# Patient Record
Sex: MALE | Race: WHITE | Employment: OTHER | URBAN - METROPOLITAN AREA
[De-identification: names, ages, dates, MRNs, and addresses within clinical notes are randomized per-mention and may not be internally consistent; named-entity substitution may affect disease eponyms.]

---

## 2017-05-11 ENCOUNTER — HOSPITAL ENCOUNTER (OUTPATIENT)
Dept: ULTRASOUND IMAGING | Age: 82
Discharge: HOME | End: 2017-05-11
Attending: INTERNAL MEDICINE | Admitting: INTERNAL MEDICINE

## 2017-05-11 DIAGNOSIS — N28.9 UNSPECIFIED DISORDER OF KIDNEY AND URETER: ICD-10-CM

## 2018-01-30 ENCOUNTER — HOSPITAL ENCOUNTER (OUTPATIENT)
Dept: GENERAL RADIOLOGY | Age: 83
Discharge: HOME | End: 2018-01-30
Attending: PHYSICIAN ASSISTANT | Admitting: PHYSICIAN ASSISTANT

## 2018-01-30 DIAGNOSIS — R06.09 DOE (DYSPNEA ON EXERTION): ICD-10-CM

## 2018-01-30 DIAGNOSIS — R07.9 CHEST PAIN, UNSPECIFIED TYPE: ICD-10-CM

## 2018-01-30 DIAGNOSIS — R53.1 WEAKNESS: ICD-10-CM

## 2018-01-30 DIAGNOSIS — R00.0 TACHYCARDIA: ICD-10-CM

## 2018-01-30 LAB
ALBUMIN SERPL-MCNC: 3.8 GM/DL (ref 3.4–5)
ALP BLD-CCNC: 111 IU/L (ref 40–129)
ALT SERPL-CCNC: 10 U/L (ref 10–40)
ANION GAP SERPL CALCULATED.3IONS-SCNC: 12 MMOL/L (ref 4–16)
AST SERPL-CCNC: 15 IU/L (ref 15–37)
BASOPHILS ABSOLUTE: 0 K/CU MM
BASOPHILS RELATIVE PERCENT: 0.7 % (ref 0–1)
BILIRUB SERPL-MCNC: 0.2 MG/DL (ref 0–1)
BUN BLDV-MCNC: 25 MG/DL (ref 6–23)
CALCIUM SERPL-MCNC: 9 MG/DL (ref 8.3–10.6)
CHLORIDE BLD-SCNC: 102 MMOL/L (ref 99–110)
CO2: 26 MMOL/L (ref 21–32)
CREAT SERPL-MCNC: 1.5 MG/DL (ref 0.9–1.3)
DIFFERENTIAL TYPE: ABNORMAL
EKG ATRIAL RATE: 87 BPM
EKG DIAGNOSIS: NORMAL
EKG P AXIS: 52 DEGREES
EKG P-R INTERVAL: 200 MS
EKG Q-T INTERVAL: 348 MS
EKG QRS DURATION: 78 MS
EKG QTC CALCULATION (BAZETT): 418 MS
EKG R AXIS: 45 DEGREES
EKG T AXIS: 34 DEGREES
EKG VENTRICULAR RATE: 87 BPM
EOSINOPHILS ABSOLUTE: 0.2 K/CU MM
EOSINOPHILS RELATIVE PERCENT: 3.1 % (ref 0–3)
FERRITIN: 10 NG/ML (ref 30–400)
GFR AFRICAN AMERICAN: 54 ML/MIN/1.73M2
GFR NON-AFRICAN AMERICAN: 44 ML/MIN/1.73M2
GLUCOSE BLD-MCNC: 114 MG/DL (ref 70–99)
HCT VFR BLD CALC: 26.5 % (ref 42–52)
HEMOGLOBIN: 7.7 GM/DL (ref 13.5–18)
IMMATURE NEUTROPHIL %: 0.5 % (ref 0–0.43)
IRON: 24 UG/DL (ref 59–158)
LYMPHOCYTES ABSOLUTE: 0.6 K/CU MM
LYMPHOCYTES RELATIVE PERCENT: 9.9 % (ref 24–44)
MCH RBC QN AUTO: 24.5 PG (ref 27–31)
MCHC RBC AUTO-ENTMCNC: 29.1 % (ref 32–36)
MCV RBC AUTO: 84.4 FL (ref 78–100)
MONOCYTES ABSOLUTE: 0.7 K/CU MM
MONOCYTES RELATIVE PERCENT: 12.4 % (ref 0–4)
PCT TRANSFERRIN: 8 % (ref 10–44)
PDW BLD-RTO: 15.8 % (ref 11.7–14.9)
PLATELET # BLD: 281 K/CU MM (ref 140–440)
PMV BLD AUTO: 10 FL (ref 7.5–11.1)
POTASSIUM SERPL-SCNC: 4.1 MMOL/L (ref 3.5–5.1)
PRO-BNP: 220.9 PG/ML
RBC # BLD: 3.14 M/CU MM (ref 4.6–6.2)
SEGMENTED NEUTROPHILS ABSOLUTE COUNT: 4.1 K/CU MM
SEGMENTED NEUTROPHILS RELATIVE PERCENT: 73.4 % (ref 36–66)
SODIUM BLD-SCNC: 140 MMOL/L (ref 135–145)
TOTAL IMMATURE NEUTOROPHIL: 0.03 K/CU MM
TOTAL IRON BINDING CAPACITY: 297 UG/DL (ref 250–450)
TOTAL PROTEIN: 6.2 GM/DL (ref 6.4–8.2)
TRANSFERRIN: 259.9 MG/DL (ref 200–360)
TSH HIGH SENSITIVITY: 3.65 UIU/ML (ref 0.27–4.2)
UNSATURATED IRON BINDING CAPACITY: 273 UG/DL (ref 110–370)
WBC # BLD: 5.6 K/CU MM (ref 4–10.5)

## 2018-02-01 ENCOUNTER — HOSPITAL ENCOUNTER (OUTPATIENT)
Dept: INFUSION THERAPY | Age: 83
Discharge: HOME | End: 2018-02-01
Attending: FAMILY MEDICINE | Admitting: FAMILY MEDICINE

## 2018-02-01 VITALS
SYSTOLIC BLOOD PRESSURE: 130 MMHG | OXYGEN SATURATION: 98 % | DIASTOLIC BLOOD PRESSURE: 72 MMHG | RESPIRATION RATE: 18 BRPM | HEART RATE: 76 BPM | TEMPERATURE: 97.9 F

## 2018-02-01 RX ORDER — ACETAMINOPHEN 325 MG/1
650 TABLET ORAL SEE ADMIN INSTRUCTIONS
Status: COMPLETED | OUTPATIENT
Start: 2018-02-01 | End: 2018-02-01

## 2018-02-01 RX ORDER — SODIUM CHLORIDE 9 MG/ML
INJECTION, SOLUTION INTRAVENOUS
Status: COMPLETED
Start: 2018-02-01 | End: 2018-02-01

## 2018-02-01 RX ORDER — 0.9 % SODIUM CHLORIDE 0.9 %
250 INTRAVENOUS SOLUTION INTRAVENOUS ONCE
Status: COMPLETED | OUTPATIENT
Start: 2018-02-01 | End: 2018-02-01

## 2018-02-01 RX ORDER — SODIUM CHLORIDE 0.9 % (FLUSH) 0.9 %
10 SYRINGE (ML) INJECTION PRN
Status: DISCONTINUED | OUTPATIENT
Start: 2018-02-01 | End: 2018-02-02 | Stop reason: HOSPADM

## 2018-02-01 RX ADMIN — Medication 10 ML: at 08:25

## 2018-02-01 RX ADMIN — ACETAMINOPHEN 650 MG: 325 TABLET ORAL at 09:12

## 2018-02-01 RX ADMIN — SODIUM CHLORIDE 500 ML: 9 INJECTION, SOLUTION INTRAVENOUS at 09:13

## 2018-02-01 RX ADMIN — Medication 500 ML: at 09:13

## 2018-02-01 NOTE — PLAN OF CARE
Pt taken to room 4 . Pt oriented to room, call light, bed/chair controls, TV, pt voiced understanding. Plan of care explained to pt, pt voiced understanding.

## 2018-04-10 ENCOUNTER — HOSPITAL ENCOUNTER (OUTPATIENT)
Dept: GENERAL RADIOLOGY | Age: 83
Discharge: HOME | End: 2018-04-10
Attending: SPECIALIST | Admitting: SPECIALIST

## 2018-04-10 DIAGNOSIS — K92.2 GASTROINTESTINAL HEMORRHAGE: ICD-10-CM

## 2018-04-10 DIAGNOSIS — D64.9 ANEMIA, UNSPECIFIED TYPE: ICD-10-CM

## 2018-04-10 DIAGNOSIS — K92.2 ACUTE GASTROINTESTINAL HEMORRHAGE: ICD-10-CM

## 2018-04-10 DIAGNOSIS — D64.9 ANEMIA: ICD-10-CM

## 2018-11-19 LAB
CHOLESTEROL, TOTAL: 157 MG/DL
CHOLESTEROL/HDL RATIO: NORMAL
HDLC SERPL-MCNC: 41 MG/DL (ref 35–70)
LDL CHOLESTEROL CALCULATED: 75 MG/DL (ref 0–160)
TRIGL SERPL-MCNC: 207 MG/DL
VLDLC SERPL CALC-MCNC: 41 MG/DL

## 2019-10-12 DIAGNOSIS — N40.1 BPH WITH URINARY OBSTRUCTION: ICD-10-CM

## 2019-10-12 DIAGNOSIS — N13.8 BPH WITH URINARY OBSTRUCTION: ICD-10-CM

## 2019-10-12 DIAGNOSIS — F41.9 ANXIETY: ICD-10-CM

## 2019-10-12 RX ORDER — FERROUS SULFATE 325(65) MG
325 TABLET ORAL 2 TIMES DAILY WITH MEALS
COMMUNITY
End: 2019-11-06

## 2019-10-12 RX ORDER — QUETIAPINE FUMARATE 100 MG/1
50 TABLET, FILM COATED ORAL EVERY EVENING
COMMUNITY
End: 2021-05-24

## 2019-10-12 RX ORDER — MINOCYCLINE HYDROCHLORIDE 100 MG/1
100 CAPSULE ORAL 2 TIMES DAILY
COMMUNITY
End: 2019-11-06

## 2019-10-12 RX ORDER — QUETIAPINE FUMARATE 50 MG/1
50 TABLET, FILM COATED ORAL 2 TIMES DAILY
COMMUNITY
End: 2021-10-20 | Stop reason: ALTCHOICE

## 2019-10-12 RX ORDER — TEMAZEPAM 7.5 MG/1
7.5 CAPSULE ORAL NIGHTLY PRN
COMMUNITY

## 2019-11-06 ENCOUNTER — OFFICE VISIT (OUTPATIENT)
Dept: FAMILY MEDICINE CLINIC | Age: 84
End: 2019-11-06
Payer: MEDICARE

## 2019-11-06 VITALS
OXYGEN SATURATION: 99 % | DIASTOLIC BLOOD PRESSURE: 68 MMHG | WEIGHT: 185.2 LBS | BODY MASS INDEX: 27.43 KG/M2 | HEART RATE: 98 BPM | HEIGHT: 69 IN | SYSTOLIC BLOOD PRESSURE: 122 MMHG

## 2019-11-06 DIAGNOSIS — Z91.81 AT HIGH RISK FOR FALLS: Primary | ICD-10-CM

## 2019-11-06 DIAGNOSIS — G62.9 NEUROPATHY: ICD-10-CM

## 2019-11-06 LAB
A/G RATIO: 1.7 (ref 1.1–2.2)
ALBUMIN SERPL-MCNC: 3.7 G/DL (ref 3.4–5)
ALP BLD-CCNC: 89 U/L (ref 40–129)
ALT SERPL-CCNC: 6 U/L (ref 10–40)
ANION GAP SERPL CALCULATED.3IONS-SCNC: 11 MMOL/L (ref 3–16)
AST SERPL-CCNC: 14 U/L (ref 15–37)
BASOPHILS ABSOLUTE: 0 K/UL (ref 0–0.2)
BASOPHILS RELATIVE PERCENT: 1.3 %
BILIRUB SERPL-MCNC: <0.2 MG/DL (ref 0–1)
BUN BLDV-MCNC: 25 MG/DL (ref 7–20)
CALCIUM SERPL-MCNC: 8.9 MG/DL (ref 8.3–10.6)
CHLORIDE BLD-SCNC: 104 MMOL/L (ref 99–110)
CO2: 24 MMOL/L (ref 21–32)
CREAT SERPL-MCNC: 1.6 MG/DL (ref 0.8–1.3)
EOSINOPHILS ABSOLUTE: 0.2 K/UL (ref 0–0.6)
EOSINOPHILS RELATIVE PERCENT: 5.1 %
FOLATE: 6.45 NG/ML (ref 4.78–24.2)
GFR AFRICAN AMERICAN: 49
GFR NON-AFRICAN AMERICAN: 41
GLOBULIN: 2.2 G/DL
GLUCOSE BLD-MCNC: 118 MG/DL (ref 70–99)
HCT VFR BLD CALC: 27 % (ref 40.5–52.5)
HEMOGLOBIN: 8.7 G/DL (ref 13.5–17.5)
LYMPHOCYTES ABSOLUTE: 0.6 K/UL (ref 1–5.1)
LYMPHOCYTES RELATIVE PERCENT: 17 %
MCH RBC QN AUTO: 26 PG (ref 26–34)
MCHC RBC AUTO-ENTMCNC: 32.2 G/DL (ref 31–36)
MCV RBC AUTO: 80.7 FL (ref 80–100)
MONOCYTES ABSOLUTE: 0.3 K/UL (ref 0–1.3)
MONOCYTES RELATIVE PERCENT: 10.4 %
NEUTROPHILS ABSOLUTE: 2.2 K/UL (ref 1.7–7.7)
NEUTROPHILS RELATIVE PERCENT: 66.2 %
PDW BLD-RTO: 19.3 % (ref 12.4–15.4)
PLATELET # BLD: 203 K/UL (ref 135–450)
PMV BLD AUTO: 8.4 FL (ref 5–10.5)
POTASSIUM SERPL-SCNC: 4.1 MMOL/L (ref 3.5–5.1)
RBC # BLD: 3.34 M/UL (ref 4.2–5.9)
SODIUM BLD-SCNC: 139 MMOL/L (ref 136–145)
TOTAL PROTEIN: 5.9 G/DL (ref 6.4–8.2)
VITAMIN B-12: 390 PG/ML (ref 211–911)
WBC # BLD: 3.4 K/UL (ref 4–11)

## 2019-11-06 PROCEDURE — 1123F ACP DISCUSS/DSCN MKR DOCD: CPT | Performed by: FAMILY MEDICINE

## 2019-11-06 PROCEDURE — 4040F PNEUMOC VAC/ADMIN/RCVD: CPT | Performed by: FAMILY MEDICINE

## 2019-11-06 PROCEDURE — 1036F TOBACCO NON-USER: CPT | Performed by: FAMILY MEDICINE

## 2019-11-06 PROCEDURE — G8427 DOCREV CUR MEDS BY ELIG CLIN: HCPCS | Performed by: FAMILY MEDICINE

## 2019-11-06 PROCEDURE — G8484 FLU IMMUNIZE NO ADMIN: HCPCS | Performed by: FAMILY MEDICINE

## 2019-11-06 PROCEDURE — G8417 CALC BMI ABV UP PARAM F/U: HCPCS | Performed by: FAMILY MEDICINE

## 2019-11-06 PROCEDURE — 99214 OFFICE O/P EST MOD 30 MIN: CPT | Performed by: FAMILY MEDICINE

## 2019-11-06 ASSESSMENT — ENCOUNTER SYMPTOMS
VOMITING: 0
ABDOMINAL PAIN: 0
NAUSEA: 0
COUGH: 0
SHORTNESS OF BREATH: 0
CONSTIPATION: 1
DIARRHEA: 0

## 2019-11-06 ASSESSMENT — PATIENT HEALTH QUESTIONNAIRE - PHQ9
1. LITTLE INTEREST OR PLEASURE IN DOING THINGS: 0
2. FEELING DOWN, DEPRESSED OR HOPELESS: 0
SUM OF ALL RESPONSES TO PHQ9 QUESTIONS 1 & 2: 0
SUM OF ALL RESPONSES TO PHQ QUESTIONS 1-9: 0
SUM OF ALL RESPONSES TO PHQ QUESTIONS 1-9: 0

## 2019-11-07 DIAGNOSIS — D64.9 ANEMIA, UNSPECIFIED TYPE: Primary | ICD-10-CM

## 2019-11-08 ENCOUNTER — TELEPHONE (OUTPATIENT)
Dept: FAMILY MEDICINE CLINIC | Age: 84
End: 2019-11-08

## 2019-11-08 DIAGNOSIS — D64.9 ANEMIA, UNSPECIFIED TYPE: ICD-10-CM

## 2019-11-08 LAB — TOTAL IRON BINDING CAPACITY: 318 UG/DL (ref 260–445)

## 2019-12-02 ENCOUNTER — OFFICE VISIT (OUTPATIENT)
Dept: FAMILY MEDICINE CLINIC | Age: 84
End: 2019-12-02
Payer: MEDICARE

## 2019-12-02 VITALS
BODY MASS INDEX: 24.35 KG/M2 | HEART RATE: 89 BPM | WEIGHT: 179.8 LBS | DIASTOLIC BLOOD PRESSURE: 60 MMHG | HEIGHT: 72 IN | OXYGEN SATURATION: 100 % | SYSTOLIC BLOOD PRESSURE: 115 MMHG

## 2019-12-02 DIAGNOSIS — G57.93 NEUROPATHY OF BOTH FEET: Primary | ICD-10-CM

## 2019-12-02 DIAGNOSIS — I83.93 VARICOSE VEINS OF BOTH LOWER EXTREMITIES, UNSPECIFIED WHETHER COMPLICATED: ICD-10-CM

## 2019-12-02 DIAGNOSIS — G57.93 NEUROPATHY INVOLVING BOTH LOWER EXTREMITIES: ICD-10-CM

## 2019-12-02 DIAGNOSIS — R20.9 COLD EXTREMITIES: ICD-10-CM

## 2019-12-02 PROCEDURE — 1036F TOBACCO NON-USER: CPT | Performed by: PHYSICIAN ASSISTANT

## 2019-12-02 PROCEDURE — G8484 FLU IMMUNIZE NO ADMIN: HCPCS | Performed by: PHYSICIAN ASSISTANT

## 2019-12-02 PROCEDURE — G8427 DOCREV CUR MEDS BY ELIG CLIN: HCPCS | Performed by: PHYSICIAN ASSISTANT

## 2019-12-02 PROCEDURE — 4040F PNEUMOC VAC/ADMIN/RCVD: CPT | Performed by: PHYSICIAN ASSISTANT

## 2019-12-02 PROCEDURE — G8420 CALC BMI NORM PARAMETERS: HCPCS | Performed by: PHYSICIAN ASSISTANT

## 2019-12-02 PROCEDURE — 99214 OFFICE O/P EST MOD 30 MIN: CPT | Performed by: PHYSICIAN ASSISTANT

## 2019-12-02 PROCEDURE — 1123F ACP DISCUSS/DSCN MKR DOCD: CPT | Performed by: PHYSICIAN ASSISTANT

## 2020-01-08 ENCOUNTER — OFFICE VISIT (OUTPATIENT)
Dept: FAMILY MEDICINE CLINIC | Age: 85
End: 2020-01-08
Payer: MEDICARE

## 2020-01-08 VITALS
OXYGEN SATURATION: 100 % | HEART RATE: 77 BPM | SYSTOLIC BLOOD PRESSURE: 118 MMHG | WEIGHT: 177.8 LBS | BODY MASS INDEX: 24.08 KG/M2 | HEIGHT: 72 IN | DIASTOLIC BLOOD PRESSURE: 70 MMHG

## 2020-01-08 DIAGNOSIS — Z13.1 SCREENING FOR DIABETES MELLITUS: ICD-10-CM

## 2020-01-08 DIAGNOSIS — N18.30 CKD (CHRONIC KIDNEY DISEASE) STAGE 3, GFR 30-59 ML/MIN (HCC): ICD-10-CM

## 2020-01-08 DIAGNOSIS — D64.9 ANEMIA, UNSPECIFIED TYPE: ICD-10-CM

## 2020-01-08 DIAGNOSIS — R73.9 HYPERGLYCEMIA: ICD-10-CM

## 2020-01-08 DIAGNOSIS — G57.93 NEUROPATHY INVOLVING BOTH LOWER EXTREMITIES: ICD-10-CM

## 2020-01-08 LAB
A/G RATIO: 1.2 (ref 1.1–2.2)
ALBUMIN SERPL-MCNC: 3.6 G/DL (ref 3.4–5)
ALP BLD-CCNC: 87 U/L (ref 40–129)
ALT SERPL-CCNC: 8 U/L (ref 10–40)
ANION GAP SERPL CALCULATED.3IONS-SCNC: 14 MMOL/L (ref 3–16)
AST SERPL-CCNC: 16 U/L (ref 15–37)
BASOPHILS ABSOLUTE: 0.1 K/UL (ref 0–0.2)
BASOPHILS RELATIVE PERCENT: 2 %
BILIRUB SERPL-MCNC: <0.2 MG/DL (ref 0–1)
BUN BLDV-MCNC: 25 MG/DL (ref 7–20)
CALCIUM SERPL-MCNC: 9.4 MG/DL (ref 8.3–10.6)
CHLORIDE BLD-SCNC: 100 MMOL/L (ref 99–110)
CO2: 24 MMOL/L (ref 21–32)
CREAT SERPL-MCNC: 1.7 MG/DL (ref 0.8–1.3)
EOSINOPHILS ABSOLUTE: 0.2 K/UL (ref 0–0.6)
EOSINOPHILS RELATIVE PERCENT: 6.3 %
FERRITIN: 16.8 NG/ML (ref 30–400)
GFR AFRICAN AMERICAN: 46
GFR NON-AFRICAN AMERICAN: 38
GLOBULIN: 2.9 G/DL
GLUCOSE BLD-MCNC: 93 MG/DL (ref 70–99)
HCT VFR BLD CALC: 28 % (ref 40.5–52.5)
HEMOGLOBIN: 9.2 G/DL (ref 13.5–17.5)
LYMPHOCYTES ABSOLUTE: 0.7 K/UL (ref 1–5.1)
LYMPHOCYTES RELATIVE PERCENT: 19.7 %
MCH RBC QN AUTO: 26.6 PG (ref 26–34)
MCHC RBC AUTO-ENTMCNC: 32.9 G/DL (ref 31–36)
MCV RBC AUTO: 80.9 FL (ref 80–100)
MONOCYTES ABSOLUTE: 0.4 K/UL (ref 0–1.3)
MONOCYTES RELATIVE PERCENT: 10.7 %
NEUTROPHILS ABSOLUTE: 2.1 K/UL (ref 1.7–7.7)
NEUTROPHILS RELATIVE PERCENT: 61.3 %
PDW BLD-RTO: 18.7 % (ref 12.4–15.4)
PLATELET # BLD: 193 K/UL (ref 135–450)
PMV BLD AUTO: 8.4 FL (ref 5–10.5)
POTASSIUM SERPL-SCNC: 4.4 MMOL/L (ref 3.5–5.1)
RBC # BLD: 3.46 M/UL (ref 4.2–5.9)
SODIUM BLD-SCNC: 138 MMOL/L (ref 136–145)
TOTAL PROTEIN: 6.5 G/DL (ref 6.4–8.2)
WBC # BLD: 3.5 K/UL (ref 4–11)

## 2020-01-08 PROCEDURE — 99213 OFFICE O/P EST LOW 20 MIN: CPT | Performed by: PHYSICIAN ASSISTANT

## 2020-01-08 PROCEDURE — G8484 FLU IMMUNIZE NO ADMIN: HCPCS | Performed by: PHYSICIAN ASSISTANT

## 2020-01-08 PROCEDURE — 4040F PNEUMOC VAC/ADMIN/RCVD: CPT | Performed by: PHYSICIAN ASSISTANT

## 2020-01-08 PROCEDURE — G8420 CALC BMI NORM PARAMETERS: HCPCS | Performed by: PHYSICIAN ASSISTANT

## 2020-01-08 PROCEDURE — G8427 DOCREV CUR MEDS BY ELIG CLIN: HCPCS | Performed by: PHYSICIAN ASSISTANT

## 2020-01-08 PROCEDURE — 1036F TOBACCO NON-USER: CPT | Performed by: PHYSICIAN ASSISTANT

## 2020-01-08 PROCEDURE — 1123F ACP DISCUSS/DSCN MKR DOCD: CPT | Performed by: PHYSICIAN ASSISTANT

## 2020-01-08 RX ORDER — GABAPENTIN 100 MG/1
100 CAPSULE ORAL 2 TIMES DAILY
Qty: 60 CAPSULE | Refills: 2 | Status: SHIPPED | OUTPATIENT
Start: 2020-01-08 | End: 2020-11-30

## 2020-01-08 NOTE — PROGRESS NOTES
1/8/2020    Renetta Calvert    Chief Complaint   Patient presents with    Other     f/u for going to a vein specialist for his legs being numb. Pt states that at the vein spec they told him it was not his veins causing it . HPI  History was obtained from patient. Ramirez Price is a 80 y.o. male who presents today for follow-up. Patient has been having ongoing numbness and tingling in bilateral legs and feet for 1-2 years. Coldness at night, wakes him up. Patient has been worked up for this in the past, dating back at least to 2018. Patient has both chronic kidney disease as well as anemia. B12 and Folate most recently was normal.  He was also sent to Dr. Rena Tate for evaluation. There was concern for  varicosities and chronic venous insufficiency causing the complaints, however, ultrasound did not reveal this. Patient did not want an EMG last visit, but is agreeable today. He denies any other pain. REVIEW OF SYMPTOMS    Constitutional:  Denies fever, chills, weight loss or weakness  Cardiovascular:  Denies chest pain, palpitations or swelling  Respiratory:  Denies cough or shortness of breath  Neurologic: Admits numbness, tingling, coldness to bilateral lower extremities.       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    Rt Tricep, Lt Superior Forearm       FAMILY HISTORY  Family History   Problem Relation Age of Onset    Depression Mother     COPD Father     Diabetes Brother     Diabetes Maternal Grandmother        SOCIAL HISTORY  Social History     Socioeconomic History    Marital status:      Spouse name: None    Number of children: None    Years of education: None    Highest education level: None   Occupational History    None   Social Needs    Financial resource strain: None    Food insecurity:     Worry: None     Inability: None    Transportation needs:     Medical: None     Non-medical: None   Tobacco Use    Smoking status: Former Smoker     Packs/day: 1.00     Years: 5.00     Pack years: 5.00     Types: Cigarettes     Start date: 1950     Last attempt to quit: 1955     Years since quittin.2    Smokeless tobacco: Never Used   Substance and Sexual Activity    Alcohol use: Not Currently    Drug use: Not Currently    Sexual activity: None   Lifestyle    Physical activity:     Days per week: None     Minutes per session: None    Stress: None   Relationships    Social connections:     Talks on phone: None     Gets together: None     Attends Holiness service: None     Active member of club or organization: None     Attends meetings of clubs or organizations: None     Relationship status: None    Intimate partner violence:     Fear of current or ex partner: None     Emotionally abused: None     Physically abused: None     Forced sexual activity: None   Other Topics Concern    None   Social History Narrative    None        SURGICAL HISTORY  Past Surgical History:   Procedure Laterality Date    CATARACT REMOVAL      HERNIA REPAIR      NECK SURGERY         CURRENT MEDICATIONS  Current Outpatient Medications   Medication Sig Dispense Refill    gabapentin (NEURONTIN) 100 MG capsule Take 1 capsule by mouth 2 times daily for 30 days. Intended supply: 90 days 60 capsule 2    ferrous sulfate 325 (65 Fe) MG tablet Take 325 mg by mouth daily (with breakfast)      finasteride (PROSCAR) 5 MG tablet TAKE 1 TABLET BY MOUTH EVERY DAY  11    temazepam (RESTORIL) 7.5 MG capsule Take 7.5 mg by mouth nightly as needed for Sleep.       QUEtiapine (SEROQUEL) 100 MG tablet Take 100 mg by mouth every evening      QUEtiapine (SEROQUEL) 50 MG tablet Take 50 mg by mouth daily      sertraline (ZOLOFT) 100 MG tablet Take 100 mg by mouth daily      tamsulosin (FLOMAX) 0.4 MG capsule Take 1 capsule by mouth daily. (Patient taking differently: Take 0.8 mg by mouth daily 01/11/17 Pt to take 2- .04 mg capsules at bedtime) 30 capsule 12     No current facility-administered medications for this visit. ALLERGIES  No Known Allergies    PHYSICAL EXAM    /70   Pulse 77   Ht 6' (1.829 m)   Wt 177 lb 12.8 oz (80.6 kg)   SpO2 100%   BMI 24.11 kg/m²     Constitutional:  Well developed, well nourished  Lymphatic:  No lymphadenopathy noted  Cardiovascular:  Normal heart rate, normal rhythm, no murmurs, gallops or rubs  Thorax & Lungs:  Normal breath sounds, no respiratory distress, no wheezing  Skin:  Warm, dry, no erythema, no rash  Back:  No tenderness, full lumbar ROM  Extremities/Neuro:  No edema, no tenderness, no cyanosis, no clubbing. Sensation intact. Equal strength in bilateral lower extremities. Normal range of motion at the ankles. Distal pulses intact. Varicosities noted. Psychiatric:  Affect normal, mood normal    ASSESSMENT & PLAN    Roc Gonzalez was seen today for other. Diagnoses and all orders for this visit:    Anemia, unspecified type  -     CBC Auto Differential; Future  Last CBC was 11/6/2019 and patient had a hemoglobin of 8.7. Patient cannot tolerate iron supplements. He does try to get iron from diet. We will recheck CBC today. CKD (chronic kidney disease) stage 3, GFR 30-59 ml/min ([facility])  -     Comprehensive Metabolic Panel; Future  Last GFR was 11/6/2019 and was 41. We will recheck CMP today. Neuropathy involving both lower extremities  -     Hemoglobin A1C; Future  -     gabapentin (NEURONTIN) 100 MG capsule; Take 1 capsule by mouth 2 times daily for 30 days. Intended supply: 90 days  -     EMG; Future  Patient has been worked up for this in the past dating back at least to November 2018.   Normal B12 and folate

## 2020-01-09 LAB
ESTIMATED AVERAGE GLUCOSE: 114 MG/DL
HBA1C MFR BLD: 5.6 %

## 2020-01-10 ENCOUNTER — TELEPHONE (OUTPATIENT)
Dept: FAMILY MEDICINE CLINIC | Age: 85
End: 2020-01-10

## 2020-01-10 NOTE — TELEPHONE ENCOUNTER
Spoke with Bettie at 236pm regard. Message below per Dalton MARTINEZ patients wife voiced understanding. Electronically signed by Luigi Mccullough LPN on 7/70/6691 at 2:53 PM    ----- Message from Nida Gale sent at 1/10/2020  2:35 PM EST -----  His ferritin was low, which goes with the other labs she had and had ordered showing his low hemoglobin but I know that is improving. Continue to eat iron rich foods as Micha Flores directed.     Thanks, Marga

## 2020-02-28 ENCOUNTER — HOSPITAL ENCOUNTER (OUTPATIENT)
Dept: NEUROLOGY | Age: 85
Discharge: HOME | End: 2020-02-28
Payer: MEDICARE

## 2020-02-28 PROCEDURE — 95861 NEEDLE EMG 2 EXTREMITIES: CPT

## 2020-05-05 ENCOUNTER — OFFICE VISIT (OUTPATIENT)
Dept: FAMILY MEDICINE CLINIC | Age: 85
End: 2020-05-05
Payer: MEDICARE

## 2020-05-05 VITALS
OXYGEN SATURATION: 100 % | HEART RATE: 93 BPM | TEMPERATURE: 97.6 F | WEIGHT: 169.3 LBS | DIASTOLIC BLOOD PRESSURE: 70 MMHG | HEIGHT: 72 IN | SYSTOLIC BLOOD PRESSURE: 118 MMHG | BODY MASS INDEX: 22.93 KG/M2

## 2020-05-05 PROCEDURE — 99213 OFFICE O/P EST LOW 20 MIN: CPT | Performed by: PHYSICIAN ASSISTANT

## 2020-05-05 PROCEDURE — 1123F ACP DISCUSS/DSCN MKR DOCD: CPT | Performed by: PHYSICIAN ASSISTANT

## 2020-05-05 PROCEDURE — G8420 CALC BMI NORM PARAMETERS: HCPCS | Performed by: PHYSICIAN ASSISTANT

## 2020-05-05 PROCEDURE — G8427 DOCREV CUR MEDS BY ELIG CLIN: HCPCS | Performed by: PHYSICIAN ASSISTANT

## 2020-05-05 PROCEDURE — 4040F PNEUMOC VAC/ADMIN/RCVD: CPT | Performed by: PHYSICIAN ASSISTANT

## 2020-05-05 PROCEDURE — 1036F TOBACCO NON-USER: CPT | Performed by: PHYSICIAN ASSISTANT

## 2020-05-05 ASSESSMENT — ENCOUNTER SYMPTOMS
DIARRHEA: 0
SORE THROAT: 0
CONSTIPATION: 0
ABDOMINAL PAIN: 0
COUGH: 0
BLOOD IN STOOL: 0
EYE PAIN: 0
RHINORRHEA: 1
VOMITING: 0
NAUSEA: 0
SHORTNESS OF BREATH: 0

## 2020-05-05 NOTE — PROGRESS NOTES
5/5/2020    Antoni Kelsey    Chief Complaint   Patient presents with   [facility] Other     pt states that he fell last night and hit his arm on the dresser and has a pretty big cut on his forearm . Pt wants to know if he needs stitches for the one open wound        HPI  History obtained from the patient. Dulce Wang is a 80 y.o. male who presents today for right arm laceration. The patient states that he fell against his dresser yesterday morning about 11:00am. He wants to know if he needs stiches or not. \"It didn't seem to bleed that much. \" Denies pain in hand or wrist. No pain anywhere else. No bruising or swelling. He did not hit his head. He states that he has numbness in his feet, which causes him to trip sometimes. He states that he's fallen quite a bit because of this. Denies history of diabetes. REVIEW OF SYMPTOMS  Review of Systems   Constitutional: Negative for chills and fever. HENT: Positive for rhinorrhea. Negative for ear pain and sore throat. Eyes: Negative for pain and visual disturbance. Respiratory: Negative for cough and shortness of breath. Cardiovascular: Negative for chest pain and palpitations. Gastrointestinal: Negative for abdominal pain, blood in stool, constipation, diarrhea, nausea and vomiting. Genitourinary: Negative for dysuria, frequency and urgency. Skin: Positive for wound. Negative for rash. Allergic/Immunologic: Positive for environmental allergies. Neurological: Negative for dizziness, syncope and light-headedness. Psychiatric/Behavioral: Negative for suicidal ideas. The patient is not nervous/anxious.         PAST MEDICAL HISTORY  Past Medical History:   Diagnosis Date    Anemia     Anxiety     BPH     BPH with urinary obstruction     Depression     GERD (gastroesophageal reflux disease)     Hemorrhoids     Hepatitis     Hernia of unspecified site of abdominal cavity without mention of obstruction or gangrene     Hyperlipidemia     Insomnia     SCC the elbow. Neurological:      Mental Status: He is alert and oriented to person, place, and time. Comments: Cranial nerves II-XII grossly intact   Psychiatric:         Mood and Affect: Mood normal.         Behavior: Behavior normal.                 ASSESSMENT & PLAN  1. Laceration of skin of right forearm, initial encounter  Patient states that the injury occurred at 11:00am yesterday, so the 18 hour window for closure by primary intension has passed. I rinsed the laceration with 10 cc sterile saline, applied antibiotic ointment and dressed the wound with gauze. I instructed the patient to continue applying antibiotic ointment and change the dressings twice daily or if they become soiled. He will return in a week for recheck, to ensure it's healing well and is not infected. Seek immediate medical attention if he develops fever or chills or if the surrounding area becomes edematous and warm to the touch. Return in about 1 week (around 5/12/2020).             Electronically signed by Joseluis Cevallos PA-C on 5/5/2020

## 2020-05-12 ENCOUNTER — OFFICE VISIT (OUTPATIENT)
Dept: FAMILY MEDICINE CLINIC | Age: 85
End: 2020-05-12
Payer: MEDICARE

## 2020-05-12 VITALS
TEMPERATURE: 97.6 F | HEIGHT: 72 IN | SYSTOLIC BLOOD PRESSURE: 100 MMHG | WEIGHT: 174.7 LBS | DIASTOLIC BLOOD PRESSURE: 60 MMHG | OXYGEN SATURATION: 97 % | HEART RATE: 89 BPM | BODY MASS INDEX: 23.66 KG/M2

## 2020-05-12 PROCEDURE — 1036F TOBACCO NON-USER: CPT | Performed by: PHYSICIAN ASSISTANT

## 2020-05-12 PROCEDURE — G8427 DOCREV CUR MEDS BY ELIG CLIN: HCPCS | Performed by: PHYSICIAN ASSISTANT

## 2020-05-12 PROCEDURE — G8420 CALC BMI NORM PARAMETERS: HCPCS | Performed by: PHYSICIAN ASSISTANT

## 2020-05-12 PROCEDURE — 4040F PNEUMOC VAC/ADMIN/RCVD: CPT | Performed by: PHYSICIAN ASSISTANT

## 2020-05-12 PROCEDURE — 99212 OFFICE O/P EST SF 10 MIN: CPT | Performed by: PHYSICIAN ASSISTANT

## 2020-05-12 PROCEDURE — 1123F ACP DISCUSS/DSCN MKR DOCD: CPT | Performed by: PHYSICIAN ASSISTANT

## 2020-05-12 RX ORDER — SULFAMETHOXAZOLE AND TRIMETHOPRIM 800; 160 MG/1; MG/1
1 TABLET ORAL 2 TIMES DAILY
Qty: 20 TABLET | Refills: 0 | Status: SHIPPED | OUTPATIENT
Start: 2020-05-12 | End: 2020-05-22

## 2020-05-12 ASSESSMENT — ENCOUNTER SYMPTOMS
RHINORRHEA: 1
ABDOMINAL PAIN: 0
EYE PAIN: 0
COUGH: 0
SHORTNESS OF BREATH: 0
NAUSEA: 0
DIARRHEA: 0
VOMITING: 0
CONSTIPATION: 0
SORE THROAT: 0

## 2020-05-12 NOTE — PROGRESS NOTES
Spouse name: None    Number of children: None    Years of education: None    Highest education level: None   Occupational History    None   Social Needs    Financial resource strain: None    Food insecurity     Worry: None     Inability: None    Transportation needs     Medical: None     Non-medical: None   Tobacco Use    Smoking status: Former Smoker     Packs/day: 1.00     Years: 5.00     Pack years: 5.00     Types: Cigarettes     Start date: 1950     Last attempt to quit: 1955     Years since quittin.5    Smokeless tobacco: Never Used   Substance and Sexual Activity    Alcohol use: Not Currently    Drug use: Not Currently    Sexual activity: None   Lifestyle    Physical activity     Days per week: None     Minutes per session: None    Stress: None   Relationships    Social connections     Talks on phone: None     Gets together: None     Attends Orthodoxy service: None     Active member of club or organization: None     Attends meetings of clubs or organizations: None     Relationship status: None    Intimate partner violence     Fear of current or ex partner: None     Emotionally abused: None     Physically abused: None     Forced sexual activity: None   Other Topics Concern    None   Social History Narrative    None        SURGICAL HISTORY  Past Surgical History:   Procedure Laterality Date    CATARACT REMOVAL      HERNIA REPAIR      NECK SURGERY         CURRENT MEDICATIONS  Current Outpatient Medications   Medication Sig Dispense Refill    sulfamethoxazole-trimethoprim (BACTRIM DS;SEPTRA DS) 800-160 MG per tablet Take 1 tablet by mouth 2 times daily for 10 days 20 tablet 0    gabapentin (NEURONTIN) 100 MG capsule Take 1 capsule by mouth 2 times daily for 30 days.  Intended supply: 90 days 60 capsule 2    ferrous sulfate 325 (65 Fe) MG tablet Take 325 mg by mouth daily (with breakfast)      finasteride (PROSCAR) 5 MG tablet TAKE 1 TABLET BY MOUTH EVERY DAY  11  temazepam (RESTORIL) 7.5 MG capsule Take 7.5 mg by mouth nightly as needed for Sleep.  QUEtiapine (SEROQUEL) 100 MG tablet Take 100 mg by mouth every evening      QUEtiapine (SEROQUEL) 50 MG tablet Take 50 mg by mouth daily      sertraline (ZOLOFT) 100 MG tablet Take 100 mg by mouth daily      tamsulosin (FLOMAX) 0.4 MG capsule Take 1 capsule by mouth daily. (Patient taking differently: Take 0.8 mg by mouth daily 01/11/17 Pt to take 2- .04 mg capsules at bedtime) 30 capsule 12     No current facility-administered medications for this visit. ALLERGIES  No Known Allergies    RECENT LABS    Lab Results   Component Value Date    LABA1C 5.6 01/08/2020     Lab Results   Component Value Date    .0 01/08/2020       Lab Results   Component Value Date    CHOL 157 11/19/2018    CHOL 204 (H) 09/21/2012     Lab Results   Component Value Date    LDLCALC 75 11/19/2018    LDLCALC 135 (H) 09/21/2012       Lab Results   Component Value Date    WBC 3.5 (L) 01/08/2020    HGB 9.2 (L) 01/08/2020    HCT 28.0 (L) 01/08/2020    MCV 80.9 01/08/2020     01/08/2020       PHYSICAL EXAM  /60 (Site: Left Upper Arm, Position: Sitting, Cuff Size: Medium Adult)   Pulse 89   Temp 97.6 °F (36.4 °C)   Ht 6' (1.829 m)   Wt 174 lb 11.2 oz (79.2 kg)   SpO2 97%   BMI 23.69 kg/m²     Physical Exam  Constitutional:       Appearance: Normal appearance. HENT:      Head: Normocephalic and atraumatic. Eyes:      Comments: EOM grossly intact. Cardiovascular:      Rate and Rhythm: Normal rate and regular rhythm. Heart sounds: No murmur. No friction rub. No gallop. Comments: No carotid bruits. Pulmonary:      Effort: Pulmonary effort is normal.      Breath sounds: Normal breath sounds. Abdominal:      General: Bowel sounds are normal.      Palpations: Abdomen is soft. Skin:     General: Skin is warm and dry.       Comments: Avulsion laceration of right forearm, just distal to the elbow, healing but

## 2020-11-30 ENCOUNTER — VIRTUAL VISIT (OUTPATIENT)
Dept: FAMILY MEDICINE CLINIC | Age: 85
End: 2020-11-30
Payer: MEDICARE

## 2020-11-30 PROCEDURE — 99213 OFFICE O/P EST LOW 20 MIN: CPT | Performed by: FAMILY MEDICINE

## 2020-11-30 NOTE — PROGRESS NOTES
Pierre Calhoun is a 80 y.o. male evaluated via telephone on 11/30/2020. Consent:  He and/or health care decision maker is aware that that he may receive a bill for this telephone service, depending on his insurance coverage, and has provided verbal consent to proceed: Yes      Documentation:  I communicated with the patient and/or health care decision maker about falls, dermatology rash and squamous cell carcinoma, bilateral ankle swelling, peripheral neuropathy and iron deficiency anemia. Details of this discussion including any medical advice provided:     See falls discussion above    Is a notes that the patient has significant peripheral neuropathy for years. That is causing him not to walk nearly as much. He is spending a lot of times in a chair. Son is willing to do lab work to check on reversible causes of neuropathy. We discussed patient's iron deficiency anemia neither son or I are very familiar. I reassured son that his latest count although low of just above 9 was the best in the last 3 years with only checking about once a year. Patient's son denies patient having gastric surgeries therefore most likely a chronic intermittent GI blood loss for years. Patient declined denies seeing blood in his stool. Patient and son were concerned with ankle swelling. Patient has listed a history of varicose veins. Currently is tachycardic although patient does not feel he has infection despite his temperature of 99 4. I suggested that patient use support socks and do a lab evaluation for this in the discussions above. Patient to come in to do a TSH, CMP, CBC, B12, ferritin and iron. Patient follow-up in 4 months or earlier as needed. I affirm this is a Patient Initiated Episode with a Patient who has not had a related appointment within my department in the past 7 days or scheduled within the next 24 hours.     Patient identification was verified at the start of the visit: Yes    Total Time: minutes: 11-20 minutes    Note: not billable if this call serves to triage the patient into an appointment for the relevant concern      Amparo Lips

## 2020-11-30 NOTE — PROGRESS NOTES
On the basis of positive falls risk screening, assessment and plan is as follows: patient declines any further evaluation/treatment for increased falls risk. I offered physical therapy whether in-house or in their office. Son was the intermediary and will discuss with father who is hard of hearing. They are not interested at this moment but will call later if they wish for it.

## 2020-12-23 DIAGNOSIS — G62.9 NEUROPATHY: ICD-10-CM

## 2020-12-23 DIAGNOSIS — D50.0 IRON DEFICIENCY ANEMIA DUE TO CHRONIC BLOOD LOSS: ICD-10-CM

## 2020-12-23 DIAGNOSIS — E78.00 PURE HYPERCHOLESTEROLEMIA: ICD-10-CM

## 2020-12-23 LAB
A/G RATIO: 1.3 (ref 1.1–2.2)
ALBUMIN SERPL-MCNC: 3.2 G/DL (ref 3.4–5)
ALP BLD-CCNC: 103 U/L (ref 40–129)
ALT SERPL-CCNC: 13 U/L (ref 10–40)
ANION GAP SERPL CALCULATED.3IONS-SCNC: 12 MMOL/L (ref 3–16)
AST SERPL-CCNC: 18 U/L (ref 15–37)
BASOPHILS ABSOLUTE: 0 K/UL (ref 0–0.2)
BASOPHILS RELATIVE PERCENT: 0.5 %
BILIRUB SERPL-MCNC: 0.4 MG/DL (ref 0–1)
BUN BLDV-MCNC: 26 MG/DL (ref 7–20)
CALCIUM SERPL-MCNC: 9.3 MG/DL (ref 8.3–10.6)
CHLORIDE BLD-SCNC: 100 MMOL/L (ref 99–110)
CO2: 27 MMOL/L (ref 21–32)
CREAT SERPL-MCNC: 1.6 MG/DL (ref 0.8–1.3)
EOSINOPHILS ABSOLUTE: 0.4 K/UL (ref 0–0.6)
EOSINOPHILS RELATIVE PERCENT: 4.2 %
FERRITIN: 39.2 NG/ML (ref 30–400)
GFR AFRICAN AMERICAN: 49
GFR NON-AFRICAN AMERICAN: 41
GLOBULIN: 2.5 G/DL
GLUCOSE BLD-MCNC: 158 MG/DL (ref 70–99)
HCT VFR BLD CALC: 33.7 % (ref 40.5–52.5)
HEMOGLOBIN: 10.8 G/DL (ref 13.5–17.5)
IRON SATURATION: 17 % (ref 20–50)
IRON: 36 UG/DL (ref 59–158)
LYMPHOCYTES ABSOLUTE: 0.9 K/UL (ref 1–5.1)
LYMPHOCYTES RELATIVE PERCENT: 10.5 %
MCH RBC QN AUTO: 30.3 PG (ref 26–34)
MCHC RBC AUTO-ENTMCNC: 32 G/DL (ref 31–36)
MCV RBC AUTO: 94.7 FL (ref 80–100)
MONOCYTES ABSOLUTE: 0.7 K/UL (ref 0–1.3)
MONOCYTES RELATIVE PERCENT: 8.6 %
NEUTROPHILS ABSOLUTE: 6.6 K/UL (ref 1.7–7.7)
NEUTROPHILS RELATIVE PERCENT: 76.2 %
PDW BLD-RTO: 15.6 % (ref 12.4–15.4)
PLATELET # BLD: 272 K/UL (ref 135–450)
PMV BLD AUTO: 8 FL (ref 5–10.5)
POTASSIUM SERPL-SCNC: 3.4 MMOL/L (ref 3.5–5.1)
RBC # BLD: 3.55 M/UL (ref 4.2–5.9)
SODIUM BLD-SCNC: 139 MMOL/L (ref 136–145)
TOTAL IRON BINDING CAPACITY: 218 UG/DL (ref 260–445)
TOTAL PROTEIN: 5.7 G/DL (ref 6.4–8.2)
TSH SERPL DL<=0.05 MIU/L-ACNC: 6.02 UIU/ML (ref 0.27–4.2)
VITAMIN B-12: 622 PG/ML (ref 211–911)
WBC # BLD: 8.6 K/UL (ref 4–11)

## 2020-12-28 ENCOUNTER — TELEPHONE (OUTPATIENT)
Dept: FAMILY MEDICINE CLINIC | Age: 85
End: 2020-12-28

## 2020-12-28 NOTE — TELEPHONE ENCOUNTER
Calling to see if [facility], his father, needs an appointment. He did get blood work done last week. Please call him back at [phone] to let him know if appointment is needed.

## 2020-12-28 NOTE — TELEPHONE ENCOUNTER
SPOKE WITH SON INFORMED DUE 3/2021. Scheduled appt 3/31/21 9:30am    Should pt have bw done before appt?

## 2020-12-30 PROBLEM — E03.9 ACQUIRED HYPOTHYROIDISM: Status: ACTIVE | Noted: 2020-12-30

## 2020-12-31 RX ORDER — FERROUS SULFATE 325(65) MG
325 TABLET ORAL
Qty: 90 TABLET | Refills: 1 | Status: SHIPPED | OUTPATIENT
Start: 2020-12-31 | End: 2022-04-05

## 2020-12-31 RX ORDER — LEVOTHYROXINE SODIUM 0.05 MG/1
50 TABLET ORAL DAILY
Qty: 90 TABLET | Refills: 1 | Status: SHIPPED | OUTPATIENT
Start: 2020-12-31 | End: 2021-01-22 | Stop reason: DRUGHIGH

## 2020-12-31 RX ORDER — DOCUSATE SODIUM 100 MG/1
100 CAPSULE, LIQUID FILLED ORAL DAILY
Qty: 90 CAPSULE | Refills: 1 | Status: ON HOLD | OUTPATIENT
Start: 2020-12-31 | End: 2021-04-09

## 2020-12-31 NOTE — TELEPHONE ENCOUNTER
Spoke with pt's son Darrin Tay per dr Yolanda Parker note.  Son   agreed & voiced understanding  Requested Prescriptions     Signed Prescriptions Disp Refills    ferrous sulfate (IRON 325) 325 (65 Fe) MG tablet 90 tablet 1     Sig: Take 1 tablet by mouth daily (with breakfast)     Authorizing Provider: Jordyn Wheeler     Ordering User: KISHA Nolen    docusate sodium (COLACE) 100 MG capsule 90 capsule 1     Sig: Take 1 capsule by mouth daily With iron pill     Authorizing Provider: Jordyn Danielle User: Dustin Álvarez levothyroxine (SYNTHROID) 50 MCG tablet 90 tablet 1     Sig: Take 1 tablet by mouth daily     Authorizing Provider: Jordyn Wheeler     Ordering User: Malik Jay

## 2020-12-31 NOTE — TELEPHONE ENCOUNTER
Alisa please call Mr. [place] son back. Thanks for bringing your dad in to do his labs. There are 3 things of note. His iron and thyroid and potassium are all low. He should do better by improving these. Please start FeSO4 325 mg 1 daily #90 refill 1. Colace 100 mg 1 with each iron #90 refill 1 and levothyroxine 50 mcg 1 daily #90 refill 1. Please try to have your dad eat more bananas, oranges and berries as that will raise his potassium. Please consider bringing him in a week before his next appointment and we can recheck all these labs to show you the improvement. (These labs are nonfasting).

## 2021-01-21 ENCOUNTER — OFFICE VISIT (OUTPATIENT)
Dept: FAMILY MEDICINE CLINIC | Age: 86
End: 2021-01-21
Payer: MEDICARE

## 2021-01-21 ENCOUNTER — TELEPHONE (OUTPATIENT)
Dept: FAMILY MEDICINE CLINIC | Age: 86
End: 2021-01-21

## 2021-01-21 VITALS
HEIGHT: 72 IN | DIASTOLIC BLOOD PRESSURE: 72 MMHG | BODY MASS INDEX: 23.69 KG/M2 | HEART RATE: 96 BPM | TEMPERATURE: 97.2 F | SYSTOLIC BLOOD PRESSURE: 110 MMHG

## 2021-01-21 DIAGNOSIS — R60.0 BILATERAL LEG EDEMA: Primary | ICD-10-CM

## 2021-01-21 DIAGNOSIS — R60.0 BILATERAL LEG EDEMA: ICD-10-CM

## 2021-01-21 LAB
A/G RATIO: 1.4 (ref 1.1–2.2)
ALBUMIN SERPL-MCNC: 2.9 G/DL (ref 3.4–5)
ALP BLD-CCNC: 115 U/L (ref 40–129)
ALT SERPL-CCNC: 14 U/L (ref 10–40)
ANION GAP SERPL CALCULATED.3IONS-SCNC: 10 MMOL/L (ref 3–16)
AST SERPL-CCNC: 18 U/L (ref 15–37)
BASOPHILS ABSOLUTE: 0.1 K/UL (ref 0–0.2)
BASOPHILS RELATIVE PERCENT: 1.4 %
BILIRUB SERPL-MCNC: <0.2 MG/DL (ref 0–1)
BUN BLDV-MCNC: 28 MG/DL (ref 7–20)
CALCIUM SERPL-MCNC: 8.7 MG/DL (ref 8.3–10.6)
CHLORIDE BLD-SCNC: 101 MMOL/L (ref 99–110)
CO2: 26 MMOL/L (ref 21–32)
CREAT SERPL-MCNC: 1.8 MG/DL (ref 0.8–1.3)
EOSINOPHILS ABSOLUTE: 0.1 K/UL (ref 0–0.6)
EOSINOPHILS RELATIVE PERCENT: 3.3 %
GFR AFRICAN AMERICAN: 43
GFR NON-AFRICAN AMERICAN: 36
GLOBULIN: 2.1 G/DL
GLUCOSE BLD-MCNC: 118 MG/DL (ref 70–99)
HCT VFR BLD CALC: 30.8 % (ref 40.5–52.5)
HEMOGLOBIN: 10.1 G/DL (ref 13.5–17.5)
LYMPHOCYTES ABSOLUTE: 0.7 K/UL (ref 1–5.1)
LYMPHOCYTES RELATIVE PERCENT: 15.9 %
MCH RBC QN AUTO: 30.4 PG (ref 26–34)
MCHC RBC AUTO-ENTMCNC: 32.9 G/DL (ref 31–36)
MCV RBC AUTO: 92.3 FL (ref 80–100)
MONOCYTES ABSOLUTE: 0.4 K/UL (ref 0–1.3)
MONOCYTES RELATIVE PERCENT: 9.2 %
NEUTROPHILS ABSOLUTE: 3.2 K/UL (ref 1.7–7.7)
NEUTROPHILS RELATIVE PERCENT: 70.2 %
PDW BLD-RTO: 16.2 % (ref 12.4–15.4)
PLATELET # BLD: 176 K/UL (ref 135–450)
PMV BLD AUTO: 8.2 FL (ref 5–10.5)
POTASSIUM SERPL-SCNC: 3.7 MMOL/L (ref 3.5–5.1)
RBC # BLD: 3.34 M/UL (ref 4.2–5.9)
SODIUM BLD-SCNC: 137 MMOL/L (ref 136–145)
T4 FREE: 0.8 NG/DL (ref 0.9–1.8)
TOTAL PROTEIN: 5 G/DL (ref 6.4–8.2)
TSH SERPL DL<=0.05 MIU/L-ACNC: 3.52 UIU/ML (ref 0.27–4.2)
WBC # BLD: 4.6 K/UL (ref 4–11)

## 2021-01-21 PROCEDURE — 1036F TOBACCO NON-USER: CPT | Performed by: PHYSICIAN ASSISTANT

## 2021-01-21 PROCEDURE — G8427 DOCREV CUR MEDS BY ELIG CLIN: HCPCS | Performed by: PHYSICIAN ASSISTANT

## 2021-01-21 PROCEDURE — 1123F ACP DISCUSS/DSCN MKR DOCD: CPT | Performed by: PHYSICIAN ASSISTANT

## 2021-01-21 PROCEDURE — G8420 CALC BMI NORM PARAMETERS: HCPCS | Performed by: PHYSICIAN ASSISTANT

## 2021-01-21 PROCEDURE — G8484 FLU IMMUNIZE NO ADMIN: HCPCS | Performed by: PHYSICIAN ASSISTANT

## 2021-01-21 PROCEDURE — 99214 OFFICE O/P EST MOD 30 MIN: CPT | Performed by: PHYSICIAN ASSISTANT

## 2021-01-21 PROCEDURE — 4040F PNEUMOC VAC/ADMIN/RCVD: CPT | Performed by: PHYSICIAN ASSISTANT

## 2021-01-21 RX ORDER — FUROSEMIDE 20 MG/1
20 TABLET ORAL DAILY
Qty: 60 TABLET | Refills: 3 | Status: SHIPPED | OUTPATIENT
Start: 2021-01-21 | End: 2021-04-28 | Stop reason: SDUPTHER

## 2021-01-21 NOTE — PROGRESS NOTES
1/21/2021    Jenny Liriano    Chief Complaint   Patient presents with    Swelling     bilateral ankles, fluctuates daily, ongoing problems       HPI  History obtained from the patient and his son. Aura Bejarano is a 80 y.o. male who presents today for bilateral LE swelling. This has been a chronic issue for the patient, and he wears compression stockings daily and elevates his feet frequently. The patient states that despite these measures, over the last few days, the swelling to his lower legs has significantly increased over the past few days. He does admit shortness of breath on exertion, though this could be due to deconditioning. Denies chest pain, SOB at rest, or dyspnea. REVIEW OF SYMPTOMS  Review of Systems   Constitutional: Negative for chills and fever. Respiratory: Negative for cough and shortness of breath. Gastrointestinal: Negative for diarrhea and vomiting.      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    Rt Tricep, Lt Superior Forearm       FAMILY HISTORY  Family History   Problem Relation Age of Onset    Depression Mother     COPD Father     Diabetes Brother     Diabetes Maternal Grandmother        SOCIAL HISTORY  Social History     Socioeconomic History    Marital status:      Spouse name: None    Number of children: None    Years of education: None    Highest education level: None   Occupational History    None   Social Needs    Financial resource strain: None    Food insecurity     Worry: None     Inability: None    Transportation needs     Medical: None     Non-medical: None   Tobacco Use    Smoking status: Former Smoker Packs/day: 1.00     Years: 5.00     Pack years: 5.00     Types: Cigarettes     Start date: 1950     Quit date: 1955     Years since quittin.2    Smokeless tobacco: Never Used   Substance and Sexual Activity    Alcohol use: Not Currently    Drug use: Not Currently    Sexual activity: None   Lifestyle    Physical activity     Days per week: None     Minutes per session: None    Stress: None   Relationships    Social connections     Talks on phone: None     Gets together: None     Attends Roman Catholic service: None     Active member of club or organization: None     Attends meetings of clubs or organizations: None     Relationship status: None    Intimate partner violence     Fear of current or ex partner: None     Emotionally abused: None     Physically abused: None     Forced sexual activity: None   Other Topics Concern    None   Social History Narrative    None        SURGICAL HISTORY  Past Surgical History:   Procedure Laterality Date    CATARACT REMOVAL      HERNIA REPAIR      NECK SURGERY         CURRENT MEDICATIONS  Current Outpatient Medications   Medication Sig Dispense Refill    furosemide (LASIX) 20 MG tablet Take 1 tablet by mouth daily 60 tablet 3    sertraline (ZOLOFT) 50 MG tablet Take 50 mg by mouth daily      docusate sodium (COLACE) 100 MG capsule Take 1 capsule by mouth daily With iron pill 90 capsule 1    levothyroxine (SYNTHROID) 50 MCG tablet Take 1 tablet by mouth daily 90 tablet 1    temazepam (RESTORIL) 7.5 MG capsule Take 7.5 mg by mouth nightly as needed for Sleep.  QUEtiapine (SEROQUEL) 100 MG tablet Take 100 mg by mouth every evening      QUEtiapine (SEROQUEL) 50 MG tablet Take 50 mg by mouth 3 times daily       tamsulosin (FLOMAX) 0.4 MG capsule Take 1 capsule by mouth daily.  (Patient taking differently: Take 0.8 mg by mouth daily 17 Pt to take 2- .04 mg capsules at bedtime) 30 capsule 12    ferrous sulfate (IRON 325) 325 (65 Fe) MG tablet Take 1 tablet by mouth daily (with breakfast) (Patient not taking: Reported on 1/21/2021) 90 tablet 1     No current facility-administered medications for this visit. ALLERGIES  Allergies   Allergen Reactions    Minocycline Other (See Comments)       RECENT LABS    Lab Results   Component Value Date    LABA1C 5.6 01/08/2020     Lab Results   Component Value Date    .0 01/08/2020       Lab Results   Component Value Date    CHOL 157 11/19/2018    CHOL 204 (H) 09/21/2012     Lab Results   Component Value Date    LDLCALC 75 11/19/2018    LDLCALC 135 (H) 09/21/2012       Lab Results   Component Value Date    WBC 8.6 12/23/2020    HGB 10.8 (L) 12/23/2020    HCT 33.7 (L) 12/23/2020    MCV 94.7 12/23/2020     12/23/2020       PHYSICAL EXAM  /72   Pulse 96   Temp 97.2 °F (36.2 °C)   Ht 6' (1.829 m)   BMI 23.69 kg/m²     Physical Exam  Constitutional:       Appearance: Normal appearance. HENT:      Head: Normocephalic and atraumatic. Eyes:      Comments: EOM grossly intact. Cardiovascular:      Rate and Rhythm: Normal rate and regular rhythm. Heart sounds: No murmur. No friction rub. No gallop. Pulmonary:      Effort: Pulmonary effort is normal.      Breath sounds: Normal breath sounds. No wheezing, rhonchi or rales. Musculoskeletal:      Comments: 1+ pitting edema bilaterally. Skin:     General: Skin is warm and dry. Neurological:      Mental Status: He is alert and oriented to person, place, and time. Comments: Cranial nerves II-XII grossly intact   Psychiatric:         Mood and Affect: Mood normal.         Behavior: Behavior normal.         ASSESSMENT & PLAN  1. Bilateral leg edema  Prescribed Lasix 20 mg and instructed the patient to increase his dose to 40 mg once daily if swelling has not improved in 2-3 days. Take this in the morning to avoid nocturia. Will check labs and UA and advise based on findings. Follow up in 2 weeks.    - T4, Free; Future  - TSH without Reflex; Future  - Comprehensive Metabolic Panel; Future  - CBC Auto Differential; Future  - URINALYSIS WITH MICROSCOPIC  - furosemide (LASIX) 20 MG tablet; Take 1 tablet by mouth daily  Dispense: 60 tablet; Refill: 3          Return in about 2 weeks (around 2/4/2021).             Electronically signed by Liliane Flower PA-C on 1/21/2021

## 2021-01-22 DIAGNOSIS — R79.89 ELEVATED SERUM CREATININE: ICD-10-CM

## 2021-01-22 DIAGNOSIS — E03.9 ACQUIRED HYPOTHYROIDISM: Primary | ICD-10-CM

## 2021-01-22 LAB
BILIRUBIN URINE: NEGATIVE
BLOOD, URINE: ABNORMAL
CLARITY: ABNORMAL
COLOR: YELLOW
EPITHELIAL CELLS, UA: 4 /HPF (ref 0–5)
GLUCOSE URINE: NEGATIVE MG/DL
HYALINE CASTS: 2 /LPF (ref 0–8)
KETONES, URINE: NEGATIVE MG/DL
LEUKOCYTE ESTERASE, URINE: ABNORMAL
MICROSCOPIC EXAMINATION: YES
NITRITE, URINE: NEGATIVE
PH UA: 6 (ref 5–8)
PROTEIN UA: ABNORMAL MG/DL
RBC UA: ABNORMAL /HPF (ref 0–4)
SPECIFIC GRAVITY UA: 1.02 (ref 1–1.03)
URINE TYPE: ABNORMAL
UROBILINOGEN, URINE: 0.2 E.U./DL
WBC UA: 19 /HPF (ref 0–5)

## 2021-01-22 RX ORDER — LEVOTHYROXINE SODIUM 0.05 MG/1
75 TABLET ORAL DAILY
Qty: 90 TABLET | Refills: 1 | Status: SHIPPED
Start: 2021-01-22 | End: 2021-04-28 | Stop reason: SDUPTHER

## 2021-01-25 DIAGNOSIS — E88.09 SERUM ALBUMIN DECREASED: ICD-10-CM

## 2021-01-25 DIAGNOSIS — R80.9 PROTEINURIA, UNSPECIFIED TYPE: Primary | ICD-10-CM

## 2021-01-25 NOTE — RESULT ENCOUNTER NOTE
Please call the patient and let him know that we just received the results of his urinalysis. It did show some protein loss in his urine. I would like to quantify this with a 24 hour urine collection to see exactly how much protein he is losing in his urine. I placed this order and he can stop by the lab anytime to  the urine collection kit. It did show some signs of a UTI, as well. We only treat this if he is symptomatic though. Has he been experiencing any burning with urination, increased frequency or urgency of urination or lower abdominal discomfort? (If so, I will send in an antibiotic).      Thanks,  Gianni

## 2021-02-04 ENCOUNTER — VIRTUAL VISIT (OUTPATIENT)
Dept: FAMILY MEDICINE CLINIC | Age: 86
End: 2021-02-04
Payer: MEDICARE

## 2021-02-04 ENCOUNTER — TELEPHONE (OUTPATIENT)
Dept: FAMILY MEDICINE CLINIC | Age: 86
End: 2021-02-04

## 2021-02-04 VITALS — HEIGHT: 72 IN | WEIGHT: 174 LBS | BODY MASS INDEX: 23.57 KG/M2

## 2021-02-04 DIAGNOSIS — Z00.00 ROUTINE GENERAL MEDICAL EXAMINATION AT A HEALTH CARE FACILITY: Primary | ICD-10-CM

## 2021-02-04 PROCEDURE — 1123F ACP DISCUSS/DSCN MKR DOCD: CPT | Performed by: FAMILY MEDICINE

## 2021-02-04 PROCEDURE — 4040F PNEUMOC VAC/ADMIN/RCVD: CPT | Performed by: FAMILY MEDICINE

## 2021-02-04 PROCEDURE — G0438 PPPS, INITIAL VISIT: HCPCS | Performed by: FAMILY MEDICINE

## 2021-02-04 ASSESSMENT — LIFESTYLE VARIABLES: HOW OFTEN DO YOU HAVE A DRINK CONTAINING ALCOHOL: 0

## 2021-02-04 ASSESSMENT — PATIENT HEALTH QUESTIONNAIRE - PHQ9
SUM OF ALL RESPONSES TO PHQ QUESTIONS 1-9: 0
SUM OF ALL RESPONSES TO PHQ QUESTIONS 1-9: 0
2. FEELING DOWN, DEPRESSED OR HOPELESS: 0
1. LITTLE INTEREST OR PLEASURE IN DOING THINGS: 0
SUM OF ALL RESPONSES TO PHQ9 QUESTIONS 1 & 2: 0

## 2021-02-04 NOTE — TELEPHONE ENCOUNTER
Called pt's son , Ace Valle, pt missed appt today. Ace Valle states pt has seen Dr. Laina Mcguire, was given 2 rounds of ABX , last urine screen was negative. Pt stated no UTI sx at this time.   appt rescheduled 2-5-21 with larissa martinez, telephone appt

## 2021-02-04 NOTE — PATIENT INSTRUCTIONS
Personalized Preventive Plan for Apple Wheatley - 2/4/2021  Medicare offers a range of preventive health benefits. Some of the tests and screenings are paid in full while other may be subject to a deductible, co-insurance, and/or copay. Some of these benefits include a comprehensive review of your medical history including lifestyle, illnesses that may run in your family, and various assessments and screenings as appropriate. After reviewing your medical record and screening and assessments performed today your provider may have ordered immunizations, labs, imaging, and/or referrals for you. A list of these orders (if applicable) as well as your Preventive Care list are included within your After Visit Summary for your review. Other Preventive Recommendations:    · A preventive eye exam performed by an eye specialist is recommended every 1-2 years to screen for glaucoma; cataracts, macular degeneration, and other eye disorders. · A preventive dental visit is recommended every 6 months. · Try to get at least 150 minutes of exercise per week or 10,000 steps per day on a pedometer . · Order or download the FREE \"Exercise & Physical Activity: Your Everyday Guide\" from [employer]. Call 2-469.788.1502 or search [employer] online. · You need 4988-7220 mg of calcium and 4535-0616 IU of vitamin D per day. It is possible to meet your calcium requirement with diet alone, but a vitamin D supplement is usually necessary to meet this goal.  · When exposed to the sun, use a sunscreen that protects against both UVA and UVB radiation with an SPF of 30 or greater. Reapply every 2 to 3 hours or after sweating, drying off with a towel, or swimming. · Always wear a seat belt when traveling in a car. Always wear a helmet when riding a bicycle or motorcycle.

## 2021-02-04 NOTE — PROGRESS NOTES
Medicare Annual Wellness Visit  Name: Mj Evangelista Date: 2021   MRN: [id number] Sex: Male   Age: 80 y.o. Ethnicity: Non-/Non    : 10/18/1930 Race: Robyn Luevano is here for No chief complaint on file. Screenings for behavioral, psychosocial and functional/safety risks, and cognitive dysfunction are all negative except as indicated below. These results, as well as other patient data from the [address] form, are documented in Flowsheets linked to this Encounter. Allergies   Allergen Reactions    Minocycline Other (See Comments)       Prior to Visit Medications    Medication Sig Taking? Authorizing Provider   levothyroxine (SYNTHROID) 50 MCG tablet Take 1.5 tablets by mouth daily Yes Jenae Stewart PA-C   furosemide (LASIX) 20 MG tablet Take 1 tablet by mouth daily Yes Jenae Stewart PA-C   sertraline (ZOLOFT) 50 MG tablet Take 50 mg by mouth daily Yes Historical Provider, MD   docusate sodium (COLACE) 100 MG capsule Take 1 capsule by mouth daily With iron pill Yes Hardy Hawthorne MD   temazepam (RESTORIL) 7.5 MG capsule Take 7.5 mg by mouth nightly as needed for Sleep. Yes Historical Provider, MD   QUEtiapine (SEROQUEL) 100 MG tablet Take 100 mg by mouth every evening Yes Historical Provider, MD   QUEtiapine (SEROQUEL) 50 MG tablet Take 50 mg by mouth 3 times daily  Yes Historical Provider, MD   tamsulosin (FLOMAX) 0.4 MG capsule Take 1 capsule by mouth daily.   Patient taking differently: Take 0.8 mg by mouth daily 17 Pt to take 2- .04 mg capsules at bedtime Yes López Ashton MD   ferrous sulfate (IRON 325) 325 (65 Fe) MG tablet Take 1 tablet by mouth daily (with breakfast)  Patient not taking: Reported on 2021  Hardy Hawthorne MD       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 Empaneled Provider    Wt Readings from Last 3 Encounters:   05/12/20 174 lb 11.2 oz (79.2 kg)   05/05/20 169 lb 4.8 oz (76.8 kg)   01/08/20 177 lb 12.8 oz (80.6 kg)     There were no vitals filed for this visit. There is no height or weight on file to calculate BMI. Based upon direct observation of the patient, evaluation of cognition reveals recent and remote memory intact. Patient's complete Health Risk Assessment and screening values have been reviewed and are found in Flowsheets. The following problems were reviewed today and where indicated follow up appointments were made and/or referrals ordered. Positive Risk Factor Screenings with Interventions:     Fall Risk:  2 or more falls in past year?: (!) yes  Fall with injury in past year?: no  Fall Risk Interventions:    · Home safety tips provided verbally due to being virtual visit. Cognitive:   Words recalled: 0 Words Recalled  Total Score Interpretation: Positive Mini-Cog  Did the patient refuse to take the cognition test?: No  Cognitive Impairment Interventions:  · Patient declines any further evaluation/treatment for cognitive impairment       General Health and ACP:  General  In general, how would you say your health is?: Good  In the past 7 days, have you experienced any of the following? New or Increased Pain, New or Increased Fatigue, Loneliness, Social Isolation, Stress or Anger?: None of These  Do you get the social and emotional support that you need?: Yes  Do you have a Living Will?: Yes  Advance Directives     Power of Shannan Elmore Will ACP-Advance Directive ACP-Power of     Not on File Not on File Not on File Not on File      General Health Risk Interventions:  · No Living Will: ACP documents already completed- patient asked to provide copy to the office    Health Habits/Nutrition:  Health Habits/Nutrition  Do you exercise for at least 20 minutes 2-3 times per week?: (!) No  Have you lost any weight without trying in the past 3 months?: No  Do you eat only one meal per day?: No  Have you seen the dentist within the past year?: (!) No     Health Habits/Nutrition Interventions:  · Inadequate physical activity:  patient is not ready to increase his/her physical activity level at this time  · Dental exam overdue:  patient encouraged to make appointment with his/her dentist    Hearing/Vision:  No exam data present  Hearing/Vision  Do you or your family notice any trouble with your hearing that hasn't been managed with hearing aids?: No  Do you have difficulty driving, watching TV, or doing any of your daily activities because of your eyesight?: No  Have you had an eye exam within the past year?: (!) No  Hearing/Vision Interventions:  · Vision concerns:  patient encouraged to make appointment with his/her eye specialist    Safety:  Safety  Do you have working smoke detectors?: Yes  Have all throw rugs been removed or fastened?: Yes  Do you have non-slip mats or surfaces in all bathtubs/showers?: Yes  Do all of your stairways have a railing or banister?: Yes  Are your doorways, halls and stairs free of clutter?: Yes  Do you always fasten your seatbelt when you are in a car?: (!) No  Safety Interventions:  · Home safety tips provided verbally due to being virtual visit. ADL:  ADLs  In the past 7 days, did you need help from others to perform any of the following everyday activities? Eating, dressing, grooming, bathing, toileting, or walking/balance?: None  In the past 7 days, did you need help from others to take care of any of the following? Laundry, housekeeping, banking/finances, shopping, telephone use, food preparation, transportation, or taking medications?: (!) Housekeeping, Laundry  ADL Interventions:  · Patient declines any further evaluation/treatment for this issue. Patient states that his son helps with these things. Personalized Preventive Plan   Current Health Maintenance Status  Immunization History   Administered Date(s) Administered    COVID-19, Moderna, 100mcg/0.5ml 01/29/2021    Influenza Virus Vaccine 09/21/2012, 09/05/2019    Influenza, High Dose (Fluzone 65 yrs and older) 09/05/2019        Health Maintenance   Topic Date Due    DTaP/Tdap/Td vaccine (1 - Tdap) [birth year]/1949    Shingles Vaccine (1 of 2) 10/18/1980    Pneumococcal 65+ years Vaccine (1 of 1 - PPSV23) 10/18/1995    PSA counseling  09/21/2013    Annual Wellness Visit (AWV)  11/06/2019    Flu vaccine (1) 09/01/2020    COVID-19 Vaccine (2 of 2 - Moderna series) 02/26/2021    TSH testing  01/21/2022    Potassium monitoring  01/21/2022    Creatinine monitoring  01/21/2022    Hepatitis A vaccine  Aged Out    Hepatitis B vaccine  Aged Out    Hib vaccine  Aged Out    Meningococcal (ACWY) vaccine  Aged Out     Recommendations for LOSC Management Due: see orders and patient instructions/AVS.    Unable to obtain three vital signs due to patient not having equipment to take temperature or Bp. Recommended screening schedule for the next 5-10 years is provided to the patient in written form: see Patient Instructions/AVS.    Jason Alston LPN, 6/8/5603, performed the documented evaluation under the direct supervision of the attending physician.     Talia Curz is a 80 y.o. male being evaluated by a Virtual Visit (audio visit) encounter to address concerns as mentioned above. A caregiver was present when appropriate. Due to this being a TeleHealth encounter (During QNBMY-06 public health emergency), evaluation of the following organ systems was limited: Vitals/Constitutional/EENT/Resp/CV/GI//MS/Neuro/Skin/Heme-Lymph-Imm. Pursuant to the emergency declaration under the [address] [place] [address] and the [employer] Act, this Virtual Visit was conducted with patient's (and/or legal guardian's) consent, to reduce the patient's risk of exposure to COVID-19 and provide necessary medical care. The patient (and/or legal guardian) has also been advised to contact this office for worsening conditions or problems, and seek emergency medical treatment and/or call 911 if deemed necessary. Patient identification was verified at the start of the visit: Yes    Total time spent for this encounter: Not billed by time    Services were provided through audio synchronous discussion virtually to substitute for in-person clinic visit. Patient and provider were located at their individual homes. --Lidia Ma LPN on 9/0/0773 at 9:79 PM    An electronic signature was used to authenticate this note. This encounter was performed under myNess MDs, direct supervision, 2/4/2021.

## 2021-02-05 ENCOUNTER — VIRTUAL VISIT (OUTPATIENT)
Dept: FAMILY MEDICINE CLINIC | Age: 86
End: 2021-02-05
Payer: MEDICARE

## 2021-02-05 DIAGNOSIS — R60.0 BILATERAL LEG EDEMA: Primary | ICD-10-CM

## 2021-02-05 PROCEDURE — G8427 DOCREV CUR MEDS BY ELIG CLIN: HCPCS | Performed by: PHYSICIAN ASSISTANT

## 2021-02-05 PROCEDURE — G8420 CALC BMI NORM PARAMETERS: HCPCS | Performed by: PHYSICIAN ASSISTANT

## 2021-02-05 PROCEDURE — G8484 FLU IMMUNIZE NO ADMIN: HCPCS | Performed by: PHYSICIAN ASSISTANT

## 2021-02-05 PROCEDURE — 4040F PNEUMOC VAC/ADMIN/RCVD: CPT | Performed by: PHYSICIAN ASSISTANT

## 2021-02-05 PROCEDURE — 99212 OFFICE O/P EST SF 10 MIN: CPT | Performed by: PHYSICIAN ASSISTANT

## 2021-02-05 PROCEDURE — 1123F ACP DISCUSS/DSCN MKR DOCD: CPT | Performed by: PHYSICIAN ASSISTANT

## 2021-02-05 PROCEDURE — 1036F TOBACCO NON-USER: CPT | Performed by: PHYSICIAN ASSISTANT

## 2021-02-05 NOTE — PROGRESS NOTES
Albert Harrell is a 80 y.o. male evaluated via telephone on 2/5/2021. Consent:  He and/or health care decision maker is aware that that he may receive a bill for this telephone service, depending on his insurance coverage, and has provided verbal consent to proceed: Yes      Documentation:  I communicated with the patient and/or health care decision maker about LE edema. Details of this discussion including any medical advice provided:     History was given by the patient's son, Alek Neumann. The patient has seen some improvement with his leg swelling. He tried taking Lasix 20 mg but did not like that he had to get up to urinate more frequently with this, so he's only taken it once or twice. He tried taking a half table today. He doesn't drink a lot of fluids during the day but does suck on ice cubes throughout the day. Denies SOB. Pt's son reports that they've been seeing Dr. Melany Duarte for UTI and the patient has been treated with two courses of antibiotics, the last of which was Cipro. He was told by Dr. Melany Duarte that the infection is gone now. The patient's albumin was noted to be low on his recent blood work, and he showed evidence of protein in his urine, so I had ordered a 24 hour urine microalbumin but the patient has not completed this yet. Instructed him to come into the lab to obtain the container and instructions at his earliest convenience. He just started the increased dose of Levothyroxine and has been doing well on this. He has an appointment with Dr. Mariah Pope in 2 months and will recheck thyroid levels and BMP at that time. I affirm this is a Patient Initiated Episode with a Patient who has not had a related appointment within my department in the past 7 days or scheduled within the next 24 hours.     Patient identification was verified at the start of the visit: Yes    Total Time: minutes: 5-10 minutes    Note: not billable if this call serves to triage the patient into an appointment for the relevant concern      [facility]

## 2021-03-02 ENCOUNTER — APPOINTMENT (OUTPATIENT)
Dept: GENERAL RADIOLOGY | Age: 86
DRG: 326 | End: 2021-03-02
Payer: MEDICARE

## 2021-03-02 ENCOUNTER — HOSPITAL ENCOUNTER (INPATIENT)
Age: 86
LOS: 10 days | Discharge: NURSING, REHAB OR LONG-TERM CARE | DRG: 326 | End: 2021-03-12
Attending: EMERGENCY MEDICINE | Admitting: INTERNAL MEDICINE
Payer: MEDICARE

## 2021-03-02 ENCOUNTER — APPOINTMENT (OUTPATIENT)
Dept: CT IMAGING | Age: 86
DRG: 326 | End: 2021-03-02
Payer: MEDICARE

## 2021-03-02 DIAGNOSIS — R07.9 CHEST PAIN, UNSPECIFIED TYPE: ICD-10-CM

## 2021-03-02 DIAGNOSIS — K44.9 HIATAL HERNIA: ICD-10-CM

## 2021-03-02 DIAGNOSIS — R93.3 ABNORMAL COMPUTED TOMOGRAPHY OF STOMACH: Primary | ICD-10-CM

## 2021-03-02 DIAGNOSIS — K92.0 HEMATEMESIS WITH NAUSEA: ICD-10-CM

## 2021-03-02 DIAGNOSIS — D64.9 ANEMIA, UNSPECIFIED TYPE: ICD-10-CM

## 2021-03-02 PROBLEM — K92.2 UGIB (UPPER GASTROINTESTINAL BLEED): Status: ACTIVE | Noted: 2021-03-02

## 2021-03-02 LAB
ALBUMIN SERPL-MCNC: 3.2 GM/DL (ref 3.4–5)
ALP BLD-CCNC: 93 IU/L (ref 40–129)
ALT SERPL-CCNC: 12 U/L (ref 10–40)
ANION GAP SERPL CALCULATED.3IONS-SCNC: 9 MMOL/L (ref 4–16)
AST SERPL-CCNC: 20 IU/L (ref 15–37)
BASOPHILS ABSOLUTE: 0.1 K/CU MM
BASOPHILS RELATIVE PERCENT: 0.9 % (ref 0–1)
BILIRUB SERPL-MCNC: 0.2 MG/DL (ref 0–1)
BUN BLDV-MCNC: 18 MG/DL (ref 6–23)
CALCIUM SERPL-MCNC: 9 MG/DL (ref 8.3–10.6)
CHLORIDE BLD-SCNC: 98 MMOL/L (ref 99–110)
CO2: 30 MMOL/L (ref 21–32)
CREAT SERPL-MCNC: 1.7 MG/DL (ref 0.9–1.3)
DIFFERENTIAL TYPE: ABNORMAL
EOSINOPHILS ABSOLUTE: 0.3 K/CU MM
EOSINOPHILS RELATIVE PERCENT: 3.8 % (ref 0–3)
GFR AFRICAN AMERICAN: 46 ML/MIN/1.73M2
GFR NON-AFRICAN AMERICAN: 38 ML/MIN/1.73M2
GLUCOSE BLD-MCNC: 170 MG/DL (ref 70–99)
HCT VFR BLD CALC: 29.5 % (ref 42–52)
HEMOGLOBIN: 9.3 GM/DL (ref 13.5–18)
IMMATURE NEUTROPHIL %: 0.4 % (ref 0–0.43)
LIPASE: 37 IU/L (ref 13–60)
LYMPHOCYTES ABSOLUTE: 1.4 K/CU MM
LYMPHOCYTES RELATIVE PERCENT: 19.7 % (ref 24–44)
MAGNESIUM: 1.9 MG/DL (ref 1.8–2.4)
MCH RBC QN AUTO: 29.2 PG (ref 27–31)
MCHC RBC AUTO-ENTMCNC: 31.5 % (ref 32–36)
MCV RBC AUTO: 92.8 FL (ref 78–100)
MONOCYTES ABSOLUTE: 0.8 K/CU MM
MONOCYTES RELATIVE PERCENT: 11.2 % (ref 0–4)
NUCLEATED RBC %: 0 %
PDW BLD-RTO: 14.6 % (ref 11.7–14.9)
PLATELET # BLD: 239 K/CU MM (ref 140–440)
PMV BLD AUTO: 9.9 FL (ref 7.5–11.1)
POTASSIUM SERPL-SCNC: 3.4 MMOL/L (ref 3.5–5.1)
PRO-BNP: 248.4 PG/ML
RBC # BLD: 3.18 M/CU MM (ref 4.6–6.2)
SARS-COV-2, NAAT: NOT DETECTED
SEGMENTED NEUTROPHILS ABSOLUTE COUNT: 4.4 K/CU MM
SEGMENTED NEUTROPHILS RELATIVE PERCENT: 64 % (ref 36–66)
SODIUM BLD-SCNC: 137 MMOL/L (ref 135–145)
SOURCE: NORMAL
TOTAL IMMATURE NEUTOROPHIL: 0.03 K/CU MM
TOTAL NUCLEATED RBC: 0 K/CU MM
TOTAL PROTEIN: 6 GM/DL (ref 6.4–8.2)
TROPONIN T: <0.01 NG/ML
TSH HIGH SENSITIVITY: 1.48 UIU/ML (ref 0.27–4.2)
WBC # BLD: 6.9 K/CU MM (ref 4–10.5)

## 2021-03-02 PROCEDURE — 85025 COMPLETE CBC W/AUTO DIFF WBC: CPT

## 2021-03-02 PROCEDURE — 2000000000 HC ICU R&B

## 2021-03-02 PROCEDURE — 86900 BLOOD TYPING SEROLOGIC ABO: CPT

## 2021-03-02 PROCEDURE — 87635 SARS-COV-2 COVID-19 AMP PRB: CPT

## 2021-03-02 PROCEDURE — C9113 INJ PANTOPRAZOLE SODIUM, VIA: HCPCS | Performed by: PHYSICIAN ASSISTANT

## 2021-03-02 PROCEDURE — 86922 COMPATIBILITY TEST ANTIGLOB: CPT

## 2021-03-02 PROCEDURE — P9016 RBC LEUKOCYTES REDUCED: HCPCS

## 2021-03-02 PROCEDURE — 84484 ASSAY OF TROPONIN QUANT: CPT

## 2021-03-02 PROCEDURE — 83880 ASSAY OF NATRIURETIC PEPTIDE: CPT

## 2021-03-02 PROCEDURE — 2580000003 HC RX 258: Performed by: PHYSICIAN ASSISTANT

## 2021-03-02 PROCEDURE — 93005 ELECTROCARDIOGRAM TRACING: CPT | Performed by: PHYSICIAN ASSISTANT

## 2021-03-02 PROCEDURE — 71045 X-RAY EXAM CHEST 1 VIEW: CPT

## 2021-03-02 PROCEDURE — 96375 TX/PRO/DX INJ NEW DRUG ADDON: CPT

## 2021-03-02 PROCEDURE — 6360000004 HC RX CONTRAST MEDICATION: Performed by: PHYSICIAN ASSISTANT

## 2021-03-02 PROCEDURE — 74018 RADEX ABDOMEN 1 VIEW: CPT

## 2021-03-02 PROCEDURE — 84443 ASSAY THYROID STIM HORMONE: CPT

## 2021-03-02 PROCEDURE — 6360000002 HC RX W HCPCS: Performed by: PHYSICIAN ASSISTANT

## 2021-03-02 PROCEDURE — 96376 TX/PRO/DX INJ SAME DRUG ADON: CPT

## 2021-03-02 PROCEDURE — 83690 ASSAY OF LIPASE: CPT

## 2021-03-02 PROCEDURE — 80053 COMPREHEN METABOLIC PANEL: CPT

## 2021-03-02 PROCEDURE — 83735 ASSAY OF MAGNESIUM: CPT

## 2021-03-02 PROCEDURE — 83605 ASSAY OF LACTIC ACID: CPT

## 2021-03-02 PROCEDURE — 99285 EMERGENCY DEPT VISIT HI MDM: CPT

## 2021-03-02 PROCEDURE — 96374 THER/PROPH/DIAG INJ IV PUSH: CPT

## 2021-03-02 PROCEDURE — 86901 BLOOD TYPING SEROLOGIC RH(D): CPT

## 2021-03-02 PROCEDURE — 74174 CTA ABD&PLVS W/CONTRAST: CPT

## 2021-03-02 PROCEDURE — 86850 RBC ANTIBODY SCREEN: CPT

## 2021-03-02 RX ORDER — ONDANSETRON 2 MG/ML
4 INJECTION INTRAMUSCULAR; INTRAVENOUS ONCE
Status: COMPLETED | OUTPATIENT
Start: 2021-03-02 | End: 2021-03-02

## 2021-03-02 RX ORDER — SODIUM CHLORIDE 0.9 % (FLUSH) 0.9 %
10 SYRINGE (ML) INJECTION 2 TIMES DAILY
Status: DISCONTINUED | OUTPATIENT
Start: 2021-03-02 | End: 2021-03-03

## 2021-03-02 RX ORDER — FENTANYL CITRATE 50 UG/ML
25 INJECTION, SOLUTION INTRAMUSCULAR; INTRAVENOUS ONCE
Status: DISCONTINUED | OUTPATIENT
Start: 2021-03-02 | End: 2021-03-04

## 2021-03-02 RX ORDER — PANTOPRAZOLE SODIUM 40 MG/10ML
40 INJECTION, POWDER, LYOPHILIZED, FOR SOLUTION INTRAVENOUS ONCE
Status: COMPLETED | OUTPATIENT
Start: 2021-03-02 | End: 2021-03-02

## 2021-03-02 RX ORDER — 0.9 % SODIUM CHLORIDE 0.9 %
500 INTRAVENOUS SOLUTION INTRAVENOUS ONCE
Status: COMPLETED | OUTPATIENT
Start: 2021-03-02 | End: 2021-03-02

## 2021-03-02 RX ORDER — 0.9 % SODIUM CHLORIDE 0.9 %
500 INTRAVENOUS SOLUTION INTRAVENOUS ONCE
Status: COMPLETED | OUTPATIENT
Start: 2021-03-02 | End: 2021-03-03

## 2021-03-02 RX ORDER — SODIUM CHLORIDE 9 MG/ML
INJECTION, SOLUTION INTRAVENOUS PRN
Status: DISCONTINUED | OUTPATIENT
Start: 2021-03-02 | End: 2021-03-04

## 2021-03-02 RX ADMIN — IOPAMIDOL 75 ML: 755 INJECTION, SOLUTION INTRAVENOUS at 19:46

## 2021-03-02 RX ADMIN — ONDANSETRON 4 MG: 2 INJECTION INTRAMUSCULAR; INTRAVENOUS at 20:47

## 2021-03-02 RX ADMIN — SODIUM CHLORIDE 500 ML: 9 INJECTION, SOLUTION INTRAVENOUS at 23:00

## 2021-03-02 RX ADMIN — PANTOPRAZOLE SODIUM 40 MG: 40 INJECTION, POWDER, FOR SOLUTION INTRAVENOUS at 19:02

## 2021-03-02 RX ADMIN — SODIUM CHLORIDE 500 ML: 9 INJECTION, SOLUTION INTRAVENOUS at 19:02

## 2021-03-02 RX ADMIN — ONDANSETRON 4 MG: 2 INJECTION INTRAMUSCULAR; INTRAVENOUS at 19:02

## 2021-03-02 RX ADMIN — PANTOPRAZOLE SODIUM 40 MG: 40 INJECTION, POWDER, FOR SOLUTION INTRAVENOUS at 23:02

## 2021-03-02 ASSESSMENT — PAIN DESCRIPTION - PAIN TYPE: TYPE: ACUTE PAIN

## 2021-03-02 NOTE — ED PROVIDER NOTES
I independently examined and evaluated Milly Cannon. In brief their history revealed patient is here with chest pain and abdominal pain as well as vomiting blood. Patient was in baseline state of health until this evening when the above started. Patient reports to me \"thought I was having a heart attack\". Patient reports chest pain is left-sided rating into left shoulder. Some associated nausea and vomiting. Patient reports this evening he vomited again some brownish emesis that appeared bloody. Additionally, patient reports that this past weekend he had an episode of vomiting of some blood but felt better afterwards and did not seek evaluation. This is the first time this ever happened. EMS was called tonight and patient did receive full dose aspirin prior to arrival.    Their focused exam revealed the patient is afebrile, tachycardic but slightly hypertensive and otherwise with appropriate oxygen saturation on room air. The patient appears age appropriate, appears well-hydrated, well-nourished. Patient is resting comfortably in bed. Mucous membranes are moist. Speech is clear. Breathing is unlabored. Speaking clearly in full sentences. Patient does have mild epigastric tenderness to palpation. Skin is dry. Mental status is normal. The patient moves all extremities and is without facial droop.     Results for orders placed or performed during the hospital encounter of 03/02/21   COVID-19, Rapid    Specimen: Nasopharyngeal   Result Value Ref Range    Source THROAT     SARS-CoV-2, NAAT NOT DETECTED    CBC Auto Differential   Result Value Ref Range    WBC 6.9 4.0 - 10.5 K/CU MM    RBC 3.18 (L) 4.6 - 6.2 M/CU MM    Hemoglobin 9.3 (L) 13.5 - 18.0 GM/DL    Hematocrit 29.5 (L) 42 - 52 %    MCV 92.8 78 - 100 FL    MCH 29.2 27 - 31 PG    MCHC 31.5 (L) 32.0 - 36.0 %    RDW 14.6 11.7 - 14.9 %    Platelets 838 310 - 198 K/CU MM    MPV 9.9 7.5 - 11.1 FL    Differential Type AUTOMATED DIFFERENTIAL     Segs Relative 64.0 36 - 66 %    Lymphocytes % 19.7 (L) 24 - 44 %    Monocytes % 11.2 (H) 0 - 4 %    Eosinophils % 3.8 (H) 0 - 3 %    Basophils % 0.9 0 - 1 %    Segs Absolute 4.4 K/CU MM    Lymphocytes Absolute 1.4 K/CU MM    Monocytes Absolute 0.8 K/CU MM    Eosinophils Absolute 0.3 K/CU MM    Basophils Absolute 0.1 K/CU MM    Nucleated RBC % 0.0 %    Total Nucleated RBC 0.0 K/CU MM    Total Immature Neutrophil 0.03 K/CU MM    Immature Neutrophil % 0.4 0 - 0.43 %   Comprehensive Metabolic Panel w/ Reflex to MG   Result Value Ref Range    Sodium 137 135 - 145 MMOL/L    Potassium 3.4 (L) 3.5 - 5.1 MMOL/L    Chloride 98 (L) 99 - 110 mMol/L    CO2 30 21 - 32 MMOL/L    BUN 18 6 - 23 MG/DL    CREATININE 1.7 (H) 0.9 - 1.3 MG/DL    Glucose 170 (H) 70 - 99 MG/DL    Calcium 9.0 8.3 - 10.6 MG/DL    Albumin 3.2 (L) 3.4 - 5.0 GM/DL    Total Protein 6.0 (L) 6.4 - 8.2 GM/DL    Total Bilirubin 0.2 0.0 - 1.0 MG/DL    ALT 12 10 - 40 U/L    AST 20 15 - 37 IU/L    Alkaline Phosphatase 93 40 - 129 IU/L    GFR Non- 38 (L) >60 mL/min/1.73m2    GFR  46 (L) >60 mL/min/1.73m2    Anion Gap 9 4 - 16   Lipase   Result Value Ref Range    Lipase 37 13 - 60 IU/L   Troponin   Result Value Ref Range    Troponin T <0.010 <0.01 NG/ML   Magnesium   Result Value Ref Range    Magnesium 1.9 1.8 - 2.4 mg/dl   TYPE AND SCREEN   Result Value Ref Range    ABO/Rh O POSITIVE     Antibody Screen NEGATIVE     Unit Number [id number]     Component LEUKO-POOR RED CELLS     Unit Divison 00     Status ALLOCATED     Transfusion Status OK TO TRANSFUSE     Crossmatch Result COMPATIBLE     Unit Number [id number]     Component LEUKO-POOR RED CELLS     Unit Divison 00     Status ALLOCATED     Transfusion Status OK TO TRANSFUSE     Crossmatch Result COMPATIBLE          12 lead EKG per my interpretation:  Sinus Tachycardia at 117  Axis is   Normal  QTc is  within an acceptable range  There is no specific T wave changes appreciated.   There is no specific ST wave changes appreciated. No STEMI    Prior EKG to compare with was available and no clinically significant change over morphology when compared to prior. ED course: Pt presents as above. Emergent conditions considered. Presentation prompted initial labs, EKG and imaging. Rapid Covid test is negative. CBC does demonstrate acute on chronic anemia with a hemoglobin of 9.3. CMP does demonstrate mild hyperglycemia without acidosis. Lipase is suggestive of a pancreatitis. Troponin negative. Magnesium within normal limits. Type and screen is sent and 2 units of packed red blood cells are placed on hold. Patient is initially mildly tachycardic and is receiving some IV fluids for this. CT imaging of patient's abdomen/pelvis is obtained per GI bleed protocol in does demonstrate possible gastric entrapment. Patient is discussed with his general surgeon, Dr. Alycia Moss, by physicians assistant and he recommends involving GI and starting patient on some Protonix which is done. We will keep n.p.o. at midnight for any possible planned procedures. Patient is discussed with hospitalist team patient is admitted to medicine. While still in the emergency department patient underwent orthostatic vital signs per hospitalist and he was acutely positive. Patient's blood pressure dropped to the 26K systolic and his heart rate jumped to the 150s. Patient also became very nauseous and vomited on the floor. Emesis was brownish and appeared slightly bloody. Given patient's orthostatic positivity and possible continued GI bleed I will give a unit of packed red blood cells. Patient will remain resting in bed until admission. Questions sought and answered with the patient. They voice understanding and agree with plan.     CRITICAL CARE NOTE:  There was a high probability of clinically significant life-threatening deterioration of the patient's condition requiring my urgent intervention due to GI bleed and tachycardia. IV fluid resuscitation, packed red blood cell transfusion was performed to address this. Total critical care time is AT LEAST 15 minutes (of my personal time in addition to PA time). This includes vital sign monitoring, pulse oximetry monitoring, telemetry monitoring, clinical response to the IV medications, reviewing the nursing notes, consultation time, dictation/documentation time, and interpretation of the lab work. This time excludes time spent performing procedures and separately billable procedures and family discussion time. All diagnostic, treatment, and disposition decisions were made by myself in conjunction with the Advanced Practice Provider. For all further details of the patient's emergency department visit, please see the Advanced Practice Provider's documentation.        Dev Paz MD  03/02/21 8836

## 2021-03-02 NOTE — ED PROVIDER NOTES
EKG is interpreted me. EKG shows sinus rhythm 170 bpm, axis appears to be nondeviated, normal questing elevations or depressions, T waves are unremarkable, IA interval 224, QRS duration of 90, QTc of 4-46. Final impression, sinus tachycardia, prolonged IA. Overall nonspecific EKG.     Jeni Real  3/2/2021  6:23 PM        Jeni Real MD  03/02/21 0271

## 2021-03-02 NOTE — ED NOTES
Bed: ED-14  Expected date:   Expected time:   Means of arrival:   Comments:  Moving sanchez bed 4 to this room     Mi Burns RN  03/02/21 0508

## 2021-03-03 LAB
HCT VFR BLD CALC: 34.9 % (ref 42–52)
HEMOGLOBIN: 10.7 GM/DL (ref 13.5–18)
IRON: 27 UG/DL (ref 59–158)
LACTATE: 3.3 MMOL/L (ref 0.4–2)
LV EF: 50 %
LVEF MODALITY: NORMAL
PCT TRANSFERRIN: 10 % (ref 10–44)
PRO-BNP: 868.2 PG/ML
SARS-COV-2, NAAT: NOT DETECTED
SOURCE: NORMAL
TOTAL IRON BINDING CAPACITY: 283 UG/DL (ref 250–450)
TROPONIN T: <0.01 NG/ML
TROPONIN T: <0.01 NG/ML
UNSATURATED IRON BINDING CAPACITY: 256 UG/DL (ref 110–370)

## 2021-03-03 PROCEDURE — 2580000003 HC RX 258: Performed by: INTERNAL MEDICINE

## 2021-03-03 PROCEDURE — 2000000000 HC ICU R&B

## 2021-03-03 PROCEDURE — 6360000002 HC RX W HCPCS: Performed by: INTERNAL MEDICINE

## 2021-03-03 PROCEDURE — 85025 COMPLETE CBC W/AUTO DIFF WBC: CPT

## 2021-03-03 PROCEDURE — 83880 ASSAY OF NATRIURETIC PEPTIDE: CPT

## 2021-03-03 PROCEDURE — 84484 ASSAY OF TROPONIN QUANT: CPT

## 2021-03-03 PROCEDURE — C9113 INJ PANTOPRAZOLE SODIUM, VIA: HCPCS | Performed by: INTERNAL MEDICINE

## 2021-03-03 PROCEDURE — 2500000003 HC RX 250 WO HCPCS: Performed by: INTERNAL MEDICINE

## 2021-03-03 PROCEDURE — 87635 SARS-COV-2 COVID-19 AMP PRB: CPT

## 2021-03-03 PROCEDURE — 83550 IRON BINDING TEST: CPT

## 2021-03-03 PROCEDURE — P9016 RBC LEUKOCYTES REDUCED: HCPCS

## 2021-03-03 PROCEDURE — 83540 ASSAY OF IRON: CPT

## 2021-03-03 PROCEDURE — 36430 TRANSFUSION BLD/BLD COMPNT: CPT

## 2021-03-03 PROCEDURE — 85014 HEMATOCRIT: CPT

## 2021-03-03 PROCEDURE — 93010 ELECTROCARDIOGRAM REPORT: CPT | Performed by: INTERNAL MEDICINE

## 2021-03-03 PROCEDURE — 2580000003 HC RX 258: Performed by: SURGERY

## 2021-03-03 PROCEDURE — 93306 TTE W/DOPPLER COMPLETE: CPT

## 2021-03-03 PROCEDURE — 85018 HEMOGLOBIN: CPT

## 2021-03-03 RX ORDER — LEVOTHYROXINE SODIUM 0.05 MG/1
50 TABLET ORAL DAILY
Status: DISCONTINUED | OUTPATIENT
Start: 2021-03-03 | End: 2021-03-04

## 2021-03-03 RX ORDER — SODIUM CHLORIDE 0.9 % (FLUSH) 0.9 %
10 SYRINGE (ML) INJECTION PRN
Status: DISCONTINUED | OUTPATIENT
Start: 2021-03-03 | End: 2021-03-12 | Stop reason: HOSPADM

## 2021-03-03 RX ORDER — ONDANSETRON 2 MG/ML
4 INJECTION INTRAMUSCULAR; INTRAVENOUS EVERY 6 HOURS PRN
Status: DISCONTINUED | OUTPATIENT
Start: 2021-03-03 | End: 2021-03-12 | Stop reason: HOSPADM

## 2021-03-03 RX ORDER — PROMETHAZINE HYDROCHLORIDE 25 MG/1
12.5 TABLET ORAL EVERY 6 HOURS PRN
Status: DISCONTINUED | OUTPATIENT
Start: 2021-03-03 | End: 2021-03-03

## 2021-03-03 RX ORDER — TAMSULOSIN HYDROCHLORIDE 0.4 MG/1
0.8 CAPSULE ORAL DAILY
Status: DISCONTINUED | OUTPATIENT
Start: 2021-03-03 | End: 2021-03-04

## 2021-03-03 RX ORDER — ACETAMINOPHEN 325 MG/1
650 TABLET ORAL EVERY 6 HOURS PRN
Status: DISCONTINUED | OUTPATIENT
Start: 2021-03-03 | End: 2021-03-12 | Stop reason: HOSPADM

## 2021-03-03 RX ORDER — PANTOPRAZOLE SODIUM 40 MG/10ML
40 INJECTION, POWDER, LYOPHILIZED, FOR SOLUTION INTRAVENOUS EVERY 12 HOURS
Status: DISCONTINUED | OUTPATIENT
Start: 2021-03-03 | End: 2021-03-12 | Stop reason: HOSPADM

## 2021-03-03 RX ORDER — SODIUM CHLORIDE 9 MG/ML
10 INJECTION INTRAVENOUS EVERY 12 HOURS
Status: DISCONTINUED | OUTPATIENT
Start: 2021-03-03 | End: 2021-03-12 | Stop reason: HOSPADM

## 2021-03-03 RX ORDER — DIPHENHYDRAMINE HYDROCHLORIDE 50 MG/ML
25 INJECTION INTRAMUSCULAR; INTRAVENOUS ONCE
Status: COMPLETED | OUTPATIENT
Start: 2021-03-03 | End: 2021-03-04

## 2021-03-03 RX ORDER — SODIUM CHLORIDE, SODIUM LACTATE, POTASSIUM CHLORIDE, CALCIUM CHLORIDE 600; 310; 30; 20 MG/100ML; MG/100ML; MG/100ML; MG/100ML
INJECTION, SOLUTION INTRAVENOUS CONTINUOUS
Status: DISCONTINUED | OUTPATIENT
Start: 2021-03-03 | End: 2021-03-04

## 2021-03-03 RX ORDER — ACETAMINOPHEN 650 MG/1
650 SUPPOSITORY RECTAL EVERY 6 HOURS PRN
Status: DISCONTINUED | OUTPATIENT
Start: 2021-03-03 | End: 2021-03-12 | Stop reason: HOSPADM

## 2021-03-03 RX ORDER — FUROSEMIDE 20 MG/1
20 TABLET ORAL DAILY
Status: DISCONTINUED | OUTPATIENT
Start: 2021-03-03 | End: 2021-03-10

## 2021-03-03 RX ORDER — SODIUM CHLORIDE 0.9 % (FLUSH) 0.9 %
10 SYRINGE (ML) INJECTION EVERY 12 HOURS SCHEDULED
Status: DISCONTINUED | OUTPATIENT
Start: 2021-03-03 | End: 2021-03-12 | Stop reason: HOSPADM

## 2021-03-03 RX ADMIN — SODIUM CHLORIDE, POTASSIUM CHLORIDE, SODIUM LACTATE AND CALCIUM CHLORIDE: 600; 310; 30; 20 INJECTION, SOLUTION INTRAVENOUS at 20:05

## 2021-03-03 RX ADMIN — PANTOPRAZOLE SODIUM 40 MG: 40 INJECTION, POWDER, FOR SOLUTION INTRAVENOUS at 05:07

## 2021-03-03 RX ADMIN — METOPROLOL TARTRATE 5 MG: 5 INJECTION INTRAVENOUS at 17:00

## 2021-03-03 RX ADMIN — SODIUM CHLORIDE, PRESERVATIVE FREE 10 ML: 5 INJECTION INTRAVENOUS at 20:31

## 2021-03-03 RX ADMIN — METOPROLOL TARTRATE 5 MG: 5 INJECTION INTRAVENOUS at 11:29

## 2021-03-03 RX ADMIN — SODIUM CHLORIDE, PRESERVATIVE FREE 10 ML: 5 INJECTION INTRAVENOUS at 16:56

## 2021-03-03 RX ADMIN — PANTOPRAZOLE SODIUM 40 MG: 40 INJECTION, POWDER, FOR SOLUTION INTRAVENOUS at 16:55

## 2021-03-03 RX ADMIN — SODIUM CHLORIDE, POTASSIUM CHLORIDE, SODIUM LACTATE AND CALCIUM CHLORIDE: 600; 310; 30; 20 INJECTION, SOLUTION INTRAVENOUS at 08:47

## 2021-03-03 NOTE — CONSULTS
Soft and nontender. Bowel sounds are present. No  hepatosplenomegaly or guarding appreciated. EXTREMITIES:  No cyanosis or clubbing noted. NEUROLOGIC:  Cranial nerves II through XII are grossly intact. LABORATORY DATA:  His potassium is 3.4, creatinine is 1.7. This is  baseline. Mag is 1.9. Lactic acid 3.3. Troponins are negative. LFTs  are normal.  TSH is normal.  Hemoglobin 10.7 and platelet count is  normal.  The patient had a CT of the abdomen done with contrast, very  large hiatal hernia, again portion of the gastric fundus and gastric  body have migrated inferior to the diaphragm. There is a dilatation of  the _____ stomach and concern for entrapment. EKG shows sinus  tachycardia present. An echo shows LV function appears preserved. IMPRESSION AND PLAN:  This is a 80-year-old male patient who appears  younger than his age. Came with abdominal pain and chest pain present. He has a large hiatal hernia noted with possible obstruction present. The plan is for surgery on the patient. At this time from cardiac  stand, I think he appears stable. It appears that he is moderate risk  for having cardiovascular complication, but is stable to proceed. I  will follow the patient along with you. I will place him on beta  blockers IV if needed.         Ranjit Pierre MD    D: 03/03/2021 9:13:15       T: 03/03/2021 11:13:17     [id number]  Job#: [id number]     Doc#: [id number]    CC:

## 2021-03-03 NOTE — PLAN OF CARE
Problem: Infection:  Goal: Will remain free from infection  Description: Will remain free from infection  Outcome: Ongoing     Problem: Safety:  Goal: Free from accidental physical injury  Description: Free from accidental physical injury  Outcome: Ongoing  Goal: Free from intentional harm  Description: Free from intentional harm  Outcome: Ongoing     Problem: Daily Care:  Goal: Daily care needs are met  Description: Daily care needs are met  Outcome: Ongoing     Problem: Pain:  Goal: Patient's pain/discomfort is manageable  Description: Patient's pain/discomfort is manageable  Outcome: Ongoing     Problem: Skin Integrity:  Goal: Skin integrity will stabilize  Description: Skin integrity will stabilize  Outcome: Ongoing     Problem: Discharge Planning:  Goal: Patients continuum of care needs are met  Description: Patients continuum of care needs are met  Outcome: Ongoing     Problem: Fluid Volume - Imbalance:  Goal: Will show no signs and symptoms of excessive bleeding  Description: Will show no signs and symptoms of excessive bleeding  Outcome: Ongoing  Goal: Absence of imbalanced fluid volume signs and symptoms  Description: Absence of imbalanced fluid volume signs and symptoms  Outcome: Ongoing     Problem: Nausea/Vomiting:  Goal: Absence of nausea/vomiting  Description: Absence of nausea/vomiting  Outcome: Ongoing  Goal: Able to drink  Description: Able to drink  Outcome: Ongoing  Goal: Able to eat  Description: Able to eat  Outcome: Ongoing  Goal: Ability to achieve adequate nutritional intake will improve  Description: Ability to achieve adequate nutritional intake will improve  Outcome: Ongoing

## 2021-03-03 NOTE — ACP (ADVANCE CARE PLANNING)
Advance Care Planning     Advance Care Planning Clinical Specialist  Conversation Note      Date of ACP Conversation: 3/2/2021    [facility] Conducted with: Patient with Decision Making Capacity    ACP Clinical Specialist: Bronwyn Macias Decision Maker:  Primary Decision Maker: Nikia Sun @ [phone]    Current Designated Healthcare Decision Maker:  Nikia Sun @ [phone]    Click here to complete Healthcare Decision Makers including section of the Healthcare Decision Maker Relationship (ie \"Primary\")    Care Preferences    Hospitalization: \"If your health worsens and it becomes clear that your chance of recovery is unlikely, what would your preference be regarding hospitalization? \"    Choice:  [x] The patient wants hospitalization. [] The patient prefers comfort-focused treatment without hospitalization. Ventilation: \"If you were in your present state of health and suddenly became very ill and were unable to breathe on your own, what would your preference be about the use of a ventilator (breathing machine) if it were available to you? \"      If the patient would desire the use of ventilator (breathing machine), answer \"yes\". If not, \"no\": yes    \"If your health worsens and it becomes clear that your chance of recovery is unlikely, what would your preference be about the use of a ventilator (breathing machine) if it were available to you? \"     Would the patient desire the use of ventilator (breathing machine)?: No      Resuscitation  \"CPR works best to restart the heart when there is a sudden event, like a heart attack, in someone who is otherwise healthy. Unfortunately, CPR does not typically restart the heart for people who have serious health conditions or who are very sick. \"    \"In the event your heart stopped as a result of an underlying serious health condition, would you want attempts to be made to restart your heart (answer \"yes\" for attempt to resuscitate) or would you prefer a natural death (answer \"no\" for do not attempt to resuscitate)? \" yes       [x] Yes   [] No   Educated Patient / Jana Graff regarding differences between Advance Directives and portable DNR orders. Length of ACP Conversation in minutes:  12    Conversation Outcomes:  [x] ACP discussion completed  [] Existing advance directive reviewed with patient; no changes to patient's previously recorded wishes  [] New Advance Directive completed  [] Portable Do Not Rescitate prepared for Provider review and signature  [] POLST/POST/MOLST/MOST prepared for Provider review and signature      Follow-up plan:    [] Schedule follow-up conversation to continue planning  [] Referred individual to Provider for additional questions/concerns   [] Advised patient/agent/surrogate to review completed ACP document and update if needed with changes in condition, patient preferences or care setting    [x] This note routed to one or more involved healthcare providers              Patient reports that he does have a completed Medical Power of  but it is at home.  Patient reports that he will have either his wife or son drop it off to the emergency room Case Management from 11 AM to 11 PM.

## 2021-03-03 NOTE — PROGRESS NOTES
Hospitalist Progress Note      Name:  Benton Shook /Age/Sex: 10/18/1930  (80 y.o. male)   MRN & CSN:  [id number] & [id number] Admission Date/Time: 3/2/2021  6:10 PM   Location:  -A PCP: lEa lBunt MD         Hospital Day: 2    Assessment and Plan:   Benton Shook is a 80 y.o.  male  who presents with chest pain, associated nausea and bloody emesis. Upper GI bleed   Acute on chronic blood loss anemia, hemodynamically stable  Large hiatal hernia, symptomatic  Status post PRBC 1 unit, transfused in the ED  Serial H&H, transfuse for Hb less than 7  IV pantoprazole  CT abdomen with large hiatal hernia, with concerns for entrapment  General surgery on board, plans for surgery tomorrow  N.p.o., NG tube in situ  IV fluids, IV antiemetics, analgesics  Iron studies with borderline ESTUARDO  Cardiology consult was placed for cardiac clearance, cleared with moderate risk for adverse cardiovascular events  Prophylactic anticoagulation on hold due to surgery    Chest pain, atypical, likely due to above findings  Serial troponin negative  Echo pending  Cardiology on board            CKD 3, seems at baseline  Avoid nephrotoxins, monitor  Lasix on hold, resume as needed    Chronic medical condition  Neuropathic, continue gabapentin  BPH on Flomax  Hypothyroidism, continue home meds  Anxiety disorder, continue home medications    CODE STATUS, limited no intubation, no CPR, resuscitative meds are allowed      Diet Diet NPO Effective Now Exceptions are: Ice Chips   DVT Prophylaxis [] Lovenox, []  Heparin, [] SCDs, []No VTE prophylaxis, patient ambulating   GI Prophylaxis [] PPI, [] H2 Blocker, [] No GI prophylaxis, patient is receiving diet/Tube Feeds   Code Status Limited   Disposition Patient requires continued admission due to    MDM [] Low, [] Moderate,[]  High  Patient's risk as above due to      History of Present Illness:     Pt S&E.   Patient comfortable, has NG tube in situ, denies chest pain or abdominal pain at this time, feels slightly nauseated    10-14 point ROS reviewed negative, unless as noted above    Objective: Intake/Output Summary (Last 24 hours) at 3/3/2021 1305  Last data filed at 3/3/2021 0507  Gross per 24 hour   Intake 664.08 ml   Output 100 ml   Net 564.08 ml      Vitals:   Vitals:    03/03/21 0602   BP: (!) 149/84   Pulse: 105   Resp: 12   Temp:    SpO2: 95%     Physical Exam:    GEN Awake male, sitting upright in bed in no apparent distress. Appears given age. EYES Pupils are equally round. No scleral erythema, discharge, or conjunctivitis. HENT Mucous membranes are moist.  NG tube in situ  NECK No apparent thyromegaly or masses. RESP Clear to auscultation, no wheezes, rales or rhonchi. Symmetric chest movement while on room air. CARDIO/VASC S1/S2 auscultated. Regular rate without appreciable murmurs, rubs, or gallops. Peripheral pulses equal bilaterally and palpable. No peripheral edema. GI Abdomen is soft without significant tenderness, masses, or guarding. Bowel sounds are normoactive. Rectal exam deferred.  Laureano catheter is not present. HEME/LYMPH No petechiae or ecchymoses. MSK No gross joint deformities. Spontaneous movement of all extremities  SKIN Normal coloration, warm, dry. NEURO Cranial nerves appear grossly intact, normal speech, no lateralizing weakness. PSYCH Awake, alert, oriented x 4. Affect appropriate.     Medications:   Medications:    [Held by provider] levothyroxine  50 mcg Oral Daily    [Held by provider] furosemide  20 mg Oral Daily    tamsulosin  0.8 mg Oral Daily    sodium chloride flush  10 mL Intravenous 2 times per day    pantoprazole  40 mg Intravenous Q12H    And    sodium chloride (PF)  10 mL Intravenous Q12H    metoprolol (LOPRESSOR) ivpb  5 mg Intravenous TID    fentanNYL  25 mcg Intravenous Once      Infusions:    lactated ringers 85 mL/hr at 03/03/21 0847    sodium chloride Stopped (03/03/21 0403)     PRN Meds:   Hilda Garcia sodium chloride flush, 10 mL, PRN      ondansetron, 4 mg, Q6H PRN      acetaminophen, 650 mg, Q6H PRN    Or      acetaminophen, 650 mg, Q6H PRN      sodium chloride, , PRN        Data    Recent Labs     03/02/21 1843 03/03/21  0435   WBC 6.9  --    HGB 9.3* 10.7*   HCT 29.5* 34.9*     --       Recent Labs     03/02/21 1843      K 3.4*   CL 98*   CO2 30   BUN 18   CREATININE 1.7*     Recent Labs     03/02/21 1843   AST 20   ALT 12   BILITOT 0.2   ALKPHOS 93     No results for input(s): INR in the last 72 hours. No results for input(s): CKTOTAL, CKMB, CKMBINDEX, TROPONINI in the last 72 hours.         Electronically signed by Kimberly Garcia MD on 3/3/2021 at 1:05 PM

## 2021-03-03 NOTE — ED NOTES
While attempting orthostatic vital signs on pt with this nurse and Clearance Jesús JIMENEZ pt did not tolerating standing. Pt vomiting large amount of dark brown emesis. Pt immediately put back in bed. hospitalist notified.       Jens Moritz, RN  03/02/21 7085

## 2021-03-03 NOTE — CONSULTS
[facility]  Consultation Note    Trupti Painting M.D.      Reason for Consult: Large hiatal hernia with partial obstruction      Patient's Name/Date of Birth: Denise Mondragon / 10/18/1930 (89 y.o.)    Date: March 3, 2021     HPI:  55-year-old male presented to the emergency room yesterday with a 2-day history of intermittent postprandial vomiting and upper abdominal pain which was worsening. The patient came to the emergency room where he was evaluated and found to have a large hiatal hernia with partial gastric obstruction. The patient was also found to have a anemia with reported hematemesis. He had no hemodynamic instability or active bleeding. The patient did receive blood transfusion nasogastric decompression and IV fluids and is much better today. I was consulted to see the patient regarding his symptomatic hiatal hernia. The patient claims that he has known he has had a hiatal hernia for many years. For over 6 months he has had difficulty eating with intermittent episodes of epigastric and chest discomfort which was most severe on this admission.     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    Rt Tricep, Lt Superior Forearm       Past Surgical History:   Procedure Laterality Date    CATARACT REMOVAL      HERNIA REPAIR      NECK SURGERY         Allergies   Allergen Reactions    Minocycline Other (See Comments)       Family History   Problem Relation Age of Onset    Depression Mother     COPD Father     Diabetes Brother     Diabetes Maternal Grandmother        Social History     Socioeconomic History    Marital status:      Spouse name: Not on file    Number of children: Not on file    Years of education: Not on file    Highest education level: Not on file   Occupational History    Not on file   Social Needs    Financial resource strain: Not on file    Food insecurity     Worry: Not on file     Inability: Not on file    Transportation needs     Medical: Not on file     Non-medical: Not on file   Tobacco Use    Smoking status: Former Smoker     Packs/day: 1.00     Years: 5.00     Pack years: 5.00     Types: Cigarettes     Start date: 1950     Quit date: 1955     Years since quittin.3    Smokeless tobacco: Never Used   Substance and Sexual Activity    Alcohol use: Not Currently    Drug use: Not Currently    Sexual activity: Not on file   Lifestyle    Physical activity     Days per week: Not on file     Minutes per session: Not on file    Stress: Not on file   Relationships    Social connections     Talks on phone: Not on file     Gets together: Not on file     Attends Jainism service: Not on file     Active member of club or organization: Not on file     Attends meetings of clubs or organizations: Not on file     Relationship status: Not on file    Intimate partner violence     Fear of current or ex partner: Not on file     Emotionally abused: Not on file     Physically abused: Not on file     Forced sexual activity: Not on file   Other Topics Concern    Not on file   Social History Narrative    Not on file       ROS: Non-contributory    Physical Exam:  Vitals:    21 0602   BP: (!) 149/84   Pulse: 105   Resp: 12   Temp:    SpO2: 95%   Awake and alert in no distress with a nasogastric tube in place. Brownish drainage noted from the nasogastric tube. Chest: Breath sounds were clear and equal with no rales, wheezes, or rhonchi. Respiratory effort was normal with no retractions or use of accessory muscles. Cardiovascular: Heart sounds were normal with a regular rate and rhythm.   There were no murmurs or gallops. Abdomen: Bowel sounds were normal.  The abdomen was soft and non distended. There was no tenderness, guarding, rebound, or rigidity. There was no masses, hepatosplenomegaly, or hernias. Lower extremities show chronic mild edema of the feet and ankles. .      Labs    CBC:   Lab Results   Component Value Date    WBC 6.9 03/02/2021    RBC 3.18 03/02/2021    HGB 10.7 03/03/2021    HCT 34.9 03/03/2021    MCV 92.8 03/02/2021    MCH 29.2 03/02/2021    MCHC 31.5 03/02/2021    RDW 14.6 03/02/2021     03/02/2021    MPV 9.9 03/02/2021         Assessment/Plan:  25-year-old male with type III hiatal hernia with partial torsion /obstruction  I have spent time with the patient and also called the patient's son and discussed with him the cause of his symptom and the need for surgical repair. The patient does not have any history of other significant medical problems such as severe heart disease but an echo and preoperative cardiac evaluation will be obtained. Due to the patient's age he is at increased risk for postoperative complications such as pulmonary complications and prolonged recovery. I will attempt to repair the hiatal hernia laparoscopically but I did explain to the patient and the patient's family that the need for open repair is quite possible. Possible complications such as bleeding, postop pneumonia, respiratory difficulties and need for ventilator support were also discussed. I will plan on surgery tomorrow. I will also discuss his case with gastroenterology. I do not believe preoperative EGD is mandatory.     Discussed risks of Covid with patient regarding in hospital stay and surgery  Rapid test ordered      Laine Bosworth, MD   3/3/2021 at 7:36 AM

## 2021-03-03 NOTE — H&P
HISTORY AND PHYSICAL  (Hospitalist, Internal Medicine)  IDENTIFYING INFORMATION   PATIENT:  Rigo Gregg  MRN:  [id number]  ADMIT DATE: 3/2/2021  TIME OF EVALUATION: 3/2/2021 10:05 PM    CHIEF COMPLAINT     Throwing up blood  HISTORY OF PRESENT ILLNESS   Rigo Gregg is a 80 y.o. male admitted for hematemesis x2. Patient states that he was doing a blood on Sunday. And again on Tuesday. Patient is alert oriented x4. States that it was brown in color. Otherwise denies any associated complaints. Patient denies any dizziness or lightheadedness, denies any palpitation. Patient denies any chest pain during my interview, however mention chest pain to the EMS and was given aspirin. Patient denies any associated shortness of breath, myalgias, cough or diarrhea. Patient denies any blood per rectum. He does have epigastric pain can complaints dull in nature, also described as mild.     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    Rt Tricep, Lt Superior Forearm     Past Surgical History:   Procedure Laterality Date    CATARACT REMOVAL      HERNIA REPAIR      NECK SURGERY       Family History   Problem Relation Age of Onset    Depression Mother     COPD Father     Diabetes Brother     Diabetes Maternal Grandmother      Family Hx of HTN  Family Hx as reviewed above, otherwise non-contributory  Social History     Socioeconomic History    Marital status:      Spouse name: None    Number of children: None    Years of education: None    Highest education level: None   Occupational History    None   Social Needs    Financial resource strain: None    Food insecurity     Worry: None     Inability: None    Transportation needs     Medical: None     Non-medical: None   Tobacco Use    Smoking status: Former Smoker     Packs/day: 1.00     Years: 5.00     Pack years: 5.00     Types: Cigarettes     Start date: 1950     Quit date: 1955     Years since quittin.3    Smokeless tobacco: Never Used   Substance and Sexual Activity    Alcohol use: Not Currently    Drug use: Not Currently    Sexual activity: None   Lifestyle    Physical activity     Days per week: None     Minutes per session: None    Stress: None   Relationships    Social connections     Talks on phone: None     Gets together: None     Attends Latter day service: None     Active member of club or organization: None     Attends meetings of clubs or organizations: None     Relationship status: None    Intimate partner violence     Fear of current or ex partner: None     Emotionally abused: None     Physically abused: None     Forced sexual activity: None   Other Topics Concern    None   Social History Narrative    None       MEDICATIONS   Medications Prior to Admission  Not in a hospital admission.     Current Medications  Current Facility-Administered Medications   Medication Dose Route Frequency Provider Last Rate Last Admin    0.9 % sodium chloride infusion   Intravenous PRN Hilbert Dakins, PA-C        fentaNYL (SUBLIMAZE) injection 25 mcg  25 mcg Intravenous Once Hilbert Dakins, PA-C        sodium chloride flush 0.9 % injection 10 mL  10 mL Intravenous BID Hilbert Dakins, PA-C        0.9 % sodium chloride bolus  500 mL Intravenous Once Hilbert Dakins, PA-C        pantoprazole (PROTONIX) injection 40 mg  40 mg Intravenous Once Hilbert Dakins, PA-C         Current Outpatient Medications   Medication Sig Dispense Refill    levothyroxine (SYNTHROID) 50 MCG tablet Take 1.5 tablets by mouth daily 90 tablet 1    furosemide (LASIX) 20 MG tablet Take 1 tablet by mouth daily (Patient taking differently: Take 20 mg by mouth daily Son reports pt takes every other day) 60 tablet 3    sertraline (ZOLOFT) 50 MG tablet Take 50 mg by mouth daily      ferrous sulfate (IRON 325) 325 (65 Fe) MG tablet Take 1 tablet by mouth daily (with breakfast) (Patient not taking: Reported on 1/21/2021) 90 tablet 1    docusate sodium (COLACE) 100 MG capsule Take 1 capsule by mouth daily With iron pill 90 capsule 1    temazepam (RESTORIL) 7.5 MG capsule Take 7.5 mg by mouth nightly as needed for Sleep.  QUEtiapine (SEROQUEL) 100 MG tablet Take 100 mg by mouth every evening      QUEtiapine (SEROQUEL) 50 MG tablet Take 50 mg by mouth 3 times daily       tamsulosin (FLOMAX) 0.4 MG capsule Take 1 capsule by mouth daily. (Patient taking differently: Take 0.8 mg by mouth daily 01/11/17 Pt to take 2- .04 mg capsules at bedtime) 30 capsule 12         Allergies  Allergies   Allergen Reactions    Minocycline Other (See Comments)       REVIEW OF SYSTEMS   Within above limitations. 14 point review of systems reviewed. Pertinent positive or negative as per HPI or otherwise negative per 14 point systems review. PHYSICAL EXAM     Wt Readings from Last 3 Encounters:   03/02/21 174 lb (78.9 kg)   02/04/21 174 lb (78.9 kg)   05/12/20 174 lb 11.2 oz (79.2 kg)       Blood pressure (!) 149/91, pulse 114, temperature 98.5 °F (36.9 °C), temperature source Oral, resp. rate 12, height 6' (1.829 m), weight 174 lb (78.9 kg), SpO2 93 %. General - AAO x 3  Psych - Appropriate affect/speech. No agitation  Eyes - Eye lids intact. No scleral icterus  ENT - Lips wnl. External ear clear/dry/intact. No thyromegaly on inspection  Neuro - No gross peripheral or central neuro deficits on inspection  Heart - Sinus. RRR. S1 and S2 present. No added HS/murmurs appreciated. No elevated JVD appreciated  Lung - Adequate air entry b/l, No crackles/wheezes appreciated  GI - Soft.  No guarding/rigidity. No hepatosplenomegaly/ascites. BS+; mild abdominal tenderness at ventral hernia site.  - No CVA/suprapubic tenderness or palpable bladder distension  Skin - Intact. No rash/petechiae/ecchymosis. Warm extremities  MSK - Joints with normal ROM. No joint swellings    Lines/Drains/Airways/Wounds:  [unfilled]    LABS AND IMAGING   CBC  [unfilled]    Last 3 Hemoglobin  Lab Results   Component Value Date    HGB 9.3 03/02/2021    HGB 10.1 01/21/2021    HGB 10.8 12/23/2020     Last 3 WBC/ANC  Lab Results   Component Value Date    WBC 6.9 03/02/2021    WBC 4.6 01/21/2021    WBC 8.6 12/23/2020     No components found for: GRNLOCTYABS  Last 3 Platelets  No results found for: PLATELET  Chemistry  [unfilled]  [unfilled]  No results found for: LDH  Coagulation Studies  No results found for: PTT, INR  Liver Function Studies  Lab Results   Component Value Date    ALT 12 03/02/2021    AST 20 03/02/2021    ALKPHOS 93 03/02/2021       Recent Imaging        Relevant labs and imaging reviewed    ASSESSMENT AND PLAN   Albert Harrell is a 80 y.o. male p/w    Hematememesis, Anemia, unspecified type,   - started Sunday, again started at 4 PM today  - orthostatic VS  - NG tube requested by surgeon  - GI consult  - 2 IV access  - Protonix  - serial CBC    Chest pain, consider ACS, however less likely   Admit to Med/Surg    Telemetry monitoring    Serial troponin level and repeat EKG in AM   Cardiology consult.     Antiplatelet- given by EMS, will defer to cardiology for further tx given, possible hematemesis/Statin  Consider stress test, NPO    Symmetric LE edema, r/o CHF  - ECHO  - added-pro BNP  - hold lasix    CKD (chronic kidney disease) stage 3, GFR 30-59 ml/min ([facility])  -   stable    Neuropathy involving both lower extremities  -     Gabapentin    Hyperglycemia, Type 2 diabetes-?   - random blood glc high  Hypoglycemic protocol  F/u A1c    BPH  - flowmax    Hypothyroidism  - TSH    Anxiety  - no active SI  - c/w home medications    Case d/w ED provider    DVT ppx: SCD  Code status: DNR-CCA, limited, states if his heart were to stop he does not want chest compression or to be intubated. \"I'm 80years old, just let me go if my heart stops. \" Pt is AxO4, has good insight into his own condition, does not recall all his medications need, to be reconciled in the AM, however states he does not take any blood thinners    [facility], Internal Medicine  3/2/2021 at 10:05 PM

## 2021-03-03 NOTE — ED NOTES
12 Fr Ng tube placed. Measured at 54. Dark brown liquid noted in tube.  Xray called     Isatu Menendez RN  03/02/21 4130

## 2021-03-03 NOTE — ED NOTES
Report received from [facility], care assumed at this time.  Pt to Fidelia5 Jamar Elmore, RN  03/02/21 6300

## 2021-03-03 NOTE — CONSULTS
Discussed with DR. Sunitha Mike  Will dictate full note  Need abdominal surgery  Echo pending this am  Thanks     Dictated -[id number]  Moderate risk for CV events but stable from cardiac stand  Echo EF 50% range  RI noted  Would follow

## 2021-03-03 NOTE — ED PROVIDER NOTES
EMERGENCY DEPARTMENT ENCOUNTER        PCP: Brunilda Deshpande MD    [facility]    Chief Complaint   Patient presents with    Chest Pain     onset 1600 today, radiates to L arm. 324 mg asa given by EMS. and states vomiting with blood on Sunday       Of note, this patient was also evaluated by the attending physician, Dr. Isaiah Lamar. HPI      Kevin Jones is a 80 y.o. male former smoker with PMH of hepatitis, HLD, anemia, hemorrhoids, GERD, abdominal hernia who presents via EMS with chest pain and bloody emesis. Patient reports \"I thought I was having a heart attack. \"  Onset of chest pain was 4 PM this evening. Duration has been constant since onset. Aggravating factor is exertion. Alleviating factors are denied. Patient was treated with 324 mg aspirin by EMS prior to arrival without improvement in symptoms. Patient reports chest pain radiates to his left arm. Characteristic of chest pain is described as pressure throughout the anterior chest worse in the left side of the chest.  Severity of pain is 8 out of 10. Activity at onset was resting not do anything stressful or strenuous. Patient reports that shortly after chest pressure began he then became nauseated and had several episodes of brown/bloody appearing emesis. He also reports that 2 days ago he also had brown and bloody appearing emesis and nausea but no chest discomfort at that time. Patient also reports associated bilateral lower extremity edema that is chronic but slightly worse than usual the last several days. Patient denies alcohol use, palpitations, diaphoresis, cough, shortness of breath, melena, hematochezia, abdominal pain, diarrhea, constipation. REVIEW OF SYSTEMS    Constitutional:  Denies fever, chills. HENT:  Denies sore throat or ear pain   Cardiovascular:  +Chest Pain; Denies palpitations,  Denies syncope, no extremity edema.   Respiratory:  Denies cough, shortness of breath, or hemoptysis    GI: See HPI  :  Denies any urinary symptoms  Musculoskeletal:  + Bilateral lower extremity edema;  Denies back pain  Skin:  Denies rash  Neurologic:  Denies lightheadedness, dizziness, headache, focal weakness or sensory changes   Endocrine:  Denies polyuria or polydypsia   Lymphatic:  Denies swollen glands     All other review of systems are negative  See HPI and nursing notes for additional information         PAST MEDICAL & SURGICAL HISTORY    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    Rt Tricep, Lt Superior Forearm     Past Surgical History:   Procedure Laterality Date    CATARACT REMOVAL      HERNIA REPAIR      NECK SURGERY         CURRENT MEDICATIONS        ALLERGIES    Allergies   Allergen Reactions    Minocycline Other (See Comments)       SOCIAL & FAMILY HISTORY    Social History     Socioeconomic History    Marital status:      Spouse name: None    Number of children: None    Years of education: None    Highest education level: None   Occupational History    None   Social Needs    Financial resource strain: None    Food insecurity     Worry: None     Inability: None    Transportation needs     Medical: None     Non-medical: None   Tobacco Use    Smoking status: Former Smoker     Packs/day: 1.00     Years: 5.00     Pack years: 5.00     Types: Cigarettes     Start date: 1950     Quit date: 1955     Years since quittin.3    Smokeless tobacco: Never Used   Substance and Sexual Activity    Alcohol use: Not Currently    Drug use: Not Currently    Sexual activity: None   Lifestyle    Physical activity     Days per week: None     Minutes per session: None    Stress: None Relationships    Social connections     Talks on phone: None     Gets together: None     Attends Yarsanism service: None     Active member of club or organization: None     Attends meetings of clubs or organizations: None     Relationship status: None    Intimate partner violence     Fear of current or ex partner: None     Emotionally abused: None     Physically abused: None     Forced sexual activity: None   Other Topics Concern    None   Social History Narrative    None     Family History   Problem Relation Age of Onset    Depression Mother     COPD Father     Diabetes Brother     Diabetes Maternal Grandmother        PHYSICAL EXAM    VITAL SIGNS: /78   Pulse 141   Temp 99 °F (37.2 °C) (Oral)   Resp 20   Ht 6' (1.829 m)   Wt 190 lb 7.6 oz (86.4 kg)   SpO2 90%   BMI 25.83 kg/m²    Constitutional:  Well developed, well nourished, no acute distress. Patient actively vomiting brown/blood-tinged emesis during examination. HENT:  Atraumatic, moist mucus membranes. No blood or clear fluid in the posterior oropharynx. Nares without blood or clear fluid bilaterally. Neck/Lymphatics: supple, no JVD, no swollen nodes  Respiratory:  Lungs Clear, no retractions, no wheezing, rhonchi, rales  Cardiovascular:  Rate tachycardic, regular Rhythm,  no murmurs/rubs/gallops. No carotid bruits or murmurs heard in carotids. Vascular: Radial pulses and DP pulses 2+ equal bilaterally  GI:  Soft, normal bowel sounds, no distention. No guarding or rigidity. There is mild diffuse abdominal tenderness to palpation in all quadrants. No rebound tenderness. No increased tenderness to palpation over McBurney's point. Negative Jose sign. Negative Rovsing sign. Musculoskeletal: There is symmetric 1+ edema of the bilateral lower extremities. No deformities. No thigh/calf tenderness to palpation bilaterally. Compartments are soft in bilateral lower extremities.   Integument:  Skin warm and dry, no petechiae Neurologic:  Alert & oriented, normal speech  Psych: Pleasant affect, no hallucinations        LABS:  Results for orders placed or performed during the hospital encounter of 03/02/21   COVID-19, Rapid    Specimen: Nasopharyngeal   Result Value Ref Range    Source THROAT     SARS-CoV-2, NAAT NOT DETECTED    CBC Auto Differential   Result Value Ref Range    WBC 6.9 4.0 - 10.5 K/CU MM    RBC 3.18 (L) 4.6 - 6.2 M/CU MM    Hemoglobin 9.3 (L) 13.5 - 18.0 GM/DL    Hematocrit 29.5 (L) 42 - 52 %    MCV 92.8 78 - 100 FL    MCH 29.2 27 - 31 PG    MCHC 31.5 (L) 32.0 - 36.0 %    RDW 14.6 11.7 - 14.9 %    Platelets 278 055 - 207 K/CU MM    MPV 9.9 7.5 - 11.1 FL    Differential Type AUTOMATED DIFFERENTIAL     Segs Relative 64.0 36 - 66 %    Lymphocytes % 19.7 (L) 24 - 44 %    Monocytes % 11.2 (H) 0 - 4 %    Eosinophils % 3.8 (H) 0 - 3 %    Basophils % 0.9 0 - 1 %    Segs Absolute 4.4 K/CU MM    Lymphocytes Absolute 1.4 K/CU MM    Monocytes Absolute 0.8 K/CU MM    Eosinophils Absolute 0.3 K/CU MM    Basophils Absolute 0.1 K/CU MM    Nucleated RBC % 0.0 %    Total Nucleated RBC 0.0 K/CU MM    Total Immature Neutrophil 0.03 K/CU MM    Immature Neutrophil % 0.4 0 - 0.43 %   Comprehensive Metabolic Panel w/ Reflex to MG   Result Value Ref Range    Sodium 137 135 - 145 MMOL/L    Potassium 3.4 (L) 3.5 - 5.1 MMOL/L    Chloride 98 (L) 99 - 110 mMol/L    CO2 30 21 - 32 MMOL/L    BUN 18 6 - 23 MG/DL    CREATININE 1.7 (H) 0.9 - 1.3 MG/DL    Glucose 170 (H) 70 - 99 MG/DL    Calcium 9.0 8.3 - 10.6 MG/DL    Albumin 3.2 (L) 3.4 - 5.0 GM/DL    Total Protein 6.0 (L) 6.4 - 8.2 GM/DL    Total Bilirubin 0.2 0.0 - 1.0 MG/DL    ALT 12 10 - 40 U/L    AST 20 15 - 37 IU/L    Alkaline Phosphatase 93 40 - 129 IU/L    GFR Non- 38 (L) >60 mL/min/1.73m2    GFR  46 (L) >60 mL/min/1.73m2    Anion Gap 9 4 - 16   Lipase   Result Value Ref Range    Lipase 37 13 - 60 IU/L   Troponin   Result Value Ref Range    Troponin T <0.010 <0.01 course. Patient care and presentation staffed with and seen in conjunction with supervising physician, Dr. Elissa Colón. Patient presents as above. Patient placed on telemetry monitoring as well as continuous pulse oximetry monitoring. While in the ED today, labs, imaging, and EKG were obtained. For EKG interpretation- please see ED physician's note. Labs reveal worsening chronic anemia from 10.1 on 1/21 to 9.3 today as well as stable chronic kidney disease with creatinine of 1.7 today which was 1.8 on 1/21. Troponin is negative. Potassium very mildly decreased at 3.4. Type and screen ordered and 2 units of packed red blood cells placed on hold for patient should he decompensate as he has a history of anemia. Lipase normal.  Abdominal exam without peritoneal signs and although patient denies abdominal pain during HPI he does report mild diffuse abdominal tenderness of palpation of all quadrants. Chest xray obtained today and reveals increased size of large hiatal hernia but otherwise unchanged chest x-ray. Pulses are equal in all extremities, no ripping or tearing pain, no widened mediastinum-low clinical suspicion for AAA/thoracic dissection. Patient treated with 500 cc fluid bolus in the ED as he was tachycardic upon arrival with bloody emesis. He was also treated with IV Zofran and IV Protonix with concern for GI bleed. Patient's pain treated with IV fentanyl with improvement. CTA abdomen pelvis obtained today to evaluate for active GI bleed. CTA reveals a very large hiatal hernia and part of the gastric fundus and gastric body have migrated inferior to the diaphragm with dilation of the segment of the stomach concerning for entrapment. Otherwise no acute abnormality identified. No focal extravasation of contrast into the bowel is visualized per radiologist report.   Given findings concerning for entrapment of the gastric body and fundus I did consult with patient's general surgeon, Dr. Klaus Mack, and we discussed the patient's case. He would like GI to get on board and recommends consultation. He would like COVID-19 test to be obtained, patient to be n.p.o. with ice chips, lactic acid to be obtained, and NG tube placed for decompression of the stomach and patient admitted to hospitalist service. NG tube order placed and Covid test ordered and is negative. Lactic acid ordered. I did consult with on-call GI physician, Dr. Palak Goins, and we discussed the patient's case. He would like patient be treated with another 40 mg of IV Protonix for total of 80 mg and then to have 40 mg of Protonix every 12 hours. He agrees with placing NG tube. He would like patient admitted to hospitalist. I consulted with hospitalist and we discussed the patient's case. Hospitalist, Dr. Danette Novak, agrees to admit the patient at this time for further evaluation and care and would like orthostatic vital signs to be obtained. Orthostatic vital signs ordered. When patient was stood up during orthostatic vital sign testing his blood pressure did significantly drop to the 80s and his heart rate increased significantly to the 150s. He was acutely positive. He did have a large episode of brown/slightly bloody emesis with standing as well. Given concern for GI bleed patient was transfused 1 unit packed red blood cells and was ordered to remain resting in bed until admission. Hospitalist was updated. Post NG tube x-ray read that it should be advanced 5 cm. Nursing staff did not realize that it required adjustment prior to patient being taken upstairs for admission. Hospitalist, Dr. Danette Novak, updated on need for adjustment of NG tube. All pertinent Lab data and radiographic results reviewed with patient at bedside. The patient and/or the family were informed of the results of any tests/labs/imaging, the treatment plan, and time was allotted to answer questions.   Diagnosis, disposition, and treatment plan reviewed in detail with patient who

## 2021-03-03 NOTE — CARE COORDINATION
CM into see pt to initiate a safe discharge plan. Cm  introduced self and explained role of CM. Pt is kind, alert and oriented. Pt lives with his spouse. Pt home is a one level with basement. Pt has  three children and one lives in [place]. Two dtrs live out of state. Son is described as being very supportive. Pt DME includes a walker, cane and shower seat. Pt informed CM that he sometimes drives but  pts son helps with groceries/deliveries. Wife is currently receiving homecare and pt is agreement to have home care for himself. CM provided card and encouraged to call for any needs or concern. CM is available if any needs arise. CM called pts son and spoke with him regarding discharge planning. They would like [facility] to follow. CM called [facility] and spoke with [employer]. CM provided referral and faxed information.   Upon discharge pt will be going home with [facility]  Please call family  Please fax H/P, D/S.  AVS, order, and face sheet to [phone]  Please call [phone]  and inform of discharge

## 2021-03-03 NOTE — FLOWSHEET NOTE
Pt arrived in ICU to room 2111, transferred to ICU bed and hooked up to monitor. One unit of PRBCs currently infusing. VSS with ST noted on monitor. Oral temp 101.1, /95, RR 15, SPO2 92% on room air. Denies chills or Shortness of breath. 2 RN skin assessment complete per this RN and primary RN Noelle Lindquist noted to have excoriation to bilateral groin, with some scattered bruising and bottom pink and blanchable. NGT hooked up to LIS , 50cm at left nare, with brown output. Abdomen soft to touch, active bs x 4 quad. Updated on POC, voices understanding. Oriented to room and call light.

## 2021-03-03 NOTE — ED NOTES
Pt had episode of emesis dark brown in color. Pt cleaned and linens changed.       Kelley Tijerina RN  03/02/21 2017

## 2021-03-04 ENCOUNTER — APPOINTMENT (OUTPATIENT)
Dept: GENERAL RADIOLOGY | Age: 86
DRG: 326 | End: 2021-03-04
Payer: MEDICARE

## 2021-03-04 ENCOUNTER — ANESTHESIA EVENT (OUTPATIENT)
Dept: OPERATING ROOM | Age: 86
DRG: 326 | End: 2021-03-04
Payer: MEDICARE

## 2021-03-04 ENCOUNTER — ANESTHESIA (OUTPATIENT)
Dept: OPERATING ROOM | Age: 86
DRG: 326 | End: 2021-03-04
Payer: MEDICARE

## 2021-03-04 VITALS
SYSTOLIC BLOOD PRESSURE: 134 MMHG | TEMPERATURE: 96.4 F | DIASTOLIC BLOOD PRESSURE: 90 MMHG | RESPIRATION RATE: 29 BRPM | OXYGEN SATURATION: 100 %

## 2021-03-04 LAB
ANION GAP SERPL CALCULATED.3IONS-SCNC: 8 MMOL/L (ref 4–16)
BASOPHILS ABSOLUTE: 0.1 K/CU MM
BASOPHILS RELATIVE PERCENT: 0.6 % (ref 0–1)
BUN BLDV-MCNC: 21 MG/DL (ref 6–23)
CALCIUM SERPL-MCNC: 8.3 MG/DL (ref 8.3–10.6)
CHLORIDE BLD-SCNC: 104 MMOL/L (ref 99–110)
CO2: 25 MMOL/L (ref 21–32)
CREAT SERPL-MCNC: 1.7 MG/DL (ref 0.9–1.3)
DIFFERENTIAL TYPE: ABNORMAL
EOSINOPHILS ABSOLUTE: 0.1 K/CU MM
EOSINOPHILS RELATIVE PERCENT: 0.8 % (ref 0–3)
GFR AFRICAN AMERICAN: 46 ML/MIN/1.73M2
GFR NON-AFRICAN AMERICAN: 38 ML/MIN/1.73M2
GLUCOSE BLD-MCNC: 85 MG/DL (ref 70–99)
HCT VFR BLD CALC: 30.3 % (ref 42–52)
HEMOGLOBIN: 9.5 GM/DL (ref 13.5–18)
IMMATURE NEUTROPHIL %: 0.3 % (ref 0–0.43)
LYMPHOCYTES ABSOLUTE: 0.6 K/CU MM
LYMPHOCYTES RELATIVE PERCENT: 6.7 % (ref 24–44)
MCH RBC QN AUTO: 29.2 PG (ref 27–31)
MCHC RBC AUTO-ENTMCNC: 31.4 % (ref 32–36)
MCV RBC AUTO: 93.2 FL (ref 78–100)
MONOCYTES ABSOLUTE: 0.7 K/CU MM
MONOCYTES RELATIVE PERCENT: 8 % (ref 0–4)
NUCLEATED RBC %: 0 %
PDW BLD-RTO: 14.6 % (ref 11.7–14.9)
PLATELET # BLD: 212 K/CU MM (ref 140–440)
PMV BLD AUTO: 10 FL (ref 7.5–11.1)
POTASSIUM SERPL-SCNC: 4.1 MMOL/L (ref 3.5–5.1)
RBC # BLD: 3.25 M/CU MM (ref 4.6–6.2)
SEGMENTED NEUTROPHILS ABSOLUTE COUNT: 7.5 K/CU MM
SEGMENTED NEUTROPHILS RELATIVE PERCENT: 83.6 % (ref 36–66)
SODIUM BLD-SCNC: 137 MMOL/L (ref 135–145)
TOTAL IMMATURE NEUTOROPHIL: 0.03 K/CU MM
TOTAL NUCLEATED RBC: 0 K/CU MM
WBC # BLD: 9 K/CU MM (ref 4–10.5)

## 2021-03-04 PROCEDURE — 0DV44ZZ RESTRICTION OF ESOPHAGOGASTRIC JUNCTION, PERCUTANEOUS ENDOSCOPIC APPROACH: ICD-10-PCS | Performed by: SURGERY

## 2021-03-04 PROCEDURE — 2580000003 HC RX 258: Performed by: SURGERY

## 2021-03-04 PROCEDURE — 2700000000 HC OXYGEN THERAPY PER DAY

## 2021-03-04 PROCEDURE — 6360000002 HC RX W HCPCS: Performed by: INTERNAL MEDICINE

## 2021-03-04 PROCEDURE — 3700000000 HC ANESTHESIA ATTENDED CARE: Performed by: SURGERY

## 2021-03-04 PROCEDURE — 0BQT4ZZ REPAIR DIAPHRAGM, PERCUTANEOUS ENDOSCOPIC APPROACH: ICD-10-PCS | Performed by: SURGERY

## 2021-03-04 PROCEDURE — 6360000002 HC RX W HCPCS: Performed by: NURSE ANESTHETIST, CERTIFIED REGISTERED

## 2021-03-04 PROCEDURE — 6360000002 HC RX W HCPCS: Performed by: SURGERY

## 2021-03-04 PROCEDURE — 3600000014 HC SURGERY LEVEL 4 ADDTL 15MIN: Performed by: SURGERY

## 2021-03-04 PROCEDURE — 94761 N-INVAS EAR/PLS OXIMETRY MLT: CPT

## 2021-03-04 PROCEDURE — 6360000002 HC RX W HCPCS: Performed by: ANESTHESIOLOGY

## 2021-03-04 PROCEDURE — 2000000000 HC ICU R&B

## 2021-03-04 PROCEDURE — 80048 BASIC METABOLIC PNL TOTAL CA: CPT

## 2021-03-04 PROCEDURE — C9113 INJ PANTOPRAZOLE SODIUM, VIA: HCPCS | Performed by: SURGERY

## 2021-03-04 PROCEDURE — 85025 COMPLETE CBC W/AUTO DIFF WBC: CPT

## 2021-03-04 PROCEDURE — 2500000003 HC RX 250 WO HCPCS: Performed by: SURGERY

## 2021-03-04 PROCEDURE — 2500000003 HC RX 250 WO HCPCS: Performed by: INTERNAL MEDICINE

## 2021-03-04 PROCEDURE — 2500000003 HC RX 250 WO HCPCS: Performed by: NURSE ANESTHETIST, CERTIFIED REGISTERED

## 2021-03-04 PROCEDURE — C9113 INJ PANTOPRAZOLE SODIUM, VIA: HCPCS | Performed by: INTERNAL MEDICINE

## 2021-03-04 PROCEDURE — 6360000002 HC RX W HCPCS: Performed by: PHYSICIAN ASSISTANT

## 2021-03-04 PROCEDURE — 2720000010 HC SURG SUPPLY STERILE: Performed by: SURGERY

## 2021-03-04 PROCEDURE — 2580000003 HC RX 258: Performed by: NURSE ANESTHETIST, CERTIFIED REGISTERED

## 2021-03-04 PROCEDURE — 3700000001 HC ADD 15 MINUTES (ANESTHESIA): Performed by: SURGERY

## 2021-03-04 PROCEDURE — 71045 X-RAY EXAM CHEST 1 VIEW: CPT

## 2021-03-04 PROCEDURE — 2709999900 HC NON-CHARGEABLE SUPPLY: Performed by: SURGERY

## 2021-03-04 PROCEDURE — 2580000003 HC RX 258: Performed by: INTERNAL MEDICINE

## 2021-03-04 PROCEDURE — 3600000004 HC SURGERY LEVEL 4 BASE: Performed by: SURGERY

## 2021-03-04 RX ORDER — PROPOFOL 10 MG/ML
INJECTION, EMULSION INTRAVENOUS PRN
Status: DISCONTINUED | OUTPATIENT
Start: 2021-03-04 | End: 2021-03-04 | Stop reason: SDUPTHER

## 2021-03-04 RX ORDER — BUPIVACAINE HYDROCHLORIDE 5 MG/ML
INJECTION, SOLUTION EPIDURAL; INTRACAUDAL
Status: COMPLETED | OUTPATIENT
Start: 2021-03-04 | End: 2021-03-04

## 2021-03-04 RX ORDER — ROCURONIUM BROMIDE 10 MG/ML
INJECTION, SOLUTION INTRAVENOUS PRN
Status: DISCONTINUED | OUTPATIENT
Start: 2021-03-04 | End: 2021-03-04 | Stop reason: SDUPTHER

## 2021-03-04 RX ORDER — MEPERIDINE HYDROCHLORIDE 25 MG/ML
12.5 INJECTION INTRAMUSCULAR; INTRAVENOUS; SUBCUTANEOUS EVERY 5 MIN PRN
Status: DISCONTINUED | OUTPATIENT
Start: 2021-03-04 | End: 2021-03-04

## 2021-03-04 RX ORDER — HYDROMORPHONE HCL 110MG/55ML
0.5 PATIENT CONTROLLED ANALGESIA SYRINGE INTRAVENOUS EVERY 5 MIN PRN
Status: DISCONTINUED | OUTPATIENT
Start: 2021-03-04 | End: 2021-03-04

## 2021-03-04 RX ORDER — CEFAZOLIN SODIUM 2 G/50ML
SOLUTION INTRAVENOUS PRN
Status: DISCONTINUED | OUTPATIENT
Start: 2021-03-04 | End: 2021-03-04 | Stop reason: SDUPTHER

## 2021-03-04 RX ORDER — MORPHINE SULFATE 4 MG/ML
4 INJECTION, SOLUTION INTRAMUSCULAR; INTRAVENOUS
Status: DISCONTINUED | OUTPATIENT
Start: 2021-03-04 | End: 2021-03-04

## 2021-03-04 RX ORDER — PROMETHAZINE HYDROCHLORIDE 25 MG/ML
6.25 INJECTION, SOLUTION INTRAMUSCULAR; INTRAVENOUS
Status: DISCONTINUED | OUTPATIENT
Start: 2021-03-04 | End: 2021-03-04

## 2021-03-04 RX ORDER — HYDRALAZINE HYDROCHLORIDE 20 MG/ML
5 INJECTION INTRAMUSCULAR; INTRAVENOUS EVERY 10 MIN PRN
Status: DISCONTINUED | OUTPATIENT
Start: 2021-03-04 | End: 2021-03-04

## 2021-03-04 RX ORDER — SODIUM CHLORIDE, SODIUM LACTATE, POTASSIUM CHLORIDE, CALCIUM CHLORIDE 600; 310; 30; 20 MG/100ML; MG/100ML; MG/100ML; MG/100ML
INJECTION, SOLUTION INTRAVENOUS CONTINUOUS PRN
Status: DISCONTINUED | OUTPATIENT
Start: 2021-03-04 | End: 2021-03-04 | Stop reason: SDUPTHER

## 2021-03-04 RX ORDER — HYDROCODONE BITARTRATE AND ACETAMINOPHEN 5; 325 MG/1; MG/1
1 TABLET ORAL PRN
Status: ACTIVE | OUTPATIENT
Start: 2021-03-04 | End: 2021-03-04

## 2021-03-04 RX ORDER — LABETALOL HYDROCHLORIDE 5 MG/ML
5 INJECTION, SOLUTION INTRAVENOUS EVERY 10 MIN PRN
Status: DISCONTINUED | OUTPATIENT
Start: 2021-03-04 | End: 2021-03-04

## 2021-03-04 RX ORDER — SODIUM CHLORIDE, SODIUM LACTATE, POTASSIUM CHLORIDE, CALCIUM CHLORIDE 600; 310; 30; 20 MG/100ML; MG/100ML; MG/100ML; MG/100ML
INJECTION, SOLUTION INTRAVENOUS CONTINUOUS
Status: DISCONTINUED | OUTPATIENT
Start: 2021-03-04 | End: 2021-03-09

## 2021-03-04 RX ORDER — FENTANYL CITRATE 50 UG/ML
INJECTION, SOLUTION INTRAMUSCULAR; INTRAVENOUS PRN
Status: DISCONTINUED | OUTPATIENT
Start: 2021-03-04 | End: 2021-03-04 | Stop reason: SDUPTHER

## 2021-03-04 RX ORDER — FENTANYL CITRATE 50 UG/ML
50 INJECTION, SOLUTION INTRAMUSCULAR; INTRAVENOUS EVERY 5 MIN PRN
Status: DISCONTINUED | OUTPATIENT
Start: 2021-03-04 | End: 2021-03-04

## 2021-03-04 RX ORDER — HYDROCODONE BITARTRATE AND ACETAMINOPHEN 5; 325 MG/1; MG/1
2 TABLET ORAL PRN
Status: ACTIVE | OUTPATIENT
Start: 2021-03-04 | End: 2021-03-04

## 2021-03-04 RX ORDER — LIDOCAINE HYDROCHLORIDE 20 MG/ML
INJECTION, SOLUTION INTRAVENOUS PRN
Status: DISCONTINUED | OUTPATIENT
Start: 2021-03-04 | End: 2021-03-04 | Stop reason: SDUPTHER

## 2021-03-04 RX ORDER — DIPHENHYDRAMINE HYDROCHLORIDE 50 MG/ML
12.5 INJECTION INTRAMUSCULAR; INTRAVENOUS
Status: DISCONTINUED | OUTPATIENT
Start: 2021-03-04 | End: 2021-03-04

## 2021-03-04 RX ORDER — MORPHINE SULFATE 4 MG/ML
4 INJECTION, SOLUTION INTRAMUSCULAR; INTRAVENOUS
Status: DISCONTINUED | OUTPATIENT
Start: 2021-03-04 | End: 2021-03-12 | Stop reason: HOSPADM

## 2021-03-04 RX ORDER — ONDANSETRON 2 MG/ML
INJECTION INTRAMUSCULAR; INTRAVENOUS PRN
Status: DISCONTINUED | OUTPATIENT
Start: 2021-03-04 | End: 2021-03-04 | Stop reason: SDUPTHER

## 2021-03-04 RX ADMIN — DIPHENHYDRAMINE HYDROCHLORIDE 25 MG: 50 INJECTION INTRAMUSCULAR; INTRAVENOUS at 01:08

## 2021-03-04 RX ADMIN — FENTANYL CITRATE 50 MCG: 50 INJECTION INTRAMUSCULAR; INTRAVENOUS at 11:06

## 2021-03-04 RX ADMIN — MORPHINE SULFATE 4 MG: 4 INJECTION, SOLUTION INTRAMUSCULAR; INTRAVENOUS at 21:21

## 2021-03-04 RX ADMIN — ROCURONIUM BROMIDE 10 MG: 10 INJECTION INTRAVENOUS at 09:27

## 2021-03-04 RX ADMIN — SODIUM CHLORIDE, POTASSIUM CHLORIDE, SODIUM LACTATE AND CALCIUM CHLORIDE: 600; 310; 30; 20 INJECTION, SOLUTION INTRAVENOUS at 20:57

## 2021-03-04 RX ADMIN — ONDANSETRON 4 MG: 2 INJECTION INTRAMUSCULAR; INTRAVENOUS at 11:01

## 2021-03-04 RX ADMIN — FENTANYL CITRATE 100 MCG: 50 INJECTION INTRAMUSCULAR; INTRAVENOUS at 08:07

## 2021-03-04 RX ADMIN — PANTOPRAZOLE SODIUM 40 MG: 40 INJECTION, POWDER, FOR SOLUTION INTRAVENOUS at 05:54

## 2021-03-04 RX ADMIN — PHENYLEPHRINE HYDROCHLORIDE 200 MCG: 10 INJECTION INTRAVENOUS at 09:43

## 2021-03-04 RX ADMIN — SUGAMMADEX 200 MG: 100 INJECTION, SOLUTION INTRAVENOUS at 11:01

## 2021-03-04 RX ADMIN — MORPHINE SULFATE 4 MG: 4 INJECTION, SOLUTION INTRAMUSCULAR; INTRAVENOUS at 11:56

## 2021-03-04 RX ADMIN — SODIUM CHLORIDE, PRESERVATIVE FREE 10 ML: 5 INJECTION INTRAVENOUS at 21:24

## 2021-03-04 RX ADMIN — FENTANYL CITRATE 50 MCG: 50 INJECTION, SOLUTION INTRAMUSCULAR; INTRAVENOUS at 12:30

## 2021-03-04 RX ADMIN — CEFAZOLIN SODIUM 2 G: 2 SOLUTION INTRAVENOUS at 07:45

## 2021-03-04 RX ADMIN — FENTANYL CITRATE 50 MCG: 50 INJECTION, SOLUTION INTRAMUSCULAR; INTRAVENOUS at 12:24

## 2021-03-04 RX ADMIN — ONDANSETRON 4 MG: 2 INJECTION INTRAMUSCULAR; INTRAVENOUS at 21:24

## 2021-03-04 RX ADMIN — PHENYLEPHRINE HYDROCHLORIDE 200 MCG: 10 INJECTION INTRAVENOUS at 10:52

## 2021-03-04 RX ADMIN — SODIUM CHLORIDE, PRESERVATIVE FREE 10 ML: 5 INJECTION INTRAVENOUS at 21:22

## 2021-03-04 RX ADMIN — METOPROLOL TARTRATE 5 MG: 5 INJECTION INTRAVENOUS at 17:00

## 2021-03-04 RX ADMIN — ROCURONIUM BROMIDE 80 MG: 10 INJECTION INTRAVENOUS at 07:41

## 2021-03-04 RX ADMIN — ROCURONIUM BROMIDE 10 MG: 10 INJECTION INTRAVENOUS at 09:35

## 2021-03-04 RX ADMIN — PROPOFOL 100 MG: 10 INJECTION, EMULSION INTRAVENOUS at 07:41

## 2021-03-04 RX ADMIN — MORPHINE SULFATE 4 MG: 4 INJECTION, SOLUTION INTRAMUSCULAR; INTRAVENOUS at 14:05

## 2021-03-04 RX ADMIN — FENTANYL CITRATE 25 MCG: 50 INJECTION INTRAMUSCULAR; INTRAVENOUS at 11:10

## 2021-03-04 RX ADMIN — SODIUM CHLORIDE, PRESERVATIVE FREE 10 ML: 5 INJECTION INTRAVENOUS at 17:07

## 2021-03-04 RX ADMIN — PHENYLEPHRINE HYDROCHLORIDE 100 MCG: 10 INJECTION INTRAVENOUS at 08:44

## 2021-03-04 RX ADMIN — FENTANYL CITRATE 25 MCG: 50 INJECTION INTRAMUSCULAR; INTRAVENOUS at 11:15

## 2021-03-04 RX ADMIN — SODIUM CHLORIDE, PRESERVATIVE FREE 10 ML: 5 INJECTION INTRAVENOUS at 20:57

## 2021-03-04 RX ADMIN — SODIUM CHLORIDE, POTASSIUM CHLORIDE, SODIUM LACTATE AND CALCIUM CHLORIDE: 600; 310; 30; 20 INJECTION, SOLUTION INTRAVENOUS at 07:36

## 2021-03-04 RX ADMIN — MORPHINE SULFATE 4 MG: 4 INJECTION, SOLUTION INTRAMUSCULAR; INTRAVENOUS at 19:22

## 2021-03-04 RX ADMIN — METOPROLOL TARTRATE 5 MG: 5 INJECTION INTRAVENOUS at 01:08

## 2021-03-04 RX ADMIN — SODIUM CHLORIDE, POTASSIUM CHLORIDE, SODIUM LACTATE AND CALCIUM CHLORIDE 125 ML/HR: 600; 310; 30; 20 INJECTION, SOLUTION INTRAVENOUS at 13:36

## 2021-03-04 RX ADMIN — LIDOCAINE HYDROCHLORIDE 100 MG: 20 INJECTION, SOLUTION INTRAVENOUS at 07:41

## 2021-03-04 RX ADMIN — ROCURONIUM BROMIDE 10 MG: 10 INJECTION INTRAVENOUS at 10:40

## 2021-03-04 RX ADMIN — PANTOPRAZOLE SODIUM 40 MG: 40 INJECTION, POWDER, FOR SOLUTION INTRAVENOUS at 17:05

## 2021-03-04 RX ADMIN — PHENYLEPHRINE HYDROCHLORIDE 200 MCG: 10 INJECTION INTRAVENOUS at 10:28

## 2021-03-04 ASSESSMENT — PULMONARY FUNCTION TESTS
PIF_VALUE: 33
PIF_VALUE: 27
PIF_VALUE: 11
PIF_VALUE: 31
PIF_VALUE: 30
PIF_VALUE: 24
PIF_VALUE: 29
PIF_VALUE: 1
PIF_VALUE: 17
PIF_VALUE: 31
PIF_VALUE: 18
PIF_VALUE: 25
PIF_VALUE: 17
PIF_VALUE: 32
PIF_VALUE: 26
PIF_VALUE: 27
PIF_VALUE: 27
PIF_VALUE: 30
PIF_VALUE: 27
PIF_VALUE: 17
PIF_VALUE: 7
PIF_VALUE: 30
PIF_VALUE: 1
PIF_VALUE: 17
PIF_VALUE: 26
PIF_VALUE: 30
PIF_VALUE: 18
PIF_VALUE: 29
PIF_VALUE: 29
PIF_VALUE: 28
PIF_VALUE: 32
PIF_VALUE: 29
PIF_VALUE: 31
PIF_VALUE: 31
PIF_VALUE: 32
PIF_VALUE: 5
PIF_VALUE: 28
PIF_VALUE: 27
PIF_VALUE: 26
PIF_VALUE: 26
PIF_VALUE: 22
PIF_VALUE: 33
PIF_VALUE: 30
PIF_VALUE: 17
PIF_VALUE: 1
PIF_VALUE: 1
PIF_VALUE: 29
PIF_VALUE: 25
PIF_VALUE: 30
PIF_VALUE: 7
PIF_VALUE: 29
PIF_VALUE: 31
PIF_VALUE: 28
PIF_VALUE: 30
PIF_VALUE: 31
PIF_VALUE: 18
PIF_VALUE: 26
PIF_VALUE: 29
PIF_VALUE: 29
PIF_VALUE: 28
PIF_VALUE: 30
PIF_VALUE: 31
PIF_VALUE: 30
PIF_VALUE: 33
PIF_VALUE: 30
PIF_VALUE: 32
PIF_VALUE: 33
PIF_VALUE: 29
PIF_VALUE: 32
PIF_VALUE: 31
PIF_VALUE: 30
PIF_VALUE: 31
PIF_VALUE: 18
PIF_VALUE: 28
PIF_VALUE: 33
PIF_VALUE: 31
PIF_VALUE: 31
PIF_VALUE: 27
PIF_VALUE: 29
PIF_VALUE: 30
PIF_VALUE: 28
PIF_VALUE: 30
PIF_VALUE: 33
PIF_VALUE: 30
PIF_VALUE: 28
PIF_VALUE: 26
PIF_VALUE: 31
PIF_VALUE: 18
PIF_VALUE: 17
PIF_VALUE: 17
PIF_VALUE: 32
PIF_VALUE: 18
PIF_VALUE: 28
PIF_VALUE: 31
PIF_VALUE: 29
PIF_VALUE: 25
PIF_VALUE: 33
PIF_VALUE: 21
PIF_VALUE: 32
PIF_VALUE: 31
PIF_VALUE: 30
PIF_VALUE: 29
PIF_VALUE: 32
PIF_VALUE: 5
PIF_VALUE: 30
PIF_VALUE: 29
PIF_VALUE: 18
PIF_VALUE: 31
PIF_VALUE: 30
PIF_VALUE: 25
PIF_VALUE: 29
PIF_VALUE: 27
PIF_VALUE: 26
PIF_VALUE: 27

## 2021-03-04 ASSESSMENT — PAIN DESCRIPTION - FREQUENCY
FREQUENCY: CONTINUOUS
FREQUENCY: CONTINUOUS
FREQUENCY: INTERMITTENT
FREQUENCY: INTERMITTENT

## 2021-03-04 ASSESSMENT — PAIN SCALES - GENERAL
PAINLEVEL_OUTOF10: 4
PAINLEVEL_OUTOF10: 7
PAINLEVEL_OUTOF10: 8
PAINLEVEL_OUTOF10: 9
PAINLEVEL_OUTOF10: 8
PAINLEVEL_OUTOF10: 0
PAINLEVEL_OUTOF10: 8

## 2021-03-04 ASSESSMENT — PAIN DESCRIPTION - ORIENTATION
ORIENTATION: MID;UPPER
ORIENTATION: LEFT
ORIENTATION: LEFT

## 2021-03-04 ASSESSMENT — PAIN - FUNCTIONAL ASSESSMENT
PAIN_FUNCTIONAL_ASSESSMENT: PREVENTS OR INTERFERES SOME ACTIVE ACTIVITIES AND ADLS
PAIN_FUNCTIONAL_ASSESSMENT: PREVENTS OR INTERFERES SOME ACTIVE ACTIVITIES AND ADLS

## 2021-03-04 ASSESSMENT — PAIN DESCRIPTION - PROGRESSION
CLINICAL_PROGRESSION: GRADUALLY IMPROVING

## 2021-03-04 ASSESSMENT — PAIN DESCRIPTION - PAIN TYPE
TYPE: SURGICAL PAIN

## 2021-03-04 ASSESSMENT — PAIN DESCRIPTION - DESCRIPTORS
DESCRIPTORS: ACHING
DESCRIPTORS: CONSTANT;DISCOMFORT

## 2021-03-04 ASSESSMENT — PAIN DESCRIPTION - LOCATION
LOCATION: ABDOMEN
LOCATION: ABDOMEN
LOCATION: ABDOMEN;CHEST

## 2021-03-04 ASSESSMENT — PAIN DESCRIPTION - ONSET: ONSET: ON-GOING

## 2021-03-04 NOTE — FLOWSHEET NOTE
Went to patient room to check on alarm at 0330,  he had pulled both IV's out, his NG tube out, and removed all of his cardiac leads. He said he thought he was tied up in them, and trying to get free. I reassured him he was ok, and reoriented him to where he is and why he is here. I restarted #20 IV in his left FA, and reattached leads. But he did not want NG put back in. The only thing out of NG was the ice chips he had taken, no sign of bleeding. Patient has eyes closed and is resting at present time. Spoke with patients son, Serafin Cox, he will be here around 5:15-5:30 to see his father prior to surgery. Updated on patients condition.

## 2021-03-04 NOTE — BRIEF OP NOTE
Brief Postoperative Note      Patient: Kevin Jones  YOB: 1930  MRN: [id number]    Date of Procedure: 3/4/2021    Pre-Op Diagnosis: HIATAL HERNIA    Post-Op Diagnosis: Same       Procedure(s):  LAPAROSCOPIC LARGE HERNIA HIATAL REPAIR WITH GASTRIC OBSTRUCTION POSS OPEN    Surgeon(s):  Charlene Parra MD    Assistant:  * No surgical staff found *    Anesthesia: General    Estimated Blood Loss (mL): less than 50     Complications: None    Specimens:   * No specimens in log *    Implants:  * No implants in log *      Drains:   Urethral Catheter Straight-tip 16 fr (Active)   Catheter Indications Acute urinary retention/obstruction; Need for fluid management in critically ill patients in a critical care setting not able to be managed by other means such as BSC with hat, bedpan, urinal, condom catheter, or short term intermittent urethral catherization 03/04/21 0500   Securement Device Date Changed 03/03/21 03/04/21 0500   Site Assessment No urethral drainage 03/04/21 0500   Urine Color Pink 03/04/21 0500   Urine Appearance Hazy 03/04/21 0500   Urine Odor Malodorous 03/04/21 0500   Output (mL) 250 mL 03/04/21 0555       [REMOVED] NG/OG/NJ/NE Tube Nasogastric 16 fr Left nostril (Removed)   Surrounding Skin Dry; Intact 03/04/21 0100   Securement device Yes 03/04/21 0100   Status Suction-low intermittent 03/04/21 0100   Placement Verified by External Catheter Length;by Gastric Contents 03/04/21 0100   NG/OG/NJ/NE External Measurement (cm) 55 cm 03/04/21 0100   Drainage Appearance Brown 03/04/21 0100   Free Water Flush (mL) 20 mL 03/03/21 0507   Output (mL) 50 ml 03/03/21 0507       Findings: same    Electronically signed by Charlene Parra MD on 3/4/2021 at 12:00 PM

## 2021-03-04 NOTE — CARE COORDINATION
CM reviewed notes OR today for laparoscopic large hernia repair. CM will continue to follow for any changes in discharge plan.  [address]

## 2021-03-04 NOTE — PLAN OF CARE
Problem: Infection:  Goal: Will remain free from infection  Description: Will remain free from infection  Outcome: Ongoing     Problem: Safety:  Goal: Free from accidental physical injury  Description: Free from accidental physical injury  Outcome: Ongoing  Goal: Free from intentional harm  Description: Free from intentional harm  Outcome: Ongoing     Problem: Daily Care:  Goal: Daily care needs are met  Description: Daily care needs are met  Outcome: Ongoing     Problem: Pain:  Goal: Patient's pain/discomfort is manageable  Description: Patient's pain/discomfort is manageable  Outcome: Ongoing     Problem: Skin Integrity:  Goal: Skin integrity will stabilize  Description: Skin integrity will stabilize  Outcome: Ongoing     Problem: Discharge Planning:  Goal: Patients continuum of care needs are met  Description: Patients continuum of care needs are met  Outcome: Ongoing     Problem: Fluid Volume - Imbalance:  Goal: Will show no signs and symptoms of excessive bleeding  Description: Will show no signs and symptoms of excessive bleeding  Outcome: Ongoing  Goal: Absence of imbalanced fluid volume signs and symptoms  Description: Absence of imbalanced fluid volume signs and symptoms  Outcome: Ongoing     Problem: Nausea/Vomiting:  Goal: Absence of nausea/vomiting  Description: Absence of nausea/vomiting  Outcome: Ongoing  Goal: Able to drink  Description: Able to drink  Outcome: Ongoing  Goal: Able to eat  Description: Able to eat  Outcome: Ongoing  Goal: Ability to achieve adequate nutritional intake will improve  Description: Ability to achieve adequate nutritional intake will improve  Outcome: Ongoing     Problem: Falls - Risk of:  Goal: Will remain free from falls  Description: Will remain free from falls  Outcome: Ongoing  Goal: Absence of physical injury  Description: Absence of physical injury  Outcome: Ongoing

## 2021-03-04 NOTE — ANESTHESIA PRE PROCEDURE
Department of Anesthesiology  Preprocedure Note       Name:  Julian Jewish   Age:  80 y.o.  :  10/18/1930                                          MRN:  [id number]         Date:  3/4/2021      Surgeon: Silvina Zavalar):  Werner Ying MD    Procedure: Procedure(s):  LAPAROSCOPIC LARGE HERNIA HIATAL REPAIR WITH GASTRIC OBSTRUCTION POSS OPEN    Medications prior to admission:   Prior to Admission medications    Medication Sig Start Date End Date Taking? Authorizing Provider   levothyroxine (SYNTHROID) 50 MCG tablet Take 1.5 tablets by mouth daily 21   Perfecto De Guzman PA-C   furosemide (LASIX) 20 MG tablet Take 1 tablet by mouth daily  Patient taking differently: Take 20 mg by mouth daily Son reports pt takes every other day 21   Perfecto De Guzman PA-C   sertraline (ZOLOFT) 50 MG tablet Take 50 mg by mouth daily    Historical Provider, MD   ferrous sulfate (IRON 325) 325 (65 Fe) MG tablet Take 1 tablet by mouth daily (with breakfast)  Patient not taking: Reported on 2021 12/31/20 3/31/21  Tirso Martin MD   docusate sodium (COLACE) 100 MG capsule Take 1 capsule by mouth daily With iron pill 12/31/20 3/31/21  Tirso Martin MD   temazepam (RESTORIL) 7.5 MG capsule Take 7.5 mg by mouth nightly as needed for Sleep. Historical Provider, MD   QUEtiapine (SEROQUEL) 100 MG tablet Take 100 mg by mouth every evening    Historical Provider, MD   QUEtiapine (SEROQUEL) 50 MG tablet Take 50 mg by mouth 3 times daily     Historical Provider, MD   tamsulosin (FLOMAX) 0.4 MG capsule Take 1 capsule by mouth daily.   Patient taking differently: Take 0.8 mg by mouth daily 17 Pt to take 2- .04 mg capsules at bedtime 10/12/12   Raven Eden MD       Current medications:    Current Facility-Administered Medications   Medication Dose Route Frequency Provider Last Rate Last Admin    levothyroxine (SYNTHROID) tablet 50 mcg  50 mcg Oral Daily Marquis Raúl MD        [Held by provider] furosemide (LASIX) tablet 20 mg  20 mg Oral Daily Kirill Bhargavi Sanderson MD        tamsulosin (FLOMAX) capsule 0.8 mg  0.8 mg Oral Daily Kirill Bhargavi Sanderson MD        sodium chloride flush 0.9 % injection 10 mL  10 mL Intravenous 2 times per day Rafiq Johnson MD   10 mL at 03/03/21 2031    sodium chloride flush 0.9 % injection 10 mL  10 mL Intravenous PRN Kirill Bhargavi Sanderson MD        ondansetron (ZOFRAN) injection 4 mg  4 mg Intravenous Q6H PRN Kirill Bhargavi Sanderson MD        acetaminophen (TYLENOL) tablet 650 mg  650 mg Oral Q6H PRN Kirill Bhargavi Sanderson MD        Or    acetaminophen (TYLENOL) suppository 650 mg  650 mg Rectal Q6H PRN Kirill Bhargavi Sanderson MD        pantoprazole (PROTONIX) injection 40 mg  40 mg Intravenous Q12H Kirill Pavel LOWE MD   40 mg at 03/04/21 0554    And    sodium chloride (PF) 0.9 % injection 10 mL  10 mL Intravenous Q12H Kirill Bhargavi Sanderson MD   10 mL at 03/03/21 1656    lactated ringers infusion   Intravenous Continuous Charmayne Cornish, MD 85 mL/hr at 03/03/21 2005 New Bag at 03/03/21 2005    metoprolol (LOPRESSOR) 5 mg in sodium chloride 0.9 % 50 mL IVPB  5 mg Intravenous Q8H Paulette Pink MD   Stopped at 03/04/21 0138    0.9 % sodium chloride infusion   Intravenous PRN Jose Guadalupe Orr PA-C   Stopped at 03/03/21 0403    fentaNYL (SUBLIMAZE) injection 25 mcg  25 mcg Intravenous Once Jose Guadalupe Orr PA-C           Allergies:     Allergies   Allergen Reactions    Minocycline Other (See Comments)       Problem List:    Patient Active Problem List   Diagnosis Code    Hyperlipidemia E78.5    Depression F32.9    Insomnia G47.00    BPH (benign prostatic hyperplasia) N40.0    Dysuria R30.0    Vitamin D deficiency E55.9    Seborrheic keratosis, inflamed L82.0    Actinic keratosis L57.0    Seborrheic keratosis L82.1    SCC (squamous cell carcinoma) C44.92    Varicose 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 I83.813    Anxiety F41.9    BPH with urinary obstruction N40.1, N13.8    Acquired hypothyroidism E03.9  UGIB (upper gastrointestinal bleed) K92.2       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    Rt Tricep, Lt Superior Forearm       Past Surgical History:        Procedure Laterality Date    CATARACT REMOVAL      HERNIA REPAIR      NECK SURGERY         Social History:    Social History     Tobacco Use    Smoking status: Former Smoker     Packs/day: 1.00     Years: 5.00     Pack years: 5.00     Types: Cigarettes     Start date: 1950     Quit date: 1955     Years since quittin.3    Smokeless tobacco: Never Used   Substance Use Topics    Alcohol use: Not Currently                                Counseling given: Not Answered      Vital Signs (Current):   Vitals:    21 0200 21 0300 21 0400 21 0500   BP: (!) 143/87 (!) 93/49 (!) 140/73 (!) 151/82   Pulse: 75 89 77 89   Resp: 14 22 14 (!) 34   Temp:       TempSrc:       SpO2: 92%  93% 93%   Weight:       Height:                                                  BP Readings from Last 3 Encounters:   21 (!) 151/82   21 110/72   20 100/60       NPO Status:                                                                                 BMI:   Wt Readings from Last 3 Encounters:   21 190 lb 7.6 oz (86.4 kg)   21 174 lb (78.9 kg)   20 174 lb 11.2 oz (79.2 kg)     Body mass index is 25.83 kg/m².     CBC:   Lab Results   Component Value Date    WBC 9.0 2021    RBC 3.25 2021    HGB 9.5 2021    HCT 30.3 2021    MCV 93.2 2021    RDW 14.6 2021     2021       CMP:   Lab Results   Component Value Date     2021    K 4.1 03/04/2021     03/04/2021    CO2 25 03/04/2021    BUN 21 03/04/2021    CREATININE 1.7 03/04/2021    GFRAA 46 03/04/2021    AGRATIO 1.4 01/21/2021    LABGLOM 38 03/04/2021    GLUCOSE 85 03/04/2021    PROT 6.0 03/02/2021    CALCIUM 8.3 03/04/2021    BILITOT 0.2 03/02/2021    ALKPHOS 93 03/02/2021    AST 20 03/02/2021    ALT 12 03/02/2021       POC Tests: No results for input(s): POCGLU, POCNA, POCK, POCCL, POCBUN, POCHEMO, POCHCT in the last 72 hours. Coags: No results found for: PROTIME, INR, APTT    HCG (If Applicable): No results found for: PREGTESTUR, PREGSERUM, HCG, HCGQUANT     ABGs: No results found for: PHART, PO2ART, VWU3ZYK, GSZ3KTH, BEART, R1MMTMKS     Type & Screen (If Applicable):  No results found for: LABABO, LABRH    Drug/Infectious Status (If Applicable):  No results found for: HIV, HEPCAB    COVID-19 Screening (If Applicable):   Lab Results   Component Value Date    COVID19 NOT DETECTED 03/03/2021         Anesthesia Evaluation  Patient summary reviewed and Nursing notes reviewed no history of anesthetic complications:   Airway: Mallampati: II        Dental:          Pulmonary:                             ROS comment: Former smoker   Cardiovascular:  Exercise tolerance: poor (<4 METS),   (+) hyperlipidemia         Beta Blocker:  Not on Beta Blocker         Neuro/Psych:   (+) depression/anxiety             GI/Hepatic/Renal:   (+) hiatal hernia, GERD:,           Endo/Other:    (+) hypothyroidism, blood dyscrasia: anemia:., malignancy/cancer (skin). Abdominal:           Vascular: negative vascular ROS. Anesthesia Plan      general     ASA 2       Induction: intravenous. MIPS: Postoperative opioids intended and Prophylactic antiemetics administered. Anesthetic plan and risks discussed with patient. Plan discussed with CRNA.                   Ailyn Miranda, DO   3/4/2021

## 2021-03-04 NOTE — ANESTHESIA POSTPROCEDURE EVALUATION
Department of Anesthesiology  Postprocedure Note    Patient: Rosette Thomas  MRN: [id number]  YOB: 1930  Date of evaluation: 3/4/2021  Time:  1:57 PM     Procedure Summary     Date: 03/04/21 Room / Location: [address] / [facility]    Anesthesia Start: 4478 Anesthesia Stop: 1119    Procedure: [address] [place] WITH GASTRIC OBSTRUCTION POSS OPEN (N/A Abdomen) Diagnosis: (HIATAL HERNIA)    Surgeons: Ailin Chirinos MD Responsible Provider: Pa Flynn DO    Anesthesia Type: general ASA Status: 2          Anesthesia Type: general    Jaison Phase I: Jaison Score: 8    Jaison Phase II: Jaison Score: 8    Last vitals: Reviewed and per EMR flowsheets.        Anesthesia Post Evaluation    Patient location during evaluation: ICU  Patient participation: complete - patient participated  Level of consciousness: awake and confused  Airway patency: patent  Nausea & Vomiting: no vomiting and no nausea  Cardiovascular status: blood pressure returned to baseline and hemodynamically stable  Respiratory status: acceptable, face mask, nonlabored ventilation and spontaneous ventilation  Hydration status: stable

## 2021-03-04 NOTE — PROGRESS NOTES
Hospitalist Progress Note      Name:  Nasima Cutler /Age/Sex: 10/18/1930  (80 y.o. male)   MRN & CSN:  [id number] & [id number] Admission Date/Time: 3/2/2021  6:10 PM   Location:  -A PCP: Jenise Reid MD         Hospital Day: 3    Assessment and Plan:   Nasima Cutler is a 80 y.o.  male  who presents with chest pain, associated nausea and bloody emesis. Upper GI bleed   Acute on chronic blood loss anemia, hemodynamically stable  Large hiatal hernia, symptomatic  Status post PRBC 1 unit, transfused in the ED  Serial H&H, transfuse for Hb less than 7  IV pantoprazole  CT abdomen with large hiatal hernia, with concerns for entrapment  General surgery on board, surgery today  N.p.o., NG tube in situ  IV fluids, IV antiemetics, analgesics  Iron studies with borderline ESTUARDO  Cardiology consult was placed for cardiac clearance, cleared with moderate risk for adverse cardiovascular events      Chest pain, atypical, likely due to above findings  Serial troponin negative  Echo pending  Cardiology on board            CKD 3, seems at baseline  Avoid nephrotoxins, monitor  Lasix on hold, resume as needed    Chronic medical condition  Neuropathic, continue gabapentin  BPH on Flomax  Hypothyroidism, continue home meds  Anxiety disorder, continue home medications    CODE STATUS, limited no intubation, no CPR, resuscitative meds are allowed      Diet Diet NPO Effective Now Exceptions are: Ice Chips   DVT Prophylaxis [] Lovenox, []  Heparin, [] SCDs, []No VTE prophylaxis, patient ambulating   GI Prophylaxis [] PPI, [] H2 Blocker, [] No GI prophylaxis, patient is receiving diet/Tube Feeds   Code Status Limited   Disposition Patient requires continued admission due to    MDM [] Low, [] Moderate,[]  High  Patient's risk as above due to      History of Present Illness:     Pt S&E.   Seen post surgery, drowsy but arousable , patient comfortable, has NG tube in situ, denies chest pain or abdominal pain at this time    10-14 point ROS reviewed negative, unless as noted above    Objective: Intake/Output Summary (Last 24 hours) at 3/4/2021 1145  Last data filed at 3/4/2021 1103  Gross per 24 hour   Intake 2250.62 ml   Output 3250 ml   Net -999.38 ml      Vitals:   Vitals:    03/04/21 0500   BP: (!) 151/82   Pulse: 89   Resp: (!) 34   Temp:    SpO2: 93%     Physical Exam:    GEN Awake male, sitting upright in bed in no apparent distress. Appears given age. EYES Pupils are equally round. No scleral erythema, discharge, or conjunctivitis. HENT Mucous membranes are moist.  NG tube in situ  NECK No apparent thyromegaly or masses. RESP Clear to auscultation, no wheezes, rales or rhonchi. Symmetric chest movement while on room air. CARDIO/VASC S1/S2 auscultated. Regular rate without appreciable murmurs, rubs, or gallops. Peripheral pulses equal bilaterally and palpable. No peripheral edema. GI Abdomen is soft without significant tenderness, masses, or guarding. Bowel sounds are normoactive. Rectal exam deferred.  Laureano catheter is not present. HEME/LYMPH No petechiae or ecchymoses. MSK No gross joint deformities. Spontaneous movement of all extremities  SKIN Normal coloration, warm, dry. NEURO Cranial nerves appear grossly intact, normal speech, no lateralizing weakness. PSYCH Awake, alert, oriented x 4. Affect appropriate.     Medications:   Medications:    levothyroxine  50 mcg Oral Daily    [Held by provider] furosemide  20 mg Oral Daily    tamsulosin  0.8 mg Oral Daily    sodium chloride flush  10 mL Intravenous 2 times per day    pantoprazole  40 mg Intravenous Q12H    And    sodium chloride (PF)  10 mL Intravenous Q12H    metoprolol (LOPRESSOR) ivpb  5 mg Intravenous Q8H    fentanNYL  25 mcg Intravenous Once      Infusions:    lactated ringers      sodium chloride Stopped (03/03/21 0403)     PRN Meds:     morphine, 4 mg, Q3H PRN      sodium chloride flush, 10 mL, PRN      ondansetron, 4 mg, Q6H PRN      acetaminophen, 650 mg, Q6H PRN    Or      acetaminophen, 650 mg, Q6H PRN      sodium chloride, , PRN        Data    Recent Labs     03/02/21  1843 03/03/21  0435 03/04/21  0540   WBC 6.9  --  9.0   HGB 9.3* 10.7* 9.5*   HCT 29.5* 34.9* 30.3*     --  212      Recent Labs     03/02/21  1843 03/04/21  0540    137   K 3.4* 4.1   CL 98* 104   CO2 30 25   BUN 18 21   CREATININE 1.7* 1.7*     Recent Labs     03/02/21  1843   AST 20   ALT 12   BILITOT 0.2   ALKPHOS 93     No results for input(s): INR in the last 72 hours. No results for input(s): CKTOTAL, CKMB, CKMBINDEX, TROPONINI in the last 72 hours.         Electronically signed by Andrew Peterson MD on 3/4/2021 at 11:45 AM

## 2021-03-05 LAB
ANION GAP SERPL CALCULATED.3IONS-SCNC: 10 MMOL/L (ref 4–16)
BACTERIA: NEGATIVE /HPF
BASOPHILS ABSOLUTE: 0 K/CU MM
BASOPHILS RELATIVE PERCENT: 0.3 % (ref 0–1)
BILIRUBIN URINE: NEGATIVE MG/DL
BLOOD, URINE: ABNORMAL
BUN BLDV-MCNC: 22 MG/DL (ref 6–23)
CALCIUM SERPL-MCNC: 8.2 MG/DL (ref 8.3–10.6)
CHLORIDE BLD-SCNC: 104 MMOL/L (ref 99–110)
CLARITY: ABNORMAL
CO2: 27 MMOL/L (ref 21–32)
COLOR: ABNORMAL
CREAT SERPL-MCNC: 1.5 MG/DL (ref 0.9–1.3)
DIFFERENTIAL TYPE: ABNORMAL
EOSINOPHILS ABSOLUTE: 0 K/CU MM
EOSINOPHILS RELATIVE PERCENT: 0.1 % (ref 0–3)
GFR AFRICAN AMERICAN: 53 ML/MIN/1.73M2
GFR NON-AFRICAN AMERICAN: 44 ML/MIN/1.73M2
GLUCOSE BLD-MCNC: 99 MG/DL (ref 70–99)
GLUCOSE, URINE: NEGATIVE MG/DL
HCT VFR BLD CALC: 30.8 % (ref 42–52)
HEMOGLOBIN: 9.8 GM/DL (ref 13.5–18)
IMMATURE NEUTROPHIL %: 0.6 % (ref 0–0.43)
KETONES, URINE: ABNORMAL MG/DL
LEUKOCYTE ESTERASE, URINE: ABNORMAL
LYMPHOCYTES ABSOLUTE: 0.6 K/CU MM
LYMPHOCYTES RELATIVE PERCENT: 6.6 % (ref 24–44)
MCH RBC QN AUTO: 29.6 PG (ref 27–31)
MCHC RBC AUTO-ENTMCNC: 31.8 % (ref 32–36)
MCV RBC AUTO: 93.1 FL (ref 78–100)
MONOCYTES ABSOLUTE: 1.1 K/CU MM
MONOCYTES RELATIVE PERCENT: 11.9 % (ref 0–4)
MUCUS: ABNORMAL HPF
NITRITE URINE, QUANTITATIVE: NEGATIVE
NUCLEATED RBC %: 0 %
PDW BLD-RTO: 14.6 % (ref 11.7–14.9)
PH, URINE: 6 (ref 5–8)
PLATELET # BLD: 222 K/CU MM (ref 140–440)
PMV BLD AUTO: 9.3 FL (ref 7.5–11.1)
POTASSIUM SERPL-SCNC: 4.1 MMOL/L (ref 3.5–5.1)
PRO-BNP: 1965 PG/ML
PROTEIN UA: 100 MG/DL
RBC # BLD: 3.31 M/CU MM (ref 4.6–6.2)
RBC URINE: 1179 /HPF (ref 0–3)
SEGMENTED NEUTROPHILS ABSOLUTE COUNT: 7.6 K/CU MM
SEGMENTED NEUTROPHILS RELATIVE PERCENT: 80.5 % (ref 36–66)
SODIUM BLD-SCNC: 141 MMOL/L (ref 135–145)
SPECIFIC GRAVITY UA: 1.01 (ref 1–1.03)
TOTAL IMMATURE NEUTOROPHIL: 0.06 K/CU MM
TOTAL NUCLEATED RBC: 0 K/CU MM
TRICHOMONAS: ABNORMAL /HPF
UROBILINOGEN, URINE: NEGATIVE MG/DL (ref 0.2–1)
WBC # BLD: 9.4 K/CU MM (ref 4–10.5)
WBC UA: 65 /HPF (ref 0–2)

## 2021-03-05 PROCEDURE — C9113 INJ PANTOPRAZOLE SODIUM, VIA: HCPCS | Performed by: SURGERY

## 2021-03-05 PROCEDURE — 2000000000 HC ICU R&B

## 2021-03-05 PROCEDURE — 6360000002 HC RX W HCPCS: Performed by: SURGERY

## 2021-03-05 PROCEDURE — 6360000002 HC RX W HCPCS: Performed by: INTERNAL MEDICINE

## 2021-03-05 PROCEDURE — 81001 URINALYSIS AUTO W/SCOPE: CPT

## 2021-03-05 PROCEDURE — 2580000003 HC RX 258: Performed by: SURGERY

## 2021-03-05 PROCEDURE — 2500000003 HC RX 250 WO HCPCS: Performed by: SURGERY

## 2021-03-05 PROCEDURE — 36415 COLL VENOUS BLD VENIPUNCTURE: CPT

## 2021-03-05 PROCEDURE — 80048 BASIC METABOLIC PNL TOTAL CA: CPT

## 2021-03-05 PROCEDURE — 87086 URINE CULTURE/COLONY COUNT: CPT

## 2021-03-05 PROCEDURE — 83880 ASSAY OF NATRIURETIC PEPTIDE: CPT

## 2021-03-05 PROCEDURE — 94761 N-INVAS EAR/PLS OXIMETRY MLT: CPT

## 2021-03-05 PROCEDURE — 2700000000 HC OXYGEN THERAPY PER DAY

## 2021-03-05 PROCEDURE — 85025 COMPLETE CBC W/AUTO DIFF WBC: CPT

## 2021-03-05 RX ORDER — HALOPERIDOL 5 MG/ML
5 INJECTION INTRAMUSCULAR ONCE
Status: COMPLETED | OUTPATIENT
Start: 2021-03-05 | End: 2021-03-05

## 2021-03-05 RX ORDER — HALOPERIDOL 5 MG/ML
5 INJECTION INTRAMUSCULAR EVERY 6 HOURS PRN
Status: DISCONTINUED | OUTPATIENT
Start: 2021-03-05 | End: 2021-03-12 | Stop reason: HOSPADM

## 2021-03-05 RX ORDER — FUROSEMIDE 10 MG/ML
40 INJECTION INTRAMUSCULAR; INTRAVENOUS ONCE
Status: COMPLETED | OUTPATIENT
Start: 2021-03-05 | End: 2021-03-05

## 2021-03-05 RX ADMIN — HALOPERIDOL LACTATE 5 MG: 5 INJECTION, SOLUTION INTRAMUSCULAR at 12:55

## 2021-03-05 RX ADMIN — METOPROLOL TARTRATE 5 MG: 5 INJECTION INTRAVENOUS at 09:02

## 2021-03-05 RX ADMIN — SODIUM CHLORIDE, PRESERVATIVE FREE 10 ML: 5 INJECTION INTRAVENOUS at 05:22

## 2021-03-05 RX ADMIN — ENOXAPARIN SODIUM 30 MG: 30 INJECTION SUBCUTANEOUS at 09:02

## 2021-03-05 RX ADMIN — SODIUM CHLORIDE, POTASSIUM CHLORIDE, SODIUM LACTATE AND CALCIUM CHLORIDE: 600; 310; 30; 20 INJECTION, SOLUTION INTRAVENOUS at 14:27

## 2021-03-05 RX ADMIN — MORPHINE SULFATE 4 MG: 4 INJECTION, SOLUTION INTRAMUSCULAR; INTRAVENOUS at 05:22

## 2021-03-05 RX ADMIN — FUROSEMIDE 40 MG: 10 INJECTION, SOLUTION INTRAMUSCULAR; INTRAVENOUS at 12:55

## 2021-03-05 RX ADMIN — SODIUM CHLORIDE, PRESERVATIVE FREE 10 ML: 5 INJECTION INTRAVENOUS at 06:03

## 2021-03-05 RX ADMIN — SODIUM CHLORIDE, PRESERVATIVE FREE 10 ML: 5 INJECTION INTRAVENOUS at 08:58

## 2021-03-05 RX ADMIN — MORPHINE SULFATE 4 MG: 4 INJECTION, SOLUTION INTRAMUSCULAR; INTRAVENOUS at 00:30

## 2021-03-05 RX ADMIN — ONDANSETRON 4 MG: 2 INJECTION INTRAMUSCULAR; INTRAVENOUS at 03:20

## 2021-03-05 RX ADMIN — SODIUM CHLORIDE, PRESERVATIVE FREE 10 ML: 5 INJECTION INTRAVENOUS at 00:30

## 2021-03-05 RX ADMIN — PANTOPRAZOLE SODIUM 40 MG: 40 INJECTION, POWDER, FOR SOLUTION INTRAVENOUS at 17:11

## 2021-03-05 RX ADMIN — MORPHINE SULFATE 4 MG: 4 INJECTION, SOLUTION INTRAMUSCULAR; INTRAVENOUS at 03:20

## 2021-03-05 RX ADMIN — SODIUM CHLORIDE, PRESERVATIVE FREE 10 ML: 5 INJECTION INTRAVENOUS at 21:52

## 2021-03-05 RX ADMIN — PANTOPRAZOLE SODIUM 40 MG: 40 INJECTION, POWDER, FOR SOLUTION INTRAVENOUS at 06:03

## 2021-03-05 RX ADMIN — METOPROLOL TARTRATE 5 MG: 5 INJECTION INTRAVENOUS at 00:31

## 2021-03-05 RX ADMIN — METOPROLOL TARTRATE 5 MG: 5 INJECTION INTRAVENOUS at 16:46

## 2021-03-05 RX ADMIN — SODIUM CHLORIDE, POTASSIUM CHLORIDE, SODIUM LACTATE AND CALCIUM CHLORIDE: 600; 310; 30; 20 INJECTION, SOLUTION INTRAVENOUS at 04:39

## 2021-03-05 ASSESSMENT — PAIN DESCRIPTION - DESCRIPTORS
DESCRIPTORS: SORE
DESCRIPTORS: SORE
DESCRIPTORS: ACHING
DESCRIPTORS: ACHING

## 2021-03-05 ASSESSMENT — PAIN SCALES - GENERAL
PAINLEVEL_OUTOF10: 0
PAINLEVEL_OUTOF10: 8
PAINLEVEL_OUTOF10: 4
PAINLEVEL_OUTOF10: 0
PAINLEVEL_OUTOF10: 8
PAINLEVEL_OUTOF10: 0

## 2021-03-05 ASSESSMENT — PAIN DESCRIPTION - LOCATION
LOCATION: ABDOMEN

## 2021-03-05 ASSESSMENT — PAIN DESCRIPTION - ORIENTATION
ORIENTATION: MID;UPPER

## 2021-03-05 ASSESSMENT — PAIN DESCRIPTION - PAIN TYPE: TYPE: SURGICAL PAIN

## 2021-03-05 ASSESSMENT — PAIN DESCRIPTION - FREQUENCY
FREQUENCY: INTERMITTENT
FREQUENCY: INTERMITTENT

## 2021-03-05 NOTE — OP NOTE
pull on the stomach to reduce the majority of the stomach  back in the peritoneal cavity. Edema was noted within the hernia sac  and the stomach was viable with no signs of ischemia. I divided the  hepatogastric omentum with a bipolar LigaSure device exposing a portion  of the right guerita of the diaphragm. I then elevated the stomach and  exposed the greater curvature of the stomach near the mid and upper  fundus and using bipolar LigaSure device, I divided the short gastric  vessels and entered the lesser sac. In so doing, I was able to continue  to reduce the stomach from the diaphragmatic defect and I divided up the  greater curvature of the stomach up to the level of the left guerita of the  diaphragm. At this point, I incised the thickened peritoneal sac of the  hernia sac and developed a plane between the left guerita of the diaphragm  and the peritoneum and the mediastinal structures. By extending this  incision and dividing the peritoneum superiorly and towards the right  side, I was able to grasp the peritoneum and the hernia sac and fully  reduce the hernia sac from the mediastinum. This sac was excised and  placed in a retrieval bag and removed through the larger port site. Once I accomplished this, I continued to do dissection and was able to  clearly define the left guerita of the diaphragm, the right guerita of the  diaphragm and the GE junction. The posterior vagus nerve was also  identified. Bipolar LigaSure device was also used to divide some  attachments posteriorly which allowed me to clearly develop the  retroesophageal space and pass a Penrose drain around the GE junction  for traction. The patient had a D-shaped diaphragmatic defect with  healthy thickened crura of the diaphragm which allowed primary closure  without prosthetic material.  Using an extracorporeal knot tying  technique and 2-0 Ethibond sutures, I reapproximated the crura of the  diaphragm.   These were all placed individually, tied

## 2021-03-05 NOTE — PROGRESS NOTES
I seen an order to let the physician know if bp is >160/90. Current bp 162/95  Map 115. I sent Dr. Janice Alvarez a message to let him know.  Patient is currently sleeping

## 2021-03-05 NOTE — PROGRESS NOTES
Cardiology Progress Note       Danette Eddy is a 80 y.o. male   10/18/1930     SUBJECTIVE:   Patient seen and examined appears lethargic rhythm is sinus urine output is low  OBJECTIVE:    Review of Systems:  General appearance: alert, appears stated age and cooperative  Skin: Skin color, texture, normal. No rashes or lesions  HEENT: No nose bleed, headache, vision problems  CV: C/O chest pain, tightness, pressure,   Respiratory: C/o no SOB, ORTIZ, Orthopnea, PND  GI: No abdominal pain, black stool, bloating  Limbs: No c/o edema, pain, swelling, intermittent claudication, joint pains  Neuro: No dizziness, lightheadedness, syncope, gait problems, memory problems  Psych: grossly normal. No SI/depression. Vitals:   Blood pressure 122/70, pulse 86, temperature 97.9 °F (36.6 °C), temperature source Oral, resp. rate 10, height 6' (1.829 m), weight 197 lb 12 oz (89.7 kg), SpO2 98 %.     HEENT: AT, NC, PERRLA  Neck: No JVD  Heart: S1 S2 audible, no murmur   Lungs: CTA   Abdomen: Nontender   Limbs: No edema   CNS: no focal deficit      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    Rt Tricep, Lt Superior Forearm        Patient Active Problem List   Diagnosis    Hyperlipidemia    Depression    Insomnia    BPH (benign prostatic hyperplasia)    Dysuria    Vitamin D deficiency    Seborrheic keratosis, inflamed    Actinic keratosis    Seborrheic keratosis    SCC (squamous cell carcinoma)    Varicose 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    Anxiety    BPH with urinary obstruction    Acquired hypothyroidism    UGIB (upper gastrointestinal bleed)        Allergies   Allergen Reactions    11.9 03/05/2021    BASOPCT 0.3 03/05/2021    MONOSABS 1.1 03/05/2021    LYMPHSABS 0.6 03/05/2021    EOSABS 0.0 03/05/2021    BASOSABS 0.0 03/05/2021    DIFFTYPE AUTOMATED DIFFERENTIAL 03/05/2021     CMP:    Lab Results   Component Value Date     03/05/2021    K 4.1 03/05/2021     03/05/2021    CO2 27 03/05/2021    BUN 22 03/05/2021    CREATININE 1.5 03/05/2021    GFRAA 53 03/05/2021    AGRATIO 1.4 01/21/2021    LABGLOM 44 03/05/2021    GLUCOSE 99 03/05/2021    PROT 6.0 03/02/2021    LABALBU 3.2 03/02/2021    CALCIUM 8.2 03/05/2021    BILITOT 0.2 03/02/2021    ALKPHOS 93 03/02/2021    AST 20 03/02/2021    ALT 12 03/02/2021     Hepatic Function Panel:    Lab Results   Component Value Date    ALKPHOS 93 03/02/2021    ALT 12 03/02/2021    AST 20 03/02/2021    PROT 6.0 03/02/2021    BILITOT 0.2 03/02/2021    LABALBU 3.2 03/02/2021     Magnesium:    Lab Results   Component Value Date    MG 1.9 03/02/2021     PT/INR:  No results found for: PROTIME, INR  Last 3 Troponin:  No results found for: TROPONINI  U/A:    Lab Results   Component Value Date    COLORU YELLOW 01/22/2021    PHUR 6.0 01/22/2021    WBCUA 19 01/22/2021    RBCUA  01/22/2021    RBCUA 3 01/11/2017    MUCUS RARE 01/11/2017    BACTERIA RARE 01/11/2017    CLARITYU CLOUDY 01/22/2021    SPECGRAV 1.019 01/22/2021    LEUKOCYTESUR MODERATE 01/22/2021    UROBILINOGEN 0.2 01/22/2021    BILIRUBINUR Negative 01/22/2021    BLOODU MODERATE 01/22/2021    GLUCOSEU Negative 01/22/2021     ABG:  No results found for: PHART, LFP2VXP, PO2ART, QHF2KZW, BEART, THGBART, QQV7BVS, Z6RTTHZO  FLP:    Lab Results   Component Value Date    TRIG 207 11/19/2018    HDL 41 11/19/2018    LDLCALC 75 11/19/2018    LABVLDL 18 09/21/2012     TSH:    Lab Results   Component Value Date    TSH 3.52 01/21/2021      DATA:   ECG: Sinus Rhythm       ASSESSMENT:   1 status post large hiatal hernia repair will relieve obstruction no cardiac complication patient has normal LV function  2 upper GI bleed with blood loss anemia  3 atypical chest pain which resolved  4 CKD stage III  Continue IV hydration  PLAN   Stable from cardiology standpoint  Bnp

## 2021-03-05 NOTE — PLAN OF CARE
Problem: Infection:  Goal: Will remain free from infection  Description: Will remain free from infection  3/4/2021 2032 by Aurora Hallman RN  Outcome: Ongoing  3/4/2021 1814 by Dennis Degroot RN  Outcome: Ongoing     Problem: Safety:  Goal: Free from accidental physical injury  Description: Free from accidental physical injury  3/4/2021 2032 by Aurora Hallman RN  Outcome: Ongoing  3/4/2021 1814 by Dennis Degroot RN  Outcome: Ongoing  Goal: Free from intentional harm  Description: Free from intentional harm  3/4/2021 2032 by Aurora Hallman RN  Outcome: Ongoing  3/4/2021 1814 by Dennis Degroot RN  Outcome: Ongoing     Problem: Daily Care:  Goal: Daily care needs are met  Description: Daily care needs are met  3/4/2021 2032 by Aurora Hallman RN  Outcome: Ongoing  3/4/2021 1814 by Dennis Degroot RN  Outcome: Ongoing     Problem: Pain:  Goal: Patient's pain/discomfort is manageable  Description: Patient's pain/discomfort is manageable  3/4/2021 2032 by Aurora Hallman RN  Outcome: Ongoing  3/4/2021 1814 by Dennis Degroot RN  Outcome: Ongoing     Problem: Skin Integrity:  Goal: Skin integrity will stabilize  Description: Skin integrity will stabilize  3/4/2021 2032 by Aurora Hallman RN  Outcome: Ongoing  3/4/2021 1814 by Dennis Degroot RN  Outcome: Ongoing     Problem: Discharge Planning:  Goal: Patients continuum of care needs are met  Description: Patients continuum of care needs are met  3/4/2021 2032 by Aurora Hallman RN  Outcome: Ongoing  3/4/2021 1814 by Dennis Degroot RN  Outcome: Ongoing     Problem: Fluid Volume - Imbalance:  Goal: Will show no signs and symptoms of excessive bleeding  Description: Will show no signs and symptoms of excessive bleeding  3/4/2021 2032 by Aurora Hallman RN  Outcome: Ongoing  3/4/2021 1814 by Dennis Degroot RN  Outcome: Ongoing  Goal: Absence of imbalanced fluid volume signs and symptoms  Description: Absence of imbalanced fluid volume signs and symptoms  3/4/2021 2032 by Aurora Hallman RN  Outcome: Ongoing  3/4/2021 1814 by Philippe Cao RN  Outcome: Ongoing     Problem: Nausea/Vomiting:  Goal: Absence of nausea/vomiting  Description: Absence of nausea/vomiting  3/4/2021 2032 by Elizabeth Jessica RN  Outcome: Ongoing  3/4/2021 1814 by Philippe Cao RN  Outcome: Ongoing  Goal: Able to drink  Description: Able to drink  3/4/2021 2032 by Elizabeth Jessica RN  Outcome: Ongoing  3/4/2021 1814 by Philippe Cao RN  Outcome: Ongoing  Goal: Able to eat  Description: Able to eat  3/4/2021 2032 by Elizabeth Jessica RN  Outcome: Ongoing  3/4/2021 1814 by Philippe Cao RN  Outcome: Ongoing  Goal: Ability to achieve adequate nutritional intake will improve  Description: Ability to achieve adequate nutritional intake will improve  3/4/2021 2032 by Elizabeth Jessica RN  Outcome: Ongoing  3/4/2021 1814 by Philippe Cao RN  Outcome: Ongoing     Problem: Falls - Risk of:  Goal: Will remain free from falls  Description: Will remain free from falls  3/4/2021 2032 by Elizabeth Jessica RN  Outcome: Ongoing  3/4/2021 1814 by Philippe Cao RN  Outcome: Ongoing  Goal: Absence of physical injury  Description: Absence of physical injury  3/4/2021 2032 by Elizabeth Jessica RN  Outcome: Ongoing  3/4/2021 1814 by Philippe Cao RN  Outcome: Ongoing     Problem: Skin Integrity:  Goal: Will show no infection signs and symptoms  Description: Will show no infection signs and symptoms  3/4/2021 2032 by Elizabeth Jessica RN  Outcome: Ongoing  3/4/2021 1814 by Philippe Cao RN  Outcome: Ongoing  Goal: Absence of new skin breakdown  Description: Absence of new skin breakdown  3/4/2021 2032 by Elizabeth Jessica RN  Outcome: Ongoing  3/4/2021 1814 by Philippe Cao RN  Outcome: Ongoing     Problem: Discharge Planning:  Goal: Discharged to appropriate level of care  Description: Discharged to appropriate level of care  Outcome: Ongoing

## 2021-03-05 NOTE — PROGRESS NOTES
Hospitalist Progress Note      Name:  Apple Wheatley /Age/Sex: 10/18/1930  (80 y.o. male)   MRN & CSN:  [id number] & [id number] Admission Date/Time: 3/2/2021  6:10 PM   Location:  -A PCP: Connie Ramos MD         Hospital Day: 4    Assessment and Plan:   Apple Wheatley is a 80 y.o.  male  who presents with chest pain, associated nausea and bloody emesis.     Upper GI bleed   Acute on chronic blood loss anemia, hemodynamically stable  Large hiatal hernia, symptomatic s/p Laparoscopic repair of incarcerated hernia with partial fundoplication   Status post PRBC 1 unit, transfused in the ED  Serial H&H, transfuse for Hb less than 7  IV pantoprazole  CT abdomen with large hiatal hernia, with concerns for entrapment  General surgery on board s/p surgery  N.p.o., NG tube in situ, management per surgery  IV antiemetics, analgesics  Iron studies with borderline ESTUARDO  Cardiology consult was placed for cardiac clearance, cleared with moderate risk for adverse cardiovascular events    Chest pain, atypical, likely due to above findings  Serial troponin negative  Echo EF50%  Cardiology on board            CKD 3, seems at baseline  Avoid nephrotoxins, monitor  Lasix resumed    Hematuria, likely traumatic  Urology to evaluate    Chronic medical condition  Neuropathy, continue gabapentin  BPH on Flomax  Hypothyroidism, continue home meds  Anxiety disorder, continue home medications    CODE STATUS, limited no intubation, no CPR, resuscitative meds are allowed    Discussed with Joce Vera doc updated on care and course  Diet Diet NPO Effective Now Exceptions are: Ice Chips   DVT Prophylaxis [] Lovenox, []  Heparin, [] SCDs, []No VTE prophylaxis, patient ambulating   GI Prophylaxis [] PPI, [] H2 Blocker, [] No GI prophylaxis, patient is receiving diet/Tube Feeds   Code Status Limited   Disposition Patient requires continued admission due to    MDM [] Low, [] Moderate,[]  High  Patient's risk as above due to History of Present Illness:     Pt S&E. Patient seems anxious, otherwise comfortable, denies any pain, has nausea, no vomiting    10-14 point ROS reviewed negative, unless as noted above    Objective: Intake/Output Summary (Last 24 hours) at 3/5/2021 0925  Last data filed at 3/5/2021 0800  Gross per 24 hour   Intake 2595.5 ml   Output 2159 ml   Net 436.5 ml      Vitals:   Vitals:    03/05/21 0809   BP: 122/70   Pulse: 86   Resp: 10   Temp: 97.9 °F (36.6 °C)   SpO2: 98%     Physical Exam:    GEN Awake male, lying , does not seem in  distress. Appears given age. EYES Pupils are equally round. No scleral erythema, discharge, or conjunctivitis. HENT Mucous membranes are moist.  NG tube in situ  NECK No apparent thyromegaly or masses. RESP Diminished BS bilaterally, no wheezes, rales or rhonchi. Symmetric chest movement while on room air. CARDIO/VASC S1/S2 auscultated. Regular rate without appreciable murmurs, rubs, or gallops. Peripheral pulses equal bilaterally and palpable. No peripheral edema. GI Abdomen is soft without significant tenderness, masses, or guarding. Bowel sounds are reduced . Clean surgical dressings, mild crepitus in left lower quadrant area. Rectal exam deferred.  Laureano catheter is  Present, draining dark urine  HEME/LYMPH No petechiae or ecchymoses. MSK No gross joint deformities. Spontaneous movement of all extremities. Bilateral lower extremity oedema, +2  SKIN Normal coloration, warm, dry. NEURO Cranial nerves appear grossly intact, normal speech, no lateralizing weakness. PSYCH Awake, alert, oriented x 4. Affect appropriate.     Medications:   Medications:    enoxaparin  30 mg Subcutaneous Daily    [Held by provider] furosemide  20 mg Oral Daily    sodium chloride flush  10 mL Intravenous 2 times per day    pantoprazole  40 mg Intravenous Q12H    And    sodium chloride (PF)  10 mL Intravenous Q12H    metoprolol (LOPRESSOR) ivpb  5 mg Intravenous Q8H Infusions:    lactated ringers 125 mL/hr at 03/05/21 0439     PRN Meds:     morphine, 4 mg, Q2H PRN      sodium chloride flush, 10 mL, PRN      ondansetron, 4 mg, Q6H PRN      acetaminophen, 650 mg, Q6H PRN    Or      acetaminophen, 650 mg, Q6H PRN        Data    Recent Labs     03/02/21  1843 03/03/21  0435 03/04/21  0540 03/05/21  0500   WBC 6.9  --  9.0 9.4   HGB 9.3* 10.7* 9.5* 9.8*   HCT 29.5* 34.9* 30.3* 30.8*     --  212 222      Recent Labs     03/02/21  1843 03/04/21  0540 03/05/21  0500    137 141   K 3.4* 4.1 4.1   CL 98* 104 104   CO2 30 25 27   BUN 18 21 22   CREATININE 1.7* 1.7* 1.5*     Recent Labs     03/02/21  1843   AST 20   ALT 12   BILITOT 0.2   ALKPHOS 93     No results for input(s): INR in the last 72 hours. No results for input(s): CKTOTAL, CKMB, CKMBINDEX, TROPONINI in the last 72 hours.         Electronically signed by Tara Guardado MD on 3/5/2021 at 9:25 AM

## 2021-03-05 NOTE — PROGRESS NOTES
This RN spoke to Dr. Simon Koehler, covering for Dr. Kayleigh Escobar, regarding pt urine output. No Lasix ordered for now, BNP ordered.

## 2021-03-05 NOTE — CONSULTS
[facility]   Consult Note  [facility] 1 2 3 4 5    Date: 3/5/2021   Patient: Kevin Jones   : 10/18/1930   DOA: 3/2/2021   MRN: [id number]   ROOM#: -[id number]     Reason for Consult:  Epi Rater Hematuria   Requesting Physician:  Dr. Shamika Fang  Collaborating Urologist on Call at time of admission: Dr. Ruben Welsh:  Chest Pain     History Obtained From:  electronic medical record, reason patient could not give history:  altered mental status    HISTORY OF PRESENT ILLNESS:                The patient is a 80 y.o. male with significant past medical history of BPH, Hepatitis and CKD who presented to [facility] ED on 3/2/21 with chest pain, associated nausea and bloody emesis. Patient admitted for acute on chronic blood loss anemia, Upper GI bleed and found to have a large hiatal hernia with partial gastric obstruction and underwent Laparoscopic repair of incarcerated hernia with partial fundoplication  by General Surgery, Dr. Omid Jc. Patient has had an indwelling borden catheter since surgery and was pink tinged with a foul urine odor upon insertion by report. Today, patient became combative this afternoon and is now in soft wrist restraints.      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    Rt Tricep, Lt Superior Forearm     Past Surgical History:        Procedure Laterality Date    CATARACT REMOVAL      HERNIA REPAIR      HIATAL HERNIA REPAIR N/A 3/4/2021    LAPAROSCOPIC LARGE HERNIA HIATAL REPAIR WITH GASTRIC OBSTRUCTION POSS OPEN performed by Charlene Parra MD at [address] [place]       Current Medications:   Current Facility-Administered Medications: lactated ringers infusion, , Intravenous, Continuous  morphine sulfate (PF) injection 4 mg, 4 mg, Intravenous, Q2H PRN  enoxaparin (LOVENOX) injection 30 mg, 30 mg, Subcutaneous, Daily  [Held by provider] furosemide (LASIX) tablet 20 mg, 20 mg, Oral, Daily  sodium chloride flush 0.9 % injection 10 mL, 10 mL, Intravenous, 2 times per day  sodium chloride flush 0.9 % injection 10 mL, 10 mL, Intravenous, PRN  [DISCONTINUED] promethazine (PHENERGAN) tablet 12.5 mg, 12.5 mg, Oral, Q6H PRN **OR** ondansetron (ZOFRAN) injection 4 mg, 4 mg, Intravenous, Q6H PRN  acetaminophen (TYLENOL) tablet 650 mg, 650 mg, Oral, Q6H PRN **OR** acetaminophen (TYLENOL) suppository 650 mg, 650 mg, Rectal, Q6H PRN  pantoprazole (PROTONIX) injection 40 mg, 40 mg, Intravenous, Q12H **AND** sodium chloride (PF) 0.9 % injection 10 mL, 10 mL, Intravenous, Q12H  metoprolol (LOPRESSOR) 5 mg in sodium chloride 0.9 % 50 mL IVPB, 5 mg, Intravenous, Q8H    Allergies:  Minocycline    Social History:   TOBACCO:   reports that he quit smoking about 65 years ago. His smoking use included cigarettes. He started smoking about 70 years ago. He has a 5.00 pack-year smoking history. He has never used smokeless tobacco.  ETOH:   reports previous alcohol use. DRUGS:   reports previous drug use. Family History:       Problem Relation Age of Onset    Depression Mother     COPD Father     Diabetes Brother     Diabetes Maternal Grandmother        REVIEW OF SYSTEMS:     Review of systems not obtained due to patient factors - mental status    PHYSICAL EXAM:      VITALS:  BP (!) 141/76   Pulse 99   Temp 97.7 °F (36.5 °C) (Oral)   Resp 16   Ht 6' (1.829 m)   Wt 197 lb 12 oz (89.7 kg)   SpO2 99%   BMI 26.82 kg/m²      TEMPERATURE:  Current - Temp: 97.7 °F (36.5 °C);  Max - Temp  Av.2 °F (36.8 °C)  Min: 97.7 °F (36.5 °C)  Max: 98.8 °F (37.1 °C)  24HR BLOOD PRESSURE RANGE:  Systolic (52SIH), GPN:394 , Min:122 , Max:168 ; Diastolic (64GPB), WOL:08, Min:70, Max:98      General appearance: alert, appears stated age, cooperative and no distress  Head: Normocephalic, without obvious abnormality, atraumatic  Abdomen: soft, non-tender, non-distended  Male genitalia: borden catheter in place, urine khurram colored    DATA:    WBC:    Lab Results   Component Value Date    WBC 9.4 03/05/2021     Hemoglobin/Hematocrit:    Lab Results   Component Value Date    HGB 9.8 03/05/2021    HCT 30.8 03/05/2021     BMP:    Lab Results   Component Value Date     03/05/2021    K 4.1 03/05/2021     03/05/2021    CO2 27 03/05/2021    BUN 22 03/05/2021    LABALBU 3.2 03/02/2021    CREATININE 1.5 03/05/2021    CALCIUM 8.2 03/05/2021    GFRAA 53 03/05/2021    LABGLOM 44 03/05/2021     Urine Culture: pending     Imaging:  Echocardiogram Complete 2d With Doppler With Color    Result Date: 3/3/2021  Transthoracic Echocardiography Report (TTE)  Demographics   Patient Name       Nelli Cross       Date of Study       03/03/2021   Date of Birth      10/18/1930        Gender              Male   Age                80 year(s)        Race                   Patient Number     [id number]        Room Number         2111   Visit Number       [id number]   Corporate ID       [id number]   Accession Number   [id number]        [address] [place] [address]   Ordering Physician Adam Hendrickson MD Interpreting        Emma Bello MD                                       Physician  Procedure Type of Study   TTE procedure:ECHOCARDIOGRAM COMPLETE 2D W DOPPLER W COLOR. Procedure Date Date: 03/03/2021 Start: 08:52 AM Study Location: Portable Technical Quality: Fair visualization Indications:Congestive heart failure.  Patient Status: Routine Height: 72 inches Weight: 190 pounds BSA: 2.08 m2 BMI: 25.77 kg/m2 HR: 96 bpm BP: 149/84 mmHg  Conclusions   Summary  Left ventricular systolic function is low normal.  Ejection fraction is visually estimated at 50%. Sclerotic, but non-stenotic aortic valve. Trace aortic regurgitation is noted. No evidence of any pericardial effusion. Signature   ------------------------------------------------------------------  Electronically signed by Reagan Steven MD (Interpreting  physician) on 03/03/2021 at 11:18 AM  ------------------------------------------------------------------   Findings   Left Ventricle  Left ventricular systolic function is low normal.  Ejection fraction is visually estimated at 50%. Left Atrium  Essentially normal left atrium. Right Atrium  Essentially normal right atrium. Right Ventricle  Essentially normal right ventricle. Aortic Valve  Sclerotic, but non-stenotic aortic valve. Trace aortic regurgitation is noted. Mitral Valve  Trace mitral regurgitation is present. Tricuspid Valve  Mild tricuspid regurgitation is present. Pulmonic Valve  The pulmonic valve was not well visualized. Pericardial Effusion  No evidence of any pericardial effusion.   M-Mode/2D Measurements & Calculations   LV Diastolic Dimension:  LV Systolic Dimension:  LA Dimension: 3.5 cmAO Root  4.93 cm                  3.56 cm                 Dimension: 4 cmLA Area:  LV FS:27.8 %             LV Volume Diastolic: 72 60.2 cm2  LV PW Diastolic: 9.06 cm ml  LV PW Systolic: 1.4 cm   LV Volume Systolic: 40  Septum Diastolic: 9.26   ml  cm                       LV EDV/LV EDV Index: 72 RV Diastolic Dimension:  Septum Systolic: 3.04 cm GL/95 B4HU ESV/LV ESV   2.91 cm  CO: 7.97 l/min           Index: 40 ml/19 m2  CI: 3.83 l/m*m2          EF Calculated (A4C):    LA/Aorta: 0.88                           44.4 %                  Ascending Aorta: 3.7 cm  LV Area Diastolic: 88.5  EF Calculated (2D):     LA volume/Index: 49 ml  cm2                      53.5 %                  /44A3  LV Area Systolic: 18 cm2                           LV Length: 8.17 cm                            LVOT: 2.7 cm  Doppler Measurements & Calculations    AV Peak Velocity: 127 cm/s    LVOT Peak Velocity: 82.7 cm/s   AV Peak Gradient: 6.45 mmHg   LVOT Mean Velocity: 59.8 cm/s   AV Mean Velocity: 93.1 cm/s   LVOT Peak Gradient: 3 mmHgLVOT Mean Gradient:   AV Mean Gradient: 4 mmHg      2 mmHg   AV VTI: 21.5 cm               Estimated RVSP: 36 mmHg   AV Area (Continuity):3.86 cm2 Estimated RAP:3 mmHg    LVOT VTI: 14.5 cm                                 TR Velocity:286 cm/s   Estimated PASP: 35.72 mmHg    TR Gradient:32.72 mmHg      Xr Chest Portable    Result Date: 3/4/2021  EXAMINATION: ONE XRAY VIEW OF THE CHEST 3/4/2021 12:32 pm COMPARISON: March 2, 2021 HISTORY: ORDERING SYSTEM PROVIDED HISTORY: post operative hiatal hernia repair TECHNOLOGIST PROVIDED HISTORY: Reason for exam:->post operative hiatal hernia repair Reason for Exam: post operative hiatal hernia repair FINDINGS: Nasogastric tube with its distal tip coursing below the diaphragm. Stable cardiomediastinal silhouette. Bibasilar atelectasis or infiltrate is noted. No definite pleural effusion or pneumothorax. The osseous structures are stable. Bibasilar atelectasis or infiltrate. Xr Chest Portable    Result Date: 3/2/2021  EXAMINATION: ONE XRAY VIEW OF THE CHEST 3/2/2021 6:43 pm COMPARISON: 01/30/2018 HISTORY: ORDERING SYSTEM PROVIDED HISTORY: Chest pain TECHNOLOGIST PROVIDED HISTORY: Reason for exam:->Chest pain Reason for Exam: Chest pain Acuity: Acute Type of Exam: Initial Additional signs and symptoms: na Relevant Medical/Surgical History: na FINDINGS: The heart size is stable. Large hiatal hernia again noted. Probable subsegmental atelectasis left lung base. Linear opacity right lung base unchanged. No pneumothorax. Increased size of a large hiatal hernia.   Otherwise, unchanged chest     Cta Abdomen Pelvis W Contrast    Result Date: 3/2/2021  EXAMINATION: CTA OF THE ABDOMEN AND PELVIS WITH CONTRAST 3/2/2021 4:41 pm TECHNIQUE: CTA of the abdomen and pelvis was performed with the administration of intravenous contrast. Multiplanar reformatted images are provided for review. MIP images are provided for review. Dose modulation, iterative reconstruction, and/or weight based adjustment of the mA/kV was utilized to reduce the radiation dose to as low as reasonably achievable. COMPARISON: Upper GI series, 04/10/2018 HISTORY: ORDERING SYSTEM PROVIDED HISTORY: GI bleed protocol TECHNOLOGIST PROVIDED HISTORY: Reason for exam:->GI bleed protocol Decision Support Exception->Emergency Medical Condition (MA) Reason for Exam: GI bleed protocol Acuity: Acute Type of Exam: Initial FINDINGS: Lower Chest: There is an extremely large hiatal hernia, with organo-axial volvulus. The gastric fundus and a portion the body is now found inferior to the diaphragm on the left (previously those were superior), and now there is dilation of that portion of the stomach, which is concerning for entrapment. Note that the entirety of the hiatal hernia is not included. Organs: The liver enhances normally. Gallbladder is unremarkable. Normal arterial phase imaging of the spleen. Benign nodule the left adrenal gland measures 2.2 cm and is unchanged since 2009. Pancreas is unremarkable. GI/Bowel: Evaluation for GI bleeding is limited, as only an arterial phase was obtained (normally noncontrast and delayed images are also obtained for the purposes of localization of GI bleeding). That said, no suspicious extravasation of contrast is identified within the large bowel. There is mild diverticulosis of the large bowel. The appendix is normal. Again, there is the large hiatal hernia, with concern for entrapment of the gastric fundus and a portion of the body. The duodenal sweep and the remainder of the small bowel are unremarkable. 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 Urinary bladder appears thickened, which is likely secondary to outlet obstruction.   There is moderate to marked prostatic hypertrophy. No free pelvic fluid. No pelvic sidewall lymphadenopathy. There is evidence of previous bilateral inguinal hernia repair. Peritoneum/Retroperitoneum: Abdominal aorta normal in caliber. No retroperitoneal lymphadenopathy is identified. No iliac chain or inguinal lymphadenopathy. 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 Additional pagetoid change is seen in the T11 vertebral body. No acute or suspicious bony abnormalities are detected. Very large hiatal hernia. Again, a portion of the gastric fundus and gastric body have migrated inferior to the diaphragm, and there is dilation of that segment of the stomach. The findings are concerning for entrapment. Otherwise, no acute abnormality identified. Focal extravasation contrast into the bowel is not visualized, but again the evaluation is limited by a monophasic study. Moderate to marked prostatic hypertrophy. Urinary bladder mural thickening with urinary bladder diverticula. The thickening is likely secondary to bladder outlet obstruction. Xr Abdomen For Ng/og/ne Tube Placement    Result Date: 3/3/2021  EXAMINATION: ONE SUPINE XRAY VIEW(S) OF THE ABDOMEN 3/2/2021 11:58 pm COMPARISON: None. HISTORY: ORDERING SYSTEM PROVIDED HISTORY: Confirmation of course of NG/OG/NE tube and location of tip of tube TECHNOLOGIST PROVIDED HISTORY: Reason for exam:->Confirmation of course of NG/OG/NE tube and location of tip of tube Portable? ->Yes Reason for Exam: Confirmation of course of NG/OG/NE tube and location of tip of tube Acuity: Acute Type of Exam: Initial FINDINGS: The tip of the nasogastric tube is in the stomach. The side hole/port is near the expected location of the GE junction. There is marked elevation of the left hemidiaphragm. The nasogastric tube should be advanced 5 cm.      Assessment & Plan:      Albert Harrell is a 80y.o. year old male admitted 3/2/2021 for Upper GI Bleed. 1) Gross Hematuria: Resolving. Likely secondary to borden catheter trauma   Urine now khurram colored in urinary drainage tubing and pink tinged in drainage bag   CT a/p 3/2/21 with multiple bladder diverticulum, thickened urinary bladder wall likely secondary to CANSECO with a large prostate   Recommend outpatient Cystoscopy for further evaluation       Urine culture 3/5/21 pending    Continue borden catheter for now, may remove prior to discharge   2) BPH: h/o 90g prostate gland. Chronically on Flomax 0.4mg and Proscar 5mg, okay to resume these    Patient follows with Dr. Magda Roldan, h/o incomplete bladder emptying, patient has declined surgical intervention in the past due to his age   Continue medical management   Follow-up with Dr. Magda Roldan in 2 weeks     Will Continue to Follow    Patient seen and examined, chart reviewed.      Electronically signed by Leticia Maguire PA-C on 3/5/2021 at 5:16 PM

## 2021-03-05 NOTE — PLAN OF CARE
eat  Description: Able to eat  Outcome: Ongoing  Goal: Ability to achieve adequate nutritional intake will improve  Description: Ability to achieve adequate nutritional intake will improve  Outcome: Ongoing     Problem: Falls - Risk of:  Goal: Will remain free from falls  Description: Will remain free from falls  Outcome: Ongoing  Goal: Absence of physical injury  Description: Absence of physical injury  Outcome: Ongoing     Problem: Skin Integrity:  Goal: Will show no infection signs and symptoms  Description: Will show no infection signs and symptoms  Outcome: Ongoing  Goal: Absence of new skin breakdown  Description: Absence of new skin breakdown  Outcome: Ongoing     Problem: Non-Violent Restraints  Goal: Removal from restraints as soon as assessed to be safe  Outcome: Ongoing  Goal: No harm/injury to patient while restraints in use  Outcome: Ongoing  Goal: Patient's dignity will be maintained  Outcome: Ongoing     Problem: Bleeding:  Goal: Will show no signs and symptoms of excessive bleeding  Description: Will show no signs and symptoms of excessive bleeding  Outcome: Ongoing

## 2021-03-05 NOTE — PROGRESS NOTES
Teresa Trejo BSN, RN, EMT-P, clinical  for [facility] SURGICAL [facility] RN students [phone] this date.

## 2021-03-05 NOTE — CONSULTS
Per the physical rehabilitation triage process, the PT referral will be held at this time. Await improved patient condition relative to behavior including resolution of newly placed restraints. Await subsequent participation with nursing activity out of bed. Will periodically review chart for change.   Adrienne Michael PT,   3/5/2021, 12:39 PM

## 2021-03-05 NOTE — PROGRESS NOTES
Rosette Thomas Mr. Sherman has had a LOC change. He's become combative, yelling, and repetitively calling staff fakers. When he was asked if he was ready to get up and start moving for the day. He allowed staff to complete most of his bed bath before this precipitous change. Mr. Britany Yin had to be restrained with two soft restraints when he was swinging at staff. We sent a urine culture and urinalysis per Dr. Moira Hernandez. Mr. Britany Yin has not allowed us to take his temperature, however HR: 103, RR 22, unsure if PRN pain medication is advised at this time due to apparent delirium.       Dr. Nicolette Silva notified

## 2021-03-06 LAB
ABO/RH: NORMAL
ANION GAP SERPL CALCULATED.3IONS-SCNC: 12 MMOL/L (ref 4–16)
ANTIBODY SCREEN: NEGATIVE
BASOPHILS ABSOLUTE: 0 K/CU MM
BASOPHILS RELATIVE PERCENT: 0.2 % (ref 0–1)
BUN BLDV-MCNC: 22 MG/DL (ref 6–23)
CALCIUM SERPL-MCNC: 8.2 MG/DL (ref 8.3–10.6)
CHLORIDE BLD-SCNC: 100 MMOL/L (ref 99–110)
CO2: 27 MMOL/L (ref 21–32)
COMPONENT: NORMAL
COMPONENT: NORMAL
CREAT SERPL-MCNC: 1.6 MG/DL (ref 0.9–1.3)
CROSSMATCH RESULT: NORMAL
CROSSMATCH RESULT: NORMAL
CULTURE: NORMAL
DIFFERENTIAL TYPE: ABNORMAL
EOSINOPHILS ABSOLUTE: 0 K/CU MM
EOSINOPHILS RELATIVE PERCENT: 0.1 % (ref 0–3)
GFR AFRICAN AMERICAN: 49 ML/MIN/1.73M2
GFR NON-AFRICAN AMERICAN: 41 ML/MIN/1.73M2
GLUCOSE BLD-MCNC: 82 MG/DL (ref 70–99)
HCT VFR BLD CALC: 29.8 % (ref 42–52)
HEMOGLOBIN: 9.1 GM/DL (ref 13.5–18)
IMMATURE NEUTROPHIL %: 0.9 % (ref 0–0.43)
LYMPHOCYTES ABSOLUTE: 0.5 K/CU MM
LYMPHOCYTES RELATIVE PERCENT: 6.1 % (ref 24–44)
Lab: NORMAL
MCH RBC QN AUTO: 28.4 PG (ref 27–31)
MCHC RBC AUTO-ENTMCNC: 30.5 % (ref 32–36)
MCV RBC AUTO: 93.1 FL (ref 78–100)
MONOCYTES ABSOLUTE: 0.9 K/CU MM
MONOCYTES RELATIVE PERCENT: 10 % (ref 0–4)
NUCLEATED RBC %: 0 %
PDW BLD-RTO: 14.5 % (ref 11.7–14.9)
PLATELET # BLD: 217 K/CU MM (ref 140–440)
PMV BLD AUTO: 9.3 FL (ref 7.5–11.1)
POTASSIUM SERPL-SCNC: 3.8 MMOL/L (ref 3.5–5.1)
RBC # BLD: 3.2 M/CU MM (ref 4.6–6.2)
SEGMENTED NEUTROPHILS ABSOLUTE COUNT: 7 K/CU MM
SEGMENTED NEUTROPHILS RELATIVE PERCENT: 82.7 % (ref 36–66)
SODIUM BLD-SCNC: 139 MMOL/L (ref 135–145)
SPECIMEN: NORMAL
STATUS: NORMAL
STATUS: NORMAL
TOTAL IMMATURE NEUTOROPHIL: 0.08 K/CU MM
TOTAL NUCLEATED RBC: 0 K/CU MM
TRANSFUSION STATUS: NORMAL
TRANSFUSION STATUS: NORMAL
UNIT DIVISION: 0
UNIT DIVISION: 0
UNIT NUMBER: NORMAL
UNIT NUMBER: NORMAL
WBC # BLD: 8.5 K/CU MM (ref 4–10.5)

## 2021-03-06 PROCEDURE — 2580000003 HC RX 258: Performed by: SURGERY

## 2021-03-06 PROCEDURE — 94150 VITAL CAPACITY TEST: CPT

## 2021-03-06 PROCEDURE — 6360000002 HC RX W HCPCS: Performed by: SURGERY

## 2021-03-06 PROCEDURE — 2700000000 HC OXYGEN THERAPY PER DAY

## 2021-03-06 PROCEDURE — 94664 DEMO&/EVAL PT USE INHALER: CPT

## 2021-03-06 PROCEDURE — 2000000000 HC ICU R&B

## 2021-03-06 PROCEDURE — 36415 COLL VENOUS BLD VENIPUNCTURE: CPT

## 2021-03-06 PROCEDURE — 80048 BASIC METABOLIC PNL TOTAL CA: CPT

## 2021-03-06 PROCEDURE — 2500000003 HC RX 250 WO HCPCS: Performed by: SURGERY

## 2021-03-06 PROCEDURE — 85025 COMPLETE CBC W/AUTO DIFF WBC: CPT

## 2021-03-06 PROCEDURE — C9113 INJ PANTOPRAZOLE SODIUM, VIA: HCPCS | Performed by: SURGERY

## 2021-03-06 PROCEDURE — 6370000000 HC RX 637 (ALT 250 FOR IP): Performed by: PHYSICIAN ASSISTANT

## 2021-03-06 PROCEDURE — 94761 N-INVAS EAR/PLS OXIMETRY MLT: CPT

## 2021-03-06 RX ORDER — LANOLIN ALCOHOL/MO/W.PET/CERES
9 CREAM (GRAM) TOPICAL NIGHTLY PRN
Status: DISCONTINUED | OUTPATIENT
Start: 2021-03-06 | End: 2021-03-12 | Stop reason: HOSPADM

## 2021-03-06 RX ADMIN — ENOXAPARIN SODIUM 30 MG: 30 INJECTION SUBCUTANEOUS at 08:05

## 2021-03-06 RX ADMIN — SODIUM CHLORIDE, POTASSIUM CHLORIDE, SODIUM LACTATE AND CALCIUM CHLORIDE 50 ML: 600; 310; 30; 20 INJECTION, SOLUTION INTRAVENOUS at 10:54

## 2021-03-06 RX ADMIN — SODIUM CHLORIDE, PRESERVATIVE FREE 10 ML: 5 INJECTION INTRAVENOUS at 08:48

## 2021-03-06 RX ADMIN — METOPROLOL TARTRATE 5 MG: 5 INJECTION INTRAVENOUS at 00:27

## 2021-03-06 RX ADMIN — METOPROLOL TARTRATE 5 MG: 5 INJECTION INTRAVENOUS at 17:30

## 2021-03-06 RX ADMIN — METOPROLOL TARTRATE 5 MG: 5 INJECTION INTRAVENOUS at 08:05

## 2021-03-06 RX ADMIN — PANTOPRAZOLE SODIUM 40 MG: 40 INJECTION, POWDER, FOR SOLUTION INTRAVENOUS at 18:02

## 2021-03-06 RX ADMIN — SODIUM CHLORIDE, PRESERVATIVE FREE 10 ML: 5 INJECTION INTRAVENOUS at 21:06

## 2021-03-06 RX ADMIN — SODIUM CHLORIDE, PRESERVATIVE FREE 10 ML: 5 INJECTION INTRAVENOUS at 18:02

## 2021-03-06 RX ADMIN — PANTOPRAZOLE SODIUM 40 MG: 40 INJECTION, POWDER, FOR SOLUTION INTRAVENOUS at 06:32

## 2021-03-06 RX ADMIN — Medication 9 MG: at 00:56

## 2021-03-06 ASSESSMENT — PAIN SCALES - GENERAL
PAINLEVEL_OUTOF10: 0

## 2021-03-06 NOTE — PROGRESS NOTES
[facility]  [facility]   Progress Note  [facility] 0 1 2      Date: 3/6/2021   Patient: Sina Duane   : 10/18/1930   DOA: 3/2/2021   MRN: [id number]   ROOM#: /-A     Admit Date: 3/2/2021     Collaborating Urologist on Call at time of admission: Dr. Love Samano    CC: Chest Pain   Reason for Consult:  Gross Hematuria     Subjective:     Pain: mild, no nausea and no vomiting,   Bowel Movement/Flatus:   Yes  Voiding: indwelling borden catheter, urine blood tinged in drainage bag, khurram colored in tubing     \"I feel terrible\"    Objective:    Vitals:    BP (!) 142/75   Pulse 90   Temp 98 °F (36.7 °C) (Oral)   Resp 14   Ht 6' (1.829 m)   Wt 197 lb 12 oz (89.7 kg)   SpO2 100%   BMI 26.82 kg/m²    Temp  Av.8 °F (36.6 °C)  Min: 97.7 °F (36.5 °C)  Max: 98 °F (36.7 °C)       Intake/Output Summary (Last 24 hours) at 3/6/2021 0754  Last data filed at 3/6/2021 0950  Gross per 24 hour   Intake 1050 ml   Output 4665 ml   Net -3615 ml       Physical Exam:   General appearance: alert, appears stated age, cooperative, no distress and mildly obese  Head: Normocephalic, without obvious abnormality, atraumatic  Abdomen: soft, non-tender, non-distended  Male genitalia: borden catheter in place, urine blood tinged in drainage bag, khurram colored in tubing    Labs:   WBC:    Lab Results   Component Value Date    WBC 8.5 2021      Hemoglobin/Hematocrit:    Lab Results   Component Value Date    HGB 9.1 2021    HCT 29.8 2021      BMP:   Lab Results   Component Value Date     2021    K 3.8 2021     2021    CO2 27 2021    BUN 22 2021    LABALBU 3.2 2021    CREATININE 1.6 2021    CALCIUM 8.2 2021    GFRAA 49 2021    LABGLOM 41 2021      Urine Culture: 3/5/21  Final Report No growth at 18 to 36 hours    Imaging:   Echocardiogram Complete 2d With Doppler With Color    Result Date: 3/3/2021  Transthoracic Echocardiography present. Pulmonic Valve  The pulmonic valve was not well visualized. Pericardial Effusion  No evidence of any pericardial effusion.   M-Mode/2D Measurements & Calculations   LV Diastolic Dimension:  LV Systolic Dimension:  LA Dimension: 3.5 cmAO Root  4.93 cm                  3.56 cm                 Dimension: 4 cmLA Area:  LV FS:27.8 %             LV Volume Diastolic: 72 46.6 cm2  LV PW Diastolic: 5.78 cm ml  LV PW Systolic: 1.4 cm   LV Volume Systolic: 40  Septum Diastolic: 5.18   ml  cm                       LV EDV/LV EDV Index: 72 RV Diastolic Dimension:  Septum Systolic: 6.90 cm QX/71 W9QU ESV/LV ESV   2.91 cm  CO: 7.97 l/min           Index: 40 ml/19 m2  CI: 3.83 l/m*m2          EF Calculated (A4C):    LA/Aorta: 0.88                           44.4 %                  Ascending Aorta: 3.7 cm  LV Area Diastolic: 27.6  EF Calculated (2D):     LA volume/Index: 49 ml  cm2                      53.5 %                  /15Y3  LV Area Systolic: 18 cm2                           LV Length: 8.17 cm                            LVOT: 2.7 cm  Doppler Measurements & Calculations    AV Peak Velocity: 127 cm/s    LVOT Peak Velocity: 82.7 cm/s   AV Peak Gradient: 6.45 mmHg   LVOT Mean Velocity: 59.8 cm/s   AV Mean Velocity: 93.1 cm/s   LVOT Peak Gradient: 3 mmHgLVOT Mean Gradient:   AV Mean Gradient: 4 mmHg      2 mmHg   AV VTI: 21.5 cm               Estimated RVSP: 36 mmHg   AV Area (Continuity):3.86 cm2 Estimated RAP:3 mmHg    LVOT VTI: 14.5 cm                                 TR Velocity:286 cm/s   Estimated PASP: 35.72 mmHg    TR Gradient:32.72 mmHg      Xr Chest Portable    Result Date: 3/4/2021  EXAMINATION: ONE XRAY VIEW OF THE CHEST 3/4/2021 12:32 pm COMPARISON: March 2, 2021 HISTORY: ORDERING SYSTEM PROVIDED HISTORY: post operative hiatal hernia repair TECHNOLOGIST PROVIDED HISTORY: Reason for exam:->post operative hiatal hernia repair Reason for Exam: post operative hiatal hernia repair FINDINGS: Nasogastric tube with its distal tip coursing below the diaphragm. Stable cardiomediastinal silhouette. Bibasilar atelectasis or infiltrate is noted. No definite pleural effusion or pneumothorax. The osseous structures are stable. Bibasilar atelectasis or infiltrate. Xr Chest Portable    Result Date: 3/2/2021  EXAMINATION: ONE XRAY VIEW OF THE CHEST 3/2/2021 6:43 pm COMPARISON: 01/30/2018 HISTORY: ORDERING SYSTEM PROVIDED HISTORY: Chest pain TECHNOLOGIST PROVIDED HISTORY: Reason for exam:->Chest pain Reason for Exam: Chest pain Acuity: Acute Type of Exam: Initial Additional signs and symptoms: na Relevant Medical/Surgical History: na FINDINGS: The heart size is stable. Large hiatal hernia again noted. Probable subsegmental atelectasis left lung base. Linear opacity right lung base unchanged. No pneumothorax. Increased size of a large hiatal hernia. Otherwise, unchanged chest     Cta Abdomen Pelvis W Contrast    Result Date: 3/2/2021  EXAMINATION: CTA OF THE ABDOMEN AND PELVIS WITH CONTRAST 3/2/2021 4:41 pm TECHNIQUE: CTA of the abdomen and pelvis was performed with the administration of intravenous contrast. Multiplanar reformatted images are provided for review. MIP images are provided for review. Dose modulation, iterative reconstruction, and/or weight based adjustment of the mA/kV was utilized to reduce the radiation dose to as low as reasonably achievable. COMPARISON: Upper GI series, 04/10/2018 HISTORY: ORDERING SYSTEM PROVIDED HISTORY: GI bleed protocol TECHNOLOGIST PROVIDED HISTORY: Reason for exam:->GI bleed protocol Decision Support Exception->Emergency Medical Condition (MA) Reason for Exam: GI bleed protocol Acuity: Acute Type of Exam: Initial FINDINGS: Lower Chest: There is an extremely large hiatal hernia, with organo-axial volvulus.   The gastric fundus and a portion the body is now found inferior to the diaphragm on the left (previously those were superior), and now there is dilation of that portion of the stomach, which is concerning for entrapment. Note that the entirety of the hiatal hernia is not included. Organs: The liver enhances normally. Gallbladder is unremarkable. Normal arterial phase imaging of the spleen. Benign nodule the left adrenal gland measures 2.2 cm and is unchanged since 2009. Pancreas is unremarkable. GI/Bowel: Evaluation for GI bleeding is limited, as only an arterial phase was obtained (normally noncontrast and delayed images are also obtained for the purposes of localization of GI bleeding). That said, no suspicious extravasation of contrast is identified within the large bowel. There is mild diverticulosis of the large bowel. The appendix is normal. Again, there is the large hiatal hernia, with concern for entrapment of the gastric fundus and a portion of the body. The duodenal sweep and the remainder of the small bowel are unremarkable. 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 Urinary bladder appears thickened, which is likely secondary to outlet obstruction. There is moderate to marked prostatic hypertrophy. No free pelvic fluid. No pelvic sidewall lymphadenopathy. There is evidence of previous bilateral inguinal hernia repair. Peritoneum/Retroperitoneum: Abdominal aorta normal in caliber. No retroperitoneal lymphadenopathy is identified. No iliac chain or inguinal lymphadenopathy. 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 Additional pagetoid change is seen in the T11 vertebral body. No acute or suspicious bony abnormalities are detected. Very large hiatal hernia. Again, a portion of the gastric fundus and gastric body have migrated inferior to the diaphragm, and there is dilation of that segment of the stomach. The findings are concerning for entrapment. Otherwise, no acute abnormality identified.   Focal extravasation contrast into the bowel is not visualized, but again the evaluation is limited by a monophasic study. Moderate to marked prostatic hypertrophy. Urinary bladder mural thickening with urinary bladder diverticula. The thickening is likely secondary to bladder outlet obstruction. Xr Abdomen For Ng/og/ne Tube Placement    Result Date: 3/3/2021  EXAMINATION: ONE SUPINE XRAY VIEW(S) OF THE ABDOMEN 3/2/2021 11:58 pm COMPARISON: None. HISTORY: ORDERING SYSTEM PROVIDED HISTORY: Confirmation of course of NG/OG/NE tube and location of tip of tube TECHNOLOGIST PROVIDED HISTORY: Reason for exam:->Confirmation of course of NG/OG/NE tube and location of tip of tube Portable? ->Yes Reason for Exam: Confirmation of course of NG/OG/NE tube and location of tip of tube Acuity: Acute Type of Exam: Initial FINDINGS: The tip of the nasogastric tube is in the stomach. The side hole/port is near the expected location of the GE junction. There is marked elevation of the left hemidiaphragm. The nasogastric tube should be advanced 5 cm. Assessment & Plan:      Tiago King is a 80y.o. year old male admitted 3/2/2021 for Upper GI Bleed.    1) Gross Hematuria: Resolving. Likely secondary to borden catheter trauma              Urine now khurram colored in urinary drainage tubing and blood tinged in drainage bag              CT a/p 3/2/21 with multiple bladder diverticulum, thickened urinary bladder wall likely secondary to CANSECO with a large prostate              Recommend outpatient Cystoscopy for further evaluation                  Urine culture 3/5/21 Negative              Continue borden catheter for now, may remove prior to discharge   2) BPH: h/o 90g prostate gland.  Chronically on Flomax 0.4mg and Proscar 5mg, okay to resume these               Patient follows with Dr. Carlos Phillip, h/o incomplete bladder emptying, patient has declined surgical intervention in the past due to his age              Continue medical management              Follow-up with Dr. Carlos Phillip in 2 weeks     Patient seen and examined, chart reviewed.      Electronically signed by Priscilla Falcon PA-C on 3/6/2021 at 7:54 AM

## 2021-03-06 NOTE — PROGRESS NOTES
Hospitalist Progress Note      Name:  Benton Shook /Age/Sex: 10/18/1930  (80 y.o. male)   MRN & CSN:  [id number] & [id number] Admission Date/Time: 3/2/2021  6:10 PM   Location:  -A PCP: Ela Blunt MD         Hospital Day: 5    Assessment and Plan:   Benton Shook is a 80 y.o.  male  who presents with chest pain, associated nausea and bloody emesis.     Upper GI bleed   Acute on chronic blood loss anemia, hemodynamically stable  Large hiatal hernia, symptomatic s/p Laparoscopic repair of incarcerated hernia with partial fundoplication   Status post PRBC 1 unit, transfused in the ED  Serial H&H, transfuse for Hb less than 7  IV pantoprazole  CT abdomen with large hiatal hernia, with concerns for entrapment  General surgery on board s/p surgery  NG tube now out, advance diet as tolerated, started on full liquid today by surgery  IV antiemetics, analgesics  Iron studies with borderline ESTUARDO  Cardiology consult was placed for cardiac clearance, cleared with moderate risk for adverse cardiovascular events    Chest pain, atypical, likely due to above findings  Serial troponin negative  Echo EF50%  Cardiology on board            Acute metabolic encephalopathy, delirium, postop  CKD 3, seems at baseline  Avoid nephrotoxins, monitor  Lasix resumed    Hematuria, likely traumatic  Urine cultures negative  Urology, [facility] in, plans for cystoscopy as outpatient    Chronic medical condition  Neuropathy, continue gabapentin  BPH on Flomax  Hypothyroidism, continue home meds  Anxiety disorder, continue home medications    CODE STATUS, limited no intubation, no CPR, resuscitative meds are allowed  PT OT to evaluate  Diet DIET FULL LIQUID;   DVT Prophylaxis [] Lovenox, []  Heparin, [] SCDs, []No VTE prophylaxis, patient ambulating   GI Prophylaxis [] PPI, [] H2 Blocker, [] No GI prophylaxis, patient is receiving diet/Tube Feeds   Code Status Limited   Disposition Patient requires continued admission metoprolol (LOPRESSOR) ivpb  5 mg Intravenous Q8H      Infusions:    lactated ringers 50 mL (03/06/21 1054)     PRN Meds: melatonin, 9 mg, Nightly PRN  haloperidol lactate, 5 mg, Q6H PRN  morphine, 4 mg, Q2H PRN  sodium chloride flush, 10 mL, PRN  ondansetron, 4 mg, Q6H PRN  acetaminophen, 650 mg, Q6H PRN    Or  acetaminophen, 650 mg, Q6H PRN        Data    Recent Labs     03/04/21  0540 03/05/21  0500 03/06/21  0048   WBC 9.0 9.4 8.5   HGB 9.5* 9.8* 9.1*   HCT 30.3* 30.8* 29.8*    222 217      Recent Labs     03/04/21  0540 03/05/21  0500 03/06/21  0048    141 139   K 4.1 4.1 3.8    104 100   CO2 25 27 27   BUN 21 22 22   CREATININE 1.7* 1.5* 1.6*     No results for input(s): AST, ALT, ALB, BILIDIR, BILITOT, ALKPHOS in the last 72 hours. No results for input(s): INR in the last 72 hours. No results for input(s): CKTOTAL, CKMB, CKMBINDEX, TROPONINI in the last 72 hours.         Electronically signed by Scar Hoff MD on 3/6/2021 at 1:46 PM

## 2021-03-06 NOTE — PROGRESS NOTES
Urologist saw Dr. Henri Garner. Urinalysis completed. Urine still remains cherry red. Urine output > 40 ml/hr. Pt's family updated on status. Pt had very large BM that was loose and watery. Pt ate well with no difficulty.

## 2021-03-06 NOTE — PLAN OF CARE
Problem: Infection:  Goal: Will remain free from infection  Description: Will remain free from infection  3/5/2021 2051 by Flaquito Evans RN  Outcome: Ongoing  3/5/2021 1553 by Pankaj Fletcher RN  Outcome: Ongoing     Problem: Safety:  Goal: Free from accidental physical injury  Description: Free from accidental physical injury  3/5/2021 2051 by Flaquito Evans RN  Outcome: Ongoing  3/5/2021 1553 by Pankaj Fletcher RN  Outcome: Ongoing  Goal: Free from intentional harm  Description: Free from intentional harm  3/5/2021 2051 by Flaquito Evans RN  Outcome: Ongoing  3/5/2021 1553 by Pankaj Fletcher RN  Outcome: Ongoing     Problem: Daily Care:  Goal: Daily care needs are met  Description: Daily care needs are met  3/5/2021 2051 by Flaquito Evans RN  Outcome: Ongoing  3/5/2021 1553 by Pankaj Fletcher RN  Outcome: Ongoing     Problem: Pain:  Goal: Patient's pain/discomfort is manageable  Description: Patient's pain/discomfort is manageable  3/5/2021 2051 by Flaquito Evans RN  Outcome: Ongoing  3/5/2021 1553 by Pankaj Fletcher RN  Outcome: Ongoing     Problem: Skin Integrity:  Goal: Skin integrity will stabilize  Description: Skin integrity will stabilize  3/5/2021 2051 by Flaquito Evans RN  Outcome: Ongoing  3/5/2021 1553 by Pankaj Fletcher RN  Outcome: Ongoing     Problem: Discharge Planning:  Goal: Patients continuum of care needs are met  Description: Patients continuum of care needs are met  3/5/2021 2051 by Flaquito Evans RN  Outcome: Ongoing  3/5/2021 1553 by Pankaj Fletcher RN  Outcome: Ongoing     Problem: Fluid Volume - Imbalance:  Goal: Will show no signs and symptoms of excessive bleeding  Description: Will show no signs and symptoms of excessive bleeding  3/5/2021 2051 by Flaquito Evans RN  Outcome: Ongoing  3/5/2021 1553 by Pankaj Fletcher RN  Outcome: Ongoing  Goal: Absence of imbalanced fluid volume signs and symptoms  Description: Absence of imbalanced fluid volume signs and symptoms  3/5/2021 2051 by Madelyn Marin RN  Outcome: Ongoing  3/5/2021 1553 by Salazar Valdes RN  Outcome: Ongoing     Problem: Nausea/Vomiting:  Goal: Absence of nausea/vomiting  Description: Absence of nausea/vomiting  3/5/2021 2051 by Madelyn Marin RN  Outcome: Ongoing  3/5/2021 1553 by Salazar aVldes RN  Outcome: Ongoing  Goal: Able to drink  Description: Able to drink  3/5/2021 2051 by Madelyn Marin RN  Outcome: Ongoing  3/5/2021 1553 by Salazar Valdes RN  Outcome: Ongoing  Goal: Able to eat  Description: Able to eat  3/5/2021 2051 by Madelyn Marin RN  Outcome: Ongoing  3/5/2021 1553 by Salazar Valdes RN  Outcome: Ongoing  Goal: Ability to achieve adequate nutritional intake will improve  Description: Ability to achieve adequate nutritional intake will improve  3/5/2021 2051 by Madelyn Marin RN  Outcome: Ongoing  3/5/2021 1553 by Salazar Valdes RN  Outcome: Ongoing     Problem: Falls - Risk of:  Goal: Will remain free from falls  Description: Will remain free from falls  3/5/2021 2051 by Madelyn Marin RN  Outcome: Ongoing  3/5/2021 1553 by Salazar Valdes RN  Outcome: Ongoing  Goal: Absence of physical injury  Description: Absence of physical injury  3/5/2021 2051 by Madelyn Marin RN  Outcome: Ongoing  3/5/2021 1553 by Salazar Valdes RN  Outcome: Ongoing     Problem: Skin Integrity:  Goal: Will show no infection signs and symptoms  Description: Will show no infection signs and symptoms  3/5/2021 2051 by Madelyn Marin RN  Outcome: Ongoing  3/5/2021 1553 by Salazar Valdes RN  Outcome: Ongoing  Goal: Absence of new skin breakdown  Description: Absence of new skin breakdown  3/5/2021 2051 by Madelyn Marin RN  Outcome: Ongoing  3/5/2021 1553 by Salazar Valdes RN  Outcome: Ongoing     Problem: Non-Violent Restraints  Goal: Removal from restraints as soon as assessed to be safe  3/5/2021 2051 by Madelyn Marin RN  Outcome: Ongoing  3/5/2021 1553 by Salazar Valdes RN  Outcome: Ongoing  Goal: No harm/injury to patient while restraints in use  3/5/2021 2051 by Judson Kitchen RN  Outcome: Ongoing  3/5/2021 1553 by Ethan Branett RN  Outcome: Ongoing  Goal: Patient's dignity will be maintained  3/5/2021 2051 by Judson Kitchen RN  Outcome: Ongoing  3/5/2021 1553 by Ethan Barnett RN  Outcome: Ongoing     Problem: Discharge Planning:  Goal: Discharged to appropriate level of care  Description: Discharged to appropriate level of care  3/5/2021 2051 by Judson Kitchen RN  Outcome: Ongoing  3/5/2021 1553 by Ethan Barnett RN  Outcome: Ongoing     Problem: Bleeding:  Goal: Will show no signs and symptoms of excessive bleeding  Description: Will show no signs and symptoms of excessive bleeding  3/5/2021 2051 by Judson Kitchen RN  Outcome: Ongoing  3/5/2021 1553 by Ethan Barnett RN  Outcome: Ongoing

## 2021-03-06 NOTE — CONSULTS
PT update:  Reviewed chart, understand patient is improved. Continue hold this date, await tolerance for and participation with nursing mobility/ambulation.   Cliff Reina [id number]  10:27 AM 3/6/2021

## 2021-03-06 NOTE — PROGRESS NOTES
Cardiology Progress Note       Radha Zabala is a 80 y.o. male   10/18/1930     SUBJECTIVE:   Patient seen and examined appears lethargic rhythm is sinus urine output is low  3/6  Patient seen and examined more awake today rhythm is sinus, urology evaluation noted for gross hematuria  OBJECTIVE:    Review of Systems:  General appearance: alert, appears stated age and cooperative  Skin: Skin color, texture, normal. No rashes or lesions  HEENT: No nose bleed, headache, vision problems  CV: C/O chest pain, tightness, pressure,   Respiratory: C/o no SOB, ORTIZ, Orthopnea, PND  GI: No abdominal pain, black stool, bloating  Limbs: No c/o edema, pain, swelling, intermittent claudication, joint pains  Neuro: No dizziness, lightheadedness, syncope, gait problems, memory problems  Psych: grossly normal. No SI/depression. Vitals:   Blood pressure 138/72, pulse 89, temperature 98.2 °F (36.8 °C), temperature source Oral, resp. rate 14, height 6' (1.829 m), weight 197 lb 12 oz (89.7 kg), SpO2 100 %.     HEENT: AT, NC, PERRLA  Neck: No JVD  Heart: S1 S2 audible, no murmur   Lungs: CTA   Abdomen: Nontender   Limbs: No edema   CNS: no focal deficit      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    Rt Tricep, Lt Superior Forearm        Patient Active Problem List   Diagnosis    Hyperlipidemia    Depression    Insomnia    BPH (benign prostatic hyperplasia)    Dysuria    Vitamin D deficiency    Seborrheic keratosis, inflamed    Actinic keratosis    Seborrheic keratosis    SCC (squamous cell carcinoma)    Varicose 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    Anxiety    BPH with urinary obstruction    Acquired hypothyroidism    UGIB (upper gastrointestinal bleed)        Allergies   Allergen Reactions    Minocycline Other (See Comments)        Current Inpatient Medications:    Current Facility-Administered Medications   Medication Dose Route Frequency Provider Last Rate Last Admin    melatonin tablet 9 mg  9 mg Oral Nightly PRN Jesse Swanson PA-C   9 mg at 03/06/21 0056    haloperidol lactate (HALDOL) injection 5 mg  5 mg Intramuscular Q6H PRN Scooby Valdes MD        lactated ringers infusion   Intravenous Continuous Britany Pires MD 50 mL/hr at 03/05/21 1544 Rate Change at 03/05/21 1544    morphine sulfate (PF) injection 4 mg  4 mg Intravenous Q2H PRN Britany Pires MD   4 mg at 03/05/21 0522    enoxaparin (LOVENOX) injection 30 mg  30 mg Subcutaneous Daily Britany Pires MD   30 mg at 03/06/21 0805    [Held by provider] furosemide (LASIX) tablet 20 mg  20 mg Oral Daily Britany Pires MD        sodium chloride flush 0.9 % injection 10 mL  10 mL Intravenous 2 times per day Britany Pires MD   10 mL at 03/05/21 2152    sodium chloride flush 0.9 % injection 10 mL  10 mL Intravenous PRN Britany Pires MD   10 mL at 03/05/21 0603    ondansetron (ZOFRAN) injection 4 mg  4 mg Intravenous Q6H PRN Britany Pires MD   4 mg at 03/05/21 0320    acetaminophen (TYLENOL) tablet 650 mg  650 mg Oral Q6H PRN Britany Pires MD        Or    acetaminophen (TYLENOL) suppository 650 mg  650 mg Rectal Q6H PRN Britany Pires MD        pantoprazole (PROTONIX) injection 40 mg  40 mg Intravenous Q12H Britany Pires MD   40 mg at 03/06/21 5360    And    sodium chloride (PF) 0.9 % injection 10 mL  10 mL Intravenous Q12H Britany Pires MD   10 mL at 03/05/21 0603    metoprolol (LOPRESSOR) 5 mg in sodium chloride 0.9 % 50 mL IVPB  5 mg Intravenous Andrew Mcghee  mL/hr at 03/06/21 0805 5 mg at 03/06/21 0805           Labs:  CBC with Differential:    Lab Results   Component Value Date    WBC 8.5 03/06/2021    RBC 3.20 03/06/2021    HGB 9.1 03/06/2021    HCT 29.8 03/06/2021     03/06/2021    MCV 93.1 03/06/2021    MCH 28.4 03/06/2021    MCHC 30.5 03/06/2021    RDW 14.5 03/06/2021    SEGSPCT 82.7 03/06/2021    LYMPHOPCT 6.1 03/06/2021    MONOPCT 10.0 03/06/2021    BASOPCT 0.2 03/06/2021    MONOSABS 0.9 03/06/2021    LYMPHSABS 0.5 03/06/2021    EOSABS 0.0 03/06/2021    BASOSABS 0.0 03/06/2021    DIFFTYPE AUTOMATED DIFFERENTIAL 03/06/2021     CMP:    Lab Results   Component Value Date     03/06/2021    K 3.8 03/06/2021     03/06/2021    CO2 27 03/06/2021    BUN 22 03/06/2021    CREATININE 1.6 03/06/2021    GFRAA 49 03/06/2021    AGRATIO 1.4 01/21/2021    LABGLOM 41 03/06/2021    GLUCOSE 82 03/06/2021    PROT 6.0 03/02/2021    LABALBU 3.2 03/02/2021    CALCIUM 8.2 03/06/2021    BILITOT 0.2 03/02/2021    ALKPHOS 93 03/02/2021    AST 20 03/02/2021    ALT 12 03/02/2021     Hepatic Function Panel:    Lab Results   Component Value Date    ALKPHOS 93 03/02/2021    ALT 12 03/02/2021    AST 20 03/02/2021    PROT 6.0 03/02/2021    BILITOT 0.2 03/02/2021    LABALBU 3.2 03/02/2021     Magnesium:    Lab Results   Component Value Date    MG 1.9 03/02/2021     PT/INR:  No results found for: PROTIME, INR  Last 3 Troponin:  No results found for: TROPONINI  U/A:    Lab Results   Component Value Date    COLORU RED 03/05/2021    PHUR 6.0 01/22/2021    WBCUA 65 03/05/2021    RBCUA 1,179 03/05/2021    MUCUS OCCASIONAL 03/05/2021    TRICHOMONAS NONE SEEN 03/05/2021    BACTERIA NEGATIVE 03/05/2021    CLARITYU HAZY 03/05/2021    SPECGRAV 1.010 03/05/2021    LEUKOCYTESUR SMALL 03/05/2021    UROBILINOGEN NEGATIVE 03/05/2021    BILIRUBINUR NEGATIVE 03/05/2021    BLOODU LARGE 03/05/2021    GLUCOSEU Negative 01/22/2021     ABG:  No results found for: PHART, RVL3MZS, PO2ART, HFQ5TVF, BEART, THGBART, EKO5CBT, M3BGVOPT  FLP:    Lab Results   Component Value Date    TRIG 207 11/19/2018    HDL 41 11/19/2018    LDLCALC 75 11/19/2018    LABVLDL 18 09/21/2012     TSH:    Lab Results   Component Value Date    TSH 3.52 01/21/2021      DATA:   ECG: Sinus Rhythm       ASSESSMENT:   1 status post large hiatal hernia repair will relieve obstruction no cardiac complication patient has normal LV function, no cardiac complication    2 upper GI bleed with blood loss anemia    3 atypical chest pain which resolved    4 CKD stage III  Continue IV hydration    PLAN   Stable from cardiology standpoint

## 2021-03-06 NOTE — PROGRESS NOTES
Progress Note    Subjective:      Milly Cagey  Patient is much less confused today. He is hungry. 4 L urinary output following Lasix but he still has intermittent hematuria  Objective:     /72   Pulse 89   Temp 98.2 °F (36.8 °C) (Oral)   Resp 14   Ht 6' (1.829 m)   Wt 197 lb 12 oz (89.7 kg)   SpO2 100%   BMI 26.82 kg/m²     In: 1110 [P.O.:120;  I.V.:890]  Out: 4150 [Urine:4150]         General: Awake and alert and appropriate mildly confused  Abdomen: Soft flat nontender  Lungs: Clear  Other: na    Labs:   CBC:   Lab Results   Component Value Date    WBC 8.5 03/06/2021    RBC 3.20 03/06/2021    HGB 9.1 03/06/2021    HCT 29.8 03/06/2021    MCV 93.1 03/06/2021    MCH 28.4 03/06/2021    MCHC 30.5 03/06/2021    RDW 14.5 03/06/2021     03/06/2021    MPV 9.3 03/06/2021        Assessment:     Status post repair of incarcerated hiatal hernia  Hematuria  Postop confusion  Mild anemia     Plan:   Full liquid diet  Urology to evaluate and manage Laureano catheter and hematuria  Physical therapy and ambulation  May transfer out of the ICU tomorrow if necessary

## 2021-03-07 LAB
ANION GAP SERPL CALCULATED.3IONS-SCNC: 9 MMOL/L (ref 4–16)
BASOPHILS ABSOLUTE: 0 K/CU MM
BASOPHILS RELATIVE PERCENT: 0.4 % (ref 0–1)
BUN BLDV-MCNC: 23 MG/DL (ref 6–23)
CALCIUM SERPL-MCNC: 8.6 MG/DL (ref 8.3–10.6)
CHLORIDE BLD-SCNC: 103 MMOL/L (ref 99–110)
CO2: 28 MMOL/L (ref 21–32)
CREAT SERPL-MCNC: 1.4 MG/DL (ref 0.9–1.3)
DIFFERENTIAL TYPE: ABNORMAL
EOSINOPHILS ABSOLUTE: 0.2 K/CU MM
EOSINOPHILS RELATIVE PERCENT: 2.2 % (ref 0–3)
GFR AFRICAN AMERICAN: 58 ML/MIN/1.73M2
GFR NON-AFRICAN AMERICAN: 48 ML/MIN/1.73M2
GLUCOSE BLD-MCNC: 86 MG/DL (ref 70–99)
HCT VFR BLD CALC: 29.1 % (ref 42–52)
HEMOGLOBIN: 8.8 GM/DL (ref 13.5–18)
IMMATURE NEUTROPHIL %: 1.2 % (ref 0–0.43)
LYMPHOCYTES ABSOLUTE: 0.6 K/CU MM
LYMPHOCYTES RELATIVE PERCENT: 8.4 % (ref 24–44)
MCH RBC QN AUTO: 28.5 PG (ref 27–31)
MCHC RBC AUTO-ENTMCNC: 30.2 % (ref 32–36)
MCV RBC AUTO: 94.2 FL (ref 78–100)
MONOCYTES ABSOLUTE: 0.7 K/CU MM
MONOCYTES RELATIVE PERCENT: 9.7 % (ref 0–4)
NUCLEATED RBC %: 0 %
PDW BLD-RTO: 14.2 % (ref 11.7–14.9)
PLATELET # BLD: 244 K/CU MM (ref 140–440)
PMV BLD AUTO: 9.9 FL (ref 7.5–11.1)
POTASSIUM SERPL-SCNC: 3.6 MMOL/L (ref 3.5–5.1)
RBC # BLD: 3.09 M/CU MM (ref 4.6–6.2)
SEGMENTED NEUTROPHILS ABSOLUTE COUNT: 5.9 K/CU MM
SEGMENTED NEUTROPHILS RELATIVE PERCENT: 78.1 % (ref 36–66)
SODIUM BLD-SCNC: 140 MMOL/L (ref 135–145)
TOTAL IMMATURE NEUTOROPHIL: 0.09 K/CU MM
TOTAL NUCLEATED RBC: 0 K/CU MM
WBC # BLD: 7.6 K/CU MM (ref 4–10.5)

## 2021-03-07 PROCEDURE — 1200000000 HC SEMI PRIVATE

## 2021-03-07 PROCEDURE — 97162 PT EVAL MOD COMPLEX 30 MIN: CPT

## 2021-03-07 PROCEDURE — 6370000000 HC RX 637 (ALT 250 FOR IP): Performed by: INTERNAL MEDICINE

## 2021-03-07 PROCEDURE — 80048 BASIC METABOLIC PNL TOTAL CA: CPT

## 2021-03-07 PROCEDURE — 2580000003 HC RX 258: Performed by: SURGERY

## 2021-03-07 PROCEDURE — 97535 SELF CARE MNGMENT TRAINING: CPT

## 2021-03-07 PROCEDURE — 85025 COMPLETE CBC W/AUTO DIFF WBC: CPT

## 2021-03-07 PROCEDURE — 97530 THERAPEUTIC ACTIVITIES: CPT

## 2021-03-07 PROCEDURE — 2500000003 HC RX 250 WO HCPCS: Performed by: SURGERY

## 2021-03-07 PROCEDURE — C9113 INJ PANTOPRAZOLE SODIUM, VIA: HCPCS | Performed by: SURGERY

## 2021-03-07 PROCEDURE — 6360000002 HC RX W HCPCS: Performed by: SURGERY

## 2021-03-07 PROCEDURE — 97116 GAIT TRAINING THERAPY: CPT

## 2021-03-07 PROCEDURE — 97112 NEUROMUSCULAR REEDUCATION: CPT

## 2021-03-07 PROCEDURE — 97166 OT EVAL MOD COMPLEX 45 MIN: CPT

## 2021-03-07 RX ORDER — QUETIAPINE FUMARATE 100 MG/1
100 TABLET, FILM COATED ORAL EVERY EVENING
Status: DISCONTINUED | OUTPATIENT
Start: 2021-03-07 | End: 2021-03-12 | Stop reason: HOSPADM

## 2021-03-07 RX ORDER — LORAZEPAM 0.5 MG/1
0.5 TABLET ORAL NIGHTLY PRN
Status: DISCONTINUED | OUTPATIENT
Start: 2021-03-07 | End: 2021-03-12 | Stop reason: HOSPADM

## 2021-03-07 RX ORDER — AMLODIPINE BESYLATE 5 MG/1
5 TABLET ORAL DAILY
Status: DISCONTINUED | OUTPATIENT
Start: 2021-03-07 | End: 2021-03-08

## 2021-03-07 RX ORDER — QUETIAPINE FUMARATE 25 MG/1
50 TABLET, FILM COATED ORAL 3 TIMES DAILY
Status: DISCONTINUED | OUTPATIENT
Start: 2021-03-07 | End: 2021-03-12 | Stop reason: HOSPADM

## 2021-03-07 RX ADMIN — PANTOPRAZOLE SODIUM 40 MG: 40 INJECTION, POWDER, FOR SOLUTION INTRAVENOUS at 04:33

## 2021-03-07 RX ADMIN — QUETIAPINE FUMARATE 50 MG: 25 TABLET ORAL at 17:00

## 2021-03-07 RX ADMIN — SODIUM CHLORIDE, PRESERVATIVE FREE 10 ML: 5 INJECTION INTRAVENOUS at 08:22

## 2021-03-07 RX ADMIN — SODIUM CHLORIDE, PRESERVATIVE FREE 10 ML: 5 INJECTION INTRAVENOUS at 21:15

## 2021-03-07 RX ADMIN — METOPROLOL TARTRATE 5 MG: 5 INJECTION INTRAVENOUS at 08:21

## 2021-03-07 RX ADMIN — ENOXAPARIN SODIUM 30 MG: 30 INJECTION SUBCUTANEOUS at 08:21

## 2021-03-07 RX ADMIN — SERTRALINE HYDROCHLORIDE 50 MG: 50 TABLET ORAL at 12:11

## 2021-03-07 RX ADMIN — QUETIAPINE FUMARATE 50 MG: 25 TABLET ORAL at 12:16

## 2021-03-07 RX ADMIN — PANTOPRAZOLE SODIUM 40 MG: 40 INJECTION, POWDER, FOR SOLUTION INTRAVENOUS at 21:18

## 2021-03-07 RX ADMIN — METOPROLOL TARTRATE 5 MG: 5 INJECTION INTRAVENOUS at 01:31

## 2021-03-07 RX ADMIN — QUETIAPINE FUMARATE 100 MG: 100 TABLET ORAL at 21:13

## 2021-03-07 RX ADMIN — AMLODIPINE BESYLATE 5 MG: 5 TABLET ORAL at 21:13

## 2021-03-07 RX ADMIN — SODIUM CHLORIDE, PRESERVATIVE FREE 10 ML: 5 INJECTION INTRAVENOUS at 04:33

## 2021-03-07 RX ADMIN — METOPROLOL TARTRATE 25 MG: 25 TABLET, FILM COATED ORAL at 21:13

## 2021-03-07 ASSESSMENT — PAIN SCALES - GENERAL: PAINLEVEL_OUTOF10: 0

## 2021-03-07 NOTE — PROGRESS NOTES
lower extremities with pain    Anxiety    BPH with urinary obstruction    Acquired hypothyroidism    UGIB (upper gastrointestinal bleed)        Allergies   Allergen Reactions    Minocycline Other (See Comments)        Current Inpatient Medications:    Current Facility-Administered Medications   Medication Dose Route Frequency Provider Last Rate Last Admin    melatonin tablet 9 mg  9 mg Oral Nightly PRN Dedrick Devine PA-C   9 mg at 03/06/21 0056    haloperidol lactate (HALDOL) injection 5 mg  5 mg Intramuscular Q6H PRN Kishor Shafer MD        lactated ringers infusion   Intravenous Continuous Charlene Parra MD 50 mL/hr at 03/06/21 1054 50 mL at 03/06/21 1054    morphine sulfate (PF) injection 4 mg  4 mg Intravenous Q2H PRN Charlene Parra MD   4 mg at 03/05/21 0522    enoxaparin (LOVENOX) injection 30 mg  30 mg Subcutaneous Daily Charlene Parra MD   30 mg at 03/07/21 8134    [Held by provider] furosemide (LASIX) tablet 20 mg  20 mg Oral Daily Charlene Parra MD        sodium chloride flush 0.9 % injection 10 mL  10 mL Intravenous 2 times per day Charlene Parra MD   10 mL at 03/07/21 4432    sodium chloride flush 0.9 % injection 10 mL  10 mL Intravenous PRN Charlene Parra MD   10 mL at 03/05/21 0603    ondansetron (ZOFRAN) injection 4 mg  4 mg Intravenous Q6H PRN Charlene Parra MD   4 mg at 03/05/21 0320    acetaminophen (TYLENOL) tablet 650 mg  650 mg Oral Q6H PRN Charlene Parra MD        Or    acetaminophen (TYLENOL) suppository 650 mg  650 mg Rectal Q6H PRN Charlene Parra MD        pantoprazole (PROTONIX) injection 40 mg  40 mg Intravenous Q12H Charlene Parra MD   40 mg at 03/07/21 0433    And    sodium chloride (PF) 0.9 % injection 10 mL  10 mL Intravenous Q12H Charlene Parra MD   10 mL at 03/07/21 0433    metoprolol (LOPRESSOR) 5 mg in sodium chloride 0.9 % 50 mL IVPB  5 mg Intravenous Mannie Galicia MD   Stopped at 03/07/21 0056 Labs:  CBC with Differential:    Lab Results   Component Value Date    WBC 7.6 03/07/2021    RBC 3.09 03/07/2021    HGB 8.8 03/07/2021    HCT 29.1 03/07/2021     03/07/2021    MCV 94.2 03/07/2021    MCH 28.5 03/07/2021    MCHC 30.2 03/07/2021    RDW 14.2 03/07/2021    SEGSPCT 78.1 03/07/2021    LYMPHOPCT 8.4 03/07/2021    MONOPCT 9.7 03/07/2021    BASOPCT 0.4 03/07/2021    MONOSABS 0.7 03/07/2021    LYMPHSABS 0.6 03/07/2021    EOSABS 0.2 03/07/2021    BASOSABS 0.0 03/07/2021    DIFFTYPE AUTOMATED DIFFERENTIAL 03/07/2021     CMP:    Lab Results   Component Value Date     03/07/2021    K 3.6 03/07/2021     03/07/2021    CO2 28 03/07/2021    BUN 23 03/07/2021    CREATININE 1.4 03/07/2021    GFRAA 58 03/07/2021    AGRATIO 1.4 01/21/2021    LABGLOM 48 03/07/2021    GLUCOSE 86 03/07/2021    PROT 6.0 03/02/2021    LABALBU 3.2 03/02/2021    CALCIUM 8.6 03/07/2021    BILITOT 0.2 03/02/2021    ALKPHOS 93 03/02/2021    AST 20 03/02/2021    ALT 12 03/02/2021     Hepatic Function Panel:    Lab Results   Component Value Date    ALKPHOS 93 03/02/2021    ALT 12 03/02/2021    AST 20 03/02/2021    PROT 6.0 03/02/2021    BILITOT 0.2 03/02/2021    LABALBU 3.2 03/02/2021     Magnesium:    Lab Results   Component Value Date    MG 1.9 03/02/2021     PT/INR:  No results found for: PROTIME, INR  Last 3 Troponin:  No results found for: TROPONINI  U/A:    Lab Results   Component Value Date    COLORU RED 03/05/2021    PHUR 6.0 01/22/2021    WBCUA 65 03/05/2021    RBCUA 1,179 03/05/2021    MUCUS OCCASIONAL 03/05/2021    TRICHOMONAS NONE SEEN 03/05/2021    BACTERIA NEGATIVE 03/05/2021    CLARITYU HAZY 03/05/2021    SPECGRAV 1.010 03/05/2021    LEUKOCYTESUR SMALL 03/05/2021    UROBILINOGEN NEGATIVE 03/05/2021    BILIRUBINUR NEGATIVE 03/05/2021    BLOODU LARGE 03/05/2021    GLUCOSEU Negative 01/22/2021     ABG:  No results found for: PHART, MZJ3JJW, PO2ART, TOB4UUJ, BEART, THGBART, IGM7HWQ, D8NIJHBU  FLP:    Lab Results Component Value Date    TRIG 207 11/19/2018    HDL 41 11/19/2018    LDLCALC 75 11/19/2018    LABVLDL 18 09/21/2012     TSH:    Lab Results   Component Value Date    TSH 3.52 01/21/2021      DATA:   ECG: Sinus Rhythm       ASSESSMENT:   1 status post large hiatal hernia repair will relieve obstruction no cardiac complication patient has normal LV function, no cardiac complication remain clinically stable    2 upper GI bleed with blood loss anemia  Hemoglobin is stable    3 atypical chest pain which resolved  Remains chest pain-free    4 CKD stage III  Continue IV hydration    PLAN   Stable from cardiology standpoint

## 2021-03-07 NOTE — PLAN OF CARE
show no infection signs and symptoms  Outcome: Ongoing  Goal: Absence of new skin breakdown  Description: Absence of new skin breakdown  Outcome: Ongoing     Problem: Discharge Planning:  Goal: Discharged to appropriate level of care  Description: Discharged to appropriate level of care  Outcome: Ongoing     Problem: Bleeding:  Goal: Will show no signs and symptoms of excessive bleeding  Description: Will show no signs and symptoms of excessive bleeding  Outcome: Ongoing

## 2021-03-07 NOTE — PROGRESS NOTES
Gross hematuria returned this afternoon, cherry red in color. Nursing manually irrigated with return of a few dark colored clots, urine still bloody per nursing report. Orders given to manually irrigate every 4-6 hours and PRN clots. Patient made NPO after midnight in case he needs Cystoscopy/clot evacuation. Will re-evaluate in AM assuming well overnight.

## 2021-03-07 NOTE — CONSULTS
[facility] PHYSICAL THERAPY EVALUATION  Raphael [place], 10/18/1930, 2111/2111-A, 3/7/2021    History  [place]:  The primary encounter diagnosis was Abnormal computed tomography of stomach. Diagnoses of Chest pain, unspecified type, Hematemesis with nausea, Anemia, unspecified type, and Hiatal hernia were also pertinent to this visit. Patient  has a past medical history of Anemia, Anxiety, BPH, BPH with urinary obstruction, Depression, GERD (gastroesophageal reflux disease), Hemorrhoids, Hepatitis, Hernia of unspecified site of abdominal cavity without mention of obstruction or gangrene, Hyperlipidemia, Insomnia, and SCC (squamous cell carcinoma). Patient  has a past surgical history that includes Neck surgery; Cataract removal; hernia repair; and hiatal hernia repair (N/A, 3/4/2021). Subjective:  Patient states:  Alireza Sell you guys for helping me. \"    Pain:  Denies pain.     Communication with other providers:  Handoff to RN, OT  Restrictions: general precautions, fall risk    Home Setup/Prior level of function  Social/Functional History  Lives With: Spouse  Type of Home: House  Home Layout: One level, Laundry in basement  Home Access: Level entry  Bathroom Shower/Tub: Walk-in shower, Shower chair with back  [employer]: Handicap height  Bathroom Equipment: Grab bars in shower, Grab bars around toilet  Bathroom Accessibility: Leon Merrill: Rolling walker, Cane, Wheelchair-manual(Pt reports at baseline he maintaly utilizes RW during ambulation.)  Receives Help From: Family(Pt reports his wife has been having back trouble recently, however they have friends and family who can assist as needed.)  ADL Assistance: Needs assistance(Pt reports his wife assists with bathing PRN, and he is able to complete dressing and toileting IND.)  Homemaking Assistance: Independent  Homemaking Responsibilities: No(Spouse completes tasks of laundry, cooking, and cleaning.)  Ambulation Assistance: Independent  Transfer Assistance: Independent  Active : Yes  Mode of Transportation: Car(Pt reports they order groceries and drive to the store to pick them up.)  Occupation: Retired  Type of occupation: Factory    Examination of body systems (includes body structures/functions, activity/participation limitations):  · Observation:  Pt supine in bed upon arrival and agreeable to therapy  · Vision:  Wears glasses   · Hearing:  [place], bilateral hearing aides  · Cardiopulmonary:  3L O2  · Cognition: impaired, pt slightly confused this session, see OT/SLP note for further evaluation. Musculoskeletal  · ROM R/L:  WFL. · Strength R/L:  4/5, moderate impairment in function and endurance. · Neuro:  Denies N/t      Mobility:  · Rolling L/R:  supervision  · Supine to sit:  Supervision but significantly increased time and effort to complete   · Transfers: Pt completed STS from bed CGA, to/from commode and chair min A  · Sitting balance:  good. · Standing balance:  Fair, pt stood at sink ~5 minutes CGA with unilateral UE support and forward flexed posture, able to correct with cues but unable to maintain. · Gait: Pt ambulated 10' +10' with RW with decreased mark, significantly forward flexed posture, and decreased bilateral step length and height. Cues provided for walker management and pathway negotiation throughout. No LOB, SOB, or dizziness throughout bout. [facility] 6 Clicks Inpatient Mobility:  AM-PAC Inpatient Mobility Raw Score : 16    Safety: patient left in chair with alarm on, call light within reach, RN notified, gait belt used. Assessment:  Pt is a 80 y.o. male admitted to the hospital for an upper gastrointestinal bleed. Pt is typically independent with all ambulation and transfers with a RW. Pt currently requires supervision for bed mobility, min A for transfers, and CGA to ambulate 10' with RW.  Pt is presenting with decreased endurance, impaired strength, impaired transfer status, impaired bed mobility, and impaired gait. Pt would benefit from continued acute care PT as well as ARU placement after discharge to continue to address impairments. Complexity: moderate    Prognosis: Good, no significant barriers to participation at this time.      Plan Times per week: 3+/week     Equipment: TBD at next level of care    Goals:  Short term goals  Time Frame for Short term goals: 1 week  Short term goal 1: Pt to complete all bed mobility mod I  Short term goal 2: Pt to complete all STS transfers to/from bed, commode, and chair with supervision  Short term goal 3: Pt to ambulate 48' with LRAD and supervision       Treatment plan:  Bed mobility, transfers, balance, gait, TA, TX    Recommendations for NURSING mobility: amb with RW and gait belt    Time:    Time in: 0918  Time out: 1006  Timed treatment minutes: 38  Total time: 48    Electronically signed by:    Deepali Liriano PT  3/7/2021, 12:57 PM

## 2021-03-07 NOTE — PROGRESS NOTES
due to    MDM [] Low, [] Moderate,[]  High  Patient's risk as above due to      History of Present Illness:     Pt S&E. No acute events overnight, looks more alert today, doing better, had large bowel movement overnight, no complaints today  10-14 point ROS reviewed negative, unless as noted above    Objective: Intake/Output Summary (Last 24 hours) at 3/7/2021 1253  Last data filed at 3/7/2021 1100  Gross per 24 hour   Intake 2124.47 ml   Output 2035 ml   Net 89.47 ml      Vitals:   Vitals:    03/07/21 1220   BP: (!) 141/73   Pulse:    Resp:    Temp:    SpO2:      Physical Exam:    GEN Awake male, lying , does not seem in  distress. Appears given age. EYES Pupils are equally round. No scleral erythema, discharge, or conjunctivitis. HENT Mucous membranes are moist.  NG tube in situ  NECK No apparent thyromegaly or masses. RESP Diminished BS bilaterally, no wheezes, rales or rhonchi. Symmetric chest movement while on room air. CARDIO/VASC S1/S2 auscultated. Regular rate without appreciable murmurs, rubs, or gallops. Peripheral pulses equal bilaterally and palpable. No peripheral edema. GI Abdomen is soft without significant tenderness, masses, or guarding. Bowel sounds are reduced . Clean surgical dressings, mild crepitus in left lower quadrant area. Rectal exam deferred.  Laureano catheter is  Present, draining dark urine  HEME/LYMPH No petechiae or ecchymoses. MSK No gross joint deformities. Spontaneous movement of all extremities. Bilateral lower extremity oedema, +2  SKIN Normal coloration, warm, dry. NEURO Cranial nerves appear grossly intact, normal speech, no lateralizing weakness. PSYCH Awake, alert, oriented x 4. Affect appropriate.     Medications:   Medications:    QUEtiapine  100 mg Oral QPM    QUEtiapine  50 mg Oral TID    sertraline  50 mg Oral Daily    enoxaparin  30 mg Subcutaneous Daily    [Held by provider] furosemide  20 mg Oral Daily    sodium chloride flush  10 mL Intravenous 2 times per day    pantoprazole  40 mg Intravenous Q12H    And    sodium chloride (PF)  10 mL Intravenous Q12H    metoprolol (LOPRESSOR) ivpb  5 mg Intravenous Q8H      Infusions:    lactated ringers 50 mL (03/06/21 1054)     PRN Meds: LORazepam, 0.5 mg, Nightly PRN  melatonin, 9 mg, Nightly PRN  haloperidol lactate, 5 mg, Q6H PRN  morphine, 4 mg, Q2H PRN  sodium chloride flush, 10 mL, PRN  ondansetron, 4 mg, Q6H PRN  acetaminophen, 650 mg, Q6H PRN    Or  acetaminophen, 650 mg, Q6H PRN        Data    Recent Labs     03/05/21  0500 03/06/21  0048 03/07/21  0440   WBC 9.4 8.5 7.6   HGB 9.8* 9.1* 8.8*   HCT 30.8* 29.8* 29.1*    217 244      Recent Labs     03/05/21  0500 03/06/21  0048 03/07/21  0440    139 140   K 4.1 3.8 3.6    100 103   CO2 27 27 28   BUN 22 22 23   CREATININE 1.5* 1.6* 1.4*     No results for input(s): AST, ALT, ALB, BILIDIR, BILITOT, ALKPHOS in the last 72 hours. No results for input(s): INR in the last 72 hours. No results for input(s): CKTOTAL, CKMB, CKMBINDEX, TROPONINI in the last 72 hours.         Electronically signed by Tylor Delgado MD on 3/7/2021 at 12:53 PM

## 2021-03-07 NOTE — CONSULTS
[facility] THERAPY EVALUATION    Milly Cannon, 10/18/1930, 2111/2111-A, 3/7/2021    Discharge Recommendation: Inpatient Rehabilitation      History:  [place]:  The primary encounter diagnosis was Abnormal computed tomography of stomach. Diagnoses of Chest pain, unspecified type, Hematemesis with nausea, Anemia, unspecified type, and Hiatal hernia were also pertinent to this visit. Subjective:  Patient states: Agreeable to therapy. Pain: Pt denied pain this date  Communication with other providers: PT, RN  Restrictions: General Precautions, Fall Risk    Home Setup/Prior level of function:  Social/Functional History  Lives With: Spouse  Type of Home: House  Home Layout: One level, Laundry in basement  Home Access: Level entry  Bathroom Shower/Tub: Walk-in shower, Shower chair with back  [employer]: Handicap height  Bathroom Equipment: Grab bars in shower, Grab bars around toilet  Bathroom Accessibility: [facility]: Rolling walker, Cane, Wheelchair-manual(Pt reports at baseline he maintaly utilizes RW during ambulation.)  Receives Help From: Family(Pt reports his wife has been having back trouble recently, however they have friends and family who can assist as needed.)  ADL Assistance: Needs assistance(Pt reports his wife assists with bathing PRN, and he is able to complete dressing and toileting IND.)  Homemaking Assistance: Independent  Homemaking Responsibilities: No(Spouse completes tasks of laundry, cooking, and cleaning.)  Ambulation Assistance: Independent  Transfer Assistance: Independent  Active : Yes  Mode of Transportation: Car(Pt reports they order groceries and drive to the store to pick them up.)  Occupation: Retired  Type of occupation: Factory     **Recommend follow-up d/t intermittent episodes of confusion throughout session. **    Examination:  · Observation: Supine in bed upon arrival  · Vision: [facility]  · Hearing: [facility]  · Vitals: Stable vitals throughout session    Body Systems and functions:  · ROM: WFL   · Strength: WFL  · Sensation: WFL  · Tone: Normal  · Coordination: WFL  · Perception: WNL    Activities of Daily Living (ADLs):  · Feeding: Yonis  setup  · Grooming: CGA pt stood at sink and performed oral hygiene with CGA for balancing in standing and slightly forward flexed posture. Increased time and VC throughout for sequencing. Pt shaved face with electric razor while on the commode, assist to ensure task was performed thoroughly, setup, and increased time). · UB bathing: Casa in seated with assist to ensure task is performed thoroughly, increased time, setup, and VC throughout. Pt reports at baseline his wife assists with bathing. · LB bathing: ModA in seated with assist for distal reaching, setup, increased time, VC). · UB dressing: SBA in seated, setup, increased time, VC.  · LB dressing: Casa in seated pt attempted to jyoti sock, required increased time and was unable to complete task without assist). · Toileting: ModA (pt completed toileting this date and required assist for toilet transfers and brunilda care in standing). Cognitive and Psychosocial Functioning:  · Overall cognitive status: Pt oriented to the date (March) disoriented to year (2013), oriented to person and place. Pt demo intermittent episodes of confusion throughout session. · Affect: Normal     Balance:   · Sitting: SBA (pt sat EOB demo good dynamic sitting balance). · Standing: CGA (pt stood with RW. Demo fair+ dynamic standing balance and slightly forward flexed posture). Functional Mobility:   · Bed Mobility: SBA (pt performed supine to seated bed mobility with HOB slightly elevated, VC for sequencing throughout, increased time). · Transfers:   · CGA  (pt performed STS from EOB with CGA. VC throughout for sequencing and increased time). · Casa (stand to sit to toilet and chair with VC for sequencing and assist for controlled descend).   · Casa (STS from toilet with VC for sequencing throughout). · Ambulation: CGA (pt ambulated approx 15ft with RW. Demo slow pace throughout and 0 LOB, slightly forward flexed posture). AM-PAC 6 click short form for inpatient daily activity:   How much help from another person does the patient currently need. .. Unable  Dep A Lot  Max A A Lot   Mod A A Little  Min A A Little   CGA  SBA None   Mod I  Indep  Sup   1. Putting on and taking off regular lower body clothing? [] 1    [] 2   [] 2   [x] 3   [] 3   [] 4      2. Bathing (including washing, rinsing, drying)? [] 1   [] 2   [x] 2 [] 3 [] 3 [] 4   3. Toileting, which includes using toilet, bedpan, or urinal? [] 1    [] 2   [x] 2   [] 3   [] 3   [] 4     4. Putting on and taking off regular upper body clothing? [] 1   [] 2   [] 2   [] 3   [x] 3    [] 4      5. Taking care of personal grooming such as brushing teeth? [] 1   [] 2    [] 2 [] 3    [x] 3   [] 4      6. Eating meals? [] 1   [] 2   [] 2   [] 3   [] 3   [x] 4      Raw Score:  17     [24=0% impaired(CH), 23=1-19%(CI), 20-22=20-39%(CJ), 15-19=40-59%(CK), 10-14=60-79%(CL), 7-9=80-99%(CM), 6=100%(CN)]    Treatment:  Therapeutic Activity Training:   Therapeutic activity training was instructed today. Cues were given for safety, sequence, UE/LE placement, awareness, and balance. Activities performed today included bed mobility training, sup-sit, sit-stand, ambulation. Self Care Training:   Cues were given for safety, sequence, UE/LE placement, visual cues, and balance. Activities performed today included dressing, toileting, and grooming.     Safety Measures: Gait belt used, Left in chair, Alarm in place    Assessment:  Assessment  Performance deficits / Impairments: Decreased functional mobility , Decreased balance, Decreased high-level IADLs, Decreased ADL status, Decreased endurance, Decreased strength, Decreased posture  Prognosis: Good  Decision Making: Medium Complexity  REQUIRES OT FOLLOW UP: Yes  Discharge Recommendations: Kira Mendoza    Pt is a 80year old M admitted for UGIB. Pt reports that prior to admission he was IND in ADLs, not responsible for IADLs, and Yonis for functional mobility. This date, pt demo decreased strength, activity tolerance, balance, and endurance impacting ADL status. Pt is currently functioning below baseline and would benefit from skilled OT services at [facility]. Plan:  Plan  Times per week: 2+  Times per day: Daily      Goals:  1. Pt will complete all aspects of bed mobility for EOB/OOB ADLs SUP. 2. Pt will complete UB/LB bathing with CGA and AE as needed. 3. Pt will complete all aspects of LB dressing with CGA and AE as needed. 4. Pt will complete all functional transfers to and from bed, chair, toilet, shower chair with SBA and AD. 5. Pt will ambulate HH distance to bathroom for toileting with SBA and AD. 6. Pt will complete all aspects of toileting task with Casa. 7. Pt will complete oral hygiene/grooming routine in standing at sink with SBA demo good dynamic standing balance for approx 8 minutes. 8. Pt will complete ther ex/ther act with focus on UB strengthening. Time:   Time in: 918  Time out: 1006  Timed treatment minutes: 38  Total time: 48      Electronically signed by:       GONZÁLEZ Aguillon/L, [place], [id number]

## 2021-03-07 NOTE — PROGRESS NOTES
Former[facility]  [facility], [address]   Progress Note  [facility] 0 1 2      Date: 3/7/2021   Patient: Milly Cannon   : 10/18/1930   DOA: 3/2/2021   MRN: [id number]   ROOM#: [facility]/-A     Admit Date: 3/2/2021     Collaborating Urologist on Call at time of admission: Dr. Isis Patterson    CC: Chest Pain   Reason for Consult:  Gross Hematuria     Subjective:     Pain: mild, no nausea and no vomiting,   Bowel Movement/Flatus:   Yes  Voiding: indwelling borden catheter, urine khurram colored     Patient resting comfortably in bed, denies any pain    Objective:    Vitals:    BP (!) 171/82   Pulse 79   Temp 97.8 °F (36.6 °C) (Oral)   Resp 11   Ht 6' (1.829 m)   Wt 197 lb 12 oz (89.7 kg)   SpO2 100%   BMI 26.82 kg/m²    Temp  Av.8 °F (36.6 °C)  Min: 97.1 °F (36.2 °C)  Max: 98.3 °F (36.8 °C)       Intake/Output Summary (Last 24 hours) at 3/7/2021 0754  Last data filed at 3/7/2021 0746  Gross per 24 hour   Intake 2484.47 ml   Output 2560 ml   Net -75.53 ml       Physical Exam:   General appearance: alert, appears stated age, cooperative, no distress and mildly obese  Head: Normocephalic, without obvious abnormality, atraumatic  Abdomen: soft, non-tender, non-distended  Male genitalia: borden catheter in place, urine khurram colored    Labs:   WBC:    Lab Results   Component Value Date    WBC 7.6 2021      Hemoglobin/Hematocrit:    Lab Results   Component Value Date    HGB 8.8 2021    HCT 29.1 2021      BMP:   Lab Results   Component Value Date     2021    K 3.6 2021     2021    CO2 28 2021    BUN 23 2021    LABALBU 3.2 2021    CREATININE 1.4 2021    CALCIUM 8.6 2021    GFRAA 58 2021    LABGLOM 48 2021      Urine Culture: 3/5/21  Final Report No growth at 18 to 36 hours    Imaging:   Echocardiogram Complete 2d With Doppler With Color    Result Date: 3/3/2021  Transthoracic Echocardiography Report (TTE)  Demographics   Patient Name       Cesia Anderson       Date of Study       03/03/2021   Date of Birth      10/18/1930        Gender              Male   Age                80 year(s)        Race                   Patient Number     [id number]        Room Number         2111   Visit Number       [id number]   Corporate ID       [id number]   Accession Number   [id number]        [address] [place] [address], [address] [place] [address]   Ordering Physician Aime Edwards MD Interpreting        Rosana Serrano MD                                       Physician  Procedure Type of Study   TTE procedure:ECHOCARDIOGRAM COMPLETE 2D W DOPPLER W COLOR. Procedure Date Date: 03/03/2021 Start: 08:52 AM Study Location: Portable Technical Quality: Fair visualization Indications:Congestive heart failure. Patient Status: Routine Height: 72 inches Weight: 190 pounds BSA: 2.08 m2 BMI: 25.77 kg/m2 HR: 96 bpm BP: 149/84 mmHg  Conclusions   Summary  Left ventricular systolic function is low normal.  Ejection fraction is visually estimated at 50%. Sclerotic, but non-stenotic aortic valve. Trace aortic regurgitation is noted. No evidence of any pericardial effusion. Signature   ------------------------------------------------------------------  Electronically signed by Rosana Serrano MD (Interpreting  physician) on 03/03/2021 at 11:18 AM  ------------------------------------------------------------------   Findings   Left Ventricle  Left ventricular systolic function is low normal.  Ejection fraction is visually estimated at 50%. Left Atrium  Essentially normal left atrium. Right Atrium  Essentially normal right atrium. Right Ventricle  Essentially normal right ventricle. Aortic Valve  Sclerotic, but non-stenotic aortic valve. Trace aortic regurgitation is noted. Mitral Valve  Trace mitral regurgitation is present. Tricuspid Valve  Mild tricuspid regurgitation is present.    Pulmonic Valve  The pulmonic valve was not well visualized. Pericardial Effusion  No evidence of any pericardial effusion.   M-Mode/2D Measurements & Calculations   LV Diastolic Dimension:  LV Systolic Dimension:  LA Dimension: 3.5 cmAO Root  4.93 cm                  3.56 cm                 Dimension: 4 cmLA Area:  LV FS:27.8 %             LV Volume Diastolic: 72 19.9 cm2  LV PW Diastolic: 3.05 cm ml  LV PW Systolic: 1.4 cm   LV Volume Systolic: 40  Septum Diastolic: 2.52   ml  cm                       LV EDV/LV EDV Index: 72 RV Diastolic Dimension:  Septum Systolic: 3.77 cm ES/17 J5US ESV/LV ESV   2.91 cm  CO: 7.97 l/min           Index: 40 ml/19 m2  CI: 3.83 l/m*m2          EF Calculated (A4C):    LA/Aorta: 0.88                           44.4 %                  Ascending Aorta: 3.7 cm  LV Area Diastolic: 84.1  EF Calculated (2D):     LA volume/Index: 49 ml  cm2                      53.5 %                  /73V5  LV Area Systolic: 18 cm2                           LV Length: 8.17 cm                            LVOT: 2.7 cm  Doppler Measurements & Calculations    AV Peak Velocity: 127 cm/s    LVOT Peak Velocity: 82.7 cm/s   AV Peak Gradient: 6.45 mmHg   LVOT Mean Velocity: 59.8 cm/s   AV Mean Velocity: 93.1 cm/s   LVOT Peak Gradient: 3 mmHgLVOT Mean Gradient:   AV Mean Gradient: 4 mmHg      2 mmHg   AV VTI: 21.5 cm               Estimated RVSP: 36 mmHg   AV Area (Continuity):3.86 cm2 Estimated RAP:3 mmHg    LVOT VTI: 14.5 cm                                 TR Velocity:286 cm/s   Estimated PASP: 35.72 mmHg    TR Gradient:32.72 mmHg      Xr Chest Portable    Result Date: 3/4/2021  EXAMINATION: ONE XRAY VIEW OF THE CHEST 3/4/2021 12:32 pm COMPARISON: March 2, 2021 HISTORY: ORDERING SYSTEM PROVIDED HISTORY: post operative hiatal hernia repair TECHNOLOGIST PROVIDED HISTORY: Reason for exam:->post operative hiatal hernia repair Reason for Exam: post operative hiatal hernia repair FINDINGS: Nasogastric tube with its distal tip coursing below the marked prostatic hypertrophy. Urinary bladder mural thickening with urinary bladder diverticula. The thickening is likely secondary to bladder outlet obstruction. Xr Abdomen For Ng/og/ne Tube Placement    Result Date: 3/3/2021  EXAMINATION: ONE SUPINE XRAY VIEW(S) OF THE ABDOMEN 3/2/2021 11:58 pm COMPARISON: None. HISTORY: ORDERING SYSTEM PROVIDED HISTORY: Confirmation of course of NG/OG/NE tube and location of tip of tube TECHNOLOGIST PROVIDED HISTORY: Reason for exam:->Confirmation of course of NG/OG/NE tube and location of tip of tube Portable? ->Yes Reason for Exam: Confirmation of course of NG/OG/NE tube and location of tip of tube Acuity: Acute Type of Exam: Initial FINDINGS: The tip of the nasogastric tube is in the stomach. The side hole/port is near the expected location of the GE junction. There is marked elevation of the left hemidiaphragm. The nasogastric tube should be advanced 5 cm. Assessment & Plan:      Phil Weller is a 80y.o. year old male admitted 3/2/2021 for Upper GI Bleed.    1) Gross Hematuria: Resolved. Likely secondary to borden catheter trauma              Urine now khurram colored               CT a/p 3/2/21 with multiple bladder diverticulum, thickened urinary bladder wall likely secondary to CANSECO with a large prostate              Recommend outpatient Cystoscopy for further evaluation                  Urine culture 3/5/21 Negative              Recommend voiding trial once out of ICU. 2) BPH: h/o 90g prostate gland.  Chronically on Flomax 0.4mg and Proscar 5mg, okay to resume these               Patient follows with Dr. Ama Mccormack, h/o incomplete bladder emptying, patient has declined surgical intervention in the past due to his age              Continue medical management              Follow-up with Dr. Ama Mccormack in 2 weeks     Patient overall stable from a  standpoint, d/c borden prior to discharge  Patient to follow-up with Dr. Ama Mccormack in 2 weeks, will sign off, please

## 2021-03-08 LAB
ANION GAP SERPL CALCULATED.3IONS-SCNC: 10 MMOL/L (ref 4–16)
BASOPHILS ABSOLUTE: 0.1 K/CU MM
BASOPHILS RELATIVE PERCENT: 0.8 % (ref 0–1)
BUN BLDV-MCNC: 19 MG/DL (ref 6–23)
CALCIUM SERPL-MCNC: 8 MG/DL (ref 8.3–10.6)
CHLORIDE BLD-SCNC: 105 MMOL/L (ref 99–110)
CO2: 26 MMOL/L (ref 21–32)
CREAT SERPL-MCNC: 1.3 MG/DL (ref 0.9–1.3)
DIFFERENTIAL TYPE: ABNORMAL
EOSINOPHILS ABSOLUTE: 0.4 K/CU MM
EOSINOPHILS RELATIVE PERCENT: 5.9 % (ref 0–3)
GFR AFRICAN AMERICAN: >60 ML/MIN/1.73M2
GFR NON-AFRICAN AMERICAN: 52 ML/MIN/1.73M2
GLUCOSE BLD-MCNC: 91 MG/DL (ref 70–99)
HCT VFR BLD CALC: 25.5 % (ref 42–52)
HEMOGLOBIN: 7.8 GM/DL (ref 13.5–18)
IMMATURE NEUTROPHIL %: 1.2 % (ref 0–0.43)
LYMPHOCYTES ABSOLUTE: 0.7 K/CU MM
LYMPHOCYTES RELATIVE PERCENT: 11.4 % (ref 24–44)
MAGNESIUM: 1.7 MG/DL (ref 1.8–2.4)
MCH RBC QN AUTO: 28.4 PG (ref 27–31)
MCHC RBC AUTO-ENTMCNC: 30.6 % (ref 32–36)
MCV RBC AUTO: 92.7 FL (ref 78–100)
MONOCYTES ABSOLUTE: 0.7 K/CU MM
MONOCYTES RELATIVE PERCENT: 12.3 % (ref 0–4)
NUCLEATED RBC %: 0 %
PDW BLD-RTO: 14.3 % (ref 11.7–14.9)
PLATELET # BLD: 212 K/CU MM (ref 140–440)
PMV BLD AUTO: 10.1 FL (ref 7.5–11.1)
POTASSIUM SERPL-SCNC: 3.3 MMOL/L (ref 3.5–5.1)
RBC # BLD: 2.75 M/CU MM (ref 4.6–6.2)
SEGMENTED NEUTROPHILS ABSOLUTE COUNT: 4.1 K/CU MM
SEGMENTED NEUTROPHILS RELATIVE PERCENT: 68.4 % (ref 36–66)
SODIUM BLD-SCNC: 141 MMOL/L (ref 135–145)
TOTAL IMMATURE NEUTOROPHIL: 0.07 K/CU MM
TOTAL NUCLEATED RBC: 0 K/CU MM
WBC # BLD: 6 K/CU MM (ref 4–10.5)

## 2021-03-08 PROCEDURE — 6360000002 HC RX W HCPCS: Performed by: INTERNAL MEDICINE

## 2021-03-08 PROCEDURE — 80048 BASIC METABOLIC PNL TOTAL CA: CPT

## 2021-03-08 PROCEDURE — 2580000003 HC RX 258: Performed by: INTERNAL MEDICINE

## 2021-03-08 PROCEDURE — 6370000000 HC RX 637 (ALT 250 FOR IP): Performed by: SURGERY

## 2021-03-08 PROCEDURE — 94761 N-INVAS EAR/PLS OXIMETRY MLT: CPT

## 2021-03-08 PROCEDURE — 6360000002 HC RX W HCPCS: Performed by: SURGERY

## 2021-03-08 PROCEDURE — C9113 INJ PANTOPRAZOLE SODIUM, VIA: HCPCS | Performed by: SURGERY

## 2021-03-08 PROCEDURE — 2580000003 HC RX 258: Performed by: SURGERY

## 2021-03-08 PROCEDURE — 36415 COLL VENOUS BLD VENIPUNCTURE: CPT

## 2021-03-08 PROCEDURE — 6370000000 HC RX 637 (ALT 250 FOR IP): Performed by: PHYSICIAN ASSISTANT

## 2021-03-08 PROCEDURE — 97535 SELF CARE MNGMENT TRAINING: CPT

## 2021-03-08 PROCEDURE — 1200000000 HC SEMI PRIVATE

## 2021-03-08 PROCEDURE — 97112 NEUROMUSCULAR REEDUCATION: CPT

## 2021-03-08 PROCEDURE — 97116 GAIT TRAINING THERAPY: CPT

## 2021-03-08 PROCEDURE — 97530 THERAPEUTIC ACTIVITIES: CPT

## 2021-03-08 PROCEDURE — 85025 COMPLETE CBC W/AUTO DIFF WBC: CPT

## 2021-03-08 PROCEDURE — 83735 ASSAY OF MAGNESIUM: CPT

## 2021-03-08 PROCEDURE — 6370000000 HC RX 637 (ALT 250 FOR IP): Performed by: INTERNAL MEDICINE

## 2021-03-08 RX ORDER — FINASTERIDE 5 MG/1
5 TABLET, FILM COATED ORAL DAILY
Status: DISCONTINUED | OUTPATIENT
Start: 2021-03-08 | End: 2021-03-12 | Stop reason: HOSPADM

## 2021-03-08 RX ORDER — AMLODIPINE BESYLATE 5 MG/1
5 TABLET ORAL 2 TIMES DAILY
Status: DISCONTINUED | OUTPATIENT
Start: 2021-03-08 | End: 2021-03-09

## 2021-03-08 RX ORDER — TAMSULOSIN HYDROCHLORIDE 0.4 MG/1
0.4 CAPSULE ORAL DAILY
Status: DISCONTINUED | OUTPATIENT
Start: 2021-03-08 | End: 2021-03-12 | Stop reason: HOSPADM

## 2021-03-08 RX ORDER — MAGNESIUM SULFATE IN WATER 40 MG/ML
2000 INJECTION, SOLUTION INTRAVENOUS ONCE
Status: COMPLETED | OUTPATIENT
Start: 2021-03-08 | End: 2021-03-08

## 2021-03-08 RX ADMIN — PANTOPRAZOLE SODIUM 40 MG: 40 INJECTION, POWDER, FOR SOLUTION INTRAVENOUS at 17:35

## 2021-03-08 RX ADMIN — FINASTERIDE 5 MG: 5 TABLET, FILM COATED ORAL at 11:36

## 2021-03-08 RX ADMIN — AMLODIPINE BESYLATE 5 MG: 5 TABLET ORAL at 11:36

## 2021-03-08 RX ADMIN — SODIUM CHLORIDE, PRESERVATIVE FREE 10 ML: 5 INJECTION INTRAVENOUS at 21:38

## 2021-03-08 RX ADMIN — SERTRALINE HYDROCHLORIDE 50 MG: 50 TABLET ORAL at 11:36

## 2021-03-08 RX ADMIN — METOPROLOL TARTRATE 25 MG: 25 TABLET, FILM COATED ORAL at 11:36

## 2021-03-08 RX ADMIN — QUETIAPINE FUMARATE 50 MG: 25 TABLET ORAL at 11:36

## 2021-03-08 RX ADMIN — AMLODIPINE BESYLATE 5 MG: 5 TABLET ORAL at 21:36

## 2021-03-08 RX ADMIN — QUETIAPINE FUMARATE 100 MG: 100 TABLET ORAL at 21:36

## 2021-03-08 RX ADMIN — PANTOPRAZOLE SODIUM 40 MG: 40 INJECTION, POWDER, FOR SOLUTION INTRAVENOUS at 06:35

## 2021-03-08 RX ADMIN — ENOXAPARIN SODIUM 30 MG: 30 INJECTION SUBCUTANEOUS at 11:36

## 2021-03-08 RX ADMIN — METOPROLOL TARTRATE 25 MG: 25 TABLET, FILM COATED ORAL at 21:36

## 2021-03-08 RX ADMIN — QUETIAPINE FUMARATE 50 MG: 25 TABLET ORAL at 17:35

## 2021-03-08 RX ADMIN — IRON SUCROSE 300 MG: 20 INJECTION, SOLUTION INTRAVENOUS at 13:15

## 2021-03-08 RX ADMIN — SODIUM CHLORIDE, POTASSIUM CHLORIDE, SODIUM LACTATE AND CALCIUM CHLORIDE: 600; 310; 30; 20 INJECTION, SOLUTION INTRAVENOUS at 02:45

## 2021-03-08 RX ADMIN — MAGNESIUM SULFATE IN WATER 2000 MG: 40 INJECTION, SOLUTION INTRAVENOUS at 15:37

## 2021-03-08 RX ADMIN — TAMSULOSIN HYDROCHLORIDE 0.4 MG: 0.4 CAPSULE ORAL at 11:36

## 2021-03-08 RX ADMIN — SODIUM CHLORIDE, PRESERVATIVE FREE 10 ML: 5 INJECTION INTRAVENOUS at 06:36

## 2021-03-08 NOTE — PROGRESS NOTES
Hospitalist Progress Note      Name:  Sherwin Linares /Age/Sex: 10/18/1930  (80 y.o. male)   MRN & CSN:  [id number] & [id number] Admission Date/Time: 3/2/2021  6:10 PM   Location:  9266/8910-W PCP: Iman Vogt MD         Hospital Day: 7    Assessment and Plan:   Sherwin Linares is a 80 y.o.  male  who presents with chest pain, associated nausea and bloody emesis.     Upper GI bleed   Acute on chronic blood loss anemia, hemodynamically stable  Large hiatal hernia, symptomatic s/p Laparoscopic repair of incarcerated hernia with partial fundoplication   Status post PRBC 1 unit, transfused in the ED  Serial H&H, transfuse for Hb less than 7  IV pantoprazole  CT abdomen with large hiatal hernia, with concerns for entrapment  General surgery on board s/p surgery  NG tube now out, advance diet as tolerated  IV antiemetics, analgesics  Iron studies with borderline ESTUARDO, iron infusion  Cardiology consult was placed for cardiac clearance, cleared with moderate risk for adverse cardiovascular events    Chest pain, atypical, likely due to above findings  Serial troponin negative  Echo EF50%  Cardiology on board            Acute metabolic encephalopathy, delirium, postop  CKD 3, seems at baseline  Avoid nephrotoxins, monitor  Lasix resumed    Hematuria, likely traumatic  Urine cultures negative  Urology, Laureano , removed today, plans for cystoscopy as outpatient  Continue Flomax, Proscar    Hypertension  Cardiology adjusting medications    Chronic medical condition  Neuropathy, continue gabapentin  BPH on Flomax  Hypothyroidism, continue home meds  Anxiety disorder, continue home medications    CODE STATUS, limited no intubation, no CPR, resuscitative meds are allowed  PT OT to evaluate  Diet DIET DENTAL SOFT;   DVT Prophylaxis [] Lovenox, []  Heparin, [] SCDs, []No VTE prophylaxis, patient ambulating   GI Prophylaxis [] PPI, [] H2 Blocker, [] No GI prophylaxis, patient is receiving diet/Tube Feeds   Code Status Limited   Disposition Patient requires continued admission due to    MDM [] Low, [] Moderate,[]  High  Patient's risk as above due to      History of Present Illness:     Pt S&E. Patient feels much better today, slept well, no abdominal pain, has not moved his bowels  10-14 point ROS reviewed negative, unless as noted above    Objective: Intake/Output Summary (Last 24 hours) at 3/8/2021 1343  Last data filed at 3/8/2021 0636  Gross per 24 hour   Intake 1388.33 ml   Output 475 ml   Net 913.33 ml     Vitals:   Vitals:    03/08/21 1132   BP: (!) 157/76   Pulse: 74   Resp: 20   Temp: 98.6 °F (37 °C)   SpO2: 95%     Physical Exam:    GEN Awake male, lying , does not seem in  distress. Appears given age. EYES Pupils are equally round. No scleral erythema, discharge, or conjunctivitis. HENT Mucous membranes are moist.    NECK No apparent thyromegaly or masses. RESP Diminished BS bilaterally, no wheezes, rales or rhonchi. Symmetric chest movement while on room air. CARDIO/VASC S1/S2 auscultated. Regular rate without appreciable murmurs, rubs, or gallops. Peripheral pulses equal bilaterally and palpable. No peripheral edema. GI Abdomen is soft without significant tenderness, masses, or guarding. Bowel sounds are reduced . Clean surgical dressings Rectal exam deferred.  Laureano catheter is  Not Present  HEME/LYMPH No petechiae or ecchymoses. MSK No gross joint deformities. Spontaneous movement of all extremities. Bilateral lower extremity oedema,+1  SKIN Normal coloration, warm, dry. NEURO Cranial nerves appear grossly intact, normal speech, no lateralizing weakness. PSYCH Awake, alert, oriented x 4. Affect appropriate.     Medications:   Medications:    amLODIPine  5 mg Oral BID    iron sucrose  300 mg Intravenous Once    tamsulosin  0.4 mg Oral Daily    finasteride  5 mg Oral Daily    QUEtiapine  100 mg Oral QPM    QUEtiapine  50 mg Oral TID    sertraline  50 mg Oral Daily    metoprolol tartrate  25 mg Oral BID    enoxaparin  30 mg Subcutaneous Daily    [Held by provider] furosemide  20 mg Oral Daily    sodium chloride flush  10 mL Intravenous 2 times per day    pantoprazole  40 mg Intravenous Q12H    And    sodium chloride (PF)  10 mL Intravenous Q12H      Infusions:    lactated ringers 30 mL/hr at 03/08/21 1137     PRN Meds: LORazepam, 0.5 mg, Nightly PRN  melatonin, 9 mg, Nightly PRN  haloperidol lactate, 5 mg, Q6H PRN  morphine, 4 mg, Q2H PRN  sodium chloride flush, 10 mL, PRN  ondansetron, 4 mg, Q6H PRN  acetaminophen, 650 mg, Q6H PRN    Or  acetaminophen, 650 mg, Q6H PRN        Data    Recent Labs     03/06/21  0048 03/07/21  0440 03/08/21  0620   WBC 8.5 7.6 6.0   HGB 9.1* 8.8* 7.8*   HCT 29.8* 29.1* 25.5*    244 212      Recent Labs     03/06/21  0048 03/07/21  0440 03/08/21  0620    140 141   K 3.8 3.6 3.3*    103 105   CO2 27 28 26   BUN 22 23 19   CREATININE 1.6* 1.4* 1.3     No results for input(s): AST, ALT, ALB, BILIDIR, BILITOT, ALKPHOS in the last 72 hours. No results for input(s): INR in the last 72 hours. No results for input(s): CKTOTAL, CKMB, CKMBINDEX, TROPONINI in the last 72 hours.         Electronically signed by Scooby Valdes MD on 3/8/2021 at 1:43 PM

## 2021-03-08 NOTE — PROGRESS NOTES
Daily Progress Note    Awake alert has some confusion   Heart rate is stable, sinus rate is stable  Anemia and GI bleed -may need transfusion -watch for now   HTN stable   S/p abdominal surgery for hernia   Supportive care and conservative treatment  Normal EF  On echo      Pt. Awake, doing ok  HR stable, NSR, BP elevated  No CP, Sob per pt. S/p Large hiatal hernia repair    Repair done 3/4/21    UGI bleed after- s/p transfusion    Per surgery    Chest pain    Atypical    Trop neg. Echo done and EF 50%    Hematuria    Gross hematuria noted-urology following    Hgb dropping again-7.8 today    Possible cystoscopy today per urology note    BP elevated-adjust meds today  Will cont. To follow    Echo-3/3/21   Summary   Left ventricular systolic function is low normal.   Ejection fraction is visually estimated at 50%. Sclerotic, but non-stenotic aortic valve. Trace aortic regurgitation is noted. No evidence of any pericardial effusion. PAST MEDICAL HISTORY:  History of anxiety, anemia, BPH, GERD, hepatitis,  hemorrhoids, hyperlipidemia, squamous cell cancer present.     PAST SURGICAL HISTORY:  Carpal tunnel surgery, hernia repair, and neck  surgery.     SOCIAL HISTORY:  He does not smoke, does not drink.     ALLERGIES:  MINOCYCLINE.     MEDICATIONS AT HOME:  He is on Synthroid, Lasix, Zoloft, iron, Colace,  Restoril, Seroquel, and Flomax.   Objective:   BP (!) 158/78   Pulse 82   Temp 97 °F (36.1 °C) (Oral)   Resp 18   Ht 6' (1.829 m)   Wt 185 lb (83.9 kg)   SpO2 96%   BMI 25.09 kg/m²       Intake/Output Summary (Last 24 hours) at 3/8/2021 0755  Last data filed at 3/8/2021 0636  Gross per 24 hour   Intake 1388.33 ml   Output 750 ml   Net 638.33 ml       Medications:   Scheduled Meds:   QUEtiapine  100 mg Oral QPM    QUEtiapine  50 mg Oral TID    sertraline  50 mg Oral Daily    metoprolol tartrate  25 mg Oral BID    amLODIPine  5 mg Oral Daily    enoxaparin  30 mg Subcutaneous Daily    [Held by provider] furosemide  20 mg Oral Daily    sodium chloride flush  10 mL Intravenous 2 times per day    pantoprazole  40 mg Intravenous Q12H    And    sodium chloride (PF)  10 mL Intravenous Q12H      Infusions:   lactated ringers 50 mL/hr at 03/08/21 0636      PRN Meds:  LORazepam, melatonin, haloperidol lactate, morphine, sodium chloride flush, [DISCONTINUED] promethazine **OR** ondansetron, acetaminophen **OR** acetaminophen       Physical Exam:  Vitals:    03/08/21 0504   BP: (!) 158/78   Pulse: 82   Resp: 18   Temp: 97 °F (36.1 °C)   SpO2: 96%        General: AAO, NAD  Chest: Nontender  Cardiac: First and Second Heart Sounds are Normal, No Murmurs or Gallops noted  Lungs:Clear to auscultation and percussion. Abdomen: Soft, NT, ND, +BS  Extremities: No clubbing, no edema  Vascular:  Equal 2+ peripheral pulses. Lab Data:  CBC:   Recent Labs     03/06/21  0048 03/07/21  0440 03/08/21  0620   WBC 8.5 7.6 6.0   HGB 9.1* 8.8* 7.8*   HCT 29.8* 29.1* 25.5*   MCV 93.1 94.2 92.7    244 212     BMP:   Recent Labs     03/06/21  0048 03/07/21  0440    140   K 3.8 3.6    103   CO2 27 28   BUN 22 23   CREATININE 1.6* 1.4*     LIVER PROFILE: No results for input(s): AST, ALT, LIPASE, BILIDIR, BILITOT, ALKPHOS in the last 72 hours. Invalid input(s): AMYLASE,  ALB  PT/INR: No results for input(s): PROTIME, INR in the last 72 hours. APTT: No results for input(s): APTT in the last 72 hours. BNP:  No results for input(s): BNP in the last 72 hours.       Assessment:  Patient Active Problem List    Diagnosis Date Noted    Varicose 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 08/15/2015     Priority: Medium     Class: Chronic    UGIB (upper gastrointestinal bleed) 03/02/2021    Acquired hypothyroidism 12/30/2020    Anxiety     BPH with urinary obstruction     Seborrheic keratosis, inflamed 03/10/2014    Actinic keratosis 03/10/2014    Seborrheic keratosis 03/10/2014    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    Hyperlipidemia 09/21/2012    Depression 09/21/2012    Insomnia 09/21/2012    BPH (benign prostatic hyperplasia) 09/21/2012    Dysuria 09/21/2012    Vitamin D deficiency 09/21/2012       Electronically signed by Roderick Owen PA-C on 3/8/2021 at 7:55 AM  Electronically signed by Kylah Sharma MD on 3/8/21 at 9:33 AM EST

## 2021-03-08 NOTE — PLAN OF CARE
Shift     Problem: Skin Integrity:  Goal: Will show no infection signs and symptoms  Description: Will show no infection signs and symptoms  Outcome: Met This Shift  Goal: Absence of new skin breakdown  Description: Absence of new skin breakdown  Outcome: Met This Shift     Problem: Discharge Planning:  Goal: Discharged to appropriate level of care  Description: Discharged to appropriate level of care  Outcome: Met This Shift     Problem: Bleeding:  Goal: Will show no signs and symptoms of excessive bleeding  Description: Will show no signs and symptoms of excessive bleeding  Outcome: Met This Shift

## 2021-03-08 NOTE — PROGRESS NOTES
1005  Time out:  1033  Timed treatment minutes:  28  Total treatment time:  28    Electronically signed by: Domingo Valdivia,   3/8/2021, 10:35 AM    Previously filed values:    Goals:  1. Pt will complete all aspects of bed mobility for EOB/OOB ADLs SUP. 2. Pt will complete UB/LB bathing with CGA and AE as needed. 3. Pt will complete all aspects of LB dressing with CGA and AE as needed. 4. Pt will complete all functional transfers to and from bed, chair, toilet, shower chair with SBA and AD. 5. Pt will ambulate HH distance to bathroom for toileting with SBA and AD. 6. Pt will complete all aspects of toileting task with Casa. 7. Pt will complete oral hygiene/grooming routine in standing at sink with SBA demo good dynamic standing balance for approx 8 minutes. 8. Pt will complete ther ex/ther act with focus on UB strengthening.

## 2021-03-08 NOTE — PROGRESS NOTES
Physical Therapy Treatment Note  Name: Natasha Sheikh MRN: [id number] :   10/18/1930   Date:  3/8/2021   Admission Date: 3/2/2021 Room:  [address] [place] [address]     Restrictions/Precautions:        general precautions, fall risk    Communication with other providers:  RN OT    Subjective:  Patient states:  \"I can walk\"  Pain:   Location, Type, Intensity (0/10 to 10/10): Denies pain    Objective:    Observation:  Pt supine in bed upon entry and agreeable to therapy. Treatment, including education/measures:    Bed mobility: Pt completed supine to sitting EOB with supervision and increased time and effort to complete    Transfers: Pt completed STS transfer from bed and to chair min A with cues for sequencing    Balance: Pt sat EOB ~8 minutes while reaching in and out of base of support with supervision and no LOB    Gait: Pt ambulated 100' min A with RW with decreased mark, forward flexed posture, narrowed PEDRO, maintained walker too far forward, and decreased bilateral step length and height. Cues provided for pathway negotiation and VC and tactile cues provided for walker management. Assessment / Impression:    Pt up in chair at end of session with needs in reach and alarm on .     Patient's tolerance of treatment:  well   Adverse Reaction: n/a  Significant change in status and impact:  n/a  Barriers to improvement:  Decreased endurance, impaired balance    Plan for Next Session:    Continue to address transfer training and ambulation in future sessions    Time in:  1005  Time out:  1033  Timed treatment minutes:  28  Total treatment time:  28    Previously filed items:  Social/Functional History  Lives With: Spouse  Type of Home: House  Home Layout: One level, Laundry in basement  Home Access: Level entry  Bathroom Shower/Tub: Walk-in shower, Shower chair with back  Bathroom Toilet: Handicap height  Bathroom Equipment: Grab bars in shower, Grab bars around toilet  Bathroom Accessibility: Accessible  Home Equipment: Rolling walker, Cane, Wheelchair-manual(Pt reports at baseline he maintaly utilizes RW during ambulation.)  Receives Help From: Family(Pt reports his wife has been having back trouble recently, however they have friends and family who can assist as needed.)  ADL Assistance: Needs assistance(Pt reports his wife assists with bathing PRN, and he is able to complete dressing and toileting IND.)  Homemaking Assistance: Independent  Homemaking Responsibilities: No(Spouse completes tasks of laundry, cooking, and cleaning.)  Ambulation Assistance: Independent  Transfer Assistance: Independent  Active : Yes  Mode of Transportation: Car(Pt reports they order groceries and drive to the store to pick them up.)  Occupation: Retired  Type of occupation: Factory  Short term goals  Time Frame for [employer] term goals: 1 week  Short term goal 1: Pt to complete all bed mobility mod I  Short term goal 2: Pt to complete all STS transfers to/from bed, commode, and chair with supervision  Short term goal 3: Pt to ambulate 48' with LRAD and supervision       Electronically signed by:    Sari Koehler PT  3/8/2021, 1:17 PM

## 2021-03-08 NOTE — CARE COORDINATION
Chart reviewed. CM met with patient at bedside and he confirms his plan is home with [facility] at d/c. He states his son will pick him up when he is medically cleared to go home. He states he does not think he will have any needs.

## 2021-03-08 NOTE — PROGRESS NOTES
Patient sitting up in chair  Continues to improve daily  Tolerating liquid diet and was advanced to a soft diet  Confusion has resolved and he is more oriented and wants to go home soon  If the patient is tolerating a soft diet I see no reason why the patient cannot be discharged home with appropriate care at home. The patient's son is at home with his wife and I have consulted case management to assist in discharge planning.   Patient will see me back in the office in 1 week after discharge tomorrow if all is arranged

## 2021-03-08 NOTE — PROGRESS NOTES
[facility] [address]   Progress Note  [facility] 0 1 2      Date: 3/8/2021   Patient: Lilia Landa   : 10/18/1930   DOA: 3/2/2021   MRN: [id number]   ROOM#: [id number]     Admit Date: 3/2/2021     Collaborating Urologist on Call at time of admission: Dr. Marga Hoyt  CC: Chest Pain   Reason for Consult:  Gross Hematuria     Subjective:     Pain: None, no nausea and no vomiting,   Bowel Movement/Flatus:   Yes  Voiding: Indwelling borden catheter, urine clear yellow    Patient resting comfortably in bed, denies any pain. Borden catheter removed this am. Paraphimosis noted and reduced at bedside. Objective:    Vitals:    BP (!) 158/78   Pulse 82   Temp 97 °F (36.1 °C) (Oral)   Resp 18   Ht 6' (1.829 m)   Wt 185 lb (83.9 kg)   SpO2 96%   BMI 25.09 kg/m²    Temp  Av.3 °F (36.8 °C)  Min: 97 °F (36.1 °C)  Max: 99.2 °F (37.3 °C)       Intake/Output Summary (Last 24 hours) at 3/8/2021 0804  Last data filed at 3/8/2021 0636  Gross per 24 hour   Intake 1388.33 ml   Output 750 ml   Net 638.33 ml       Physical Exam:   General appearance: alert, appears stated age, cooperative, no distress and mildly obese  Head: Normocephalic, without obvious abnormality, atraumatic  Abdomen: soft, non-tender, non-distended  Male genitalia: Uncircumcised; paraphimos noted and reduced at bedside.  Borden catheter in place, urine clear yellow    Labs:   WBC:    Lab Results   Component Value Date    WBC 6.0 2021      Hemoglobin/Hematocrit:    Lab Results   Component Value Date    HGB 7.8 2021    HCT 25.5 2021      BMP:   Lab Results   Component Value Date     2021    K 3.3 2021     2021    CO2 26 2021    BUN 19 2021    LABALBU 3.2 2021    CREATININE 1.3 2021    CALCIUM 8.0 2021    GFRAA >60 2021    LABGLOM 52 2021     Urine Culture: 3/5/21  Final Report No growth at 18 to 36 hours    Imaging:   Echocardiogram Complete 2d With Doppler With Color    Result Date: 3/3/2021  Transthoracic Echocardiography Report (TTE)  Demographics   Patient Name       Hitesh Johansen       Date of Study       03/03/2021   Date of Birth      10/18/1930        Gender              Male   Age                80 year(s)        Race                   Patient Number     [id number]        Room Number         2111   Visit Number       [id number]   Corporate ID       [id number]   Accession Number   [id number]        [address] [place] [address], [address] [place] [address]   Ordering Physician Alejandro Hunt MD Interpreting        Alisson Singer MD                                       Physician  Procedure Type of Study   TTE procedure:ECHOCARDIOGRAM COMPLETE 2D W DOPPLER W COLOR. Procedure Date Date: 03/03/2021 Start: 08:52 AM Study Location: Portable Technical Quality: Fair visualization Indications:Congestive heart failure. Patient Status: Routine Height: 72 inches Weight: 190 pounds BSA: 2.08 m2 BMI: 25.77 kg/m2 HR: 96 bpm BP: 149/84 mmHg  Conclusions   Summary  Left ventricular systolic function is low normal.  Ejection fraction is visually estimated at 50%. Sclerotic, but non-stenotic aortic valve. Trace aortic regurgitation is noted. No evidence of any pericardial effusion. Signature   ------------------------------------------------------------------  Electronically signed by Alisson Singer MD (Interpreting  physician) on 03/03/2021 at 11:18 AM  ------------------------------------------------------------------   Findings   Left Ventricle  Left ventricular systolic function is low normal.  Ejection fraction is visually estimated at 50%. Left Atrium  Essentially normal left atrium. Right Atrium  Essentially normal right atrium. Right Ventricle  Essentially normal right ventricle. Aortic Valve  Sclerotic, but non-stenotic aortic valve. Trace aortic regurgitation is noted.    Mitral Valve  Trace mitral regurgitation is present. Tricuspid Valve  Mild tricuspid regurgitation is present. Pulmonic Valve  The pulmonic valve was not well visualized. Pericardial Effusion  No evidence of any pericardial effusion.   M-Mode/2D Measurements & Calculations   LV Diastolic Dimension:  LV Systolic Dimension:  LA Dimension: 3.5 cmAO Root  4.93 cm                  3.56 cm                 Dimension: 4 cmLA Area:  LV FS:27.8 %             LV Volume Diastolic: 72 17.0 cm2  LV PW Diastolic: 1.45 cm ml  LV PW Systolic: 1.4 cm   LV Volume Systolic: 40  Septum Diastolic: 5.74   ml  cm                       LV EDV/LV EDV Index: 72 RV Diastolic Dimension:  Septum Systolic: 3.67 cm WM/76 N5RF ESV/LV ESV   2.91 cm  CO: 7.97 l/min           Index: 40 ml/19 m2  CI: 3.83 l/m*m2          EF Calculated (A4C):    LA/Aorta: 0.88                           44.4 %                  Ascending Aorta: 3.7 cm  LV Area Diastolic: 96.9  EF Calculated (2D):     LA volume/Index: 49 ml  cm2                      53.5 %                  /77I9  LV Area Systolic: 18 cm2                           LV Length: 8.17 cm                            LVOT: 2.7 cm  Doppler Measurements & Calculations    AV Peak Velocity: 127 cm/s    LVOT Peak Velocity: 82.7 cm/s   AV Peak Gradient: 6.45 mmHg   LVOT Mean Velocity: 59.8 cm/s   AV Mean Velocity: 93.1 cm/s   LVOT Peak Gradient: 3 mmHgLVOT Mean Gradient:   AV Mean Gradient: 4 mmHg      2 mmHg   AV VTI: 21.5 cm               Estimated RVSP: 36 mmHg   AV Area (Continuity):3.86 cm2 Estimated RAP:3 mmHg    LVOT VTI: 14.5 cm                                 TR Velocity:286 cm/s   Estimated PASP: 35.72 mmHg    TR Gradient:32.72 mmHg      Xr Chest Portable    Result Date: 3/4/2021  EXAMINATION: ONE XRAY VIEW OF THE CHEST 3/4/2021 12:32 pm COMPARISON: March 2, 2021 HISTORY: ORDERING SYSTEM PROVIDED HISTORY: post operative hiatal hernia repair TECHNOLOGIST PROVIDED HISTORY: Reason for exam:->post operative hiatal hernia repair Reason for Exam: post operative hiatal hernia repair FINDINGS: Nasogastric tube with its distal tip coursing below the diaphragm. Stable cardiomediastinal silhouette. Bibasilar atelectasis or infiltrate is noted. No definite pleural effusion or pneumothorax. The osseous structures are stable. Bibasilar atelectasis or infiltrate. Xr Chest Portable    Result Date: 3/2/2021  EXAMINATION: ONE XRAY VIEW OF THE CHEST 3/2/2021 6:43 pm COMPARISON: 01/30/2018 HISTORY: ORDERING SYSTEM PROVIDED HISTORY: Chest pain TECHNOLOGIST PROVIDED HISTORY: Reason for exam:->Chest pain Reason for Exam: Chest pain Acuity: Acute Type of Exam: Initial Additional signs and symptoms: na Relevant Medical/Surgical History: na FINDINGS: The heart size is stable. Large hiatal hernia again noted. Probable subsegmental atelectasis left lung base. Linear opacity right lung base unchanged. No pneumothorax. Increased size of a large hiatal hernia. Otherwise, unchanged chest     Cta Abdomen Pelvis W Contrast    Result Date: 3/2/2021  EXAMINATION: CTA OF THE ABDOMEN AND PELVIS WITH CONTRAST 3/2/2021 4:41 pm TECHNIQUE: CTA of the abdomen and pelvis was performed with the administration of intravenous contrast. Multiplanar reformatted images are provided for review. MIP images are provided for review. Dose modulation, iterative reconstruction, and/or weight based adjustment of the mA/kV was utilized to reduce the radiation dose to as low as reasonably achievable. COMPARISON: Upper GI series, 04/10/2018 HISTORY: ORDERING SYSTEM PROVIDED HISTORY: GI bleed protocol TECHNOLOGIST PROVIDED HISTORY: Reason for exam:->GI bleed protocol Decision Support Exception->Emergency Medical Condition ([place]) Reason for Exam: GI bleed protocol Acuity: Acute Type of Exam: Initial FINDINGS: Lower Chest: There is an extremely large hiatal hernia, with organo-axial volvulus.   The gastric fundus and a portion the body is now found inferior to the diaphragm on the left (previously those were superior), and now there is dilation of that portion of the stomach, which is concerning for entrapment. Note that the entirety of the hiatal hernia is not included. Organs: The liver enhances normally. Gallbladder is unremarkable. Normal arterial phase imaging of the spleen. Benign nodule the left adrenal gland measures 2.2 cm and is unchanged since 2009. Pancreas is unremarkable. GI/Bowel: Evaluation for GI bleeding is limited, as only an arterial phase was obtained (normally noncontrast and delayed images are also obtained for the purposes of localization of GI bleeding). That said, no suspicious extravasation of contrast is identified within the large bowel. There is mild diverticulosis of the large bowel. The appendix is normal. Again, there is the large hiatal hernia, with concern for entrapment of the gastric fundus and a portion of the body. The duodenal sweep and the remainder of the small bowel are unremarkable. 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 Urinary bladder appears thickened, which is likely secondary to outlet obstruction. There is moderate to marked prostatic hypertrophy. No free pelvic fluid. No pelvic sidewall lymphadenopathy. There is evidence of previous bilateral inguinal hernia repair. Peritoneum/Retroperitoneum: Abdominal aorta normal in caliber. No retroperitoneal lymphadenopathy is identified. No iliac chain or inguinal lymphadenopathy. 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 Additional pagetoid change is seen in the T11 vertebral body. No acute or suspicious bony abnormalities are detected. Very large hiatal hernia. Again, a portion of the gastric fundus and gastric body have migrated inferior to the diaphragm, and there is dilation of that segment of the stomach. The findings are concerning for entrapment. Otherwise, no acute abnormality identified.   Focal extravasation contrast into the bowel is not visualized, but again the evaluation is limited by a monophasic study. Moderate to marked prostatic hypertrophy. Urinary bladder mural thickening with urinary bladder diverticula. The thickening is likely secondary to bladder outlet obstruction. Xr Abdomen For Ng/og/ne Tube Placement    Result Date: 3/3/2021  EXAMINATION: ONE SUPINE XRAY VIEW(S) OF THE ABDOMEN 3/2/2021 11:58 pm COMPARISON: None. HISTORY: ORDERING SYSTEM PROVIDED HISTORY: Confirmation of course of NG/OG/NE tube and location of tip of tube TECHNOLOGIST PROVIDED HISTORY: Reason for exam:->Confirmation of course of NG/OG/NE tube and location of tip of tube Portable? ->Yes Reason for Exam: Confirmation of course of NG/OG/NE tube and location of tip of tube Acuity: Acute Type of Exam: Initial FINDINGS: The tip of the nasogastric tube is in the stomach. The side hole/port is near the expected location of the GE junction. There is marked elevation of the left hemidiaphragm. The nasogastric tube should be advanced 5 cm. Assessment & Plan:      Phil Weller is a 80y.o. year old male admitted 3/2/2021 for Upper GI Bleed.    1) Gross Hematuria: Likely secondary to borden catheter trauma              Hgb 7.8              CT a/p 3/2/21 with multiple bladder diverticulum, thickened urinary bladder wall likely secondary to CANSECO with a large prostate              Urine culture 3/5/21 Negative   Recommend outpatient diagnostic cystoscopy in ~2 weeks for further evaluation of hematuria         2) BPH: h/o 90g prostate gland. Chronically on Flomax 0.4mg and Proscar 5mg, okay to resume these               Patient follows with Dr. Ama Mccormack, h/o incomplete bladder emptying, patient has declined surgical intervention in the past due to his age. Continue medical management   Borden catheter removed this am; recommend nursing staff perform PVR bladder scan in ~4hrs or after first void and notify us if >250cc. Follow-up with Dr. Raz Banks in 2 weeks     Will continue to follow    Patient seen and examined, chart reviewed.      Electronically signed by Romi Roldan PA-C on 3/8/2021 at 8:04 AM

## 2021-03-09 LAB
ANION GAP SERPL CALCULATED.3IONS-SCNC: 7 MMOL/L (ref 4–16)
ANISOCYTOSIS: ABNORMAL
BANDED NEUTROPHILS ABSOLUTE COUNT: 0.26 K/CU MM
BANDED NEUTROPHILS RELATIVE PERCENT: 4 % (ref 5–11)
BUN BLDV-MCNC: 18 MG/DL (ref 6–23)
CALCIUM SERPL-MCNC: 8 MG/DL (ref 8.3–10.6)
CHLORIDE BLD-SCNC: 102 MMOL/L (ref 99–110)
CO2: 27 MMOL/L (ref 21–32)
CREAT SERPL-MCNC: 1.2 MG/DL (ref 0.9–1.3)
DIFFERENTIAL TYPE: ABNORMAL
EOSINOPHILS ABSOLUTE: 0.2 K/CU MM
EOSINOPHILS RELATIVE PERCENT: 3 % (ref 0–3)
GFR AFRICAN AMERICAN: >60 ML/MIN/1.73M2
GFR NON-AFRICAN AMERICAN: 57 ML/MIN/1.73M2
GLUCOSE BLD-MCNC: 104 MG/DL (ref 70–99)
HCT VFR BLD CALC: 26.4 % (ref 42–52)
HEMOGLOBIN: 8.4 GM/DL (ref 13.5–18)
LYMPHOCYTES ABSOLUTE: 0.7 K/CU MM
LYMPHOCYTES RELATIVE PERCENT: 11 % (ref 24–44)
MAGNESIUM: 2 MG/DL (ref 1.8–2.4)
MCH RBC QN AUTO: 28.9 PG (ref 27–31)
MCHC RBC AUTO-ENTMCNC: 31.8 % (ref 32–36)
MCV RBC AUTO: 90.7 FL (ref 78–100)
METAMYELOCYTES ABSOLUTE COUNT: 0.06 K/CU MM
METAMYELOCYTES PERCENT: 1 %
MONOCYTES ABSOLUTE: 0.2 K/CU MM
MONOCYTES RELATIVE PERCENT: 3 % (ref 0–4)
NUCLEATED RED BLOOD CELLS: 1
OVALOCYTES: ABNORMAL
PDW BLD-RTO: 14.3 % (ref 11.7–14.9)
PLATELET # BLD: 225 K/CU MM (ref 140–440)
PMV BLD AUTO: 9.5 FL (ref 7.5–11.1)
POLYCHROMASIA: ABNORMAL
POTASSIUM SERPL-SCNC: 3.2 MMOL/L (ref 3.5–5.1)
RBC # BLD: 2.91 M/CU MM (ref 4.6–6.2)
SEGMENTED NEUTROPHILS ABSOLUTE COUNT: 5 K/CU MM
SEGMENTED NEUTROPHILS RELATIVE PERCENT: 78 % (ref 36–66)
SODIUM BLD-SCNC: 136 MMOL/L (ref 135–145)
WBC # BLD: 6.4 K/CU MM (ref 4–10.5)
WBC # BLD: ABNORMAL 10*3/UL

## 2021-03-09 PROCEDURE — C9113 INJ PANTOPRAZOLE SODIUM, VIA: HCPCS | Performed by: SURGERY

## 2021-03-09 PROCEDURE — 51701 INSERT BLADDER CATHETER: CPT

## 2021-03-09 PROCEDURE — 6370000000 HC RX 637 (ALT 250 FOR IP): Performed by: INTERNAL MEDICINE

## 2021-03-09 PROCEDURE — 6370000000 HC RX 637 (ALT 250 FOR IP): Performed by: HOSPITALIST

## 2021-03-09 PROCEDURE — 80048 BASIC METABOLIC PNL TOTAL CA: CPT

## 2021-03-09 PROCEDURE — 2580000003 HC RX 258: Performed by: SURGERY

## 2021-03-09 PROCEDURE — 94761 N-INVAS EAR/PLS OXIMETRY MLT: CPT

## 2021-03-09 PROCEDURE — 51798 US URINE CAPACITY MEASURE: CPT

## 2021-03-09 PROCEDURE — 6370000000 HC RX 637 (ALT 250 FOR IP): Performed by: SURGERY

## 2021-03-09 PROCEDURE — 6360000002 HC RX W HCPCS: Performed by: SURGERY

## 2021-03-09 PROCEDURE — 6370000000 HC RX 637 (ALT 250 FOR IP): Performed by: PHYSICIAN ASSISTANT

## 2021-03-09 PROCEDURE — 36415 COLL VENOUS BLD VENIPUNCTURE: CPT

## 2021-03-09 PROCEDURE — 83735 ASSAY OF MAGNESIUM: CPT

## 2021-03-09 PROCEDURE — 85007 BL SMEAR W/DIFF WBC COUNT: CPT

## 2021-03-09 PROCEDURE — 1200000000 HC SEMI PRIVATE

## 2021-03-09 PROCEDURE — 85027 COMPLETE CBC AUTOMATED: CPT

## 2021-03-09 RX ORDER — METOPROLOL SUCCINATE 25 MG/1
25 TABLET, EXTENDED RELEASE ORAL EVERY EVENING
Status: DISCONTINUED | OUTPATIENT
Start: 2021-03-09 | End: 2021-03-12 | Stop reason: HOSPADM

## 2021-03-09 RX ORDER — MAGNESIUM OXIDE 400 MG/1
200 TABLET ORAL DAILY
Status: DISCONTINUED | OUTPATIENT
Start: 2021-03-10 | End: 2021-03-12 | Stop reason: HOSPADM

## 2021-03-09 RX ORDER — AMLODIPINE BESYLATE 5 MG/1
5 TABLET ORAL EVERY MORNING
Status: DISCONTINUED | OUTPATIENT
Start: 2021-03-10 | End: 2021-03-12 | Stop reason: HOSPADM

## 2021-03-09 RX ORDER — POTASSIUM CHLORIDE 20 MEQ/1
20 TABLET, EXTENDED RELEASE ORAL 2 TIMES DAILY WITH MEALS
Status: DISCONTINUED | OUTPATIENT
Start: 2021-03-09 | End: 2021-03-12 | Stop reason: HOSPADM

## 2021-03-09 RX ORDER — MAGNESIUM OXIDE 400 MG/1
400 TABLET ORAL 2 TIMES DAILY
Status: DISCONTINUED | OUTPATIENT
Start: 2021-03-09 | End: 2021-03-09

## 2021-03-09 RX ORDER — POTASSIUM CHLORIDE 20 MEQ/1
40 TABLET, EXTENDED RELEASE ORAL ONCE
Status: COMPLETED | OUTPATIENT
Start: 2021-03-09 | End: 2021-03-09

## 2021-03-09 RX ADMIN — SODIUM CHLORIDE, POTASSIUM CHLORIDE, SODIUM LACTATE AND CALCIUM CHLORIDE: 600; 310; 30; 20 INJECTION, SOLUTION INTRAVENOUS at 09:59

## 2021-03-09 RX ADMIN — SODIUM CHLORIDE, PRESERVATIVE FREE 10 ML: 5 INJECTION INTRAVENOUS at 20:21

## 2021-03-09 RX ADMIN — PANTOPRAZOLE SODIUM 40 MG: 40 INJECTION, POWDER, FOR SOLUTION INTRAVENOUS at 06:45

## 2021-03-09 RX ADMIN — AMLODIPINE BESYLATE 5 MG: 5 TABLET ORAL at 10:00

## 2021-03-09 RX ADMIN — POTASSIUM CHLORIDE 40 MEQ: 1500 TABLET, EXTENDED RELEASE ORAL at 12:29

## 2021-03-09 RX ADMIN — QUETIAPINE FUMARATE 50 MG: 25 TABLET ORAL at 12:29

## 2021-03-09 RX ADMIN — POTASSIUM CHLORIDE 20 MEQ: 20 TABLET, EXTENDED RELEASE ORAL at 18:00

## 2021-03-09 RX ADMIN — TAMSULOSIN HYDROCHLORIDE 0.4 MG: 0.4 CAPSULE ORAL at 10:00

## 2021-03-09 RX ADMIN — SODIUM CHLORIDE, PRESERVATIVE FREE 10 ML: 5 INJECTION INTRAVENOUS at 10:00

## 2021-03-09 RX ADMIN — QUETIAPINE FUMARATE 50 MG: 25 TABLET ORAL at 10:00

## 2021-03-09 RX ADMIN — QUETIAPINE FUMARATE 50 MG: 25 TABLET ORAL at 18:00

## 2021-03-09 RX ADMIN — METOPROLOL TARTRATE 25 MG: 25 TABLET, FILM COATED ORAL at 10:00

## 2021-03-09 RX ADMIN — PANTOPRAZOLE SODIUM 40 MG: 40 INJECTION, POWDER, FOR SOLUTION INTRAVENOUS at 18:01

## 2021-03-09 RX ADMIN — FINASTERIDE 5 MG: 5 TABLET, FILM COATED ORAL at 10:00

## 2021-03-09 RX ADMIN — SODIUM CHLORIDE, PRESERVATIVE FREE 10 ML: 5 INJECTION INTRAVENOUS at 18:01

## 2021-03-09 RX ADMIN — SODIUM CHLORIDE, PRESERVATIVE FREE 10 ML: 5 INJECTION INTRAVENOUS at 06:45

## 2021-03-09 RX ADMIN — METOPROLOL SUCCINATE 25 MG: 25 TABLET, EXTENDED RELEASE ORAL at 18:00

## 2021-03-09 RX ADMIN — MAGNESIUM OXIDE 400 MG (241.3 MG MAGNESIUM) TABLET 400 MG: TABLET at 10:00

## 2021-03-09 RX ADMIN — ENOXAPARIN SODIUM 30 MG: 30 INJECTION SUBCUTANEOUS at 10:00

## 2021-03-09 RX ADMIN — SERTRALINE HYDROCHLORIDE 50 MG: 50 TABLET ORAL at 10:00

## 2021-03-09 RX ADMIN — FUROSEMIDE 20 MG: 20 TABLET ORAL at 10:00

## 2021-03-09 RX ADMIN — QUETIAPINE FUMARATE 100 MG: 100 TABLET ORAL at 20:20

## 2021-03-09 ASSESSMENT — PAIN DESCRIPTION - PAIN TYPE: TYPE: ACUTE PAIN

## 2021-03-09 ASSESSMENT — PAIN DESCRIPTION - DESCRIPTORS: DESCRIPTORS: DISCOMFORT

## 2021-03-09 ASSESSMENT — PAIN DESCRIPTION - PROGRESSION: CLINICAL_PROGRESSION: GRADUALLY IMPROVING

## 2021-03-09 ASSESSMENT — PAIN DESCRIPTION - FREQUENCY: FREQUENCY: INTERMITTENT

## 2021-03-09 ASSESSMENT — PAIN DESCRIPTION - ORIENTATION: ORIENTATION: MID;LOWER

## 2021-03-09 ASSESSMENT — PAIN SCALES - GENERAL: PAINLEVEL_OUTOF10: 2

## 2021-03-09 NOTE — PLAN OF CARE
Problem: Infection:  Goal: Will remain free from infection  Description: Will remain free from infection  3/8/2021 1947 by Christina Salinas RN  Outcome: Ongoing     Problem: Safety:  Goal: Free from accidental physical injury  Description: Free from accidental physical injury  3/8/2021 1947 by Christina Salinas RN  Outcome: Ongoing  Goal: Free from intentional harm  Description: Free from intentional harm  3/8/2021 1947 by Christina Salinas RN  Outcome: Ongoing     Problem: Daily Care:  Goal: Daily care needs are met  Description: Daily care needs are met  3/8/2021 1947 by Christina Salinas RN  Outcome: Ongoing     Problem: Pain:  Goal: Patient's pain/discomfort is manageable  Description: Patient's pain/discomfort is manageable  3/8/2021 1947 by Christina Salinas RN  Outcome: Ongoing     Problem: Skin Integrity:  Goal: Skin integrity will stabilize  Description: Skin integrity will stabilize  3/8/2021 1947 by Christina Salinas RN  Outcome: Ongoing     Problem: Discharge Planning:  Goal: Patients continuum of care needs are met  Description: Patients continuum of care needs are met  3/8/2021 1947 by Christina Salinas RN  Outcome: Ongoing     Problem: Fluid Volume - Imbalance:  Goal: Will show no signs and symptoms of excessive bleeding  Description: Will show no signs and symptoms of excessive bleeding  3/8/2021 1947 by Christina Salinas RN  Outcome: Ongoing  Goal: Absence of imbalanced fluid volume signs and symptoms  Description: Absence of imbalanced fluid volume signs and symptoms  3/8/2021 1947 by Christina Salinas RN  Outcome: Ongoing     Problem: Nausea/Vomiting:  Goal: Absence of nausea/vomiting  Description: Absence of nausea/vomiting  3/8/2021 1947 by Christina Salinas RN  Outcome: Ongoing  Goal: Able to drink  Description: Able to drink  3/8/2021 1947 by Christina Salinas RN  Outcome: Ongoing  Goal: Able to eat  Description: Able to eat  3/8/2021 1947 by Christina Salinas RN  Outcome: Ongoing  Goal: Ability to achieve adequate nutritional intake will improve  Description: Ability to achieve adequate nutritional intake will improve  3/8/2021 1947 by Gerhardt Salts, RN  Outcome: Ongoing     Problem: Falls - Risk of:  Goal: Will remain free from falls  Description: Will remain free from falls  3/8/2021 1947 by Gerhardt Salts, RN  Outcome: Ongoing  Goal: Absence of physical injury  Description: Absence of physical injury  3/8/2021 1947 by Gerhardt Salts, RN  Outcome: Ongoing     Problem: Skin Integrity:  Goal: Will show no infection signs and symptoms  Description: Will show no infection signs and symptoms  3/8/2021 1947 by Gerhardt Salts, RN  Outcome: Ongoing  Goal: Absence of new skin breakdown  Description: Absence of new skin breakdown  3/8/2021 1947 by Gerhardt Salts, RN  Outcome: Ongoing     Problem: Discharge Planning:  Goal: Discharged to appropriate level of care  Description: Discharged to appropriate level of care  3/8/2021 1947 by Gerhardt Salts, RN  Outcome: Ongoing     Problem: Bleeding:  Goal: Will show no signs and symptoms of excessive bleeding  Description: Will show no signs and symptoms of excessive bleeding  3/8/2021 1947 by Gerhardt Salts, RN  Outcome: Ongoing

## 2021-03-09 NOTE — PLAN OF CARE
Problem: Infection:  Goal: Will remain free from infection  Description: Will remain free from infection  Outcome: Ongoing     Problem: Safety:  Goal: Free from accidental physical injury  Description: Free from accidental physical injury  Outcome: Ongoing  Goal: Free from intentional harm  Description: Free from intentional harm  Outcome: Ongoing     Problem: Daily Care:  Goal: Daily care needs are met  Description: Daily care needs are met  Outcome: Ongoing     Problem: Pain:  Goal: Patient's pain/discomfort is manageable  Description: Patient's pain/discomfort is manageable  Outcome: Ongoing     Problem: Skin Integrity:  Goal: Skin integrity will stabilize  Description: Skin integrity will stabilize  Outcome: Ongoing     Problem: Discharge Planning:  Goal: Patients continuum of care needs are met  Description: Patients continuum of care needs are met  Outcome: Ongoing     Problem: Fluid Volume - Imbalance:  Goal: Will show no signs and symptoms of excessive bleeding  Description: Will show no signs and symptoms of excessive bleeding  Outcome: Ongoing  Goal: Absence of imbalanced fluid volume signs and symptoms  Description: Absence of imbalanced fluid volume signs and symptoms  Outcome: Ongoing     Problem: Nausea/Vomiting:  Goal: Absence of nausea/vomiting  Description: Absence of nausea/vomiting  Outcome: Ongoing  Goal: Able to drink  Description: Able to drink  Outcome: Ongoing  Goal: Able to eat  Description: Able to eat  Outcome: Ongoing  Goal: Ability to achieve adequate nutritional intake will improve  Description: Ability to achieve adequate nutritional intake will improve  Outcome: Ongoing     Problem: Falls - Risk of:  Goal: Will remain free from falls  Description: Will remain free from falls  Outcome: Ongoing  Goal: Absence of physical injury  Description: Absence of physical injury  Outcome: Ongoing     Problem: Skin Integrity:  Goal: Will show no infection signs and symptoms  Description: Will show no infection signs and symptoms  Outcome: Ongoing  Goal: Absence of new skin breakdown  Description: Absence of new skin breakdown  Outcome: Ongoing     Problem: Discharge Planning:  Goal: Discharged to appropriate level of care  Description: Discharged to appropriate level of care  Outcome: Ongoing     Problem: Bleeding:  Goal: Will show no signs and symptoms of excessive bleeding  Description: Will show no signs and symptoms of excessive bleeding  Outcome: Ongoing

## 2021-03-09 NOTE — PROGRESS NOTES
[facility]   Progress Note  [facility] 0 1 2      Date: 3/9/2021   Patient: Amber Hodges   : 10/18/1930   DOA: 3/2/2021   MRN: [id number]   ROOM#: [id number]     Admit Date: 3/2/2021     Collaborating Urologist on Call at time of admission: Dr. Twan Henning  CC: Chest Pain   Reason for Consult:  Gross Hematuria     Subjective:     Pain: None, no nausea and no vomiting,   Bowel Movement/Flatus:   Yes  Voiding: None    Patient resting comfortably in bedside recliner, states he feels OK today. Borden catheter removed 3/8/21, pt unable to void since borden removal. He was straight cathed this am with 575cc urine return. PVR's in our office typically between 300-500cc. Objective:    Vitals:    BP (!) 142/64   Pulse 79   Temp 97.5 °F (36.4 °C) (Oral)   Resp 18   Ht 6' (1.829 m)   Wt 185 lb (83.9 kg)   SpO2 94%   BMI 25.09 kg/m²    Temp  Av.7 °F (36.5 °C)  Min: 97.2 °F (36.2 °C)  Max: 98.3 °F (36.8 °C)       Intake/Output Summary (Last 24 hours) at 3/9/2021 1150  Last data filed at 3/9/2021 6896  Gross per 24 hour   Intake 1462.67 ml   Output 575 ml   Net 887.67 ml       Physical Exam:   General appearance: alert, appears stated age, cooperative, no distress and mildly obese  Head: Normocephalic, without obvious abnormality, atraumatic  Abdomen: soft, non-tender, non-distended  Male genitalia: Uncircumcised; paraphimos noted and reduced at bedside.  Borden catheter in place, urine clear yellow    Labs:   WBC:    Lab Results   Component Value Date    WBC 6.4 2021      Hemoglobin/Hematocrit:    Lab Results   Component Value Date    HGB 8.4 2021    HCT 26.4 2021      BMP:   Lab Results   Component Value Date     2021    K 3.2 2021     2021    CO2 27 2021    BUN 18 2021    LABALBU 3.2 2021    CREATININE 1.2 2021    CALCIUM 8.0 2021    GFRAA >60 2021    LABGLOM 57 2021     Urine Culture: 3/5/21 Final Report No growth at 18 to 36 hours    Imaging:   Echocardiogram Complete 2d With Doppler With Color    Result Date: 3/3/2021  Transthoracic Echocardiography Report (TTE)  Demographics   Patient Name       Benito Lyons       Date of Study       03/03/2021   Date of Birth      10/18/1930        Gender              Male   Age                80 year(s)        Race                   Patient Number     [id number]        Room Number         2111   Visit Number       [id number]   Corporate ID       [id number]   Accession Number   [id number]        [address] [place] [address]   Ordering Physician Karly Sims MD Interpreting        Kamila Powell MD                                       Physician  Procedure Type of Study   TTE procedure:ECHOCARDIOGRAM COMPLETE 2D W DOPPLER W COLOR. Procedure Date Date: 03/03/2021 Start: 08:52 AM Study Location: Portable Technical Quality: Fair visualization Indications:Congestive heart failure. Patient Status: Routine Height: 72 inches Weight: 190 pounds BSA: 2.08 m2 BMI: 25.77 kg/m2 HR: 96 bpm BP: 149/84 mmHg  Conclusions   Summary  Left ventricular systolic function is low normal.  Ejection fraction is visually estimated at 50%. Sclerotic, but non-stenotic aortic valve. Trace aortic regurgitation is noted. No evidence of any pericardial effusion. Signature   ------------------------------------------------------------------  Electronically signed by Kamila Powell MD (Interpreting  physician) on 03/03/2021 at 11:18 AM  ------------------------------------------------------------------   Findings   Left Ventricle  Left ventricular systolic function is low normal.  Ejection fraction is visually estimated at 50%. Left Atrium  Essentially normal left atrium. Right Atrium  Essentially normal right atrium. Right Ventricle  Essentially normal right ventricle.    Aortic Valve  Sclerotic, but non-stenotic aortic valve. Trace aortic regurgitation is noted. Mitral Valve  Trace mitral regurgitation is present. Tricuspid Valve  Mild tricuspid regurgitation is present. Pulmonic Valve  The pulmonic valve was not well visualized. Pericardial Effusion  No evidence of any pericardial effusion.   M-Mode/2D Measurements & Calculations   LV Diastolic Dimension:  LV Systolic Dimension:  LA Dimension: 3.5 cmAO Root  4.93 cm                  3.56 cm                 Dimension: 4 cmLA Area:  LV FS:27.8 %             LV Volume Diastolic: 72 13.1 cm2  LV PW Diastolic: 1.41 cm ml  LV PW Systolic: 1.4 cm   LV Volume Systolic: 40  Septum Diastolic: 2.35   ml  cm                       LV EDV/LV EDV Index: 72 RV Diastolic Dimension:  Septum Systolic: 7.68 cm CW/23 W3EB ESV/LV ESV   2.91 cm  CO: 7.97 l/min           Index: 40 ml/19 m2  CI: 3.83 l/m*m2          EF Calculated (A4C):    LA/Aorta: 0.88                           44.4 %                  Ascending Aorta: 3.7 cm  LV Area Diastolic: 42.1  EF Calculated (2D):     LA volume/Index: 49 ml  cm2                      53.5 %                  /63A3  LV Area Systolic: 18 cm2                           LV Length: 8.17 cm                            LVOT: 2.7 cm  Doppler Measurements & Calculations    AV Peak Velocity: 127 cm/s    LVOT Peak Velocity: 82.7 cm/s   AV Peak Gradient: 6.45 mmHg   LVOT Mean Velocity: 59.8 cm/s   AV Mean Velocity: 93.1 cm/s   LVOT Peak Gradient: 3 mmHgLVOT Mean Gradient:   AV Mean Gradient: 4 mmHg      2 mmHg   AV VTI: 21.5 cm               Estimated RVSP: 36 mmHg   AV Area (Continuity):3.86 cm2 Estimated RAP:3 mmHg    LVOT VTI: 14.5 cm                                 TR Velocity:286 cm/s   Estimated PASP: 35.72 mmHg    TR Gradient:32.72 mmHg      Xr Chest Portable    Result Date: 3/4/2021  EXAMINATION: ONE XRAY VIEW OF THE CHEST 3/4/2021 12:32 pm COMPARISON: March 2, 2021 HISTORY: ORDERING SYSTEM PROVIDED HISTORY: post operative hiatal hernia repair TECHNOLOGIST PROVIDED HISTORY: Reason for exam:->post operative hiatal hernia repair Reason for Exam: post operative hiatal hernia repair FINDINGS: Nasogastric tube with its distal tip coursing below the diaphragm. Stable cardiomediastinal silhouette. Bibasilar atelectasis or infiltrate is noted. No definite pleural effusion or pneumothorax. The osseous structures are stable. Bibasilar atelectasis or infiltrate. Xr Chest Portable    Result Date: 3/2/2021  EXAMINATION: ONE XRAY VIEW OF THE CHEST 3/2/2021 6:43 pm COMPARISON: 01/30/2018 HISTORY: ORDERING SYSTEM PROVIDED HISTORY: Chest pain TECHNOLOGIST PROVIDED HISTORY: Reason for exam:->Chest pain Reason for Exam: Chest pain Acuity: Acute Type of Exam: Initial Additional signs and symptoms: na Relevant Medical/Surgical History: na FINDINGS: The heart size is stable. Large hiatal hernia again noted. Probable subsegmental atelectasis left lung base. Linear opacity right lung base unchanged. No pneumothorax. Increased size of a large hiatal hernia. Otherwise, unchanged chest     Cta Abdomen Pelvis W Contrast    Result Date: 3/2/2021  EXAMINATION: CTA OF THE ABDOMEN AND PELVIS WITH CONTRAST 3/2/2021 4:41 pm TECHNIQUE: CTA of the abdomen and pelvis was performed with the administration of intravenous contrast. Multiplanar reformatted images are provided for review. MIP images are provided for review. Dose modulation, iterative reconstruction, and/or weight based adjustment of the mA/kV was utilized to reduce the radiation dose to as low as reasonably achievable. COMPARISON: Upper GI series, 04/10/2018 HISTORY: ORDERING SYSTEM PROVIDED HISTORY: GI bleed protocol TECHNOLOGIST PROVIDED HISTORY: Reason for exam:->GI bleed protocol Decision Support Exception->Emergency Medical Condition (MA) Reason for Exam: GI bleed protocol Acuity: Acute Type of Exam: Initial FINDINGS: Lower Chest: There is an extremely large hiatal hernia, with organo-axial volvulus.   The gastric fundus and a portion the body is now found inferior to the diaphragm on the left (previously those were superior), and now there is dilation of that portion of the stomach, which is concerning for entrapment. Note that the entirety of the hiatal hernia is not included. Organs: The liver enhances normally. Gallbladder is unremarkable. Normal arterial phase imaging of the spleen. Benign nodule the left adrenal gland measures 2.2 cm and is unchanged since 2009. Pancreas is unremarkable. GI/Bowel: Evaluation for GI bleeding is limited, as only an arterial phase was obtained (normally noncontrast and delayed images are also obtained for the purposes of localization of GI bleeding). That said, no suspicious extravasation of contrast is identified within the large bowel. There is mild diverticulosis of the large bowel. The appendix is normal. Again, there is the large hiatal hernia, with concern for entrapment of the gastric fundus and a portion of the body. The duodenal sweep and the remainder of the small bowel are unremarkable. 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 Urinary bladder appears thickened, which is likely secondary to outlet obstruction. There is moderate to marked prostatic hypertrophy. No free pelvic fluid. No pelvic sidewall lymphadenopathy. There is evidence of previous bilateral inguinal hernia repair. Peritoneum/Retroperitoneum: Abdominal aorta normal in caliber. No retroperitoneal lymphadenopathy is identified. No iliac chain or inguinal lymphadenopathy. 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 Additional pagetoid change is seen in the T11 vertebral body. No acute or suspicious bony abnormalities are detected. Very large hiatal hernia. Again, a portion of the gastric fundus and gastric body have migrated inferior to the diaphragm, and there is dilation of that segment of the stomach.   The findings are concerning for entrapment. Otherwise, no acute abnormality identified. Focal extravasation contrast into the bowel is not visualized, but again the evaluation is limited by a monophasic study. Moderate to marked prostatic hypertrophy. Urinary bladder mural thickening with urinary bladder diverticula. The thickening is likely secondary to bladder outlet obstruction. Xr Abdomen For Ng/og/ne Tube Placement    Result Date: 3/3/2021  EXAMINATION: ONE SUPINE XRAY VIEW(S) OF THE ABDOMEN 3/2/2021 11:58 pm COMPARISON: None. HISTORY: ORDERING SYSTEM PROVIDED HISTORY: Confirmation of course of NG/OG/NE tube and location of tip of tube TECHNOLOGIST PROVIDED HISTORY: Reason for exam:->Confirmation of course of NG/OG/NE tube and location of tip of tube Portable? ->Yes Reason for Exam: Confirmation of course of NG/OG/NE tube and location of tip of tube Acuity: Acute Type of Exam: Initial FINDINGS: The tip of the nasogastric tube is in the stomach. The side hole/port is near the expected location of the GE junction. There is marked elevation of the left hemidiaphragm. The nasogastric tube should be advanced 5 cm. Assessment & Plan:      Rosette Thomas is a 80y.o. year old male admitted 3/2/2021 for Upper GI Bleed.    1) Gross Hematuria: Likely secondary to borden catheter trauma              Hgb 8.4, stable              CT a/p 3/2/21 with multiple bladder diverticulum, thickened urinary bladder wall likely secondary to CANSECO with a large prostate              Urine culture 3/5/21 Negative   Recommend outpatient diagnostic cystoscopy in ~2 weeks for further evaluation of hematuria         2) BPH: h/o 90g prostate gland. Chronically on Flomax 0.4mg and Proscar 5mg, okay to resume these               Patient follows with Dr. Deepti Carvajal, h/o incomplete bladder emptying, patient has declined surgical intervention in the past due to his age.  Continue medical management   Borden catheter removed 3/8/21, pt straight cathed this am with 575cc urine return   Recommend nursing staff perform PVR bladder scan q4hrs and straight cath if >300cc. Follow-up with Dr. Migdalia Rebollar in 2 weeks for diagnostic cystoscopy. Will continue to follow. Anticipate discharge home tomorrow. Patient seen and examined, chart reviewed.      Electronically signed by Lizeth Mueller PA-C on 3/9/2021 at 11:50 AM

## 2021-03-09 NOTE — PROGRESS NOTES
Daily Progress Note    Remain in sinus rate is stable  Heart rate and BP stable  S/p abdominal surgery   Conservative treatment from cardiac stand  Keep on current cardiac meds    Pt. Awake, alert, doing ok  HR stable, NSR, BP stable  No CP, Sob per pt.     S/p Large hiatal hernia repair    Repair done 3/4/21    UGI bleed after- s/p transfusion    Per surgery     Chest pain    Atypical    Trop neg. Echo done and EF 50%     Hematuria    Gross hematuria noted-urology following    Hgb dropping again-7.8 last check- awaiting labs today    Possible cystoscopy today per urology note     BP elevated-adjusted meds yesterday-monitor BP today  Stable from cardiac standpoint  Ok to D/C when ok with primary team     Echo-3/3/21   Summary   Left ventricular systolic function is low normal.   Ejection fraction is visually estimated at 50%.   Sclerotic, but non-stenotic aortic valve.   Trace aortic regurgitation is noted.   No evidence of any pericardial effusion.     PAST MEDICAL HISTORY:  History of anxiety, anemia, BPH, GERD, hepatitis,  hemorrhoids, hyperlipidemia, squamous cell cancer present.     PAST SURGICAL HISTORY:  Carpal tunnel surgery, hernia repair, and neck  surgery.     SOCIAL HISTORY:  He does not smoke, does not drink.     ALLERGIES:  MINOCYCLINE.     MEDICATIONS AT HOME:  He is on Synthroid, Lasix, Zoloft, iron, Colace,  Restoril, Seroquel, and Flomax.       Objective:   BP (!) 154/77   Pulse 77   Temp 97.2 °F (36.2 °C) (Oral)   Resp 18   Ht 6' (1.829 m)   Wt 185 lb (83.9 kg)   SpO2 95%   BMI 25.09 kg/m²       Intake/Output Summary (Last 24 hours) at 3/9/2021 0759  Last data filed at 3/9/2021 0645  Gross per 24 hour   Intake 1134.83 ml   Output 575 ml   Net 559.83 ml       Medications:   Scheduled Meds:   amLODIPine  5 mg Oral BID    tamsulosin  0.4 mg Oral Daily    finasteride  5 mg Oral Daily    QUEtiapine  100 mg Oral QPM    QUEtiapine  50 mg Oral TID    sertraline  50 mg Oral Daily    03/10/2014    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    Hyperlipidemia 09/21/2012    Depression 09/21/2012    Insomnia 09/21/2012    BPH (benign prostatic hyperplasia) 09/21/2012    Dysuria 09/21/2012    Vitamin D deficiency 09/21/2012       Electronically signed by Hellen Nguyễn PA-C on 3/9/2021 at 7:59 AM   Electronically signed by Kenny Hsieh MD on 3/9/21 at 12:57 PM EST

## 2021-03-09 NOTE — PROGRESS NOTES
Comprehensive Nutrition Assessment    Type and Reason for Visit:  Initial, RD Nutrition Re-Screen/LOS    Nutrition Recommendations/Plan:   Modified diet to Low Fiber Diet   Encourage adequate protein at meals     Nutrition Assessment:  Pt with upper gi bleed and POD#5 lap hernia hiatal repair with gastric obstruction. Pt tolerated Dental Soft diet at visit, spoke with pt and pt's son. Pt has difficulty hearing, but able to answer diet/wt hx. Pt has good appetite and good po intake prior and during admission. Denied any N/V/pain. Pt's son stated pt's UBW ~175 but has noticed pt has gained. Encourage adequate protein for wound healing. Anticipated to D/C per urology noted. Pt at low nutrition risk. Estimated Daily Nutrient Needs:  Energy (kcal):  5847-1672 (1.25-1.4); Weight Used for Energy Requirements:  Current     Protein (g):   (1.2-1.5 g/kg); Weight Used for Protein Requirements:  Ideal        Fluid (ml/day):  9242-5281; Method Used for Fluid Requirements:  1 ml/kcal      Nutrition Related Findings:  +1 pitting RUE, LUE      Wounds:  Surgical Incision       Current Nutrition Therapies:    DIET LOW FIBER; Anthropometric Measures:  · Height: 6' (182.9 cm)  · Current Body Weight: 184 lb 15.5 oz (83.9 kg)   · Admission Body Weight: 173 lb 15.1 oz (78.9 kg)    · Usual Body Weight: 175 lb (79.4 kg)     · Ideal Body Weight: 178 lbs; % Ideal Body Weight 103.9 %   · BMI: 25.1  · BMI Categories: Overweight (BMI 25.0-29. 9)       Nutrition Diagnosis:   · Increased nutrient needs related to (Wound healing) as evidenced by wounds    Nutrition Interventions:   Food and/or Nutrient Delivery:  Modify Current Diet  Nutrition Education/Counseling:  No recommendation at this time   Coordination of Nutrition Care:  Continue to monitor while inpatient, Coordination of Community Care    Goals:  Pt will tolerate new diet order in the next 24 hours       Nutrition Monitoring and Evaluation:   Behavioral-Environmental Outcomes:  None Identified   Food/Nutrient Intake Outcomes:  Diet Advancement/Tolerance, Food and Nutrient Intake  Physical Signs/Symptoms Outcomes:  Biochemical Data, Weight, Skin, Nutrition Focused Physical Findings, Fluid Status or Edema     Discharge Planning:     Too soon to determine     Electronically signed by Michelle Lama RD, LD on 3/9/21 at 1:10 PM EST    Contact: [phone]

## 2021-03-09 NOTE — PROGRESS NOTES
Patient over 8hrs with No void post borden removal.  Bladder Scan completed and showed >450cc. Patient denies bladder discomfort or need to void. Will contact Urology Service to inform. Spoke w/ MICHELLE Bashir and orders given to straight cath Patient x1. And complete PVR if/when Patient voids. Patient straight cath'd for 575cc of Annetta, Red Urine.

## 2021-03-09 NOTE — PLAN OF CARE
Problem: Infection:  Goal: Will remain free from infection  Description: Will remain free from infection  Outcome: Ongoing     Problem: Safety:  Goal: Free from accidental physical injury  Description: Free from accidental physical injury  Outcome: Ongoing  Goal: Free from intentional harm  Description: Free from intentional harm  Outcome: Ongoing     Problem: Daily Care:  Goal: Daily care needs are met  Description: Daily care needs are met  Outcome: Ongoing     Problem: Pain:  Goal: Patient's pain/discomfort is manageable  Description: Patient's pain/discomfort is manageable  Outcome: Ongoing  Goal: Pain level will decrease  Description: Pain level will decrease  Outcome: Ongoing  Goal: Control of acute pain  Description: Control of acute pain  Outcome: Ongoing  Goal: Control of chronic pain  Description: Control of chronic pain  Outcome: Ongoing     Problem: Skin Integrity:  Goal: Skin integrity will stabilize  Description: Skin integrity will stabilize  Outcome: Ongoing     Problem: Discharge Planning:  Goal: Patients continuum of care needs are met  Description: Patients continuum of care needs are met  Outcome: Ongoing     Problem: Fluid Volume - Imbalance:  Goal: Will show no signs and symptoms of excessive bleeding  Description: Will show no signs and symptoms of excessive bleeding  Outcome: Ongoing  Goal: Absence of imbalanced fluid volume signs and symptoms  Description: Absence of imbalanced fluid volume signs and symptoms  Outcome: Ongoing     Problem: Nausea/Vomiting:  Goal: Absence of nausea/vomiting  Description: Absence of nausea/vomiting  Outcome: Ongoing  Goal: Able to drink  Description: Able to drink  Outcome: Ongoing  Goal: Able to eat  Description: Able to eat  Outcome: Ongoing  Goal: Ability to achieve adequate nutritional intake will improve  Description: Ability to achieve adequate nutritional intake will improve  Outcome: Ongoing     Problem: Falls - Risk of:  Goal: Will remain free from falls  Description: Will remain free from falls  Outcome: Ongoing  Goal: Absence of physical injury  Description: Absence of physical injury  Outcome: Ongoing     Problem: Skin Integrity:  Goal: Will show no infection signs and symptoms  Description: Will show no infection signs and symptoms  Outcome: Ongoing  Goal: Absence of new skin breakdown  Description: Absence of new skin breakdown  Outcome: Ongoing     Problem: Discharge Planning:  Goal: Discharged to appropriate level of care  Description: Discharged to appropriate level of care  Outcome: Ongoing     Problem: Bleeding:  Goal: Will show no signs and symptoms of excessive bleeding  Description: Will show no signs and symptoms of excessive bleeding  Outcome: Ongoing     Problem: Urinary Retention:  Goal: Urinary elimination within specified parameters  Description: Urinary elimination within specified parameters  Outcome: Ongoing  Goal: Able to perform urinary catheter care  Description: Able to perform urinary catheter care  Outcome: Ongoing  Goal: Able to perform urinary self-catheterization  Description: Able to perform urinary self-catheterization  Outcome: Ongoing  Goal: Ability to reestablish a normal urinary elimination pattern will improve - after catheter removal  Description: Ability to reestablish a normal urinary elimination pattern will improve - after catheter removal  Outcome: Ongoing  Goal: Ability to recognize the need to void and respond appropriately will improve  Description: Ability to recognize the need to void and respond appropriately will improve  Outcome: Ongoing  Goal: Absence of postvoid residual urine  Description: Absence of postvoid residual urine  Outcome: Ongoing

## 2021-03-09 NOTE — PROGRESS NOTES
Progress Note    Subjective:      Usama Baltazar  Postop day #5 laparoscopic repair of incarcerated type III hiatal hernia  Patient tolerating a diet  Has low potassium of 3.2  Reportedly needed straight catheterization last night urology to evaluate  Patient wanting to go home  Spoke to the patient's son who will be in today but also mention that the patient's wife unknowingly to the wife was admitted to the hospital last night. She is undergoing an upper endoscopy for possible GI bleed  Objective:     BP (!) 142/64   Pulse 79   Temp 97.5 °F (36.4 °C) (Oral)   Resp 18   Ht 6' (1.829 m)   Wt 185 lb (83.9 kg)   SpO2 94%   BMI 25.09 kg/m²     In: 1462.7 [P.O.:540; I.V.:922.7]  Out: 575 [Urine:575]         General: Up in a chair awake alert and appropriate  Abdomen: Abdomen soft incisions healing well  Lungs: Clear  Other: N/A    Labs:   BMP:    Lab Results   Component Value Date     03/09/2021    K 3.2 03/09/2021     03/09/2021    CO2 27 03/09/2021    BUN 18 03/09/2021    LABALBU 3.2 03/02/2021    CREATININE 1.2 03/09/2021    CALCIUM 8.0 03/09/2021    GFRAA >60 03/09/2021    LABGLOM 57 03/09/2021    GLUCOSE 104 03/09/2021        Assessment:     Doing well     Plan:   Urology to evaluate need for Laureano catheter.   From surgical standpoint patient ready for discharge  Home health care requested  Discharge instructions given verbally to the patient's son and to the patient  We will follow up with me next week

## 2021-03-09 NOTE — PROGRESS NOTES
last 72 hours. APTT:No results for input(s): APTT in the last 72 hours. LIVER PROFILE:No results for input(s): AST, ALT, BILIDIR, BILITOT, ALKPHOS in the last 72 hours. Imaging Last 24 Hours:  No results found. Assessment//Plan           Hospital Problems           Last Modified POA    UGIB (upper gastrointestinal bleed) 3/2/2021 Yes        Assessment & Plan  Upper GI bled with acute on chronic anemia  -Large hiatal hernia s/p lap repair of incarcerated hernia with partial fundoplication 3/4  -S/p PRBC and iron sucrose  -IV PPI  -advance diet  Chest pain  -atypical  -EF 50%  Acute met encephalopathy  -post op delerium  CKD 3  -stable  Hematuria traumatic and urinary retention  -urine cx neg  -Borden removed  -flomax, proscar  -cystocopy outpt  HTN  -BB  Hypokalemia  -replace  Hypothyroid  DVt prophylaxis  -lovenox      Overall stable and plan discharge for tomorrow and  as still retaining urine. If has PVR > 300cc then straight can and may have to go home with borden. Discussed with urology.        Electronically signed by Orville Cockayne, MD on 3/9/21 at 8:59 AM EST

## 2021-03-10 LAB
ANION GAP SERPL CALCULATED.3IONS-SCNC: 10 MMOL/L (ref 4–16)
ANISOCYTOSIS: ABNORMAL
BANDED NEUTROPHILS ABSOLUTE COUNT: 0.18 K/CU MM
BANDED NEUTROPHILS RELATIVE PERCENT: 3 % (ref 5–11)
BUN BLDV-MCNC: 15 MG/DL (ref 6–23)
CALCIUM SERPL-MCNC: 8 MG/DL (ref 8.3–10.6)
CHLORIDE BLD-SCNC: 103 MMOL/L (ref 99–110)
CO2: 25 MMOL/L (ref 21–32)
CREAT SERPL-MCNC: 1.3 MG/DL (ref 0.9–1.3)
DIFFERENTIAL TYPE: ABNORMAL
GFR AFRICAN AMERICAN: >60 ML/MIN/1.73M2
GFR NON-AFRICAN AMERICAN: 52 ML/MIN/1.73M2
GLUCOSE BLD-MCNC: 104 MG/DL (ref 70–99)
HCT VFR BLD CALC: 26.6 % (ref 42–52)
HEMOGLOBIN: 8.4 GM/DL (ref 13.5–18)
LYMPHOCYTES ABSOLUTE: 0.7 K/CU MM
LYMPHOCYTES RELATIVE PERCENT: 11 % (ref 24–44)
MAGNESIUM: 1.9 MG/DL (ref 1.8–2.4)
MCH RBC QN AUTO: 28.9 PG (ref 27–31)
MCHC RBC AUTO-ENTMCNC: 31.6 % (ref 32–36)
MCV RBC AUTO: 91.4 FL (ref 78–100)
METAMYELOCYTES ABSOLUTE COUNT: 0.12 K/CU MM
METAMYELOCYTES PERCENT: 2 %
MONOCYTES ABSOLUTE: 0.8 K/CU MM
MONOCYTES RELATIVE PERCENT: 13 % (ref 0–4)
PDW BLD-RTO: 14.1 % (ref 11.7–14.9)
PLATELET # BLD: 251 K/CU MM (ref 140–440)
PMV BLD AUTO: 9.9 FL (ref 7.5–11.1)
POTASSIUM SERPL-SCNC: 3.5 MMOL/L (ref 3.5–5.1)
RBC # BLD: 2.91 M/CU MM (ref 4.6–6.2)
RBC # BLD: ABNORMAL 10*6/UL
SEGMENTED NEUTROPHILS ABSOLUTE COUNT: 4.2 K/CU MM
SEGMENTED NEUTROPHILS RELATIVE PERCENT: 71 % (ref 36–66)
SODIUM BLD-SCNC: 138 MMOL/L (ref 135–145)
WBC # BLD: 6 K/CU MM (ref 4–10.5)

## 2021-03-10 PROCEDURE — 51701 INSERT BLADDER CATHETER: CPT

## 2021-03-10 PROCEDURE — 6370000000 HC RX 637 (ALT 250 FOR IP): Performed by: INTERNAL MEDICINE

## 2021-03-10 PROCEDURE — 94761 N-INVAS EAR/PLS OXIMETRY MLT: CPT

## 2021-03-10 PROCEDURE — 85007 BL SMEAR W/DIFF WBC COUNT: CPT

## 2021-03-10 PROCEDURE — 2580000003 HC RX 258: Performed by: SURGERY

## 2021-03-10 PROCEDURE — 6360000002 HC RX W HCPCS: Performed by: SURGERY

## 2021-03-10 PROCEDURE — 51798 US URINE CAPACITY MEASURE: CPT

## 2021-03-10 PROCEDURE — 36415 COLL VENOUS BLD VENIPUNCTURE: CPT

## 2021-03-10 PROCEDURE — 94150 VITAL CAPACITY TEST: CPT

## 2021-03-10 PROCEDURE — 83735 ASSAY OF MAGNESIUM: CPT

## 2021-03-10 PROCEDURE — 85027 COMPLETE CBC AUTOMATED: CPT

## 2021-03-10 PROCEDURE — 80048 BASIC METABOLIC PNL TOTAL CA: CPT

## 2021-03-10 PROCEDURE — 1200000000 HC SEMI PRIVATE

## 2021-03-10 PROCEDURE — 6370000000 HC RX 637 (ALT 250 FOR IP): Performed by: HOSPITALIST

## 2021-03-10 PROCEDURE — 6370000000 HC RX 637 (ALT 250 FOR IP): Performed by: SURGERY

## 2021-03-10 PROCEDURE — C9113 INJ PANTOPRAZOLE SODIUM, VIA: HCPCS | Performed by: SURGERY

## 2021-03-10 RX ORDER — POTASSIUM CHLORIDE 20 MEQ/1
20 TABLET, EXTENDED RELEASE ORAL DAILY
Qty: 30 TABLET | Refills: 0 | Status: SHIPPED | OUTPATIENT
Start: 2021-03-10 | End: 2021-04-28 | Stop reason: SDUPTHER

## 2021-03-10 RX ORDER — FINASTERIDE 5 MG/1
5 TABLET, FILM COATED ORAL DAILY
Qty: 30 TABLET | Refills: 0 | Status: SHIPPED | OUTPATIENT
Start: 2021-03-11 | End: 2021-04-28 | Stop reason: SDUPTHER

## 2021-03-10 RX ORDER — PANTOPRAZOLE SODIUM 20 MG/1
20 TABLET, DELAYED RELEASE ORAL DAILY
Qty: 30 TABLET | Refills: 0 | Status: SHIPPED | OUTPATIENT
Start: 2021-03-10 | End: 2021-04-28 | Stop reason: SDUPTHER

## 2021-03-10 RX ORDER — AMLODIPINE BESYLATE 5 MG/1
5 TABLET ORAL EVERY MORNING
Qty: 30 TABLET | Refills: 0 | Status: SHIPPED | OUTPATIENT
Start: 2021-03-11 | End: 2021-04-28 | Stop reason: SDUPTHER

## 2021-03-10 RX ORDER — METOPROLOL SUCCINATE 25 MG/1
25 TABLET, EXTENDED RELEASE ORAL EVERY EVENING
Qty: 30 TABLET | Refills: 0 | Status: SHIPPED | OUTPATIENT
Start: 2021-03-10 | End: 2021-04-28 | Stop reason: SDUPTHER

## 2021-03-10 RX ADMIN — QUETIAPINE FUMARATE 100 MG: 100 TABLET ORAL at 20:34

## 2021-03-10 RX ADMIN — QUETIAPINE FUMARATE 50 MG: 25 TABLET ORAL at 09:22

## 2021-03-10 RX ADMIN — METOPROLOL SUCCINATE 25 MG: 25 TABLET, EXTENDED RELEASE ORAL at 18:05

## 2021-03-10 RX ADMIN — SODIUM CHLORIDE, PRESERVATIVE FREE 10 ML: 5 INJECTION INTRAVENOUS at 20:34

## 2021-03-10 RX ADMIN — ENOXAPARIN SODIUM 30 MG: 30 INJECTION SUBCUTANEOUS at 09:19

## 2021-03-10 RX ADMIN — SODIUM CHLORIDE, PRESERVATIVE FREE 10 ML: 5 INJECTION INTRAVENOUS at 18:06

## 2021-03-10 RX ADMIN — POTASSIUM CHLORIDE 20 MEQ: 20 TABLET, EXTENDED RELEASE ORAL at 09:22

## 2021-03-10 RX ADMIN — FUROSEMIDE 20 MG: 20 TABLET ORAL at 09:19

## 2021-03-10 RX ADMIN — TAMSULOSIN HYDROCHLORIDE 0.4 MG: 0.4 CAPSULE ORAL at 09:18

## 2021-03-10 RX ADMIN — MAGNESIUM OXIDE 200 MG: 400 TABLET ORAL at 09:19

## 2021-03-10 RX ADMIN — POTASSIUM CHLORIDE 20 MEQ: 20 TABLET, EXTENDED RELEASE ORAL at 18:08

## 2021-03-10 RX ADMIN — SERTRALINE HYDROCHLORIDE 50 MG: 50 TABLET ORAL at 09:18

## 2021-03-10 RX ADMIN — AMLODIPINE BESYLATE 5 MG: 5 TABLET ORAL at 09:19

## 2021-03-10 RX ADMIN — PANTOPRAZOLE SODIUM 40 MG: 40 INJECTION, POWDER, FOR SOLUTION INTRAVENOUS at 06:30

## 2021-03-10 RX ADMIN — FINASTERIDE 5 MG: 5 TABLET, FILM COATED ORAL at 09:19

## 2021-03-10 RX ADMIN — PANTOPRAZOLE SODIUM 40 MG: 40 INJECTION, POWDER, FOR SOLUTION INTRAVENOUS at 18:05

## 2021-03-10 RX ADMIN — SODIUM CHLORIDE, PRESERVATIVE FREE 10 ML: 5 INJECTION INTRAVENOUS at 06:30

## 2021-03-10 RX ADMIN — SODIUM CHLORIDE, PRESERVATIVE FREE 10 ML: 5 INJECTION INTRAVENOUS at 09:19

## 2021-03-10 RX ADMIN — QUETIAPINE FUMARATE 50 MG: 25 TABLET ORAL at 18:05

## 2021-03-10 ASSESSMENT — PAIN SCALES - GENERAL
PAINLEVEL_OUTOF10: 0
PAINLEVEL_OUTOF10: 0

## 2021-03-10 NOTE — PLAN OF CARE
Problem: Infection:  Goal: Will remain free from infection  Description: Will remain free from infection  3/10/2021 0222 by Ifrah Kang RN  Outcome: Met This Shift  3/10/2021 0221 by Ifrah Kang RN  Outcome: Met This Shift  3/9/2021 1605 by Edward Quick RN  Outcome: Ongoing     Problem: Safety:  Goal: Free from accidental physical injury  Description: Free from accidental physical injury  3/10/2021 0222 by Ifrah Kang RN  Outcome: Met This Shift  3/10/2021 0221 by Ifrah Kang RN  Outcome: Met This Shift  3/9/2021 1605 by Edward Quick RN  Outcome: Ongoing  Goal: Free from intentional harm  Description: Free from intentional harm  3/10/2021 0222 by Ifrah Kang RN  Outcome: Met This Shift  3/10/2021 0221 by Ifrah Kang RN  Outcome: Met This Shift  3/9/2021 1605 by Edward Quick RN  Outcome: Ongoing     Problem: Daily Care:  Goal: Daily care needs are met  Description: Daily care needs are met  3/10/2021 0222 by Ifrah Kang RN  Outcome: Met This Shift  3/10/2021 0221 by Ifrah Kang RN  Outcome: Met This Shift  3/9/2021 1605 by Edward Quick RN  Outcome: Ongoing     Problem: Pain:  Goal: Patient's pain/discomfort is manageable  Description: Patient's pain/discomfort is manageable  3/10/2021 0222 by Ifrah Kang RN  Outcome: Met This Shift  3/10/2021 0221 by Ifrha Kang RN  Outcome: Met This Shift  3/9/2021 1605 by Edward Quick RN  Outcome: Ongoing  Goal: Pain level will decrease  Description: Pain level will decrease  3/10/2021 0222 by Ifrah Kang RN  Outcome: Met This Shift  3/10/2021 0221 by Ifrah Kang RN  Outcome: Met This Shift  3/9/2021 1605 by Edward Quick RN  Outcome: Ongoing  Goal: Control of acute pain  Description: Control of acute pain  3/10/2021 0222 by Ifrah Kang RN  Outcome: Met This Shift  3/10/2021 0221 by Ifrah Kang RN  Outcome: Met This Shift  3/9/2021 1605 by Edward Quick RN  Outcome: Ongoing  Goal: Control of chronic pain  Description: Control of chronic pain  3/10/2021 0222 by Dariusz Manley RN  Outcome: Met This Shift  3/10/2021 0221 by Dariusz Manley RN  Outcome: Met This Shift  3/9/2021 1605 by Aundrea Curran RN  Outcome: Ongoing     Problem: Skin Integrity:  Goal: Skin integrity will stabilize  Description: Skin integrity will stabilize  3/10/2021 0222 by Dariusz Manley RN  Outcome: Met This Shift  3/10/2021 0221 by Dariusz Manley RN  Outcome: Met This Shift  3/9/2021 1605 by Aundrea Curran RN  Outcome: Ongoing     Problem: Discharge Planning:  Goal: Patients continuum of care needs are met  Description: Patients continuum of care needs are met  3/10/2021 0222 by Dariusz Manley RN  Outcome: Met This Shift  3/10/2021 0221 by Dariusz Manley RN  Outcome: Met This Shift  3/9/2021 1605 by Aundrea Curran RN  Outcome: Ongoing     Problem: Fluid Volume - Imbalance:  Goal: Will show no signs and symptoms of excessive bleeding  Description: Will show no signs and symptoms of excessive bleeding  3/10/2021 0222 by Dariusz Manley RN  Outcome: Met This Shift  3/10/2021 0221 by Dariusz Manley RN  Outcome: Met This Shift  3/9/2021 1605 by Aundrea Curran RN  Outcome: Ongoing  Goal: Absence of imbalanced fluid volume signs and symptoms  Description: Absence of imbalanced fluid volume signs and symptoms  3/10/2021 0222 by Dariusz Manley RN  Outcome: Met This Shift  3/10/2021 0221 by Dariusz Manley RN  Outcome: Met This Shift  3/9/2021 1605 by Aundrea Curran RN  Outcome: Ongoing     Problem: Nausea/Vomiting:  Goal: Absence of nausea/vomiting  Description: Absence of nausea/vomiting  3/10/2021 0222 by Dariusz Manley RN  Outcome: Met This Shift  3/10/2021 0221 by Dariusz Manley RN  Outcome: Met This Shift  3/9/2021 1605 by Aundrea Curran RN  Outcome: Ongoing  Goal: Able to drink  Description: Able to drink  3/10/2021 0222 by Dariusz Manley RN  Outcome: Met This Shift  3/10/2021 0221 by Dariusz Manley RN  Outcome: Met This Shift  3/9/2021 1605 by Adan Shift  3/9/2021 1605 by Pb Cueto RN  Outcome: Ongoing     Problem: Bleeding:  Goal: Will show no signs and symptoms of excessive bleeding  Description: Will show no signs and symptoms of excessive bleeding  3/10/2021 0222 by Guillermina Dior RN  Outcome: Met This Shift  3/10/2021 0221 by Guillermina Dior RN  Outcome: Met This Shift  3/9/2021 1605 by Pb Cueto RN  Outcome: Ongoing     Problem: Urinary Retention:  Goal: Ability to recognize the need to void and respond appropriately will improve  Description: Ability to recognize the need to void and respond appropriately will improve  3/10/2021 0222 by Guillermina Dior RN  Outcome: Met This Shift  3/10/2021 0221 by Guillermina Dior RN  Outcome: Met This Shift  3/9/2021 1605 by Pb Cueto RN  Outcome: Ongoing     Problem: Urinary Retention:  Goal: Urinary elimination within specified parameters  Description: Urinary elimination within specified parameters  3/10/2021 0222 by Guillermina Dior RN  Outcome: Ongoing  3/10/2021 0221 by Guillermina Dior RN  Outcome: Ongoing  3/9/2021 1605 by Pb Cueto RN  Outcome: Ongoing  Goal: Ability to reestablish a normal urinary elimination pattern will improve - after catheter removal  Description: Ability to reestablish a normal urinary elimination pattern will improve - after catheter removal  3/10/2021 0222 by Guillermina Dior RN  Outcome: Ongoing  3/10/2021 0221 by Guillermina Dior RN  Outcome: Not Met This Shift  3/9/2021 1605 by Pb Cueto RN  Outcome: Ongoing  Goal: Absence of postvoid residual urine  Description: Absence of postvoid residual urine  3/10/2021 0222 by Guillermina Dior RN  Outcome: Ongoing  Note: Q4h bladder scan ordered  3/10/2021 0221 by Guillermina Dior RN  Outcome: Ongoing  3/9/2021 1605 by Pb Cueto RN  Outcome: Ongoing     Problem: Urinary Retention:  Goal: Able to perform urinary self-catheterization  Description: Able to perform urinary self-catheterization  3/10/2021 0222 by Guillermina Dior RN  Outcome: Not Met This Shift  3/10/2021 0221 by vAa Romo RN  Outcome: Not Met This Shift  3/9/2021 1605 by Evelio Guzman RN  Outcome: Ongoing     Problem: Urinary Retention:  Goal: Able to perform urinary catheter care  Description: Able to perform urinary catheter care  3/10/2021 0222 by Ava Romo RN  Outcome: Completed  Note: Patient w/o borden cathether  3/10/2021 0221 by Ava Romo RN  Outcome: Met This Shift  3/9/2021 1605 by Evelio Guzman RN  Outcome: Ongoing

## 2021-03-10 NOTE — PROGRESS NOTES
Upon completing Bladder scan @ 0630, Noted az small amount of blood In Patients brief. No clots noted  Spoke w/ Read MICHELLE Charles to inform.   No new orders placed @ this time

## 2021-03-10 NOTE — DISCHARGE INSTR - COC
Continuity of Care Form    Patient Name: Talia Cruz   :  10/18/1930  MRN:  [id number]    Admit date:  3/2/2021  Discharge date:  3/12/2021    Code Status Order: Limited   Advance Directives:   Advance Care Flowsheet Documentation       Date/Time Healthcare Directive Type of Healthcare Directive Copy in [address] Agent's Name Healthcare Agent's Phone Number    21 0236  Yes, patient has an advance directive for healthcare treatment  Health care treatment directive  Yes, copy in chart  Spouse  [place]  [phone]            Admitting Physician:  Dionisio Landeros MD  PCP: Milan Angel MD    Discharging Nurse: Andi Salas RN  [address] Unit/Room#: 5420/3809-R  Discharging Unit Phone Number: [phone]    Emergency Contact:   Extended Emergency Contact Information  Primary Emergency Contact: Bettie Sherman  Address: [address] [place] [address] Phone: [phone]  Work Phone: [phone]  Mobile Phone: [phone]  Relation: Spouse  Secondary Emergency Contact: Zhou Gutierrez  [place] Phone: [phone]  Work Phone: [phone]  Mobile Phone: [phone]  Relation: Child    Past Surgical History:  Past Surgical History:   Procedure Laterality Date    CATARACT REMOVAL      HERNIA REPAIR      HIATAL HERNIA REPAIR N/A 3/4/2021    LAPAROSCOPIC LARGE HERNIA HIATAL REPAIR WITH GASTRIC OBSTRUCTION POSS OPEN performed by Cris Bello MD at [address] [place] [address]         Immunization History:   Immunization History   Administered Date(s) Administered    COVID-19, Moderna, PF, 100mcg/0.5mL 2021    Influenza Virus Vaccine 2012, 2019    Influenza, High Dose (Fluzone 72 yrs and older) 2019       Active Problems:  Patient Active Problem List   Diagnosis Code    Hyperlipidemia E78.5    Depression F32.9    Insomnia G47.00    BPH (benign prostatic hyperplasia) N40.0    Dysuria R30.0  Vitamin D deficiency E55.9    Seborrheic keratosis, inflamed L82.0    Actinic keratosis L57.0    Seborrheic keratosis L82.1    SCC (squamous cell carcinoma) C44.92    Varicose 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 I83.813    Anxiety F41.9    BPH with urinary obstruction N40.1, N13.8    Acquired hypothyroidism E03.9    UGIB (upper gastrointestinal bleed) K92.2       Isolation/Infection:   Isolation            No Isolation          Patient Infection Status       Infection Onset Added Last Indicated Last Indicated By Review Planned Expiration Resolved Resolved By    None active    Resolved    COVID-19 Rule Out 03/03/21 03/03/21 03/03/21 COVID-19, Rapid (Ordered)   03/03/21 Rule-Out Test Resulted            Nurse Assessment:  Last Vital Signs: /68   Pulse 76   Temp 97.6 °F (36.4 °C) (Oral)   Resp 17   Ht 6' (1.829 m)   Wt 185 lb (83.9 kg)   SpO2 94%   BMI 25.09 kg/m²     Last documented pain score (0-10 scale): Pain Level: 0  Last Weight:   Wt Readings from Last 1 Encounters:   03/07/21 185 lb (83.9 kg)     Mental Status:  alert    IV Access:  - None    Nursing Mobility/ADLs:  Walking   Assisted  Transfer  Assisted  Bathing  Assisted  Dressing  Assisted  Toileting  Assisted  Feeding  Independent  Med Admin  Assisted  Med Delivery   whole    Wound Care Documentation and Therapy:        Elimination:  Continence:   · Bowel: Yes  · Bladder: Yes  Urinary Catheter: None   Colostomy/Ileostomy/Ileal Conduit: No       Date of Last BM: 3/12/2021      Intake/Output Summary (Last 24 hours) at 3/10/2021 1431  Last data filed at 3/10/2021 0918  Gross per 24 hour   Intake 140 ml   Output 925 ml   Net -785 ml     I/O last 3 completed shifts: In: 532.1 [P.O.:340; I.V.:192.1]  Out: 1492 [Urine:1492]    Safety Concerns:      At Risk for Falls    Impairments/Disabilities:      None        Patient's personal belongings (please select all that are sent with patient):  None    RN SIGNATURE:  Electronically signed by Junior Navarro RN on 3/12/21 at 3:59 PM EST    CASE MANAGEMENT/SOCIAL WORK SECTION    Inpatient Status Date: ***    Readmission Risk Assessment Score:  Readmission Risk              Risk of Unplanned Readmission:        20           Discharging to Facility/ Agency   · Name:   · Address:  · Phone:  · Fax:    Dialysis Facility (if applicable)   · Name:  · Address:  · Dialysis Schedule:  · Phone:  · Fax:    / signature: {Esignature:[id number]}    PHYSICIAN SECTION  Nutrition Therapy:  Current Nutrition Therapy:   - Oral Diet:  General    Routes of Feeding: Oral  Liquids: No Restrictions  Daily Fluid Restriction: no  Last Modified Barium Swallow with Video (Video Swallowing Test): not done    Treatments at the Time of Hospital Discharge:   Respiratory Treatments: none  Oxygen Therapy:  is not on home oxygen therapy. Ventilator:    - No ventilator support    Rehab Therapies: Physical Therapy and Occupational Therapy  Weight Bearing Status/Restrictions: No weight bearing restirctions  Prognosis: Fair    Condition at Discharge: Stable    Rehab Potential (if transferring to Rehab): Good    Recommended Labs or Other Treatments After Discharge: CBC and CMP in one week. Follow up with urology in a week    Physician Certification: I certify the above information and transfer of Raphael Zee  is necessary for the continuing treatment of the diagnosis listed and that he requires Dioni Benjamin for less 30 days.      Update Admission H&P: No change in H&P    PHYSICIAN SIGNATURE:  Electronically signed by Claude Blonder, MD on 3/12/21 at 3:49 PM EST

## 2021-03-10 NOTE — CARE COORDINATION
Pt on discharge to return home c son (wife remains in [facility] as a patient). Notified Stefani of pt's discharge and faxed Awa Zee order/ AVS to [facility] [phone]. Upon discharge pt and son feel pt not ready to return home and would benefit from SNF plcmt and pt now agreeable. Met c pt and son at bedside and they would like [facility] as pt's spouse has been there previously. Referral sent to [facility], pt will need updated PT/OT for SNF precert, placed whiteboard note.

## 2021-03-10 NOTE — PROGRESS NOTES
Daily Progress Note    Doing ok having issues with  related  unable to void straight cath and bleeding   Stable from cardiac stand  Keep on BP meds  S/p abdominal surgery for hernia      Pt. Awake, alert, doing ok  HR stable, NSR, BP stable  No CP, Sob per pt.     S/p Large hiatal hernia repair    Repair done 3/4/21    UGI bleed after- s/p transfusion    Per surgery     Chest pain    Atypical    Trop neg.    Echo done and EF 50%     Hematuria    Gross hematuria noted-urology following    Hgb stable    Stable now-urology to follow OP     BP stable  Stable from cardiac standpoint  Ok to D/C when ok with primary team     Echo-3/3/21   Summary   Left ventricular systolic function is low normal.   Ejection fraction is visually estimated at 50%.   Sclerotic, but non-stenotic aortic valve.   Trace aortic regurgitation is noted.   No evidence of any pericardial effusion.     PAST MEDICAL HISTORY:  History of anxiety, anemia, BPH, GERD, hepatitis,  hemorrhoids, hyperlipidemia, squamous cell cancer present.     PAST SURGICAL HISTORY:  Carpal tunnel surgery, hernia repair, and neck  surgery.     SOCIAL HISTORY:  He does not smoke, does not drink.     ALLERGIES:  MINOCYCLINE.     MEDICATIONS AT HOME:  He is on Synthroid, Lasix, Zoloft, iron, Colace,  Restoril, Seroquel, and Flomax.          Objective:   BP (!) 152/70   Pulse 78   Temp 96.4 °F (35.8 °C) (Oral)   Resp 18   Ht 6' (1.829 m)   Wt 185 lb (83.9 kg)   SpO2 95%   BMI 25.09 kg/m²       Intake/Output Summary (Last 24 hours) at 3/10/2021 0808  Last data filed at 3/10/2021 0630  Gross per 24 hour   Intake 532.13 ml   Output 1492 ml   Net -959.87 ml       Medications:   Scheduled Meds:   magnesium oxide  200 mg Oral Daily    potassium chloride  20 mEq Oral BID WC    amLODIPine  5 mg Oral QAM    metoprolol succinate  25 mg Oral QPM    tamsulosin  0.4 mg Oral Daily    finasteride  5 mg Oral Daily    QUEtiapine  100 mg Oral QPM    QUEtiapine  50 mg Oral TID  sertraline  50 mg Oral Daily    enoxaparin  30 mg Subcutaneous Daily    furosemide  20 mg Oral Daily    sodium chloride flush  10 mL Intravenous 2 times per day    pantoprazole  40 mg Intravenous Q12H    And    sodium chloride (PF)  10 mL Intravenous Q12H      Infusions:     PRN Meds:  LORazepam, melatonin, haloperidol lactate, morphine, sodium chloride flush, [DISCONTINUED] promethazine **OR** ondansetron, acetaminophen **OR** acetaminophen       Physical Exam:  Vitals:    03/10/21 0311   BP: (!) 152/70   Pulse: 78   Resp: 18   Temp: 96.4 °F (35.8 °C)   SpO2: 95%        General: AAO, NAD  Chest: Nontender  Cardiac: First and Second Heart Sounds are Normal, No Murmurs or Gallops noted  Lungs:Clear to auscultation and percussion. Abdomen: Soft, NT, ND, +BS  Extremities: No clubbing, no edema  Vascular:  Equal 2+ peripheral pulses. Lab Data:  CBC:   Recent Labs     03/08/21  0620 03/09/21  0800 03/10/21  0646   WBC 6.0 6.4 6.0   HGB 7.8* 8.4* 8.4*   HCT 25.5* 26.4* 26.6*   MCV 92.7 90.7 91.4    225 251     BMP:   Recent Labs     03/08/21  0620 03/09/21  0800 03/10/21  0646    136 138   K 3.3* 3.2* 3.5    102 103   CO2 26 27 25   BUN 19 18 15   CREATININE 1.3 1.2 1.3     LIVER PROFILE: No results for input(s): AST, ALT, LIPASE, BILIDIR, BILITOT, ALKPHOS in the last 72 hours. Invalid input(s): AMYLASE,  ALB  PT/INR: No results for input(s): PROTIME, INR in the last 72 hours. APTT: No results for input(s): APTT in the last 72 hours. BNP:  No results for input(s): BNP in the last 72 hours.       Assessment:  Patient Active Problem List    Diagnosis Date Noted    Varicose 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 08/15/2015     Priority: Medium     Class: Chronic    UGIB (upper gastrointestinal bleed) 03/02/2021    Acquired hypothyroidism 12/30/2020    Anxiety     BPH with urinary obstruction     Seborrheic keratosis, inflamed 03/10/2014    Actinic keratosis 03/10/2014    Seborrheic keratosis 03/10/2014    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    Hyperlipidemia 09/21/2012    Depression 09/21/2012    Insomnia 09/21/2012    BPH (benign prostatic hyperplasia) 09/21/2012    Dysuria 09/21/2012    Vitamin D deficiency 09/21/2012       Electronically signed by Ladon Kussmaul, PA-C on 3/10/2021 at 8:08 AM   Electronically signed by Aury Hunter MD on 3/10/21 at 10:16 AM EST

## 2021-03-10 NOTE — PROGRESS NOTES
[facility] [address]   Progress Note  [facility] 0 1 2      Date: 3/10/2021   Patient: Sherwin Linares   : 10/18/1930   DOA: 3/2/2021   MRN: [id number]   ROOM#: [id number]     Admit Date: 3/2/2021  Collaborating Urologist on Call at time of admission: Dr. Yaneli Pruitt  CC: Chest Pain  Reason for Consult:  Gross Hematuria     Subjective:     Pain: None, no nausea and no vomiting  Bowel Movement/Flatus: Yes  Voiding: None    Patient resting comfortably in bedside recliner, states he feels OK today and is ready to go home. Laureano catheter removed 3/8/21, pt only able to dribble small amounts. Straight cathed overnight with 750cc bloody urine returns with clots noted. PVR 365cc this am, pt comfortable. PVR's in our office typically between 300-500cc. Objective:    Vitals:    BP (!) 152/70   Pulse 78   Temp 96.4 °F (35.8 °C) (Oral)   Resp 18   Ht 6' (1.829 m)   Wt 185 lb (83.9 kg)   SpO2 95%   BMI 25.09 kg/m²    Temp  Av.3 °F (36.3 °C)  Min: 96.4 °F (35.8 °C)  Max: 98.5 °F (36.9 °C)       Intake/Output Summary (Last 24 hours) at 3/10/2021 0912  Last data filed at 3/10/2021 0630  Gross per 24 hour   Intake 292.13 ml   Output 1403 ml   Net -1110.87 ml       Physical Exam:   General appearance: alert, appears stated age, cooperative, no distress and mildly obese  Head: Normocephalic, without obvious abnormality, atraumatic  Abdomen: soft, non-tender, non-distended  Male genitalia: Uncircumcised; paraphimos noted and reduced at bedside.  Laureano catheter in place, urine clear yellow    Labs:   WBC:    Lab Results   Component Value Date    WBC 6.0 03/10/2021      Hemoglobin/Hematocrit:    Lab Results   Component Value Date    HGB 8.4 03/10/2021    HCT 26.6 03/10/2021      BMP:   Lab Results   Component Value Date     03/10/2021    K 3.5 03/10/2021     03/10/2021    CO2 25 03/10/2021    BUN 15 03/10/2021    LABALBU 3.2 2021    CREATININE 1.3 03/10/2021    CALCIUM 8.0 03/10/2021 GFRAA >60 03/10/2021    LABGLOM 52 03/10/2021     Urine Culture: 3/5/21  Final Report No growth at 18 to 36 hours    Imaging:   Echocardiogram Complete 2d With Doppler With Color    Result Date: 3/3/2021  Transthoracic Echocardiography Report (TTE)  Demographics   Patient Name       Danna Jeffries       Date of Study       03/03/2021   Date of Birth      10/18/1930        Gender              Male   Age                80 year(s)        Race                   Patient Number     [id number]        Room Number         2111   Visit Number       [id number]   Corporate ID       [id number]   Accession Number   [id number]        Simran Nichols, [facility]   Ordering Physician Flash Gamboa MD Interpreting        Andrew Haines MD                                       Physician  Procedure Type of Study   TTE procedure:ECHOCARDIOGRAM COMPLETE 2D W DOPPLER W COLOR. Procedure Date Date: 03/03/2021 Start: 08:52 AM Study Location: Portable Technical Quality: Fair visualization Indications:Congestive heart failure. Patient Status: Routine Height: 72 inches Weight: 190 pounds BSA: 2.08 m2 BMI: 25.77 kg/m2 HR: 96 bpm BP: 149/84 mmHg  Conclusions   Summary  Left ventricular systolic function is low normal.  Ejection fraction is visually estimated at 50%. Sclerotic, but non-stenotic aortic valve. Trace aortic regurgitation is noted. No evidence of any pericardial effusion. Signature   ------------------------------------------------------------------  Electronically signed by Andrew Haines MD (Interpreting  physician) on 03/03/2021 at 11:18 AM  ------------------------------------------------------------------   Findings   Left Ventricle  Left ventricular systolic function is low normal.  Ejection fraction is visually estimated at 50%. Left Atrium  Essentially normal left atrium. Right Atrium  Essentially normal right atrium.    Right Ventricle Essentially normal right ventricle. Aortic Valve  Sclerotic, but non-stenotic aortic valve. Trace aortic regurgitation is noted. Mitral Valve  Trace mitral regurgitation is present. Tricuspid Valve  Mild tricuspid regurgitation is present. Pulmonic Valve  The pulmonic valve was not well visualized. Pericardial Effusion  No evidence of any pericardial effusion.   M-Mode/2D Measurements & Calculations   LV Diastolic Dimension:  LV Systolic Dimension:  LA Dimension: 3.5 cmAO Root  4.93 cm                  3.56 cm                 Dimension: 4 cmLA Area:  LV FS:27.8 %             LV Volume Diastolic: 72 91.7 cm2  LV PW Diastolic: 3.00 cm ml  LV PW Systolic: 1.4 cm   LV Volume Systolic: 40  Septum Diastolic: 4.77   ml  cm                       LV EDV/LV EDV Index: 72 RV Diastolic Dimension:  Septum Systolic: 2.47 cm YD/59 J8QO ESV/LV ESV   2.91 cm  CO: 7.97 l/min           Index: 40 ml/19 m2  CI: 3.83 l/m*m2          EF Calculated (A4C):    LA/Aorta: 0.88                           44.4 %                  Ascending Aorta: 3.7 cm  LV Area Diastolic: 56.8  EF Calculated (2D):     LA volume/Index: 49 ml  cm2                      53.5 %                  /43G1  LV Area Systolic: 18 cm2                           LV Length: 8.17 cm                            LVOT: 2.7 cm  Doppler Measurements & Calculations    AV Peak Velocity: 127 cm/s    LVOT Peak Velocity: 82.7 cm/s   AV Peak Gradient: 6.45 mmHg   LVOT Mean Velocity: 59.8 cm/s   AV Mean Velocity: 93.1 cm/s   LVOT Peak Gradient: 3 mmHgLVOT Mean Gradient:   AV Mean Gradient: 4 mmHg      2 mmHg   AV VTI: 21.5 cm               Estimated RVSP: 36 mmHg   AV Area (Continuity):3.86 cm2 Estimated RAP:3 mmHg    LVOT VTI: 14.5 cm                                 TR Velocity:286 cm/s   Estimated PASP: 35.72 mmHg    TR Gradient:32.72 mmHg      Xr Chest Portable    Result Date: 3/4/2021  EXAMINATION: ONE XRAY VIEW OF THE CHEST 3/4/2021 12:32 pm COMPARISON: March 2, 2021 HISTORY: is an extremely large hiatal hernia, with organo-axial volvulus. The gastric fundus and a portion the body is now found inferior to the diaphragm on the left (previously those were superior), and now there is dilation of that portion of the stomach, which is concerning for entrapment. Note that the entirety of the hiatal hernia is not included. Organs: The liver enhances normally. Gallbladder is unremarkable. Normal arterial phase imaging of the spleen. Benign nodule the left adrenal gland measures 2.2 cm and is unchanged since 2009. Pancreas is unremarkable. GI/Bowel: Evaluation for GI bleeding is limited, as only an arterial phase was obtained (normally noncontrast and delayed images are also obtained for the purposes of localization of GI bleeding). That said, no suspicious extravasation of contrast is identified within the large bowel. There is mild diverticulosis of the large bowel. The appendix is normal. Again, there is the large hiatal hernia, with concern for entrapment of the gastric fundus and a portion of the body. The duodenal sweep and the remainder of the small bowel are unremarkable. 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 Urinary bladder appears thickened, which is likely secondary to outlet obstruction. There is moderate to marked prostatic hypertrophy. No free pelvic fluid. No pelvic sidewall lymphadenopathy. There is evidence of previous bilateral inguinal hernia repair. Peritoneum/Retroperitoneum: Abdominal aorta normal in caliber. No retroperitoneal lymphadenopathy is identified. No iliac chain or inguinal lymphadenopathy. 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 Additional pagetoid change is seen in the T11 vertebral body. No acute or suspicious bony abnormalities are detected. Very large hiatal hernia.   Again, a portion of the gastric fundus and gastric body have migrated inferior to the diaphragm, and there is dilation of that segment of the stomach. The findings are concerning for entrapment. Otherwise, no acute abnormality identified. Focal extravasation contrast into the bowel is not visualized, but again the evaluation is limited by a monophasic study. Moderate to marked prostatic hypertrophy. Urinary bladder mural thickening with urinary bladder diverticula. The thickening is likely secondary to bladder outlet obstruction. Xr Abdomen For Ng/og/ne Tube Placement    Result Date: 3/3/2021  EXAMINATION: ONE SUPINE XRAY VIEW(S) OF THE ABDOMEN 3/2/2021 11:58 pm COMPARISON: None. HISTORY: ORDERING SYSTEM PROVIDED HISTORY: Confirmation of course of NG/OG/NE tube and location of tip of tube TECHNOLOGIST PROVIDED HISTORY: Reason for exam:->Confirmation of course of NG/OG/NE tube and location of tip of tube Portable? ->Yes Reason for Exam: Confirmation of course of NG/OG/NE tube and location of tip of tube Acuity: Acute Type of Exam: Initial FINDINGS: The tip of the nasogastric tube is in the stomach. The side hole/port is near the expected location of the GE junction. There is marked elevation of the left hemidiaphragm. The nasogastric tube should be advanced 5 cm. Assessment & Plan:      Sina Duane is a 80y.o. year old male admitted 3/2/2021 for Upper GI Bleed.    1) Gross Hematuria: Likely secondary to borden catheter trauma              Hgb 8.4, stable              CT a/p 3/2/21 with multiple bladder diverticulum, thickened urinary bladder wall likely secondary to CANSECO with a large prostate              Urine culture 3/5/21 Negative   Recommend outpatient diagnostic cystoscopy in the near future with Dr. Andrae Saenz for further evaluation of hematuria. 2) BPH: h/o 90g prostate gland.  Chronically on Flomax 0.4mg and Proscar 5mg, okay to resume these               Patient follows with Dr. Andrae Saenz, h/o incomplete bladder emptying, patient has declined surgical intervention in the past due to his age. Continue medical management   Borden catheter removed 3/8/21, pt straight cathed this am with 750cc urine return   Nursing staff to perform PVR bladder scan q4hrs and straight cath if >300cc. Recommend straight cathing pt prior to discharge. Do not recommend sending pt home with indwelling borden catheter d/t confusion and risk of borden trauma. Please provide pt with straight catheters prior to discharge. [facility] to straight cath pt at home as needed for urinary retention. Hopeful that pt will be able to urinate once he is more mobile at home and further out from surgery. Encourage adequate hydration. Pt stable from a  standpoint. Will sign off, please call with any questions. Pt to follow up in our office on Friday, 3/12/21 for reevaluation. Discussed plan with son Sridhar Topete ([phone]) and he is in agreement with the plan. Patient seen and examined, chart reviewed.      Electronically signed by Kamron Delgado PA-C on 3/10/2021 at 9:12 AM

## 2021-03-10 NOTE — DISCHARGE INSTR - DIET

## 2021-03-10 NOTE — PROGRESS NOTES
Progress Note  Date:3/10/2021       [facility]/[id number]-U  Patient Name:Nino Sherman     Date of Birth:     Age:89 y.o. Wants to go home  Subjective    Subjective:  Symptoms:  Stable. Diet:  Adequate intake. Pain:  He reports no pain. Review of Systems  Objective         Vitals Last 24 Hours:  TEMPERATURE:  Temp  Av.3 °F (36.3 °C)  Min: 96.4 °F (35.8 °C)  Max: 98.5 °F (36.9 °C)  RESPIRATIONS RANGE: Resp  Av  Min: 18  Max: 18  PULSE OXIMETRY RANGE: SpO2  Av %  Min: 94 %  Max: 96 %  PULSE RANGE: Pulse  Av  Min: 76  Max: 83  BLOOD PRESSURE RANGE: Systolic (66ONF), SPL:601 , Min:134 , RZP:640   ; Diastolic (93GDE), VSL:58, Min:64, Max:81    I/O (24Hr): Intake/Output Summary (Last 24 hours) at 3/10/2021 0814  Last data filed at 3/10/2021 0630  Gross per 24 hour   Intake 532.13 ml   Output 1492 ml   Net -959.87 ml     Objective:  General Appearance:  Comfortable. Vital signs: (most recent): Blood pressure (!) 152/70, pulse 78, temperature 96.4 °F (35.8 °C), temperature source Oral, resp. rate 18, height 6' (1.829 m), weight 185 lb (83.9 kg), SpO2 95 %. Vital signs are normal.    HEENT: Normal HEENT exam.    Lungs:  Normal effort. There are decreased breath sounds. Heart: Normal rate. Abdomen: Abdomen is soft. Bowel sounds are normal.     Extremities: Decreased range of motion. Neurological: Patient is alert. Pupils:  Pupils are equal, round, and reactive to light. Skin:  Warm.       Labs/Imaging/Diagnostics    Labs:  CBC:  Recent Labs     21  0620 21  0800 03/10/21  0646   WBC 6.0 6.4 6.0   RBC 2.75* 2.91* 2.91*   HGB 7.8* 8.4* 8.4*   HCT 25.5* 26.4* 26.6*   MCV 92.7 90.7 91.4   RDW 14.3 14.3 14.1    225 251     CHEMISTRIES:  Recent Labs     21  0620 21  0800 03/10/21  0646    136 138   K 3.3* 3.2* 3.5    102 103   CO2 26 27 25   BUN 19 18 15   CREATININE 1.3 1.2 1.3   GLUCOSE 91 104* 104*   MG 1.7* 2.0  -- PT/INR:No results for input(s): PROTIME, INR in the last 72 hours. APTT:No results for input(s): APTT in the last 72 hours. LIVER PROFILE:No results for input(s): AST, ALT, BILIDIR, BILITOT, ALKPHOS in the last 72 hours. Imaging Last 24 Hours:  No results found. Assessment//Plan           Hospital Problems           Last Modified POA    UGIB (upper gastrointestinal bleed) 3/2/2021 Yes        Assessment & Plan  Upper GI bled with acute on chronic anemia  -Large hiatal hernia s/p lap repair of incarcerated hernia with partial fundoplication 3/4  -S/p PRBC and iron sucrose  -IV PPI  -advance diet  Chest pain  -atypical  -EF 50%  Acute met encephalopathy  -post op delerium  CKD 3  -stable  Hematuria traumatic and urinary retention  -urine cx neg  -Borden removed  -flomax, proscar  -cystocopy outpt  HTN  -BB  Hypokalemia  -replace  Hypothyroid  DVt prophylaxis  -lovenox      OK to discharge as per urology and agree with plan. Hold sending with borden cathter as at risk for confusion/trauma and will need [facility] to monitor urine output and if retaining then straight cath prn.  Follow up with urology 3/12/21     Electronically signed by Jennifer Owen MD on 3/9/21 at 8:59 AM EST

## 2021-03-11 PROCEDURE — 51798 US URINE CAPACITY MEASURE: CPT

## 2021-03-11 PROCEDURE — 97110 THERAPEUTIC EXERCISES: CPT

## 2021-03-11 PROCEDURE — 6370000000 HC RX 637 (ALT 250 FOR IP): Performed by: SURGERY

## 2021-03-11 PROCEDURE — 6360000002 HC RX W HCPCS: Performed by: SURGERY

## 2021-03-11 PROCEDURE — 97116 GAIT TRAINING THERAPY: CPT

## 2021-03-11 PROCEDURE — 6370000000 HC RX 637 (ALT 250 FOR IP): Performed by: INTERNAL MEDICINE

## 2021-03-11 PROCEDURE — 94761 N-INVAS EAR/PLS OXIMETRY MLT: CPT

## 2021-03-11 PROCEDURE — C9113 INJ PANTOPRAZOLE SODIUM, VIA: HCPCS | Performed by: SURGERY

## 2021-03-11 PROCEDURE — 51701 INSERT BLADDER CATHETER: CPT

## 2021-03-11 PROCEDURE — 2580000003 HC RX 258: Performed by: SURGERY

## 2021-03-11 PROCEDURE — 97530 THERAPEUTIC ACTIVITIES: CPT

## 2021-03-11 PROCEDURE — 94150 VITAL CAPACITY TEST: CPT

## 2021-03-11 PROCEDURE — 1200000000 HC SEMI PRIVATE

## 2021-03-11 PROCEDURE — 6370000000 HC RX 637 (ALT 250 FOR IP): Performed by: HOSPITALIST

## 2021-03-11 RX ADMIN — MAGNESIUM OXIDE 200 MG: 400 TABLET ORAL at 09:51

## 2021-03-11 RX ADMIN — ENOXAPARIN SODIUM 30 MG: 30 INJECTION SUBCUTANEOUS at 09:51

## 2021-03-11 RX ADMIN — POTASSIUM CHLORIDE 20 MEQ: 20 TABLET, EXTENDED RELEASE ORAL at 17:53

## 2021-03-11 RX ADMIN — QUETIAPINE FUMARATE 50 MG: 25 TABLET ORAL at 12:05

## 2021-03-11 RX ADMIN — QUETIAPINE FUMARATE 100 MG: 100 TABLET ORAL at 21:06

## 2021-03-11 RX ADMIN — PANTOPRAZOLE SODIUM 40 MG: 40 INJECTION, POWDER, FOR SOLUTION INTRAVENOUS at 17:52

## 2021-03-11 RX ADMIN — TAMSULOSIN HYDROCHLORIDE 0.4 MG: 0.4 CAPSULE ORAL at 09:51

## 2021-03-11 RX ADMIN — MORPHINE SULFATE 4 MG: 4 INJECTION, SOLUTION INTRAMUSCULAR; INTRAVENOUS at 02:59

## 2021-03-11 RX ADMIN — AMLODIPINE BESYLATE 5 MG: 5 TABLET ORAL at 09:51

## 2021-03-11 RX ADMIN — SODIUM CHLORIDE, PRESERVATIVE FREE 10 ML: 5 INJECTION INTRAVENOUS at 21:06

## 2021-03-11 RX ADMIN — POTASSIUM CHLORIDE 20 MEQ: 20 TABLET, EXTENDED RELEASE ORAL at 09:51

## 2021-03-11 RX ADMIN — SODIUM CHLORIDE, PRESERVATIVE FREE 10 ML: 5 INJECTION INTRAVENOUS at 17:53

## 2021-03-11 RX ADMIN — METOPROLOL SUCCINATE 25 MG: 25 TABLET, EXTENDED RELEASE ORAL at 17:53

## 2021-03-11 RX ADMIN — PANTOPRAZOLE SODIUM 40 MG: 40 INJECTION, POWDER, FOR SOLUTION INTRAVENOUS at 05:58

## 2021-03-11 RX ADMIN — FINASTERIDE 5 MG: 5 TABLET, FILM COATED ORAL at 09:52

## 2021-03-11 RX ADMIN — QUETIAPINE FUMARATE 50 MG: 25 TABLET ORAL at 17:53

## 2021-03-11 RX ADMIN — SERTRALINE HYDROCHLORIDE 50 MG: 50 TABLET ORAL at 09:52

## 2021-03-11 RX ADMIN — SODIUM CHLORIDE, PRESERVATIVE FREE 10 ML: 5 INJECTION INTRAVENOUS at 09:52

## 2021-03-11 RX ADMIN — SODIUM CHLORIDE, PRESERVATIVE FREE 10 ML: 5 INJECTION INTRAVENOUS at 05:58

## 2021-03-11 RX ADMIN — QUETIAPINE FUMARATE 50 MG: 25 TABLET ORAL at 09:52

## 2021-03-11 ASSESSMENT — PAIN SCALES - GENERAL
PAINLEVEL_OUTOF10: 0
PAINLEVEL_OUTOF10: 0

## 2021-03-11 NOTE — PROGRESS NOTES
Daily Progress Note     patient is awake alert  Remain in sinus  Has issues with hematuria   May need a borden to help heal--urology on case   Stable from cardiac stand  Had abdominal surgery for hernia  Keep on current cardiac meds    Echo-3/3/21   Summary   Left ventricular systolic function is low normal.   Ejection fraction is visually estimated at 50%.   Sclerotic, but non-stenotic aortic valve.   Trace aortic regurgitation is noted.   No evidence of any pericardial effusion.     PAST MEDICAL HISTORY:  History of anxiety, anemia, BPH, GERD, hepatitis,  hemorrhoids, hyperlipidemia, squamous cell cancer present.     PAST SURGICAL HISTORY:  Carpal tunnel surgery, hernia repair, and neck  surgery.     SOCIAL HISTORY:  He does not smoke, does not drink.       Objective:   /67   Pulse 81   Temp 97.7 °F (36.5 °C) (Oral)   Resp 16   Ht 6' (1.829 m)   Wt 185 lb (83.9 kg)   SpO2 94%   BMI 25.09 kg/m²       Intake/Output Summary (Last 24 hours) at 3/11/2021 1121  Last data filed at 3/11/2021 1006  Gross per 24 hour   Intake 140 ml   Output 1700 ml   Net -1560 ml       Medications:   Scheduled Meds:   magnesium oxide  200 mg Oral Daily    potassium chloride  20 mEq Oral BID WC    amLODIPine  5 mg Oral QAM    metoprolol succinate  25 mg Oral QPM    tamsulosin  0.4 mg Oral Daily    finasteride  5 mg Oral Daily    QUEtiapine  100 mg Oral QPM    QUEtiapine  50 mg Oral TID    sertraline  50 mg Oral Daily    enoxaparin  30 mg Subcutaneous Daily    sodium chloride flush  10 mL Intravenous 2 times per day    pantoprazole  40 mg Intravenous Q12H    And    sodium chloride (PF)  10 mL Intravenous Q12H      Infusions:     PRN Meds:  LORazepam, melatonin, haloperidol lactate, morphine, sodium chloride flush, [DISCONTINUED] promethazine **OR** ondansetron, acetaminophen **OR** acetaminophen       Physical Exam:  Vitals:    03/11/21 0948   BP:    Pulse:    Resp: 16   Temp:    SpO2: 94%        General: awake alert   Chest: Nontender  Cardiac: sinus   Lungs:Clear to auscultation and percussion. Abdomen: Soft, NT, ND, +BS  Extremities: no edema   Vascular:  Equal 2+ peripheral pulses. Lab Data:  CBC:   Recent Labs     03/09/21  0800 03/10/21  0646   WBC 6.4 6.0   HGB 8.4* 8.4*   HCT 26.4* 26.6*   MCV 90.7 91.4    251     BMP:   Recent Labs     03/09/21  0800 03/10/21  0646    138   K 3.2* 3.5    103   CO2 27 25   BUN 18 15   CREATININE 1.2 1.3     LIVER PROFILE: No results for input(s): AST, ALT, LIPASE, BILIDIR, BILITOT, ALKPHOS in the last 72 hours. Invalid input(s): AMYLASE,  ALB  PT/INR: No results for input(s): PROTIME, INR in the last 72 hours. APTT: No results for input(s): APTT in the last 72 hours. BNP:  No results for input(s): BNP in the last 72 hours.       Assessment:  Patient Active Problem List    Diagnosis Date Noted    Varicose 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 08/15/2015     Priority: Medium     Class: Chronic    UGIB (upper gastrointestinal bleed) 03/02/2021    Acquired hypothyroidism 12/30/2020    Anxiety     BPH with urinary obstruction     Seborrheic keratosis, inflamed 03/10/2014    Actinic keratosis 03/10/2014    Seborrheic keratosis 03/10/2014    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    Hyperlipidemia 09/21/2012    Depression 09/21/2012    Insomnia 09/21/2012    BPH (benign prostatic hyperplasia) 09/21/2012    Dysuria 09/21/2012    Vitamin D deficiency 09/21/2012       Jessica Weber MD 3/11/2021 11:21 AM

## 2021-03-11 NOTE — CARE COORDINATION
TC to [facility], spoke with Darlene Beckman, she has received referral and still reviewing, will let me know once decision made. Will need updated PT/OT for SNF precert, whiteboard placed yesterday, pt ready for discharge once approved. Notified Fidelina with [facility] that PT/OT is available.

## 2021-03-11 NOTE — PROGRESS NOTES
Physical Therapy    Physical Therapy Treatment Note  Name: Danette Eddy MRN: [id number] :   10/18/1930   Date:  3/11/2021   Admission Date: 3/2/2021 Room:  [address] [place] [address]   Restrictions/Precautions:        fall risk; general precautions    Subjective:  Patient states:  \"I can walk in the hallway\"  Pain:   Location, Type, Intensity (0/10 to 10/10):  0/10; denies    Objective:    Observation:  Pt standing with OT upon entry    Treatment, including education/measures:  Pt agreeable to participating in therapy at this time. Therapeutic Activity Training:   Therapeutic activity training was instructed today. Cues were given for safety, sequence, UE/LE placement, awareness, and balance. Activities performed today included bed mobility training, sup-sit, sit-stand. Pt completed sit to stand from chair with modAx1 with verbal cues to push through chair and avoid pulling on walker. Pt completed stand to sit onto chair with minAx1 with verbal cues to feel chair against back of legs, reach back, and sit slowly; poor eccentric control and decreased safety awareness. Pt completed sit to stand from chair with minAx1 with verbal cues to push through chair and avoid pulling on walker. Pt completed stand to sit onto chair with minAx1 with verbal cues to feel chair against back of legs, reach back, and sit slowly; poor eccentric control and decreased safety awareness. Gait Training:  Cues were given for safety, sequence, device management, balance, posture, awareness, path. Pt ambulated 10 feet + 10 feet + 25 feet with minAx1 with a front wheeled walker with a decreased gait speed, a decreased step length bilaterally, a shuffling gait, a forward trunk lean, and an unsteady gait. Pt provided with verbal and tactile cues for BLE placement, walker placement, and sequence throughout ambulation. Pt provided with verbal and tactile cues to maintain upright posture in order to avoid COM shifting outside of PEDRO.   Pt Assistance: Independent  Transfer Assistance: Independent  Active : Yes  Mode of Transportation: Car(Pt reports they order groceries and drive to the store to pick them up.)  Occupation: Retired  Type of occupation: Factory  Short term goals  Time Frame for [employer] term goals: 1 week  Short term goal 1: Pt to complete all bed mobility mod I  Short term goal 2: Pt to complete all STS transfers to/from bed, commode, and chair with supervision  Short term goal 3: Pt to ambulate 48' with LRAD and supervision       Electronically signed by:      Ishaan Francisco PT, DPT  License #: [id number]

## 2021-03-11 NOTE — PROGRESS NOTES
Occupational Therapy  . Occupational Therapy Treatment Note  Name: Lisha Parker MRN: [id number] :   10/18/1930   Date:  3/11/2021   Admission Date: 3/2/2021 [facility]  [id number]   Restrictions/Precautions:    General precautions; Fall risk    Communication with other providers:  Per chart review and Nurse Alisa Miller, patient is appropriate for therapeutic intervention. Nurse notified / aware of pt's bloody urine in urinal, reports pt has straight cath. PT Moreno Ledezma. Subjective:  Patient states: \"I can hardly hear you, my hearing aid batteries are dead! \" With written communication, pt agreeable to Tx session. Pain:   Location, Type, Intensity (0/10 to 10/10):  0/10, denies pain    Objective:    Observation:  Pt received seated in bedside chair. Objective Measures:  N/A    Treatment, including education:  Therapeutic Activity Training:   Therapeutic activity training was instructed today. Cues were given for safety, sequence, UE/LE placement, awareness, and balance. Activities performed today included transfer training for sit-stand, SPT and functional mobility. Mod A c RW for sit to stand from chair, required tactile cues for safe body positioning   Min A c RW for stand to sits c verbal / tactile cues for hand placement / safe body positioning, exhibits poor eccentric control. Functional Mobility: Min A c RW inside room to sink and in hallway ~35 ft c chair follow. Pt required rest break between mobility in the room and in the hallway to manage activity tolerance. Standing balance / tolerance: Mod A x1 to correct one LOB during ADL task at sink, otherwise Min A c RW, tolerated two stands, ~5 minutes each      Therapeutic Exercise:  Cues were given for technique, safety, recruitment, and rationale. Cues were verbal and/or tactile.   Pt Sup seated + visual cues for technique to perform BUE AROM exercises to include: shoulder flex/extension, internal/external rotation, chest presses, bicep curls, rowing, and supination/pronation x15 reps each c two rest breaks to manage activity tolerance. All therapeutic intervention performed c emphasis on BUE exercises, dynamic balance / standing tolerance to inc strength, endurance and act tolerance for inc Indep c ADL tasks, func transfers / mobility. Safety  Patient safely in bedside chair + alarm at end of session, with call light/phone in reach, and nursing aware. Gait belt was used for func transfers / mobility. Assessment / Impression:        Patient's tolerance of treatment:  Well   Adverse Reaction: None  Significant change in status and impact:  None  Barriers to improvement:  Balance / strength / endurance    Plan for Next Session:    Continue per OT POC c emphasis on dynamic balance / standing tolerance during ADL tasks. Time in:  1108  Time out:  1137  Timed treatment minutes:  29  Total treatment time:  29    Electronically signed by:    GISELE Arias  3/11/2021, 11:03 AM    Previously filed values:       Goals:  1. Pt will complete all aspects of bed mobility for EOB/OOB ADLs SUP. 2. Pt will complete UB/LB bathing with CGA and AE as needed. 3. Pt will complete all aspects of LB dressing with CGA and AE as needed. 4. Pt will complete all functional transfers to and from bed, chair, toilet, shower chair with SBA and AD. 5. Pt will ambulate HH distance to bathroom for toileting with SBA and AD. 6. Pt will complete all aspects of toileting task with Casa. 7. Pt will complete oral hygiene/grooming routine in standing at sink with SBA demo good dynamic standing balance for approx 8 minutes. 8. Pt will complete ther ex/ther act with focus on UB strengthening.

## 2021-03-11 NOTE — PROGRESS NOTES
Progress Note  Date:3/11/2021       Room:[address] [place] [address]  Patient Name:Nino Sherman     Date of Birth:     Age:89 y.o. No discomfort urinating and no blood  Subjective    Subjective:  Symptoms:  Stable. Diet:  Adequate intake. Pain:  He reports no pain. Review of Systems  Objective         Vitals Last 24 Hours:  TEMPERATURE:  Temp  Av.6 °F (36.4 °C)  Min: 97.3 °F (36.3 °C)  Max: 98.1 °F (36.7 °C)  RESPIRATIONS RANGE: Resp  Av.8  Min: 16  Max: 18  PULSE OXIMETRY RANGE: SpO2  Av.3 %  Min: 94 %  Max: 95 %  PULSE RANGE: Pulse  Av.3  Min: 76  Max: 89  BLOOD PRESSURE RANGE: Systolic (55NSC), SWT:528 , Min:127 , GSJ:784   ; Diastolic (79LXN), WVD:53, Min:65, Max:76    I/O (24Hr): Intake/Output Summary (Last 24 hours) at 3/11/2021 0809  Last data filed at 3/11/2021 0429  Gross per 24 hour   Intake 30 ml   Output 1275 ml   Net -1245 ml     Objective:  General Appearance:  Comfortable. Vital signs: (most recent): Blood pressure 134/65, pulse 83, temperature 98.1 °F (36.7 °C), temperature source Oral, resp. rate 18, height 6' (1.829 m), weight 185 lb (83.9 kg), SpO2 94 %. Vital signs are normal.    HEENT: Normal HEENT exam.    Lungs:  Normal effort. There are decreased breath sounds. Heart: Normal rate. Abdomen: Abdomen is soft. Bowel sounds are normal.     Extremities: Decreased range of motion. Neurological: Patient is alert. Pupils:  Pupils are equal, round, and reactive to light. Skin:  Warm. Labs/Imaging/Diagnostics    Labs:  CBC:  Recent Labs     21  0800 03/10/21  0646   WBC 6.4 6.0   RBC 2.91* 2.91*   HGB 8.4* 8.4*   HCT 26.4* 26.6*   MCV 90.7 91.4   RDW 14.3 14.1    251     CHEMISTRIES:  Recent Labs     21  0800 03/10/21  0646    138   K 3.2* 3.5    103   CO2 27 25   BUN 18 15   CREATININE 1.2 1.3   GLUCOSE 104* 104*   MG 2.0 1.9     PT/INR:No results for input(s): PROTIME, INR in the last 72 hours.   APTT:No results for input(s): APTT in the last 72 hours. LIVER PROFILE:No results for input(s): AST, ALT, BILIDIR, BILITOT, ALKPHOS in the last 72 hours. Imaging Last 24 Hours:  No results found.   Assessment//Plan           Hospital Problems           Last Modified POA    UGIB (upper gastrointestinal bleed) 3/2/2021 Yes        Assessment & Plan  Upper GI bled with acute on chronic anemia  -Large hiatal hernia s/p lap repair of incarcerated hernia with partial fundoplication 3/4  -S/p PRBC and iron sucrose  -IV PPI  -advance diet  Chest pain  -atypical  -EF 50%  Acute met encephalopathy  -post op delerium  CKD 3  -stable  Hematuria traumatic and urinary retention  -urine cx neg  -Laureano removed  -flomax, proscar  -cystocopy outpt  HTN  -BB  Hypokalemia  -replace  Hypothyroid  DVt prophylaxis  -lovenox      SNF   Electronically signed by Corinne America, MD on 3/9/21 at 8:59 AM EST

## 2021-03-12 VITALS
DIASTOLIC BLOOD PRESSURE: 58 MMHG | OXYGEN SATURATION: 96 % | BODY MASS INDEX: 25.06 KG/M2 | HEIGHT: 72 IN | HEART RATE: 89 BPM | SYSTOLIC BLOOD PRESSURE: 117 MMHG | RESPIRATION RATE: 16 BRPM | WEIGHT: 185 LBS | TEMPERATURE: 98 F

## 2021-03-12 PROCEDURE — 51798 US URINE CAPACITY MEASURE: CPT

## 2021-03-12 PROCEDURE — 97530 THERAPEUTIC ACTIVITIES: CPT

## 2021-03-12 PROCEDURE — 6370000000 HC RX 637 (ALT 250 FOR IP): Performed by: HOSPITALIST

## 2021-03-12 PROCEDURE — 6370000000 HC RX 637 (ALT 250 FOR IP): Performed by: INTERNAL MEDICINE

## 2021-03-12 PROCEDURE — 97116 GAIT TRAINING THERAPY: CPT

## 2021-03-12 PROCEDURE — 6370000000 HC RX 637 (ALT 250 FOR IP): Performed by: SURGERY

## 2021-03-12 PROCEDURE — 94761 N-INVAS EAR/PLS OXIMETRY MLT: CPT

## 2021-03-12 PROCEDURE — 6360000002 HC RX W HCPCS: Performed by: SURGERY

## 2021-03-12 PROCEDURE — 2580000003 HC RX 258: Performed by: SURGERY

## 2021-03-12 PROCEDURE — 51701 INSERT BLADDER CATHETER: CPT

## 2021-03-12 PROCEDURE — C9113 INJ PANTOPRAZOLE SODIUM, VIA: HCPCS | Performed by: SURGERY

## 2021-03-12 RX ORDER — POLYETHYLENE GLYCOL 3350 17 G/17G
17 POWDER, FOR SOLUTION ORAL DAILY
Qty: 30 EACH | Refills: 0
Start: 2021-03-13 | End: 2021-04-12

## 2021-03-12 RX ORDER — SENNA AND DOCUSATE SODIUM 50; 8.6 MG/1; MG/1
2 TABLET, FILM COATED ORAL DAILY PRN
Status: DISCONTINUED | OUTPATIENT
Start: 2021-03-12 | End: 2021-03-12 | Stop reason: HOSPADM

## 2021-03-12 RX ORDER — POLYETHYLENE GLYCOL 3350 17 G/17G
17 POWDER, FOR SOLUTION ORAL DAILY
Status: DISCONTINUED | OUTPATIENT
Start: 2021-03-12 | End: 2021-03-12 | Stop reason: HOSPADM

## 2021-03-12 RX ADMIN — AMLODIPINE BESYLATE 5 MG: 5 TABLET ORAL at 09:33

## 2021-03-12 RX ADMIN — POTASSIUM CHLORIDE 20 MEQ: 20 TABLET, EXTENDED RELEASE ORAL at 09:35

## 2021-03-12 RX ADMIN — SODIUM CHLORIDE, PRESERVATIVE FREE 10 ML: 5 INJECTION INTRAVENOUS at 09:36

## 2021-03-12 RX ADMIN — ACETAMINOPHEN 650 MG: 325 TABLET ORAL at 15:19

## 2021-03-12 RX ADMIN — SODIUM CHLORIDE, PRESERVATIVE FREE 10 ML: 5 INJECTION INTRAVENOUS at 05:47

## 2021-03-12 RX ADMIN — POTASSIUM CHLORIDE 20 MEQ: 20 TABLET, EXTENDED RELEASE ORAL at 15:19

## 2021-03-12 RX ADMIN — PANTOPRAZOLE SODIUM 40 MG: 40 INJECTION, POWDER, FOR SOLUTION INTRAVENOUS at 05:47

## 2021-03-12 RX ADMIN — POLYETHYLENE GLYCOL (3350) 17 G: 17 POWDER, FOR SOLUTION ORAL at 15:23

## 2021-03-12 RX ADMIN — MAGNESIUM OXIDE 200 MG: 400 TABLET ORAL at 09:33

## 2021-03-12 RX ADMIN — FINASTERIDE 5 MG: 5 TABLET, FILM COATED ORAL at 09:33

## 2021-03-12 RX ADMIN — ENOXAPARIN SODIUM 30 MG: 30 INJECTION SUBCUTANEOUS at 09:33

## 2021-03-12 RX ADMIN — SERTRALINE HYDROCHLORIDE 50 MG: 50 TABLET ORAL at 09:33

## 2021-03-12 RX ADMIN — QUETIAPINE FUMARATE 50 MG: 25 TABLET ORAL at 09:33

## 2021-03-12 RX ADMIN — QUETIAPINE FUMARATE 50 MG: 25 TABLET ORAL at 12:36

## 2021-03-12 RX ADMIN — TAMSULOSIN HYDROCHLORIDE 0.4 MG: 0.4 CAPSULE ORAL at 09:33

## 2021-03-12 ASSESSMENT — PAIN DESCRIPTION - LOCATION: LOCATION: HEAD

## 2021-03-12 ASSESSMENT — PAIN SCALES - GENERAL: PAINLEVEL_OUTOF10: 0

## 2021-03-12 NOTE — DISCHARGE SUMMARY
Physician Discharge Summary     Patient ID:  Brian Yeung  [id number]  76 y.o.  10/18/1930    Admit date: 3/2/2021    Discharge date and time: No discharge date for patient encounter. Admitting Physician: Codie Silva MD     Discharge Physician: Rik Hi    Admission Diagnoses: Hiatal hernia [K44.9]  Abnormal computed tomography of stomach [R93.3]  UGIB (upper gastrointestinal bleed) [K92.2]  Hematemesis with nausea [K92.0]  Anemia, unspecified type [D64.9]  Chest pain, unspecified type [R07.9]    Discharge Diagnoses: Upper GI bleed with acute on chronic anemia                                           Chest apin                                           Acute metabolic encephaloapty                                          CKD3                                           Hypokalemia                                           Hypothyroid                                           Urinary retention and hematuria 2/2 traumatic borden                                           HTN                                              Admission Condition: fair    Discharged Condition: good    Indication for Admission: GI bleed    Hospital Course:   80 y.o. male admitted for hematemesis x2. Patient states that he was doing a blood on Sunday. And again on Tuesday. Patient is alert oriented x4. States that it was brown in color. Otherwise denies any associated complaints. Patient denies any dizziness or lightheadedness, denies any palpitation. Patient denies any chest pain during my interview, however mention chest pain to the EMS and was given aspirin. Patient denies any associated shortness of breath, myalgias, cough or diarrhea. Patient denies any blood per rectum. He does have epigastric pain can complaints dull in nature, also described as mild.     He was admitted for upper GI bleed requiring PRBC and worsening acute on chronic anemia.  Surgery consulted and imaging showed incarcerated hernia and underwent ex lap repair of incarcerated hernia with partial fundoplication. His Hb remained stable. He did have atypical chest pain and most likely due to above but cardiology consulted and med management recommended. Echo with EF 50%. His BP meds were adjusted as noted in med rec. He did have post op delerium which resolved. Urology consulted as he had a borden in place and had hematuria and consistent with trauma. Taking out borden he did have urinary retention and continued on flomax and proscar and this improved but still need to monitor how much he urinates as at risk for recurrent urinary retention and has follow up with urology and may need to straight cath prn. Not a good candidate for longstanding borden catheter if needed in future. Will need cystoscopy outpt. Place on potassium supplements for hypokalemia but BMP needs to be checked at [facility] with underlying CKD . He was discharged to SNF. Consults: cardiology and urology        Outstanding Order Results     No orders found from 2/1/2021 to 3/3/2021. Discharge Exam:  HEENT: Normal HEENT exam.    Lungs:  Normal effort. There are decreased breath sounds. Heart: Normal rate. Abdomen: Abdomen is soft. Bowel sounds are normal.     Extremities: Decreased range of motion. Neurological: Patient is alert. Pupils:  Pupils are equal, round, and reactive to light. Skin:  Warm.       Disposition: [facility]    Patient Instructions:      Medication List      START taking these medications    amLODIPine 5 MG tablet  Commonly known as: NORVASC  Take 1 tablet by mouth every morning     finasteride 5 MG tablet  Commonly known as: PROSCAR  Take 1 tablet by mouth daily     metoprolol succinate 25 MG extended release tablet  Commonly known as: TOPROL XL  Take 1 tablet by mouth every evening     pantoprazole 20 MG tablet  Commonly known as: Protonix  Take 1 tablet by mouth daily     polyethylene glycol 17 g packet  Commonly known as: GLYCOLAX  Take 17 g by mouth daily  Start

## 2021-03-12 NOTE — CARE COORDINATION
Spoke with Preston Jackman at [facility], precert remains pending and she will update once received. TC to pt's son Maryam Rodas to update. Received call from Preston Jackman with [facility], pt is approved to admit. Set up transport with [facility] [phone], packet prepared.  Electronic PAS completed

## 2021-03-12 NOTE — PLAN OF CARE
Problem: Infection:  Goal: Will remain free from infection  Description: Will remain free from infection  3/12/2021 1315 by Janessa Parson RN  Outcome: Ongoing  3/12/2021 0232 by Dick Landaverde RN  Outcome: Ongoing     Problem: Safety:  Goal: Free from accidental physical injury  Description: Free from accidental physical injury  3/12/2021 1315 by Janessa Parson RN  Outcome: Ongoing  3/12/2021 0232 by Dick Landaverde RN  Outcome: Ongoing  Goal: Free from intentional harm  Description: Free from intentional harm  3/12/2021 1315 by Janessa Parson RN  Outcome: Ongoing  3/12/2021 0232 by Dick Landaverde RN  Outcome: Ongoing     Problem: Daily Care:  Goal: Daily care needs are met  Description: Daily care needs are met  3/12/2021 1315 by Janessa Parson RN  Outcome: Ongoing  3/12/2021 0232 by Dick Landaverde RN  Outcome: Ongoing     Problem: Pain:  Goal: Patient's pain/discomfort is manageable  Description: Patient's pain/discomfort is manageable  3/12/2021 1315 by Janessa Parson RN  Outcome: Ongoing  3/12/2021 0232 by Dick Landaverde RN  Outcome: Ongoing  Goal: Pain level will decrease  Description: Pain level will decrease  3/12/2021 1315 by Janessa Parson RN  Outcome: Ongoing  3/12/2021 0232 by Dick Landaverde RN  Outcome: Ongoing  Goal: Control of acute pain  Description: Control of acute pain  3/12/2021 1315 by Janessa Parson RN  Outcome: Ongoing  3/12/2021 0232 by Dick Landaverde RN  Outcome: Ongoing  Goal: Control of chronic pain  Description: Control of chronic pain  3/12/2021 1315 by Janessa Parson RN  Outcome: Ongoing  3/12/2021 0232 by Dick Landaverde RN  Outcome: Ongoing     Problem: Skin Integrity:  Goal: Skin integrity will stabilize  Description: Skin integrity will stabilize  3/12/2021 1315 by Janessa Parson RN  Outcome: Ongoing  3/12/2021 0232 by Dick Landaverde RN  Outcome: Ongoing     Problem: Discharge Planning:  Goal: Patients continuum of care needs are met  Description: Patients continuum of care needs are met  3/12/2021 1315 by Nikhil Schmitt RN  Outcome: Ongoing  3/12/2021 0232 by Luiza Mendoza RN  Outcome: Ongoing     Problem: Fluid Volume - Imbalance:  Goal: Will show no signs and symptoms of excessive bleeding  Description: Will show no signs and symptoms of excessive bleeding  3/12/2021 1315 by Nikhil Schmitt RN  Outcome: Ongoing  3/12/2021 0232 by Luiza Mendoza RN  Outcome: Ongoing  Goal: Absence of imbalanced fluid volume signs and symptoms  Description: Absence of imbalanced fluid volume signs and symptoms  3/12/2021 1315 by Nikhil Schmitt RN  Outcome: Ongoing  3/12/2021 0232 by Luiza Mendoza RN  Outcome: Ongoing     Problem: Nausea/Vomiting:  Goal: Absence of nausea/vomiting  Description: Absence of nausea/vomiting  3/12/2021 1315 by Nikhil Schmitt RN  Outcome: Ongoing  3/12/2021 0232 by Luiza Mendoza RN  Outcome: Ongoing  Goal: Able to drink  Description: Able to drink  3/12/2021 1315 by Nikhil Schmitt RN  Outcome: Ongoing  3/12/2021 0232 by Luiza Mendoza RN  Outcome: Ongoing  Goal: Able to eat  Description: Able to eat  3/12/2021 1315 by Nikhil Schmitt RN  Outcome: Ongoing  3/12/2021 0232 by Luiza Mendoza RN  Outcome: Ongoing  Goal: Ability to achieve adequate nutritional intake will improve  Description: Ability to achieve adequate nutritional intake will improve  3/12/2021 1315 by Nikhil Schmitt RN  Outcome: Ongoing  3/12/2021 0232 by Luiza Mendoza RN  Outcome: Ongoing     Problem: Falls - Risk of:  Goal: Will remain free from falls  Description: Will remain free from falls  3/12/2021 1315 by Nikhil Schmitt RN  Outcome: Ongoing  3/12/2021 0232 by Luiza Mendoza RN  Outcome: Ongoing  Goal: Absence of physical injury  Description: Absence of physical injury  3/12/2021 1315 by Nikhil Schmitt RN  Outcome: Ongoing  3/12/2021 0232 by Luiza Mendoza RN  Outcome: Ongoing     Problem: Skin Integrity:  Goal: Will show no infection signs and symptoms  Description: Will show no infection signs and symptoms  3/12/2021 1315 by Aakash Barrios RN  Outcome: Ongoing  3/12/2021 0232 by Susannah Beltre RN  Outcome: Ongoing  Goal: Absence of new skin breakdown  Description: Absence of new skin breakdown  3/12/2021 1315 by Aakash Barrios RN  Outcome: Ongoing  3/12/2021 0232 by Susannah Beltre RN  Outcome: Ongoing     Problem: Discharge Planning:  Goal: Discharged to appropriate level of care  Description: Discharged to appropriate level of care  3/12/2021 1315 by Aakash Barrios RN  Outcome: Ongoing  3/12/2021 0232 by Susannah Beltre RN  Outcome: Ongoing

## 2021-03-12 NOTE — PROGRESS NOTES
Daily Progress Note    Awake alert feeling better  Sitting in chair, eating better  Hematuria improved  Heart rate and BP stable  S/p abdominal surgery  HTN keep on meds  Stable from cardiac stand  Hopefully d/c soon      Pt. Awake, alert, doing ok  HR stable, NSR, BP stable  No CP, Sob per pt.     S/p Large hiatal hernia repair    Repair done 3/4/21    UGI bleed after- s/p transfusion    Per surgery     Chest pain    Atypical    Trop neg.    Echo done and EF 50%     Hematuria    Gross hematuria noted-urology was following    Hgb stable    Stable now    Continues with urinary retention-per note does not want borden but wants SNF to straight cath as needed until OP f/u     BP stable  Stable from cardiac standpoint  Ok to D/C when ok with primary team-awaiting placement     Echo-3/3/21   Summary   Left ventricular systolic function is low normal.   Ejection fraction is visually estimated at 50%.   Sclerotic, but non-stenotic aortic valve.   Trace aortic regurgitation is noted.   No evidence of any pericardial effusion.     PAST MEDICAL HISTORY:  History of anxiety, anemia, BPH, GERD, hepatitis,  hemorrhoids, hyperlipidemia, squamous cell cancer present.     PAST SURGICAL HISTORY:  Carpal tunnel surgery, hernia repair, and neck  surgery.     SOCIAL HISTORY:  He does not smoke, does not drink.     ALLERGIES:  MINOCYCLINE.     MEDICATIONS AT HOME:  He is on Synthroid, Lasix, Zoloft, iron, Colace,  Restoril, Seroquel, and Flomax.       Objective:   /67   Pulse 78   Temp 97.9 °F (36.6 °C) (Oral)   Resp 16   Ht 6' (1.829 m)   Wt 185 lb (83.9 kg)   SpO2 95%   BMI 25.09 kg/m²       Intake/Output Summary (Last 24 hours) at 3/12/2021 1000  Last data filed at 3/12/2021 0547  Gross per 24 hour   Intake 350 ml   Output 960 ml   Net -610 ml       Medications:   Scheduled Meds:   magnesium oxide  200 mg Oral Daily    potassium chloride  20 mEq Oral BID WC    amLODIPine  5 mg Oral QAM    metoprolol succinate  25 mg carcinoma) 03/10/2014    Hyperlipidemia 09/21/2012    Depression 09/21/2012    Insomnia 09/21/2012    BPH (benign prostatic hyperplasia) 09/21/2012    Dysuria 09/21/2012    Vitamin D deficiency 09/21/2012       Electronically signed by Kaya Tay PA-C on 3/12/2021 at 10:00 AM

## 2021-03-12 NOTE — PROGRESS NOTES
BILITOT, ALKPHOS in the last 72 hours. Imaging Last 24 Hours:  No results found.   Assessment//Plan           Hospital Problems           Last Modified POA    UGIB (upper gastrointestinal bleed) 3/2/2021 Yes        Assessment & Plan  Upper GI bled with acute on chronic anemia  -Large hiatal hernia s/p lap repair of incarcerated hernia with partial fundoplication 3/4  -S/p PRBC and iron sucrose  -IV PPI  -advance diet  Chest pain  -atypical  -EF 50%  Acute met encephalopathy  -post op delerium  CKD 3  -stable  Hematuria traumatic and urinary retention  -urine cx neg  -Laureano removed  -flomax, proscar  -cystocopy outpt  HTN  -BB  Hypokalemia  -replace  Hypothyroid  DVt prophylaxis  -lovenox      SNF  And discharge once precert is done  Electronically signed by Ramos Enciso MD on 3/9/21 at 8:59 AM EST

## 2021-03-12 NOTE — PROGRESS NOTES
Physical Therapy    Physical Therapy Treatment Note  Name: Sina Duane MRN: [id number] :   10/18/1930   Date:  3/12/2021   Admission Date: 3/2/2021 Room:  [address] [place] [address]-     Restrictions/Precautions:        fall risk; general precautions    Communication with other providers:  RN approves tx session. Subjective:  Patient states:  Agreeable to tx session; \"Lets do it\"    Pain:   Location, Type, Intensity (0/10 to 10/10): \"Currently have a headache but the nurse just gave me something for it. \"    Objective:    Observation:  Pt in bed upon arrival. Pt son present for tx session. Treatment, including education/measures:  Transfers  Supine to sit :SBA  Sit to supine :CGA  Scooting :CGA  Rolling :CGA; VC's for hand placement   Sit to stand :min/CGA  Stand to sit :CGA    Gait:  Pt amb with RW for 100 ft with CG/min assist. Pt amb with very decreased mark and narrow PEDRO. Pt appears slightly unsteady when turning but does not experience any true LOB's. Pt needed VC's for pathway, safety    Safety  Patient left safely in the bed, with call light/phone in reach with alarm applied. Gait belt and mask were used for transfers and gait.     Assessment / Impression:       Patient's tolerance of treatment:  good   Adverse Reaction: none  Significant change in status and impact:  none  Barriers to improvement:  Endurance     Plan for Next Session:    Will cont to work towards pt's goals per his tolerance    Time in:  1525  Time out:  1550  Timed treatment minutes:  25  Total treatment time:  25    Previously filed items:  Social/Functional History  Lives With: Spouse  Type of Home: House  Home Layout: One level, Laundry in basement  Home Access: Level entry  Bathroom Shower/Tub: Walk-in shower, Shower chair with back  [employer]: Handicap height  Bathroom Equipment: Grab bars in shower, Grab bars around toilet  Bathroom Accessibility: Accessible  Home Equipment: Rolling walker, [place], Wheelchair-manual(Pt reports at baseline he maintaly utilizes RW during ambulation.)  Receives Help From: Family(Pt reports his wife has been having back trouble recently, however they have friends and family who can assist as needed.)  ADL Assistance: Needs assistance(Pt reports his wife assists with bathing PRN, and he is able to complete dressing and toileting IND.)  Homemaking Assistance: Independent  Homemaking Responsibilities: No(Spouse completes tasks of laundry, cooking, and cleaning.)  Ambulation Assistance: Independent  Transfer Assistance: Independent  Active : Yes  Mode of Transportation: Car(Pt reports they order groceries and drive to the store to pick them up.)  Occupation: Retired  Type of occupation: Factory  Short term goals  Time Frame for Valeri Row term goals: 1 week  Short term goal 1: Pt to complete all bed mobility mod I  Short term goal 2: Pt to complete all STS transfers to/from bed, commode, and chair with supervision  Short term goal 3: Pt to ambulate 48' with LRAD and supervision       Electronically signed by:    Nori Barrientos PTA  3/12/2021, 3:50 PM

## 2021-03-12 NOTE — PLAN OF CARE
falls  Description: Will remain free from falls  Outcome: Ongoing  Goal: Absence of physical injury  Description: Absence of physical injury  Outcome: Ongoing     Problem: Skin Integrity:  Goal: Will show no infection signs and symptoms  Description: Will show no infection signs and symptoms  Outcome: Ongoing  Goal: Absence of new skin breakdown  Description: Absence of new skin breakdown  Outcome: Ongoing     Problem: Discharge Planning:  Goal: Discharged to appropriate level of care  Description: Discharged to appropriate level of care  Outcome: Ongoing     Problem: Bleeding:  Goal: Will show no signs and symptoms of excessive bleeding  Description: Will show no signs and symptoms of excessive bleeding  Outcome: Ongoing     Problem: Urinary Retention:  Goal: Urinary elimination within specified parameters  Description: Urinary elimination within specified parameters  Outcome: Ongoing  Goal: Able to perform urinary self-catheterization  Description: Able to perform urinary self-catheterization  Outcome: Ongoing  Goal: Ability to reestablish a normal urinary elimination pattern will improve - after catheter removal  Description: Ability to reestablish a normal urinary elimination pattern will improve - after catheter removal  Outcome: Ongoing  Goal: Ability to recognize the need to void and respond appropriately will improve  Description: Ability to recognize the need to void and respond appropriately will improve  Outcome: Ongoing  Goal: Absence of postvoid residual urine  Description: Absence of postvoid residual urine  Outcome: Ongoing

## 2021-03-15 LAB
EKG ATRIAL RATE: 117 BPM
EKG DIAGNOSIS: NORMAL
EKG P AXIS: 5 DEGREES
EKG P-R INTERVAL: 224 MS
EKG Q-T INTERVAL: 320 MS
EKG QRS DURATION: 90 MS
EKG QTC CALCULATION (BAZETT): 446 MS
EKG R AXIS: -6 DEGREES
EKG T AXIS: 32 DEGREES
EKG VENTRICULAR RATE: 117 BPM

## 2021-03-22 PROBLEM — R31.9 HEMATURIA: Status: ACTIVE | Noted: 2021-03-22

## 2021-03-23 ENCOUNTER — HOSPITAL ENCOUNTER (OUTPATIENT)
Dept: INFUSION THERAPY | Age: 86
Setting detail: INFUSION SERIES
Discharge: HOME | DRG: 666 | End: 2021-03-23
Payer: MEDICARE

## 2021-03-23 VITALS
DIASTOLIC BLOOD PRESSURE: 56 MMHG | RESPIRATION RATE: 16 BRPM | TEMPERATURE: 97.1 F | HEART RATE: 72 BPM | OXYGEN SATURATION: 100 % | SYSTOLIC BLOOD PRESSURE: 117 MMHG

## 2021-03-23 DIAGNOSIS — R31.9 HEMATURIA, UNSPECIFIED TYPE: Primary | ICD-10-CM

## 2021-03-23 PROCEDURE — 86850 RBC ANTIBODY SCREEN: CPT

## 2021-03-23 PROCEDURE — 99211 OFF/OP EST MAY X REQ PHY/QHP: CPT

## 2021-03-23 PROCEDURE — P9016 RBC LEUKOCYTES REDUCED: HCPCS

## 2021-03-23 PROCEDURE — 86900 BLOOD TYPING SEROLOGIC ABO: CPT

## 2021-03-23 PROCEDURE — 2580000003 HC RX 258: Performed by: PHYSICIAN ASSISTANT

## 2021-03-23 PROCEDURE — 86901 BLOOD TYPING SEROLOGIC RH(D): CPT

## 2021-03-23 PROCEDURE — 86922 COMPATIBILITY TEST ANTIGLOB: CPT

## 2021-03-23 PROCEDURE — 36430 TRANSFUSION BLD/BLD COMPNT: CPT

## 2021-03-23 RX ORDER — SODIUM CHLORIDE 0.9 % (FLUSH) 0.9 %
20 SYRINGE (ML) INJECTION PRN
Status: DISCONTINUED | OUTPATIENT
Start: 2021-03-23 | End: 2021-03-24 | Stop reason: HOSPADM

## 2021-03-23 RX ORDER — 0.9 % SODIUM CHLORIDE 0.9 %
250 INTRAVENOUS SOLUTION INTRAVENOUS ONCE
Status: CANCELLED | OUTPATIENT
Start: 2021-03-23 | End: 2021-03-23

## 2021-03-23 RX ORDER — ACETAMINOPHEN 325 MG/1
650 TABLET ORAL EVERY 6 HOURS PRN
COMMUNITY
End: 2022-03-10

## 2021-03-23 RX ORDER — 0.9 % SODIUM CHLORIDE 0.9 %
250 INTRAVENOUS SOLUTION INTRAVENOUS ONCE
Status: COMPLETED | OUTPATIENT
Start: 2021-03-23 | End: 2021-03-23

## 2021-03-23 RX ORDER — SODIUM CHLORIDE 0.9 % (FLUSH) 0.9 %
20 SYRINGE (ML) INJECTION PRN
Status: CANCELLED | OUTPATIENT
Start: 2021-03-23

## 2021-03-23 RX ORDER — ATROPA BELLADONNA AND OPIUM 16.2; 3 MG/1; MG/1
30 SUPPOSITORY RECTAL EVERY 8 HOURS PRN
COMMUNITY
End: 2021-04-28

## 2021-03-23 RX ORDER — CIPROFLOXACIN 250 MG/1
250 TABLET, FILM COATED ORAL 2 TIMES DAILY
Status: ON HOLD | COMMUNITY
End: 2021-04-09 | Stop reason: HOSPADM

## 2021-03-23 RX ADMIN — SODIUM CHLORIDE, PRESERVATIVE FREE 20 ML: 5 INJECTION INTRAVENOUS at 08:30

## 2021-03-23 RX ADMIN — SODIUM CHLORIDE 250 ML: 9 INJECTION, SOLUTION INTRAVENOUS at 09:30

## 2021-03-23 NOTE — DISCHARGE SUMMARY
Tolerated Transfusion well. Reviewed discharge instructions, understanding verbalized. Copies of AVS given to take home. Patient discharged  with  to [facility] [place] [facility].     Orders Placed This Encounter   Medications    0.9 % sodium chloride infusion 250 mL    sodium chloride flush 0.9 % injection 20 mL

## 2021-03-23 NOTE — PLAN OF CARE
To unit room 2 for Blood Transfusion. Orientated to unit. Procedure and plan of care explained. Questions answered. Understanding verbalized.

## 2021-03-25 ENCOUNTER — HOSPITAL ENCOUNTER (OUTPATIENT)
Dept: INFUSION THERAPY | Age: 86
Setting detail: INFUSION SERIES
Discharge: HOME | DRG: 666 | End: 2021-03-25
Payer: MEDICARE

## 2021-03-25 VITALS
RESPIRATION RATE: 16 BRPM | HEART RATE: 70 BPM | SYSTOLIC BLOOD PRESSURE: 119 MMHG | DIASTOLIC BLOOD PRESSURE: 56 MMHG | OXYGEN SATURATION: 98 % | TEMPERATURE: 97.3 F

## 2021-03-25 PROCEDURE — P9016 RBC LEUKOCYTES REDUCED: HCPCS

## 2021-03-25 PROCEDURE — 2580000003 HC RX 258: Performed by: EMERGENCY MEDICINE

## 2021-03-25 PROCEDURE — 36430 TRANSFUSION BLD/BLD COMPNT: CPT

## 2021-03-25 PROCEDURE — 99211 OFF/OP EST MAY X REQ PHY/QHP: CPT

## 2021-03-25 RX ORDER — SODIUM CHLORIDE 9 MG/ML
INJECTION, SOLUTION INTRAVENOUS PRN
Status: COMPLETED | OUTPATIENT
Start: 2021-03-25 | End: 2021-03-25

## 2021-03-25 RX ADMIN — SODIUM CHLORIDE: 9 INJECTION, SOLUTION INTRAVENOUS at 09:19

## 2021-03-25 NOTE — PROGRESS NOTES
Tolerated transfusion well. Reviewed discharge instruction, voiced understanding. Copies of AVS given. Pt discharged [facility]. Pt to exit via wheelchair.     Orders Placed This Encounter   Medications    0.9 % sodium chloride infusion

## 2021-03-26 ENCOUNTER — HOSPITAL ENCOUNTER (INPATIENT)
Age: 86
LOS: 14 days | Discharge: NURSING, REHAB OR LONG-TERM CARE | DRG: 666 | End: 2021-04-09
Attending: EMERGENCY MEDICINE
Payer: MEDICARE

## 2021-03-26 DIAGNOSIS — R31.9 HEMATURIA, UNSPECIFIED TYPE: Primary | ICD-10-CM

## 2021-03-26 DIAGNOSIS — D64.9 ANEMIA, UNSPECIFIED TYPE: ICD-10-CM

## 2021-03-26 DIAGNOSIS — T83.9XXA PROBLEM WITH FOLEY CATHETER, INITIAL ENCOUNTER (HCC): ICD-10-CM

## 2021-03-26 LAB
ABO/RH: NORMAL
ABO/RH: NORMAL
ALBUMIN SERPL-MCNC: 2.4 GM/DL (ref 3.4–5)
ALP BLD-CCNC: 86 IU/L (ref 40–129)
ALT SERPL-CCNC: 9 U/L (ref 10–40)
ANION GAP SERPL CALCULATED.3IONS-SCNC: 8 MMOL/L (ref 4–16)
ANTIBODY SCREEN: NEGATIVE
ANTIBODY SCREEN: NEGATIVE
APTT: 37.8 SECONDS (ref 25.1–37.1)
AST SERPL-CCNC: 15 IU/L (ref 15–37)
BACTERIA: NEGATIVE /HPF
BASOPHILS ABSOLUTE: 0 K/CU MM
BASOPHILS RELATIVE PERCENT: 0.3 % (ref 0–1)
BILIRUB SERPL-MCNC: 0.2 MG/DL (ref 0–1)
BILIRUBIN URINE: NEGATIVE MG/DL
BLOOD, URINE: ABNORMAL
BUN BLDV-MCNC: 20 MG/DL (ref 6–23)
CALCIUM SERPL-MCNC: 8 MG/DL (ref 8.3–10.6)
CHLORIDE BLD-SCNC: 104 MMOL/L (ref 99–110)
CLARITY: ABNORMAL
CO2: 24 MMOL/L (ref 21–32)
COLOR: ABNORMAL
COMPONENT: NORMAL
COMPONENT: NORMAL
CREAT SERPL-MCNC: 1.7 MG/DL (ref 0.9–1.3)
CROSSMATCH RESULT: NORMAL
CROSSMATCH RESULT: NORMAL
DIFFERENTIAL TYPE: ABNORMAL
EOSINOPHILS ABSOLUTE: 0.2 K/CU MM
EOSINOPHILS RELATIVE PERCENT: 1.8 % (ref 0–3)
GFR AFRICAN AMERICAN: 46 ML/MIN/1.73M2
GFR NON-AFRICAN AMERICAN: 38 ML/MIN/1.73M2
GLUCOSE BLD-MCNC: 129 MG/DL (ref 70–99)
GLUCOSE, URINE: NEGATIVE MG/DL
HCT VFR BLD CALC: 25.8 % (ref 42–52)
HEMOGLOBIN: 7.9 GM/DL (ref 13.5–18)
IMMATURE NEUTROPHIL %: 1.8 % (ref 0–0.43)
INR BLD: 1.16 INDEX
KETONES, URINE: NEGATIVE MG/DL
LEUKOCYTE ESTERASE, URINE: ABNORMAL
LYMPHOCYTES ABSOLUTE: 0.8 K/CU MM
LYMPHOCYTES RELATIVE PERCENT: 8.1 % (ref 24–44)
MCH RBC QN AUTO: 28.3 PG (ref 27–31)
MCHC RBC AUTO-ENTMCNC: 30.6 % (ref 32–36)
MCV RBC AUTO: 92.5 FL (ref 78–100)
MONOCYTES ABSOLUTE: 0.8 K/CU MM
MONOCYTES RELATIVE PERCENT: 8.7 % (ref 0–4)
NITRITE URINE, QUANTITATIVE: NEGATIVE
NUCLEATED RBC %: 0 %
PDW BLD-RTO: 15.8 % (ref 11.7–14.9)
PH, URINE: 6 (ref 5–8)
PLATELET # BLD: 305 K/CU MM (ref 140–440)
PMV BLD AUTO: 8.8 FL (ref 7.5–11.1)
POTASSIUM SERPL-SCNC: 4.4 MMOL/L (ref 3.5–5.1)
PROTEIN UA: 100 MG/DL
PROTHROMBIN TIME: 14.1 SECONDS (ref 11.7–14.5)
RBC # BLD: 2.79 M/CU MM (ref 4.6–6.2)
RBC URINE: 3179 /HPF (ref 0–3)
SARS-COV-2, NAAT: NOT DETECTED
SEGMENTED NEUTROPHILS ABSOLUTE COUNT: 7.7 K/CU MM
SEGMENTED NEUTROPHILS RELATIVE PERCENT: 79.3 % (ref 36–66)
SODIUM BLD-SCNC: 136 MMOL/L (ref 135–145)
SOURCE: NORMAL
SPECIFIC GRAVITY UA: 1.01 (ref 1–1.03)
STATUS: NORMAL
STATUS: NORMAL
TOTAL IMMATURE NEUTOROPHIL: 0.17 K/CU MM
TOTAL NUCLEATED RBC: 0 K/CU MM
TOTAL PROTEIN: 5.5 GM/DL (ref 6.4–8.2)
TRANSFUSION STATUS: NORMAL
TRANSFUSION STATUS: NORMAL
TRICHOMONAS: ABNORMAL /HPF
UNIT DIVISION: 0
UNIT DIVISION: 0
UNIT NUMBER: NORMAL
UNIT NUMBER: NORMAL
UROBILINOGEN, URINE: NEGATIVE MG/DL (ref 0.2–1)
WBC # BLD: 9.7 K/CU MM (ref 4–10.5)
WBC UA: 561 /HPF (ref 0–2)

## 2021-03-26 PROCEDURE — 85025 COMPLETE CBC W/AUTO DIFF WBC: CPT

## 2021-03-26 PROCEDURE — 99284 EMERGENCY DEPT VISIT MOD MDM: CPT

## 2021-03-26 PROCEDURE — 94761 N-INVAS EAR/PLS OXIMETRY MLT: CPT

## 2021-03-26 PROCEDURE — 85730 THROMBOPLASTIN TIME PARTIAL: CPT

## 2021-03-26 PROCEDURE — 81001 URINALYSIS AUTO W/SCOPE: CPT

## 2021-03-26 PROCEDURE — 87635 SARS-COV-2 COVID-19 AMP PRB: CPT

## 2021-03-26 PROCEDURE — 80053 COMPREHEN METABOLIC PANEL: CPT

## 2021-03-26 PROCEDURE — 87086 URINE CULTURE/COLONY COUNT: CPT

## 2021-03-26 PROCEDURE — 6370000000 HC RX 637 (ALT 250 FOR IP): Performed by: INTERNAL MEDICINE

## 2021-03-26 PROCEDURE — 1200000000 HC SEMI PRIVATE

## 2021-03-26 PROCEDURE — 86901 BLOOD TYPING SEROLOGIC RH(D): CPT

## 2021-03-26 PROCEDURE — 86900 BLOOD TYPING SEROLOGIC ABO: CPT

## 2021-03-26 PROCEDURE — 2580000003 HC RX 258: Performed by: INTERNAL MEDICINE

## 2021-03-26 PROCEDURE — 36415 COLL VENOUS BLD VENIPUNCTURE: CPT

## 2021-03-26 PROCEDURE — 85610 PROTHROMBIN TIME: CPT

## 2021-03-26 PROCEDURE — 6370000000 HC RX 637 (ALT 250 FOR IP): Performed by: NURSE PRACTITIONER

## 2021-03-26 PROCEDURE — 51702 INSERT TEMP BLADDER CATH: CPT

## 2021-03-26 PROCEDURE — 86850 RBC ANTIBODY SCREEN: CPT

## 2021-03-26 RX ORDER — METOPROLOL SUCCINATE 25 MG/1
25 TABLET, EXTENDED RELEASE ORAL EVERY EVENING
Status: DISCONTINUED | OUTPATIENT
Start: 2021-03-26 | End: 2021-04-09 | Stop reason: HOSPADM

## 2021-03-26 RX ORDER — LEVOTHYROXINE SODIUM 0.07 MG/1
75 TABLET ORAL DAILY
Status: DISCONTINUED | OUTPATIENT
Start: 2021-03-26 | End: 2021-04-09 | Stop reason: HOSPADM

## 2021-03-26 RX ORDER — PROMETHAZINE HYDROCHLORIDE 25 MG/1
12.5 TABLET ORAL EVERY 6 HOURS PRN
Status: DISCONTINUED | OUTPATIENT
Start: 2021-03-26 | End: 2021-04-09 | Stop reason: HOSPADM

## 2021-03-26 RX ORDER — QUETIAPINE FUMARATE 100 MG/1
100 TABLET, FILM COATED ORAL EVERY EVENING
Status: DISCONTINUED | OUTPATIENT
Start: 2021-03-26 | End: 2021-03-26

## 2021-03-26 RX ORDER — SODIUM CHLORIDE 9 MG/ML
25 INJECTION, SOLUTION INTRAVENOUS PRN
Status: DISCONTINUED | OUTPATIENT
Start: 2021-03-26 | End: 2021-04-09 | Stop reason: HOSPADM

## 2021-03-26 RX ORDER — ACETAMINOPHEN 650 MG/1
650 SUPPOSITORY RECTAL EVERY 6 HOURS PRN
Status: DISCONTINUED | OUTPATIENT
Start: 2021-03-26 | End: 2021-04-09 | Stop reason: HOSPADM

## 2021-03-26 RX ORDER — DOCUSATE SODIUM 100 MG/1
100 CAPSULE, LIQUID FILLED ORAL DAILY
Status: DISCONTINUED | OUTPATIENT
Start: 2021-03-26 | End: 2021-04-09 | Stop reason: HOSPADM

## 2021-03-26 RX ORDER — ACETAMINOPHEN 325 MG/1
650 TABLET ORAL EVERY 6 HOURS PRN
Status: DISCONTINUED | OUTPATIENT
Start: 2021-03-26 | End: 2021-04-09 | Stop reason: HOSPADM

## 2021-03-26 RX ORDER — FINASTERIDE 5 MG/1
5 TABLET, FILM COATED ORAL DAILY
Status: DISCONTINUED | OUTPATIENT
Start: 2021-03-26 | End: 2021-04-09 | Stop reason: HOSPADM

## 2021-03-26 RX ORDER — SODIUM CHLORIDE 0.9 % (FLUSH) 0.9 %
10 SYRINGE (ML) INJECTION PRN
Status: DISCONTINUED | OUTPATIENT
Start: 2021-03-26 | End: 2021-04-09 | Stop reason: HOSPADM

## 2021-03-26 RX ORDER — POTASSIUM CHLORIDE 20 MEQ/1
40 TABLET, EXTENDED RELEASE ORAL PRN
Status: DISCONTINUED | OUTPATIENT
Start: 2021-03-26 | End: 2021-04-09 | Stop reason: HOSPADM

## 2021-03-26 RX ORDER — AMLODIPINE BESYLATE 5 MG/1
5 TABLET ORAL EVERY MORNING
Status: DISCONTINUED | OUTPATIENT
Start: 2021-03-27 | End: 2021-04-09 | Stop reason: HOSPADM

## 2021-03-26 RX ORDER — SODIUM CHLORIDE 9 MG/ML
INJECTION, SOLUTION INTRAVENOUS CONTINUOUS
Status: ACTIVE | OUTPATIENT
Start: 2021-03-26 | End: 2021-03-27

## 2021-03-26 RX ORDER — POTASSIUM CHLORIDE 20 MEQ/1
20 TABLET, EXTENDED RELEASE ORAL DAILY
Status: DISCONTINUED | OUTPATIENT
Start: 2021-03-26 | End: 2021-04-09 | Stop reason: HOSPADM

## 2021-03-26 RX ORDER — PANTOPRAZOLE SODIUM 20 MG/1
20 TABLET, DELAYED RELEASE ORAL DAILY
Status: DISCONTINUED | OUTPATIENT
Start: 2021-03-26 | End: 2021-04-09 | Stop reason: HOSPADM

## 2021-03-26 RX ORDER — FERROUS SULFATE 325(65) MG
325 TABLET ORAL
Status: DISCONTINUED | OUTPATIENT
Start: 2021-03-27 | End: 2021-04-09 | Stop reason: HOSPADM

## 2021-03-26 RX ORDER — LORAZEPAM 0.5 MG/1
0.5 TABLET ORAL NIGHTLY PRN
Status: DISCONTINUED | OUTPATIENT
Start: 2021-03-26 | End: 2021-04-09 | Stop reason: HOSPADM

## 2021-03-26 RX ORDER — TAMSULOSIN HYDROCHLORIDE 0.4 MG/1
0.8 CAPSULE ORAL DAILY
Status: DISCONTINUED | OUTPATIENT
Start: 2021-03-26 | End: 2021-04-09 | Stop reason: HOSPADM

## 2021-03-26 RX ORDER — FUROSEMIDE 20 MG/1
20 TABLET ORAL DAILY
Status: DISCONTINUED | OUTPATIENT
Start: 2021-03-26 | End: 2021-04-09 | Stop reason: HOSPADM

## 2021-03-26 RX ORDER — QUETIAPINE FUMARATE 100 MG/1
100 TABLET, FILM COATED ORAL NIGHTLY PRN
Status: DISCONTINUED | OUTPATIENT
Start: 2021-03-26 | End: 2021-04-09 | Stop reason: HOSPADM

## 2021-03-26 RX ORDER — FAMOTIDINE 20 MG/1
20 TABLET, FILM COATED ORAL 2 TIMES DAILY
Status: DISCONTINUED | OUTPATIENT
Start: 2021-03-26 | End: 2021-04-09 | Stop reason: HOSPADM

## 2021-03-26 RX ORDER — POTASSIUM CHLORIDE 7.45 MG/ML
10 INJECTION INTRAVENOUS PRN
Status: DISCONTINUED | OUTPATIENT
Start: 2021-03-26 | End: 2021-04-09 | Stop reason: HOSPADM

## 2021-03-26 RX ORDER — SODIUM CHLORIDE 0.9 % (FLUSH) 0.9 %
10 SYRINGE (ML) INJECTION EVERY 12 HOURS SCHEDULED
Status: DISCONTINUED | OUTPATIENT
Start: 2021-03-26 | End: 2021-04-09 | Stop reason: HOSPADM

## 2021-03-26 RX ORDER — POLYETHYLENE GLYCOL 3350 17 G/17G
17 POWDER, FOR SOLUTION ORAL DAILY PRN
Status: DISCONTINUED | OUTPATIENT
Start: 2021-03-26 | End: 2021-04-09 | Stop reason: HOSPADM

## 2021-03-26 RX ORDER — QUETIAPINE FUMARATE 25 MG/1
50 TABLET, FILM COATED ORAL 3 TIMES DAILY
Status: DISCONTINUED | OUTPATIENT
Start: 2021-03-26 | End: 2021-04-09 | Stop reason: HOSPADM

## 2021-03-26 RX ORDER — ONDANSETRON 2 MG/ML
4 INJECTION INTRAMUSCULAR; INTRAVENOUS EVERY 6 HOURS PRN
Status: DISCONTINUED | OUTPATIENT
Start: 2021-03-26 | End: 2021-04-09 | Stop reason: HOSPADM

## 2021-03-26 RX ADMIN — QUETIAPINE FUMARATE 100 MG: 100 TABLET ORAL at 21:09

## 2021-03-26 RX ADMIN — METOPROLOL SUCCINATE 25 MG: 25 TABLET, EXTENDED RELEASE ORAL at 21:09

## 2021-03-26 RX ADMIN — LEVOTHYROXINE SODIUM 75 MCG: 0.07 TABLET ORAL at 21:09

## 2021-03-26 RX ADMIN — TAMSULOSIN HYDROCHLORIDE 0.8 MG: 0.4 CAPSULE ORAL at 21:12

## 2021-03-26 RX ADMIN — DOCUSATE SODIUM 100 MG: 100 CAPSULE, LIQUID FILLED ORAL at 21:09

## 2021-03-26 RX ADMIN — FUROSEMIDE 20 MG: 20 TABLET ORAL at 21:09

## 2021-03-26 RX ADMIN — SERTRALINE HYDROCHLORIDE 50 MG: 50 TABLET ORAL at 21:09

## 2021-03-26 RX ADMIN — PANTOPRAZOLE SODIUM 20 MG: 20 TABLET, DELAYED RELEASE ORAL at 21:09

## 2021-03-26 RX ADMIN — POTASSIUM CHLORIDE 20 MEQ: 1500 TABLET, EXTENDED RELEASE ORAL at 21:09

## 2021-03-26 RX ADMIN — FINASTERIDE 5 MG: 5 TABLET, FILM COATED ORAL at 21:09

## 2021-03-26 RX ADMIN — SODIUM CHLORIDE: 9 INJECTION, SOLUTION INTRAVENOUS at 20:26

## 2021-03-26 RX ADMIN — FAMOTIDINE 20 MG: 20 TABLET ORAL at 21:09

## 2021-03-26 ASSESSMENT — ENCOUNTER SYMPTOMS
ALLERGIC/IMMUNOLOGIC NEGATIVE: 1
RESPIRATORY NEGATIVE: 1
ABDOMINAL PAIN: 1
EYES NEGATIVE: 1

## 2021-03-26 ASSESSMENT — PAIN SCALES - GENERAL: PAINLEVEL_OUTOF10: 0

## 2021-03-26 NOTE — H&P
[facility]    History and Physical      Name:  Leandra Goel /Age/Sex: 10/18/1930  (80 y.o. male)   MRN & CSN:  [id number] & [id number] Admission Date/Time: 3/26/2021  3:51 PM   Location:  [facility] PCP: Karlee Espinoza MD       Hospital Day: 1    Assessment and Plan:   Leandra Goel is a 80 y.o.  male with past medical history of hypertension, BPH, hypothyroidism, anemia of chronic disease who presents with gross hematuria    Gross Hematuria  Laureano catheter dysfunction likely due to blood clotts     Laureano to be exchanged in ED, started on CBI  IV Rocephin  Continue Flomax, Proscar  Urology consulted -possible cystoscopy in a.m., will keep n.p.o. after midnight     Anemia of chronic disease   Due to chronic GI bleed and hematuria    Recent admission for GI bleed and EGD  Hb 7.9, monitor hemoglobin and transfuse for Hb less than 7  Continue with ferrous sulfate    Hypertension -continue with Toprol, Norvasc, Lasix     Chronic medical condition : Resume home medications when clinically appropriate    Peripheral Neuropathy   BPH on Flomax  Hypothyroidism   Anxiety disorder/depression    Diet No diet orders on file   DVT Prophylaxis [] Lovenox, []  Heparin, [] SCDs, [] No VTE prophylaxis, patient ambulating   GI Prophylaxis [] PPI, [] H2 Blocker, [] No GI prophylaxis, patient is receiving diet/Tube Feeds   Code Status Prior   Disposition Patient requires continued admission due to gross hematuria   MDM [] Low, [x] Moderate,[]  High  Patient's risk as above due to      History of Present Illness:     Chief Complaint: Hematuria, dysfunction of Laureano cath    Leandra Goel is a 80 y.o.  male with past medical history of hypertension, BPH, hypothyroidism, anemia of chronic disease who presents with gross hematuria. Patient states that he noticed intermittent blood clots passing through the urethral catheter which concerned him and was brought to the emergency room today.   He was not passing much of urine through the Laureano. Denies any fever or chills. He has no nausea or vomiting. There was no reported fever. He was transfused 2 units of blood at the transfusion center yesterday. Denies any shortness of breath or chest pain. No reported dizziness. Ten point ROS reviewed negative, unless as noted above    Objective: Intake/Output Summary (Last 24 hours) at 3/26/2021 1647  Last data filed at 3/26/2021 1624  Gross per 24 hour   Intake --   Output 500 ml   Net -500 ml      Vitals:   Vitals:    03/26/21 1553   BP: 120/64   Pulse: 84   Resp: 17   Temp: 98.1 °F (36.7 °C)   SpO2: 97%     Physical Exam:    GEN Awake male, resting in bed in no apparent distress. Appears given age. EYES Pupils are equally round. No scleral erythema, discharge, or conjunctivitis. HENT Mucous membranes are moist.   NECK No apparent thyromegaly or masses. RESP Clear to auscultation, no wheezes, rales or rhonchi. Symmetric chest movement while on room air. CARDIO/VASC S1/S2 auscultated. Regular rate without appreciable murmurs, rubs, or gallops. Peripheral pulses equal bilaterally and palpable. No peripheral edema. GI Abdomen is soft without significant tenderness, masses, or guarding. Bowel sounds are normoactive. Rectal exam deferred.  Laureano catheter is present. With irrigation  HEME/LYMPH No petechiae or ecchymoses. MSK No gross joint deformities. Spontaneous movement of all extremities  SKIN Normal coloration, warm, dry. NEURO Cranial nerves appear grossly intact, normal speech, no lateralizing weakness. PSYCH Awake, alert, oriented x 4. Affect appropriate.     Past Medical History:      Past Medical History:   Diagnosis Date    Anemia     Anxiety     BPH     BPH with urinary obstruction     Depression     GERD (gastroesophageal reflux disease)     Hemorrhoids     Hepatitis     Hernia of unspecified site of abdominal cavity without mention of obstruction or gangrene     Hyperlipidemia     Insomnia      Not on file       Medications:   Medications:    Infusions:   PRN Meds:   Current home medications   Prior to Admission medications    Medication Sig Start Date End Date Taking? Authorizing Provider   acetaminophen (TYLENOL) 325 MG tablet Take 650 mg by mouth every 6 hours as needed for Pain    Historical Provider, MD   opium-belladonna (B&O SUPPRETTES) 16.2-30 MG suppository Place 30 mg rectally every 8 hours as needed for Pain. Historical Provider, MD   ciprofloxacin (CIPRO) 250 MG tablet Take 250 mg by mouth 2 times daily    Historical Provider, MD   polyethylene glycol (GLYCOLAX) 17 g packet Take 17 g by mouth daily 3/13/21 4/12/21  Kwesi Brennan MD   metoprolol succinate (TOPROL XL) 25 MG extended release tablet Take 1 tablet by mouth every evening 3/10/21   Kwesi Brennan, MD   amLODIPine (NORVASC) 5 MG tablet Take 1 tablet by mouth every morning 3/11/21   Kwesi Brnenan MD   finasteride (PROSCAR) 5 MG tablet Take 1 tablet by mouth daily 3/11/21   Kwesi Brennan MD   pantoprazole (PROTONIX) 20 MG tablet Take 1 tablet by mouth daily 3/10/21   Kwesi Brennan MD   potassium chloride (KLOR-CON M) 20 MEQ extended release tablet Take 1 tablet by mouth daily 3/10/21   Kwesi Brennan MD   levothyroxine (SYNTHROID) 50 MCG tablet Take 1.5 tablets by mouth daily 1/22/21   Esther Moy PA-C   furosemide (LASIX) 20 MG tablet Take 1 tablet by mouth daily  Patient taking differently: Take 20 mg by mouth daily Son reports pt takes every other day 1/21/21   Esther Moy PA-C   sertraline (ZOLOFT) 50 MG tablet Take 50 mg by mouth daily    Historical Provider, MD   ferrous sulfate (IRON 325) 325 (65 Fe) MG tablet Take 1 tablet by mouth daily (with breakfast) 12/31/20 3/31/21  Lalo Morales MD   docusate sodium (COLACE) 100 MG capsule Take 1 capsule by mouth daily With iron pill 12/31/20 3/31/21  Lalo Morales MD   temazepam (RESTORIL) 7.5 MG capsule Take 7.5 mg by mouth nightly as needed for Sleep. Historical Provider, MD   QUEtiapine (SEROQUEL) 100 MG tablet Take 100 mg by mouth every evening    Historical Provider, MD   QUEtiapine (SEROQUEL) 50 MG tablet Take 50 mg by mouth 3 times daily     Historical Provider, MD   tamsulosin (FLOMAX) 0.4 MG capsule Take 1 capsule by mouth daily.   Patient taking differently: Take 0.8 mg by mouth daily 01/11/17 Pt to take 2- .04 mg capsules at bedtime 10/12/12   William Guevara MD       PHYSICIAN CERTIFICATION    I certify that Jessica Escobar is expected to be hospitalized for greater than 2 midnights based on the above assessment and plan:     Current diagnosis and plan of management discussed with the patient at the time of admission in lay language     Code status was discussed with patient  - Full code     Pain Assessment -no reported pain at this time    Electronically signed by Ventura Shi MD on 3/26/2021 at 4:47 PM

## 2021-03-26 NOTE — ED TRIAGE NOTES
Pt has been throwing blood clots through hius urethral castheter over past couple of days. Pt denies any pain or discomforts at this time.

## 2021-03-26 NOTE — ED NOTES
Full Report given to [facility]. No further questions at this time.      Maritza Whittaker RN  03/26/21 1939

## 2021-03-26 NOTE — ED PROVIDER NOTES
[facility]      TRIAGE CHIEF COMPLAINT:   Other (Blood clots in urethral catheter)      [place]:  Ayanna Pfeiffer is a [address] y.o. male that presents with complaint of Laureano catheter problem, hematuria, anemia. Patient has a history of this has a catheter and he states because he has prostate issues. He does see urology. Patient is very hard of hearing but denies other complaints. He was sent in today by another hospital, physician who called me before arrival she had talked to patient's urologist patient to be admitted here and to go to surgery with urology for ablation, intervention for continued hematuria anemia requiring blood transfusions despite Laureano catheter. No other questions or concerns patient otherwise stable. REVIEW OF SYSTEMS:  At least 10 systems reviewed and otherwise acutely negative except as in the [address] Review of Systems   Constitutional: Negative. HENT: Negative. Eyes: Negative. Respiratory: Negative. Cardiovascular: Negative. Gastrointestinal: Positive for abdominal pain. Endocrine: Negative. Genitourinary: Positive for difficulty urinating and hematuria. Musculoskeletal: Negative. Skin: Negative. Allergic/Immunologic: Negative. Neurological: Negative. Hematological: Negative. Psychiatric/Behavioral: Negative. All other systems reviewed and are negative.       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  ciprofloxacin (CIPRO) 250 MG tablet Take 250 mg by mouth 2 times daily      polyethylene glycol (GLYCOLAX) 17 g packet Take 17 g by mouth daily 30 each 0    metoprolol succinate (TOPROL XL) 25 MG extended release tablet Take 1 tablet by mouth every evening 30 tablet 0    amLODIPine (NORVASC) 5 MG tablet Take 1 tablet by mouth every morning 30 tablet 0    finasteride (PROSCAR) 5 MG tablet Take 1 tablet by mouth daily 30 tablet 0    pantoprazole (PROTONIX) 20 MG tablet Take 1 tablet by mouth daily 30 tablet 0    potassium chloride (KLOR-CON M) 20 MEQ extended release tablet Take 1 tablet by mouth daily 30 tablet 0    levothyroxine (SYNTHROID) 50 MCG tablet Take 1.5 tablets by mouth daily 90 tablet 1    furosemide (LASIX) 20 MG tablet Take 1 tablet by mouth daily (Patient taking differently: Take 20 mg by mouth daily Son reports pt takes every other day) 60 tablet 3    sertraline (ZOLOFT) 50 MG tablet Take 50 mg by mouth daily      ferrous sulfate (IRON 325) 325 (65 Fe) MG tablet Take 1 tablet by mouth daily (with breakfast) 90 tablet 1    docusate sodium (COLACE) 100 MG capsule Take 1 capsule by mouth daily With iron pill 90 capsule 1    temazepam (RESTORIL) 7.5 MG capsule Take 7.5 mg by mouth nightly as needed for Sleep.  QUEtiapine (SEROQUEL) 100 MG tablet Take 100 mg by mouth every evening      QUEtiapine (SEROQUEL) 50 MG tablet Take 50 mg by mouth 3 times daily       tamsulosin (FLOMAX) 0.4 MG capsule Take 1 capsule by mouth daily. (Patient taking differently: Take 0.8 mg by mouth daily 01/11/17 Pt to take 2- .04 mg capsules at bedtime) 30 capsule 12      Allergies   Allergen Reactions    Minocycline Other (See Comments)     No current facility-administered medications for this encounter.       Current Outpatient BP       Pulse       Resp       Temp       Temp src       SpO2       Weight       Height       Head Circumference       Peak Flow       Pain Score       Pain Loc       Pain Edu? Excl. in [address]? PHYSICAL EXAM:  Physical Exam  Vitals signs and nursing note reviewed. Constitutional:       General: He is not in acute distress. Appearance: Normal appearance. He is well-developed and well-groomed. He is not ill-appearing, toxic-appearing or diaphoretic. HENT:      Head: Normocephalic and atraumatic. Right Ear: External ear normal.      Left Ear: External ear normal.      Nose: No congestion or rhinorrhea. Mouth/Throat:      Pharynx: No oropharyngeal exudate or posterior oropharyngeal erythema. Eyes:      General: No scleral icterus. Right eye: No discharge. Left eye: No discharge. Extraocular Movements: Extraocular movements intact. Conjunctiva/sclera: Conjunctivae normal.      Pupils: Pupils are equal, round, and reactive to light. Neck:      Musculoskeletal: Full passive range of motion without pain and normal range of motion. Normal range of motion. No edema, erythema, neck rigidity, crepitus, injury, pain with movement or torticollis. Vascular: No JVD. Trachea: Phonation normal.   Cardiovascular:      Rate and Rhythm: Normal rate and regular rhythm. Pulses: Normal pulses. Heart sounds: Normal heart sounds. No murmur. No friction rub. No gallop. Pulmonary:      Effort: Pulmonary effort is normal. No respiratory distress. Breath sounds: Normal breath sounds. No stridor. No wheezing, rhonchi or rales. Abdominal:      General: Bowel sounds are normal. There is no distension. There are no signs of injury. Palpations: Abdomen is soft. There is no mass or pulsatile mass. Tenderness: There is abdominal tenderness. There is no guarding or rebound. Negative signs include Jose's sign, Rovsing's sign and McBurney's sign. Hernia: No hernia is present. Genitourinary:     Penis: Normal and circumcised. No phimosis, paraphimosis, hypospadias, erythema, discharge, swelling or lesions. Comments: Laureano catheter in place, nothing coming out  Musculoskeletal: Normal range of motion. General: Swelling present. No tenderness, deformity or signs of injury. Right lower leg: Edema present. Left lower leg: Edema present. Skin:     General: Skin is warm. Coloration: Skin is pale. Skin is not jaundiced. Findings: No bruising, erythema, lesion or rash. Neurological:      General: No focal deficit present. Mental Status: He is alert and oriented to person, place, and time. GCS: GCS eye subscore is 4. GCS verbal subscore is 5. GCS motor subscore is 6. Cranial Nerves: Cranial nerves are intact. No cranial nerve deficit, dysarthria or facial asymmetry. Sensory: Sensation is intact. No sensory deficit. Motor: Motor function is intact. No weakness, tremor, atrophy, abnormal muscle tone or seizure activity. Coordination: Coordination is intact. Coordination normal.   Psychiatric:         Mood and Affect: Mood normal.         Behavior: Behavior is cooperative. Thought Content:  Thought content normal.         Judgment: Judgment normal.           I have reviewed andinterpreted all of the currently available lab results from this visit (if applicable):    Results for orders placed or performed during the hospital encounter of 03/26/21   COVID-19, Rapid    Specimen: Nasopharyngeal   Result Value Ref Range    Source THROAT     SARS-CoV-2, NAAT NOT DETECTED    CBC Auto Differential   Result Value Ref Range    WBC 9.7 4.0 - 10.5 K/CU MM    RBC 2.79 (L) 4.6 - 6.2 M/CU MM    Hemoglobin 7.9 (L) 13.5 - 18.0 GM/DL    Hematocrit 25.8 (L) 42 - 52 %    MCV 92.5 78 - 100 FL    MCH 28.3 27 - 31 PG    MCHC 30.6 (L) 32.0 - 36.0 %    RDW 15.8 (H) 11.7 - 14.9 %    Platelets 033 118 - 835 K/CU MM    MPV 8.8 7.5 - 11.1 FL    Differential Type AUTOMATED DIFFERENTIAL     Segs Relative 79.3 (H) 36 - 66 %    Lymphocytes % 8.1 (L) 24 - 44 %    Monocytes % 8.7 (H) 0 - 4 %    Eosinophils % 1.8 0 - 3 %    Basophils % 0.3 0 - 1 %    Segs Absolute 7.7 K/CU MM    Lymphocytes Absolute 0.8 K/CU MM    Monocytes Absolute 0.8 K/CU MM    Eosinophils Absolute 0.2 K/CU MM    Basophils Absolute 0.0 K/CU MM    Nucleated RBC % 0.0 %    Total Nucleated RBC 0.0 K/CU MM    Total Immature Neutrophil 0.17 K/CU MM    Immature Neutrophil % 1.8 (H) 0 - 0.43 %   CMP   Result Value Ref Range    Sodium 136 135 - 145 MMOL/L    Potassium 4.4 3.5 - 5.1 MMOL/L    Chloride 104 99 - 110 mMol/L    CO2 24 21 - 32 MMOL/L    BUN 20 6 - 23 MG/DL    CREATININE 1.7 (H) 0.9 - 1.3 MG/DL    Glucose 129 (H) 70 - 99 MG/DL    Calcium 8.0 (L) 8.3 - 10.6 MG/DL    Albumin 2.4 (L) 3.4 - 5.0 GM/DL    Total Protein 5.5 (L) 6.4 - 8.2 GM/DL    Total Bilirubin 0.2 0.0 - 1.0 MG/DL    ALT 9 (L) 10 - 40 U/L    AST 15 15 - 37 IU/L    Alkaline Phosphatase 86 40 - 129 IU/L    GFR Non- 38 (L) >60 mL/min/1.73m2    GFR  46 (L) >60 mL/min/1.73m2    Anion Gap 8 4 - 16   Protime/INR & PTT   Result Value Ref Range    Protime 14.1 11.7 - 14.5 SECONDS    INR 1.16 INDEX    aPTT 37.8 (H) 25.1 - 37.1 SECONDS   Urinalysis   Result Value Ref Range    Color, UA RED (A) YELLOW    Clarity, UA SLIGHTLY CLOUDY (A) CLEAR    Glucose, Urine NEGATIVE NEGATIVE MG/DL    Bilirubin Urine NEGATIVE NEGATIVE MG/DL    Ketones, Urine NEGATIVE NEGATIVE MG/DL    Specific Gravity, UA 1.008 1.001 - 1.035    Blood, Urine LARGE (A) NEGATIVE    pH, Urine 6.0 5.0 - 8.0    Protein,  (A) NEGATIVE MG/DL    Urobilinogen, Urine NEGATIVE 0.2 - 1.0 MG/DL    Nitrite Urine, Quantitative NEGATIVE NEGATIVE    Leukocyte Esterase, Urine TRACE (A) NEGATIVE    RBC, UA 3,179 (H) 0 - 3 /HPF    WBC,  (H) 0 - 2 /HPF    Bacteria, UA NEGATIVE NEGATIVE /HPF    Trichomonas, UA NONE SEEN NONE SEEN right atrium. Right Ventricle  Essentially normal right ventricle. Aortic Valve  Sclerotic, but non-stenotic aortic valve. Trace aortic regurgitation is noted. Mitral Valve  Trace mitral regurgitation is present. Tricuspid Valve  Mild tricuspid regurgitation is present. Pulmonic Valve  The pulmonic valve was not well visualized. Pericardial Effusion  No evidence of any pericardial effusion.   M-Mode/2D Measurements & Calculations   LV Diastolic Dimension:  LV Systolic Dimension:  LA Dimension: 3.5 cmAO Root  4.93 cm                  3.56 cm                 Dimension: 4 cmLA Area:  LV FS:27.8 %             LV Volume Diastolic: 72 92.3 cm2  LV PW Diastolic: 9.05 cm ml  LV PW Systolic: 1.4 cm   LV Volume Systolic: 40  Septum Diastolic: 6.44   ml  cm                       LV EDV/LV EDV Index: 72 RV Diastolic Dimension:  Septum Systolic: 7.56 cm OI/18 Z8ND ESV/LV ESV   2.91 cm  CO: 7.97 l/min           Index: 40 ml/19 m2  CI: 3.83 l/m*m2          EF Calculated (A4C):    LA/Aorta: 0.88                           44.4 %                  Ascending Aorta: 3.7 cm  LV Area Diastolic: 74.9  EF Calculated (2D):     LA volume/Index: 49 ml  cm2                      53.5 %                  /57O4  LV Area Systolic: 18 cm2                           LV Length: 8.17 cm                            LVOT: 2.7 cm  Doppler Measurements & Calculations    AV Peak Velocity: 127 cm/s    LVOT Peak Velocity: 82.7 cm/s   AV Peak Gradient: 6.45 mmHg   LVOT Mean Velocity: 59.8 cm/s   AV Mean Velocity: 93.1 cm/s   LVOT Peak Gradient: 3 mmHgLVOT Mean Gradient:   AV Mean Gradient: 4 mmHg      2 mmHg   AV VTI: 21.5 cm               Estimated RVSP: 36 mmHg   AV Area (Continuity):3.86 cm2 Estimated RAP:3 mmHg    LVOT VTI: 14.5 cm                                 TR Velocity:286 cm/s   Estimated PASP: 35.72 mmHg    TR Gradient:32.72 mmHg      Xr Chest Portable    Result Date: 3/4/2021  EXAMINATION: ONE XRAY VIEW OF THE CHEST 3/4/2021 12:32 pm Initial FINDINGS: Lower Chest: There is an extremely large hiatal hernia, with organo-axial volvulus. The gastric fundus and a portion the body is now found inferior to the diaphragm on the left (previously those were superior), and now there is dilation of that portion of the stomach, which is concerning for entrapment. Note that the entirety of the hiatal hernia is not included. Organs: The liver enhances normally. Gallbladder is unremarkable. Normal arterial phase imaging of the spleen. Benign nodule the left adrenal gland measures 2.2 cm and is unchanged since 2009. Pancreas is unremarkable. GI/Bowel: Evaluation for GI bleeding is limited, as only an arterial phase was obtained (normally noncontrast and delayed images are also obtained for the purposes of localization of GI bleeding). That said, no suspicious extravasation of contrast is identified within the large bowel. There is mild diverticulosis of the large bowel. The appendix is normal. Again, there is the large hiatal hernia, with concern for entrapment of the gastric fundus and a portion of the body. The duodenal sweep and the remainder of the small bowel are unremarkable. 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 Urinary bladder appears thickened, which is likely secondary to outlet obstruction. There is moderate to marked prostatic hypertrophy. No free pelvic fluid. No pelvic sidewall lymphadenopathy. There is evidence of previous bilateral inguinal hernia repair. Peritoneum/Retroperitoneum: Abdominal aorta normal in caliber. No retroperitoneal lymphadenopathy is identified. No iliac chain or inguinal lymphadenopathy. 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 Additional pagetoid change is seen in the T11 vertebral body. No acute or suspicious bony abnormalities are detected. Very large hiatal hernia.   Again, a portion of the gastric fundus and gastric body have migrated inferior to the diaphragm, and there is dilation of that segment of the stomach. The findings are concerning for entrapment. Otherwise, no acute abnormality identified. Focal extravasation contrast into the bowel is not visualized, but again the evaluation is limited by a monophasic study. Moderate to marked prostatic hypertrophy. Urinary bladder mural thickening with urinary bladder diverticula. The thickening is likely secondary to bladder outlet obstruction. Xr Abdomen For Ng/og/ne Tube Placement    Result Date: 3/3/2021  EXAMINATION: ONE SUPINE XRAY VIEW(S) OF THE ABDOMEN 3/2/2021 11:58 pm COMPARISON: None. HISTORY: ORDERING SYSTEM PROVIDED HISTORY: Confirmation of course of NG/OG/NE tube and location of tip of tube TECHNOLOGIST PROVIDED HISTORY: Reason for exam:->Confirmation of course of NG/OG/NE tube and location of tip of tube Portable? ->Yes Reason for Exam: Confirmation of course of NG/OG/NE tube and location of tip of tube Acuity: Acute Type of Exam: Initial FINDINGS: The tip of the nasogastric tube is in the stomach. The side hole/port is near the expected location of the GE junction. There is marked elevation of the left hemidiaphragm. The nasogastric tube should be advanced 5 cm. EKG (if obtained): (All EKG's are interpreted by myself in the absence of a cardiologist)    MDM:    Patient here with Laureano catheter problem, hematuria. Again patient sent in by another physician who talk to me before patient's arrival patient has a history of prostate issues apparently has a Laureano catheter and is having difficulty draining having hematuria and anemia from this requiring multiple blood transfusions. Other physician did talk to patient's urologist today before patient's arrival patient is to be admitted and go to the OR with urology for ablation versus intervention to help stop hematuria, anemia requiring blood transfusions.   Patient is very hard of hearing he is ANO x3 but having no other complaints other than some lower abdominal pain and difficulty urinating hematuria we will check basic lab work we will try to irrigate Laureano, change catheter, Laureano will give him pain nausea medicine as needed vital signs are stable. I did order a rapid Covid test for operating room reasons, did talk to hospital medicine patient be admitted for observation and urology evaluation. I did talk to urology agree with plan admit to medicine and will need to go to the OR probably tomorrow otherwise stable admitted. Pending labs. Laureano catheter was exchanged by nursing staff. Now irrigating. Does have hematuria we will culture urine Covid test is negative hemoglobin is 7.9 stable admitted. I did talk to hospital medicine. CLINICAL IMPRESSION:  Final diagnoses:   Hematuria, unspecified type   Problem with Laureano catheter, initial encounter ([facility] [address]   Anemia, unspecified type       (Please note that portions of this note may have been completed with a voice recognition program. Efforts were made to edit the dictations but occasionally words aremis-transcribed.)    DISPOSITION REFERRAL (if applicable):  No follow-up provider specified.     DISPOSITION MEDICATIONS (if applicable):  New Prescriptions    No medications on file          Windy Bell, [facility],   03/26/21 7327

## 2021-03-27 LAB
ANION GAP SERPL CALCULATED.3IONS-SCNC: 7 MMOL/L (ref 4–16)
BASOPHILS ABSOLUTE: 0.1 K/CU MM
BASOPHILS RELATIVE PERCENT: 0.9 % (ref 0–1)
BUN BLDV-MCNC: 18 MG/DL (ref 6–23)
CALCIUM SERPL-MCNC: 7.8 MG/DL (ref 8.3–10.6)
CHLORIDE BLD-SCNC: 104 MMOL/L (ref 99–110)
CO2: 24 MMOL/L (ref 21–32)
CREAT SERPL-MCNC: 1.6 MG/DL (ref 0.9–1.3)
DIFFERENTIAL TYPE: ABNORMAL
EOSINOPHILS ABSOLUTE: 0.3 K/CU MM
EOSINOPHILS RELATIVE PERCENT: 4.9 % (ref 0–3)
GFR AFRICAN AMERICAN: 49 ML/MIN/1.73M2
GFR NON-AFRICAN AMERICAN: 41 ML/MIN/1.73M2
GLUCOSE BLD-MCNC: 111 MG/DL (ref 70–99)
HCT VFR BLD CALC: 23.4 % (ref 42–52)
HEMOGLOBIN: 7 GM/DL (ref 13.5–18)
IMMATURE NEUTROPHIL %: 1.2 % (ref 0–0.43)
LYMPHOCYTES ABSOLUTE: 0.6 K/CU MM
LYMPHOCYTES RELATIVE PERCENT: 9 % (ref 24–44)
MCH RBC QN AUTO: 28.5 PG (ref 27–31)
MCHC RBC AUTO-ENTMCNC: 29.9 % (ref 32–36)
MCV RBC AUTO: 95.1 FL (ref 78–100)
MONOCYTES ABSOLUTE: 0.7 K/CU MM
MONOCYTES RELATIVE PERCENT: 9.9 % (ref 0–4)
NUCLEATED RBC %: 0 %
PDW BLD-RTO: 15.9 % (ref 11.7–14.9)
PLATELET # BLD: 253 K/CU MM (ref 140–440)
PMV BLD AUTO: 8.9 FL (ref 7.5–11.1)
POTASSIUM SERPL-SCNC: 4 MMOL/L (ref 3.5–5.1)
RBC # BLD: 2.46 M/CU MM (ref 4.6–6.2)
SEGMENTED NEUTROPHILS ABSOLUTE COUNT: 4.9 K/CU MM
SEGMENTED NEUTROPHILS RELATIVE PERCENT: 74.1 % (ref 36–66)
SODIUM BLD-SCNC: 135 MMOL/L (ref 135–145)
TOTAL IMMATURE NEUTOROPHIL: 0.08 K/CU MM
TOTAL NUCLEATED RBC: 0 K/CU MM
WBC # BLD: 6.6 K/CU MM (ref 4–10.5)

## 2021-03-27 PROCEDURE — 1200000000 HC SEMI PRIVATE

## 2021-03-27 PROCEDURE — 94761 N-INVAS EAR/PLS OXIMETRY MLT: CPT

## 2021-03-27 PROCEDURE — 86900 BLOOD TYPING SEROLOGIC ABO: CPT

## 2021-03-27 PROCEDURE — P9016 RBC LEUKOCYTES REDUCED: HCPCS

## 2021-03-27 PROCEDURE — 80048 BASIC METABOLIC PNL TOTAL CA: CPT

## 2021-03-27 PROCEDURE — 86901 BLOOD TYPING SEROLOGIC RH(D): CPT

## 2021-03-27 PROCEDURE — 2580000003 HC RX 258: Performed by: INTERNAL MEDICINE

## 2021-03-27 PROCEDURE — 36415 COLL VENOUS BLD VENIPUNCTURE: CPT

## 2021-03-27 PROCEDURE — 85025 COMPLETE CBC W/AUTO DIFF WBC: CPT

## 2021-03-27 PROCEDURE — 97116 GAIT TRAINING THERAPY: CPT

## 2021-03-27 PROCEDURE — 97530 THERAPEUTIC ACTIVITIES: CPT

## 2021-03-27 PROCEDURE — 86922 COMPATIBILITY TEST ANTIGLOB: CPT

## 2021-03-27 PROCEDURE — 6370000000 HC RX 637 (ALT 250 FOR IP): Performed by: INTERNAL MEDICINE

## 2021-03-27 PROCEDURE — 97163 PT EVAL HIGH COMPLEX 45 MIN: CPT

## 2021-03-27 PROCEDURE — 6360000002 HC RX W HCPCS: Performed by: INTERNAL MEDICINE

## 2021-03-27 PROCEDURE — 86850 RBC ANTIBODY SCREEN: CPT

## 2021-03-27 RX ORDER — FUROSEMIDE 10 MG/ML
20 INJECTION INTRAMUSCULAR; INTRAVENOUS ONCE
Status: COMPLETED | OUTPATIENT
Start: 2021-03-27 | End: 2021-03-27

## 2021-03-27 RX ORDER — CALCIUM CARBONATE 200(500)MG
500 TABLET,CHEWABLE ORAL EVERY 6 HOURS PRN
Status: DISCONTINUED | OUTPATIENT
Start: 2021-03-27 | End: 2021-04-09 | Stop reason: HOSPADM

## 2021-03-27 RX ORDER — SODIUM CHLORIDE 9 MG/ML
INJECTION, SOLUTION INTRAVENOUS PRN
Status: DISCONTINUED | OUTPATIENT
Start: 2021-03-27 | End: 2021-04-01

## 2021-03-27 RX ADMIN — TAMSULOSIN HYDROCHLORIDE 0.8 MG: 0.4 CAPSULE ORAL at 11:11

## 2021-03-27 RX ADMIN — FAMOTIDINE 20 MG: 20 TABLET ORAL at 20:41

## 2021-03-27 RX ADMIN — FUROSEMIDE 20 MG: 20 TABLET ORAL at 13:25

## 2021-03-27 RX ADMIN — POTASSIUM CHLORIDE 20 MEQ: 1500 TABLET, EXTENDED RELEASE ORAL at 11:05

## 2021-03-27 RX ADMIN — SERTRALINE HYDROCHLORIDE 50 MG: 50 TABLET ORAL at 11:11

## 2021-03-27 RX ADMIN — FINASTERIDE 5 MG: 5 TABLET, FILM COATED ORAL at 13:26

## 2021-03-27 RX ADMIN — FERROUS SULFATE TAB 325 MG (65 MG ELEMENTAL FE) 325 MG: 325 (65 FE) TAB at 10:59

## 2021-03-27 RX ADMIN — QUETIAPINE FUMARATE 50 MG: 25 TABLET ORAL at 20:40

## 2021-03-27 RX ADMIN — QUETIAPINE FUMARATE 50 MG: 25 TABLET ORAL at 15:59

## 2021-03-27 RX ADMIN — PANTOPRAZOLE SODIUM 20 MG: 20 TABLET, DELAYED RELEASE ORAL at 11:08

## 2021-03-27 RX ADMIN — DOCUSATE SODIUM 100 MG: 100 CAPSULE, LIQUID FILLED ORAL at 11:04

## 2021-03-27 RX ADMIN — FUROSEMIDE 20 MG: 10 INJECTION, SOLUTION INTRAVENOUS at 16:01

## 2021-03-27 RX ADMIN — LEVOTHYROXINE SODIUM 75 MCG: 0.07 TABLET ORAL at 11:08

## 2021-03-27 RX ADMIN — QUETIAPINE FUMARATE 50 MG: 25 TABLET ORAL at 11:12

## 2021-03-27 RX ADMIN — METOPROLOL SUCCINATE 25 MG: 25 TABLET, EXTENDED RELEASE ORAL at 17:39

## 2021-03-27 RX ADMIN — AMLODIPINE BESYLATE 5 MG: 5 TABLET ORAL at 11:03

## 2021-03-27 RX ADMIN — SODIUM CHLORIDE, PRESERVATIVE FREE 10 ML: 5 INJECTION INTRAVENOUS at 20:41

## 2021-03-27 RX ADMIN — SODIUM CHLORIDE 25 ML: 9 INJECTION, SOLUTION INTRAVENOUS at 11:32

## 2021-03-27 RX ADMIN — FAMOTIDINE 20 MG: 20 TABLET ORAL at 11:04

## 2021-03-27 RX ADMIN — SODIUM CHLORIDE: 9 INJECTION, SOLUTION INTRAVENOUS at 09:06

## 2021-03-27 NOTE — PROGRESS NOTES
Physical Therapy    Facility/Department: [facility] 1N  Initial Assessment    NAME: Meliza Patton  : 10/18/1930  MRN: [id number]    Date of Service: 3/27/2021    Discharge Recommendations:  Subacute/Skilled Nursing Facility        Assessment   Assessment: Pt is a 80 y.o. male with medical history, surgical history, co-morbidities, and personal factors including Anemia, Anxiety, BPH, BPH with urinary obstruction, Depression, GERD (gastroesophageal reflux disease), Hemorrhoids, Hepatitis, Hernia of unspecified site of abdominal cavity without mention of obstruction or gangrene, Hyperlipidemia, Insomnia, SCC (squamous cell carcinoma), Neck surgery; Cataract removal; hernia repair; and hiatal hernia repair (N/A, 3/4/2021) with admission for Hematuria, Problem with Laureano catheter, and Anemia. Prior to admission, pt was modified independent with functional mobility and ADLs. Examination of body systems reveals decreased strength, decreased balance, decreased aerobic capacity, and decreased independence with functional mobility.          Prognosis: Good  Decision Making: High Complexity  Clinical Presentation: unpredictable characteristics  PT Education: Goals;PT Role;General Safety;Plan of Care;Gait Training;Functional Mobility Training;Equipment;Transfer Training;Orientation  REQUIRES PT FOLLOW UP: Yes  Activity Tolerance  Activity Tolerance: Patient Tolerated treatment well         Restrictions  Restrictions/Precautions  Restrictions/Precautions: General Precautions, Fall Risk  Vision/Hearing  Vision: Within Functional Limits  Hearing: Exceptions to [facility]  Hearing Exceptions: Hard of hearing/hearing concerns     Subjective  General  Chart Reviewed: Yes  Patient assessed for rehabilitation services?: Yes  Family / Caregiver Present: No  Follows Commands: Within Functional Limits  Pain Screening  Patient Currently in Pain: Denies  Pain Assessment  Pain Assessment: 0-10  Pain Level: 0  Vital Signs  Patient Currently in Pain: Denies       Orientation  Orientation  Overall Orientation Status: Impaired  Orientation Level: Disoriented to time;Oriented to person  Social/Functional History  Social/Functional History  Lives With: Spouse  Type of Home: House  Home Layout: One level  Home Access: Level entry  Bathroom Shower/Tub: Walk-in shower  Bathroom Toilet: Handicap height  Bathroom Equipment: Grab bars in shower, Shower chair  Home Equipment: Rolling walker, Cane  ADL Assistance: Independent  Homemaking Assistance: Needs assistance  Ambulation Assistance: Independent  Transfer Assistance: Independent  Active : Yes  Cognition   Cognition  Overall Cognitive Status: Exceptions  Arousal/Alertness: Appropriate responses to stimuli  Following Commands:  Follows all commands without difficulty  Attention Span: Appears intact  Safety Judgement: Decreased awareness of need for safety;Decreased awareness of need for assistance  Problem Solving: Decreased awareness of errors  Insights: Decreased awareness of deficits  Initiation: Requires cues for some  Sequencing: Requires cues for some    Objective             Strength RLE  Comment: knee extension: at least 3+/5 observed functionally  Strength LLE  Comment: knee extension: at least 3+/5 observed functionally     Sensation  Overall Sensation Status: WNL  Bed mobility  Supine to Sit: Minimal assistance(with verbal and tactile cues for BUE and BLE placement throughout transfer; wtih HOB elevated; with increased time for task completion)  Transfers  Sit to Stand: Minimal Assistance(with verbal cues to push through bed and avoid pulling on walker)  Stand to sit: Minimal Assistance(with verbal cues to feel chair against back of legs, reach back, and sit slowly)  Ambulation  Ambulation?: Yes  Ambulation 1  Surface: level tile  Device: Rolling Walker  Assistance: Minimal assistance  Quality of Gait: decreased gait speed, decreased step length bilaterally, unsteady gait  Distance: 5 feet  Comments: with verbal and tactile cues for BLE placement, walker placement, and sequence throughout ambulation; with verbal and tactile cues to maintain upright posture in order to avoid COM shifting outside of PEDRO     Balance  Posture: Fair(forward head, rounded shoulders)  Sitting - Static: Good  Sitting - Dynamic: Good  Standing - Static: Poor;+  Standing - Dynamic: Poor;+        Gait Training:  Cues were given for safety, sequence, device management, balance, posture, awareness, path. Therapeutic Activity Training:   Therapeutic activity training was instructed today. Cues were given for safety, sequence, UE/LE placement, awareness, and balance. Activities performed today included bed mobility training, sup-sit, sit-stand. Increased time required for all task completion. Plan   Plan  Times per week: 3+  Current Treatment Recommendations: Strengthening, ROM, Balance Training, Functional Mobility Training, Transfer Training, ADL/Self-care Training, Gait Training, Patient/Caregiver Education & Training, IADL Training, Stair training, Equipment Evaluation, Education, & procurement, Neuromuscular Re-education, Pain Management, Home Exercise Program, Positioning, Endurance Training, Safety Education & Training, Cognitive/Perceptual Training  Safety Devices  Type of devices: All fall risk precautions in place, Left in chair, Call light within reach, Chair alarm in place, Nurse notified, Gait belt, Patient at risk for falls      AM-PAC Score  AM-PAC Inpatient Mobility Raw Score : 16 (03/27/21 1503)  AM-PAC Inpatient T-Scale Score : 40.78 (03/27/21 1503)  Mobility Inpatient CMS 0-100% Score: 54.16 (03/27/21 1503)  Mobility Inpatient CMS G-Code Modifier : CK (03/27/21 1503)          Goals  Long term goals  Time Frame for Long term goals :  In one week:  Long term goal 1: Pt will complete all bed mobility with SBAx1  Long term goal 2: Pt will complete sit <> stand transfers with SBAx1  Long term goal 3: Pt will ambulate 75 feet with SBAx1 with LRAD  Long term goal 4: Pt will independently complete 3 sets of 10 reps of BLE AROM exercises in available and allowed ROM       Time In: 1122  Time Out: 1216  Total Treatment Time: 54  Timed Code Treatment Minutes: 300 [facility] Chema Martínez PT, DPT  License #: [id number]

## 2021-03-27 NOTE — PLAN OF CARE
Problem: Falls - Risk of:  Goal: Will remain free from falls  Description: Will remain free from falls  Outcome: Ongoing  Goal: Absence of physical injury  Description: Absence of physical injury  Outcome: Ongoing     Problem: Safety:  Goal: Free from accidental physical injury  Description: Free from accidental physical injury  Outcome: Ongoing  Goal: Free from intentional harm  Description: Free from intentional harm  Outcome: Ongoing     Problem: Daily Care:  Goal: Daily care needs are met  Description: Daily care needs are met  Outcome: Ongoing     Problem: Discharge Planning:  Goal: Patients continuum of care needs are met  Description: Patients continuum of care needs are met  Outcome: Ongoing

## 2021-03-27 NOTE — CONSULTS
[facility] [address]   Consult Note  [facility] 1 2 3 4 5    Date: 3/27/2021   Patient: Aldair Schroeder   : 10/18/1930   DOA: 3/26/2021   MRN: [id number]   ROOM#: [id number]     Reason for Consult:  Hematuria  Requesting Physician:  Dr. Luther Arnett  Collaborating Urologist on Call at time of admission: Dr. Medeiros Post: Blood clots in catheter    History Obtained From:  patient, electronic medical record    HISTORY OF PRESENT ILLNESS:                The patient is a 80 y.o. male with significant past medical history of BPH, Hepatitis and CKD who presented with blood clots in his catheter and persistent hematuria x2 weeks. A 28fr 3-way catheter was placed in the ED and pt started on CBI, urine cherry red with multiple clots returned with manual irrigated. Pt with anemia requring several PRBC transfusions in the passed week. Pt is currently comfortable. Urine cherry red on slow flow CBI. I manually irrigated catheter with 2 large clots returned. Urine clear on high flow CBI. Will continue to monitor conservatively. Pt may require cystoscopy/bladder fulguration during this admission. Discussed plan with son Triston Swanson ([phone]). ED Provider's HPI 3/26/21: Aldair Schroeder is a 80 y.o. male that presents with complaint of Laureano catheter problem, hematuria, anemia. Patient has a history of this has a catheter and he states because he has prostate issues. He does see urology. Patient is very hard of hearing but denies other complaints. He was sent in today by another hospital, physician who called me before arrival she had talked to patient's urologist patient to be admitted here and to go to surgery with urology for ablation, intervention for continued hematuria anemia requiring blood transfusions despite Laureano catheter. No other questions or concerns patient otherwise stable.     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    Rt Tricep, Lt Superior Forearm     Past Surgical History:        Procedure Laterality Date    CATARACT REMOVAL      HERNIA REPAIR      HIATAL HERNIA REPAIR N/A 3/4/2021    LAPAROSCOPIC LARGE HERNIA HIATAL REPAIR WITH GASTRIC OBSTRUCTION POSS OPEN performed by Abrahan Daly MD at [address] [facility]       Current Medications:   Current Facility-Administered Medications: 0.9 % sodium chloride infusion, , Intravenous, PRN  furosemide (LASIX) injection 20 mg, 20 mg, Intravenous, Once  amLODIPine (NORVASC) tablet 5 mg, 5 mg, Oral, QAM  docusate sodium (COLACE) capsule 100 mg, 100 mg, Oral, Daily  ferrous sulfate (IRON 325) tablet 325 mg, 325 mg, Oral, Daily with breakfast  finasteride (PROSCAR) tablet 5 mg, 5 mg, Oral, Daily  furosemide (LASIX) tablet 20 mg, 20 mg, Oral, Daily  levothyroxine (SYNTHROID) tablet 75 mcg, 75 mcg, Oral, Daily  metoprolol succinate (TOPROL XL) extended release tablet 25 mg, 25 mg, Oral, QPM  pantoprazole (PROTONIX) tablet 20 mg, 20 mg, Oral, Daily  potassium chloride (KLOR-CON M) extended release tablet 20 mEq, 20 mEq, Oral, Daily  QUEtiapine (SEROQUEL) tablet 50 mg, 50 mg, Oral, TID  tamsulosin (FLOMAX) capsule 0.8 mg, 0.8 mg, Oral, Daily  LORazepam (ATIVAN) tablet 0.5 mg, 0.5 mg, Oral, Nightly PRN  sertraline (ZOLOFT) tablet 50 mg, 50 mg, Oral, Daily  sodium chloride flush 0.9 % injection 10 mL, 10 mL, Intravenous, 2 times per day  sodium chloride flush 0.9 % injection 10 mL, 10 mL, Intravenous, PRN  0.9 % sodium chloride infusion, 25 mL, Intravenous, PRN  enoxaparin (LOVENOX) injection 40 mg, 40 mg, Subcutaneous, Daily  promethazine (PHENERGAN) tablet 12.5 mg, 12.5 mg, Oral, Q6H PRN **OR** ondansetron (ZOFRAN) injection 4 mg, 4 mg, Intravenous, Q6H PRN  polyethylene glycol (GLYCOLAX) packet 17 g, 17 g, Oral, Daily PRN  famotidine (PEPCID) tablet 20 mg, 20 mg, Oral, BID  acetaminophen (TYLENOL) tablet 650 mg, 650 mg, Oral, Q6H PRN **OR** acetaminophen (TYLENOL) suppository 650 mg, 650 mg, Rectal, Q6H PRN  potassium chloride (KLOR-CON M) extended release tablet 40 mEq, 40 mEq, Oral, PRN **OR** potassium bicarb-citric acid (EFFER-K) effervescent tablet 40 mEq, 40 mEq, Oral, PRN **OR** potassium chloride 10 mEq/100 mL IVPB (Peripheral Line), 10 mEq, Intravenous, PRN  0.9 % sodium chloride infusion, , Intravenous, Continuous  QUEtiapine (SEROQUEL) tablet 100 mg, 100 mg, Oral, Nightly PRN    Allergies:  Minocycline    Social History:   TOBACCO:   reports that he quit smoking about 65 years ago. His smoking use included cigarettes. He started smoking about 70 years ago. He has a 5.00 pack-year smoking history. He has never used smokeless tobacco.  ETOH:   reports previous alcohol use. DRUGS:   reports previous drug use. Family History:       Problem Relation Age of Onset    Depression Mother     COPD Father     Diabetes Brother     Diabetes Maternal Grandmother        REVIEW OF SYSTEMS:     CONSTITUTIONAL:  negative  RESPIRATORY:  negative  CARDIOVASCULAR:  negative  GASTROINTESTINAL:  negative  GENITOURINARY:  positive for hesitancy, decreased stream and hematuria    PHYSICAL EXAM:      VITALS:  BP (!) 125/59   Pulse 89   Temp 98.6 °F (37 °C) (Oral)   Resp 16   Ht 6' (1.829 m)   Wt 178 lb (80.7 kg)   SpO2 97%   BMI 24.14 kg/m²      TEMPERATURE:  Current - Temp: 98.6 °F (37 °C);  Max - Temp  Av.4 °F (36.9 °C)  Min: 98.1 °F (36.7 °C)  Max: 98.6 °F (37 °C)  24HR BLOOD PRESSURE RANGE:  Systolic (48YUO), OXX:829 , Min:120 , RUT:677   ; Diastolic (78TDN), FGU:33, Min:59, Max:68      General appearance: alert, appears stated age, cooperative and no distress  Head: Normocephalic, without obvious ventricular systolic function is low normal.  Ejection fraction is visually estimated at 50%. Sclerotic, but non-stenotic aortic valve. Trace aortic regurgitation is noted. No evidence of any pericardial effusion. Signature   ------------------------------------------------------------------  Electronically signed by Elvis Patrick MD (Interpreting  physician) on 03/03/2021 at 11:18 AM  ------------------------------------------------------------------   Findings   Left Ventricle  Left ventricular systolic function is low normal.  Ejection fraction is visually estimated at 50%. Left Atrium  Essentially normal left atrium. Right Atrium  Essentially normal right atrium. Right Ventricle  Essentially normal right ventricle. Aortic Valve  Sclerotic, but non-stenotic aortic valve. Trace aortic regurgitation is noted. Mitral Valve  Trace mitral regurgitation is present. Tricuspid Valve  Mild tricuspid regurgitation is present. Pulmonic Valve  The pulmonic valve was not well visualized. Pericardial Effusion  No evidence of any pericardial effusion.   M-Mode/2D Measurements & Calculations   LV Diastolic Dimension:  LV Systolic Dimension:  LA Dimension: 3.5 cmAO Root  4.93 cm                  3.56 cm                 Dimension: 4 cmLA Area:  LV FS:27.8 %             LV Volume Diastolic: 72 22.6 cm2  LV PW Diastolic: 1.38 cm ml  LV PW Systolic: 1.4 cm   LV Volume Systolic: 40  Septum Diastolic: 8.55   ml  cm                       LV EDV/LV EDV Index: 72 RV Diastolic Dimension:  Septum Systolic: 5.65 cm PP/42 H3KO ESV/LV ESV   2.91 cm  CO: 7.97 l/min           Index: 40 ml/19 m2  CI: 3.83 l/m*m2          EF Calculated (A4C):    LA/Aorta: 0.88                           44.4 %                  Ascending Aorta: 3.7 cm  LV Area Diastolic: 12.1  EF Calculated (2D):     LA volume/Index: 49 ml  cm2                      53.5 %                  /87X2  LV Area Systolic: 18 cm2                           LV Length: 8.17 cm                            LVOT: 2.7 cm  Doppler Measurements & Calculations    AV Peak Velocity: 127 cm/s    LVOT Peak Velocity: 82.7 cm/s   AV Peak Gradient: 6.45 mmHg   LVOT Mean Velocity: 59.8 cm/s   AV Mean Velocity: 93.1 cm/s   LVOT Peak Gradient: 3 mmHgLVOT Mean Gradient:   AV Mean Gradient: 4 mmHg      2 mmHg   AV VTI: 21.5 cm               Estimated RVSP: 36 mmHg   AV Area (Continuity):3.86 cm2 Estimated RAP:3 mmHg    LVOT VTI: 14.5 cm                                 TR Velocity:286 cm/s   Estimated PASP: 35.72 mmHg    TR Gradient:32.72 mmHg      Xr Chest Portable    Result Date: 3/4/2021  EXAMINATION: ONE XRAY VIEW OF THE CHEST 3/4/2021 12:32 pm COMPARISON: March 2, 2021 HISTORY: ORDERING SYSTEM PROVIDED HISTORY: post operative hiatal hernia repair TECHNOLOGIST PROVIDED HISTORY: Reason for exam:->post operative hiatal hernia repair Reason for Exam: post operative hiatal hernia repair FINDINGS: Nasogastric tube with its distal tip coursing below the diaphragm. Stable cardiomediastinal silhouette. Bibasilar atelectasis or infiltrate is noted. No definite pleural effusion or pneumothorax. The osseous structures are stable. Bibasilar atelectasis or infiltrate. Xr Chest Portable    Result Date: 3/2/2021  EXAMINATION: ONE XRAY VIEW OF THE CHEST 3/2/2021 6:43 pm COMPARISON: 01/30/2018 HISTORY: ORDERING SYSTEM PROVIDED HISTORY: Chest pain TECHNOLOGIST PROVIDED HISTORY: Reason for exam:->Chest pain Reason for Exam: Chest pain Acuity: Acute Type of Exam: Initial Additional signs and symptoms: na Relevant Medical/Surgical History: na FINDINGS: The heart size is stable. Large hiatal hernia again noted. Probable subsegmental atelectasis left lung base. Linear opacity right lung base unchanged. No pneumothorax. Increased size of a large hiatal hernia.   Otherwise, unchanged chest     Cta Abdomen Pelvis W Contrast    Result Date: 3/2/2021  EXAMINATION: CTA OF THE ABDOMEN AND PELVIS WITH CONTRAST outlet obstruction. There is moderate to marked prostatic hypertrophy. No free pelvic fluid. No pelvic sidewall lymphadenopathy. There is evidence of previous bilateral inguinal hernia repair. Peritoneum/Retroperitoneum: Abdominal aorta normal in caliber. No retroperitoneal lymphadenopathy is identified. No iliac chain or inguinal lymphadenopathy. 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 Additional pagetoid change is seen in the T11 vertebral body. No acute or suspicious bony abnormalities are detected. Very large hiatal hernia. Again, a portion of the gastric fundus and gastric body have migrated inferior to the diaphragm, and there is dilation of that segment of the stomach. The findings are concerning for entrapment. Otherwise, no acute abnormality identified. Focal extravasation contrast into the bowel is not visualized, but again the evaluation is limited by a monophasic study. Moderate to marked prostatic hypertrophy. Urinary bladder mural thickening with urinary bladder diverticula. The thickening is likely secondary to bladder outlet obstruction. Xr Abdomen For Ng/og/ne Tube Placement    Result Date: 3/3/2021  EXAMINATION: ONE SUPINE XRAY VIEW(S) OF THE ABDOMEN 3/2/2021 11:58 pm COMPARISON: None. HISTORY: ORDERING SYSTEM PROVIDED HISTORY: Confirmation of course of NG/OG/NE tube and location of tip of tube TECHNOLOGIST PROVIDED HISTORY: Reason for exam:->Confirmation of course of NG/OG/NE tube and location of tip of tube Portable? ->Yes Reason for Exam: Confirmation of course of NG/OG/NE tube and location of tip of tube Acuity: Acute Type of Exam: Initial FINDINGS: The tip of the nasogastric tube is in the stomach. The side hole/port is near the expected location of the GE junction. There is marked elevation of the left hemidiaphragm. The nasogastric tube should be advanced 5 cm.        Assessment & Plan:      Vijay Spatz is a 80

## 2021-03-27 NOTE — PROGRESS NOTES
ml      Vitals:   Vitals:    03/27/21 0509   BP: (!) 125/59   Pulse: 89   Resp: 16   Temp: 98.6 °F (37 °C)   SpO2: 97%     Physical Exam:    GEN Awake male, resting in bed in no apparent distress. Appears given age. HENT Mucous membranes are moist.   RESP Clear to auscultation, no wheezes, rales or rhonchi. CARDIO/VASC -S1/S2 auscultated. Regular rate without appreciable murmurs, rubs, or gallops. Peripheral pulses equal bilaterally and palpable. No peripheral edema. GI Abdomen is soft without significant tenderness, masses, or guarding. Bowel sounds are normoactive. Rectal exam deferred.  Laureano catheter is present.   Annetta-colored urine in the bag    Medications:   Medications:    furosemide  20 mg Intravenous Once    amLODIPine  5 mg Oral QAM    docusate sodium  100 mg Oral Daily    ferrous sulfate  325 mg Oral Daily with breakfast    finasteride  5 mg Oral Daily    furosemide  20 mg Oral Daily    levothyroxine  75 mcg Oral Daily    metoprolol succinate  25 mg Oral QPM    pantoprazole  20 mg Oral Daily    potassium chloride  20 mEq Oral Daily    QUEtiapine  50 mg Oral TID    tamsulosin  0.8 mg Oral Daily    sertraline  50 mg Oral Daily    sodium chloride flush  10 mL Intravenous 2 times per day    enoxaparin  40 mg Subcutaneous Daily    famotidine  20 mg Oral BID      Infusions:    sodium chloride      sodium chloride      sodium chloride 75 mL/hr at 03/26/21 2026     PRN Meds: sodium chloride, , PRN  LORazepam, 0.5 mg, Nightly PRN  sodium chloride flush, 10 mL, PRN  sodium chloride, 25 mL, PRN  promethazine, 12.5 mg, Q6H PRN    Or  ondansetron, 4 mg, Q6H PRN  polyethylene glycol, 17 g, Daily PRN  acetaminophen, 650 mg, Q6H PRN    Or  acetaminophen, 650 mg, Q6H PRN  potassium chloride, 40 mEq, PRN    Or  potassium alternative oral replacement, 40 mEq, PRN    Or  potassium chloride, 10 mEq, PRN  QUEtiapine, 100 mg, Nightly PRN          Electronically signed by Allison Rdz MD on

## 2021-03-28 ENCOUNTER — APPOINTMENT (OUTPATIENT)
Dept: ULTRASOUND IMAGING | Age: 86
DRG: 666 | End: 2021-03-28
Payer: MEDICARE

## 2021-03-28 LAB
ABO/RH: NORMAL
ANION GAP SERPL CALCULATED.3IONS-SCNC: 9 MMOL/L (ref 4–16)
ANTIBODY SCREEN: NEGATIVE
BASOPHILS ABSOLUTE: 0 K/CU MM
BASOPHILS RELATIVE PERCENT: 0.6 % (ref 0–1)
BUN BLDV-MCNC: 19 MG/DL (ref 6–23)
CALCIUM SERPL-MCNC: 8.3 MG/DL (ref 8.3–10.6)
CHLORIDE BLD-SCNC: 103 MMOL/L (ref 99–110)
CO2: 26 MMOL/L (ref 21–32)
COMPONENT: NORMAL
CREAT SERPL-MCNC: 1.7 MG/DL (ref 0.9–1.3)
CROSSMATCH RESULT: NORMAL
CULTURE: NORMAL
DIFFERENTIAL TYPE: ABNORMAL
EOSINOPHILS ABSOLUTE: 0.3 K/CU MM
EOSINOPHILS RELATIVE PERCENT: 3.5 % (ref 0–3)
GFR AFRICAN AMERICAN: 46 ML/MIN/1.73M2
GFR NON-AFRICAN AMERICAN: 38 ML/MIN/1.73M2
GLUCOSE BLD-MCNC: 112 MG/DL (ref 70–99)
HCT VFR BLD CALC: 25.6 % (ref 42–52)
HEMOGLOBIN: 8 GM/DL (ref 13.5–18)
IMMATURE NEUTROPHIL %: 1 % (ref 0–0.43)
LYMPHOCYTES ABSOLUTE: 0.6 K/CU MM
LYMPHOCYTES RELATIVE PERCENT: 8.5 % (ref 24–44)
Lab: NORMAL
MCH RBC QN AUTO: 28.7 PG (ref 27–31)
MCHC RBC AUTO-ENTMCNC: 31.3 % (ref 32–36)
MCV RBC AUTO: 91.8 FL (ref 78–100)
MONOCYTES ABSOLUTE: 0.6 K/CU MM
MONOCYTES RELATIVE PERCENT: 7.8 % (ref 0–4)
NUCLEATED RBC %: 0 %
PDW BLD-RTO: 15.3 % (ref 11.7–14.9)
PLATELET # BLD: 271 K/CU MM (ref 140–440)
PMV BLD AUTO: 9 FL (ref 7.5–11.1)
POTASSIUM SERPL-SCNC: 4.3 MMOL/L (ref 3.5–5.1)
RBC # BLD: 2.79 M/CU MM (ref 4.6–6.2)
SEGMENTED NEUTROPHILS ABSOLUTE COUNT: 5.7 K/CU MM
SEGMENTED NEUTROPHILS RELATIVE PERCENT: 78.6 % (ref 36–66)
SODIUM BLD-SCNC: 138 MMOL/L (ref 135–145)
SPECIMEN: NORMAL
STATUS: NORMAL
TOTAL IMMATURE NEUTOROPHIL: 0.07 K/CU MM
TOTAL NUCLEATED RBC: 0 K/CU MM
TRANSFUSION STATUS: NORMAL
UNIT DIVISION: 0
UNIT NUMBER: NORMAL
WBC # BLD: 7.2 K/CU MM (ref 4–10.5)

## 2021-03-28 PROCEDURE — 76770 US EXAM ABDO BACK WALL COMP: CPT

## 2021-03-28 PROCEDURE — 80048 BASIC METABOLIC PNL TOTAL CA: CPT

## 2021-03-28 PROCEDURE — 1200000000 HC SEMI PRIVATE

## 2021-03-28 PROCEDURE — 36415 COLL VENOUS BLD VENIPUNCTURE: CPT

## 2021-03-28 PROCEDURE — 6370000000 HC RX 637 (ALT 250 FOR IP): Performed by: INTERNAL MEDICINE

## 2021-03-28 PROCEDURE — 94761 N-INVAS EAR/PLS OXIMETRY MLT: CPT

## 2021-03-28 PROCEDURE — 97535 SELF CARE MNGMENT TRAINING: CPT

## 2021-03-28 PROCEDURE — 2580000003 HC RX 258: Performed by: INTERNAL MEDICINE

## 2021-03-28 PROCEDURE — 85025 COMPLETE CBC W/AUTO DIFF WBC: CPT

## 2021-03-28 PROCEDURE — 97166 OT EVAL MOD COMPLEX 45 MIN: CPT

## 2021-03-28 RX ADMIN — SODIUM CHLORIDE, PRESERVATIVE FREE 10 ML: 5 INJECTION INTRAVENOUS at 20:54

## 2021-03-28 RX ADMIN — TAMSULOSIN HYDROCHLORIDE 0.8 MG: 0.4 CAPSULE ORAL at 12:09

## 2021-03-28 RX ADMIN — FINASTERIDE 5 MG: 5 TABLET, FILM COATED ORAL at 12:08

## 2021-03-28 RX ADMIN — QUETIAPINE FUMARATE 50 MG: 25 TABLET ORAL at 20:54

## 2021-03-28 RX ADMIN — FAMOTIDINE 20 MG: 20 TABLET ORAL at 12:07

## 2021-03-28 RX ADMIN — FAMOTIDINE 20 MG: 20 TABLET ORAL at 20:53

## 2021-03-28 RX ADMIN — QUETIAPINE FUMARATE 50 MG: 25 TABLET ORAL at 16:09

## 2021-03-28 RX ADMIN — FERROUS SULFATE TAB 325 MG (65 MG ELEMENTAL FE) 325 MG: 325 (65 FE) TAB at 12:07

## 2021-03-28 RX ADMIN — PANTOPRAZOLE SODIUM 20 MG: 20 TABLET, DELAYED RELEASE ORAL at 12:15

## 2021-03-28 RX ADMIN — AMLODIPINE BESYLATE 5 MG: 5 TABLET ORAL at 12:06

## 2021-03-28 RX ADMIN — POTASSIUM CHLORIDE 20 MEQ: 1500 TABLET, EXTENDED RELEASE ORAL at 12:08

## 2021-03-28 RX ADMIN — SODIUM CHLORIDE, PRESERVATIVE FREE 10 ML: 5 INJECTION INTRAVENOUS at 12:16

## 2021-03-28 RX ADMIN — SERTRALINE HYDROCHLORIDE 50 MG: 50 TABLET ORAL at 12:09

## 2021-03-28 RX ADMIN — LEVOTHYROXINE SODIUM 75 MCG: 0.07 TABLET ORAL at 12:09

## 2021-03-28 RX ADMIN — DOCUSATE SODIUM 100 MG: 100 CAPSULE, LIQUID FILLED ORAL at 12:06

## 2021-03-28 RX ADMIN — FUROSEMIDE 20 MG: 20 TABLET ORAL at 12:08

## 2021-03-28 RX ADMIN — METOPROLOL SUCCINATE 25 MG: 25 TABLET, EXTENDED RELEASE ORAL at 18:22

## 2021-03-28 RX ADMIN — QUETIAPINE FUMARATE 50 MG: 25 TABLET ORAL at 12:15

## 2021-03-28 ASSESSMENT — PAIN SCALES - GENERAL: PAINLEVEL_OUTOF10: 0

## 2021-03-28 NOTE — PROGRESS NOTES
PROTIME 14.1 03/26/2021    INR 1.16 03/26/2021      PTT:    Lab Results   Component Value Date    APTT 37.8 03/26/2021     Urine Culture: No growth at 18 to 36 hours     Imaging:  Echocardiogram Complete 2d With Doppler With Color    Result Date: 3/3/2021  Transthoracic Echocardiography Report (TTE)  Demographics   Patient Name       Gurpreet Kirk       Date of Study       03/03/2021   Date of Birth      10/18/1930        Gender              Male   Age                80 year(s)        Race                   Patient Number     [id number]        Room Number         2111   Visit Number       [id number]   Corporate ID       [id number]   Accession Number   [id number]        [address] [facility], [facility]   Ordering Physician Risa Whiting MD Interpreting        Miroslava Feliz MD                                       Physician  Procedure Type of Study   TTE procedure:ECHOCARDIOGRAM COMPLETE 2D W DOPPLER W COLOR. Procedure Date Date: 03/03/2021 Start: 08:52 AM Study Location: Portable Technical Quality: Fair visualization Indications:Congestive heart failure. Patient Status: Routine Height: 72 inches Weight: 190 pounds BSA: 2.08 m2 BMI: 25.77 kg/m2 HR: 96 bpm BP: 149/84 mmHg  Conclusions   Summary  Left ventricular systolic function is low normal.  Ejection fraction is visually estimated at 50%. Sclerotic, but non-stenotic aortic valve. Trace aortic regurgitation is noted. No evidence of any pericardial effusion. Signature   ------------------------------------------------------------------  Electronically signed by Miroslava Feliz MD (Interpreting  physician) on 03/03/2021 at 11:18 AM  ------------------------------------------------------------------   Findings   Left Ventricle  Left ventricular systolic function is low normal.  Ejection fraction is visually estimated at 50%. Left Atrium  Essentially normal left atrium.    Right Atrium 3/4/2021 12:32 pm COMPARISON: March 2, 2021 HISTORY: ORDERING SYSTEM PROVIDED HISTORY: post operative hiatal hernia repair TECHNOLOGIST PROVIDED HISTORY: Reason for exam:->post operative hiatal hernia repair Reason for Exam: post operative hiatal hernia repair FINDINGS: Nasogastric tube with its distal tip coursing below the diaphragm. Stable cardiomediastinal silhouette. Bibasilar atelectasis or infiltrate is noted. No definite pleural effusion or pneumothorax. The osseous structures are stable. Bibasilar atelectasis or infiltrate. Xr Chest Portable    Result Date: 3/2/2021  EXAMINATION: ONE XRAY VIEW OF THE CHEST 3/2/2021 6:43 pm COMPARISON: 01/30/2018 HISTORY: ORDERING SYSTEM PROVIDED HISTORY: Chest pain TECHNOLOGIST PROVIDED HISTORY: Reason for exam:->Chest pain Reason for Exam: Chest pain Acuity: Acute Type of Exam: Initial Additional signs and symptoms: na Relevant Medical/Surgical History: na FINDINGS: The heart size is stable. Large hiatal hernia again noted. Probable subsegmental atelectasis left lung base. Linear opacity right lung base unchanged. No pneumothorax. Increased size of a large hiatal hernia. Otherwise, unchanged chest     Cta Abdomen Pelvis W Contrast    Result Date: 3/2/2021  EXAMINATION: CTA OF THE ABDOMEN AND PELVIS WITH CONTRAST 3/2/2021 4:41 pm TECHNIQUE: CTA of the abdomen and pelvis was performed with the administration of intravenous contrast. Multiplanar reformatted images are provided for review. MIP images are provided for review. Dose modulation, iterative reconstruction, and/or weight based adjustment of the mA/kV was utilized to reduce the radiation dose to as low as reasonably achievable.  COMPARISON: Upper GI series, 04/10/2018 HISTORY: ORDERING SYSTEM PROVIDED HISTORY: GI bleed protocol TECHNOLOGIST PROVIDED HISTORY: Reason for exam:->GI bleed protocol Decision Support Exception->Emergency Medical Condition (MA) Reason for Exam: GI bleed protocol Acuity: Acute Type of Exam: Initial FINDINGS: Lower Chest: There is an extremely large hiatal hernia, with organo-axial volvulus. The gastric fundus and a portion the body is now found inferior to the diaphragm on the left (previously those were superior), and now there is dilation of that portion of the stomach, which is concerning for entrapment. Note that the entirety of the hiatal hernia is not included. Organs: The liver enhances normally. Gallbladder is unremarkable. Normal arterial phase imaging of the spleen. Benign nodule the left adrenal gland measures 2.2 cm and is unchanged since 2009. Pancreas is unremarkable. GI/Bowel: Evaluation for GI bleeding is limited, as only an arterial phase was obtained (normally noncontrast and delayed images are also obtained for the purposes of localization of GI bleeding). That said, no suspicious extravasation of contrast is identified within the large bowel. There is mild diverticulosis of the large bowel. The appendix is normal. Again, there is the large hiatal hernia, with concern for entrapment of the gastric fundus and a portion of the body. The duodenal sweep and the remainder of the small bowel are unremarkable. 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 Urinary bladder appears thickened, which is likely secondary to outlet obstruction. There is moderate to marked prostatic hypertrophy. No free pelvic fluid. No pelvic sidewall lymphadenopathy. There is evidence of previous bilateral inguinal hernia repair. Peritoneum/Retroperitoneum: Abdominal aorta normal in caliber. No retroperitoneal lymphadenopathy is identified. No iliac chain or inguinal lymphadenopathy. 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 Additional pagetoid change is seen in the T11 vertebral body. No acute or suspicious bony abnormalities are detected. Very large hiatal hernia.   Again, a portion of the gastric fundus and gastric body have migrated inferior to the diaphragm, and there is dilation of that segment of the stomach. The findings are concerning for entrapment. Otherwise, no acute abnormality identified. Focal extravasation contrast into the bowel is not visualized, but again the evaluation is limited by a monophasic study. Moderate to marked prostatic hypertrophy. Urinary bladder mural thickening with urinary bladder diverticula. The thickening is likely secondary to bladder outlet obstruction. Xr Abdomen For Ng/og/ne Tube Placement    Result Date: 3/3/2021  EXAMINATION: ONE SUPINE XRAY VIEW(S) OF THE ABDOMEN 3/2/2021 11:58 pm COMPARISON: None. HISTORY: ORDERING SYSTEM PROVIDED HISTORY: Confirmation of course of NG/OG/NE tube and location of tip of tube TECHNOLOGIST PROVIDED HISTORY: Reason for exam:->Confirmation of course of NG/OG/NE tube and location of tip of tube Portable? ->Yes Reason for Exam: Confirmation of course of NG/OG/NE tube and location of tip of tube Acuity: Acute Type of Exam: Initial FINDINGS: The tip of the nasogastric tube is in the stomach. The side hole/port is near the expected location of the GE junction. There is marked elevation of the left hemidiaphragm. The nasogastric tube should be advanced 5 cm. Assessment & Plan:      Dale Spatz is a 80y.o. year old male admitted 3/26/2021 for gross hematuria.    1) Gross Hematuria: Likely secondary to borden catheter trauma              28fr 3-way catheter in place, urine clear on high flow CBI              Hgb 7.0, recommend PRBC transfusion if <7.0              CT a/p 3/2/21 with multiple bladder diverticulum, thickened urinary bladder wall likely secondary to CANSECO with a large prostate              Urine culture negative    Hgb 8.0              Nursing staff to manually irrigate bladder prn clots or catheter not draining properly.               Continue CBI; pt may require cystoscopy/bladder fulguration/clot evac during this admission if persistent hematuria. NPO after midnight. 2) BPH: h/o 90g prostate gland. Chronically on Flomax 0.4mg and Proscar 5mg, okay to resume these               Patient follows with Dr. David Baxter, h/o incomplete bladder emptying, patient has declined surgical intervention in the past due to his age. Continue medical management              Continue catheter for now, keep off traction     Will continue to follow    Patient seen and examined, chart reviewed.      Electronically signed by Zechariah Schulz PA-C on 3/28/2021 at 10:26 AM

## 2021-03-28 NOTE — PLAN OF CARE
Called Alex Davey, will come and do portable as soon as I am caught up from ED STATS, spoke to Gumaro 1737 hrs

## 2021-03-28 NOTE — PROGRESS NOTES
[address]    Hospitalist Progress Note      Name:  Felecia Christian /Age/Sex: 10/18/1930  (80 y.o. male)   MRN & CSN:  [id number] & [id number] Admission Date/Time: 3/26/2021  3:51 PM   Location:  [address] [place] [address] PCP: Brian Alvarez MD         Hospital Day: 3    Assessment and Plan:   Felecia Christian is a 80 y.o.  male  with past medical history of hypertension, BPH, hypothyroidism, anemia of chronic disease who presents with gross hematuria     Gross Hematuria  Laureano catheter dysfunction likely due to blood clotts      Continue with CBI per Urology   IV Rocephin  Continue Flomax, Proscar  Urology following       Acute on chronic anemia    Due to chronic GI bleed and hematuria     Recent admission for GI bleed and EGD  Hb 7.9, dropped to 7.0 - transfuse with a unit of pRBC (continued bleed)  Monitor hemoglobin and transfuse for Hb less than 7  Continue with ferrous sulfate     Hypertension -continue with Toprol, Norvasc, Lasix     Chronic medical condition : Resume home medications when clinically appropriate     Peripheral Neuropathy   BPH on Flomax  Hypothyroidism   Anxiety disorder/depression    Diet DIET GENERAL;   DVT Prophylaxis [] Lovenox, []  Heparin, [] SCDs, []No VTE prophylaxis, patient ambulating   GI Prophylaxis [] PPI, [] H2 Blocker, [] No GI prophylaxis, patient is receiving diet/Tube Feeds   Code Status Full Code   Disposition Patient requires continued admission due to hematuria   MDM [] Low, [x] Moderate,[]  High     History of Present Illness: Subjective     Patient Seen & Examined at the bedside       Patient is resting in bed with no distress while on room air - hard of hearing - but communicates appropriately   Asking questions as to when \"we\" will do the procedures - no fever       Ten point ROS reviewed negative, unless as noted above    Objective:        Intake/Output Summary (Last 24 hours) at 3/28/2021 1028  Last data filed at 3/28/2021 0818  Gross per 24 hour   Intake 4658.33 ml   Output 02792 ml Net -51750.67 ml      Vitals:   Vitals:    03/28/21 0845   BP: 133/64   Pulse: 77   Resp: 20   Temp: 98.4 °F (36.9 °C)   SpO2: 96%     Physical Exam:    GEN Awake male, resting in bed in no apparent distress. Appears given age. HENT Mucous membranes are moist.   RESP Clear to auscultation, no wheezes, rales or rhonchi. CARDIO/VASC -S1/S2 auscultated. Regular rate without appreciable murmurs, rubs, or gallops. Peripheral pulses equal bilaterally and palpable. No peripheral edema. GI Abdomen is soft without significant tenderness, masses, or guarding. Bowel sounds are normoactive. Rectal exam deferred.  Laureano catheter is present.   Annetta-colored urine in the bag    Medications:   Medications:    amLODIPine  5 mg Oral QAM    docusate sodium  100 mg Oral Daily    ferrous sulfate  325 mg Oral Daily with breakfast    finasteride  5 mg Oral Daily    furosemide  20 mg Oral Daily    levothyroxine  75 mcg Oral Daily    metoprolol succinate  25 mg Oral QPM    pantoprazole  20 mg Oral Daily    potassium chloride  20 mEq Oral Daily    QUEtiapine  50 mg Oral TID    tamsulosin  0.8 mg Oral Daily    sertraline  50 mg Oral Daily    sodium chloride flush  10 mL Intravenous 2 times per day    enoxaparin  40 mg Subcutaneous Daily    famotidine  20 mg Oral BID      Infusions:    sodium chloride      sodium chloride 25 mL (03/27/21 1132)     PRN Meds: sodium chloride, , PRN  calcium carbonate, 500 mg, Q6H PRN  LORazepam, 0.5 mg, Nightly PRN  sodium chloride flush, 10 mL, PRN  sodium chloride, 25 mL, PRN  promethazine, 12.5 mg, Q6H PRN    Or  ondansetron, 4 mg, Q6H PRN  polyethylene glycol, 17 g, Daily PRN  acetaminophen, 650 mg, Q6H PRN    Or  acetaminophen, 650 mg, Q6H PRN  potassium chloride, 40 mEq, PRN    Or  potassium alternative oral replacement, 40 mEq, PRN    Or  potassium chloride, 10 mEq, PRN  QUEtiapine, 100 mg, Nightly PRN          Electronically signed by Christian Payan MD on 3/28/2021 at 10:28 AM

## 2021-03-28 NOTE — CONSULTS
floor, blood and urine saturating bed/floor- RN notified and arrived to reconnect, Urologist notified and arrived to assess, this OT assisted pt in LB bathing/dressing and transfer to chair for linen change  · Vision: [facility]  · Hearing: Extremely [place]  · Vitals: Stable vitals throughout session    Body Systems and functions:  · ROM: WFL   · Strength: WFL   · Sensation: WFL  · Tone: Normal  · Coordination: WFL  · Perception: WNL    Activities of Daily Living (ADLs):  · Feeding: ind  · Grooming: [place] (pt able to demo grooming in seated with no difficulties noted)  · UB bathing: SUP (recommend pt complete in seated d/t dec standing balance)  · LB bathing: min A (completed LB bathing this date d/t pt soiled on arrival, assist with thoroughness and balance in standing)  · UB dressing: setup (pt donned gown in seated with assist for line management only)  · LB dressing: min A (pt with difficulty safely releasing UE from FWW in standing to complete task, able to jyoti B socks in seated with no assist required)  · Toileting: min A (assist with balance in standing and to ensure thoroughness in task)    Cognitive and Psychosocial Functioning:  · Overall cognitive status: Pt is A&O x person, place, and month not year (2002).  Intermittently confused throughout session- catheter on floor upon arrival pt states \"I don't know\" when asked, pt frequently pulls on catheter line t/o session, reports \"the  is outside\" and similar statements t/o session requiring cues for reorientation, unable to report PLOF states \"I can't remember\" re: assist required at [address] [place] [address]  · Affect: Normal     Balance:   · Sitting: at EOB with SUP, slight lateral lean with cues to correct  · Standing: static standing at [address] with SBA, dynamic standing with CGA    Functional Mobility:  · Bed Mobility: supine to sit with HOB flat and use of R bed rail for assist  · Transfers: STS from EOB to FWW with CGA for safety and cues for positioning and sequencing · Ambulation: from EOB to armchair with FWW, min A for walker management and no LOB noted      AM-PAC 6 click short form for inpatient daily activity:   How much help from another person does the patient currently need. .. Unable  Dep A Lot  Max A A Lot   Mod A A Little  Min A A Little   CGA  SBA None   Mod I  Indep  Sup   1. Putting on and taking off regular lower body clothing? [] 1    [] 2   [] 2   [x] 3   [] 3   [] 4      2. Bathing (including washing, rinsing, drying)? [] 1   [] 2   [] 2 [x] 3 [] 3 [] 4   3. Toileting, which includes using toilet, bedpan, or urinal? [] 1    [] 2   [] 2   [x] 3   [] 3   [] 4     4. Putting on and taking off regular upper body clothing? [] 1   [] 2   [] 2   [] 3   [] 3    [x] 4      5. Taking care of personal grooming such as brushing teeth? [] 1   [] 2    [] 2 [] 3    [] 3   [x] 4      6. Eating meals? [] 1   [] 2   [] 2   [] 3   [] 3   [x] 4      Raw Score:  21     [24=0% impaired(CH), 23=1-19%(CI), 20-22=20-39%(CJ), 15-19=40-59%(CK), 10-14=60-79%(CL), 7-9=80-99%(CM), 6=100%(CN)]     Treatment:  Self Care Training:   Cues were given for safety, sequence, UE/LE placement, visual cues, and balance. Activities performed today included dressing, toileting, grooming, and functional transfer training as noted above. Safety Measures: Gait belt used, Left in seated in chair with all needs met, RN aware of status, Alarm in place    Assessment:  Pt is a 80 y.o. male who  has a past medical history of Anemia, Anxiety, BPH, BPH with urinary obstruction, Depression, GERD (gastroesophageal reflux disease), Hemorrhoids, Hepatitis, Hernia of unspecified site of abdominal cavity without mention of obstruction or gangrene, Hyperlipidemia, Insomnia, and SCC (squamous cell carcinoma). Presented to [facility] ED 3/26 with c/o borden catheter problem, hematuria, and anemia. Low Hg on arrival, receiving PRBC prn, ultrasound pending.  Urology planning for potential cystoscopy/bladder fulguration

## 2021-03-28 NOTE — PROGRESS NOTES
Persistent gross hematuria with indwelling catheter  Enlarged prostate with median lobe    One unit Prbc yesterday 7 to 8  Urine is light pink on slow/moderate CBI on my exam  Prior irrigation with small clot    By report he is pulling on catheter components  He is [place]      Continue borden and CBI  Monitor for patient pulling    Af,vss  [place]  Soft, nd/nt      Difficult situation with patient pulling on catheter, urinary retention requiring CIC vs indwelling catheter with possible dementia component vs [place]      Continue Borden and CBI  Obtain renal/bladder ultrasound to evaluate for clots in bladder  Possible cystoscopy, fulguration of bleeding, clot evac if does not improve in near future  Urology team to discuss with son as well concerning TURP (concerns for failure and patient pulling on catheter) vs trial of SP tube (concerns for him pulling on that tube as well) for long term bladder management. Wound need cardiac clearance for TURP procedure. Urology team will continue to follow.

## 2021-03-29 ENCOUNTER — ANESTHESIA EVENT (OUTPATIENT)
Dept: OPERATING ROOM | Age: 86
DRG: 666 | End: 2021-03-29
Payer: MEDICARE

## 2021-03-29 ENCOUNTER — ANESTHESIA (OUTPATIENT)
Dept: OPERATING ROOM | Age: 86
DRG: 666 | End: 2021-03-29
Payer: MEDICARE

## 2021-03-29 VITALS
SYSTOLIC BLOOD PRESSURE: 137 MMHG | DIASTOLIC BLOOD PRESSURE: 85 MMHG | OXYGEN SATURATION: 100 % | RESPIRATION RATE: 15 BRPM

## 2021-03-29 LAB
HCT VFR BLD CALC: 25 % (ref 42–52)
HEMOGLOBIN: 7.8 GM/DL (ref 13.5–18)
MCH RBC QN AUTO: 28.7 PG (ref 27–31)
MCHC RBC AUTO-ENTMCNC: 31.2 % (ref 32–36)
MCV RBC AUTO: 91.9 FL (ref 78–100)
PDW BLD-RTO: 15.4 % (ref 11.7–14.9)
PLATELET # BLD: 239 K/CU MM (ref 140–440)
PMV BLD AUTO: 8.6 FL (ref 7.5–11.1)
RBC # BLD: 2.72 M/CU MM (ref 4.6–6.2)
SARS-COV-2, NAAT: NOT DETECTED
WBC # BLD: 7.4 K/CU MM (ref 4–10.5)

## 2021-03-29 PROCEDURE — 6360000002 HC RX W HCPCS: Performed by: NURSE ANESTHETIST, CERTIFIED REGISTERED

## 2021-03-29 PROCEDURE — 94761 N-INVAS EAR/PLS OXIMETRY MLT: CPT

## 2021-03-29 PROCEDURE — 3600000012 HC SURGERY LEVEL 2 ADDTL 15MIN: Performed by: SPECIALIST

## 2021-03-29 PROCEDURE — 2580000003 HC RX 258: Performed by: SPECIALIST

## 2021-03-29 PROCEDURE — 2580000003 HC RX 258: Performed by: ANESTHESIOLOGY

## 2021-03-29 PROCEDURE — 0T9B30Z DRAINAGE OF BLADDER WITH DRAINAGE DEVICE, PERCUTANEOUS APPROACH: ICD-10-PCS | Performed by: SPECIALIST

## 2021-03-29 PROCEDURE — 87635 SARS-COV-2 COVID-19 AMP PRB: CPT

## 2021-03-29 PROCEDURE — 85027 COMPLETE CBC AUTOMATED: CPT

## 2021-03-29 PROCEDURE — 0T5B8ZZ DESTRUCTION OF BLADDER, VIA NATURAL OR ARTIFICIAL OPENING ENDOSCOPIC: ICD-10-PCS | Performed by: SPECIALIST

## 2021-03-29 PROCEDURE — 7100000001 HC PACU RECOVERY - ADDTL 15 MIN: Performed by: SPECIALIST

## 2021-03-29 PROCEDURE — 7100000000 HC PACU RECOVERY - FIRST 15 MIN: Performed by: SPECIALIST

## 2021-03-29 PROCEDURE — 6360000002 HC RX W HCPCS: Performed by: NURSE PRACTITIONER

## 2021-03-29 PROCEDURE — 3700000001 HC ADD 15 MINUTES (ANESTHESIA): Performed by: SPECIALIST

## 2021-03-29 PROCEDURE — 1200000000 HC SEMI PRIVATE

## 2021-03-29 PROCEDURE — 3700000000 HC ANESTHESIA ATTENDED CARE: Performed by: SPECIALIST

## 2021-03-29 PROCEDURE — 2709999900 HC NON-CHARGEABLE SUPPLY: Performed by: SPECIALIST

## 2021-03-29 PROCEDURE — C1729 CATH, DRAINAGE: HCPCS | Performed by: SPECIALIST

## 2021-03-29 PROCEDURE — 36415 COLL VENOUS BLD VENIPUNCTURE: CPT

## 2021-03-29 PROCEDURE — 6360000002 HC RX W HCPCS: Performed by: SPECIALIST

## 2021-03-29 PROCEDURE — 6370000000 HC RX 637 (ALT 250 FOR IP): Performed by: SPECIALIST

## 2021-03-29 PROCEDURE — 0TCB8ZZ EXTIRPATION OF MATTER FROM BLADDER, VIA NATURAL OR ARTIFICIAL OPENING ENDOSCOPIC: ICD-10-PCS | Performed by: SPECIALIST

## 2021-03-29 PROCEDURE — 3600000002 HC SURGERY LEVEL 2 BASE: Performed by: SPECIALIST

## 2021-03-29 PROCEDURE — 2580000003 HC RX 258

## 2021-03-29 PROCEDURE — 2500000003 HC RX 250 WO HCPCS: Performed by: NURSE ANESTHETIST, CERTIFIED REGISTERED

## 2021-03-29 RX ORDER — DIPHENHYDRAMINE HYDROCHLORIDE 50 MG/ML
12.5 INJECTION INTRAMUSCULAR; INTRAVENOUS
Status: DISCONTINUED | OUTPATIENT
Start: 2021-03-29 | End: 2021-03-29 | Stop reason: HOSPADM

## 2021-03-29 RX ORDER — PROMETHAZINE HYDROCHLORIDE 25 MG/ML
6.25 INJECTION, SOLUTION INTRAMUSCULAR; INTRAVENOUS
Status: DISCONTINUED | OUTPATIENT
Start: 2021-03-29 | End: 2021-03-29 | Stop reason: HOSPADM

## 2021-03-29 RX ORDER — SODIUM CHLORIDE, SODIUM LACTATE, POTASSIUM CHLORIDE, CALCIUM CHLORIDE 600; 310; 30; 20 MG/100ML; MG/100ML; MG/100ML; MG/100ML
INJECTION, SOLUTION INTRAVENOUS CONTINUOUS
Status: DISCONTINUED | OUTPATIENT
Start: 2021-03-29 | End: 2021-03-29

## 2021-03-29 RX ORDER — MEPERIDINE HYDROCHLORIDE 25 MG/ML
12.5 INJECTION INTRAMUSCULAR; INTRAVENOUS; SUBCUTANEOUS EVERY 5 MIN PRN
Status: DISCONTINUED | OUTPATIENT
Start: 2021-03-29 | End: 2021-03-29 | Stop reason: HOSPADM

## 2021-03-29 RX ORDER — DEXAMETHASONE SODIUM PHOSPHATE 4 MG/ML
INJECTION, SOLUTION INTRA-ARTICULAR; INTRALESIONAL; INTRAMUSCULAR; INTRAVENOUS; SOFT TISSUE PRN
Status: DISCONTINUED | OUTPATIENT
Start: 2021-03-29 | End: 2021-03-29 | Stop reason: SDUPTHER

## 2021-03-29 RX ORDER — HYDROCODONE BITARTRATE AND ACETAMINOPHEN 5; 325 MG/1; MG/1
2 TABLET ORAL EVERY 6 HOURS PRN
Status: DISCONTINUED | OUTPATIENT
Start: 2021-03-29 | End: 2021-03-29 | Stop reason: HOSPADM

## 2021-03-29 RX ORDER — SODIUM CHLORIDE 9 MG/ML
INJECTION, SOLUTION INTRAVENOUS CONTINUOUS
Status: DISCONTINUED | OUTPATIENT
Start: 2021-03-29 | End: 2021-04-01

## 2021-03-29 RX ORDER — FENTANYL CITRATE 50 UG/ML
50 INJECTION, SOLUTION INTRAMUSCULAR; INTRAVENOUS EVERY 5 MIN PRN
Status: DISCONTINUED | OUTPATIENT
Start: 2021-03-29 | End: 2021-03-29 | Stop reason: HOSPADM

## 2021-03-29 RX ORDER — PHENYLEPHRINE HCL IN 0.9% NACL 1 MG/10 ML
SYRINGE (ML) INTRAVENOUS PRN
Status: DISCONTINUED | OUTPATIENT
Start: 2021-03-29 | End: 2021-03-29 | Stop reason: SDUPTHER

## 2021-03-29 RX ORDER — ZIPRASIDONE MESYLATE 20 MG/ML
20 INJECTION, POWDER, LYOPHILIZED, FOR SOLUTION INTRAMUSCULAR ONCE
Status: COMPLETED | OUTPATIENT
Start: 2021-03-29 | End: 2021-03-29

## 2021-03-29 RX ORDER — LIDOCAINE HYDROCHLORIDE 20 MG/ML
INJECTION, SOLUTION INTRAVENOUS PRN
Status: DISCONTINUED | OUTPATIENT
Start: 2021-03-29 | End: 2021-03-29 | Stop reason: SDUPTHER

## 2021-03-29 RX ORDER — 0.9 % SODIUM CHLORIDE 0.9 %
500 INTRAVENOUS SOLUTION INTRAVENOUS
Status: DISCONTINUED | OUTPATIENT
Start: 2021-03-29 | End: 2021-03-29 | Stop reason: HOSPADM

## 2021-03-29 RX ORDER — ESMOLOL HYDROCHLORIDE 10 MG/ML
INJECTION INTRAVENOUS PRN
Status: DISCONTINUED | OUTPATIENT
Start: 2021-03-29 | End: 2021-03-29 | Stop reason: SDUPTHER

## 2021-03-29 RX ORDER — HYDROMORPHONE HCL 110MG/55ML
0.5 PATIENT CONTROLLED ANALGESIA SYRINGE INTRAVENOUS EVERY 5 MIN PRN
Status: DISCONTINUED | OUTPATIENT
Start: 2021-03-29 | End: 2021-03-29 | Stop reason: HOSPADM

## 2021-03-29 RX ORDER — FENTANYL CITRATE 50 UG/ML
INJECTION, SOLUTION INTRAMUSCULAR; INTRAVENOUS PRN
Status: DISCONTINUED | OUTPATIENT
Start: 2021-03-29 | End: 2021-03-29 | Stop reason: SDUPTHER

## 2021-03-29 RX ORDER — ONDANSETRON 2 MG/ML
4 INJECTION INTRAMUSCULAR; INTRAVENOUS
Status: DISCONTINUED | OUTPATIENT
Start: 2021-03-29 | End: 2021-03-29 | Stop reason: HOSPADM

## 2021-03-29 RX ORDER — HYDROCODONE BITARTRATE AND ACETAMINOPHEN 5; 325 MG/1; MG/1
1 TABLET ORAL PRN
Status: DISCONTINUED | OUTPATIENT
Start: 2021-03-29 | End: 2021-03-29 | Stop reason: HOSPADM

## 2021-03-29 RX ORDER — HYDRALAZINE HYDROCHLORIDE 20 MG/ML
5 INJECTION INTRAMUSCULAR; INTRAVENOUS EVERY 10 MIN PRN
Status: DISCONTINUED | OUTPATIENT
Start: 2021-03-29 | End: 2021-03-29 | Stop reason: HOSPADM

## 2021-03-29 RX ORDER — ONDANSETRON 2 MG/ML
INJECTION INTRAMUSCULAR; INTRAVENOUS PRN
Status: DISCONTINUED | OUTPATIENT
Start: 2021-03-29 | End: 2021-03-29 | Stop reason: SDUPTHER

## 2021-03-29 RX ORDER — PROPOFOL 10 MG/ML
INJECTION, EMULSION INTRAVENOUS PRN
Status: DISCONTINUED | OUTPATIENT
Start: 2021-03-29 | End: 2021-03-29 | Stop reason: SDUPTHER

## 2021-03-29 RX ORDER — LABETALOL HYDROCHLORIDE 5 MG/ML
5 INJECTION, SOLUTION INTRAVENOUS EVERY 10 MIN PRN
Status: DISCONTINUED | OUTPATIENT
Start: 2021-03-29 | End: 2021-03-29 | Stop reason: HOSPADM

## 2021-03-29 RX ADMIN — WATER: 1 INJECTION INTRAMUSCULAR; INTRAVENOUS; SUBCUTANEOUS at 20:30

## 2021-03-29 RX ADMIN — SODIUM CHLORIDE: 9 INJECTION, SOLUTION INTRAVENOUS at 15:45

## 2021-03-29 RX ADMIN — FAMOTIDINE 20 MG: 20 TABLET ORAL at 19:58

## 2021-03-29 RX ADMIN — Medication 100 MCG: at 17:26

## 2021-03-29 RX ADMIN — DEXAMETHASONE SODIUM PHOSPHATE 4 MG: 4 INJECTION, SOLUTION INTRAMUSCULAR; INTRAVENOUS at 17:21

## 2021-03-29 RX ADMIN — ZIPRASIDONE MESYLATE 20 MG: 20 INJECTION, POWDER, LYOPHILIZED, FOR SOLUTION INTRAMUSCULAR at 20:24

## 2021-03-29 RX ADMIN — LIDOCAINE HYDROCHLORIDE 75 MG: 20 INJECTION, SOLUTION INTRAVENOUS at 17:14

## 2021-03-29 RX ADMIN — PROPOFOL 75 MG: 10 INJECTION, EMULSION INTRAVENOUS at 17:14

## 2021-03-29 RX ADMIN — FENTANYL CITRATE 100 MCG: 50 INJECTION, SOLUTION INTRAMUSCULAR; INTRAVENOUS at 17:13

## 2021-03-29 RX ADMIN — QUETIAPINE FUMARATE 50 MG: 25 TABLET ORAL at 19:58

## 2021-03-29 RX ADMIN — Medication 100 MCG: at 17:21

## 2021-03-29 RX ADMIN — ESMOLOL HYDROCHLORIDE 20 MG: 10 INJECTION, SOLUTION INTRAVENOUS at 17:14

## 2021-03-29 RX ADMIN — ONDANSETRON 4 MG: 2 INJECTION INTRAMUSCULAR; INTRAVENOUS at 17:38

## 2021-03-29 RX ADMIN — SODIUM CHLORIDE, PRESERVATIVE FREE 10 ML: 5 INJECTION INTRAVENOUS at 19:59

## 2021-03-29 RX ADMIN — SODIUM CHLORIDE: 9 INJECTION, SOLUTION INTRAVENOUS at 17:10

## 2021-03-29 RX ADMIN — CEFAZOLIN SODIUM 2 G: 10 INJECTION, POWDER, FOR SOLUTION INTRAVENOUS at 17:20

## 2021-03-29 ASSESSMENT — PULMONARY FUNCTION TESTS
PIF_VALUE: 5
PIF_VALUE: 5
PIF_VALUE: 1
PIF_VALUE: 5
PIF_VALUE: 4
PIF_VALUE: 3
PIF_VALUE: 4
PIF_VALUE: 2
PIF_VALUE: 5
PIF_VALUE: 5
PIF_VALUE: 0
PIF_VALUE: 4
PIF_VALUE: 4
PIF_VALUE: 2
PIF_VALUE: 2
PIF_VALUE: 4
PIF_VALUE: 5
PIF_VALUE: 4
PIF_VALUE: 2
PIF_VALUE: 2
PIF_VALUE: 4
PIF_VALUE: 5
PIF_VALUE: 6
PIF_VALUE: 1

## 2021-03-29 ASSESSMENT — PAIN SCALES - GENERAL
PAINLEVEL_OUTOF10: 0
PAINLEVEL_OUTOF10: 0

## 2021-03-29 NOTE — PROGRESS NOTES
1806 Pt arrived to PACU from OR. Monitors applied and alarms on. Report from [facility]. 1830 Pt turned side to side in bed. 1832 Pt supra-pubic cath flushed per Dr. Efren Fuentes at bedside in PACU.   2354 Report called to floor and given to [facility]. 1850 Transporter to PACU to transfer pt to room.

## 2021-03-29 NOTE — PROGRESS NOTES
Formerly [facility] [address], [address]   Progress Note  [facility] 0 1 2      Date: 3/29/2021   Patient: Willie Roldan   : 10/18/1930   DOA: 3/26/2021   MRN: [id number]   ROOM#: 1111/1111-A     Admit Date: 3/26/2021     Collaborating Urologist on Call at time of admission: Dr. Kelsi Maravilla  CC: Blood clots in catheter  Reason for Consult:  Hematuria    Subjective:     Pain: none, no nausea and no vomiting,   Bowel Movement/Flatus:   Yes  Voidinfr 3-way catheter in place, urine cherry red on low flow CBI    Pt resting comfortably in bed, son at bedside. Discussed plan for cystoscopy, clot evacuation/bladder fulguration and possible transurethral resection of prostate VS suprapubic catheter placement this afternoon and possible risks/complications. Pt and son verbalize understanding and are agreeable to proceed. Objective:    Vitals:    /60   Pulse 74   Temp 98.2 °F (36.8 °C) (Oral)   Resp 16   Ht 6' (1.829 m)   Wt 170 lb (77.1 kg)   SpO2 99%   BMI 23.06 kg/m²    Temp  Av.5 °F (36.9 °C)  Min: 98.2 °F (36.8 °C)  Max: 98.7 °F (37.1 °C)       Intake/Output Summary (Last 24 hours) at 3/29/2021 8904  Last data filed at 3/29/2021 3954  Gross per 24 hour   Intake 640 ml   Output 57351 ml   Net -48195 ml       Physical Exam:   General appearance: alert, appears stated age, cooperative and no distress  Head: Normocephalic, without obvious abnormality, atraumatic  Back: No CVA tenderness  Abdomen: Soft, non-tender, non-distended   : Uncircumcised phallus with 28fr 3-way catheter in place, urine cherry red on low flow CBI.     Labs:   WBC:    Lab Results   Component Value Date    WBC 7.2 2021      Hemoglobin/Hematocrit:    Lab Results   Component Value Date    HGB 8.0 2021    HCT 25.6 2021      BMP:   Lab Results   Component Value Date     2021    K 4.3 2021     2021    CO2 26 2021    BUN 19 2021    LABALBU 2.4 2021    CREATININE 1.7 03/28/2021    CALCIUM 8.3 03/28/2021    GFRAA 46 03/28/2021    LABGLOM 38 03/28/2021      PT/INR:    Lab Results   Component Value Date    PROTIME 14.1 03/26/2021    INR 1.16 03/26/2021      PTT:    Lab Results   Component Value Date    APTT 37.8 03/26/2021     Urine Culture: No growth at 18 to 36 hours    Imaging:  Echocardiogram Complete 2d With Doppler With Color    Result Date: 3/3/2021  Transthoracic Echocardiography Report (TTE)  Demographics   Patient Name       Louis Cao       Date of Study       03/03/2021   Date of Birth      10/18/1930        Gender              Male   Age                80 year(s)        Race                   Patient Number     [id number]        Room Number         2111   Visit Number       [id number]   Corporate ID       [id number]   Accession Number   [id number]        [address] [place] [address], [address] [place] [address]   Ordering Physician Bright Brown MD Interpreting        Michelle Davenport MD                                       Physician  Procedure Type of Study   TTE procedure:ECHOCARDIOGRAM COMPLETE 2D W DOPPLER W COLOR. Procedure Date Date: 03/03/2021 Start: 08:52 AM Study Location: Portable Technical Quality: Fair visualization Indications:Congestive heart failure. Patient Status: Routine Height: 72 inches Weight: 190 pounds BSA: 2.08 m2 BMI: 25.77 kg/m2 HR: 96 bpm BP: 149/84 mmHg  Conclusions   Summary  Left ventricular systolic function is low normal.  Ejection fraction is visually estimated at 50%. Sclerotic, but non-stenotic aortic valve. Trace aortic regurgitation is noted. No evidence of any pericardial effusion.    Signature   ------------------------------------------------------------------  Electronically signed by Michelle Davenport MD (Interpreting  physician) on 03/03/2021 at 11:18 AM  ------------------------------------------------------------------   Findings   Left Ventricle  Left ventricular systolic function is low normal.  Ejection fraction is visually estimated at 50%. Left Atrium  Essentially normal left atrium. Right Atrium  Essentially normal right atrium. Right Ventricle  Essentially normal right ventricle. Aortic Valve  Sclerotic, but non-stenotic aortic valve. Trace aortic regurgitation is noted. Mitral Valve  Trace mitral regurgitation is present. Tricuspid Valve  Mild tricuspid regurgitation is present. Pulmonic Valve  The pulmonic valve was not well visualized. Pericardial Effusion  No evidence of any pericardial effusion.   M-Mode/2D Measurements & Calculations   LV Diastolic Dimension:  LV Systolic Dimension:  LA Dimension: 3.5 cmAO Root  4.93 cm                  3.56 cm                 Dimension: 4 cmLA Area:  LV FS:27.8 %             LV Volume Diastolic: 72 99.0 cm2  LV PW Diastolic: 0.05 cm ml  LV PW Systolic: 1.4 cm   LV Volume Systolic: 40  Septum Diastolic: 9.12   ml  cm                       LV EDV/LV EDV Index: 72 RV Diastolic Dimension:  Septum Systolic: 5.01 cm WN/87 Z1PE ESV/LV ESV   2.91 cm  CO: 7.97 l/min           Index: 40 ml/19 m2  CI: 3.83 l/m*m2          EF Calculated (A4C):    LA/Aorta: 0.88                           44.4 %                  Ascending Aorta: 3.7 cm  LV Area Diastolic: 27.6  EF Calculated (2D):     LA volume/Index: 49 ml  cm2                      53.5 %                  /34C0  LV Area Systolic: 18 cm2                           LV Length: 8.17 cm                            LVOT: 2.7 cm  Doppler Measurements & Calculations    AV Peak Velocity: 127 cm/s    LVOT Peak Velocity: 82.7 cm/s   AV Peak Gradient: 6.45 mmHg   LVOT Mean Velocity: 59.8 cm/s   AV Mean Velocity: 93.1 cm/s   LVOT Peak Gradient: 3 mmHgLVOT Mean Gradient:   AV Mean Gradient: 4 mmHg      2 mmHg   AV VTI: 21.5 cm               Estimated RVSP: 36 mmHg   AV Area (Continuity):3.86 cm2 Estimated RAP:3 mmHg    LVOT VTI: 14.5 cm                                 TR Velocity:286 cm/s   Estimated PASP: 35.72 mmHg    TR Gradient:32.72 mmHg      Xr Chest Portable    Result Date: 3/4/2021  EXAMINATION: ONE XRAY VIEW OF THE CHEST 3/4/2021 12:32 pm COMPARISON: March 2, 2021 HISTORY: ORDERING SYSTEM PROVIDED HISTORY: post operative hiatal hernia repair TECHNOLOGIST PROVIDED HISTORY: Reason for exam:->post operative hiatal hernia repair Reason for Exam: post operative hiatal hernia repair FINDINGS: Nasogastric tube with its distal tip coursing below the diaphragm. Stable cardiomediastinal silhouette. Bibasilar atelectasis or infiltrate is noted. No definite pleural effusion or pneumothorax. The osseous structures are stable. Bibasilar atelectasis or infiltrate. Xr Chest Portable    Result Date: 3/2/2021  EXAMINATION: ONE XRAY VIEW OF THE CHEST 3/2/2021 6:43 pm COMPARISON: 01/30/2018 HISTORY: ORDERING SYSTEM PROVIDED HISTORY: Chest pain TECHNOLOGIST PROVIDED HISTORY: Reason for exam:->Chest pain Reason for Exam: Chest pain Acuity: Acute Type of Exam: Initial Additional signs and symptoms: na Relevant Medical/Surgical History: na FINDINGS: The heart size is stable. Large hiatal hernia again noted. Probable subsegmental atelectasis left lung base. Linear opacity right lung base unchanged. No pneumothorax. Increased size of a large hiatal hernia. Otherwise, unchanged chest     Cta Abdomen Pelvis W Contrast    Result Date: 3/2/2021  EXAMINATION: CTA OF THE ABDOMEN AND PELVIS WITH CONTRAST 3/2/2021 4:41 pm TECHNIQUE: CTA of the abdomen and pelvis was performed with the administration of intravenous contrast. Multiplanar reformatted images are provided for review. MIP images are provided for review. Dose modulation, iterative reconstruction, and/or weight based adjustment of the mA/kV was utilized to reduce the radiation dose to as low as reasonably achievable.  COMPARISON: Upper GI series, 04/10/2018 HISTORY: ORDERING SYSTEM PROVIDED HISTORY: GI bleed protocol TECHNOLOGIST PROVIDED HISTORY: Reason for acute or suspicious bony abnormalities are detected. Very large hiatal hernia. Again, a portion of the gastric fundus and gastric body have migrated inferior to the diaphragm, and there is dilation of that segment of the stomach. The findings are concerning for entrapment. Otherwise, no acute abnormality identified. Focal extravasation contrast into the bowel is not visualized, but again the evaluation is limited by a monophasic study. Moderate to marked prostatic hypertrophy. Urinary bladder mural thickening with urinary bladder diverticula. The thickening is likely secondary to bladder outlet obstruction. Xr Abdomen For Ng/og/ne Tube Placement    Result Date: 3/3/2021  EXAMINATION: ONE SUPINE XRAY VIEW(S) OF THE ABDOMEN 3/2/2021 11:58 pm COMPARISON: None. HISTORY: ORDERING SYSTEM PROVIDED HISTORY: Confirmation of course of NG/OG/NE tube and location of tip of tube TECHNOLOGIST PROVIDED HISTORY: Reason for exam:->Confirmation of course of NG/OG/NE tube and location of tip of tube Portable? ->Yes Reason for Exam: Confirmation of course of NG/OG/NE tube and location of tip of tube Acuity: Acute Type of Exam: Initial FINDINGS: The tip of the nasogastric tube is in the stomach. The side hole/port is near the expected location of the GE junction. There is marked elevation of the left hemidiaphragm. The nasogastric tube should be advanced 5 cm. Us Retroperitoneal Complete    Result Date: 3/28/2021  EXAMINATION: RETROPERITONEAL ULTRASOUND OF THE KIDNEYS AND URINARY BLADDER 3/28/2021 COMPARISON: 03/02/2021 HISTORY: ORDERING SYSTEM PROVIDED HISTORY: Hematuria with borden, distended bladder? TECHNOLOGIST PROVIDED HISTORY: Reason for exam:->Hematuria with borden, distended bladder? Reason for Exam: Hematuria. Check for bladder distention Acuity: Acute Type of Exam: Initial Additional signs and symptoms: Pt extremely uncooperative.   Refused part of exam FINDINGS: Examination is limited due to patient cooperation. Examination was terminated prematurely at patient request.  Images of the bladder were not obtained. Kidneys: The right kidney measures 9.7 cm in length and the left kidney measures 9.8 cm in length. Kidneys demonstrate increased cortical echogenicity. No evidence of hydronephrosis or intrarenal stones. There is a 3.8 x 2.6 x 3.2 cm simple appearing left renal cyst.     1. Increased renal cortical echogenicity bilaterally, suggestive of medical renal disease. 2. 3.8 cm simple appearing left renal cyst. 3. Bladder images were not obtained at patient request.       Assessment & Plan:      Krystle Manley is a 80y.o. year old male admitted 3/26/2021 for gross hematuria.    1) Gross Hematuria: Likely secondary to borden catheter trauma              28fr 3-way catheter in place, urine clear on high flow CBI              Hgb 7.0, recommend PRBC transfusion if <7.0              CT a/p 3/2/21 with multiple bladder diverticulum, thickened urinary bladder wall likely secondary to CANSECO with a large prostate              Urine culture negative               Hgb 7.8              Nursing staff to manually irrigate bladder prn clots or catheter not draining properly.              Continue CBI   Rapid Covid   NPO since midnight   Plan for cystoscopy, clot evacuation/bladder fulguration and possible transurethral resection of prostate VS suprapubic catheter placement this afternoon with Dr. Cinda Bobby.  2) BPH: h/o 90g prostate gland. Chronically on Flomax 0.4mg and Proscar 5mg, okay to resume these               Patient follows with Dr. Daiana Flores, h/o incomplete bladder emptying, patient has declined surgical intervention in the past due to his age. Continue medical management              Continue catheter for now, keep off traction     Will continue to follow    Patient seen and examined, chart reviewed.      Electronically signed by Collin Stewart PA-C on 3/29/2021 at 7:14 AM

## 2021-03-29 NOTE — PROGRESS NOTES
[address] [place] [address] filed at 3/29/2021 0727  Gross per 24 hour   Intake 640 ml   Output 30168 ml   Net -49492 ml      Vitals:   Vitals:    03/29/21 0420   BP: 129/60   Pulse: 74   Resp: 16   Temp: 98.2 °F (36.8 °C)   SpO2: 99%     Physical Exam:    GEN Awake male, resting in bed in no apparent distress. Appears given age. HENT Mucous membranes are moist.   RESP Clear to auscultation, no wheezes, rales or rhonchi. CARDIO/VASC -S1/S2 auscultated. Regular rate without appreciable murmurs, rubs, or gallops. Peripheral pulses equal bilaterally and palpable. No peripheral edema. GI Abdomen is soft without significant tenderness, masses, or guarding. Bowel sounds are normoactive. Rectal exam deferred.  Laureano catheter is present.   Annetta-colored urine in the bag    Medications:   Medications:    amLODIPine  5 mg Oral QAM    docusate sodium  100 mg Oral Daily    ferrous sulfate  325 mg Oral Daily with breakfast    finasteride  5 mg Oral Daily    furosemide  20 mg Oral Daily    levothyroxine  75 mcg Oral Daily    metoprolol succinate  25 mg Oral QPM    pantoprazole  20 mg Oral Daily    potassium chloride  20 mEq Oral Daily    QUEtiapine  50 mg Oral TID    tamsulosin  0.8 mg Oral Daily    sertraline  50 mg Oral Daily    sodium chloride flush  10 mL Intravenous 2 times per day    [Held by provider] enoxaparin  40 mg Subcutaneous Daily    famotidine  20 mg Oral BID      Infusions:    sodium chloride      sodium chloride 25 mL (03/27/21 1132)     PRN Meds: sodium chloride, , PRN  calcium carbonate, 500 mg, Q6H PRN  LORazepam, 0.5 mg, Nightly PRN  sodium chloride flush, 10 mL, PRN  sodium chloride, 25 mL, PRN  promethazine, 12.5 mg, Q6H PRN    Or  ondansetron, 4 mg, Q6H PRN  polyethylene glycol, 17 g, Daily PRN  acetaminophen, 650 mg, Q6H PRN    Or  acetaminophen, 650 mg, Q6H PRN  potassium chloride, 40 mEq, PRN    Or  potassium alternative oral replacement, 40 mEq, PRN    Or  potassium chloride, 10 mEq, PRN  QUEtiapine, 100 mg, Nightly PRN          Electronically signed by Monet Duran MD on 3/29/2021 at 7:51 AM

## 2021-03-29 NOTE — PROGRESS NOTES
Physical Therapy    Physical Therapy Treatment Note  Name: Mukesh Zaragoza MRN: [id number] :   10/18/1930   Date:  3/29/2021   Admission Date: 3/26/2021 Room:  OR/NONE     Tx session attempt this date; pt off of floor at this time. Will re-attempt as schedule allows.      Electronically signed by:    Kevin Stack PTA  3/29/2021, 4:45 PM

## 2021-03-29 NOTE — ANESTHESIA PRE PROCEDURE
Department of Anesthesiology  Preprocedure Note       Name:  Denise Mondragon   Age:  80 y.o.  :  10/18/1930                                          MRN:  [id number]         Date:  3/29/2021      Surgeon: Daiana Gaytan): Bhavani David MD    Procedure: Procedure(s):  CYSTOSCOPY EVACUATION OF CLOTS, BLADDER FULGERATION, AND POSSIBLE TURP AND SUPRA-PUBIC CATHETER    Medications prior to admission:   Prior to Admission medications    Medication Sig Start Date End Date Taking? Authorizing Provider   acetaminophen (TYLENOL) 325 MG tablet Take 650 mg by mouth every 6 hours as needed for Pain    Historical Provider, MD   opium-belladonna (B&O SUPPRETTES) 16.2-30 MG suppository Place 30 mg rectally every 8 hours as needed for Pain.     Historical Provider, MD   ciprofloxacin (CIPRO) 250 MG tablet Take 250 mg by mouth 2 times daily    Historical Provider, MD   polyethylene glycol (GLYCOLAX) 17 g packet Take 17 g by mouth daily 3/13/21 4/12/21  Son Barnett MD   metoprolol succinate (TOPROL XL) 25 MG extended release tablet Take 1 tablet by mouth every evening 3/10/21   Son Barnett MD   amLODIPine (NORVASC) 5 MG tablet Take 1 tablet by mouth every morning 3/11/21   Son Barnett MD   finasteride (PROSCAR) 5 MG tablet Take 1 tablet by mouth daily 3/11/21   Son Barnett MD   pantoprazole (PROTONIX) 20 MG tablet Take 1 tablet by mouth daily 3/10/21   Son Barnett MD   potassium chloride (KLOR-CON M) 20 MEQ extended release tablet Take 1 tablet by mouth daily 3/10/21   Son Barnett MD   levothyroxine (SYNTHROID) 50 MCG tablet Take 1.5 tablets by mouth daily 21   Ernesto Lombard, PA-C   furosemide (LASIX) 20 MG tablet Take 1 tablet by mouth daily  Patient taking differently: Take 20 mg by mouth daily Son reports pt takes every other day 21   Ernesto Lombard, PA-C   sertraline (ZOLOFT) 50 MG tablet Take 50 mg by mouth daily    Historical Provider, MD   ferrous sulfate (IRON 325) 325 (65 Fe) MG tablet Take 1 tablet by mouth daily (with breakfast) 12/31/20 3/31/21  Ela Blunt MD   docusate sodium (COLACE) 100 MG capsule Take 1 capsule by mouth daily With iron pill 12/31/20 3/31/21  Ela Blunt MD   temazepam (RESTORIL) 7.5 MG capsule Take 7.5 mg by mouth nightly as needed for Sleep. Historical Provider, MD   QUEtiapine (SEROQUEL) 100 MG tablet Take 100 mg by mouth every evening    Historical Provider, MD   QUEtiapine (SEROQUEL) 50 MG tablet Take 50 mg by mouth 3 times daily     Historical Provider, MD   tamsulosin (FLOMAX) 0.4 MG capsule Take 1 capsule by mouth daily. Patient taking differently: Take 0.8 mg by mouth daily 01/11/17 Pt to take 2- .04 mg capsules at bedtime 10/12/12   Kadi Jessica MD       Current medications:    No current facility-administered medications for this visit. No current outpatient medications on file.      Facility-Administered Medications Ordered in Other Visits   Medication Dose Route Frequency Provider Last Rate Last Admin    0.9 % sodium chloride infusion   Intravenous PRN Jones Cheadle, MD        calcium carbonate (TUMS) chewable tablet 500 mg  500 mg Oral Q6H PRN Jones Cheadle, MD        amLODIPine (NORVASC) tablet 5 mg  5 mg Oral QAM Juan Espinal MD   5 mg at 03/28/21 1206    docusate sodium (COLACE) capsule 100 mg  100 mg Oral Daily Juan Espinal MD   100 mg at 03/28/21 1206    ferrous sulfate (IRON 325) tablet 325 mg  325 mg Oral Daily with breakfast Jones Cheadle, MD   325 mg at 03/28/21 1207    finasteride (PROSCAR) tablet 5 mg  5 mg Oral Daily Juan Espinal MD   5 mg at 03/28/21 1208    furosemide (LASIX) tablet 20 mg  20 mg Oral Daily Juan Espinal MD   20 mg at 03/28/21 1208    levothyroxine (SYNTHROID) tablet 75 mcg  75 mcg Oral Daily Juan Espinal MD   75 mcg at 03/28/21 1209    metoprolol succinate (TOPROL XL) extended release tablet 25 mg  25 mg Oral QPM Juan Espinal MD   25 mg at 03/28/21 1822    pantoprazole (PROTONIX) tablet 20 mg 20 mg Oral Daily Sal Jang MD   20 mg at 03/28/21 1215    potassium chloride (KLOR-CON M) extended release tablet 20 mEq  20 mEq Oral Daily Sal Jang MD   20 mEq at 03/28/21 1208    QUEtiapine (SEROQUEL) tablet 50 mg  50 mg Oral TID Sal Jang MD   50 mg at 03/28/21 2054    tamsulosin (FLOMAX) capsule 0.8 mg  0.8 mg Oral Daily Sal Jang MD   0.8 mg at 03/28/21 1209    LORazepam (ATIVAN) tablet 0.5 mg  0.5 mg Oral Nightly PRN Sal Jang MD        sertraline (ZOLOFT) tablet 50 mg  50 mg Oral Daily Sal Jang MD   50 mg at 03/28/21 1209    sodium chloride flush 0.9 % injection 10 mL  10 mL Intravenous 2 times per day Sal Jang MD   10 mL at 03/28/21 2054    sodium chloride flush 0.9 % injection 10 mL  10 mL Intravenous PRN Sal Jang MD        0.9 % sodium chloride infusion  25 mL Intravenous PRN Sal Jang  mL/hr at 03/27/21 1132 25 mL at 03/27/21 1132    [Held by provider] enoxaparin (LOVENOX) injection 40 mg  40 mg Subcutaneous Daily Sal Jang MD        promethazine (PHENERGAN) tablet 12.5 mg  12.5 mg Oral Q6H PRN Sal Jang MD        Or    ondansetron (ZOFRAN) injection 4 mg  4 mg Intravenous Q6H PRN Sal Jang MD        polyethylene glycol (GLYCOLAX) packet 17 g  17 g Oral Daily PRN Sal Jang MD        famotidine (PEPCID) tablet 20 mg  20 mg Oral BID Sal Jang MD   20 mg at 03/28/21 2053    acetaminophen (TYLENOL) tablet 650 mg  650 mg Oral Q6H PRN Sal Jang MD        Or    acetaminophen (TYLENOL) suppository 650 mg  650 mg Rectal Q6H PRN Sal Jang MD        potassium chloride (KLOR-CON M) extended release tablet 40 mEq  40 mEq Oral PRN Sal Jang MD        Or    potassium bicarb-citric acid (EFFER-K) effervescent tablet 40 mEq  40 mEq Oral PRN Sal Jang MD        Or    potassium chloride 10 mEq/100 mL IVPB (Peripheral Line)  10 mEq Intravenous PRN Sal Jang MD        QUEtiapine (SEROQUEL) tablet 100 mg  100 mg Oral Nightly PRN ROSEMARY Coburn CNP   100 mg at 218       Allergies: Allergies   Allergen Reactions    Minocycline Other (See Comments)       Problem List:    Patient Active Problem List   Diagnosis Code    Hyperlipidemia E78.5    Depression F32.9    Insomnia G47.00    BPH (benign prostatic hyperplasia) N40.0    Dysuria R30.0    Vitamin D deficiency E55.9    Seborrheic keratosis, inflamed L82.0    Actinic keratosis L57.0    Seborrheic keratosis L82.1    SCC (squamous cell carcinoma) C44.92    Varicose 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 I83.813    Anxiety F41.9    BPH with urinary obstruction N40.1, N13.8    Acquired hypothyroidism E03.9    UGIB (upper gastrointestinal bleed) K92.2    Hematuria R31.9       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    Rt Tricep, Lt Superior Forearm       Past Surgical History:        Procedure Laterality Date    CATARACT REMOVAL      HERNIA REPAIR      HIATAL HERNIA REPAIR N/A 3/4/2021    LAPAROSCOPIC LARGE HERNIA HIATAL REPAIR WITH GASTRIC OBSTRUCTION POSS OPEN performed by Johana Sánchez MD at [address] [place] [address]         Social History:    Social History     Tobacco Use    Smoking status: Former Smoker     Packs/day: 1.00     Years: 5.00     Pack years: 5.00     Types: Cigarettes     Start date: 1950     Quit date: 1955     Years since quittin.4    Smokeless tobacco: Never Used   Substance Use Topics    Alcohol use: Not Currently                                Counseling given: Not Answered      Vital Signs (Current):    There were no vitals filed for this visit. BP Readings from Last 3 Encounters:   03/29/21 134/64   03/25/21 (!) 119/56   03/23/21 (!) 117/56       NPO Status:                                                                                 BMI:   Wt Readings from Last 3 Encounters:   03/29/21 170 lb (77.1 kg)   03/07/21 185 lb (83.9 kg)   02/04/21 174 lb (78.9 kg)     There is no height or weight on file to calculate BMI.    CBC:   Lab Results   Component Value Date    WBC 7.2 03/28/2021    RBC 2.79 03/28/2021    HGB 8.0 03/28/2021    HCT 25.6 03/28/2021    MCV 91.8 03/28/2021    RDW 15.3 03/28/2021     03/28/2021       CMP:   Lab Results   Component Value Date     03/28/2021    K 4.3 03/28/2021     03/28/2021    CO2 26 03/28/2021    BUN 19 03/28/2021    CREATININE 1.7 03/28/2021    GFRAA 46 03/28/2021    AGRATIO 1.4 01/21/2021    LABGLOM 38 03/28/2021    GLUCOSE 112 03/28/2021    PROT 5.5 03/26/2021    CALCIUM 8.3 03/28/2021    BILITOT 0.2 03/26/2021    ALKPHOS 86 03/26/2021    AST 15 03/26/2021    ALT 9 03/26/2021       POC Tests: No results for input(s): POCGLU, POCNA, POCK, POCCL, POCBUN, POCHEMO, POCHCT in the last 72 hours.     Coags:   Lab Results   Component Value Date    PROTIME 14.1 03/26/2021    INR 1.16 03/26/2021    APTT 37.8 03/26/2021       HCG (If Applicable): No results found for: PREGTESTUR, PREGSERUM, HCG, HCGQUANT     ABGs: No results found for: PHART, PO2ART, QGP0CMV, JBN6MGF, BEART, F7NNRSVA     Type & Screen (If Applicable):  No results found for: LABABO, LABRH    Drug/Infectious Status (If Applicable):  No results found for: HIV, HEPCAB    COVID-19 Screening (If Applicable):   Lab Results   Component Value Date    COVID19 NOT DETECTED 03/26/2021         Anesthesia Evaluation   no history of anesthetic complications:   Airway: Mallampati: III        Dental:          Pulmonary:   (+) decreased breath sounds,                            ROS comment: Former smoker   Cardiovascular:  Exercise tolerance: poor (<4 METS),   (+) hyperlipidemia        Rhythm: regular    Echocardiogram reviewed         Beta Blocker:  Not on Beta Blocker      ROS comment: Echo 3/21     Summary   Left ventricular systolic function is low normal.   Ejection fraction is visually estimated at 50%. Sclerotic, but non-stenotic aortic valve. Trace aortic regurgitation is noted. No evidence of any pericardial effusion. Neuro/Psych:   (+) depression/anxiety             GI/Hepatic/Renal:   (+) hiatal hernia, GERD:,           Endo/Other:    (+) hypothyroidism, blood dyscrasia: anemia:., malignancy/cancer (skin). Abdominal:           Vascular: negative vascular ROS. Anesthesia Plan      general     ASA 3       Induction: intravenous. Anesthetic plan and risks discussed with patient. Use of blood products discussed with patient whom. Plan discussed with attending and CRNA.               Ray Nails, ROSEMARY - CRNA   3/29/2021

## 2021-03-29 NOTE — CARE COORDINATION
Chart reviewed and call to pt room to initiate discharge plan. Pts son, Reji Sosa, answered hospital phone. CM introduced self and explained role. Per pt son, pt is from [facility] and he would like him to return at discharge. CM called Malorie Marroquin carrie, and left voicemail for her to return call about pt needs to return.

## 2021-03-29 NOTE — BRIEF OP NOTE
Brief Postoperative Note      Patient: Leandra Goel  YOB: 1930  MRN: [id number]    Date of Procedure: 3/29/2021    Pre-Op Diagnosis: Gross hematuria, clot retention    Post-Op Diagnosis: Same       Procedure(s):  CYSTOSCOPY EVACUATION OF CLOTS, BLADDER FULGERATION, AND SUPRA-PUBIC CATHETER INSERTION    Surgeon(s): Alen Woodruff MD    Assistant:  * No surgical staff found *    Anesthesia: General    Estimated Blood Loss (mL): less than 50     Complications: None    Specimens:   * No specimens in log *    Implants:  * No implants in log *      Drains:   Suprapubic Catheter Non-latex 12 fr (Active)   Dressing Status Clean;Dry; Intact 03/29/21 1800       [REMOVED] NG/OG/NJ/NE Tube Nasogastric 16 fr Left nostril (Removed)   Surrounding Skin Dry; Intact 03/04/21 0100   Securement device Yes 03/04/21 0100   Status Suction-low intermittent 03/04/21 0100   Placement Verified by External Catheter Length;by Gastric Contents 03/04/21 0100   NG/OG/NJ/NE External Measurement (cm) 55 cm 03/04/21 0100   Drainage Appearance Brown 03/04/21 0100   Free Water Flush (mL) 20 mL 03/03/21 0507   Output (mL) 50 ml 03/03/21 0507       [REMOVED] NG/OG/NJ/NE Tube Nasogastric 16 fr Right nostril (Removed)   Surrounding Skin Dry; Intact 03/05/21 0825   Securement device Yes 03/05/21 0825   Status Suction-low intermittent 03/05/21 0825   Placement Verified by Gastric Contents 03/05/21 0825   NG/OG/NJ/NE External Measurement (cm) 74 cm 03/05/21 0825   Drainage Appearance Dark Red;Brown 03/05/21 0825   Output (mL) 40 ml 03/05/21 1247       [REMOVED] Urethral Catheter Straight-tip 16 fr (Removed)   Catheter Indications Need for fluid management in critically ill patients in a critical care setting not able to be managed by other means such as BSC with hat, bedpan, urinal, condom catheter, or short term intermittent urethral catherization 03/08/21 0507   Securement Device Date Changed 03/07/21 03/08/21 0507   Site Assessment No urethral drainage 03/08/21 0507   Urine Color Annetta 03/08/21 0507   Urine Appearance Hazy 03/08/21 0507   Urine Odor Malodorous 03/07/21 1100   Output (mL) 275 mL 03/08/21 0507       [REMOVED] Urethral Catheter Non-latex 26 fr (Removed)   Catheter Indications Bladder injury or continuousbladder irrigation 03/26/21 1624   Urine Color Cherry 03/28/21 0328   Urine Appearance Clear 03/27/21 2302   Output (mL) 1300 mL 03/28/21 1825       Findings: 1. Large prostate w median lobe likely source for hematuria. 2. Severely trabeculated bladder   3. 12F SP tube inserted w/o difficulty.      Electronically signed by Nery Zheng MD on 3/29/2021 at 6:12 PM

## 2021-03-29 NOTE — PROGRESS NOTES
1540  Pt arrived to Same Day per cart. Pt alert and responsive--skin W&D.    1600. Pre-op done and pt awaiting surgery. Pt's son not located in waiting area. 1  Pt now very agitated--pulling at things and taking things off his body (SCD, cardiac lead). Pt states \"my nerves are shot\" and wants his clothes so he can go home. Tried to calm patient and explain why things are taking so long. 65  Called pt's son and was able to have him speak with patient in attempt to calm him.  1705  Dr Alexandria Laws here--consent had been signed earlier by pt's son on inpatient unit. 1710  Pt to OR per cart. 80  Pt's son called again to update him on care.

## 2021-03-30 PROCEDURE — 2580000003 HC RX 258: Performed by: SPECIALIST

## 2021-03-30 PROCEDURE — 94761 N-INVAS EAR/PLS OXIMETRY MLT: CPT

## 2021-03-30 PROCEDURE — 6370000000 HC RX 637 (ALT 250 FOR IP): Performed by: SPECIALIST

## 2021-03-30 PROCEDURE — 1200000000 HC SEMI PRIVATE

## 2021-03-30 RX ADMIN — METOPROLOL SUCCINATE 25 MG: 25 TABLET, EXTENDED RELEASE ORAL at 18:30

## 2021-03-30 RX ADMIN — FAMOTIDINE 20 MG: 20 TABLET ORAL at 20:55

## 2021-03-30 RX ADMIN — FERROUS SULFATE TAB 325 MG (65 MG ELEMENTAL FE) 325 MG: 325 (65 FE) TAB at 08:49

## 2021-03-30 RX ADMIN — DOCUSATE SODIUM 100 MG: 100 CAPSULE, LIQUID FILLED ORAL at 08:49

## 2021-03-30 RX ADMIN — QUETIAPINE FUMARATE 50 MG: 25 TABLET ORAL at 20:55

## 2021-03-30 RX ADMIN — SERTRALINE HYDROCHLORIDE 50 MG: 50 TABLET ORAL at 08:49

## 2021-03-30 RX ADMIN — TAMSULOSIN HYDROCHLORIDE 0.8 MG: 0.4 CAPSULE ORAL at 08:49

## 2021-03-30 RX ADMIN — AMLODIPINE BESYLATE 5 MG: 5 TABLET ORAL at 08:49

## 2021-03-30 RX ADMIN — FAMOTIDINE 20 MG: 20 TABLET ORAL at 08:49

## 2021-03-30 RX ADMIN — QUETIAPINE FUMARATE 50 MG: 25 TABLET ORAL at 14:51

## 2021-03-30 RX ADMIN — PANTOPRAZOLE SODIUM 20 MG: 20 TABLET, DELAYED RELEASE ORAL at 08:49

## 2021-03-30 RX ADMIN — FUROSEMIDE 20 MG: 20 TABLET ORAL at 08:49

## 2021-03-30 RX ADMIN — QUETIAPINE FUMARATE 50 MG: 25 TABLET ORAL at 08:47

## 2021-03-30 RX ADMIN — POTASSIUM CHLORIDE 20 MEQ: 1500 TABLET, EXTENDED RELEASE ORAL at 08:49

## 2021-03-30 RX ADMIN — LORAZEPAM 0.5 MG: 0.5 TABLET ORAL at 04:25

## 2021-03-30 RX ADMIN — SODIUM CHLORIDE: 9 INJECTION, SOLUTION INTRAVENOUS at 14:14

## 2021-03-30 RX ADMIN — FINASTERIDE 5 MG: 5 TABLET, FILM COATED ORAL at 08:49

## 2021-03-30 RX ADMIN — SODIUM CHLORIDE, PRESERVATIVE FREE 10 ML: 5 INJECTION INTRAVENOUS at 09:02

## 2021-03-30 RX ADMIN — LEVOTHYROXINE SODIUM 75 MCG: 0.07 TABLET ORAL at 08:49

## 2021-03-30 NOTE — PROGRESS NOTES
03/26/2021    CREATININE 1.7 03/28/2021    CALCIUM 8.3 03/28/2021    GFRAA 46 03/28/2021    LABGLOM 38 03/28/2021      PT/INR:    Lab Results   Component Value Date    PROTIME 14.1 03/26/2021    INR 1.16 03/26/2021      PTT:    Lab Results   Component Value Date    APTT 37.8 03/26/2021       Urine Culture: No growth at 18 to 36 hours    Imaging:  Echocardiogram Complete 2d With Doppler With Color    Result Date: 3/3/2021  Transthoracic Echocardiography Report (TTE)  Demographics   Patient Name       Ashley Tate       Date of Study       03/03/2021   Date of Birth      10/18/1930        Gender              Male   Age                80 year(s)        Race                   Patient Number     [id number]        Room Number         2111   Visit Number       [id number]   Corporate ID       [id number]   Accession Number   [id number]        [address] [place] [address]   Ordering Physician Donald Mills MD Interpreting        Elvis Patrick MD                                       Physician  Procedure Type of Study   TTE procedure:ECHOCARDIOGRAM COMPLETE 2D W DOPPLER W COLOR. Procedure Date Date: 03/03/2021 Start: 08:52 AM Study Location: Portable Technical Quality: Fair visualization Indications:Congestive heart failure. Patient Status: Routine Height: 72 inches Weight: 190 pounds BSA: 2.08 m2 BMI: 25.77 kg/m2 HR: 96 bpm BP: 149/84 mmHg  Conclusions   Summary  Left ventricular systolic function is low normal.  Ejection fraction is visually estimated at 50%. Sclerotic, but non-stenotic aortic valve. Trace aortic regurgitation is noted. No evidence of any pericardial effusion.    Signature   ------------------------------------------------------------------  Electronically signed by Elvis Patrick MD (Interpreting  physician) on 03/03/2021 at 11:18 AM  ------------------------------------------------------------------   Findings   Left Ventricle Left ventricular systolic function is low normal.  Ejection fraction is visually estimated at 50%. Left Atrium  Essentially normal left atrium. Right Atrium  Essentially normal right atrium. Right Ventricle  Essentially normal right ventricle. Aortic Valve  Sclerotic, but non-stenotic aortic valve. Trace aortic regurgitation is noted. Mitral Valve  Trace mitral regurgitation is present. Tricuspid Valve  Mild tricuspid regurgitation is present. Pulmonic Valve  The pulmonic valve was not well visualized. Pericardial Effusion  No evidence of any pericardial effusion.   M-Mode/2D Measurements & Calculations   LV Diastolic Dimension:  LV Systolic Dimension:  LA Dimension: 3.5 cmAO Root  4.93 cm                  3.56 cm                 Dimension: 4 cmLA Area:  LV FS:27.8 %             LV Volume Diastolic: 72 51.5 cm2  LV PW Diastolic: 7.50 cm ml  LV PW Systolic: 1.4 cm   LV Volume Systolic: 40  Septum Diastolic: 7.18   ml  cm                       LV EDV/LV EDV Index: 72 RV Diastolic Dimension:  Septum Systolic: 1.80 cm ZT/88 P2PI ESV/LV ESV   2.91 cm  CO: 7.97 l/min           Index: 40 ml/19 m2  CI: 3.83 l/m*m2          EF Calculated (A4C):    LA/Aorta: 0.88                           44.4 %                  Ascending Aorta: 3.7 cm  LV Area Diastolic: 38.3  EF Calculated (2D):     LA volume/Index: 49 ml  cm2                      53.5 %                  /43H7  LV Area Systolic: 18 cm2                           LV Length: 8.17 cm                            LVOT: 2.7 cm  Doppler Measurements & Calculations    AV Peak Velocity: 127 cm/s    LVOT Peak Velocity: 82.7 cm/s   AV Peak Gradient: 6.45 mmHg   LVOT Mean Velocity: 59.8 cm/s   AV Mean Velocity: 93.1 cm/s   LVOT Peak Gradient: 3 mmHgLVOT Mean Gradient:   AV Mean Gradient: 4 mmHg      2 mmHg   AV VTI: 21.5 cm               Estimated RVSP: 36 mmHg   AV Area (Continuity):3.86 cm2 Estimated RAP:3 mmHg    LVOT VTI: 14.5 cm                                 TR Velocity:286 cm/s   Estimated PASP: 35.72 mmHg    TR Gradient:32.72 mmHg      Xr Chest Portable    Result Date: 3/4/2021  EXAMINATION: ONE XRAY VIEW OF THE CHEST 3/4/2021 12:32 pm COMPARISON: March 2, 2021 HISTORY: ORDERING SYSTEM PROVIDED HISTORY: post operative hiatal hernia repair TECHNOLOGIST PROVIDED HISTORY: Reason for exam:->post operative hiatal hernia repair Reason for Exam: post operative hiatal hernia repair FINDINGS: Nasogastric tube with its distal tip coursing below the diaphragm. Stable cardiomediastinal silhouette. Bibasilar atelectasis or infiltrate is noted. No definite pleural effusion or pneumothorax. The osseous structures are stable. Bibasilar atelectasis or infiltrate. Xr Chest Portable    Result Date: 3/2/2021  EXAMINATION: ONE XRAY VIEW OF THE CHEST 3/2/2021 6:43 pm COMPARISON: 01/30/2018 HISTORY: ORDERING SYSTEM PROVIDED HISTORY: Chest pain TECHNOLOGIST PROVIDED HISTORY: Reason for exam:->Chest pain Reason for Exam: Chest pain Acuity: Acute Type of Exam: Initial Additional signs and symptoms: na Relevant Medical/Surgical History: na FINDINGS: The heart size is stable. Large hiatal hernia again noted. Probable subsegmental atelectasis left lung base. Linear opacity right lung base unchanged. No pneumothorax. Increased size of a large hiatal hernia. Otherwise, unchanged chest     Cta Abdomen Pelvis W Contrast    Result Date: 3/2/2021  EXAMINATION: CTA OF THE ABDOMEN AND PELVIS WITH CONTRAST 3/2/2021 4:41 pm TECHNIQUE: CTA of the abdomen and pelvis was performed with the administration of intravenous contrast. Multiplanar reformatted images are provided for review. MIP images are provided for review. Dose modulation, iterative reconstruction, and/or weight based adjustment of the mA/kV was utilized to reduce the radiation dose to as low as reasonably achievable.  COMPARISON: Upper GI series, 04/10/2018 HISTORY: ORDERING SYSTEM PROVIDED HISTORY: GI bleed protocol TECHNOLOGIST PROVIDED HISTORY: Reason for exam:->GI bleed protocol Decision Support Exception->Emergency Medical Condition (MA) Reason for Exam: GI bleed protocol Acuity: Acute Type of Exam: Initial FINDINGS: Lower Chest: There is an extremely large hiatal hernia, with organo-axial volvulus. The gastric fundus and a portion the body is now found inferior to the diaphragm on the left (previously those were superior), and now there is dilation of that portion of the stomach, which is concerning for entrapment. Note that the entirety of the hiatal hernia is not included. Organs: The liver enhances normally. Gallbladder is unremarkable. Normal arterial phase imaging of the spleen. Benign nodule the left adrenal gland measures 2.2 cm and is unchanged since 2009. Pancreas is unremarkable. GI/Bowel: Evaluation for GI bleeding is limited, as only an arterial phase was obtained (normally noncontrast and delayed images are also obtained for the purposes of localization of GI bleeding). That said, no suspicious extravasation of contrast is identified within the large bowel. There is mild diverticulosis of the large bowel. The appendix is normal. Again, there is the large hiatal hernia, with concern for entrapment of the gastric fundus and a portion of the body. The duodenal sweep and the remainder of the small bowel are unremarkable. 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 Urinary bladder appears thickened, which is likely secondary to outlet obstruction. There is moderate to marked prostatic hypertrophy. No free pelvic fluid. No pelvic sidewall lymphadenopathy. There is evidence of previous bilateral inguinal hernia repair. Peritoneum/Retroperitoneum: Abdominal aorta normal in caliber. No retroperitoneal lymphadenopathy is identified. No iliac chain or inguinal lymphadenopathy. 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   Additional pagetoid change is seen in

## 2021-03-30 NOTE — PROGRESS NOTES
Hospitalist Progress Note      Name:  Jessica Escobar /Age/Sex: 10/18/1930  (80 y.o. male)   MRN & CSN:  [id number] & [id number] Admission Date/Time: 3/26/2021  3:51 PM   Location:  1111/1111-A PCP: Lalo Morales MD         Hospital Day: 5    Assessment and Plan:   Jessica Escobar is a 80 y.o.  male  with past medical history of hypertension, BPH, hypothyroidism, anemia of chronic disease who presents with gross hematuria     1) Gross Hematuria  -CT a/p 3/2/21 with multiple bladder diverticulum, thickened urinary bladder wall likely secondary to CANSECO with a large prostate  -S/P Cystoscopy, clot evacuation, bladder fulguration and SPC placement  -Urology on board; continue bladder irrigation  -Continue Flomax, Proscar     2) Acute on chronic anemia    -Due to chronic GI bleed and hematuria   -Recent admission for GI bleed and EGD  -S/P PRBC transfusion  -Monitor hb daily     Chronic medical condition : Resume home medications when clinically appropriate     CKD stage III - Cr stable    Peripheral Neuropathy   BPH on Flomax  Hypothyroidism   Anxiety disorder/depression  Essential HTN    Diet DIET CLEAR LIQUID;   DVT Prophylaxis [] Lovenox, []  Heparin, [] SCDs, [] Ambulation   GI Prophylaxis [] PPI,  [] H2 Blocker,  [] Carafate,  [] Diet/Tube Feeds   Code Status Full Code   Disposition TBD   MDM      History of Present Illness:     Patient was seen and examined  Denied any chest or abdominal pain  No dizziness or palpitations  No acute event overnight    Ten point ROS reviewed negative, unless as noted above    Objective: Intake/Output Summary (Last 24 hours) at 3/30/2021 1610  Last data filed at 3/30/2021 1249  Gross per 24 hour   Intake 1300 ml   Output 930 ml   Net 370 ml      Vitals:   Vitals:    21 0846   BP: (!) 114/56   Pulse: 82   Resp: 16   Temp: 98.2 °F (36.8 °C)   SpO2: 95%     Physical Exam:   GEN Awake male, sitting upright in bed in no apparent distress.  Appears given age.  Etha Collie are equally round. No scleral erythema, discharge, or conjunctivitis. HENT Mucous membranes are moist. Oral pharynx without exudates, no evidence of thrush. NECK Supple, no apparent thyromegaly or masses. RESP Clear to auscultation, no wheezes, rales or rhonchi. Symmetric chest movement while on room air. CARDIO/VASC S1/S2 auscultated. Regular rate without appreciable murmurs, rubs, or gallops. No JVD or carotid bruits. Peripheral pulses equal bilaterally and palpable. No peripheral edema. GI Abdomen is soft without significant tenderness, masses, or guarding. Bowel sounds are normoactive. Rectal exam deferred.  No costovertebral angle tenderness. Laureano catheter is present. HEME/LYMPH No palpable cervical lymphadenopathy and no hepatosplenomegaly. No petechiae or ecchymoses. MSK No gross joint deformities. SKIN Normal coloration, warm, dry. NEURO Cranial nerves appear grossly intact, normal speech, no lateralizing weakness. PSYCH Awake, alert. Affect appropriate.     Medications:   Medications:    amLODIPine  5 mg Oral QAM    docusate sodium  100 mg Oral Daily    ferrous sulfate  325 mg Oral Daily with breakfast    finasteride  5 mg Oral Daily    furosemide  20 mg Oral Daily    levothyroxine  75 mcg Oral Daily    metoprolol succinate  25 mg Oral QPM    pantoprazole  20 mg Oral Daily    potassium chloride  20 mEq Oral Daily    QUEtiapine  50 mg Oral TID    tamsulosin  0.8 mg Oral Daily    sertraline  50 mg Oral Daily    sodium chloride flush  10 mL Intravenous 2 times per day    [Held by provider] enoxaparin  40 mg Subcutaneous Daily    famotidine  20 mg Oral BID      Infusions:    sodium chloride 100 mL/hr at 03/30/21 1414    sodium chloride      sodium chloride 25 mL (03/27/21 1132)     PRN Meds: sodium chloride, , PRN  calcium carbonate, 500 mg, Q6H PRN  LORazepam, 0.5 mg, Nightly PRN  sodium chloride flush, 10 mL, PRN  sodium chloride, 25 mL, PRN  promethazine, 12.5 mg, Q6H PRN    Or  ondansetron, 4 mg, Q6H PRN  polyethylene glycol, 17 g, Daily PRN  acetaminophen, 650 mg, Q6H PRN    Or  acetaminophen, 650 mg, Q6H PRN  potassium chloride, 40 mEq, PRN    Or  potassium alternative oral replacement, 40 mEq, PRN    Or  potassium chloride, 10 mEq, PRN  QUEtiapine, 100 mg, Nightly PRN          Electronically signed by Dayron Acuna MD on 3/30/2021 at 4:10 PM

## 2021-03-30 NOTE — OP NOTE
[address] [place] [address], [address] [place] [address]                                OPERATIVE REPORT    PATIENT NAME: Moreno Bonds                        :        10/18/1930  MED REC NO:   [id number]                          ROOM:       9799  ACCOUNT NO:   [de-identified]                           ADMIT DATE: 2021  PROVIDER:     Kanika Manriquez MD    DATE OF PROCEDURE:  2021    PREOPERATIVE DIAGNOSES:  Gross hematuria and clot retention. POSTOPERATIVE DIAGNOSES:  Gross hematuria and clot retention. OPERATIONS PERFORMED:  Cystoscopy, evacuation of clots, bladder  fulguration, and insertion of suprapubic tube. SURGEON:  Kanika Manriquez MD    ANESTHESIA:  General.    INDICATIONS:  Less than 50 mL. COMPLICATIONS:  None. DRAINS LEFT IN PLACE: A 12-[place] suprapubic tube. INDICATIONS FOR PROCEDURE:  The patient is a 20-year-old gentleman with  a history of urinary retention following hiatal hernia repair. He had  multiple catheters in. He said his family had problems with  intermittent gross hematuria. He has had a chronic history of enlarged  prostate and is on maximum medical therapy per Dr. Grace Velasquez. He is back  in the hospital with gross hematuria and clots, on CBI. Based on these  constellation of findings, he was recommended for cystoscopy, clot  evacuation, fulguration, possible TURP, and possible suprapubic tube  insertion. Risks and benefits were discussed with the son, including  bleeding, infection, urethral injury, bladder injury, bowel injury, and  need for additional surgical procedures and he is agreeable to it. DETAILS OF THE PROCEDURE:  After induction of general anesthesia, placed  in lithotomy position, prepping and draping according to sterile  technique, time-out was taken, all plans were agreed upon. IV  antibiotics have been given. The Laureano catheter have been removed.    Initially, inserted a 24-Lebanese continuous flow cystoscope _____  somewhat bruising underneath the mucosa, the large median lobe, multiple  clots in the bladder. I spent the first 15 minutes just irrigating out  all the clots including clots and large diverticula in the posterior  bladder wall. Once they were all cleared, I could see that the bleeding  seemed to be only coming from the prostatic urethra. I examined the  bladder with both the 30 degree and 70 degree lens and no other lesions. I used a rollerball to thoroughly cauterize that median lobe and those  areas of the prostate that were bleeding. Once I was confident with  hemostasis, we then filled the bladder up. Using a 70-degree lens,  looking at the anterior bladder wall, locked the site approximately 2 cm  above the pubic bone in the midline. Spinal needle was inserted in the  bladder without difficulty and that became the guide to insert a  12-Lebanese Bard suprapubic tube. Once it was in place, it was advanced  and the dilator was removed, the balloon was inflated, it was placed for  drainage, and was draining clear, secured with a silk stitch and then we  made sure that there was no further bleeding from the bladder itself and  so the cystoscope was removed, no Laureano was inserted through the urethra  and the patient was taken back to the recovery room once in stable  condition.         Jaci Amezquita MD    D: 03/29/2021 18:30:06       T: 03/29/2021 19:45:48     [email or web address]  Job#: [id number]     Doc#: [id number]    CC:

## 2021-03-31 ENCOUNTER — APPOINTMENT (OUTPATIENT)
Dept: CT IMAGING | Age: 86
DRG: 666 | End: 2021-03-31
Payer: MEDICARE

## 2021-03-31 LAB
ANION GAP SERPL CALCULATED.3IONS-SCNC: 12 MMOL/L (ref 4–16)
BUN BLDV-MCNC: 15 MG/DL (ref 6–23)
CALCIUM SERPL-MCNC: 7.9 MG/DL (ref 8.3–10.6)
CHLORIDE BLD-SCNC: 104 MMOL/L (ref 99–110)
CO2: 22 MMOL/L (ref 21–32)
CREAT SERPL-MCNC: 1.4 MG/DL (ref 0.9–1.3)
GFR AFRICAN AMERICAN: 58 ML/MIN/1.73M2
GFR NON-AFRICAN AMERICAN: 48 ML/MIN/1.73M2
GLUCOSE BLD-MCNC: 169 MG/DL (ref 70–99)
HCT VFR BLD CALC: 29.3 % (ref 42–52)
HEMOGLOBIN: 7.7 GM/DL (ref 13.5–18)
MCH RBC QN AUTO: 28.8 PG (ref 27–31)
MCHC RBC AUTO-ENTMCNC: 26.3 % (ref 32–36)
MCV RBC AUTO: 109.7 FL (ref 78–100)
PDW BLD-RTO: 15.1 % (ref 11.7–14.9)
PLATELET # BLD: 234 K/CU MM (ref 140–440)
PMV BLD AUTO: 9.1 FL (ref 7.5–11.1)
POTASSIUM SERPL-SCNC: 3.7 MMOL/L (ref 3.5–5.1)
RBC # BLD: 2.67 M/CU MM (ref 4.6–6.2)
SODIUM BLD-SCNC: 138 MMOL/L (ref 135–145)
WBC # BLD: 6 K/CU MM (ref 4–10.5)

## 2021-03-31 PROCEDURE — 85027 COMPLETE CBC AUTOMATED: CPT

## 2021-03-31 PROCEDURE — 97110 THERAPEUTIC EXERCISES: CPT

## 2021-03-31 PROCEDURE — 72192 CT PELVIS W/O DYE: CPT

## 2021-03-31 PROCEDURE — 1200000000 HC SEMI PRIVATE

## 2021-03-31 PROCEDURE — 6370000000 HC RX 637 (ALT 250 FOR IP): Performed by: SPECIALIST

## 2021-03-31 PROCEDURE — 2580000003 HC RX 258: Performed by: SPECIALIST

## 2021-03-31 PROCEDURE — 51798 US URINE CAPACITY MEASURE: CPT

## 2021-03-31 PROCEDURE — 97530 THERAPEUTIC ACTIVITIES: CPT

## 2021-03-31 PROCEDURE — 80048 BASIC METABOLIC PNL TOTAL CA: CPT

## 2021-03-31 PROCEDURE — 94761 N-INVAS EAR/PLS OXIMETRY MLT: CPT

## 2021-03-31 RX ADMIN — QUETIAPINE FUMARATE 50 MG: 25 TABLET ORAL at 21:48

## 2021-03-31 RX ADMIN — FUROSEMIDE 20 MG: 20 TABLET ORAL at 09:04

## 2021-03-31 RX ADMIN — FAMOTIDINE 20 MG: 20 TABLET ORAL at 09:03

## 2021-03-31 RX ADMIN — TAMSULOSIN HYDROCHLORIDE 0.8 MG: 0.4 CAPSULE ORAL at 09:05

## 2021-03-31 RX ADMIN — FERROUS SULFATE TAB 325 MG (65 MG ELEMENTAL FE) 325 MG: 325 (65 FE) TAB at 09:03

## 2021-03-31 RX ADMIN — POTASSIUM CHLORIDE 20 MEQ: 1500 TABLET, EXTENDED RELEASE ORAL at 09:05

## 2021-03-31 RX ADMIN — FINASTERIDE 5 MG: 5 TABLET, FILM COATED ORAL at 09:04

## 2021-03-31 RX ADMIN — FAMOTIDINE 20 MG: 20 TABLET ORAL at 21:48

## 2021-03-31 RX ADMIN — SERTRALINE HYDROCHLORIDE 50 MG: 50 TABLET ORAL at 09:05

## 2021-03-31 RX ADMIN — QUETIAPINE FUMARATE 50 MG: 25 TABLET ORAL at 14:26

## 2021-03-31 RX ADMIN — SODIUM CHLORIDE: 9 INJECTION, SOLUTION INTRAVENOUS at 18:24

## 2021-03-31 RX ADMIN — SODIUM CHLORIDE: 9 INJECTION, SOLUTION INTRAVENOUS at 09:10

## 2021-03-31 RX ADMIN — AMLODIPINE BESYLATE 5 MG: 5 TABLET ORAL at 09:03

## 2021-03-31 RX ADMIN — LEVOTHYROXINE SODIUM 75 MCG: 0.07 TABLET ORAL at 05:33

## 2021-03-31 RX ADMIN — LORAZEPAM 0.5 MG: 0.5 TABLET ORAL at 21:48

## 2021-03-31 RX ADMIN — SODIUM CHLORIDE: 9 INJECTION, SOLUTION INTRAVENOUS at 00:23

## 2021-03-31 RX ADMIN — METOPROLOL SUCCINATE 25 MG: 25 TABLET, EXTENDED RELEASE ORAL at 19:14

## 2021-03-31 RX ADMIN — DOCUSATE SODIUM 100 MG: 100 CAPSULE, LIQUID FILLED ORAL at 09:03

## 2021-03-31 RX ADMIN — QUETIAPINE FUMARATE 50 MG: 25 TABLET ORAL at 09:05

## 2021-03-31 RX ADMIN — PANTOPRAZOLE SODIUM 20 MG: 20 TABLET, DELAYED RELEASE ORAL at 09:04

## 2021-03-31 ASSESSMENT — PAIN SCALES - GENERAL: PAINLEVEL_OUTOF10: 0

## 2021-03-31 NOTE — PROGRESS NOTES
technique, safety, recruitment, and rationale. Cues were verbal and/or tactile. Pt Sup + vc's for technique to perform BUE AROM exercises in semi-fowlers in bed to include: shoulder flex/extension, internal/external rotation, chest presses, bicep curls, rowing, and supination/pronation x10-12 reps each c rest breaks PRN. All therapeutic intervention performed c emphasis on dynamic balance / standing tolerance to inc strength, endurance and act tolerance for inc Indep c ADL tasks, func transfers / mobility. Safety  Patient safely in bed + alarm at end of session, with call light/phone in reach, and nursing aware. Gait belt was used for func transfers / mobility. Assessment / Impression:        Patient's tolerance of treatment:  Well   Adverse Reaction: None  Significant change in status and impact:  None  Barriers to improvement:  Strength / balance / endurance, extremely [place]    Plan for Next Session:    Continue per OT POC per patient's tolerance. Time in:  1610  Time out:  1638  Timed treatment minutes:  28  Total treatment time:  28    Electronically signed by:    GISELE Larkin  3/31/2021, 11:27 AM    Previously filed values:    Goals:  1. Pt will complete all aspects of bed mobility for EOB/OOB ADLs ind with HOB flat and no use of bed rails  2. Pt will complete UB/LB bathing with SUP and rest breaks as needed  3. Pt will complete all aspects of LB dressing with setup and AE as needed/trained  4. Pt will complete all functional transfers to and from bed, chair, toilet, shower chair with SUP and FWW prn  5. Pt will ambulate HH distance to bathroom for toileting with FWW and SUP  6. Pt will complete all aspects of toileting task Dashawn at standard commode  7. Pt will complete oral hygiene/grooming routine in standing at sink demo G balance and tolerance  8.  Pt will complete ther ex/ther act with focus on UB strengthening

## 2021-03-31 NOTE — PROGRESS NOTES
[facility] [address], [address]   Progress Note  [address] 0 1 2      Date: 3/31/2021   Patient: Alicia Adorno   : 10/18/1930   DOA: 3/26/2021   MRN: [id number]   ROOM#: 1111/1111-A     Admit Date: 3/26/2021     Collaborating Urologist on Call at time of admission: Dr. Rachael Zuluaga  CC: Blood clots in catheter  Reason for Consult:  Hematuria  POD #2: Cystoscopy, clot evacuation, bladder fulguration, and insertion of a 12fr suprapubic tube    Subjective:     Pain: none, no nausea and no vomiting,   Bowel Movement/Flatus:   Yes  Voidinfr SPT in place, urine dark red    Pt resting comfortably in bed, denies any pain. His SPT is draining dark red urine. I manually irrigated his bladder via the 12fr SPT with 200cc normal saline and had 2 small clots return. Urine light cherry red by end of irrigation. Pt tolerated well. No leaking around SPT or via urethra noted during irrigation.     Objective:    Vitals:    BP (!) 153/68   Pulse 80   Temp 97.2 °F (36.2 °C) (Oral)   Resp 16   Ht 6' (1.829 m)   Wt 170 lb (77.1 kg)   SpO2 98%   BMI 23.06 kg/m²    Temp  Av °F (36.7 °C)  Min: 97.2 °F (36.2 °C)  Max: 98.3 °F (36.8 °C)       Intake/Output Summary (Last 24 hours) at 3/31/2021 0654  Last data filed at 3/31/2021 0536  Gross per 24 hour   Intake 1786.67 ml   Output 2580 ml   Net -793.33 ml       Physical Exam:   General appearance: alert, appears stated age, cooperative and no distress  Head: Normocephalic, without obvious abnormality, atraumatic  Back: No CVA tenderness  Abdomen: Soft, non-tender, non-distended   : 12fr SPT in place, urine dark red    Labs:   WBC:    Lab Results   Component Value Date    WBC 7.4 2021      Hemoglobin/Hematocrit:    Lab Results   Component Value Date    HGB 7.8 2021    HCT 25.0 2021      BMP:   Lab Results   Component Value Date     2021    K 4.3 2021     2021    CO2 26 2021    BUN 19 2021    LABALBU 2.4 03/26/2021    CREATININE 1.7 03/28/2021    CALCIUM 8.3 03/28/2021    GFRAA 46 03/28/2021    LABGLOM 38 03/28/2021      PT/INR:    Lab Results   Component Value Date    PROTIME 14.1 03/26/2021    INR 1.16 03/26/2021      PTT:    Lab Results   Component Value Date    APTT 37.8 03/26/2021       Urine Culture: No growth at 18 to 36 hours    Imaging:  Echocardiogram Complete 2d With Doppler With Color    Result Date: 3/3/2021  Transthoracic Echocardiography Report (TTE)  Demographics   Patient Name       Alon Canchola       Date of Study       03/03/2021   Date of Birth      10/18/1930        Gender              Male   Age                80 year(s)        Race                   Patient Number     [id number]        Room Number         2111   Visit Number       [id number]   Corporate ID       [id number]   Accession Number   [id number]        [address] [place] [address]   Ordering Physician Nyle Boxer MD Interpreting        Krunal Khan MD                                       Physician  Procedure Type of Study   TTE procedure:ECHOCARDIOGRAM COMPLETE 2D W DOPPLER W COLOR. Procedure Date Date: 03/03/2021 Start: 08:52 AM Study Location: Portable Technical Quality: Fair visualization Indications:Congestive heart failure. Patient Status: Routine Height: 72 inches Weight: 190 pounds BSA: 2.08 m2 BMI: 25.77 kg/m2 HR: 96 bpm BP: 149/84 mmHg  Conclusions   Summary  Left ventricular systolic function is low normal.  Ejection fraction is visually estimated at 50%. Sclerotic, but non-stenotic aortic valve. Trace aortic regurgitation is noted. No evidence of any pericardial effusion.    Signature   ------------------------------------------------------------------  Electronically signed by Krunal Khan MD (Interpreting  physician) on 03/03/2021 at 11:18 AM  ------------------------------------------------------------------   Findings   Left Ventricle Left ventricular systolic function is low normal.  Ejection fraction is visually estimated at 50%. Left Atrium  Essentially normal left atrium. Right Atrium  Essentially normal right atrium. Right Ventricle  Essentially normal right ventricle. Aortic Valve  Sclerotic, but non-stenotic aortic valve. Trace aortic regurgitation is noted. Mitral Valve  Trace mitral regurgitation is present. Tricuspid Valve  Mild tricuspid regurgitation is present. Pulmonic Valve  The pulmonic valve was not well visualized. Pericardial Effusion  No evidence of any pericardial effusion.   M-Mode/2D Measurements & Calculations   LV Diastolic Dimension:  LV Systolic Dimension:  LA Dimension: 3.5 cmAO Root  4.93 cm                  3.56 cm                 Dimension: 4 cmLA Area:  LV FS:27.8 %             LV Volume Diastolic: 72 32.1 cm2  LV PW Diastolic: 7.54 cm ml  LV PW Systolic: 1.4 cm   LV Volume Systolic: 40  Septum Diastolic: 0.91   ml  cm                       LV EDV/LV EDV Index: 72 RV Diastolic Dimension:  Septum Systolic: 9.30 cm WJ/10 F2GH ESV/LV ESV   2.91 cm  CO: 7.97 l/min           Index: 40 ml/19 m2  CI: 3.83 l/m*m2          EF Calculated (A4C):    LA/Aorta: 0.88                           44.4 %                  Ascending Aorta: 3.7 cm  LV Area Diastolic: 91.2  EF Calculated (2D):     LA volume/Index: 49 ml  cm2                      53.5 %                  /72O0  LV Area Systolic: 18 cm2                           LV Length: 8.17 cm                            LVOT: 2.7 cm  Doppler Measurements & Calculations    AV Peak Velocity: 127 cm/s    LVOT Peak Velocity: 82.7 cm/s   AV Peak Gradient: 6.45 mmHg   LVOT Mean Velocity: 59.8 cm/s   AV Mean Velocity: 93.1 cm/s   LVOT Peak Gradient: 3 mmHgLVOT Mean Gradient:   AV Mean Gradient: 4 mmHg      2 mmHg   AV VTI: 21.5 cm               Estimated RVSP: 36 mmHg   AV Area (Continuity):3.86 cm2 Estimated RAP:3 mmHg    LVOT VTI: 14.5 cm                                 TR Velocity:286 cm/s   Estimated PASP: 35.72 mmHg    TR Gradient:32.72 mmHg      Xr Chest Portable    Result Date: 3/4/2021  EXAMINATION: ONE XRAY VIEW OF THE CHEST 3/4/2021 12:32 pm COMPARISON: March 2, 2021 HISTORY: ORDERING SYSTEM PROVIDED HISTORY: post operative hiatal hernia repair TECHNOLOGIST PROVIDED HISTORY: Reason for exam:->post operative hiatal hernia repair Reason for Exam: post operative hiatal hernia repair FINDINGS: Nasogastric tube with its distal tip coursing below the diaphragm. Stable cardiomediastinal silhouette. Bibasilar atelectasis or infiltrate is noted. No definite pleural effusion or pneumothorax. The osseous structures are stable. Bibasilar atelectasis or infiltrate. Xr Chest Portable    Result Date: 3/2/2021  EXAMINATION: ONE XRAY VIEW OF THE CHEST 3/2/2021 6:43 pm COMPARISON: 01/30/2018 HISTORY: ORDERING SYSTEM PROVIDED HISTORY: Chest pain TECHNOLOGIST PROVIDED HISTORY: Reason for exam:->Chest pain Reason for Exam: Chest pain Acuity: Acute Type of Exam: Initial Additional signs and symptoms: na Relevant Medical/Surgical History: na FINDINGS: The heart size is stable. Large hiatal hernia again noted. Probable subsegmental atelectasis left lung base. Linear opacity right lung base unchanged. No pneumothorax. Increased size of a large hiatal hernia. Otherwise, unchanged chest     Cta Abdomen Pelvis W Contrast    Result Date: 3/2/2021  EXAMINATION: CTA OF THE ABDOMEN AND PELVIS WITH CONTRAST 3/2/2021 4:41 pm TECHNIQUE: CTA of the abdomen and pelvis was performed with the administration of intravenous contrast. Multiplanar reformatted images are provided for review. MIP images are provided for review. Dose modulation, iterative reconstruction, and/or weight based adjustment of the mA/kV was utilized to reduce the radiation dose to as low as reasonably achievable.  COMPARISON: Upper GI series, 04/10/2018 HISTORY: ORDERING SYSTEM PROVIDED HISTORY: GI bleed protocol TECHNOLOGIST PROVIDED HISTORY: Reason for exam:->GI bleed protocol Decision Support Exception->Emergency Medical Condition (MA) Reason for Exam: GI bleed protocol Acuity: Acute Type of Exam: Initial FINDINGS: Lower Chest: There is an extremely large hiatal hernia, with organo-axial volvulus. The gastric fundus and a portion the body is now found inferior to the diaphragm on the left (previously those were superior), and now there is dilation of that portion of the stomach, which is concerning for entrapment. Note that the entirety of the hiatal hernia is not included. Organs: The liver enhances normally. Gallbladder is unremarkable. Normal arterial phase imaging of the spleen. Benign nodule the left adrenal gland measures 2.2 cm and is unchanged since 2009. Pancreas is unremarkable. GI/Bowel: Evaluation for GI bleeding is limited, as only an arterial phase was obtained (normally noncontrast and delayed images are also obtained for the purposes of localization of GI bleeding). That said, no suspicious extravasation of contrast is identified within the large bowel. There is mild diverticulosis of the large bowel. The appendix is normal. Again, there is the large hiatal hernia, with concern for entrapment of the gastric fundus and a portion of the body. The duodenal sweep and the remainder of the small bowel are unremarkable. 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 Urinary bladder appears thickened, which is likely secondary to outlet obstruction. There is moderate to marked prostatic hypertrophy. No free pelvic fluid. No pelvic sidewall lymphadenopathy. There is evidence of previous bilateral inguinal hernia repair. Peritoneum/Retroperitoneum: Abdominal aorta normal in caliber. No retroperitoneal lymphadenopathy is identified. No iliac chain or inguinal lymphadenopathy. 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   Additional pagetoid change is seen in limited due to patient cooperation. Examination was terminated prematurely at patient request.  Images of the bladder were not obtained. Kidneys: The right kidney measures 9.7 cm in length and the left kidney measures 9.8 cm in length. Kidneys demonstrate increased cortical echogenicity. No evidence of hydronephrosis or intrarenal stones. There is a 3.8 x 2.6 x 3.2 cm simple appearing left renal cyst.     1. Increased renal cortical echogenicity bilaterally, suggestive of medical renal disease. 2. 3.8 cm simple appearing left renal cyst. 3. Bladder images were not obtained at patient request.       Assessment & Plan:      Jaylen Valero is a 80y.o. year old male admitted 3/26/2021 for gross hematuria.    1) Gross Hematuria: Likely secondary to borden catheter trauma              POD #2: Cystoscopy, clot evacuation, bladder fulguration, and insertion of a 12fr suprapubic tube              Hgb 7.7, recommend PRBC transfusion if <7.0              CT a/p 3/2/21 with multiple bladder diverticulum, thickened urinary bladder wall likely secondary to CANSECO with a large prostate              Urine culture negative               Nursing staff to manually irrigate bladder q4hrs and prn clots   CT pelvis with clots within bladder. Plan for NPO after midnight and cystoscopy/prostate fulguration/clot evacuation, and possible transurethral resection of the prostate tomorrow morning with Dr. Kelsi Maravilla. 2) BPH: h/o 90g prostate gland. Chronically on Flomax 0.4mg and Proscar 5mg, okay to resume these               3/29/21 s/p 12fr SPT placement   Continue SPT     Son Aston Santos ([phone]) updated and agreeable with the plan. Will continue to follow    Patient seen and examined, chart reviewed.      Electronically signed by Mat Durbin PA-C on 3/31/2021 at 6:54 AM

## 2021-03-31 NOTE — PROGRESS NOTES
Hospitalist Progress Note      Name:  Razia Blount /Age/Sex: 10/18/1930  (80 y.o. male)   MRN & CSN:  [id number] & [id number] Admission Date/Time: 3/26/2021  3:51 PM   Location:  1111/1111-A PCP: Ted Shepherd MD         Hospital Day: 6    Assessment and Plan:   Ivette Sherman is a 80 y.o.  male  with past medical history of hypertension, BPH, hypothyroidism, anemia of chronic disease who presents with gross hematuria     1) Gross Hematuria  -CT a/p 3/2/21 with multiple bladder diverticulum, thickened urinary bladder wall likely secondary to CANSECO with a large prostate  -S/P Cystoscopy, clot evacuation, bladder fulguration and SPC placement  -Urology on board; continue bladder irrigation  -Continue Flomax, Proscar     2) Acute on chronic anemia    -Due to chronic GI bleed and hematuria   -Recent admission for GI bleed and EGD  -S/P PRBC transfusion  -Monitor hb daily     Chronic medical condition : Resume home medications when clinically appropriate     CKD stage III - Cr stable    Peripheral Neuropathy   BPH on Flomax  Hypothyroidism   Anxiety disorder/depression  Essential HTN    Diet DIET GENERAL;  Diet NPO, After Midnight   DVT Prophylaxis [] Lovenox, []  Heparin, [] SCDs, [] Ambulation   GI Prophylaxis [] PPI,  [] H2 Blocker,  [] Carafate,  [] Diet/Tube Feeds   Code Status Full Code   Disposition [facility]      History of Present Illness:     Patient was seen and examined  Denied any chest pain, dizziness  No fever, chills, N/V/D  No acute event overnight    Ten point ROS reviewed negative, unless as noted above    Objective: Intake/Output Summary (Last 24 hours) at 3/31/2021 1420  Last data filed at 3/31/2021 1136  Gross per 24 hour   Intake 1856.67 ml   Output 2350 ml   Net -493.33 ml      Vitals:   Vitals:    21 0815   BP: 134/64   Pulse: 84   Resp: 16   Temp: 97.5 °F (36.4 °C)   SpO2:      Physical Exam:   GEN Awake male, sitting upright in bed in no apparent distress.  Appears given age.  Heath Diss are equally round. No scleral erythema, discharge, or conjunctivitis. HENT Mucous membranes are moist. Oral pharynx without exudates, no evidence of thrush. NECK Supple, no apparent thyromegaly or masses. RESP Clear to auscultation, no wheezes, rales or rhonchi. Symmetric chest movement while on room air. CARDIO/VASC S1/S2 auscultated. Regular rate without appreciable murmurs, rubs, or gallops. No JVD or carotid bruits. Peripheral pulses equal bilaterally and palpable. No peripheral edema. GI Abdomen is soft without significant tenderness, masses, or guarding. Bowel sounds are normoactive. Rectal exam deferred.  No costovertebral angle tenderness. Laureano catheter is present. HEME/LYMPH No palpable cervical lymphadenopathy and no hepatosplenomegaly. No petechiae or ecchymoses. MSK No gross joint deformities. SKIN Normal coloration, warm, dry. NEURO Cranial nerves appear grossly intact, normal speech, no lateralizing weakness. PSYCH Awake, alert, oriented x 4. Affect appropriate.     Medications:   Medications:    amLODIPine  5 mg Oral QAM    docusate sodium  100 mg Oral Daily    ferrous sulfate  325 mg Oral Daily with breakfast    finasteride  5 mg Oral Daily    furosemide  20 mg Oral Daily    levothyroxine  75 mcg Oral Daily    metoprolol succinate  25 mg Oral QPM    pantoprazole  20 mg Oral Daily    potassium chloride  20 mEq Oral Daily    QUEtiapine  50 mg Oral TID    tamsulosin  0.8 mg Oral Daily    sertraline  50 mg Oral Daily    sodium chloride flush  10 mL Intravenous 2 times per day    [Held by provider] enoxaparin  40 mg Subcutaneous Daily    famotidine  20 mg Oral BID      Infusions:    sodium chloride 100 mL/hr at 03/31/21 0910    sodium chloride      sodium chloride 25 mL (03/27/21 1132)     PRN Meds: sodium chloride, , PRN  calcium carbonate, 500 mg, Q6H PRN  LORazepam, 0.5 mg, Nightly PRN  sodium chloride flush, 10 mL, PRN  sodium chloride, 25 mL, PRN  promethazine, 12.5 mg, Q6H PRN    Or  ondansetron, 4 mg, Q6H PRN  polyethylene glycol, 17 g, Daily PRN  acetaminophen, 650 mg, Q6H PRN    Or  acetaminophen, 650 mg, Q6H PRN  potassium chloride, 40 mEq, PRN    Or  potassium alternative oral replacement, 40 mEq, PRN    Or  potassium chloride, 10 mEq, PRN  QUEtiapine, 100 mg, Nightly PRN          Electronically signed by Margot Kahn MD on 3/31/2021 at 2:20 PM

## 2021-04-01 ENCOUNTER — ANESTHESIA (OUTPATIENT)
Dept: OPERATING ROOM | Age: 86
DRG: 666 | End: 2021-04-01
Payer: MEDICARE

## 2021-04-01 ENCOUNTER — ANESTHESIA EVENT (OUTPATIENT)
Dept: OPERATING ROOM | Age: 86
DRG: 666 | End: 2021-04-01
Payer: MEDICARE

## 2021-04-01 VITALS
DIASTOLIC BLOOD PRESSURE: 86 MMHG | TEMPERATURE: 97.7 F | RESPIRATION RATE: 10 BRPM | SYSTOLIC BLOOD PRESSURE: 153 MMHG | OXYGEN SATURATION: 100 %

## 2021-04-01 LAB
HCT VFR BLD CALC: 22.7 % (ref 42–52)
HCT VFR BLD CALC: 31.2 % (ref 42–52)
HEMOGLOBIN: 6.8 GM/DL (ref 13.5–18)
HEMOGLOBIN: 9.2 GM/DL (ref 13.5–18)
MCH RBC QN AUTO: 28.3 PG (ref 27–31)
MCHC RBC AUTO-ENTMCNC: 30 % (ref 32–36)
MCV RBC AUTO: 94.6 FL (ref 78–100)
PDW BLD-RTO: 15 % (ref 11.7–14.9)
PLATELET # BLD: 234 K/CU MM (ref 140–440)
PMV BLD AUTO: 9 FL (ref 7.5–11.1)
RBC # BLD: 2.4 M/CU MM (ref 4.6–6.2)
WBC # BLD: 6.3 K/CU MM (ref 4–10.5)

## 2021-04-01 PROCEDURE — 7100000001 HC PACU RECOVERY - ADDTL 15 MIN: Performed by: UROLOGY

## 2021-04-01 PROCEDURE — 85014 HEMATOCRIT: CPT

## 2021-04-01 PROCEDURE — 88305 TISSUE EXAM BY PATHOLOGIST: CPT

## 2021-04-01 PROCEDURE — 7100000000 HC PACU RECOVERY - FIRST 15 MIN: Performed by: UROLOGY

## 2021-04-01 PROCEDURE — 36415 COLL VENOUS BLD VENIPUNCTURE: CPT

## 2021-04-01 PROCEDURE — 2580000003 HC RX 258: Performed by: UROLOGY

## 2021-04-01 PROCEDURE — 2580000003 HC RX 258: Performed by: PHYSICIAN ASSISTANT

## 2021-04-01 PROCEDURE — 85027 COMPLETE CBC AUTOMATED: CPT

## 2021-04-01 PROCEDURE — 85018 HEMOGLOBIN: CPT

## 2021-04-01 PROCEDURE — 94761 N-INVAS EAR/PLS OXIMETRY MLT: CPT

## 2021-04-01 PROCEDURE — 3600000012 HC SURGERY LEVEL 2 ADDTL 15MIN: Performed by: UROLOGY

## 2021-04-01 PROCEDURE — 1200000000 HC SEMI PRIVATE

## 2021-04-01 PROCEDURE — 6360000002 HC RX W HCPCS: Performed by: NURSE ANESTHETIST, CERTIFIED REGISTERED

## 2021-04-01 PROCEDURE — 86922 COMPATIBILITY TEST ANTIGLOB: CPT

## 2021-04-01 PROCEDURE — P9016 RBC LEUKOCYTES REDUCED: HCPCS

## 2021-04-01 PROCEDURE — 86850 RBC ANTIBODY SCREEN: CPT

## 2021-04-01 PROCEDURE — 86901 BLOOD TYPING SEROLOGIC RH(D): CPT

## 2021-04-01 PROCEDURE — 0TCB8ZZ EXTIRPATION OF MATTER FROM BLADDER, VIA NATURAL OR ARTIFICIAL OPENING ENDOSCOPIC: ICD-10-PCS | Performed by: UROLOGY

## 2021-04-01 PROCEDURE — 0V508ZZ DESTRUCTION OF PROSTATE, VIA NATURAL OR ARTIFICIAL OPENING ENDOSCOPIC: ICD-10-PCS | Performed by: UROLOGY

## 2021-04-01 PROCEDURE — 6370000000 HC RX 637 (ALT 250 FOR IP): Performed by: UROLOGY

## 2021-04-01 PROCEDURE — 86900 BLOOD TYPING SEROLOGIC ABO: CPT

## 2021-04-01 PROCEDURE — 2709999900 HC NON-CHARGEABLE SUPPLY: Performed by: UROLOGY

## 2021-04-01 PROCEDURE — 3600000002 HC SURGERY LEVEL 2 BASE: Performed by: UROLOGY

## 2021-04-01 PROCEDURE — 3700000000 HC ANESTHESIA ATTENDED CARE: Performed by: UROLOGY

## 2021-04-01 PROCEDURE — 0VB08ZZ EXCISION OF PROSTATE, VIA NATURAL OR ARTIFICIAL OPENING ENDOSCOPIC: ICD-10-PCS | Performed by: UROLOGY

## 2021-04-01 PROCEDURE — 3700000001 HC ADD 15 MINUTES (ANESTHESIA): Performed by: UROLOGY

## 2021-04-01 RX ORDER — FENTANYL CITRATE 50 UG/ML
50 INJECTION, SOLUTION INTRAMUSCULAR; INTRAVENOUS EVERY 5 MIN PRN
Status: DISCONTINUED | OUTPATIENT
Start: 2021-04-01 | End: 2021-04-01 | Stop reason: HOSPADM

## 2021-04-01 RX ORDER — DEXAMETHASONE SODIUM PHOSPHATE 4 MG/ML
INJECTION, SOLUTION INTRA-ARTICULAR; INTRALESIONAL; INTRAMUSCULAR; INTRAVENOUS; SOFT TISSUE PRN
Status: DISCONTINUED | OUTPATIENT
Start: 2021-04-01 | End: 2021-04-01 | Stop reason: SDUPTHER

## 2021-04-01 RX ORDER — FENTANYL CITRATE 50 UG/ML
INJECTION, SOLUTION INTRAMUSCULAR; INTRAVENOUS PRN
Status: DISCONTINUED | OUTPATIENT
Start: 2021-04-01 | End: 2021-04-01 | Stop reason: SDUPTHER

## 2021-04-01 RX ORDER — ONDANSETRON 2 MG/ML
4 INJECTION INTRAMUSCULAR; INTRAVENOUS
Status: DISCONTINUED | OUTPATIENT
Start: 2021-04-01 | End: 2021-04-01 | Stop reason: HOSPADM

## 2021-04-01 RX ORDER — HYDRALAZINE HYDROCHLORIDE 20 MG/ML
5 INJECTION INTRAMUSCULAR; INTRAVENOUS EVERY 10 MIN PRN
Status: DISCONTINUED | OUTPATIENT
Start: 2021-04-01 | End: 2021-04-01 | Stop reason: HOSPADM

## 2021-04-01 RX ORDER — PROMETHAZINE HYDROCHLORIDE 25 MG/ML
6.25 INJECTION, SOLUTION INTRAMUSCULAR; INTRAVENOUS
Status: DISCONTINUED | OUTPATIENT
Start: 2021-04-01 | End: 2021-04-01 | Stop reason: HOSPADM

## 2021-04-01 RX ORDER — HYDROCODONE BITARTRATE AND ACETAMINOPHEN 5; 325 MG/1; MG/1
2 TABLET ORAL EVERY 6 HOURS PRN
Status: DISCONTINUED | OUTPATIENT
Start: 2021-04-01 | End: 2021-04-01 | Stop reason: HOSPADM

## 2021-04-01 RX ORDER — 0.9 % SODIUM CHLORIDE 0.9 %
500 INTRAVENOUS SOLUTION INTRAVENOUS
Status: DISCONTINUED | OUTPATIENT
Start: 2021-04-01 | End: 2021-04-01 | Stop reason: HOSPADM

## 2021-04-01 RX ORDER — SODIUM CHLORIDE 9 MG/ML
INJECTION, SOLUTION INTRAVENOUS CONTINUOUS
Status: DISCONTINUED | OUTPATIENT
Start: 2021-04-01 | End: 2021-04-09 | Stop reason: HOSPADM

## 2021-04-01 RX ORDER — SODIUM CHLORIDE 9 MG/ML
INJECTION, SOLUTION INTRAVENOUS PRN
Status: COMPLETED | OUTPATIENT
Start: 2021-04-01 | End: 2021-04-01

## 2021-04-01 RX ORDER — DIPHENHYDRAMINE HYDROCHLORIDE 50 MG/ML
12.5 INJECTION INTRAMUSCULAR; INTRAVENOUS
Status: DISCONTINUED | OUTPATIENT
Start: 2021-04-01 | End: 2021-04-01 | Stop reason: HOSPADM

## 2021-04-01 RX ORDER — CEFAZOLIN SODIUM 2 G/50ML
SOLUTION INTRAVENOUS PRN
Status: DISCONTINUED | OUTPATIENT
Start: 2021-04-01 | End: 2021-04-01 | Stop reason: SDUPTHER

## 2021-04-01 RX ORDER — ONDANSETRON 2 MG/ML
INJECTION INTRAMUSCULAR; INTRAVENOUS PRN
Status: DISCONTINUED | OUTPATIENT
Start: 2021-04-01 | End: 2021-04-01 | Stop reason: SDUPTHER

## 2021-04-01 RX ORDER — PROPOFOL 10 MG/ML
INJECTION, EMULSION INTRAVENOUS PRN
Status: DISCONTINUED | OUTPATIENT
Start: 2021-04-01 | End: 2021-04-01 | Stop reason: SDUPTHER

## 2021-04-01 RX ORDER — HYDROMORPHONE HCL 110MG/55ML
0.5 PATIENT CONTROLLED ANALGESIA SYRINGE INTRAVENOUS EVERY 5 MIN PRN
Status: DISCONTINUED | OUTPATIENT
Start: 2021-04-01 | End: 2021-04-01 | Stop reason: HOSPADM

## 2021-04-01 RX ORDER — LABETALOL HYDROCHLORIDE 5 MG/ML
5 INJECTION, SOLUTION INTRAVENOUS EVERY 10 MIN PRN
Status: DISCONTINUED | OUTPATIENT
Start: 2021-04-01 | End: 2021-04-01 | Stop reason: HOSPADM

## 2021-04-01 RX ORDER — LIDOCAINE HYDROCHLORIDE 20 MG/ML
INJECTION, SOLUTION INTRAVENOUS PRN
Status: DISCONTINUED | OUTPATIENT
Start: 2021-04-01 | End: 2021-04-01 | Stop reason: SDUPTHER

## 2021-04-01 RX ORDER — MEPERIDINE HYDROCHLORIDE 25 MG/ML
12.5 INJECTION INTRAMUSCULAR; INTRAVENOUS; SUBCUTANEOUS EVERY 5 MIN PRN
Status: DISCONTINUED | OUTPATIENT
Start: 2021-04-01 | End: 2021-04-01 | Stop reason: HOSPADM

## 2021-04-01 RX ORDER — HYDROCODONE BITARTRATE AND ACETAMINOPHEN 5; 325 MG/1; MG/1
1 TABLET ORAL PRN
Status: DISCONTINUED | OUTPATIENT
Start: 2021-04-01 | End: 2021-04-01 | Stop reason: HOSPADM

## 2021-04-01 RX ADMIN — SODIUM CHLORIDE: 900 INJECTION INTRAVENOUS at 10:28

## 2021-04-01 RX ADMIN — PROPOFOL 100 MG: 10 INJECTION, EMULSION INTRAVENOUS at 11:06

## 2021-04-01 RX ADMIN — SODIUM CHLORIDE, PRESERVATIVE FREE 10 ML: 5 INJECTION INTRAVENOUS at 23:39

## 2021-04-01 RX ADMIN — QUETIAPINE FUMARATE 50 MG: 25 TABLET ORAL at 15:26

## 2021-04-01 RX ADMIN — FAMOTIDINE 20 MG: 20 TABLET ORAL at 23:39

## 2021-04-01 RX ADMIN — FENTANYL CITRATE 50 MCG: 50 INJECTION, SOLUTION INTRAMUSCULAR; INTRAVENOUS at 11:02

## 2021-04-01 RX ADMIN — FENTANYL CITRATE 25 MCG: 50 INJECTION, SOLUTION INTRAMUSCULAR; INTRAVENOUS at 11:40

## 2021-04-01 RX ADMIN — METOPROLOL SUCCINATE 25 MG: 25 TABLET, EXTENDED RELEASE ORAL at 18:32

## 2021-04-01 RX ADMIN — PHENYLEPHRINE HYDROCHLORIDE 200 MCG: 10 INJECTION INTRAVENOUS at 11:10

## 2021-04-01 RX ADMIN — CEFAZOLIN SODIUM 2 G: 2 SOLUTION INTRAVENOUS at 11:11

## 2021-04-01 RX ADMIN — PHENYLEPHRINE HYDROCHLORIDE 200 MCG: 10 INJECTION INTRAVENOUS at 11:08

## 2021-04-01 RX ADMIN — PHENYLEPHRINE HYDROCHLORIDE 200 MCG: 10 INJECTION INTRAVENOUS at 11:13

## 2021-04-01 RX ADMIN — DEXAMETHASONE SODIUM PHOSPHATE 4 MG: 4 INJECTION, SOLUTION INTRAMUSCULAR; INTRAVENOUS at 11:14

## 2021-04-01 RX ADMIN — LIDOCAINE HYDROCHLORIDE 100 MG: 20 INJECTION, SOLUTION INTRAVENOUS at 11:06

## 2021-04-01 RX ADMIN — QUETIAPINE FUMARATE 50 MG: 25 TABLET ORAL at 23:39

## 2021-04-01 RX ADMIN — FENTANYL CITRATE 25 MCG: 50 INJECTION, SOLUTION INTRAMUSCULAR; INTRAVENOUS at 11:35

## 2021-04-01 RX ADMIN — ONDANSETRON 4 MG: 2 INJECTION INTRAMUSCULAR; INTRAVENOUS at 11:14

## 2021-04-01 RX ADMIN — PHENYLEPHRINE HYDROCHLORIDE 200 MCG: 10 INJECTION INTRAVENOUS at 11:19

## 2021-04-01 RX ADMIN — SODIUM CHLORIDE: 9 INJECTION, SOLUTION INTRAVENOUS at 13:12

## 2021-04-01 ASSESSMENT — PULMONARY FUNCTION TESTS
PIF_VALUE: 3
PIF_VALUE: 1
PIF_VALUE: 7
PIF_VALUE: 8
PIF_VALUE: 7
PIF_VALUE: 15
PIF_VALUE: 9
PIF_VALUE: 15
PIF_VALUE: 5
PIF_VALUE: 8
PIF_VALUE: 16
PIF_VALUE: 20
PIF_VALUE: 6
PIF_VALUE: 0
PIF_VALUE: 6
PIF_VALUE: 15
PIF_VALUE: 6
PIF_VALUE: 4
PIF_VALUE: 6
PIF_VALUE: 7
PIF_VALUE: 6
PIF_VALUE: 7
PIF_VALUE: 6
PIF_VALUE: 6
PIF_VALUE: 13
PIF_VALUE: 7
PIF_VALUE: 6
PIF_VALUE: 7
PIF_VALUE: 6
PIF_VALUE: 2
PIF_VALUE: 9
PIF_VALUE: 2
PIF_VALUE: 4
PIF_VALUE: 7
PIF_VALUE: 7
PIF_VALUE: 9
PIF_VALUE: 7
PIF_VALUE: 15
PIF_VALUE: 12
PIF_VALUE: 6
PIF_VALUE: 7
PIF_VALUE: 7

## 2021-04-01 NOTE — PROGRESS NOTES
Persistent prostatic bleeding  Blood loss anemia      SP tube not draining well at times  He may have pulled out of position  He has been pulling at tubes and IV's. He has a sitter now  He was voiding per urethra last night  Hgb 6.8 this am PRBC ordered    CT Scan pelvis 3/21/2021 with blood clots in bladder    Af,vss  Awake, cooperative, nad  [place]  Sp tube is draining red urine but appears out of place      Long discussion with patient and sone (in person and on phone)    I discussed options and risks    He/we elected to proceed with cystoscopy, clot evaluation, fulguration of bleeding, possible TURP, possible SP tube placement    1. PRBC unit for blood loss anemia  2. Ancef on call to OR  3.   Proceed with surgery as above

## 2021-04-01 NOTE — PROGRESS NOTES
[facility]   Progress Note  [facility] 0 1 2      Date: 2021   Patient: Milagros Conde   : 10/18/1930   DOA: 3/26/2021   MRN: [id number]   ROOM#: 1111/1111-A     Admit Date: 3/26/2021     Collaborating Urologist on Call at time of admission: Dr. Emeka Hoff clots in catheter  Reason for Consult:  Hematuria  POD #3: Cystoscopy, clot evacuation, bladder fulguration, and insertion of a 12fr suprapubic tube    Subjective:     Pain: mild, no nausea and no vomiting,   Bowel Movement/Flatus:   Yes  Voidinfr SPT in place, urine dark red    Pt resting in bed, endorses mild lower abdominal discomfort this morning. His catheter is not irrigating well. I also tried to advance SPT into bladder after balloon deflation, but it will not advance further. Discussed plan for cystoscopy, clot evacuation, bladder/prostate fulguration, and possible transurethral resection of the prostate this morning with possible risks/complications. Pt verbalizes understanding and is agreeable to proceed. Discussed with son as well who is also in agreement.     Objective:    Vitals:    /67   Pulse 82   Temp 98.2 °F (36.8 °C) (Oral)   Resp 18   Ht 6' (1.829 m)   Wt 170 lb (77.1 kg)   SpO2 98%   BMI 23.06 kg/m²    Temp  Av.8 °F (36.6 °C)  Min: 96.4 °F (35.8 °C)  Max: 98.9 °F (37.2 °C)       Intake/Output Summary (Last 24 hours) at 2021 0650  Last data filed at 2021 0400  Gross per 24 hour   Intake 1525 ml   Output 1750 ml   Net -225 ml       Physical Exam:   General appearance: alert, appears stated age, cooperative and no distress  Head: Normocephalic, without obvious abnormality, atraumatic  Back: No CVA tenderness  Abdomen: Soft, non-tender, non-distended   : 12fr SPT in place, urine dark red    Labs:   WBC:    Lab Results   Component Value Date    WBC 6.0 2021      Hemoglobin/Hematocrit:    Lab Results   Component Value Date    HGB 7.7 2021    HCT 29.3 2021 BMP:   Lab Results   Component Value Date     03/31/2021    K 3.7 03/31/2021     03/31/2021    CO2 22 03/31/2021    BUN 15 03/31/2021    LABALBU 2.4 03/26/2021    CREATININE 1.4 03/31/2021    CALCIUM 7.9 03/31/2021    GFRAA 58 03/31/2021    LABGLOM 48 03/31/2021      PT/INR:    Lab Results   Component Value Date    PROTIME 14.1 03/26/2021    INR 1.16 03/26/2021      PTT:    Lab Results   Component Value Date    APTT 37.8 03/26/2021     Urine Culture: No growth at 18 to 36 hours    Imaging:  Echocardiogram Complete 2d With Doppler With Color    Result Date: 3/3/2021  Transthoracic Echocardiography Report (TTE)  Demographics   Patient Name       Leopold Dowse       Date of Study       03/03/2021   Date of Birth      10/18/1930        Gender              Male   Age                80 year(s)        Race                   Patient Number     [id number]        Room Number         [facility]   Visit Number       [id number]   Corporate ID       [id number]   Accession Number   [id number]        [address] [place] [address]   Ordering Physician Oliver Workman MD Interpreting        Ana Funk MD                                       Physician  Procedure Type of Study   TTE procedure:ECHOCARDIOGRAM COMPLETE 2D W DOPPLER W COLOR. Procedure Date Date: 03/03/2021 Start: 08:52 AM Study Location: Portable Technical Quality: Fair visualization Indications:Congestive heart failure. Patient Status: Routine Height: 72 inches Weight: 190 pounds BSA: 2.08 m2 BMI: 25.77 kg/m2 HR: 96 bpm BP: 149/84 mmHg  Conclusions   Summary  Left ventricular systolic function is low normal.  Ejection fraction is visually estimated at 50%. Sclerotic, but non-stenotic aortic valve. Trace aortic regurgitation is noted. No evidence of any pericardial effusion.    Signature   ------------------------------------------------------------------  Electronically signed by Aldona Castleman MD (Interpreting  physician) on 03/03/2021 at 11:18 AM  ------------------------------------------------------------------   Findings   Left Ventricle  Left ventricular systolic function is low normal.  Ejection fraction is visually estimated at 50%. Left Atrium  Essentially normal left atrium. Right Atrium  Essentially normal right atrium. Right Ventricle  Essentially normal right ventricle. Aortic Valve  Sclerotic, but non-stenotic aortic valve. Trace aortic regurgitation is noted. Mitral Valve  Trace mitral regurgitation is present. Tricuspid Valve  Mild tricuspid regurgitation is present. Pulmonic Valve  The pulmonic valve was not well visualized. Pericardial Effusion  No evidence of any pericardial effusion.   M-Mode/2D Measurements & Calculations   LV Diastolic Dimension:  LV Systolic Dimension:  LA Dimension: 3.5 cmAO Root  4.93 cm                  3.56 cm                 Dimension: 4 cmLA Area:  LV FS:27.8 %             LV Volume Diastolic: 72 24.4 cm2  LV PW Diastolic: 4.76 cm ml  LV PW Systolic: 1.4 cm   LV Volume Systolic: 40  Septum Diastolic: 5.40   ml  cm                       LV EDV/LV EDV Index: 72 RV Diastolic Dimension:  Septum Systolic: 9.65 cm LT/19 O3NW ESV/LV ESV   2.91 cm  CO: 7.97 l/min           Index: 40 ml/19 m2  CI: 3.83 l/m*m2          EF Calculated (A4C):    LA/Aorta: 0.88                           44.4 %                  Ascending Aorta: 3.7 cm  LV Area Diastolic: 66.5  EF Calculated (2D):     LA volume/Index: 49 ml  cm2                      53.5 %                  /[id number]  LV Area Systolic: 18 cm2                           LV Length: 8.17 cm                            LVOT: 2.7 cm  Doppler Measurements & Calculations    AV Peak Velocity: 127 cm/s    LVOT Peak Velocity: 82.7 cm/s   AV Peak Gradient: 6.45 mmHg   LVOT Mean Velocity: 59.8 cm/s   AV Mean Velocity: 93.1 cm/s   LVOT Peak Gradient: 3 mmHgLVOT Mean Gradient:   AV Mean Gradient: 4 mmHg      2 mmHg AV VTI: 21.5 cm               Estimated RVSP: 36 mmHg   AV Area (Continuity):3.86 cm2 Estimated RAP:3 mmHg    LVOT VTI: 14.5 cm                                 TR Velocity:286 cm/s   Estimated PASP: 35.72 mmHg    TR Gradient:32.72 mmHg      Ct Pelvis Wo Contrast Additional Contrast? None    Result Date: 3/31/2021  EXAMINATION: CT OF THE PELVIS WITHOUT CONTRAST, 3/31/2021 11:53 am TECHNIQUE: CT of the pelvis was performed without the administration of intravenous contrast.  Multiplanar reformatted images are provided for review. Dose modulation, iterative reconstruction, and/or weight based adjustment of the mA/kV was utilized to reduce the radiation dose to as low as reasonably achievable. COMPARISON: 03/02/2021 HISTORY: ORDERING SYSTEM PROVIDED HISTORY:  Hematuria, evaluate SPT placement. TECHNOLOGIST PROVIDED HISTORY: Reason for exam:  hematuria, evaluate SPT placement. Additional Contrast?  None Reason for Exam: hematuria, evaluate SPT placement. FINDINGS: A suprapubic catheter has been placed. This extends through the right rectus muscle. The balloon and catheter tip are present within the urinary bladder. The urinary bladder wall is thickened. There are multiple foci of air within the urinary bladder. Hyperdense material is noted within the urinary bladder. The prostate gland is enlarged. There is a small fat containing left inguinal hernia. Diverticulosis is noted. There is no free intraperitoneal air. Cortical and trabecular thickening are noted throughout the right iliac bone. This extends to involve the entire right hemipelvis. There is no associated significant right hip degenerative change at this time. There is degenerative change at the lower lumbar spine. 1. The suprapubic catheter has its tip and balloon within the urinary bladder. 2. Multifocal urinary bladder wall of gas. This may be related to the procedure. Emphysematous cystitis is an alternate consideration.  3. Bladder wall thickening. Hyperdense material within the urinary bladder, likely blood clot. 4. Prostate gland enlargement. 5. Right hemipelvis bony Paget's disease. Xr Chest Portable    Result Date: 3/4/2021  EXAMINATION: ONE XRAY VIEW OF THE CHEST 3/4/2021 12:32 pm COMPARISON: March 2, 2021 HISTORY: ORDERING SYSTEM PROVIDED HISTORY: post operative hiatal hernia repair TECHNOLOGIST PROVIDED HISTORY: Reason for exam:->post operative hiatal hernia repair Reason for Exam: post operative hiatal hernia repair FINDINGS: Nasogastric tube with its distal tip coursing below the diaphragm. Stable cardiomediastinal silhouette. Bibasilar atelectasis or infiltrate is noted. No definite pleural effusion or pneumothorax. The osseous structures are stable. Bibasilar atelectasis or infiltrate. Xr Chest Portable    Result Date: 3/2/2021  EXAMINATION: ONE XRAY VIEW OF THE CHEST 3/2/2021 6:43 pm COMPARISON: 01/30/2018 HISTORY: ORDERING SYSTEM PROVIDED HISTORY: Chest pain TECHNOLOGIST PROVIDED HISTORY: Reason for exam:->Chest pain Reason for Exam: Chest pain Acuity: Acute Type of Exam: Initial Additional signs and symptoms: na Relevant Medical/Surgical History: na FINDINGS: The heart size is stable. Large hiatal hernia again noted. Probable subsegmental atelectasis left lung base. Linear opacity right lung base unchanged. No pneumothorax. Increased size of a large hiatal hernia. Otherwise, unchanged chest     Cta Abdomen Pelvis W Contrast    Result Date: 3/2/2021  EXAMINATION: CTA OF THE ABDOMEN AND PELVIS WITH CONTRAST 3/2/2021 4:41 pm TECHNIQUE: CTA of the abdomen and pelvis was performed with the administration of intravenous contrast. Multiplanar reformatted images are provided for review. MIP images are provided for review. Dose modulation, iterative reconstruction, and/or weight based adjustment of the mA/kV was utilized to reduce the radiation dose to as low as reasonably achievable.  COMPARISON: Upper GI series, 04/10/2018 HISTORY: ORDERING SYSTEM PROVIDED HISTORY: GI bleed protocol TECHNOLOGIST PROVIDED HISTORY: Reason for exam:->GI bleed protocol Decision Support Exception->Emergency Medical Condition (MA) Reason for Exam: GI bleed protocol Acuity: Acute Type of Exam: Initial FINDINGS: Lower Chest: There is an extremely large hiatal hernia, with organo-axial volvulus. The gastric fundus and a portion the body is now found inferior to the diaphragm on the left (previously those were superior), and now there is dilation of that portion of the stomach, which is concerning for entrapment. Note that the entirety of the hiatal hernia is not included. Organs: The liver enhances normally. Gallbladder is unremarkable. Normal arterial phase imaging of the spleen. Benign nodule the left adrenal gland measures 2.2 cm and is unchanged since 2009. Pancreas is unremarkable. GI/Bowel: Evaluation for GI bleeding is limited, as only an arterial phase was obtained (normally noncontrast and delayed images are also obtained for the purposes of localization of GI bleeding). That said, no suspicious extravasation of contrast is identified within the large bowel. There is mild diverticulosis of the large bowel. The appendix is normal. Again, there is the large hiatal hernia, with concern for entrapment of the gastric fundus and a portion of the body. The duodenal sweep and the remainder of the small bowel are unremarkable. 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 Urinary bladder appears thickened, which is likely secondary to outlet obstruction. There is moderate to marked prostatic hypertrophy. No free pelvic fluid. No pelvic sidewall lymphadenopathy. There is evidence of previous bilateral inguinal hernia repair. Peritoneum/Retroperitoneum: Abdominal aorta normal in caliber. No retroperitoneal lymphadenopathy is identified. No iliac chain or inguinal lymphadenopathy.  Bones/Soft Tissues: Pagetoid changes are seen in

## 2021-04-01 NOTE — BRIEF OP NOTE
Brief Postoperative Note      Patient: Michelle Pineda  YOB: 1930  MRN: [id number]    Date of Procedure: 4/1/2021    Pre-Op Diagnosis: 1. Gross hematuria with clots                                2.  Enlarged prostate with urinary retention                                3.  Blood Loss Anemia    Post-Op Diagnosis: Same       Procedure  1. Transurethra Resection of Prostate (TURP)  2.  Cystoscopy and clot evacuation  3. Fulguration of prostatic bleeding    Surgeon(s):  Rodrigo Bell MD    Assistant:  none    Anesthesia: General    Estimated Blood Loss (mL): 20    Complications: None    Specimens:   ID Type Source Tests Collected by Time Destination   A : Prostate Chips Tissue Prostate Port MD Nick 4/1/2021 1126        Implants:        Drains:   Urethral Catheter Triple-lumen 22 fr (Active)       Suprapubic Catheter Non-latex 12 fr (Active)   Site Assessment Pink 04/01/21 1015   Dressing Status Clean;Dry; Intact 03/31/21 0536   Dressing Type Split gauze 03/31/21 0536   Urine Color Red 04/01/21 1015   Urine Appearance Cloudy 04/01/21 1015   Urine Odor Malodorous 03/30/21 1708   Output (mL) 500 mL 04/01/21 0930                                                                                                                  Findings: 1.  30ml of clot in bladder- removed                  2.  12 Italian SP tube confirmed in bladder/balloon ok                  3.  Bleeding from median lobe                  4.  TURP of large median lobe and some bilateral lateral tissue                  5.  Both UO's seen and unharmed during resection.   Resection time less than 1 hour                  6.  Bladder trabeculation- Grade III and bladder diverticulum                  7.  Prostate 45 grams on MIAH w/o nodules    Electronically signed by Rodrigo Bell MD on 4/1/2021 at 12:11 PM

## 2021-04-01 NOTE — PROGRESS NOTES
1300 post op H&H sent to lab.  Patient sitter at bedside   1310 Dr Eilleen Buerger in to visit at bedside and is aware urine is becoming more pink tinged in borden   1320 Repot called to Shantal Garsia prior to transport back to room   1335 transferred back to room via bed per transporter Frank Hogan and Rosalino walking along with patient

## 2021-04-01 NOTE — PROGRESS NOTES
1230- arrived to PACU in semi-fowlers position, monitors applied alarms on oriented to unit. Handoff report received from [employer] [address] [place] [address]  1250- Handoff report given to Jameson

## 2021-04-01 NOTE — PROGRESS NOTES
laying in bed in no apparent distress. Appears given age. EYES Pupils are equally round. No scleral erythema, discharge, or conjunctivitis. HENT Mucous membranes are moist. Oral pharynx without exudates, no evidence of thrush. NECK Supple, no apparent thyromegaly or masses. RESP Clear to auscultation, no wheezes, rales or rhonchi. Symmetric chest movement while on room air. CARDIO/VASC S1/S2 auscultated. Regular rate without appreciable murmurs, rubs, or gallops. No JVD or carotid bruits. Peripheral pulses equal bilaterally and palpable. No peripheral edema. GI Abdomen is soft without significant tenderness, masses, or guarding. Bowel sounds are normoactive. Rectal exam deferred.  No costovertebral angle tenderness. Laureano catheter is present. HEME/LYMPH No palpable cervical lymphadenopathy and no hepatosplenomegaly. No petechiae or ecchymoses. MSK No gross joint deformities. SKIN Normal coloration, warm, dry. NEURO Cranial nerves appear grossly intact, normal speech, no lateralizing weakness. PSYCH Awake, alert. Affect appropriate.     Medications:   Medications:    amLODIPine  5 mg Oral QAM    docusate sodium  100 mg Oral Daily    ferrous sulfate  325 mg Oral Daily with breakfast    finasteride  5 mg Oral Daily    furosemide  20 mg Oral Daily    levothyroxine  75 mcg Oral Daily    metoprolol succinate  25 mg Oral QPM    pantoprazole  20 mg Oral Daily    potassium chloride  20 mEq Oral Daily    QUEtiapine  50 mg Oral TID    tamsulosin  0.8 mg Oral Daily    sertraline  50 mg Oral Daily    sodium chloride flush  10 mL Intravenous 2 times per day    [Held by provider] enoxaparin  40 mg Subcutaneous Daily    famotidine  20 mg Oral BID      Infusions:    sodium chloride 100 mL/hr at 03/31/21 1824    sodium chloride      sodium chloride 25 mL (03/27/21 1132)     PRN Meds: meperidine, 12.5 mg, Q5 Min PRN  fentanNYL, 50 mcg, Q5 Min PRN  HYDROmorphone, 0.5 mg, Q5 Min PRN  HYDROcodone 5 mg - acetaminophen, 1 tablet, PRN    Or  HYDROcodone 5 mg - acetaminophen, 2 tablet, Q6H PRN  ondansetron, 4 mg, Once PRN  promethazine, 6.25 mg, Once PRN  sodium chloride, 500 mL, Once PRN  diphenhydrAMINE, 12.5 mg, Once PRN  labetalol, 5 mg, Q10 Min PRN  hydrALAZINE, 5 mg, Q10 Min PRN  sodium chloride, , PRN  calcium carbonate, 500 mg, Q6H PRN  LORazepam, 0.5 mg, Nightly PRN  sodium chloride flush, 10 mL, PRN  sodium chloride, 25 mL, PRN  promethazine, 12.5 mg, Q6H PRN    Or  ondansetron, 4 mg, Q6H PRN  polyethylene glycol, 17 g, Daily PRN  acetaminophen, 650 mg, Q6H PRN    Or  acetaminophen, 650 mg, Q6H PRN  potassium chloride, 40 mEq, PRN    Or  potassium alternative oral replacement, 40 mEq, PRN    Or  potassium chloride, 10 mEq, PRN  QUEtiapine, 100 mg, Nightly PRN          Electronically signed by Radha Stanley MD on 4/1/2021 at 11:53 AM

## 2021-04-01 NOTE — ANESTHESIA PRE PROCEDURE
Department of Anesthesiology  Preprocedure Note       Name:  Alicia Older   Age:  [address] y.o.  :  10/18/1930                                          MRN:  [id number]         Date:  2021      Surgeon: Ary Faith):  Jean Dueñas MD    Procedure: Procedure(s):  CYSTOSCOPY PROSTATE FULGURATION  EVACUATION OF CLOTS POSS TURP    Medications prior to admission:   Prior to Admission medications    Medication Sig Start Date End Date Taking? Authorizing Provider   acetaminophen (TYLENOL) 325 MG tablet Take 650 mg by mouth every 6 hours as needed for Pain   Yes Historical Provider, MD   opium-belladonna (B&O SUPPRETTES) 16.2-30 MG suppository Place 30 mg rectally every 8 hours as needed for Pain.    Yes Historical Provider, MD   ciprofloxacin (CIPRO) 250 MG tablet Take 250 mg by mouth 2 times daily   Yes Historical Provider, MD   polyethylene glycol (GLYCOLAX) 17 g packet Take 17 g by mouth daily 3/13/21 4/12/21 Yes Denver Blanco MD   metoprolol succinate (TOPROL XL) 25 MG extended release tablet Take 1 tablet by mouth every evening 3/10/21  Yes Denver Blanco MD   amLODIPine (NORVASC) 5 MG tablet Take 1 tablet by mouth every morning 3/11/21  Yes Denver Blanco MD   finasteride (PROSCAR) 5 MG tablet Take 1 tablet by mouth daily 3/11/21  Yes Denver Blanco MD   pantoprazole (PROTONIX) 20 MG tablet Take 1 tablet by mouth daily 3/10/21  Yes Denver Blanco MD   potassium chloride (KLOR-CON M) 20 MEQ extended release tablet Take 1 tablet by mouth daily 3/10/21  Yes Denver Blanco MD   levothyroxine (SYNTHROID) 50 MCG tablet Take 1.5 tablets by mouth daily 21  Yes Claudine Kahn PA-C   furosemide (LASIX) 20 MG tablet Take 1 tablet by mouth daily  Patient taking differently: Take 20 mg by mouth daily Son reports pt takes every other day 21  Yes Claudine Kahn PA-C   sertraline (ZOLOFT) 50 MG tablet Take 50 mg by mouth daily   Yes Historical Provider, MD   ferrous sulfate (IRON 325) 325 (65 Fe) MG tablet Take 1 tablet by mouth daily (with breakfast) 12/31/20 3/31/21 Yes Karel Lr MD   QUEtiapine (SEROQUEL) 100 MG tablet Take 100 mg by mouth every evening   Yes Historical Provider, MD   QUEtiapine (SEROQUEL) 50 MG tablet Take 50 mg by mouth 3 times daily    Yes Historical Provider, MD   tamsulosin (FLOMAX) 0.4 MG capsule Take 1 capsule by mouth daily. Patient taking differently: Take 0.8 mg by mouth daily 01/11/17 Pt to take 2- .04 mg capsules at bedtime 10/12/12  Yes Erwin Finnegan MD   temazepam (RESTORIL) 7.5 MG capsule Take 7.5 mg by mouth nightly as needed for Sleep.     Historical Provider, MD       Current medications:    Current Facility-Administered Medications   Medication Dose Route Frequency Provider Last Rate Last Admin    0.9 % sodium chloride infusion   Intravenous PRN Baljit Parr PA-C        0.9 % sodium chloride infusion   Intravenous Continuous Deacon Myers  mL/hr at 03/31/21 1824 New Bag at 03/31/21 1824    0.9 % sodium chloride infusion   Intravenous PRN Zach Yin MD        calcium carbonate (TUMS) chewable tablet 500 mg  500 mg Oral Q6H PRN Deacon Myers MD        amLODIPine (NORVASC) tablet 5 mg  5 mg Oral QAM Deacon Myers MD   5 mg at 03/31/21 4618    docusate sodium (COLACE) capsule 100 mg  100 mg Oral Daily Deacon Myers MD   100 mg at 03/31/21 8072    ferrous sulfate (IRON 325) tablet 325 mg  325 mg Oral Daily with breakfast Zach Yin MD   325 mg at 03/31/21 0903    finasteride (PROSCAR) tablet 5 mg  5 mg Oral Daily Deacon Myers MD   5 mg at 03/31/21 0904    furosemide (LASIX) tablet 20 mg  20 mg Oral Daily Deacon Myers MD   20 mg at 03/31/21 0904    levothyroxine (SYNTHROID) tablet 75 mcg  75 mcg Oral Daily Deacon Myers MD   75 mcg at 03/31/21 0533    metoprolol succinate (TOPROL XL) extended release tablet 25 mg  25 mg Oral QPM Deacon Myers MD   25 mg at 03/31/21 1914    pantoprazole (PROTONIX) tablet 20 mg  20 mg Oral Daily Nova Styles MD   20 mg at 03/31/21 0904    potassium chloride (KLOR-CON M) extended release tablet 20 mEq  20 mEq Oral Daily Nova Styles MD   20 mEq at 03/31/21 0905    QUEtiapine (SEROQUEL) tablet 50 mg  50 mg Oral TID Nova Styles MD   50 mg at 03/31/21 2148    tamsulosin (FLOMAX) capsule 0.8 mg  0.8 mg Oral Daily Deacon Myers MD   0.8 mg at 03/31/21 0905    LORazepam (ATIVAN) tablet 0.5 mg  0.5 mg Oral Nightly PRN Nova Styles MD   0.5 mg at 03/31/21 2148    sertraline (ZOLOFT) tablet 50 mg  50 mg Oral Daily Deacon Myers MD   50 mg at 03/31/21 0905    sodium chloride flush 0.9 % injection 10 mL  10 mL Intravenous 2 times per day Nova Styles MD   10 mL at 03/30/21 0902    sodium chloride flush 0.9 % injection 10 mL  10 mL Intravenous PRN Deacon Myers MD        0.9 % sodium chloride infusion  25 mL Intravenous PRN Nova Styles  mL/hr at 03/27/21 1132 25 mL at 03/27/21 1132    [Held by provider] enoxaparin (LOVENOX) injection 40 mg  40 mg Subcutaneous Daily Juan Espinal MD        promethazine (PHENERGAN) tablet 12.5 mg  12.5 mg Oral Q6H PRN Nova Styles MD        Or    ondansetron (ZOFRAN) injection 4 mg  4 mg Intravenous Q6H PRN Deacon Myers MD        polyethylene glycol (GLYCOLAX) packet 17 g  17 g Oral Daily PRN Nova Styles MD        famotidine (PEPCID) tablet 20 mg  20 mg Oral BID Nova Styles MD   20 mg at 03/31/21 2148    acetaminophen (TYLENOL) tablet 650 mg  650 mg Oral Q6H PRN Nova Styles MD        Or    acetaminophen (TYLENOL) suppository 650 mg  650 mg Rectal Q6H PRN Nova Styles MD        potassium chloride (KLOR-CON M) extended release tablet 40 mEq  40 mEq Oral PRN Nova Styles MD        Or    potassium bicarb-citric acid (EFFER-K) effervescent tablet 40 mEq  40 mEq Oral PRN Nova Styles MD        Or    potassium chloride 10 mEq/100 mL IVPB (Peripheral Line)  10 mEq Intravenous PRN Nova Styles MD        QUEtiapine (SEROQUEL) tablet 100 mg  100 mg Oral Nightly PRN Nova Styles MD   100 mg at 21       Allergies:     Allergies   Allergen Reactions    Minocycline Other (See Comments)       Problem List:    Patient Active Problem List   Diagnosis Code    Hyperlipidemia E78.5    Depression F32.9    Insomnia G47.00    BPH (benign prostatic hyperplasia) N40.0    Dysuria R30.0    Vitamin D deficiency E55.9    Seborrheic keratosis, inflamed L82.0    Actinic keratosis L57.0    Seborrheic keratosis L82.1    SCC (squamous cell carcinoma) C44.92    Varicose 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 I83.813    Anxiety F41.9    BPH with urinary obstruction N40.1, N13.8    Acquired hypothyroidism E03.9    UGIB (upper gastrointestinal bleed) K92.2    Hematuria R31.9       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    Rt Tricep, Lt Superior Forearm       Past Surgical History:        Procedure Laterality Date    CATARACT REMOVAL      CYSTOSCOPY N/A 3/29/2021    CYSTOSCOPY EVACUATION OF CLOTS, BLADDER FULGERATION, AND SUPRA-PUBIC CATHETER INSERTION performed by Nova Styles MD at [address] N/A 3/4/2021    LAPAROSCOPIC LARGE HERNIA HIATAL REPAIR WITH GASTRIC OBSTRUCTION POSS OPEN performed by Sweetie Morales MD at [facility]         Social History:    Social History     Tobacco Use    Smoking status: Former Smoker     Packs/day: 1.00     Years: 5.00     Pack years: 5.00     Types: Cigarettes     Start date: 1950     Quit date: 1955     Years since quittin.4    Smokeless tobacco: Never Used   Substance Use Topics    Alcohol use: Not Currently                                Counseling given: Not Answered      Vital Signs (Current):   Vitals:    03/31/21 1845 03/31/21 2130 04/01/21 0356 04/01/21 0915   BP: (!) 135/59 (!) 100/50  (!) 166/77   Pulse: 85 82 82 80   Resp:  17 18 16   Temp:  98.9 °F (37.2 °C) 98.2 °F (36.8 °C) 98.1 °F (36.7 °C)   TempSrc:  Oral Oral Oral   SpO2:  97% 98% 99%   Weight:       Height:                                                  BP Readings from Last 3 Encounters:   04/01/21 (!) 166/77   03/29/21 137/85   03/25/21 (!) 119/56       NPO Status: Time of last liquid consumption: 2300                        Time of last solid consumption: 1800                        Date of last liquid consumption: 03/31/21                        Date of last solid food consumption: 03/31/21    BMI:   Wt Readings from Last 3 Encounters:   03/29/21 170 lb (77.1 kg)   03/07/21 185 lb (83.9 kg)   02/04/21 174 lb (78.9 kg)     Body mass index is 23.06 kg/m². CBC:   Lab Results   Component Value Date    WBC 6.3 04/01/2021    RBC 2.40 04/01/2021    HGB 6.8 04/01/2021    HCT 22.7 04/01/2021    MCV 94.6 04/01/2021    RDW 15.0 04/01/2021     04/01/2021       CMP:   Lab Results   Component Value Date     03/31/2021    K 3.7 03/31/2021     03/31/2021    CO2 22 03/31/2021    BUN 15 03/31/2021    CREATININE 1.4 03/31/2021    GFRAA 58 03/31/2021    AGRATIO 1.4 01/21/2021    LABGLOM 48 03/31/2021    GLUCOSE 169 03/31/2021    PROT 5.5 03/26/2021    CALCIUM 7.9 03/31/2021    BILITOT 0.2 03/26/2021    ALKPHOS 86 03/26/2021    AST 15 03/26/2021    ALT 9 03/26/2021       POC Tests: No results for input(s): POCGLU, POCNA, POCK, POCCL, POCBUN, POCHEMO, POCHCT in the last 72 hours.     Coags:   Lab Results   Component Value Date    PROTIME 14.1 03/26/2021    INR 1.16 03/26/2021    APTT 37.8 03/26/2021       HCG (If Applicable): No results found for: PREGTESTUR, PREGSERUM, HCG, HCGQUANT     ABGs: No results found for: PHART, PO2ART, GWA0DCL, SSU1CCA, BEART, F2UUBRSU     Type & Screen (If Applicable):  No results found for: LABABO, LABRH    Drug/Infectious Status (If Applicable):  No results found for: HIV, HEPCAB    COVID-19 Screening (If Applicable):   Lab Results   Component Value Date    COVID19 NOT DETECTED 03/29/2021           Anesthesia Evaluation    Airway: Mallampati: II  TM distance: >3 FB   Neck ROM: full  Mouth opening: > = 3 FB Dental:          Pulmonary:normal exam                               Cardiovascular:                      Neuro/Psych:   (+) psychiatric history:            GI/Hepatic/Renal:   (+) GERD:, liver disease:,           Endo/Other:    (+) hypothyroidism::., .                 Abdominal:           Vascular:                                        Anesthesia Plan      general     ASA 4       Induction: intravenous. MIPS: Postoperative opioids intended. Anesthetic plan and risks discussed with patient. Plan discussed with CRNA.     Attending anesthesiologist reviewed and agrees with Palmira Ma MD   4/1/2021

## 2021-04-01 NOTE — PROGRESS NOTES
SP CATH NOT DRAINING,  BLADDER SCAN DONE 136, PT VOIDING    FROM THE PENIS, SUKUMAR LANDEROS WAS NOTIFIED

## 2021-04-01 NOTE — ANESTHESIA POSTPROCEDURE EVALUATION
Department of Anesthesiology  Postprocedure Note    Patient: Meliza Patton  MRN: [id number]  YOB: 1930  Date of evaluation: 4/1/2021  Time:  12:36 PM     Procedure Summary     Date: 04/01/21 Room / Location: [address]    Anesthesia Start: 5342 Anesthesia Stop: 8262    Procedure: CYSTOSCOPY, PROSTATE FULGURATION, EVACUATION OF CLOTS & TURP (N/A ) Diagnosis: (-)    Surgeons: Kyleigh Jim MD Responsible Provider: Salty Ayala MD    Anesthesia Type: general ASA Status: 4          Anesthesia Type: general    Jaison Phase I: Jaison Score: 10    Jaison Phase II:      Last vitals: Reviewed and per EMR flowsheets.        Anesthesia Post Evaluation    Patient location during evaluation: PACU  Patient participation: complete - patient participated  Level of consciousness: awake and alert  Pain score: 0  Airway patency: patent  Nausea & Vomiting: no vomiting and no nausea  Complications: no  Cardiovascular status: blood pressure returned to baseline and hemodynamically stable  Respiratory status: acceptable, spontaneous ventilation, nonlabored ventilation and face mask  Hydration status: stable

## 2021-04-02 LAB
ABO/RH: NORMAL
ANION GAP SERPL CALCULATED.3IONS-SCNC: 9 MMOL/L (ref 4–16)
ANTIBODY SCREEN: NEGATIVE
BUN BLDV-MCNC: 14 MG/DL (ref 6–23)
CALCIUM SERPL-MCNC: 8.1 MG/DL (ref 8.3–10.6)
CHLORIDE BLD-SCNC: 106 MMOL/L (ref 99–110)
CO2: 22 MMOL/L (ref 21–32)
COMPONENT: NORMAL
CREAT SERPL-MCNC: 1.4 MG/DL (ref 0.9–1.3)
CROSSMATCH RESULT: NORMAL
GFR AFRICAN AMERICAN: 58 ML/MIN/1.73M2
GFR NON-AFRICAN AMERICAN: 48 ML/MIN/1.73M2
GLUCOSE BLD-MCNC: 131 MG/DL (ref 70–99)
HCT VFR BLD CALC: 27 % (ref 42–52)
HEMOGLOBIN: 8.2 GM/DL (ref 13.5–18)
MCH RBC QN AUTO: 28.7 PG (ref 27–31)
MCHC RBC AUTO-ENTMCNC: 30.4 % (ref 32–36)
MCV RBC AUTO: 94.4 FL (ref 78–100)
PDW BLD-RTO: 14.7 % (ref 11.7–14.9)
PLATELET # BLD: 259 K/CU MM (ref 140–440)
PMV BLD AUTO: 9.1 FL (ref 7.5–11.1)
POTASSIUM SERPL-SCNC: 3.6 MMOL/L (ref 3.5–5.1)
RBC # BLD: 2.86 M/CU MM (ref 4.6–6.2)
SODIUM BLD-SCNC: 137 MMOL/L (ref 135–145)
STATUS: NORMAL
TRANSFUSION STATUS: NORMAL
UNIT DIVISION: 0
UNIT NUMBER: NORMAL
WBC # BLD: 8.4 K/CU MM (ref 4–10.5)

## 2021-04-02 PROCEDURE — 2580000003 HC RX 258: Performed by: UROLOGY

## 2021-04-02 PROCEDURE — 85027 COMPLETE CBC AUTOMATED: CPT

## 2021-04-02 PROCEDURE — 6370000000 HC RX 637 (ALT 250 FOR IP): Performed by: UROLOGY

## 2021-04-02 PROCEDURE — 97116 GAIT TRAINING THERAPY: CPT

## 2021-04-02 PROCEDURE — 6360000002 HC RX W HCPCS: Performed by: UROLOGY

## 2021-04-02 PROCEDURE — 97530 THERAPEUTIC ACTIVITIES: CPT

## 2021-04-02 PROCEDURE — 80048 BASIC METABOLIC PNL TOTAL CA: CPT

## 2021-04-02 PROCEDURE — 36415 COLL VENOUS BLD VENIPUNCTURE: CPT

## 2021-04-02 PROCEDURE — 94761 N-INVAS EAR/PLS OXIMETRY MLT: CPT

## 2021-04-02 PROCEDURE — 1200000000 HC SEMI PRIVATE

## 2021-04-02 RX ORDER — ATROPA BELLADONNA AND OPIUM 16.2; 6 MG/1; MG/1
60 SUPPOSITORY RECTAL EVERY 8 HOURS PRN
Status: DISCONTINUED | OUTPATIENT
Start: 2021-04-02 | End: 2021-04-09 | Stop reason: HOSPADM

## 2021-04-02 RX ADMIN — FUROSEMIDE 20 MG: 20 TABLET ORAL at 09:27

## 2021-04-02 RX ADMIN — QUETIAPINE FUMARATE 50 MG: 25 TABLET ORAL at 09:28

## 2021-04-02 RX ADMIN — QUETIAPINE FUMARATE 50 MG: 25 TABLET ORAL at 23:51

## 2021-04-02 RX ADMIN — POTASSIUM CHLORIDE 20 MEQ: 1500 TABLET, EXTENDED RELEASE ORAL at 09:27

## 2021-04-02 RX ADMIN — METOPROLOL SUCCINATE 25 MG: 25 TABLET, EXTENDED RELEASE ORAL at 18:23

## 2021-04-02 RX ADMIN — SODIUM CHLORIDE, PRESERVATIVE FREE 10 ML: 5 INJECTION INTRAVENOUS at 23:51

## 2021-04-02 RX ADMIN — DEXTROSE MONOHYDRATE 2000 MG: 50 INJECTION, SOLUTION INTRAVENOUS at 23:51

## 2021-04-02 RX ADMIN — DOCUSATE SODIUM 100 MG: 100 CAPSULE, LIQUID FILLED ORAL at 09:27

## 2021-04-02 RX ADMIN — FAMOTIDINE 20 MG: 20 TABLET ORAL at 23:51

## 2021-04-02 RX ADMIN — AMLODIPINE BESYLATE 5 MG: 5 TABLET ORAL at 09:27

## 2021-04-02 RX ADMIN — ACETAMINOPHEN 650 MG: 325 TABLET ORAL at 12:55

## 2021-04-02 RX ADMIN — QUETIAPINE FUMARATE 50 MG: 25 TABLET ORAL at 14:42

## 2021-04-02 RX ADMIN — SODIUM CHLORIDE: 9 INJECTION, SOLUTION INTRAVENOUS at 09:27

## 2021-04-02 RX ADMIN — SERTRALINE HYDROCHLORIDE 50 MG: 50 TABLET ORAL at 09:27

## 2021-04-02 RX ADMIN — TAMSULOSIN HYDROCHLORIDE 0.8 MG: 0.4 CAPSULE ORAL at 09:27

## 2021-04-02 RX ADMIN — FERROUS SULFATE TAB 325 MG (65 MG ELEMENTAL FE) 325 MG: 325 (65 FE) TAB at 09:28

## 2021-04-02 RX ADMIN — FINASTERIDE 5 MG: 5 TABLET, FILM COATED ORAL at 09:27

## 2021-04-02 RX ADMIN — SODIUM CHLORIDE, PRESERVATIVE FREE 10 ML: 5 INJECTION INTRAVENOUS at 09:28

## 2021-04-02 RX ADMIN — PANTOPRAZOLE SODIUM 20 MG: 20 TABLET, DELAYED RELEASE ORAL at 09:28

## 2021-04-02 RX ADMIN — FAMOTIDINE 20 MG: 20 TABLET ORAL at 09:27

## 2021-04-02 RX ADMIN — LEVOTHYROXINE SODIUM 75 MCG: 0.07 TABLET ORAL at 06:47

## 2021-04-02 ASSESSMENT — PAIN DESCRIPTION - PROGRESSION
CLINICAL_PROGRESSION: GRADUALLY IMPROVING

## 2021-04-02 ASSESSMENT — PAIN SCALES - GENERAL
PAINLEVEL_OUTOF10: 0
PAINLEVEL_OUTOF10: 2
PAINLEVEL_OUTOF10: 0
PAINLEVEL_OUTOF10: 0

## 2021-04-02 NOTE — PROGRESS NOTES
Patient is currently crying stating, \"I am tired of being sick. \" Continuously crying. I believe a Tech came in to ask how he was, I told her that it might not be a good time. Struggling to comfort because he is hard of hearing.

## 2021-04-02 NOTE — PROGRESS NOTES
Intake Outcomes:  Supplement Intake, Food and Nutrient Intake  Physical Signs/Symptoms Outcomes:  Biochemical Data, Weight, GI Status, Hemodynamic Status, Fluid Status or Edema     Discharge Planning:     Too soon to determine     Electronically signed by Ekta Joe MS, RD, LD on 4/2/21 at 8:17 AM EDT    Contact: [phone]

## 2021-04-02 NOTE — PROGRESS NOTES
Hospitalist Progress Note      Name:  Meliza Patton /Age/Sex: 10/18/1930  (80 y.o. male)   MRN & CSN:  [id number] & [id number] Admission Date/Time: 3/26/2021  3:51 PM   Location:  1111/1111-A PCP: Liam Proctor MD         Hospital Day: 8    Assessment and Plan:   Mildred Stone Chalmers is a 80 y.o.  male  with past medical history of hypertension, BPH, hypothyroidism, anemia of chronic disease who presents with gross hematuria     1) Gross Hematuria  -CT a/p 3/2/21 with multiple bladder diverticulum, thickened urinary bladder wall likely secondary to CANSECO with a large prostate  -S/P Cystoscopy, clot evacuation, bladder fulguration and SPC placement  -Urology on board; for cystoscopy, clot evacuation, fulguration of bleeding, possible TURP and SP tube placement 2021  -Continue irrigation  -Continue Flomax, Proscar     2) Acute on chronic anemia    -Due to chronic GI bleed and hematuria   -Recent admission for GI bleed and EGD  -S/P PRBC transfusion  -Monitor hb daily     Chronic medical condition : Resume home medications when clinically appropriate     CKD stage III - Cr stable    Peripheral Neuropathy   BPH on Flomax  Hypothyroidism   Anxiety disorder/depression  Essential HTN    Diet DIET GENERAL;  Dietary Nutrition Supplements: Standard High Calorie Oral Supplement   DVT Prophylaxis [] Lovenox, []  Heparin, [] SCDs, [] Ambulation   GI Prophylaxis [] PPI,  [] H2 Blocker,  [] Carafate,  [] Diet/Tube Feeds   Code Status Full Code   Disposition SNF   MDM      History of Present Illness:     Patient was seen and examined  Denied any worsening abdominal pain  No chest pain or SOB  No acute event overnight  Ten point ROS reviewed negative, unless as noted above    Objective:        Intake/Output Summary (Last 24 hours) at 2021 1148  Last data filed at 2021 0938  Gross per 24 hour   Intake 1000 ml   Output 1850 ml   Net -850 ml      Vitals:   Vitals:    21 1000   BP: (!) 152/70   Pulse: 84   Resp: 16   Temp: 98.1 °F (36.7 °C)   SpO2:      Physical Exam:   GEN Awake male, sitting upright in bed in no apparent distress. Appears given age. EYES Pupils are equally round. No scleral erythema, discharge, or conjunctivitis. HENT Mucous membranes are moist. Oral pharynx without exudates, no evidence of thrush. NECK Supple, no apparent thyromegaly or masses. RESP Clear to auscultation, no wheezes, rales or rhonchi. Symmetric chest movement while on room air. CARDIO/VASC S1/S2 auscultated. Regular rate without appreciable murmurs, rubs, or gallops. No JVD or carotid bruits. Peripheral pulses equal bilaterally and palpable. No peripheral edema. GI Abdomen is soft without significant tenderness, masses, or guarding. Bowel sounds are normoactive. Rectal exam deferred.  No costovertebral angle tenderness. Normal appearing external genitalia. Laureano catheter is present. HEME/LYMPH No palpable cervical lymphadenopathy and no hepatosplenomegaly. No petechiae or ecchymoses. MSK No gross joint deformities. SKIN Normal coloration, warm, dry. NEURO Cranial nerves appear grossly intact, normal speech, no lateralizing weakness. PSYCH Awake, alert, oriented x 4. Affect appropriate.     Medications:   Medications:    amLODIPine  5 mg Oral QAM    docusate sodium  100 mg Oral Daily    ferrous sulfate  325 mg Oral Daily with breakfast    finasteride  5 mg Oral Daily    furosemide  20 mg Oral Daily    levothyroxine  75 mcg Oral Daily    metoprolol succinate  25 mg Oral QPM    pantoprazole  20 mg Oral Daily    potassium chloride  20 mEq Oral Daily    QUEtiapine  50 mg Oral TID    tamsulosin  0.8 mg Oral Daily    sertraline  50 mg Oral Daily    sodium chloride flush  10 mL Intravenous 2 times per day    [Held by provider] enoxaparin  40 mg Subcutaneous Daily    famotidine  20 mg Oral BID      Infusions:    sodium chloride 50 mL/hr at 04/02/21 0927    sodium chloride 25 mL (03/27/21 1132)     PRN Meds: calcium carbonate, 500 mg, Q6H PRN  LORazepam, 0.5 mg, Nightly PRN  sodium chloride flush, 10 mL, PRN  sodium chloride, 25 mL, PRN  promethazine, 12.5 mg, Q6H PRN    Or  ondansetron, 4 mg, Q6H PRN  polyethylene glycol, 17 g, Daily PRN  acetaminophen, 650 mg, Q6H PRN    Or  acetaminophen, 650 mg, Q6H PRN  potassium chloride, 40 mEq, PRN    Or  potassium alternative oral replacement, 40 mEq, PRN    Or  potassium chloride, 10 mEq, PRN  QUEtiapine, 100 mg, Nightly PRN          Electronically signed by Christine Hoyt MD on 4/2/2021 at 11:48 AM

## 2021-04-02 NOTE — OP NOTE
would  often pull at causing gross hematuria. He has had persistent gross  hematuria for the past several weeks requiring blood transfusions. An  attempt at fulguration of the median portion of his prostate was  performed several days ago, but the bleeding recurred. He has a  12-[place] citizen suprapubic tube. The patient's hemoglobin dropped again and  had clots in his bladder. All medical and surgical therapies were  discussed as well as the risks and benefits with the patient and his  family. They elected to proceed with surgery. He accepted the COVID  risks of surgery and anesthesia. OPERATIVE REPORT:  The patient received 2 gm of Ancef. He was brought  to the operating room. He had compression boots in place. He was  placed on the operating room table. A general anesthetic was  administered by the Anesthesia service. He was then placed in dorsal  lithotomy position and prepped and draped in a sterile fashion after  being appropriately padded. Time-out was performed per protocol. A 25-[place] citizen resectoscope with visual obturator was placed into the  bladder. He had blood clots in his bladder which was removed with an  Ellik evacuator. I then could see the lining of the bladder. He did  have a grade 3 bladder trabeculation and a small bladder diverticulum. A 12-[place] citizen suprapubic tube was seen at anterior bladder and the balloon  was inflated. Once all the clots were removed, I could see both UOs  which were unharmed during this procedure. He was oozing from his  median lobe of the prostate. Given the persistent oozing, I elected to  proceed with a transurethral resection of prostate in the hopes of  alleviating his urinary retention and his persistent prostatic bleeding. The majority of the obstruction appeared to be from the median lobe. Using a 24-[place] citizen loop, this was carefully resected with care to avoid  the UOs. The surgical landmarks were the veru and the bladder neck.    Once the median lobe was resected, I did resect some left and right  lateral tissue. Following resection, the prostatic urethra appeared  widely patent. There was no active bleeding. The prostatic urethra was  fulgurated. The prostatic chips were removed. The resectoscope was  then removed and a 22-Yoruba, three-way catheter was placed. The  balloon was inflated. The catheter irrigated clear. There was no  bleeding around the catheter. The prostatic chips were sent to  pathology. A catheter plug was placed in the irrigation port, and a  drainage bag was placed to the Laureano catheter. The suprapubic tube will  remain to gravity drainage. A digital rectal examination was performed  which demonstrated a 45 gm prostate without nodules and now a TURP  defect. At the end of the procedure, his preoperative ordered blood  transfusion was started. Blood loss was less than 20 mL during this  procedure. He was brought to the PACU in stable condition.         Rosetta Pike MD    D: 04/01/2021 12:25:33       T: 04/01/2021 12:34:17     [email or web address]  Job#: [id number]     Doc#: [id number]    CC:

## 2021-04-02 NOTE — PROGRESS NOTES
Physical Therapy    Physical Therapy Treatment Note  Name: Henny Washington MRN: [id number] :   10/18/1930   Date:  2021   Admission Date: 3/26/2021 Room:  1111/1111-A   Restrictions/Precautions:  Restrictions/Precautions  Restrictions/Precautions: General Precautions, Fall Risk       Communication with other providers:  Per nurse ok to tx. Pt has sitter in room. Subjective:  Patient states: Motivated and agreeable to walk. Pt reports he has not walked in 30 days  Pain:   Location, Type, Intensity (0/10 to 10/10):  No c/o pain  Objective:    Observation:  Alert and oriented to self  Treatment, including education/measures:  Sup<=>sit sba  Sit<=>stand cga and cues for hand placement for bed and couch. amb with rw 100' x 2 cga and cues for posture and walker safety with turning and with IV lines. Safety  Patient left safely in the bed, with call light/phone in reach with alarm applied. Gait belt and mask were used for transfers and gait. Assessment / Impression:       Patient's tolerance of treatment:  good   Adverse Reaction: na  Significant change in status and impact:  na  Barriers to improvement:  Strength and safety  Plan for Next Session:    Cont. POC  Time in:  1615  Time out:  1640  Timed treatment minutes:  25  Total treatment time:  25    Previously filed items:  Social/Functional History  Lives With: Spouse  Type of Home: House  Home Layout: One level  Home Access: Level entry  Bathroom Shower/Tub: Walk-in shower  Bathroom Toilet: Handicap height  Bathroom Equipment: Grab bars in shower, Shower chair  Home Equipment: Rolling walker, Cane  ADL Assistance: [address] [place] [address] Avenue: Needs assistance  Ambulation Assistance: Independent  Transfer Assistance: Independent  Active : Yes  Additional Comments: Pt from [facility] and to return at d/c     Long term goals  Time Frame for Long term goals :  In one week:  Long term goal 1: Pt will complete all bed mobility with SBAx1  Long term goal 2: Pt will complete sit <> stand transfers with SBAx1  Long term goal 3: Pt will ambulate 75 feet with SBAx1 with LRAD  Long term goal 4: Pt will independently complete 3 sets of 10 reps of BLE AROM exercises in available and allowed ROM    Electronically signed by:    Chelsea Rosales PTA  4/2/2021, 8:20 AM

## 2021-04-02 NOTE — PROGRESS NOTES
At 16pm borden cath was irrigated with 120cc sterile water,  Fluid went in  Well, But unable to aspirated anything back but did drain  In the tube, no clots noted, urine  Medium red

## 2021-04-02 NOTE — PROGRESS NOTES
[facility]   Progress Note  [facility] 0 1 2      Date: 2021   Patient: Justin Bach   : 10/18/1930   DOA: 3/26/2021   MRN: [id number]   ROOM#: 1111/1111-A     Admit Date: 3/26/2021     Collaborating Urologist on Call at time of admission: Dr. Herbert Severin clots in catheter  Reason for Consult:  Hematuria  S/p Cystoscopy, clot evacuation, bladder fulguration, and insertion of a 12fr suprapubic tube on 3/29/21  S/p Cystoscopy, clot evacuation, transurethral resection of prostate and prostatic fulguration, and placement of a 22fr 3-way catheter on 21    Subjective:     Pain: mild, no nausea and no vomiting,   Bowel Movement/Flatus: Yes  Voidinfr SPT in place, urine dark red, clearing in tubing. 22fr 3-way catheter in place, urine dark red, seems to be clearing. Pt resting in bed, discouraged that his is still bleeding. I manually irrigated his bladder with 600cc normal saline. Unable to aspirate, but it drained well in catheters. Urine light pink by end of irrigation. Objective:    Vitals:    /61   Pulse 79   Temp 98.3 °F (36.8 °C)   Resp 16   Ht 6' (1.829 m)   Wt 170 lb (77.1 kg)   SpO2 97%   BMI 23.06 kg/m²    Temp  Av.9 °F (36.6 °C)  Min: 97.3 °F (36.3 °C)  Max: 98.9 °F (37.2 °C)       Intake/Output Summary (Last 24 hours) at 2021 1596  Last data filed at 2021 1815  Gross per 24 hour   Intake 1775 ml   Output 1200 ml   Net 575 ml       Physical Exam:   General appearance: alert, appears stated age, cooperative and no distress  Head: Normocephalic, without obvious abnormality, atraumatic  Back: No CVA tenderness  Abdomen: Soft, non-tender, non-distended   : 12fr SPT in place, urine dark red. 22fr 3-way catheter in place.     Labs:   WBC:    Lab Results   Component Value Date    WBC 6.3 2021      Hemoglobin/Hematocrit:    Lab Results   Component Value Date    HGB 9.2 2021    HCT 31.2 2021      BMP:   Lab Results Component Value Date     03/31/2021    K 3.7 03/31/2021     03/31/2021    CO2 22 03/31/2021    BUN 15 03/31/2021    LABALBU 2.4 03/26/2021    CREATININE 1.4 03/31/2021    CALCIUM 7.9 03/31/2021    GFRAA 58 03/31/2021    LABGLOM 48 03/31/2021      PT/INR:    Lab Results   Component Value Date    PROTIME 14.1 03/26/2021    INR 1.16 03/26/2021      PTT:    Lab Results   Component Value Date    APTT 37.8 03/26/2021     Urine Culture: No growth at 18 to 36 hours    Imaging:  Echocardiogram Complete 2d With Doppler With Color    Result Date: 3/3/2021  Transthoracic Echocardiography Report (TTE)  Demographics   Patient Name       Leopold Dowse       Date of Study       03/03/2021   Date of Birth      10/18/1930        Gender              Male   Age                80 year(s)        Race                   Patient Number     [id number]        Room Number         [facility]   Visit Number       [id number]   Corporate ID       [id number]   Accession Number   [id number]        [address] [place] [address]   Ordering Physician Oliver Workman MD Interpreting        Ana Funk MD                                       Physician  Procedure Type of Study   TTE procedure:ECHOCARDIOGRAM COMPLETE 2D W DOPPLER W COLOR. Procedure Date Date: 03/03/2021 Start: 08:52 AM Study Location: Portable Technical Quality: Fair visualization Indications:Congestive heart failure. Patient Status: Routine Height: 72 inches Weight: 190 pounds BSA: 2.08 m2 BMI: 25.77 kg/m2 HR: 96 bpm BP: 149/84 mmHg  Conclusions   Summary  Left ventricular systolic function is low normal.  Ejection fraction is visually estimated at 50%. Sclerotic, but non-stenotic aortic valve. Trace aortic regurgitation is noted. No evidence of any pericardial effusion.    Signature   ------------------------------------------------------------------  Electronically signed by Ana Funk MD Estimated RVSP: 36 mmHg   AV Area (Continuity):3.86 cm2 Estimated RAP:3 mmHg    LVOT VTI: 14.5 cm                                 TR Velocity:286 cm/s   Estimated PASP: 35.72 mmHg    TR Gradient:32.72 mmHg      Ct Pelvis Wo Contrast Additional Contrast? None    Result Date: 3/31/2021  EXAMINATION: CT OF THE PELVIS WITHOUT CONTRAST, 3/31/2021 11:53 am TECHNIQUE: CT of the pelvis was performed without the administration of intravenous contrast.  Multiplanar reformatted images are provided for review. Dose modulation, iterative reconstruction, and/or weight based adjustment of the mA/kV was utilized to reduce the radiation dose to as low as reasonably achievable. COMPARISON: 03/02/2021 HISTORY: ORDERING SYSTEM PROVIDED HISTORY:  Hematuria, evaluate SPT placement. TECHNOLOGIST PROVIDED HISTORY: Reason for exam:  hematuria, evaluate SPT placement. Additional Contrast?  None Reason for Exam: hematuria, evaluate SPT placement. FINDINGS: A suprapubic catheter has been placed. This extends through the right rectus muscle. The balloon and catheter tip are present within the urinary bladder. The urinary bladder wall is thickened. There are multiple foci of air within the urinary bladder. Hyperdense material is noted within the urinary bladder. The prostate gland is enlarged. There is a small fat containing left inguinal hernia. Diverticulosis is noted. There is no free intraperitoneal air. Cortical and trabecular thickening are noted throughout the right iliac bone. This extends to involve the entire right hemipelvis. There is no associated significant right hip degenerative change at this time. There is degenerative change at the lower lumbar spine. 1. The suprapubic catheter has its tip and balloon within the urinary bladder. 2. Multifocal urinary bladder wall of gas. This may be related to the procedure. Emphysematous cystitis is an alternate consideration. 3. Bladder wall thickening.   Hyperdense material within the urinary bladder, likely blood clot. 4. Prostate gland enlargement. 5. Right hemipelvis bony Paget's disease. Xr Chest Portable    Result Date: 3/4/2021  EXAMINATION: ONE XRAY VIEW OF THE CHEST 3/4/2021 12:32 pm COMPARISON: March 2, 2021 HISTORY: ORDERING SYSTEM PROVIDED HISTORY: post operative hiatal hernia repair TECHNOLOGIST PROVIDED HISTORY: Reason for exam:->post operative hiatal hernia repair Reason for Exam: post operative hiatal hernia repair FINDINGS: Nasogastric tube with its distal tip coursing below the diaphragm. Stable cardiomediastinal silhouette. Bibasilar atelectasis or infiltrate is noted. No definite pleural effusion or pneumothorax. The osseous structures are stable. Bibasilar atelectasis or infiltrate. Us Retroperitoneal Complete    Result Date: 3/28/2021  EXAMINATION: RETROPERITONEAL ULTRASOUND OF THE KIDNEYS AND URINARY BLADDER 3/28/2021 COMPARISON: 03/02/2021 HISTORY: ORDERING SYSTEM PROVIDED HISTORY: Hematuria with borden, distended bladder? TECHNOLOGIST PROVIDED HISTORY: Reason for exam:->Hematuria with borden, distended bladder? Reason for Exam: Hematuria. Check for bladder distention Acuity: Acute Type of Exam: Initial Additional signs and symptoms: Pt extremely uncooperative. Refused part of exam FINDINGS: Examination is limited due to patient cooperation. Examination was terminated prematurely at patient request.  Images of the bladder were not obtained. Kidneys: The right kidney measures 9.7 cm in length and the left kidney measures 9.8 cm in length. Kidneys demonstrate increased cortical echogenicity. No evidence of hydronephrosis or intrarenal stones. There is a 3.8 x 2.6 x 3.2 cm simple appearing left renal cyst.     1. Increased renal cortical echogenicity bilaterally, suggestive of medical renal disease.  2. 3.8 cm simple appearing left renal cyst. 3. Bladder images were not obtained at patient request.       Assessment & Plan:      Simon Antony is a 80y.o. year old male admitted 3/26/2021 for gross hematuria.    1) Gross Hematuria: Likely secondary to borden catheter trauma              S/p Cystoscopy, clot evacuation, transurethral resection of prostate and prostatic fulguration, and placement of a 22fr 3-way catheter on 4/1/21   S/p Cystoscopy, clot evacuation, bladder fulguration, and insertion of a 12fr suprapubic tube on 3/29/21              Hgb 9.2, s/p 1unit PRBC 4/1/21              CT a/p 3/2/21 with multiple bladder diverticulum, thickened urinary bladder wall likely secondary to CANSECO with a large prostate              Urine culture negative               Nursing staff to manually irrigate bladder q4hrs and prn clots  2) BPH: h/o 90g prostate gland. Chronically on Flomax 0.4mg and Proscar 5mg, okay to resume these               6/76/83 s/p 12fr SPT placement              Continue SPT and 22fr 3-way catheter. Hopeful we can remove urethral catheter tomorrow.     Will continue to follow    Patient seen and examined, chart reviewed.      Electronically signed by Collin Stewart PA-C on 4/2/2021 at 6:52 AM

## 2021-04-02 NOTE — PROGRESS NOTES
POD # 1 TURP    Urine in borden tubing and SP tubing - clear yellow  He is having bladder spasms intermittently    Af,vss  Soft, nd/nt  Borden catheter draining- clear yellow urine      D/c borden catheter today  Continue SP tube  Possible voiding trial in future by clamping SP tube periodically

## 2021-04-02 NOTE — PROGRESS NOTES
Pt continuously messing w/  I.V and site. Pt previously took out I.V earlier in Night, was non-redirectable, wouldn't listen at time of incident. When asked why, states \"I need to find a way to commit suicide. \" Pt states he doesn't want to die but wants God to save him already. Pt then began messing w/ pillow case, stating he wants to strangle himself with it. Pt is confused, but notes are being taken and pt is being watched very closely. Will immediately notify RN if anything else happens /or is said.

## 2021-04-03 LAB
ANION GAP SERPL CALCULATED.3IONS-SCNC: 8 MMOL/L (ref 4–16)
BUN BLDV-MCNC: 15 MG/DL (ref 6–23)
CALCIUM SERPL-MCNC: 8.1 MG/DL (ref 8.3–10.6)
CHLORIDE BLD-SCNC: 104 MMOL/L (ref 99–110)
CO2: 25 MMOL/L (ref 21–32)
CREAT SERPL-MCNC: 1.3 MG/DL (ref 0.9–1.3)
GFR AFRICAN AMERICAN: >60 ML/MIN/1.73M2
GFR NON-AFRICAN AMERICAN: 52 ML/MIN/1.73M2
GLUCOSE BLD-MCNC: 106 MG/DL (ref 70–99)
HCT VFR BLD CALC: 25.8 % (ref 42–52)
HEMOGLOBIN: 8.2 GM/DL (ref 13.5–18)
MCH RBC QN AUTO: 29.7 PG (ref 27–31)
MCHC RBC AUTO-ENTMCNC: 31.8 % (ref 32–36)
MCV RBC AUTO: 93.5 FL (ref 78–100)
PDW BLD-RTO: 14.9 % (ref 11.7–14.9)
PLATELET # BLD: 254 K/CU MM (ref 140–440)
PMV BLD AUTO: 9.1 FL (ref 7.5–11.1)
POTASSIUM SERPL-SCNC: 3.9 MMOL/L (ref 3.5–5.1)
RBC # BLD: 2.76 M/CU MM (ref 4.6–6.2)
SODIUM BLD-SCNC: 137 MMOL/L (ref 135–145)
WBC # BLD: 8 K/CU MM (ref 4–10.5)

## 2021-04-03 PROCEDURE — 94761 N-INVAS EAR/PLS OXIMETRY MLT: CPT

## 2021-04-03 PROCEDURE — 36415 COLL VENOUS BLD VENIPUNCTURE: CPT

## 2021-04-03 PROCEDURE — 6370000000 HC RX 637 (ALT 250 FOR IP): Performed by: INTERNAL MEDICINE

## 2021-04-03 PROCEDURE — 2580000003 HC RX 258: Performed by: UROLOGY

## 2021-04-03 PROCEDURE — 6370000000 HC RX 637 (ALT 250 FOR IP): Performed by: UROLOGY

## 2021-04-03 PROCEDURE — 85027 COMPLETE CBC AUTOMATED: CPT

## 2021-04-03 PROCEDURE — 1200000000 HC SEMI PRIVATE

## 2021-04-03 PROCEDURE — 80048 BASIC METABOLIC PNL TOTAL CA: CPT

## 2021-04-03 RX ORDER — SODIUM PHOSPHATE, DIBASIC AND SODIUM PHOSPHATE, MONOBASIC 7; 19 G/133ML; G/133ML
1 ENEMA RECTAL
Status: COMPLETED | OUTPATIENT
Start: 2021-04-03 | End: 2021-04-03

## 2021-04-03 RX ADMIN — FAMOTIDINE 20 MG: 20 TABLET ORAL at 21:01

## 2021-04-03 RX ADMIN — LORAZEPAM 0.5 MG: 0.5 TABLET ORAL at 01:48

## 2021-04-03 RX ADMIN — DOCUSATE SODIUM 100 MG: 100 CAPSULE, LIQUID FILLED ORAL at 12:40

## 2021-04-03 RX ADMIN — POTASSIUM CHLORIDE 20 MEQ: 1500 TABLET, EXTENDED RELEASE ORAL at 12:39

## 2021-04-03 RX ADMIN — FAMOTIDINE 20 MG: 20 TABLET ORAL at 12:40

## 2021-04-03 RX ADMIN — FERROUS SULFATE TAB 325 MG (65 MG ELEMENTAL FE) 325 MG: 325 (65 FE) TAB at 12:40

## 2021-04-03 RX ADMIN — LEVOTHYROXINE SODIUM 75 MCG: 0.07 TABLET ORAL at 12:39

## 2021-04-03 RX ADMIN — POLYETHYLENE GLYCOL (3350) 17 G: 17 POWDER, FOR SOLUTION ORAL at 12:38

## 2021-04-03 RX ADMIN — SODIUM PHOSPHATE, DIBASIC AND SODIUM PHOSPHATE, MONOBASIC 1 ENEMA: 7; 19 ENEMA RECTAL at 09:11

## 2021-04-03 RX ADMIN — METOPROLOL SUCCINATE 25 MG: 25 TABLET, EXTENDED RELEASE ORAL at 18:36

## 2021-04-03 RX ADMIN — SERTRALINE HYDROCHLORIDE 50 MG: 50 TABLET ORAL at 12:39

## 2021-04-03 RX ADMIN — QUETIAPINE FUMARATE 50 MG: 25 TABLET ORAL at 15:33

## 2021-04-03 RX ADMIN — QUETIAPINE FUMARATE 50 MG: 25 TABLET ORAL at 21:01

## 2021-04-03 RX ADMIN — AMLODIPINE BESYLATE 5 MG: 5 TABLET ORAL at 12:40

## 2021-04-03 RX ADMIN — SODIUM CHLORIDE, PRESERVATIVE FREE 10 ML: 5 INJECTION INTRAVENOUS at 21:01

## 2021-04-03 RX ADMIN — PANTOPRAZOLE SODIUM 20 MG: 20 TABLET, DELAYED RELEASE ORAL at 12:40

## 2021-04-03 RX ADMIN — QUETIAPINE FUMARATE 50 MG: 25 TABLET ORAL at 12:40

## 2021-04-03 RX ADMIN — TAMSULOSIN HYDROCHLORIDE 0.8 MG: 0.4 CAPSULE ORAL at 12:42

## 2021-04-03 RX ADMIN — FUROSEMIDE 20 MG: 20 TABLET ORAL at 12:40

## 2021-04-03 RX ADMIN — FINASTERIDE 5 MG: 5 TABLET, FILM COATED ORAL at 12:42

## 2021-04-03 ASSESSMENT — PAIN DESCRIPTION - PROGRESSION
CLINICAL_PROGRESSION: GRADUALLY IMPROVING

## 2021-04-03 ASSESSMENT — PAIN SCALES - GENERAL: PAINLEVEL_OUTOF10: 0

## 2021-04-03 NOTE — PROGRESS NOTES
[facility] [address]   Progress Note  [facility] 0 1 2      Date: 4/3/2021   Patient: Krystle Manley   : 10/18/1930   DOA: 3/26/2021   MRN: [id number]   ROOM#: 1111/1111-A     Admit Date: 3/26/2021     Collaborating Urologist on Call at time of admission: Dr. Lynn Pinedo clots in catheter  Reason for Consult:  Hematuria  S/p Cystoscopy, clot evacuation, bladder fulguration, and insertion of a 12fr suprapubic tube on 3/29/21  S/p Cystoscopy, clot evacuation, transurethral resection of prostate and prostatic fulguration, and placement of a 22fr 3-way catheter on 21    Subjective:     Pain: mild, no nausea and no vomiting,   Bowel Movement/Flatus: Yes  Voidinfr SPT in place, urine clear yellow in bag. Borden balloon port ripped off at bifurcation, SPT still within tract. Pt resting in bed, denies any pain. Procedure: Pt dressed and draped in the usual sterile fashion; RN alejandro at bedside assisting with procedure. Suture was cut and 12fr catheter carefully removed. We immediately inserted a new 12fr catheter into SPT insertion site without difficulty and immediate return of pink-tinged yellow urine with several small clots. 10cc sterile water inflated into balloon easily. I then manually irrigated the bladder with 250cc normal saline with 5cc small clot return. Urine pink-tinged by end of procedure. Pt tolerated well. Catheter secured to leg via borden securing device. Pt's son Nic Florentino updated via telephone.     Objective:    Vitals:    BP (!) 141/64   Pulse 87   Temp 99 °F (37.2 °C) (Oral)   Resp 16   Ht 6' 0.01\" (1.829 m)   Wt 170 lb (77.1 kg)   SpO2 100%   BMI 23.05 kg/m²    Temp  Av.2 °F (37.3 °C)  Min: 99 °F (37.2 °C)  Max: 99.4 °F (37.4 °C)       Intake/Output Summary (Last 24 hours) at 4/3/2021 1024  Last data filed at 2021 1755  Gross per 24 hour   Intake 300 ml   Output --   Net 300 ml       Physical Exam:   General appearance: alert, appears stated age, cooperative and no distress  Head: Normocephalic, without obvious abnormality, atraumatic  Back: No CVA tenderness  Abdomen: Soft, non-tender, non-distended   : 12fr SPT in place, urine clear yellow in bag. Laureano balloon port ripped off at bifurcation, SPT still within tract. Labs:   WBC:    Lab Results   Component Value Date    WBC 8.4 04/02/2021      Hemoglobin/Hematocrit:    Lab Results   Component Value Date    HGB 8.2 04/02/2021    HCT 27.0 04/02/2021      BMP:   Lab Results   Component Value Date     04/02/2021    K 3.6 04/02/2021     04/02/2021    CO2 22 04/02/2021    BUN 14 04/02/2021    LABALBU 2.4 03/26/2021    CREATININE 1.4 04/02/2021    CALCIUM 8.1 04/02/2021    GFRAA 58 04/02/2021    LABGLOM 48 04/02/2021      PT/INR:    Lab Results   Component Value Date    PROTIME 14.1 03/26/2021    INR 1.16 03/26/2021      PTT:    Lab Results   Component Value Date    APTT 37.8 03/26/2021     Urine Culture: No growth at 18 to 36 hours    Imaging:  Echocardiogram Complete 2d With Doppler With Color    Result Date: 3/3/2021  Transthoracic Echocardiography Report (TTE)  Demographics   Patient Name       Jo Palacios       Date of Study       03/03/2021   Date of Birth      10/18/1930        Gender              Male   Age                80 year(s)        Race                   Patient Number     [id number]        Room Number         2111   Visit Number       [id number]   Corporate ID       [id number]   Accession Number   [id number]        [address] [place] [address]   Ordering Physician Mary Hoyt MD Interpreting        Bertram Alexandre MD                                       Physician  Procedure Type of Study   TTE procedure:ECHOCARDIOGRAM COMPLETE 2D W DOPPLER W COLOR. Procedure Date Date: 03/03/2021 Start: 08:52 AM Study Location: Portable Technical Quality: Fair visualization Indications:Congestive heart failure.  Patient Status: Routine Height: 72 inches Weight: 190 pounds BSA: 2.08 m2 BMI: 25.77 kg/m2 HR: 96 bpm BP: 149/84 mmHg  Conclusions   Summary  Left ventricular systolic function is low normal.  Ejection fraction is visually estimated at 50%. Sclerotic, but non-stenotic aortic valve. Trace aortic regurgitation is noted. No evidence of any pericardial effusion. Signature   ------------------------------------------------------------------  Electronically signed by Darwin Stearns MD (Interpreting  physician) on 03/03/2021 at 11:18 AM  ------------------------------------------------------------------   Findings   Left Ventricle  Left ventricular systolic function is low normal.  Ejection fraction is visually estimated at 50%. Left Atrium  Essentially normal left atrium. Right Atrium  Essentially normal right atrium. Right Ventricle  Essentially normal right ventricle. Aortic Valve  Sclerotic, but non-stenotic aortic valve. Trace aortic regurgitation is noted. Mitral Valve  Trace mitral regurgitation is present. Tricuspid Valve  Mild tricuspid regurgitation is present. Pulmonic Valve  The pulmonic valve was not well visualized. Pericardial Effusion  No evidence of any pericardial effusion.   M-Mode/2D Measurements & Calculations   LV Diastolic Dimension:  LV Systolic Dimension:  LA Dimension: 3.5 cmAO Root  4.93 cm                  3.56 cm                 Dimension: 4 cmLA Area:  LV FS:27.8 %             LV Volume Diastolic: 72 72.8 cm2  LV PW Diastolic: 4.94 cm ml  LV PW Systolic: 1.4 cm   LV Volume Systolic: 40  Septum Diastolic: 3.35   ml  cm                       LV EDV/LV EDV Index: 72 RV Diastolic Dimension:  Septum Systolic: 1.82 cm MD/31 P4VY ESV/LV ESV   2.91 cm  CO: 7.97 l/min           Index: 40 ml/19 m2  CI: 3.83 l/m*m2          EF Calculated (A4C):    LA/Aorta: 0.88                           44.4 %                  Ascending Aorta: 3.7 cm  LV Area Diastolic: 33.9  EF Calculated (2D):     LA volume/Index: 49 ml  cm2                      53.5 %                  /77O0  LV Area Systolic: 18 cm2                           LV Length: 8.17 cm                            LVOT: 2.7 cm  Doppler Measurements & Calculations    AV Peak Velocity: 127 cm/s    LVOT Peak Velocity: 82.7 cm/s   AV Peak Gradient: 6.45 mmHg   LVOT Mean Velocity: 59.8 cm/s   AV Mean Velocity: 93.1 cm/s   LVOT Peak Gradient: 3 mmHgLVOT Mean Gradient:   AV Mean Gradient: 4 mmHg      2 mmHg   AV VTI: 21.5 cm               Estimated RVSP: 36 mmHg   AV Area (Continuity):3.86 cm2 Estimated RAP:3 mmHg    LVOT VTI: 14.5 cm                                 TR Velocity:286 cm/s   Estimated PASP: 35.72 mmHg    TR Gradient:32.72 mmHg      Ct Pelvis Wo Contrast Additional Contrast? None    Result Date: 3/31/2021  EXAMINATION: CT OF THE PELVIS WITHOUT CONTRAST, 3/31/2021 11:53 am TECHNIQUE: CT of the pelvis was performed without the administration of intravenous contrast.  Multiplanar reformatted images are provided for review. Dose modulation, iterative reconstruction, and/or weight based adjustment of the mA/kV was utilized to reduce the radiation dose to as low as reasonably achievable. COMPARISON: 03/02/2021 HISTORY: ORDERING SYSTEM PROVIDED HISTORY:  Hematuria, evaluate SPT placement. TECHNOLOGIST PROVIDED HISTORY: Reason for exam:  hematuria, evaluate SPT placement. Additional Contrast?  None Reason for Exam: hematuria, evaluate SPT placement. FINDINGS: A suprapubic catheter has been placed. This extends through the right rectus muscle. The balloon and catheter tip are present within the urinary bladder. The urinary bladder wall is thickened. There are multiple foci of air within the urinary bladder. Hyperdense material is noted within the urinary bladder. The prostate gland is enlarged. There is a small fat containing left inguinal hernia. Diverticulosis is noted. There is no free intraperitoneal air.  Cortical and trabecular thickening are noted throughout the right iliac bone. This extends to involve the entire right hemipelvis. There is no associated significant right hip degenerative change at this time. There is degenerative change at the lower lumbar spine. 1. The suprapubic catheter has its tip and balloon within the urinary bladder. 2. Multifocal urinary bladder wall of gas. This may be related to the procedure. Emphysematous cystitis is an alternate consideration. 3. Bladder wall thickening. Hyperdense material within the urinary bladder, likely blood clot. 4. Prostate gland enlargement. 5. Right hemipelvis bony Paget's disease. Xr Chest Portable    Result Date: 3/4/2021  EXAMINATION: ONE XRAY VIEW OF THE CHEST 3/4/2021 12:32 pm COMPARISON: March 2, 2021 HISTORY: ORDERING SYSTEM PROVIDED HISTORY: post operative hiatal hernia repair TECHNOLOGIST PROVIDED HISTORY: Reason for exam:->post operative hiatal hernia repair Reason for Exam: post operative hiatal hernia repair FINDINGS: Nasogastric tube with its distal tip coursing below the diaphragm. Stable cardiomediastinal silhouette. Bibasilar atelectasis or infiltrate is noted. No definite pleural effusion or pneumothorax. The osseous structures are stable. Bibasilar atelectasis or infiltrate. Us Retroperitoneal Complete    Result Date: 3/28/2021  EXAMINATION: RETROPERITONEAL ULTRASOUND OF THE KIDNEYS AND URINARY BLADDER 3/28/2021 COMPARISON: 03/02/2021 HISTORY: ORDERING SYSTEM PROVIDED HISTORY: Hematuria with borden, distended bladder? TECHNOLOGIST PROVIDED HISTORY: Reason for exam:->Hematuria with borden, distended bladder? Reason for Exam: Hematuria. Check for bladder distention Acuity: Acute Type of Exam: Initial Additional signs and symptoms: Pt extremely uncooperative. Refused part of exam FINDINGS: Examination is limited due to patient cooperation. Examination was terminated prematurely at patient request.  Images of the bladder were not obtained. Kidneys:  The right kidney measures 9.7 cm in length and the left kidney measures 9.8 cm in length. Kidneys demonstrate increased cortical echogenicity. No evidence of hydronephrosis or intrarenal stones. There is a 3.8 x 2.6 x 3.2 cm simple appearing left renal cyst.     1. Increased renal cortical echogenicity bilaterally, suggestive of medical renal disease. 2. 3.8 cm simple appearing left renal cyst. 3. Bladder images were not obtained at patient request.       Assessment & Plan:      Milagros Conde is a 80y.o. year old male admitted 3/26/2021 for gross hematuria.    1) Gross Hematuria: Improving. Likely secondary to borden catheter trauma              S/p Cystoscopy, clot evacuation, transurethral resection of prostate and prostatic fulguration, and placement of a 22fr 3-way catheter on 4/1/21   S/p Cystoscopy, clot evacuation, bladder fulguration, and insertion of a 12fr suprapubic tube on 3/29/21              Hgb 9.2, s/p 1unit PRBC 4/1/21              CT a/p 3/2/21 with multiple bladder diverticulum, thickened urinary bladder wall likely secondary to CANSECO with a large prostate              Urine culture negative               [facility] staff to manually irrigate bladder prn clots  2) BPH: h/o 90g prostate gland. Chronically on Flomax 0.4mg and Proscar 5mg, okay to resume these               7/31/83 s/p 12fr SPT placement              Continue SPT, exchanged today. If urine clear yellow later this afternoon, OK to clamp catheter to see if pt able to void on his own. Recommend bladder scan 4hrs after clamping catheter.     Will continue to follow    Patient seen and examined, chart reviewed.      Electronically signed by Keren Gutierrez PA-C on 4/3/2021 at 10:24 AM

## 2021-04-03 NOTE — PROGRESS NOTES
Hospitalist Progress Note      Name:  Marzena Ortiz /Age/Sex: 10/18/1930  (80 y.o. male)   MRN & CSN:  [id number] & [id number] Admission Date/Time: 3/26/2021  3:51 PM   Location:  1111/1111-A PCP: Karlee Espinoza MD         Hospital Day: 9    Assessment and Plan:   Kelton Sherman is a 80 y.o.  male  with past medical history of hypertension, BPH, hypothyroidism, anemia of chronic disease who presents with gross hematuria     1) Gross Hematuria  -CT a/p 3/2/21 with multiple bladder diverticulum, thickened urinary bladder wall likely secondary to CANSECO with a large prostate  -S/P Cystoscopy, clot evacuation, bladder fulguration and SPC placement  -Urology on board; for cystoscopy, clot evacuation, fulguration of bleeding, possible TURP and SP tube placement 2021  -Continue irrigation  -Continue Flomax, Proscar      2) Acute on chronic anemia    -Due to chronic GI bleed and hematuria   -Recent admission for GI bleed and EGD  -S/P PRBC transfusion  -Monitor hb daily     Chronic medical condition : Resume home medications when clinically appropriate     CKD stage III - Cr stable    Peripheral Neuropathy   BPH on Flomax  Hypothyroidism   Anxiety disorder/depression  Essential HTN    Diet DIET GENERAL;  Dietary Nutrition Supplements: Standard High Calorie Oral Supplement   DVT Prophylaxis [] Lovenox, []  Heparin, [] SCDs, [] Ambulation   GI Prophylaxis [] PPI,  [] H2 Blocker,  [] Carafate,  [] Diet/Tube Feeds   Code Status Full Code   Disposition [facility]      History of Present Illness:     Patient denied any new complaints. No acute overnight events. Objective:        Intake/Output Summary (Last 24 hours) at 4/3/2021 1616  Last data filed at 2021 1755  Gross per 24 hour   Intake 60 ml   Output --   Net 60 ml      Vitals:   Vitals:    21 1149   BP:    Pulse:    Resp: 16   Temp:    SpO2: 98%     Physical Exam:     GEN alert awake oriented x3  HEENT NC/AT, PERRLA, EOMI +  Lungs B/L clear  Heart RRR, S1/S2 heard, no M/R/G  Abdomen S/NT/ND/BS +  Genitourinary SPC +  Neuro nonfocal exam  Skin warm dry with no rashes    Medications:   Medications:    amLODIPine  5 mg Oral QAM    docusate sodium  100 mg Oral Daily    ferrous sulfate  325 mg Oral Daily with breakfast    finasteride  5 mg Oral Daily    furosemide  20 mg Oral Daily    levothyroxine  75 mcg Oral Daily    metoprolol succinate  25 mg Oral QPM    pantoprazole  20 mg Oral Daily    potassium chloride  20 mEq Oral Daily    QUEtiapine  50 mg Oral TID    tamsulosin  0.8 mg Oral Daily    sertraline  50 mg Oral Daily    sodium chloride flush  10 mL Intravenous 2 times per day    [Held by provider] enoxaparin  40 mg Subcutaneous Daily    famotidine  20 mg Oral BID      Infusions:    sodium chloride 50 mL/hr at 04/02/21 0927    sodium chloride 25 mL (03/27/21 1132)     PRN Meds: opium-belladonna, 60 mg, Q8H PRN  calcium carbonate, 500 mg, Q6H PRN  LORazepam, 0.5 mg, Nightly PRN  sodium chloride flush, 10 mL, PRN  sodium chloride, 25 mL, PRN  promethazine, 12.5 mg, Q6H PRN    Or  ondansetron, 4 mg, Q6H PRN  polyethylene glycol, 17 g, Daily PRN  acetaminophen, 650 mg, Q6H PRN    Or  acetaminophen, 650 mg, Q6H PRN  potassium chloride, 40 mEq, PRN    Or  potassium alternative oral replacement, 40 mEq, PRN    Or  potassium chloride, 10 mEq, PRN  QUEtiapine, 100 mg, Nightly PRN      Current laboratory imaging data reviewed    Electronically signed by Ivette Taylor MD on 4/3/2021 at 4:16 PM

## 2021-04-04 LAB
ANION GAP SERPL CALCULATED.3IONS-SCNC: 6 MMOL/L (ref 4–16)
BUN BLDV-MCNC: 16 MG/DL (ref 6–23)
CALCIUM SERPL-MCNC: 8.1 MG/DL (ref 8.3–10.6)
CHLORIDE BLD-SCNC: 106 MMOL/L (ref 99–110)
CO2: 26 MMOL/L (ref 21–32)
CREAT SERPL-MCNC: 1.4 MG/DL (ref 0.9–1.3)
GFR AFRICAN AMERICAN: 58 ML/MIN/1.73M2
GFR NON-AFRICAN AMERICAN: 48 ML/MIN/1.73M2
GLUCOSE BLD-MCNC: 107 MG/DL (ref 70–99)
HCT VFR BLD CALC: 25.6 % (ref 42–52)
HEMOGLOBIN: 7.9 GM/DL (ref 13.5–18)
MCH RBC QN AUTO: 28.9 PG (ref 27–31)
MCHC RBC AUTO-ENTMCNC: 30.9 % (ref 32–36)
MCV RBC AUTO: 93.8 FL (ref 78–100)
PDW BLD-RTO: 15 % (ref 11.7–14.9)
PLATELET # BLD: 266 K/CU MM (ref 140–440)
PMV BLD AUTO: 9.3 FL (ref 7.5–11.1)
POTASSIUM SERPL-SCNC: 3.9 MMOL/L (ref 3.5–5.1)
RBC # BLD: 2.73 M/CU MM (ref 4.6–6.2)
SODIUM BLD-SCNC: 138 MMOL/L (ref 135–145)
WBC # BLD: 6.9 K/CU MM (ref 4–10.5)

## 2021-04-04 PROCEDURE — 51798 US URINE CAPACITY MEASURE: CPT

## 2021-04-04 PROCEDURE — 2580000003 HC RX 258: Performed by: UROLOGY

## 2021-04-04 PROCEDURE — 80048 BASIC METABOLIC PNL TOTAL CA: CPT

## 2021-04-04 PROCEDURE — 6370000000 HC RX 637 (ALT 250 FOR IP): Performed by: UROLOGY

## 2021-04-04 PROCEDURE — 36415 COLL VENOUS BLD VENIPUNCTURE: CPT

## 2021-04-04 PROCEDURE — 85027 COMPLETE CBC AUTOMATED: CPT

## 2021-04-04 PROCEDURE — 1200000000 HC SEMI PRIVATE

## 2021-04-04 RX ADMIN — FAMOTIDINE 20 MG: 20 TABLET ORAL at 22:37

## 2021-04-04 RX ADMIN — SODIUM CHLORIDE: 9 INJECTION, SOLUTION INTRAVENOUS at 23:55

## 2021-04-04 RX ADMIN — SODIUM CHLORIDE, PRESERVATIVE FREE 10 ML: 5 INJECTION INTRAVENOUS at 22:37

## 2021-04-04 RX ADMIN — SERTRALINE HYDROCHLORIDE 50 MG: 50 TABLET ORAL at 11:17

## 2021-04-04 RX ADMIN — TAMSULOSIN HYDROCHLORIDE 0.8 MG: 0.4 CAPSULE ORAL at 11:16

## 2021-04-04 RX ADMIN — PANTOPRAZOLE SODIUM 20 MG: 20 TABLET, DELAYED RELEASE ORAL at 11:17

## 2021-04-04 RX ADMIN — FINASTERIDE 5 MG: 5 TABLET, FILM COATED ORAL at 11:16

## 2021-04-04 RX ADMIN — LEVOTHYROXINE SODIUM 75 MCG: 0.07 TABLET ORAL at 11:17

## 2021-04-04 RX ADMIN — FERROUS SULFATE TAB 325 MG (65 MG ELEMENTAL FE) 325 MG: 325 (65 FE) TAB at 11:16

## 2021-04-04 RX ADMIN — QUETIAPINE FUMARATE 50 MG: 25 TABLET ORAL at 11:16

## 2021-04-04 RX ADMIN — QUETIAPINE FUMARATE 50 MG: 25 TABLET ORAL at 22:37

## 2021-04-04 RX ADMIN — FAMOTIDINE 20 MG: 20 TABLET ORAL at 11:17

## 2021-04-04 RX ADMIN — METOPROLOL SUCCINATE 25 MG: 25 TABLET, EXTENDED RELEASE ORAL at 16:49

## 2021-04-04 RX ADMIN — POTASSIUM CHLORIDE 20 MEQ: 1500 TABLET, EXTENDED RELEASE ORAL at 11:16

## 2021-04-04 RX ADMIN — FUROSEMIDE 20 MG: 20 TABLET ORAL at 11:16

## 2021-04-04 RX ADMIN — DOCUSATE SODIUM 100 MG: 100 CAPSULE, LIQUID FILLED ORAL at 11:16

## 2021-04-04 RX ADMIN — AMLODIPINE BESYLATE 5 MG: 5 TABLET ORAL at 11:16

## 2021-04-04 RX ADMIN — QUETIAPINE FUMARATE 50 MG: 25 TABLET ORAL at 16:50

## 2021-04-04 ASSESSMENT — PAIN SCALES - GENERAL
PAINLEVEL_OUTOF10: 0
PAINLEVEL_OUTOF10: 0

## 2021-04-04 NOTE — PROGRESS NOTES
Hospitalist Progress Note      Name:  Kevin Lockett /Age/Sex: 10/18/1930  (80 y.o. male)   MRN & CSN:  [id number] & [id number] Admission Date/Time: 3/26/2021  3:51 PM   Location:  1111/1111-A PCP: Nasim Canas MD         Hospital Day: 10    Assessment and Plan:   Jennifer Gomes [place]iston is a 80 y.o.  male  with past medical history of hypertension, BPH, hypothyroidism, anemia of chronic disease who presents with gross hematuria     1) Gross Hematuria  -CT a/p 3/2/21 with multiple bladder diverticulum, thickened urinary bladder wall likely secondary to CANSECO with a large prostate  -S/P Cystoscopy, clot evacuation, bladder fulguration and SPC placement  -Urology on board; for cystoscopy, clot evacuation, fulguration of bleeding, possible TURP and SP tube placement 2021  -Continue irrigation and continue bladder scan monitoring  -Continue Flomax, Proscar      2) Acute on chronic anemia    -Due to chronic GI bleed and hematuria   -Recent admission for GI bleed and EGD  -S/P PRBC transfusion  -Monitor hb daily     Chronic medical condition : Resume home medications when clinically appropriate     CKD stage III - Cr stable    Peripheral Neuropathy   BPH on Flomax  Hypothyroidism   Anxiety disorder/depression  Essential HTN    Diet DIET GENERAL;  Dietary Nutrition Supplements: Standard High Calorie Oral Supplement   DVT Prophylaxis [] Lovenox, []  Heparin, [] SCDs, [] Ambulation   GI Prophylaxis [] PPI,  [] H2 Blocker,  [] Carafate,  [] Diet/Tube Feeds   Code Status Full Code   Disposition [facility]      History of Present Illness:     Patient denied any new complaints. No acute overnight events.     Objective:     No intake or output data in the 24 hours ending 21 1458   Vitals:   Vitals:    21 1112   BP: 136/71   Pulse: 68   Resp: 17   Temp: 98 °F (36.7 °C)   SpO2: 98%     Physical Exam:     GEN alert awake oriented x3  HEENT NC/AT, PERRLA, EOMI +  Lungs B/L clear  Heart RRR, S1/S2 heard, no M/R/G  Abdomen S/NT/ND/BS +  Genitourinary SPC +  Neuro nonfocal exam  Skin warm dry with no rashes    Medications:   Medications:    amLODIPine  5 mg Oral QAM    docusate sodium  100 mg Oral Daily    ferrous sulfate  325 mg Oral Daily with breakfast    finasteride  5 mg Oral Daily    furosemide  20 mg Oral Daily    levothyroxine  75 mcg Oral Daily    metoprolol succinate  25 mg Oral QPM    pantoprazole  20 mg Oral Daily    potassium chloride  20 mEq Oral Daily    QUEtiapine  50 mg Oral TID    tamsulosin  0.8 mg Oral Daily    sertraline  50 mg Oral Daily    sodium chloride flush  10 mL Intravenous 2 times per day    [Held by provider] enoxaparin  40 mg Subcutaneous Daily    famotidine  20 mg Oral BID      Infusions:    sodium chloride 50 mL/hr at 04/02/21 0927    sodium chloride 25 mL (03/27/21 1132)     PRN Meds: opium-belladonna, 60 mg, Q8H PRN  calcium carbonate, 500 mg, Q6H PRN  LORazepam, 0.5 mg, Nightly PRN  sodium chloride flush, 10 mL, PRN  sodium chloride, 25 mL, PRN  promethazine, 12.5 mg, Q6H PRN    Or  ondansetron, 4 mg, Q6H PRN  polyethylene glycol, 17 g, Daily PRN  acetaminophen, 650 mg, Q6H PRN    Or  acetaminophen, 650 mg, Q6H PRN  potassium chloride, 40 mEq, PRN    Or  potassium alternative oral replacement, 40 mEq, PRN    Or  potassium chloride, 10 mEq, PRN  QUEtiapine, 100 mg, Nightly PRN      Current laboratory imaging data reviewed    Electronically signed by Marce Conrad MD on 4/4/2021 at 2:58 PM

## 2021-04-04 NOTE — PROGRESS NOTES
Atrium  Essentially normal left atrium. Right Atrium  Essentially normal right atrium. Right Ventricle  Essentially normal right ventricle. Aortic Valve  Sclerotic, but non-stenotic aortic valve. Trace aortic regurgitation is noted. Mitral Valve  Trace mitral regurgitation is present. Tricuspid Valve  Mild tricuspid regurgitation is present. Pulmonic Valve  The pulmonic valve was not well visualized. Pericardial Effusion  No evidence of any pericardial effusion.   M-Mode/2D Measurements & Calculations   LV Diastolic Dimension:  LV Systolic Dimension:  LA Dimension: 3.5 cmAO Root  4.93 cm                  3.56 cm                 Dimension: 4 cmLA Area:  LV FS:27.8 %             LV Volume Diastolic: 72 12.5 cm2  LV PW Diastolic: 5.49 cm ml  LV PW Systolic: 1.4 cm   LV Volume Systolic: 40  Septum Diastolic: 0.84   ml  cm                       LV EDV/LV EDV Index: 72 RV Diastolic Dimension:  Septum Systolic: 6.81 cm YD/11 F8US ESV/LV ESV   2.91 cm  CO: 7.97 l/min           Index: 40 ml/19 m2  CI: 3.83 l/m*m2          EF Calculated (A4C):    LA/Aorta: 0.88                           44.4 %                  Ascending Aorta: 3.7 cm  LV Area Diastolic: 00.5  EF Calculated (2D):     LA volume/Index: 49 ml  cm2                      53.5 %                  /16K0  LV Area Systolic: 18 cm2                           LV Length: 8.17 cm                            LVOT: 2.7 cm  Doppler Measurements & Calculations    AV Peak Velocity: 127 cm/s    LVOT Peak Velocity: 82.7 cm/s   AV Peak Gradient: 6.45 mmHg   LVOT Mean Velocity: 59.8 cm/s   AV Mean Velocity: 93.1 cm/s   LVOT Peak Gradient: 3 mmHgLVOT Mean Gradient:   AV Mean Gradient: 4 mmHg      2 mmHg   AV VTI: 21.5 cm               Estimated RVSP: 36 mmHg   AV Area (Continuity):3.86 cm2 Estimated RAP:3 mmHg    LVOT VTI: 14.5 cm                                 TR Velocity:286 cm/s   Estimated PASP: 35.72 mmHg    TR Gradient:32.72 mmHg      Ct Pelvis Wo Contrast Additional Contrast? None    Result Date: 3/31/2021  EXAMINATION: CT OF THE PELVIS WITHOUT CONTRAST, 3/31/2021 11:53 am TECHNIQUE: CT of the pelvis was performed without the administration of intravenous contrast.  Multiplanar reformatted images are provided for review. Dose modulation, iterative reconstruction, and/or weight based adjustment of the mA/kV was utilized to reduce the radiation dose to as low as reasonably achievable. COMPARISON: 03/02/2021 HISTORY: ORDERING SYSTEM PROVIDED HISTORY:  Hematuria, evaluate SPT placement. TECHNOLOGIST PROVIDED HISTORY: Reason for exam:  hematuria, evaluate SPT placement. Additional Contrast?  None Reason for Exam: hematuria, evaluate SPT placement. FINDINGS: A suprapubic catheter has been placed. This extends through the right rectus muscle. The balloon and catheter tip are present within the urinary bladder. The urinary bladder wall is thickened. There are multiple foci of air within the urinary bladder. Hyperdense material is noted within the urinary bladder. The prostate gland is enlarged. There is a small fat containing left inguinal hernia. Diverticulosis is noted. There is no free intraperitoneal air. Cortical and trabecular thickening are noted throughout the right iliac bone. This extends to involve the entire right hemipelvis. There is no associated significant right hip degenerative change at this time. There is degenerative change at the lower lumbar spine. 1. The suprapubic catheter has its tip and balloon within the urinary bladder. 2. Multifocal urinary bladder wall of gas. This may be related to the procedure. Emphysematous cystitis is an alternate consideration. 3. Bladder wall thickening. Hyperdense material within the urinary bladder, likely blood clot. 4. Prostate gland enlargement. 5. Right hemipelvis bony Paget's disease.      Xr Chest Portable    Result Date: 3/4/2021  EXAMINATION: ONE XRAY VIEW OF THE CHEST 3/4/2021 12:32 pm COMPARISON: March 2, 2021 HISTORY: ORDERING SYSTEM PROVIDED HISTORY: post operative hiatal hernia repair TECHNOLOGIST PROVIDED HISTORY: Reason for exam:->post operative hiatal hernia repair Reason for Exam: post operative hiatal hernia repair FINDINGS: Nasogastric tube with its distal tip coursing below the diaphragm. Stable cardiomediastinal silhouette. Bibasilar atelectasis or infiltrate is noted. No definite pleural effusion or pneumothorax. The osseous structures are stable. Bibasilar atelectasis or infiltrate. Us Retroperitoneal Complete    Result Date: 3/28/2021  EXAMINATION: RETROPERITONEAL ULTRASOUND OF THE KIDNEYS AND URINARY BLADDER 3/28/2021 COMPARISON: 03/02/2021 HISTORY: ORDERING SYSTEM PROVIDED HISTORY: Hematuria with borden, distended bladder? TECHNOLOGIST PROVIDED HISTORY: Reason for exam:->Hematuria with borden, distended bladder? Reason for Exam: Hematuria. Check for bladder distention Acuity: Acute Type of Exam: Initial Additional signs and symptoms: Pt extremely uncooperative. Refused part of exam FINDINGS: Examination is limited due to patient cooperation. Examination was terminated prematurely at patient request.  Images of the bladder were not obtained. Kidneys: The right kidney measures 9.7 cm in length and the left kidney measures 9.8 cm in length. Kidneys demonstrate increased cortical echogenicity. No evidence of hydronephrosis or intrarenal stones. There is a 3.8 x 2.6 x 3.2 cm simple appearing left renal cyst.     1. Increased renal cortical echogenicity bilaterally, suggestive of medical renal disease. 2. 3.8 cm simple appearing left renal cyst. 3. Bladder images were not obtained at patient request.       Assessment & Plan:      Reva Brunson is a 80y.o. year old male admitted 3/26/2021 for gross hematuria.    1) Gross Hematuria: Resolved.  Likely secondary to borden catheter trauma.              S/p Cystoscopy, clot evacuation, transurethral resection of prostate and prostatic fulguration, and placement of a 22fr 3-way catheter on 4/1/21   S/p Cystoscopy, clot evacuation, bladder fulguration, and insertion of a 12fr suprapubic tube on 3/29/21              Hgb 7.9, stable; s/p 1unit PRBC 4/1/21              CT a/p 3/2/21 with multiple bladder diverticulum, thickened urinary bladder wall likely secondary to CANSECO with a large prostate              Urine culture negative   2) BPH: h/o 90g prostate gland. Chronically on Flomax 0.4mg and Proscar 5mg, okay to resume these               7/33/09 s/p 12fr SPT placement              Continue SPT, exchanged 4/3/21   Spt clamped this am for voiding trial. Nursing staff to perform bladder scans q4hrs and notify us if >500cc     Will continue to follow    Patient seen and examined, chart reviewed.      Electronically signed by Carlos Alberto Madera PA-C on 4/4/2021 at 8:23 AM

## 2021-04-05 LAB
ANION GAP SERPL CALCULATED.3IONS-SCNC: 8 MMOL/L (ref 4–16)
BUN BLDV-MCNC: 18 MG/DL (ref 6–23)
CALCIUM SERPL-MCNC: 8 MG/DL (ref 8.3–10.6)
CHLORIDE BLD-SCNC: 103 MMOL/L (ref 99–110)
CO2: 24 MMOL/L (ref 21–32)
CREAT SERPL-MCNC: 1.3 MG/DL (ref 0.9–1.3)
GFR AFRICAN AMERICAN: >60 ML/MIN/1.73M2
GFR NON-AFRICAN AMERICAN: 52 ML/MIN/1.73M2
GLUCOSE BLD-MCNC: 117 MG/DL (ref 70–99)
HCT VFR BLD CALC: 27.4 % (ref 42–52)
HEMOGLOBIN: 8.4 GM/DL (ref 13.5–18)
MCH RBC QN AUTO: 28.7 PG (ref 27–31)
MCHC RBC AUTO-ENTMCNC: 30.7 % (ref 32–36)
MCV RBC AUTO: 93.5 FL (ref 78–100)
PDW BLD-RTO: 15.1 % (ref 11.7–14.9)
PLATELET # BLD: 307 K/CU MM (ref 140–440)
PMV BLD AUTO: 9.6 FL (ref 7.5–11.1)
POTASSIUM SERPL-SCNC: 3.9 MMOL/L (ref 3.5–5.1)
RBC # BLD: 2.93 M/CU MM (ref 4.6–6.2)
SODIUM BLD-SCNC: 135 MMOL/L (ref 135–145)
WBC # BLD: 6.2 K/CU MM (ref 4–10.5)

## 2021-04-05 PROCEDURE — 51798 US URINE CAPACITY MEASURE: CPT

## 2021-04-05 PROCEDURE — 6370000000 HC RX 637 (ALT 250 FOR IP): Performed by: UROLOGY

## 2021-04-05 PROCEDURE — 2580000003 HC RX 258: Performed by: UROLOGY

## 2021-04-05 PROCEDURE — 1200000000 HC SEMI PRIVATE

## 2021-04-05 PROCEDURE — 36415 COLL VENOUS BLD VENIPUNCTURE: CPT

## 2021-04-05 PROCEDURE — 85027 COMPLETE CBC AUTOMATED: CPT

## 2021-04-05 PROCEDURE — 97116 GAIT TRAINING THERAPY: CPT

## 2021-04-05 PROCEDURE — 94761 N-INVAS EAR/PLS OXIMETRY MLT: CPT

## 2021-04-05 PROCEDURE — 80048 BASIC METABOLIC PNL TOTAL CA: CPT

## 2021-04-05 PROCEDURE — 97530 THERAPEUTIC ACTIVITIES: CPT

## 2021-04-05 RX ADMIN — QUETIAPINE FUMARATE 50 MG: 25 TABLET ORAL at 08:48

## 2021-04-05 RX ADMIN — SODIUM CHLORIDE, PRESERVATIVE FREE 10 ML: 5 INJECTION INTRAVENOUS at 08:47

## 2021-04-05 RX ADMIN — QUETIAPINE FUMARATE 50 MG: 25 TABLET ORAL at 14:06

## 2021-04-05 RX ADMIN — QUETIAPINE FUMARATE 50 MG: 25 TABLET ORAL at 21:12

## 2021-04-05 RX ADMIN — FINASTERIDE 5 MG: 5 TABLET, FILM COATED ORAL at 08:48

## 2021-04-05 RX ADMIN — LEVOTHYROXINE SODIUM 75 MCG: 0.07 TABLET ORAL at 08:49

## 2021-04-05 RX ADMIN — AMLODIPINE BESYLATE 5 MG: 5 TABLET ORAL at 08:49

## 2021-04-05 RX ADMIN — SERTRALINE HYDROCHLORIDE 50 MG: 50 TABLET ORAL at 08:49

## 2021-04-05 RX ADMIN — FAMOTIDINE 20 MG: 20 TABLET ORAL at 08:48

## 2021-04-05 RX ADMIN — TAMSULOSIN HYDROCHLORIDE 0.8 MG: 0.4 CAPSULE ORAL at 08:50

## 2021-04-05 RX ADMIN — METOPROLOL SUCCINATE 25 MG: 25 TABLET, EXTENDED RELEASE ORAL at 19:11

## 2021-04-05 RX ADMIN — PANTOPRAZOLE SODIUM 20 MG: 20 TABLET, DELAYED RELEASE ORAL at 08:48

## 2021-04-05 RX ADMIN — FUROSEMIDE 20 MG: 20 TABLET ORAL at 08:49

## 2021-04-05 RX ADMIN — POTASSIUM CHLORIDE 20 MEQ: 1500 TABLET, EXTENDED RELEASE ORAL at 08:49

## 2021-04-05 RX ADMIN — DOCUSATE SODIUM 100 MG: 100 CAPSULE, LIQUID FILLED ORAL at 08:49

## 2021-04-05 RX ADMIN — FERROUS SULFATE TAB 325 MG (65 MG ELEMENTAL FE) 325 MG: 325 (65 FE) TAB at 08:48

## 2021-04-05 RX ADMIN — FAMOTIDINE 20 MG: 20 TABLET ORAL at 21:12

## 2021-04-05 RX ADMIN — SODIUM CHLORIDE: 9 INJECTION, SOLUTION INTRAVENOUS at 21:12

## 2021-04-05 ASSESSMENT — PAIN SCALES - GENERAL
PAINLEVEL_OUTOF10: 0
PAINLEVEL_OUTOF10: 0

## 2021-04-05 NOTE — CARE COORDINATION
Call to [facility] left message about discharge and precert. Whiteboard placed asking for updated notes. Call [facility] and spoke with Mitali Low. Pt has been accepted and will need a precert.

## 2021-04-05 NOTE — PROGRESS NOTES
[facility] [address]   Progress Note  [facility] 0 1 2      Date: 2021   Patient: Glenna Murphy   : 10/18/1930   DOA: 3/26/2021   MRN: [id number]   ROOM#: 1111/1111-A     Admit Date: 3/26/2021     Collaborating Urologist on Call at time of admission: Dr. Aurora Arellano clots in catheter  Reason for Consult:  Hematuria  S/p Cystoscopy, clot evacuation, bladder fulguration, and insertion of a 12fr suprapubic tube on 3/29/21  S/p Cystoscopy, clot evacuation, transurethral resection of prostate and prostatic fulguration, and placement of a 22fr 3-way catheter on 21    Subjective:     Pain: none, no nausea and no vomiting,   Bowel Movement/Flatus: Yes  Voidinfr SPT in place, currently clamped since 715. Urine clear yellow overnight. Pt resting in bed, denies any pain. SPT clamped overnight with bladder scan of 500cc this am. SPT once again clamped, will see if pt can void on his own today and not unclamp SPT unless pt uncomfortable and unable to void. Objective:    Vitals:    /61   Pulse 73   Temp 98.4 °F (36.9 °C) (Oral)   Resp 15   Ht 6' 0.01\" (1.829 m)   Wt 175 lb 3.2 oz (79.5 kg)   SpO2 96%   BMI 23.76 kg/m²    Temp  Av.2 °F (36.8 °C)  Min: 98 °F (36.7 °C)  Max: 98.4 °F (36.9 °C)     No intake or output data in the 24 hours ending 21 0804    Physical Exam:   General appearance: alert, appears stated age, cooperative and no distress  Head: Normocephalic, without obvious abnormality, atraumatic  Back: No CVA tenderness  Abdomen: Soft, non-tender, non-distended   : 12fr SPT in place, currently clamped since 715. Urine clear yellow overnight.     Labs:   WBC:    Lab Results   Component Value Date    WBC 6.9 2021      Hemoglobin/Hematocrit:    Lab Results   Component Value Date    HGB 7.9 2021    HCT 25.6 2021      BMP:   Lab Results   Component Value Date     2021    K 3.9 2021     2021    CO2 26 04/04/2021    BUN 16 04/04/2021    LABALBU 2.4 03/26/2021    CREATININE 1.4 04/04/2021    CALCIUM 8.1 04/04/2021    GFRAA 58 04/04/2021    LABGLOM 48 04/04/2021      PT/INR:    Lab Results   Component Value Date    PROTIME 14.1 03/26/2021    INR 1.16 03/26/2021      PTT:    Lab Results   Component Value Date    APTT 37.8 03/26/2021     Urine Culture: No growth at 18 to 36 hours    Imaging:  Echocardiogram Complete 2d With Doppler With Color    Result Date: 3/3/2021  Transthoracic Echocardiography Report (TTE)  Demographics   Patient Name       Leopold Dowse       Date of Study       03/03/2021   Date of Birth      10/18/1930        Gender              Male   Age                80 year(s)        Race                   Patient Number     [id number]        Room Number         2111   Visit Number       [id number]   Corporate ID       [id number]   Accession Number   [id number]        [address] [place] [address]   Ordering Physician Oliver Workman MD Interpreting        Ana Funk MD                                       Physician  Procedure Type of Study   TTE procedure:ECHOCARDIOGRAM COMPLETE 2D W DOPPLER W COLOR. Procedure Date Date: 03/03/2021 Start: 08:52 AM Study Location: Portable Technical Quality: Fair visualization Indications:Congestive heart failure. Patient Status: Routine Height: 72 inches Weight: 190 pounds BSA: 2.08 m2 BMI: 25.77 kg/m2 HR: 96 bpm BP: 149/84 mmHg  Conclusions   Summary  Left ventricular systolic function is low normal.  Ejection fraction is visually estimated at 50%. Sclerotic, but non-stenotic aortic valve. Trace aortic regurgitation is noted. No evidence of any pericardial effusion.    Signature   ------------------------------------------------------------------  Electronically signed by Ana Funk MD (Interpreting  physician) on 03/03/2021 at 11:18 AM ------------------------------------------------------------------   Findings   Left Ventricle  Left ventricular systolic function is low normal.  Ejection fraction is visually estimated at 50%. Left Atrium  Essentially normal left atrium. Right Atrium  Essentially normal right atrium. Right Ventricle  Essentially normal right ventricle. Aortic Valve  Sclerotic, but non-stenotic aortic valve. Trace aortic regurgitation is noted. Mitral Valve  Trace mitral regurgitation is present. Tricuspid Valve  Mild tricuspid regurgitation is present. Pulmonic Valve  The pulmonic valve was not well visualized. Pericardial Effusion  No evidence of any pericardial effusion.   M-Mode/2D Measurements & Calculations   LV Diastolic Dimension:  LV Systolic Dimension:  LA Dimension: 3.5 cmAO Root  4.93 cm                  3.56 cm                 Dimension: 4 cmLA Area:  LV FS:27.8 %             LV Volume Diastolic: 72 29.3 cm2  LV PW Diastolic: 7.21 cm ml  LV PW Systolic: 1.4 cm   LV Volume Systolic: 40  Septum Diastolic: 4.79   ml  cm                       LV EDV/LV EDV Index: 72 RV Diastolic Dimension:  Septum Systolic: 7.08 cm XP/94 L4II ESV/LV ESV   2.91 cm  CO: 7.97 l/min           Index: 40 ml/19 m2  CI: 3.83 l/m*m2          EF Calculated (A4C):    LA/Aorta: 0.88                           44.4 %                  Ascending Aorta: 3.7 cm  LV Area Diastolic: 52.3  EF Calculated (2D):     LA volume/Index: 49 ml  cm2                      53.5 %                  /42W4  LV Area Systolic: 18 cm2                           LV Length: 8.17 cm                            LVOT: 2.7 cm  Doppler Measurements & Calculations    AV Peak Velocity: 127 cm/s    LVOT Peak Velocity: 82.7 cm/s   AV Peak Gradient: 6.45 mmHg   LVOT Mean Velocity: 59.8 cm/s   AV Mean Velocity: 93.1 cm/s   LVOT Peak Gradient: 3 mmHgLVOT Mean Gradient:   AV Mean Gradient: 4 mmHg      2 mmHg   AV VTI: 21.5 cm               Estimated RVSP: 36 mmHg   AV Area (Continuity):3.86 cm2 Estimated RAP:3 mmHg    LVOT VTI: 14.5 cm                                 TR Velocity:286 cm/s   Estimated PASP: 35.72 mmHg    TR Gradient:32.72 mmHg      Ct Pelvis Wo Contrast Additional Contrast? None    Result Date: 3/31/2021  EXAMINATION: CT OF THE PELVIS WITHOUT CONTRAST, 3/31/2021 11:53 am TECHNIQUE: CT of the pelvis was performed without the administration of intravenous contrast.  Multiplanar reformatted images are provided for review. Dose modulation, iterative reconstruction, and/or weight based adjustment of the mA/kV was utilized to reduce the radiation dose to as low as reasonably achievable. COMPARISON: 03/02/2021 HISTORY: ORDERING SYSTEM PROVIDED HISTORY:  Hematuria, evaluate SPT placement. TECHNOLOGIST PROVIDED HISTORY: Reason for exam:  hematuria, evaluate SPT placement. Additional Contrast?  None Reason for Exam: hematuria, evaluate SPT placement. FINDINGS: A suprapubic catheter has been placed. This extends through the right rectus muscle. The balloon and catheter tip are present within the urinary bladder. The urinary bladder wall is thickened. There are multiple foci of air within the urinary bladder. Hyperdense material is noted within the urinary bladder. The prostate gland is enlarged. There is a small fat containing left inguinal hernia. Diverticulosis is noted. There is no free intraperitoneal air. Cortical and trabecular thickening are noted throughout the right iliac bone. This extends to involve the entire right hemipelvis. There is no associated significant right hip degenerative change at this time. There is degenerative change at the lower lumbar spine. 1. The suprapubic catheter has its tip and balloon within the urinary bladder. 2. Multifocal urinary bladder wall of gas. This may be related to the procedure. Emphysematous cystitis is an alternate consideration. 3. Bladder wall thickening.   Hyperdense material within the urinary bladder, likely blood clot. 4. Prostate gland enlargement. 5. Right hemipelvis bony Paget's disease. Xr Chest Portable    Result Date: 3/4/2021  EXAMINATION: ONE XRAY VIEW OF THE CHEST 3/4/2021 12:32 pm COMPARISON: March 2, 2021 HISTORY: ORDERING SYSTEM PROVIDED HISTORY: post operative hiatal hernia repair TECHNOLOGIST PROVIDED HISTORY: Reason for exam:->post operative hiatal hernia repair Reason for Exam: post operative hiatal hernia repair FINDINGS: Nasogastric tube with its distal tip coursing below the diaphragm. Stable cardiomediastinal silhouette. Bibasilar atelectasis or infiltrate is noted. No definite pleural effusion or pneumothorax. The osseous structures are stable. Bibasilar atelectasis or infiltrate. Us Retroperitoneal Complete    Result Date: 3/28/2021  EXAMINATION: RETROPERITONEAL ULTRASOUND OF THE KIDNEYS AND URINARY BLADDER 3/28/2021 COMPARISON: 03/02/2021 HISTORY: ORDERING SYSTEM PROVIDED HISTORY: Hematuria with borden, distended bladder? TECHNOLOGIST PROVIDED HISTORY: Reason for exam:->Hematuria with borden, distended bladder? Reason for Exam: Hematuria. Check for bladder distention Acuity: Acute Type of Exam: Initial Additional signs and symptoms: Pt extremely uncooperative. Refused part of exam FINDINGS: Examination is limited due to patient cooperation. Examination was terminated prematurely at patient request.  Images of the bladder were not obtained. Kidneys: The right kidney measures 9.7 cm in length and the left kidney measures 9.8 cm in length. Kidneys demonstrate increased cortical echogenicity. No evidence of hydronephrosis or intrarenal stones. There is a 3.8 x 2.6 x 3.2 cm simple appearing left renal cyst.     1. Increased renal cortical echogenicity bilaterally, suggestive of medical renal disease.  2. 3.8 cm simple appearing left renal cyst. 3. Bladder images were not obtained at patient request.       Assessment & Plan:      Milagros Conde is a 80y.o. year old male admitted 3/26/2021 for gross hematuria.    1) Gross Hematuria: Resolved. Likely secondary to borden catheter trauma.              S/p Cystoscopy, clot evacuation, transurethral resection of prostate and prostatic fulguration, and placement of a 22fr 3-way catheter on 4/1/21   S/p Cystoscopy, clot evacuation, bladder fulguration, and insertion of a 12fr suprapubic tube on 3/29/21              4/4/21 Hgb 7.9, stable; s/p 1unit PRBC 4/1/21              CT a/p 3/2/21 with multiple bladder diverticulum, thickened urinary bladder wall likely secondary to CANSECO with a large prostate              Urine culture negative   2) BPH: h/o 90g prostate gland. Chronically on Flomax 0.4mg and Proscar 5mg, okay to resume these               4/05/44 s/p 12fr SPT placement              Continue SPT, exchanged 4/3/21   Spt clamped this am for voiding trial. Nursing staff to perform bladder scans q4hrs and notify us if >500cc     Will continue to follow. Anticipate discharge later today or tomorrow. Patient seen and examined, chart reviewed.      Electronically signed by Keren Gutierrez PA-C on 4/5/2021 at 8:04 AM

## 2021-04-05 NOTE — PROGRESS NOTES
Physical Therapy    Physical Therapy Treatment Note  Name: Romina Mock MRN: [id number] :   10/18/1930   Date:  2021   Admission Date: 3/26/2021 Room:  [address] [place] [address]   Restrictions/Precautions:  Restrictions/Precautions  Restrictions/Precautions: General Precautions, Fall Risk       Communication with other providers:  Per nurse ok to tx  Subjective:  Patient states: Motivated and agreeable to tx  Pain:   Location, Type, Intensity (0/10 to 10/10):  No c/o pain during tx session  Objective:    Observation:  Alert and oriented  Treatment, including education/measures:  Sit<=>stand cga and cues for hand placement for safety  amb with rw and cga 80' x 1, 100' x 2 with cues for walker safety and posture. Safety  Patient left safely in the bed, with call light/phone in reach with alarm applied. Gait belt and mask were used for transfers and gait. Assessment / Impression:       Patient's tolerance of treatment:  good  Adverse Reaction: na  Significant change in status and impact:  na  Barriers to improvement:  safety  Plan for Next Session:    Cont. POC  Time in:  1610  Time out:  1635  Timed treatment minutes:  25  Total treatment time:  25    Previously filed items:  Social/Functional History  Lives With: Spouse  Type of Home: House  Home Layout: One level  Home Access: Level entry  Bathroom Shower/Tub: Walk-in shower  Bathroom Toilet: Handicap height  Bathroom Equipment: Grab bars in shower, Shower chair  Home Equipment: Rolling walker, Cane  ADL Assistance: [address]: Needs assistance  Ambulation Assistance: Independent  Transfer Assistance: Independent  Active : Yes  Additional Comments: Pt from [facility] and to return at d/c     Long term goals  Time Frame for Long term goals :  In one week:  Long term goal 1: Pt will complete all bed mobility with SBAx1  Long term goal 2: Pt will complete sit <> stand transfers with SBAx1  Long term goal 3: Pt will ambulate 75 feet with SBAx1 with LRAD  Long term goal 4: Pt will independently complete 3 sets of 10 reps of BLE AROM exercises in available and allowed ROM    Electronically signed by:    Maria Guadalupe Mejia PTA  4/5/2021, 8:23 AM

## 2021-04-05 NOTE — PROGRESS NOTES
Hospitalist Progress Note      Name:  Esmer Diaz /Age/Sex: 10/18/1930  (80 y.o. male)   MRN & CSN:  [id number] & [id number] Admission Date/Time: 3/26/2021  3:51 PM   Location:  1111/1111-A PCP: Bg Vu MD         Hospital Day: 11    Assessment and Plan:   Fabricio Joyce [place] is a 80 y.o.  male  with past medical history of hypertension, BPH, hypothyroidism, anemia of chronic disease who presents with gross hematuria     1) Gross Hematuria  -CT a/p 3/2/21 with multiple bladder diverticulum, thickened urinary bladder wall likely secondary to CANSECO with a large prostate  -S/P Cystoscopy, clot evacuation, bladder fulguration and SPC placement  -Urology on board; for cystoscopy, clot evacuation, fulguration of bleeding, possible TURP and SP tube placement 2021. Still has complaints of urinary retention. Urology recommend continued SPC,continue Flomax, Proscar.      2) Acute on chronic anemia    -Due to chronic GI bleed and hematuria   -Recent admission for GI bleed and EGD  -S/P 2 UNIT PRBC transfusion. H&H stable       CKD stage III - Cr stable continue Lasix daily. Follow BMP  Peripheral Neuropathy   Hypothyroidism on Synthroid. Anxiety disorder/depression on Seroquel/sertraline. Essential HTN continue Norvasc and Toprol-XL. Diet DIET GENERAL;  Dietary Nutrition Supplements: Standard High Calorie Oral Supplement   DVT Prophylaxis [] Lovenox, []  Heparin, [] SCDs, [] Ambulation   GI Prophylaxis [] PPI,  [] H2 Blocker,  [] Carafate,  [] Diet/Tube Feeds   Code Status Full Code   Disposition [facility]      History of Present Illness:     Patient denied any new complaints. No acute overnight events. Objective:        Intake/Output Summary (Last 24 hours) at 2021 0842  Last data filed at 2021 0500  Gross per 24 hour   Intake --   Output 500 ml   Net -500 ml      Vitals:   Vitals:    21 0815   BP: (!) 113/56   Pulse: 69   Resp: 16   Temp: 98.3 °F (36.8 °C)   SpO2: 99%     Physical Exam:     GEN alert awake oriented x3  HEENT NC/AT, PERRLA, EOMI +  Lungs B/L clear  Heart RRR, S1/S2 heard, no M/R/G  Abdomen S/NT/ND/BS +  Genitourinary SPC +  Neuro nonfocal exam  Skin warm dry with no rashes    Medications:   Medications:    amLODIPine  5 mg Oral QAM    docusate sodium  100 mg Oral Daily    ferrous sulfate  325 mg Oral Daily with breakfast    finasteride  5 mg Oral Daily    furosemide  20 mg Oral Daily    levothyroxine  75 mcg Oral Daily    metoprolol succinate  25 mg Oral QPM    pantoprazole  20 mg Oral Daily    potassium chloride  20 mEq Oral Daily    QUEtiapine  50 mg Oral TID    tamsulosin  0.8 mg Oral Daily    sertraline  50 mg Oral Daily    sodium chloride flush  10 mL Intravenous 2 times per day    [Held by provider] enoxaparin  40 mg Subcutaneous Daily    famotidine  20 mg Oral BID      Infusions:    sodium chloride 50 mL/hr at 04/04/21 2355    sodium chloride 25 mL (03/27/21 1132)     PRN Meds: opium-belladonna, 60 mg, Q8H PRN  calcium carbonate, 500 mg, Q6H PRN  LORazepam, 0.5 mg, Nightly PRN  sodium chloride flush, 10 mL, PRN  sodium chloride, 25 mL, PRN  promethazine, 12.5 mg, Q6H PRN    Or  ondansetron, 4 mg, Q6H PRN  polyethylene glycol, 17 g, Daily PRN  acetaminophen, 650 mg, Q6H PRN    Or  acetaminophen, 650 mg, Q6H PRN  potassium chloride, 40 mEq, PRN    Or  potassium alternative oral replacement, 40 mEq, PRN    Or  potassium chloride, 10 mEq, PRN  QUEtiapine, 100 mg, Nightly PRN      Current laboratory imaging data reviewed    Electronically signed by Agustina Hogna MD on 4/5/2021 at 8:42 AM

## 2021-04-06 LAB
ANION GAP SERPL CALCULATED.3IONS-SCNC: 6 MMOL/L (ref 4–16)
BUN BLDV-MCNC: 15 MG/DL (ref 6–23)
CALCIUM SERPL-MCNC: 8.1 MG/DL (ref 8.3–10.6)
CHLORIDE BLD-SCNC: 105 MMOL/L (ref 99–110)
CO2: 24 MMOL/L (ref 21–32)
CREAT SERPL-MCNC: 1.3 MG/DL (ref 0.9–1.3)
GFR AFRICAN AMERICAN: >60 ML/MIN/1.73M2
GFR NON-AFRICAN AMERICAN: 52 ML/MIN/1.73M2
GLUCOSE BLD-MCNC: 131 MG/DL (ref 70–99)
HCT VFR BLD CALC: 26.9 % (ref 42–52)
HEMOGLOBIN: 8.3 GM/DL (ref 13.5–18)
MCH RBC QN AUTO: 28.9 PG (ref 27–31)
MCHC RBC AUTO-ENTMCNC: 30.9 % (ref 32–36)
MCV RBC AUTO: 93.7 FL (ref 78–100)
PDW BLD-RTO: 14.7 % (ref 11.7–14.9)
PLATELET # BLD: 293 K/CU MM (ref 140–440)
PMV BLD AUTO: 9.2 FL (ref 7.5–11.1)
POTASSIUM SERPL-SCNC: 4.1 MMOL/L (ref 3.5–5.1)
RBC # BLD: 2.87 M/CU MM (ref 4.6–6.2)
SODIUM BLD-SCNC: 135 MMOL/L (ref 135–145)
WBC # BLD: 6.1 K/CU MM (ref 4–10.5)

## 2021-04-06 PROCEDURE — 36415 COLL VENOUS BLD VENIPUNCTURE: CPT

## 2021-04-06 PROCEDURE — 51798 US URINE CAPACITY MEASURE: CPT

## 2021-04-06 PROCEDURE — 6370000000 HC RX 637 (ALT 250 FOR IP): Performed by: UROLOGY

## 2021-04-06 PROCEDURE — 1200000000 HC SEMI PRIVATE

## 2021-04-06 PROCEDURE — 97530 THERAPEUTIC ACTIVITIES: CPT

## 2021-04-06 PROCEDURE — 80048 BASIC METABOLIC PNL TOTAL CA: CPT

## 2021-04-06 PROCEDURE — 85027 COMPLETE CBC AUTOMATED: CPT

## 2021-04-06 PROCEDURE — 97116 GAIT TRAINING THERAPY: CPT

## 2021-04-06 RX ADMIN — QUETIAPINE FUMARATE 50 MG: 25 TABLET ORAL at 14:07

## 2021-04-06 RX ADMIN — LEVOTHYROXINE SODIUM 75 MCG: 0.07 TABLET ORAL at 09:18

## 2021-04-06 RX ADMIN — PANTOPRAZOLE SODIUM 20 MG: 20 TABLET, DELAYED RELEASE ORAL at 09:18

## 2021-04-06 RX ADMIN — POTASSIUM CHLORIDE 20 MEQ: 1500 TABLET, EXTENDED RELEASE ORAL at 09:18

## 2021-04-06 RX ADMIN — FAMOTIDINE 20 MG: 20 TABLET ORAL at 22:33

## 2021-04-06 RX ADMIN — METOPROLOL SUCCINATE 25 MG: 25 TABLET, EXTENDED RELEASE ORAL at 16:36

## 2021-04-06 RX ADMIN — TAMSULOSIN HYDROCHLORIDE 0.8 MG: 0.4 CAPSULE ORAL at 09:18

## 2021-04-06 RX ADMIN — FUROSEMIDE 20 MG: 20 TABLET ORAL at 09:19

## 2021-04-06 RX ADMIN — FERROUS SULFATE TAB 325 MG (65 MG ELEMENTAL FE) 325 MG: 325 (65 FE) TAB at 09:19

## 2021-04-06 RX ADMIN — FAMOTIDINE 20 MG: 20 TABLET ORAL at 09:19

## 2021-04-06 RX ADMIN — FINASTERIDE 5 MG: 5 TABLET, FILM COATED ORAL at 09:19

## 2021-04-06 RX ADMIN — DOCUSATE SODIUM 100 MG: 100 CAPSULE, LIQUID FILLED ORAL at 09:19

## 2021-04-06 RX ADMIN — QUETIAPINE FUMARATE 50 MG: 25 TABLET ORAL at 22:33

## 2021-04-06 RX ADMIN — QUETIAPINE FUMARATE 50 MG: 25 TABLET ORAL at 09:18

## 2021-04-06 RX ADMIN — AMLODIPINE BESYLATE 5 MG: 5 TABLET ORAL at 09:18

## 2021-04-06 RX ADMIN — SERTRALINE HYDROCHLORIDE 50 MG: 50 TABLET ORAL at 09:18

## 2021-04-06 ASSESSMENT — PAIN SCALES - GENERAL: PAINLEVEL_OUTOF10: 0

## 2021-04-06 NOTE — PROGRESS NOTES
Physical Therapy    Physical Therapy Treatment Note  Name: Sonal Calabrese MRN: [id number] :   10/18/1930   Date:  2021   Admission Date: 3/26/2021 Room:  [address] [place] [address]-A   Restrictions/Precautions:  Restrictions/Precautions  Restrictions/Precautions: General Precautions, Fall Risk       Communication with other providers:    Subjective:  Patient states: Motivated and agreeable to tx   Pain:   Location, Type, Intensity (0/10 to 10/10):  No c/o pain  Objective:    Observation:  Alert and oriented but appeared more tired today. Treatment, including education/measures:  Sup<=>sit sba  Sit<=>stand cga and cues for safety with hand placement. Pt needing to sit for extended time at side of bed before ambulating. amb with rw 120' cga and cues for posture and walker safety. Pt declined further amb at this time and requesting to return to bed after gt. Safety  Patient left safely in the bed, with call light/phone in reach with alarm/ applied. Gait belt and mask were used for transfers and gait. Assessment / Impression:       Patient's tolerance of treatment:  good  Adverse Reaction: na  Significant change in status and impact:  na  Barriers to improvement:  safety  Plan for Next Session:    Cont. POC  Time in:  1130  Time out:  1155  Timed treatment minutes:  25  Total treatment time:  25    Previously filed items:  Social/Functional History  Lives With: Spouse  Type of Home: House  Home Layout: One level  Home Access: Level entry  Bathroom Shower/Tub: Walk-in shower  Bathroom Toilet: Handicap height  Bathroom Equipment: Grab bars in shower, Shower chair  Home Equipment: Rolling walker, Cane  ADL Assistance: [address] [place] [address] Avenue: Needs assistance  Ambulation Assistance: Independent  Transfer Assistance: Independent  Active : Yes  Additional Comments: Pt from [facility] and to return at d/c     Long term goals  Time Frame for Long term goals :  In one week:  Long term goal 1: Pt will complete all bed mobility with SBAx1  Long term goal 2: Pt will complete sit <> stand transfers with SBAx1  Long term goal 3: Pt will ambulate 75 feet with SBAx1 with LRAD  Long term goal 4: Pt will independently complete 3 sets of 10 reps of BLE AROM exercises in available and allowed ROM    Electronically signed by:    Rianna Dash PTA  4/6/2021, 8:22 AM

## 2021-04-06 NOTE — PROGRESS NOTES
Occupational Therapy      Attempted at 1130 hrs c Nurse Aviva Davis reporting pt is just now sleeping and to not wake at this time. Plan is to continue per OT POC c re-attempt as schedule allows.     Electronically signed by:    GISELE Crockett  4/6/2021, 11:31 AM

## 2021-04-06 NOTE — PROGRESS NOTES
Hospitalist Progress Note      Name:  Clifton Diamond /Age/Sex: 10/18/1930  (80 y.o. male)   MRN & CSN:  [id number] & [id number] Admission Date/Time: 3/26/2021  3:51 PM   Location:  1111/1111-A PCP: Benjie Estrella MD         Hospital Day: 12    Assessment and Plan:   Wilbru Gonzalez [place] is a 80 y.o.  male  with past medical history of hypertension, BPH, hypothyroidism, anemia of chronic disease who presents with gross hematuria     1) Gross Hematuria  -CT a/p 3/2/21 with multiple bladder diverticulum, thickened urinary bladder wall likely secondary to CANSECO with a large prostate  -S/P Cystoscopy, clot evacuation, bladder fulguration and SPC placement  -Urology on board; for cystoscopy, clot evacuation, fulguration of bleeding, possible TURP and SP tube placement 2021. Still has complaints of urinary retention. Urology recommend continued SPC,continue Flomax, Proscar.      2) Acute on chronic anemia    -Due to chronic GI bleed and hematuria   -Recent admission for GI bleed and EGD  -S/P 2 UNIT PRBC transfusion. H&H stable       CKD stage III - Cr stable continue Lasix daily. Follow BMP  Peripheral Neuropathy   Hypothyroidism on Synthroid. Anxiety disorder/depression on Seroquel/sertraline. Essential HTN continue Norvasc and Toprol-XL. Diet DIET GENERAL;  Dietary Nutrition Supplements: Standard High Calorie Oral Supplement   DVT Prophylaxis [] Lovenox, []  Heparin, [] SCDs, [] Ambulation   GI Prophylaxis [] PPI,  [] H2 Blocker,  [] Carafate,  [] Diet/Tube Feeds   Code Status Full Code   Disposition [facility]      Medically stable for DC. Waiting for ECF approval.    History of Present Illness:     Patient denied any new complaints. No acute overnight events. Objective:        Intake/Output Summary (Last 24 hours) at 2021 0816  Last data filed at 2021 1913  Gross per 24 hour   Intake 420 ml   Output 575 ml   Net -155 ml      Vitals:   Vitals:    21 0745   BP: (!) 108/58   Pulse: 67 Resp: 14   Temp: 97.5 °F (36.4 °C)   SpO2: 97%     Physical Exam:     GEN alert awake oriented x3  HEENT NC/AT, PERRLA, EOMI +  Lungs B/L clear  Heart RRR, S1/S2 heard, no M/R/G  Abdomen S/NT/ND/BS +  Genitourinary SPC +  Neuro nonfocal exam  Skin warm dry with no rashes    Medications:   Medications:    amLODIPine  5 mg Oral QAM    docusate sodium  100 mg Oral Daily    ferrous sulfate  325 mg Oral Daily with breakfast    finasteride  5 mg Oral Daily    furosemide  20 mg Oral Daily    levothyroxine  75 mcg Oral Daily    metoprolol succinate  25 mg Oral QPM    pantoprazole  20 mg Oral Daily    potassium chloride  20 mEq Oral Daily    QUEtiapine  50 mg Oral TID    tamsulosin  0.8 mg Oral Daily    sertraline  50 mg Oral Daily    sodium chloride flush  10 mL Intravenous 2 times per day    [Held by provider] enoxaparin  40 mg Subcutaneous Daily    famotidine  20 mg Oral BID      Infusions:    sodium chloride 50 mL/hr at 04/05/21 2112    sodium chloride 25 mL (03/27/21 1132)     PRN Meds: opium-belladonna, 60 mg, Q8H PRN  calcium carbonate, 500 mg, Q6H PRN  LORazepam, 0.5 mg, Nightly PRN  sodium chloride flush, 10 mL, PRN  sodium chloride, 25 mL, PRN  promethazine, 12.5 mg, Q6H PRN    Or  ondansetron, 4 mg, Q6H PRN  polyethylene glycol, 17 g, Daily PRN  acetaminophen, 650 mg, Q6H PRN    Or  acetaminophen, 650 mg, Q6H PRN  potassium chloride, 40 mEq, PRN    Or  potassium alternative oral replacement, 40 mEq, PRN    Or  potassium chloride, 10 mEq, PRN  QUEtiapine, 100 mg, Nightly PRN      Current laboratory imaging data reviewed    Electronically signed by MD Sherri on 4/6/2021 at 8:16 AM

## 2021-04-06 NOTE — PROGRESS NOTES
[facility] [address]   Progress Note  [facility] 0 1 2      Date: 2021   Patient: Marzena Ortiz   : 10/18/1930   DOA: 3/26/2021   MRN: [id number]   ROOM#: [id number]/1111-A     Admit Date: 3/26/2021     Collaborating Urologist on Call at time of admission: Dr. Dwight Gale clots in catheter  Reason for Consult:  Hematuria  S/p Cystoscopy, clot evacuation, bladder fulguration, and insertion of a 12fr suprapubic tube on 3/29/21  S/p Cystoscopy, clot evacuation, transurethral resection of prostate and prostatic fulguration, and placement of a 22fr 3-way catheter on 21    Subjective:     Pain: none, no nausea and no vomiting,   Bowel Movement/Flatus: Yes  Voidinfr SPT in place, currently clamped since 715. Urine clear yellow overnight. Pt resting in bed, denies any pain. SPT clamped overnight with bladder scans of ~300cc since yesterday. Discussed with pt and son about plan for follow up in our office next week for reevaluation. Objective:    Vitals:    BP (!) 108/58   Pulse 67   Temp 97.5 °F (36.4 °C) (Oral)   Resp 14   Ht 6' 0.01\" (1.829 m)   Wt 175 lb 3.2 oz (79.5 kg)   SpO2 97%   BMI 23.76 kg/m²    Temp  Av.7 °F (36.5 °C)  Min: 97.5 °F (36.4 °C)  Max: 97.8 °F (36.6 °C)       Intake/Output Summary (Last 24 hours) at 2021 9616  Last data filed at 2021 1913  Gross per 24 hour   Intake 420 ml   Output 575 ml   Net -155 ml       Physical Exam:   General appearance: alert, appears stated age, cooperative and no distress  Head: Normocephalic, without obvious abnormality, atraumatic  Back: No CVA tenderness  Abdomen: Soft, non-tender, non-distended   : 12fr SPT in place, currently clamped since yesterday morning. Urine clear yellow.     Labs:   WBC:    Lab Results   Component Value Date    WBC 6.1 2021      Hemoglobin/Hematocrit:    Lab Results   Component Value Date    HGB 8.3 2021    HCT 26.9 2021      BMP:   Lab Results   Component Value Date     04/06/2021    K 4.1 04/06/2021     04/06/2021    CO2 24 04/06/2021    BUN 15 04/06/2021    LABALBU 2.4 03/26/2021    CREATININE 1.3 04/06/2021    CALCIUM 8.1 04/06/2021    GFRAA >60 04/06/2021    LABGLOM 52 04/06/2021      PT/INR:    Lab Results   Component Value Date    PROTIME 14.1 03/26/2021    INR 1.16 03/26/2021      PTT:    Lab Results   Component Value Date    APTT 37.8 03/26/2021     Urine Culture: No growth at 18 to 36 hours    Imaging:  Echocardiogram Complete 2d With Doppler With Color    Result Date: 3/3/2021  Transthoracic Echocardiography Report (TTE)  Demographics   Patient Name       Helga Reed       Date of Study       03/03/2021   Date of Birth      10/18/1930        Gender              Male   Age                80 year(s)        Race                   Patient Number     [id number]        Room Number         [facility]   Visit Number       [id number]   Corporate ID       [id number]   Accession Number   [id number]        [address] [place] [address]   Ordering Physician Kulwant Soliman MD Interpreting        Kelsey Mckoy MD                                       Physician  Procedure Type of Study   TTE procedure:ECHOCARDIOGRAM COMPLETE 2D W DOPPLER W COLOR. Procedure Date Date: 03/03/2021 Start: 08:52 AM Study Location: Portable Technical Quality: Fair visualization Indications:Congestive heart failure. Patient Status: Routine Height: 72 inches Weight: 190 pounds BSA: 2.08 m2 BMI: 25.77 kg/m2 HR: 96 bpm BP: 149/84 mmHg  Conclusions   Summary  Left ventricular systolic function is low normal.  Ejection fraction is visually estimated at 50%. Sclerotic, but non-stenotic aortic valve. Trace aortic regurgitation is noted. No evidence of any pericardial effusion.    Signature   ------------------------------------------------------------------  Electronically signed by Kelsey Mckoy MD (Interpreting  physician) on 03/03/2021 at 11:18 AM  ------------------------------------------------------------------   Findings   Left Ventricle  Left ventricular systolic function is low normal.  Ejection fraction is visually estimated at 50%. Left Atrium  Essentially normal left atrium. Right Atrium  Essentially normal right atrium. Right Ventricle  Essentially normal right ventricle. Aortic Valve  Sclerotic, but non-stenotic aortic valve. Trace aortic regurgitation is noted. Mitral Valve  Trace mitral regurgitation is present. Tricuspid Valve  Mild tricuspid regurgitation is present. Pulmonic Valve  The pulmonic valve was not well visualized. Pericardial Effusion  No evidence of any pericardial effusion.   M-Mode/2D Measurements & Calculations   LV Diastolic Dimension:  LV Systolic Dimension:  LA Dimension: 3.5 cmAO Root  4.93 cm                  3.56 cm                 Dimension: 4 cmLA Area:  LV FS:27.8 %             LV Volume Diastolic: 72 55.3 cm2  LV PW Diastolic: 7.66 cm ml  LV PW Systolic: 1.4 cm   LV Volume Systolic: 40  Septum Diastolic: 1.13   ml  cm                       LV EDV/LV EDV Index: 72 RV Diastolic Dimension:  Septum Systolic: 3.04 cm IG/03 [id number] ESV/LV ESV   2.91 cm  CO: 7.97 l/min           Index: 40 ml/19 m2  CI: 3.83 l/m*m2          EF Calculated (A4C):    LA/Aorta: 0.88                           44.4 %                  Ascending Aorta: 3.7 cm  LV Area Diastolic: 99.0  EF Calculated (2D):     LA volume/Index: 49 ml  cm2                      53.5 %                  /41K3  LV Area Systolic: 18 cm2                           LV Length: 8.17 cm                            LVOT: 2.7 cm  Doppler Measurements & Calculations    AV Peak Velocity: 127 cm/s    LVOT Peak Velocity: 82.7 cm/s   AV Peak Gradient: 6.45 mmHg   LVOT Mean Velocity: 59.8 cm/s   AV Mean Velocity: 93.1 cm/s   LVOT Peak Gradient: 3 mmHgLVOT Mean Gradient:   AV Mean Gradient: 4 mmHg      2 mmHg   AV VTI: 21.5 cm               Estimated RVSP: 36 mmHg   AV Area (Continuity):3.86 cm2 Estimated RAP:3 mmHg    LVOT VTI: 14.5 cm                                 TR Velocity:286 cm/s   Estimated PASP: 35.72 mmHg    TR Gradient:32.72 mmHg      Ct Pelvis Wo Contrast Additional Contrast? None    Result Date: 3/31/2021  EXAMINATION: CT OF THE PELVIS WITHOUT CONTRAST, 3/31/2021 11:53 am TECHNIQUE: CT of the pelvis was performed without the administration of intravenous contrast.  Multiplanar reformatted images are provided for review. Dose modulation, iterative reconstruction, and/or weight based adjustment of the mA/kV was utilized to reduce the radiation dose to as low as reasonably achievable. COMPARISON: 03/02/2021 HISTORY: ORDERING SYSTEM PROVIDED HISTORY:  Hematuria, evaluate SPT placement. TECHNOLOGIST PROVIDED HISTORY: Reason for exam:  hematuria, evaluate SPT placement. Additional Contrast?  None Reason for Exam: hematuria, evaluate SPT placement. FINDINGS: A suprapubic catheter has been placed. This extends through the right rectus muscle. The balloon and catheter tip are present within the urinary bladder. The urinary bladder wall is thickened. There are multiple foci of air within the urinary bladder. Hyperdense material is noted within the urinary bladder. The prostate gland is enlarged. There is a small fat containing left inguinal hernia. Diverticulosis is noted. There is no free intraperitoneal air. Cortical and trabecular thickening are noted throughout the right iliac bone. This extends to involve the entire right hemipelvis. There is no associated significant right hip degenerative change at this time. There is degenerative change at the lower lumbar spine. 1. The suprapubic catheter has its tip and balloon within the urinary bladder. 2. Multifocal urinary bladder wall of gas. This may be related to the procedure. Emphysematous cystitis is an alternate consideration. 3. Bladder wall thickening.   Hyperdense material within the urinary bladder, likely blood clot. 4. Prostate gland enlargement. 5. Right hemipelvis bony Paget's disease. Xr Chest Portable    Result Date: 3/4/2021  EXAMINATION: ONE XRAY VIEW OF THE CHEST 3/4/2021 12:32 pm COMPARISON: March 2, 2021 HISTORY: ORDERING SYSTEM PROVIDED HISTORY: post operative hiatal hernia repair TECHNOLOGIST PROVIDED HISTORY: Reason for exam:->post operative hiatal hernia repair Reason for Exam: post operative hiatal hernia repair FINDINGS: Nasogastric tube with its distal tip coursing below the diaphragm. Stable cardiomediastinal silhouette. Bibasilar atelectasis or infiltrate is noted. No definite pleural effusion or pneumothorax. The osseous structures are stable. Bibasilar atelectasis or infiltrate. Us Retroperitoneal Complete    Result Date: 3/28/2021  EXAMINATION: RETROPERITONEAL ULTRASOUND OF THE KIDNEYS AND URINARY BLADDER 3/28/2021 COMPARISON: 03/02/2021 HISTORY: ORDERING SYSTEM PROVIDED HISTORY: Hematuria with borden, distended bladder? TECHNOLOGIST PROVIDED HISTORY: Reason for exam:->Hematuria with borden, distended bladder? Reason for Exam: Hematuria. Check for bladder distention Acuity: Acute Type of Exam: Initial Additional signs and symptoms: Pt extremely uncooperative. Refused part of exam FINDINGS: Examination is limited due to patient cooperation. Examination was terminated prematurely at patient request.  Images of the bladder were not obtained. Kidneys: The right kidney measures 9.7 cm in length and the left kidney measures 9.8 cm in length. Kidneys demonstrate increased cortical echogenicity. No evidence of hydronephrosis or intrarenal stones. There is a 3.8 x 2.6 x 3.2 cm simple appearing left renal cyst.     1. Increased renal cortical echogenicity bilaterally, suggestive of medical renal disease.  2. 3.8 cm simple appearing left renal cyst. 3. Bladder images were not obtained at patient request.       Assessment & Plan:      Ayanna Pfeiffer is a 80 y.o. year old male admitted 3/26/2021 for gross hematuria.    1) Gross Hematuria: Resolved. Likely secondary to borden catheter trauma.              S/p Cystoscopy, clot evacuation, transurethral resection of prostate and prostatic fulguration, and placement of a 22fr 3-way catheter on 4/1/21   S/p Cystoscopy, clot evacuation, bladder fulguration, and insertion of a 12fr suprapubic tube on 3/29/21              8.3, stable; s/p 1unit PRBC 4/1/21              CT a/p 3/2/21 with multiple bladder diverticulum, thickened urinary bladder wall likely secondary to CANSECO with a large prostate              Urine culture negative   2) BPH: h/o 90g prostate gland. Chronically on Flomax 0.4mg and Proscar 5mg, okay to resume these               9/68/46 s/p 12fr SPT placement              Continue SPT, exchanged 4/3/21   SPT clamped since yesterday, pt voiding on own with PVRs ~300cc. Nursing staff to continue performing bladder scans q4hrs and notify us if >500cc. If pt continues to void on his own, discharge with SPT in place and clamped.     Pt stable from a  standpoint. Will sign off, please call with any questions. Pt to follow up in our office in 1 week. Will contact doc DossIsabell Ranjith ([phone]) to schedule appt. Patient seen and examined, chart reviewed.      Electronically signed by Brianna Hoffmann PA-C on 4/6/2021 at 8:52 AM

## 2021-04-07 LAB
ANION GAP SERPL CALCULATED.3IONS-SCNC: 7 MMOL/L (ref 4–16)
BUN BLDV-MCNC: 13 MG/DL (ref 6–23)
CALCIUM SERPL-MCNC: 8.2 MG/DL (ref 8.3–10.6)
CHLORIDE BLD-SCNC: 105 MMOL/L (ref 99–110)
CO2: 24 MMOL/L (ref 21–32)
CREAT SERPL-MCNC: 1.3 MG/DL (ref 0.9–1.3)
GFR AFRICAN AMERICAN: >60 ML/MIN/1.73M2
GFR NON-AFRICAN AMERICAN: 52 ML/MIN/1.73M2
GLUCOSE BLD-MCNC: 109 MG/DL (ref 70–99)
POTASSIUM SERPL-SCNC: 3.9 MMOL/L (ref 3.5–5.1)
SODIUM BLD-SCNC: 136 MMOL/L (ref 135–145)

## 2021-04-07 PROCEDURE — 2580000003 HC RX 258: Performed by: UROLOGY

## 2021-04-07 PROCEDURE — 6370000000 HC RX 637 (ALT 250 FOR IP): Performed by: UROLOGY

## 2021-04-07 PROCEDURE — 1200000000 HC SEMI PRIVATE

## 2021-04-07 PROCEDURE — 97535 SELF CARE MNGMENT TRAINING: CPT

## 2021-04-07 PROCEDURE — 80048 BASIC METABOLIC PNL TOTAL CA: CPT

## 2021-04-07 PROCEDURE — 51798 US URINE CAPACITY MEASURE: CPT

## 2021-04-07 PROCEDURE — 36415 COLL VENOUS BLD VENIPUNCTURE: CPT

## 2021-04-07 PROCEDURE — 97530 THERAPEUTIC ACTIVITIES: CPT

## 2021-04-07 PROCEDURE — 94761 N-INVAS EAR/PLS OXIMETRY MLT: CPT

## 2021-04-07 RX ADMIN — TAMSULOSIN HYDROCHLORIDE 0.8 MG: 0.4 CAPSULE ORAL at 09:06

## 2021-04-07 RX ADMIN — FINASTERIDE 5 MG: 5 TABLET, FILM COATED ORAL at 09:05

## 2021-04-07 RX ADMIN — PANTOPRAZOLE SODIUM 20 MG: 20 TABLET, DELAYED RELEASE ORAL at 09:05

## 2021-04-07 RX ADMIN — METOPROLOL SUCCINATE 25 MG: 25 TABLET, EXTENDED RELEASE ORAL at 17:06

## 2021-04-07 RX ADMIN — FAMOTIDINE 20 MG: 20 TABLET ORAL at 20:14

## 2021-04-07 RX ADMIN — DOCUSATE SODIUM 100 MG: 100 CAPSULE, LIQUID FILLED ORAL at 09:06

## 2021-04-07 RX ADMIN — SERTRALINE HYDROCHLORIDE 50 MG: 50 TABLET ORAL at 09:06

## 2021-04-07 RX ADMIN — QUETIAPINE FUMARATE 50 MG: 25 TABLET ORAL at 20:14

## 2021-04-07 RX ADMIN — QUETIAPINE FUMARATE 50 MG: 25 TABLET ORAL at 13:37

## 2021-04-07 RX ADMIN — AMLODIPINE BESYLATE 5 MG: 5 TABLET ORAL at 09:06

## 2021-04-07 RX ADMIN — SODIUM CHLORIDE, PRESERVATIVE FREE 10 ML: 5 INJECTION INTRAVENOUS at 20:14

## 2021-04-07 RX ADMIN — FAMOTIDINE 20 MG: 20 TABLET ORAL at 09:05

## 2021-04-07 RX ADMIN — LEVOTHYROXINE SODIUM 75 MCG: 0.07 TABLET ORAL at 09:06

## 2021-04-07 RX ADMIN — ACETAMINOPHEN 650 MG: 325 TABLET ORAL at 15:42

## 2021-04-07 RX ADMIN — FERROUS SULFATE TAB 325 MG (65 MG ELEMENTAL FE) 325 MG: 325 (65 FE) TAB at 09:06

## 2021-04-07 RX ADMIN — QUETIAPINE FUMARATE 50 MG: 25 TABLET ORAL at 09:06

## 2021-04-07 RX ADMIN — POTASSIUM CHLORIDE 20 MEQ: 1500 TABLET, EXTENDED RELEASE ORAL at 09:06

## 2021-04-07 RX ADMIN — FUROSEMIDE 20 MG: 20 TABLET ORAL at 09:06

## 2021-04-07 RX ADMIN — SODIUM CHLORIDE: 9 INJECTION, SOLUTION INTRAVENOUS at 09:05

## 2021-04-07 ASSESSMENT — PAIN SCALES - GENERAL
PAINLEVEL_OUTOF10: 3
PAINLEVEL_OUTOF10: 0

## 2021-04-07 ASSESSMENT — PAIN DESCRIPTION - ONSET: ONSET: ON-GOING

## 2021-04-07 ASSESSMENT — PAIN DESCRIPTION - PROGRESSION: CLINICAL_PROGRESSION: GRADUALLY IMPROVING

## 2021-04-07 NOTE — PROGRESS NOTES
.  Occupational Therapy Treatment Note  Name: Ayanna Pfeiffer MRN: [id number] :   10/18/1930   Date:  2021   Admission Date: 3/26/2021 Room:  [facility]/1111-A   Restrictions/Precautions:  Restrictions/Precautions  Restrictions/Precautions: General Precautions, Fall Risk     Communication with other providers:  Per chart review and Nurse Tatyana Veloz, patient is appropriate for therapeutic intervention. Nurse notified of pt's urination in toilet and need for bladder scanning, arrived at end of toileting. Nurse located new valve for suprapubic catheter . Subjective:  Patient states:  Pt agreeable to OT Tx session. \"I can hardly hear you. \"   Pain:   Location, Type, Intensity (0/10 to 10/10):  0/10, denies pain    Objective:    Observation:  Pt received seated in bedside chair. Alert and oriented to self, very [place]. Pt required loud clear voice in close proximity for communcation, follows cues c min increased time to respond, pleasant and actively participated. Objective Measures:  IV, Suprapubic catheter    Treatment, including education:  Therapeutic Activity Training:   Therapeutic activity training was instructed today. Cues were given for safety, sequence, UE/LE placement, awareness, and balance. Activities performed today included transfer training sit-stand, SPT. Sit to stands: Min A to Mod A varied c fatigue and surface heights, required min cues for safe body positioning. Stand to sits: CGA to Min A, varied c fatigue and surface heights. SPT: See Toilet Transfer  Functional Mobility: CGA to Min A c RW for occasional touching assist for RW safety / placement during tight turns inside room to / from bathroom. Standing balance / tolerance: SBA static / CGA dynamic standing c RW, stands x4 trials, tolerated 3-5 minutes each. Self Care Training:   Cues were given for safety, sequence, UE/LE placement, visual cues, and balance.     Activities performed today included dressing, toileting and grooming. Toilet Transfer: Min A c RW + min cues for safe body positioning, use of grab bar. Toileting: Max A for touching assist c pt performing some of the clothing mgmt up / down, initially performed hygiene. During toileting, valve on suprapubic catheter fell off and urine leaked onto pt's pants. Pt assisted for final hygiene due to this. LB Dressing: Max A for doffing / donning pull up and long pants. Grooming: Set up seated for hand hygiene. All therapeutic intervention performed c emphasis on toileting tasks, dynamic balance / standing tolerance to inc strength, endurance and act tolerance for inc Indep c ADL tasks, func transfers / mobility. Safety  Patient safely in bedside chair + alarm placed at end of session, with call light/phone in reach, and nursing aware. Gait belt was used for func transfers / mobility. Telesitter. Nurse Alice Anthony present for completion of patient care. Assessment / Impression:        Patient's tolerance of treatment:  Well   Adverse Reaction: None  Significant change in status and impact: None  Barriers to improvement:  Hard of Hearing, strength, endurance    Plan for Next Session:    Continue per OT POC per patient's tolerance c emphasis on safe body positioning during ADL tasks / functional transfers. Time in:  1055  Time out:  1125  Timed treatment minutes:  30  Total treatment time:  30    Electronically signed by:    GISELE Avila  4/7/2021, 10:57 AM    Previously filed values:       Goals:  1. Pt will complete all aspects of bed mobility for EOB/OOB ADLs ind with HOB flat and no use of bed rails  2. Pt will complete UB/LB bathing with SUP and rest breaks as needed  3. Pt will complete all aspects of LB dressing with setup and AE as needed/trained  4. Pt will complete all functional transfers to and from bed, chair, toilet, shower chair with SUP and FWW prn  5. Pt will ambulate HH distance to bathroom for toileting with FWW and SUP  6.  Pt will

## 2021-04-07 NOTE — PROGRESS NOTES
monitor while inpatient, Feeding Assistance/Environment Change    Goals:  Pt will consume greater than 75% of meals and supplements       Nutrition Monitoring and Evaluation:   Behavioral-Environmental Outcomes:  None Identified   Food/Nutrient Intake Outcomes:  Diet Advancement/Tolerance, Food and Nutrient Intake, Supplement Intake  Physical Signs/Symptoms Outcomes:  Biochemical Data, Meal Time Behavior, Skin, Weight     Discharge Planning:    Continue current diet     Electronically signed by Jose Velez RD, LD on 4/7/21 at 3:10 PM EDT    Contact: [phone]

## 2021-04-07 NOTE — PROGRESS NOTES
Hospitalist Progress Note      Name:  Clifton Diamond /Age/Sex: 10/18/1930  (80 y.o. male)   MRN & CSN:  [id number] & [id number] Admission Date/Time: 3/26/2021  3:51 PM   Location:  1111/1111-A PCP: Benjie Estrella MD         Hospital Day: 13    Assessment and Plan:   Wilbur Gonzalez [place] is a 80 y.o.  male  with past medical history of hypertension, BPH, hypothyroidism, anemia of chronic disease who presents with gross hematuria     1) Gross Hematuria  -CT a/p 3/2/21 with multiple bladder diverticulum, thickened urinary bladder wall likely secondary to CANSECO with a large prostate  -S/P Cystoscopy, clot evacuation, bladder fulguration and SPC placement  -Urology on board; for cystoscopy, clot evacuation, fulguration of bleeding, possible TURP and SP tube placement 2021. Still has complaints of urinary retention. Urology recommend continued SPC,continue Flomax, Proscar.      2) Acute on chronic anemia    -Due to chronic GI bleed and hematuria   -Recent admission for GI bleed and EGD  -S/P 2 UNIT PRBC transfusion. H&H stable       CKD stage III - Cr stable continue Lasix daily. Follow BMP  Peripheral Neuropathy   Hypothyroidism on Synthroid. Anxiety disorder/depression on Seroquel/sertraline. Essential HTN continue Norvasc and Toprol-XL. Diet DIET GENERAL;  Dietary Nutrition Supplements: Standard High Calorie Oral Supplement   DVT Prophylaxis [] Lovenox, []  Heparin, [] SCDs, [] Ambulation   GI Prophylaxis [] PPI,  [] H2 Blocker,  [] Carafate,  [] Diet/Tube Feeds   Code Status Full Code   Disposition [facility]      Medically stable for DC. Waiting for ECF approval.    History of Present Illness:     Patient denied any new complaints. No acute overnight events. Objective:        Intake/Output Summary (Last 24 hours) at 2021 1244  Last data filed at 2021 1123  Gross per 24 hour   Intake 1300 ml   Output 675 ml   Net 625 ml      Vitals:   Vitals:    21 0815   BP: (!) 141/71   Pulse: 63

## 2021-04-08 LAB
ANION GAP SERPL CALCULATED.3IONS-SCNC: 9 MMOL/L (ref 4–16)
BUN BLDV-MCNC: 13 MG/DL (ref 6–23)
CALCIUM SERPL-MCNC: 8.1 MG/DL (ref 8.3–10.6)
CHLORIDE BLD-SCNC: 106 MMOL/L (ref 99–110)
CO2: 24 MMOL/L (ref 21–32)
CREAT SERPL-MCNC: 1.3 MG/DL (ref 0.9–1.3)
GFR AFRICAN AMERICAN: >60 ML/MIN/1.73M2
GFR NON-AFRICAN AMERICAN: 52 ML/MIN/1.73M2
GLUCOSE BLD-MCNC: 107 MG/DL (ref 70–99)
POTASSIUM SERPL-SCNC: 4 MMOL/L (ref 3.5–5.1)
SODIUM BLD-SCNC: 139 MMOL/L (ref 135–145)

## 2021-04-08 PROCEDURE — 94761 N-INVAS EAR/PLS OXIMETRY MLT: CPT

## 2021-04-08 PROCEDURE — 6370000000 HC RX 637 (ALT 250 FOR IP): Performed by: UROLOGY

## 2021-04-08 PROCEDURE — 80048 BASIC METABOLIC PNL TOTAL CA: CPT

## 2021-04-08 PROCEDURE — 36415 COLL VENOUS BLD VENIPUNCTURE: CPT

## 2021-04-08 PROCEDURE — 1200000000 HC SEMI PRIVATE

## 2021-04-08 PROCEDURE — 2580000003 HC RX 258: Performed by: UROLOGY

## 2021-04-08 PROCEDURE — 51798 US URINE CAPACITY MEASURE: CPT

## 2021-04-08 RX ADMIN — QUETIAPINE FUMARATE 50 MG: 25 TABLET ORAL at 14:54

## 2021-04-08 RX ADMIN — QUETIAPINE FUMARATE 50 MG: 25 TABLET ORAL at 10:18

## 2021-04-08 RX ADMIN — QUETIAPINE FUMARATE 50 MG: 25 TABLET ORAL at 22:17

## 2021-04-08 RX ADMIN — FERROUS SULFATE TAB 325 MG (65 MG ELEMENTAL FE) 325 MG: 325 (65 FE) TAB at 07:45

## 2021-04-08 RX ADMIN — AMLODIPINE BESYLATE 5 MG: 5 TABLET ORAL at 10:18

## 2021-04-08 RX ADMIN — METOPROLOL SUCCINATE 25 MG: 25 TABLET, EXTENDED RELEASE ORAL at 17:47

## 2021-04-08 RX ADMIN — FINASTERIDE 5 MG: 5 TABLET, FILM COATED ORAL at 10:17

## 2021-04-08 RX ADMIN — LEVOTHYROXINE SODIUM 75 MCG: 0.07 TABLET ORAL at 07:45

## 2021-04-08 RX ADMIN — TAMSULOSIN HYDROCHLORIDE 0.8 MG: 0.4 CAPSULE ORAL at 10:18

## 2021-04-08 RX ADMIN — PANTOPRAZOLE SODIUM 20 MG: 20 TABLET, DELAYED RELEASE ORAL at 10:18

## 2021-04-08 RX ADMIN — FUROSEMIDE 20 MG: 20 TABLET ORAL at 10:17

## 2021-04-08 RX ADMIN — FAMOTIDINE 20 MG: 20 TABLET ORAL at 10:19

## 2021-04-08 RX ADMIN — SODIUM CHLORIDE, PRESERVATIVE FREE 10 ML: 5 INJECTION INTRAVENOUS at 22:15

## 2021-04-08 RX ADMIN — SERTRALINE HYDROCHLORIDE 50 MG: 50 TABLET ORAL at 10:17

## 2021-04-08 RX ADMIN — FAMOTIDINE 20 MG: 20 TABLET ORAL at 22:15

## 2021-04-08 RX ADMIN — DOCUSATE SODIUM 100 MG: 100 CAPSULE, LIQUID FILLED ORAL at 10:18

## 2021-04-08 RX ADMIN — POTASSIUM CHLORIDE 20 MEQ: 1500 TABLET, EXTENDED RELEASE ORAL at 10:19

## 2021-04-08 ASSESSMENT — PAIN SCALES - GENERAL: PAINLEVEL_OUTOF10: 0

## 2021-04-08 NOTE — CARE COORDINATION
Call to [employer] cell phone number and LSW had to leave a message. Call to [facility] and had Carmine paged. LSW informed that she is not in the building. LSW had to leave a message.

## 2021-04-08 NOTE — PROGRESS NOTES
Hospitalist Progress Note      Name:  Porsche Hazel /Age/Sex: 10/18/1930  (80 y.o. male)   MRN & CSN:  [id number] & [id number] Admission Date/Time: 3/26/2021  3:51 PM   Location:  1111/1111-A PCP: Reba Palma MD         Hospital Day: 14    Assessment and Plan:   Kylie Sherman is a 80 y.o.  male  with past medical history of hypertension, BPH, hypothyroidism, anemia of chronic disease who presents with gross hematuria     1) Gross Hematuria  -CT a/p 3/2/21 with multiple bladder diverticulum, thickened urinary bladder wall likely secondary to CANSECO with a large prostate  -S/P Cystoscopy, clot evacuation, bladder fulguration and SPC placement  -Urology on board; for cystoscopy, clot evacuation, fulguration of bleeding, possible TURP and SP tube placement 2021. Still has complaints of urinary retention. Urology recommend continued SPC,continue Flomax, Proscar.      2) Acute on chronic anemia    -Due to chronic GI bleed and hematuria   -Recent admission for GI bleed and EGD  -S/P 2 UNIT PRBC transfusion. H&H stable       CKD stage III - Cr stable continue Lasix daily. Follow BMP  Peripheral Neuropathy   Hypothyroidism on Synthroid. Anxiety disorder/depression on Seroquel/sertraline. Essential HTN continue Norvasc and Toprol-XL. Diet DIET GENERAL;  Dietary Nutrition Supplements: Standard High Calorie Oral Supplement   DVT Prophylaxis [] Lovenox, []  Heparin, [] SCDs, [] Ambulation   GI Prophylaxis [] PPI,  [] H2 Blocker,  [] Carafate,  [] Diet/Tube Feeds   Code Status Full Code   Disposition [facility]      Medically stable for DC. Waiting for ECF approval.    History of Present Illness:     Patient denied any new complaints. No acute overnight events. Objective:        Intake/Output Summary (Last 24 hours) at 2021 1004  Last data filed at 2021 0619  Gross per 24 hour   Intake 240 ml   Output 720 ml   Net -480 ml      Vitals:   Vitals:    21 0337   BP: (!) 118/57   Pulse: 64 Resp: 19   Temp: 98.7 °F (37.1 °C)   SpO2: 95%     Physical Exam:     GEN alert awake oriented x3  HEENT NC/AT, PERRLA, EOMI +  Lungs B/L clear  Heart RRR, S1/S2 heard, no M/R/G  Abdomen S/NT/ND/BS +  Genitourinary SPC +  Neuro nonfocal exam  Skin warm dry with no rashes    Medications:   Medications:    amLODIPine  5 mg Oral QAM    docusate sodium  100 mg Oral Daily    ferrous sulfate  325 mg Oral Daily with breakfast    finasteride  5 mg Oral Daily    furosemide  20 mg Oral Daily    levothyroxine  75 mcg Oral Daily    metoprolol succinate  25 mg Oral QPM    pantoprazole  20 mg Oral Daily    potassium chloride  20 mEq Oral Daily    QUEtiapine  50 mg Oral TID    tamsulosin  0.8 mg Oral Daily    sertraline  50 mg Oral Daily    sodium chloride flush  10 mL Intravenous 2 times per day    [Held by provider] enoxaparin  40 mg Subcutaneous Daily    famotidine  20 mg Oral BID      Infusions:    sodium chloride 50 mL/hr at 04/07/21 0905    sodium chloride 25 mL (03/27/21 1132)     PRN Meds: opium-belladonna, 60 mg, Q8H PRN  calcium carbonate, 500 mg, Q6H PRN  LORazepam, 0.5 mg, Nightly PRN  sodium chloride flush, 10 mL, PRN  sodium chloride, 25 mL, PRN  promethazine, 12.5 mg, Q6H PRN    Or  ondansetron, 4 mg, Q6H PRN  polyethylene glycol, 17 g, Daily PRN  acetaminophen, 650 mg, Q6H PRN    Or  acetaminophen, 650 mg, Q6H PRN  potassium chloride, 40 mEq, PRN    Or  potassium alternative oral replacement, 40 mEq, PRN    Or  potassium chloride, 10 mEq, PRN  QUEtiapine, 100 mg, Nightly PRN      Current laboratory imaging data reviewed    Electronically signed by Desiree Dougherty MD on 4/8/2021 at 10:04 AM

## 2021-04-09 VITALS
WEIGHT: 175.2 LBS | HEART RATE: 62 BPM | RESPIRATION RATE: 16 BRPM | TEMPERATURE: 98 F | OXYGEN SATURATION: 98 % | BODY MASS INDEX: 23.73 KG/M2 | HEIGHT: 72 IN | DIASTOLIC BLOOD PRESSURE: 60 MMHG | SYSTOLIC BLOOD PRESSURE: 140 MMHG

## 2021-04-09 LAB
ANION GAP SERPL CALCULATED.3IONS-SCNC: 5 MMOL/L (ref 4–16)
BUN BLDV-MCNC: 13 MG/DL (ref 6–23)
CALCIUM SERPL-MCNC: 7.8 MG/DL (ref 8.3–10.6)
CHLORIDE BLD-SCNC: 107 MMOL/L (ref 99–110)
CO2: 26 MMOL/L (ref 21–32)
CREAT SERPL-MCNC: 1.3 MG/DL (ref 0.9–1.3)
GFR AFRICAN AMERICAN: >60 ML/MIN/1.73M2
GFR NON-AFRICAN AMERICAN: 52 ML/MIN/1.73M2
GLUCOSE BLD-MCNC: 111 MG/DL (ref 70–99)
POTASSIUM SERPL-SCNC: 3.4 MMOL/L (ref 3.5–5.1)
SODIUM BLD-SCNC: 138 MMOL/L (ref 135–145)

## 2021-04-09 PROCEDURE — 51798 US URINE CAPACITY MEASURE: CPT

## 2021-04-09 PROCEDURE — 2580000003 HC RX 258: Performed by: UROLOGY

## 2021-04-09 PROCEDURE — 97116 GAIT TRAINING THERAPY: CPT

## 2021-04-09 PROCEDURE — 6370000000 HC RX 637 (ALT 250 FOR IP): Performed by: UROLOGY

## 2021-04-09 PROCEDURE — 6370000000 HC RX 637 (ALT 250 FOR IP): Performed by: INTERNAL MEDICINE

## 2021-04-09 PROCEDURE — 94761 N-INVAS EAR/PLS OXIMETRY MLT: CPT

## 2021-04-09 PROCEDURE — 97110 THERAPEUTIC EXERCISES: CPT

## 2021-04-09 PROCEDURE — 80048 BASIC METABOLIC PNL TOTAL CA: CPT

## 2021-04-09 PROCEDURE — 36415 COLL VENOUS BLD VENIPUNCTURE: CPT

## 2021-04-09 RX ORDER — POTASSIUM CHLORIDE 20 MEQ/1
40 TABLET, EXTENDED RELEASE ORAL ONCE
Status: COMPLETED | OUTPATIENT
Start: 2021-04-09 | End: 2021-04-09

## 2021-04-09 RX ORDER — DOCUSATE SODIUM 100 MG/1
100 CAPSULE, LIQUID FILLED ORAL DAILY
Qty: 90 CAPSULE | Refills: 1 | Status: SHIPPED | OUTPATIENT
Start: 2021-04-09 | End: 2021-07-08

## 2021-04-09 RX ADMIN — POTASSIUM CHLORIDE 40 MEQ: 1500 TABLET, EXTENDED RELEASE ORAL at 13:51

## 2021-04-09 RX ADMIN — PANTOPRAZOLE SODIUM 20 MG: 20 TABLET, DELAYED RELEASE ORAL at 09:41

## 2021-04-09 RX ADMIN — QUETIAPINE FUMARATE 50 MG: 25 TABLET ORAL at 13:52

## 2021-04-09 RX ADMIN — FERROUS SULFATE TAB 325 MG (65 MG ELEMENTAL FE) 325 MG: 325 (65 FE) TAB at 09:49

## 2021-04-09 RX ADMIN — LEVOTHYROXINE SODIUM 75 MCG: 0.07 TABLET ORAL at 05:57

## 2021-04-09 RX ADMIN — DOCUSATE SODIUM 100 MG: 100 CAPSULE, LIQUID FILLED ORAL at 09:39

## 2021-04-09 RX ADMIN — POTASSIUM CHLORIDE 20 MEQ: 1500 TABLET, EXTENDED RELEASE ORAL at 09:41

## 2021-04-09 RX ADMIN — FAMOTIDINE 20 MG: 20 TABLET ORAL at 09:40

## 2021-04-09 RX ADMIN — QUETIAPINE FUMARATE 50 MG: 25 TABLET ORAL at 09:41

## 2021-04-09 RX ADMIN — FUROSEMIDE 20 MG: 20 TABLET ORAL at 09:40

## 2021-04-09 RX ADMIN — FINASTERIDE 5 MG: 5 TABLET, FILM COATED ORAL at 09:40

## 2021-04-09 RX ADMIN — TAMSULOSIN HYDROCHLORIDE 0.8 MG: 0.4 CAPSULE ORAL at 09:42

## 2021-04-09 RX ADMIN — SODIUM CHLORIDE: 9 INJECTION, SOLUTION INTRAVENOUS at 05:57

## 2021-04-09 RX ADMIN — AMLODIPINE BESYLATE 5 MG: 5 TABLET ORAL at 09:39

## 2021-04-09 RX ADMIN — POLYETHYLENE GLYCOL (3350) 17 G: 17 POWDER, FOR SOLUTION ORAL at 14:39

## 2021-04-09 RX ADMIN — SERTRALINE HYDROCHLORIDE 50 MG: 50 TABLET ORAL at 09:41

## 2021-04-09 ASSESSMENT — PAIN SCALES - GENERAL
PAINLEVEL_OUTOF10: 0
PAINLEVEL_OUTOF10: 0

## 2021-04-09 NOTE — CARE COORDINATION
Call from [facility]. They are going to  accept the Pt while his precert is pending. LSW schedueld transport with [facility] for a 3:15. LSW notified SNF, family and RN. Packet prepared.

## 2021-04-09 NOTE — DISCHARGE INSTR - COC
Continuity of Care Form    Patient Name: Jaylen Valero   :  10/18/1930  MRN:  [id number]    Admit date:  3/26/2021  Discharge date:  ***    Code Status Order: Full Code   Advance Directives:   Advance Care Flowsheet Documentation       Date/Time Healthcare Directive Type of Healthcare Directive Copy in [address] Agent's Name Healthcare Agent's Phone Number    21 6348  Yes, patient has an advance directive for healthcare treatment  Durable power of  for health care  No, copy requested from family  Adult Juan Heredia  [phone]    21 1549  Yes, patient has an advance directive for healthcare treatment  --  --  --  --  --    21 1434  Yes, patient has an advance directive for healthcare treatment  Health care treatment directive;Durable power of  for health care  --  Adult Children  Brent Hatchet  [phone]    21 0032  Yes, patient has an advance directive for healthcare treatment  Health care treatment directive  --  Spouse  Jewel Deshpande  [phone]    21 2221  Yes, patient has an advance directive for healthcare treatment  --  --  --  --  --            Admitting Physician:  No admitting provider for patient encounter.   PCP: Hussein Tolbert MD    Discharging Nurse: [facility] Unit/Room#: 0689/6296-V  Discharging Unit Phone Number: [phone]    Emergency Contact:   Extended Emergency Contact Information  Primary Emergency Contact: Bettie Sherman  Address: [address] [place] [address] Phone: [phone]  Work Phone: [phone]  Mobile Phone: [phone]  Relation: Spouse  Secondary Emergency Contact: Neliamearl Marcos  [place] Phone: [phone]  Work Phone: [phone]  Mobile Phone: [phone]  Relation: Child    Past Surgical History:  Past Surgical History:   Procedure Laterality Date    CATARACT REMOVAL      CYSTOSCOPY N/A 3/29/2021 CYSTOSCOPY EVACUATION OF CLOTS, BLADDER FULGERATION, AND SUPRA-PUBIC CATHETER INSERTION performed by Martha Wallis MD at [facility] 9 N/A 4/1/2021    CYSTOSCOPY, PROSTATE FULGURATION, EVACUATION OF CLOTS & TURP performed by Daniel Lee MD at [address] N/A 3/4/2021    LAPAROSCOPIC LARGE HERNIA HIATAL REPAIR WITH GASTRIC OBSTRUCTION POSS OPEN performed by Seun Villalobos MD at [address] [facility]         Immunization History:   Immunization History   Administered Date(s) Administered    COVID-19, Moderna, PF, 100mcg/0.5mL 01/29/2021    Influenza Virus Vaccine 09/21/2012, 09/05/2019    Influenza, High Dose (Fluzone 65 yrs and older) 09/05/2019       Active Problems:  Patient Active Problem List   Diagnosis Code    Hyperlipidemia E78.5    Depression F32.9    Insomnia G47.00    BPH (benign prostatic hyperplasia) N40.0    Dysuria R30.0    Vitamin D deficiency E55.9    Seborrheic keratosis, inflamed L82.0    Actinic keratosis L57.0    Seborrheic keratosis L82.1    SCC (squamous cell carcinoma) C44.92    Varicose 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 I83.813    Anxiety F41.9    BPH with urinary obstruction N40.1, N13.8    Acquired hypothyroidism E03.9    UGIB (upper gastrointestinal bleed) K92.2    Hematuria R31.9       Isolation/Infection:   Isolation            No Isolation          Patient Infection Status       Infection Onset Added Last Indicated Last Indicated By Review Planned Expiration Resolved Resolved By    None active    Resolved    COVID-19 Rule Out 03/03/21 03/03/21 03/03/21 COVID-19, Rapid (Ordered)   03/03/21 Rule-Out Test Resulted            Nurse Assessment:  Last Vital Signs: BP (!) 144/66   Pulse 60   Temp 98.3 °F (36.8 °C) (Oral)   Resp 16   Ht 6' 0.01\" (1.829 m)   Wt 175 lb 3.2 oz (79.5 kg)   SpO2 98%   BMI 23.76 kg/m²     Last documented pain score (0-10 scale): Pain Level: 0  Last Weight:    Wt Readings from Last 1 Encounters:   04/04/21 175 lb 3.2 oz (79.5 kg)     Mental Status:  disoriented    IV Access:  - None    Nursing Mobility/ADLs:  Walking   Assisted  Transfer  Assisted  Bathing  Dependent  Dressing  Dependent  Toileting  Dependent  Feeding  Assisted  Med Admin  Dependent  Med Delivery   whole    Wound Care Documentation and Therapy:        Elimination:  Continence:   · Bowel: No  · Bladder: No  Urinary Catheter: {Urinary Catheter:[id number]:::0} has supra pubic  Catheter that is capped off  Colostomy/Ileostomy/Ileal Conduit: No       Date of Last BM: ***    Intake/Output Summary (Last 24 hours) at 4/9/2021 1339  Last data filed at 4/9/2021 0700  Gross per 24 hour   Intake 510 ml   Output 1025 ml   Net -515 ml     I/O last 3 completed shifts: In: 510 [I.V.:510]  Out: 1025 [Urine:1025]    Safety Concerns: At Risk for Falls    Impairments/Disabilities:      Hearing    Nutrition Therapy:  Current Nutrition Therapy:   - Oral Diet:  General    Routes of Feeding: Oral  Liquids: No Restrictions  Daily Fluid Restriction: no  Last Modified Barium Swallow with Video (Video Swallowing Test): not done    Treatments at the Time of Hospital Discharge:   Respiratory Treatments: None  Oxygen Therapy:  is not on home oxygen therapy.   Ventilator:    - No ventilator support    Rehab Therapies: Physical Therapy and Occupational Therapy  Weight Bearing Status/Restrictions: No weight bearing restirctions      Patient's personal belongings (please select all that are sent with patient):  None    RN SIGNATURE:  Electronically signed by Eleazar Roche RN on 4/9/21 at 2:35 PM EDT    CASE MANAGEMENT/SOCIAL WORK SECTION    Inpatient Status Date: ***    Readmission Risk Assessment Score:  Readmission Risk              Risk of Unplanned Readmission:        26           Discharging to Facility/ Agency   · Name:   · Address:  · Phone:  · Fax:    Dialysis Facility (if applicable)   · Name:  · Address:  · Dialysis Schedule:  · Phone:  · Fax:    / signature: {Esignature:[id number]:::0}    PHYSICIAN SECTION    Prognosis: Good    Condition at Discharge: Stable    Rehab Potential (if transferring to Rehab): Good    Recommended Labs or Other Treatments After Discharge: CBC, BMP in 1 week    Physician Certification: I certify the above information and transfer of Clifton Diamond  is necessary for the continuing treatment of the diagnosis listed and that he requires [place] for less 30 days.      Update Admission H&P: No change in H&P    PHYSICIAN SIGNATURE:  Electronically signed by Siria De Souza MD on 4/9/21 at 1:39 PM EDT

## 2021-04-09 NOTE — PROGRESS NOTES
Physical Therapy    Physical Therapy Treatment Note  Name: Zhanna Gold MRN: [id number] :   10/18/1930   Date:  2021   Admission Date: 3/26/2021 Room:  11111111-A   Restrictions/Precautions:  Restrictions/Precautions  Restrictions/Precautions: General Precautions, Fall Risk       Communication with other providers:  Nurse in room giving meds at end of tx session   Subjective:  Patient states: Motivated and agreeable to tx  Pain:   Location, Type, Intensity (0/10 to 10/10):  No c/o pain during tx session  Objective:    Observation:  Alert and oriented x 4  Treatment, including education/measures:  Sup to sit sba  Sit<=>stand from bed and chair cga and cues for hand placement for safety. amb with rw 60' x 2 with one standing rest and 110' x 1 with cues for posture and walker safety with turning. Ex in sitting:  Trunk stretches with deep breathing  20 reps aps and circles  20 reps laqs  Safety  Patient left safely in the chair, with call light/phone in reach with alarm applied. Gait belt and mask were used for transfers and gait. Assessment / Impression:       Patient's tolerance of treatment:  good  Adverse Reaction: na  Significant change in status and impact:  na  Barriers to improvement:  Strength and safety  Plan for Next Session:    Cont. POC  Time in:  915  Time out: 945  Timed treatment minutes:  30  Total treatment time:  30    Previously filed items:  Social/Functional History  Lives With: Spouse  Type of Home: House  Home Layout: One level  Home Access: Level entry  Bathroom Shower/Tub: Walk-in shower  Bathroom Toilet: Handicap height  Bathroom Equipment: Grab bars in shower, Shower chair  Home Equipment: Rolling walker, Cane  ADL Assistance: Independent  Homemaking Assistance: Needs assistance  Ambulation Assistance: Independent  Transfer Assistance: Independent  Active :  Yes  Additional Comments: Pt from [facility] and to return at d/c     Long term goals  Time Frame for Long term goals : In one week:  Long term goal 1: Pt will complete all bed mobility with SBAx1  Long term goal 2: Pt will complete sit <> stand transfers with SBAx1  Long term goal 3: Pt will ambulate 75 feet with SBAx1 with LRAD  Long term goal 4: Pt will independently complete 3 sets of 10 reps of BLE AROM exercises in available and allowed ROM    Electronically signed by:    Shamir Leiva PTA  4/9/2021, 8:17 AM

## 2021-04-09 NOTE — PLAN OF CARE
Problem: Falls - Risk of:  Goal: Will remain free from falls  Description: Will remain free from falls  4/9/2021 0234 by Jeremie Carcamo RN  Outcome: Ongoing  4/8/2021 1845 by Ramon Mueller RN  Outcome: Ongoing  Goal: Absence of physical injury  Description: Absence of physical injury  4/9/2021 0234 by Jeremie Carcamo RN  Outcome: Ongoing  4/8/2021 1845 by Ramon Mueller RN  Outcome: Ongoing     Problem: Safety:  Goal: Free from accidental physical injury  Description: Free from accidental physical injury  4/9/2021 0234 by Jeremie Carcamo RN  Outcome: Ongoing  4/8/2021 1845 by Ramon Mueller RN  Outcome: Ongoing  Goal: Free from intentional harm  Description: Free from intentional harm  4/9/2021 0234 by Jeremie Carcamo RN  Outcome: Ongoing  4/8/2021 1845 by Ramon Mueller RN  Outcome: Ongoing     Problem: Daily Care:  Goal: Daily care needs are met  Description: Daily care needs are met  4/9/2021 0234 by Jeremie Carcamo RN  Outcome: Ongoing  4/8/2021 1845 by Ramon Mueller RN  Outcome: Ongoing     Problem: Discharge Planning:  Goal: Patients continuum of care needs are met  Description: Patients continuum of care needs are met  4/9/2021 0234 by Jeremie Carcamo RN  Outcome: Ongoing  4/8/2021 1845 by Ramon Mueller RN  Outcome: Ongoing

## 2021-04-09 NOTE — DISCHARGE SUMMARY
Aldair Schroeder 10/18/1930 [id number]  PCP:  Ovidio Salinas MD    Admit date: 3/26/2021  Admitting Physician: No admitting provider for patient encounter. Discharge date: 4/11/2021 Discharge Physician: Lottie Piedra MD         Discharge Diagnoses. As per below    Hospital Course:  History of present illness at admission: As per H&P  Subsequent Hospital Course:     1) Gross Hematuria  -CT a/p 3/2/21 with multiple bladder diverticulum, thickened urinary bladder wall likely     secondary to CANSECO with a large prostate-  -S/P Cystoscopy, clot evacuation, bladder fulguration and SPC placement   TURP and SP tube placement 04/01/2021. Still has complaints of urinary retention. Urology recommend continued SPC,continue Flomax, Proscar.      2) Acute on chronic anemia    -Due to chronic GI bleed and hematuria   -Recent admission for GI bleed and EGD  -S/P 2 UNIT PRBC transfusion. H&H stable        3)CKD stage III - Cr stable continue Lasix daily. Stable BMP    Peripheral Neuropathy   Hypothyroidism on Synthroid. Anxiety disorder/depression on Seroquel/sertraline. Essential HTN continue Norvasc and Toprol-XL. On the day of discharge, pt felt better. No new complaints.     Pertinent Exam Findings on Day of Discharge:  General Appearance:    Alert, cooperative, no distress, appears stated age  Head:    Normocephalic, without obvious abnormality, atraumatic  Eyes:    PERRL, conjunctiva/corneas clear, EOM's intact  Lungs:    Clear to auscultation bilaterally, respirations unlabored   Heart:    Regular rate and rhythm, S1 and S2 normal, no murmur,   rub or gallop  Abdomen:     Soft, non-tender, bowel sounds active, no masses, no organomegaly  Extremities:   Extremities normal, atraumatic, no cyanosis or edema  Genitourinary  SPC +    Consults:  IP CONSULT TO UROLOGY  IP CONSULT TO HOSPITALIST    Patient Instructions:   [address], [address] [place] Medication Instructions [id number]    Printed on:04/11/21 1767 Medication Information                      acetaminophen (TYLENOL) 325 MG tablet  Take 650 mg by mouth every 6 hours as needed for Pain             amLODIPine (NORVASC) 5 MG tablet  Take 1 tablet by mouth every morning             docusate sodium (COLACE) 100 MG capsule  Take 1 capsule by mouth daily With iron pill             ferrous sulfate (IRON 325) 325 (65 Fe) MG tablet  Take 1 tablet by mouth daily (with breakfast)             finasteride (PROSCAR) 5 MG tablet  Take 1 tablet by mouth daily             furosemide (LASIX) 20 MG tablet  Take 1 tablet by mouth daily             levothyroxine (SYNTHROID) 50 MCG tablet  Take 1.5 tablets by mouth daily             metoprolol succinate (TOPROL XL) 25 MG extended release tablet  Take 1 tablet by mouth every evening             opium-belladonna (B&O SUPPRETTES) 16.2-30 MG suppository  Place 30 mg rectally every 8 hours as needed for Pain.             pantoprazole (PROTONIX) 20 MG tablet  Take 1 tablet by mouth daily             polyethylene glycol (GLYCOLAX) 17 g packet  Take 17 g by mouth daily             potassium chloride (KLOR-CON M) 20 MEQ extended release tablet  Take 1 tablet by mouth daily             QUEtiapine (SEROQUEL) 100 MG tablet  Take 100 mg by mouth every evening             QUEtiapine (SEROQUEL) 50 MG tablet  Take 50 mg by mouth 3 times daily              sertraline (ZOLOFT) 50 MG tablet  Take 50 mg by mouth daily             tamsulosin (FLOMAX) 0.4 MG capsule  Take 1 capsule by mouth daily. temazepam (RESTORIL) 7.5 MG capsule  Take 7.5 mg by mouth nightly as needed for Sleep. Diet:  Cardiac diet    Activity:   activity as tolerated     Discharge Condition:   fair    Disposition:   SNF    Follow-up    Brando Duron MD  Go to  Medical Management  y [address]   Daniel Lee MD  Go in 1 week  Outpatient follow-up for postop issues and ?? catheter removal  [address] [place] [address]  [phone]       Time spent on discharge in the examination, evaluation, counseling and review of medications and   discharge plan:34 minutes       Discharge Physician Signed: Nenita Torres

## 2021-04-09 NOTE — CARE COORDINATION
Call from Pt son. Pt son expressed his frustration about the Pt still being at the hospital and not at the SNF. LSW explained that the LSW is at the mercy of therapy and the Pt insurance for auth. Pt insurance requires a precert which is based on the Pt  performance with therapy. Therapy attempted to work with the Pt but the Pt was sleep and not a good time. Therapy did not re-attempt until the following day. LSW called the SNF informing them of the updated notes when they were in Epic. Now that a precert has been initiated it is up to the SNF to communicate with the insurance. Pt son stated that if the Pt precert does not come today. Then they will just take the Pt home with home care. They had Kezia Mir in the past.     Call to Zach Mike. She will email the Pt insurance.

## 2021-04-09 NOTE — PROGRESS NOTES
Hospitalist Progress Note      Name:  Danika Romano /Age/Sex: 10/18/1930  (80 y.o. male)   MRN & CSN:  [id number] & [id number] Admission Date/Time: 3/26/2021  3:51 PM   Location:  1111/1111-A PCP: Magda Xavier MD         Hospital Day: 15    Assessment and Plan:   Mikael Cota [place] is a 80 y.o.  male  with past medical history of hypertension, BPH, hypothyroidism, anemia of chronic disease who presents with gross hematuria     1) Gross Hematuria  -CT a/p 3/2/21 with multiple bladder diverticulum, thickened urinary bladder wall likely secondary to CANSECO with a large prostate  -S/P Cystoscopy, clot evacuation, bladder fulguration and SPC placement  -Urology on board; for cystoscopy, clot evacuation, fulguration of bleeding, possible TURP and SP tube placement 2021. Still has complaints of urinary retention. Urology recommend continued SPC,continue Flomax, Proscar.      2) Acute on chronic anemia    -Due to chronic GI bleed and hematuria   -Recent admission for GI bleed and EGD  -S/P 2 UNIT PRBC transfusion. H&H stable       CKD stage III - Cr stable continue Lasix daily. Follow BMP  Peripheral Neuropathy   Hypothyroidism on Synthroid. Anxiety disorder/depression on Seroquel/sertraline. Essential HTN continue Norvasc and Toprol-XL. Diet DIET GENERAL;  Dietary Nutrition Supplements: Standard High Calorie Oral Supplement   DVT Prophylaxis [] Lovenox, []  Heparin, [] SCDs, [] Ambulation   GI Prophylaxis [] PPI,  [] H2 Blocker,  [] Carafate,  [] Diet/Tube Feeds   Code Status Full Code   [facility]      Medically stable for DC. Waiting for ECF approval.  As per son if SNF approval did not come today-would like to take him home    History of Present Illness:     Patient denied any new complaints. No acute overnight events. Objective:        Intake/Output Summary (Last 24 hours) at 2021 1251  Last data filed at 2021 0700  Gross per 24 hour   Intake 510 ml   Output 1025 ml   Net -515 ml      Vitals:   Vitals:    04/09/21 0930   BP: (!) 144/66   Pulse: 60   Resp: 16   Temp: 98.3 °F (36.8 °C)   SpO2: 98%     Physical Exam:     GEN alert awake oriented x3  HEENT NC/AT, PERRLA, EOMI +  Lungs B/L clear  Heart RRR, S1/S2 heard, no M/R/G  Abdomen S/NT/ND/BS +  Genitourinary SPC +  Neuro nonfocal exam  Skin warm dry with no rashes    Medications:   Medications:    amLODIPine  5 mg Oral QAM    docusate sodium  100 mg Oral Daily    ferrous sulfate  325 mg Oral Daily with breakfast    finasteride  5 mg Oral Daily    furosemide  20 mg Oral Daily    levothyroxine  75 mcg Oral Daily    metoprolol succinate  25 mg Oral QPM    pantoprazole  20 mg Oral Daily    potassium chloride  20 mEq Oral Daily    QUEtiapine  50 mg Oral TID    tamsulosin  0.8 mg Oral Daily    sertraline  50 mg Oral Daily    sodium chloride flush  10 mL Intravenous 2 times per day    [Held by provider] enoxaparin  40 mg Subcutaneous Daily    famotidine  20 mg Oral BID      Infusions:    sodium chloride 50 mL/hr at 04/09/21 0557    sodium chloride 25 mL (03/27/21 1132)     PRN Meds: opium-belladonna, 60 mg, Q8H PRN  calcium carbonate, 500 mg, Q6H PRN  LORazepam, 0.5 mg, Nightly PRN  sodium chloride flush, 10 mL, PRN  sodium chloride, 25 mL, PRN  promethazine, 12.5 mg, Q6H PRN    Or  ondansetron, 4 mg, Q6H PRN  polyethylene glycol, 17 g, Daily PRN  acetaminophen, 650 mg, Q6H PRN    Or  acetaminophen, 650 mg, Q6H PRN  potassium chloride, 40 mEq, PRN    Or  potassium alternative oral replacement, 40 mEq, PRN    Or  potassium chloride, 10 mEq, PRN  QUEtiapine, 100 mg, Nightly PRN      Current laboratory imaging data reviewed    Electronically signed by Ezequiel Fuchs MD on 4/9/2021 at 12:51 PM

## 2021-04-28 ENCOUNTER — OFFICE VISIT (OUTPATIENT)
Dept: FAMILY MEDICINE CLINIC | Age: 86
End: 2021-04-28
Payer: MEDICARE

## 2021-04-28 VITALS
HEART RATE: 81 BPM | SYSTOLIC BLOOD PRESSURE: 112 MMHG | TEMPERATURE: 97 F | DIASTOLIC BLOOD PRESSURE: 54 MMHG | OXYGEN SATURATION: 93 %

## 2021-04-28 DIAGNOSIS — F51.01 PRIMARY INSOMNIA: ICD-10-CM

## 2021-04-28 DIAGNOSIS — I10 ESSENTIAL HYPERTENSION: ICD-10-CM

## 2021-04-28 DIAGNOSIS — R60.0 BILATERAL LEG EDEMA: ICD-10-CM

## 2021-04-28 DIAGNOSIS — F41.9 ANXIETY: ICD-10-CM

## 2021-04-28 DIAGNOSIS — N40.1 BPH WITH URINARY OBSTRUCTION: ICD-10-CM

## 2021-04-28 DIAGNOSIS — E03.9 ACQUIRED HYPOTHYROIDISM: Primary | ICD-10-CM

## 2021-04-28 DIAGNOSIS — N13.8 BPH WITH URINARY OBSTRUCTION: ICD-10-CM

## 2021-04-28 PROCEDURE — 1111F DSCHRG MED/CURRENT MED MERGE: CPT | Performed by: FAMILY MEDICINE

## 2021-04-28 PROCEDURE — 4040F PNEUMOC VAC/ADMIN/RCVD: CPT | Performed by: FAMILY MEDICINE

## 2021-04-28 PROCEDURE — 1036F TOBACCO NON-USER: CPT | Performed by: FAMILY MEDICINE

## 2021-04-28 PROCEDURE — 99214 OFFICE O/P EST MOD 30 MIN: CPT | Performed by: FAMILY MEDICINE

## 2021-04-28 PROCEDURE — 1123F ACP DISCUSS/DSCN MKR DOCD: CPT | Performed by: FAMILY MEDICINE

## 2021-04-28 PROCEDURE — G8427 DOCREV CUR MEDS BY ELIG CLIN: HCPCS | Performed by: FAMILY MEDICINE

## 2021-04-28 PROCEDURE — G8420 CALC BMI NORM PARAMETERS: HCPCS | Performed by: FAMILY MEDICINE

## 2021-04-28 RX ORDER — POLYETHYLENE GLYCOL 3350 17 G/17G
17 POWDER, FOR SOLUTION ORAL DAILY
COMMUNITY
End: 2022-03-03

## 2021-04-28 RX ORDER — LEVOTHYROXINE SODIUM 0.07 MG/1
75 TABLET ORAL DAILY
Qty: 90 TABLET | Refills: 1 | Status: SHIPPED | OUTPATIENT
Start: 2021-04-28 | End: 2021-12-10 | Stop reason: SDUPTHER

## 2021-04-28 RX ORDER — PANTOPRAZOLE SODIUM 20 MG/1
20 TABLET, DELAYED RELEASE ORAL DAILY
Qty: 90 TABLET | Refills: 1 | Status: SHIPPED | OUTPATIENT
Start: 2021-04-28 | End: 2022-02-22 | Stop reason: SDUPTHER

## 2021-04-28 RX ORDER — AMLODIPINE BESYLATE 5 MG/1
5 TABLET ORAL EVERY MORNING
Qty: 90 TABLET | Refills: 1 | Status: SHIPPED | OUTPATIENT
Start: 2021-04-28 | End: 2021-05-21

## 2021-04-28 RX ORDER — FUROSEMIDE 20 MG/1
20 TABLET ORAL DAILY
Qty: 90 TABLET | Refills: 1 | Status: SHIPPED | OUTPATIENT
Start: 2021-04-28 | End: 2021-05-28

## 2021-04-28 RX ORDER — FINASTERIDE 5 MG/1
5 TABLET, FILM COATED ORAL DAILY
Qty: 90 TABLET | Refills: 1 | Status: SHIPPED | OUTPATIENT
Start: 2021-04-28 | End: 2021-11-30

## 2021-04-28 RX ORDER — METOPROLOL SUCCINATE 25 MG/1
25 TABLET, EXTENDED RELEASE ORAL EVERY EVENING
Qty: 90 TABLET | Refills: 1 | Status: SHIPPED | OUTPATIENT
Start: 2021-04-28 | End: 2022-03-10

## 2021-04-28 RX ORDER — POTASSIUM CHLORIDE 20 MEQ/1
20 TABLET, EXTENDED RELEASE ORAL DAILY
Qty: 90 TABLET | Refills: 1 | Status: SHIPPED | OUTPATIENT
Start: 2021-04-28 | End: 2021-05-28

## 2021-04-28 NOTE — PROGRESS NOTES
4/28/2021    Meliza Patton    Chief Complaint   Patient presents with    Other     discharged from snf    Foot Swelling     continuing       HPI    Mildred Stone is a 80 y.o. male who presents today with follow-up. This is my first time meeting Mildred Stone is 80years old. I take care of his wife Jose Beal. He is present with his son Priyanka Hernandez who I have met before. We have all been vaccinated. Patient could not hear me. We kept our distance and I dropped my mask which was helpful and appreciated by them with their permission. Priyanka Hernandez had a nearly 2-month hospital and nursing home stay. He went in for chest pain was found to have an incarcerated hernia. There was a lot of GI bleeding as well as  bleeding as patient under anesthesia tore out his Laureano. The hematuria has resolved. I reviewed patient's blood work and weeks ago he still had anemia and hypocalcemia. Patient son's only complaint is edema which is bilateral a little worse on the right than the left maybe 2+ on 1 and 1+ on the other. Patient is wearing nice thick supportive socks but they do not have as much elasticity at the ankles if patient would benefit from. Son asked for handicap placard.       REVIEW OF SYSTEMS    Constitutional:  Denies fever, chills, weight loss or weakness  Eyes:  no photophobia or discharge  ENT: Patient is hard of hearing which is chronic  Cardiovascular:  Denies chest pain, palpitations or swelling  Respiratory:  Denies cough or shortness of breath  GI:  no abdominal pain, nausea, vomiting, or diarrhea  Musculoskeletal:  no back pain  Skin:  No rashes  Neurologic:  no headache, focal weakness, or sensory changes  Endocrine:  no polyuria or polydipsia      PAST MEDICAL HISTORY  Past Medical History:   Diagnosis Date    Anemia     Anxiety     BPH with urinary obstruction     Depression     GERD (gastroesophageal reflux disease)     Hemorrhoids     Hepatitis     Hernia of unspecified site of abdominal cavity without mention of obstruction or gangrene     Hyperlipidemia     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    Rt Tricep, Lt Superior Forearm       FAMILY HISTORY  Family History   Problem Relation Age of Onset    Depression Mother     COPD Father     Diabetes Brother     Diabetes Maternal Grandmother        SOCIAL HISTORY  Social History     Socioeconomic History    Marital status:      Spouse name: Not on file    Number of children: Not on file    Years of education: Not on file    Highest education level: Not on file   Occupational History    Not on file   Social Needs    Financial resource strain: Not on file    Food insecurity     Worry: Not on file     Inability: Not on file    Transportation needs     Medical: Not on file     Non-medical: Not on file   Tobacco Use    Smoking status: Former Smoker     Packs/day: 1.00     Years: 5.00     Pack years: 5.00     Types: Cigarettes     Start date: 1950     Quit date: 1955     Years since quittin.5    Smokeless tobacco: Never Used   Substance and Sexual Activity    Alcohol use: Not Currently    Drug use: Not Currently    Sexual activity: Not on file   Lifestyle    Physical activity     Days per week: Not on file     Minutes per session: Not on file    Stress: Not on file   Relationships    Social connections     Talks on phone: Not on file     Gets together: Not on file     Attends Nondenominational service: Not on file     Active member of club or organization: Not on file     Attends meetings of clubs or organizations: Not on file     Relationship status: Not on file    Intimate partner violence     Fear of current or ex partner: Not on file     Emotionally abused: Not on file     Physically abused: Not on file     Forced sexual activity: Not on file   Other Topics Concern    Not on file   Social History Narrative    Not on file        SURGICAL HISTORY  Past Surgical History:   Procedure Laterality Date    CATARACT REMOVAL      CYSTOSCOPY N/A 3/29/2021    CYSTOSCOPY EVACUATION OF CLOTS, BLADDER FULGERATION, AND SUPRA-PUBIC CATHETER INSERTION performed by Emily Oakley MD at [address] 4/1/2021    CYSTOSCOPY, PROSTATE FULGURATION, EVACUATION OF CLOTS & TURP performed by Jian Edwards MD at [address] N/A 3/4/2021    LAPAROSCOPIC LARGE HERNIA HIATAL REPAIR WITH GASTRIC OBSTRUCTION POSS OPEN performed by Kim Marquez MD at [place] [address]  Current Outpatient Medications   Medication Sig Dispense Refill    polyethylene glycol (GLYCOLAX) 17 GM/SCOOP powder Take 17 g by mouth daily      metoprolol succinate (TOPROL XL) 25 MG extended release tablet Take 1 tablet by mouth every evening 90 tablet 1    amLODIPine (NORVASC) 5 MG tablet Take 1 tablet by mouth every morning 90 tablet 1    finasteride (PROSCAR) 5 MG tablet Take 1 tablet by mouth daily 90 tablet 1    pantoprazole (PROTONIX) 20 MG tablet Take 1 tablet by mouth daily 90 tablet 1    potassium chloride (KLOR-CON M) 20 MEQ extended release tablet Take 1 tablet by mouth daily 90 tablet 1    levothyroxine (SYNTHROID) 75 MCG tablet Take 1 tablet by mouth daily 90 tablet 1    furosemide (LASIX) 20 MG tablet Take 1 tablet by mouth daily 90 tablet 1    docusate sodium (COLACE) 100 MG capsule Take 1 capsule by mouth daily With iron pill 90 capsule 1    acetaminophen (TYLENOL) 325 MG tablet Take 650 mg by mouth every 6 hours as needed for Pain      sertraline (ZOLOFT) 50 MG tablet Take 50 mg by mouth daily      ferrous sulfate (IRON 325) 325 (65 Fe) MG tablet Take 1 tablet by mouth daily (with breakfast) 90 tablet 1    temazepam (RESTORIL) 7.5 MG capsule Take 7.5 mg by mouth nightly as needed for Sleep.       QUEtiapine (SEROQUEL) 100 MG tablet Take 100 mg by mouth every evening      QUEtiapine (SEROQUEL) 50 MG tablet Take 50 mg by mouth 3 times daily       tamsulosin (FLOMAX) 0.4 MG capsule

## 2021-04-30 ENCOUNTER — TELEPHONE (OUTPATIENT)
Dept: FAMILY MEDICINE CLINIC | Age: 86
End: 2021-04-30

## 2021-04-30 NOTE — TELEPHONE ENCOUNTER
Leonel Ahumada nurse for [facility] called and stated that patient was D/C to home 4-24-21 from [facility] . Needs Verbal order for Nursing,PT & OT and service will start next week.  Call Leonel Ahumada with verbal

## 2021-05-04 ENCOUNTER — TELEPHONE (OUTPATIENT)
Dept: FAMILY MEDICINE CLINIC | Age: 86
End: 2021-05-04

## 2021-05-06 ENCOUNTER — TELEPHONE (OUTPATIENT)
Dept: FAMILY MEDICINE CLINIC | Age: 86
End: 2021-05-06

## 2021-05-20 ENCOUNTER — TELEPHONE (OUTPATIENT)
Dept: FAMILY MEDICINE CLINIC | Age: 86
End: 2021-05-20

## 2021-05-20 NOTE — TELEPHONE ENCOUNTER
Spoke with Jermaine Ela. Nurse states she did give pt fluids bp was then 110/50 p 60. All other vitals normal. Unable to draw pt's blood due to dehydration.  Nurse encouraged pt's family to continue to give pt fluids

## 2021-05-20 NOTE — TELEPHONE ENCOUNTER
Jermaine Mahmood from [facility] called and stated that the patients BP is 80/40 in both arms. All other vitals were good. Patient is alert and does take fluids in.  Sleeps a lot but that is normal for patient per his care giver   Call Jermaine Mahmood and advise

## 2021-05-21 NOTE — TELEPHONE ENCOUNTER
Asked them to stop patient's amlodipine. Please call us next week with his blood pressure and heart rate.

## 2021-05-24 ENCOUNTER — OFFICE VISIT (OUTPATIENT)
Dept: FAMILY MEDICINE CLINIC | Age: 86
End: 2021-05-24
Payer: MEDICARE

## 2021-05-24 VITALS
BODY MASS INDEX: 24.65 KG/M2 | DIASTOLIC BLOOD PRESSURE: 48 MMHG | HEART RATE: 76 BPM | WEIGHT: 182 LBS | SYSTOLIC BLOOD PRESSURE: 92 MMHG | HEIGHT: 72 IN

## 2021-05-24 DIAGNOSIS — E03.9 ACQUIRED HYPOTHYROIDISM: ICD-10-CM

## 2021-05-24 DIAGNOSIS — N13.8 BPH WITH URINARY OBSTRUCTION: ICD-10-CM

## 2021-05-24 DIAGNOSIS — E55.9 VITAMIN D DEFICIENCY: ICD-10-CM

## 2021-05-24 DIAGNOSIS — K92.2 UGIB (UPPER GASTROINTESTINAL BLEED): Primary | ICD-10-CM

## 2021-05-24 DIAGNOSIS — R60.0 BILATERAL LEG EDEMA: ICD-10-CM

## 2021-05-24 DIAGNOSIS — C44.92 SCC (SQUAMOUS CELL CARCINOMA): ICD-10-CM

## 2021-05-24 DIAGNOSIS — K92.2 UGIB (UPPER GASTROINTESTINAL BLEED): ICD-10-CM

## 2021-05-24 DIAGNOSIS — L12.0 BULLOUS PEMPHIGOID: ICD-10-CM

## 2021-05-24 DIAGNOSIS — N40.1 BPH WITH URINARY OBSTRUCTION: ICD-10-CM

## 2021-05-24 LAB
ANION GAP SERPL CALCULATED.3IONS-SCNC: 14 MMOL/L (ref 3–16)
BASOPHILS ABSOLUTE: 0 K/UL (ref 0–0.2)
BASOPHILS RELATIVE PERCENT: 0.4 %
BUN BLDV-MCNC: 37 MG/DL (ref 7–20)
CALCIUM SERPL-MCNC: 9 MG/DL (ref 8.3–10.6)
CHLORIDE BLD-SCNC: 107 MMOL/L (ref 99–110)
CO2: 18 MMOL/L (ref 21–32)
CREAT SERPL-MCNC: 2 MG/DL (ref 0.8–1.3)
EOSINOPHILS ABSOLUTE: 0.1 K/UL (ref 0–0.6)
EOSINOPHILS RELATIVE PERCENT: 0.8 %
GFR AFRICAN AMERICAN: 38
GFR NON-AFRICAN AMERICAN: 32
GLUCOSE BLD-MCNC: 167 MG/DL (ref 70–99)
HCT VFR BLD CALC: 28.3 % (ref 40.5–52.5)
HEMOGLOBIN: 9.4 G/DL (ref 13.5–17.5)
LYMPHOCYTES ABSOLUTE: 0.7 K/UL (ref 1–5.1)
LYMPHOCYTES RELATIVE PERCENT: 7.4 %
MCH RBC QN AUTO: 29.4 PG (ref 26–34)
MCHC RBC AUTO-ENTMCNC: 33.2 G/DL (ref 31–36)
MCV RBC AUTO: 88.7 FL (ref 80–100)
MONOCYTES ABSOLUTE: 0.7 K/UL (ref 0–1.3)
MONOCYTES RELATIVE PERCENT: 7.5 %
NEUTROPHILS ABSOLUTE: 7.8 K/UL (ref 1.7–7.7)
NEUTROPHILS RELATIVE PERCENT: 83.9 %
PDW BLD-RTO: 18.1 % (ref 12.4–15.4)
PLATELET # BLD: 210 K/UL (ref 135–450)
PMV BLD AUTO: 7.7 FL (ref 5–10.5)
POTASSIUM SERPL-SCNC: 4.5 MMOL/L (ref 3.5–5.1)
RBC # BLD: 3.19 M/UL (ref 4.2–5.9)
SODIUM BLD-SCNC: 139 MMOL/L (ref 136–145)
TSH SERPL DL<=0.05 MIU/L-ACNC: 4.07 UIU/ML (ref 0.27–4.2)
VITAMIN D 25-HYDROXY: 30.6 NG/ML
WBC # BLD: 9.3 K/UL (ref 4–11)

## 2021-05-24 PROCEDURE — G8427 DOCREV CUR MEDS BY ELIG CLIN: HCPCS | Performed by: FAMILY MEDICINE

## 2021-05-24 PROCEDURE — 1036F TOBACCO NON-USER: CPT | Performed by: FAMILY MEDICINE

## 2021-05-24 PROCEDURE — 4040F PNEUMOC VAC/ADMIN/RCVD: CPT | Performed by: FAMILY MEDICINE

## 2021-05-24 PROCEDURE — 99214 OFFICE O/P EST MOD 30 MIN: CPT | Performed by: FAMILY MEDICINE

## 2021-05-24 PROCEDURE — 1123F ACP DISCUSS/DSCN MKR DOCD: CPT | Performed by: FAMILY MEDICINE

## 2021-05-24 PROCEDURE — G8420 CALC BMI NORM PARAMETERS: HCPCS | Performed by: FAMILY MEDICINE

## 2021-05-24 RX ORDER — DOXYCYCLINE HYCLATE 100 MG
100 TABLET ORAL DAILY
Qty: 90 TABLET | Refills: 0 | Status: SHIPPED | OUTPATIENT
Start: 2021-05-24 | End: 2021-06-23

## 2021-05-24 NOTE — PROGRESS NOTES
5/24/2021    Clifton Diamond    Chief Complaint   Patient presents with    Other     4 week    Other     open small wound, right lower leg. pt states originally wound was a blister, popped . HPI    Wilbur Gonzalez is a 80 y.o. male who presents today with follow-up. Changes short-term follow-up with his son LINDA Torres after recent hospitalization. This is his second follow-up with me. Patient had been admitted for an upper GI bleed. Son notes that he has had bleeding on and off for 10 years and not a clear etiology known. Patient did not have an interval CBC done. Son notes blister on his upper part of his right lower leg. Son brings in documentation [facility]  He had responded in the past to doxycycline as well as medium strength steroid cream.    Patient's edema is excellent. He is not wearing the support socks that we had asked. His blood pressure is low today. Son believes it has been low at home but does not have the nurses numbers which he will bring in next time. Patient has not seen blood loss anywhere. Son notes compliance with medications.     REVIEW OF SYSTEMS    Constitutional:  Denies fever, chills, weight loss or weakness  Eyes:  no photophobia or discharge  ENT:  no sore throat or ear pain  Cardiovascular:  Denies chest pain, palpitations or swelling  Respiratory:  Denies cough or shortness of breath  GI:  no abdominal pain, nausea, vomiting, or diarrhea  Musculoskeletal:  no back pain  Skin:  No rashes  Neurologic:  no headache, focal weakness, or sensory changes  Endocrine:  no polyuria or polydipsia      PAST MEDICAL HISTORY  Past Medical History:   Diagnosis Date    Anemia     Anxiety     BPH with urinary obstruction     Depression     GERD (gastroesophageal reflux disease)     Hemorrhoids     Hepatitis     Hernia of unspecified site of abdominal cavity without mention of obstruction or gangrene     Hyperlipidemia     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    Rt Tricep, Lt Superior Forearm       FAMILY HISTORY  Family History   Problem Relation Age of Onset    Depression Mother     COPD Father     Diabetes Brother     Diabetes Maternal Grandmother        SOCIAL HISTORY  Social History     Socioeconomic History    Marital status:      Spouse name: Not on file    Number of children: Not on file    Years of education: Not on file    Highest education level: Not on file   Occupational History    Not on file   Tobacco Use    Smoking status: Former Smoker     Packs/day: 1.00     Years: 5.00     Pack years: 5.00     Types: Cigarettes     Start date: 1950     Quit date: 1955     Years since quittin.5    Smokeless tobacco: Never Used   Substance and Sexual Activity    Alcohol use: Not Currently    Drug use: Not Currently    Sexual activity: Not on file   Other Topics Concern    Not on file   Social History Narrative    Not on file     Social Determinants of Health     Financial Resource Strain: Low Risk     Difficulty of Paying Living Expenses: Not hard at all   Food Insecurity: No Food Insecurity    Worried About [address] in the Last Year: Never true    [address] in the Last Year: Never true   Transportation Needs:     Lack of Transportation (Medical):      Lack of Transportation (Non-Medical):    Physical Activity:     Days of Exercise per Week:     Minutes of Exercise per Session:    Stress:     Feeling of Stress :    Social Connections:     Frequency of Communication with Friends and Family:     Frequency of Social Gatherings with Friends and Family:     Attends Taoist Services:     Active Member of Clubs or Organizations:     Attends Club or Organization Meetings:     Marital Status:    Intimate Partner Violence:     Fear of Current or Ex-Partner:     Emotionally Abused:     Physically Abused:     Sexually Abused:         SURGICAL HISTORY  Past Surgical History: Procedure Laterality Date    CATARACT REMOVAL      CYSTOSCOPY N/A 3/29/2021    CYSTOSCOPY EVACUATION OF CLOTS, BLADDER FULGERATION, AND SUPRA-PUBIC CATHETER INSERTION performed by Nova Styles MD at [address] [place], [place] [address]e [facility] 4/1/2021    CYSTOSCOPY, PROSTATE FULGURATION, EVACUATION OF CLOTS & TURP performed by Beryl Pandey MD at [facility] N/A 3/4/2021    LAPAROSCOPIC LARGE HERNIA HIATAL REPAIR WITH GASTRIC OBSTRUCTION POSS OPEN performed by Sweetie Morales MD at [place] [address]  Current Outpatient Medications   Medication Sig Dispense Refill    doxycycline hyclate (VIBRA-TABS) 100 MG tablet Take 1 tablet by mouth daily 90 tablet 0    polyethylene glycol (GLYCOLAX) 17 GM/SCOOP powder Take 17 g by mouth daily      metoprolol succinate (TOPROL XL) 25 MG extended release tablet Take 1 tablet by mouth every evening 90 tablet 1    finasteride (PROSCAR) 5 MG tablet Take 1 tablet by mouth daily 90 tablet 1    pantoprazole (PROTONIX) 20 MG tablet Take 1 tablet by mouth daily 90 tablet 1    potassium chloride (KLOR-CON M) 20 MEQ extended release tablet Take 1 tablet by mouth daily 90 tablet 1    levothyroxine (SYNTHROID) 75 MCG tablet Take 1 tablet by mouth daily 90 tablet 1    furosemide (LASIX) 20 MG tablet Take 1 tablet by mouth daily 90 tablet 1    docusate sodium (COLACE) 100 MG capsule Take 1 capsule by mouth daily With iron pill 90 capsule 1    acetaminophen (TYLENOL) 325 MG tablet Take 650 mg by mouth every 6 hours as needed for Pain      sertraline (ZOLOFT) 50 MG tablet Take 50 mg by mouth daily      ferrous sulfate (IRON 325) 325 (65 Fe) MG tablet Take 1 tablet by mouth daily (with breakfast) 90 tablet 1    temazepam (RESTORIL) 7.5 MG capsule Take 7.5 mg by mouth nightly as needed for Sleep.       QUEtiapine (SEROQUEL) 50 MG tablet Take 50 mg by mouth 2 times daily       tamsulosin (FLOMAX) 0.4 MG capsule Take 1 capsule by mouth daily. (Patient taking differently: Take 0.8 mg by mouth daily 01/11/17 Pt to take 2- .04 mg capsules at bedtime) 30 capsule 12     No current facility-administered medications for this visit. ALLERGIES  Allergies   Allergen Reactions    Minocycline Other (See Comments)       PHYSICAL EXAM  BP (!) 92/48   Pulse 76   Ht 6' (1.829 m)   Wt 182 lb (82.6 kg)   BMI 24.68 kg/m²     1 open noninfected superficial blister on the upper part of his right lower leg not associated with edema it is approximately 3 x 3 cm while there is a subcentimeter blister a few inches below it. Lungs are clear to auscultation cardiovascular exam is normal.  Patient with trace ankle edema    ASSESSMENT & PLAN    1. UGIB (upper gastrointestinal bleed)  Continue current meds. Will recheck CBC today and monthly. We will asked patient to follow-up and see us in 4 weeks. I asked home health care to recheck his CBC at that point.  - CBC Auto Differential; Future    2. Acquired hypothyroidism  Issue is stable check labs today. Adjust medication off of lab results.  - TSH without Reflex; Future    3. BPH with urinary obstruction  Issue controlled. Continue meds. Refilled meds. 4. SCC (squamous cell carcinoma)  Patient to continue following up with dermatology    5. Bilateral leg edema  Rule out over control as patient's blood pressure is low today. Currently will decrease beta-blocker by half to Toprol 25 mg 1/2 pill daily.  - Basic Metabolic Panel; Future    6. Bullous pemphigoid  Add doxycycline for 3 months to hopefully get control and prevent blisters. Would also prevent cellulitis in open sore. - doxycycline hyclate (VIBRA-TABS) 100 MG tablet; Take 1 tablet by mouth daily  Dispense: 90 tablet; Refill: 0    7. Vitamin D deficiency  Issue is stable check labs today. Adjust medication off of lab results. - Vitamin D 25 Hydroxy;  Future    Follow-up 4 weeks       Electronically signed by Bigg Lewis Emma Hitchcock MD on 5/24/2021

## 2021-05-25 ENCOUNTER — TELEPHONE (OUTPATIENT)
Dept: FAMILY MEDICINE CLINIC | Age: 86
End: 2021-05-25

## 2021-05-25 PROBLEM — R73.9 HYPERGLYCEMIA: Status: ACTIVE | Noted: 2021-05-25

## 2021-05-25 NOTE — TELEPHONE ENCOUNTER
To Dr. Alicia Rose--    Patient fell last night---hit head (no bruising)---hit left on knuckles (some bruising)----patient tripped over his robes----Sourav took patient's vitals:    BP  115/55  (within range set by you,  per Apple Rogers)    Pulse 70    O2 -98% at room air     Temp 97.7    Apple Cone did encourage fluid intake to help with BP.

## 2021-05-25 NOTE — TELEPHONE ENCOUNTER
Spoke with care giver bina. No apparent injuries, pt's son was with him when he fell, pt appears to be doing ok today.

## 2021-05-26 ENCOUNTER — TELEPHONE (OUTPATIENT)
Dept: FAMILY MEDICINE CLINIC | Age: 86
End: 2021-05-26

## 2021-05-28 ENCOUNTER — TELEPHONE (OUTPATIENT)
Dept: FAMILY MEDICINE CLINIC | Age: 86
End: 2021-05-28

## 2021-05-28 DIAGNOSIS — R60.0 BILATERAL LEG EDEMA: ICD-10-CM

## 2021-05-28 RX ORDER — FUROSEMIDE 20 MG/1
20 TABLET ORAL EVERY OTHER DAY
Qty: 90 TABLET | Refills: 1
Start: 2021-05-28 | End: 2021-06-23 | Stop reason: SDUPTHER

## 2021-05-28 RX ORDER — POTASSIUM CHLORIDE 20 MEQ/1
20 TABLET, EXTENDED RELEASE ORAL EVERY OTHER DAY
Qty: 90 TABLET | Refills: 1
Start: 2021-05-28 | End: 2022-02-22 | Stop reason: SDUPTHER

## 2021-05-28 NOTE — TELEPHONE ENCOUNTER
BP taken after being off the Amlodipine for one week-------today it is 106/60   Pulse 67    Please let [facility])  know how long patient is suppose to take the Doxycycline tablets.     Lungs are clear    Vitals are good    Patient looks good

## 2021-05-28 NOTE — TELEPHONE ENCOUNTER
Alisa, see if you can send a copy of my last note to the nurses. They ask about the duration of doxycycline which is 1 daily for 3 months with food.   Thanks

## 2021-06-01 ENCOUNTER — TELEPHONE (OUTPATIENT)
Dept: FAMILY MEDICINE CLINIC | Age: 86
End: 2021-06-01

## 2021-06-03 ENCOUNTER — TELEPHONE (OUTPATIENT)
Dept: FAMILY MEDICINE CLINIC | Age: 86
End: 2021-06-03

## 2021-06-04 NOTE — TELEPHONE ENCOUNTER
Spoke with pt's home care nurse Corine Goldman  per dr Wu Round note. Nurse agreed & voiced understanding.  Nurse stated she will inform the family to stop metoprolol

## 2021-06-10 ENCOUNTER — TELEPHONE (OUTPATIENT)
Dept: FAMILY MEDICINE CLINIC | Age: 86
End: 2021-06-10

## 2021-06-10 NOTE — TELEPHONE ENCOUNTER
Call nursing. Please continue with his frequent blood pressure and pulse checks as you can. Please encourage nutrition, and increasing water intake. Patient would benefit from the Toprol if his blood pressure is able to recover. I agree. Stay off it for now.

## 2021-06-14 NOTE — TELEPHONE ENCOUNTER
Spoke with pt's home care nurse Carlos Eduardo Portillo per dr Neel Moore note.  Nurse agreed & voiced understanding

## 2021-06-17 ENCOUNTER — TELEPHONE (OUTPATIENT)
Dept: FAMILY MEDICINE CLINIC | Age: 86
End: 2021-06-17

## 2021-06-17 LAB
BASOPHILS ABSOLUTE: 0.1 /ΜL
BASOPHILS RELATIVE PERCENT: 1 %
EOSINOPHILS ABSOLUTE: 0.4 /ΜL
EOSINOPHILS RELATIVE PERCENT: 6.1 %
HCT VFR BLD CALC: 30.7 % (ref 41–53)
HEMOGLOBIN: 9.7 G/DL (ref 13.5–17.5)
LYMPHOCYTES ABSOLUTE: 1.4 /ΜL
LYMPHOCYTES RELATIVE PERCENT: 22.7 %
MCH RBC QN AUTO: 28.4 PG
MCHC RBC AUTO-ENTMCNC: 31.6 G/DL
MCV RBC AUTO: 89.8 FL
MONOCYTES ABSOLUTE: 0.7 /ΜL
MONOCYTES RELATIVE PERCENT: 11.2 %
NEUTROPHILS ABSOLUTE: 3.6 /ΜL
NEUTROPHILS RELATIVE PERCENT: 58.5 %
PDW BLD-RTO: 232 %
PLATELET # BLD: 16 K/ΜL
PMV BLD AUTO: 9.7 FL
RBC # BLD: 3.42 10^6/ΜL
WBC # BLD: 6.2 10^3/ML

## 2021-06-18 NOTE — TELEPHONE ENCOUNTER
Spoke with pt's home care nurse Cheryl Ferrera per dr Truong Pill note.  Cheryl Ferrera agreed & voiced understanding

## 2021-06-23 ENCOUNTER — OFFICE VISIT (OUTPATIENT)
Dept: FAMILY MEDICINE CLINIC | Age: 86
End: 2021-06-23
Payer: MEDICARE

## 2021-06-23 VITALS
BODY MASS INDEX: 24.92 KG/M2 | HEART RATE: 72 BPM | DIASTOLIC BLOOD PRESSURE: 64 MMHG | WEIGHT: 184 LBS | SYSTOLIC BLOOD PRESSURE: 120 MMHG | HEIGHT: 72 IN

## 2021-06-23 DIAGNOSIS — N13.8 BPH WITH URINARY OBSTRUCTION: ICD-10-CM

## 2021-06-23 DIAGNOSIS — E03.9 ACQUIRED HYPOTHYROIDISM: ICD-10-CM

## 2021-06-23 DIAGNOSIS — G60.9 IDIOPATHIC PERIPHERAL NEUROPATHY: ICD-10-CM

## 2021-06-23 DIAGNOSIS — N40.1 BPH WITH URINARY OBSTRUCTION: ICD-10-CM

## 2021-06-23 DIAGNOSIS — R04.2 HEMOPTYSIS: Primary | ICD-10-CM

## 2021-06-23 DIAGNOSIS — R60.0 BILATERAL LEG EDEMA: ICD-10-CM

## 2021-06-23 DIAGNOSIS — L12.0 BULLOUS PEMPHIGOID: ICD-10-CM

## 2021-06-23 DIAGNOSIS — K92.2 UGIB (UPPER GASTROINTESTINAL BLEED): ICD-10-CM

## 2021-06-23 DIAGNOSIS — R73.9 HYPERGLYCEMIA: ICD-10-CM

## 2021-06-23 PROCEDURE — G8427 DOCREV CUR MEDS BY ELIG CLIN: HCPCS | Performed by: FAMILY MEDICINE

## 2021-06-23 PROCEDURE — 99214 OFFICE O/P EST MOD 30 MIN: CPT | Performed by: FAMILY MEDICINE

## 2021-06-23 PROCEDURE — 1036F TOBACCO NON-USER: CPT | Performed by: FAMILY MEDICINE

## 2021-06-23 PROCEDURE — 4040F PNEUMOC VAC/ADMIN/RCVD: CPT | Performed by: FAMILY MEDICINE

## 2021-06-23 PROCEDURE — 1123F ACP DISCUSS/DSCN MKR DOCD: CPT | Performed by: FAMILY MEDICINE

## 2021-06-23 PROCEDURE — G8420 CALC BMI NORM PARAMETERS: HCPCS | Performed by: FAMILY MEDICINE

## 2021-06-23 RX ORDER — FUROSEMIDE 20 MG/1
10 TABLET ORAL EVERY OTHER DAY
Qty: 30 TABLET | Refills: 1
Start: 2021-06-23 | End: 2022-02-22 | Stop reason: SDUPTHER

## 2021-06-23 NOTE — PROGRESS NOTES
6/23/2021    Jimmy Runner    Chief Complaint   Patient presents with    Other     4 week f/u    Cough     blood in sputum. denies sob breath or chest discomfort. 2 weeks       HPI    Mireya Hou is a 80 y.o. male who presents today with follow-up. Patient denies swelling. He notes that he is taking 20 mg of Lasix 3 times a week. We decreased this at the last appointment after seeing his elevated creatinine    Patient did do interval anemia check. Its only mildly improved from 3 weeks ago. He has not seen black stools or bloody stools. He does note on some small amount of hemoptysis. Patient feels his bullous pemphigoid is well controlled. He thinks he may have a small pop blister on his hand currently. He is using doxycycline and tolerating of that. It is written as allergic to minocin. Patient complains of decreased sensation in the bottom of both of his feet. He states has been going for years.         REVIEW OF SYSTEMS    Constitutional:  Denies fever, chills, weight loss or weakness  Eyes:  no photophobia or discharge  ENT:  no sore throat or ear pain  Cardiovascular:  Denies chest pain, palpitations or swelling  Respiratory:  Denies cough or shortness of breath  GI:  no abdominal pain, nausea, vomiting, or diarrhea  Musculoskeletal:  no back pain  Skin:  No rashes  Neurologic:  no headache, focal weakness, or sensory changes  Endocrine:  no polyuria or polydipsia      PAST MEDICAL HISTORY  Past Medical History:   Diagnosis Date    Anemia     Anxiety     BPH with urinary obstruction     Depression     GERD (gastroesophageal reflux disease)     Hemorrhoids     Hepatitis     Hernia of unspecified site of abdominal cavity without mention of obstruction or gangrene     Hyperlipidemia     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 He is in a wheelchair. His lungs are clear with good air movement. No distress. There is 1 small possible previously blistered lesion on his left extensor hand. ASSESSMENT & PLAN    1. Hemoptysis  Rule out persistent pneumonia or tumor. Use saline nasal spray 3 times daily for at least a week to possibly clear sinus infection. Already on doxycycline.  - XR CHEST (2 VW); Future    2. Bullous pemphigoid  Controlled on doxycycline. 3. UGIB (upper gastrointestinal bleed)  Chronic. Continue following anemia check every 3 to 4 weeks  - CBC Auto Differential; Future    4. Acquired hypothyroidism  Reviewed recent labs. At goal.  Remain the same    5. BPH with urinary obstruction  Issue controlled. Continue meds. Refilled meds. 6. Bilateral leg edema  No edema. Wean Lasix further to 10 mg every other day. Possibly DC Lasix on follow-up. Recheck BMP in 3 weeks    - furosemide (LASIX) 20 MG tablet; Take 0.5 tablets by mouth every other day  Dispense: 30 tablet; Refill: 1  - Basic Metabolic Panel; Future    7. Hyperglycemia  Rule out interval onset diabetes  - Hemoglobin A1C; Future    8. Idiopathic peripheral neuropathy  Check B12. Will complete [facility] forms. Currently gave them back to son for him to fill out the first page.   - Vitamin B12; Future    Follow-up in 4 weeks  To reassess Lasix, anemia, [facility] paperwork    Electronically signed by Magda Xavier MD on 6/23/2021

## 2021-06-25 ENCOUNTER — TELEPHONE (OUTPATIENT)
Dept: FAMILY MEDICINE CLINIC | Age: 86
End: 2021-06-25

## 2021-06-25 NOTE — TELEPHONE ENCOUNTER
Spoke with pt's home care nurse Joan Euceda per dr Tinajero note.  Joan Euceda agreed & voiced understanding

## 2021-07-06 ENCOUNTER — TELEPHONE (OUTPATIENT)
Dept: FAMILY MEDICINE CLINIC | Age: 86
End: 2021-07-06

## 2021-07-12 NOTE — TELEPHONE ENCOUNTER
Mupirocin ointment 2%-from dermo dr and the aren't in.  PLEASE CALL WHEN CALLED IN    lv-4/28  Na-7/28    [facility]

## 2021-07-22 ENCOUNTER — HOSPITAL ENCOUNTER (OUTPATIENT)
Age: 86
Discharge: HOME | End: 2021-07-22
Payer: MEDICARE

## 2021-07-22 ENCOUNTER — TELEPHONE (OUTPATIENT)
Dept: FAMILY MEDICINE CLINIC | Age: 86
End: 2021-07-22

## 2021-07-22 ENCOUNTER — HOSPITAL ENCOUNTER (OUTPATIENT)
Dept: GENERAL RADIOLOGY | Age: 86
Discharge: HOME | End: 2021-07-22
Payer: MEDICARE

## 2021-07-22 DIAGNOSIS — K92.2 UGIB (UPPER GASTROINTESTINAL BLEED): ICD-10-CM

## 2021-07-22 DIAGNOSIS — R73.9 HYPERGLYCEMIA: ICD-10-CM

## 2021-07-22 DIAGNOSIS — R04.2 HEMOPTYSIS: ICD-10-CM

## 2021-07-22 DIAGNOSIS — R60.0 BILATERAL LEG EDEMA: ICD-10-CM

## 2021-07-22 DIAGNOSIS — G60.9 IDIOPATHIC PERIPHERAL NEUROPATHY: ICD-10-CM

## 2021-07-22 LAB
ANION GAP SERPL CALCULATED.3IONS-SCNC: 11 MMOL/L (ref 4–16)
ANISOCYTOSIS: ABNORMAL
BANDED NEUTROPHILS ABSOLUTE COUNT: 0.1 K/CU MM
BANDED NEUTROPHILS RELATIVE PERCENT: 1 % (ref 5–11)
BUN BLDV-MCNC: 39 MG/DL (ref 6–23)
CALCIUM SERPL-MCNC: 9.1 MG/DL (ref 8.3–10.6)
CHLORIDE BLD-SCNC: 101 MMOL/L (ref 99–110)
CO2: 23 MMOL/L (ref 21–32)
CREAT SERPL-MCNC: 1.9 MG/DL (ref 0.9–1.3)
DIFFERENTIAL TYPE: ABNORMAL
ESTIMATED AVERAGE GLUCOSE: 128 MG/DL
GFR AFRICAN AMERICAN: 41 ML/MIN/1.73M2
GFR NON-AFRICAN AMERICAN: 33 ML/MIN/1.73M2
GLUCOSE BLD-MCNC: 108 MG/DL (ref 70–99)
HBA1C MFR BLD: 6.1 % (ref 4.2–6.3)
HCT VFR BLD CALC: 33.1 % (ref 42–52)
HEMOGLOBIN: 10.5 GM/DL (ref 13.5–18)
LYMPHOCYTES ABSOLUTE: 1.6 K/CU MM
LYMPHOCYTES RELATIVE PERCENT: 16 % (ref 24–44)
MCH RBC QN AUTO: 29.2 PG (ref 27–31)
MCHC RBC AUTO-ENTMCNC: 31.7 % (ref 32–36)
MCV RBC AUTO: 91.9 FL (ref 78–100)
METAMYELOCYTES ABSOLUTE COUNT: 0.21 K/CU MM
METAMYELOCYTES PERCENT: 2 %
MONOCYTES ABSOLUTE: 1.3 K/CU MM
MONOCYTES RELATIVE PERCENT: 13 % (ref 0–4)
MYELOCYTE PERCENT: 1 %
MYELOCYTES ABSOLUTE COUNT: 0.1 K/CU MM
OVALOCYTES: ABNORMAL
PDW BLD-RTO: 16.1 % (ref 11.7–14.9)
PLATELET # BLD: 303 K/CU MM (ref 140–440)
PMV BLD AUTO: 9.6 FL (ref 7.5–11.1)
POTASSIUM SERPL-SCNC: 4.9 MMOL/L (ref 3.5–5.1)
PROMYELOCYTES ABSOLUTE COUNT: 0.1 K/CU MM
PROMYELOCYTES PERCENT: 1 %
RBC # BLD: 3.6 M/CU MM (ref 4.6–6.2)
SEGMENTED NEUTROPHILS ABSOLUTE COUNT: 6.9 K/CU MM
SEGMENTED NEUTROPHILS RELATIVE PERCENT: 66 % (ref 36–66)
SODIUM BLD-SCNC: 135 MMOL/L (ref 135–145)
VITAMIN B-12: 861.2 PG/ML (ref 211–911)
WBC # BLD: 10.3 K/CU MM (ref 4–10.5)

## 2021-07-22 PROCEDURE — 83036 HEMOGLOBIN GLYCOSYLATED A1C: CPT

## 2021-07-22 PROCEDURE — 71046 X-RAY EXAM CHEST 2 VIEWS: CPT

## 2021-07-22 PROCEDURE — 80048 BASIC METABOLIC PNL TOTAL CA: CPT

## 2021-07-22 PROCEDURE — 36415 COLL VENOUS BLD VENIPUNCTURE: CPT

## 2021-07-22 PROCEDURE — 85027 COMPLETE CBC AUTOMATED: CPT

## 2021-07-22 PROCEDURE — 85007 BL SMEAR W/DIFF WBC COUNT: CPT

## 2021-07-22 PROCEDURE — 82607 VITAMIN B-12: CPT

## 2021-07-22 NOTE — TELEPHONE ENCOUNTER
RADIOLOGY CALLED TO MAKE SURE WE RECEIVED DELIA XRAY RESULTS. IF WE HAVE ANY QUESTIONS REFER TO THE NUMBER BELOW.   [phone]

## 2021-07-23 ENCOUNTER — TELEPHONE (OUTPATIENT)
Dept: FAMILY MEDICINE CLINIC | Age: 86
End: 2021-07-23

## 2021-07-23 DIAGNOSIS — C92.40 ACUTE PROMYELOCYTIC LEUKEMIA NOT HAVING ACHIEVED REMISSION (HCC): Primary | ICD-10-CM

## 2021-07-23 RX ORDER — LEVOFLOXACIN 500 MG/1
TABLET, FILM COATED ORAL
Qty: 7 TABLET | Refills: 0 | Status: SHIPPED | OUTPATIENT
Start: 2021-07-23 | End: 2021-08-10 | Stop reason: ALTCHOICE

## 2021-08-05 ENCOUNTER — OFFICE VISIT (OUTPATIENT)
Dept: FAMILY MEDICINE CLINIC | Age: 86
End: 2021-08-05
Payer: MEDICARE

## 2021-08-05 VITALS
WEIGHT: 178 LBS | HEIGHT: 72 IN | HEART RATE: 95 BPM | BODY MASS INDEX: 24.11 KG/M2 | DIASTOLIC BLOOD PRESSURE: 64 MMHG | OXYGEN SATURATION: 92 % | SYSTOLIC BLOOD PRESSURE: 104 MMHG

## 2021-08-05 DIAGNOSIS — I83.813 VARICOSE VEINS OF BOTH LOWER EXTREMITIES WITH PAIN: ICD-10-CM

## 2021-08-05 DIAGNOSIS — J98.11 ATELECTASIS: ICD-10-CM

## 2021-08-05 DIAGNOSIS — K92.2 UGIB (UPPER GASTROINTESTINAL BLEED): ICD-10-CM

## 2021-08-05 DIAGNOSIS — L12.0 BULLOUS PEMPHIGOID: Primary | ICD-10-CM

## 2021-08-05 PROCEDURE — 99214 OFFICE O/P EST MOD 30 MIN: CPT | Performed by: FAMILY MEDICINE

## 2021-08-05 PROCEDURE — G8420 CALC BMI NORM PARAMETERS: HCPCS | Performed by: FAMILY MEDICINE

## 2021-08-05 PROCEDURE — 4040F PNEUMOC VAC/ADMIN/RCVD: CPT | Performed by: FAMILY MEDICINE

## 2021-08-05 PROCEDURE — 1036F TOBACCO NON-USER: CPT | Performed by: FAMILY MEDICINE

## 2021-08-05 PROCEDURE — G8427 DOCREV CUR MEDS BY ELIG CLIN: HCPCS | Performed by: FAMILY MEDICINE

## 2021-08-05 PROCEDURE — 1123F ACP DISCUSS/DSCN MKR DOCD: CPT | Performed by: FAMILY MEDICINE

## 2021-08-05 RX ORDER — PREDNISONE 20 MG/1
TABLET ORAL
COMMUNITY
End: 2022-02-21

## 2021-08-05 RX ORDER — SULFAMETHOXAZOLE AND TRIMETHOPRIM 800; 160 MG/1; MG/1
TABLET ORAL
COMMUNITY
End: 2022-02-21

## 2021-08-05 NOTE — PROGRESS NOTES
2021    Merlinda Sparrow    Chief Complaint   Patient presents with   [employer] Other     4 week follow up    Other     pt had chest xray on 21, please review with the pt and his son    Forms     pts son is curious about [facility] that was brought to the pts last visit    Other     pts reports blisters are appearing to look better, dermatologist prescribed steroid       HPI    Sandra Ramos is a 80 y.o. male who presents today with follow-up.         REVIEW OF SYSTEMS    Constitutional:  Denies fever, chills, weight loss or weakness  Eyes:  no photophobia or discharge  ENT:  no sore throat or ear pain  Cardiovascular:  Denies chest pain, palpitations or swelling  Respiratory:  Denies cough or shortness of breath  GI:  no abdominal pain, nausea, vomiting, or diarrhea  Musculoskeletal:  no back pain  Skin:  No rashes  Neurologic:  no headache, focal weakness, or sensory changes  Endocrine:  no polyuria or polydipsia      PAST MEDICAL HISTORY  Past Medical History:   Diagnosis Date    Anemia     Anxiety     BPH with urinary obstruction     Depression     GERD (gastroesophageal reflux disease)     Hemorrhoids     Hepatitis     Hernia of unspecified site of abdominal cavity without mention of obstruction or gangrene     Hyperlipidemia     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    Rt Tricep, Lt Superior Forearm       FAMILY HISTORY  Family History   Problem Relation Age of Onset    Depression Mother     COPD Father     Diabetes Brother     Diabetes Maternal Grandmother        SOCIAL HISTORY  Social History     Socioeconomic History    Marital status:      Spouse name: None    Number of children: None    Years of education: None    Highest education level: None   Occupational History    None   Tobacco Use    Smoking status: Former Smoker     Packs/day: 1.00     Years: 5.00     Pack years: 5.00     Types: Cigarettes     Start date: 1950     Quit date: 1955     Years since quittin.7    Smokeless tobacco: Never Used   Substance and Sexual Activity    Alcohol use: Not Currently    Drug use: Not Currently    Sexual activity: None   Other Topics Concern    None   Social History Narrative    None     Social Determinants of Health     Financial Resource Strain: Low Risk     Difficulty of Paying Living Expenses: Not hard at all   Food Insecurity: No Food Insecurity    Worried About Running Out of Food in the Last Year: Never true    Miah of Food in the Last Year: Never true   Transportation Needs:     Lack of Transportation (Medical):      Lack of Transportation (Non-Medical):    Physical Activity:     Days of Exercise per Week:     Minutes of Exercise per Session:    Stress:     Feeling of Stress :    Social Connections:     Frequency of Communication with Friends and Family:     Frequency of Social Gatherings with Friends and Family:     Attends Hinduism Services:     Active Member of Clubs or Organizations:     Attends Club or Organization Meetings:     Marital Status:    Intimate Partner Violence:     Fear of Current or Ex-Partner:     Emotionally Abused:     Physically Abused:     Sexually Abused:         SURGICAL HISTORY  Past Surgical History:   Procedure Laterality Date    CATARACT REMOVAL      CYSTOSCOPY N/A 3/29/2021    CYSTOSCOPY EVACUATION OF CLOTS, BLADDER FULGERATION, AND SUPRA-PUBIC CATHETER INSERTION performed by Annalisa Travis MD at [address] [facility] 4/1/2021    CYSTOSCOPY, PROSTATE FULGURATION, EVACUATION OF CLOTS & TURP performed by Jesusita Maharaj MD at [address] 3/4/2021    [address] [facility] performed by Ade Awad MD at [place] [address]  Current Outpatient Medications   Medication Sig Dispense Refill    predniSONE (DELTASONE) 20 MG tablet prednisone 20 mg tablet   TAKE 3 TABS A DAY X7 DAYS,2 TABS A DAY X7 DAYS THEN 1 TAB A DAY X14 DAYS      sulfamethoxazole-trimethoprim (BACTRIM DS;SEPTRA DS) 800-160 MG per tablet sulfamethoxazole 800 mg-trimethoprim 160 mg tablet      levoFLOXacin (LEVAQUIN) 500 MG tablet One daily until gone 7 tablet 0    furosemide (LASIX) 20 MG tablet Take 0.5 tablets by mouth every other day 30 tablet 1    potassium chloride (KLOR-CON M) 20 MEQ extended release tablet Take 1 tablet by mouth every other day 90 tablet 1    polyethylene glycol (GLYCOLAX) 17 GM/SCOOP powder Take 17 g by mouth daily      metoprolol succinate (TOPROL XL) 25 MG extended release tablet Take 1 tablet by mouth every evening 90 tablet 1    finasteride (PROSCAR) 5 MG tablet Take 1 tablet by mouth daily 90 tablet 1    pantoprazole (PROTONIX) 20 MG tablet Take 1 tablet by mouth daily 90 tablet 1    levothyroxine (SYNTHROID) 75 MCG tablet Take 1 tablet by mouth daily 90 tablet 1    acetaminophen (TYLENOL) 325 MG tablet Take 650 mg by mouth every 6 hours as needed for Pain      sertraline (ZOLOFT) 50 MG tablet Take 50 mg by mouth daily      ferrous sulfate (IRON 325) 325 (65 Fe) MG tablet Take 1 tablet by mouth daily (with breakfast) 90 tablet 1    temazepam (RESTORIL) 7.5 MG capsule Take 7.5 mg by mouth nightly as needed for Sleep.  QUEtiapine (SEROQUEL) 50 MG tablet Take 50 mg by mouth 2 times daily       tamsulosin (FLOMAX) 0.4 MG capsule Take 1 capsule by mouth daily. (Patient taking differently: Take 0.8 mg by mouth daily 01/11/17 Pt to take 2- .04 mg capsules at bedtime) 30 capsule 12     No current facility-administered medications for this visit.        ALLERGIES  Allergies   Allergen Reactions    Minocycline Other (See Comments)       PHYSICAL EXAM  /64 (Site: Left Upper Arm, Position: Sitting, Cuff Size: Medium Adult)   Pulse 95   Ht 6' (1.829 m)   Wt 178 lb (80.7 kg)   SpO2 92%   BMI 24.14 kg/m²     Lungs are clear to auscultation  S1-S2 is normal without murmurs gallops or rubs  Bilateral lower extremities are without edema    ASSESSMENT & PLAN    1. Atelectasis  Cough deep breathe every 4 hours. Resurrect his old inspirometer. Patient currently without symptoms. We discussed with them we will decided not to do follow-up studies. 2. Bullous pemphigoid  Symptoms improved with current therapy of and biotics of doxycycline and steroids that he will be tapering from. 3. UGIB (upper gastrointestinal bleed)  Currently asymptomatic. Hopefully resolved  Check CBC between appointments are on follow-up    4. Varicose 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  Patient doing well with the decreased Lasix dose. If no edema on follow-up would give a trial off of Lasix.      Follow-up 3 months    Electronically signed by Rose Mg MD on 8/5/2021

## 2021-08-06 NOTE — PROGRESS NOTES
Patient Name:  Danial Mercedes  Patient :  10/18/1930  Patient MRN:  [id number]     Primary Oncologist: Candelario Worrell MD  Referring Provider: Vickie Jose MD     Date of Service: 8/10/2021      Reason for Consult: Abnormal labs     Chief Complaint:    Chief Complaint   Patient presents with    New Patient        Patient Active Problem List:     Hyperlipidemia     Depression     Primary insomnia     Dysuria     Vitamin D deficiency     Seborrheic keratosis, inflamed     Actinic keratosis     Seborrheic keratosis     SCC (squamous cell carcinoma)     Varicose 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     Anxiety     BPH with urinary obstruction     Acquired hypothyroidism     UGIB (upper gastrointestinal bleed)     Hematuria     Hyperglycemia     Bullous pemphigoid     Atelectasis       HPI:   Danial Mercedes is a pleasant 80-year-old male patient who was referred for abnormal lab. On 2021 CBC showed left shift with myelocytes, metamyelocytes, promyelocytes, bands. WBC was 10.3. Hg 10.5 and platelet 267. He is followed by dermatologist for history bullous pemphigoid and skin cancer. He had laparoscopic repair of incarcerated hiatal hernia with partial fundoplication on 4/3/0207 by Dr Andrew Mcfarlane. He received one unit PRBC. In 2021 he had gross hematuria likely related to borden catheter trauma s/p cystoscopy TURP, bladder fulguration. He has BPH and is on flomax and proscar. He is not a very good historian. He has hearing problem. I will order labs today including flow cytometry and BCR Abl study.     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    Rt Tricep, Lt Superior Forearm        Past Surgery History:    Past Surgical History:   Procedure Laterality Date    CATARACT REMOVAL      CYSTOSCOPY N/A 3/29/2021    CYSTOSCOPY EVACUATION OF CLOTS, BLADDER FULGERATION, AND SUPRA-PUBIC CATHETER INSERTION performed by Ashwini Walter MD at [address] 4/1/2021    CYSTOSCOPY, PROSTATE FULGURATION, EVACUATION OF CLOTS & TURP performed by Trupti James MD at [address] N/A 3/4/2021    LAPAROSCOPIC LARGE HERNIA HIATAL REPAIR WITH GASTRIC OBSTRUCTION POSS OPEN performed by Neha Avila MD at [facility] History:   He smoked one PPD for 5 years and quit in 1955. Denied EtOH or illicit drug use. He has 3 children. Family History:   No cancer. Allergies   Allergen Reactions    Minocycline Other (See Comments)       Current Outpatient Medications on File Prior to Visit   Medication Sig Dispense Refill    mupirocin (BACTROBAN) 2 % ointment Apply 3 times daily. 1 Tube 2    predniSONE (DELTASONE) 20 MG tablet prednisone 20 mg tablet   TAKE 3 TABS A DAY X7 DAYS,2 TABS A DAY X7 DAYS THEN 1 TAB A DAY X14 DAYS      sulfamethoxazole-trimethoprim (BACTRIM DS;SEPTRA DS) 800-160 MG per tablet sulfamethoxazole 800 mg-trimethoprim 160 mg tablet      furosemide (LASIX) 20 MG tablet Take 0.5 tablets by mouth every other day 30 tablet 1    potassium chloride (KLOR-CON M) 20 MEQ extended release tablet Take 1 tablet by mouth every other day 90 tablet 1    polyethylene glycol (GLYCOLAX) 17 GM/SCOOP powder Take 17 g by mouth daily      acetaminophen (TYLENOL) 325 MG tablet Take 650 mg by mouth every 6 hours as needed for Pain      sertraline (ZOLOFT) 50 MG tablet Take 50 mg by mouth daily      temazepam (RESTORIL) 7.5 MG capsule Take 7.5 mg by mouth nightly as needed for Sleep.       QUEtiapine (SEROQUEL) 50 MG tablet Take 50 mg by mouth 2 times daily       tamsulosin (FLOMAX) 0.4 MG capsule Take 1 capsule by mouth daily. (Patient taking differently: Take 0.8 mg by mouth daily 01/11/17 Pt to take 2- .04 mg capsules at bedtime) 30 capsule 12    metoprolol succinate (TOPROL XL) 25 MG extended release tablet Take 1 tablet by mouth every evening 90 tablet 1    finasteride (PROSCAR) 5 MG tablet Take 1 tablet by mouth daily 90 tablet 1    pantoprazole (PROTONIX) 20 MG tablet Take 1 tablet by mouth daily 90 tablet 1    levothyroxine (SYNTHROID) 75 MCG tablet Take 1 tablet by mouth daily 90 tablet 1    ferrous sulfate (IRON 325) 325 (65 Fe) MG tablet Take 1 tablet by mouth daily (with breakfast) 90 tablet 1     No current facility-administered medications on file prior to visit. Review of Systems:    Constitutional:  No weight loss, No fever, No chills, No night sweats. Energy level fair. Eyes:  No diplopia, No transient or permanent loss of vision, No scotomata. ENT / Mouth:  No epistaxis, No dysphagia, No hoarseness, No oral ulcers, No gingival bleeding. No sore throat, No postnasal drip, No nasal drip, No mouth pain, No sinus pain, No tinnitus, +  Hearing loss. Cardiovascular:  No chest pain, No palpitations, No syncope, No upper extremity edema, No lower extremity edema, No calf discomfort. Respiratory:  No cough. No hemoptysis, No pleurisy, No wheezing, No dyspnea. Gastrointestinal:  No abdominal pain, No abdominal cramping, No nausea, No vomiting, No constipation, No diarrhea, No hematochezia, No melena, No jaundice, No dyspepsia, No dysphagia. Urinary:  No dysuria, No hematuria, No urinary incontinence. Musculoskeletal:  No muscle pain, No swollen joints, No joint redness, No bone pain, No spine tenderness. Skin:  No rash, No nodules, No pruritus, No lesions. Neurologic:  No confusion, No seizures, No syncope, No tremor, No speech change, No headache, No hiccups, No abnormal gait, No sensory changes, No weakness.   Psychiatric:  No depression, No today.    I have discussed the above stated plan with the patient and they verbalized understanding and agreed with the plan. Thank you for allowing us to participate in this patient's care.       Electronically signed by Tita Lai MD on 8/6/21 at 1:52 PM EDT

## 2021-08-09 NOTE — TELEPHONE ENCOUNTER
LV-08/05  NV-08/10    [facility]      PT NEEDS A REORDER FOR HIS CREAM. HE HAS SOME PLACES THAT ARE HURTING HIM.

## 2021-08-10 ENCOUNTER — HOSPITAL ENCOUNTER (OUTPATIENT)
Dept: INFUSION THERAPY | Age: 86
Discharge: HOME | End: 2021-08-10
Payer: MEDICARE

## 2021-08-10 ENCOUNTER — INITIAL CONSULT (OUTPATIENT)
Dept: ONCOLOGY | Age: 86
End: 2021-08-10
Payer: MEDICARE

## 2021-08-10 VITALS
HEART RATE: 101 BPM | OXYGEN SATURATION: 99 % | SYSTOLIC BLOOD PRESSURE: 106 MMHG | WEIGHT: 180.6 LBS | TEMPERATURE: 96.2 F | DIASTOLIC BLOOD PRESSURE: 59 MMHG | HEIGHT: 72 IN | BODY MASS INDEX: 24.46 KG/M2 | RESPIRATION RATE: 16 BRPM

## 2021-08-10 DIAGNOSIS — D64.9 ANEMIA, UNSPECIFIED TYPE: Primary | ICD-10-CM

## 2021-08-10 DIAGNOSIS — D72.825 BANDEMIA: ICD-10-CM

## 2021-08-10 DIAGNOSIS — D72.829 LEUKOCYTOSIS, UNSPECIFIED TYPE: ICD-10-CM

## 2021-08-10 DIAGNOSIS — D64.9 ANEMIA, UNSPECIFIED TYPE: ICD-10-CM

## 2021-08-10 DIAGNOSIS — R53.83 OTHER FATIGUE: ICD-10-CM

## 2021-08-10 LAB
ALBUMIN SERPL-MCNC: 3.9 GM/DL (ref 3.4–5)
ALP BLD-CCNC: 137 IU/L (ref 40–129)
ALT SERPL-CCNC: 18 U/L (ref 10–40)
ANION GAP SERPL CALCULATED.3IONS-SCNC: 11 MMOL/L (ref 4–16)
AST SERPL-CCNC: 13 IU/L (ref 15–37)
BASOPHILS ABSOLUTE: 0 K/CU MM
BASOPHILS RELATIVE PERCENT: 0.1 % (ref 0–1)
BILIRUB SERPL-MCNC: 0.2 MG/DL (ref 0–1)
BUN BLDV-MCNC: 42 MG/DL (ref 6–23)
CALCIUM SERPL-MCNC: 8.6 MG/DL (ref 8.3–10.6)
CHLORIDE BLD-SCNC: 103 MMOL/L (ref 99–110)
CO2: 20 MMOL/L (ref 21–32)
CREAT SERPL-MCNC: 2.5 MG/DL (ref 0.9–1.3)
DIFFERENTIAL TYPE: ABNORMAL
EOSINOPHILS ABSOLUTE: 0 K/CU MM
EOSINOPHILS RELATIVE PERCENT: 0.1 % (ref 0–3)
FERRITIN: 96 NG/ML (ref 30–400)
FOLATE: 15.3 NG/ML (ref 3.1–17.5)
GFR AFRICAN AMERICAN: 30 ML/MIN/1.73M2
GFR NON-AFRICAN AMERICAN: 24 ML/MIN/1.73M2
GLUCOSE BLD-MCNC: 150 MG/DL (ref 70–99)
HCT VFR BLD CALC: 33.9 % (ref 42–52)
HEMOGLOBIN: 11 GM/DL (ref 13.5–18)
IRON: 58 UG/DL (ref 59–158)
LACTATE DEHYDROGENASE: 177 IU/L (ref 120–246)
LYMPHOCYTES ABSOLUTE: 0.3 K/CU MM
LYMPHOCYTES RELATIVE PERCENT: 3.6 % (ref 24–44)
MCH RBC QN AUTO: 29.4 PG (ref 27–31)
MCHC RBC AUTO-ENTMCNC: 32.4 % (ref 32–36)
MCV RBC AUTO: 90.6 FL (ref 78–100)
MONOCYTES ABSOLUTE: 0.2 K/CU MM
MONOCYTES RELATIVE PERCENT: 2.3 % (ref 0–4)
PCT TRANSFERRIN: 23 % (ref 10–44)
PDW BLD-RTO: 17.4 % (ref 11.7–14.9)
PLATELET # BLD: 114 K/CU MM (ref 140–440)
PMV BLD AUTO: 9.4 FL (ref 7.5–11.1)
POTASSIUM SERPL-SCNC: 5.4 MMOL/L (ref 3.5–5.1)
RBC # BLD: 3.74 M/CU MM (ref 4.6–6.2)
SEGMENTED NEUTROPHILS ABSOLUTE COUNT: 7 K/CU MM
SEGMENTED NEUTROPHILS RELATIVE PERCENT: 93.9 % (ref 36–66)
SODIUM BLD-SCNC: 134 MMOL/L (ref 135–145)
TOTAL IRON BINDING CAPACITY: 255 UG/DL (ref 250–450)
TOTAL PROTEIN: 6.1 GM/DL (ref 6.4–8.2)
TSH HIGH SENSITIVITY: 2.42 UIU/ML (ref 0.27–4.2)
UNSATURATED IRON BINDING CAPACITY: 197 UG/DL (ref 110–370)
VITAMIN B-12: 560.6 PG/ML (ref 211–911)
WBC # BLD: 7.5 K/CU MM (ref 4–10.5)

## 2021-08-10 PROCEDURE — 36415 COLL VENOUS BLD VENIPUNCTURE: CPT

## 2021-08-10 PROCEDURE — 84165 PROTEIN E-PHORESIS SERUM: CPT

## 2021-08-10 PROCEDURE — 83550 IRON BINDING TEST: CPT

## 2021-08-10 PROCEDURE — G8420 CALC BMI NORM PARAMETERS: HCPCS | Performed by: INTERNAL MEDICINE

## 2021-08-10 PROCEDURE — 4040F PNEUMOC VAC/ADMIN/RCVD: CPT | Performed by: INTERNAL MEDICINE

## 2021-08-10 PROCEDURE — 1036F TOBACCO NON-USER: CPT | Performed by: INTERNAL MEDICINE

## 2021-08-10 PROCEDURE — 83540 ASSAY OF IRON: CPT

## 2021-08-10 PROCEDURE — 82746 ASSAY OF FOLIC ACID SERUM: CPT

## 2021-08-10 PROCEDURE — 82607 VITAMIN B-12: CPT

## 2021-08-10 PROCEDURE — 82728 ASSAY OF FERRITIN: CPT

## 2021-08-10 PROCEDURE — 99202 OFFICE O/P NEW SF 15 MIN: CPT

## 2021-08-10 PROCEDURE — 85025 COMPLETE CBC W/AUTO DIFF WBC: CPT

## 2021-08-10 PROCEDURE — 84443 ASSAY THYROID STIM HORMONE: CPT

## 2021-08-10 PROCEDURE — G8427 DOCREV CUR MEDS BY ELIG CLIN: HCPCS | Performed by: INTERNAL MEDICINE

## 2021-08-10 PROCEDURE — 80053 COMPREHEN METABOLIC PANEL: CPT

## 2021-08-10 PROCEDURE — 83615 LACTATE (LD) (LDH) ENZYME: CPT

## 2021-08-10 PROCEDURE — 99204 OFFICE O/P NEW MOD 45 MIN: CPT | Performed by: INTERNAL MEDICINE

## 2021-08-10 PROCEDURE — 1123F ACP DISCUSS/DSCN MKR DOCD: CPT | Performed by: INTERNAL MEDICINE

## 2021-08-10 NOTE — PROGRESS NOTES
MA Rooming Questions  Patient: Jimmy Zambrano  MRN: [id number]    Date: 8/10/2021        1. Do you have any new issues?   no         2. Do you need any refills on medications?    no    5. Did the patient have a depression screening completed today?  No    No data recorded     PHQ-9 Given to (if applicable):               PHQ-9 Score (if applicable):                     [] Positive     []  Negative              Does question #9 need addressed (if applicable)                     [] Yes    []  No               Charly Pain, CMA

## 2021-08-12 ENCOUNTER — TELEPHONE (OUTPATIENT)
Dept: FAMILY MEDICINE CLINIC | Age: 86
End: 2021-08-12

## 2021-08-12 LAB
ALBUMIN ELP: 3.2 GM/DL (ref 3.2–5.6)
ALPHA-1-GLOBULIN: 0.3 GM/DL (ref 0.1–0.4)
ALPHA-2-GLOBULIN: 0.8 GM/DL (ref 0.4–1.2)
BETA GLOBULIN: 0.9 GM/DL (ref 0.5–1.3)
GAMMA GLOBULIN: 0.9 GM/DL (ref 0.5–1.6)
SPEP INTERPRETATION: ABNORMAL
TOTAL PROTEIN: 6.1 GM/DL (ref 6.4–8.2)

## 2021-08-12 NOTE — TELEPHONE ENCOUNTER
Pt's son was wondering if the [place] form was ready.  Could you let him know either way  today

## 2021-08-16 ENCOUNTER — TELEPHONE (OUTPATIENT)
Dept: FAMILY MEDICINE CLINIC | Age: 86
End: 2021-08-16

## 2021-08-16 NOTE — TELEPHONE ENCOUNTER
Spoke with pt's son Ashok Joshiapril informed [facility] ready for [facility] 25 fee, Ashok Roldan voiced understanding

## 2021-08-31 ENCOUNTER — OFFICE VISIT (OUTPATIENT)
Dept: ONCOLOGY | Age: 86
End: 2021-08-31
Payer: MEDICARE

## 2021-08-31 ENCOUNTER — HOSPITAL ENCOUNTER (OUTPATIENT)
Dept: INFUSION THERAPY | Age: 86
Discharge: HOME | End: 2021-08-31
Payer: MEDICARE

## 2021-08-31 VITALS
HEIGHT: 72 IN | OXYGEN SATURATION: 99 % | SYSTOLIC BLOOD PRESSURE: 110 MMHG | HEART RATE: 103 BPM | RESPIRATION RATE: 16 BRPM | DIASTOLIC BLOOD PRESSURE: 57 MMHG | WEIGHT: 183 LBS | TEMPERATURE: 97.2 F | BODY MASS INDEX: 24.79 KG/M2

## 2021-08-31 DIAGNOSIS — D72.829 LEUKOCYTOSIS, UNSPECIFIED TYPE: Primary | ICD-10-CM

## 2021-08-31 DIAGNOSIS — D72.825 BANDEMIA: ICD-10-CM

## 2021-08-31 DIAGNOSIS — D69.6 THROMBOCYTOPENIA, UNSPECIFIED (HCC): ICD-10-CM

## 2021-08-31 PROCEDURE — 99211 OFF/OP EST MAY X REQ PHY/QHP: CPT

## 2021-08-31 PROCEDURE — 1036F TOBACCO NON-USER: CPT | Performed by: NURSE PRACTITIONER

## 2021-08-31 PROCEDURE — G8420 CALC BMI NORM PARAMETERS: HCPCS | Performed by: NURSE PRACTITIONER

## 2021-08-31 PROCEDURE — 1123F ACP DISCUSS/DSCN MKR DOCD: CPT | Performed by: NURSE PRACTITIONER

## 2021-08-31 PROCEDURE — G8427 DOCREV CUR MEDS BY ELIG CLIN: HCPCS | Performed by: NURSE PRACTITIONER

## 2021-08-31 PROCEDURE — 99214 OFFICE O/P EST MOD 30 MIN: CPT | Performed by: NURSE PRACTITIONER

## 2021-08-31 PROCEDURE — 4040F PNEUMOC VAC/ADMIN/RCVD: CPT | Performed by: NURSE PRACTITIONER

## 2021-08-31 ASSESSMENT — PATIENT HEALTH QUESTIONNAIRE - PHQ9
SUM OF ALL RESPONSES TO PHQ QUESTIONS 1-9: 0
1. LITTLE INTEREST OR PLEASURE IN DOING THINGS: 0
2. FEELING DOWN, DEPRESSED OR HOPELESS: 0
SUM OF ALL RESPONSES TO PHQ QUESTIONS 1-9: 0
SUM OF ALL RESPONSES TO PHQ9 QUESTIONS 1 & 2: 0
SUM OF ALL RESPONSES TO PHQ QUESTIONS 1-9: 0

## 2021-08-31 NOTE — PROGRESS NOTES
MA Rooming Questions  Patient: Bhavani Argueta  MRN: [id number]    Date: 8/31/2021        1. Do you have any new issues?   no         2. Do you need any refills on medications?    no    3. Have you had any imaging done since your last visit?   no    4. Have you been hospitalized or seen in the emergency room since your last visit here?   no    5. Did the patient have a depression screening completed today?  Yes    PHQ-9 Total Score: 0 (8/31/2021  2:25 PM)       PHQ-9 Given to (if applicable):               PHQ-9 Score (if applicable):                     [] Positive     []  Negative              Does question #9 need addressed (if applicable)                     [] Yes    []  No               Geovanny Mack CMA

## 2021-08-31 NOTE — PROGRESS NOTES
Patient Name:  Francisco France  Patient :  10/18/1930  Patient MRN:  [id number]     Primary Oncologist: Lisa Gonzales MD  Referring Provider: Jeb Robin MD     Date of Service: 2021      Reason for Consult: Abnormal labs     Chief Complaint:    Chief Complaint   Patient presents with    Discuss Labs        Patient Active Problem List:     Hyperlipidemia     Depression     Primary insomnia     Dysuria     Vitamin D deficiency     Seborrheic keratosis, inflamed     Actinic keratosis     Seborrheic keratosis     SCC (squamous cell carcinoma)     Varicose 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     Anxiety     BPH with urinary obstruction     Acquired hypothyroidism     UGIB (upper gastrointestinal bleed)     Hematuria     Hyperglycemia     Bullous pemphigoid     Atelectasis       HPI:   Francisco France is a pleasant 27-year-old male patient who was referred for abnormal lab. On 2021 CBC showed left shift with myelocytes, metamyelocytes, promyelocytes, bands. WBC was 10.3. Hg 10.5 and platelet 920. He is followed by dermatologist for history bullous pemphigoid and skin cancer. He had laparoscopic repair of incarcerated hiatal hernia with partial fundoplication on [id number] by Dr Shakeel Epstein. He received one unit PRBC. In 2021 he had gross hematuria likely related to borden catheter trauma s/p cystoscopy TURP, bladder fulguration. He has BPH and is on flomax and proscar. He is not a very good historian. Presented for scheduled follow up with his caregiver on 2021. We reviewed BCR ABL which was negative. 21 flow cytometry:  Final Pathologic Diagnosis:   Peripheral blood; Flow Cytometry Analysis:   -     No diagnostic immunophenotypic abnormalities detected. Comments:   No immunophenotypic evidence of a lymphoproliferative   disorder, acute leukemia or increased blasts is identified.    Myeloproliferative neoplasms and myelodysplastic syndromes   may not show antigenic abnormalities on myeloid cells and   cannot be ruled out by flow cytometry. Please correlate the   result with morphological findings, other pertinent   laboratory data and clinical information. Flow Differential (%) and Population Analysis:   Lymphocytes: 3.9%   T-cells (72% of lymphoid cells) show a CD4/CD8 ratio of   about 0.5 without overt phenotypic abnormality. NK-cells   (11% of lymphoid cells) are unremarkable. Mature B-cells   (11% of lymphoid cells) are polyclonal with no definitive   clonal population identified (kappa:lambda 2.2). Monocytes: 2.0%   Monocytes co-express CD14 and CD64 without phenotypic   abnormalities. Granulocytes: 93.9%   Granulocytes are phenotypically mature and without aberrant   antigen expression. CD45 Dim: 0.1%   CD34+ cells are not increased.              Results were reviewed with him. This is likely reactive. We did discuss that he may need BMB at some point. Denies fever, chills, night sweats, frequent infection, no dizziness, visual changes, or headache. no cough, chest pain, hemoptysis, shortness of breath, no nausea, vomiting, diarrhea, no constipation, no melena or hematochezia,  no dysuria, hematuria, no lower extremity edema or calf pain. Denies pain. No worsening depression.       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    Rt Tricep, Lt Superior Forearm        Past Surgery History:    Past Surgical History:   Procedure Laterality Date    CATARACT REMOVAL      CYSTOSCOPY N/A 3/29/2021    CYSTOSCOPY EVACUATION OF CLOTS, BLADDER FULGERATION, AND SUPRA-PUBIC CATHETER INSERTION performed by Vel Callejas MD at [address] 4/1/2021    CYSTOSCOPY, PROSTATE FULGURATION, EVACUATION OF CLOTS & TURP performed by Jelena Ordoñez MD at [address] N/A 3/4/2021    LAPAROSCOPIC LARGE HERNIA HIATAL REPAIR WITH GASTRIC OBSTRUCTION POSS OPEN performed by Anne Marie Nelson MD at [facility] History:   He smoked one PPD for 5 years and quit in 1955. Denied EtOH or illicit drug use. He has 3 children. Family History:   No cancer. Allergies   Allergen Reactions    Minocycline Other (See Comments)       Current Outpatient Medications on File Prior to Visit   Medication Sig Dispense Refill    predniSONE (DELTASONE) 20 MG tablet prednisone 20 mg tablet   TAKE 3 TABS A DAY X7 DAYS,2 TABS A DAY X7 DAYS THEN 1 TAB A DAY X14 DAYS      sulfamethoxazole-trimethoprim (BACTRIM DS;SEPTRA DS) 800-160 MG per tablet sulfamethoxazole 800 mg-trimethoprim 160 mg tablet      furosemide (LASIX) 20 MG tablet Take 0.5 tablets by mouth every other day 30 tablet 1    polyethylene glycol (GLYCOLAX) 17 GM/SCOOP powder Take 17 g by mouth daily      acetaminophen (TYLENOL) 325 MG tablet Take 650 mg by mouth every 6 hours as needed for Pain      sertraline (ZOLOFT) 50 MG tablet Take 50 mg by mouth daily      temazepam (RESTORIL) 7.5 MG capsule Take 7.5 mg by mouth nightly as needed for Sleep.  QUEtiapine (SEROQUEL) 50 MG tablet Take 50 mg by mouth 2 times daily       tamsulosin (FLOMAX) 0.4 MG capsule Take 1 capsule by mouth daily.  (Patient taking differently: Take 0.8 mg by mouth daily 01/11/17 Pt to take 2- .04 mg capsules at bedtime) 30 capsule 12    potassium chloride (KLOR-CON M) 20 MEQ extended release tablet Take 1 tablet by mouth every other day 90 tablet 1    metoprolol succinate (TOPROL XL) 25 MG extended release tablet Take 1 tablet by mouth every evening 90 tablet 1    finasteride (PROSCAR) 5 MG tablet Take 1 tablet by mouth daily 90 tablet 1    pantoprazole (PROTONIX) 20 MG tablet Take 1 tablet by mouth daily 90 tablet 1    levothyroxine (SYNTHROID) 75 MCG tablet Take 1 tablet by mouth daily 90 tablet 1    ferrous sulfate (IRON 325) 325 (65 Fe) MG tablet Take 1 tablet by mouth daily (with breakfast) 90 tablet 1     No current facility-administered medications on file prior to visit. Review of Systems:    As per HPI     Vital Signs: BP (!) 110/57 (Site: Right Upper Arm, Position: Sitting, Cuff Size: Medium Adult)   Pulse 103   Temp 97.2 °F (36.2 °C) (Infrared)   Resp 16   Ht 6' (1.829 m)   Wt 183 lb (83 kg)   SpO2 99%   BMI 24.82 kg/m²      Physical Exam:  CONSTITUTIONAL: awake, alert, cooperative, no apparent distress  EYES: pupils equal, round and reactive to light, sclera clear and conjunctiva pallor  ENT: Normocephalic, without obvious abnormality, atraumatic; [place]  NECK: supple, symmetrical  HEMATOLOGIC/LYMPHATIC: no cervical, supraclavicular or axillary lymphadenopathy   LUNGS: no increased work of breathing and clear to auscultation   CARDIOVASCULAR: regular rate and rhythm, normal S1 and S2, no murmur noted  ABDOMEN: normal bowel sounds x 4, soft, non-distended, non-tender  MUSCULOSKELETAL: full range of motion noted, tone is normal  NEUROLOGIC: awake, alert, oriented to name, place and time. Motor skills grossly intact. SKIN: Normal skin color, texture, turgor and no jaundice.  appears intact   EXTREMITIES: no LE edema        Labs:  Hematology:  Lab Results   Component Value Date    WBC 7.5 08/10/2021    RBC 3.74 (L) 08/10/2021    HGB 11.0 (L) 08/10/2021    HCT 33.9 (L) 08/10/2021    MCV 90.6 08/10/2021    MCH 29.4 08/10/2021    MCHC 32.4 08/10/2021    RDW 17.4 (H) 08/10/2021     (L) 08/10/2021    MPV 9.4 08/10/2021    BANDSPCT 1 (L) 07/22/2021    SEGSPCT 93.9 (H) 08/10/2021    EOSRELPCT 0.1 08/10/2021    BASOPCT 0.1 08/10/2021    LYMPHOPCT 3.6 (L) 08/10/2021    MONOPCT 2.3 08/10/2021    BANDABS 0.10 07/22/2021    SEGSABS 7.0 08/10/2021    EOSABS 0.0 08/10/2021    BASOSABS 0.0 08/10/2021    LYMPHSABS 0.3 08/10/2021    MONOSABS 0.2 08/10/2021    DIFFTYPE AUTOMATED DIFFERENTIAL 08/10/2021    ANISOCYTOSIS 1+ 07/22/2021    POLYCHROM 1+ 03/09/2021    WBCMORP RARE 03/09/2021     Chemistry:  Lab Results   Component Value Date     (L) 08/10/2021    K 5.4 (H) 08/10/2021     08/10/2021    CO2 20 (L) 08/10/2021    BUN 42 (H) 08/10/2021    CREATININE 2.5 (H) 08/10/2021    GLUCOSE 150 (H) 08/10/2021    CALCIUM 8.6 08/10/2021    PROT 6.1 (L) 08/10/2021    PROT 6.1 (L) 08/10/2021    LABALBU 3.9 08/10/2021    BILITOT 0.2 08/10/2021    ALKPHOS 137 (H) 08/10/2021    AST 13 (L) 08/10/2021    ALT 18 08/10/2021    LABGLOM 24 (L) 08/10/2021    GFRAA 30 (L) 08/10/2021    AGRATIO 1.4 01/21/2021    GLOB 2.1 01/21/2021    MG 1.9 03/10/2021     Lab Results   Component Value Date     08/10/2021     Lab Results   Component Value Date    TSHHS 2.420 08/10/2021    T4FREE 0.8 (L) 01/21/2021     Immunology:  Lab Results   Component Value Date    PROT 6.1 (L) 08/10/2021    PROT 6.1 (L) 08/10/2021    SPEP INTERPRETATION - Within normal limits. RS 08/10/2021    ALBUMINELP 3.2 08/10/2021    LABALPH 0.3 08/10/2021    LABALPH 0.8 08/10/2021    LABBETA 0.9 08/10/2021    GAMGLOB 0.9 08/10/2021     Coagulation Panel:  Lab Results   Component Value Date    PROTIME 14.1 03/26/2021    INR 1.16 03/26/2021    APTT 37.8 (H) 03/26/2021     Anemia Panel:  Lab Results   Component Value Date    DHFFSMKM06 560.6 08/10/2021    FOLATE 15.3 08/10/2021     Tumor Markers:  Lab Results   Component Value Date    PSA 6.95 (H) 09/21/2012        Observations:  PHQ-9 Total Score: 0 (8/31/2021  2:25 PM)       Assessment & Plan:  1. He had left shift  WBC on peripheral blood. No signs of infection. 8/17/21 Flow cytometry was negative. BCR ABL negative. This is likely reactive. We will continue to monitor.  He will have repeat labs in three months or sooner. 2. He has anemia. Will order iron study. B-12 in July 2021 was 861.2. 8/10/21  Iron studies reviewed. 3. He has BPH. Followed by urologist.    RTC in 3 months or sooner. All of his questions have been answered for today.

## 2021-09-03 ENCOUNTER — TELEPHONE (OUTPATIENT)
Dept: FAMILY MEDICINE CLINIC | Age: 86
End: 2021-09-03

## 2021-09-03 ENCOUNTER — HOSPITAL ENCOUNTER (OUTPATIENT)
Dept: ULTRASOUND IMAGING | Age: 86
Discharge: HOME | End: 2021-09-03
Payer: MEDICARE

## 2021-09-03 ENCOUNTER — HOSPITAL ENCOUNTER (EMERGENCY)
Age: 86
Discharge: HOME | End: 2021-09-03
Attending: STUDENT IN AN ORGANIZED HEALTH CARE EDUCATION/TRAINING PROGRAM
Payer: MEDICARE

## 2021-09-03 VITALS
RESPIRATION RATE: 17 BRPM | OXYGEN SATURATION: 96 % | HEART RATE: 87 BPM | SYSTOLIC BLOOD PRESSURE: 137 MMHG | TEMPERATURE: 98.1 F | DIASTOLIC BLOOD PRESSURE: 73 MMHG

## 2021-09-03 DIAGNOSIS — I82.411 ACUTE DEEP VEIN THROMBOSIS (DVT) OF FEMORAL VEIN OF RIGHT LOWER EXTREMITY (HCC): Primary | ICD-10-CM

## 2021-09-03 DIAGNOSIS — R22.43 LOCALIZED SWELLING OF BOTH LOWER LEGS: Primary | ICD-10-CM

## 2021-09-03 DIAGNOSIS — M79.89 LEG SWELLING: Primary | ICD-10-CM

## 2021-09-03 DIAGNOSIS — M79.89 LEG SWELLING: ICD-10-CM

## 2021-09-03 LAB
ANION GAP SERPL CALCULATED.3IONS-SCNC: 12 MMOL/L (ref 4–16)
ANISOCYTOSIS: ABNORMAL
ATYPICAL LYMPHOCYTE ABSOLUTE COUNT: ABNORMAL
ATYPICAL MONOCYTES: ABNORMAL
BUN BLDV-MCNC: 26 MG/DL (ref 6–23)
CALCIUM SERPL-MCNC: 8.7 MG/DL (ref 8.3–10.6)
CHLORIDE BLD-SCNC: 105 MMOL/L (ref 99–110)
CO2: 18 MMOL/L (ref 21–32)
CREAT SERPL-MCNC: 2 MG/DL (ref 0.9–1.3)
DIFFERENTIAL TYPE: ABNORMAL
EOSINOPHILS ABSOLUTE: 0.1 K/CU MM
EOSINOPHILS RELATIVE PERCENT: 2 % (ref 0–3)
GFR AFRICAN AMERICAN: 38 ML/MIN/1.73M2
GFR NON-AFRICAN AMERICAN: 32 ML/MIN/1.73M2
GLUCOSE BLD-MCNC: 166 MG/DL (ref 70–99)
HCT VFR BLD CALC: 30.9 % (ref 42–52)
HEMOGLOBIN: 9.7 GM/DL (ref 13.5–18)
INR BLD: 0.99 INDEX
LYMPHOCYTES ABSOLUTE: 0.7 K/CU MM
LYMPHOCYTES RELATIVE PERCENT: 10 % (ref 24–44)
MCH RBC QN AUTO: 29.8 PG (ref 27–31)
MCHC RBC AUTO-ENTMCNC: 31.4 % (ref 32–36)
MCV RBC AUTO: 94.8 FL (ref 78–100)
METAMYELOCYTES ABSOLUTE COUNT: 0.07 K/CU MM
METAMYELOCYTES PERCENT: 1 %
MONOCYTES ABSOLUTE: 0.6 K/CU MM
MONOCYTES RELATIVE PERCENT: 8 % (ref 0–4)
MYELOCYTE PERCENT: 1 %
MYELOCYTES ABSOLUTE COUNT: 0.07 K/CU MM
OVALOCYTES: ABNORMAL
PDW BLD-RTO: 15.9 % (ref 11.7–14.9)
PLATELET # BLD: 171 K/CU MM (ref 140–440)
PMV BLD AUTO: 9.3 FL (ref 7.5–11.1)
POTASSIUM SERPL-SCNC: 4.1 MMOL/L (ref 3.5–5.1)
PROTHROMBIN TIME: 12.8 SECONDS (ref 11.7–14.5)
RBC # BLD: 3.26 M/CU MM (ref 4.6–6.2)
SEGMENTED NEUTROPHILS ABSOLUTE COUNT: 5.7 K/CU MM
SEGMENTED NEUTROPHILS RELATIVE PERCENT: 78 % (ref 36–66)
SODIUM BLD-SCNC: 135 MMOL/L (ref 135–145)
WBC # BLD: 7.2 K/CU MM (ref 4–10.5)

## 2021-09-03 PROCEDURE — 6360000002 HC RX W HCPCS: Performed by: STUDENT IN AN ORGANIZED HEALTH CARE EDUCATION/TRAINING PROGRAM

## 2021-09-03 PROCEDURE — 93970 EXTREMITY STUDY: CPT

## 2021-09-03 PROCEDURE — 85007 BL SMEAR W/DIFF WBC COUNT: CPT

## 2021-09-03 PROCEDURE — 85610 PROTHROMBIN TIME: CPT

## 2021-09-03 PROCEDURE — 99283 EMERGENCY DEPT VISIT LOW MDM: CPT

## 2021-09-03 PROCEDURE — 85027 COMPLETE CBC AUTOMATED: CPT

## 2021-09-03 PROCEDURE — 96372 THER/PROPH/DIAG INJ SC/IM: CPT

## 2021-09-03 PROCEDURE — 80048 BASIC METABOLIC PNL TOTAL CA: CPT

## 2021-09-03 RX ADMIN — ENOXAPARIN SODIUM 80 MG: 80 INJECTION SUBCUTANEOUS at 14:13

## 2021-09-03 NOTE — TELEPHONE ENCOUNTER
Spoke with Daughter, patient. Due to length of DVT Right superficial femoral vein extending from popliteal vein. Occlusive thrombosis popliteal through superficial femoral vein. will send to ED for further evaluation. Discussed need to start anticoagulation.  Denies any SOB, chest pain

## 2021-09-03 NOTE — TELEPHONE ENCOUNTER
[facility] US scheduled  Called and gave wife Howard Cleveland on [employer]. 10:30 Arrival today, 09/03/2021- for 11:00 US appt. Bring photo ID and insurance card.

## 2021-09-03 NOTE — ED PROVIDER NOTES
EMERGENCY DEPARTMENT ENCOUNTER      CHIEF COMPLAINT    Chief Complaint   Patient presents with    Leg Pain     right leg pain, had outpatient US and was told to come to  ER       HPI    Bernadette Zee is a 80 y.o. male with history significant for BPH GERD hyperlipidemia previous squamous cell carcinoma, possible CKD who presents with leg swelling. Patient noticed right-sided leg swelling outpatient provider order ultrasound today, ultrasound showed a DVT therefore was sent to the emergency department for further work-up, patient denies any numbness or any significant pain in the leg denies any discolorations, denies any shortness of breath or any chest pain is satting 86% on room air is not tachycardic either. Denies any recent prolonged mobilization or any recent surgery on hematologic disorders however patient does walk with a walker at home, a house however does not use the stairs at all, does have someone with him the entire time. DVT study today shows  Impression   Occlusive thrombosis extending from the popliteal region proximally on the   right through the superficial femoral vein and partial occlusive thrombus   extending more proximally.       The left lower extremity is unremarkable in appearance.       Findings were discussed with DEVAN SOLARES at 12:39 pm on 9/3/2021. REVIEW OF SYSTEMS    Constitutional: Denies chills, fatigue, unexpected weight loss or fever. HENT: Denies sore throat or rhinorrhea. Eyes: Denies vision changes. Respiratory: Denies shortness of breath or cough. Cardiovascular: Denies chest pain,+ leg swelling - palpitations. Gastrointestinal: Denies abdominal pain, diarrhea, nausea and vomiting. Genitourinary: Denies dysuria or hematuria. Skin: Denies rashes or wounds. MSK: Leg swelling  Neurologic: Denies headache, lightheadedness, numbness, or weakness.    Hematologic/lymphatic: Denies unexpected weight loss, night sweats  Endocrine: No polyuria, polydipsia, or polyphagia      Pertinent positives and negatives are delineated in HPI and ROS above, all other systems are reviewed and are negative    PAST MEDICAL HISTORY    Past Medical History:   Diagnosis Date    Anemia     Anxiety     Arthritis     BPH with urinary obstruction     Depression     GERD (gastroesophageal reflux disease)     Hemorrhoids     Hepatitis     Hernia of unspecified site of abdominal cavity without mention of obstruction or gangrene     Hyperlipidemia     Hypotension     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    Rt Tricep, Lt Superior Forearm     Medical history reviewed and no pertinent past medical history other than the ones mentioned above    SURGICAL HISTORY    Past Surgical History:   Procedure Laterality Date    CATARACT REMOVAL      CYSTOSCOPY N/A 3/29/2021    CYSTOSCOPY EVACUATION OF CLOTS, BLADDER FULGERATION, AND SUPRA-PUBIC CATHETER INSERTION performed by Arslan Haynes MD at [facility] 4/1/2021    CYSTOSCOPY, PROSTATE FULGURATION, EVACUATION OF CLOTS & TURP performed by Neal Arango MD at [facility] N/A 3/4/2021    LAPAROSCOPIC LARGE HERNIA HIATAL REPAIR WITH GASTRIC OBSTRUCTION POSS OPEN performed by Radha Baker MD at [facility]       Surgical history reviewed and no pertinent surgical history other than the ones mentioned above    CURRENT MEDICATIONS    Current Outpatient Rx   Medication Sig Dispense Refill    enoxaparin (LOVENOX) 80 MG/0.8ML injection Inject 0.8 mLs into the skin 2 times daily for 7 days 11.2 mL 0    predniSONE (DELTASONE) 20 MG tablet prednisone 20 mg tablet   TAKE 3 TABS A DAY X7 DAYS,2 TABS A DAY X7 DAYS THEN 1 TAB A DAY X14 DAYS      sulfamethoxazole-trimethoprim (BACTRIM DS;SEPTRA DS) 800-160 MG per tablet sulfamethoxazole 800 mg-trimethoprim 160 mg tablet      furosemide (LASIX) 20 MG tablet Take 0.5 tablets by mouth every other day 30 tablet 1    potassium chloride (KLOR-CON M) 20 MEQ extended release tablet Take 1 tablet by mouth every other day 90 tablet 1    polyethylene glycol (GLYCOLAX) 17 GM/SCOOP powder Take 17 g by mouth daily      metoprolol succinate (TOPROL XL) 25 MG extended release tablet Take 1 tablet by mouth every evening 90 tablet 1    finasteride (PROSCAR) 5 MG tablet Take 1 tablet by mouth daily 90 tablet 1    pantoprazole (PROTONIX) 20 MG tablet Take 1 tablet by mouth daily 90 tablet 1    levothyroxine (SYNTHROID) 75 MCG tablet Take 1 tablet by mouth daily 90 tablet 1    acetaminophen (TYLENOL) 325 MG tablet Take 650 mg by mouth every 6 hours as needed for Pain      sertraline (ZOLOFT) 50 MG tablet Take 50 mg by mouth daily      ferrous sulfate (IRON 325) 325 (65 Fe) MG tablet Take 1 tablet by mouth daily (with breakfast) 90 tablet 1    temazepam (RESTORIL) 7.5 MG capsule Take 7.5 mg by mouth nightly as needed for Sleep.  QUEtiapine (SEROQUEL) 50 MG tablet Take 50 mg by mouth 2 times daily       tamsulosin (FLOMAX) 0.4 MG capsule Take 1 capsule by mouth daily.  (Patient taking differently: Take 0.8 mg by mouth daily 01/11/17 Pt to take 2- .04 mg capsules at bedtime) 30 capsule 12     Medication is reviewed    ALLERGIES    Allergies   Allergen Reactions    Minocycline Other (See Comments)     Allergy is reviewed    FAMILY HISTORY    Family History   Problem Relation Age of Onset    Depression Mother     Diabetes Mother     COPD Father     Diabetes Brother     Diabetes Maternal Grandmother      Family history reviewed and no pertinent family history other than the ones mentioned above    SOCIAL HISTORY    Social History     Socioeconomic History    Marital status:      Spouse name: Not on file    Number of children: Not on file    Years of education: Not on file    Highest education level: Not on file   Occupational History    Not on file   Tobacco Use    Smoking status: Former Smoker     Packs/day: 1.00     Years: 5.00     Pack years: 5.00     Types: Cigarettes     Start date: 1950     Quit date: 1955     Years since quittin.8    Smokeless tobacco: Never Used   Substance and Sexual Activity    Alcohol use: Not Currently    Drug use: Not Currently    Sexual activity: Not on file   Other Topics Concern    Not on file   Social History Narrative    Not on file     Social Determinants of Health     Financial Resource Strain: Low Risk     Difficulty of Paying Living Expenses: Not hard at all   Food Insecurity: No Food Insecurity    Worried About [facility] in the Last Year: Never true    [facility] in the Last Year: Never true   Transportation Needs:     Lack of Transportation (Medical):  Lack of Transportation (Non-Medical):    Physical Activity:     Days of Exercise per Week:     Minutes of Exercise per Session:    Stress:     Feeling of Stress :    Social Connections:     Frequency of Communication with Friends and Family:     Frequency of Social Gatherings with Friends and Family:     Attends [employer] Services:     Active Member of Clubs or Organizations:     Attends Club or Organization Meetings:     Marital Status:    Intimate Partner Violence:     Fear of Current or Ex-Partner:     Emotionally Abused:     Physically Abused:     Sexually Abused:      Live with wife  Alcohol and recreational drug use: Denies  Social history reviewed and no pertinent social history other than the ones mentioned above    PHYSICAL EXAM    Vital Signs:/73   Pulse 87   Temp 98.1 °F (36.7 °C) (Oral)   Resp 17   SpO2 96%   I have reviewed the triage vital signs.   Constitutional: Well nourished, well developed, appears stated age  Eyes: PERRL, no conjunctival injection  HENT: NCAT, Neck supple without meningismus   CV: RRR, Warm, right lower extremity edema  RESP: Normal RR, no increased respiratory efforts  GI: soft, non-distended  MSK: 1+ right lower extremity edema no discolorations, 2+ pedal pulses and femoral pulses, normal range of motion  Skin: Warm, dry. No rashes  Neuro: Alert, CNs II-XII grossly intact. Moving all 4 extremities  Psych: Appropriate mood and affect. Labs:   Labs Reviewed   BASIC METABOLIC PANEL W/ REFLEX TO MG FOR LOW K - Abnormal; Notable for the following components:       Result Value    CO2 18 (*)     BUN 26 (*)     CREATININE 2.0 (*)     Glucose 166 (*)     GFR Non- 32 (*)     GFR  38 (*)     All other components within normal limits   CBC WITH AUTO DIFFERENTIAL - Abnormal; Notable for the following components:    RBC 3.26 (*)     Hemoglobin 9.7 (*)     Hematocrit 30.9 (*)     MCHC 31.4 (*)     RDW 15.9 (*)     All other components within normal limits   PROTIME-INR       Radiology:  No orders to display       I directly reviewed the images and radiology interpretation      ED COURSE  Assessment & Medical Decision Making:  Jeaneth Stockton is a 80 y.o. male who presents with DVT PE on outpatient ultrasounds, patient does have elevated creatinine seems to be chronic with while patient does not have a history of CKD patient does not qualify for Eliquis anymore patient denies any chest pain or shortness of breath and satting well is not tachycardic, concern for PE at this point is relatively low, patient is warned of potential worsening symptoms and warned of using anticoagulation given his age and using a walker, patient family stated that he will have somebody with him the entire time, risk and benefits of start anticoagulation as discussed, patient qualifies for Lovenox, will give patient first dose of Lovenox and patient will need urgent outpatient follow-up to bridge patient to warfarin.           DDx includes but not limited to: DVT, phlegmasia cerulea dolens, phlegmasia cerulea alba, PE, superficial venous thrombosis    Workup includes but not limited to: Labs    Treatment includes but not limited to: [facility]    Critical care time: NA    Impression:   Right extremity femoral vein DVT    Disposition: Discharge    This note dictated using Dragon medical voice recognition software. Attempts at proofreading were made, but errors may occasionally still occur.            Teresa Harper MD  09/03/21 [id number]

## 2021-09-08 ENCOUNTER — OFFICE VISIT (OUTPATIENT)
Dept: FAMILY MEDICINE CLINIC | Age: 86
End: 2021-09-08
Payer: MEDICARE

## 2021-09-08 VITALS
SYSTOLIC BLOOD PRESSURE: 92 MMHG | HEART RATE: 82 BPM | DIASTOLIC BLOOD PRESSURE: 54 MMHG | HEIGHT: 72 IN | WEIGHT: 179 LBS | OXYGEN SATURATION: 98 % | BODY MASS INDEX: 24.24 KG/M2

## 2021-09-08 DIAGNOSIS — D72.829 LEUKOCYTOSIS, UNSPECIFIED TYPE: ICD-10-CM

## 2021-09-08 DIAGNOSIS — I82.401 RECURRENT ACUTE DEEP VEIN THROMBOSIS (DVT) OF RIGHT LOWER EXTREMITY (HCC): Primary | ICD-10-CM

## 2021-09-08 DIAGNOSIS — Z23 FLU VACCINE NEED: ICD-10-CM

## 2021-09-08 PROCEDURE — 99214 OFFICE O/P EST MOD 30 MIN: CPT | Performed by: FAMILY MEDICINE

## 2021-09-08 PROCEDURE — G8427 DOCREV CUR MEDS BY ELIG CLIN: HCPCS | Performed by: FAMILY MEDICINE

## 2021-09-08 PROCEDURE — G8420 CALC BMI NORM PARAMETERS: HCPCS | Performed by: FAMILY MEDICINE

## 2021-09-08 PROCEDURE — 1036F TOBACCO NON-USER: CPT | Performed by: FAMILY MEDICINE

## 2021-09-08 PROCEDURE — 4040F PNEUMOC VAC/ADMIN/RCVD: CPT | Performed by: FAMILY MEDICINE

## 2021-09-08 PROCEDURE — G0008 ADMIN INFLUENZA VIRUS VAC: HCPCS | Performed by: FAMILY MEDICINE

## 2021-09-08 PROCEDURE — 1123F ACP DISCUSS/DSCN MKR DOCD: CPT | Performed by: FAMILY MEDICINE

## 2021-09-08 PROCEDURE — 90694 VACC AIIV4 NO PRSRV 0.5ML IM: CPT | Performed by: FAMILY MEDICINE

## 2021-09-08 NOTE — PROGRESS NOTES
Vaccine Information Sheet, \"Influenza - Inactivated\"  given to Janis Ball, or parent/legal guardian of  Janis Ball and verbalized understanding. Patient responses:    Have you ever had a reaction to a flu vaccine? No  Do you have any current illness? No  Have you ever had [place] Syndrome? No  Do you have a serious allergy to any of the follow: Neomycin, Polymyxin, Thimerosal, eggs or egg products? No    Flu vaccine given per order. Please see immunization tab. Risks and benefits explained. Current VIS given.

## 2021-09-09 NOTE — PROGRESS NOTES
9/8/2021    Ramses Ac    Chief Complaint   Patient presents with    Other     right lower leg dvt    Other     no c/o's    Other     need to discuss blood thinner       HPI    Parag Gonzalez is a 80 y.o. male who presents today with follow-up. Patient was recently found to have significant increased swelling of his right leg over his left leg. Dr. Whiting Media wound a DVT. Patient sent to the emergency room. ER documented that they did not start Eliquis due to renal insufficiency although patient's level of renal insufficiency does not rule out Eliquis. He would be on 2.5 twice daily but patient under evaluation for leukocytosis and if he has a heme-onc issue then I agree he is safer on heparin.     REVIEW OF SYSTEMS    Constitutional:  Denies fever, chills, weight loss or weakness  Eyes:  no photophobia or discharge  ENT:  no sore throat or ear pain  Cardiovascular:  Denies chest pain, palpitations or swelling  Respiratory:  Denies cough or shortness of breath  GI:  no abdominal pain, nausea, vomiting, or diarrhea  Musculoskeletal:  no back pain  Skin:  No rashes  Neurologic:  no headache, focal weakness, or sensory changes  Endocrine:  no polyuria or polydipsia      PAST MEDICAL HISTORY  Past Medical History:   Diagnosis Date    Anemia     Anxiety     Arthritis     BPH with urinary obstruction     Depression     GERD (gastroesophageal reflux disease)     Hemorrhoids     Hepatitis     Hernia of unspecified site of abdominal cavity without mention of obstruction or gangrene     Hyperlipidemia     Hypotension     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    Rt Tricep, Lt Superior Forearm       FAMILY HISTORY  Family History   Problem Relation Age of Onset    Depression Mother     Diabetes Mother     COPD Father     Diabetes Brother     Diabetes Maternal Grandmother        SOCIAL HISTORY  Social History     Socioeconomic History    Marital status:      Spouse name: Not on file    Number of children: Not on file    Years of education: Not on file    Highest education level: Not on file   Occupational History    Not on file   Tobacco Use    Smoking status: Former Smoker     Packs/day: 1.00     Years: 5.00     Pack years: 5.00     Types: Cigarettes     Start date: 1950     Quit date: 1955     Years since quittin.8    Smokeless tobacco: Never Used   Substance and Sexual Activity    Alcohol use: Not Currently    Drug use: Not Currently    Sexual activity: Not on file   Other Topics Concern    Not on file   Social History Narrative    Not on file     Social Determinants of Health     Financial Resource Strain: Low Risk     Difficulty of Paying Living Expenses: Not hard at all   Food Insecurity: No Food Insecurity    Worried About [address] in the Last Year: Never true    [address]  [employer] in the Last Year: Never true   Transportation Needs:     Lack of Transportation (Medical):      Lack of Transportation (Non-Medical):    Physical Activity:     Days of Exercise per Week:     Minutes of Exercise per Session:    Stress:     Feeling of Stress :    Social Connections:     Frequency of Communication with Friends and Family:     Frequency of Social Gatherings with Friends and Family:     Attends Amish Services:     Active Member of Clubs or Organizations:     Attends Club or Organization Meetings:     Marital Status:    Intimate Partner Violence:     Fear of Current or Ex-Partner:     Emotionally Abused:     Physically Abused:     Sexually Abused:         SURGICAL HISTORY  Past Surgical History:   Procedure Laterality Date    CATARACT REMOVAL      CYSTOSCOPY N/A 3/29/2021    CYSTOSCOPY EVACUATION OF CLOTS, BLADDER FULGERATION, AND SUPRA-PUBIC CATHETER INSERTION performed by Reuben Toribio MD at [address] 2021    CYSTOSCOPY, PROSTATE FULGURATION, EVACUATION OF CLOTS & TURP performed by Rachelle Lyons MD at [address] [facility] current facility-administered medications for this visit. ALLERGIES  Allergies   Allergen Reactions    Minocycline Other (See Comments)       PHYSICAL EXAM  BP (!) 92/54   Pulse 82   Ht 6' (1.829 m)   Wt 179 lb (81.2 kg)   SpO2 98%   BMI 24.28 kg/m²     ASSESSMENT & PLAN    1. 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 Jeff Durbin  As well as reviewed indications and doses of anticoagulants  Remain on heparin for now. Patient dosed appropriately 1 mg/kg twice daily. Will perfect serve Dr. Jeff Durbin to see his recommendations on anticoagulation at this point. Refilled for 2 more weeks of Lovenox    - enoxaparin (LOVENOX) 80 MG/0.8ML injection; Inject 0.8 mLs into the skin 2 times daily for 7 days  Dispense: 11.2 mL; Refill: 0    2. Flu vaccine need  - INFLUENZA, QUADV, ADJUVANTED, 65 YRS =, IM, PF, PREFILL SYR, 0.5ML (FLUAD)    3. Leukocytosis, unspecified type  We will contact Dr. Jeff Durbin to see his recommendations.     Patient billed off total time-30minutes  5 minutes prechart review  20 minutes spent with patient  5 minutes creating note            Electronically signed by Amparo Yo MD on 9/8/2021

## 2021-09-10 ENCOUNTER — TELEPHONE (OUTPATIENT)
Dept: FAMILY MEDICINE CLINIC | Age: 86
End: 2021-09-10

## 2021-09-10 NOTE — TELEPHONE ENCOUNTER
To try to help make patient stronger. Ask family to decrease patient's metoprolol XL 25 mg to 1/2 pill daily. (Please changes on the med list) I would like to see his blood pressure higher.   Thanks

## 2021-09-14 ENCOUNTER — TELEPHONE (OUTPATIENT)
Dept: FAMILY MEDICINE CLINIC | Age: 86
End: 2021-09-14

## 2021-09-15 ENCOUNTER — TELEPHONE (OUTPATIENT)
Dept: FAMILY MEDICINE CLINIC | Age: 86
End: 2021-09-15

## 2021-09-15 NOTE — TELEPHONE ENCOUNTER
Pt's son states pt was seen 09/08/21 low bp. Son wanted to inform pt not taking any bp meds as of 6/2021. Pt wasn't taking bp med the day of that appt. Asked son radha is pt's current bp.  Son states he will check later today and call back

## 2021-09-15 NOTE — TELEPHONE ENCOUNTER
----- Message from Liborio Christopher sent at 9/14/2021  4:41 PM EDT -----  Subject: Message to Provider    QUESTIONS  Information for Provider? pt. son, Valeria Nyhan, returned call for [facility]. Office was closed at the time of call. Please call Valeria Nyhan at your   earliest convenience. ---------------------------------------------------------------------------  --------------  Dimple MORRIS  What is the best way for the office to contact you? OK to leave message on   voicemail  Preferred Call Back Phone Number? [phone]  ---------------------------------------------------------------------------  --------------  SCRIPT ANSWERS  Relationship to Patient? Other  Representative Name? Caralyn Cowden   Is the Representative on the appropriate HIPAA document in Epic?  Yes

## 2021-09-16 ENCOUNTER — TELEPHONE (OUTPATIENT)
Dept: FAMILY MEDICINE CLINIC | Age: 86
End: 2021-09-16

## 2021-09-16 NOTE — TELEPHONE ENCOUNTER
Patients son Álvaro An called and stated that Dr Al Park ws going to consult with Dr Octavio Whitaker about the Injections/for blood thinning and wanted to change to oral. Has this happened?  Patient has enough injections until Sat 9-18-21  Call Álvaro An

## 2021-09-17 ENCOUNTER — TELEPHONE (OUTPATIENT)
Dept: FAMILY MEDICINE CLINIC | Age: 86
End: 2021-09-17

## 2021-09-17 NOTE — TELEPHONE ENCOUNTER
Thank you for reminding me. I spoke with him the next day and forgot to get back with you. Dr. Keron Murdock said that it would be fine for your dad to use Eliquis. We both decided on the dose of 2.5 mg 1 twice a day. Please finish the Lovenox. The first morning when there is no Lovenox, please start the Eliquis. (Stella Lindquist can you send in number 60 pills refill 5). When patient is on Eliquis, he is not to use any Motrin, ibuprofen or Aleve which could hurt his stomach and increase chance of a bleed.  Thanks

## 2021-09-17 NOTE — TELEPHONE ENCOUNTER
----- Message from Hilario Moraels sent at 9/17/2021 10:15 AM EDT -----  Subject: Message to Provider    QUESTIONS  Information for Provider? Friday joe (Friend) called in Parag Gonzalez Blood Pressure   and Heart Rate. BP is 117/68 and Heart Rate is 80   ---------------------------------------------------------------------------  --------------  CALL BACK INFO  What is the best way for the office to contact you? OK to leave message on   voicemail  Preferred Call Back Phone Number? [phone]  ---------------------------------------------------------------------------  --------------  SCRIPT ANSWERS  Relationship to Patient? Third Party  Representative Name?  Friday joe

## 2021-10-04 ENCOUNTER — TELEPHONE (OUTPATIENT)
Dept: FAMILY MEDICINE CLINIC | Age: 86
End: 2021-10-04

## 2021-10-04 NOTE — TELEPHONE ENCOUNTER
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 Jose Watson WAS CALLING TO SEE IF DELIA NEEDS TO GET THE BOOSTER SHOT. IF SOMEONE COULD GIVE HER A CALL BACK.    [phone]

## 2021-10-05 ENCOUNTER — TELEPHONE (OUTPATIENT)
Dept: FAMILY MEDICINE CLINIC | Age: 86
End: 2021-10-05

## 2021-10-05 NOTE — TELEPHONE ENCOUNTER
Yes, we do recommend a Covid booster. Please call the [address] [place] [address] or your local pharmacy to make a Covid booster shot appointment.   Thanks

## 2021-10-05 NOTE — TELEPHONE ENCOUNTER
[facility] giver and friend called and stated that patient is having Left ear pain.  Offered an appt and unable to get patient here

## 2021-10-06 ENCOUNTER — OFFICE VISIT (OUTPATIENT)
Dept: FAMILY MEDICINE CLINIC | Age: 86
End: 2021-10-06
Payer: MEDICARE

## 2021-10-06 VITALS
HEIGHT: 72 IN | HEART RATE: 73 BPM | DIASTOLIC BLOOD PRESSURE: 82 MMHG | OXYGEN SATURATION: 96 % | SYSTOLIC BLOOD PRESSURE: 124 MMHG | TEMPERATURE: 98.5 F | WEIGHT: 183 LBS | BODY MASS INDEX: 24.79 KG/M2

## 2021-10-06 DIAGNOSIS — L98.491 SKIN ULCER, LIMITED TO BREAKDOWN OF SKIN (HCC): ICD-10-CM

## 2021-10-06 DIAGNOSIS — H92.02 LEFT EAR PAIN: Primary | ICD-10-CM

## 2021-10-06 PROCEDURE — G8427 DOCREV CUR MEDS BY ELIG CLIN: HCPCS | Performed by: FAMILY MEDICINE

## 2021-10-06 PROCEDURE — 99213 OFFICE O/P EST LOW 20 MIN: CPT | Performed by: FAMILY MEDICINE

## 2021-10-06 PROCEDURE — G8484 FLU IMMUNIZE NO ADMIN: HCPCS | Performed by: FAMILY MEDICINE

## 2021-10-06 PROCEDURE — 1123F ACP DISCUSS/DSCN MKR DOCD: CPT | Performed by: FAMILY MEDICINE

## 2021-10-06 PROCEDURE — 4040F PNEUMOC VAC/ADMIN/RCVD: CPT | Performed by: FAMILY MEDICINE

## 2021-10-06 PROCEDURE — 1036F TOBACCO NON-USER: CPT | Performed by: FAMILY MEDICINE

## 2021-10-06 PROCEDURE — G8420 CALC BMI NORM PARAMETERS: HCPCS | Performed by: FAMILY MEDICINE

## 2021-10-06 NOTE — PROGRESS NOTES
 Not on file     Social Determinants of Health     Financial Resource Strain: Low Risk     Difficulty of Paying Living Expenses: Not hard at all   Food Insecurity: No Food Insecurity    Worried About Running Out of Food in the Last Year: Never true    [address] in the Last Year: Never true   Transportation Needs:     Lack of Transportation (Medical):      Lack of Transportation (Non-Medical):    Physical Activity:     Days of Exercise per Week:     Minutes of Exercise per Session:    Stress:     Feeling of Stress :    Social Connections:     Frequency of Communication with Friends and Family:     Frequency of Social Gatherings with Friends and Family:     Attends Confucianist Services:     Active Member of Clubs or Organizations:     Attends Club or Organization Meetings:     Marital Status:    Intimate Partner Violence:     Fear of Current or Ex-Partner:     Emotionally Abused:     Physically Abused:     Sexually Abused:         SURGICAL HISTORY  Past Surgical History:   Procedure Laterality Date    CATARACT REMOVAL      CYSTOSCOPY N/A 3/29/2021    CYSTOSCOPY EVACUATION OF CLOTS, BLADDER FULGERATION, AND SUPRA-PUBIC CATHETER INSERTION performed by Purvis Crigler, MD at [address] [facility] 4/1/2021    CYSTOSCOPY, PROSTATE FULGURATION, EVACUATION OF CLOTS & TURP performed by Chiquita Hatch MD at [address] N/[address] 3/4/2021    LAPAROSCOPIC [address] performed by Dayami Gaona MD at [place] [address]  Current Outpatient Medications   Medication Sig Dispense Refill    apixaban (ELIQUIS) 2.5 MG TABS tablet Take 1 tablet by mouth 2 times daily 60 tablet 5    predniSONE (DELTASONE) 20 MG tablet prednisone 20 mg tablet   TAKE 3 TABS A DAY X7 DAYS,2 TABS A DAY X7 DAYS THEN 1 TAB A DAY X14 DAYS      sulfamethoxazole-trimethoprim (BACTRIM DS;SEPTRA DS) 800-160 MG per tablet sulfamethoxazole 800 mg-trimethoprim 160 mg tablet      furosemide (LASIX) 20 MG tablet Take 0.5 tablets by mouth every other day 30 tablet 1    potassium chloride (KLOR-CON M) 20 MEQ extended release tablet Take 1 tablet by mouth every other day 90 tablet 1    polyethylene glycol (GLYCOLAX) 17 GM/SCOOP powder Take 17 g by mouth daily      metoprolol succinate (TOPROL XL) 25 MG extended release tablet Take 1 tablet by mouth every evening 90 tablet 1    finasteride (PROSCAR) 5 MG tablet Take 1 tablet by mouth daily 90 tablet 1    pantoprazole (PROTONIX) 20 MG tablet Take 1 tablet by mouth daily 90 tablet 1    levothyroxine (SYNTHROID) 75 MCG tablet Take 1 tablet by mouth daily 90 tablet 1    acetaminophen (TYLENOL) 325 MG tablet Take 650 mg by mouth every 6 hours as needed for Pain      sertraline (ZOLOFT) 50 MG tablet Take 50 mg by mouth daily      ferrous sulfate (IRON 325) 325 (65 Fe) MG tablet Take 1 tablet by mouth daily (with breakfast) 90 tablet 1    temazepam (RESTORIL) 7.5 MG capsule Take 7.5 mg by mouth nightly as needed for Sleep.  QUEtiapine (SEROQUEL) 50 MG tablet Take 50 mg by mouth 2 times daily       tamsulosin (FLOMAX) 0.4 MG capsule Take 1 capsule by mouth daily. (Patient taking differently: Take 0.8 mg by mouth daily 01/11/17 Pt to take 2- .04 mg capsules at bedtime) 30 capsule 12     No current facility-administered medications for this visit. ALLERGIES  Allergies   Allergen Reactions    Minocycline Other (See Comments)       PHYSICAL EXAM  /82   Pulse 73   Temp 98.5 °F (36.9 °C)   Ht 6' (1.829 m)   Wt 183 lb (83 kg)   SpO2 96%   BMI 24.82 kg/m²   Right TM and EAC were normal  Left EAC had yellow soft wax but obstructing  Top of his outer ear had a 7 x 5 mm ulceration    ASSESSMENT & PLAN    1. Left ear pain  Wax impaction  -Cleared by MA/Nuris    2.  Skin ulcer, limited to breakdown of skin ([address]  Rule out skin cancer  Consult dermatology    - [facility] Johnny Catalan DO, Dermatology, [place]           Electronically signed by Katherine Altamirano MD on 10/6/2021

## 2021-10-12 LAB
BCR/ABL T(9,22): NORMAL
COMMENT: NORMAL

## 2021-10-20 ENCOUNTER — OFFICE VISIT (OUTPATIENT)
Dept: FAMILY MEDICINE CLINIC | Age: 86
End: 2021-10-20
Payer: MEDICARE

## 2021-10-20 VITALS
SYSTOLIC BLOOD PRESSURE: 104 MMHG | HEIGHT: 72 IN | HEART RATE: 91 BPM | DIASTOLIC BLOOD PRESSURE: 64 MMHG | BODY MASS INDEX: 24.65 KG/M2 | OXYGEN SATURATION: 94 % | WEIGHT: 182 LBS

## 2021-10-20 DIAGNOSIS — N13.8 BPH WITH URINARY OBSTRUCTION: ICD-10-CM

## 2021-10-20 DIAGNOSIS — R60.0 BILATERAL LEG EDEMA: ICD-10-CM

## 2021-10-20 DIAGNOSIS — N40.1 BPH WITH URINARY OBSTRUCTION: ICD-10-CM

## 2021-10-20 DIAGNOSIS — F51.01 PRIMARY INSOMNIA: ICD-10-CM

## 2021-10-20 DIAGNOSIS — F32.1 CURRENT MODERATE EPISODE OF MAJOR DEPRESSIVE DISORDER, UNSPECIFIED WHETHER RECURRENT (HCC): ICD-10-CM

## 2021-10-20 DIAGNOSIS — E03.9 ACQUIRED HYPOTHYROIDISM: Primary | ICD-10-CM

## 2021-10-20 DIAGNOSIS — R40.0 DAYTIME SOMNOLENCE: ICD-10-CM

## 2021-10-20 LAB
ANION GAP SERPL CALCULATED.3IONS-SCNC: 15 MMOL/L (ref 3–16)
BASOPHILS ABSOLUTE: 0.1 K/UL (ref 0–0.2)
BASOPHILS RELATIVE PERCENT: 0.9 %
BUN BLDV-MCNC: 34 MG/DL (ref 7–20)
CALCIUM SERPL-MCNC: 9.1 MG/DL (ref 8.3–10.6)
CHLORIDE BLD-SCNC: 106 MMOL/L (ref 99–110)
CO2: 16 MMOL/L (ref 21–32)
CREAT SERPL-MCNC: 2.5 MG/DL (ref 0.8–1.3)
EOSINOPHILS ABSOLUTE: 0.4 K/UL (ref 0–0.6)
EOSINOPHILS RELATIVE PERCENT: 7.6 %
GFR AFRICAN AMERICAN: 29
GFR NON-AFRICAN AMERICAN: 24
GLUCOSE BLD-MCNC: 100 MG/DL (ref 70–99)
HCT VFR BLD CALC: 33.5 % (ref 40.5–52.5)
HEMOGLOBIN: 10.9 G/DL (ref 13.5–17.5)
LYMPHOCYTES ABSOLUTE: 1.1 K/UL (ref 1–5.1)
LYMPHOCYTES RELATIVE PERCENT: 18.9 %
MCH RBC QN AUTO: 30.5 PG (ref 26–34)
MCHC RBC AUTO-ENTMCNC: 32.5 G/DL (ref 31–36)
MCV RBC AUTO: 93.7 FL (ref 80–100)
MONOCYTES ABSOLUTE: 0.7 K/UL (ref 0–1.3)
MONOCYTES RELATIVE PERCENT: 11.6 %
NEUTROPHILS ABSOLUTE: 3.5 K/UL (ref 1.7–7.7)
NEUTROPHILS RELATIVE PERCENT: 61 %
PDW BLD-RTO: 16.7 % (ref 12.4–15.4)
PLATELET # BLD: 225 K/UL (ref 135–450)
PMV BLD AUTO: 7.8 FL (ref 5–10.5)
POTASSIUM SERPL-SCNC: 4.6 MMOL/L (ref 3.5–5.1)
RBC # BLD: 3.57 M/UL (ref 4.2–5.9)
SODIUM BLD-SCNC: 137 MMOL/L (ref 136–145)
WBC # BLD: 5.7 K/UL (ref 4–11)

## 2021-10-20 PROCEDURE — 99214 OFFICE O/P EST MOD 30 MIN: CPT | Performed by: FAMILY MEDICINE

## 2021-10-20 PROCEDURE — G8484 FLU IMMUNIZE NO ADMIN: HCPCS | Performed by: FAMILY MEDICINE

## 2021-10-20 PROCEDURE — 1123F ACP DISCUSS/DSCN MKR DOCD: CPT | Performed by: FAMILY MEDICINE

## 2021-10-20 PROCEDURE — G8427 DOCREV CUR MEDS BY ELIG CLIN: HCPCS | Performed by: FAMILY MEDICINE

## 2021-10-20 PROCEDURE — 1036F TOBACCO NON-USER: CPT | Performed by: FAMILY MEDICINE

## 2021-10-20 PROCEDURE — G8420 CALC BMI NORM PARAMETERS: HCPCS | Performed by: FAMILY MEDICINE

## 2021-10-20 PROCEDURE — 4040F PNEUMOC VAC/ADMIN/RCVD: CPT | Performed by: FAMILY MEDICINE

## 2021-10-20 RX ORDER — QUETIAPINE FUMARATE 25 MG/1
25 TABLET, FILM COATED ORAL 2 TIMES DAILY
Qty: 60 TABLET | Refills: 5 | Status: SHIPPED | OUTPATIENT
Start: 2021-10-20 | End: 2022-02-28

## 2021-10-20 NOTE — PROGRESS NOTES
10/20/2021    Jordi Gleason    Chief Complaint   Patient presents with    Other     6 week f/u bp - still holding metoprolol    Other     no c/o's       HPI    Nuris Reese is a 80 y.o. male who presents today with follow-up. Patient continues to do well. His blood pressure remains low for his age. He remains holding his Toprol 25 mg daily at this point. Although the wax did not clear from his years at last visit his pain complaint from the ears has resolved. They declined an evaluation today. Patient notes benefit from the Restoril 7.5 mg and wishes to remain on it. Family does note the patient is drowsy during the first half of the day. Not as much in the afternoon evening. Patient's depression has been well controlled. Family is willing to let me adjust the Seroquel.   Patient continues to see Dr. Kulkarni Donte:  Denies fever, chills, weight loss or weakness  Eyes:  no photophobia or discharge  ENT:  no sore throat or ear pain  Cardiovascular:  Denies chest pain, palpitations or swelling  Respiratory:  Denies cough or shortness of breath  GI:  no abdominal pain, nausea, vomiting, or diarrhea  Musculoskeletal:  no back pain  Skin:  No rashes  Neurologic:  no headache, focal weakness, or sensory changes  Endocrine:  no polyuria or polydipsia      PAST MEDICAL HISTORY  Past Medical History:   Diagnosis Date    Anemia     Anxiety     Arthritis     BPH with urinary obstruction     Depression     GERD (gastroesophageal reflux disease)     Hemorrhoids     Hepatitis     Hernia of unspecified site of abdominal cavity without mention of obstruction or gangrene     Hyperlipidemia     Hypotension     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    Rt Tricep, Lt Superior Forearm       FAMILY HISTORY  Family History   Problem Relation Age of Onset    Depression Mother     Diabetes Mother     COPD Father     Diabetes Brother     Diabetes Maternal Grandmother SOCIAL HISTORY  Social History     Socioeconomic History    Marital status:      Spouse name: Not on file    Number of children: Not on file    Years of education: Not on file    Highest education level: Not on file   Occupational History    Not on file   Tobacco Use    Smoking status: Former Smoker     Packs/day: 1.00     Years: 5.00     Pack years: 5.00     Types: Cigarettes     Start date: 1950     Quit date: 1955     Years since quittin.0    Smokeless tobacco: Never Used   Substance and Sexual Activity    Alcohol use: Not Currently    Drug use: Not Currently    Sexual activity: Not on file   Other Topics Concern    Not on file   Social History Narrative    Not on file     Social Determinants of Health     Financial Resource Strain: Low Risk     Difficulty of Paying Living Expenses: Not hard at all   Food Insecurity: No Food Insecurity    Worried About [address] in the Last Year: Never true    [address] [facility] in the Last Year: Never true   Transportation Needs:     Lack of Transportation (Medical):      Lack of Transportation (Non-Medical):    Physical Activity:     Days of Exercise per Week:     Minutes of Exercise per Session:    Stress:     Feeling of Stress :    Social Connections:     Frequency of Communication with Friends and Family:     Frequency of Social Gatherings with Friends and Family:     Attends Baptism Services:     Active Member of Clubs or Organizations:     Attends Club or Organization Meetings:     Marital Status:    Intimate Partner Violence:     Fear of Current or Ex-Partner:     Emotionally Abused:     Physically Abused:     Sexually Abused:         SURGICAL HISTORY  Past Surgical History:   Procedure Laterality Date    CATARACT REMOVAL      CYSTOSCOPY N/A 3/29/2021    CYSTOSCOPY EVACUATION OF CLOTS, BLADDER FULGERATION, AND SUPRA-PUBIC CATHETER INSERTION performed by Purvis Crigler, MD at [facility] 4/1/2021    CYSTOSCOPY, PROSTATE FULGURATION, EVACUATION OF CLOTS & TURP performed by Sandra Joy MD at [address] 3/4/2021    LAPAROSCOPIC LARGE HERNIA HIATAL REPAIR WITH GASTRIC OBSTRUCTION POSS OPEN performed by Allen Alexander MD at [place] [address]  Current Outpatient Medications   Medication Sig Dispense Refill    QUEtiapine (SEROQUEL) 25 MG tablet Take 1 tablet by mouth 2 times daily 60 tablet 5    apixaban (ELIQUIS) 2.5 MG TABS tablet Take 1 tablet by mouth 2 times daily 60 tablet 5    predniSONE (DELTASONE) 20 MG tablet prednisone 20 mg tablet   TAKE 3 TABS A DAY X7 DAYS,2 TABS A DAY X7 DAYS THEN 1 TAB A DAY X14 DAYS      sulfamethoxazole-trimethoprim (BACTRIM DS;SEPTRA DS) 800-160 MG per tablet sulfamethoxazole 800 mg-trimethoprim 160 mg tablet      furosemide (LASIX) 20 MG tablet Take 0.5 tablets by mouth every other day 30 tablet 1    potassium chloride (KLOR-CON M) 20 MEQ extended release tablet Take 1 tablet by mouth every other day 90 tablet 1    polyethylene glycol (GLYCOLAX) 17 GM/SCOOP powder Take 17 g by mouth daily      finasteride (PROSCAR) 5 MG tablet Take 1 tablet by mouth daily 90 tablet 1    pantoprazole (PROTONIX) 20 MG tablet Take 1 tablet by mouth daily 90 tablet 1    levothyroxine (SYNTHROID) 75 MCG tablet Take 1 tablet by mouth daily 90 tablet 1    acetaminophen (TYLENOL) 325 MG tablet Take 650 mg by mouth every 6 hours as needed for Pain      sertraline (ZOLOFT) 50 MG tablet Take 50 mg by mouth daily      ferrous sulfate (IRON 325) 325 (65 Fe) MG tablet Take 1 tablet by mouth daily (with breakfast) 90 tablet 1    temazepam (RESTORIL) 7.5 MG capsule Take 7.5 mg by mouth nightly as needed for Sleep.  tamsulosin (FLOMAX) 0.4 MG capsule Take 1 capsule by mouth daily.  (Patient taking differently: Take 0.8 mg by mouth daily 01/11/17 Pt to take 2- .04 mg capsules at bedtime) 30 capsule 12    metoprolol succinate (TOPROL XL) 25 MG extended release tablet Take 1 tablet by mouth every evening (Patient not taking: Reported on 10/20/2021) 90 tablet 1     No current facility-administered medications for this visit. ALLERGIES  Allergies   Allergen Reactions    Minocycline Other (See Comments)       PHYSICAL EXAM  /64   Pulse 91   Ht 6' (1.829 m)   Wt 182 lb (82.6 kg)   SpO2 94%   BMI 24.68 kg/m²     ASSESSMENT & PLAN    1. Acquired hypothyroidism  Reviewed labs. At goal.  Remain on same    2. Primary insomnia  Reviewed [place]. No sign of abuse or diversion  Refill the same    3. BPH with urinary obstruction  Issue controlled. Continue meds. Refilled meds. 4. Bilateral leg edema  Issue is stable check labs today. Adjust medication off of lab results. - CBC Auto Differential; Future  - Basic Metabolic Panel; Future    5. Current moderate episode of major depressive disorder, unspecified whether recurrent (HCC)  Decrease the Seroquel to 25 mg twice daily and will keep it 100 mg at night. Patient has two different prescriptions    - QUEtiapine (SEROQUEL) 25 MG tablet; Take 1 tablet by mouth 2 times daily  Dispense: 60 tablet; Refill: 5    6. Daytime somnolence  See #5    7. Generalized weakness  Attempting to improve sedation and improve sleep. Will complete [facility] forms to hopefully get some assistance around the house.        Electronically signed by Stella Vallejo MD on 10/20/2021

## 2021-11-01 ENCOUNTER — TELEPHONE (OUTPATIENT)
Dept: FAMILY MEDICINE CLINIC | Age: 86
End: 2021-11-01

## 2021-11-30 DIAGNOSIS — N40.1 BPH WITH URINARY OBSTRUCTION: ICD-10-CM

## 2021-11-30 DIAGNOSIS — N13.8 BPH WITH URINARY OBSTRUCTION: ICD-10-CM

## 2021-11-30 RX ORDER — FINASTERIDE 5 MG/1
TABLET, FILM COATED ORAL
Qty: 90 TABLET | Refills: 1 | Status: SHIPPED | OUTPATIENT
Start: 2021-11-30 | End: 2022-05-02

## 2021-11-30 NOTE — PROGRESS NOTES
Patient Name:  Trisha Espinoza  Patient :  10/18/1930  Patient MRN:  [id number]     Primary Oncologist: Robert Foss MD  Referring Provider: Gumaro Pizano MD     Date of Service: 2021         Chief Complaint:    No chief complaint on file. He came in for follow up visit. Patient Active Problem List:     Hyperlipidemia     Depression     Primary insomnia     Dysuria     Vitamin D deficiency     Seborrheic keratosis, inflamed     Actinic keratosis     Seborrheic keratosis     SCC (squamous cell carcinoma)     Varicose 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     Anxiety     BPH with urinary obstruction     Acquired hypothyroidism     UGIB (upper gastrointestinal bleed)     Hematuria     Hyperglycemia     Bullous pemphigoid     Atelectasis       HPI:   Trisha Espinoza is a pleasant 80-year-old male patient who was referred for abnormal lab. On 2021 CBC showed left shift with myelocytes, metamyelocytes, promyelocytes, bands. WBC was 10.3. Hg 10.5 and platelet 992. He is followed by dermatologist for history bullous pemphigoid and skin cancer. He had laparoscopic repair of incarcerated hiatal hernia with partial fundoplication on 7928 by Dr Arma Fleischer. He received one unit PRBC. In 2021 he had gross hematuria likely related to borden catheter trauma s/p cystoscopy TURP, bladder fulguration. He has BPH and is on flomax and proscar. He is not a very good historian. We reviewed BCR ABL which was negative. 21 flow cytometry:  Final Pathologic Diagnosis:   Peripheral blood; Flow Cytometry Analysis:   -     No diagnostic immunophenotypic abnormalities detected. On 2021 he came in for follow-up visit.     Currently he is on bactrim for UTI. He had hematuria. I recommend to take oral Iron daily OTC. He will follow up with urologist, Dr Mahnaz Deluca. CBC on 2021 showed Hg 10.3, WBC 7.7 and platelet 206. Creatinine was 2.9. He has hearing problem.   No acute pain.  Denied any nausea, vomiting or diarrhea. No fever or chills. No chest pain, shortness of breath or palpitation. No headache or dizzy spell. No specific bone pain. No melena or hematochezia. Denied any dysuria or hematuria. 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    Rt Tricep, Lt Superior Forearm        Past Surgery History:    Past Surgical History:   Procedure Laterality Date    CATARACT REMOVAL      CYSTOSCOPY N/A 3/29/2021    CYSTOSCOPY EVACUATION OF CLOTS, BLADDER FULGERATION, AND SUPRA-PUBIC CATHETER INSERTION performed by Nusrat Samaniego MD at [address] [facility] 4/1/2021    CYSTOSCOPY, PROSTATE FULGURATION, EVACUATION OF CLOTS & TURP performed by Chitra Gallardo MD at [address] N/A 3/4/2021    LAPAROSCOPIC LARGE HERNIA HIATAL REPAIR WITH GASTRIC OBSTRUCTION POSS OPEN performed by Sejal Park MD at [address] [facility] History:   He smoked one PPD for 5 years and quit in 1955. Denied EtOH or illicit drug use. He has 3 children. Family History:   No cancer. Review of Systems: The remainder of ROS is unremarkable. Vital Signs: There were no vitals taken for this visit. Physical Exam:  CONSTITUTIONAL: awake, alert, cooperative, no apparent distress  EYES: pupils equal, round and reactive to light, sclera clear and conjunctiva pallor  ENT: Normocephalic, without obvious abnormality, atraumatic; [place]  NECK: supple, symmetrical. No JVD.   HEMATOLOGIC/LYMPHATIC: no cervical, supraclavicular or axillary lymphadenopathy   LUNGS: no increased work of breathing and clear to auscultation   CARDIOVASCULAR: regular rate and rhythm, normal S1 and S2, no murmur  ABDOMEN: normal bowel sound, soft, non-distended, non-tender  MUSCULOSKELETAL: full range of motion noted, tone is normal  NEUROLOGIC: awake, alert, oriented to name, place and time. Motor skills grossly intact. Cranial nerves II through XII grossly intact. SKIN: No jaundice. Dry skin. EXTREMITIES: no LE edema  or cyanosis.        Labs:  Hematology:  Lab Results   Component Value Date    WBC 5.7 10/20/2021    RBC 3.57 (L) 10/20/2021    HGB 10.9 (L) 10/20/2021    HCT 33.5 (L) 10/20/2021    MCV 93.7 10/20/2021    MCH 30.5 10/20/2021    MCHC 32.5 10/20/2021    RDW 16.7 (H) 10/20/2021     10/20/2021    MPV 7.8 10/20/2021    BANDSPCT 1 (L) 07/22/2021    SEGSPCT 78.0 (H) 09/03/2021    EOSRELPCT 7.6 10/20/2021    BASOPCT 0.9 10/20/2021    LYMPHOPCT 18.9 10/20/2021    MONOPCT 11.6 10/20/2021    BANDABS 0.10 07/22/2021    SEGSABS 5.7 09/03/2021    EOSABS 0.4 10/20/2021    BASOSABS 0.1 10/20/2021    LYMPHSABS 1.1 10/20/2021    MONOSABS 0.7 10/20/2021    DIFFTYPE MANUAL DIFFERENTIAL 09/03/2021    ANISOCYTOSIS 1+ 09/03/2021    POLYCHROM 1+ 03/09/2021    WBCMORP RARE 03/09/2021     Chemistry:  Lab Results   Component Value Date     10/20/2021    K 4.6 10/20/2021     10/20/2021    CO2 16 (L) 10/20/2021    BUN 34 (H) 10/20/2021    CREATININE 2.5 (H) 10/20/2021    GLUCOSE 100 (H) 10/20/2021    CALCIUM 9.1 10/20/2021    PROT 6.1 (L) 08/10/2021    PROT 6.1 (L) 08/10/2021    LABALBU 3.9 08/10/2021    BILITOT 0.2 08/10/2021    ALKPHOS 137 (H) 08/10/2021    AST 13 (L) 08/10/2021    ALT 18 08/10/2021    LABGLOM 24 (A) 10/20/2021    GFRAA 29 (A) 10/20/2021    AGRATIO 1.4 01/21/2021    GLOB 2.1 01/21/2021    MG 1.9 03/10/2021     Lab Results   Component Value Date     08/10/2021     Lab Results   Component Value Date    TSHHS 2.420 08/10/2021    T4FREE 0.8 (L) 01/21/2021     Immunology:  Lab Results Component Value Date    PROT 6.1 (L) 08/10/2021    PROT 6.1 (L) 08/10/2021    SPEP INTERPRETATION - Within normal limits. RS 08/10/2021    ALBUMINELP 3.2 08/10/2021    LABALPH 0.3 08/10/2021    LABALPH 0.8 08/10/2021    LABBETA 0.9 08/10/2021    GAMGLOB 0.9 08/10/2021     Coagulation Panel:  Lab Results   Component Value Date    PROTIME 12.8 09/03/2021    INR 0.99 09/03/2021    APTT 37.8 (H) 03/26/2021     Anemia Panel:  Lab Results   Component Value Date    XSEWHJSZ57 560.6 08/10/2021    FOLATE 15.3 08/10/2021     Tumor Markers:  Lab Results   Component Value Date    PSA 6.95 (H) 09/21/2012        Observations:  No data recorded       Assessment & Plan:  1. He had left shift  WBC on peripheral blood. No signs of infection. 8/17/21 Flow cytometry was negative. BCR ABL negative. This is likely reactive. In December 2021 WBC was 7.7, hemoglobin 10.3, platelet 921. Absolute neutrophil was 5.3.    2. He has anemia. B-12 in July 2021 was 861.2. 8/10/21  Iron studies reviewed. In December 2021 hemoglobin was 10.3. I recommend to take daily OTC iron. I will check Iron study and CBC prior to next OV. He is on eliquis. 3. He has BPH. Followed by urologist. He is on bactrim for UTI with hematuria. RTC in 3 months or sooner. All of his questions have been answered for today.

## 2021-12-02 ENCOUNTER — HOSPITAL ENCOUNTER (OUTPATIENT)
Dept: INFUSION THERAPY | Age: 86
Discharge: HOME | End: 2021-12-02
Payer: MEDICARE

## 2021-12-02 DIAGNOSIS — D72.829 LEUKOCYTOSIS, UNSPECIFIED TYPE: ICD-10-CM

## 2021-12-02 LAB
ALBUMIN SERPL-MCNC: 3.8 GM/DL (ref 3.4–5)
ALP BLD-CCNC: 128 IU/L (ref 40–128)
ALT SERPL-CCNC: 13 U/L (ref 10–40)
ANION GAP SERPL CALCULATED.3IONS-SCNC: 14 MMOL/L (ref 4–16)
AST SERPL-CCNC: 13 IU/L (ref 15–37)
BASOPHILS ABSOLUTE: 0.1 K/CU MM
BASOPHILS RELATIVE PERCENT: 0.6 % (ref 0–1)
BILIRUB SERPL-MCNC: 0.2 MG/DL (ref 0–1)
BUN BLDV-MCNC: 34 MG/DL (ref 6–23)
CALCIUM SERPL-MCNC: 9.3 MG/DL (ref 8.3–10.6)
CHLORIDE BLD-SCNC: 105 MMOL/L (ref 99–110)
CO2: 20 MMOL/L (ref 21–32)
CREAT SERPL-MCNC: 2.9 MG/DL (ref 0.9–1.3)
DIFFERENTIAL TYPE: ABNORMAL
EOSINOPHILS ABSOLUTE: 0.3 K/CU MM
EOSINOPHILS RELATIVE PERCENT: 4.1 % (ref 0–3)
GFR AFRICAN AMERICAN: 25 ML/MIN/1.73M2
GFR NON-AFRICAN AMERICAN: 21 ML/MIN/1.73M2
GLUCOSE BLD-MCNC: 94 MG/DL (ref 70–99)
HCT VFR BLD CALC: 31.2 % (ref 42–52)
HEMOGLOBIN: 10.3 GM/DL (ref 13.5–18)
LACTATE DEHYDROGENASE: 172 IU/L (ref 120–246)
LYMPHOCYTES ABSOLUTE: 1.3 K/CU MM
LYMPHOCYTES RELATIVE PERCENT: 17.3 % (ref 24–44)
MCH RBC QN AUTO: 30.5 PG (ref 27–31)
MCHC RBC AUTO-ENTMCNC: 33 % (ref 32–36)
MCV RBC AUTO: 92.3 FL (ref 78–100)
MONOCYTES ABSOLUTE: 0.8 K/CU MM
MONOCYTES RELATIVE PERCENT: 9.7 % (ref 0–4)
PDW BLD-RTO: 14.7 % (ref 11.7–14.9)
PLATELET # BLD: 254 K/CU MM (ref 140–440)
PMV BLD AUTO: 8.5 FL (ref 7.5–11.1)
POTASSIUM SERPL-SCNC: 4.7 MMOL/L (ref 3.5–5.1)
RBC # BLD: 3.38 M/CU MM (ref 4.6–6.2)
SEGMENTED NEUTROPHILS ABSOLUTE COUNT: 5.3 K/CU MM
SEGMENTED NEUTROPHILS RELATIVE PERCENT: 68.3 % (ref 36–66)
SODIUM BLD-SCNC: 139 MMOL/L (ref 135–145)
TOTAL PROTEIN: 6.6 GM/DL (ref 6.4–8.2)
WBC # BLD: 7.7 K/CU MM (ref 4–10.5)

## 2021-12-02 PROCEDURE — 83615 LACTATE (LD) (LDH) ENZYME: CPT

## 2021-12-02 PROCEDURE — 80053 COMPREHEN METABOLIC PANEL: CPT

## 2021-12-02 PROCEDURE — 36415 COLL VENOUS BLD VENIPUNCTURE: CPT

## 2021-12-02 PROCEDURE — 85025 COMPLETE CBC W/AUTO DIFF WBC: CPT

## 2021-12-09 ENCOUNTER — HOSPITAL ENCOUNTER (OUTPATIENT)
Dept: INFUSION THERAPY | Age: 86
Discharge: HOME | End: 2021-12-09
Payer: MEDICARE

## 2021-12-09 ENCOUNTER — OFFICE VISIT (OUTPATIENT)
Dept: ONCOLOGY | Age: 86
End: 2021-12-09
Payer: MEDICARE

## 2021-12-09 VITALS
SYSTOLIC BLOOD PRESSURE: 118 MMHG | DIASTOLIC BLOOD PRESSURE: 55 MMHG | HEIGHT: 72 IN | WEIGHT: 184 LBS | HEART RATE: 92 BPM | OXYGEN SATURATION: 98 % | RESPIRATION RATE: 16 BRPM | BODY MASS INDEX: 24.92 KG/M2 | TEMPERATURE: 97.9 F

## 2021-12-09 DIAGNOSIS — E03.9 ACQUIRED HYPOTHYROIDISM: ICD-10-CM

## 2021-12-09 DIAGNOSIS — D64.9 ANEMIA, UNSPECIFIED TYPE: Primary | ICD-10-CM

## 2021-12-09 PROCEDURE — G8420 CALC BMI NORM PARAMETERS: HCPCS | Performed by: INTERNAL MEDICINE

## 2021-12-09 PROCEDURE — 4040F PNEUMOC VAC/ADMIN/RCVD: CPT | Performed by: INTERNAL MEDICINE

## 2021-12-09 PROCEDURE — G8484 FLU IMMUNIZE NO ADMIN: HCPCS | Performed by: INTERNAL MEDICINE

## 2021-12-09 PROCEDURE — G8427 DOCREV CUR MEDS BY ELIG CLIN: HCPCS | Performed by: INTERNAL MEDICINE

## 2021-12-09 PROCEDURE — 1036F TOBACCO NON-USER: CPT | Performed by: INTERNAL MEDICINE

## 2021-12-09 PROCEDURE — 99213 OFFICE O/P EST LOW 20 MIN: CPT | Performed by: INTERNAL MEDICINE

## 2021-12-09 PROCEDURE — 1123F ACP DISCUSS/DSCN MKR DOCD: CPT | Performed by: INTERNAL MEDICINE

## 2021-12-09 PROCEDURE — 99211 OFF/OP EST MAY X REQ PHY/QHP: CPT

## 2021-12-09 NOTE — PROGRESS NOTES
MA Rooming Questions  Patient: Kenneth Gustafson  MRN: [id number]    Date: 12/9/2021        1. Do you have any new issues? yes - currently on bactrim for UTI, also on new topical cream for skin         2. Do you need any refills on medications?    no    3. Have you had any imaging done since your last visit?   no    4. Have you been hospitalized or seen in the emergency room since your last visit here?   no    5. Did the patient have a depression screening completed today?  No    No data recorded     PHQ-9 Given to (if applicable):               PHQ-9 Score (if applicable):                     [] Positive     []  Negative              Does question #9 need addressed (if applicable)                     [] Yes    []  No               Judy Dash CMA

## 2021-12-10 RX ORDER — LEVOTHYROXINE SODIUM 0.07 MG/1
75 TABLET ORAL DAILY
Qty: 90 TABLET | Refills: 1 | Status: SHIPPED | OUTPATIENT
Start: 2021-12-10 | End: 2022-05-02

## 2022-01-21 ENCOUNTER — TELEPHONE (OUTPATIENT)
Dept: FAMILY MEDICINE CLINIC | Age: 87
End: 2022-01-21

## 2022-01-21 NOTE — TELEPHONE ENCOUNTER
I did not know that caretenders did physical therapy. If they do, Alisa please order. If not, we could consult a home health team to do home therapy.

## 2022-01-21 NOTE — TELEPHONE ENCOUNTER
PT HAD A FALL THIS AM- NO INJURIES BUT GETTING A LITTLE UNSTABLE ON HIS FEET. CAN THEY GET CARE TENDERS TO DO SOME PHY THY?   PLEASE ADVISE

## 2022-02-08 NOTE — PROGRESS NOTES
Patient Name:  Aldair Schroeder  Patient :  10/18/1930  Patient MRN:  [id number]     Primary Oncologist: Andrés Barrera MD  Referring Provider: Ovidio Salinas MD     Date of Service: 3/10/2022         Chief Complaint:    No chief complaint on file. He came in for follow up visit. Patient Active Problem List:     Hyperlipidemia     Depression     Primary insomnia     Dysuria     Vitamin D deficiency     Seborrheic keratosis, inflamed     Actinic keratosis     Seborrheic keratosis     SCC (squamous cell carcinoma)     Varicose 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     Anxiety     BPH with urinary obstruction     Acquired hypothyroidism     UGIB (upper gastrointestinal bleed)     Hematuria     Hyperglycemia     Bullous pemphigoid     Atelectasis     HPI:   Aldair Schroeder is a pleasant 22-year-old male patient who was referred for abnormal lab. On 2021 CBC showed left shift with myelocytes, metamyelocytes, promyelocytes, bands. WBC was 10.3. Hg 10.5 and platelet 484. He is followed by dermatologist for history bullous pemphigoid and skin cancer. He had laparoscopic repair of incarcerated hiatal hernia with partial fundoplication on 6684 by Dr Toya Goana. He received one unit PRBC. In 2021 he had gross hematuria likely related to borden catheter trauma s/p cystoscopy TURP, bladder fulguration. He has BPH and is on flomax and proscar. He is not a very good historian. We reviewed BCR ABL which was negative. 21 flow cytometry:  Final Pathologic Diagnosis:   Peripheral blood; Flow Cytometry Analysis:   -     No diagnostic immunophenotypic abnormalities detected. CBC on 2021 showed Hg 10.3, WBC 7.7 and platelet 999. Creatinine was 2.9. On March 10, 2022 he came in for follow-up visit. He is losing strength and will start physical therapy on 2022. I strongly encouraged him to do physical therapy. Alexis Mora   He was seen by dermatologist for the skin disorder and placed on antibiotic. Labs in February 2022 was reviewed. Creatinine was 2.8, WBC 6.0, hemoglobin 10.5, platelet 190. He has hearing problem. No acute pain. Denied any nausea, vomiting or diarrhea. No fever or chills. No chest pain, shortness of breath or palpitation. No headache or dizzy spell. No specific bone pain. No melena or hematochezia. Denied any dysuria or hematuria. 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    Rt Tricep, Lt Superior Forearm        Past Surgery History:    Past Surgical History:   Procedure Laterality Date    CATARACT REMOVAL      CYSTOSCOPY N/A 3/29/2021    CYSTOSCOPY EVACUATION OF CLOTS, BLADDER FULGERATION, AND SUPRA-PUBIC CATHETER INSERTION performed by Anibal Dale MD [facility] [address] [facility] 4/1/2021    CYSTOSCOPY, PROSTATE FULGURATION, EVACUATION OF CLOTS & TURP performed by Alberteen Boxer, MD at [address] N/A 3/4/2021    LAPAROSCOPIC LARGE HERNIA HIATAL REPAIR WITH GASTRIC OBSTRUCTION POSS OPEN performed by Maribel Levine MD at [address] [facility] History:   He smoked one PPD for 5 years and quit in 1955. Denied EtOH or illicit drug use. He has 3 children. Family History:   No cancer. Review of Systems: The remainder of ROS is unremarkable. Vital Signs: There were no vitals taken for this visit.      Physical Exam:  CONSTITUTIONAL: awake, alert, cooperative, no apparent distress  EYES: pupils equal, round and reactive to light, sclera clear and + pallor  ENT: Normocephalic, without obvious abnormality, atraumatic; [place]  NECK: supple, symmetrical. No JVD. HEMATOLOGIC/LYMPHATIC: no cervical, supraclavicular or axillary lymphadenopathy   LUNGS: no increased work of breathing and clear to auscultation   CARDIOVASCULAR: regular rate and rhythm, normal S1 and S2, no murmur  ABDOMEN: normal bowel sound, soft, non-distended, non-tender  MUSCULOSKELETAL: full range of motion noted, tone is normal  NEUROLOGIC: Motor skills grossly intact. CN II through XII grossly intact. SKIN: No jaundice. Dry skin. No petechial rash. EXTREMITIES: no LE edema  or cyanosis.      Labs:  Hematology:  Lab Results   Component Value Date    WBC 7.7 12/02/2021    RBC 3.38 (L) 12/02/2021    HGB 10.3 (L) 12/02/2021    HCT 31.2 (L) 12/02/2021    MCV 92.3 12/02/2021    MCH 30.5 12/02/2021    MCHC 33.0 12/02/2021    RDW 14.7 12/02/2021     12/02/2021    MPV 8.5 12/02/2021    BANDSPCT 1 (L) 07/22/2021    SEGSPCT 68.3 (H) 12/02/2021    EOSRELPCT 4.1 (H) 12/02/2021    BASOPCT 0.6 12/02/2021    LYMPHOPCT 17.3 (L) 12/02/2021    MONOPCT 9.7 (H) 12/02/2021    BANDABS 0.10 07/22/2021    SEGSABS 5.3 12/02/2021    EOSABS 0.3 12/02/2021    BASOSABS 0.1 12/02/2021    LYMPHSABS 1.3 12/02/2021    MONOSABS 0.8 12/02/2021    DIFFTYPE AUTOMATED DIFFERENTIAL 12/02/2021    ANISOCYTOSIS 1+ 09/03/2021    POLYCHROM 1+ 03/09/2021    WBCMORP RARE 03/09/2021     Chemistry:  Lab Results   Component Value Date     12/02/2021    K 4.7 12/02/2021     12/02/2021    CO2 20 (L) 12/02/2021    BUN 34 (H) 12/02/2021    CREATININE 2.9 (H) 12/02/2021    GLUCOSE 94 12/02/2021    CALCIUM 9.3 12/02/2021    PROT 6.6 12/02/2021    LABALBU 3.8 12/02/2021    BILITOT 0.2 12/02/2021    ALKPHOS 128 12/02/2021    AST 13 (L) 12/02/2021    ALT 13 12/02/2021    LABGLOM 21 (L) 12/02/2021    GFRAA 25 (L) 12/02/2021    AGRATIO 1.4 01/21/2021    GLOB 2.1 01/21/2021    MG 1.9 03/10/2021     Lab Results   Component Value Date     12/02/2021     Lab Results   Component Value Date    TSHHS 2.420 08/10/2021    T4FREE 0.8 (L) 01/21/2021     Immunology:  Lab Results   Component Value Date    PROT 6.6 12/02/2021    SPEP INTERPRETATION - Within normal limits. RS 08/10/2021    ALBUMINELP 3.2 08/10/2021    LABALPH 0.3 08/10/2021    LABALPH 0.8 08/10/2021    LABBETA 0.9 08/10/2021    GAMGLOB 0.9 08/10/2021     Coagulation Panel:  Lab Results   Component Value Date    PROTIME 12.8 09/03/2021    INR 0.99 09/03/2021    APTT 37.8 (H) 03/26/2021     Anemia Panel:  Lab Results   Component Value Date    [id number] 560.6 08/10/2021    FOLATE 15.3 08/10/2021     Tumor Markers:  Lab Results   Component Value Date    PSA 6.95 (H) 09/21/2012      Observations:  No data recorded     Assessment & Plan:  1. He had left shift  WBC on peripheral blood. No signs of infection. 8/17/21 Flow cytometry was negative. BCR ABL negative. This is likely reactive. In December 2021 WBC was 7.7, hemoglobin 10.3, platelet 653. Absolute neutrophil was 5.3. CBC in February 2022 was reviewed. WBC was 6.0, hemoglobin 10.5, platelet 681. Absolute neutrophil was 3.6. 2. He has anemia. B-12 in July 2021 was 861.2. 8/10/21  Iron studies reviewed. In December 2021 hemoglobin was 10.3. He is on eliquis. I gave him prescription of oral iron on March 7, 2022. I will recheck CBC and iron study prior to next office visit. 3. He has BPH. Followed by urologist.     I strongly recommend physical therapy. RTC in 3 months or sooner. All of his questions have been answered for today.

## 2022-02-21 ENCOUNTER — OFFICE VISIT (OUTPATIENT)
Dept: FAMILY MEDICINE CLINIC | Age: 87
End: 2022-02-21
Payer: MEDICARE

## 2022-02-21 VITALS
DIASTOLIC BLOOD PRESSURE: 56 MMHG | HEIGHT: 72 IN | BODY MASS INDEX: 23.7 KG/M2 | SYSTOLIC BLOOD PRESSURE: 98 MMHG | HEART RATE: 97 BPM | OXYGEN SATURATION: 99 % | WEIGHT: 175 LBS

## 2022-02-21 DIAGNOSIS — I95.0 IDIOPATHIC HYPOTENSION: ICD-10-CM

## 2022-02-21 DIAGNOSIS — I82.401 RECURRENT ACUTE DEEP VEIN THROMBOSIS (DVT) OF RIGHT LOWER EXTREMITY (HCC): ICD-10-CM

## 2022-02-21 DIAGNOSIS — N18.32 STAGE 3B CHRONIC KIDNEY DISEASE (HCC): ICD-10-CM

## 2022-02-21 DIAGNOSIS — F32.1 CURRENT MODERATE EPISODE OF MAJOR DEPRESSIVE DISORDER, UNSPECIFIED WHETHER RECURRENT (HCC): ICD-10-CM

## 2022-02-21 DIAGNOSIS — R60.0 BILATERAL LEG EDEMA: ICD-10-CM
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
## 2022-02-21 DIAGNOSIS — L12.0 BULLOUS PEMPHIGOID: Primary | ICD-10-CM

## 2022-02-21 DIAGNOSIS — D69.6 THROMBOCYTOPENIA, UNSPECIFIED (HCC): ICD-10-CM

## 2022-02-21 DIAGNOSIS — E03.9 ACQUIRED HYPOTHYROIDISM: ICD-10-CM

## 2022-02-21 PROBLEM — L98.491 SKIN ULCER, LIMITED TO BREAKDOWN OF SKIN (HCC): Status: ACTIVE | Noted: 2022-02-21

## 2022-02-21 PROBLEM — N18.4 CHRONIC KIDNEY DISEASE, STAGE IV (SEVERE) (HCC): Status: ACTIVE | Noted: 2022-02-21

## 2022-02-21 PROBLEM — N18.31 STAGE 3A CHRONIC KIDNEY DISEASE (HCC): Status: ACTIVE | Noted: 2022-02-21

## 2022-02-21 LAB
ANION GAP SERPL CALCULATED.3IONS-SCNC: 17 MMOL/L (ref 3–16)
BASOPHILS ABSOLUTE: 0.1 K/UL (ref 0–0.2)
BASOPHILS RELATIVE PERCENT: 1.2 %
BUN BLDV-MCNC: 28 MG/DL (ref 7–20)
CALCIUM SERPL-MCNC: 9.1 MG/DL (ref 8.3–10.6)
CHLORIDE BLD-SCNC: 106 MMOL/L (ref 99–110)
CO2: 18 MMOL/L (ref 21–32)
CREAT SERPL-MCNC: 2.8 MG/DL (ref 0.8–1.3)
EOSINOPHILS ABSOLUTE: 0.5 K/UL (ref 0–0.6)
EOSINOPHILS RELATIVE PERCENT: 8.5 %
GFR AFRICAN AMERICAN: 26
GFR NON-AFRICAN AMERICAN: 21
GLUCOSE BLD-MCNC: 105 MG/DL (ref 70–99)
HCT VFR BLD CALC: 31.6 % (ref 40.5–52.5)
HEMOGLOBIN: 10.5 G/DL (ref 13.5–17.5)
LYMPHOCYTES ABSOLUTE: 1.2 K/UL (ref 1–5.1)
LYMPHOCYTES RELATIVE PERCENT: 20.3 %
MCH RBC QN AUTO: 31.3 PG (ref 26–34)
MCHC RBC AUTO-ENTMCNC: 33.4 G/DL (ref 31–36)
MCV RBC AUTO: 93.6 FL (ref 80–100)
MONOCYTES ABSOLUTE: 0.6 K/UL (ref 0–1.3)
MONOCYTES RELATIVE PERCENT: 10.1 %
NEUTROPHILS ABSOLUTE: 3.6 K/UL (ref 1.7–7.7)
NEUTROPHILS RELATIVE PERCENT: 59.9 %
PDW BLD-RTO: 15.2 % (ref 12.4–15.4)
PLATELET # BLD: 191 K/UL (ref 135–450)
PMV BLD AUTO: 7.9 FL (ref 5–10.5)
POTASSIUM SERPL-SCNC: 4.3 MMOL/L (ref 3.5–5.1)
RBC # BLD: 3.37 M/UL (ref 4.2–5.9)
SODIUM BLD-SCNC: 141 MMOL/L (ref 136–145)
WBC # BLD: 6 K/UL (ref 4–11)

## 2022-02-21 PROCEDURE — 1036F TOBACCO NON-USER: CPT | Performed by: FAMILY MEDICINE

## 2022-02-21 PROCEDURE — 99214 OFFICE O/P EST MOD 30 MIN: CPT | Performed by: FAMILY MEDICINE

## 2022-02-21 PROCEDURE — G8427 DOCREV CUR MEDS BY ELIG CLIN: HCPCS | Performed by: FAMILY MEDICINE

## 2022-02-21 PROCEDURE — G8420 CALC BMI NORM PARAMETERS: HCPCS | Performed by: FAMILY MEDICINE

## 2022-02-21 PROCEDURE — 4040F PNEUMOC VAC/ADMIN/RCVD: CPT | Performed by: FAMILY MEDICINE

## 2022-02-21 PROCEDURE — 1123F ACP DISCUSS/DSCN MKR DOCD: CPT | Performed by: FAMILY MEDICINE

## 2022-02-21 PROCEDURE — G8484 FLU IMMUNIZE NO ADMIN: HCPCS | Performed by: FAMILY MEDICINE

## 2022-02-21 RX ORDER — SULFAMETHOXAZOLE AND TRIMETHOPRIM 800; 160 MG/1; MG/1
1 TABLET ORAL DAILY
Qty: 30 TABLET | Refills: 0 | Status: SHIPPED | OUTPATIENT
Start: 2022-02-21 | End: 2022-03-10 | Stop reason: ALTCHOICE

## 2022-02-21 RX ORDER — PREDNISONE 1 MG/1
5 TABLET ORAL DAILY
Qty: 30 TABLET | Refills: 0 | Status: SHIPPED | OUTPATIENT
Start: 2022-02-21 | End: 2022-03-16

## 2022-02-22 RX ORDER — PANTOPRAZOLE SODIUM 20 MG/1
20 TABLET, DELAYED RELEASE ORAL DAILY
Qty: 90 TABLET | Refills: 1 | Status: SHIPPED | OUTPATIENT
Start: 2022-02-22 | End: 2022-07-05

## 2022-02-22 RX ORDER — POTASSIUM CHLORIDE 20 MEQ/1
20 TABLET, EXTENDED RELEASE ORAL EVERY OTHER DAY
Qty: 90 TABLET | Refills: 1 | Status: SHIPPED | OUTPATIENT
Start: 2022-02-22 | End: 2022-04-05 | Stop reason: ALTCHOICE

## 2022-02-22 RX ORDER — FUROSEMIDE 20 MG/1
10 TABLET ORAL EVERY OTHER DAY
Qty: 30 TABLET | Refills: 1 | Status: SHIPPED | OUTPATIENT
Start: 2022-02-22 | End: 2022-05-16 | Stop reason: SDUPTHER

## 2022-02-22 NOTE — PROGRESS NOTES
2/21/2022    Forbes Baumgarten    Chief Complaint   Patient presents with   Neeta Lai Other     4 month f/u    Fatigue     physical weakness - caregiver Evangelinaadam Arreolaadam would like to discuss physical therapy    Other     sores (scabbed over) bilateral lower leg. caregiver states pt recieves medication from dermatologist. unsure if pt is using med. unsure name of med. HPI    César Hogan is a 80 y.o. male who presents today with follow-up. Patient present with Evangelina Reyes who is a longtime family friend who is very helpful when Charlie's her sons not available. Patient's bullous pemphigoid is come back. He has significant blistering lesions ongoing. He is not been on prednisone or antibiotics for a while. Apparently he had grown allergic to doxycycline. Evangelina Reyes is a bit unclear on the medicines he is on and patient does not know.     It sounds that home physical therapy never got started although ordered    Unclear that he is still off Toprol    REVIEW OF SYSTEMS    Constitutional:  Denies fever, chills, weight loss or weakness  Eyes:  no photophobia or discharge  ENT:  no sore throat or ear pain  Cardiovascular:  Denies chest pain, palpitations or swelling  Respiratory:  Denies cough or shortness of breath  GI:  no abdominal pain, nausea, vomiting, or diarrhea  Musculoskeletal:  no back pain  Skin:  No rashes  Neurologic:  no headache, focal weakness, or sensory changes  Endocrine:  no polyuria or polydipsia      PAST MEDICAL HISTORY  Past Medical History:   Diagnosis Date    Anemia     Anxiety     Arthritis     BPH with urinary obstruction     Depression     GERD (gastroesophageal reflux disease)     Hemorrhoids     Hepatitis     Hernia of unspecified site of abdominal cavity without mention of obstruction or gangrene     Hyperlipidemia     Hypotension     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    Rt Tricep, Lt Superior Forearm       FAMILY HISTORY  Family History   Problem Relation Age of Onset    Depression Mother    Neeta Lai Diabetes Mother     COPD Father     Diabetes Brother     Diabetes Maternal Grandmother        SOCIAL HISTORY  Social History     Socioeconomic History    Marital status:      Spouse name: Not on file    Number of children: Not on file    Years of education: Not on file    Highest education level: Not on file   Occupational History    Not on file   Tobacco Use    Smoking status: Former Smoker     Packs/day: 1.00     Years: 5.00     Pack years: 5.00     Types: Cigarettes     Start date: 1950     Quit date: 1955     Years since quittin.2    Smokeless tobacco: Never Used   Substance and Sexual Activity    Alcohol use: Not Currently    Drug use: Not Currently    Sexual activity: Not on file   Other Topics Concern    Not on file   Social History Narrative    Not on file     Social Determinants of Health     Financial Resource Strain: Low Risk     Difficulty of Paying Living Expenses: Not hard at all   Food Insecurity: No Food Insecurity    Worried About [email or web address] in the Last Year: Never true    [facility] in the Last Year: Never true   Transportation Needs:     Lack of Transportation (Medical): Not on file    Lack of Transportation (Non-Medical):  Not on file   Physical Activity:     Days of Exercise per Week: Not on file    Minutes of Exercise per Session: Not on file   Stress:     Feeling of Stress : Not on file   Social Connections:     Frequency of Communication with Friends and Family: Not on file    Frequency of Social Gatherings with Friends and Family: Not on file    Attends [employer] Services: Not on file    Active Member of Clubs or Organizations: Not on file    Attends Club or Organization Meetings: Not on file    Marital Status: Not on file   Intimate Partner Violence:     Fear of Current or Ex-Partner: Not on file    Emotionally Abused: Not on file    Physically Abused: Not on file    Sexually Abused: Not on file   Housing Stability:     Unable to Pay for Housing in the Last Year: Not on file    Number of Places Lived in the Last Year: Not on file    Unstable Housing in the Last Year: Not on file        SURGICAL HISTORY  Past Surgical History:   Procedure Laterality Date    CATARACT REMOVAL      CYSTOSCOPY N/A 3/29/2021    CYSTOSCOPY EVACUATION OF CLOTS, BLADDER FULGERATION, AND SUPRA-PUBIC CATHETER INSERTION performed by Tere Horton MD at [facility] 4/1/2021    CYSTOSCOPY, PROSTATE FULGURATION, EVACUATION OF CLOTS & TURP performed by Adilson Johnson MD at [facility] N/A 3/4/2021    LAPAROSCOPIC [facility] WITH GASTRIC OBSTRUCTION POSS OPEN performed by Nick Pierson MD at [place] [address]  Current Outpatient Medications   Medication Sig Dispense Refill    predniSONE (DELTASONE) 5 MG tablet Take 1 tablet by mouth daily for 10 days 30 tablet 0    sulfamethoxazole-trimethoprim (BACTRIM DS;SEPTRA DS) 800-160 MG per tablet Take 1 tablet by mouth daily 30 tablet 0    levothyroxine (SYNTHROID) 75 MCG tablet Take 1 tablet by mouth daily 90 tablet 1    QUEtiapine (SEROQUEL) 25 MG tablet Take 1 tablet by mouth 2 times daily 60 tablet 5    apixaban (ELIQUIS) 2.5 MG TABS tablet Take 1 tablet by mouth 2 times daily 60 tablet 5    furosemide (LASIX) 20 MG tablet Take 0.5 tablets by mouth every other day 30 tablet 1    potassium chloride (KLOR-CON M) 20 MEQ extended release tablet Take 1 tablet by mouth every other day 90 tablet 1    polyethylene glycol (GLYCOLAX) 17 GM/SCOOP powder Take 17 g by mouth daily      metoprolol succinate (TOPROL XL) 25 MG extended release tablet Take 1 tablet by mouth every evening 90 tablet 1    pantoprazole (PROTONIX) 20 MG tablet Take 1 tablet by mouth daily 90 tablet 1    acetaminophen (TYLENOL) 325 MG tablet Take 650 mg by mouth every 6 hours as needed for Pain      sertraline (ZOLOFT) 50 MG tablet Take 50 mg by mouth daily      ferrous sulfate (IRON 325) 325 (65 Fe) MG tablet Take 1 tablet by mouth daily (with breakfast) 90 tablet 1    temazepam (RESTORIL) 7.5 MG capsule Take 7.5 mg by mouth nightly as needed for Sleep.  finasteride (PROSCAR) 5 MG tablet TAKE 1 TABLET BY MOUTH EVERY DAY 90 tablet 1    tamsulosin (FLOMAX) 0.4 MG capsule Take 1 capsule by mouth daily. (Patient taking differently: Take 0.8 mg by mouth daily 01/11/17 Pt to take 2- .04 mg capsules at bedtime) 30 capsule 12     No current facility-administered medications for this visit. ALLERGIES  Allergies   Allergen Reactions    Minocycline Other (See Comments)       PHYSICAL EXAM  BP (!) 98/56   Pulse 97   Ht 6' (1.829 m)   Wt 175 lb (79.4 kg)   SpO2 99%   BMI 23.73 kg/m²     ASSESSMENT & PLAN    1. Bullous pemphigoid  Restart prednisone and a different antibiotic  Short-term follow-up  If not significantly improving would reconsult dermatology    - predniSONE (DELTASONE) 5 MG tablet; Take 1 tablet by mouth daily for 10 days  Dispense: 30 tablet; Refill: 0  - sulfamethoxazole-trimethoprim (BACTRIM DS;SEPTRA DS) 800-160 MG per tablet; Take 1 tablet by mouth daily  Dispense: 30 tablet; Refill: 0    2. Acquired hypothyroidism  Reviewed labs  At goal  Remain on the same    3. Current moderate episode of major depressive disorder, unspecified whether recurrent ([address]  Reportedly controlled  Continue the same    4. 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 Continue meds. Refilled meds. 5. Stage 3b chronic kidney disease ([address]  Issue controlled. Continue meds. Refilled meds. *    6. Thrombocytopenia, unspecified ([address]  Issue controlled. Continue meds. Refilled meds. 7. Fall, subsequent encounter  This time try traveling to physical therapy maybe he did not qualify. Patient very happy with [facility] physical therapy.   They asked specifically not to go to [facility]  - [facility]    8. Idiopathic hypotension  Possibly improving  Continue to observe blood pressures and symptoms  - Basic Metabolic Panel;  Future  - CBC with Auto Differential; Future    Follow-up 4 weeks       Electronically signed by Hussein Tolbert MD on 2/21/2022

## 2022-02-25 DIAGNOSIS — N18.4 CHRONIC KIDNEY DISEASE, STAGE IV (SEVERE) (HCC): Primary | ICD-10-CM

## 2022-02-26 DIAGNOSIS — F32.1 CURRENT MODERATE EPISODE OF MAJOR DEPRESSIVE DISORDER, UNSPECIFIED WHETHER RECURRENT (HCC): ICD-10-CM

## 2022-02-28 RX ORDER — QUETIAPINE FUMARATE 25 MG/1
TABLET, FILM COATED ORAL
Qty: 180 TABLET | Refills: 1 | Status: ON HOLD | OUTPATIENT
Start: 2022-02-28 | End: 2022-08-31 | Stop reason: HOSPADM

## 2022-03-03 ENCOUNTER — HOSPITAL ENCOUNTER (OUTPATIENT)
Age: 87
Setting detail: SPECIMEN
Discharge: HOME | End: 2022-03-03
Payer: MEDICARE

## 2022-03-03 PROCEDURE — 87070 CULTURE OTHR SPECIMN AEROBIC: CPT

## 2022-03-03 PROCEDURE — 87075 CULTR BACTERIA EXCEPT BLOOD: CPT

## 2022-03-08 LAB
CULTURE: NORMAL
Lab: NORMAL
SPECIMEN: NORMAL

## 2022-03-10 ENCOUNTER — OFFICE VISIT (OUTPATIENT)
Dept: ONCOLOGY | Age: 87
End: 2022-03-10
Payer: MEDICARE

## 2022-03-10 ENCOUNTER — HOSPITAL ENCOUNTER (OUTPATIENT)
Dept: INFUSION THERAPY | Age: 87
Discharge: HOME | End: 2022-03-10

## 2022-03-10 VITALS
DIASTOLIC BLOOD PRESSURE: 50 MMHG | RESPIRATION RATE: 16 BRPM | WEIGHT: 176 LBS | SYSTOLIC BLOOD PRESSURE: 91 MMHG | HEIGHT: 72 IN | HEART RATE: 99 BPM | TEMPERATURE: 97.4 F | BODY MASS INDEX: 23.84 KG/M2

## 2022-03-10 DIAGNOSIS — D64.9 ANEMIA, UNSPECIFIED TYPE: Primary | ICD-10-CM

## 2022-03-10 PROCEDURE — 1036F TOBACCO NON-USER: CPT | Performed by: INTERNAL MEDICINE

## 2022-03-10 PROCEDURE — 4040F PNEUMOC VAC/ADMIN/RCVD: CPT | Performed by: INTERNAL MEDICINE

## 2022-03-10 PROCEDURE — G8427 DOCREV CUR MEDS BY ELIG CLIN: HCPCS | Performed by: INTERNAL MEDICINE

## 2022-03-10 PROCEDURE — 1123F ACP DISCUSS/DSCN MKR DOCD: CPT | Performed by: INTERNAL MEDICINE

## 2022-03-10 PROCEDURE — G8420 CALC BMI NORM PARAMETERS: HCPCS | Performed by: INTERNAL MEDICINE

## 2022-03-10 PROCEDURE — G8484 FLU IMMUNIZE NO ADMIN: HCPCS | Performed by: INTERNAL MEDICINE

## 2022-03-10 PROCEDURE — 99213 OFFICE O/P EST LOW 20 MIN: CPT | Performed by: INTERNAL MEDICINE

## 2022-03-10 RX ORDER — FERROUS SULFATE 325(65) MG
325 TABLET ORAL
Qty: 30 TABLET | Refills: 2 | Status: SHIPPED | OUTPATIENT
Start: 2022-03-10 | End: 2022-03-21

## 2022-03-10 RX ORDER — TACROLIMUS 1 MG/G
OINTMENT TOPICAL
COMMUNITY
Start: 2022-02-13 | End: 2022-03-10

## 2022-03-10 RX ORDER — DOCUSATE SODIUM 100 MG/1
100 CAPSULE, LIQUID FILLED ORAL PRN
COMMUNITY
End: 2022-04-05

## 2022-03-10 ASSESSMENT — PATIENT HEALTH QUESTIONNAIRE - PHQ9
SUM OF ALL RESPONSES TO PHQ QUESTIONS 1-9: 0
SUM OF ALL RESPONSES TO PHQ QUESTIONS 1-9: 0
SUM OF ALL RESPONSES TO PHQ9 QUESTIONS 1 & 2: 0
SUM OF ALL RESPONSES TO PHQ QUESTIONS 1-9: 0
SUM OF ALL RESPONSES TO PHQ QUESTIONS 1-9: 0
2. FEELING DOWN, DEPRESSED OR HOPELESS: 0
1. LITTLE INTEREST OR PLEASURE IN DOING THINGS: 0

## 2022-03-10 NOTE — PROGRESS NOTES
MA Rooming Questions  Patient: Brinda Recinos  MRN: [id number]    Date: 3/10/2022        1. Do you have any new issues? Cecelia Solares been losing strength, starts PT ana. 2. Do you need any refills on medications?    no    3. Have you had any imaging done since your last visit?   no    4. Have you been hospitalized or seen in the emergency room since your last visit here?   no    5. Did the patient have a depression screening completed today?  Yes    PHQ-9 Total Score: 0 (3/10/2022  2:39 PM)       PHQ-9 Given to (if applicable):               PHQ-9 Score (if applicable):                     [] Positive     []  Negative              Does question #9 need addressed (if applicable)                     [] Yes    []  No               Zulma Lazaro CMA

## 2022-03-11 ENCOUNTER — HOSPITAL ENCOUNTER (OUTPATIENT)
Dept: PHYSICAL THERAPY | Age: 87
Setting detail: THERAPIES SERIES
Discharge: HOME | End: 2022-03-11
Payer: MEDICARE

## 2022-03-11 PROCEDURE — 97161 PT EVAL LOW COMPLEX 20 MIN: CPT

## 2022-03-11 PROCEDURE — 97110 THERAPEUTIC EXERCISES: CPT

## 2022-03-11 RX ORDER — APIXABAN 2.5 MG/1
TABLET, FILM COATED ORAL
Qty: 60 TABLET | Refills: 5 | Status: ON HOLD | OUTPATIENT
Start: 2022-03-11 | End: 2022-08-30

## 2022-03-11 NOTE — FLOWSHEET NOTE
Outpatient Physical Therapy  [place]           [x] Phone: [phone]   Fax: [phone]  [facility]           [] Phone: [phone]   Fax: [phone]        Physical Therapy Daily Treatment Note  Date:  3/11/2022    Patient Name:  Leandra Goel    :  10/18/1930  MRN: [id number]  Restrictions/Precautions:  none  Diagnosis:   Diagnosis: Fall  Date of Injury/Surgery:   Treatment Diagnosis: Treatment Diagnosis: impaired balance, impaired gait, BLE weakness    Insurance/Certification information: PT Insurance Information: [facility] Medicare   Referring Physician:  Referring Practitioner: Soy Looney MD  Next Doctor Visit:    Plan of care signed (Y/N):  Sent 3/11  Outcome Measure: ABC: 18%  Visit# / total visits:  1 /10  Pain level: 0/10     Goals:     Patient goals : improve balance and strength  Short term goals  Time Frame for Short term goals: refer to [facility]  Long term goals  Time Frame for Long term goals : 10 visits  Long term goal 1: Pt will improve ABC score to 25% or more to show improved confidence with balance  Long term goal 2: Pt chu improve TUG to 30 sec or less to show improved gait and mobility  Long term goal 3: Pt will improve BLE gross strength to 4/5 or more to aide in balance  Long term goal 4: Pt will improve 5x STS to 20 sec or less to show improved transfers    Summary of Evaluation: Assessment: Pt is a 19-year-old male who presents to therapy due to worsening balance over the last year and increased weakness since December, insedeous onset. Upon assessment, pt does present with BLE weakness, impaired balance, impaired gait, and overall reduced independence with ADL and IADL task completion. Pt would benefit from skilled therapy interventions to address listed impairments, progress toward goal completion and improve ADL/IADL status. PT also warranted to reduce risk for further injury or decline. Subjective:  See eval         Any changes in Ambulatory Summary Sheet? None        Objective:  See eval   COVID screening questions were asked and patient attested that there had been no contact or symptoms        Exercises: (No more than 4 columns)   Exercise/Equipment 3/11/22 #1 Date Date           WARM UP                     TABLE      Seated march x10 ea BL     LAQ x10 ea BL     Seated clams YTB x10     Seated glute ext YTB x10 ea BL              STANDING                                                     PROPRIOCEPTION                                    MODALITIES                      Other Therapeutic Activities/Education:  HEP and importance of completion, POC and goals, anatomy and physiology related to condition      Home Exercise Program: Issued, practiced and pt demo ability to perform 3/11/2022      Manual Treatments:  none      Modalities:  none      Communication with other providers:  POC sent      Assessment:  Pt tolerated today's treatment without any adverse reactions or complications this date. End pain: same    Assessment: Pt is a 68-year-old male who presents to therapy due to worsening balance over the last year and increased weakness since December, insedeous onset. Upon assessment, pt does present with BLE weakness, impaired balance, impaired gait, and overall reduced independence with ADL and IADL task completion. Pt would benefit from skilled therapy interventions to address listed impairments, progress toward goal completion and improve ADL/IADL status. PT also warranted to reduce risk for further injury or decline.       Plan for Next Session: Specific instructions for Next Treatment: balance, gait, BLE strength      Time In / Time Out:   4531 - 5594    Timed Code/Total Treatment Minutes:  44': 11' TE x1, 28' Evavl x1      Next Progress Note due:  10th visit      Plan of Care Interventions:  [x] Therapeutic Exercise  [] Modalities:  [x] Therapeutic Activity     [] Ultrasound  [] Estim  [x] Gait Training      [] Cervical Traction [] Lumbar Traction  [x] Neuromuscular Re-education    [] Cold/hotpack [] Iontophoresis   [x] Instruction in HEP      [] Vasopneumatic   [] Dry Needling    [x] Manual Therapy               [] Aquatic Therapy              Electronically signed by:  Reta Gillis PT, DPT 3/11/2022, 1:50 PM

## 2022-03-11 NOTE — PROGRESS NOTES
Physical Therapy  Initial Assessment  Date: 3/11/2022  Patient Name: Joselin Schultz  MRN: [id number]  : 10/18/1930     Treatment Diagnosis: impaired balance, impaired gait, BLE weakness    Restrictions: none       Subjective   General  Chart Reviewed: Yes  Patient assessed for rehabilitation services?: Yes  Additional Pertinent Hx: CA  Referring Practitioner: Bobbi Meyer MD  Referral Date : 22  Diagnosis: Fall  Follows Commands: Within Functional Limits  PT Visit Information  PT Insurance Information: [facility] Medicare  Subjective  Subjective: Pt and his friend note that pt has been having balance problems over the last year and has been losing strength since . Pt notes no falls onto the floor since the beginning of the year but does stumble. Pt denies any major falls that caused injury within the last 6 months. Pts daughter has been living with him due to the safety concerns. Pt lives in a one-story home with a ramp to enter, reports no issues with the ramp. Pt uses a 2WW for ambulation in home and community. Pt is MI with dressing and baiting but daughter is near by for safety and assistance. He does have suction cup grab bars for balance and a chair in the tub shower, daughter is SBA during this task. Daughter does the IADLs. Pt denies trouble getting in/out of bed but some trouble with STS. Denies pain or radicular symptoms. Pain Screening  Patient Currently in Pain: No  Vital Signs  Patient Currently in Pain: No    Vision/Hearing  Vision  Vision: Within Functional Limits  Hearing  Hearing: Within functional limits    Orientation  Orientation  Overall Orientation Status: Within Normal Limits    Social/Functional History  Social/Functional History  Lives With: Daughter  Type of Home: House  Home Layout: One level  Home Access: Ramped entrance  Bathroom Shower/Tub: Tub/Shower unit  Bathroom Equipment: Grab bars in shower; Shower chair  Home Equipment: Rolling walker  ADL Assistance: Independent  Homemaking Assistance: Independent  Ambulation Assistance: Independent  Transfer Assistance: Independent  Occupation: Retired    Objective     Observation/Palpation  Observation: Gait: 2WW reduced mark w/ equal stance BL, mild flexed fwd posture    AROM RLE (degrees)  RLE AROM: WFL  AROM LLE (degrees)  LLE AROM : WFL    Strength RLE  Strength RLE: Exception  R Hip Flexion: 3+/5  R Hip ABduction: 4-/5  R Hip ADduction: 4/5  R Knee Flexion: 3+/5  R Knee Extension: 4-/5  R Ankle Dorsiflexion: 4/5  Strength LLE  Strength LLE: Exception  L Hip Flexion: 3+/5  L Hip ABduction: 4-/5  L Hip ADduction: 4/5  L Knee Flexion: 3+/5  L Knee Extension: 4-/5  L Ankle Dorsiflexion: 3-/5     Additional Measures  Special Tests: TU.61 seconds. 5x STS: 25.12 seconds. Other: WBOS EO no hands mild sway CGA. romberg EO no hands mod sway CGA  Sensation  Overall Sensation Status: WFL       Assessment   Conditions Requiring Skilled Therapeutic Intervention  Body structures, Functions, Activity limitations: Decreased functional mobility ; Decreased ADL status; Decreased strength;Decreased high-level IADLs;Decreased balance;Decreased posture  Assessment: Pt is a 59-year-old male who presents to therapy due to worsening balance over the last year and increased weakness since December, insous onset. Upon assessment, pt does present with BLE weakness, impaired balance, impaired gait, and overall reduced independence with ADL and IADL task completion. Pt would benefit from skilled therapy interventions to address listed impairments, progress toward goal completion and improve ADL/IADL status. PT also warranted to reduce risk for further injury or decline. Treatment Diagnosis: impaired balance, impaired gait, BLE weakness  Prognosis: Good  Decision Making: Low Complexity  History: see above. PLOF independent prior to Dec  Exam: see above  Clinical Presentation: see above  Barriers to Learning: none.  style: demo  REQUIRES PT FOLLOW UP: Yes  Treatment Initiated : yes on 3/11    Patient agrees with established plan of care and assisted in the development of their short term and long term goals. Patient had no adverse reaction with initial treatment and there are no barriers to learning. Learning preferences include demonstration, practice, and handouts. Patient expressed understanding of HEP and appears to be motivated to participate in an active PT program including compliance with HEP expectations.           Plan   Plan  Times per week: 2  Times per day: Daily  Plan weeks: 5  Specific instructions for Next Treatment: balance, gait, BLE strength  Current Treatment Recommendations: Strengthening,IADL Training,Neuromuscular Re-education,Home Exercise Program,ROM,Safety Education & Training,Manual Therapy - Soft Tissue Mobilization,Balance Training,Patient/Caregiver Education & Training,Functional Mobility Training,Gait Training,Modalities,Pain Management,ADL/Self-care Training      OutComes Score   ABC: 18%             Goals  Short term goals  Time Frame for Short term goals: refer to [facility]  Long term goals  Time Frame for Long term goals : 10 visits  Long term goal 1: Pt will improve ABC score to 25% or more to show improved confidence with balance  Long term goal 2: Pt chu improve TUG to 30 sec or less to show improved gait and mobility  Long term goal 3: Pt will improve BLE gross strength to 4/5 or more to aide in balance  Long term goal 4: Pt will improve 5x STS to 20 sec or less to show improved transfers  Patient Goals   Patient goals : improve balance and strength       Therapy Time: 1118 - 1157  Delilah Bowman, PT, DPT

## 2022-03-11 NOTE — PLAN OF CARE
Outpatient Physical Therapy           [place]           [x] Phone: [phone]   Fax: [phone]  [facility]           [] Phone: [phone]   Fax: [phone]     To: Referring Practitioner: Terry Mistry MD  From: Mague Guajardo PT, DPT     Patient: Attila Pastor       : 10/18/1930  Diagnosis: Diagnosis: Fall   Treatment Diagnosis: Treatment Diagnosis: impaired balance, impaired gait, BLE weakness   Date: 3/11/2022    Physical Therapy Certification/Re-Certification Form  Dear Dr. Caitie Browning,  The following patient has been evaluated for physical therapy services and for therapy to continue, insurance requires physician review of the treatment plan initially and every 90 days. Please review the attached evaluation and/or summary of the patient's plan of care, and verify that you agree therapy should continue by signing the attached document and sending it back to our office. Assessment:    Assessment: Pt is a 80-year-old male who presents to therapy due to worsening balance over the last year and increased weakness since December, insedeous onset. Upon assessment, pt does present with BLE weakness, impaired balance, impaired gait, and overall reduced independence with ADL and IADL task completion. Pt would benefit from skilled therapy interventions to address listed impairments, progress toward goal completion and improve ADL/IADL status. PT also warranted to reduce risk for further injury or decline. Patient agrees with established plan of care and assisted in the development of their short term and long term goals. Patient had no adverse reaction with initial treatment and there are no barriers to learning. Learning preferences include demonstration, practice, and handouts. Patient expressed understanding of HEP and appears to be motivated to participate in an active PT program including compliance with HEP expectations.       Plan of Care/Treatment to date:  [x] Therapeutic Exercise  [] Modalities:  [x] Therapeutic Activity     [] Ultrasound  [] Electrical Stimulation  [x] Gait Training      [] Cervical Traction [] Lumbar Traction  [x] Neuromuscular Re-education    [] Cold/hotpack [] Iontophoresis   [x] Instruction in HEP      [] Vasopneumatic    [] Dry Needling  [x] Manual Therapy               [] Aquatic Therapy       Other:          Frequency/Duration:  # Days per week: [] 1 day # Weeks: [] 1 week [x] 5 weeks     [x] 2 days   [] 2 weeks [] 6 weeks     [] 3 days   [] 3 weeks [] 7 weeks     [] 4 days   [] 4 weeks [] 8 weeks         [] 9 weeks [] 10 weeks         [] 11 weeks [] 12 weeks    Rehab Potential/Progress: [] Excellent [x] Good [] Fair  [] Poor     Goals:    Patient goals : improve balance and strength  Short term goals  Time Frame for Short term goals: refer to [facility]  Long term goals  Time Frame for Long term goals : 10 visits  Long term goal 1: Pt will improve ABC score to 25% or more to show improved confidence with balance  Long term goal 2: Pt chu improve TUG to 30 sec or less to show improved gait and mobility  Long term goal 3: Pt will improve BLE gross strength to 4/5 or more to aide in balance  Long term goal 4: Pt will improve 5x STS to 20 sec or less to show improved transfers      Electronically signed by:  Michelle Liu PT, DPT, 3/11/2022, 1:49 PM        If you have any questions or concerns, please don't hesitate to call.   Thank you for your referral.      Physician Signature:________________________________Date:_________ TIME: _____  By signing above, therapists plan is approved by physician

## 2022-03-16 ENCOUNTER — HOSPITAL ENCOUNTER (OUTPATIENT)
Dept: PHYSICAL THERAPY | Age: 87
Discharge: HOME | End: 2022-03-16

## 2022-03-16 DIAGNOSIS — L12.0 BULLOUS PEMPHIGOID: ICD-10-CM

## 2022-03-16 RX ORDER — PREDNISONE 1 MG/1
5 TABLET ORAL DAILY
Qty: 30 TABLET | Refills: 0 | Status: SHIPPED | OUTPATIENT
Start: 2022-03-16 | End: 2022-04-19

## 2022-03-16 NOTE — FLOWSHEET NOTE
Physical Therapy  Cancellation/No-show Note  Patient Name:  Robert Shultz  :  10/18/1930   Date:  3/16/2022  Cancelled visits to date: 1  No-shows to date: 0    For today's appointment patient:  [x]  Cancelled  []  Rescheduled appointment  []  No-show     Reason given by patient:  []  Patient ill  []  Conflicting appointment  [x]  No transportation    []  Conflict with work  []  No reason given  []  Other:     Comments:      Electronically signed by:  Concha De Leon 3/16/2022, 12:56 PM

## 2022-03-18 ENCOUNTER — HOSPITAL ENCOUNTER (OUTPATIENT)
Dept: PHYSICAL THERAPY | Age: 87
Setting detail: THERAPIES SERIES
Discharge: HOME | End: 2022-03-18
Payer: MEDICARE

## 2022-03-18 PROCEDURE — 97110 THERAPEUTIC EXERCISES: CPT

## 2022-03-18 PROCEDURE — 97116 GAIT TRAINING THERAPY: CPT

## 2022-03-18 NOTE — FLOWSHEET NOTE
Outpatient Physical Therapy  [place]           [x] Phone: [phone]   Fax: [phone]  [place]           [] Phone: [phone]   Fax: [phone]        Physical Therapy Daily Treatment Note  Date:  3/18/2022    Patient Name:  Aldair Schroeder    :  10/18/1930  MRN: [id number]  Restrictions/Precautions:  none  Diagnosis:   Diagnosis: Fall  Date of Injury/Surgery:   Treatment Diagnosis: Treatment Diagnosis: impaired balance, impaired gait, BLE weakness    Insurance/Certification information: PT Insurance Information: [facility] Medicare   Referring Physician:  Referring Practitioner: Lona Cheney MD  Next Doctor Visit:    Plan of care signed (Y/N):  Sent 3/11  Outcome Measure: ABC: 18%  Visit# / total visits:  2 /10  Pain level: 0/10     Goals:     Patient goals : improve balance and strength  Short term goals  Time Frame for Short term goals: refer to [facility]  Long term goals  Time Frame for Long term goals : 10 visits  Long term goal 1: Pt will improve ABC score to 25% or more to show improved confidence with balance  Long term goal 2: Pt chu improve TUG to 30 sec or less to show improved gait and mobility  Long term goal 3: Pt will improve BLE gross strength to 4/5 or more to aide in balance  Long term goal 4: Pt will improve 5x STS to 20 sec or less to show improved transfers    Summary of Evaluation: Assessment: Pt is a 80-year-old male who presents to therapy due to worsening balance over the last year and increased weakness since December, insedeous onset. Upon assessment, pt does present with BLE weakness, impaired balance, impaired gait, and overall reduced independence with ADL and IADL task completion. Pt would benefit from skilled therapy interventions to address listed impairments, progress toward goal completion and improve ADL/IADL status. PT also warranted to reduce risk for further injury or decline. Subjective: Pt reported 0/10 pain. Pt stated that he was not doing HEP.  Hears better out of R ear. Any changes in Ambulatory Summary Sheet? None        Objective:  See eval   COVID screening questions were asked and patient attested that there had been no contact or symptoms    Walking with a 2WW, forward flexed posture outside walker, slow mark, short step, length and height  Extensor lag/weak quad in R leg  L hip weakness  Cues to not push off table during STS  Cued to keep feet apart with ambulation   Cued for posture throughout. Cued to stand closer to walker while walking   CGA standing exercises    Extra time needed with seated RB      Exercises: (No more than 4 columns)   Exercise/Equipment 3/11/22 #1 3/18/22 #2 Date           WARM UP       NuStep    5' lvl 4          TABLE      Seated march x10 ea BL 10 each Bilat    LAQ x10 ea BL 10 each    Seated clams YTB x10 YTB x 10    Seated glute ext YTB x10 ea BL GTB x10 Bilat. STANDING      STS  15 x [address]    // black stripe walks  2 laps    Standing marches  10 x bilat. Toe taps   10 x bilat. Step Ups  10x bilat. 4\"                     PROPRIOCEPTION                                    MODALITIES                      Other Therapeutic Activities/Education:  HEP and importance of completion, POC and goals, anatomy and physiology related to condition      Home Exercise Program: Issued, practiced and pt demo ability to perform 3/11/2022      Manual Treatments:  none      Modalities:  none      Communication with other providers:  POC sent      Assessment:  Pt tolerated today's treatment without any adverse reactions or complications this date. Low endurance, needs assistance for transfers, LE weakness. Pt would continue to benefit from skilled therapy interventions to address remaining impairments, improve mobility and strength and progress toward goal completion while reducing risk for re-injury or further decline. End pain: 0/10 Pt reports some fatigue end of tx.     Assessment: Pt is a 51-year-old male who presents to therapy due to worsening balance over the last year and increased weakness since December, insedeous onset. Upon assessment, pt does present with BLE weakness, impaired balance, impaired gait, and overall reduced independence with ADL and IADL task completion. Pt would benefit from skilled therapy interventions to address listed impairments, progress toward goal completion and improve ADL/IADL status. PT also warranted to reduce risk for further injury or decline. Plan for Next Session: Specific instructions for Next Treatment: balance, gait, BLE strength  Use GTB for clams and seated glute ext.       Time In / Time Out:   0845 - 0923    Timed Code/Total Treatment Minutes:  38'/38' 1 TE 2 GT      Next Progress Note due:  10th visit      Plan of Care Interventions:  [x] Therapeutic Exercise  [] Modalities:  [x] Therapeutic Activity     [] Ultrasound  [] Estim  [x] Gait Training      [] Cervical Traction [] Lumbar Traction  [x] Neuromuscular Re-education    [] Cold/hotpack [] Iontophoresis   [x] Instruction in HEP      [] Vasopneumatic   [] Dry Needling    [x] Manual Therapy               [] Aquatic Therapy              Electronically signed by:  Geni Escobedo PTA, CLT 3/18/2022, 8:45 AM  KAROLINA Basurto

## 2022-03-21 ENCOUNTER — OFFICE VISIT (OUTPATIENT)
Dept: FAMILY MEDICINE CLINIC | Age: 87
End: 2022-03-21
Payer: MEDICARE

## 2022-03-21 ENCOUNTER — TELEPHONE (OUTPATIENT)
Dept: FAMILY MEDICINE CLINIC | Age: 87
End: 2022-03-21

## 2022-03-21 VITALS
BODY MASS INDEX: 24.38 KG/M2 | HEART RATE: 80 BPM | SYSTOLIC BLOOD PRESSURE: 110 MMHG | DIASTOLIC BLOOD PRESSURE: 58 MMHG | OXYGEN SATURATION: 98 % | HEIGHT: 72 IN | WEIGHT: 180 LBS

## 2022-03-21 DIAGNOSIS — L12.0 BULLOUS PEMPHIGOID: Primary | ICD-10-CM

## 2022-03-21 DIAGNOSIS — I82.401 RECURRENT ACUTE DEEP VEIN THROMBOSIS (DVT) OF RIGHT LOWER EXTREMITY (HCC): ICD-10-CM

## 2022-03-21 DIAGNOSIS — L98.491 SKIN ULCER, LIMITED TO BREAKDOWN OF SKIN (HCC): ICD-10-CM

## 2022-03-21 DIAGNOSIS — F51.01 PRIMARY INSOMNIA: ICD-10-CM

## 2022-03-21 PROCEDURE — 99213 OFFICE O/P EST LOW 20 MIN: CPT | Performed by: FAMILY MEDICINE

## 2022-03-21 PROCEDURE — G8420 CALC BMI NORM PARAMETERS: HCPCS | Performed by: FAMILY MEDICINE

## 2022-03-21 PROCEDURE — 1036F TOBACCO NON-USER: CPT | Performed by: FAMILY MEDICINE

## 2022-03-21 PROCEDURE — 1123F ACP DISCUSS/DSCN MKR DOCD: CPT | Performed by: FAMILY MEDICINE

## 2022-03-21 PROCEDURE — G8484 FLU IMMUNIZE NO ADMIN: HCPCS | Performed by: FAMILY MEDICINE

## 2022-03-21 PROCEDURE — G8427 DOCREV CUR MEDS BY ELIG CLIN: HCPCS | Performed by: FAMILY MEDICINE

## 2022-03-21 PROCEDURE — 4040F PNEUMOC VAC/ADMIN/RCVD: CPT | Performed by: FAMILY MEDICINE

## 2022-03-21 NOTE — TELEPHONE ENCOUNTER
----- Message from Eryn Blevins sent at 3/21/2022  9:10 AM EDT -----  Subject: Message to Provider    QUESTIONS  Information for Provider? Patient son got a letter about Dr. Aba Trent   patients needing to get a new doctor. Can patient be switched to a   different provider in the office. Please advise.  ---------------------------------------------------------------------------  --------------  CALL BACK INFO  What is the best way for the office to contact you? OK to leave message on   voicemail  Preferred Call Back Phone Number? [phone]  ---------------------------------------------------------------------------  --------------  SCRIPT ANSWERS  Relationship to Patient? Other  Representative Name? Neetacam Arguello  Is the [place] [employer] on the appropriate HIPAA document in Epic?  Yes

## 2022-03-21 NOTE — PROGRESS NOTES
3/21/2022    Ayanna Pfeiffer    Chief Complaint   Patient presents with    Other     4 week f/u - Bullous pemphigoid    Other     sores bilatera lower legs improved. pt is seeing a dermatologist. has a f/u appt with dermatologist 3/24/22        HPI    Reza Wallis is a 80 y.o. male who presents today with follow-up. Nuria Good brought Kenton Whaley. She is a family friend who comes to many visits. She helps out son Kamila Bui. Daughter Oren Haile is now staying with patient but she does not drive therefore she does not come to appointments. Patient feels he does well with his sleep. He uses Restoril 7.5 every evening. Patient appears to not be having new lesions. The areas underneath his Band-Aids are getting hard and dry. I would prefer they be more open to air.     REVIEW OF SYSTEMS    Constitutional:  Denies fever, chills, weight loss or weakness  Eyes:  no photophobia or discharge  ENT:  no sore throat or ear pain  Cardiovascular:  Denies chest pain, palpitations or swelling  Respiratory:  Denies cough or shortness of breath  GI:  no abdominal pain, nausea, vomiting, or diarrhea  Musculoskeletal:  no back pain  Skin:  No rashes  Neurologic:  no headache, focal weakness, or sensory changes  Endocrine:  no polyuria or polydipsia      PAST MEDICAL HISTORY  Past Medical History:   Diagnosis Date    Anemia     Anxiety     Arthritis     BPH with urinary obstruction     Depression     GERD (gastroesophageal reflux disease)     Hemorrhoids     Hepatitis     Hernia of unspecified site of abdominal cavity without mention of obstruction or gangrene     Hyperlipidemia     Hypotension     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    Rt Tricep, Lt Superior Forearm       FAMILY HISTORY  Family History   Problem Relation Age of Onset    Depression Mother     Diabetes Mother     COPD Father     Diabetes Brother     Diabetes Maternal Grandmother        SOCIAL HISTORY  Social History     Socioeconomic History    Marital status:      Spouse name: Not on file    Number of children: Not on file    Years of education: Not on file    Highest education level: Not on file   Occupational History    Not on file   Tobacco Use    Smoking status: Former Smoker     Packs/day: 1.00     Years: 5.00     Pack years: 5.00     Types: Cigarettes     Start date: 1950     Quit date: 1955     Years since quittin.1    Smokeless tobacco: Never Used   Substance and Sexual Activity    Alcohol use: Not Currently    Drug use: Not Currently    Sexual activity: Not on file   Other Topics Concern    Not on file   Social History Narrative    Not on file     Social Determinants of Health     Financial Resource Strain: Low Risk     Difficulty of Paying Living Expenses: Not hard at all   Food Insecurity: No Food Insecurity    Worried About [address] in the Last Year: Never true    [facility] in the Last Year: Never true   Transportation Needs:     Lack of Transportation (Medical): Not on file    Lack of Transportation (Non-Medical):  Not on file   Physical Activity:     Days of Exercise per Week: Not on file    Minutes of Exercise per Session: Not on file   Stress:     Feeling of Stress : Not on file   Social Connections:     Frequency of Communication with Friends and Family: Not on file    Frequency of Social Gatherings with Friends and Family: Not on file    Attends Sabianist Services: Not on file    Active Member of [address] [place] [address] or Organizations: Not on file    Attends Club or Organization Meetings: Not on file    Marital Status: Not on file   Intimate Partner Violence:     Fear of Current or Ex-Partner: Not on file    Emotionally Abused: Not on file    Physically Abused: Not on file    Sexually Abused: Not on file   Housing Stability:     Unable to Pay for Housing in the Last Year: Not on file    Number of [place] in the Last Year: Not on file    Unstable Housing in the Last Year: Not on file SURGICAL HISTORY  Past Surgical History:   Procedure Laterality Date    CATARACT REMOVAL      CYSTOSCOPY N/A 3/29/2021    CYSTOSCOPY EVACUATION OF CLOTS, BLADDER FULGERATION, AND SUPRA-PUBIC CATHETER INSERTION performed by Anibal Dale MD at [address] 4/1/2021    CYSTOSCOPY, PROSTATE FULGURATION, EVACUATION OF CLOTS & TURP performed by Alberteen Boxer, MD at [address] N/A 3/4/2021    LAPAROSCOPIC LARGE HERNIA HIATAL REPAIR WITH GASTRIC OBSTRUCTION POSS OPEN performed by Maribel Levine MD at [place] [address]  Current Outpatient Medications   Medication Sig Dispense Refill    predniSONE (DELTASONE) 5 MG tablet Take 1 tablet by mouth daily 30 tablet 0    ELIQUIS 2.5 MG TABS tablet TAKE 1 TABLET BY MOUTH TWICE A DAY 60 tablet 5    tamsulosin (FLOMAX) 0.4 MG capsule Take 2 capsules by mouth nightly 60 capsule 1    QUEtiapine (SEROQUEL) 25 MG tablet TAKE 1 TABLET BY MOUTH TWICE A  tablet 1    furosemide (LASIX) 20 MG tablet Take 0.5 tablets by mouth every other day 30 tablet 1    potassium chloride (KLOR-CON M) 20 MEQ extended release tablet Take 1 tablet by mouth every other day 90 tablet 1    pantoprazole (PROTONIX) 20 MG tablet Take 1 tablet by mouth daily 90 tablet 1    levothyroxine (SYNTHROID) 75 MCG tablet Take 1 tablet by mouth daily (Patient taking differently: Take 50 mcg by mouth daily ) 90 tablet 1    finasteride (PROSCAR) 5 MG tablet TAKE 1 TABLET BY MOUTH EVERY DAY 90 tablet 1    sertraline (ZOLOFT) 50 MG tablet Take 50 mg by mouth daily      temazepam (RESTORIL) 7.5 MG capsule Take 7.5 mg by mouth nightly as needed for Sleep.       docusate sodium (COLACE) 100 MG capsule Take 100 mg by mouth as needed for Constipation (Patient not taking: Reported on 3/21/2022)      ferrous sulfate (IRON 325) 325 (65 Fe) MG tablet Take 1 tablet by mouth daily (with breakfast) (Patient not taking: Reported on 3/21/2022) 90 tablet 1     No current facility-administered medications for this visit. ALLERGIES  Allergies   Allergen Reactions    Minocycline Other (See Comments)       PHYSICAL EXAM  BP (!) 110/58   Pulse 80   Ht 6' (1.829 m)   Wt 180 lb (81.6 kg)   SpO2 98%   BMI 24.41 kg/m²     ASSESSMENT & PLAN    1. Bullous pemphigoid  Patient improving on steroid and antibiotic. Patient be seen dermatology in 3 days    2. Skin ulcer, limited to breakdown of skin ([address]  Ulcer size and number improving    3. 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 Primary insomnia  Issue controlled. Continue meds. Refilled meds.     Patient billed off total time-22minutes  5 minutes prechart review  12 minutes spent with patient  5 minutes creating note            Electronically signed by Jose C Tay MD on 3/21/2022

## 2022-03-21 NOTE — TELEPHONE ENCOUNTER
----- Message from Gavin Aguilerajoe sent at 3/21/2022  9:10 AM EDT -----  Subject: Message to Provider    QUESTIONS  Information for Provider? Patient son got a letter about Dr. Mariola Wright   patients needing to get a new doctor. Can patient be switched to a   different provider in the office. Please advise.  ---------------------------------------------------------------------------  --------------  CALL BACK INFO  What is the best way for the office to contact you? OK to leave message on   voicemail  Preferred Call Back Phone Number? [phone]  ---------------------------------------------------------------------------  --------------  SCRIPT ANSWERS  Relationship to Patient? Other  Representative Name? Fred Vaughan  Is the [facility] on the appropriate HIPAA document in Epic?  Yes

## 2022-03-22 ENCOUNTER — HOSPITAL ENCOUNTER (OUTPATIENT)
Dept: PHYSICAL THERAPY | Age: 87
Setting detail: THERAPIES SERIES
Discharge: HOME | End: 2022-03-22
Payer: MEDICARE

## 2022-03-22 PROCEDURE — 97110 THERAPEUTIC EXERCISES: CPT

## 2022-03-22 PROCEDURE — 97530 THERAPEUTIC ACTIVITIES: CPT

## 2022-03-22 NOTE — FLOWSHEET NOTE
Outpatient Physical Therapy  [place]           [x] Phone: [phone]   Fax: [phone]  [place]           [] Phone: [phone]   Fax: [phone]        Physical Therapy Daily Treatment Note  Date:  3/22/2022    Patient Name:  Negrito Duval    :  10/18/1930  MRN: [id number]  Restrictions/Precautions:  none  Diagnosis:   Diagnosis: Fall  Date of Injury/Surgery:   Treatment Diagnosis: Treatment Diagnosis: impaired balance, impaired gait, BLE weakness    Insurance/Certification information: PT Insurance Information: [facility] Medicare   Referring Physician:  Referring Practitioner: Benito Hoyt MD  Next Doctor Visit:    Plan of care signed (Y/N):  Sent 3/11  Outcome Measure: ABC: 18%  Visit# / total visits:  3 /10  Pain level: 0/10     Goals:     Patient goals : improve balance and strength  Short term goals  Time Frame for Short term goals: refer to [facility]  Long term goals  Time Frame for Long term goals : 10 visits  Long term goal 1: Pt will improve ABC score to 25% or more to show improved confidence with balance  Long term goal 2: Pt chu improve TUG to 30 sec or less to show improved gait and mobility  Long term goal 3: Pt will improve BLE gross strength to 4/5 or more to aide in balance  Long term goal 4: Pt will improve 5x STS to 20 sec or less to show improved transfers    Summary of Evaluation: Assessment: Pt is a 35-year-old male who presents to therapy due to worsening balance over the last year and increased weakness since December, insedeous onset. Upon assessment, pt does present with BLE weakness, impaired balance, impaired gait, and overall reduced independence with ADL and IADL task completion. Pt would benefit from skilled therapy interventions to address listed impairments, progress toward goal completion and improve ADL/IADL status. PT also warranted to reduce risk for further injury or decline. Subjective: Pt came in today reporting of no pain. Has not been performing HEP. Any changes in Ambulatory Summary Sheet? None        Objective:  See eval   COVID screening questions were asked and patient attested that there had been no contact or symptoms    BLE weakness R>L noted with exercises  Pt currently ambulating with 2WW, forward flexed posture , slow mark, decrease step length. Does improve with cueing. Exercises: (No more than 4 columns)   Exercise/Equipment 3/11/22 #1 3/18/22 #2 Date 3/22/22 #3           WARM UP       NuStep    5' lvl 4 6' lvl 4         TABLE      Seated march x10 ea BL 10 each Bilat 1x10 alt   LAQ x10 ea BL 10 each 2x10 R/L   Seated clams YTB x10 YTB x 10 RTB 2x10   Seated glute ext YTB x10 ea BL GTB x10 Bilat. STANDING      STS  15 x [address] 1x10, 1x5   // black stripe walks  2 laps 2 laps   Standing marches  10 x bilat. 10x   Toe taps   10 x bilat. 10x alt   Step Ups  10x bilat. 4\" 10x bilat. 4\"   sidestepping   2 laps bilat UE support   lateral  Step up and over      4' attempted - difficult   PROPRIOCEPTION                                    MODALITIES                      Other Therapeutic Activities/Education:  HEP and importance of completion, POC and goals, anatomy and physiology related to condition      Home Exercise Program: Issued, practiced and pt demo ability to perform 3/11/2022      Manual Treatments:  none      Modalities:  none      Communication with other providers:  POC sent      Assessment:  Pt demonstrated overall good tolerance to today's session with provided rest breaks. Fatigues easily. Pt would continue to benefit from skilled therapy interventions to address remaining impairments, improve mobility and strength and progress toward goal completion while reducing risk for re-injury or further decline. End pain: 0/10 - reports of fatigue    Assessment: Pt is a 60-year-old male who presents to therapy due to worsening balance over the last year and increased weakness since December, insedeous onset. Upon assessment, pt does present with BLE weakness, impaired balance, impaired gait, and overall reduced independence with ADL and IADL task completion. Pt would benefit from skilled therapy interventions to address listed impairments, progress toward goal completion and improve ADL/IADL status. PT also warranted to reduce risk for further injury or decline. Plan for Next Session: Specific instructions for Next Treatment: balance, gait, BLE strength  Use GTB for clams and seated glute ext.       Time In / Time Out:   1346 - 1430    Timed Code/Total Treatment Minutes:  44/44, (2) TE, (1) TA    Next Progress Note due:  10th visit      Plan of Care Interventions:  [x] Therapeutic Exercise  [] Modalities:  [x] Therapeutic Activity     [] Ultrasound  [] Estim  [x] Gait Training      [] Cervical Traction [] Lumbar Traction  [x] Neuromuscular Re-education    [] Cold/hotpack [] Iontophoresis   [x] Instruction in HEP      [] Vasopneumatic   [] Dry Needling    [x] Manual Therapy               [] Aquatic Therapy              Electronically signed by:  Heather Galvez PTA 3/22/2022, 1:46 PM

## 2022-03-25 ENCOUNTER — HOSPITAL ENCOUNTER (OUTPATIENT)
Dept: PHYSICAL THERAPY | Age: 87
Setting detail: THERAPIES SERIES
Discharge: HOME | End: 2022-03-25
Payer: MEDICARE

## 2022-03-25 PROCEDURE — 97110 THERAPEUTIC EXERCISES: CPT

## 2022-03-25 NOTE — FLOWSHEET NOTE
Outpatient Physical Therapy  [place]           [x] Phone: [phone]   Fax: [phone]  [place]           [] Phone: [phone]   Fax: [phone]        Physical Therapy Daily Treatment Note  Date:  3/25/2022    Patient Name:  Ricci Stanley    :  10/18/1930  MRN: [id number]  Restrictions/Precautions:  none  Diagnosis:   Diagnosis: Fall  Date of Injury/Surgery:   Treatment Diagnosis: Treatment Diagnosis: impaired balance, impaired gait, BLE weakness    Insurance/Certification information: PT Insurance Information: [facility] Medicare   Referring Physician:  Referring Practitioner: Belle Noble MD  Next Doctor Visit:    Plan of care signed (Y/N):  yes  Outcome Measure: ABC: 18%  Visit# / total visits:  4/10  Pain level: 0/10     Goals:     Patient goals : improve balance and strength  Short term goals  Time Frame for Short term goals: refer to [facility]  Long term goals  Time Frame for Long term goals : 10 visits  Long term goal 1: Pt will improve ABC score to 25% or more to show improved confidence with balance  Long term goal 2: Pt chu improve TUG to 30 sec or less to show improved gait and mobility  Long term goal 3: Pt will improve BLE gross strength to 4/5 or more to aide in balance  Long term goal 4: Pt will improve 5x STS to 20 sec or less to show improved transfers    Summary of Evaluation: Assessment: Pt is a 42-year-old male who presents to therapy due to worsening balance over the last year and increased weakness since December, insedeous onset. Upon assessment, pt does present with BLE weakness, impaired balance, impaired gait, and overall reduced independence with ADL and IADL task completion. Pt would benefit from skilled therapy interventions to address listed impairments, progress toward goal completion and improve ADL/IADL status. PT also warranted to reduce risk for further injury or decline. Subjective: pt reports that exercises at home have been going well.          Any changes in Ambulatory Summary Sheet? None        Objective:  See teoal   COVID screening questions were asked and patient attested that there had been no contact or symptoms    Extension lag and decreased strength RLE with LAQ     Exercises: (No more than 4 columns)   Exercise/Equipment 3/18/22 #2  3/22/22 #3 3/25/22 #4           WARM UP       NuStep   5' lvl 4 6' lvl 4 L4 5'          TABLE      Seated march 10 each Bilat 1x10 alt 10x2 alt    LAQ 10 each 2x10 R/L 2x10 bilat    Seated clams YTB x 10 RTB 2x10 RTB 2x10   Seated glute ext GTB x10 Bilat. STANDING      STS 15 x [address] 1x10, 1x5 3x5    // black stripe walks 2 laps 2 laps    Standing marches 10 x bilat. 10x x15 alt    Toe taps  10 x bilat. 10x alt 4\" x10 bilat    Step Ups 10x bilat. 4\" 10x bilat. 4\" 4\" x10 bilat    sidestepping  2 laps bilat UE support 2 laps bilat UE support   lateral  Step up and over     4' attempted - difficult    Butt kicks    x15 alt    PROPRIOCEPTION                                    MODALITIES                      Other Therapeutic Activities/Education:  HEP and importance of completion, POC and goals      Home Exercise Program: Issued, practiced and pt demo ability to perform 3/11/2022      Manual Treatments:  none      Modalities:  none      Communication with other providers:  POC sent      Assessment:  Pt tolerated treatment well today despite showing fatigue after standing exercises. Pt did require a longer rest break with water after performing some standing exercises in the parallel bars. Mildred Stone requires intermittent cues to sit to stand pushing from the chair instead of grabbing his walker to pull himself up. Pt would benefit from continued skilled physical therapy to address remaining limitations of BLE strength, balance and gait in order to prevent further injury and decline.  End pain: \"just tired\"         Plan for Next Session: Specific instructions for Next Treatment: balance, gait, BLE strength        Time In / Time Out:   1114/1158    Timed Code/Total Treatment Minutes:   40': 40' TE x3    Next Progress Note due:  10th visit      Plan of Care Interventions:  [x] Therapeutic Exercise  [] Modalities:  [x] Therapeutic Activity     [] Ultrasound  [] Estim  [x] Gait Training      [] Cervical Traction [] Lumbar Traction  [x] Neuromuscular Re-education    [] Cold/hotpack [] Iontophoresis   [x] Instruction in HEP      [] Vasopneumatic   [] Dry Needling    [x] Manual Therapy               [] Aquatic Therapy              Electronically signed by:  TEO Mccabe,  3/25/2022, 6:54 AM

## 2022-03-29 ENCOUNTER — HOSPITAL ENCOUNTER (OUTPATIENT)
Dept: PHYSICAL THERAPY | Age: 87
Setting detail: THERAPIES SERIES
Discharge: HOME | End: 2022-03-29
Payer: MEDICARE

## 2022-03-29 ENCOUNTER — HOSPITAL ENCOUNTER (OUTPATIENT)
Age: 87
Discharge: HOME | End: 2022-03-29
Payer: MEDICARE

## 2022-03-29 ENCOUNTER — HOSPITAL ENCOUNTER (OUTPATIENT)
Dept: ULTRASOUND IMAGING | Age: 87
Discharge: HOME | End: 2022-03-29
Payer: MEDICARE

## 2022-03-29 DIAGNOSIS — D64.9 ANEMIA, UNSPECIFIED TYPE: ICD-10-CM

## 2022-03-29 DIAGNOSIS — N18.4 CKD (CHRONIC KIDNEY DISEASE), STAGE IV (HCC): ICD-10-CM

## 2022-03-29 LAB
ANION GAP SERPL CALCULATED.3IONS-SCNC: 12 MMOL/L (ref 4–16)
ANISOCYTOSIS: ABNORMAL
BACTERIA: ABNORMAL /HPF
BANDED NEUTROPHILS ABSOLUTE COUNT: 0.39 K/CU MM
BANDED NEUTROPHILS RELATIVE PERCENT: 7 % (ref 5–11)
BILIRUBIN URINE: NEGATIVE MG/DL
BLOOD, URINE: ABNORMAL
BUN BLDV-MCNC: 41 MG/DL (ref 6–23)
BURR CELLS: ABNORMAL
CALCIUM SERPL-MCNC: 8.4 MG/DL (ref 8.3–10.6)
CHLORIDE BLD-SCNC: 106 MMOL/L (ref 99–110)
CLARITY: CLEAR
CO2: 18 MMOL/L (ref 21–32)
COLOR: YELLOW
CREAT SERPL-MCNC: 2.3 MG/DL (ref 0.9–1.3)
CREATININE URINE: 46.4 MG/DL (ref 39–259)
DIFFERENTIAL TYPE: ABNORMAL
EOSINOPHILS ABSOLUTE: 0.1 K/CU MM
EOSINOPHILS RELATIVE PERCENT: 2 % (ref 0–3)
FERRITIN: 163 NG/ML (ref 30–400)
GFR AFRICAN AMERICAN: 32 ML/MIN/1.73M2
GFR NON-AFRICAN AMERICAN: 27 ML/MIN/1.73M2
GLUCOSE BLD-MCNC: 76 MG/DL (ref 70–99)
GLUCOSE, URINE: NEGATIVE MG/DL
HCT VFR BLD CALC: 32.6 % (ref 42–52)
HEMOGLOBIN: 9.8 GM/DL (ref 13.5–18)
IRON: 73 UG/DL (ref 59–158)
KETONES, URINE: NEGATIVE MG/DL
LEUKOCYTE ESTERASE, URINE: ABNORMAL
LYMPHOCYTES ABSOLUTE: 1 K/CU MM
LYMPHOCYTES RELATIVE PERCENT: 18 % (ref 24–44)
MCH RBC QN AUTO: 31.1 PG (ref 27–31)
MCHC RBC AUTO-ENTMCNC: 30.1 % (ref 32–36)
MCV RBC AUTO: 103.5 FL (ref 78–100)
METAMYELOCYTES ABSOLUTE COUNT: 0.17 K/CU MM
METAMYELOCYTES PERCENT: 3 %
MONOCYTES ABSOLUTE: 0.5 K/CU MM
MONOCYTES RELATIVE PERCENT: 9 % (ref 0–4)
MUCUS: ABNORMAL HPF
NITRITE URINE, QUANTITATIVE: NEGATIVE
PCT TRANSFERRIN: 30 % (ref 10–44)
PDW BLD-RTO: 16.3 % (ref 11.7–14.9)
PH, URINE: 6 (ref 5–8)
PLATELET # BLD: 240 K/CU MM (ref 140–440)
PMV BLD AUTO: 9.7 FL (ref 7.5–11.1)
POTASSIUM SERPL-SCNC: 5.6 MMOL/L (ref 3.5–5.1)
PROT/CREAT RATIO, UR: 0.3
PROTEIN UA: NEGATIVE MG/DL
RBC # BLD: 3.15 M/CU MM (ref 4.6–6.2)
RBC # BLD: ABNORMAL 10*6/UL
RBC URINE: 93 /HPF (ref 0–3)
SEGMENTED NEUTROPHILS ABSOLUTE COUNT: 3.3 K/CU MM
SEGMENTED NEUTROPHILS RELATIVE PERCENT: 61 % (ref 36–66)
SODIUM BLD-SCNC: 136 MMOL/L (ref 135–145)
SPECIFIC GRAVITY UA: 1.02 (ref 1–1.03)
SQUAMOUS EPITHELIAL: 1 /HPF
TOTAL IRON BINDING CAPACITY: 247 UG/DL (ref 250–450)
TRICHOMONAS: ABNORMAL /HPF
UNSATURATED IRON BINDING CAPACITY: 174 UG/DL (ref 110–370)
URINE TOTAL PROTEIN: 15.2 MG/DL
UROBILINOGEN, URINE: 0.2 MG/DL (ref 0.2–1)
WBC # BLD: 5.5 K/CU MM (ref 4–10.5)
WBC CLUMP: ABNORMAL /HPF
WBC UA: 80 /HPF (ref 0–2)
YEAST: ABNORMAL /HPF

## 2022-03-29 PROCEDURE — 83550 IRON BINDING TEST: CPT

## 2022-03-29 PROCEDURE — 82728 ASSAY OF FERRITIN: CPT

## 2022-03-29 PROCEDURE — 81001 URINALYSIS AUTO W/SCOPE: CPT

## 2022-03-29 PROCEDURE — 83540 ASSAY OF IRON: CPT

## 2022-03-29 PROCEDURE — 85007 BL SMEAR W/DIFF WBC COUNT: CPT

## 2022-03-29 PROCEDURE — 80048 BASIC METABOLIC PNL TOTAL CA: CPT

## 2022-03-29 PROCEDURE — 85027 COMPLETE CBC AUTOMATED: CPT

## 2022-03-29 PROCEDURE — 76775 US EXAM ABDO BACK WALL LIM: CPT

## 2022-03-29 PROCEDURE — 84156 ASSAY OF PROTEIN URINE: CPT

## 2022-03-29 PROCEDURE — 36415 COLL VENOUS BLD VENIPUNCTURE: CPT

## 2022-03-29 PROCEDURE — 97110 THERAPEUTIC EXERCISES: CPT

## 2022-03-29 PROCEDURE — 82570 ASSAY OF URINE CREATININE: CPT

## 2022-03-29 NOTE — RESULT ENCOUNTER NOTE
TELL PT:  -POTASSIUM LEVEL IS HIGH AT 5.6, PLEASE STOP YOUR POTASSIUM SUPPLEMENTS AND COME  VELTASSA 8.4G DAILY FOR 2 DAYS TODAY     THX

## 2022-03-29 NOTE — FLOWSHEET NOTE
Outpatient Physical Therapy  [place]           [x] Phone: [phone]   Fax: [phone]  [place]           [] Phone: [phone]   Fax: [phone]        Physical Therapy Daily Treatment Note  Date:  3/29/2022    Patient Name:  Santo Weeks  \"AMENA\"   :  10/18/1930  MRN: [id number]  Restrictions/Precautions:  none  Diagnosis:   Diagnosis: Fall  Date of Injury/Surgery:   Treatment Diagnosis: Treatment Diagnosis: impaired balance, impaired gait, BLE weakness    Insurance/Certification information: PT Insurance Information: [facility] Medicare   Referring Physician:  Referring Practitioner: Abraham Sandy MD  Next Doctor Visit:    Plan of care signed (Y/N):  yes  Outcome Measure: ABC: 18%  Visit# / total visits:  5/10  Pain level: 0/10     Goals:     Patient goals : improve balance and strength  Short term goals  Time Frame for Short term goals: refer to [facility]  Long term goals  Time Frame for Long term goals : 10 visits  Long term goal 1: Pt will improve ABC score to 25% or more to show improved confidence with balance  Long term goal 2: Pt chu improve TUG to 30 sec or less to show improved gait and mobility  Long term goal 3: Pt will improve BLE gross strength to 4/5 or more to aide in balance  Long term goal 4: Pt will improve 5x STS to 20 sec or less to show improved transfers    Summary of Evaluation: Assessment: Pt is a 77-year-old male who presents to therapy due to worsening balance over the last year and increased weakness since December, insedeous onset. Upon assessment, pt does present with BLE weakness, impaired balance, impaired gait, and overall reduced independence with ADL and IADL task completion. Pt would benefit from skilled therapy interventions to address listed impairments, progress toward goal completion and improve ADL/IADL status. PT also warranted to reduce risk for further injury or decline. Subjective: pt reports that he is not in any pain today.          Any changes in Ambulatory Summary Sheet? None        Objective:    COVID screening questions were asked and patient attested that there had been no contact or symptoms    Pt cont to use hands on walker with STS despite cues     Exercises: (No more than 4 columns)   Exercise/Equipment  3/22/22 #3 3/25/22 #4 3/29/22 #5           WARM UP       NuStep   6' lvl 4 L4 5'  L4 5'          TABLE      Seated march 1x10 alt 10x2 alt  x20 alt   LAQ 2x10 R/L 2x10 bilat  x20 bilat    Seated clams RTB 2x10 RTB 2x10 RTB x20   Seated glute ext         SLR   attempted   STANDING      STS 1x10, 1x5 3x5  X10, x5    // black stripe walks 2 laps  x4 laps BUE support   Standing marches 10x x15 alt  x15 alt   Toe taps  10x alt 4\" x10 bilat  4\" x15 bilat    Step Ups 10x bilat. 4\" 4\" x10 bilat  4\" x10 bilat    sidestepping 2 laps bilat UE support 2 laps bilat UE support x2 laps bilat UE support   lateral  Step up and over    4' attempted - difficult     Butt kicks   x15 alt  x15 alt    PROPRIOCEPTION                                    MODALITIES                      Other Therapeutic Activities/Education:  HEP and importance of completion, POC and goals      Home Exercise Program: Issued, practiced and pt demo ability to perform 3/11/2022      Manual Treatments:  none      Modalities:  none      Communication with other providers:  POC sent      Assessment:  Pt tolerated treatment well today. Pt continues to require longer rest breaks with water while performing standing activities in the parallel bars. Pt would benefit from continued skilled physical therapy to address remaining limitations of BLE strength and endurance in order to prevent further injury and decline.  End pain: same          Plan for Next Session: Specific instructions for Next Treatment: balance, gait, BLE strength        Time In / Time Out:   1344/1425    Timed Code/Total Treatment Minutes:   39': TE x3    Next Progress Note due:  10th visit      Plan of Care Interventions:  [x] Therapeutic Exercise  [] Modalities:  [x] Therapeutic Activity     [] Ultrasound  [] Estim  [x] Gait Training      [] Cervical Traction [] Lumbar Traction  [x] Neuromuscular Re-education    [] Cold/hotpack [] Iontophoresis   [x] Instruction in HEP      [] Vasopneumatic   [] Dry Needling    [x] Manual Therapy               [] Aquatic Therapy              Electronically signed by:  TEO Mccarthy,  3/29/2022, 6:59 AM

## 2022-03-29 NOTE — RESULT ENCOUNTER NOTE
TELL PT:  -GO  KEFLEX 500MG PO BID FOR 10 DAYS DUE TO YOUR RINE SHOWING SIGNS OF BLADDER INFECTION  - IT WAS SENT TO [facility] [address]  THX

## 2022-04-01 ENCOUNTER — HOSPITAL ENCOUNTER (OUTPATIENT)
Dept: PHYSICAL THERAPY | Age: 87
Setting detail: THERAPIES SERIES
Discharge: HOME | End: 2022-04-01
Payer: MEDICARE

## 2022-04-01 PROCEDURE — 97112 NEUROMUSCULAR REEDUCATION: CPT

## 2022-04-01 PROCEDURE — 97110 THERAPEUTIC EXERCISES: CPT

## 2022-04-01 PROCEDURE — 97530 THERAPEUTIC ACTIVITIES: CPT

## 2022-04-01 NOTE — FLOWSHEET NOTE
Outpatient Physical Therapy  [place]           [x] Phone: [phone]   Fax: [phone]  [place]           [] Phone: [phone]   Fax: [phone]        Physical Therapy Daily Treatment Note  Date:  2022    Patient Name:  Belen Horowitz  \"AMENA\"   :  10/18/1930  MRN: [id number]  Restrictions/Precautions:  none  Diagnosis:   Diagnosis: Fall  Date of Injury/Surgery:   Treatment Diagnosis: Treatment Diagnosis: impaired balance, impaired gait, BLE weakness    Insurance/Certification information: PT Insurance Information: [facility] Medicare   Referring Physician:  Referring Practitioner: Theresa Martin MD  Next Doctor Visit:    Plan of care signed (Y/N):  yes  Outcome Measure: ABC: 18%  Visit# / total visits:  6/10  Pain level: 0/10     Goals:     Patient goals : improve balance and strength  Short term goals  Time Frame for Short term goals: refer to [facility]  Long term goals  Time Frame for Long term goals : 10 visits  Long term goal 1: Pt will improve ABC score to 25% or more to show improved confidence with balance  Long term goal 2: Pt chu improve TUG to 30 sec or less to show improved gait and mobility  Long term goal 3: Pt will improve BLE gross strength to 4/5 or more to aide in balance  Long term goal 4: Pt will improve 5x STS to 20 sec or less to show improved transfers    Summary of Evaluation: Assessment: Pt is a 19-year-old male who presents to therapy due to worsening balance over the last year and increased weakness since December, insedeous onset. Upon assessment, pt does present with BLE weakness, impaired balance, impaired gait, and overall reduced independence with ADL and IADL task completion. Pt would benefit from skilled therapy interventions to address listed impairments, progress toward goal completion and improve ADL/IADL status. PT also warranted to reduce risk for further injury or decline. Subjective: Pt arrives in good spirits and reports no pain.  Pt states that he doesn't do HEP and is lazy and that he has never done HEP. Pt states the he has been getting around at home \"pretty good\". Pt states no LOB or falls. No changes in medications. Any changes in Ambulatory Summary Sheet? None      Objective:    COVID screening questions were asked and patient attested that there had been no contact or symptoms    Fwd flexed posture with all standing activities. Cued throughout  Heavy B UE assist in // bars  Extensor lag SLR/ LAQ max cueing  Need for assist with [place] TKE  Pt require tactile, vc, and mirror for technique   Min impulsiveness and need for simple commands    Seated RB encouraged    Exercises: (No more than 4 columns)   Exercise/Equipment  3/22/22 #3 3/25/22 #4 3/29/22 #5 4/1/22 #6            WARM UP        NuStep   6' lvl 4 L4 5'  L4 5'  lvl 5 5'          TABLE       Seated march 1x10 alt 10x2 alt  x20 alt X 20 alt   LAQ 2x10 R/L 2x10 bilat  x20 bilat  SAQ supine bilat   Seated clams RTB 2x10 RTB 2x10 RTB x20 Supine one at a time RTB x 20ea   Seated glute ext    20x       SLR   attempted 10x    STANDING       STS 1x10, 1x5 3x5  X10, x5  X 10   // black stripe walks 2 laps  x4 laps BUE support X 4 laps BUE support    Standing marches 10x x15 alt  x15 alt x15 krupa   Toe taps  10x alt 4\" x10 bilat  4\" x15 bilat  4\" x 15 bilat   Step Ups 10x bilat.  4\" 4\" x10 bilat  4\" x10 bilat  4\" x 15 bilat   sidestepping 2 laps bilat UE support 2 laps bilat UE support x2 laps bilat UE support    lateral  Step up and over    4' attempted - difficult      Butt kicks   x15 alt  x15 alt     PROPRIOCEPTION                                          MODALITIES                         Other Therapeutic Activities/Education:  HEP and importance of completion, POC and goals      Home Exercise Program: Issued, practiced and pt demo ability to perform 3/11/2022      Manual Treatments:  none      Modalities:  none      Communication with other providers:  POC sent      Assessment:    Pt tolerated treatment well today with noted LE weakness and no increase in pain. Pt required max vc and tacile cuing throughout. Pt demonstrated LE fatigue/ weakness with a lot of UE assist and difficulty with posture. Pt would benefit from skilled therapy to promote LE strength to promote safe home and community ambulation.    Pain: 0/10      Plan for Next Session: Specific instructions for Next Treatment: balance, gait, BLE strength        Time In / Time Out:   1015/1053    Timed Code/Total Treatment Minutes:   38'/38': TE 1, TA 1, NE 1    Next Progress Note due:  10th visit      Plan of Care Interventions:  [x] Therapeutic Exercise  [] Modalities:  [x] Therapeutic Activity     [] Ultrasound  [] Estim  [x] Gait Training      [] Cervical Traction [] Lumbar Traction  [x] Neuromuscular Re-education    [] Cold/hotpack [] Iontophoresis   [x] Instruction in HEP      [] Vasopneumatic   [] Dry Needling    [x] Manual Therapy               [] Aquatic Therapy              Electronically signed by:  Eric Hilario SPTA 4/1/2022, 7:42 AM  Hillary Gordon PTA, CLT 4/1/22

## 2022-04-04 RX ORDER — FERROUS SULFATE 325(65) MG
TABLET ORAL
Qty: 30 TABLET | Refills: 2 | OUTPATIENT
Start: 2022-04-04

## 2022-04-05 ENCOUNTER — HOSPITAL ENCOUNTER (OUTPATIENT)
Dept: PHYSICAL THERAPY | Age: 87
Discharge: HOME | End: 2022-04-05

## 2022-04-05 NOTE — FLOWSHEET NOTE
Physical Therapy  Cancellation/No-show Note  Patient Name:  Belen Horowitz  :  10/18/1930   Date:  2022  Cancelled visits to date: 2  No-shows to date: 0    For today's appointment patient:  [x]  Cancelled  []  Rescheduled appointment  []  No-show     Reason given by patient:  []  Patient ill  []  Conflicting appointment  []  No transportation    []  Conflict with work  []  No reason given  [x]  Other:     Comments:  Pt feeling really weak and not up to therapy today  Electronically signed by:  TEO Garcia,2022, 1:36 PM
Pt feeling really weak and not up to therapy today
Susan Flores RN

## 2022-04-08 ENCOUNTER — HOSPITAL ENCOUNTER (OUTPATIENT)
Age: 87
Discharge: HOME | End: 2022-04-08
Payer: MEDICARE

## 2022-04-08 ENCOUNTER — HOSPITAL ENCOUNTER (OUTPATIENT)
Dept: PHYSICAL THERAPY | Age: 87
Setting detail: THERAPIES SERIES
Discharge: HOME | End: 2022-04-08
Payer: MEDICARE

## 2022-04-08 DIAGNOSIS — N18.4 CKD (CHRONIC KIDNEY DISEASE), STAGE IV (HCC): ICD-10-CM

## 2022-04-08 LAB
ANION GAP SERPL CALCULATED.3IONS-SCNC: 15 MMOL/L (ref 4–16)
BUN BLDV-MCNC: 35 MG/DL (ref 6–23)
CALCIUM SERPL-MCNC: 8.6 MG/DL (ref 8.3–10.6)
CHLORIDE BLD-SCNC: 107 MMOL/L (ref 99–110)
CO2: 15 MMOL/L (ref 21–32)
CREAT SERPL-MCNC: 2.3 MG/DL (ref 0.9–1.3)
GFR AFRICAN AMERICAN: 32 ML/MIN/1.73M2
GFR NON-AFRICAN AMERICAN: 27 ML/MIN/1.73M2
GLUCOSE BLD-MCNC: 118 MG/DL (ref 70–99)
POTASSIUM SERPL-SCNC: 4.7 MMOL/L (ref 3.5–5.1)
SODIUM BLD-SCNC: 137 MMOL/L (ref 135–145)

## 2022-04-08 PROCEDURE — 97110 THERAPEUTIC EXERCISES: CPT

## 2022-04-08 PROCEDURE — 97530 THERAPEUTIC ACTIVITIES: CPT

## 2022-04-08 PROCEDURE — 36415 COLL VENOUS BLD VENIPUNCTURE: CPT

## 2022-04-08 PROCEDURE — 97112 NEUROMUSCULAR REEDUCATION: CPT

## 2022-04-08 PROCEDURE — 80048 BASIC METABOLIC PNL TOTAL CA: CPT

## 2022-04-08 NOTE — FLOWSHEET NOTE
Outpatient Physical Therapy  [place]           [x] Phone: [phone]   Fax: [phone]  [place]           [] Phone: [phone]   Fax: [phone]        Physical Therapy Daily Treatment Note  Date:  2022    Patient Name:  Ramses Ac  \"AMENA\"   :  10/18/1930  MRN: [id number]  Restrictions/Precautions:  none  Diagnosis:   Diagnosis: Fall  Date of Injury/Surgery:   Treatment Diagnosis: Treatment Diagnosis: impaired balance, impaired gait, BLE weakness    Insurance/Certification information: PT Insurance Information: [facility] Medicare   Referring Physician:  Referring Practitioner: Frandy Braxton MD  Next Doctor Visit:    Plan of care signed (Y/N):  yes  Outcome Measure: ABC: 18%  Visit# / total visits:  7/10  Pain level: 0/10     Goals:     Patient goals : improve balance and strength  Short term goals  Time Frame for Short term goals: refer to [facility]  Long term goals  Time Frame for Long term goals : 10 visits  Long term goal 1: Pt will improve ABC score to 25% or more to show improved confidence with balance  Long term goal 2: Pt chu improve TUG to 30 sec or less to show improved gait and mobility  Long term goal 3: Pt will improve BLE gross strength to 4/5 or more to aide in balance  Long term goal 4: Pt will improve 5x STS to 20 sec or less to show improved transfers    Summary of Evaluation: Assessment: Pt is a 42-year-old male who presents to therapy due to worsening balance over the last year and increased weakness since December, insedeous onset. Upon assessment, pt does present with BLE weakness, impaired balance, impaired gait, and overall reduced independence with ADL and IADL task completion. Pt would benefit from skilled therapy interventions to address listed impairments, progress toward goal completion and improve ADL/IADL status. PT also warranted to reduce risk for further injury or decline. Subjective: Pt arrives stating that he feels good. Says that his energy Is \"not bad\".  Pt ability to perform 3/11/2022      Manual Treatments:  none      Modalities:  none      Communication with other providers:  POC sent      Assessment:    Pt tolerated treatment well today with noted LE weakness and no pain. Needs multiple seated RB. Pt had trouble with command following, most likely due to trouble hearing. Noted patient balance and safety technique as poor. Non-compliant to Hep at this time. Pt needs LE strength and improved gait mechanics for safe community ambulation. No pain rating given. Pt stated feeling fine when leaving.        Plan for Next Session: Specific instructions for Next Treatment: balance, gait, BLE strength        Time In / Time Out:  1348/1430    Timed Code/Total Treatment Minutes:   43/43': TE 1, TA 1, NE 1    Next Progress Note due:  10th visit      Plan of Care Interventions:  [x] Therapeutic Exercise  [] Modalities:  [x] Therapeutic Activity     [] Ultrasound  [] Estim  [x] Gait Training      [] Cervical Traction [] Lumbar Traction  [x] Neuromuscular Re-education    [] Cold/hotpack [] Iontophoresis   [x] Instruction in HEP      [] Vasopneumatic   [] Dry Needling    [x] Manual Therapy               [] Aquatic Therapy              Electronically signed by:  DYLAN Palma 4/8/2022, 3:37 PM

## 2022-04-12 ENCOUNTER — HOSPITAL ENCOUNTER (OUTPATIENT)
Dept: PHYSICAL THERAPY | Age: 87
Setting detail: THERAPIES SERIES
Discharge: HOME | End: 2022-04-12
Payer: MEDICARE

## 2022-04-12 PROCEDURE — 97110 THERAPEUTIC EXERCISES: CPT

## 2022-04-12 NOTE — FLOWSHEET NOTE
Outpatient Physical Therapy  [place]           [x] Phone: [phone]   Fax: [phone]  [place]           [] Phone: [phone]   Fax: [phone]        Physical Therapy Daily Treatment Note  Date:  2022    Patient Name:  Sarah Burton  \"AMENA\"   :  10/18/1930  MRN: [id number]  Restrictions/Precautions:  none  Diagnosis:   Diagnosis: Fall  Date of Injury/Surgery:   Treatment Diagnosis: Treatment Diagnosis: impaired balance, impaired gait, BLE weakness    Insurance/Certification information: PT Insurance Information: [facility] Medicare   Referring Physician:  Referring Practitioner: John Elizalde MD  Next Doctor Visit:    Plan of care signed (Y/N):  yes  Outcome Measure: ABC: 63%  TU.87\" from chair with arms  5x STS: 21.83\" from chair with arms   Visit# / total visits:  8/10  Pain level: 0/10     Goals:     Patient goals : improve balance and strength  Short term goals  Time Frame for Short term goals: refer to [facility]  Long term goals  Time Frame for Long term goals : 10 visits  Long term goal 1: Pt will improve ABC score to 25% or more to show improved confidence with balance MET   Long term goal 2: Pt chu improve TUG to 30 sec or less to show improved gait and mobility NOT MET   Long term goal 3: Pt will improve BLE gross strength to 4/5 or more to aide in balanceMET   Long term goal 4: Pt will improve 5x STS to 20 sec or less to show improved transfers ALMOST MET     Summary of Evaluation: Assessment: Pt is a 77-year-old male who presents to therapy due to worsening balance over the last year and increased weakness since December, insedeous onset. Upon assessment, pt does present with BLE weakness, impaired balance, impaired gait, and overall reduced independence with ADL and IADL task completion. Pt would benefit from skilled therapy interventions to address listed impairments, progress toward goal completion and improve ADL/IADL status.  PT also warranted to reduce risk for further injury or decline. Subjective: pt reports that he is doing good today. Pt notes that he is not doing his HEP at home and is ready for today to be his last day at therapy. ABC: 63%    Any changes in Ambulatory Summary Sheet? None      Objective:    COVID screening questions were asked and patient attested that there had been no contact or symptoms    BLE gross strength    TU.87\" from chair with arms  5x STS: 21.83\" from chair with arms   Baseline confusion     Exercises: (No more than 4 columns)   Exercise/Equipment 22 #6 22 #7 22 #8           WARM UP       NuStep   lvl 5 5' 5 min  L2 5'          TABLE      Seated march X 20 alt X 20alt X 20alt   LAQ SAQ supine bilat x20 bilat x20 bilat   Seated clams Supine one at a time RTB x 20ea Seated RTB 20x ea    Seated glute ext 20x      Seated HS curl   RTB 10x2 Bilat       SLR 10x      STANDING      STS X 10 X 20     // black stripe walks X 4 laps BUE support  X 4 laps BUE support     Standing marches x15 krupa x15 alt     Toe taps  4\" x 15 bilat 4\" x 20 bilat    Step Ups 4\" x 15 bilat 4\" x 20 bilat    sidestepping  x2 laps bilat UE support    lateral  Step up and over         Butt kicks   x10 alt. PROPRIOCEPTION                                    MODALITIES                      Other Therapeutic Activities/Education:  HEP and importance of completion, POC and goals      Home Exercise Program: Issued, practiced and pt demo ability to perform 3/11/2022      Manual Treatments:  none      Modalities:  none      Communication with other providers:  Discharge summary sent     Assessment:  Pt has shown good progress since therapy start regarding improved strength and balance confidence. Pt is not compliant with HEP at home and requires more rest breaks than activity during therapy. Pt is confused and [place] at baseline. edu for caregiver to try to continue exercises at home and for walker skiis for walker to use on carpet.  Pt has met some of his goals at this time and is no longer having any major limitations outside of therapy. PT educated pt on continuation of HEP after discharge for max benefits and reduced risk for future decline. Pt discharged this date.  end pain: same        Plan for Next Session: discharged this date         Time In / Time Out:  1115/1159    Timed Code/Total Treatment Minutes:   40': 40' TE x3        Plan of Care Interventions:  [x] Therapeutic Exercise  [] Modalities:  [x] Therapeutic Activity     [] Ultrasound  [] Estim  [x] Gait Training      [] Cervical Traction [] Lumbar Traction  [x] Neuromuscular Re-education    [] Cold/hotpack [] Iontophoresis   [x] Instruction in HEP      [] Vasopneumatic   [] Dry Needling    [x] Manual Therapy               [] Aquatic Therapy              Electronically signed by:  TEO Santos  4/12/2022, 7:36 AM

## 2022-04-12 NOTE — PROGRESS NOTES
Outpatient Physical Therapy           [place]           [x] Phone: [phone]   Fax: [phone]  [place]           [] Phone: [phone]   Fax: [phone]      To:   Sola Alexandre MD    From: TEO Sheldon  . Joel Taveras, PT, DPT   Patient: Keshia Lipscomb                  : 10/18/1930  Diagnosis:    Fall      Date: 2022  Treatment Diagnosis:   impaired balance, impaired gait, BLE weakness         []  Progress Note                [x]  Discharge Note    Evaluation Date: 3/11/22    Total Visits to date: 8   Cancels/No-shows to date:  2    Subjective:  pt reports that he is doing good today. Pt notes that he is not doing his HEP at home and is ready for today to be his last day at therapy. ABC: 63%      Plan of Care/Treatment to date:  [x] Therapeutic Exercise    [] Modalities:  [x] Therapeutic Activity     [] Ultrasound  [] Electrical Stimulation  [x] Gait Training      [] Cervical Traction   [] Lumbar Traction  [x] Neuromuscular Re-education  [] Cold/hotpack [] Iontophoresis  [x] Instruction in HEP      Other:  [x] Manual Therapy       []  Vasopneumatic  [] Aquatic Therapy       []   Dry Needle Therapy                      Objective/Significant Findings At Last Visit/Comments:    BLE gross strength 4/5   TU.87\" from chair with arms  5x STS: 21.83\" from chair with arms   Baseline confusion     Assessment:   Pt has shown good progress since therapy start regarding improved strength and balance confidence. Pt is not compliant with HEP at home and requires more rest breaks than activity during therapy. Pt is confused and [place] at baseline. edu for caregiver to try to continue exercises at home and for walker skiis for walker to use on carpet.  Pt has met some of his goals at this time and is no longer having any major limitations outside of therapy.  PT educated pt on continuation of HEP after discharge for max benefits and reduced risk for future decline. Pt discharged this date.      Goal Status:  [x] Achieved [x] Partially Achieved  [x] Not Achieved     Changes to goals:        Patient goals : improve balance and strength  Short term goals  Time Frame for Short term goals: refer to [facility]  Long term goals  Time Frame for Long term goals : 10 visits  Long term goal 1: Pt will improve ABC score to 25% or more to show improved confidence with balance MET 4/12  Long term goal 2: Pt chu improve TUG to 30 sec or less to show improved gait and mobility NOT MET 4/12  Long term goal 3: Pt will improve BLE gross strength to 4/5 or more to aide in balanceMET 4/12  Long term goal 4: Pt will improve 5x STS to 20 sec or less to show improved transfers ALMOST MET 4/12              Patient Status: [] Continue per initial plan of Care     [x] Patient now discharged     [] Additional visits requested, Please re-certify for additional visits: If we are requesting more visits, we fully anticipate the patient's condition is expected to improve within the treatment timeframe we are requesting. Electronically signed by:  TEO Anderson,  4/12/2022, 7:39 AM   Fidencio Ayala PT, DPT    If you have any questions or concerns, please don't hesitate to call.   Thank you for your referral.    Physician Signature:______________________ Date:______ Time: ________  By signing above, therapists plan is approved by physician

## 2022-04-19 DIAGNOSIS — L12.0 BULLOUS PEMPHIGOID: ICD-10-CM

## 2022-04-19 RX ORDER — PREDNISONE 1 MG/1
TABLET ORAL
Qty: 30 TABLET | Refills: 0 | Status: SHIPPED | OUTPATIENT
Start: 2022-04-19 | End: 2022-06-16

## 2022-04-28 ENCOUNTER — TELEPHONE (OUTPATIENT)
Dept: FAMILY MEDICINE CLINIC | Age: 87
End: 2022-04-28

## 2022-04-30 DIAGNOSIS — N13.8 BPH WITH URINARY OBSTRUCTION: ICD-10-CM

## 2022-04-30 DIAGNOSIS — E03.9 ACQUIRED HYPOTHYROIDISM: ICD-10-CM

## 2022-04-30 DIAGNOSIS — N40.1 BPH WITH URINARY OBSTRUCTION: ICD-10-CM

## 2022-05-02 RX ORDER — FINASTERIDE 5 MG/1
TABLET, FILM COATED ORAL
Qty: 90 TABLET | Refills: 1 | Status: SHIPPED | OUTPATIENT
Start: 2022-05-02

## 2022-05-02 RX ORDER — LEVOTHYROXINE SODIUM 0.07 MG/1
TABLET ORAL
Qty: 90 TABLET | Refills: 1 | Status: SHIPPED | OUTPATIENT
Start: 2022-05-02

## 2022-05-17 NOTE — PROGRESS NOTES
Patient Name:  Eliza Singh  Patient :  10/18/1930  Patient MRN:  [id number]     Primary Oncologist: Eveline Bobby MD  Referring Provider: Rose Mg MD     Date of Service: 2022       Chief Complaint:    No chief complaint on file. He came in for follow up visit. Patient Active Problem List:     Hyperlipidemia     Depression     Primary insomnia     Dysuria     Vitamin D deficiency     Seborrheic keratosis, inflamed     Actinic keratosis     Seborrheic keratosis     SCC (squamous cell carcinoma)     Varicose 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     Anxiety     BPH with urinary obstruction     Acquired hypothyroidism     UGIB (upper gastrointestinal bleed)     Hematuria     Hyperglycemia     Bullous pemphigoid     Atelectasis     HPI:   Eliza Singh is a pleasant 80-year-old male patient who was referred for abnormal lab. On 2021 CBC showed left shift with myelocytes, metamyelocytes, promyelocytes, bands. WBC was 10.3. Hg 10.5 and platelet 462. He is followed by dermatologist for history bullous pemphigoid and skin cancer. He had laparoscopic repair of incarcerated hiatal hernia with partial fundoplication on  by Dr Marcel Manley. He received one unit PRBC. In 2021 he had gross hematuria likely related to borden catheter trauma s/p cystoscopy TURP, bladder fulguration. He has BPH and is on flomax and proscar. He is not a very good historian. BCR ABL was negative. 21 flow cytometry:  Final Pathologic Diagnosis:   Peripheral blood; Flow Cytometry Analysis:   -     No diagnostic immunophenotypic abnormalities detected. CBC on 2021 showed Hg 10.3, WBC 7.7 and platelet 795. Creatinine was 2.9. He started physical therapy on 2022. I strongly encouraged him to do physical therapy. Tatianna Padmini He was seen by dermatologist for the skin disorder and placed on antibiotic. Labs in 2022 was reviewed.   Creatinine was 2.8, WBC 6.0, hemoglobin 10.5, platelet 857. On 6/16/2022 he came in for follow up visit. Has been seen by nephrologist.  He is agreeable to have labs today including iron study. He has been also followed by dermatologist.  I advised to call for the test result. He has hearing problem. No acute pain. Denied any nausea, vomiting or diarrhea. No fever or chills. No chest pain, shortness of breath or palpitation. No headache or dizzy spell. No specific bone pain. No melena or hematochezia. Denied any dysuria or hematuria. 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    Rt Tricep, Lt Superior Forearm      Past Surgery History:    Past Surgical History:   Procedure Laterality Date    CATARACT REMOVAL      CYSTOSCOPY N/A 3/29/2021    CYSTOSCOPY EVACUATION OF CLOTS, BLADDER FULGERATION, AND SUPRA-PUBIC CATHETER INSERTION [facility] [address] [facility]    CYSTOSCOPY, PROSTATE FULGURATION, EVACUATION OF CLOTS & TURP performed by Mraysol Santillan MD at [address] N/A 3/4/2021    LAPAROSCOPIC LARGE HERNIA HIATAL REPAIR WITH GASTRIC OBSTRUCTION POSS OPEN performed by Junior Issa MD at [facility] History:   He smoked one PPD for 5 years and quit in 1955. Denied EtOH or illicit drug use. He has 3 children. Family History:   No cancer. Review of Systems: The remainder of ROS is unremarkable. Vital Signs: There were no vitals taken for this visit.      Physical Exam:  CONSTITUTIONAL: awake, alert, cooperative, no apparent distress  EYES: pupils equal, round and reactive to light, sclera clear and + pallor  ENT: Normocephalic, without obvious abnormality, atraumatic; [place]  NECK: supple, symmetrical. No JVD. HEMATOLOGIC/LYMPHATIC: no cervical, supraclavicular or axillary lymphadenopathy   LUNGS: no increased work of breathing and clear to auscultation   CARDIOVASCULAR: regular rate and rhythm, normal S1 and S2, no murmur  ABDOMEN: normal bowel sound, soft, non-distended, non-tender  MUSCULOSKELETAL: full range of motion noted, tone is normal  NEUROLOGIC: Motor skills grossly intact. CN II- XII grossly intact. SKIN: No jaundice. Dry skin. He has seborrheic keratosis. EXTREMITIES: no LE edema  or cyanosis.      Labs:  Hematology:  Lab Results   Component Value Date    WBC 5.5 03/29/2022    RBC 3.15 (L) 03/29/2022    HGB 9.8 (L) 03/29/2022    HCT 32.6 (L) 03/29/2022    .5 (H) 03/29/2022    MCH 31.1 (H) 03/29/2022    MCHC 30.1 (L) 03/29/2022    RDW 16.3 (H) 03/29/2022     03/29/2022    MPV 9.7 03/29/2022    BANDSPCT 7 03/29/2022    SEGSPCT 61.0 03/29/2022    EOSRELPCT 2.0 03/29/2022    BASOPCT 1.2 02/21/2022    LYMPHOPCT 18.0 (L) 03/29/2022    MONOPCT 9.0 (H) 03/29/2022    BANDABS 0.39 03/29/2022    SEGSABS 3.3 03/29/2022    EOSABS 0.1 03/29/2022    BASOSABS 0.1 02/21/2022    LYMPHSABS 1.0 03/29/2022    MONOSABS 0.5 03/29/2022    DIFFTYPE MANUAL DIFFERENTIAL 03/29/2022    ANISOCYTOSIS 1+ 03/29/2022    POLYCHROM 1+ 03/09/2021    WBCMORP RARE 03/09/2021     Chemistry:  Lab Results   Component Value Date     04/08/2022    K 4.7 04/08/2022     04/08/2022    CO2 15 (L) 04/08/2022    BUN 35 (H) 04/08/2022    CREATININE 2.3 (H) 04/08/2022    GLUCOSE 118 (H) 04/08/2022    CALCIUM 8.6 04/08/2022    PROT 6.6 12/02/2021    LABALBU 3.8 12/02/2021    BILITOT 0.2 12/02/2021    ALKPHOS 128 12/02/2021    AST 13 (L) 12/02/2021    ALT 13 12/02/2021    LABGLOM 27 (L) 04/08/2022    GFRAA 32 (L) 04/08/2022    AGRATIO 1.4 01/21/2021    GLOB 2.1 01/21/2021 MG 1.9 03/10/2021     Lab Results   Component Value Date     12/02/2021     Lab Results   Component Value Date    TSHHS 2.420 08/10/2021    T4FREE 0.8 (L) 01/21/2021     Immunology:  Lab Results   Component Value Date    PROT 6.6 12/02/2021    SPEP INTERPRETATION - Within normal limits. RS 08/10/2021    ALBUMINELP 3.2 08/10/2021    LABALPH 0.3 08/10/2021    LABALPH 0.8 08/10/2021    LABBETA 0.9 08/10/2021    GAMGLOB 0.9 08/10/2021     Coagulation Panel:  Lab Results   Component Value Date    PROTIME 12.8 09/03/2021    INR 0.99 09/03/2021    APTT 37.8 (H) 03/26/2021     Anemia Panel:  Lab Results   Component Value Date    PLJLTSYF34 560.6 08/10/2021    FOLATE 15.3 08/10/2021     Tumor Markers:  Lab Results   Component Value Date    PSA 6.95 (H) 09/21/2012      Observations:  No data recorded     Assessment & Plan:  1. He had left shift  WBC on peripheral blood. No signs of infection. 8/17/21 Flow cytometry was negative. BCR ABL negative. This is likely reactive. In December 2021 WBC was 7.7, hemoglobin 10.3, platelet 307. Absolute neutrophil was 5.3. CBC in February 2022 was reviewed. WBC was 6.0, hemoglobin 10.5, platelet 670. Absolute neutrophil was 3.6. I will check labs today. Advised to call for the test result. 2. He has multifactorial anemia. B-12 in July 2021 was 861.2. 8/10/21  Iron studies reviewed. In December 2021 hemoglobin was 10.3. He is on eliquis. I gave him prescription of oral iron on March 7, 2022. I will recheck CBC and iron study today and prior to next office visit. Advised to call for the test result. 3. He has BPH. Followed by urologist.     4. He has CKD. He has been followed by nephrologist.    I strongly recommend physical therapy. RTC in 3 months or sooner. All of his questions have been answered for today.

## 2022-05-26 ENCOUNTER — HOSPITAL ENCOUNTER (OUTPATIENT)
Age: 87
Discharge: HOME | End: 2022-05-26
Payer: MEDICARE

## 2022-05-26 DIAGNOSIS — N18.4 CKD (CHRONIC KIDNEY DISEASE), STAGE IV (HCC): ICD-10-CM

## 2022-05-26 LAB
ANION GAP SERPL CALCULATED.3IONS-SCNC: 13 MMOL/L (ref 4–16)
BUN BLDV-MCNC: 47 MG/DL (ref 6–23)
CALCIUM SERPL-MCNC: 8.3 MG/DL (ref 8.3–10.6)
CHLORIDE BLD-SCNC: 104 MMOL/L (ref 99–110)
CO2: 19 MMOL/L (ref 21–32)
CREAT SERPL-MCNC: 2.4 MG/DL (ref 0.9–1.3)
GFR AFRICAN AMERICAN: 31 ML/MIN/1.73M2
GFR NON-AFRICAN AMERICAN: 26 ML/MIN/1.73M2
GLUCOSE BLD-MCNC: 103 MG/DL (ref 70–99)
POTASSIUM SERPL-SCNC: 4 MMOL/L (ref 3.5–5.1)
SODIUM BLD-SCNC: 136 MMOL/L (ref 135–145)

## 2022-05-26 PROCEDURE — 80048 BASIC METABOLIC PNL TOTAL CA: CPT

## 2022-05-26 PROCEDURE — 36415 COLL VENOUS BLD VENIPUNCTURE: CPT

## 2022-06-03 DIAGNOSIS — L12.0 BULLOUS PEMPHIGOID: ICD-10-CM

## 2022-06-03 RX ORDER — SULFAMETHOXAZOLE AND TRIMETHOPRIM 800; 160 MG/1; MG/1
TABLET ORAL
Qty: 30 TABLET | Refills: 0 | OUTPATIENT
Start: 2022-06-03

## 2022-06-03 RX ORDER — PANTOPRAZOLE SODIUM 20 MG/1
TABLET, DELAYED RELEASE ORAL
Qty: 90 TABLET | Refills: 1 | OUTPATIENT
Start: 2022-06-03

## 2022-06-16 ENCOUNTER — OFFICE VISIT (OUTPATIENT)
Dept: ONCOLOGY | Age: 87
End: 2022-06-16
Payer: MEDICARE

## 2022-06-16 ENCOUNTER — HOSPITAL ENCOUNTER (OUTPATIENT)
Dept: INFUSION THERAPY | Age: 87
Discharge: HOME | End: 2022-06-16
Payer: MEDICARE

## 2022-06-16 VITALS
DIASTOLIC BLOOD PRESSURE: 56 MMHG | WEIGHT: 184 LBS | HEART RATE: 86 BPM | RESPIRATION RATE: 16 BRPM | SYSTOLIC BLOOD PRESSURE: 104 MMHG | HEIGHT: 72 IN | BODY MASS INDEX: 24.92 KG/M2 | TEMPERATURE: 97.9 F | OXYGEN SATURATION: 99 %

## 2022-06-16 DIAGNOSIS — D64.9 ANEMIA, UNSPECIFIED TYPE: ICD-10-CM

## 2022-06-16 DIAGNOSIS — D64.9 ANEMIA, UNSPECIFIED TYPE: Primary | ICD-10-CM

## 2022-06-16 LAB
BASOPHILS ABSOLUTE: 0 K/CU MM
BASOPHILS RELATIVE PERCENT: 0.3 % (ref 0–1)
DIFFERENTIAL TYPE: ABNORMAL
EOSINOPHILS ABSOLUTE: 0.1 K/CU MM
EOSINOPHILS RELATIVE PERCENT: 1 % (ref 0–3)
HCT VFR BLD CALC: 30.9 % (ref 42–52)
HEMOGLOBIN: 10.1 GM/DL (ref 13.5–18)
LYMPHOCYTES ABSOLUTE: 1.4 K/CU MM
LYMPHOCYTES RELATIVE PERCENT: 19.8 % (ref 24–44)
MCH RBC QN AUTO: 31.8 PG (ref 27–31)
MCHC RBC AUTO-ENTMCNC: 32.7 % (ref 32–36)
MCV RBC AUTO: 97.2 FL (ref 78–100)
MONOCYTES ABSOLUTE: 0.8 K/CU MM
MONOCYTES RELATIVE PERCENT: 10.5 % (ref 0–4)
PDW BLD-RTO: 14.2 % (ref 11.7–14.9)
PLATELET # BLD: 173 K/CU MM (ref 140–440)
PMV BLD AUTO: 9.8 FL (ref 7.5–11.1)
RBC # BLD: 3.18 M/CU MM (ref 4.6–6.2)
SEGMENTED NEUTROPHILS ABSOLUTE COUNT: 4.9 K/CU MM
SEGMENTED NEUTROPHILS RELATIVE PERCENT: 68.4 % (ref 36–66)
WBC # BLD: 7.1 K/CU MM (ref 4–10.5)

## 2022-06-16 PROCEDURE — 36415 COLL VENOUS BLD VENIPUNCTURE: CPT

## 2022-06-16 PROCEDURE — 85025 COMPLETE CBC W/AUTO DIFF WBC: CPT

## 2022-06-16 PROCEDURE — G8420 CALC BMI NORM PARAMETERS: HCPCS | Performed by: INTERNAL MEDICINE

## 2022-06-16 PROCEDURE — G8427 DOCREV CUR MEDS BY ELIG CLIN: HCPCS | Performed by: INTERNAL MEDICINE

## 2022-06-16 PROCEDURE — 1036F TOBACCO NON-USER: CPT | Performed by: INTERNAL MEDICINE

## 2022-06-16 PROCEDURE — 1123F ACP DISCUSS/DSCN MKR DOCD: CPT | Performed by: INTERNAL MEDICINE

## 2022-06-16 PROCEDURE — 99213 OFFICE O/P EST LOW 20 MIN: CPT | Performed by: INTERNAL MEDICINE

## 2022-06-16 RX ORDER — PREDNISONE 10 MG/1
TABLET ORAL
Status: ON HOLD | COMMUNITY
Start: 2022-06-03 | End: 2022-10-14 | Stop reason: HOSPADM

## 2022-06-16 NOTE — PROGRESS NOTES
MA Rooming Questions  Patient: Elvera Fleischer  MRN: [id number]    Date: 6/16/2022        1. Do you have any new issues?   no         2. Do you need any refills on medications?    no    3. Have you had any imaging done since your last visit?   no    4. Have you been hospitalized or seen in the emergency room since your last visit here?   no    5. Did the patient have a depression screening completed today?  No    No data recorded     PHQ-9 Given to (if applicable):               PHQ-9 Score (if applicable):                     [] Positive     []  Negative              Does question #9 need addressed (if applicable)                     [] Yes    []  No               Harika Sequeira CMA

## 2022-06-20 ENCOUNTER — OFFICE VISIT (OUTPATIENT)
Dept: FAMILY MEDICINE CLINIC | Age: 87
End: 2022-06-20
Payer: MEDICARE

## 2022-06-20 VITALS
RESPIRATION RATE: 16 BRPM | HEIGHT: 72 IN | SYSTOLIC BLOOD PRESSURE: 100 MMHG | HEART RATE: 80 BPM | WEIGHT: 188.7 LBS | BODY MASS INDEX: 25.56 KG/M2 | OXYGEN SATURATION: 99 % | DIASTOLIC BLOOD PRESSURE: 64 MMHG

## 2022-06-20 DIAGNOSIS — E03.9 ACQUIRED HYPOTHYROIDISM: ICD-10-CM

## 2022-06-20 DIAGNOSIS — L12.0 BULLOUS PEMPHIGOID: Primary | ICD-10-CM

## 2022-06-20 DIAGNOSIS — N40.1 BPH WITH URINARY OBSTRUCTION: ICD-10-CM

## 2022-06-20 DIAGNOSIS — N13.8 BPH WITH URINARY OBSTRUCTION: ICD-10-CM

## 2022-06-20 DIAGNOSIS — I82.401 RECURRENT ACUTE DEEP VEIN THROMBOSIS (DVT) OF RIGHT LOWER EXTREMITY (HCC): ICD-10-CM

## 2022-06-20 LAB — TSH REFLEX FT4: 3.03 UIU/ML (ref 0.27–4.2)

## 2022-06-20 PROCEDURE — G8427 DOCREV CUR MEDS BY ELIG CLIN: HCPCS | Performed by: STUDENT IN AN ORGANIZED HEALTH CARE EDUCATION/TRAINING PROGRAM

## 2022-06-20 PROCEDURE — 99213 OFFICE O/P EST LOW 20 MIN: CPT | Performed by: STUDENT IN AN ORGANIZED HEALTH CARE EDUCATION/TRAINING PROGRAM

## 2022-06-20 PROCEDURE — G8417 CALC BMI ABV UP PARAM F/U: HCPCS | Performed by: STUDENT IN AN ORGANIZED HEALTH CARE EDUCATION/TRAINING PROGRAM

## 2022-06-20 PROCEDURE — 1036F TOBACCO NON-USER: CPT | Performed by: STUDENT IN AN ORGANIZED HEALTH CARE EDUCATION/TRAINING PROGRAM

## 2022-06-20 PROCEDURE — 1123F ACP DISCUSS/DSCN MKR DOCD: CPT | Performed by: STUDENT IN AN ORGANIZED HEALTH CARE EDUCATION/TRAINING PROGRAM

## 2022-06-20 ASSESSMENT — ENCOUNTER SYMPTOMS
ABDOMINAL PAIN: 0
SORE THROAT: 0
WHEEZING: 0
SHORTNESS OF BREATH: 0
NAUSEA: 0

## 2022-06-20 NOTE — PROGRESS NOTES
6/20/2022    Arinaa Soto    Chief Complaint   Patient presents with    3 Month Follow-Up       HPI  History was obtained from pateint. Accompanied by close friend Jen Dumas is a 80 y.o. male with a PMHx as listed below who presents today for     Following with Nephrology Dr. Marquis Ortega, compliant with sodium bicarb, now on lasix every MWF. Son is managing medication    Following with Dr. Ina Jara, bultifactroial anemia he is monitoring  Hx. DVTs recurrent and on eliquis    Following with Dr. Kristin Sanchez hx. Bullous pemphigoid, on steroid regiemn that is going down        1. Bullous pemphigoid    2. 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 Acquired hypothyroidism    4. BPH with urinary obstruction             REVIEW OF SYMPTOMS    Review of Systems   Constitutional: Negative for chills and fatigue. HENT: Negative for congestion and sore throat. Respiratory: Negative for shortness of breath and wheezing. Cardiovascular: Negative for chest pain and palpitations. Gastrointestinal: Negative for abdominal pain and nausea. Genitourinary: Negative for frequency and urgency. Neurological: Negative for light-headedness. PAST MEDICAL HISTORY  Past Medical History:   Diagnosis Date    Anemia     Anxiety     Arthritis     BPH with urinary obstruction     Depression     GERD (gastroesophageal reflux disease)     Hemorrhoids     Hepatitis     Hernia of unspecified site of abdominal cavity without mention of obstruction or gangrene     Hyperlipidemia     Hypotension     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    Rt Tricep, Lt Superior Forearm       FAMILY HISTORY  Family History   Problem Relation Age of Onset    Depression Mother     Diabetes Mother     COPD Father     Diabetes Brother     Diabetes Maternal Grandmother        SOCIAL HISTORY  Social History     Socioeconomic History    Marital status:       Spouse name: Not on file    Number of children: Not on file    Years of education: Not on file    Highest education level: Not on file   Occupational History    Not on file   Tobacco Use    Smoking status: Former Smoker     Packs/day: 1.00     Years: 5.00     Pack years: 5.00     Types: Cigarettes     Start date: 1950     Quit date: 1955     Years since quittin.6    Smokeless tobacco: Never Used   Substance and Sexual Activity    Alcohol use: Not Currently    Drug use: Not Currently    Sexual activity: Not on file   Other Topics Concern    Not on file   Social History Narrative    Not on file     Social Determinants of Health     Financial Resource Strain:     Difficulty of Paying Living Expenses: Not on file   Food Insecurity:     Worried About [address] [employer] in the Last Year: Not on file    Miah of Food in the Last Year: Not on file   Transportation Needs:     Lack of Transportation (Medical): Not on file    Lack of Transportation (Non-Medical):  Not on file   Physical Activity:     Days of Exercise per Week: Not on file    Minutes of Exercise per Session: Not on file   Stress:     Feeling of Stress : Not on file   Social Connections:     Frequency of Communication with Friends and Family: Not on file    Frequency of Social Gatherings with Friends and Family: Not on file    Attends Gnosticist Services: Not on file    Active Member of [address] [place] [address] or Organizations: Not on file    Attends Club or Organization Meetings: Not on file    Marital Status: Not on file   Intimate Partner Violence:     Fear of Current or Ex-Partner: Not on file    Emotionally Abused: Not on file    Physically Abused: Not on file    Sexually Abused: Not on file   Housing Stability:     Unable to Pay for Housing in the Last Year: Not on file    Number of [place] in the Last Year: Not on file    Unstable Housing in the Last Year: Not on file        SURGICAL HISTORY  Past Surgical History:   Procedure Laterality Date    CATARACT REMOVAL      CYSTOSCOPY N/A 3/29/2021    CYSTOSCOPY EVACUATION OF CLOTS, BLADDER FULGERATION, AND SUPRA-PUBIC CATHETER INSERTION performed by Luisa Hurt MD at [address] 4/1/2021    CYSTOSCOPY, PROSTATE FULGURATION, EVACUATION OF CLOTS & TURP performed by Marcelo Hernández MD at [address] N/A 3/4/2021    LAPAROSCOPIC LARGE HERNIA HIATAL REPAIR WITH GASTRIC OBSTRUCTION POSS OPEN performed by Angelita Morales MD at [facility]  Current Outpatient Medications   Medication Sig Dispense Refill    predniSONE (DELTASONE) 10 MG tablet TAKE 1 TABLET BY MOUTH EVERY DAY      furosemide (LASIX) 40 MG tablet Take 1 tablet by mouth three times a week Mondays, wedn, firdays (Patient taking differently: Take 40 mg by mouth three times a week Mondays, wedn, fridays) 30 tablet 1    sodium bicarbonate 650 MG tablet Take 1 tablet by mouth daily 90 tablet 1    levothyroxine (SYNTHROID) 75 MCG tablet TAKE 1 TABLET BY MOUTH EVERY DAY 90 tablet 1    finasteride (PROSCAR) 5 MG tablet TAKE 1 TABLET BY MOUTH EVERY DAY 90 tablet 1    ELIQUIS 2.5 MG TABS tablet TAKE 1 TABLET BY MOUTH TWICE A DAY 60 tablet 5    tamsulosin (FLOMAX) 0.4 MG capsule Take 2 capsules by mouth nightly 60 capsule 1    QUEtiapine (SEROQUEL) 25 MG tablet TAKE 1 TABLET BY MOUTH TWICE A  tablet 1    pantoprazole (PROTONIX) 20 MG tablet Take 1 tablet by mouth daily 90 tablet 1    sertraline (ZOLOFT) 50 MG tablet Take 50 mg by mouth daily      temazepam (RESTORIL) 7.5 MG capsule Take 7.5 mg by mouth nightly as needed for Sleep. No current facility-administered medications for this visit.        ALLERGIES  Allergies   Allergen Reactions    Minocycline Other (See Comments)       PHYSICAL EXAM    /64 (Site: Right Upper Arm, Position: Sitting, Cuff Size: Large Adult)   Pulse 80   Resp 16   Ht 6' (1.829 m)   Wt 188 lb 11.2 oz (85.6 kg)   SpO2 99%   BMI 25.59 kg/m²     Physical Exam  Constitutional:       Appearance: Normal appearance. HENT:      Head: Normocephalic and atraumatic. Eyes:      Extraocular Movements: Extraocular movements intact. Pupils: Pupils are equal, round, and reactive to light. Cardiovascular:      Rate and Rhythm: Normal rate and regular rhythm. Pulses: Normal pulses. Heart sounds: No murmur heard. No friction rub. No gallop. Pulmonary:      Effort: Pulmonary effort is normal.      Breath sounds: Normal breath sounds. Skin:     General: Skin is warm and dry. Comments: Seborrheic keratosis throughout scalp   Neurological:      General: No focal deficit present. Mental Status: He is alert. Psychiatric:         Mood and Affect: Mood normal.         Behavior: Behavior normal.         ASSESSMENT & PLAN    1. Bullous pemphigoid      2. 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 Acquired hypothyroidism    - TSH with Reflex to FT4; Future    4. BPH with urinary obstruction      Instructed on sodium bicarb and lasix instructions per nephro, son is now managing medications  Discussed to obtain TSH  Continue to follow with derm plan to taper PO lipids  BPH stable    Return in about 4 months (around 10/20/2022).          Electronically signed by Robe Bone DO on 6/20/2022

## 2022-07-05 RX ORDER — PANTOPRAZOLE SODIUM 20 MG/1
TABLET, DELAYED RELEASE ORAL
Qty: 90 TABLET | Refills: 1 | Status: SHIPPED | OUTPATIENT
Start: 2022-07-05

## 2022-07-09 DIAGNOSIS — L12.0 BULLOUS PEMPHIGOID: ICD-10-CM

## 2022-07-11 RX ORDER — SULFAMETHOXAZOLE AND TRIMETHOPRIM 800; 160 MG/1; MG/1
TABLET ORAL
Qty: 30 TABLET | Refills: 0 | OUTPATIENT
Start: 2022-07-11

## 2022-08-10 DIAGNOSIS — L12.0 BULLOUS PEMPHIGOID: ICD-10-CM

## 2022-08-11 RX ORDER — SULFAMETHOXAZOLE AND TRIMETHOPRIM 800; 160 MG/1; MG/1
TABLET ORAL
Qty: 30 TABLET | Refills: 0 | OUTPATIENT
Start: 2022-08-11

## 2022-08-18 ENCOUNTER — TELEMEDICINE (OUTPATIENT)
Dept: FAMILY MEDICINE CLINIC | Age: 87
End: 2022-08-18
Payer: MEDICARE

## 2022-08-18 DIAGNOSIS — F33.41 RECURRENT MAJOR DEPRESSIVE DISORDER, IN PARTIAL REMISSION (HCC): Primary | ICD-10-CM

## 2022-08-18 DIAGNOSIS — F03.911 AGITATION DUE TO DEMENTIA: ICD-10-CM

## 2022-08-18 PROCEDURE — 99443 PR PHYS/QHP TELEPHONE EVALUATION 21-30 MIN: CPT | Performed by: STUDENT IN AN ORGANIZED HEALTH CARE EDUCATION/TRAINING PROGRAM

## 2022-08-18 RX ORDER — QUETIAPINE FUMARATE 50 MG/1
50 TABLET, FILM COATED ORAL 2 TIMES DAILY
Qty: 180 TABLET | Refills: 1 | Status: ON HOLD | OUTPATIENT
Start: 2022-08-18 | End: 2022-08-31 | Stop reason: HOSPADM

## 2022-08-18 SDOH — ECONOMIC STABILITY: FOOD INSECURITY: WITHIN THE PAST 12 MONTHS, YOU WORRIED THAT YOUR FOOD WOULD RUN OUT BEFORE YOU GOT MONEY TO BUY MORE.: NEVER TRUE

## 2022-08-18 SDOH — ECONOMIC STABILITY: FOOD INSECURITY: WITHIN THE PAST 12 MONTHS, THE FOOD YOU BOUGHT JUST DIDN'T LAST AND YOU DIDN'T HAVE MONEY TO GET MORE.: NEVER TRUE

## 2022-08-18 ASSESSMENT — SOCIAL DETERMINANTS OF HEALTH (SDOH): HOW HARD IS IT FOR YOU TO PAY FOR THE VERY BASICS LIKE FOOD, HOUSING, MEDICAL CARE, AND HEATING?: NOT HARD AT ALL

## 2022-08-18 NOTE — PROGRESS NOTES
Sameer Dennison (:  10/18/1930) is a Established patient, here for evaluation of the following:    Assessment & Plan   Below is the assessment and plan developed based on review of pertinent history, physical exam, labs, studies, and medications. 1. Recurrent major depressive disorder, in partial remission (HCC)  -     sertraline (ZOLOFT) 50 MG tablet; Take 1 tablet by mouth daily, Disp-90 tablet, R-1Normal  2. 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 Start zoloft increase seroquel consider behaviorall health referral, family declines today. Subjective   HPI  Patient presents VIA TELEPHONE accompanied by daughter Zeke Hinkle. unable to speak to patient due to hearing difficulties. Wife passed away in November since then having difficulty agitation/outbreaks/anxiety. This has been a long term problem many years. HE has been on low dose seroquel for quite sometime now. They note difficulty getting patient in  Review of Systems   Psychiatric/Behavioral:  Positive for agitation and behavioral problems. Objective   Patient-Reported Vitals  Patient-Reported Weight: 170lb       Physical Exam    On this date 2022 I have spent 25 minutes reviewing previous notes, test results and face to face (virtual) with the patient discussing the diagnosis and importance of compliance with the treatment plan as well as documenting on the day of the visit. Sameer Dennison, was evaluated through a synchronous (real-time) audio-video encounter. The patient (or guardian if applicable) is aware that this is a billable service, which includes applicable co-pays. This Virtual Visit was conducted with patient's (and/or legal guardian's) consent.  The visit was conducted pursuant to the emergency declaration under the [facility], [facility] authority and the [employer] and [employer] General Act. Patient identification was verified, and a caregiver was present when appropriate. The patient was located at Home: [address] [place] [id number]. Provider was located at [facility] ([address] [place] [address]): [address] [facility]. [address].         --Óscar Dick, DO

## 2022-08-21 ENCOUNTER — APPOINTMENT (OUTPATIENT)
Dept: GENERAL RADIOLOGY | Age: 87
DRG: 205 | End: 2022-08-21
Payer: MEDICARE

## 2022-08-21 ENCOUNTER — HOSPITAL ENCOUNTER (INPATIENT)
Age: 87
LOS: 6 days | Discharge: NURSING, REHAB OR LONG-TERM CARE | DRG: 205 | End: 2022-08-31
Attending: EMERGENCY MEDICINE | Admitting: STUDENT IN AN ORGANIZED HEALTH CARE EDUCATION/TRAINING PROGRAM
Payer: MEDICARE

## 2022-08-21 DIAGNOSIS — N18.4 CHRONIC KIDNEY DISEASE, STAGE IV (SEVERE) (HCC): ICD-10-CM

## 2022-08-21 DIAGNOSIS — R07.9 CHEST PAIN, UNSPECIFIED TYPE: Primary | ICD-10-CM

## 2022-08-21 DIAGNOSIS — R77.8 ELEVATED TROPONIN: ICD-10-CM

## 2022-08-21 LAB
ANION GAP SERPL CALCULATED.3IONS-SCNC: 14 MMOL/L (ref 4–16)
BASOPHILS ABSOLUTE: 0 K/CU MM
BASOPHILS RELATIVE PERCENT: 0.3 % (ref 0–1)
BUN BLDV-MCNC: 42 MG/DL (ref 6–23)
CALCIUM SERPL-MCNC: 8.4 MG/DL (ref 8.3–10.6)
CHLORIDE BLD-SCNC: 100 MMOL/L (ref 99–110)
CO2: 22 MMOL/L (ref 21–32)
CREAT SERPL-MCNC: 2.4 MG/DL (ref 0.9–1.3)
DIFFERENTIAL TYPE: ABNORMAL
EOSINOPHILS ABSOLUTE: 0 K/CU MM
EOSINOPHILS RELATIVE PERCENT: 0.2 % (ref 0–3)
GFR AFRICAN AMERICAN: 31 ML/MIN/1.73M2
GFR NON-AFRICAN AMERICAN: 26 ML/MIN/1.73M2
GLUCOSE BLD-MCNC: 189 MG/DL (ref 70–99)
HCT VFR BLD CALC: 31.1 % (ref 42–52)
HEMOGLOBIN: 10 GM/DL (ref 13.5–18)
IMMATURE NEUTROPHIL %: 1.5 % (ref 0–0.43)
LYMPHOCYTES ABSOLUTE: 0.7 K/CU MM
LYMPHOCYTES RELATIVE PERCENT: 6.2 % (ref 24–44)
MCH RBC QN AUTO: 31.3 PG (ref 27–31)
MCHC RBC AUTO-ENTMCNC: 32.2 % (ref 32–36)
MCV RBC AUTO: 97.5 FL (ref 78–100)
MONOCYTES ABSOLUTE: 0.6 K/CU MM
MONOCYTES RELATIVE PERCENT: 6.1 % (ref 0–4)
NUCLEATED RBC %: 0 %
PDW BLD-RTO: 14.5 % (ref 11.7–14.9)
PLATELET # BLD: 166 K/CU MM (ref 140–440)
PMV BLD AUTO: 9.6 FL (ref 7.5–11.1)
POTASSIUM SERPL-SCNC: 3.3 MMOL/L (ref 3.5–5.1)
RBC # BLD: 3.19 M/CU MM (ref 4.6–6.2)
SEGMENTED NEUTROPHILS ABSOLUTE COUNT: 9 K/CU MM
SEGMENTED NEUTROPHILS RELATIVE PERCENT: 85.7 % (ref 36–66)
SODIUM BLD-SCNC: 136 MMOL/L (ref 135–145)
TOTAL IMMATURE NEUTOROPHIL: 0.16 K/CU MM
TOTAL NUCLEATED RBC: 0 K/CU MM
TROPONIN T: 0.03 NG/ML
WBC # BLD: 10.5 K/CU MM (ref 4–10.5)

## 2022-08-21 PROCEDURE — 83735 ASSAY OF MAGNESIUM: CPT

## 2022-08-21 PROCEDURE — 71046 X-RAY EXAM CHEST 2 VIEWS: CPT

## 2022-08-21 PROCEDURE — 99285 EMERGENCY DEPT VISIT HI MDM: CPT

## 2022-08-21 PROCEDURE — 80048 BASIC METABOLIC PNL TOTAL CA: CPT

## 2022-08-21 PROCEDURE — 85025 COMPLETE CBC W/AUTO DIFF WBC: CPT

## 2022-08-21 PROCEDURE — 84484 ASSAY OF TROPONIN QUANT: CPT

## 2022-08-21 PROCEDURE — 6370000000 HC RX 637 (ALT 250 FOR IP): Performed by: NURSE PRACTITIONER

## 2022-08-21 PROCEDURE — G0378 HOSPITAL OBSERVATION PER HR: HCPCS

## 2022-08-21 PROCEDURE — 93005 ELECTROCARDIOGRAM TRACING: CPT | Performed by: NURSE PRACTITIONER

## 2022-08-21 RX ORDER — FUROSEMIDE 20 MG/1
20 TABLET ORAL DAILY
Status: DISCONTINUED | OUTPATIENT
Start: 2022-08-22 | End: 2022-08-22

## 2022-08-21 RX ORDER — LEVOTHYROXINE SODIUM 0.07 MG/1
75 TABLET ORAL DAILY
Status: DISCONTINUED | OUTPATIENT
Start: 2022-08-22 | End: 2022-08-31 | Stop reason: HOSPADM

## 2022-08-21 RX ORDER — TEMAZEPAM 7.5 MG/1
15 CAPSULE ORAL NIGHTLY
Status: DISCONTINUED | OUTPATIENT
Start: 2022-08-21 | End: 2022-08-31 | Stop reason: HOSPADM

## 2022-08-21 RX ORDER — ONDANSETRON 2 MG/ML
4 INJECTION INTRAMUSCULAR; INTRAVENOUS EVERY 6 HOURS PRN
Status: DISCONTINUED | OUTPATIENT
Start: 2022-08-21 | End: 2022-08-31 | Stop reason: HOSPADM

## 2022-08-21 RX ORDER — ESCITALOPRAM OXALATE 10 MG/1
5 TABLET ORAL DAILY
Status: DISCONTINUED | OUTPATIENT
Start: 2022-08-22 | End: 2022-08-31 | Stop reason: HOSPADM

## 2022-08-21 RX ORDER — FUROSEMIDE 20 MG/1
1 TABLET ORAL DAILY
COMMUNITY
Start: 2022-08-19 | End: 2022-08-31

## 2022-08-21 RX ORDER — ATORVASTATIN CALCIUM 40 MG/1
40 TABLET, FILM COATED ORAL NIGHTLY
Status: DISCONTINUED | OUTPATIENT
Start: 2022-08-21 | End: 2022-08-31 | Stop reason: HOSPADM

## 2022-08-21 RX ORDER — QUETIAPINE FUMARATE 100 MG/1
1 TABLET, FILM COATED ORAL DAILY
Status: ON HOLD | COMMUNITY
Start: 2022-08-15 | End: 2022-10-14 | Stop reason: HOSPADM

## 2022-08-21 RX ORDER — PROMETHAZINE HYDROCHLORIDE 12.5 MG/1
12.5 TABLET ORAL EVERY 6 HOURS PRN
Status: DISCONTINUED | OUTPATIENT
Start: 2022-08-21 | End: 2022-08-31 | Stop reason: HOSPADM

## 2022-08-21 RX ORDER — SODIUM CHLORIDE 9 MG/ML
INJECTION, SOLUTION INTRAVENOUS PRN
Status: DISCONTINUED | OUTPATIENT
Start: 2022-08-21 | End: 2022-08-31 | Stop reason: HOSPADM

## 2022-08-21 RX ORDER — PANTOPRAZOLE SODIUM 20 MG/1
20 TABLET, DELAYED RELEASE ORAL DAILY
Status: DISCONTINUED | OUTPATIENT
Start: 2022-08-22 | End: 2022-08-31 | Stop reason: HOSPADM

## 2022-08-21 RX ORDER — SODIUM CHLORIDE 0.9 % (FLUSH) 0.9 %
5-40 SYRINGE (ML) INJECTION PRN
Status: DISCONTINUED | OUTPATIENT
Start: 2022-08-21 | End: 2022-08-31 | Stop reason: HOSPADM

## 2022-08-21 RX ORDER — SODIUM BICARBONATE 650 MG/1
650 TABLET ORAL DAILY
Status: DISCONTINUED | OUTPATIENT
Start: 2022-08-22 | End: 2022-08-31 | Stop reason: HOSPADM

## 2022-08-21 RX ORDER — TAMSULOSIN HYDROCHLORIDE 0.4 MG/1
0.8 CAPSULE ORAL NIGHTLY
Status: DISCONTINUED | OUTPATIENT
Start: 2022-08-21 | End: 2022-08-31 | Stop reason: HOSPADM

## 2022-08-21 RX ORDER — ENOXAPARIN SODIUM 100 MG/ML
40 INJECTION SUBCUTANEOUS DAILY
Status: CANCELLED | OUTPATIENT
Start: 2022-08-22

## 2022-08-21 RX ORDER — ASPIRIN 81 MG/1
81 TABLET, CHEWABLE ORAL DAILY
Status: DISCONTINUED | OUTPATIENT
Start: 2022-08-22 | End: 2022-08-31 | Stop reason: HOSPADM

## 2022-08-21 RX ORDER — FINASTERIDE 5 MG/1
5 TABLET, FILM COATED ORAL DAILY
Status: DISCONTINUED | OUTPATIENT
Start: 2022-08-22 | End: 2022-08-31 | Stop reason: HOSPADM

## 2022-08-21 RX ORDER — PREDNISONE 10 MG/1
10 TABLET ORAL DAILY
Status: DISCONTINUED | OUTPATIENT
Start: 2022-08-22 | End: 2022-08-31 | Stop reason: HOSPADM

## 2022-08-21 RX ORDER — ESCITALOPRAM OXALATE 5 MG/1
1 TABLET ORAL DAILY
COMMUNITY
Start: 2022-08-15

## 2022-08-21 RX ORDER — POLYETHYLENE GLYCOL 3350 17 G/17G
17 POWDER, FOR SOLUTION ORAL DAILY PRN
Status: DISCONTINUED | OUTPATIENT
Start: 2022-08-21 | End: 2022-08-31 | Stop reason: HOSPADM

## 2022-08-21 RX ORDER — NITROGLYCERIN 0.4 MG/1
0.4 TABLET SUBLINGUAL EVERY 5 MIN PRN
Status: DISCONTINUED | OUTPATIENT
Start: 2022-08-21 | End: 2022-08-31 | Stop reason: HOSPADM

## 2022-08-21 RX ORDER — ACETAMINOPHEN 325 MG/1
650 TABLET ORAL EVERY 6 HOURS PRN
Status: DISCONTINUED | OUTPATIENT
Start: 2022-08-21 | End: 2022-08-31 | Stop reason: HOSPADM

## 2022-08-21 RX ORDER — QUETIAPINE FUMARATE 100 MG/1
100 TABLET, FILM COATED ORAL 2 TIMES DAILY
Status: DISCONTINUED | OUTPATIENT
Start: 2022-08-21 | End: 2022-08-31 | Stop reason: HOSPADM

## 2022-08-21 RX ADMIN — TEMAZEPAM 15 MG: 7.5 CAPSULE ORAL at 23:16

## 2022-08-21 RX ADMIN — QUETIAPINE FUMARATE 100 MG: 100 TABLET ORAL at 23:16

## 2022-08-21 RX ADMIN — TAMSULOSIN HYDROCHLORIDE 0.8 MG: 0.4 CAPSULE ORAL at 23:16

## 2022-08-21 RX ADMIN — APIXABAN 2.5 MG: 2.5 TABLET, FILM COATED ORAL at 23:16

## 2022-08-21 RX ADMIN — ATORVASTATIN CALCIUM 40 MG: 40 TABLET, FILM COATED ORAL at 23:16

## 2022-08-21 ASSESSMENT — ENCOUNTER SYMPTOMS
COUGH: 0
SORE THROAT: 0
SHORTNESS OF BREATH: 0
CONSTIPATION: 0
EYES NEGATIVE: 1
NAUSEA: 0
ABDOMINAL PAIN: 0
VOMITING: 0
CHEST TIGHTNESS: 1

## 2022-08-21 ASSESSMENT — PAIN SCALES - GENERAL: PAINLEVEL_OUTOF10: 5

## 2022-08-21 ASSESSMENT — PAIN - FUNCTIONAL ASSESSMENT: PAIN_FUNCTIONAL_ASSESSMENT: 0-10

## 2022-08-21 NOTE — ACP (ADVANCE CARE PLANNING)
Patient does have a ACP documents/Medical Power of --naming his son--Charlie Sherman @ [phone] as Patients' Medical POA.

## 2022-08-21 NOTE — ED NOTES
Handoff report given to [facility], RN     Zarina Cardenas, BARBARA  08/21/22 [id number]

## 2022-08-21 NOTE — ED PROVIDER NOTES
[address]  EMERGENCY DEPARTMENT ENCOUNTER      Pt Name: Ángel Castle  MRN: [id number]  [place] 10/18/1930  Date of evaluation: 8/21/2022  Provider: Acosta Leonard MD    CHIEF COMPLAINT       Chief Complaint   Patient presents with    Chest Pain    Shortness of Breath                HISTORY OF PRESENT ILLNESS   (Location/Symptom, Timing/Onset, Context/Setting, Quality, Duration, Modifying Factors, Severity)  Note limiting factors. Ángel Castle is a 80 y.o. male who presents to the emergency department with a transient episode of substernal chest pain. HPI    Nursing Notes were reviewed. This is a 80-year-old man who comes to the emergency department due to a transient episode of substernal chest pain. The patient is severely hard of hearing with a hearing aid and is a poor historian. However, he is able to answer basic questions. The patient says that he had a an episode of substernal chest pain at home, and as a result, his daughter called 911 and he was transported to the emergency department. The patient received aspirin in route to the hospital.    Here in the emergency department, the patient has no complaints. He no longer has chest pain. He denies any episodes of difficulty breathing. He denies nausea or vomiting. Denies recent fevers. Denies cough. The patient states that he feels back to normal now. REVIEW OF SYSTEMS    (2-9 systems for level 4, 10 or more for level 5)     Review of Systems    The patient is extremely hard of hearing and a complete review of systems is difficult, however as best as possible, 10 systems reviewed with the patient and all were negative with exception of those noted in the history of present illness above.       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       Current Discharge Medication List        CONTINUE these medications which have NOT CHANGED    Details   escitalopram (LEXAPRO) 5 MG tablet Take 1 tablet by mouth daily      !! QUEtiapine (SEROQUEL) 100 MG tablet Take 1 tablet by mouth daily      !! furosemide (LASIX) 20 MG tablet Take 1 tablet by mouth daily      !! 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 ! 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 ! furosemide (LASIX) 40 MG tablet Take 1 tablet by mouth three times a week Mondays, wedn, firdays  Qty: 30 tablet, Refills: 1    Associated Diagnoses: Bilateral leg edema      sodium bicarbonate 650 MG tablet Take 1 tablet by mouth daily  Qty: 90 tablet, Refills: 1      levothyroxine (SYNTHROID) 75 MCG tablet TAKE 1 TABLET BY MOUTH EVERY DAY  Qty: 90 tablet, Refills: 1    Associated Diagnoses: Acquired hypothyroidism      finasteride (PROSCAR) 5 MG tablet TAKE 1 TABLET BY MOUTH EVERY DAY  Qty: 90 tablet, Refills: 1    Associated Diagnoses: BPH with urinary obstruction      ELIQUIS 2.5 MG TABS tablet TAKE 1 TABLET BY MOUTH TWICE A DAY  Qty: 60 tablet, Refills: 5      tamsulosin (FLOMAX) 0.4 MG capsule Take 2 capsules by mouth nightly  Qty: 60 capsule, Refills: 1      !! QUEtiapine (SEROQUEL) 25 MG tablet TAKE 1 TABLET BY MOUTH TWICE A DAY  Qty: 180 tablet, Refills: 1    Associated Diagnoses: Current moderate episode of major depressive disorder, unspecified whether recurrent [facility]      ! ! sertraline (ZOLOFT) 50 MG tablet Take 50 mg by mouth daily      temazepam (RESTORIL) 7.5 MG capsule Take 7.5 mg by mouth nightly as needed for Sleep. !! - Potential duplicate medications found. Please discuss with provider. ALLERGIES     Minocycline    FAMILY HISTORY       Family History   Problem Relation Age of Onset    Depression Mother     Diabetes Mother     COPD Father     Diabetes Brother     Diabetes Maternal Grandmother           SOCIAL HISTORY       Social History     Socioeconomic History    Marital status:       Spouse name: None    Number of children: None    Years of education: None    Highest education level: None   Tobacco Use    Smoking status: Former     Packs/day: 1.00     Years: 5.00     Pack years: 5.00     Types: Cigarettes     Start date: 1950     Quit date: 1955     Years since quittin.8    Smokeless tobacco: Never   Substance and Sexual Activity    Alcohol use: Not Currently    Drug use: Not Currently     Social Determinants of Health     Financial Resource Strain: Low Risk     Difficulty of Paying Living Expenses: Not hard at all   Food Insecurity: No Food Insecurity    Worried About Running Out of Food in the Last Year: Never true    Ran Out of Food in the Last Year: Never true       SCREENINGS         Kay Coma Scale  Eye Opening: Spontaneous  Best Verbal Response: Oriented  Best Motor Response: Obeys commands  [place] Coma Scale Score: 15                     CIWA Assessment  BP: 130/75  Heart Rate: 84                 PHYSICAL EXAM    (up to 7 for level 4, 8 or more for level 5)     ED Triage Vitals [08/21/22 1845]   BP Temp Temp Source Heart Rate Resp SpO2 Height Weight   136/81 98 °F (36.7 °C) Oral (!) 104 17 99 % 6' (1.829 m) 188 lb (85.3 kg)       Physical Exam  Vitals reviewed. Constitutional:       General: He is not in acute distress. Appearance: He is well-developed. He is not ill-appearing, toxic-appearing or diaphoretic. HENT:      Head: Normocephalic and atraumatic. Eyes:      Extraocular Movements: Extraocular movements intact. Cardiovascular:      Rate and Rhythm: Normal rate and regular rhythm. Pulmonary:      Effort: Pulmonary effort is normal.      Breath sounds: Normal breath sounds. Chest:      Chest wall: No tenderness. Abdominal:      Palpations: Abdomen is soft. Tenderness: There is no abdominal tenderness. Musculoskeletal:         General: Normal range of motion. Skin:     General: Skin is warm and dry. Capillary Refill: Capillary refill takes less than 2 seconds. Neurological:      General: No focal deficit present. Mental Status: He is alert. Psychiatric:         Mood and Affect: Mood normal.         Behavior: Behavior normal.       DIAGNOSTIC RESULTS     EKG: All EKG's are interpreted by the Emergency Department Physician who either signs or Co-signs this chart in the absence of a cardiologist.    First-degree AV block. Ventricular rate of 98. AR is 230. QRS is 90. QTc is 451. There are no abnormal ST elevations or depressions. There is no ventricular ectopy.   There are no significant changes prior EKG from March 2000    RADIOLOGY:   Non-plain film images such as CT, Ultrasound and MRI are read by the radiologist. Plain radiographic images are visualized and preliminarily interpreted by the emergency physician with the below findings:    Interpretation per the Radiologist below, if available at the time of this note:    XR CHEST (2 VW)   Final Result   Bibasilar airspace opacities compatible with atelectasis or pneumonia. ED BEDSIDE ULTRASOUND:   Performed by ED Physician - none    LABS:  Labs Reviewed   CBC WITH AUTO DIFFERENTIAL - Abnormal; Notable for the following components:       Result Value    RBC 3.19 (*)     Hemoglobin 10.0 (*)     Hematocrit 31.1 (*)     MCH 31.3 (*)     Segs Relative 85.7 (*)     Lymphocytes % 6.2 (*)     Monocytes % 6.1 (*)     Immature Neutrophil % 1.5 (*)     All other components within normal limits   BASIC METABOLIC PANEL W/ REFLEX TO MG FOR LOW K - Abnormal; Notable for the following components:    Potassium 3.3 (*)     BUN 42 (*)     Creatinine 2.4 (*)     Glucose 189 (*)     GFR Non- 26 (*)     GFR  31 (*)     All other components within normal limits   TROPONIN - Abnormal; Notable for the following components:    Troponin T 0.027 (*)     All other components within normal limits   MAGNESIUM   TROPONIN   TROPONIN   CBC   COMPREHENSIVE METABOLIC PANEL W/ REFLEX TO MG FOR LOW K   MAGNESIUM   HEMOGLOBIN A1C   LIPID PANEL       All other labs were within normal range or not returned as of this dictation. EMERGENCY DEPARTMENT COURSE and DIFFERENTIAL DIAGNOSIS/MDM:   Vitals:    Vitals:    08/21/22 1845 08/21/22 1900 08/21/22 2218   BP: 136/81 132/80 130/75   Pulse: (!) 104 96 84   Resp: 17 12 19   Temp: 98 °F (36.7 °C)  97.9 °F (36.6 °C)   TempSrc: Oral  Oral   SpO2: 99%  100%   Weight: 188 lb (85.3 kg)     Height: 6' (1.829 m)           MDM  Primary concern in this patient is acute coronary syndrome and cardiac related disorders.   However, the

## 2022-08-22 ENCOUNTER — APPOINTMENT (OUTPATIENT)
Dept: ULTRASOUND IMAGING | Age: 87
DRG: 205 | End: 2022-08-22
Payer: MEDICARE

## 2022-08-22 ENCOUNTER — APPOINTMENT (OUTPATIENT)
Dept: CT IMAGING | Age: 87
DRG: 205 | End: 2022-08-22
Payer: MEDICARE

## 2022-08-22 ENCOUNTER — APPOINTMENT (OUTPATIENT)
Dept: GENERAL RADIOLOGY | Age: 87
DRG: 205 | End: 2022-08-22
Payer: MEDICARE

## 2022-08-22 LAB
ALBUMIN SERPL-MCNC: 3 GM/DL (ref 3.4–5)
ALBUMIN SERPL-MCNC: 3.2 GM/DL (ref 3.4–5)
ALP BLD-CCNC: 183 IU/L (ref 40–128)
ALP BLD-CCNC: 232 IU/L (ref 40–129)
ALT SERPL-CCNC: 334 U/L (ref 10–40)
ALT SERPL-CCNC: 438 U/L (ref 10–40)
AMMONIA: 24 UMOL/L (ref 16–60)
AMMONIA: 33 UMOL/L (ref 16–60)
ANION GAP SERPL CALCULATED.3IONS-SCNC: 14 MMOL/L (ref 4–16)
ANION GAP SERPL CALCULATED.3IONS-SCNC: 17 MMOL/L (ref 4–16)
APTT: 31.3 SECONDS (ref 25.1–37.1)
AST SERPL-CCNC: 487 IU/L (ref 15–37)
AST SERPL-CCNC: 554 IU/L (ref 15–37)
BASOPHILS ABSOLUTE: 0 K/CU MM
BASOPHILS RELATIVE PERCENT: 0.1 % (ref 0–1)
BILIRUB SERPL-MCNC: 1.7 MG/DL (ref 0–1)
BILIRUB SERPL-MCNC: 2.1 MG/DL (ref 0–1)
BUN BLDV-MCNC: 42 MG/DL (ref 6–23)
BUN BLDV-MCNC: 47 MG/DL (ref 6–23)
CALCIUM SERPL-MCNC: 7.9 MG/DL (ref 8.3–10.6)
CALCIUM SERPL-MCNC: 8.3 MG/DL (ref 8.3–10.6)
CHLORIDE BLD-SCNC: 102 MMOL/L (ref 99–110)
CHLORIDE BLD-SCNC: 102 MMOL/L (ref 99–110)
CHOLESTEROL: 229 MG/DL
CO2: 18 MMOL/L (ref 21–32)
CO2: 22 MMOL/L (ref 21–32)
CREAT SERPL-MCNC: 2.5 MG/DL (ref 0.9–1.3)
CREAT SERPL-MCNC: 2.9 MG/DL (ref 0.9–1.3)
DIFFERENTIAL TYPE: ABNORMAL
EKG ATRIAL RATE: 113 BPM
EKG ATRIAL RATE: 85 BPM
EKG ATRIAL RATE: 98 BPM
EKG DIAGNOSIS: NORMAL
EKG P AXIS: 36 DEGREES
EKG P AXIS: 45 DEGREES
EKG P AXIS: 52 DEGREES
EKG P-R INTERVAL: 200 MS
EKG P-R INTERVAL: 230 MS
EKG P-R INTERVAL: 232 MS
EKG Q-T INTERVAL: 328 MS
EKG Q-T INTERVAL: 354 MS
EKG Q-T INTERVAL: 392 MS
EKG QRS DURATION: 80 MS
EKG QRS DURATION: 88 MS
EKG QRS DURATION: 90 MS
EKG QTC CALCULATION (BAZETT): 449 MS
EKG QTC CALCULATION (BAZETT): 451 MS
EKG QTC CALCULATION (BAZETT): 466 MS
EKG R AXIS: -31 DEGREES
EKG R AXIS: -45 DEGREES
EKG R AXIS: -49 DEGREES
EKG T AXIS: -18 DEGREES
EKG T AXIS: 40 DEGREES
EKG T AXIS: 46 DEGREES
EKG VENTRICULAR RATE: 113 BPM
EKG VENTRICULAR RATE: 85 BPM
EKG VENTRICULAR RATE: 98 BPM
EOSINOPHILS ABSOLUTE: 0 K/CU MM
EOSINOPHILS RELATIVE PERCENT: 0.1 % (ref 0–3)
ESTIMATED AVERAGE GLUCOSE: 120 MG/DL
GFR AFRICAN AMERICAN: 25 ML/MIN/1.73M2
GFR AFRICAN AMERICAN: 29 ML/MIN/1.73M2
GFR NON-AFRICAN AMERICAN: 21 ML/MIN/1.73M2
GFR NON-AFRICAN AMERICAN: 24 ML/MIN/1.73M2
GLUCOSE BLD-MCNC: 138 MG/DL (ref 70–99)
GLUCOSE BLD-MCNC: 172 MG/DL (ref 70–99)
HAV IGM SER IA-ACNC: NON REACTIVE
HBA1C MFR BLD: 5.8 % (ref 4.2–6.3)
HCT VFR BLD CALC: 26.3 % (ref 42–52)
HCT VFR BLD CALC: 28 % (ref 42–52)
HDLC SERPL-MCNC: 69 MG/DL
HEMOGLOBIN: 8.4 GM/DL (ref 13.5–18)
HEMOGLOBIN: 8.9 GM/DL (ref 13.5–18)
HEPATITIS B CORE IGM ANTIBODY: NON REACTIVE
HEPATITIS B SURFACE ANTIGEN: NON REACTIVE
HEPATITIS C ANTIBODY: NON REACTIVE
IMMATURE NEUTROPHIL %: 1.2 % (ref 0–0.43)
INR BLD: 1.16 INDEX
LACTATE: 2.3 MMOL/L (ref 0.4–2)
LDL CHOLESTEROL CALCULATED: 143 MG/DL
LV EF: 50 %
LVEF MODALITY: NORMAL
LYMPHOCYTES ABSOLUTE: 0.4 K/CU MM
LYMPHOCYTES RELATIVE PERCENT: 2.8 % (ref 24–44)
MAGNESIUM: 1.7 MG/DL (ref 1.8–2.4)
MAGNESIUM: 2 MG/DL (ref 1.8–2.4)
MCH RBC QN AUTO: 31.2 PG (ref 27–31)
MCH RBC QN AUTO: 31.7 PG (ref 27–31)
MCHC RBC AUTO-ENTMCNC: 31.8 % (ref 32–36)
MCHC RBC AUTO-ENTMCNC: 31.9 % (ref 32–36)
MCV RBC AUTO: 98.2 FL (ref 78–100)
MCV RBC AUTO: 99.2 FL (ref 78–100)
MONOCYTES ABSOLUTE: 0.8 K/CU MM
MONOCYTES RELATIVE PERCENT: 6.3 % (ref 0–4)
NUCLEATED RBC %: 0 %
PDW BLD-RTO: 14.6 % (ref 11.7–14.9)
PDW BLD-RTO: 15.1 % (ref 11.7–14.9)
PLATELET # BLD: 142 K/CU MM (ref 140–440)
PLATELET # BLD: 151 K/CU MM (ref 140–440)
PMV BLD AUTO: 10.1 FL (ref 7.5–11.1)
PMV BLD AUTO: 9.8 FL (ref 7.5–11.1)
POTASSIUM SERPL-SCNC: 3.4 MMOL/L (ref 3.5–5.1)
POTASSIUM SERPL-SCNC: 3.9 MMOL/L (ref 3.5–5.1)
PROTHROMBIN TIME: 15 SECONDS (ref 11.7–14.5)
RBC # BLD: 2.65 M/CU MM (ref 4.6–6.2)
RBC # BLD: 2.85 M/CU MM (ref 4.6–6.2)
SEGMENTED NEUTROPHILS ABSOLUTE COUNT: 11.5 K/CU MM
SEGMENTED NEUTROPHILS RELATIVE PERCENT: 89.5 % (ref 36–66)
SODIUM BLD-SCNC: 137 MMOL/L (ref 135–145)
SODIUM BLD-SCNC: 138 MMOL/L (ref 135–145)
T4 FREE: 1.16 NG/DL (ref 0.9–1.8)
TOTAL IMMATURE NEUTOROPHIL: 0.15 K/CU MM
TOTAL NUCLEATED RBC: 0 K/CU MM
TOTAL PROTEIN: 4.7 GM/DL (ref 6.4–8.2)
TOTAL PROTEIN: 5.1 GM/DL (ref 6.4–8.2)
TRIGL SERPL-MCNC: 85 MG/DL
TROPONIN T: 0.02 NG/ML
TSH HIGH SENSITIVITY: 1.52 UIU/ML (ref 0.27–4.2)
WBC # BLD: 12.8 K/CU MM (ref 4–10.5)
WBC # BLD: 8.4 K/CU MM (ref 4–10.5)

## 2022-08-22 PROCEDURE — 6370000000 HC RX 637 (ALT 250 FOR IP): Performed by: NURSE PRACTITIONER

## 2022-08-22 PROCEDURE — 93010 ELECTROCARDIOGRAM REPORT: CPT | Performed by: INTERNAL MEDICINE

## 2022-08-22 PROCEDURE — 93005 ELECTROCARDIOGRAM TRACING: CPT | Performed by: INTERNAL MEDICINE

## 2022-08-22 PROCEDURE — 6360000002 HC RX W HCPCS: Performed by: HOSPITALIST

## 2022-08-22 PROCEDURE — 6370000000 HC RX 637 (ALT 250 FOR IP): Performed by: INTERNAL MEDICINE

## 2022-08-22 PROCEDURE — 2000000000 HC ICU R&B

## 2022-08-22 PROCEDURE — G0378 HOSPITAL OBSERVATION PER HR: HCPCS

## 2022-08-22 PROCEDURE — 96366 THER/PROPH/DIAG IV INF ADDON: CPT

## 2022-08-22 PROCEDURE — 82140 ASSAY OF AMMONIA: CPT

## 2022-08-22 PROCEDURE — 83036 HEMOGLOBIN GLYCOSYLATED A1C: CPT

## 2022-08-22 PROCEDURE — 93005 ELECTROCARDIOGRAM TRACING: CPT | Performed by: STUDENT IN AN ORGANIZED HEALTH CARE EDUCATION/TRAINING PROGRAM

## 2022-08-22 PROCEDURE — 71045 X-RAY EXAM CHEST 1 VIEW: CPT

## 2022-08-22 PROCEDURE — 83735 ASSAY OF MAGNESIUM: CPT

## 2022-08-22 PROCEDURE — 2580000003 HC RX 258: Performed by: PHYSICIAN ASSISTANT

## 2022-08-22 PROCEDURE — 85025 COMPLETE CBC W/AUTO DIFF WBC: CPT

## 2022-08-22 PROCEDURE — 93970 EXTREMITY STUDY: CPT

## 2022-08-22 PROCEDURE — 84484 ASSAY OF TROPONIN QUANT: CPT

## 2022-08-22 PROCEDURE — 93306 TTE W/DOPPLER COMPLETE: CPT

## 2022-08-22 PROCEDURE — 96375 TX/PRO/DX INJ NEW DRUG ADDON: CPT

## 2022-08-22 PROCEDURE — 80061 LIPID PANEL: CPT

## 2022-08-22 PROCEDURE — 96367 TX/PROPH/DG ADDL SEQ IV INF: CPT

## 2022-08-22 PROCEDURE — 84443 ASSAY THYROID STIM HORMONE: CPT

## 2022-08-22 PROCEDURE — 02HV33Z INSERTION OF INFUSION DEVICE INTO SUPERIOR VENA CAVA, PERCUTANEOUS APPROACH: ICD-10-PCS | Performed by: HOSPITALIST

## 2022-08-22 PROCEDURE — 85610 PROTHROMBIN TIME: CPT

## 2022-08-22 PROCEDURE — 36415 COLL VENOUS BLD VENIPUNCTURE: CPT

## 2022-08-22 PROCEDURE — 2580000003 HC RX 258: Performed by: INTERNAL MEDICINE

## 2022-08-22 PROCEDURE — 80053 COMPREHEN METABOLIC PANEL: CPT

## 2022-08-22 PROCEDURE — 96365 THER/PROPH/DIAG IV INF INIT: CPT

## 2022-08-22 PROCEDURE — 83605 ASSAY OF LACTIC ACID: CPT

## 2022-08-22 PROCEDURE — 2500000003 HC RX 250 WO HCPCS: Performed by: PHYSICIAN ASSISTANT

## 2022-08-22 PROCEDURE — 85027 COMPLETE CBC AUTOMATED: CPT

## 2022-08-22 PROCEDURE — 6360000002 HC RX W HCPCS: Performed by: INTERNAL MEDICINE

## 2022-08-22 PROCEDURE — 6360000002 HC RX W HCPCS: Performed by: NURSE PRACTITIONER

## 2022-08-22 PROCEDURE — 80074 ACUTE HEPATITIS PANEL: CPT

## 2022-08-22 PROCEDURE — 84439 ASSAY OF FREE THYROXINE: CPT

## 2022-08-22 PROCEDURE — 87040 BLOOD CULTURE FOR BACTERIA: CPT

## 2022-08-22 PROCEDURE — 85730 THROMBOPLASTIN TIME PARTIAL: CPT

## 2022-08-22 PROCEDURE — 96376 TX/PRO/DX INJ SAME DRUG ADON: CPT

## 2022-08-22 PROCEDURE — 51702 INSERT TEMP BLADDER CATH: CPT

## 2022-08-22 PROCEDURE — 76705 ECHO EXAM OF ABDOMEN: CPT

## 2022-08-22 RX ORDER — NOREPINEPHRINE BIT/0.9 % NACL 16MG/250ML
1-100 INFUSION BOTTLE (ML) INTRAVENOUS CONTINUOUS
Status: DISCONTINUED | OUTPATIENT
Start: 2022-08-22 | End: 2022-08-23

## 2022-08-22 RX ORDER — SODIUM CHLORIDE, SODIUM LACTATE, POTASSIUM CHLORIDE, CALCIUM CHLORIDE 600; 310; 30; 20 MG/100ML; MG/100ML; MG/100ML; MG/100ML
INJECTION, SOLUTION INTRAVENOUS CONTINUOUS
Status: DISCONTINUED | OUTPATIENT
Start: 2022-08-22 | End: 2022-08-25

## 2022-08-22 RX ORDER — SODIUM CHLORIDE 9 MG/ML
INJECTION, SOLUTION INTRAVENOUS CONTINUOUS
Status: DISCONTINUED | OUTPATIENT
Start: 2022-08-22 | End: 2022-08-22

## 2022-08-22 RX ORDER — SODIUM CHLORIDE 0.9 % (FLUSH) 0.9 %
5-40 SYRINGE (ML) INJECTION PRN
Status: DISCONTINUED | OUTPATIENT
Start: 2022-08-22 | End: 2022-08-31 | Stop reason: HOSPADM

## 2022-08-22 RX ORDER — MAGNESIUM SULFATE IN WATER 40 MG/ML
2000 INJECTION, SOLUTION INTRAVENOUS ONCE
Status: COMPLETED | OUTPATIENT
Start: 2022-08-22 | End: 2022-08-22

## 2022-08-22 RX ORDER — 0.9 % SODIUM CHLORIDE 0.9 %
500 INTRAVENOUS SOLUTION INTRAVENOUS ONCE
Status: COMPLETED | OUTPATIENT
Start: 2022-08-22 | End: 2022-08-22

## 2022-08-22 RX ORDER — SODIUM CHLORIDE 9 MG/ML
INJECTION, SOLUTION INTRAVENOUS PRN
Status: DISCONTINUED | OUTPATIENT
Start: 2022-08-22 | End: 2022-08-31 | Stop reason: HOSPADM

## 2022-08-22 RX ORDER — POTASSIUM CHLORIDE 7.45 MG/ML
10 INJECTION INTRAVENOUS
Status: DISPENSED | OUTPATIENT
Start: 2022-08-22 | End: 2022-08-22

## 2022-08-22 RX ORDER — SODIUM CHLORIDE 0.9 % (FLUSH) 0.9 %
5-40 SYRINGE (ML) INJECTION EVERY 12 HOURS SCHEDULED
Status: DISCONTINUED | OUTPATIENT
Start: 2022-08-22 | End: 2022-08-31 | Stop reason: HOSPADM

## 2022-08-22 RX ADMIN — SERTRALINE HYDROCHLORIDE 50 MG: 50 TABLET ORAL at 11:42

## 2022-08-22 RX ADMIN — MAGNESIUM SULFATE HEPTAHYDRATE 2000 MG: 2 INJECTION, SOLUTION INTRAVENOUS at 11:46

## 2022-08-22 RX ADMIN — FINASTERIDE 5 MG: 5 TABLET, FILM COATED ORAL at 11:40

## 2022-08-22 RX ADMIN — POTASSIUM CHLORIDE 10 MEQ: 7.45 INJECTION INTRAVENOUS at 11:47

## 2022-08-22 RX ADMIN — SODIUM BICARBONATE 650 MG: 650 TABLET ORAL at 11:35

## 2022-08-22 RX ADMIN — ASPIRIN 81 MG CHEWABLE TABLET 81 MG: 81 TABLET CHEWABLE at 11:36

## 2022-08-22 RX ADMIN — CEFEPIME HYDROCHLORIDE 1000 MG: 1 INJECTION, POWDER, FOR SOLUTION INTRAMUSCULAR; INTRAVENOUS at 14:32

## 2022-08-22 RX ADMIN — HYDROMORPHONE HYDROCHLORIDE 1 MG: 1 INJECTION, SOLUTION INTRAMUSCULAR; INTRAVENOUS; SUBCUTANEOUS at 21:59

## 2022-08-22 RX ADMIN — PANTOPRAZOLE 20 MG: 20 TABLET, DELAYED RELEASE ORAL at 06:28

## 2022-08-22 RX ADMIN — LEVOTHYROXINE SODIUM 75 MCG: 0.07 TABLET ORAL at 06:28

## 2022-08-22 RX ADMIN — Medication 3 MCG/MIN: at 21:34

## 2022-08-22 RX ADMIN — SODIUM CHLORIDE, POTASSIUM CHLORIDE, SODIUM LACTATE AND CALCIUM CHLORIDE: 600; 310; 30; 20 INJECTION, SOLUTION INTRAVENOUS at 16:45

## 2022-08-22 RX ADMIN — METOPROLOL TARTRATE 25 MG: 25 TABLET, FILM COATED ORAL at 11:35

## 2022-08-22 RX ADMIN — TAMSULOSIN HYDROCHLORIDE 0.8 MG: 0.4 CAPSULE ORAL at 20:46

## 2022-08-22 RX ADMIN — ESCITALOPRAM OXALATE 5 MG: 10 TABLET ORAL at 11:36

## 2022-08-22 RX ADMIN — PREDNISONE 10 MG: 10 TABLET ORAL at 11:35

## 2022-08-22 RX ADMIN — SODIUM CHLORIDE, PRESERVATIVE FREE 10 ML: 5 INJECTION INTRAVENOUS at 20:46

## 2022-08-22 RX ADMIN — SODIUM CHLORIDE 500 ML: 9 INJECTION, SOLUTION INTRAVENOUS at 14:23

## 2022-08-22 RX ADMIN — HYDROMORPHONE HYDROCHLORIDE 1 MG: 1 INJECTION, SOLUTION INTRAMUSCULAR; INTRAVENOUS; SUBCUTANEOUS at 21:48

## 2022-08-22 RX ADMIN — QUETIAPINE FUMARATE 100 MG: 100 TABLET ORAL at 11:43

## 2022-08-22 ASSESSMENT — PAIN SCALES - GENERAL
PAINLEVEL_OUTOF10: 7
PAINLEVEL_OUTOF10: 7
PAINLEVEL_OUTOF10: 0

## 2022-08-22 NOTE — PLAN OF CARE
Spoke to [employer] at 11:50am, patient already ate breakfast and was about to eat lunch, so will keep NPO after patient eats lunch to be done later tonight.

## 2022-08-22 NOTE — H&P
V2.0  History and Physical      Name:  Dm Martinez /Age/Sex: 10/18/1930  (80 y.o. male)   MRN & CSN:  [id number] & [id number] Encounter Date/Time: 2022 9:30 PM EDT   Location:  ED28/ED-28 PCP: No primary care provider on file. Hospital Day: 1    Assessment and Plan:   Dm Martinez is a 80 y.o. male with a pmh as noted below presents with recurrent paroxysmal chest pain     Chest pain r/o ACS  Essential Hypertension  In setting of CAD  Initial troponin 0.027, trend x3              Telemetry monitoring    Chest xray with bibasilar airspace opacities               EKG reviewed, NSR. No evidence of acute ST elevation or depression . Repeat EKG in AM              Cardiology consult. Follows with Dr. Betsy Olmedo              Antiplatelet/BB/Statin/Sl Nitro    Check lipid panel in am     CKD stage IV   Follows with Dr. Tho Ledezma   Creatinine baseline    Multifactorial anemia  Skin cancer   Follows with oncology, Dr. Harsha Iraheta,. Follows with dermatology    BPH  Continue Flomax    Chronic Conditions: continue home medication as ordered  Peripheral neuropathy,  Hypothyroidism on Synthroid  Anxiety disorder/depression on Seroquel/sertraline    All testing  and results reviewed with patient . All questions answered. Patient and family voiced understanding    This patient was seen and examined in conjunction with Dr. Edin Acosta. He/She was agreeable with the plan and management as dictated above.     Disposition:   Expected Disposition: Home  Estimated D/C: 1 day     Diet No diet orders on file   GI Prophylaxis  [x] PPI,  [] H2 Blocker,  [] Carafate,  [] Diet/Tube Feeds   DVT Prophylaxis [] Lovenox, []  Heparin, [] SCDs, [x] Ambulation,  [] NOAC   Code Status Prior   Surrogate Decision Maker/ POA          History from:     patient, electronic medical record,     History of Present Illness:     Chief Complaint: Chest pain  Dm Martinez is a 80 y.o. male with history of hypertension/BPH, hypothyroidism, anemia of chronic disease who presents to the ER with complaints of chest pain, intermittent at rest.  The patient is severely hard of hearing and a poor historian so most information is taken from medical record. Patient reported he had an episode of chest pain at home and as a result his daughter called 911 and he was transported the emergency department. He denied any complaints in the ED, however later developed an episodic gasp episode of chest pain lasting less than 10 minutes. He denies any episodes of shortness of breath. Denies any nausea or vomiting. Denies recent fevers or cough. Review of Systems: Need 10 Elements   Review of Systems   Constitutional: Negative. Negative for fever and unexpected weight change. HENT:  Negative for congestion and sore throat. Eyes: Negative. Respiratory:  Positive for chest tightness. Negative for cough and shortness of breath. Cardiovascular:  Positive for chest pain. Negative for leg swelling. Gastrointestinal:  Negative for abdominal pain, constipation, nausea and vomiting. Endocrine: Negative. Genitourinary:  Negative for dysuria and hematuria. Musculoskeletal:  Negative for neck pain. Skin:  Negative for wound. Neurological:  Negative for dizziness and light-headedness. All other systems reviewed and are negative. Objective:   No intake or output data in the 24 hours ending 08/21/22 2130   Vitals:   Vitals:    08/21/22 1845 08/21/22 1900   BP: 136/81 132/80   Pulse: (!) 104 96   Resp: 17 12   Temp: 98 °F (36.7 °C)    TempSrc: Oral    SpO2: 99%    Weight: 188 lb (85.3 kg)    Height: 6' (1.829 m)        Medications Prior to Admission     Prior to Admission medications    Medication Sig Start Date End Date Taking?  Authorizing Provider   escitalopram (LEXAPRO) 5 MG tablet Take 1 tablet by mouth daily 8/15/22  Yes Historical Provider, MD   QUEtiapine (SEROQUEL) 100 MG tablet Take 1 tablet by mouth daily 8/15/22  Yes Historical Provider, MD   furosemide (LASIX) 20 MG tablet Take 1 tablet by mouth daily 8/19/22  Yes Historical Provider, MD   QUEtiapine (SEROQUEL) 50 MG tablet Take 1 tablet by mouth 2 times daily 8/18/22 2/14/23  Óscar Dick,    sertraline (ZOLOFT) 50 MG tablet Take 1 tablet by mouth daily 8/18/22   Óscar Dick, DO   pantoprazole (PROTONIX) 20 MG tablet TAKE 1 TABLET BY MOUTH EVERY DAY 7/5/22   Óscar Dick, DO   predniSONE (DELTASONE) 10 MG tablet TAKE 1 TABLET BY MOUTH EVERY DAY 6/3/22   Historical Provider, MD   furosemide (LASIX) 40 MG tablet Take 1 tablet by mouth three times a week Mondays, wedn, firdays  Patient taking differently: Take 40 mg by mouth three times a week Mondays, wedn, fridays 6/8/22 9/6/22  Adriane Houston DO   sodium bicarbonate 650 MG tablet Take 1 tablet by mouth daily 6/7/22 9/5/22  Adriane Houston DO   levothyroxine (SYNTHROID) 75 MCG tablet TAKE 1 TABLET BY MOUTH EVERY DAY 5/2/22   Óscar Dick, DO   finasteride (PROSCAR) 5 MG tablet TAKE 1 TABLET BY MOUTH EVERY DAY 5/2/22   Óscar Dick,    ELIQUIS 2.5 MG TABS tablet TAKE 1 TABLET BY MOUTH TWICE A DAY 3/11/22   Lafayette Boeck, MD   [facility]) 0.4 MG capsule Take 2 capsules by mouth nightly 3/3/22   Adriane Houston DO   QUEtiapine (SEROQUEL) 25 MG tablet TAKE 1 TABLET BY MOUTH TWICE A DAY 2/28/22   Lafayette Boeck, MD   sertraline (ZOLOFT) 50 MG tablet Take 50 mg by mouth daily    Historical Provider, MD   temazepam (RESTORIL) 7.5 MG capsule Take 7.5 mg by mouth nightly as needed for Sleep. Historical Provider, MD       Physical Exam: Need 8 Elements   Physical Exam  Vitals and nursing note reviewed. Constitutional:       General: He is not in acute distress. Appearance: Normal appearance. HENT:      Head: Normocephalic. Nose: Nose normal.      Mouth/Throat:      Pharynx: Oropharynx is clear. Eyes:      Pupils: Pupils are equal, round, and reactive to light.    Cardiovascular:      Rate and Rhythm: Regular rhythm. Tachycardia present. Pulses: Normal pulses. Pulmonary:      Effort: Pulmonary effort is normal. No respiratory distress. Breath sounds: No wheezing or rhonchi. Abdominal:      General: Abdomen is flat. Bowel sounds are normal. There is no distension. Musculoskeletal:         General: Normal range of motion. Cervical back: Normal range of motion. Skin:     General: Skin is warm and dry. Capillary Refill: Capillary refill takes less than 2 seconds. Neurological:      General: No focal deficit present. Mental Status: He is alert and oriented to person, place, and time. Psychiatric:         Mood and Affect: Mood normal.          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 Allergies: Allergies   Allergen Reactions    Minocycline Other (See Comments)     Fam HX:  family history includes COPD in his father; Depression in his mother; Diabetes in his brother, maternal grandmother, and mother. Soc HX:   Social History     Socioeconomic History    Marital status:       Spouse name: None    Number of children: None    Years of education: None    Highest education level: None   Tobacco Use    Smoking status: Former     Packs/day: 1.00     Years: 5.00     Pack years: 5.00     Types: Cigarettes     Start date: 1950     Quit date: 1955     Years since quittin.8    Smokeless tobacco: Never   Substance and Sexual Activity    Alcohol use: Not Currently    Drug use: Not Currently     Social Determinants of Health     Financial Resource Strain: Low Risk     Difficulty of Paying Living Expenses: Not hard at all   Food Insecurity: No Food Insecurity    Worried About Running Out of Food in the Last Year: Never true    Ran Out of Food in the Last Year: Never true       Medications:   Medications:    Infusions:   PRN Meds:     Labs      CBC:   Recent Labs     08/21/22  1850   WBC 10.5   HGB 10.0*        BMP:    Recent Labs     08/21/22  1850      K 3.3*      CO2 22   BUN 42*   CREATININE 2.4*   GLUCOSE 189*     Hepatic: No results for input(s): AST, ALT, ALB, BILITOT, ALKPHOS in the last 72 hours. Lipids:   Lab Results   Component Value Date/Time    CHOL 157 11/19/2018 12:00 AM    HDL 41 11/19/2018 12:00 AM    TRIG 207 11/19/2018 12:00 AM     Hemoglobin A1C:   Lab Results   Component Value Date/Time    LABA1C 6.1 07/22/2021 11:16 AM     TSH:   Lab Results   Component Value Date/Time    TSH 4.07 05/24/2021 11:32 AM     Troponin:   Lab Results   Component Value Date/Time    TROPONINT 0.027 08/21/2022 06:50 PM    TROPONINT <0.010 03/03/2021 04:35 AM    TROPONINT <0.010 03/02/2021 11:20 PM     Lactic Acid: No results for input(s): LACTA in the last 72 hours. BNP: No results for input(s): PROBNP in the last 72 hours.   UA:  Lab Results   Component Value Date/Time    NITRU NEGATIVE 03/29/2022 09:11 AM    NITRU Negative 01/22/2021 09:24 AM    COLORU YELLOW 03/29/2022 09:11 AM    PHUR 6.0 01/22/2021 09:24 AM    WBCUA 80 03/29/2022 09:11 AM    RBCUA 93 03/29/2022 09:11 AM    MUCUS RARE 03/29/2022 09:11 AM    TRICHOMONAS NONE SEEN 03/29/2022 09:11 AM    YEAST RARE 03/29/2022 09:11 AM    BACTERIA MODERATE 03/29/2022 09:11 AM    CLARITYU CLEAR 03/29/2022 09:11 AM    SPECGRAV 1.020 03/29/2022 09:11 AM    LEUKOCYTESUR MODERATE 03/29/2022 09:11 AM    UROBILINOGEN 0.2 03/29/2022 09:11 AM [address] [facility] NEGATIVE 03/29/2022 09:11 AM    BLOODU LARGE 03/29/2022 09:11 AM    GLUCOSEU Negative 01/22/2021 09:24 AM    KETUA NEGATIVE 03/29/2022 09:11 AM     Urine Cultures: No results found for: [place]  Blood Cultures: No results found for: BC  No results found for: BLOODCULT2  Organism: No results found for: ORG    Imaging/Diagnostics Last 24 Hours   XR CHEST (2 VW)    Result Date: 8/21/2022  EXAMINATION: TWO XRAY VIEWS OF THE CHEST 8/21/2022 7:37 pm COMPARISON: Chest radiograph 07/22/2021. HISTORY: ORDERING SYSTEM PROVIDED HISTORY: Chest Pain TECHNOLOGIST PROVIDED HISTORY: Reason for exam:->Chest Pain Reason for Exam: Chest Pain; Shortness of Breath Additional signs and symptoms: Chest Pain; Shortness of Breath FINDINGS: The cardiomediastinal silhouette is unchanged. Bibasilar airspace opacities. No pneumothorax, vascular congestion, consolidation, or pleural effusion is identified. No acute osseous abnormality. Bibasilar airspace opacities compatible with atelectasis or pneumonia. Electronically signed by ROSEMARY Cooper CNP on 8/21/2022 at 9:30 PM          This dictation was created with voice recognition software. While attempts have been made to review the dictation as it is transcribed, on occasion the spoken word can be misinterpreted by the technology leading to omissions or inappropriate words, phrases or sentences.      Electronically signed by ROSEMARY Cooper CNP on 8/21/2022 at 9:30 PM

## 2022-08-22 NOTE — PROGRESS NOTES
RENAL DOSE ADJUSTMENT MADE PER P/T PROTOCOL    PREVIOUS ORDER:  Cefepime 2gm q12h    Estimated Creatinine Clearance: 21 mL/min (A) (based on SCr of 2.5 mg/dL (H)). Recent Labs     08/21/22  1850 08/22/22  0217 08/22/22  1203   BUN 42* 42*  --    CREATININE 2.4* 2.5*  --     151  --    INR  --   --  1.16     NEW RENALLY ADJUSTED ORDER:  Cefepime 1gm Q12h per P&T protocol for est CrCl of 21.     Khoi Nair [facility]  8/22/2022 1:45 PM

## 2022-08-22 NOTE — CONSULTS
ordered  Hypothyroidism-on Synthroid  Anxiety depression 6-continue antidepressants  Diet ADULT DIET; Regular; 5 carb choices (75 gm/meal); No Caffeine   DVT Prophylaxis [] Lovenox, []  Heparin, [] SCDs, [] Ambulation,  [x] Eliquis, [] Xarelto   Code Status Full Code   Surrogate Decision Maker/ POA Unknown     History Obtained From:    electronic medical record    History of Present Illness:     Chief Complaint: Chest pain    Edith Wiley is a 80 y.o. male who is seen today by the critical care team at the request of hospitalist, with a Chief Complaint of acute encephalopathy, most likely metabolic      Review of Systems:    Review of Systems  10 point ROS reviewed, patient is hard of hearing requiring multiple prompting, admits to some kind of discomfort says he does not feel right  Denies chest pain, nausea, vomiting, abdominal pain, motor or sensory changes, lightheadedness       Objective: Intake/Output Summary (Last 24 hours) at 8/22/2022 1345  Last data filed at 8/22/2022 0129  Gross per 24 hour   Intake --   Output 300 ml   Net -300 ml      Vitals:   Vitals:    08/21/22 1900 08/21/22 2218 08/22/22 0153 08/22/22 0900   BP: 132/80 130/75 (!) 145/73 (!) 88/54   Pulse: 96 84 (!) 126    Resp: 12 19 18   Temp:  97.9 °F (36.6 °C)  98.8 °F (37.1 °C)   TempSrc:  Oral     SpO2:  100% 98% 98%   Weight:       Height:           Medications Prior to Admission     Prior to Admission medications    Medication Sig Start Date End Date Taking?  Authorizing Provider   escitalopram (LEXAPRO) 5 MG tablet Take 1 tablet by mouth daily 8/15/22  Yes Historical Provider, MD   QUEtiapine (SEROQUEL) 100 MG tablet Take 1 tablet by mouth daily 8/15/22  Yes Historical Provider, MD   furosemide (LASIX) 20 MG tablet Take 1 tablet by mouth daily 8/19/22  Yes Historical Provider, MD   QUEtiapine (SEROQUEL) 50 MG tablet Take 1 tablet by mouth 2 times daily 8/18/22 2/14/23  Óscar Dick,    sertraline (ZOLOFT) 50 MG tablet Take 1 tablet by mouth daily 8/18/22   Óscar Dick DO   pantoprazole (PROTONIX) 20 MG tablet TAKE 1 TABLET BY MOUTH EVERY DAY 7/5/22   Óscar Dick DO   predniSONE (DELTASONE) 10 MG tablet TAKE 1 TABLET BY MOUTH EVERY DAY 6/3/22   Historical Provider, MD   furosemide (LASIX) 40 MG tablet Take 1 tablet by mouth three times a week Mondays, wedn, firdays  Patient taking differently: Take 40 mg by mouth three times a week Mondays, wedn, fridays 6/8/22 9/6/22  Adriane Houston DO   sodium bicarbonate 650 MG tablet Take 1 tablet by mouth daily 6/7/22 9/5/22  Adriane Houston DO   levothyroxine (SYNTHROID) 75 MCG tablet TAKE 1 TABLET BY MOUTH EVERY DAY 5/2/22   Óscar Dick DO   finasteride (PROSCAR) 5 MG tablet TAKE 1 TABLET BY MOUTH EVERY DAY 5/2/22   Óscar Dick DO   ELIQUIS 2.5 MG TABS tablet TAKE 1 TABLET BY MOUTH TWICE A DAY 3/11/22   Al Canela MD   tamsulosin [facility]) 0.4 MG capsule Take 2 capsules by mouth nightly 3/3/22   Adriane Houston DO   QUEtiapine (SEROQUEL) 25 MG tablet TAKE 1 TABLET BY MOUTH TWICE A DAY 2/28/22   Al Canela MD   sertraline (ZOLOFT) 50 MG tablet Take 50 mg by mouth daily    Historical Provider, MD   temazepam (RESTORIL) 7.5 MG capsule Take 7.5 mg by mouth nightly as needed for Sleep. Historical Provider, MD       Physical Exam: Need 8 Elements   General: Patient is a 80-year-old gentleman appears stated age, NAD  Eyes: EOMI  ENT: neck supple, hard of hearing  Cardiovascular: Tachycardia, sinus rhythm  Respiratory: Clear to auscultation  Gastrointestinal: Distended abdomen possibly from bagging  Genitourinary: no suprapubic tenderness  Musculoskeletal: No edema  Skin: warm, dry  Neuro: Alert, appears apprehensive, answered all the orientation questions, no lateralizing weakness  Psych: Mood appropriate.        Past Medical History:   PMHx   Past Medical History:   Diagnosis Date    Anemia     Anxiety     Arthritis     BPH with urinary obstruction Depression     GERD (gastroesophageal reflux disease)     Hemorrhoids     Hepatitis     Hernia of unspecified site of abdominal cavity without mention of obstruction or gangrene     Hyperlipidemia     Hypotension     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    Rt Tricep, Lt Superior Forearm     PSHX:  has a past surgical history that includes Neck surgery; Cataract removal; hernia repair; hiatal hernia repair (N/A, 3/4/2021); Cystoscopy (N/A, 3/29/2021); and Cystoscopy (N/A, 2021). Allergies: Allergies   Allergen Reactions    Minocycline Other (See Comments)     Fam HX: family history includes COPD in his father; Depression in his mother; Diabetes in his brother, maternal grandmother, and mother. Soc HX:   Social History     Socioeconomic History    Marital status:       Spouse name: None    Number of children: None    Years of education: None    Highest education level: None   Tobacco Use    Smoking status: Former     Packs/day: 1.00     Years: 5.00     Pack years: 5.00     Types: Cigarettes     Start date: 1950     Quit date: 1955     Years since quittin.8    Smokeless tobacco: Never   Substance and Sexual Activity    Alcohol use: Not Currently    Drug use: Not Currently     Social Determinants of Health     Financial Resource Strain: Low Risk     Difficulty of Paying Living Expenses: Not hard at all   Food Insecurity: No Food Insecurity    Worried About Running Out of Food in the Last Year: Never true    Ran Out of Food in the Last Year: Never true       Medications:   Medications:    magnesium sulfate  2,000 mg IntraVENous Once    metoprolol tartrate  25 mg Oral BID    sodium chloride  500 mL IntraVENous Once    cefepime  1,000 mg IntraVENous Q12H    [Held by provider] apixaban  2.5 mg Oral BID    escitalopram  5 mg Oral Daily    finasteride  5 mg Oral Daily    levothyroxine  75 mcg Oral Daily    pantoprazole  20 mg Oral Daily    predniSONE  10 mg Oral Daily    QUEtiapine  100 mg Oral BID    sertraline  50 mg Oral Daily    sodium bicarbonate  650 mg Oral Daily    tamsulosin  0.8 mg Oral Nightly    temazepam  15 mg Oral Nightly    aspirin  81 mg Oral Daily    [Held by provider] atorvastatin  40 mg Oral Nightly      Infusions:    lactated ringers      sodium chloride      sodium chloride       PRN Meds: sodium chloride flush, 5-40 mL, PRN  sodium chloride, , PRN  acetaminophen, 650 mg, Q6H PRN   Or  acetaminophen, 650 mg, Q6H PRN  polyethylene glycol, 17 g, Daily PRN  promethazine, 12.5 mg, Q6H PRN   Or  ondansetron, 4 mg, Q6H PRN  nitroGLYCERIN, 0.4 mg, Q5 Min PRN        Labs and Imaging   XR CHEST (2 VW)    Result Date: 8/21/2022  EXAMINATION: TWO XRAY VIEWS OF THE CHEST 8/21/2022 7:37 pm COMPARISON: Chest radiograph 07/22/2021. HISTORY: ORDERING SYSTEM PROVIDED HISTORY: Chest Pain TECHNOLOGIST PROVIDED HISTORY: Reason for exam:->Chest Pain Reason for Exam: Chest Pain; Shortness of Breath Additional signs and symptoms: Chest Pain; Shortness of Breath FINDINGS: The cardiomediastinal silhouette is unchanged. Bibasilar airspace opacities. No pneumothorax, vascular congestion, consolidation, or pleural effusion is identified. No acute osseous abnormality. Bibasilar airspace opacities compatible with atelectasis or pneumonia.        CBC:   Recent Labs     08/21/22 1850 08/22/22 0217   WBC 10.5 8.4   HGB 10.0* 8.9*    151     BMP:    Recent Labs     08/21/22 1850 08/22/22 0217    138   K 3.3* 3.4*    102   CO2 22 22   BUN 42* 42*   CREATININE 2.4* 2.5*   GLUCOSE 189* 138*     Hepatic:   Recent Labs     08/22/22 0217   *   *   BILITOT 1.7*   ALKPHOS 183*     Lipids:   Lab Results   Component Value Date/Time    CHOL 229 08/22/2022 02:17 AM    CHOL 157 11/19/2018 12:00 AM    HDL 69 08/22/2022 02:17 AM    TRIG 85 08/22/2022 02:17 AM     Hemoglobin A1C:   Lab Results   Component Value Date/Time    LABA1C 5.8 08/22/2022 02:17 AM     TSH:   Lab Results

## 2022-08-22 NOTE — CONSULTS
RENAL CONSULT AS HE NEEDS A PICC LINE  OFFICE PT  CREATININE 2.9, WORSE THAN USUAL  IF UNABLE TO PLACE AN IJ CENTRAL LINE THEN PLACE A PICC LINE

## 2022-08-22 NOTE — SIGNIFICANT EVENT
Rapid Response called around 12:30PM.  I immediately responded. Patient was in the bathroom and was urinating and having a bowel movement and was going in and out of conciousness. When I got to the room patient was in the chair and not responding. A pulse was checked just prior to transfer to the bed and a very weak and barely palpable pulse was noted. Patient was immediately transferred to the bed and chest compressions were started. Pads were immediately placed. 1mg of epinephrine was ordered and administered. About 20 seconds later patient was moving his arms and fighting the ambu bag. A pulse was checked and he had a strong pulse. After ROSC patient was not moving extremities or responding to stimuli or commands. Decision was made to intubate however just prior patient awoken and continued to respond to commands and kept stating that he wanted a pillow. Due to being neurologically intact no arctic sun was started. Patient was transferred to the ICU. Case was d/w Cardiology and Echo was without any acute pathology with preserved EF. Will monitor. Stat labs also ordered including blood cultures and given soft pressures will begin broad spectrum antibiotics for now. IVF was also ordered with small bolus and gentle maintance given his underlying CKD stage IV. CT H ordered and pending. Family was updated and currently awaiting call from patient's son. Prior to leaving ICU patient was back to his baseline mental status and doing well. He was alert and answering all questions appropriately. Addendum: I did speak with Efrem Graham who will be here around 4PM; answered all questions. Patient is to be a DNR CCA per son; order placed. Patient continues to do well at bedside; does not appear septic/toxic but given events with pending US and CXR will begin antibiotics; did d/w Pharmacy and given age and CKD will hold on Vancomycin. UA pending. Will continue IVF and if needed will begin pressors.

## 2022-08-22 NOTE — PROGRESS NOTES
CT unable to get a hold of the nurse to see if patient is able to come down for his CT scan.     [address] call at 1500 to see if pt ready to come down

## 2022-08-22 NOTE — FLOWSHEET NOTE
Pt to room 2103 more responsive, alert and oriented. Skin assesed this nurse and Evelyn nurse from [address] [place] [address] Rd . Scattered abrasions, very large skin tear to right anterior forearm. Dr. Patricia Paula at bedside, Loretta PA both assessed pt. Family reached by DR. Mora.

## 2022-08-22 NOTE — CONSULTS
[address], [address] [facility]                                  CONSULTATION    PATIENT NAME: Ángel Alexander                        :        10/18/1930  MED REC NO:   [id number]                          ROOM:       4007  ACCOUNT NO:   [de-identified]                           ADMIT DATE: 2022  PROVIDER:     Alisa Anton    CONSULT DATE:  2022    INDICATION:  Chest pain. HISTORY OF PRESENT ILLNESS:  The patient is a 44-year-old male who  presented to the hospital with onset of chest pain. Chest pain began  yesterday intermittently at rest and daughter called 911 at that time. He describes the chest pain as throbbing sensation at times. Denies any  shortness of breath, but he is very hard of hearing and poor historian. I tried to call family members, unfortunately unable to get a hold of  him at that time. Today, he complains of no chest pain, no shortness of  breath. Some palpitations and dizziness. On admission here, troponin  was at 0.027 and the next one was trending down to 0.022. EKG reviewed,  no ischemic changes were noted. His last echo was on 2021, at  that time left ventricular systolic function was normal, EF was 30%,  sclerotic non-stenotic aortic valve, trace aortic regurgitation, no  evidence of any PE at that time. PAST MEDICAL HISTORY:  Anemia, anxiety, arthritis, BPH, depression,  GERD, hemorrhoids, hepatitis, hernia, hypotension, hyperlipidemia,  squamous cell carcinoma, CKD and DVT. PAST SURGICAL HISTORY:  Cataracts removal, hernia repair, neck surgery. SOCIAL HISTORY:  He is a former smoker. He does not use any alcohol. He does not use any illicit drugs. ALLERGIES:  Allergic to MINOCYCLINE. HOME MEDICATIONS:  He is on prednisone, Lasix, sodium bicarb, Synthroid,  Proscar, Eliquis 2.5 mg, Flomax, Seroquel, Zoloft, Lexapro, Protonix.     REVIEW OF SYSTEMS:  A 10-point review of system is otherwise negative  unless noted above in the HPI. PHYSICAL EXAMINATION:  GENERAL:  The patient is awake, alert, answering questions  appropriately, however very hard of hearing, difficulty communicating  due to tachypnea at times. VITAL SIGNS:  The patient is afebrile at 98.7, respirations 19, pulse  112, blood pressure 145/73 at that time. He is saturating 100% on 2  liters nasal cannula. HEENT:  Head is normocephalic and atraumatic. Pupils are equal and  reactive to light. CHEST:  Chest is equal and expansive. LUNGS:  Lungs are clear to auscultation. No wheezes, rhonchi, or rales  noted. CARDIOVASCULAR:  Heart rate and rhythm regular. No clicks, gallops, or  murmurs noted. ABDOMEN:  Soft, nontender. Bowel sounds are present in all four  quadrants. No hepatosplenomegaly or guarding noted. EXTREMITIES:  No clubbing, cyanosis or edema noted. NEUROLOGICAL:  Cranial nerves II through XII are grossly intact. LABORATORY DATA:  Latest troponin 0.022. Potassium is at 3.4. Creatinine was at 2.5. Magnesium was 1.7. Hemoglobin at 8.9. DIAGNOSTIC DATA:  Chest x-ray showed bibasilar airspace opacities  compatible with atelectasis or pneumonia. IMPRESSION AND PLAN:  The patient is a 75-year-old male who presented to  the hospital with chest discomfort. Troponin mildly elevated, but  trending down. Likely elevated _____ CKD. There were no significant  changes on EKG. At this time, we will obtain an echo. We will continue  to trend troponin. We will start metoprolol. Optimize medical therapy. His chest pain has gone away. We will continue to just treat medically  at this time. This plan was discussed with Dr. Tran Hong.     Agree with above     Jeramie Shelton    D: 08/22/2022 8:42:36       T: 08/22/2022 13:32:47     [email or web address]  Job#: [id number]     Doc#: [id number]    CC:

## 2022-08-22 NOTE — PROGRESS NOTES
Hospitalist Progress Note      Name:  Ric Aguilar /Age/Sex: 10/18/1930  (80 y.o. male)   MRN & CSN:  [id number] & [id number] Admission Date/Time: 2022  6:43 PM   Location:  [address] [place] [address]- PCP: No primary care provider on file. Hospital Day: 2    Assessment and Plan:   Ric Aguilar is a 80 y.o.  male  who presents with Chest pain    Chest pain: Troponin slight rise in setting of CKD; EKG non-ischemic, Echo ordered, appreciate Cardiology recs and continue ASA; holding statin  Sinus Tachycardia:  this AM and started on Metoprolol per Cardiology, tele and monitor  Hypokalemia: 3.4 this AM, repeat in AM and replace as needed  Transaminitis- unknown etiology, no abdominal pain; repeat in AM, RUQ US ordered and acute hepatitis panel, statin held  Hyperbilirubinemia: Repeat in AM; plan as per above  Hypotension: Noted on AM vitals but was not notified, repeated at bedside 94/54, patient asymptomatic with no complaints and was resting comfortably just awakening from a nap so will monitor, hold parameters placed on Metoprolol  Hx Recurrent DVT of RLE: Continue Eliquis  Hx Multifactorial Anemia: Hgb 8.9 but all cell lines dropped overnight, repeat in AM; no suspected source of clinical bleeding  Hx Bullous Pemphigoid: Continue daily steroids; follows with Dermatology as outpatient  Renal Cyst: Being followed by Nephrology as OP  Chronic bilateral leg edema: Being followed by Nephrology, continue daily PO Lasix  Hx CKD Stage IV: Cr stable at 2.5, monitor, continue supplemental sodium bicarb  Hx BPH: Contniue Tamsulosin and Proscar  Hx Peripheral Neuropathy  Hx Hypothyroidism: Levothyroxine resumed  Hx Anxiety/Depression: continue Zoloft, Seroquel, Lexapro  Hx SCC Skin Cancer: Follows OP with Heme/Onc  Hx Insomnia: Continue home Temazepam    Diet ADULT DIET; Regular; 5 carb choices (75 gm/meal);  No Caffeine   DVT Prophylaxis [] Lovenox, []  Heparin, [] SCDs, [] Ambulation   GI Prophylaxis [] PPI,  [] H2 Blocker,  [] Carafate,  [] Diet/Tube Feeds   Code Status Full Code   Disposition Patient requires continued admission due to chest pain work up and tachycardia   MDM [] Low, [] Moderate,[]  High  Patient's risk as above due to      History of Present Illness:     Denies any chest pains or SOB this AM; denies any fevers or chills    Objective: Intake/Output Summary (Last 24 hours) at 8/22/2022 1021  Last data filed at 8/22/2022 0129  Gross per 24 hour   Intake --   Output 300 ml   Net -300 ml      Vitals:   Vitals:    08/22/22 0900   BP: (!) 88/54   Pulse:    Resp: 18   Temp: 98.8 °F (37.1 °C)   SpO2: 98%     Physical Exam:   GEN Awake male, sitting upright in bed in no apparent distress. Appears given age. EYES Pupils are equally round. No scleral erythema, discharge, or conjunctivitis. NECK Supple, no apparent thyromegaly or masses. RESP Clear to auscultation, no wheezes, rales or rhonchi. Symmetric chest movement while on room air (NC on patient but at 0L)  CARDIO/VASC S1/S2 auscultated. Regular rate without appreciable murmurs, rubs, or gallops. HEME/LYMPH No petechiae or ecchymoses.   PSYCH Awake, alert, oriented     Medications:   Medications:    potassium chloride  10 mEq IntraVENous Q1H    magnesium sulfate  2,000 mg IntraVENous Once    metoprolol tartrate  25 mg Oral BID    apixaban  2.5 mg Oral BID    escitalopram  5 mg Oral Daily    finasteride  5 mg Oral Daily    furosemide  20 mg Oral Daily    levothyroxine  75 mcg Oral Daily    pantoprazole  20 mg Oral Daily    predniSONE  10 mg Oral Daily    QUEtiapine  100 mg Oral BID    sertraline  50 mg Oral Daily    sodium bicarbonate  650 mg Oral Daily    tamsulosin  0.8 mg Oral Nightly    temazepam  15 mg Oral Nightly    aspirin  81 mg Oral Daily    atorvastatin  40 mg Oral Nightly      Infusions:    sodium chloride       PRN Meds: sodium chloride flush, 5-40 mL, PRN  sodium chloride, , PRN  acetaminophen, 650 mg, Q6H PRN   Or  acetaminophen, 650 mg, Q6H PRN  polyethylene glycol, 17 g, Daily PRN  promethazine, 12.5 mg, Q6H PRN   Or  ondansetron, 4 mg, Q6H PRN  nitroGLYCERIN, 0.4 mg, Q5 Min PRN          Electronically signed by Eldon Ko MD on 8/22/2022 at 10:21 AM

## 2022-08-23 ENCOUNTER — APPOINTMENT (OUTPATIENT)
Dept: CT IMAGING | Age: 87
DRG: 205 | End: 2022-08-23
Payer: MEDICARE

## 2022-08-23 ENCOUNTER — APPOINTMENT (OUTPATIENT)
Dept: GENERAL RADIOLOGY | Age: 87
DRG: 205 | End: 2022-08-23
Payer: MEDICARE

## 2022-08-23 LAB
ALBUMIN SERPL-MCNC: 2.9 GM/DL (ref 3.4–5)
ALP BLD-CCNC: 205 IU/L (ref 40–129)
ALT SERPL-CCNC: 339 U/L (ref 10–40)
ANION GAP SERPL CALCULATED.3IONS-SCNC: 11 MMOL/L (ref 4–16)
AST SERPL-CCNC: 260 IU/L (ref 15–37)
BILIRUB SERPL-MCNC: 1.1 MG/DL (ref 0–1)
BILIRUBIN DIRECT: 1 MG/DL (ref 0–0.3)
BILIRUBIN, INDIRECT: 0.1 MG/DL (ref 0–0.7)
BUN BLDV-MCNC: 48 MG/DL (ref 6–23)
CALCIUM SERPL-MCNC: 7.9 MG/DL (ref 8.3–10.6)
CHLORIDE BLD-SCNC: 103 MMOL/L (ref 99–110)
CO2: 24 MMOL/L (ref 21–32)
CREAT SERPL-MCNC: 2.9 MG/DL (ref 0.9–1.3)
GFR AFRICAN AMERICAN: 25 ML/MIN/1.73M2
GFR NON-AFRICAN AMERICAN: 21 ML/MIN/1.73M2
GLUCOSE BLD-MCNC: 134 MG/DL (ref 70–99)
HCT VFR BLD CALC: 24.8 % (ref 42–52)
HEMOGLOBIN: 7.9 GM/DL (ref 13.5–18)
LACTATE: 0.4 MMOL/L (ref 0.4–2)
MCH RBC QN AUTO: 31.5 PG (ref 27–31)
MCHC RBC AUTO-ENTMCNC: 31.9 % (ref 32–36)
MCV RBC AUTO: 98.8 FL (ref 78–100)
PDW BLD-RTO: 15.2 % (ref 11.7–14.9)
PLATELET # BLD: 146 K/CU MM (ref 140–440)
PMV BLD AUTO: 10 FL (ref 7.5–11.1)
POTASSIUM SERPL-SCNC: 3.8 MMOL/L (ref 3.5–5.1)
PROCALCITONIN: 1.6
RBC # BLD: 2.51 M/CU MM (ref 4.6–6.2)
SODIUM BLD-SCNC: 138 MMOL/L (ref 135–145)
TOTAL PROTEIN: 4.6 GM/DL (ref 6.4–8.2)
WBC # BLD: 9.3 K/CU MM (ref 4–10.5)

## 2022-08-23 PROCEDURE — 83605 ASSAY OF LACTIC ACID: CPT

## 2022-08-23 PROCEDURE — 2580000003 HC RX 258: Performed by: INTERNAL MEDICINE

## 2022-08-23 PROCEDURE — 84145 PROCALCITONIN (PCT): CPT

## 2022-08-23 PROCEDURE — 94761 N-INVAS EAR/PLS OXIMETRY MLT: CPT

## 2022-08-23 PROCEDURE — 82248 BILIRUBIN DIRECT: CPT

## 2022-08-23 PROCEDURE — 6370000000 HC RX 637 (ALT 250 FOR IP): Performed by: INTERNAL MEDICINE

## 2022-08-23 PROCEDURE — 80053 COMPREHEN METABOLIC PANEL: CPT

## 2022-08-23 PROCEDURE — 6370000000 HC RX 637 (ALT 250 FOR IP): Performed by: NURSE PRACTITIONER

## 2022-08-23 PROCEDURE — 2580000003 HC RX 258: Performed by: PHYSICIAN ASSISTANT

## 2022-08-23 PROCEDURE — 71045 X-RAY EXAM CHEST 1 VIEW: CPT

## 2022-08-23 PROCEDURE — 85027 COMPLETE CBC AUTOMATED: CPT

## 2022-08-23 PROCEDURE — G0378 HOSPITAL OBSERVATION PER HR: HCPCS

## 2022-08-23 PROCEDURE — 2700000000 HC OXYGEN THERAPY PER DAY

## 2022-08-23 PROCEDURE — 96366 THER/PROPH/DIAG IV INF ADDON: CPT

## 2022-08-23 PROCEDURE — 6360000002 HC RX W HCPCS: Performed by: INTERNAL MEDICINE

## 2022-08-23 PROCEDURE — 70450 CT HEAD/BRAIN W/O DYE: CPT

## 2022-08-23 PROCEDURE — 96367 TX/PROPH/DG ADDL SEQ IV INF: CPT

## 2022-08-23 RX ADMIN — ASPIRIN 81 MG CHEWABLE TABLET 81 MG: 81 TABLET CHEWABLE at 08:17

## 2022-08-23 RX ADMIN — METOPROLOL TARTRATE 25 MG: 25 TABLET, FILM COATED ORAL at 08:16

## 2022-08-23 RX ADMIN — SODIUM CHLORIDE, PRESERVATIVE FREE 10 ML: 5 INJECTION INTRAVENOUS at 08:17

## 2022-08-23 RX ADMIN — TAMSULOSIN HYDROCHLORIDE 0.8 MG: 0.4 CAPSULE ORAL at 20:34

## 2022-08-23 RX ADMIN — SODIUM BICARBONATE 650 MG: 650 TABLET ORAL at 08:18

## 2022-08-23 RX ADMIN — PANTOPRAZOLE 20 MG: 20 TABLET, DELAYED RELEASE ORAL at 06:08

## 2022-08-23 RX ADMIN — PIPERACILLIN AND TAZOBACTAM 4500 MG: 4; .5 INJECTION, POWDER, FOR SOLUTION INTRAVENOUS; PARENTERAL at 17:06

## 2022-08-23 RX ADMIN — TEMAZEPAM 15 MG: 7.5 CAPSULE ORAL at 20:34

## 2022-08-23 RX ADMIN — SERTRALINE HYDROCHLORIDE 50 MG: 50 TABLET ORAL at 08:16

## 2022-08-23 RX ADMIN — FINASTERIDE 5 MG: 5 TABLET, FILM COATED ORAL at 08:16

## 2022-08-23 RX ADMIN — CEFEPIME HYDROCHLORIDE 1000 MG: 1 INJECTION, POWDER, FOR SOLUTION INTRAMUSCULAR; INTRAVENOUS at 14:34

## 2022-08-23 RX ADMIN — SODIUM CHLORIDE, PRESERVATIVE FREE 10 ML: 5 INJECTION INTRAVENOUS at 20:33

## 2022-08-23 RX ADMIN — LEVOTHYROXINE SODIUM 75 MCG: 0.07 TABLET ORAL at 06:08

## 2022-08-23 RX ADMIN — QUETIAPINE FUMARATE 100 MG: 100 TABLET ORAL at 08:17

## 2022-08-23 RX ADMIN — PREDNISONE 10 MG: 10 TABLET ORAL at 08:16

## 2022-08-23 RX ADMIN — QUETIAPINE FUMARATE 100 MG: 100 TABLET ORAL at 20:34

## 2022-08-23 RX ADMIN — ESCITALOPRAM OXALATE 5 MG: 10 TABLET ORAL at 08:17

## 2022-08-23 ASSESSMENT — ENCOUNTER SYMPTOMS
EYES NEGATIVE: 1
ALLERGIC/IMMUNOLOGIC NEGATIVE: 1
RESPIRATORY NEGATIVE: 1
GASTROINTESTINAL NEGATIVE: 1

## 2022-08-23 ASSESSMENT — PAIN SCALES - GENERAL
PAINLEVEL_OUTOF10: 0

## 2022-08-23 NOTE — PROGRESS NOTES
Patient's son/DENY Banks called. Updated son on patient's condition. Stated he will bring in patient's living will today to clarify DNR status/what patient would want and not want.

## 2022-08-23 NOTE — PROGRESS NOTES
Central Line    Date/Time: 8/22/2022 10:28 PM  Performed by: Lisbeth Dowell MD  Authorized by: Lisbeth Dowell MD   Consent: Verbal consent obtained. Risks and benefits: risks, benefits and alternatives were discussed  Consent given by: patient  Patient understanding: patient states understanding of the procedure being performed  Patient consent: the patient's understanding of the procedure matches consent given  Procedure consent: procedure consent matches procedure scheduled  Relevant documents: relevant documents present and verified  Test results: test results available and properly labeled  Site marked: the operative site was marked  Imaging studies: imaging studies available  Patient identity confirmed: verbally with patient and hospital-assigned identification number  Time out: Immediately prior to procedure a \"time out\" was called to verify the correct patient, procedure, equipment, support staff and site/side marked as required.   Indications: vascular access  Anesthesia: local infiltration    Anesthesia:  Local Anesthetic: lidocaine 1% without epinephrine    Sedation:  Patient sedated: yes  Sedatives: see MAR for details  Analgesia: hydromorphone    Preparation: skin prepped with 2% chlorhexidine  Skin prep agent dried: skin prep agent completely dried prior to procedure  Sterile barriers: all five maximum sterile barriers used - cap, mask, sterile gown, sterile gloves, and large sterile sheet  Hand hygiene: hand hygiene performed prior to central venous catheter insertion  Location details: right internal jugular  Patient position: Trendelenburg  Catheter type: triple lumen  Catheter size: 8 Fr  Pre-procedure: landmarks identified  Ultrasound guidance: yes  Sterile ultrasound techniques: sterile gel and sterile probe covers were used  Number of attempts: 1  Successful placement: yes  Post-procedure: line sutured  Assessment: blood return through all ports, placement verified by x-ray and no pneumothorax on x-ray  Patient tolerance: patient tolerated the procedure well with no immediate complications  Comments: Guidewire confirmed in the vein before dilating

## 2022-08-23 NOTE — PROGRESS NOTES
Hospitalist Progress Note      Name:  Vivian Couch /Age/Sex: 10/18/1930  (80 y.o. male)   MRN & CSN:  [id number] & [id number] Admission Date/Time: 2022  6:43 PM   Location:  -A PCP: No primary care provider on file. Hospital Day: 3    Assessment and Plan:   Vivian Couch is a 80 y.o.  male  who presents with Chest pain    S/p PEA with CPR and ROSC. No apparent cause. CT head negative. patient does not remember the event . currently in sinus in telemetry. EKG did not show any ischemic changes. Echo showed preserved EF. Plan to continue Lopressor at this time. Patient had initially presented with chest pain. Elevated troponin in the setting of CKD. Cardiology was consulted. Unlikely ACS. Will keep monitoring in telemetry. Lactic acidosis was present likely postcode. Repeat order waiting on new labs. We will continue IV hydration. Hypokalemia: Resolved    Transaminitis- unknown etiology, no abdominal pain; improving. Statin held. US abdomen showed cholelithiasis but no signs of cholecystitis    Hyperbilirubinemia: Trending down    Hx Recurrent DVT of RLE: Continue Eliquis      Other co-morbidities     Hx Multifactorial Anemia: Hgb 8.9 but all cell lines dropped overnight, repeat in AM; no suspected source of clinical bleeding  Hx Bullous Pemphigoid: Continue daily steroids; follows with Dermatology as outpatient  Renal Cyst: Being followed by Nephrology as OP  Chronic bilateral leg edema: Being followed by Nephrology, continue daily PO Lasix  Hx CKD Stage IV: Cr stable at 2.5, monitor, continue supplemental sodium bicarb  Hx BPH: Contniue Tamsulosin and Proscar  Hx Peripheral Neuropathy  Hx Hypothyroidism: Levothyroxine resumed  Hx Anxiety/Depression: continue Zoloft, Seroquel, Lexapro  Hx SCC Skin Cancer: Follows OP with Heme/Onc  Hx Insomnia: Continue home Temazepam    Diet ADULT DIET; Regular; 5 carb choices (75 gm/meal);  No Caffeine   DVT Prophylaxis [] Lovenox, [] Heparin, [] SCDs, [] Ambulation   GI Prophylaxis [] PPI,  [] H2 Blocker,  [] Carafate,  [] Diet/Tube Feeds   Code Status DNR-CC   Disposition Patient requires continued admission due to status post cardiac arrest   MDM [] Low, [] Moderate,[x]  High  Patient's risk as above due to cardiac arrest recently     History of Present Illness:     Chief Complaint: Chest pain  Edwin Melgoza is a 80 y.o.  male  who presents with chest pain     Patient denies any complaints. He is slim slightly sleepy and reports he is just tired. Able to recall the year but does not know the exact date. Ten point ROS reviewed negative, unless as noted above    Objective: Intake/Output Summary (Last 24 hours) at 8/23/2022 1949  Last data filed at 8/23/2022 1355  Gross per 24 hour   Intake --   Output 975 ml   Net -975 ml      Vitals:   Vitals:    08/23/22 1930   BP: (!) 110/58   Pulse: 67   Resp: 15   Temp: 98.2 °F (36.8 °C)   SpO2: 100%     Physical Exam:   GEN Awake male, sitting upright in bed in no apparent distress. Appears given age. EYES Pupils are equally round. No scleral erythema, discharge, or conjunctivitis. HENT Mucous membranes are moist. Oral pharynx without exudates, no evidence of thrush. NECK Supple, no apparent thyromegaly or masses. RESP Clear to auscultation, no wheezes, rales or rhonchi. Symmetric chest movement while on room air. CARDIO/VASC S1/S2 auscultated. Regular rate without appreciable murmurs, rubs, or gallops. No JVD or carotid bruits. Peripheral pulses equal bilaterally and palpable. No peripheral edema. GI Abdomen is soft without significant tenderness, masses, or guarding. Bowel sounds are normoactive. Rectal exam deferred.  No costovertebral angle tenderness. Normal appearing external genitalia. Laureano catheter is not present. HEME/LYMPH No palpable cervical lymphadenopathy and no hepatosplenomegaly. No petechiae or ecchymoses. MSK No gross joint deformities.   SKIN Normal coloration, warm, dry. NEURO Cranial nerves appear grossly intact, normal speech, no lateralizing weakness. PSYCH Awake, alert, oriented x 4. Affect appropriate. Medications:   Medications:    piperacillin-tazobactam  4,500 mg IntraVENous Q12H    metoprolol tartrate  25 mg Oral BID    lidocaine  5 mL IntraDERmal Once    sodium chloride flush  5-40 mL IntraVENous 2 times per day    [Held by provider] apixaban  2.5 mg Oral BID    escitalopram  5 mg Oral Daily    finasteride  5 mg Oral Daily    levothyroxine  75 mcg Oral Daily    pantoprazole  20 mg Oral Daily    predniSONE  10 mg Oral Daily    QUEtiapine  100 mg Oral BID    sertraline  50 mg Oral Daily    sodium bicarbonate  650 mg Oral Daily    tamsulosin  0.8 mg Oral Nightly    temazepam  15 mg Oral Nightly    aspirin  81 mg Oral Daily    [Held by provider] atorvastatin  40 mg Oral Nightly      Infusions:    lactated ringers 50 mL/hr at 08/22/22 1645    sodium chloride      sodium chloride       PRN Meds: sodium chloride flush, 5-40 mL, PRN  sodium chloride, , PRN  sodium chloride flush, 5-40 mL, PRN  sodium chloride, , PRN  acetaminophen, 650 mg, Q6H PRN   Or  acetaminophen, 650 mg, Q6H PRN  polyethylene glycol, 17 g, Daily PRN  promethazine, 12.5 mg, Q6H PRN   Or  ondansetron, 4 mg, Q6H PRN  nitroGLYCERIN, 0.4 mg, Q5 Min PRN          Patient is still admitted because c. The anticipated discharge is in greater than 24 hours.      Electronically signed by Argenis Jin MD on 8/23/2022 at 7:49 PM

## 2022-08-23 NOTE — CONSULTS
Nephrology Service Consultation      [address], [address] [address]  Phone: [phone]  Office Hours: 8:30AM - 4:30PM  Monday - Friday              MDM//Assessment and Recommendations     KAVON  CKD4  METABOLIC ACIdosis  Hypokalemia  DVT in legs  Syncope/hypotension  Anemia  Transaminitis     Plan:  If picc line needed then proceed  Continue IVF  Hold home dose of lasix  Avoid nephrotoxins  Will follow    Thank you    Patient Active Problem List    Diagnosis Date Noted    Chest pain 08/21/2022    Agitation due to dementia ([facility] 08/18/2022    Varicose 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 08/15/2015    Skin ulcer, limited to breakdown of skin ([facility] 02/21/2022    Current moderate episode of major depressive disorder, unspecified whether recurrent ([facility] 02/21/2022    Chronic kidney disease, stage IV (severe) ([facility] 02/21/2022    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 ([facility] 02/21/2022    Leukocytosis 09/08/2021    Thrombocytopenia, unspecified 08/31/2021    Bandemia 08/10/2021    Atelectasis 08/05/2021    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         Patient:  Dami Blairgood  MRN: [id number]  Consulting physician:  Catia Mart, *  Reason for Consult: can pt have a picc line    PCP: No primary care provider on file. HISTORY OF PRESENT ILLNESS:   The patient is a 80 y.o. male with CKD4, presented due to chest pain.   He had an episode of syncope that is thought to be vasovagal.  He was found to he hypotensive transiently. Renal consult for picc. He is an office pt and his cr is 2.9 today. He is on IVF  He is hard of hearing      REVIEW OF SYSTEMS:  Can not obtain due to hearing difficulties    Past Medical History:        Diagnosis Date    Anemia     Anxiety     Arthritis     BPH with urinary obstruction     Depression     GERD (gastroesophageal reflux disease)     Hemorrhoids     Hepatitis     Hernia of unspecified site of abdominal cavity without mention of obstruction or gangrene     Hyperlipidemia     Hypotension     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    Rt Tricep, Lt Superior Forearm       Past Surgical History:        Procedure Laterality Date    CATARACT REMOVAL      CYSTOSCOPY N/A 3/29/2021    CYSTOSCOPY EVACUATION OF CLOTS, BLADDER FULGERATION, AND SUPRA-PUBIC CATHETER INSERTION performed by Kylah Perez MD at [place] [address] N/A 4/1/2021    CYSTOSCOPY, PROSTATE FULGURATION, EVACUATION OF CLOTS & TURP performed by Cherise Suggs MD at [address] [place] [address] N/A 3/4/2021    LAPAROSCOPIC LARGE HERNIA HIATAL REPAIR WITH GASTRIC OBSTRUCTION POSS OPEN performed by Jonn Hawthorne MD at [address] [place] [address]         Medications:   Prior to Admission medications    Medication Sig Start Date End Date Taking?  Authorizing Provider   escitalopram (LEXAPRO) 5 MG tablet Take 1 tablet by mouth daily 8/15/22  Yes Historical Provider, MD   QUEtiapine (SEROQUEL) 100 MG tablet Take 1 tablet by mouth daily 8/15/22  Yes Historical Provider, MD   furosemide (LASIX) 20 MG tablet Take 1 tablet by mouth daily 8/19/22  Yes Historical Provider, MD   QUEtiapine (SEROQUEL) 50 MG tablet Take 1 tablet by mouth 2 times daily 8/18/22 2/14/23  Óscar Dick, DO   sertraline (ZOLOFT) 50 MG tablet Take 1 tablet by mouth daily 8/18/22   Óscar Dick, DO   pantoprazole (PROTONIX) 20 MG tablet TAKE 1 TABLET BY MOUTH EVERY DAY 7/5/22   Óscar Dick, DO   predniSONE normocephalic, atraumatic  Neck: supple, trachea midline  Lungs:  breathing comfortably on nc  Heart[de-identified] regular rate and rhythm,   Abdomen: non distended  Extremities: extremities normal, atraumatic, no cyanosis or edema  Neurologic: drowsy, [place]  Psychiatric: mood and affect can not be assessed     CBC:   Recent Labs     08/22/22 0217 08/22/22  1432 08/23/22  0420   WBC 8.4 12.8* 9.3   HGB 8.9* 8.4* 7.9*    142 146     BMP:    Recent Labs     08/22/22 0217 08/22/22  1432 08/23/22  0420    137 138   K 3.4* 3.9 3.8    102 103   CO2 22 18* 24   BUN 42* 47* 48*   CREATININE 2.5* 2.9* 2.9*   GLUCOSE 138* 172* 134*     Hepatic:   Recent Labs     08/22/22 0217 08/22/22  1432 08/23/22  0420   * 487* 260*   * 438* 339*   BILITOT 1.7* 2.1* 1.1*   ALKPHOS 183* 232* 205*     Troponin: No results for input(s): TROPONINI in the last 72 hours. BNP: No results for input(s): BNP in the last 72 hours.   I/O last 3 completed shifts:  In: -   Out: 825 [Urine:825]         Electronically signed by Darling Huff DO on 8/23/2022 at 7:38 AM    MD Isabell Mejia DO [address],  [facility], [place] [address]  PHONE: [phone]  FAX: [phone]

## 2022-08-23 NOTE — PROGRESS NOTES
RENAL DOSE ADJUSTMENT MADE PER P/T PROTOCOL    PREVIOUS ORDER:  Zosyn 4.5gm Q8h    Estimated Creatinine Clearance: 18 mL/min (A) (based on SCr of 2.9 mg/dL (H)).   Recent Labs     08/22/22  0217 08/22/22  1203 08/22/22  1432 08/23/22  0420   BUN 42*  --  47* 48*   CREATININE 2.5*  --  2.9* 2.9*     --  142 146   INR  --  1.16  --   --      NEW RENALLY ADJUSTED ORDER:  Zosyn 4.5gm Q12h per P&T protocol    Woodrow Domínguez [facility]  8/23/2022 3:34 PM

## 2022-08-23 NOTE — PROGRESS NOTES
Notified MD Alberto about patient's bloody urine. MD yanez and saw blood clots in borden tubing.  No new orders

## 2022-08-23 NOTE — CARE COORDINATION
Patient received on 8/22 from  after RR. Patient admitted 8/21 for Chest Pain. He is from home. He has a PCP and insurance that assists with Rx when needed. Will revisit when family is present and able to assist with assessment.  Lu Wooten RN

## 2022-08-23 NOTE — PROGRESS NOTES
Daily Progress Note  Subjective:  Awake and alert; feeling fair  In ICU after event yesterday; syncope during going the bathroom yesterday  BP and HR stable; NSR on tele  Transfer out of ICU today  Check mag    Attending Note:  More awake alert  Did not remember what happened yesterday  Vaso-vagal syncope -PEA needed CPR  Now remain in sinus rate is stable  Elevated LFT possible due to gall bladder issue--improving   Chronic DVT in left leg below knee not sure if needed AC vs ASA only   Anemia   Chronic renal dx   CTA head negative   Supportive care   Suggest surgery consult for gall bladder issues     US of legs  Impression:        Persistent partially occlusive thrombus in the right peroneal and posterior   tibial veins. No evidence of left lower extremity DVT. US of gallbladder   Impression:        Cholelithiasis with gallbladder wall thickening suspicious for   cholelithiasis. Sonographic White Stair sign was not well assessed as the patient   was medicated for the examination. Nuclear medicine hepatobiliary scan may   be useful for further characterization as these findings can be seen in the   setting of acute or chronic cholecystitis. Impression and Plan:   Chest pain    Having some discomfort at home; however no new reports    Troponin mildly elevated; likely in setting of CKD    EKG showed no new ischemia    Echo showed preserved EF    Continue lopressor at this time       Syncope    After going to restroom    Likely vasovagal syncope    Denies any dizziness or lightheadedness today    CT head negative      Anemia    Hgb 7.9 today    Hold asa and eliquis    DVT's; Normally on Eliquis;for now on hold for anemia    LE negative for DVT's    CKD; Renal following    Will cont' to follow    Most Recent Echo   8/22/2022 Summary   Left ventricular systolic function is hyperdynamic. Ejection fraction is visually estimated at >50%. Mild left ventricular hypertrophy.    Sclerotic, but non-stenotic aortic valve. Mild to moderate tricuspid regurgitation; RVSP: 37 mmHg. No evidence of any pericardial effusion. Pericardial fat pad visualized. Radiology  CT head 8/23/2022  Impression   No evidence of intracranial hemorrhage or any other definable acute   intracranial abnormality. No interval change versus CT scan in 2017. PAST MEDICAL HISTORY:  Anemia, anxiety, arthritis, BPH, depression,  GERD, hemorrhoids, hepatitis, hernia, hypotension, hyperlipidemia,  squamous cell carcinoma, CKD and DVT. PAST SURGICAL HISTORY:  Cataracts removal, hernia repair, neck surgery. SOCIAL HISTORY:  He is a former smoker. He does not use any alcohol. He does not use any illicit drugs. ALLERGIES:  Allergic to MINOCYCLINE. HOME MEDICATIONS:  He is on prednisone, Lasix, sodium bicarb, Synthroid,  Proscar, Eliquis 2.5 mg, Flomax, Seroquel, Zoloft, Lexapro, Protonix.      Objective:   /72   Pulse 83   Temp 97.3 °F (36.3 °C) (Oral)   Resp 17   Ht 6' (1.829 m)   Wt 188 lb (85.3 kg)   SpO2 100%   BMI 25.50 kg/m²     Intake/Output Summary (Last 24 hours) at 8/23/2022 1137  Last data filed at 8/23/2022 0545  Gross per 24 hour   Intake --   Output 525 ml   Net -525 ml       Medications:   Scheduled Meds:   metoprolol tartrate  25 mg Oral BID    cefepime  1,000 mg IntraVENous Q24H    lidocaine  5 mL IntraDERmal Once    sodium chloride flush  5-40 mL IntraVENous 2 times per day    [Held by provider] apixaban  2.5 mg Oral BID    escitalopram  5 mg Oral Daily    finasteride  5 mg Oral Daily    levothyroxine  75 mcg Oral Daily    pantoprazole  20 mg Oral Daily    predniSONE  10 mg Oral Daily    QUEtiapine  100 mg Oral BID    sertraline  50 mg Oral Daily    sodium bicarbonate  650 mg Oral Daily    tamsulosin  0.8 mg Oral Nightly    temazepam  15 mg Oral Nightly    aspirin  81 mg Oral Daily    [Held by provider] atorvastatin  40 mg Oral Nightly      Infusions:   lactated ringers 50 mL/hr at 08/22/22 1645    sodium chloride      sodium chloride        PRN Meds:  sodium chloride flush, sodium chloride, sodium chloride flush, sodium chloride, acetaminophen **OR** acetaminophen, polyethylene glycol, promethazine **OR** ondansetron, nitroGLYCERIN     Physical Exam:  Vitals:    08/23/22 0800   BP: 122/72   Pulse: 83   Resp: 17   Temp: 97.3 °F (36.3 °C)   SpO2: 100%        General: [place]  Chest: Nontender  Cardiac: First and Second Heart Sounds are Normal, No Murmurs or Gallops noted  Lungs:Clear to auscultation and percussion. Abdomen: Soft, NT, ND, +BS  Extremities: No clubbing, no edema  Vascular:  Equal 2+ peripheral pulses. Lab Data:  CBC:   Recent Labs     08/22/22 0217 08/22/22  1432 08/23/22  0420   WBC 8.4 12.8* 9.3   HGB 8.9* 8.4* 7.9*   HCT 28.0* 26.3* 24.8*   MCV 98.2 99.2 98.8    142 146     BMP:   Recent Labs     08/22/22 0217 08/22/22  1432 08/23/22  0420    137 138   K 3.4* 3.9 3.8    102 103   CO2 22 18* 24   BUN 42* 47* 48*   CREATININE 2.5* 2.9* 2.9*     LIVER PROFILE:   Recent Labs     08/22/22 0217 08/22/22  1432 08/23/22  0420   * 487* 260*   * 438* 339*   BILIDIR  --   --  1.0*   BILITOT 1.7* 2.1* 1.1*   ALKPHOS 183* 232* 205*     PT/INR:   Recent Labs     08/22/22  1203   PROTIME 15.0*   INR 1.16     APTT:   Recent Labs     08/22/22  1203   APTT 31.3     BNP:  No results for input(s): BNP in the last 72 hours.       Assessment:  Patient Active Problem List    Diagnosis Date Noted    Chest pain 08/21/2022    Agitation due to dementia [facility]) 08/18/2022    Varicose 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 08/15/2015    Skin ulcer, limited to breakdown of skin ([address] 02/21/2022    Current moderate episode of major depressive disorder, unspecified whether recurrent ([facility] 02/21/2022    Chronic kidney disease, stage IV (severe) ([facility] 02/21/2022    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 ([facility] 02/21/2022    Leukocytosis 09/08/2021 Thrombocytopenia, unspecified 08/31/2021    Bandemia 08/10/2021    Atelectasis 08/05/2021    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       Electronically signed by ROSEMARY Clark CNP on 8/23/2022 at 11:37 AM     Electronically signed by Vik Westbrook MD on 8/23/22 at 11:56 AM EDT

## 2022-08-23 NOTE — PROGRESS NOTES
Critical care progress Note 8/23/2022        Jaren Schmitz  10/18/1930  [id number]      Assessment/Plan:    Syncope, suspect vasovagal event as etiology of the same  Transient hypotension secondary to above  CKD stage IV  Electrolyte  imbalance  Hypothyroidism  History DVT    An overall improvement of respiratory clinical hemodynamic status, the patient can transition out of critical care unit setting today. Management reviewed during critical care rounds. Nothing further to add call if you have any questions. Subjective:    No new issues reported overnight. The patient denied any specific complaints this morning, difficult to discern information in light of severe hearing disturbance. Review of Systems   Constitutional:  Positive for fatigue. HENT: Negative. Eyes: Negative. Respiratory: Negative. Cardiovascular: Negative. Gastrointestinal: Negative. Endocrine: Negative. Genitourinary: Negative. Musculoskeletal: Negative. Skin: Negative. Allergic/Immunologic: Negative. Neurological: Negative. Hematological: Negative. Psychiatric/Behavioral: Negative. Physical Exam:           /72   Pulse 83   Temp 97.3 °F (36.3 °C) (Oral)   Resp 17   Ht 6' (1.829 m)   Wt 188 lb (85.3 kg)   SpO2 100%   BMI 25.50 kg/m²       General: Elderly male, awake alert no distress vitals reviewed  Eyes: Pulls, motor intact  ENT: neck supple  Cardiovascular: Regular rate, systolic murmur. Respiratory: Clear to auscultation  Gastrointestinal: Soft, non tender  Genitourinary: no suprapubic tenderness  Musculoskeletal: Osteoarthritic changes of the hands, warm to palpation intact pulses  Skin: warm, dry  Neuro: Alert, appropriately interactive, no obvious focal deficits. Sensory neuronal hearing loss noted. Psych: Mood appropriate for circumstances.     Current Medications:   metoprolol tartrate  25 mg Oral BID    cefepime  1,000 mg IntraVENous Q24H    lidocaine  5 mL IntraDERmal Once    sodium chloride flush  5-40 mL IntraVENous 2 times per day    [Held by provider] apixaban  2.5 mg Oral BID    escitalopram  5 mg Oral Daily    finasteride  5 mg Oral Daily    levothyroxine  75 mcg Oral Daily    pantoprazole  20 mg Oral Daily    predniSONE  10 mg Oral Daily    QUEtiapine  100 mg Oral BID    sertraline  50 mg Oral Daily    sodium bicarbonate  650 mg Oral Daily    tamsulosin  0.8 mg Oral Nightly    temazepam  15 mg Oral Nightly    aspirin  81 mg Oral Daily    [Held by provider] atorvastatin  40 mg Oral Nightly       Labs, Imaging and Studies reviewed:    Echocardiogram complete 2D with doppler with color    Result Date: 8/22/2022  Transthoracic Echocardiography Report (TTE)  Demographics   Patient Name       Isaias Arevalo       Date of Study       08/22/2022   Date of Birth      10/18/1930        Gender              Male   Age                80 year(s)        Race                   Patient Number     [id number]        Room Number         2103   Visit Number       [id number]   Corporate ID       [id number]   Accession Number   [id number]        Rodriguez Ruiz RDCS   Ordering Physician Daina Bush MD  Interpreting        Daina Bush MD                                       Physician  Procedure Type of Study   TTE procedure:ECHOCARDIOGRAM COMPLETE 2D W DOPPLER W COLOR. Procedure Date Date: 08/22/2022 Start: 07:32 AM Study Location: Portable Technical Quality: Fair visualization Indications:Congestive heart failure. Patient Status: Routine Height: 72 inches Weight: 188 pounds BSA: 2.08 m2 BMI: 25.5 kg/m2 HR: 112 bpm BP: 130/75 mmHg  Conclusions   Summary  Left ventricular systolic function is hyperdynamic. Ejection fraction is visually estimated at >50%. Mild left ventricular hypertrophy. Sclerotic, but non-stenotic aortic valve. Mild to moderate tricuspid regurgitation; RVSP: 37 mmHg.   No evidence of any pericardial effusion. Pericardial fat pad visualized. Signature   ------------------------------------------------------------------  Electronically signed by Sascha Perera MD (Interpreting  physician) on 08/22/2022 at 04:30 PM  ------------------------------------------------------------------   Findings   Left Ventricle  Left ventricular systolic function is hyperdynamic. Ejection fraction is visually estimated at >50%. Mild left ventricular hypertrophy. No regional wall motion abnormalites. Left ventricle size is normal.  Cannot determine diastolic function due to arrhythmia. Left Atrium  Essentially normal left atrium. Right Atrium  Essentially normal right atrium. Right Ventricle  Essentially normal right ventricle. Aortic Valve  Sclerotic, but non-stenotic aortic valve. Mitral Valve  Structurally normal mitral valve. Tricuspid Valve  Mild to moderate tricuspid regurgitation; RVSP: 37 mmHg. Pulmonic Valve  The pulmonic valve was not well visualized. Pericardial Effusion  No evidence of any pericardial effusion. Pleural Effusion  No evidence of pleural effusion. Miscellaneous  Pericardial fat pad visualized. IVC and abdominal aorta are not clearly visualized due to poor subcostal  window.   M-Mode/2D Measurements & Calculations   LV Diastolic Dimension:  LV Systolic Dimension:  LA Dimension: 3.4 cmAO Root  3.87 cm                  2.87 cm                 Dimension: 3.7 cmLA Area:  LV FS:25.8 %             LV Volume Diastolic:    12.6 cm2  LV PW Diastolic: 5.55 cm 543 ml  Septum Diastolic: 8.90   LV Volume Systolic: 53  cm                       ml  Septum Systolic: 3.91 cm LV EDV/LV EDV Index:    RV Diastolic Dimension:  CO: 9.21 l/min           127 ml/61 m2LV ESV/LV   1.95 cm  CI: 2.88 l/m*m2          ESV Index: 53 ml/25 m2                           EF Calculated (A4C):    LA/Aorta: 1.89  LV Area Diastolic: 68.7  82.4 %  cm2                      EF Calculated (2D): LA volume/Index: 26 ml  LV Area Systolic: 81.9   75.6 %                  /12m2  cm2                           LV Length: 8.96 cm                            LVOT: 2.4 cm  Doppler Measurements & Calculations   MV Peak E-Wave: 116  AV Peak Velocity: 121 cm/s    LVOT Peak Velocity: 94  cm/s                 AV Peak Gradient: 5.86 mmHg   cm/s                       AV Mean Velocity: 69.8 cm/s   LVOT Mean Velocity: 50.9  MV Peak Gradient:    AV Mean Gradient: 3 mmHg      cm/s  5.38 mmHg            AV VTI: 14.2 cm               LVOT Peak Gradient: 4                       AV Area (Continuity):3.76 cm2 mmHgLVOT Mean Gradient: 1                                                     mmHg                       LVOT VTI: 11.8 cm             Estimated RVSP: 37 mmHg  MV E' Septal                                       Estimated RAP:3 mmHg  Velocity: 7.46 cm/s  Estimated PASP: 29.01 mmHg  MV E' Lateral  Velocity: 6.58 cm/s                                TR Velocity:255 cm/s  MV E/E' septal:                                    TR Gradient:26.01 mmHg  15.55                                              PV Peak Velocity: 139  MV E/E' lateral:                                   cm/s  17.63                                              PV Peak Gradient: 7.73                                                     mmHg                                                     PV Mean Velocity: 76.4                                                     cm/s                                                     PV Mean Gradient: 3 mmHg      XR CHEST (2 VW)    Result Date: 8/21/2022  EXAMINATION: TWO XRAY VIEWS OF THE CHEST 8/21/2022 7:37 pm COMPARISON: Chest radiograph 07/22/2021. HISTORY: ORDERING SYSTEM PROVIDED HISTORY: Chest Pain TECHNOLOGIST PROVIDED HISTORY: Reason for exam:->Chest Pain Reason for Exam: Chest Pain; Shortness of Breath Additional signs and symptoms: Chest Pain; Shortness of Breath FINDINGS: The cardiomediastinal silhouette is unchanged. Bibasilar airspace opacities. No pneumothorax, vascular congestion, consolidation, or pleural effusion is identified. No acute osseous abnormality. Bibasilar airspace opacities compatible with atelectasis or pneumonia. CT HEAD WO CONTRAST    Result Date: 8/23/2022  EXAMINATION: CT OF THE HEAD WITHOUT CONTRAST  8/22/2022 11:27 pm TECHNIQUE: CT of the head was performed without the administration of intravenous contrast. Automated exposure control, iterative reconstruction, and/or weight based adjustment of the mA/kV was utilized to reduce the radiation dose to as low as reasonably achievable. COMPARISON: CT scan of brain, without contrast on 01/11/2017 HISTORY: ORDERING SYSTEM PROVIDED HISTORY: syncope TECHNOLOGIST PROVIDED HISTORY: Reason for exam:->syncope Has a \"code stroke\" or \"stroke alert\" been called? ->No Reason for Exam: syncope FINDINGS: BRAIN/VENTRICLES: On the 1st series of axial images, there were motion induced artifacts with ill-defined high-density at the upper portion of both cerebral hemispheres. Additional axial images have been obtained through these portions of brain without motion induced artifact, and, without any abnormal densities. No evidence of intracranial hemorrhage or any other definable acute intracranial abnormality. Evidence of moderate cortical atrophy changes in frontal lobes and along the sylvian fissures bilaterally. Mild central atrophy. Moderate to marked chronic microvascular ischemic/aging changes with prominent low attenuation in the periventricular and central white matter bilaterally, similar to previous CT scan. There is no definable new or acute focal abnormality in bilateral cerebral cortex. No definite focal abnormality in bilateral basal ganglia structures, bilateral thalami, brainstem, or cerebellum. No evidence of intracranial mass, subdural or epidural hematoma or subdural hygroma.  ORBITS: The visualized portion of the orbits demonstrate no acute abnormality. SINUSES: The visualized paranasal sinuses and mastoid air cells demonstrate no acute abnormality. SOFT TISSUES/SKULL:  No acute abnormality of the visualized skull or soft tissues. No definite interval change on CT scan of brain, as compared to previous CT scan on 01/11/2017. No evidence of intracranial hemorrhage or any other definable acute intracranial abnormality. No interval change versus CT scan in 2017. XR CHEST PORTABLE    Result Date: 8/22/2022  EXAMINATION: ONE XRAY VIEW OF THE CHEST 8/22/2022 10:27 pm COMPARISON: 1 day prior. HISTORY: ORDERING SYSTEM PROVIDED HISTORY: verify cvc placement TECHNOLOGIST PROVIDED HISTORY: Reason for exam:->verify cvc placement Reason for Exam: verify cvc placement Additional signs and symptoms: verify cvc placement FINDINGS: Right internal jugular center venous catheter placed with the tip at the superior cavoatrial junction. No pneumothorax. Decreased lung volumes with increased bibasilar atelectasis. No pleural effusion. Cardiac and mediastinal contours stable. No acute osseous abnormality. 1. Right internal jugular center venous catheter tip at the superior cavoatrial junction. 2. No pneumothorax. 3. Low lung volumes with increased bibasilar atelectasis. VL DUP LOWER EXTREMITY VENOUS BILATERAL    Result Date: 8/22/2022  EXAMINATION: DUPLEX VENOUS ULTRASOUND OF THE BILATERAL LOWER EXTREMITIES8/22/2022 5:56 pm TECHNIQUE: Duplex ultrasound using B-mode/gray scaled imaging, Doppler spectral analysis and color flow Doppler was obtained of the deep venous structures of the lower bilateral extremities. COMPARISON: 09/03/2021 HISTORY: ORDERING SYSTEM PROVIDED HISTORY: f/u RLE DVT TECHNOLOGIST PROVIDED HISTORY: Reason for exam:->f/u RLE DVT FINDINGS: There is persistent partially occlusive thrombus in the right peroneal and posterior tibial veins.  The remaining visualized veins of the bilateral lower extremities are patent and free of echogenic thrombus. The veins demonstrate good compressibility with normal color flow study and spectral analysis. Persistent partially occlusive thrombus in the right peroneal and posterior tibial veins. No evidence of left lower extremity DVT. US ABDOMEN LIMITED Specify organ? LIVER, GALLBLADDER    Result Date: 8/22/2022  EXAMINATION: RIGHT UPPER QUADRANT ULTRASOUND 8/22/2022 5:56 pm COMPARISON: None. HISTORY: ORDERING SYSTEM PROVIDED HISTORY: transaminitis, hyperbilirubinemia TECHNOLOGIST PROVIDED HISTORY: Reason for exam:->transaminitis, hyperbilirubinemia Specify organ?->LIVER Specify organ?->GALLBLADDER FINDINGS: LIVER:  The liver demonstrates normal echogenicity without evidence of intrahepatic biliary ductal dilatation. BILIARY SYSTEM:  The gallbladder contains layering sludge and stones. The gallbladder wall is thickened to 5 mm. No pericholecystic fluid is evident. Sonographic Cara Lathe sign was not well assessed as the patient was medicated for the examination. Common bile duct is within normal limits measuring 4 mm in diameter. RIGHT KIDNEY: The right kidney is grossly unremarkable without evidence of hydronephrosis. PANCREAS:  Visualized portions of the pancreas are unremarkable. OTHER: No evidence of right upper quadrant ascites. Cholelithiasis with gallbladder wall thickening suspicious for cholelithiasis. Sonographic Cara Lathe sign was not well assessed as the patient was medicated for the examination. Nuclear medicine hepatobiliary scan may be useful for further characterization as these findings can be seen in the setting of acute or chronic cholecystitis.        Recent Results (from the past 24 hour(s))   Ammonia    Collection Time: 08/22/22 12:03 PM   Result Value Ref Range    Ammonia 33 16 - 60 UMOL/L   TSH    Collection Time: 08/22/22 12:03 PM   Result Value Ref Range    TSH, High Sensitivity 1.520 0.270 - 4.20 uIu/ml   T4, Free    Collection Time: 08/22/22 12:03 PM   Result Value Ref Range T4 Free 1.16 0.9 - 1.8 NG/DL   Protime/INR & PTT    Collection Time: 08/22/22 12:03 PM   Result Value Ref Range    Protime 15.0 (H) 11.7 - 14.5 SECONDS    INR 1.16 INDEX    aPTT 31.3 25.1 - 37.1 SECONDS   Hepatitis Panel, Acute    Collection Time: 08/22/22 12:03 PM   Result Value Ref Range    Hepatitis B Surface Ag NON REACTIVE NON REACTIVE    Hep A IgM NON REACTIVE NON REACTIVE    Hep B Core Ab, IgM NON REACTIVE NON REACTIVE    Hepatitis C Ab NON REACTIVE NON REACTIVE   EKG 12 Lead    Collection Time: 08/22/22  1:31 PM   Result Value Ref Range    Ventricular Rate 85 BPM    Atrial Rate 85 BPM    P-R Interval 232 ms    QRS Duration 80 ms    Q-T Interval 392 ms    QTc Calculation (Bazett) 466 ms    P Axis 52 degrees    R Axis -31 degrees    T Axis -18 degrees    Diagnosis       Sinus rhythm with 1st degree AV block  Left axis deviation  Abnormal ECG  When compared with ECG of 22-AUG-2022 02:26,  ME interval has increased  Inverted T waves have replaced nonspecific T wave abnormality in Inferior leads  Confirmed by Claire Cuellar MD, Angelic Ying ([id number]) on 8/22/2022 10:23:52 PM     Comprehensive Metabolic Panel w/ Reflex to MG    Collection Time: 08/22/22  2:32 PM   Result Value Ref Range    Sodium 137 135 - 145 MMOL/L    Potassium 3.9 3.5 - 5.1 MMOL/L    Chloride 102 99 - 110 mMol/L    CO2 18 (L) 21 - 32 MMOL/L    BUN 47 (H) 6 - 23 MG/DL    Creatinine 2.9 (H) 0.9 - 1.3 MG/DL    Glucose 172 (H) 70 - 99 MG/DL    Calcium 7.9 (L) 8.3 - 10.6 MG/DL    Albumin 3.0 (L) 3.4 - 5.0 GM/DL    Total Protein 4.7 (L) 6.4 - 8.2 GM/DL    Total Bilirubin 2.1 (H) 0.0 - 1.0 MG/DL     (H) 10 - 40 U/L     (H) 15 - 37 IU/L    Alkaline Phosphatase 232 (H) 40 - 129 IU/L    GFR Non- 21 (L) >60 mL/min/1.73m2    GFR  25 (L) >60 mL/min/1.73m2    Anion Gap 17 (H) 4 - 16   CBC with Auto Differential    Collection Time: 08/22/22  2:32 PM   Result Value Ref Range    WBC 12.8 (H) 4.0 - 10.5 K/CU MM    RBC 2.65 (L) 4.6 - 6.2 M/CU MM    Hemoglobin 8.4 (L) 13.5 - 18.0 GM/DL    Hematocrit 26.3 (L) 42 - 52 %    MCV 99.2 78 - 100 FL    MCH 31.7 (H) 27 - 31 PG    MCHC 31.9 (L) 32.0 - 36.0 %    RDW 15.1 (H) 11.7 - 14.9 %    Platelets 562 490 - 573 K/CU MM    MPV 9.8 7.5 - 11.1 FL    Differential Type AUTOMATED DIFFERENTIAL     Segs Relative 89.5 (H) 36 - 66 %    Lymphocytes % 2.8 (L) 24 - 44 %    Monocytes % 6.3 (H) 0 - 4 %    Eosinophils % 0.1 0 - 3 %    Basophils % 0.1 0 - 1 %    Segs Absolute 11.5 K/CU MM    Lymphocytes Absolute 0.4 K/CU MM    Monocytes Absolute 0.8 K/CU MM    Eosinophils Absolute 0.0 K/CU MM    Basophils Absolute 0.0 K/CU MM    Nucleated RBC % 0.0 %    Total Nucleated RBC 0.0 K/CU MM    Total Immature Neutrophil 0.15 K/CU MM    Immature Neutrophil % 1.2 (H) 0 - 0.43 %   Lactic Acid    Collection Time: 08/22/22  3:44 PM   Result Value Ref Range    Lactate 2.3 (HH) 0.4 - 2.0 mMOL/L   Ammonia    Collection Time: 08/22/22  3:45 PM   Result Value Ref Range    Ammonia 24 16 - 60 UMOL/L   Lactic Acid    Collection Time: 08/23/22  4:20 AM   Result Value Ref Range    Lactate 0.4 0.4 - 2.0 mMOL/L   Basic Metabolic Panel w/ Reflex to MG    Collection Time: 08/23/22  4:20 AM   Result Value Ref Range    Sodium 138 135 - 145 MMOL/L    Potassium 3.8 3.5 - 5.1 MMOL/L    Chloride 103 99 - 110 mMol/L    CO2 24 21 - 32 MMOL/L    Anion Gap 11 4 - 16    BUN 48 (H) 6 - 23 MG/DL    Creatinine 2.9 (H) 0.9 - 1.3 MG/DL    Glucose 134 (H) 70 - 99 MG/DL    Calcium 7.9 (L) 8.3 - 10.6 MG/DL    GFR Non- 21 (L) >60 mL/min/1.73m2    GFR  25 (L) >60 mL/min/1.73m2   CBC    Collection Time: 08/23/22  4:20 AM   Result Value Ref Range    WBC 9.3 4.0 - 10.5 K/CU MM    RBC 2.51 (L) 4.6 - 6.2 M/CU MM    Hemoglobin 7.9 (L) 13.5 - 18.0 GM/DL    Hematocrit 24.8 (L) 42 - 52 %    MCV 98.8 78 - 100 FL    MCH 31.5 (H) 27 - 31 PG    MCHC 31.9 (L) 32.0 - 36.0 %    RDW 15.2 (H) 11.7 - 14.9 %    Platelets 254 388 - 104 K/CU MM

## 2022-08-24 ENCOUNTER — APPOINTMENT (OUTPATIENT)
Dept: NUCLEAR MEDICINE | Age: 87
DRG: 205 | End: 2022-08-24
Payer: MEDICARE

## 2022-08-24 LAB
ALBUMIN SERPL-MCNC: 2.6 GM/DL (ref 3.4–5)
ALP BLD-CCNC: 183 IU/L (ref 40–129)
ALT SERPL-CCNC: 244 U/L (ref 10–40)
ANION GAP SERPL CALCULATED.3IONS-SCNC: 11 MMOL/L (ref 4–16)
AST SERPL-CCNC: 112 IU/L (ref 15–37)
BILIRUB SERPL-MCNC: 0.5 MG/DL (ref 0–1)
BILIRUBIN DIRECT: 0.3 MG/DL (ref 0–0.3)
BILIRUBIN, INDIRECT: 0.2 MG/DL (ref 0–0.7)
BUN BLDV-MCNC: 50 MG/DL (ref 6–23)
CALCIUM SERPL-MCNC: 8.2 MG/DL (ref 8.3–10.6)
CHLORIDE BLD-SCNC: 105 MMOL/L (ref 99–110)
CO2: 23 MMOL/L (ref 21–32)
CREAT SERPL-MCNC: 3.1 MG/DL (ref 0.9–1.3)
GFR AFRICAN AMERICAN: 23 ML/MIN/1.73M2
GFR NON-AFRICAN AMERICAN: 19 ML/MIN/1.73M2
GLUCOSE BLD-MCNC: 153 MG/DL (ref 70–99)
HCT VFR BLD CALC: 23.8 % (ref 42–52)
HEMOGLOBIN: 7.7 GM/DL (ref 13.5–18)
MAGNESIUM: 2.3 MG/DL (ref 1.8–2.4)
MCH RBC QN AUTO: 32 PG (ref 27–31)
MCHC RBC AUTO-ENTMCNC: 32.4 % (ref 32–36)
MCV RBC AUTO: 98.8 FL (ref 78–100)
PDW BLD-RTO: 15.2 % (ref 11.7–14.9)
PLATELET # BLD: 138 K/CU MM (ref 140–440)
PMV BLD AUTO: 9.7 FL (ref 7.5–11.1)
POTASSIUM SERPL-SCNC: 3.9 MMOL/L (ref 3.5–5.1)
RBC # BLD: 2.41 M/CU MM (ref 4.6–6.2)
SODIUM BLD-SCNC: 139 MMOL/L (ref 135–145)
TOTAL PROTEIN: 4.4 GM/DL (ref 6.4–8.2)
WBC # BLD: 7.6 K/CU MM (ref 4–10.5)

## 2022-08-24 PROCEDURE — 82248 BILIRUBIN DIRECT: CPT

## 2022-08-24 PROCEDURE — 96366 THER/PROPH/DIAG IV INF ADDON: CPT

## 2022-08-24 PROCEDURE — 2580000003 HC RX 258: Performed by: INTERNAL MEDICINE

## 2022-08-24 PROCEDURE — G0378 HOSPITAL OBSERVATION PER HR: HCPCS

## 2022-08-24 PROCEDURE — 6360000002 HC RX W HCPCS: Performed by: STUDENT IN AN ORGANIZED HEALTH CARE EDUCATION/TRAINING PROGRAM

## 2022-08-24 PROCEDURE — 96361 HYDRATE IV INFUSION ADD-ON: CPT

## 2022-08-24 PROCEDURE — 94761 N-INVAS EAR/PLS OXIMETRY MLT: CPT

## 2022-08-24 PROCEDURE — 2580000003 HC RX 258: Performed by: STUDENT IN AN ORGANIZED HEALTH CARE EDUCATION/TRAINING PROGRAM

## 2022-08-24 PROCEDURE — 2700000000 HC OXYGEN THERAPY PER DAY

## 2022-08-24 PROCEDURE — 6370000000 HC RX 637 (ALT 250 FOR IP): Performed by: INTERNAL MEDICINE

## 2022-08-24 PROCEDURE — 80048 BASIC METABOLIC PNL TOTAL CA: CPT

## 2022-08-24 PROCEDURE — 6370000000 HC RX 637 (ALT 250 FOR IP): Performed by: NURSE PRACTITIONER

## 2022-08-24 PROCEDURE — 83605 ASSAY OF LACTIC ACID: CPT

## 2022-08-24 PROCEDURE — 2580000003 HC RX 258: Performed by: PHYSICIAN ASSISTANT

## 2022-08-24 PROCEDURE — 83735 ASSAY OF MAGNESIUM: CPT

## 2022-08-24 PROCEDURE — 85027 COMPLETE CBC AUTOMATED: CPT

## 2022-08-24 PROCEDURE — 80053 COMPREHEN METABOLIC PANEL: CPT

## 2022-08-24 RX ORDER — 0.9 % SODIUM CHLORIDE 0.9 %
500 INTRAVENOUS SOLUTION INTRAVENOUS ONCE
Status: COMPLETED | OUTPATIENT
Start: 2022-08-24 | End: 2022-08-24

## 2022-08-24 RX ADMIN — PREDNISONE 10 MG: 10 TABLET ORAL at 09:45

## 2022-08-24 RX ADMIN — SODIUM CHLORIDE, PRESERVATIVE FREE 10 ML: 5 INJECTION INTRAVENOUS at 09:46

## 2022-08-24 RX ADMIN — FINASTERIDE 5 MG: 5 TABLET, FILM COATED ORAL at 09:45

## 2022-08-24 RX ADMIN — TAMSULOSIN HYDROCHLORIDE 0.8 MG: 0.4 CAPSULE ORAL at 20:25

## 2022-08-24 RX ADMIN — PIPERACILLIN AND TAZOBACTAM 4500 MG: 4; .5 INJECTION, POWDER, FOR SOLUTION INTRAVENOUS; PARENTERAL at 17:54

## 2022-08-24 RX ADMIN — PANTOPRAZOLE 20 MG: 20 TABLET, DELAYED RELEASE ORAL at 06:01

## 2022-08-24 RX ADMIN — SODIUM CHLORIDE, POTASSIUM CHLORIDE, SODIUM LACTATE AND CALCIUM CHLORIDE: 600; 310; 30; 20 INJECTION, SOLUTION INTRAVENOUS at 17:11

## 2022-08-24 RX ADMIN — SODIUM BICARBONATE 650 MG: 650 TABLET ORAL at 09:45

## 2022-08-24 RX ADMIN — SODIUM CHLORIDE, PRESERVATIVE FREE 10 ML: 5 INJECTION INTRAVENOUS at 20:25

## 2022-08-24 RX ADMIN — QUETIAPINE FUMARATE 100 MG: 100 TABLET ORAL at 20:58

## 2022-08-24 RX ADMIN — SODIUM CHLORIDE 500 ML: 9 INJECTION, SOLUTION INTRAVENOUS at 22:44

## 2022-08-24 RX ADMIN — PIPERACILLIN AND TAZOBACTAM 4500 MG: 4; .5 INJECTION, POWDER, FOR SOLUTION INTRAVENOUS; PARENTERAL at 04:08

## 2022-08-24 RX ADMIN — LEVOTHYROXINE SODIUM 75 MCG: 0.07 TABLET ORAL at 06:01

## 2022-08-24 RX ADMIN — QUETIAPINE FUMARATE 100 MG: 100 TABLET ORAL at 09:45

## 2022-08-24 RX ADMIN — METOPROLOL TARTRATE 25 MG: 25 TABLET, FILM COATED ORAL at 09:45

## 2022-08-24 RX ADMIN — TEMAZEPAM 15 MG: 7.5 CAPSULE ORAL at 20:58

## 2022-08-24 RX ADMIN — ESCITALOPRAM OXALATE 5 MG: 10 TABLET ORAL at 09:45

## 2022-08-24 RX ADMIN — ASPIRIN 81 MG CHEWABLE TABLET 81 MG: 81 TABLET CHEWABLE at 09:45

## 2022-08-24 RX ADMIN — SERTRALINE HYDROCHLORIDE 50 MG: 50 TABLET ORAL at 09:46

## 2022-08-24 ASSESSMENT — PAIN SCALES - GENERAL
PAINLEVEL_OUTOF10: 0

## 2022-08-24 NOTE — PROGRESS NOTES
Called lab multiple times regarding lab results.  Stated they are running the labs and nothing has resulted

## 2022-08-24 NOTE — PROGRESS NOTES
During assessment, noticed R IJ CVC out a few cm.  Notified MD Alberto and he ordered a chest xray and reinforce the dressing

## 2022-08-24 NOTE — PROGRESS NOTES
Hospitalist Progress Note      Name:  Alesha Ramirez /Age/Sex: 10/18/1930  (80 y.o. male)   MRN & CSN:  [id number] & [id number] Admission Date/Time: 2022  6:43 PM   Location:  [address] [place] [address]- PCP: No primary care provider on file. Hospital Day: 4    Assessment and Plan:   Alesha Ramirez is a 80 y.o.  male  who presents with Chest pain    Patient had initially presented with chest pain. Elevated troponin in the setting of CKD. Cardiology was consulted. Unlikely ACS. Will keep monitoring in telemetry Episode of  PEA with CPR and ROSC during the hospital stay. No apparent cause. CT head negative. patient does not remember the event . currently in sinus in telemetry. EKG did not show any ischemic changes. Echo showed preserved EF. Plan to continue Lopressor at this time. Anemia - likely multifactorial. No active bleeding. Will keep monitoring for now. KAVON on CKD - Cr stable. Nephrology on board. Gross hematuria - likely trauma due to Borden . Eliquis held for now. BPH - chronically on Flomax 0.4 mg and Proscar 5 mg. Recommend borden removal/voiding trial prior to discharge once more medically stable    Lactic acidosis was present likely postcode - resolved. Hypokalemia: Resolved    Transaminitis- unknown etiology, no abdominal pain; improving. Statin held.  US abdomen showed cholelithiasis but no signs of cholecystitis    Hyperbilirubinemia: Trending down    Hx Recurrent DVT of RLE: Continue Eliquis    Patient need PT/OT evaluation for deconditioning      Other co-morbidities     Hx Multifactorial Anemia: Hgb 8.9 but all cell lines dropped overnight, repeat in AM; no suspected source of clinical bleeding  Hx Bullous Pemphigoid: Continue daily steroids; follows with Dermatology as outpatient  Renal Cyst: Being followed by Nephrology as OP  Chronic bilateral leg edema: Being followed by Nephrology, continue daily PO Lasix  Hx CKD Stage IV: Cr stable at 2.5, monitor, continue supplemental sodium bicarb  Hx BPH: Contniue Tamsulosin and Proscar  Hx Peripheral Neuropathy  Hx Hypothyroidism: Levothyroxine resumed  Hx Anxiety/Depression: continue Zoloft, Seroquel, Lexapro  Hx SCC Skin Cancer: Follows OP with Heme/Onc  Hx Insomnia: Continue home Temazepam    Diet ADULT DIET; Regular; 5 carb choices (75 gm/meal); No Caffeine   DVT Prophylaxis [] Lovenox, []  Heparin, [] SCDs, [] Ambulation   GI Prophylaxis [] PPI,  [] H2 Blocker,  [] Carafate,  [] Diet/Tube Feeds   Code Status DNR-CC   Disposition Patient requires continued admission due to status post cardiac arrest   MDM [] Low, [] Moderate,[x]  High  Patient's risk as above due to cardiac arrest recently     History of Present Illness:     Chief Complaint: Chest pain  Yamini Crook is a 80 y.o.  male  who presents with chest pain     Patient denies any complaints. He is slim slightly sleepy and reports he is just tired. Able to recall the year but does not know the exact date. Ten point ROS reviewed negative, unless as noted above    Objective: Intake/Output Summary (Last 24 hours) at 8/24/2022 1759  Last data filed at 8/24/2022 0600  Gross per 24 hour   Intake --   Output 1250 ml   Net -1250 ml      Vitals:   Vitals:    08/24/22 1535   BP: 110/63   Pulse: 73   Resp: 12   Temp: 98.2 °F (36.8 °C)   SpO2: 98%     Physical Exam:   GEN Awake male, sitting upright in bed in no apparent distress. Appears given age. EYES Pupils are equally round. No scleral erythema, discharge, or conjunctivitis. HENT Mucous membranes are moist. Oral pharynx without exudates, no evidence of thrush. NECK Supple, no apparent thyromegaly or masses. RESP Clear to auscultation, no wheezes, rales or rhonchi. Symmetric chest movement while on room air. CARDIO/VASC S1/S2 auscultated. Regular rate without appreciable murmurs, rubs, or gallops. No JVD or carotid bruits. Peripheral pulses equal bilaterally and palpable. No peripheral edema.   GI Abdomen is soft without significant tenderness, masses, or guarding. Bowel sounds are normoactive. Rectal exam deferred.  No costovertebral angle tenderness. Normal appearing external genitalia. Laureano catheter is not present. HEME/LYMPH No palpable cervical lymphadenopathy and no hepatosplenomegaly. No petechiae or ecchymoses. MSK No gross joint deformities. SKIN Normal coloration, warm, dry. NEURO Cranial nerves appear grossly intact, normal speech, no lateralizing weakness. PSYCH Awake, alert, oriented x 4. Affect appropriate. Medications:   Medications:    piperacillin-tazobactam  4,500 mg IntraVENous Q12H    metoprolol tartrate  25 mg Oral BID    lidocaine  5 mL IntraDERmal Once    sodium chloride flush  5-40 mL IntraVENous 2 times per day    [Held by provider] apixaban  2.5 mg Oral BID    escitalopram  5 mg Oral Daily    finasteride  5 mg Oral Daily    levothyroxine  75 mcg Oral Daily    pantoprazole  20 mg Oral Daily    predniSONE  10 mg Oral Daily    QUEtiapine  100 mg Oral BID    sertraline  50 mg Oral Daily    sodium bicarbonate  650 mg Oral Daily    tamsulosin  0.8 mg Oral Nightly    temazepam  15 mg Oral Nightly    aspirin  81 mg Oral Daily    [Held by provider] atorvastatin  40 mg Oral Nightly      Infusions:    lactated ringers 50 mL/hr at 08/24/22 1711    sodium chloride      sodium chloride       PRN Meds: sodium chloride flush, 5-40 mL, PRN  sodium chloride, , PRN  sodium chloride flush, 5-40 mL, PRN  sodium chloride, , PRN  acetaminophen, 650 mg, Q6H PRN   Or  acetaminophen, 650 mg, Q6H PRN  polyethylene glycol, 17 g, Daily PRN  promethazine, 12.5 mg, Q6H PRN   Or  ondansetron, 4 mg, Q6H PRN  nitroGLYCERIN, 0.4 mg, Q5 Min PRN        Patient is still admitted because c. The anticipated discharge is in greater than 24 hours.      Electronically signed by Angeles Sommers MD on 8/24/2022 at 5:59 PM

## 2022-08-24 NOTE — PROGRESS NOTES
Cholelithiasis with gallbladder wall thickening suspicious for   cholelithiasis. Sonographic Jackson Deanna sign was not well assessed as the patient   was medicated for the examination. Nuclear medicine hepatobiliary scan may   be useful for further characterization as these findings can be seen in the   setting of acute or chronic cholecystitis. CT head 8/23/2022  Impression   No evidence of intracranial hemorrhage or any other definable acute   intracranial abnormality. No interval change versus CT scan in 2017. PAST MEDICAL HISTORY:  Anemia, anxiety, arthritis, BPH, depression,  GERD, hemorrhoids, hepatitis, hernia, hypotension, hyperlipidemia,  squamous cell carcinoma, CKD and DVT. PAST SURGICAL HISTORY:  Cataracts removal, hernia repair, neck surgery. SOCIAL HISTORY:  He is a former smoker. He does not use any alcohol. He does not use any illicit drugs. ALLERGIES:  Allergic to MINOCYCLINE. HOME MEDICATIONS:  He is on prednisone, Lasix, sodium bicarb, Synthroid,  Proscar, Eliquis 2.5 mg, Flomax, Seroquel, Zoloft, Lexapro, Protonix.        Objective:   /65   Pulse 69   Temp 98.4 °F (36.9 °C) (Oral)   Resp 12   Ht 6' (1.829 m)   Wt 188 lb (85.3 kg)   SpO2 100%   BMI 25.50 kg/m²     Intake/Output Summary (Last 24 hours) at 8/24/2022 1106  Last data filed at 8/24/2022 0600  Gross per 24 hour   Intake --   Output 1700 ml   Net -1700 ml       Medications:   Scheduled Meds:   piperacillin-tazobactam  4,500 mg IntraVENous Q12H    metoprolol tartrate  25 mg Oral BID    lidocaine  5 mL IntraDERmal Once    sodium chloride flush  5-40 mL IntraVENous 2 times per day    [Held by provider] apixaban  2.5 mg Oral BID    escitalopram  5 mg Oral Daily    finasteride  5 mg Oral Daily    levothyroxine  75 mcg Oral Daily    pantoprazole  20 mg Oral Daily    predniSONE  10 mg Oral Daily    QUEtiapine  100 mg Oral BID    sertraline  50 mg Oral Daily    sodium bicarbonate  650 mg Oral Daily tamsulosin  0.8 mg Oral Nightly    temazepam  15 mg Oral Nightly    aspirin  81 mg Oral Daily    [Held by provider] atorvastatin  40 mg Oral Nightly      Infusions:   lactated ringers 50 mL/hr at 08/22/22 1645    sodium chloride      sodium chloride        PRN Meds:  sodium chloride flush, sodium chloride, sodium chloride flush, sodium chloride, acetaminophen **OR** acetaminophen, polyethylene glycol, promethazine **OR** ondansetron, nitroGLYCERIN     Physical Exam:  Vitals:    08/24/22 1016   BP:    Pulse: 69   Resp: 12   Temp:    SpO2: 100%        General: Frail and elderly  Chest: Nontender  Cardiac: First and Second Heart Sounds are Normal, No Murmurs or Gallops noted  Lungs:Clear to auscultation and percussion. Abdomen: Soft, NT, ND, +BS  Extremities: No clubbing, no edema  Vascular:  Equal 2+ peripheral pulses. Lab Data:  CBC:   Recent Labs     08/22/22  1432 08/23/22  0420 08/24/22  0155   WBC 12.8* 9.3 7.6   HGB 8.4* 7.9* 7.7*   HCT 26.3* 24.8* 23.8*   MCV 99.2 98.8 98.8    146 138*     BMP:   Recent Labs     08/22/22  1432 08/23/22  0420 08/24/22  0155    138 139   K 3.9 3.8 3.9    103 105   CO2 18* 24 23   BUN 47* 48* 50*   CREATININE 2.9* 2.9* 3.1*     LIVER PROFILE:   Recent Labs     08/22/22  1432 08/23/22  0420 08/24/22  0155   * 260* 112*   * 339* 244*   BILIDIR  --  1.0* 0.3   BILITOT 2.1* 1.1* 0.5   ALKPHOS 232* 205* 183*     PT/INR:   Recent Labs     08/22/22  1203   PROTIME 15.0*   INR 1.16     APTT:   Recent Labs     08/22/22  1203   APTT 31.3     BNP:  No results for input(s): BNP in the last 72 hours.       Assessment:  Patient Active Problem List    Diagnosis Date Noted    Chest pain 08/21/2022    Agitation due to dementia [facility]) 08/18/2022    Varicose 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 08/15/2015    Skin ulcer, limited to breakdown of skin ([facility] 02/21/2022    Current moderate episode of major depressive disorder, unspecified whether recurrent ([facility] 02/21/2022    Chronic kidney disease, stage IV (severe) ([facility] 02/21/2022    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 ([facility] 02/21/2022    Leukocytosis 09/08/2021    Thrombocytopenia, unspecified 08/31/2021    Bandemia 08/10/2021    Atelectasis 08/05/2021    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       Electronically signed by ROSEMARY Steiner CNP on 8/24/2022 at 11:06 AM       Electronically signed by Jayshree Cox MD on 8/24/22 at 11:55 AM EDT within functional limits

## 2022-08-24 NOTE — CARE COORDINATION
Attempted to meet with patient. He is sleeping soundly and did not respond to touch or voice. Attempted to contact family - son Michael Marie at [phone]; \"not accepting calls at this time\" Attempted to contact daughter Maninder Bloom at [phone]; [facility] requesting return call. There is another child listed Blake Merrill Waller give info if she calls\". Did not reach out to this child. CM will continue to contact family as patient is not able to participate.  Amador Pandey RN

## 2022-08-24 NOTE — PROGRESS NOTES
Nephrology Progress Note        [address], [address] [address]  Phone: [phone]  Office Hours: 8:30AM - 4:30PM  Monday - Friday 8/24/2022 8:34 AM  Subjective:   Admit Date: 8/21/2022  PCP: No primary care provider on file. Interval History:   Doing ok  On ivf    Diet: ADULT DIET; Regular; 5 carb choices (75 gm/meal); No Caffeine      Data:   Scheduled Meds:   piperacillin-tazobactam  4,500 mg IntraVENous Q12H    metoprolol tartrate  25 mg Oral BID    lidocaine  5 mL IntraDERmal Once    sodium chloride flush  5-40 mL IntraVENous 2 times per day    [Held by provider] apixaban  2.5 mg Oral BID    escitalopram  5 mg Oral Daily    finasteride  5 mg Oral Daily    levothyroxine  75 mcg Oral Daily    pantoprazole  20 mg Oral Daily    predniSONE  10 mg Oral Daily    QUEtiapine  100 mg Oral BID    sertraline  50 mg Oral Daily    sodium bicarbonate  650 mg Oral Daily    tamsulosin  0.8 mg Oral Nightly    temazepam  15 mg Oral Nightly    aspirin  81 mg Oral Daily    [Held by provider] atorvastatin  40 mg Oral Nightly     Continuous Infusions:   lactated ringers 50 mL/hr at 08/22/22 1645    sodium chloride      sodium chloride       PRN Meds:sodium chloride flush, sodium chloride, sodium chloride flush, sodium chloride, acetaminophen **OR** acetaminophen, polyethylene glycol, promethazine **OR** ondansetron, nitroGLYCERIN  I/O last 3 completed shifts:  In: -   Out: 6779 [Urine:2225]  No intake/output data recorded.     Intake/Output Summary (Last 24 hours) at 8/24/2022 0834  Last data filed at 8/24/2022 0600  Gross per 24 hour   Intake --   Output 1700 ml   Net -1700 ml       CBC:   Recent Labs     08/22/22  1432 08/23/22  0420 08/24/22  0155   WBC 12.8* 9.3 7.6   HGB 8.4* 7.9* 7.7*    146 138*       BMP:    Recent Labs     08/22/22  1432 08/23/22  0420 08/24/22  0155    138 139   K 3.9 3.8 3.9    103 105   CO2 18* 24 23   BUN 47* 48* 50*   CREATININE 2.9* 2.9* 3.1* GLUCOSE 172* 134* 153*     Hepatic:   Recent Labs     08/22/22  1432 08/23/22  0420 08/24/22  0155   * 260* 112*   * 339* 244*   BILITOT 2.1* 1.1* 0.5   ALKPHOS 232* 205* 183*     Troponin: No results for input(s): TROPONINI in the last 72 hours. BNP: No results for input(s): BNP in the last 72 hours.   Lipids:   Recent Labs     08/22/22  0217   CHOL 229*   HDL 69     ABGs: No results found for: PHART, PO2ART, ELR6HPO  INR:   Recent Labs     08/22/22  1203   INR 1.16       Objective:   Vitals: /61   Pulse 70   Temp 98.3 °F (36.8 °C) (Oral)   Resp 11   Ht 6' (1.829 m)   Wt 188 lb (85.3 kg)   SpO2 100%   BMI 25.50 kg/m²   General appearance: alert and cooperative with exam, in no acute distress  HEENT: normocephalic, atraumatic,   Neck: supple, trachea midline  Lungs: clear to auscultation bilaterally, breathing comfortably on nc  Heart[de-identified] regular rate and rhythm, S1, S2 normal,  Abdomen: soft, non-tender; non distended, +bowel sounds  Extremities: extremities atraumatic, no cyanosis or edema  Neurologic: alert, oriented, follows commands, interactive    Assessment and Plan:     Patient Active Problem List    Diagnosis Date Noted    Chest pain 08/21/2022    Agitation due to dementia ([facility] 08/18/2022    Varicose 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 08/15/2015    Skin ulcer, limited to breakdown of skin ([address] 02/21/2022    Current moderate episode of major depressive disorder, unspecified whether recurrent ([address] 02/21/2022    Chronic kidney disease, stage IV (severe) ([facility]) 02/21/2022    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     Cr 3.1  If oral intake is ok then you may stop the LR  Thank you                  Electronically signed by Ulises Martin DO on 8/24/2022 at 8:34 AM    [address],  [facility] [address]  PHONE: [phone]  FAX: [phone]

## 2022-08-24 NOTE — CONSULTS
[facility] [address]   Consult Note  [facility] 1 2 3 4 5    Date: 2022   Patient: Ric Aguilar   : 10/18/1930   DOA: 2022   MRN: [id number]   ROOM#: -A     Reason for Consult:  Hematuria with clots in borden  Requesting Physician:  Dr. Luh Escalera  Collaborating Urologist on Call at time of admission: Dr. Marquez Daniele:  Chest pain    History Obtained From:  patient, electronic medical record    HISTORY OF PRESENT ILLNESS:                The patient is a 80 y.o. male with significant past medical history of BPH who presented with chest pain 22. Work-up in the ED revealed elevated troponin and chronic renal insufficiency for which he was admitted. Pt sustained a vaso-vagal syncopal episode with PEA requiring CPR . Later that day, he was noted to have hematuria with clots in his borden catheter. This morning, his urine is clear yellow and he is resting comfortable. He denies hematuria or issues voiding at home. ED Provider's HPI 22: This is a 79-year-old man who comes to the emergency department due to a transient episode of substernal chest pain. The patient is severely hard of hearing with a hearing aid and is a poor historian. However, he is able to answer basic questions. The patient says that he had a an episode of substernal chest pain at home, and as a result, his daughter called 911 and he was transported to the emergency department. The patient received aspirin in route to the hospital.  Here in the emergency department, the patient has no complaints. He no longer has chest pain. He denies any episodes of difficulty breathing. He denies nausea or vomiting. Denies recent fevers. Denies cough. The patient states that he feels back to normal now.     Past Medical History:        Diagnosis Date    Anemia     Anxiety     Arthritis     BPH with urinary obstruction     Depression     GERD (gastroesophageal reflux disease)     Hemorrhoids Hepatitis     Hernia of unspecified site of abdominal cavity without mention of obstruction or gangrene     Hyperlipidemia     Hypotension     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    Rt Tricep, Lt Superior Forearm     Past Surgical History:        Procedure Laterality Date    CATARACT REMOVAL      CYSTOSCOPY N/A 3/29/2021    CYSTOSCOPY EVACUATION OF CLOTS, BLADDER FULGERATION, AND SUPRA-PUBIC CATHETER INSERTION performed by Jennifer Soares MD at [facility] N/A 4/1/2021    CYSTOSCOPY, PROSTATE FULGURATION, EVACUATION OF CLOTS & TURP performed by Claudio Wasserman MD at [address] [place] [address] N/A 3/4/2021    LAPAROSCOPIC LARGE HERNIA HIATAL REPAIR WITH GASTRIC OBSTRUCTION POSS OPEN performed by Ifrah Lucero MD at [address] [facility]       Current Medications:   Current Facility-Administered Medications: piperacillin-tazobactam (ZOSYN) 4,500 mg in dextrose 5 % 100 mL IVPB (mini-bag), 4,500 mg, IntraVENous, Q12H  metoprolol tartrate (LOPRESSOR) tablet 25 mg, 25 mg, Oral, BID  lactated ringers infusion, , IntraVENous, Continuous  lidocaine 1 % injection 5 mL, 5 mL, IntraDERmal, Once  sodium chloride flush 0.9 % injection 5-40 mL, 5-40 mL, IntraVENous, 2 times per day  sodium chloride flush 0.9 % injection 5-40 mL, 5-40 mL, IntraVENous, PRN  0.9 % sodium chloride infusion, , IntraVENous, PRN  [Held by provider] apixaban (ELIQUIS) tablet 2.5 mg, 2.5 mg, Oral, BID  escitalopram (LEXAPRO) tablet 5 mg, 5 mg, Oral, Daily  finasteride (PROSCAR) tablet 5 mg, 5 mg, Oral, Daily  levothyroxine (SYNTHROID) tablet 75 mcg, 75 mcg, Oral, Daily  pantoprazole (PROTONIX) tablet 20 mg, 20 mg, Oral, Daily  predniSONE (DELTASONE) tablet 10 mg, 10 mg, Oral, Daily  QUEtiapine (SEROQUEL) tablet 100 mg, 100 mg, Oral, BID  sertraline (ZOLOFT) tablet 50 mg, 50 mg, Oral, Daily  sodium bicarbonate tablet 650 mg, 650 mg, Oral, Daily  tamsulosin (FLOMAX) capsule 0.8 mg, 0.8 mg, Oral, Nightly  temazepam (RESTORIL) capsule 15 mg, 15 mg, Oral, Nightly  sodium chloride flush 0.9 % injection 5-40 mL, 5-40 mL, IntraVENous, PRN  0.9 % sodium chloride infusion, , IntraVENous, PRN  acetaminophen (TYLENOL) tablet 650 mg, 650 mg, Oral, Q6H PRN **OR** acetaminophen (TYLENOL) suppository 650 mg, 650 mg, Rectal, Q6H PRN  polyethylene glycol (GLYCOLAX) packet 17 g, 17 g, Oral, Daily PRN  aspirin chewable tablet 81 mg, 81 mg, Oral, Daily  [Held by provider] atorvastatin (LIPITOR) tablet 40 mg, 40 mg, Oral, Nightly  promethazine (PHENERGAN) tablet 12.5 mg, 12.5 mg, Oral, Q6H PRN **OR** ondansetron (ZOFRAN) injection 4 mg, 4 mg, IntraVENous, Q6H PRN  nitroGLYCERIN (NITROSTAT) SL tablet 0.4 mg, 0.4 mg, SubLINGual, Q5 Min PRN    Allergies:  Minocycline    Social History:   TOBACCO:   reports that he quit smoking about 66 years ago. His smoking use included cigarettes. He started smoking about 71 years ago. He has a 5.00 pack-year smoking history. He has never used smokeless tobacco.  ETOH:   reports that he does not currently use alcohol. DRUGS:   reports that he does not currently use drugs. Family History:       Problem Relation Age of Onset    Depression Mother     Diabetes Mother     COPD Father     Diabetes Brother     Diabetes Maternal Grandmother        REVIEW OF SYSTEMS:     CONSTITUTIONAL:  positive for  fatigue  RESPIRATORY:  negative  CARDIOVASCULAR:  negative  GASTROINTESTINAL:  negative  GENITOURINARY:  negative    PHYSICAL EXAM:      VITALS:  /61   Pulse 70   Temp 98.3 °F (36.8 °C) (Oral)   Resp 11   Ht 6' (1.829 m)   Wt 188 lb (85.3 kg)   SpO2 100%   BMI 25.50 kg/m²      TEMPERATURE:  Current - Temp: 98.3 °F (36.8 °C); Max - Temp  Av °F (36.7 °C)  Min: 97.5 °F (36.4 °C)  Max: 98.3 °F (36.8 °C)  24HR BLOOD PRESSURE RANGE:  Systolic (82CNY), BJW:164 , Min:88 , XYS:836   ; Diastolic (85CTO), FJQ:17, Min:54, Max:73    8HR INTAKE OUTPUT:  No intake/output data recorded.   URINARY CATHETER OUTPUT (Borden):     DRAIN/TUBE OUTPUT:        Physical Exam:  General appearance: alert, appears stated age, cooperative, no distress, and mildly obese  Head: Normocephalic, without obvious abnormality, atraumatic  Back:  No CVA tenderness  Abdomen:  Soft, non-tender, non-distended  : 16fr borden catheter in place, urine clear yellow. DATA:    WBC:    Lab Results   Component Value Date/Time    WBC 7.6 08/24/2022 01:55 AM     Hemoglobin/Hematocrit:    Lab Results   Component Value Date/Time    HGB 7.7 08/24/2022 01:55 AM    HCT 23.8 08/24/2022 01:55 AM     BMP:    Lab Results   Component Value Date/Time     08/24/2022 01:55 AM    K 3.9 08/24/2022 01:55 AM     08/24/2022 01:55 AM    CO2 23 08/24/2022 01:55 AM    BUN 50 08/24/2022 01:55 AM    LABALBU 2.6 08/24/2022 01:55 AM    CREATININE 3.1 08/24/2022 01:55 AM    CALCIUM 8.2 08/24/2022 01:55 AM    GFRAA 23 08/24/2022 01:55 AM    LABGLOM 19 08/24/2022 01:55 AM     PT/INR:    Lab Results   Component Value Date/Time    PROTIME 15.0 08/22/2022 12:03 PM    INR 1.16 08/22/2022 12:03 PM     Blood Culture: NGTD    Imaging:  Echocardiogram complete 2D with doppler with color    Result Date: 8/22/2022  Transthoracic Echocardiography Report (TTE)  Demographics   Patient Name       Lashay Busing       Date of Study       08/22/2022   Date of Birth      10/18/1930        Gender              Male   Age                80 year(s)        Race                   Patient Number     [id number]        Room Number         2103   Visit Number       [id number]   Corporate ID       [id number]   Accession Number   [id number]        Jacobo Oconnor RDCS   Ordering Physician Selena Porter MD  Interpreting        Selena Porter MD                                       Physician  Procedure Type of Study   TTE procedure:ECHOCARDIOGRAM COMPLETE 2D W DOPPLER W COLOR.   Procedure Date Date: 08/22/2022 Start: 07:32 AM Study Location: Portable Technical Quality: Fair visualization Indications:Congestive heart failure. Patient Status: Routine Height: 72 inches Weight: 188 pounds BSA: 2.08 m2 BMI: 25.5 kg/m2 HR: 112 bpm BP: 130/75 mmHg  Conclusions   Summary  Left ventricular systolic function is hyperdynamic. Ejection fraction is visually estimated at >50%. Mild left ventricular hypertrophy. Sclerotic, but non-stenotic aortic valve. Mild to moderate tricuspid regurgitation; RVSP: 37 mmHg. No evidence of any pericardial effusion. Pericardial fat pad visualized. Signature   ------------------------------------------------------------------  Electronically signed by Emerson Conley MD (Interpreting  physician) on 08/22/2022 at 04:30 PM  ------------------------------------------------------------------   Findings   Left Ventricle  Left ventricular systolic function is hyperdynamic. Ejection fraction is visually estimated at >50%. Mild left ventricular hypertrophy. No regional wall motion abnormalites. Left ventricle size is normal.  Cannot determine diastolic function due to arrhythmia. Left Atrium  Essentially normal left atrium. Right Atrium  Essentially normal right atrium. Right Ventricle  Essentially normal right ventricle. Aortic Valve  Sclerotic, but non-stenotic aortic valve. Mitral Valve  Structurally normal mitral valve. Tricuspid Valve  Mild to moderate tricuspid regurgitation; RVSP: 37 mmHg. Pulmonic Valve  The pulmonic valve was not well visualized. Pericardial Effusion  No evidence of any pericardial effusion. Pleural Effusion  No evidence of pleural effusion. Miscellaneous  Pericardial fat pad visualized. IVC and abdominal aorta are not clearly visualized due to poor subcostal  window.   M-Mode/2D Measurements & Calculations   LV Diastolic Dimension:  LV Systolic Dimension:  LA Dimension: 3.4 cmAO Root  3.87 cm                  2.87 cm                 Dimension: 3.7 cmLA Area:  LV FS:25.8 %             LV Volume Diastolic:    36.6 cm2  LV PW Diastolic: 5.56 cm 328 ml  Septum Diastolic: 1.36   LV Volume Systolic: 53  cm                       ml  Septum Systolic: 3.39 cm LV EDV/LV EDV Index:    RV Diastolic Dimension:  CO: 8.90 l/min           127 ml/61 m2LV ESV/LV   1.95 cm  CI: 2.88 l/m*m2          ESV Index: 53 ml/25 m2                           EF Calculated (A4C):    LA/Aorta: 1.71  LV Area Diastolic: 21.0  74.2 %  cm2                      EF Calculated (2D):     LA volume/Index: 26 ml  LV Area Systolic: 89.5   46.7 %                  /12m2  cm2                           LV Length: 8.96 cm                            LVOT: 2.4 cm  Doppler Measurements & Calculations   MV Peak E-Wave: 116  AV Peak Velocity: 121 cm/s    LVOT Peak Velocity: 94  cm/s                 AV Peak Gradient: 5.86 mmHg   cm/s                       AV Mean Velocity: 69.8 cm/s   LVOT Mean Velocity: 50.9  MV Peak Gradient:    AV Mean Gradient: 3 mmHg      cm/s  5.38 mmHg            AV VTI: 14.2 cm               LVOT Peak Gradient: 4                       AV Area (Continuity):3.76 cm2 mmHgLVOT Mean Gradient: 1                                                     mmHg                       LVOT VTI: 11.8 cm             Estimated RVSP: 37 mmHg  MV E' Septal                                       Estimated RAP:3 mmHg  Velocity: 7.46 cm/s  Estimated PASP: 29.01 mmHg  MV E' Lateral  Velocity: 6.58 cm/s                                TR Velocity:255 cm/s  MV E/E' septal:                                    TR Gradient:26.01 mmHg  15.55                                              PV Peak Velocity: 139  MV E/E' lateral:                                   cm/s  17.63                                              PV Peak Gradient: 7.73                                                     mmHg                                                     PV Mean Velocity: 76.4 cm/s                                                     PV Mean Gradient: 3 mmHg      XR CHEST (2 VW)    Result Date: 8/21/2022  EXAMINATION: TWO XRAY VIEWS OF THE CHEST 8/21/2022 7:37 pm COMPARISON: Chest radiograph 07/22/2021. HISTORY: ORDERING SYSTEM PROVIDED HISTORY: Chest Pain TECHNOLOGIST PROVIDED HISTORY: Reason for exam:->Chest Pain Reason for Exam: Chest Pain; Shortness of Breath Additional signs and symptoms: Chest Pain; Shortness of Breath FINDINGS: The cardiomediastinal silhouette is unchanged. Bibasilar airspace opacities. No pneumothorax, vascular congestion, consolidation, or pleural effusion is identified. No acute osseous abnormality. Bibasilar airspace opacities compatible with atelectasis or pneumonia. CT HEAD WO CONTRAST    Result Date: 8/23/2022  EXAMINATION: CT OF THE HEAD WITHOUT CONTRAST  8/22/2022 11:27 pm TECHNIQUE: CT of the head was performed without the administration of intravenous contrast. Automated exposure control, iterative reconstruction, and/or weight based adjustment of the mA/kV was utilized to reduce the radiation dose to as low as reasonably achievable. COMPARISON: CT scan of brain, without contrast on 01/11/2017 HISTORY: ORDERING SYSTEM PROVIDED HISTORY: syncope TECHNOLOGIST PROVIDED HISTORY: Reason for exam:->syncope Has a \"code stroke\" or \"stroke alert\" been called? ->No Reason for Exam: syncope FINDINGS: BRAIN/VENTRICLES: On the 1st series of axial images, there were motion induced artifacts with ill-defined high-density at the upper portion of both cerebral hemispheres. Additional axial images have been obtained through these portions of brain without motion induced artifact, and, without any abnormal densities. No evidence of intracranial hemorrhage or any other definable acute intracranial abnormality. Evidence of moderate cortical atrophy changes in frontal lobes and along the sylvian fissures bilaterally. Mild central atrophy.   Moderate to marked chronic microvascular ischemic/aging changes with prominent low attenuation in the periventricular and central white matter bilaterally, similar to previous CT scan. There is no definable new or acute focal abnormality in bilateral cerebral cortex. No definite focal abnormality in bilateral basal ganglia structures, bilateral thalami, brainstem, or cerebellum. No evidence of intracranial mass, subdural or epidural hematoma or subdural hygroma. ORBITS: The visualized portion of the orbits demonstrate no acute abnormality. SINUSES: The visualized paranasal sinuses and mastoid air cells demonstrate no acute abnormality. SOFT TISSUES/SKULL:  No acute abnormality of the visualized skull or soft tissues. No definite interval change on CT scan of brain, as compared to previous CT scan on 01/11/2017. No evidence of intracranial hemorrhage or any other definable acute intracranial abnormality. No interval change versus CT scan in 2017. XR CHEST PORTABLE    Result Date: 8/23/2022  EXAMINATION: ONE XRAY VIEW OF THE CHEST 8/23/2022 9:08 pm COMPARISON: Chest radiograph dated August 22, 2022 HISTORY: ORDERING SYSTEM PROVIDED HISTORY: CVC placement TECHNOLOGIST PROVIDED HISTORY: Reason for exam:->CVC placement Reason for Exam: CVC placement Additional signs and symptoms: nine Relevant Medical/Surgical History: GERD FINDINGS: The cardiomediastinal silhouette is stable. A right IJ line is in place with the tip overlying the proximal right atrial region. Bibasilar parenchymal opacities are seen. There is no definite pneumothorax. There is a small left pleural effusion. 1. Right IJ line is seen with the tip overlying the proximal right atrial region. 2. Small left pleural effusion without significant change. 3. Bibasilar parenchymal opacities which could represent atelectasis/infiltrates. No significant change.      XR CHEST PORTABLE    Result Date: 8/22/2022  EXAMINATION: ONE XRAY VIEW OF THE CHEST 8/22/2022 10:27 pm COMPARISON: 1 day prior. HISTORY: ORDERING SYSTEM PROVIDED HISTORY: verify cvc placement TECHNOLOGIST PROVIDED HISTORY: Reason for exam:->verify cvc placement Reason for Exam: verify cvc placement Additional signs and symptoms: verify cvc placement FINDINGS: Right internal jugular center venous catheter placed with the tip at the superior cavoatrial junction. No pneumothorax. Decreased lung volumes with increased bibasilar atelectasis. No pleural effusion. Cardiac and mediastinal contours stable. No acute osseous abnormality. 1. Right internal jugular center venous catheter tip at the superior cavoatrial junction. 2. No pneumothorax. 3. Low lung volumes with increased bibasilar atelectasis. VL DUP LOWER EXTREMITY VENOUS BILATERAL    Result Date: 8/22/2022  EXAMINATION: DUPLEX VENOUS ULTRASOUND OF THE BILATERAL LOWER EXTREMITIES8/22/2022 5:56 pm TECHNIQUE: Duplex ultrasound using B-mode/gray scaled imaging, Doppler spectral analysis and color flow Doppler was obtained of the deep venous structures of the lower bilateral extremities. COMPARISON: 09/03/2021 HISTORY: ORDERING SYSTEM PROVIDED HISTORY: f/u RLE DVT TECHNOLOGIST PROVIDED HISTORY: Reason for exam:->f/u RLE DVT FINDINGS: There is persistent partially occlusive thrombus in the right peroneal and posterior tibial veins. The remaining visualized veins of the bilateral lower extremities are patent and free of echogenic thrombus. The veins demonstrate good compressibility with normal color flow study and spectral analysis. Persistent partially occlusive thrombus in the right peroneal and posterior tibial veins. No evidence of left lower extremity DVT. US ABDOMEN LIMITED Specify organ? LIVER, GALLBLADDER    Result Date: 8/22/2022  EXAMINATION: RIGHT UPPER QUADRANT ULTRASOUND 8/22/2022 5:56 pm COMPARISON: None.  HISTORY: ORDERING SYSTEM PROVIDED HISTORY: transaminitis, hyperbilirubinemia TECHNOLOGIST PROVIDED HISTORY: Reason for exam:->transaminitis, hyperbilirubinemia Specify organ?->LIVER Specify organ?->GALLBLADDER FINDINGS: LIVER:  The liver demonstrates normal echogenicity without evidence of intrahepatic biliary ductal dilatation. BILIARY SYSTEM:  The gallbladder contains layering sludge and stones. The gallbladder wall is thickened to 5 mm. No pericholecystic fluid is evident. Sonographic Myranda Grundy sign was not well assessed as the patient was medicated for the examination. Common bile duct is within normal limits measuring 4 mm in diameter. RIGHT KIDNEY: The right kidney is grossly unremarkable without evidence of hydronephrosis. PANCREAS:  Visualized portions of the pancreas are unremarkable. OTHER: No evidence of right upper quadrant ascites. Cholelithiasis with gallbladder wall thickening suspicious for cholelithiasis. Sonographic Myranda Grundy sign was not well assessed as the patient was medicated for the examination. Nuclear medicine hepatobiliary scan may be useful for further characterization as these findings can be seen in the setting of acute or chronic cholecystitis. Assessment & Plan:      Julio César Black is a 80y.o. year old male admitted 8/21/2022 for chest pain. 1) Gross Hematuria: Resolved. Likely secondary to borden catheter trauma and anticoagulation. Eliquis currently being held. S/p Cystoscopy, clot evacuation, transurethral resection of prostate and prostatic fulguration, and placement of a 22fr 3-way catheter on 4/1/21   Hgb 7.7, recommend PRBC transfusion if <7.0   Keep catheter fastened to leg securing device. Manually irrigate/aspirate catheter if recurrence of hematuria/clots. 2) BPH: h/o 90g prostate gland. Chronically on Flomax 0.4mg BID and Proscar 5mg. Recommend borden removal/voiding trial prior to discharge once more medically stable. Pt stable from a  standpoint. Will sign off, please call with any questions. Pt to follow up with in our office in 1 month for reevaluation.     Patient seen and examined, chart reviewed.      Electronically signed by Simi Randall PA-C on 8/24/2022 at 8:10 AM

## 2022-08-25 PROBLEM — I46.9 CARDIAC ARREST (HCC): Status: ACTIVE | Noted: 2022-08-25

## 2022-08-25 LAB
ALBUMIN SERPL-MCNC: 2.6 GM/DL (ref 3.4–5)
ALP BLD-CCNC: 169 IU/L (ref 40–129)
ALT SERPL-CCNC: 162 U/L (ref 10–40)
ANION GAP SERPL CALCULATED.3IONS-SCNC: 8 MMOL/L (ref 4–16)
AST SERPL-CCNC: 48 IU/L (ref 15–37)
BILIRUB SERPL-MCNC: 0.3 MG/DL (ref 0–1)
BILIRUBIN DIRECT: 0.2 MG/DL (ref 0–0.3)
BILIRUBIN, INDIRECT: 0.1 MG/DL (ref 0–0.7)
BUN BLDV-MCNC: 47 MG/DL (ref 6–23)
CALCIUM SERPL-MCNC: 7.9 MG/DL (ref 8.3–10.6)
CHLORIDE BLD-SCNC: 109 MMOL/L (ref 99–110)
CO2: 23 MMOL/L (ref 21–32)
CREAT SERPL-MCNC: 2.8 MG/DL (ref 0.9–1.3)
FERRITIN: 121 NG/ML (ref 30–400)
GFR AFRICAN AMERICAN: 26 ML/MIN/1.73M2
GFR NON-AFRICAN AMERICAN: 21 ML/MIN/1.73M2
GLUCOSE BLD-MCNC: 120 MG/DL (ref 70–99)
HCT VFR BLD CALC: 23.1 % (ref 42–52)
HEMOGLOBIN: 7.2 GM/DL (ref 13.5–18)
IRON: 50 UG/DL (ref 59–158)
MCH RBC QN AUTO: 30.8 PG (ref 27–31)
MCHC RBC AUTO-ENTMCNC: 31.2 % (ref 32–36)
MCV RBC AUTO: 98.7 FL (ref 78–100)
PCT TRANSFERRIN: 28 % (ref 10–44)
PDW BLD-RTO: 15.1 % (ref 11.7–14.9)
PLATELET # BLD: 143 K/CU MM (ref 140–440)
PMV BLD AUTO: 9.9 FL (ref 7.5–11.1)
POTASSIUM SERPL-SCNC: 4 MMOL/L (ref 3.5–5.1)
RBC # BLD: 2.34 M/CU MM (ref 4.6–6.2)
SODIUM BLD-SCNC: 140 MMOL/L (ref 135–145)
TOTAL IRON BINDING CAPACITY: 178 UG/DL (ref 250–450)
TOTAL PROTEIN: 4.1 GM/DL (ref 6.4–8.2)
UNSATURATED IRON BINDING CAPACITY: 128 UG/DL (ref 110–370)
WBC # BLD: 6.5 K/CU MM (ref 4–10.5)

## 2022-08-25 PROCEDURE — 6370000000 HC RX 637 (ALT 250 FOR IP): Performed by: NURSE PRACTITIONER

## 2022-08-25 PROCEDURE — 97166 OT EVAL MOD COMPLEX 45 MIN: CPT

## 2022-08-25 PROCEDURE — 94761 N-INVAS EAR/PLS OXIMETRY MLT: CPT

## 2022-08-25 PROCEDURE — 97162 PT EVAL MOD COMPLEX 30 MIN: CPT

## 2022-08-25 PROCEDURE — 82728 ASSAY OF FERRITIN: CPT

## 2022-08-25 PROCEDURE — 36592 COLLECT BLOOD FROM PICC: CPT

## 2022-08-25 PROCEDURE — 83550 IRON BINDING TEST: CPT

## 2022-08-25 PROCEDURE — 97116 GAIT TRAINING THERAPY: CPT

## 2022-08-25 PROCEDURE — 1200000000 HC SEMI PRIVATE

## 2022-08-25 PROCEDURE — 2580000003 HC RX 258: Performed by: STUDENT IN AN ORGANIZED HEALTH CARE EDUCATION/TRAINING PROGRAM

## 2022-08-25 PROCEDURE — 85027 COMPLETE CBC AUTOMATED: CPT

## 2022-08-25 PROCEDURE — 2580000003 HC RX 258: Performed by: PHYSICIAN ASSISTANT

## 2022-08-25 PROCEDURE — 97530 THERAPEUTIC ACTIVITIES: CPT

## 2022-08-25 PROCEDURE — 96361 HYDRATE IV INFUSION ADD-ON: CPT

## 2022-08-25 PROCEDURE — 6370000000 HC RX 637 (ALT 250 FOR IP): Performed by: INTERNAL MEDICINE

## 2022-08-25 PROCEDURE — 97535 SELF CARE MNGMENT TRAINING: CPT

## 2022-08-25 PROCEDURE — 82248 BILIRUBIN DIRECT: CPT

## 2022-08-25 PROCEDURE — 83540 ASSAY OF IRON: CPT

## 2022-08-25 PROCEDURE — G0378 HOSPITAL OBSERVATION PER HR: HCPCS

## 2022-08-25 PROCEDURE — 6360000002 HC RX W HCPCS: Performed by: STUDENT IN AN ORGANIZED HEALTH CARE EDUCATION/TRAINING PROGRAM

## 2022-08-25 PROCEDURE — 80053 COMPREHEN METABOLIC PANEL: CPT

## 2022-08-25 RX ADMIN — FINASTERIDE 5 MG: 5 TABLET, FILM COATED ORAL at 08:50

## 2022-08-25 RX ADMIN — SODIUM BICARBONATE 650 MG: 650 TABLET ORAL at 08:50

## 2022-08-25 RX ADMIN — METOPROLOL TARTRATE 25 MG: 25 TABLET, FILM COATED ORAL at 20:10

## 2022-08-25 RX ADMIN — SODIUM CHLORIDE, PRESERVATIVE FREE 10 ML: 5 INJECTION INTRAVENOUS at 09:30

## 2022-08-25 RX ADMIN — PREDNISONE 10 MG: 10 TABLET ORAL at 08:50

## 2022-08-25 RX ADMIN — SODIUM CHLORIDE, PRESERVATIVE FREE 10 ML: 5 INJECTION INTRAVENOUS at 20:11

## 2022-08-25 RX ADMIN — TEMAZEPAM 15 MG: 7.5 CAPSULE ORAL at 20:10

## 2022-08-25 RX ADMIN — PIPERACILLIN AND TAZOBACTAM 4500 MG: 4; .5 INJECTION, POWDER, FOR SOLUTION INTRAVENOUS; PARENTERAL at 17:50

## 2022-08-25 RX ADMIN — ASPIRIN 81 MG CHEWABLE TABLET 81 MG: 81 TABLET CHEWABLE at 08:50

## 2022-08-25 RX ADMIN — METOPROLOL TARTRATE 25 MG: 25 TABLET, FILM COATED ORAL at 08:50

## 2022-08-25 RX ADMIN — QUETIAPINE FUMARATE 100 MG: 100 TABLET ORAL at 08:50

## 2022-08-25 RX ADMIN — LEVOTHYROXINE SODIUM 75 MCG: 0.07 TABLET ORAL at 05:16

## 2022-08-25 RX ADMIN — TAMSULOSIN HYDROCHLORIDE 0.8 MG: 0.4 CAPSULE ORAL at 20:10

## 2022-08-25 RX ADMIN — QUETIAPINE FUMARATE 100 MG: 100 TABLET ORAL at 20:12

## 2022-08-25 RX ADMIN — PANTOPRAZOLE 20 MG: 20 TABLET, DELAYED RELEASE ORAL at 05:16

## 2022-08-25 RX ADMIN — PIPERACILLIN AND TAZOBACTAM 4500 MG: 4; .5 INJECTION, POWDER, FOR SOLUTION INTRAVENOUS; PARENTERAL at 05:25

## 2022-08-25 RX ADMIN — ESCITALOPRAM OXALATE 5 MG: 10 TABLET ORAL at 08:50

## 2022-08-25 RX ADMIN — SERTRALINE HYDROCHLORIDE 50 MG: 50 TABLET ORAL at 08:50

## 2022-08-25 ASSESSMENT — PAIN SCALES - GENERAL: PAINLEVEL_OUTOF10: 0

## 2022-08-25 NOTE — PROGRESS NOTES
[facility] THERAPY EVALUATION    Jyoti Vaughan, 10/18/1930, 3001/3001-A, 8/25/2022    Discharge Recommendation: SNF    History:  [place]:  The primary encounter diagnosis was Chest pain, unspecified type. Diagnoses of Elevated troponin and Chronic kidney disease, stage IV (severe) (HCC) were also pertinent to this visit. Subjective:  Patient states: \"I think my bowels are gonna move\"  Pain: denies  Communication with other providers: coeval with PT Ankush Cardozo, handoff to [employer]  Restrictions: general precautions, fall risk    Home Setup/Prior level of function:  Social/Functional History  Lives With: Spouse  Type of Home: House  Home Layout: Laundry in basement, Able to Live on Main level with bedroom/bathroom, Two level  Home Access: Stairs to enter without rails  Entrance Stairs - Number of Steps: 3  Bathroom Shower/Tub: Tub/Shower unit, Shower chair with back  Bathroom Toilet: Standard  Bathroom Equipment: Shower chair  Home Equipment: Walker, rolling (pt unsure if standard or rolling walker)  Has the patient had two or more falls in the past year or any fall with injury in the past year?: Unknown  ADL Assistance: Independent  Homemaking Assistance: Needs assistance (wife assists)  Ambulation Assistance: Independent  Transfer Assistance: Independent  Active : No  Patient's  Info: son drives  Additional Comments: pt somewhat questionable historian this date. son lives in [address] [place] [address]    Examination:  Observation: Pt was semi fowlers in bed upon arrival, agreeable to session. Tele, IV  Vision: WFL, wears glasses typically for general vision  Hearing: [place], hears better out of R ear  Objective Measures: several BP readings taken throughout session d/t pt with recent hypotension, symptomatic.  Readings as follows:  96/61 (72) standing  114/70 (84) sitting EOB  97/68 (78) on toilet 1  99/57 (70) on toilet 2 after stand  96/55 (68) in chair at end of session    Body Systems and functions:  ROM: WFL in BUE  Strength: 4/5 in BUE  Sensation: WFL BUE, states BLE dulled sensation  Tone: normotonic in BUE  Coordination: fine motor somewhat impaired  Activity Tolerance: fair    Activities of Daily Living (ADLs):  Feeding: ind  Grooming: setup/SBA while seated  Toileting: max A standing pericare  UB dressing/bathing: SBA  LB dressing/bathing: mod A to don socks EOB    *pt ADL function inferred from gross functional assessment of mobility, balance, posture, safety awareness, activity tolerance (unless otherwise indicated)    Cognitive and Psychosocial Functioning:  Overall cognitive status: WNL, A/Ox3 (unaware of situation). Pt with mild confusion, poor safety awareness/insight throughout session. Affect: pleasant    Functional Mobility:  Bed Mobility: CGA  Sit <> Stand: min Ax2, min Ax1, mod Ax1    Ambulation: min Ax1 using FWW (assist for walker mgmt)      AM-PAC 6 click short form for inpatient daily activity:   How much help from another person does the patient currently need. .. Unable  Dep A Lot  Max A A Lot   Mod A A Little  Min A A Little   CGA  SBA None   Mod I  Indep  Sup   1. Putting on and taking off regular lower body clothing? [] 1    [] 2   [x] 2   [] 3   [] 3   [] 4      2. Bathing (including washing, rinsing, drying)? [] 1   [] 2   [x] 2 [] 3 [] 3 [] 4   3. Toileting, which includes using toilet, bedpan, or urinal? [] 1    [x] 2   [] 2   [] 3   [] 3   [] 4     4. Putting on and taking off regular upper body clothing? [] 1   [] 2   [] 2   [] 3   [x] 3    [] 4      5. Taking care of personal grooming such as brushing teeth? [] 1   [] 2    [] 2 [] 3    [x] 3   [] 4      6. Eating meals?    [] 1   [] 2   [] 2   [] 3   [] 3   [x] 4        Raw Score:  16      24/24 = unimpaired  23/24 = 1-20% impaired   20/24-22/24 = 21-40% impaired  15/24-19/24 = 41-59% impaired   10/24-14/24 = 60%-79% impaired  7/24-9/24 = 80%-99% impaired  6/24 = 100% impaired     Treatment:  Self Care Training (20 minutes):   Self care training was performed today. Cues were given for safety, sequence, UE/LE placement, visual cues, and balance. Activities performed today included pt able to don socks at EOB with mod A (able to don one of them). Pt later in bathroom standing at sink to perform ADLs, however pt states his bowels are moving quickly. Pt with quick pace ambulating backwards to toilet with FWW, unaware of where toilet was, generally unsafe. Pt sitting on commode CGA. Pt having large BM, standing with mod A receiving partial pericare max A. Pt having another bout of BM, sitting with CGA and having additional BM. Pt standing again with mod A, max A for pericare. Pt BP low throughout toileting tasks, further ADLs deferred today. Therapeutic Activity Training (10 minutes): Therapeutic activity training was instructed today. Cues were given for safety, sequence, UE/LE placement, visual cues, and balance. Activities performed today included pt demo supine to sit EOB with CGA. Pt standing from EOB with min Ax2, pt unable to fully clear bed. Pt attempting STS second time, able to stand min Ax1. Pt ambulating to bathroom, completing above self cares. Pt completing second bout of pericare, demo SPT to chair with min Ax1. Pt wheeled back into room, positioned for comfort. Edu on therapy POC, discharge disposition/needs. Handoff to CM. Education: Role of OT, OT POC, discharge needs, safety, benefits of EOB/OOB activity, AD/DME needs, Home safety  Safety Measures: Gait belt used for safety of pt and therapist, Left in recliner, Alarm in place, call light and phone within reach, lines managed    Assessment:  Pt is a pleasant 80 yr old male from home with spouse who presents with chest pain. Prior to admission, pt was ind with ADLs and mobility using walker. Pt currently well below baseline, pt requiring min-mod A for mobility using FWW, mod-max A for LB tasks, ind-SBA for UB tasks.  Pt with symptomatic hypotension throughout session today, generalized weakness, impaired endurance and poor safety. Pt would benefit from continued IP OT services during their stay, and discharge to SNF.     Complexity: mod  Prognosis: good  Barriers: safety, endurance, BP    Plan:  Plan: 3+/week    Treatment to include: Strengthening, ROM, Balance Training, Functional Mobility Training, Endurance Training, Gait Training, Pain Management, Safety Education and Training, Patient+Caregiver Education and Training, Equipment Evaluation Education and Procurement, Positioning, Self Care Training, Home Management Training, Coordination Training, cognitive reorientation, cognitive/perceptual training    Pt Would Benefit from Continued Edu on safety  Adaptive Equipment Recommendations: none    Goals:  Time frame for goals: 2 weeks  Pt will complete feeding tasks with ind  Pt will complete grooming tasks with mod I standing at sink  Pt will complete toileting tasks with min A using standard commode  Pt will complete UB dressing and bathing tasks with ind  Pt will complete LB dressing and bathing tasks with min A  Pt will complete therapeutic exercise/activity to increase independence in ADL/IADL function  Pt will practice functional transfers and mobility with AD for increased safety and independence    Time:   Time in: 0859  Time out: 0939  Treatment Minutes: 30  Evaluation Minutes: 10  Total time: 40    Electronically signed by:      GONZÁLEZ Rodriguez/L  8/25/2022, 11:22 AM

## 2022-08-25 NOTE — PROGRESS NOTES
Nephrology Progress Note        [address], [address] [address]  Phone: [phone]  Office Hours: 8:30AM - 4:30PM  Monday - Friday 8/25/2022 8:21 AM  Subjective:   Admit Date: 8/21/2022  PCP: No primary care provider on file. Interval History:   Doing well  Good BP    Diet: ADULT DIET; Regular; 5 carb choices (75 gm/meal); No Caffeine      Data:   Scheduled Meds:   piperacillin-tazobactam  4,500 mg IntraVENous Q12H    metoprolol tartrate  25 mg Oral BID    lidocaine  5 mL IntraDERmal Once    sodium chloride flush  5-40 mL IntraVENous 2 times per day    [Held by provider] apixaban  2.5 mg Oral BID    escitalopram  5 mg Oral Daily    finasteride  5 mg Oral Daily    levothyroxine  75 mcg Oral Daily    pantoprazole  20 mg Oral Daily    predniSONE  10 mg Oral Daily    QUEtiapine  100 mg Oral BID    sertraline  50 mg Oral Daily    sodium bicarbonate  650 mg Oral Daily    tamsulosin  0.8 mg Oral Nightly    temazepam  15 mg Oral Nightly    aspirin  81 mg Oral Daily    [Held by provider] atorvastatin  40 mg Oral Nightly     Continuous Infusions:   sodium chloride      sodium chloride       PRN Meds:sodium chloride flush, sodium chloride, sodium chloride flush, sodium chloride, acetaminophen **OR** acetaminophen, polyethylene glycol, promethazine **OR** ondansetron, nitroGLYCERIN  I/O last 3 completed shifts:  In: -   Out: 2450 [Urine:2450]  No intake/output data recorded.     Intake/Output Summary (Last 24 hours) at 8/25/2022 0821  Last data filed at 8/25/2022 0640  Gross per 24 hour   Intake --   Output 1200 ml   Net -1200 ml       CBC:   Recent Labs     08/23/22  0420 08/24/22  0155 08/25/22  0515   WBC 9.3 7.6 6.5   HGB 7.9* 7.7* 7.2*    138* 143       BMP:    Recent Labs     08/23/22  0420 08/24/22  0155 08/25/22  0515    139 140   K 3.8 3.9 4.0    105 109   CO2 24 23 23   BUN 48* 50* 47*   CREATININE 2.9* 3.1* 2.8*   GLUCOSE 134* 153* 120*     Hepatic:   Recent Labs     08/23/22  0420 08/24/22  0155 08/25/22  0515   * 112* 48*   * 244* 162*   BILITOT 1.1* 0.5 0.3   ALKPHOS 205* 183* 169*     Troponin: No results for input(s): TROPONINI in the last 72 hours. BNP: No results for input(s): BNP in the last 72 hours. Lipids: No results for input(s): CHOL, HDL in the last 72 hours.     Invalid input(s): LDLCALCU  ABGs: No results found for: PHART, PO2ART, QSU9XRM  INR:   Recent Labs     08/22/22  1203   INR 1.16       Objective:   Vitals: /68   Pulse 74   Temp 98 °F (36.7 °C) (Axillary)   Resp 11   Ht 6' (1.829 m)   Wt 188 lb (85.3 kg)   SpO2 94%   BMI 25.50 kg/m²   General appearance: alert and cooperative with exam, in no acute distress  HEENT: normocephalic, atraumatic,   Neck: supple, trachea midline  Lungs: breathing comfortably   Heart[de-identified] regular rate and rhythm, S1, S2 normal,  Extremities: extremities atraumatic, no cyanosis or edema  Neurologic: alert,     Assessment and Plan:     Patient Active Problem List    Diagnosis Date Noted    Chest pain 08/21/2022    Agitation due to dementia ([facility] 08/18/2022    Varicose 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 08/15/2015    Skin ulcer, limited to breakdown of skin ([address] 02/21/2022    Current moderate episode of major depressive disorder, unspecified whether recurrent ([address] 02/21/2022    Chronic kidney disease, stage IV (severe) (HCC) 02/21/2022    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

## 2022-08-25 NOTE — PROGRESS NOTES
Daily Progress Note  Subjective:  Awake and alert; feeling well  Denies any CP or SOB  BP and HR stable; NSR on tele  Transferred out of ICU stable  Attending Note:    Patient is moved out of ICU--3 E. remains in sinus rhythm--heart rate stable-blood pressure stable  No further arrhythmias or bradycardia noted  Is awake alert--no chest pain no shortness of present  Syncope possible vasovagal--stable at present  Continue conservative treatment--very hard of hearing  History of DVT on low-dose anticoagulation  Renal insufficiency--creatinine is 2.8  LFTs are improving also  Continue supportive care  Stable from cardiac stand    Impression and Plan:   Chest pain    Having some discomfort at home; however no new reports    Troponin mildly elevated; likely in setting of CKD    EKG showed no new ischemia    Echo showed preserved EF    Continue lopressor at this time       Syncope    After going to restroom    Likely vasovagal syncope    Denies any dizziness or lightheadedness today    CT head negative    No further episodes since      Anemia    Hgb 7.2 today    Hold asa and eliquis    Hematuria resolving     Hx of DVT's; Normally on Eliquis;for now on hold for anemia    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 Ejection fraction is visually estimated at >50%. Mild left ventricular hypertrophy. Sclerotic, but non-stenotic aortic valve. Mild to moderate tricuspid regurgitation; RVSP: 37 mmHg. No evidence of any pericardial effusion. Pericardial fat pad visualized. Radiology  US of legs 8/22/2022          Impression:         Persistent partially occlusive thrombus in the right peroneal and posterior   tibial veins. No evidence of left lower extremity DVT.        US of gallbladder 8/22/22          Impression:         Cholelithiasis with gallbladder wall thickening suspicious for   cholelithiasis. Sonographic Terisa Balloon sign was not well assessed as the patient   was medicated for the examination. Nuclear medicine hepatobiliary scan may   be useful for further characterization as these findings can be seen in the   setting of acute or chronic cholecystitis. CT head 8/23/2022  Impression   No evidence of intracranial hemorrhage or any other definable acute   intracranial abnormality. No interval change versus CT scan in 2017. PAST MEDICAL HISTORY:  Anemia, anxiety, arthritis, BPH, depression,  GERD, hemorrhoids, hepatitis, hernia, hypotension, hyperlipidemia,  squamous cell carcinoma, CKD and DVT. PAST SURGICAL HISTORY:  Cataracts removal, hernia repair, neck surgery. SOCIAL HISTORY:  He is a former smoker. He does not use any alcohol. He does not use any illicit drugs. ALLERGIES:  Allergic to MINOCYCLINE. HOME MEDICATIONS:  He is on prednisone, Lasix, sodium bicarb, Synthroid,  Proscar, Eliquis 2.5 mg, Flomax, Seroquel, Zoloft, Lexapro, Protonix.      Objective:   /71   Pulse 82   Temp 98.1 °F (36.7 °C) (Axillary) Comment (Src): pt eating breakfast  Resp 15   Ht 6' (1.829 m)   Wt 188 lb (85.3 kg)   SpO2 96%   BMI 25.50 kg/m²     Intake/Output Summary (Last 24 hours) at 8/25/2022 0955  Last data filed at 8/25/2022 0640  Gross per 24 hour   Intake --   Output 1200 ml   Net -1200 ml       Medications:   Scheduled Meds:   piperacillin-tazobactam  4,500 mg IntraVENous Q12H    metoprolol tartrate  25 mg Oral BID    lidocaine  5 mL IntraDERmal Once    sodium chloride flush  5-40 mL IntraVENous 2 times per day    [Held by provider] apixaban  2.5 mg Oral BID    escitalopram  5 mg Oral Daily    finasteride  5 mg Oral Daily    levothyroxine  75 mcg Oral Daily    pantoprazole  20 mg Oral Daily    predniSONE  10 mg Oral Daily    QUEtiapine  100 mg Oral BID    sertraline  50 mg Oral Daily    sodium bicarbonate  650 mg Oral Daily    tamsulosin  0.8 mg Oral Nightly    temazepam  15 mg Oral Nightly    aspirin  81 mg Oral Daily    [Held by provider] atorvastatin  40 mg Oral Nightly      Infusions:   sodium chloride      sodium chloride        PRN Meds:  sodium chloride flush, sodium chloride, sodium chloride flush, sodium chloride, acetaminophen **OR** acetaminophen, polyethylene glycol, promethazine **OR** ondansetron, nitroGLYCERIN     Physical Exam:  Vitals:    08/25/22 0846   BP: 121/71   Pulse: 82   Resp: 15   Temp: 98.1 °F (36.7 °C)   SpO2: 96%        General: AAO, NAD  Chest: Nontender  Cardiac: First and Second Heart Sounds are Normal, No Murmurs or Gallops noted  Lungs:Clear to auscultation and percussion. Abdomen: Soft, NT, ND, +BS  Extremities: No clubbing, no edema  Vascular:  Equal 2+ peripheral pulses. Lab Data:  CBC:   Recent Labs     08/23/22  0420 08/24/22  0155 08/25/22  0515   WBC 9.3 7.6 6.5   HGB 7.9* 7.7* 7.2*   HCT 24.8* 23.8* 23.1*   MCV 98.8 98.8 98.7    138* 143     BMP:   Recent Labs     08/23/22  0420 08/24/22  0155 08/25/22  0515    139 140   K 3.8 3.9 4.0    105 109   CO2 24 23 23   BUN 48* 50* 47*   CREATININE 2.9* 3.1* 2.8*     LIVER PROFILE:   Recent Labs     08/23/22  0420 08/24/22  0155 08/25/22  0515   * 112* 48*   * 244* 162*   BILIDIR 1.0* 0.3 0.2   BILITOT 1.1* 0.5 0.3   ALKPHOS 205* 183* 169*     PT/INR:   Recent Labs     08/22/22  1203   PROTIME 15.0*   INR 1.16     APTT:   Recent Labs     08/22/22  1203   APTT 31.3     BNP:  No results for input(s): BNP in the last 72 hours.       Assessment:  Patient Active Problem List    Diagnosis Date Noted    Cardiac arrest ([address] 08/25/2022    Chest pain 08/21/2022    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) 08/18/2022    Varicose 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 08/15/2015    Skin ulcer, limited to breakdown of skin ([facility] 02/21/2022    Current moderate

## 2022-08-25 NOTE — PROGRESS NOTES
Hospitalist Progress Note      Name:  Cj Rob /Age/Sex: 10/18/1930  (80 y.o. male)   MRN & CSN:  [id number] & [id number] Admission Date/Time: 2022  6:43 PM   [facility] PCP: No primary care provider on file. Hospital Day: 5    Assessment and Plan:   Cj Rob is a 80 y.o.  male  who presents with Chest pain    Patient had initially presented with chest pain. Elevated troponin in the setting of CKD. Cardiology was consulted. Unlikely ACS. Will keep monitoring in telemetry Episode of  PEA with CPR and ROSC during the hospital stay. No apparent cause. CT head negative. patient does not remember the event . currently in sinus in telemetry. EKG did not show any ischemic changes. Echo showed preserved EF. Plan to continue Lopressor at this time. Anemia - likely multifactorial. No active bleeding. Will keep monitoring for now. Panel and FOBT has been ordered waiting for labs to be drawn. KAVON on CKD - Cr stable. Nephrology on board. Gross hematuria - likely trauma due to Borden . Eliquis held for now. BPH - chronically on Flomax 0.4 mg and Proscar 5 mg. Recommend borden removal/voiding trial prior to discharge once more medically stable    Lactic acidosis was present likely postcode - resolved. Hypokalemia: Resolved    Transaminitis- unknown etiology, no abdominal pain; improving. Statin held. US abdomen showed cholelithiasis but no signs of cholecystitis    Hyperbilirubinemia: Trending down    Hx Recurrent DVT of RLE: Continue Eliquis    PT recommended SNF. waiting for placement otherwise stable    Other co-morbidities     Hx Multifactorial Anemia: Hgb 8.9 but all cell lines dropped overnight, repeat in AM; no suspected source of clinical bleeding  Hx Bullous Pemphigoid: Continue daily steroids; follows with Dermatology as outpatient  Renal Cyst: Being followed by Nephrology as OP  Chronic bilateral leg edema: Being followed by Nephrology, continue daily PO Lasix  Hx CKD Stage IV: Cr stable at 2.5, monitor, continue supplemental sodium bicarb  Hx BPH: Contniue Tamsulosin and Proscar  Hx Peripheral Neuropathy  Hx Hypothyroidism: Levothyroxine resumed  Hx Anxiety/Depression: continue Zoloft, Seroquel, Lexapro  Hx SCC Skin Cancer: Follows OP with Heme/Onc  Hx Insomnia: Continue home Temazepam    Diet ADULT DIET; Regular; 5 carb choices (75 gm/meal); No Caffeine   DVT Prophylaxis [] Lovenox, []  Heparin, [] SCDs, [] Ambulation   GI Prophylaxis [] PPI,  [] H2 Blocker,  [] Carafate,  [] Diet/Tube Feeds   Code Status DNR-CC   Disposition Patient requires continued admission due to status post cardiac arrest   MDM [] Low, [] Moderate,[x]  High  Patient's risk as above due to cardiac arrest recently     History of Present Illness:     Chief Complaint: Chest pain  Dm Martinez is a 80 y.o.  male  who presents with chest pain     Patient looks more awake and alert. Denies any complaints    Ten point ROS reviewed negative, unless as noted above    Objective: Intake/Output Summary (Last 24 hours) at 8/25/2022 1544  Last data filed at 8/25/2022 0640  Gross per 24 hour   Intake --   Output 1200 ml   Net -1200 ml      Vitals:   Vitals:    08/25/22 0846   BP: 121/71   Pulse: 82   Resp: 15   Temp: 98.1 °F (36.7 °C)   SpO2: 96%     Physical Exam:   GEN Awake male, sitting upright in bed in no apparent distress. Appears given age. EYES Pupils are equally round. No scleral erythema, discharge, or conjunctivitis. HENT Mucous membranes are moist. Oral pharynx without exudates, no evidence of thrush. NECK Supple, no apparent thyromegaly or masses. RESP Clear to auscultation, no wheezes, rales or rhonchi. Symmetric chest movement while on room air. CARDIO/VASC S1/S2 auscultated. Regular rate without appreciable murmurs, rubs, or gallops. No JVD or carotid bruits. Peripheral pulses equal bilaterally and palpable. No peripheral edema.   GI Abdomen is soft without significant

## 2022-08-25 NOTE — CARE COORDINATION
CM met with therapists Mendoza Jane and Lebron Reed who state they are recommending SNF. CM call to Pt son Shaw Patient, cell phone does not have VM set up. VM left on home phone for return call. CM call to Pt daughter Sandra Priest, left VM for return call. 11:19 AM   CM contacted by Shaw Patient. Pt is from home with his daughter Sandra Priest. Pt has DME to include a walker. Shaw Patient is agreeable with therapy recommendation of SNF. Choice of [employer]. Referral made to [address] [place] [address]. Pt has insurance, pcp, and can afford medications.     CM following

## 2022-08-25 NOTE — CONSULTS
[address] [facility],2Nd Floor ACUTE CARE PHYSICAL THERAPY EVALUATION  Brittani Cat, 10/18/1930, 3001/3001-A, 8/25/2022    History  [place]:  The primary encounter diagnosis was Chest pain, unspecified type. Diagnoses of Elevated troponin and Chronic kidney disease, stage IV (severe) (HCC) were also pertinent to this visit. Patient  has a past medical history of Anemia, Anxiety, Arthritis, BPH with urinary obstruction, Depression, GERD (gastroesophageal reflux disease), Hemorrhoids, Hepatitis, Hernia of unspecified site of abdominal cavity without mention of obstruction or gangrene, Hyperlipidemia, Hypotension, and SCC (squamous cell carcinoma). Patient  has a past surgical history that includes Neck surgery; Cataract removal; hernia repair; hiatal hernia repair (N/A, 3/4/2021); Cystoscopy (N/A, 3/29/2021); and Cystoscopy (N/A, 4/1/2021). Subjective:  Patient states:  \"I don't remember (why pt is at hospital)\". Pain:  Pt reports no pain. Communication with other providers:  Handoff to RN, co-eval with Chepe FAYE for safety   Restrictions: Tele, IV,      Home Setup/Prior level of function  Social/Functional History  Lives With: Spouse  Type of Home: House  Home Layout: Laundry in basement, Able to Live on Main level with bedroom/bathroom, Two level  Home Access: Stairs to enter without rails  Entrance Stairs - Number of Steps: 3  Bathroom Shower/Tub: Tub/Shower unit, Shower chair with back  Bathroom Toilet: Standard  Bathroom Equipment: Shower chair  Home Equipment: Walker, rolling (pt unsure if standard or rolling walker)  Has the patient had two or more falls in the past year or any fall with injury in the past year?: Unknown  ADL Assistance: Independent  Homemaking Assistance: Needs assistance (wife assists)  Ambulation Assistance: Independent  Transfer Assistance: Independent  Active : No  Patient's  Info: son drives  Additional Comments: pt somewhat questionable historian this date.  son lives in [place]. [place]    Examination of body systems (includes body structures/functions, activity/participation limitations):  Observation:  Pt is awake in semi-fowlers upon arrival  Vision:  [facility]  Hearing:  [facility]  Cardiopulmonary:  On room air, concern for low BP: 96/61 (72) standing  114/70 (84) sitting EOB  97/68 (78) on toilet 1  99/57 (70) on toilet 2 after stand  96/55 (68) in chair at end of session  Cognition: Mildly impaired, pt is oriented x3, increased processing time, see OT/SLP note for further evaluation. Musculoskeletal  ROM R/L:  WFL BLE. Strength R/L:  Generally 4-/5, decreased in function and endurance. Neuro:  pt reports \"dull\" sensation to light touch BLE    Gait pattern: Pt demonstrates step-through pattern with decreased foot clearance, decreased SL, decreased mark, increased trunk flexion posture and Ue support on RW. Mobility:  Supine to sit:  CGA  Transfers: Mod- Min   Sitting balance: SBA-SUP  . Standing balance:  CGA-Min.    Gait: Min    AMPA 6 Clicks Inpatient Mobility:  AM-PAC Inpatient Mobility Raw Score : 16    Treatment:    Bed mobility: PT encourages sup>sit, provides v/c for sequencing. Pt demonstrates fair- ability to advance LE, requires increased effort, CGA for LE/ hips to EOB and CGA for trunk to upright from [facility] raised, increased UE support required on bed rails. PT v/c for scooting to EOB, pt requires CGA for safety, cues to prevent premature STS. Sitting balance: Seated EOB pt demonstrates fair- balance, BUE support required for maintaining upright, ~5' seated during assessment and vitals monitoring. Additional seated balance on BR commode x3'/x4' with SBA- SUP nearby for safety and low BP concerns. Sit<>Stand: Pt performed STS from EOB to RW x2 reps total with Min A x1 for trial 1, Min A x1 trial 2, v/c for proper sequencing. Pt demonstrates increased effort, increased UE support and time to upright.  Return to seated at EOB for rest break between standing trials CGA. Stand>sit at commode pt demonstrates decreased safety awareness, Min A for control and Max cues for alignment. Sit>stand from commode to RW Mod A. Standing balance at BR commode x1'/x2' for dependent brunilda care, pt with heavy forward trunk lean. SPT from commode to recliner end of session with Min A for RW management and control of descent. Pt with increasing fatigue and returned to seated for safety. Gait: Pt AMB x8 ft to BR+ 3 ft to sink+ 3ft retro to commode, with RW, step-through pattern with decreased foot clearance, decreased SL, decreased mark, increased trunk flexion posture and Ue support on RW. Overall pt unsteady with AMB and is unsafe to perform independently. End of session pt left in recliner with LE elevated  with lines managed, call light, phone, exit alarm, tray, all needs, RN aware. Assessment:    Pt is a 79 y/o male admitted 8/21 with c/o  chest pain. Patient with significant h/o  Anemia, Anxiety, Arthritis, BPH with urinary obstruction, Depression, GERD (gastroesophageal reflux disease), Hemorrhoids, Hepatitis, Hernia of unspecified site of abdominal cavity without mention of obstruction or gangrene, Hyperlipidemia, see chart. Per pt report pt has been performing ADLs/IADLs with Mod I- however pt is unreliable historian see above. At this time pt appears to be functioning below baseline. Pt is now presenting with impairments in BLE strength, functional endurance, safety awareness, balance, cardiovascular endurance. Pt would benefit from skilled PT services in order to address impairments and promote return to PLOF. PT to recommend d/c to SNF. Complexity: Moderate  Prognosis: Good, no significant barriers to participation at this time.    Plan Plan: 3-5 times per week/week, 1 week,   Discharge Recommendations: Subacute/Skilled Nursing Facility  Equipment: defer    Goals:  Short Term Goals  Time Frame for Short term goals: 1 week  Short term goal 1: Pt will perform all bed mobility with SBA  Short term goal 2: Pt will tolerate AMB x25 ft with RW, chair follow, CGA, stable vitals  Short term goal 3: Pt will demonstrate STS to/from standard chair with UE support, SBA       Treatment plan:  Bed mobility, transfers, balance, gait, TA, TX    Recommendations for NURSING mobility: SPT to [facility] with RW Min x2    Time:   Time in: 08:59  Time out: 09:38  Timed treatment minutes: 27  Total time: 39    Electronically signed by:    Nafisa Tuttle, PT  2/44/5720, 67:24 PM  PT Lic #: [id number]

## 2022-08-26 LAB
ALBUMIN SERPL-MCNC: 2.7 GM/DL (ref 3.4–5)
ALP BLD-CCNC: 159 IU/L (ref 40–129)
ALT SERPL-CCNC: 129 U/L (ref 10–40)
ANION GAP SERPL CALCULATED.3IONS-SCNC: 8 MMOL/L (ref 4–16)
AST SERPL-CCNC: 30 IU/L (ref 15–37)
BILIRUB SERPL-MCNC: 0.4 MG/DL (ref 0–1)
BILIRUBIN DIRECT: 0.2 MG/DL (ref 0–0.3)
BILIRUBIN, INDIRECT: 0.2 MG/DL (ref 0–0.7)
BUN BLDV-MCNC: 44 MG/DL (ref 6–23)
CALCIUM SERPL-MCNC: 8 MG/DL (ref 8.3–10.6)
CHLORIDE BLD-SCNC: 106 MMOL/L (ref 99–110)
CO2: 24 MMOL/L (ref 21–32)
CREAT SERPL-MCNC: 2.8 MG/DL (ref 0.9–1.3)
FERRITIN: 110 NG/ML (ref 30–400)
GFR AFRICAN AMERICAN: 26 ML/MIN/1.73M2
GFR NON-AFRICAN AMERICAN: 21 ML/MIN/1.73M2
GLUCOSE BLD-MCNC: 134 MG/DL (ref 70–99)
HCT VFR BLD CALC: 23.8 % (ref 42–52)
HEMOGLOBIN: 7.5 GM/DL (ref 13.5–18)
IRON: 75 UG/DL (ref 59–158)
MCH RBC QN AUTO: 31 PG (ref 27–31)
MCHC RBC AUTO-ENTMCNC: 31.5 % (ref 32–36)
MCV RBC AUTO: 98.3 FL (ref 78–100)
PCT TRANSFERRIN: 41 % (ref 10–44)
PDW BLD-RTO: 15 % (ref 11.7–14.9)
PLATELET # BLD: 165 K/CU MM (ref 140–440)
PMV BLD AUTO: 10 FL (ref 7.5–11.1)
POTASSIUM SERPL-SCNC: 4 MMOL/L (ref 3.5–5.1)
RBC # BLD: 2.42 M/CU MM (ref 4.6–6.2)
SODIUM BLD-SCNC: 138 MMOL/L (ref 135–145)
TOTAL IRON BINDING CAPACITY: 185 UG/DL (ref 250–450)
TOTAL PROTEIN: 4.5 GM/DL (ref 6.4–8.2)
UNSATURATED IRON BINDING CAPACITY: 110 UG/DL (ref 110–370)
WBC # BLD: 6.2 K/CU MM (ref 4–10.5)

## 2022-08-26 PROCEDURE — 97110 THERAPEUTIC EXERCISES: CPT

## 2022-08-26 PROCEDURE — 94761 N-INVAS EAR/PLS OXIMETRY MLT: CPT

## 2022-08-26 PROCEDURE — 2580000003 HC RX 258: Performed by: PHYSICIAN ASSISTANT

## 2022-08-26 PROCEDURE — 83550 IRON BINDING TEST: CPT

## 2022-08-26 PROCEDURE — 6370000000 HC RX 637 (ALT 250 FOR IP): Performed by: INTERNAL MEDICINE

## 2022-08-26 PROCEDURE — 36592 COLLECT BLOOD FROM PICC: CPT

## 2022-08-26 PROCEDURE — 82728 ASSAY OF FERRITIN: CPT

## 2022-08-26 PROCEDURE — 82248 BILIRUBIN DIRECT: CPT

## 2022-08-26 PROCEDURE — 6360000002 HC RX W HCPCS: Performed by: STUDENT IN AN ORGANIZED HEALTH CARE EDUCATION/TRAINING PROGRAM

## 2022-08-26 PROCEDURE — 80053 COMPREHEN METABOLIC PANEL: CPT

## 2022-08-26 PROCEDURE — 6370000000 HC RX 637 (ALT 250 FOR IP): Performed by: NURSE PRACTITIONER

## 2022-08-26 PROCEDURE — 97530 THERAPEUTIC ACTIVITIES: CPT

## 2022-08-26 PROCEDURE — 85027 COMPLETE CBC AUTOMATED: CPT

## 2022-08-26 PROCEDURE — 1200000000 HC SEMI PRIVATE

## 2022-08-26 PROCEDURE — 2580000003 HC RX 258: Performed by: STUDENT IN AN ORGANIZED HEALTH CARE EDUCATION/TRAINING PROGRAM

## 2022-08-26 PROCEDURE — 83540 ASSAY OF IRON: CPT

## 2022-08-26 RX ADMIN — SODIUM CHLORIDE, PRESERVATIVE FREE 10 ML: 5 INJECTION INTRAVENOUS at 09:03

## 2022-08-26 RX ADMIN — SODIUM CHLORIDE, PRESERVATIVE FREE 10 ML: 5 INJECTION INTRAVENOUS at 20:16

## 2022-08-26 RX ADMIN — SERTRALINE HYDROCHLORIDE 50 MG: 50 TABLET ORAL at 09:02

## 2022-08-26 RX ADMIN — FINASTERIDE 5 MG: 5 TABLET, FILM COATED ORAL at 09:02

## 2022-08-26 RX ADMIN — ESCITALOPRAM OXALATE 5 MG: 10 TABLET ORAL at 09:02

## 2022-08-26 RX ADMIN — SODIUM BICARBONATE 650 MG: 650 TABLET ORAL at 09:02

## 2022-08-26 RX ADMIN — METOPROLOL TARTRATE 25 MG: 25 TABLET, FILM COATED ORAL at 09:02

## 2022-08-26 RX ADMIN — PIPERACILLIN AND TAZOBACTAM 4500 MG: 4; .5 INJECTION, POWDER, FOR SOLUTION INTRAVENOUS; PARENTERAL at 06:01

## 2022-08-26 RX ADMIN — PIPERACILLIN AND TAZOBACTAM 4500 MG: 4; .5 INJECTION, POWDER, FOR SOLUTION INTRAVENOUS; PARENTERAL at 16:20

## 2022-08-26 RX ADMIN — METOPROLOL TARTRATE 25 MG: 25 TABLET, FILM COATED ORAL at 20:15

## 2022-08-26 RX ADMIN — LEVOTHYROXINE SODIUM 75 MCG: 0.07 TABLET ORAL at 06:03

## 2022-08-26 RX ADMIN — TEMAZEPAM 15 MG: 7.5 CAPSULE ORAL at 20:15

## 2022-08-26 RX ADMIN — QUETIAPINE FUMARATE 100 MG: 100 TABLET ORAL at 20:15

## 2022-08-26 RX ADMIN — PREDNISONE 10 MG: 10 TABLET ORAL at 09:03

## 2022-08-26 RX ADMIN — ASPIRIN 81 MG CHEWABLE TABLET 81 MG: 81 TABLET CHEWABLE at 09:02

## 2022-08-26 RX ADMIN — PANTOPRAZOLE 20 MG: 20 TABLET, DELAYED RELEASE ORAL at 06:02

## 2022-08-26 RX ADMIN — TAMSULOSIN HYDROCHLORIDE 0.8 MG: 0.4 CAPSULE ORAL at 20:15

## 2022-08-26 RX ADMIN — QUETIAPINE FUMARATE 100 MG: 100 TABLET ORAL at 09:02

## 2022-08-26 NOTE — PROGRESS NOTES
Comprehensive Nutrition Assessment    Type and Reason for Visit:  Initial (los 5 d)    Nutrition Recommendations/Plan:   Continue current diet  Begin low emilio high protein oral nutrition supplement daily     Malnutrition Assessment:  Malnutrition Status:  No malnutrition (08/26/22 1226)    Context:  Acute Illness       Nutrition Assessment:    Pt admitted with CP. H/O hepatitis, HLD, anemia, hemorrhoids. Feeding self after set up and generally consuming greater than half of Diabetic Diet. Recent wt gain noted, partially fluid related on lasix. Will continue to follow as moderate nutrition risk. Nutrition Related Findings:    BUN 44, Cr 2.8, Glu 134, A1c 5.8 Wound Type: Skin Tears       Current Nutrition Intake & Therapies:    Average Meal Intake: 51-75%  Average Supplements Intake: None Ordered  ADULT DIET; Regular; 5 carb choices (75 gm/meal); No Caffeine    Anthropometric Measures:  Height: 6' (182.9 cm)  Ideal Body Weight (IBW): 178 lbs (81 kg)    Admission Body Weight: 205 lb 11 oz (93.3 kg)  Current Body Weight: 205 lb 11 oz (93.3 kg), 115.6 % IBW. Current BMI (kg/m2): 27.9  Usual Body Weight: 180 lb 10 oz (81.9 kg) (8/2021)  % Weight Change (Calculated): 13.9                    BMI Categories: Overweight (BMI 25.0-29. 9)    Estimated Daily Nutrient Needs:  Energy Requirements Based On: Formula  Weight Used for Energy Requirements: Current  Energy (kcal/day): 1949 (Latisha Orleans)  Weight Used for Protein Requirements: Ideal  Protein (g/day): 81-97 (1-1.2 g/kg IBW)  Method Used for Fluid Requirements: 1 ml/kcal  Fluid (ml/day): 1950    Nutrition Diagnosis:   Predicted inadequate energy intake related to pain as evidenced by intake 51-75%    Nutrition Interventions:   Food and/or Nutrient Delivery: Continue Current Diet  Nutrition Education/Counseling: No recommendation at this time  Coordination of Nutrition Care: Continue to monitor while inpatient, Feeding Assistance/Environment Change       Goals: Goals:  Meet at least 75% of estimated needs       Nutrition Monitoring and Evaluation:   Behavioral-Environmental Outcomes: None Identified  Food/Nutrient Intake Outcomes: Food and Nutrient Intake  Physical Signs/Symptoms Outcomes: Biochemical Data, Fluid Status or Edema, Meal Time Behavior, Nutrition Focused Physical Findings, Skin, Weight    Discharge Planning:    No discharge needs at this time     Maura Foster, [address] [place] [address],   Contact: [phone]

## 2022-08-26 NOTE — PROGRESS NOTES
Daily Progress Note  Subjective:  Awake and alert; feeling fair  No CP or SOB  BP and HR stable, NSR on tele  Supportive care  Stable from cardiac standpoint    Attending Note:  Patient is awake alert-feeling better  No chest pain or shortness of breath  Heart rate and blood pressure stable  Stable from cardiac stand  Creatinine stable  Vasovagal syncope no recurrent episodes  On low-dose anticoagulation  -  Impression and Plan:   Chest pain    Having some discomfort at home; however no new reports    Troponin mildly elevated; likely in setting of CKD    EKG showed no new ischemia    Echo showed preserved EF    Continue lopressor at this time       Syncope    After going to restroom    Likely vasovagal syncope    Denies any dizziness or lightheadedness today    CT head negative    No further episodes since      Anemia    Hgb 7.5 today; improving    Hold asa and eliquis    Hematuria resolving     Hx of DVT's; Normally on Eliquis;for now on hold for anemia    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 Ejection fraction is visually estimated at >50%. Mild left ventricular hypertrophy. Sclerotic, but non-stenotic aortic valve. Mild to moderate tricuspid regurgitation; RVSP: 37 mmHg. No evidence of any pericardial effusion. Pericardial fat pad visualized. Radiology  US of legs 8/22/2022          Impression:         Persistent partially occlusive thrombus in the right peroneal and posterior   tibial veins. No evidence of left lower extremity DVT. US of gallbladder 8/22/22          Impression:         Cholelithiasis with gallbladder wall thickening suspicious for   cholelithiasis.   Sonographic Amor Olszewski sign was not well assessed as the patient   was medicated for the examination. Nuclear medicine hepatobiliary scan may   be useful for further characterization as these findings can be seen in the   setting of acute or chronic cholecystitis. CT head 8/23/2022  Impression   No evidence of intracranial hemorrhage or any other definable acute   intracranial abnormality. No interval change versus CT scan in 2017. PAST MEDICAL HISTORY:  Anemia, anxiety, arthritis, BPH, depression,  GERD, hemorrhoids, hepatitis, hernia, hypotension, hyperlipidemia,  squamous cell carcinoma, CKD and DVT. PAST SURGICAL HISTORY:  Cataracts removal, hernia repair, neck surgery. SOCIAL HISTORY:  He is a former smoker. He does not use any alcohol. He does not use any illicit drugs. ALLERGIES:  Allergic to MINOCYCLINE. HOME MEDICATIONS:  He is on prednisone, Lasix, sodium bicarb, Synthroid,  Proscar, Eliquis 2.5 mg, Flomax, Seroquel, Zoloft, Lexapro, Protonix.        Objective:   /69   Pulse 80   Temp 98 °F (36.7 °C) (Oral)   Resp 16   Ht 6' (1.829 m)   Wt 205 lb 11 oz (93.3 kg)   SpO2 93%   BMI 27.90 kg/m²     Intake/Output Summary (Last 24 hours) at 8/26/2022 1028  Last data filed at 8/25/2022 2015  Gross per 24 hour   Intake 120 ml   Output 450 ml   Net -330 ml       Medications:   Scheduled Meds:   piperacillin-tazobactam  4,500 mg IntraVENous Q12H    metoprolol tartrate  25 mg Oral BID    lidocaine  5 mL IntraDERmal Once    sodium chloride flush  5-40 mL IntraVENous 2 times per day    [Held by provider] apixaban  2.5 mg Oral BID    escitalopram  5 mg Oral Daily    finasteride  5 mg Oral Daily    levothyroxine  75 mcg Oral Daily    pantoprazole  20 mg Oral Daily    predniSONE  10 mg Oral Daily    QUEtiapine  100 mg Oral BID    sertraline  50 mg Oral Daily    sodium bicarbonate  650 mg Oral Daily    tamsulosin  0.8 mg Oral Nightly    temazepam  15 mg Oral Nightly    aspirin  81 mg Oral Daily    [Held by provider] atorvastatin  40 mg Oral Nightly Infusions:   sodium chloride      sodium chloride        PRN Meds:  sodium chloride flush, sodium chloride, sodium chloride flush, sodium chloride, acetaminophen **OR** acetaminophen, polyethylene glycol, promethazine **OR** ondansetron, nitroGLYCERIN     Physical Exam:  Vitals:    08/26/22 0845   BP: 137/69   Pulse: 80   Resp: 16   Temp: 98 °F (36.7 °C)   SpO2: 93%        General: [place], frail and elderly  Chest: Nontender  Cardiac: First and Second Heart Sounds are Normal, No Murmurs or Gallops noted  Lungs:Clear to auscultation and percussion. Abdomen: Soft, NT, ND, +BS  Extremities: No clubbing, no edema  Vascular:  Equal 2+ peripheral pulses. Lab Data:  CBC:   Recent Labs     08/24/22  0155 08/25/22  0515 08/26/22  0546   WBC 7.6 6.5 6.2   HGB 7.7* 7.2* 7.5*   HCT 23.8* 23.1* 23.8*   MCV 98.8 98.7 98.3   * 143 165     BMP:   Recent Labs     08/24/22  0155 08/25/22  0515 08/26/22  0546    140 138   K 3.9 4.0 4.0    109 106   CO2 23 23 24   BUN 50* 47* 44*   CREATININE 3.1* 2.8* 2.8*     LIVER PROFILE:   Recent Labs     08/24/22  0155 08/25/22  0515 08/26/22  0546   * 48* 30   * 162* 129*   BILIDIR 0.3 0.2 0.2   BILITOT 0.5 0.3 0.4   ALKPHOS 183* 169* 159*     PT/INR: No results for input(s): PROTIME, INR in the last 72 hours. APTT: No results for input(s): APTT in the last 72 hours. BNP:  No results for input(s): BNP in the last 72 hours.       Assessment:  Patient Active Problem List    Diagnosis Date Noted    Cardiac arrest ([facility] 08/25/2022    Chest pain 08/21/2022    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 ([facility] 02/21/2022    Leukocytosis 09/08/2021    Thrombocytopenia, unspecified 08/31/2021    Bandemia 08/10/2021    Atelectasis 08/05/2021    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       Electronically signed by ROSEMARY Duran - CNP on 8/26/2022 at 10:28 AM     Electronically signed by Opal Rodrigez MD on 8/26/22 at 11:06 AM EDT

## 2022-08-26 NOTE — PROGRESS NOTES
Hospitalist Progress Note      Name:  Faiza Chávez /Age/Sex: 10/18/1930  (80 y.o. male)   MRN & CSN:  [id number] & [id number] Admission Date/Time: 2022  6:43 PM   Location:  [address] [place] [address]- PCP: No primary care provider on file. Hospital Day: 6    Assessment and Plan:   Faiza Chávez is a 80 y.o.  male  who presents with Chest pain    Patient had initially presented with chest pain. Elevated troponin in the setting of CKD. Cardiology was consulted. Unlikely ACS. Will keep monitoring in telemetry Episode of  PEA with CPR and ROSC during the hospital stay. No apparent cause. CT head negative. patient does not remember the event . currently in sinus in telemetry. EKG did not show any ischemic changes. Echo showed preserved EF. Plan to continue Lopressor at this time. Anemia - likely multifactorial. No active bleeding. Will keep monitoring for now. Iron panel not suggestive of ESTUARDO. Hb stable for now    KAVON on CKD - Cr stable. Nephrology on board. Gross hematuria - likely trauma due to Borden . Eliquis held for now. BPH - chronically on Flomax 0.4 mg and Proscar 5 mg. Recommend borden removal/voiding trial prior to discharge once more medically stable    Lactic acidosis was present likely postcode - resolved. Hypokalemia: Resolved    Transaminitis- unknown etiology, no abdominal pain; improving. Statin held. US abdomen showed cholelithiasis but no signs of cholecystitis    Hyperbilirubinemia: Trending down    Hx Recurrent DVT of RLE: Continue Eliquis    PT recommended SNF. waiting for placement otherwise stable    Other co-morbidities     Hx Multifactorial Anemia: Hgb 8.9 but all cell lines dropped overnight, repeat in AM; no suspected source of clinical bleeding  Hx Bullous Pemphigoid: Continue daily steroids; follows with Dermatology as outpatient  Renal Cyst: Being followed by Nephrology as OP  Chronic bilateral leg edema: Being followed by Nephrology, continue daily PO Lasix  Hx CKD Stage IV: Cr stable at 2.5, monitor, continue supplemental sodium bicarb  Hx BPH: Contniue Tamsulosin and Proscar  Hx Peripheral Neuropathy  Hx Hypothyroidism: Levothyroxine resumed  Hx Anxiety/Depression: continue Zoloft, Seroquel, Lexapro  Hx SCC Skin Cancer: Follows OP with Heme/Onc  Hx Insomnia: Continue home Temazepam    Diet ADULT DIET; Regular; 5 carb choices (75 gm/meal); No Caffeine  ADULT ORAL NUTRITION SUPPLEMENT; Dinner; Low Calorie/High Protein Oral Supplement   DVT Prophylaxis [] Lovenox, []  Heparin, [] SCDs, [] Ambulation   GI Prophylaxis [] PPI,  [] H2 Blocker,  [] Carafate,  [] Diet/Tube Feeds   Code Status DNR-CC   Disposition Patient requires continued admission due to status post cardiac arrest   MDM [] Low, [] Moderate,[x]  High  Patient's risk as above due to cardiac arrest recently     History of Present Illness:     Chief Complaint: Chest pain  Ángel Castle is a 80 y.o.  male  who presents with chest pain     Patient has no new complaints. looks stable. waiting for placement    Ten point ROS reviewed negative, unless as noted above    Objective: Intake/Output Summary (Last 24 hours) at 8/26/2022 1320  Last data filed at 8/25/2022 2015  Gross per 24 hour   Intake 120 ml   Output 450 ml   Net -330 ml      Vitals:   Vitals:    08/26/22 0845   BP: 137/69   Pulse: 80   Resp: 16   Temp: 98 °F (36.7 °C)   SpO2: 93%     Physical Exam:   GEN Awake male, sitting upright in bed in no apparent distress. Appears given age. EYES Pupils are equally round. No scleral erythema, discharge, or conjunctivitis. HENT Mucous membranes are moist. Oral pharynx without exudates, no evidence of thrush. NECK Supple, no apparent thyromegaly or masses. RESP Clear to auscultation, no wheezes, rales or rhonchi. Symmetric chest movement while on room air. CARDIO/VASC S1/S2 auscultated. Regular rate without appreciable murmurs, rubs, or gallops. No JVD or carotid bruits.  Peripheral pulses equal bilaterally and palpable. No peripheral edema. GI Abdomen is soft without significant tenderness, masses, or guarding. Bowel sounds are normoactive. Rectal exam deferred.  No costovertebral angle tenderness. Normal appearing external genitalia. Laureano catheter is not present. HEME/LYMPH No palpable cervical lymphadenopathy and no hepatosplenomegaly. No petechiae or ecchymoses. MSK No gross joint deformities. SKIN Normal coloration, warm, dry. NEURO Cranial nerves appear grossly intact, normal speech, no lateralizing weakness. PSYCH Awake, alert, oriented x 4. Affect appropriate. Medications:   Medications:    piperacillin-tazobactam  4,500 mg IntraVENous Q12H    metoprolol tartrate  25 mg Oral BID    lidocaine  5 mL IntraDERmal Once    sodium chloride flush  5-40 mL IntraVENous 2 times per day    [Held by provider] apixaban  2.5 mg Oral BID    escitalopram  5 mg Oral Daily    finasteride  5 mg Oral Daily    levothyroxine  75 mcg Oral Daily    pantoprazole  20 mg Oral Daily    predniSONE  10 mg Oral Daily    QUEtiapine  100 mg Oral BID    sertraline  50 mg Oral Daily    sodium bicarbonate  650 mg Oral Daily    tamsulosin  0.8 mg Oral Nightly    temazepam  15 mg Oral Nightly    aspirin  81 mg Oral Daily    [Held by provider] atorvastatin  40 mg Oral Nightly      Infusions:    sodium chloride      sodium chloride       PRN Meds: sodium chloride flush, 5-40 mL, PRN  sodium chloride, , PRN  sodium chloride flush, 5-40 mL, PRN  sodium chloride, , PRN  acetaminophen, 650 mg, Q6H PRN   Or  acetaminophen, 650 mg, Q6H PRN  polyethylene glycol, 17 g, Daily PRN  promethazine, 12.5 mg, Q6H PRN   Or  ondansetron, 4 mg, Q6H PRN  nitroGLYCERIN, 0.4 mg, Q5 Min PRN        Patient is still admitted because c. The anticipated discharge is in greater than 24 hours.      Electronically signed by Randall Walker MD on 8/26/2022 at 1:20 PM

## 2022-08-26 NOTE — PROGRESS NOTES
Physical Therapy  Name: Royer Reynolds MRN: [id number] :   10/18/1930   Date:  2022   Admission Date: 2022 Room:  [address] [place] [address]   Restrictions/Precautions:        Tele, IV  Communication with other providers:  Kavin Mercedes RN states pt is ok to see for therapy  Subjective:  Patient states:  '' my low back is killing me ''   Pain:   Location, Type, Intensity (0/10 to 10/10):  6/10 low back pain  Objective:    Observation:  pt seated in recliner  Upon entrance, pt reported low back hurt and further stated he suspected it was from not moving much. Pt positioned in chair more comfortably with pillows. Review of POC and notes. Agreeable to therapy but did not want to attempt walking, reporting that he feels ' shaky'  Standing tolerance was aprox 30s. Repeated standing 2x from recliner using 2WW for UE support. Rest breaks required between bouts of exercises. [place], with better hearing on R side. Treatment, including education/measures:  Therapeutic Exercise:  Therapeutic exercises were instructed today. Cues were given for technique, safety, recruitment, and rationale. Cues were verbal and/or tactile. Transfers with line management of tele, IV    Scooting :SBA  Sit to stand :[address] [place]   Stand to sit :[place]     Sitting Exercises: Ankle pumps x 20  Heel raises x 20  LAQ's x 20  Marching x 20   Clams x 20  Hip Abd x 20  Therapeutic Exercise:  Therapeutic exercises were instructed today. Cues were given for technique, safety, recruitment, and rationale. Cues were verbal and/or tactile. Safety  Patient left safely in the recliner, with call light/phone in reach with alarm applied. Gait belt and mask were used for transfers and gait.   Assessment / Impression:     Patient's tolerance of treatment:  good   Adverse Reaction: no  Significant change in status and impact:  no  Barriers to improvement:  no  Plan for Next Session:    Will cont to work towards pt's goals per patient tolerance  Time in:  3:33  Time out:

## 2022-08-26 NOTE — CARE COORDINATION
YENW called Bishop Osuna with [facility] and left a message informing her of possible discharge date. Waiting on return call.

## 2022-08-26 NOTE — PROGRESS NOTES
Nephrology Progress Note        [address], [address] [address]  Phone: [phone]  Office Hours: 8:30AM - 4:30PM  Monday - Friday 8/26/2022 7:20 AM  Subjective:   Admit Date: 8/21/2022  PCP: No primary care provider on file. Interval History:   Resting peacefully  Good bp    Diet: ADULT DIET; Regular; 5 carb choices (75 gm/meal); No Caffeine      Data:   Scheduled Meds:   piperacillin-tazobactam  4,500 mg IntraVENous Q12H    metoprolol tartrate  25 mg Oral BID    lidocaine  5 mL IntraDERmal Once    sodium chloride flush  5-40 mL IntraVENous 2 times per day    [Held by provider] apixaban  2.5 mg Oral BID    escitalopram  5 mg Oral Daily    finasteride  5 mg Oral Daily    levothyroxine  75 mcg Oral Daily    pantoprazole  20 mg Oral Daily    predniSONE  10 mg Oral Daily    QUEtiapine  100 mg Oral BID    sertraline  50 mg Oral Daily    sodium bicarbonate  650 mg Oral Daily    tamsulosin  0.8 mg Oral Nightly    temazepam  15 mg Oral Nightly    aspirin  81 mg Oral Daily    [Held by provider] atorvastatin  40 mg Oral Nightly     Continuous Infusions:   sodium chloride      sodium chloride       PRN Meds:sodium chloride flush, sodium chloride, sodium chloride flush, sodium chloride, acetaminophen **OR** acetaminophen, polyethylene glycol, promethazine **OR** ondansetron, nitroGLYCERIN  I/O last 3 completed shifts: In: 120 [P.O.:120]  Out: 1650 [Urine:1650]  No intake/output data recorded.     Intake/Output Summary (Last 24 hours) at 8/26/2022 0720  Last data filed at 8/25/2022 2015  Gross per 24 hour   Intake 120 ml   Output 450 ml   Net -330 ml       CBC:   Recent Labs     08/24/22  0155 08/25/22  0515 08/26/22  0546   WBC 7.6 6.5 6.2   HGB 7.7* 7.2* 7.5*   * 143 165       BMP:    Recent Labs     08/24/22  0155 08/25/22  0515 08/26/22  0546    140 138   K 3.9 4.0 4.0    109 106   CO2 23 23 24   BUN 50* 47* 44*   CREATININE 3.1* 2.8* 2.8*   GLUCOSE 153* 120* 134* Hepatic:   Recent Labs     08/24/22  0155 08/25/22  0515 08/26/22  0546   * 48* 30   * 162* 129*   BILITOT 0.5 0.3 0.4   ALKPHOS 183* 169* 159*         Objective:   Vitals: /70   Pulse 61   Temp 98.1 °F (36.7 °C) (Oral)   Resp 11   Ht 6' (1.829 m)   Wt 188 lb (85.3 kg)   SpO2 95%   BMI 25.50 kg/m²   General appearance:  in no acute distress  HEENT: normocephalic, atraumatic,   Neck: supple, trachea midline  Lungs:  breathing comfortably   Extremities: extremities atraumatic, no cyanosis or edema  Neurologic: drowsy    Assessment and Plan:       Patient Active Problem List    Diagnosis Date Noted    Cardiac arrest ([facility] [address] 08/25/2022    Chest pain 08/21/2022    Agitation due to dementia ([facility] [address] 08/18/2022    Varicose 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 08/15/2015    Skin ulcer, limited to breakdown of skin (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) 02/21/2022    Leukocytosis 09/08/2021    Thrombocytopenia, unspecified 08/31/2021    Bandemia 08/10/2021    Atelectasis 08/05/2021    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     Cr at baseline  Upon dc, change his lasix to only prn leg edema   continue supportive mgmt    Thank you                Electronically signed by Jimena Levy DO on 8/26/2022 at 7:20 AM    [facility]  Candice Lr MD  [id number], DO  [address],  [address]  [facility], [place] [address]  PHONE: [phone]  FAX: [phone]

## 2022-08-27 LAB
CULTURE: NORMAL
CULTURE: NORMAL
Lab: NORMAL
Lab: NORMAL
SPECIMEN: NORMAL
SPECIMEN: NORMAL

## 2022-08-27 PROCEDURE — 97530 THERAPEUTIC ACTIVITIES: CPT

## 2022-08-27 PROCEDURE — 97110 THERAPEUTIC EXERCISES: CPT

## 2022-08-27 PROCEDURE — 2580000003 HC RX 258: Performed by: PHYSICIAN ASSISTANT

## 2022-08-27 PROCEDURE — 6360000002 HC RX W HCPCS: Performed by: STUDENT IN AN ORGANIZED HEALTH CARE EDUCATION/TRAINING PROGRAM

## 2022-08-27 PROCEDURE — 94761 N-INVAS EAR/PLS OXIMETRY MLT: CPT

## 2022-08-27 PROCEDURE — 2580000003 HC RX 258: Performed by: STUDENT IN AN ORGANIZED HEALTH CARE EDUCATION/TRAINING PROGRAM

## 2022-08-27 PROCEDURE — 6370000000 HC RX 637 (ALT 250 FOR IP): Performed by: INTERNAL MEDICINE

## 2022-08-27 PROCEDURE — 6370000000 HC RX 637 (ALT 250 FOR IP): Performed by: NURSE PRACTITIONER

## 2022-08-27 PROCEDURE — 1200000000 HC SEMI PRIVATE

## 2022-08-27 RX ADMIN — TEMAZEPAM 15 MG: 7.5 CAPSULE ORAL at 20:27

## 2022-08-27 RX ADMIN — SODIUM CHLORIDE, PRESERVATIVE FREE 10 ML: 5 INJECTION INTRAVENOUS at 09:38

## 2022-08-27 RX ADMIN — PIPERACILLIN AND TAZOBACTAM 4500 MG: 4; .5 INJECTION, POWDER, FOR SOLUTION INTRAVENOUS; PARENTERAL at 17:23

## 2022-08-27 RX ADMIN — TAMSULOSIN HYDROCHLORIDE 0.8 MG: 0.4 CAPSULE ORAL at 20:27

## 2022-08-27 RX ADMIN — SODIUM BICARBONATE 650 MG: 650 TABLET ORAL at 09:37

## 2022-08-27 RX ADMIN — METOPROLOL TARTRATE 25 MG: 25 TABLET, FILM COATED ORAL at 09:37

## 2022-08-27 RX ADMIN — METOPROLOL TARTRATE 25 MG: 25 TABLET, FILM COATED ORAL at 20:27

## 2022-08-27 RX ADMIN — FINASTERIDE 5 MG: 5 TABLET, FILM COATED ORAL at 09:37

## 2022-08-27 RX ADMIN — PREDNISONE 10 MG: 10 TABLET ORAL at 09:37

## 2022-08-27 RX ADMIN — ASPIRIN 81 MG CHEWABLE TABLET 81 MG: 81 TABLET CHEWABLE at 09:37

## 2022-08-27 RX ADMIN — QUETIAPINE FUMARATE 100 MG: 100 TABLET ORAL at 20:27

## 2022-08-27 RX ADMIN — PIPERACILLIN AND TAZOBACTAM 4500 MG: 4; .5 INJECTION, POWDER, FOR SOLUTION INTRAVENOUS; PARENTERAL at 06:14

## 2022-08-27 RX ADMIN — PANTOPRAZOLE 20 MG: 20 TABLET, DELAYED RELEASE ORAL at 06:15

## 2022-08-27 RX ADMIN — SERTRALINE HYDROCHLORIDE 50 MG: 50 TABLET ORAL at 09:37

## 2022-08-27 RX ADMIN — QUETIAPINE FUMARATE 100 MG: 100 TABLET ORAL at 09:37

## 2022-08-27 RX ADMIN — ESCITALOPRAM OXALATE 5 MG: 10 TABLET ORAL at 09:37

## 2022-08-27 RX ADMIN — LEVOTHYROXINE SODIUM 75 MCG: 0.07 TABLET ORAL at 06:15

## 2022-08-27 ASSESSMENT — PAIN SCALES - GENERAL
PAINLEVEL_OUTOF10: 0

## 2022-08-27 NOTE — PROGRESS NOTES
Nephrology Progress Note        [address], [address] [address]  Phone: [phone]  Office Hours: 8:30AM - 4:30PM  Monday - Friday 8/27/2022 7:49 AM  Subjective:   Admit Date: 8/21/2022  PCP: No primary care provider on file. Interval History:   No  complaints  [place]  Alondra sleep    Diet: ADULT DIET; Regular; 5 carb choices (75 gm/meal); No Caffeine  ADULT ORAL NUTRITION SUPPLEMENT; Dinner; Low Calorie/High Protein Oral Supplement      Data:   Scheduled Meds:   piperacillin-tazobactam  4,500 mg IntraVENous Q12H    metoprolol tartrate  25 mg Oral BID    lidocaine  5 mL IntraDERmal Once    sodium chloride flush  5-40 mL IntraVENous 2 times per day    [Held by provider] apixaban  2.5 mg Oral BID    escitalopram  5 mg Oral Daily    finasteride  5 mg Oral Daily    levothyroxine  75 mcg Oral Daily    pantoprazole  20 mg Oral Daily    predniSONE  10 mg Oral Daily    QUEtiapine  100 mg Oral BID    sertraline  50 mg Oral Daily    sodium bicarbonate  650 mg Oral Daily    tamsulosin  0.8 mg Oral Nightly    temazepam  15 mg Oral Nightly    aspirin  81 mg Oral Daily    [Held by provider] atorvastatin  40 mg Oral Nightly     Continuous Infusions:   sodium chloride      sodium chloride       PRN Meds:sodium chloride flush, sodium chloride, sodium chloride flush, sodium chloride, acetaminophen **OR** acetaminophen, polyethylene glycol, promethazine **OR** ondansetron, nitroGLYCERIN  I/O last 3 completed shifts: In: 120 [P.O.:120]  Out: -   No intake/output data recorded.   No intake or output data in the 24 hours ending 08/27/22 0749      CBC:   Recent Labs     08/25/22 0515 08/26/22  0546   WBC 6.5 6.2   HGB 7.2* 7.5*    165       BMP:    Recent Labs     08/25/22 0515 08/26/22  0546    138   K 4.0 4.0    106   CO2 23 24   BUN 47* 44*   CREATININE 2.8* 2.8*   GLUCOSE 120* 134*     Hepatic:   Recent Labs     08/25/22 0515 08/26/22  0546   AST 48* 30   * 129* BILITOT 0.3 0.4   ALKPHOS 169* 159*         Objective:   Vitals: /71   Pulse 65   Temp 97.8 °F (36.6 °C) (Oral)   Resp 14   Ht 6' (1.829 m)   Wt 205 lb 11 oz (93.3 kg)   SpO2 96%   BMI 27.90 kg/m²   General appearance:  in no acute distress  HEENT: normocephalic, atraumatic,   Neck: supple, trachea midline  Lungs:  breathing comfortably   Extremities: extremities atraumatic, no cyanosis or edema  Neurologic: drowsy    Assessment and Plan:       Patient Active Problem List    Diagnosis Date Noted    Cardiac arrest ([facility] [address] 08/25/2022    Chest pain 08/21/2022    Agitation due to dementia ([address] 08/18/2022    Varicose 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 08/15/2015    Skin ulcer, limited to breakdown of skin ([address] 02/21/2022    Current moderate episode of major depressive disorder, unspecified whether recurrent ([address] 02/21/2022    Chronic kidney disease, stage IV (severe) ([address] 02/21/2022    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     MDM  Stable eGFR  BP normal  Good urine output  Electrolytes normal  Thank you                Electronically signed by Ankush Purcell MD on 8/27/2022 at 7:49 AM    [facility]  MD Caden Nair DO [address],  [address]  [place], [place] [address]  PHONE: [phone]  FAX: [phone]

## 2022-08-27 NOTE — PROGRESS NOTES
Daily Progress Note  Subjective:  Awake, alert, feeling ok   Denies CP or SOB   HR and BP stable, SR on tele   Continue supportive care  Stable from cardiac standpoint     Attending Note:      Impression and Plan:   Chest pain    Having some discomfort at home; however no new reports    Troponin mildly elevated; likely in setting of CKD    EKG showed no new ischemia    Echo showed preserved EF    Continue lopressor at this time       Syncope    After going to restroom    Likely vasovagal syncope    Denies any dizziness or lightheadedness today    CT head negative    No further episodes since      Anemia    Hgb 7.5 yesterday; improving    Hold eliquis    Hematuria resolving     Hx of DVT's; Normally on Eliquis;for now on hold for anemia    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 Ejection fraction is visually estimated at >50%. Mild left ventricular hypertrophy. Sclerotic, but non-stenotic aortic valve. Mild to moderate tricuspid regurgitation; RVSP: 37 mmHg. No evidence of any pericardial effusion. Pericardial fat pad visualized. Radiology  US of legs 8/22/2022          Impression:         Persistent partially occlusive thrombus in the right peroneal and posterior   tibial veins. No evidence of left lower extremity DVT. US of gallbladder 8/22/22          Impression:         Cholelithiasis with gallbladder wall thickening suspicious for   cholelithiasis. Sonographic Wiliam Nisa sign was not well assessed as the patient   was medicated for the examination. Nuclear medicine hepatobiliary scan may   be useful for further characterization as these findings can be seen in the   setting of acute or chronic cholecystitis.        CT head 8/23/2022  Impression   No evidence of intracranial hemorrhage or any other definable acute   intracranial abnormality. No interval change versus CT scan in 2017. PAST MEDICAL HISTORY:  Anemia, anxiety, arthritis, BPH, depression,  GERD, hemorrhoids, hepatitis, hernia, hypotension, hyperlipidemia,  squamous cell carcinoma, CKD and DVT. PAST SURGICAL HISTORY:  Cataracts removal, hernia repair, neck surgery. SOCIAL HISTORY:  He is a former smoker. He does not use any alcohol. He does not use any illicit drugs. ALLERGIES:  Allergic to MINOCYCLINE. HOME MEDICATIONS:  He is on prednisone, Lasix, sodium bicarb, Synthroid,  Proscar, Eliquis 2.5 mg, Flomax, Seroquel, Zoloft, Lexapro, Protonix.      Objective:   /69   Pulse 69   Temp 98 °F (36.7 °C) (Oral)   Resp 17   Ht 6' (1.829 m)   Wt 205 lb 11 oz (93.3 kg)   SpO2 96%   BMI 27.90 kg/m²   No intake or output data in the 24 hours ending 08/27/22 0959    Medications:   Scheduled Meds:   piperacillin-tazobactam  4,500 mg IntraVENous Q12H    metoprolol tartrate  25 mg Oral BID    lidocaine  5 mL IntraDERmal Once    sodium chloride flush  5-40 mL IntraVENous 2 times per day    [Held by provider] apixaban  2.5 mg Oral BID    escitalopram  5 mg Oral Daily    finasteride  5 mg Oral Daily    levothyroxine  75 mcg Oral Daily    pantoprazole  20 mg Oral Daily    predniSONE  10 mg Oral Daily    QUEtiapine  100 mg Oral BID    sertraline  50 mg Oral Daily    sodium bicarbonate  650 mg Oral Daily    tamsulosin  0.8 mg Oral Nightly    temazepam  15 mg Oral Nightly    aspirin  81 mg Oral Daily    [Held by provider] atorvastatin  40 mg Oral Nightly      Infusions:   sodium chloride      sodium chloride        PRN Meds:  sodium chloride flush, sodium chloride, sodium chloride flush, sodium chloride, acetaminophen **OR** acetaminophen, polyethylene glycol, promethazine **OR** ondansetron, nitroGLYCERIN     Physical Exam:  Vitals:    08/27/22 0938   BP: 134/69   Pulse: 69   Resp: 17 Temp: 98 °F (36.7 °C)   SpO2: 96%        General: [place], A&Ox4, NAD, frail   Chest: Nontender  Cardiac: First and Second Heart Sounds are Normal, No Murmurs or Gallops noted  Lungs:Clear to auscultation and percussion. Abdomen: Soft, NT, ND, +BS  Extremities: No clubbing, no edema  Vascular:  Equal 2+ peripheral pulses. Lab Data:  CBC:   Recent Labs     08/25/22  0515 08/26/22  0546   WBC 6.5 6.2   HGB 7.2* 7.5*   HCT 23.1* 23.8*   MCV 98.7 98.3    165     BMP:   Recent Labs     08/25/22 0515 08/26/22  0546    138   K 4.0 4.0    106   CO2 23 24   BUN 47* 44*   CREATININE 2.8* 2.8*     LIVER PROFILE:   Recent Labs     08/25/22 0515 08/26/22  0546   AST 48* 30   * 129*   BILIDIR 0.2 0.2   BILITOT 0.3 0.4   ALKPHOS 169* 159*     PT/INR: No results for input(s): PROTIME, INR in the last 72 hours. APTT: No results for input(s): APTT in the last 72 hours. BNP:  No results for input(s): BNP in the last 72 hours.       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 ([facility] [address] 02/21/2022    Leukocytosis 09/08/2021    Thrombocytopenia, unspecified 08/31/2021    Bandemia 08/10/2021    Atelectasis 08/05/2021    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       Electronically signed by ROSEMARY Tan CNP on 8/27/2022 at 9:59 AM

## 2022-08-27 NOTE — PROGRESS NOTES
Hospitalist Progress Note      Name:  Tianna Allred /Age/Sex: 10/18/1930  (80 y.o. male)   MRN & CSN:  [id number] & [id number] Admission Date/Time: 2022  6:43 PM   Location:  [address] [place] [address]- PCP: No primary care provider on file. Hospital Day: 7    Assessment and Plan:   Tianna Allred is a 80 y.o.  male  who presents with Chest pain    Patient had initially presented with chest pain. Elevated troponin in the setting of CKD. Cardiology was consulted. Unlikely ACS. Will keep monitoring in telemetry Episode of  PEA with CPR and ROSC during the hospital stay. No apparent cause. CT head negative. patient does not remember the event . currently in sinus in telemetry. EKG did not show any ischemic changes. Echo showed preserved EF. Plan to continue Lopressor at this time. Anemia - likely multifactorial. No active bleeding. Will keep monitoring for now. Iron panel not suggestive of ESTUARDO. Hb stable for now    KAVON on CKD - Cr stable. Nephrology on board. Gross hematuria - likely trauma due to Borden . Eliquis held for now. BPH - chronically on Flomax 0.4 mg and Proscar 5 mg. Recommend borden removal/voiding trial prior to discharge once more medically stable    Lactic acidosis was present likely postcode - resolved. Hypokalemia: Resolved    Transaminitis- unknown etiology, no abdominal pain; improving. Statin held. US abdomen showed cholelithiasis but no signs of cholecystitis    Hyperbilirubinemia: Trending down    Hx Recurrent DVT of RLE: Continue Eliquis    PT recommended SNF. waiting for placement otherwise stable    Other co-morbidities     Hx Multifactorial Anemia: Hgb 8.9 but all cell lines dropped overnight, repeat in AM; no suspected source of clinical bleeding  Hx Bullous Pemphigoid: Continue daily steroids; follows with Dermatology as outpatient  Renal Cyst: Being followed by Nephrology as OP  Chronic bilateral leg edema: Being followed by Nephrology, continue daily PO Lasix  Hx CKD Stage IV: Cr stable at 2.5, monitor, continue supplemental sodium bicarb  Hx BPH: Contniue Tamsulosin and Proscar  Hx Peripheral Neuropathy  Hx Hypothyroidism: Levothyroxine resumed  Hx Anxiety/Depression: continue Zoloft, Seroquel, Lexapro  Hx SCC Skin Cancer: Follows OP with Heme/Onc  Hx Insomnia: Continue home Temazepam    Diet ADULT DIET; Regular; 5 carb choices (75 gm/meal); No Caffeine  ADULT ORAL NUTRITION SUPPLEMENT; Dinner; Low Calorie/High Protein Oral Supplement   DVT Prophylaxis [] Lovenox, []  Heparin, [] SCDs, [] Ambulation   GI Prophylaxis [] PPI,  [] H2 Blocker,  [] Carafate,  [] Diet/Tube Feeds   Code Status DNR-CC   Disposition Patient requires continued admission due to status post cardiac arrest   MDM [] Low, [] Moderate,[x]  High  Patient's risk as above due to cardiac arrest recently     History of Present Illness:     Chief Complaint: Chest pain  Sundar Hi is a 80 y.o.  male  who presents with chest pain     Patient has no new complaints. looks stable. waiting for placement    Ten point ROS reviewed negative, unless as noted above    Objective: Intake/Output Summary (Last 24 hours) at 8/27/2022 1514  Last data filed at 8/27/2022 1153  Gross per 24 hour   Intake --   Output 200 ml   Net -200 ml      Vitals:   Vitals:    08/27/22 0938   BP: 134/69   Pulse: 69   Resp: 17   Temp: 98 °F (36.7 °C)   SpO2: 96%     Physical Exam:   GEN Awake male, sitting upright in bed in no apparent distress. Appears given age. EYES Pupils are equally round. No scleral erythema, discharge, or conjunctivitis. HENT Mucous membranes are moist. Oral pharynx without exudates, no evidence of thrush. NECK Supple, no apparent thyromegaly or masses. RESP Clear to auscultation, no wheezes, rales or rhonchi. Symmetric chest movement while on room air. CARDIO/VASC S1/S2 auscultated. Regular rate without appreciable murmurs, rubs, or gallops. No JVD or carotid bruits.  Peripheral pulses equal bilaterally and palpable. No peripheral edema. GI Abdomen is soft without significant tenderness, masses, or guarding. Bowel sounds are normoactive. Rectal exam deferred.  No costovertebral angle tenderness. Normal appearing external genitalia. Laureano catheter is not present. HEME/LYMPH No palpable cervical lymphadenopathy and no hepatosplenomegaly. No petechiae or ecchymoses. MSK No gross joint deformities. SKIN Normal coloration, warm, dry. NEURO Cranial nerves appear grossly intact, normal speech, no lateralizing weakness. PSYCH Awake, alert, oriented x 4. Affect appropriate. Medications:   Medications:    piperacillin-tazobactam  4,500 mg IntraVENous Q12H    metoprolol tartrate  25 mg Oral BID    lidocaine  5 mL IntraDERmal Once    sodium chloride flush  5-40 mL IntraVENous 2 times per day    [Held by provider] apixaban  2.5 mg Oral BID    escitalopram  5 mg Oral Daily    finasteride  5 mg Oral Daily    levothyroxine  75 mcg Oral Daily    pantoprazole  20 mg Oral Daily    predniSONE  10 mg Oral Daily    QUEtiapine  100 mg Oral BID    sertraline  50 mg Oral Daily    sodium bicarbonate  650 mg Oral Daily    tamsulosin  0.8 mg Oral Nightly    temazepam  15 mg Oral Nightly    aspirin  81 mg Oral Daily    [Held by provider] atorvastatin  40 mg Oral Nightly      Infusions:    sodium chloride      sodium chloride       PRN Meds: sodium chloride flush, 5-40 mL, PRN  sodium chloride, , PRN  sodium chloride flush, 5-40 mL, PRN  sodium chloride, , PRN  acetaminophen, 650 mg, Q6H PRN   Or  acetaminophen, 650 mg, Q6H PRN  polyethylene glycol, 17 g, Daily PRN  promethazine, 12.5 mg, Q6H PRN   Or  ondansetron, 4 mg, Q6H PRN  nitroGLYCERIN, 0.4 mg, Q5 Min PRN        Patient is still admitted because c. The anticipated discharge is in greater than 24 hours.      Electronically signed by Mendoza Dalal MD on 8/27/2022 at 3:14 PM

## 2022-08-27 NOTE — PROGRESS NOTES
Occupational Therapy Treatment Note    Name: Apple Woods MRN: [id number] :   10/18/1930   Date:  2022   Admission Date: 2022 Room:  3001/3001-A     Primary Problem:  The primary encounter diagnosis was Chest pain, unspecified type. Diagnoses of Elevated troponin and Chronic kidney disease, stage IV (severe) ([facility]) were also pertinent to this visit. Restrictions/Precautions:          general precautions, fall risk    Communication with other providers: Per chart review and Nurse patient is appropriate for therapeutic intervention. Subjective:  Patient states:  Agreeable to OT therapy. Reported being cleaned up already today. Denied the need for the bathroom  Pain:   Location, Type, Intensity (0/10 to 10/10):  deny    Objective:    Observation: In recliner upon arrival   Objective Measures:  Alert    Treatment, including education:  Therapeutic Exercise:  BUE strengthening ex targeting utilizing red theraband shoulder press, chest press, shoulder abd/add, shoulder horiz abd/add, concentric arm circles clockwise/counter clockwise, bicep/triceps curls x2 sets x15 reps. Demo SBA  +verbal/visual cues for pace and corrected techs. All therapeutic intervention performed c emphasis on BUE/BLE strengthening and endurance to  increase strength for functional tasks / transfers. Therapeutic Activity Training:   Therapeutic activity training was instructed today. Cues were given for safety, sequence, UE/LE placement, awareness, and balance. Sitting balance:F+ on edge of recliner  Sit/stand transfers:CGA-Min A due to repetitive reps in order to improve IND and G safety awareness carryover.  Completed 12 reps total.   Functional Mobility: Around the room 8' with Min A utilizing RW    Activities performed today included bed mobility training, transfers, functional mobility  to increase strength, activity tolerance to facilitate IND c ADL tasks, func transfers / mobility with G safety awareness carryover      Assessment / Impression:    Patient's tolerance of treatment: good  Adverse Reaction: none  Significant change in status and impact:  none  Barriers to improvement: none    Safety  Patient educated on role of OT , benefits of OT and rationale for therapeutic intervention. Patient safely in recliner + alarm set at end of session, with call light/phone in reach, and nursing aware. Gait belt was used for func transfers / mobility.       Plan for Next Session:    Continue OT POC    Time in:  1031  Time out:  1109  Timed treatment minutes:  38  Total treatment time:  38      Electronically signed by:    CARLOS Valera,   8/27/2022, 8:49 AM

## 2022-08-28 PROCEDURE — 1200000000 HC SEMI PRIVATE

## 2022-08-28 PROCEDURE — 2580000003 HC RX 258: Performed by: STUDENT IN AN ORGANIZED HEALTH CARE EDUCATION/TRAINING PROGRAM

## 2022-08-28 PROCEDURE — 6370000000 HC RX 637 (ALT 250 FOR IP): Performed by: INTERNAL MEDICINE

## 2022-08-28 PROCEDURE — 6360000002 HC RX W HCPCS: Performed by: STUDENT IN AN ORGANIZED HEALTH CARE EDUCATION/TRAINING PROGRAM

## 2022-08-28 PROCEDURE — 2580000003 HC RX 258: Performed by: PHYSICIAN ASSISTANT

## 2022-08-28 PROCEDURE — 6370000000 HC RX 637 (ALT 250 FOR IP): Performed by: NURSE PRACTITIONER

## 2022-08-28 PROCEDURE — 94761 N-INVAS EAR/PLS OXIMETRY MLT: CPT

## 2022-08-28 RX ADMIN — PIPERACILLIN AND TAZOBACTAM 4500 MG: 4; .5 INJECTION, POWDER, FOR SOLUTION INTRAVENOUS; PARENTERAL at 17:31

## 2022-08-28 RX ADMIN — QUETIAPINE FUMARATE 100 MG: 100 TABLET ORAL at 20:59

## 2022-08-28 RX ADMIN — SODIUM CHLORIDE 25 ML: 9 INJECTION, SOLUTION INTRAVENOUS at 17:28

## 2022-08-28 RX ADMIN — PIPERACILLIN AND TAZOBACTAM 4500 MG: 4; .5 INJECTION, POWDER, FOR SOLUTION INTRAVENOUS; PARENTERAL at 05:17

## 2022-08-28 RX ADMIN — QUETIAPINE FUMARATE 100 MG: 100 TABLET ORAL at 09:04

## 2022-08-28 RX ADMIN — SODIUM CHLORIDE, PRESERVATIVE FREE 10 ML: 5 INJECTION INTRAVENOUS at 09:04

## 2022-08-28 RX ADMIN — PREDNISONE 10 MG: 10 TABLET ORAL at 09:04

## 2022-08-28 RX ADMIN — TAMSULOSIN HYDROCHLORIDE 0.8 MG: 0.4 CAPSULE ORAL at 20:59

## 2022-08-28 RX ADMIN — ASPIRIN 81 MG CHEWABLE TABLET 81 MG: 81 TABLET CHEWABLE at 09:04

## 2022-08-28 RX ADMIN — FINASTERIDE 5 MG: 5 TABLET, FILM COATED ORAL at 09:04

## 2022-08-28 RX ADMIN — SODIUM CHLORIDE, PRESERVATIVE FREE 10 ML: 5 INJECTION INTRAVENOUS at 20:59

## 2022-08-28 RX ADMIN — TEMAZEPAM 15 MG: 7.5 CAPSULE ORAL at 20:59

## 2022-08-28 RX ADMIN — SERTRALINE HYDROCHLORIDE 50 MG: 50 TABLET ORAL at 09:04

## 2022-08-28 RX ADMIN — SODIUM BICARBONATE 650 MG: 650 TABLET ORAL at 09:04

## 2022-08-28 RX ADMIN — METOPROLOL TARTRATE 25 MG: 25 TABLET, FILM COATED ORAL at 09:04

## 2022-08-28 RX ADMIN — ESCITALOPRAM OXALATE 5 MG: 10 TABLET ORAL at 09:04

## 2022-08-28 RX ADMIN — PANTOPRAZOLE 20 MG: 20 TABLET, DELAYED RELEASE ORAL at 06:20

## 2022-08-28 RX ADMIN — METOPROLOL TARTRATE 25 MG: 25 TABLET, FILM COATED ORAL at 20:59

## 2022-08-28 RX ADMIN — LEVOTHYROXINE SODIUM 75 MCG: 0.07 TABLET ORAL at 06:20

## 2022-08-28 ASSESSMENT — PAIN SCALES - GENERAL
PAINLEVEL_OUTOF10: 0
PAINLEVEL_OUTOF10: 0

## 2022-08-28 NOTE — PLAN OF CARE
Daughter Jorge Luis Pope called nurse to check on patient's status and wellbeing today. Updated by phone with patient's permission and answered all questions.

## 2022-08-28 NOTE — PROGRESS NOTES
Hospitalist Progress Note      Name:  Ric Aguilar /Age/Sex: 10/18/1930  (80 y.o. male)   MRN & CSN:  [id number] & [id number] Admission Date/Time: 2022  6:43 PM   Location:  [address] [place] [address]-A PCP: No primary care provider on file. Hospital Day: 8    Assessment and Plan:   Ric Aguilar is a 80 y.o.  male  who presents with Chest pain    Patient had initially presented with chest pain. Elevated troponin in the setting of CKD. Cardiology was consulted. Unlikely ACS. Will keep monitoring in telemetry Episode of  PEA with CPR and ROSC during the hospital stay. No apparent cause. CT head negative. patient does not remember the event . currently in sinus in telemetry. EKG did not show any ischemic changes. Echo showed preserved EF. Plan to continue Lopressor at this time. Anemia - likely multifactorial. No active bleeding. Will keep monitoring for now. Iron panel not suggestive of ETSUARDO. Hb stable for now    KAVON on CKD - Cr stable. Nephrology on board. Gross hematuria - likely trauma due to Borden . Eliquis held for now. BPH - chronically on Flomax 0.4 mg and Proscar 5 mg. Recommend borden removal/voiding trial prior to discharge once more medically stable    Lactic acidosis was present likely postcode - resolved. Hypokalemia: Resolved    Transaminitis- unknown etiology, no abdominal pain; improving. Statin held. US abdomen showed cholelithiasis but no signs of cholecystitis    Hyperbilirubinemia: Trending down    Hx Recurrent DVT of RLE: Continue Eliquis    PT recommended SNF. waiting for placement otherwise stable    Other co-morbidities     Hx Multifactorial Anemia: Hgb 8.9 but all cell lines dropped overnight, repeat in AM; no suspected source of clinical bleeding  Hx Bullous Pemphigoid: Continue daily steroids; follows with Dermatology as outpatient  Renal Cyst: Being followed by Nephrology as OP  Chronic bilateral leg edema: Being followed by Nephrology, continue daily PO Lasix  Hx and palpable. No peripheral edema. GI Abdomen is soft without significant tenderness, masses, or guarding. Bowel sounds are normoactive. Rectal exam deferred.  No costovertebral angle tenderness. Normal appearing external genitalia. Laureano catheter is not present. HEME/LYMPH No palpable cervical lymphadenopathy and no hepatosplenomegaly. No petechiae or ecchymoses. MSK No gross joint deformities. SKIN Normal coloration, warm, dry. NEURO Cranial nerves appear grossly intact, normal speech, no lateralizing weakness. PSYCH Awake, alert, oriented x 4. Affect appropriate. Medications:   Medications:    piperacillin-tazobactam  4,500 mg IntraVENous Q12H    metoprolol tartrate  25 mg Oral BID    lidocaine  5 mL IntraDERmal Once    sodium chloride flush  5-40 mL IntraVENous 2 times per day    [Held by provider] apixaban  2.5 mg Oral BID    escitalopram  5 mg Oral Daily    finasteride  5 mg Oral Daily    levothyroxine  75 mcg Oral Daily    pantoprazole  20 mg Oral Daily    predniSONE  10 mg Oral Daily    QUEtiapine  100 mg Oral BID    sertraline  50 mg Oral Daily    sodium bicarbonate  650 mg Oral Daily    tamsulosin  0.8 mg Oral Nightly    temazepam  15 mg Oral Nightly    aspirin  81 mg Oral Daily    [Held by provider] atorvastatin  40 mg Oral Nightly      Infusions:    sodium chloride      sodium chloride       PRN Meds: sodium chloride flush, 5-40 mL, PRN  sodium chloride, , PRN  sodium chloride flush, 5-40 mL, PRN  sodium chloride, , PRN  acetaminophen, 650 mg, Q6H PRN   Or  acetaminophen, 650 mg, Q6H PRN  polyethylene glycol, 17 g, Daily PRN  promethazine, 12.5 mg, Q6H PRN   Or  ondansetron, 4 mg, Q6H PRN  nitroGLYCERIN, 0.4 mg, Q5 Min PRN        Patient is still admitted because c. The anticipated discharge is in greater than 24 hours.      Electronically signed by Brandy Nuñez MD on 8/28/2022 at 2:29 PM

## 2022-08-28 NOTE — PROGRESS NOTES
suspicious for   cholelithiasis. Sonographic Aranza Salle sign was not well assessed as the patient   was medicated for the examination. Nuclear medicine hepatobiliary scan may   be useful for further characterization as these findings can be seen in the   setting of acute or chronic cholecystitis. CT head 8/23/2022  Impression   No evidence of intracranial hemorrhage or any other definable acute   intracranial abnormality. No interval change versus CT scan in 2017. PAST MEDICAL HISTORY:  Anemia, anxiety, arthritis, BPH, depression,  GERD, hemorrhoids, hepatitis, hernia, hypotension, hyperlipidemia,  squamous cell carcinoma, CKD and DVT. PAST SURGICAL HISTORY:  Cataracts removal, hernia repair, neck surgery. SOCIAL HISTORY:  He is a former smoker. He does not use any alcohol. He does not use any illicit drugs. ALLERGIES:  Allergic to MINOCYCLINE. HOME MEDICATIONS:  He is on prednisone, Lasix, sodium bicarb, Synthroid,  Proscar, Eliquis 2.5 mg, Flomax, Seroquel, Zoloft, Lexapro, Protonix.        Objective:   BP (!) 118/59   Pulse 66   Temp 97.9 °F (36.6 °C) (Oral)   Resp 18   Ht 6' (1.829 m)   Wt 205 lb 11 oz (93.3 kg)   SpO2 99%   BMI 27.90 kg/m²     Intake/Output Summary (Last 24 hours) at 8/28/2022 0914  Last data filed at 8/28/2022 5985  Gross per 24 hour   Intake 240 ml   Output 700 ml   Net -460 ml       Medications:   Scheduled Meds:   piperacillin-tazobactam  4,500 mg IntraVENous Q12H    metoprolol tartrate  25 mg Oral BID    lidocaine  5 mL IntraDERmal Once    sodium chloride flush  5-40 mL IntraVENous 2 times per day    [Held by provider] apixaban  2.5 mg Oral BID    escitalopram  5 mg Oral Daily    finasteride  5 mg Oral Daily    levothyroxine  75 mcg Oral Daily    pantoprazole  20 mg Oral Daily    predniSONE  10 mg Oral Daily    QUEtiapine  100 mg Oral BID    sertraline  50 mg Oral Daily    sodium bicarbonate  650 mg Oral Daily    tamsulosin  0.8 mg Oral Nightly temazepam  15 mg Oral Nightly    aspirin  81 mg Oral Daily    [Held by provider] atorvastatin  40 mg Oral Nightly      Infusions:   sodium chloride      sodium chloride        PRN Meds:  sodium chloride flush, sodium chloride, sodium chloride flush, sodium chloride, acetaminophen **OR** acetaminophen, polyethylene glycol, promethazine **OR** ondansetron, nitroGLYCERIN     Physical Exam:  Vitals:    08/28/22 0854   BP: (!) 118/59   Pulse: 66   Resp: 18   Temp: 97.9 °F (36.6 °C)   SpO2: 99%        General: AAO, NAD, [place]  Chest: Nontender  Cardiac: First and Second Heart Sounds are Normal, No Murmurs or Gallops noted  Lungs:Clear to auscultation and percussion. Abdomen: Soft, NT, ND, +BS  Extremities: No clubbing, no edema  Vascular:  Equal 2+ peripheral pulses. Lab Data:  CBC:   Recent Labs     08/26/22  0546   WBC 6.2   HGB 7.5*   HCT 23.8*   MCV 98.3        BMP:   Recent Labs     08/26/22  0546      K 4.0      CO2 24   BUN 44*   CREATININE 2.8*     LIVER PROFILE:   Recent Labs     08/26/22  0546   AST 30   *   BILIDIR 0.2   BILITOT 0.4   ALKPHOS 159*     PT/INR: No results for input(s): PROTIME, INR in the last 72 hours. APTT: No results for input(s): APTT in the last 72 hours. BNP:  No results for input(s): BNP in the last 72 hours.       Assessment:  Patient Active Problem List    Diagnosis Date Noted    Cardiac arrest ([facility] 08/25/2022    Chest pain 08/21/2022    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) 08/18/2022    Varicose 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 08/15/2015    Skin ulcer, limited to breakdown of skin ([facility] 02/21/2022    Current moderate episode of major depressive disorder, unspecified whether recurrent ([facility] 02/21/2022    Chronic kidney disease, stage IV (severe) ([facility] 02/21/2022    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 ([facility] 02/21/2022    Leukocytosis 09/08/2021    Thrombocytopenia, unspecified 08/31/2021    Bandemia 08/10/2021 Atelectasis 08/05/2021    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       Electronically signed by ROSEMARY Lisa CNP on 8/28/2022 at 9:14 AM

## 2022-08-28 NOTE — PROGRESS NOTES
Nephrology Progress Note        [address], [address] [address]  Phone: [phone]  Office Hours: 8:30AM - 4:30PM  Monday - Friday 8/28/2022 12:40 PM  Subjective:   Admit Date: 8/21/2022  PCP: No primary care provider on file. Interval History:   No  complaints  [place]      Diet: ADULT DIET; Regular; 5 carb choices (75 gm/meal); No Caffeine  ADULT ORAL NUTRITION SUPPLEMENT; Dinner;  Low Calorie/High Protein Oral Supplement      Data:   Scheduled Meds:   piperacillin-tazobactam  4,500 mg IntraVENous Q12H    metoprolol tartrate  25 mg Oral BID    lidocaine  5 mL IntraDERmal Once    sodium chloride flush  5-40 mL IntraVENous 2 times per day    [Held by provider] apixaban  2.5 mg Oral BID    escitalopram  5 mg Oral Daily    finasteride  5 mg Oral Daily    levothyroxine  75 mcg Oral Daily    pantoprazole  20 mg Oral Daily    predniSONE  10 mg Oral Daily    QUEtiapine  100 mg Oral BID    sertraline  50 mg Oral Daily    sodium bicarbonate  650 mg Oral Daily    tamsulosin  0.8 mg Oral Nightly    temazepam  15 mg Oral Nightly    aspirin  81 mg Oral Daily    [Held by provider] atorvastatin  40 mg Oral Nightly     Continuous Infusions:   sodium chloride      sodium chloride       PRN Meds:sodium chloride flush, sodium chloride, sodium chloride flush, sodium chloride, acetaminophen **OR** acetaminophen, polyethylene glycol, promethazine **OR** ondansetron, nitroGLYCERIN  I/O last 3 completed shifts:  In: -   Out: 400 [Urine:400]  I/O this shift:  In: 240 [P.O.:240]  Out: 300 [Urine:300]    Intake/Output Summary (Last 24 hours) at 8/28/2022 1240  Last data filed at 8/28/2022 0903  Gross per 24 hour   Intake 240 ml   Output 500 ml   Net -260 ml         CBC:   Recent Labs     08/26/22 0546   WBC 6.2   HGB 7.5*          BMP:    Recent Labs     08/26/22 0546      K 4.0      CO2 24   BUN 44*   CREATININE 2.8*   GLUCOSE 134*     Hepatic:   Recent Labs     08/26/22 0546   AST 30 *   BILITOT 0.4   ALKPHOS 159*         Objective:   Vitals: BP (!) 118/59   Pulse 66   Temp 97.9 °F (36.6 °C) (Oral)   Resp 18   Ht 6' (1.829 m)   Wt 205 lb 11 oz (93.3 kg)   SpO2 99%   BMI 27.90 kg/m²   General appearance:  in no acute distress  HEENT: normocephalic, atraumatic,   Neck: supple, trachea midline  Lungs:  breathing comfortably   Extremities: extremities atraumatic, no cyanosis or edema  Neurologic: drowsy    Assessment and Plan:       Patient Active Problem List    Diagnosis Date Noted    Cardiac arrest ([facility] 08/25/2022    Chest pain 08/21/2022    Agitation due to dementia ([address] 08/18/2022    Varicose 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 08/15/2015    Skin ulcer, limited to breakdown of skin ([facility] 02/21/2022    Current moderate episode of major depressive disorder, unspecified whether recurrent ([address] 02/21/2022    Chronic kidney disease, stage IV (severe) ([facility] 02/21/2022    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     MDM  Stable eGFR  BP normal  Good urine output  No new labs  Thank you                Electronically signed by Bobo Constantino MD on 8/28/2022 at 12:40 PM    [address]  [place], [place]  PHONE: [phone]  FAX: [phone]

## 2022-08-28 NOTE — PLAN OF CARE
Problem: Discharge Planning  Goal: Discharge to home or other facility with appropriate resources  8/28/2022 1042 by Kings Castillo RN  Outcome: Progressing  8/28/2022 1042 by Kings Castillo RN  Outcome: Not Progressing  Flowsheets (Taken 8/28/2022 4353)  Discharge to home or other facility with appropriate resources:   Identify barriers to discharge with patient and caregiver   Arrange for needed discharge resources and transportation as appropriate     Problem: Pain  Goal: Verbalizes/displays adequate comfort level or baseline comfort level  8/28/2022 1042 by Kings Castillo RN  Outcome: Progressing  8/28/2022 1042 by Kings Castillo RN  Outcome: Not Progressing  Flowsheets (Taken 8/28/2022 5805)  Verbalizes/displays adequate comfort level or baseline comfort level:   Encourage patient to monitor pain and request assistance   Assess pain using appropriate pain scale     Problem: Skin/Tissue Integrity  Goal: Absence of new skin breakdown  Description: 1. Monitor for areas of redness and/or skin breakdown  2. Assess vascular access sites hourly  3. Every 4-6 hours minimum:  Change oxygen saturation probe site  4. Every 4-6 hours:  If on nasal continuous positive airway pressure, respiratory therapy assess nares and determine need for appliance change or resting period.   8/28/2022 1042 by Kings Castillo RN  Outcome: Progressing  8/28/2022 1042 by Kings Castillo RN  Outcome: Not Progressing     Problem: Safety - Adult  Goal: Free from fall injury  8/28/2022 1042 by Kings Castillo RN  Outcome: Progressing  8/28/2022 1042 by Kings Castillo RN  Outcome: Not Progressing     Problem: ABCDS Injury Assessment  Goal: Absence of physical injury  8/28/2022 1042 by Kings Castillo RN  Outcome: Progressing  8/28/2022 1042 by Kings Castillo RN  Outcome: Not Progressing     Problem: Nutrition Deficit:  Goal: Optimize nutritional status  8/28/2022 1042 by Kings Castillo RN  Outcome: Progressing  8/28/2022 1042 by Kings Castillo RN  Outcome: Not Progressing     Problem: Discharge Planning  Goal: Discharge to home or other facility with appropriate resources  8/28/2022 1042 by Nayeli Houston RN  Outcome: Progressing  8/28/2022 1042 by Nayeli Houston RN  Outcome: Not Progressing  Flowsheets (Taken 8/28/2022 8722)  Discharge to home or other facility with appropriate resources:   Identify barriers to discharge with patient and caregiver   Arrange for needed discharge resources and transportation as appropriate     Problem: Pain  Goal: Verbalizes/displays adequate comfort level or baseline comfort level  8/28/2022 1042 by Nayeli Houston RN  Outcome: Progressing  8/28/2022 1042 by Nayeli Houston RN  Outcome: Not Progressing  Flowsheets (Taken 8/28/2022 2906)  Verbalizes/displays adequate comfort level or baseline comfort level:   Encourage patient to monitor pain and request assistance   Assess pain using appropriate pain scale     Problem: Skin/Tissue Integrity  Goal: Absence of new skin breakdown  Description: 1. Monitor for areas of redness and/or skin breakdown  2. Assess vascular access sites hourly  3. Every 4-6 hours minimum:  Change oxygen saturation probe site  4. Every 4-6 hours:  If on nasal continuous positive airway pressure, respiratory therapy assess nares and determine need for appliance change or resting period.   8/28/2022 1042 by Nayeli Houston RN  Outcome: Progressing  8/28/2022 1042 by Nayeli Houston RN  Outcome: Not Progressing     Problem: Safety - Adult  Goal: Free from fall injury  8/28/2022 1042 by Nayeli Houston RN  Outcome: Progressing  8/28/2022 1042 by Nayeli Houston RN  Outcome: Not Progressing     Problem: ABCDS Injury Assessment  Goal: Absence of physical injury  8/28/2022 1042 by Nayeli Houston RN  Outcome: Progressing  8/28/2022 1042 by Nayeli Houston RN  Outcome: Not Progressing     Problem: Nutrition Deficit:  Goal: Optimize nutritional status  8/28/2022 1042 by Nayeli Houston RN  Outcome: Progressing  8/28/2022 1042 by Mamie Lilly, RN  Outcome: Not Progressing

## 2022-08-29 PROCEDURE — 97535 SELF CARE MNGMENT TRAINING: CPT

## 2022-08-29 PROCEDURE — 97530 THERAPEUTIC ACTIVITIES: CPT

## 2022-08-29 PROCEDURE — 6360000002 HC RX W HCPCS: Performed by: STUDENT IN AN ORGANIZED HEALTH CARE EDUCATION/TRAINING PROGRAM

## 2022-08-29 PROCEDURE — 1200000000 HC SEMI PRIVATE

## 2022-08-29 PROCEDURE — 2580000003 HC RX 258: Performed by: PHYSICIAN ASSISTANT

## 2022-08-29 PROCEDURE — 6370000000 HC RX 637 (ALT 250 FOR IP): Performed by: NURSE PRACTITIONER

## 2022-08-29 PROCEDURE — 97110 THERAPEUTIC EXERCISES: CPT

## 2022-08-29 PROCEDURE — 2580000003 HC RX 258: Performed by: STUDENT IN AN ORGANIZED HEALTH CARE EDUCATION/TRAINING PROGRAM

## 2022-08-29 PROCEDURE — 94761 N-INVAS EAR/PLS OXIMETRY MLT: CPT

## 2022-08-29 PROCEDURE — 6370000000 HC RX 637 (ALT 250 FOR IP): Performed by: INTERNAL MEDICINE

## 2022-08-29 PROCEDURE — 97116 GAIT TRAINING THERAPY: CPT

## 2022-08-29 RX ADMIN — FINASTERIDE 5 MG: 5 TABLET, FILM COATED ORAL at 08:49

## 2022-08-29 RX ADMIN — SERTRALINE HYDROCHLORIDE 50 MG: 50 TABLET ORAL at 08:49

## 2022-08-29 RX ADMIN — SODIUM CHLORIDE, PRESERVATIVE FREE 10 ML: 5 INJECTION INTRAVENOUS at 08:48

## 2022-08-29 RX ADMIN — METOPROLOL TARTRATE 25 MG: 25 TABLET, FILM COATED ORAL at 08:49

## 2022-08-29 RX ADMIN — TAMSULOSIN HYDROCHLORIDE 0.8 MG: 0.4 CAPSULE ORAL at 20:54

## 2022-08-29 RX ADMIN — QUETIAPINE FUMARATE 100 MG: 100 TABLET ORAL at 20:54

## 2022-08-29 RX ADMIN — SODIUM CHLORIDE, PRESERVATIVE FREE 10 ML: 5 INJECTION INTRAVENOUS at 20:56

## 2022-08-29 RX ADMIN — QUETIAPINE FUMARATE 100 MG: 100 TABLET ORAL at 08:49

## 2022-08-29 RX ADMIN — ESCITALOPRAM OXALATE 5 MG: 10 TABLET ORAL at 08:48

## 2022-08-29 RX ADMIN — METOPROLOL TARTRATE 25 MG: 25 TABLET, FILM COATED ORAL at 20:54

## 2022-08-29 RX ADMIN — SODIUM BICARBONATE 650 MG: 650 TABLET ORAL at 08:49

## 2022-08-29 RX ADMIN — TEMAZEPAM 15 MG: 7.5 CAPSULE ORAL at 20:54

## 2022-08-29 RX ADMIN — PANTOPRAZOLE 20 MG: 20 TABLET, DELAYED RELEASE ORAL at 05:18

## 2022-08-29 RX ADMIN — LEVOTHYROXINE SODIUM 75 MCG: 0.07 TABLET ORAL at 05:18

## 2022-08-29 RX ADMIN — ASPIRIN 81 MG CHEWABLE TABLET 81 MG: 81 TABLET CHEWABLE at 08:48

## 2022-08-29 RX ADMIN — PREDNISONE 10 MG: 10 TABLET ORAL at 08:49

## 2022-08-29 RX ADMIN — PIPERACILLIN AND TAZOBACTAM 4500 MG: 4; .5 INJECTION, POWDER, FOR SOLUTION INTRAVENOUS; PARENTERAL at 05:23

## 2022-08-29 RX ADMIN — PIPERACILLIN AND TAZOBACTAM 4500 MG: 4; .5 INJECTION, POWDER, FOR SOLUTION INTRAVENOUS; PARENTERAL at 17:23

## 2022-08-29 ASSESSMENT — PAIN SCALES - GENERAL: PAINLEVEL_OUTOF10: 0

## 2022-08-29 NOTE — PROGRESS NOTES
Physical Therapy    Physical Therapy Treatment Note  Name: Gennie Runner MRN: [id number] :   10/18/1930   Date:  2022   Admission Date: 2022 Room:  [address] [place], [place] [address]A   Restrictions/Precautions:  fall risk, general prec, IV          Communication with other providers:  nurse states pt ok to treat  Subjective:  Patient states:  agreeable to PT, states he is not sure how well he'll walk  Pain:   Location, Type, Intensity (0/10 to 10/10):  pt denies pain    Objective:    Observation:  supine in bed upon entry  Treatment, including education/measures:  Transfers with line management of IV   Rolling: SBA  Supine to sit :SBA  Sit to supine :CGA  Scooting :Casa/SBA-pt able to scoot/bridge in bed with cues  Sit to stand :CGA-Casa. Requires multiple attempts when sit>stand from recliner due to lower sitting surface  Stand to sit :CGA  SPT:CGA/Casa  Vc's for safety and sequencing. Pt [place] so vc's sometimes limited. Sitting Exercises: Ankle pumps x 20  Glute sets x 10  LAQ's x 10  Marching x 10 alternating for core activation   Hip Abd x 10  Therapeutic Exercise:  Therapeutic exercises were instructed today. Cues were given for technique, safety, recruitment, and rationale. Cues were verbal and/or tactile for optimal muscle recruitment    Gait:  Pt amb with FWW for 6', then ~15' with CGA  Pt needed VC's for safety and sequencing, pt displays forward posture, vc's to increase trunk ext during amb. Safety  Patient left safely in the bed, with call light/phone in reach with alarm applied. Gait belt used for transfers and gait.       Assessment / Impression:    Pt improves gait tolerance and transfer independence on this date  Patient's tolerance of treatment:  good   Adverse Reaction: na  Significant change in status and impact:  na  Barriers to improvement:  weakness  Plan for Next Session:    Cont POC /c focus on gait  Time in:  1045  Time out:  1108  Timed treatment minutes:  23  Total treatment time: 21    Previously filed items:  Social/Functional History  Lives With: Spouse  Type of Home: House  Home Layout: Laundry in basement, Able to Live on Main level with bedroom/bathroom, Two level  Home Access: Stairs to enter without rails  Entrance Stairs - Number of Steps: 3  Bathroom Shower/Tub: Tub/Shower unit, Shower chair with back  Bathroom Toilet: Standard  Bathroom Equipment: Shower chair  Home Equipment: Walker, rolling (pt unsure if standard or rolling walker)  Has the patient had two or more falls in the past year or any fall with injury in the past year?: Unknown  ADL Assistance: Independent  Homemaking Assistance: Needs assistance (wife assists)  Ambulation Assistance: Independent  Transfer Assistance: Independent  Active : No  Patient's  Info: son drives  Additional Comments: pt somewhat questionable historian this date.  son lives in [address] [place] [address] Drive  Time Frame for Short term goals: 1 week  Short term goal 1: Pt will perform all bed mobility with SBA  Short term goal 2: Pt will tolerate AMB x25 ft with RW, chair follow, CGA, stable vitals  Short term goal 3: Pt will demonstrate STS to/from standard chair with UE support, SBA    Electronically signed by:    Patel Capone PTA  8/29/2022, 10:51 AM

## 2022-08-29 NOTE — CARE COORDINATION
LSW called [facility] and spoke with Gina Erickson with [facility] and they can take pt once they have precert. Precert is pending. Need updated PT/OT notes WB note placed.

## 2022-08-29 NOTE — PLAN OF CARE
Problem: Discharge Planning  Goal: Discharge to home or other facility with appropriate resources  8/29/2022 0941 by Phoenix Rodrigues RN  Outcome: Adequate for Discharge  8/28/2022 2340 by Mansi Dennis RN  Outcome: Progressing     Problem: Pain  Goal: Verbalizes/displays adequate comfort level or baseline comfort level  8/29/2022 0941 by Phoenix Rodrigues RN  Outcome: Adequate for Discharge  8/28/2022 2340 by Mansi Dennis RN  Outcome: Progressing     Problem: Skin/Tissue Integrity  Goal: Absence of new skin breakdown  Description: 1. Monitor for areas of redness and/or skin breakdown  2. Assess vascular access sites hourly  3. Every 4-6 hours minimum:  Change oxygen saturation probe site  4. Every 4-6 hours:  If on nasal continuous positive airway pressure, respiratory therapy assess nares and determine need for appliance change or resting period.   8/29/2022 0941 by Phoenix Rodrigues RN  Outcome: Adequate for Discharge  8/28/2022 2340 by Evette Valerio RN  Outcome: Progressing     Problem: Safety - Adult  Goal: Free from fall injury  8/29/2022 0941 by Phoenix Rodrigues RN  Outcome: Adequate for Discharge  8/28/2022 2340 by Mansi Dennis RN  Outcome: Progressing     Problem: ABCDS Injury Assessment  Goal: Absence of physical injury  8/29/2022 0941 by Phoenix Rodrigues RN  Outcome: Adequate for Discharge  8/28/2022 2340 by Evette Valerio RN  Outcome: Progressing     Problem: Nutrition Deficit:  Goal: Optimize nutritional status  8/29/2022 0941 by Phoenix Rodrigues RN  Outcome: Adequate for Discharge  8/28/2022 2340 by Evette Valerio RN  Outcome: Progressing

## 2022-08-29 NOTE — DISCHARGE INSTR - COC
Continuity of Care Form    Patient Name: Alesha Ramirez   :  10/18/1930  MRN:  [id number]    Admit date:  2022  Discharge date:  2022    Code Status Order: DNR-CC   Advance Directives:     Admitting Physician:  Loretto Fleischer, MD  PCP: No primary care provider on file.     Discharging Nurse: [address] [place] [address] Unit/Room#: 7054/2640-J  Discharging Unit Phone Number: [phone]    Emergency Contact:   Extended Emergency Contact Information  Primary Emergency Contact: [place] Phone: [phone]  Work Phone: [phone]  Mobile Phone: [phone]  Relation: Child  Secondary Emergency Contact: Vidya Valdes Phone: [phone]  Mobile Phone: [phone]  Relation: Child    Past Surgical History:  Past Surgical History:   Procedure Laterality Date    CATARACT REMOVAL      CYSTOSCOPY N/A 3/29/2021    CYSTOSCOPY EVACUATION OF CLOTS, BLADDER FULGERATION, AND SUPRA-PUBIC CATHETER INSERTION performed by Nonah Sicard, MD at [address] [place] [address] 2021    CYSTOSCOPY, PROSTATE FULGURATION, EVACUATION OF CLOTS & TURP performed by Samina Mckinley MD at [address] [place] [address] N/A 3/4/2021    LAPAROSCOPIC [facility] WITH GASTRIC OBSTRUCTION POSS OPEN performed by Boni Velasco MD at [address] [place] [address]         Immunization History:   Immunization History   Administered Date(s) Administered     Influenza, FLUZONE (age 72 y+), High Dose 10/28/2020, 2021    COVID-19, MODERNA BLUE border, Primary or Immunocompromised, (age 12y+), IM, 100 mcg/0.5mL 2021, 2021    COVID-19, US Vaccine, Vaccine Unspecified 2021, 2021, 2021    Influenza A ([id number]) Vaccine PF IM 2009    Influenza Virus Vaccine 2012, 10/01/2018, 2019, 10/30/2020    Influenza, FLUAD, (age 72 y+), Adjuvanted 2021    Influenza, High Dose (Fluzone 65 yrs and older) 10/27/2017, 10/10/2018, 2019 Pneumococcal Conjugate 13-valent ([id number]) 08/05/2016    Pneumococcal Polysaccharide ([id number]) 12/07/2021    Tdap (Boostrix, Adacel) 08/27/2013, 05/04/2018    Zoster Live (Zostavax) 07/22/2015       Active Problems:  Patient Active Problem List   Diagnosis Code    Hyperlipidemia E78.5    Depression F32. 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    Atelectasis J98.11    Bandemia D72.825    Thrombocytopenia, unspecified D69.6    Leukocytosis D72.829    Skin ulcer, limited to breakdown of skin ([facility]) L98.491    Current moderate episode of major depressive disorder, unspecified whether recurrent ([facility]) F32.1    Chronic kidney disease, stage IV (severe) ([facility]) N18.4    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) F03.91    Chest pain R07.9    Cardiac arrest ([facility]) I46.9       Isolation/Infection:   Isolation            No Isolation          Patient Infection Status       Infection Onset Added Last Indicated Last Indicated By Review Planned Expiration Resolved Resolved By    None active    Resolved    COVID-19 (Rule Out) 03/03/21 03/03/21 03/03/21 COVID-19, Rapid (Ordered)   03/03/21 Rule-Out Test Resulted            Nurse Assessment:  Last Vital Signs: BP (!) 107/59   Pulse 63   Temp 98.4 °F (36.9 °C) (Oral)   Resp 16   Ht 6' (1.829 m)   Wt 206 lb (93.4 kg)   SpO2 97%   BMI 27.94 kg/m²     Last documented pain score (0-10 scale): Pain Level: 0  Last Weight:   Wt Readings from Last 1 Encounters:   08/29/22 206 lb (93.4 kg)     Mental Status:  oriented    IV Access:  - None    Nursing Mobility/ADLs:  Walking   Assisted  Transfer  Assisted  Bathing  Assisted  Dressing  Assisted  Toileting  Assisted  Feeding  [address] Delivery   whole    Wound Care Documentation and Therapy:  Wound 08/22/22 Radial Right; Anterior large skin tear (Active)   Wound Etiology Skin Tear 08/29/22 0846   Dressing Status Clean;Dry; Intact 08/29/22 0846   Wound Cleansed Not Cleansed 08/29/22 0846   Wound Length (cm) 4 cm 08/22/22 1418   Wound Width (cm) 3 cm 08/22/22 1418   Wound Surface Area (cm^2) 12 cm^2 08/22/22 1418   Wound Assessment Bleeding;Pink/red 08/24/22 1500   Drainage Amount None 08/28/22 2057   Drainage Description Serosanguinous 08/25/22 0847   Odor None 08/28/22 2057   Tiffanie-wound Assessment Fragile 08/24/22 2019   Number of days: 6       Incision 03/29/21 Anterior (Active)   Number of days: 517        Elimination:  Continence: Bowel: Yes  Bladder: Yes  Urinary Catheter: None   Colostomy/Ileostomy/Ileal Conduit: No       Date of Last BM: 08/30/2022    Intake/Output Summary (Last 24 hours) at 8/29/2022 1001  Last data filed at 8/29/2022 0904  Gross per 24 hour   Intake 240 ml   Output 650 ml   Net -410 ml     I/O last 3 completed shifts: In: 360 [P.O.:360]  Out: 500 [Urine:500]    Safety Concerns:     None    Impairments/Disabilities:      Hearing      Patient's personal belongings (please select all that are sent with patient):  Dentures upper and lower    RN SIGNATURE:  Electronically signed by Allen Henning RN on 8/31/22 at 12:41 PM EDT          PHYSICIAN SECTION    Prognosis: Fair    Condition at Discharge: Stable    Rehab Potential (if transferring to Rehab):  Fair    Recommended Labs or Other Treatments After Discharge: CBC, monitor for bleeding, follow up with PCP within 1 week and determine timing of restarting anticoagulation    Nutrition Therapy:  Current Nutrition Therapy:   - Oral Diet: Cardiac, carb controlled (5 carb choices), low calorie/high protein supplement at dinner    Routes of Feeding: Oral  Liquids: No Restrictions  Daily Fluid Restriction: no  Last Modified Barium Swallow with Video (Video Swallowing Test): not done    Treatments at the Time of Hospital Discharge:   Respiratory Treatments: prn  Oxygen Therapy:  is not on home oxygen therapy. Ventilator:    - No ventilator support    Rehab Therapies: Physical Therapy  Weight Bearing Status/Restrictions: No weight bearing restrictions  Other Medical Equipment (for information only, NOT a DME order):  wheelchair and walker  Other Treatments:     Physician Certification: I certify the above information and transfer of Jaren Schmitz  is necessary for the continuing treatment of the diagnosis listed and that he requires [facility] for greater 30 days.      Update Admission H&P: No change in H&P    PHYSICIAN SIGNATURE:  Electronically signed by Sulema Judd MD on 8/31/22 at 12:18 PM EDT

## 2022-08-29 NOTE — PROGRESS NOTES
Resting comfortably on room air  Cr 2.8  Continue supportive mgmt  Change the lasix to only prn leg edema upon dc    Thank you    Patient Active Problem List    Diagnosis Date Noted    Cardiac arrest ([facility] 08/25/2022    Chest pain 08/21/2022    Agitation due to dementia ([facility] 08/18/2022    Varicose 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 08/15/2015    Skin ulcer, limited to breakdown of skin ([facility] 02/21/2022    Current moderate episode of major depressive disorder, unspecified whether recurrent ([facility] 02/21/2022    Chronic kidney disease, stage IV (severe) ([facility] 02/21/2022    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 ([facility] 02/21/2022    Leukocytosis 09/08/2021    Thrombocytopenia, unspecified 08/31/2021    Bandemia 08/10/2021    Atelectasis 08/05/2021    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

## 2022-08-29 NOTE — PROGRESS NOTES
Hospitalist Progress Note      Name:  Tarik Rogers /Age/Sex: 10/18/1930  (80 y.o. male)   MRN & CSN:  [id number] & [id number] Admission Date/Time: 2022  6:43 PM   Location:  [address] [place] [address]-A PCP: No primary care provider on file. Hospital Day: 9    Assessment and Plan:   Tarik Rogers is a 80 y.o.  male  who presents with Chest pain    Patient had initially presented with chest pain. Elevated troponin in the setting of CKD. Cardiology was consulted. Unlikely ACS. Will keep monitoring in telemetry Episode of  PEA with CPR and ROSC during the hospital stay. No apparent cause. CT head negative. patient does not remember the event . currently in sinus in telemetry. EKG did not show any ischemic changes. Echo showed preserved EF. Plan to continue Lopressor at this time. Anemia - likely multifactorial. No active bleeding. Will keep monitoring for now. Iron panel not suggestive of ESTUARDO. Hb stable for now    KAVON on CKD - Cr stable. Nephrology on board. Gross hematuria - likely trauma due to Borden . Eliquis held for now. BPH - chronically on Flomax 0.4 mg and Proscar 5 mg. Recommend borden removal/voiding trial prior to discharge once more medically stable    Lactic acidosis was present likely postcode - resolved. Hypokalemia: Resolved    Transaminitis- unknown etiology, no abdominal pain; improving. Statin held. US abdomen showed cholelithiasis but no signs of cholecystitis    Hyperbilirubinemia: Trending down    Hx Recurrent DVT of RLE: Continue Eliquis    PT recommended SNF. waiting for placement otherwise stable    Other co-morbidities     Hx Multifactorial Anemia: Hgb 8.9 but all cell lines dropped overnight, repeat in AM; no suspected source of clinical bleeding  Hx Bullous Pemphigoid: Continue daily steroids; follows with Dermatology as outpatient  Renal Cyst: Being followed by Nephrology as OP  Chronic bilateral leg edema: Being followed by Nephrology, continue daily PO Lasix  Hx CKD Stage IV: Cr stable at 2.5, monitor, continue supplemental sodium bicarb  Hx BPH: Contniue Tamsulosin and Proscar  Hx Peripheral Neuropathy  Hx Hypothyroidism: Levothyroxine resumed  Hx Anxiety/Depression: continue Zoloft, Seroquel, Lexapro  Hx SCC Skin Cancer: Follows OP with Heme/Onc  Hx Insomnia: Continue home Temazepam    Diet ADULT DIET; Regular; 5 carb choices (75 gm/meal); No Caffeine  ADULT ORAL NUTRITION SUPPLEMENT; Dinner; Low Calorie/High Protein Oral Supplement   DVT Prophylaxis [] Lovenox, []  Heparin, [] SCDs, [] Ambulation   GI Prophylaxis [] PPI,  [] H2 Blocker,  [] Carafate,  [] Diet/Tube Feeds   Code Status DNR-CC   Disposition Patient requires continued admission due to status post cardiac arrest   MDM [] Low, [] Moderate,[x]  High  Patient's risk as above due to cardiac arrest recently     History of Present Illness:     Chief Complaint: Chest pain  Jay Jacob is a 80 y.o.  male  who presents with chest pain     Patient looks comfortable. Denies any complaint at this point    Ten point ROS reviewed negative, unless as noted above    Objective: Intake/Output Summary (Last 24 hours) at 8/29/2022 1453  Last data filed at 8/29/2022 1312  Gross per 24 hour   Intake 240 ml   Output 650 ml   Net -410 ml      Vitals:   Vitals:    08/29/22 1400   BP: 137/72   Pulse: 64   Resp: 17   Temp: 98.1 °F (36.7 °C)   SpO2: 98%     Physical Exam:   GEN Awake male, sitting upright in bed in no apparent distress. Appears given age. EYES Pupils are equally round. No scleral erythema, discharge, or conjunctivitis. HENT Mucous membranes are moist. Oral pharynx without exudates, no evidence of thrush. NECK Supple, no apparent thyromegaly or masses. RESP Clear to auscultation, no wheezes, rales or rhonchi. Symmetric chest movement while on room air. CARDIO/VASC S1/S2 auscultated. Regular rate without appreciable murmurs, rubs, or gallops. No JVD or carotid bruits.  Peripheral pulses equal bilaterally and palpable. No peripheral edema. GI Abdomen is soft without significant tenderness, masses, or guarding. Bowel sounds are normoactive. Rectal exam deferred.  No costovertebral angle tenderness. Normal appearing external genitalia. Laureano catheter is not present. HEME/LYMPH No palpable cervical lymphadenopathy and no hepatosplenomegaly. No petechiae or ecchymoses. MSK No gross joint deformities. SKIN Normal coloration, warm, dry. NEURO Cranial nerves appear grossly intact, normal speech, no lateralizing weakness. PSYCH Awake, alert, oriented x 4. Affect appropriate. Medications:   Medications:    piperacillin-tazobactam  4,500 mg IntraVENous Q12H    metoprolol tartrate  25 mg Oral BID    lidocaine  5 mL IntraDERmal Once    sodium chloride flush  5-40 mL IntraVENous 2 times per day    [Held by provider] apixaban  2.5 mg Oral BID    escitalopram  5 mg Oral Daily    finasteride  5 mg Oral Daily    levothyroxine  75 mcg Oral Daily    pantoprazole  20 mg Oral Daily    predniSONE  10 mg Oral Daily    QUEtiapine  100 mg Oral BID    sertraline  50 mg Oral Daily    sodium bicarbonate  650 mg Oral Daily    tamsulosin  0.8 mg Oral Nightly    temazepam  15 mg Oral Nightly    aspirin  81 mg Oral Daily    [Held by provider] atorvastatin  40 mg Oral Nightly      Infusions:    sodium chloride 25 mL (08/28/22 1728)    sodium chloride       PRN Meds: sodium chloride flush, 5-40 mL, PRN  sodium chloride, , PRN  sodium chloride flush, 5-40 mL, PRN  sodium chloride, , PRN  acetaminophen, 650 mg, Q6H PRN   Or  acetaminophen, 650 mg, Q6H PRN  polyethylene glycol, 17 g, Daily PRN  promethazine, 12.5 mg, Q6H PRN   Or  ondansetron, 4 mg, Q6H PRN  nitroGLYCERIN, 0.4 mg, Q5 Min PRN        Patient is still admitted because c. The anticipated discharge is in greater than 24 hours.      Electronically signed by Rebeca Armando MD on 8/29/2022 at 2:53 PM

## 2022-08-29 NOTE — PROGRESS NOTES
Occupational Therapy  . Occupational Therapy Treatment Note    Name: Jay Jacob MRN: [id number] :   10/18/1930   Date:  2022   Admission Date: 2022 Room:  3001/3001-A     Discharge Recommendation: SNF    Primary Problem:  Primary Problem:  The primary encounter diagnosis was Chest pain, unspecified type. Diagnoses of Elevated troponin and Chronic kidney disease, stage IV (severe) (HCC) were also pertinent to this visit. Restrictions/Precautions:          General precautions, fall risk      Communication with other providers: cm updated note    Subjective:  Patient states:  IM tired. Id rather get back in bed. Pain:   Location, Type, Intensity (0/10 to 10/10):  denied    Objective:    Observation: patient asleep and supine. Hard to arouse. Patient very fatigued this date. Is c/o very cold feet. B feet warm to the touch. Noted some pitting edema on LLE/foot. Objective Measures:  none    Treatment, including education:    ADL activity training was instructed today. Cues were given for safety, sequence, UE/LE placement, visual cues, and balance. Activities performed today included dressing, grooming. LB dressing- DEP to don/doff socks. During ambulation under garments falling down, patient required assist to hold them up. Cues and education on better fitting depends vs diaper. Patient states he is too fatigued to change at this time. Therapeutic activity training was instructed today. Cues were given for safety, sequence, UE/LE placement, awareness, and balance. Activities performed today included bed mobility training, sup-sit, sit-stand, SPT. Supine to EOB- CGA with a lot of effort. Scooting hips forward- CGA with a lot of effort. Eob sitting balance- Fair +. Hunched over. Cues for posture. Patient demo marching in place while EOB. Stand to FWW- CGA with cues for technique. Patient wanting to pull on walker. Functional mobility-  tolerated x15 feet- CGA .  Very hunched over posture. Cues needed. Sit to EOB- CGA. Patien attempting to return supine before lines in correct position. Return supine-SBA- offered to assist with boost to [facility], patient declining. Patient immediately fell asleep. BLE AROM exercises to include ankle pumps, ankle circles, marches,hip straight leg raises. x10 reps each. Cues for self-pacing c rest breaks PRN    Patient educated on role of OT , benefits of OT and rationale for therapeutic intervention. Benefit of OOB/EOB activities, benefit of movement. AE/AD, WS/EC,     All therapeutic intervention performed c emphasis on dynamic balance / standing tolerance to increase strength, endurance and activity tolerance for increased [place] c ADL tasks and func transfers / mobility. Patient left safely in bed at end of session, with call light in reach, alarm on and nursing aware. Gait belt was used for func transfers / mobility. Assessment / Impression:    Patient very lethargic this date. Patient is also very [facility]. Needs increased time and effort with cues throughout.    Patient's tolerance of treatment: fair  Adverse Reaction: none  Significant change in status and impact:  none  Barriers to improvement: weakness, lethargy      Plan for Next Session:    Continue with OT POC      Time in:  1023  Time out:  1046  Timed treatment minutes:  23  Total treatment time:  23      Electronically signed by:    CARLOS Quinn COTA/L [id number]    8/29/2022, 10:40 AM

## 2022-08-29 NOTE — PLAN OF CARE
Problem: Discharge Planning  Goal: Discharge to home or other facility with appropriate resources  8/28/2022 2340 by Fady Daily RN  Outcome: Progressing  8/28/2022 1042 by Kings Castillo RN  Outcome: Progressing  8/28/2022 1042 by Kings Castillo RN  Outcome: Not Progressing  Flowsheets (Taken 8/28/2022 3847)  Discharge to home or other facility with appropriate resources:   Identify barriers to discharge with patient and caregiver   Arrange for needed discharge resources and transportation as appropriate     Problem: Pain  Goal: Verbalizes/displays adequate comfort level or baseline comfort level  8/28/2022 2340 by Fady Daily RN  Outcome: Progressing  8/28/2022 1042 by Kings Castillo RN  Outcome: Progressing  8/28/2022 1042 by Kings Castillo RN  Outcome: Not Progressing  Flowsheets (Taken 8/28/2022 1177)  Verbalizes/displays adequate comfort level or baseline comfort level:   Encourage patient to monitor pain and request assistance   Assess pain using appropriate pain scale     Problem: Skin/Tissue Integrity  Goal: Absence of new skin breakdown  Description: 1. Monitor for areas of redness and/or skin breakdown  2. Assess vascular access sites hourly  3. Every 4-6 hours minimum:  Change oxygen saturation probe site  4. Every 4-6 hours:  If on nasal continuous positive airway pressure, respiratory therapy assess nares and determine need for appliance change or resting period.   8/28/2022 2340 by Fady Daily RN  Outcome: Progressing  8/28/2022 1042 by Kings Castillo RN  Outcome: Progressing  8/28/2022 1042 by Kings Castillo RN  Outcome: Not Progressing     Problem: Safety - Adult  Goal: Free from fall injury  8/28/2022 2340 by Fady Daily RN  Outcome: Progressing  8/28/2022 1042 by Kings Castillo RN  Outcome: Progressing  8/28/2022 1042 by Kings Castillo RN  Outcome: Not Progressing     Problem: ABCDS Injury Assessment  Goal: Absence of physical injury  8/28/2022 2340 by Mireya Plank, RN  Outcome: Progressing  8/28/2022 1042 by Brayden Anguiano RN  Outcome: Progressing  8/28/2022 1042 by Brayden Anguiano RN  Outcome: Not Progressing     Problem: Nutrition Deficit:  Goal: Optimize nutritional status  8/28/2022 2340 by Mireya Asher RN  Outcome: Progressing  8/28/2022 1042 by Brayden Anguiano RN  Outcome: Progressing  8/28/2022 1042 by Brayden Anguiano RN  Outcome: Not Progressing     Problem: Discharge Planning  Goal: Discharge to home or other facility with appropriate resources  8/28/2022 2340 by Mireya Asher RN  Outcome: Progressing  8/28/2022 1042 by Brayden Anguiano RN  Outcome: Progressing  8/28/2022 1042 by Brayden Anguiano RN  Outcome: Not Progressing  Flowsheets (Taken 8/28/2022 3485)  Discharge to home or other facility with appropriate resources:   Identify barriers to discharge with patient and caregiver   Arrange for needed discharge resources and transportation as appropriate     Problem: Pain  Goal: Verbalizes/displays adequate comfort level or baseline comfort level  8/28/2022 2340 by Mireya Asher RN  Outcome: Progressing  8/28/2022 1042 by Brayden Anguiano RN  Outcome: Progressing  8/28/2022 1042 by Brayden Anguiano RN  Outcome: Not Progressing  Flowsheets (Taken 8/28/2022 8322)  Verbalizes/displays adequate comfort level or baseline comfort level:   Encourage patient to monitor pain and request assistance   Assess pain using appropriate pain scale     Problem: Skin/Tissue Integrity  Goal: Absence of new skin breakdown  Description: 1. Monitor for areas of redness and/or skin breakdown  2. Assess vascular access sites hourly  3. Every 4-6 hours minimum:  Change oxygen saturation probe site  4. Every 4-6 hours:  If on nasal continuous positive airway pressure, respiratory therapy assess nares and determine need for appliance change or resting period.   8/28/2022 2340 by Mireya Asher RN  Outcome: Progressing  8/28/2022 1042 by Carlos Vance RN  Outcome: Progressing  8/28/2022 1042 by Carlos Vance RN  Outcome: Not Progressing     Problem: Safety - Adult  Goal: Free from fall injury  8/28/2022 2340 by Vik Raman RN  Outcome: Progressing  8/28/2022 1042 by Carlos Vance RN  Outcome: Progressing  8/28/2022 1042 by Carlos Vance RN  Outcome: Not Progressing     Problem: ABCDS Injury Assessment  Goal: Absence of physical injury  8/28/2022 2340 by Vik Raman RN  Outcome: Progressing  8/28/2022 1042 by Carlos Vance RN  Outcome: Progressing  8/28/2022 1042 by Carlos aVnce RN  Outcome: Not Progressing     Problem: Nutrition Deficit:  Goal: Optimize nutritional status  8/28/2022 2340 by Vik Raman RN  Outcome: Progressing  8/28/2022 1042 by Carlos Vance RN  Outcome: Progressing  8/28/2022 1042 by Carlos Vance RN  Outcome: Not Progressing

## 2022-08-30 LAB
ALBUMIN SERPL-MCNC: 2.8 GM/DL (ref 3.4–5)
ALP BLD-CCNC: 106 IU/L (ref 40–128)
ALT SERPL-CCNC: 50 U/L (ref 10–40)
ANION GAP SERPL CALCULATED.3IONS-SCNC: 8 MMOL/L (ref 4–16)
ANISOCYTOSIS: ABNORMAL
AST SERPL-CCNC: 13 IU/L (ref 15–37)
BANDED NEUTROPHILS ABSOLUTE COUNT: 0.36 K/CU MM
BANDED NEUTROPHILS RELATIVE PERCENT: 4 % (ref 5–11)
BILIRUB SERPL-MCNC: 0.2 MG/DL (ref 0–1)
BUN BLDV-MCNC: 45 MG/DL (ref 6–23)
CALCIUM SERPL-MCNC: 8.3 MG/DL (ref 8.3–10.6)
CHLORIDE BLD-SCNC: 104 MMOL/L (ref 99–110)
CO2: 24 MMOL/L (ref 21–32)
CREAT SERPL-MCNC: 3 MG/DL (ref 0.9–1.3)
DIFFERENTIAL TYPE: ABNORMAL
DOHLE BODIES: PRESENT
EOSINOPHILS ABSOLUTE: 0.1 K/CU MM
EOSINOPHILS RELATIVE PERCENT: 1 % (ref 0–3)
GFR AFRICAN AMERICAN: 24 ML/MIN/1.73M2
GFR NON-AFRICAN AMERICAN: 20 ML/MIN/1.73M2
GLUCOSE BLD-MCNC: 134 MG/DL (ref 70–99)
HCT VFR BLD CALC: 24.2 % (ref 42–52)
HEMOGLOBIN: 7.6 GM/DL (ref 13.5–18)
LYMPHOCYTES ABSOLUTE: 1.3 K/CU MM
LYMPHOCYTES RELATIVE PERCENT: 15 % (ref 24–44)
MCH RBC QN AUTO: 31.3 PG (ref 27–31)
MCHC RBC AUTO-ENTMCNC: 31.4 % (ref 32–36)
MCV RBC AUTO: 99.6 FL (ref 78–100)
METAMYELOCYTES ABSOLUTE COUNT: 0.27 K/CU MM
METAMYELOCYTES PERCENT: 3 %
MONOCYTES ABSOLUTE: 0.7 K/CU MM
MONOCYTES RELATIVE PERCENT: 8 % (ref 0–4)
PDW BLD-RTO: 15 % (ref 11.7–14.9)
PLATELET # BLD: 197 K/CU MM (ref 140–440)
PMV BLD AUTO: 9.5 FL (ref 7.5–11.1)
POTASSIUM SERPL-SCNC: 4.2 MMOL/L (ref 3.5–5.1)
RBC # BLD: 2.43 M/CU MM (ref 4.6–6.2)
RBC # BLD: ABNORMAL 10*6/UL
SEGMENTED NEUTROPHILS ABSOLUTE COUNT: 6.2 K/CU MM
SEGMENTED NEUTROPHILS RELATIVE PERCENT: 69 % (ref 36–66)
SODIUM BLD-SCNC: 136 MMOL/L (ref 135–145)
TOTAL PROTEIN: 4.9 GM/DL (ref 6.4–8.2)
WBC # BLD: 8.9 K/CU MM (ref 4–10.5)

## 2022-08-30 PROCEDURE — 85007 BL SMEAR W/DIFF WBC COUNT: CPT

## 2022-08-30 PROCEDURE — 85027 COMPLETE CBC AUTOMATED: CPT

## 2022-08-30 PROCEDURE — 6370000000 HC RX 637 (ALT 250 FOR IP): Performed by: INTERNAL MEDICINE

## 2022-08-30 PROCEDURE — 94761 N-INVAS EAR/PLS OXIMETRY MLT: CPT

## 2022-08-30 PROCEDURE — 6360000002 HC RX W HCPCS: Performed by: STUDENT IN AN ORGANIZED HEALTH CARE EDUCATION/TRAINING PROGRAM

## 2022-08-30 PROCEDURE — 6370000000 HC RX 637 (ALT 250 FOR IP): Performed by: NURSE PRACTITIONER

## 2022-08-30 PROCEDURE — 1200000000 HC SEMI PRIVATE

## 2022-08-30 PROCEDURE — 2580000003 HC RX 258: Performed by: STUDENT IN AN ORGANIZED HEALTH CARE EDUCATION/TRAINING PROGRAM

## 2022-08-30 PROCEDURE — 2580000003 HC RX 258: Performed by: PHYSICIAN ASSISTANT

## 2022-08-30 PROCEDURE — 36592 COLLECT BLOOD FROM PICC: CPT

## 2022-08-30 PROCEDURE — 80053 COMPREHEN METABOLIC PANEL: CPT

## 2022-08-30 RX ORDER — APIXABAN 2.5 MG/1
TABLET, FILM COATED ORAL
Qty: 60 TABLET | Refills: 5 | OUTPATIENT
Start: 2022-08-30 | End: 2022-08-31

## 2022-08-30 RX ADMIN — PIPERACILLIN AND TAZOBACTAM 4500 MG: 4; .5 INJECTION, POWDER, FOR SOLUTION INTRAVENOUS; PARENTERAL at 16:55

## 2022-08-30 RX ADMIN — METOPROLOL TARTRATE 25 MG: 25 TABLET, FILM COATED ORAL at 07:59

## 2022-08-30 RX ADMIN — ASPIRIN 81 MG CHEWABLE TABLET 81 MG: 81 TABLET CHEWABLE at 08:00

## 2022-08-30 RX ADMIN — ESCITALOPRAM OXALATE 5 MG: 10 TABLET ORAL at 08:00

## 2022-08-30 RX ADMIN — PREDNISONE 10 MG: 10 TABLET ORAL at 08:00

## 2022-08-30 RX ADMIN — PIPERACILLIN AND TAZOBACTAM 4500 MG: 4; .5 INJECTION, POWDER, FOR SOLUTION INTRAVENOUS; PARENTERAL at 05:30

## 2022-08-30 RX ADMIN — QUETIAPINE FUMARATE 100 MG: 100 TABLET ORAL at 20:05

## 2022-08-30 RX ADMIN — QUETIAPINE FUMARATE 100 MG: 100 TABLET ORAL at 08:00

## 2022-08-30 RX ADMIN — LEVOTHYROXINE SODIUM 75 MCG: 0.07 TABLET ORAL at 06:58

## 2022-08-30 RX ADMIN — FINASTERIDE 5 MG: 5 TABLET, FILM COATED ORAL at 08:00

## 2022-08-30 RX ADMIN — TAMSULOSIN HYDROCHLORIDE 0.8 MG: 0.4 CAPSULE ORAL at 20:05

## 2022-08-30 RX ADMIN — PANTOPRAZOLE 20 MG: 20 TABLET, DELAYED RELEASE ORAL at 06:58

## 2022-08-30 RX ADMIN — METOPROLOL TARTRATE 25 MG: 25 TABLET, FILM COATED ORAL at 20:05

## 2022-08-30 RX ADMIN — SODIUM BICARBONATE 650 MG: 650 TABLET ORAL at 07:59

## 2022-08-30 RX ADMIN — SODIUM CHLORIDE, PRESERVATIVE FREE 10 ML: 5 INJECTION INTRAVENOUS at 08:00

## 2022-08-30 RX ADMIN — SERTRALINE HYDROCHLORIDE 50 MG: 50 TABLET ORAL at 08:00

## 2022-08-30 RX ADMIN — SODIUM CHLORIDE, PRESERVATIVE FREE 10 ML: 5 INJECTION INTRAVENOUS at 20:05

## 2022-08-30 RX ADMIN — TEMAZEPAM 15 MG: 7.5 CAPSULE ORAL at 20:05

## 2022-08-30 ASSESSMENT — PAIN SCALES - GENERAL
PAINLEVEL_OUTOF10: 0

## 2022-08-30 NOTE — PLAN OF CARE
Problem: Discharge Planning  Goal: Discharge to home or other facility with appropriate resources  8/30/2022 1025 by Rita Vigil RN  Outcome: Progressing  8/30/2022 0646 by Regina Villalta RN  Outcome: Progressing  8/30/2022 0201 by Jonna Foss LPN  Outcome: Progressing     Problem: Pain  Goal: Verbalizes/displays adequate comfort level or baseline comfort level  8/30/2022 1025 by Rita Vigil RN  Outcome: Progressing  8/30/2022 0646 by Regina Villalta RN  Outcome: Progressing  8/30/2022 0201 by Jonna Foss LPN  Outcome: Progressing     Problem: Skin/Tissue Integrity  Goal: Absence of new skin breakdown  Description: 1. Monitor for areas of redness and/or skin breakdown  2. Assess vascular access sites hourly  3. Every 4-6 hours minimum:  Change oxygen saturation probe site  4. Every 4-6 hours:  If on nasal continuous positive airway pressure, respiratory therapy assess nares and determine need for appliance change or resting period.   8/30/2022 1025 by Rita Vigil RN  Outcome: Progressing  8/30/2022 0646 by Regina Villalta RN  Outcome: Progressing  8/30/2022 0201 by Jonna Foss LPN  Outcome: Progressing     Problem: Safety - Adult  Goal: Free from fall injury  8/30/2022 1025 by Rita Vigil RN  Outcome: Progressing  8/30/2022 0646 by Regina Villalta RN  Outcome: Progressing  Flowsheets (Taken 8/30/2022 9180)  Free From Fall Injury: Instruct family/caregiver on patient safety  8/30/2022 0201 by Jonna Foss LPN  Outcome: Progressing  Flowsheets (Taken 8/30/2022 0200)  Free From Fall Injury: Sharifa Jones family/caregiver on patient safety     Problem: ABCDS Injury Assessment  Goal: Absence of physical injury  8/30/2022 1025 by Rita Vigil RN  Outcome: Progressing  8/30/2022 0646 by Regina Villalta RN  Outcome: Progressing  Flowsheets (Taken 8/30/2022 7243)  Absence of Physical Injury: Implement safety measures based on patient assessment  8/30/2022 0201 by Fareed Rizo Tyrell Tyson LPN  Outcome: Progressing     Problem: Nutrition Deficit:  Goal: Optimize nutritional status  8/30/2022 1025 by Leandra Glass RN  Outcome: Progressing  8/30/2022 0646 by Scott Sheridan RN  Outcome: Progressing  8/30/2022 0201 by Bard Leandra LPN  Outcome: Progressing

## 2022-08-30 NOTE — PROGRESS NOTES
Hospitalist Progress Note      Name:  Dalton Bolanos /Age/Sex: 10/18/1930  (80 y.o. male)   MRN & CSN:  [id number] & [id number] Admission Date/Time: 2022  6:43 PM   [facility] PCP: No primary care provider on file. Hospital Day: 10    Assessment and Plan:   Dalton Bolanos is a 80 y.o.  male  who presents with Chest pain    Patient had initially presented with chest pain. Elevated troponin in the setting of CKD. Cardiology was consulted. Unlikely ACS. Will keep monitoring in telemetry Episode of  PEA with CPR and ROSC during the hospital stay. No apparent cause. CT head negative. Patient does not remember the event . Currently in sinus in telemetry. EKG did not show any ischemic changes. Echo showed preserved EF. Plan to continue Lopressor at this time. Anemia - likely multifactorial. No active bleeding. Will keep monitoring for now. Iron panel not suggestive of ESTUARDO. Hb stable for now    KAVON on CKD - Cr stable. Nephrology on board. Gross hematuria - likely trauma due to Borden . Eliquis held for now. BPH - chronically on Flomax 0.4 mg and Proscar 5 mg. Recommend borden removal/voiding trial prior to discharge once more medically stable    Lactic acidosis was present likely postcode - resolved. Hypokalemia: Resolved    Transaminitis: unknown etiology, no abdominal pain; improving. Statin held. US abdomen showed cholelithiasis but no signs of cholecystitis    Hyperbilirubinemia: Trending down    Hx Recurrent DVT of RLE: Discussed with cardiology, recommend holding eliquis as risk outweighs benefit of continuing at this time, okay to proceed with ASA instead. PT recommended SNF. waiting for placement otherwise stable    Other co-morbidities     Hx Multifactorial Anemia: Hgb 8.9 but all cell lines dropped overnight; no suspected source of clinical bleeding  Hx Bullous Pemphigoid: Continue daily steroids; follows with Dermatology as outpatient  Renal Cyst: Being without appreciable murmurs, rubs, or gallops. No JVD or carotid bruits. Peripheral pulses equal bilaterally and palpable. No peripheral edema. GI Abdomen is soft without significant tenderness, masses, or guarding. Bowel sounds are normoactive. Rectal exam deferred.  No costovertebral angle tenderness. Normal appearing external genitalia. Laureano catheter is not present. HEME/LYMPH No palpable cervical lymphadenopathy and no hepatosplenomegaly. No petechiae or ecchymoses. MSK No gross joint deformities. SKIN Normal coloration, warm, dry. NEURO Cranial nerves appear grossly intact, normal speech, no lateralizing weakness. PSYCH Awake, alert, oriented x 4. Affect appropriate. Medications:   Medications:    piperacillin-tazobactam  4,500 mg IntraVENous Q12H    metoprolol tartrate  25 mg Oral BID    lidocaine  5 mL IntraDERmal Once    sodium chloride flush  5-40 mL IntraVENous 2 times per day    [Held by provider] apixaban  2.5 mg Oral BID    escitalopram  5 mg Oral Daily    finasteride  5 mg Oral Daily    levothyroxine  75 mcg Oral Daily    pantoprazole  20 mg Oral Daily    predniSONE  10 mg Oral Daily    QUEtiapine  100 mg Oral BID    sertraline  50 mg Oral Daily    sodium bicarbonate  650 mg Oral Daily    tamsulosin  0.8 mg Oral Nightly    temazepam  15 mg Oral Nightly    aspirin  81 mg Oral Daily    [Held by provider] atorvastatin  40 mg Oral Nightly      Infusions:    sodium chloride 25 mL (08/28/22 1728)    sodium chloride       PRN Meds: sodium chloride flush, 5-40 mL, PRN  sodium chloride, , PRN  sodium chloride flush, 5-40 mL, PRN  sodium chloride, , PRN  acetaminophen, 650 mg, Q6H PRN   Or  acetaminophen, 650 mg, Q6H PRN  polyethylene glycol, 17 g, Daily PRN  promethazine, 12.5 mg, Q6H PRN   Or  ondansetron, 4 mg, Q6H PRN  nitroGLYCERIN, 0.4 mg, Q5 Min PRN        Patient is still admitted because c. The anticipated discharge is in greater than 24 hours.      Electronically signed by [employer] Shivani Bowers MD on 8/30/2022 at 7:07 AM

## 2022-08-31 ENCOUNTER — APPOINTMENT (OUTPATIENT)
Dept: CT IMAGING | Age: 87
DRG: 205 | End: 2022-08-31
Payer: MEDICARE

## 2022-08-31 VITALS
OXYGEN SATURATION: 98 % | SYSTOLIC BLOOD PRESSURE: 121 MMHG | TEMPERATURE: 98.2 F | BODY MASS INDEX: 28.82 KG/M2 | RESPIRATION RATE: 18 BRPM | HEART RATE: 60 BPM | DIASTOLIC BLOOD PRESSURE: 69 MMHG | WEIGHT: 212.74 LBS | HEIGHT: 72 IN

## 2022-08-31 LAB
SARS-COV-2, NAAT: NOT DETECTED
SOURCE: NORMAL

## 2022-08-31 PROCEDURE — 74176 CT ABD & PELVIS W/O CONTRAST: CPT

## 2022-08-31 PROCEDURE — 6370000000 HC RX 637 (ALT 250 FOR IP): Performed by: INTERNAL MEDICINE

## 2022-08-31 PROCEDURE — 6370000000 HC RX 637 (ALT 250 FOR IP): Performed by: NURSE PRACTITIONER

## 2022-08-31 PROCEDURE — 6360000002 HC RX W HCPCS: Performed by: STUDENT IN AN ORGANIZED HEALTH CARE EDUCATION/TRAINING PROGRAM

## 2022-08-31 PROCEDURE — 87635 SARS-COV-2 COVID-19 AMP PRB: CPT

## 2022-08-31 PROCEDURE — 94761 N-INVAS EAR/PLS OXIMETRY MLT: CPT

## 2022-08-31 PROCEDURE — 2580000003 HC RX 258: Performed by: STUDENT IN AN ORGANIZED HEALTH CARE EDUCATION/TRAINING PROGRAM

## 2022-08-31 RX ORDER — ASPIRIN 81 MG/1
81 TABLET, CHEWABLE ORAL DAILY
Qty: 30 TABLET | Refills: 0 | Status: SHIPPED | OUTPATIENT
Start: 2022-09-01

## 2022-08-31 RX ORDER — ATORVASTATIN CALCIUM 40 MG/1
40 TABLET, FILM COATED ORAL NIGHTLY
Qty: 30 TABLET | Refills: 0 | Status: SHIPPED | OUTPATIENT
Start: 2022-08-31

## 2022-08-31 RX ORDER — TEMAZEPAM 15 MG/1
15 CAPSULE ORAL NIGHTLY
Qty: 30 CAPSULE | Refills: 0 | Status: CANCELLED | OUTPATIENT
Start: 2022-08-31 | End: 2022-09-30

## 2022-08-31 RX ORDER — QUETIAPINE FUMARATE 100 MG/1
100 TABLET, FILM COATED ORAL 2 TIMES DAILY
Qty: 60 TABLET | Refills: 0 | Status: CANCELLED | OUTPATIENT
Start: 2022-08-31

## 2022-08-31 RX ADMIN — LEVOTHYROXINE SODIUM 75 MCG: 0.07 TABLET ORAL at 06:11

## 2022-08-31 RX ADMIN — METOPROLOL TARTRATE 25 MG: 25 TABLET, FILM COATED ORAL at 10:08

## 2022-08-31 RX ADMIN — PIPERACILLIN AND TAZOBACTAM 4500 MG: 4; .5 INJECTION, POWDER, FOR SOLUTION INTRAVENOUS; PARENTERAL at 06:33

## 2022-08-31 RX ADMIN — SERTRALINE HYDROCHLORIDE 50 MG: 50 TABLET ORAL at 10:09

## 2022-08-31 RX ADMIN — PANTOPRAZOLE 20 MG: 20 TABLET, DELAYED RELEASE ORAL at 06:11

## 2022-08-31 RX ADMIN — SODIUM BICARBONATE 650 MG: 650 TABLET ORAL at 10:07

## 2022-08-31 RX ADMIN — FINASTERIDE 5 MG: 5 TABLET, FILM COATED ORAL at 10:07

## 2022-08-31 RX ADMIN — ESCITALOPRAM OXALATE 5 MG: 10 TABLET ORAL at 10:08

## 2022-08-31 RX ADMIN — ASPIRIN 81 MG CHEWABLE TABLET 81 MG: 81 TABLET CHEWABLE at 10:07

## 2022-08-31 RX ADMIN — PREDNISONE 10 MG: 10 TABLET ORAL at 10:09

## 2022-08-31 RX ADMIN — QUETIAPINE FUMARATE 100 MG: 100 TABLET ORAL at 10:07

## 2022-08-31 ASSESSMENT — PAIN SCALES - GENERAL
PAINLEVEL_OUTOF10: 0

## 2022-08-31 NOTE — PLAN OF CARE
Problem: Discharge Planning  Goal: Discharge to home or other facility with appropriate resources  Outcome: Completed     Problem: Pain  Goal: Verbalizes/displays adequate comfort level or baseline comfort level  Outcome: Completed  Flowsheets (Taken 8/31/2022 0345 by Konstantin Ron LPN)  Verbalizes/displays adequate comfort level or baseline comfort level:   Encourage patient to monitor pain and request assistance   Assess pain using appropriate pain scale   Administer analgesics based on type and severity of pain and evaluate response   Implement non-pharmacological measures as appropriate and evaluate response   Consider cultural and social influences on pain and pain management   Notify Licensed Independent Practitioner if interventions unsuccessful or patient reports new pain     Problem: Skin/Tissue Integrity  Goal: Absence of new skin breakdown  Description: 1. Monitor for areas of redness and/or skin breakdown  2. Assess vascular access sites hourly  3. Every 4-6 hours minimum:  Change oxygen saturation probe site  4. Every 4-6 hours:  If on nasal continuous positive airway pressure, respiratory therapy assess nares and determine need for appliance change or resting period.   Outcome: Completed     Problem: Safety - Adult  Goal: Free from fall injury  Outcome: Completed     Problem: ABCDS Injury Assessment  Goal: Absence of physical injury  Outcome: Completed     Problem: Nutrition Deficit:  Goal: Optimize nutritional status  Outcome: Completed

## 2022-08-31 NOTE — PROGRESS NOTES
This RN called and gave report to nurse on [address] at [facility]. Pt is ready to go. Answered all questions and [employer] [facility] nurse stated they understood. Pt transferring with [facility] now. Son is aware!

## 2022-08-31 NOTE — CARE COORDINATION
Pt has been approved to go to [facility]. SUSIE PS the doctor and informed her of the approval.      Pt will need a rapid COVID on day of discharge. CM will need DINAH completed by RN and doctor. If pt is discharged after hours please complete the following. ... Call report to [phone]  Fax completed AVS with both DINAH on the AVS and any written Rx [phone]. Set up transportation [facility]. [facility] [phone] or [facility] [phone] and call family.

## 2022-08-31 NOTE — PROGRESS NOTES
Nephrology Progress Note        [address], [address] [address]  Phone: [phone]  Office Hours: 8:30AM - 4:30PM  Monday - Friday 8/31/2022 7:07 AM  Subjective:   Admit Date: 8/21/2022  PCP: No primary care provider on file. Interval History  Ggod bp  Hard of hearing    Diet: ADULT DIET; Regular; 5 carb choices (75 gm/meal); No Caffeine  ADULT ORAL NUTRITION SUPPLEMENT; Dinner; Low Calorie/High Protein Oral Supplement      Data:   Scheduled Meds:   piperacillin-tazobactam  4,500 mg IntraVENous Q12H    metoprolol tartrate  25 mg Oral BID    lidocaine  5 mL IntraDERmal Once    sodium chloride flush  5-40 mL IntraVENous 2 times per day    [Held by provider] apixaban  2.5 mg Oral BID    escitalopram  5 mg Oral Daily    finasteride  5 mg Oral Daily    levothyroxine  75 mcg Oral Daily    pantoprazole  20 mg Oral Daily    predniSONE  10 mg Oral Daily    QUEtiapine  100 mg Oral BID    sertraline  50 mg Oral Daily    sodium bicarbonate  650 mg Oral Daily    tamsulosin  0.8 mg Oral Nightly    temazepam  15 mg Oral Nightly    aspirin  81 mg Oral Daily    [Held by provider] atorvastatin  40 mg Oral Nightly     Continuous Infusions:   sodium chloride 25 mL (08/28/22 1728)    sodium chloride       PRN Meds:sodium chloride flush, sodium chloride, sodium chloride flush, sodium chloride, acetaminophen **OR** acetaminophen, polyethylene glycol, promethazine **OR** ondansetron, nitroGLYCERIN  I/O last 3 completed shifts: In: 240 [P.O.:240]  Out: 825 [Urine:825]  No intake/output data recorded.     Intake/Output Summary (Last 24 hours) at 8/31/2022 0707  Last data filed at 8/31/2022 3316  Gross per 24 hour   Intake 240 ml   Output 300 ml   Net -60 ml       CBC:   Recent Labs     08/30/22  0810   WBC 8.9   HGB 7.6*          BMP:    Recent Labs     08/30/22  0810      K 4.2      CO2 24   BUN 45*   CREATININE 3.0*   GLUCOSE 134*     Hepatic:   Recent Labs     08/30/22  0810   [address]  PHONE: [phone]  FAX: [phone]

## 2022-09-01 NOTE — DISCHARGE SUMMARY
V2.0  Discharge Summary    Name:  Kash Taylor /Age/Sex: 10/18/1930 (28 y.o. male)   Admit Date: 2022  Discharge Date: 22    MRN & CSN:  [id number] & [id number] Encounter Date and Time 22 8:32 PM EDT    Attending:  No att. providers found Discharging Provider: Roly Miller MD       Hospital Course:     Kash Taylor is a 80 y.o.  male  who presents with Chest pain     Cardiac arrest  Chest pain - resolved  Patient had initially presented with chest pain. Mildly elevated troponin in setting of CKD, no ACS. Experienced PEA with brief period of resuscitation and subsequent ROSC. Uncertain etiology and patient does not remember the event. EKG without ischemic changes. Echo showed preserved EF.  -Initiated lopressor    Syncope - likely vasovagal  Following ambulation to restroom. CTH negative.  -Denies dizziness/LH now  -No repeat episodes  -Fall precautions  -Stopped lasix, to only be used prn for leg edema per nephrology     Anemia - likely multifactorial. No active bleeding. Iron panel not suggestive of ESTUARDO. 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 BPH - chronically on Flomax 0.4 mg and Proscar 5 mg.    -Laureano removal/voiding trial as able     Lactic acidosis was present likely postcode - resolved. Hypokalemia: Resolved     Transaminitis: unknown etiology, no abdominal pain. Statin held.  US abdomen showed cholelithiasis but no signs of cholecystitis  -Improved, resumed statin on discharge  -Monitor closely outpatient and stop statin if required     Hyperbilirubinemia: resolved       Other co-morbidities   Hx Bullous Pemphigoid: Continue daily steroids; follows with Dermatology as outpatient  Renal Cyst: Being followed by Nephrology as OP  Chronic bilateral leg edema: Nephrology recommends prn lasix for leg edema  Hx BPH: Contniue Tamsulosin and Proscar  Hx Peripheral Neuropathy  Hx Hypothyroidism: Levothyroxine   Hx Anxiety/Depression: continue Zoloft, Seroquel, Lexapro  Hx SCC Skin Cancer: Follows OP with Heme/Onc  Hx Insomnia: Continue home Temazepam      The patient expressed appropriate understanding of, and agreement with the discharge recommendations, medications, and plan. Consults this admission:  IP CONSULT TO HOSPITALIST  IP CONSULT TO CARDIOLOGY  IP CONSULT TO CRITICAL CARE  IP CONSULT TO IV TEAM  IP CONSULT TO NEPHROLOGY  IP CONSULT TO UROLOGY    Discharge Diagnosis:   Chest pain        Discharge Instruction:   Follow up appointments: PCP, cardiology, nephrology, heme/onc, dermatology  Primary care physician: No primary care provider on file.  within 2 weeks  Diet: cardiac diet   Activity: activity as tolerated  Disposition: Discharged to:   []Home, []C, [x]SNF, []Acute Rehab, []Hospice   Condition on discharge: Stable  Labs and Tests to be Followed up as an outpatient by PCP or Specialist: CBC, LFTs    Discharge Medications:        Medication List        START taking these medications      aspirin 81 MG chewable tablet  Take 1 tablet by mouth daily  Start taking on: September 1, 2022     atorvastatin 40 MG tablet  Commonly known as: LIPITOR  Take 1 tablet by mouth nightly     metoprolol tartrate 25 MG tablet  Commonly known as: LOPRESSOR  Take 1 tablet by mouth 2 times daily            CHANGE how you take these medications      QUEtiapine 100 MG tablet  Commonly known as: SEROQUEL  What changed: Another medication with the same name was removed. Continue taking this medication, and follow the directions you see here. sertraline 50 MG tablet  Commonly known as: ZOLOFT  What changed: Another medication with the same name was removed. Continue taking this medication, and follow the directions you see here. CONTINUE taking these medications      escitalopram 5 MG tablet  Commonly known as: LEXAPRO     finasteride 5 MG tablet  Commonly known as: PROSCAR  TAKE 1 TABLET BY MOUTH EVERY DAY     levothyroxine 75 MCG tablet  Commonly known as: SYNTHROID  TAKE 1 TABLET BY MOUTH EVERY DAY     pantoprazole 20 MG tablet  Commonly known as: PROTONIX  TAKE 1 TABLET BY MOUTH EVERY DAY     predniSONE 10 MG tablet  Commonly known as: DELTASONE     sodium bicarbonate 650 MG tablet  Take 1 tablet by mouth daily     tamsulosin 0.4 MG capsule  Commonly known as: Flomax  Take 2 capsules by mouth nightly     temazepam 7.5 MG capsule  Commonly known as: RESTORIL            STOP taking these medications      Eliquis 2.5 MG Tabs tablet  Generic drug: apixaban     furosemide 20 MG tablet  Commonly known as: LASIX     furosemide 40 MG tablet  Commonly known as: Lasix               Where to Get Your Medications        These medications were sent to [facility]/pharmacy #[id number]- [place], [place] - [facility] [phone] - F [phone]  [address] [place] [address]      Phone: [phone]   aspirin 81 MG chewable tablet  atorvastatin 40 MG tablet  metoprolol tartrate 25 MG tablet        Objective Findings at Discharge:   /69   Pulse 60   Temp 98.2 °F (36.8 °C) (Oral)   Resp 18   Ht 6' (1.829 m)   Wt 212 lb 11.9 oz (96.5 kg)   SpO2 98%   BMI 28.85 kg/m²       Physical Exam:   General: NAD  Eyes: EOMI  ENT: neck supple  Cardiovascular: Regular rate. Respiratory: Clear to auscultation  Gastrointestinal: Soft, non tender  Genitourinary: no suprapubic tenderness  Musculoskeletal: No edema  Skin: warm, dry  Neuro: Alert.   Psych: Mood appropriate. Labs and Imaging   CT ABDOMEN PELVIS WO CONTRAST Additional Contrast? None    Result Date: 8/31/2022  EXAMINATION: CT OF THE ABDOMEN AND PELVIS WITHOUT CONTRAST 8/31/2022 8:24 am TECHNIQUE: CT of the abdomen and pelvis was performed without the administration of intravenous contrast. Multiplanar reformatted images are provided for review. Automated exposure control, iterative reconstruction, and/or weight based adjustment of the mA/kV was utilized to reduce the radiation dose to as low as reasonably achievable. COMPARISON: 03/02/2021 HISTORY: ORDERING SYSTEM PROVIDED HISTORY: Evaluate complex renal cyst from renal US. Thx TECHNOLOGIST PROVIDED HISTORY: Reason for exam:->Evaluate complex renal cyst from renal US. Thx Additional Contrast?->None Reason for Exam: Evaluate complex renal cyst from renal US. Thx Additional signs and symptoms: none Relevant Medical/Surgical History: none FINDINGS: Lower Chest: Small bilateral pleural effusions and mild left basilar atelectasis. Organs: In the pancreatic uncinate process, there is a 23 mm stable cyst. The unenhanced liver, spleen, pancreas, gallbladder, adrenal glands, and kidneys demonstrate no acute abnormality. In the left adrenal gland, there is a stable 22 mm adenoma which is low-attenuation. Evaluation of the left renal cyst from prior ultrasounds is limited by absence of intravenous contrast.  The left renal cyst is low-attenuation measuring 9 Hounsfield units and measures 4.4 x 2.8 cm. In 2021, the cyst measured 5.1 x 4.1 cm. No additional discrete renal lesion on the noncontrast CT. No hydronephrosis. GI/Bowel: Scattered mild diverticulosis. Normal appendix. No acutely dilated or inflamed loops of bowel. Pelvis: Minimal gas in the urinary bladder. Several bladder diverticula are present. No pelvic mass, adenopathy, or fluid collection. Peritoneum/Retroperitoneum: No abdominal aortic aneurysm. No adenopathy. No ascites.   No free intraperitoneal air. Bones/Soft Tissues:  No acute abnormality of the visualized osseous structures. 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 1. Left renal cyst is incompletely evaluated without intravenous contrast. However, the cyst measures water attenuation and has decreased in size from 2021, currently measuring 4.4 x 2.8 cm almost certainly compatible with a benign etiology. 2. Stable 23 mm pancreatic uncinate process cyst. 3. Small bilateral pleural effusions and mild bibasilar atelectasis. CBC:   Recent Labs     08/30/22  0810   WBC 8.9   HGB 7.6*        BMP:    Recent Labs     08/30/22  0810      K 4.2      CO2 24   BUN 45*   CREATININE 3.0*   GLUCOSE 134*     Hepatic:   Recent Labs     08/30/22  0810   AST 13*   ALT 50*   BILITOT 0.2   ALKPHOS 106     Lipids:   Lab Results   Component Value Date/Time    CHOL 229 08/22/2022 02:17 AM    CHOL 157 11/19/2018 12:00 AM    HDL 69 08/22/2022 02:17 AM    TRIG 85 08/22/2022 02:17 AM     Hemoglobin A1C:   Lab Results   Component Value Date/Time    LABA1C 5.8 08/22/2022 02:17 AM     TSH:   Lab Results   Component Value Date/Time    TSH 4.07 05/24/2021 11:32 AM     Troponin:   Lab Results   Component Value Date/Time    TROPONINT 0.022 08/22/2022 02:17 AM    TROPONINT 0.027 08/21/2022 06:50 PM    TROPONINT <0.010 03/03/2021 04:35 AM     Lactic Acid: No results for input(s): LACTA in the last 72 hours. BNP: No results for input(s): PROBNP in the last 72 hours.   UA:  Lab Results   Component Value Date/Time    NITRU NEGATIVE 03/29/2022 09:11 AM    NITRU Negative 01/22/2021 09:24 AM    COLORU YELLOW 03/29/2022 09:11 AM    PHUR 6.0 01/22/2021 09:24 AM    WBCUA 80 03/29/2022 09:11 AM    RBCUA 93 03/29/2022 09:11 AM    MUCUS RARE 03/29/2022 09:11 AM    TRICHOMONAS NONE SEEN 03/29/2022 09:11 AM    YEAST RARE 03/29/2022 09:11 AM    BACTERIA MODERATE 03/29/2022 09:11 AM    CLARITYU CLEAR 03/29/2022 09:11 AM    SPECGRAV 1.020 03/29/2022 09:11 AM    LEUKOCYTESUR MODERATE 03/29/2022 09:11 AM    UROBILINOGEN 0.2 03/29/2022 09:11 AM    BILIRUBINUR NEGATIVE 03/29/2022 09:11 AM    BLOODU LARGE 03/29/2022 09:11 AM    GLUCOSEU Negative 01/22/2021 09:24 AM    KETUA NEGATIVE 03/29/2022 09:11 AM     Urine Cultures: No results found for: LABURIN  Blood Cultures: No results found for: BC  No results found for: BLOODCULT2  Organism: No results found for: ORG    Time Spent Discharging patient 55 minutes    Electronically signed by Renetta Richter MD on 8/31/2022 at 8:32 PM

## 2022-09-08 ENCOUNTER — TELEPHONE (OUTPATIENT)
Dept: ONCOLOGY | Age: 87
End: 2022-09-08

## 2022-09-12 ENCOUNTER — TELEPHONE (OUTPATIENT)
Dept: ONCOLOGY | Age: 87
End: 2022-09-12

## 2022-09-12 NOTE — TELEPHONE ENCOUNTER
Daughter stated pt is in Assisted Living & her brother will determine if patient will keep OV 09/16/22. He will cb if appt. needs cancelled. Patient missed lab appt. 09/09/22.

## 2022-10-05 ENCOUNTER — TELEPHONE (OUTPATIENT)
Dept: FAMILY MEDICINE CLINIC | Age: 87
End: 2022-10-05

## 2022-10-05 NOTE — TELEPHONE ENCOUNTER
Patient's son called in on the nurse triage line stating his dad took a negative Covid test yesterday but is having trouble breathing today. Per Dr. Marcus Gonzalez go to either the walk-in clinic or the ED.

## 2022-10-06 ENCOUNTER — HOSPITAL ENCOUNTER (OUTPATIENT)
Dept: GENERAL RADIOLOGY | Age: 87
Discharge: HOME | DRG: 193 | End: 2022-10-06
Payer: MEDICARE

## 2022-10-06 ENCOUNTER — HOSPITAL ENCOUNTER (OUTPATIENT)
Age: 87
Discharge: HOME | DRG: 193 | End: 2022-10-06
Payer: MEDICARE

## 2022-10-06 ENCOUNTER — OFFICE VISIT (OUTPATIENT)
Dept: FAMILY MEDICINE CLINIC | Age: 87
End: 2022-10-06
Payer: MEDICARE

## 2022-10-06 ENCOUNTER — TELEPHONE (OUTPATIENT)
Dept: FAMILY MEDICINE CLINIC | Age: 87
End: 2022-10-06

## 2022-10-06 ENCOUNTER — HOSPITAL ENCOUNTER (OUTPATIENT)
Age: 87
Setting detail: SPECIMEN
Discharge: HOME | DRG: 193 | End: 2022-10-06
Payer: MEDICARE

## 2022-10-06 VITALS
WEIGHT: 217.6 LBS | HEART RATE: 82 BPM | BODY MASS INDEX: 29.51 KG/M2 | OXYGEN SATURATION: 96 % | RESPIRATION RATE: 12 BRPM | TEMPERATURE: 97.5 F

## 2022-10-06 DIAGNOSIS — J06.9 URI WITH COUGH AND CONGESTION: ICD-10-CM

## 2022-10-06 DIAGNOSIS — J06.9 URI WITH COUGH AND CONGESTION: Primary | ICD-10-CM

## 2022-10-06 PROCEDURE — G8484 FLU IMMUNIZE NO ADMIN: HCPCS | Performed by: PHYSICIAN ASSISTANT

## 2022-10-06 PROCEDURE — 1123F ACP DISCUSS/DSCN MKR DOCD: CPT | Performed by: PHYSICIAN ASSISTANT

## 2022-10-06 PROCEDURE — 71046 X-RAY EXAM CHEST 2 VIEWS: CPT

## 2022-10-06 PROCEDURE — U0003 INFECTIOUS AGENT DETECTION BY NUCLEIC ACID (DNA OR RNA); SEVERE ACUTE RESPIRATORY SYNDROME CORONAVIRUS 2 (SARS-COV-2) (CORONAVIRUS DISEASE [COVID-19]), AMPLIFIED PROBE TECHNIQUE, MAKING USE OF HIGH THROUGHPUT TECHNOLOGIES AS DESCRIBED BY CMS-2020-01-R: HCPCS

## 2022-10-06 PROCEDURE — U0005 INFEC AGEN DETEC AMPLI PROBE: HCPCS

## 2022-10-06 PROCEDURE — G8417 CALC BMI ABV UP PARAM F/U: HCPCS | Performed by: PHYSICIAN ASSISTANT

## 2022-10-06 PROCEDURE — 99213 OFFICE O/P EST LOW 20 MIN: CPT | Performed by: PHYSICIAN ASSISTANT

## 2022-10-06 PROCEDURE — 1036F TOBACCO NON-USER: CPT | Performed by: PHYSICIAN ASSISTANT

## 2022-10-06 PROCEDURE — G8427 DOCREV CUR MEDS BY ELIG CLIN: HCPCS | Performed by: PHYSICIAN ASSISTANT

## 2022-10-06 RX ORDER — BENZONATATE 200 MG/1
200 CAPSULE ORAL 3 TIMES DAILY PRN
Qty: 15 CAPSULE | Refills: 0 | Status: SHIPPED | OUTPATIENT
Start: 2022-10-06

## 2022-10-06 RX ORDER — AZITHROMYCIN 250 MG/1
TABLET, FILM COATED ORAL
Qty: 6 TABLET | Refills: 0 | Status: ON HOLD | OUTPATIENT
Start: 2022-10-06 | End: 2022-10-14 | Stop reason: HOSPADM

## 2022-10-06 NOTE — PROGRESS NOTES
10/6/22  Asa Mcduffie  10/18/1930    FLU/COVID-19 CLINIC EVALUATION    HPI SYMPTOMS:    Employer: Retired    [] Fevers  [] Chills  [x] Cough  [] Coughing up blood  [x] Chest Congestion  [] Nasal Congestion  [x] Feeling short of breath  [x] Sometimes  [] Frequently  [] All the time  [] Chest pain  [] Headaches  []Tolerable  [] Severe  [] Sore throat  [] Muscle aches  [] Nausea  [] Vomiting  []Unable to keep fluids down  [] Diarrhea  []Severe    [x] OTHER SYMPTOMS: Fatigue    Symptom Duration:   [] 1  [] 2   [] 3   [] 4    [] 5   [] 6   [x] 7   [] 8   [] 9   [] 10   [] 11   [] 12   [] 13   [] 14   [] Longer than 14 days    Symptom course:   [] Worsening     [x] Stable     [] Improving    RISK FACTORS:    [] Pregnant or possibly pregnant  [x] Age over 61  [] Diabetes  [] Heart disease  [] Asthma  [] COPD/Other chronic lung diseases  [] Active Cancer  [] On Chemotherapy  [] Taking oral steroids  [] History Lymphoma/Leukemia  [] Close contact with a lab confirmed COVID-19 patient within 14 days of symptom onset  [] History of travel from affected geographical areas within 14 days of symptom onset       VITALS:  There were no vitals filed for this visit. TESTS:    POCT FLU:  [] Positive     []Negative    ASSESSMENT:    [] Flu  [] Possible COVID-19  [] Strep    PLAN:    [] Discharge home with written instructions for:  [] Flu management  [] Possible COVID-19 infection with self-quarantine and management of symptoms  [] Follow-up with primary care physician or emergency department if worsens  [] Evaluation per physician/NP/PA in clinic  [] Sent to ER       An  electronic signature was used to authenticate this note.      --Paige Mcarthur LPN on 46/5/8310 at 3:51 AM

## 2022-10-06 NOTE — TELEPHONE ENCOUNTER
chronic issue (?), conservative management place feet above heart when sitting, compression stockings. Daily weights. Low sodium. If not improving evaluate in person.  If significant sharp pain or warmth needs to be evaluated same day

## 2022-10-06 NOTE — TELEPHONE ENCOUNTER
Janifer Goldmann called and stated that patient is having Bilateral feet and ankle swelling. No appts available in the office.  Call Janifer Goldmann and advise

## 2022-10-06 NOTE — PATIENT INSTRUCTIONS
Your COVID 19 test can take 1-5 days for the results to come back. We ask that you make a Mychart page and view your test results this way. If positive, please work on contact tracing. Increase fluids and rest  Saline nasal spray as needed for nasal congestion  Monitor temperature twice a day  Tylenol as needed for fevers and/or discomfort. Big deep breaths periodically throughout the day  Regular Mucinex over the counter as needed for chest congestion  Tessalon Perles 3 times daily as needed for cough  Likely viral etiology, but given age and concerns, will initiate Z-Anibal  Obtain chest x-ray  If symptoms worsen -Go to the ER. Follow up with your primary care provider      To Whom it May Concern:    Loly Key was tested for COVID-19 10/6/2022. He/she must stay home until test results are back. If test is negative, ok to return to work/school. If test is positive, isolate for a total of 5 days, starting from day 1 of symptom onset. After 5 days, if fever-free for 24 hours and there has been a gradual improvement in symptoms, may come out of isolation, but must consistently wear a mask when around other people for 5 additional days. (5 days isolation, 5 days mask-wearing). We do not recommend retesting as patients may continue to test positive for months even though no longer contagious. It is suggested you call [address] [facility] or [facility] with any questions regarding isolation timeframe/return to work/school details.         Vincenzo Saenz PA-C

## 2022-10-06 NOTE — PROGRESS NOTES
10/6/2022    Josephfarnaz Woodall    Chief Complaint   Patient presents with    Cough       HPI  History was obtained from patient and and his friend. Sandra Miranda is a 80 y.o. male who presents today with complaints of 1 week history of cough, chest congestion, postnasal drip, fatigue. His friend thinks he has been short of breath but patient denies to me today. He denies chest pain, chest tightness or heaviness, fever or chills, leg pain or swelling. He denies nausea, vomiting, diarrhea, loss of taste or smell. He lives at home with his daughter. Friend is concerned for pneumonia. No known ill contacts. He is vaccinated against COVID-19, boosted x1. He took a rapid COVID test at home which was negative. Has PCP appt in less than 2 weeks. 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    Rt Tricep, Lt Superior Forearm       FAMILY HISTORY  Family History   Problem Relation Age of Onset    Depression Mother     Diabetes Mother     COPD Father     Diabetes Brother     Diabetes Maternal Grandmother        SOCIAL HISTORY  Social History     Socioeconomic History    Marital status:     Tobacco Use    Smoking status: Former     Packs/day: 1.00     Years: 5.00     Pack years: 5.00     Types: Cigarettes     Start date: 1950     Quit date: 1955     Years since quittin.9    Smokeless tobacco: Never   Substance and Sexual Activity    Alcohol use: Not Currently    Drug use: Not Currently     Social Determinants of Health     Financial Resource Strain: Low Risk     Difficulty of Paying Living Expenses: Not hard at all   Food Insecurity: No Food Insecurity    Worried About Running Out of Food in the Last Year: Never true    Ran Out of Food in the Last Year: Never true        SURGICAL HISTORY  Past Surgical History:   Procedure Laterality Date    CATARACT REMOVAL      CYSTOSCOPY N/A 3/29/2021    CYSTOSCOPY EVACUATION OF CLOTS, BLADDER FULGERATION, AND SUPRA-PUBIC CATHETER INSERTION performed by Scott Foster MD at [facility] N/A 4/1/2021    CYSTOSCOPY, PROSTATE FULGURATION, EVACUATION OF CLOTS & TURP performed by Romelia Hopper MD at [address] [place] [address] N/A 3/4/2021    LAPAROSCOPIC [facility] WITH GASTRIC OBSTRUCTION POSS OPEN performed by Romie Arzate MD at [address]  Current Outpatient Medications   Medication Sig Dispense Refill    azithromycin (ZITHROMAX) 250 MG tablet Use as directed 6 tablet 0    benzonatate (TESSALON) 200 MG capsule Take 1 capsule by mouth 3 times daily as needed for Cough 15 capsule 0    atorvastatin (LIPITOR) 40 MG tablet Take 1 tablet by mouth nightly 30 tablet 0    metoprolol tartrate (LOPRESSOR) 25 MG tablet Take 1 tablet by mouth 2 times daily 60 tablet 0    aspirin 81 MG chewable tablet Take 1 tablet by mouth daily 30 tablet 0    escitalopram (LEXAPRO) 5 MG tablet Take 1 tablet by mouth daily      QUEtiapine (SEROQUEL) 100 MG tablet Take 1 tablet by mouth daily      pantoprazole (PROTONIX) 20 MG tablet TAKE 1 TABLET BY MOUTH EVERY DAY 90 tablet 1    predniSONE (DELTASONE) 10 MG tablet TAKE 1 TABLET BY MOUTH EVERY DAY      levothyroxine (SYNTHROID) 75 MCG tablet TAKE 1 TABLET BY MOUTH EVERY DAY 90 tablet 1    finasteride (PROSCAR) 5 MG tablet TAKE 1 TABLET BY MOUTH EVERY DAY 90 tablet 1    tamsulosin (FLOMAX) 0.4 MG capsule Take 2 capsules by mouth nightly 60 capsule 1    sertraline (ZOLOFT) 50 MG tablet Take 50 mg by mouth daily      temazepam (RESTORIL) 7.5 MG capsule Take 7.5 mg by mouth nightly as needed for Sleep.        No current facility-administered medications for this visit. ALLERGIES  Allergies   Allergen Reactions    Minocycline Other (See Comments)       PHYSICAL EXAM    Pulse 82   Temp 97.5 °F (36.4 °C)   Resp 12   Wt 217 lb 9.6 oz (98.7 kg)   SpO2 96%   BMI 29.51 kg/m²     Constitutional:  Well developed, well nourished. Pleasant and cooperative. No acute distress  HENT:  Normocephalic, atraumatic, bilateral external ears normal, hearing aids in place. Oropharynx moist, cobblestoning of posterior pharynx. Postnasal drip noted. No petechiae, no exudate, no oral aphthous ulcers. Uvula midline and benign. Airway patent. No obvious nasal congestion. Eyes:  conjunctiva normal, no discharge, no scleral icterus  Neck:  No tenderness, supple  Lymphatic:  No lymphadenopathy noted  Cardiovascular:  Normal heart rate, normal rhythm, no murmurs, gallops or rubs  Thorax & Lungs: Satting 96% on room air. No work of breathing. Normal breath sounds, no respiratory distress, no wheezing, no rales, no rhonchi  Skin:  Warm, dry. Multiple seborrheic-keratosis-appearing lesions on scalp and face. Extremities:  No edema, no tenderness, no cyanosis  Neurologic:  Alert & oriented  Psychiatric:  Affect normal, mood normal    ASSESSMENT & PLAN    Sidney Echavarria was seen today for cough. Diagnoses and all orders for this visit:    URI with cough and congestion  -     COVID-19  -     azithromycin (ZITHROMAX) 250 MG tablet; Use as directed  -     benzonatate (TESSALON) 200 MG capsule; Take 1 capsule by mouth 3 times daily as needed for Cough  -     XR CHEST (2 VW); Future       Increase fluids and rest  Saline nasal spray as needed for nasal congestion  Monitor temperature twice a day  Tylenol as needed for fevers and/or discomfort.   Big deep breaths periodically throughout the day  Hot tea with honey encouraged before bedtime  Regular Mucinex over the counter as needed for chest congestion  Tessalon Perles 3 times daily as needed for cough  Likely viral etiology, but given age and concerns, will initiate Z-Anibal  Obtain chest x-ray  If symptoms worsen -Go to the ER. Follow up with your primary care provider    There are no discontinued medications. No follow-ups on file. Patient/friend verbalize understanding with the above plan and is in agreement. I did don appropriate PPE (including N95 face mask, protective safety glasses, face shield, gloves, and gown) as recommended by the health facility/national standard best practice, during my interaction with the patient. Please note that this chart was generated using dragon dictation software. Although every effort was made to ensure the accuracy of this automated transcription, some errors in transcription may have occurred.     Electronically signed by Columba Barclay PA-C on 10/6/2022

## 2022-10-07 LAB
SARS-COV-2: NOT DETECTED
SOURCE: NORMAL

## 2022-10-08 ENCOUNTER — APPOINTMENT (OUTPATIENT)
Dept: GENERAL RADIOLOGY | Age: 87
DRG: 193 | End: 2022-10-08
Payer: MEDICARE

## 2022-10-08 ENCOUNTER — HOSPITAL ENCOUNTER (INPATIENT)
Age: 87
LOS: 6 days | Discharge: HOME | DRG: 193 | End: 2022-10-14
Attending: EMERGENCY MEDICINE | Admitting: STUDENT IN AN ORGANIZED HEALTH CARE EDUCATION/TRAINING PROGRAM
Payer: MEDICARE

## 2022-10-08 DIAGNOSIS — R05.2 SUBACUTE COUGH: ICD-10-CM

## 2022-10-08 DIAGNOSIS — R06.02 SHORTNESS OF BREATH: ICD-10-CM

## 2022-10-08 DIAGNOSIS — N18.9 CHRONIC KIDNEY DISEASE, UNSPECIFIED CKD STAGE: ICD-10-CM

## 2022-10-08 DIAGNOSIS — J18.9 PNEUMONIA OF LEFT LOWER LOBE DUE TO INFECTIOUS ORGANISM: Primary | ICD-10-CM

## 2022-10-08 LAB
ADENOVIRUS DETECTION BY PCR: NOT DETECTED
ALBUMIN SERPL-MCNC: 3.5 GM/DL (ref 3.4–5)
ALP BLD-CCNC: 89 IU/L (ref 40–129)
ALT SERPL-CCNC: 15 U/L (ref 10–40)
ANION GAP SERPL CALCULATED.3IONS-SCNC: 7 MMOL/L (ref 4–16)
AST SERPL-CCNC: 17 IU/L (ref 15–37)
BANDED NEUTROPHILS ABSOLUTE COUNT: 0.08 K/CU MM
BANDED NEUTROPHILS RELATIVE PERCENT: 1 % (ref 5–11)
BILIRUB SERPL-MCNC: 0.2 MG/DL (ref 0–1)
BORDETELLA PARAPERTUSSIS BY PCR: NOT DETECTED
BORDETELLA PERTUSSIS PCR: NOT DETECTED
BUN BLDV-MCNC: 33 MG/DL (ref 6–23)
CALCIUM SERPL-MCNC: 8.5 MG/DL (ref 8.3–10.6)
CHLAMYDOPHILA PNEUMONIA PCR: NOT DETECTED
CHLORIDE BLD-SCNC: 100 MMOL/L (ref 99–110)
CO2: 26 MMOL/L (ref 21–32)
CORONAVIRUS 229E PCR: NOT DETECTED
CORONAVIRUS HKU1 PCR: NOT DETECTED
CORONAVIRUS NL63 PCR: NOT DETECTED
CORONAVIRUS OC43 PCR: NOT DETECTED
CREAT SERPL-MCNC: 2.4 MG/DL (ref 0.9–1.3)
DIFFERENTIAL TYPE: ABNORMAL
EOSINOPHILS ABSOLUTE: 0.1 K/CU MM
EOSINOPHILS RELATIVE PERCENT: 1 % (ref 0–3)
GFR AFRICAN AMERICAN: 31 ML/MIN/1.73M2
GFR NON-AFRICAN AMERICAN: 26 ML/MIN/1.73M2
GLUCOSE BLD-MCNC: 125 MG/DL (ref 70–99)
HCT VFR BLD CALC: 27.1 % (ref 42–52)
HEMOGLOBIN: 8.6 GM/DL (ref 13.5–18)
HUMAN METAPNEUMOVIRUS PCR: NOT DETECTED
INFLUENZA A BY PCR: NOT DETECTED
INFLUENZA A H1 (2009) PCR: NOT DETECTED
INFLUENZA A H1 PANDEMIC PCR: NOT DETECTED
INFLUENZA A H3 PCR: NOT DETECTED
INFLUENZA B BY PCR: NOT DETECTED
LACTIC ACID, SEPSIS: 0.9 MMOL/L (ref 0.5–1.9)
LACTIC ACID, SEPSIS: 1.2 MMOL/L (ref 0.5–1.9)
LYMPHOCYTES ABSOLUTE: 1.8 K/CU MM
LYMPHOCYTES RELATIVE PERCENT: 22 % (ref 24–44)
MCH RBC QN AUTO: 30.8 PG (ref 27–31)
MCHC RBC AUTO-ENTMCNC: 31.7 % (ref 32–36)
MCV RBC AUTO: 97.1 FL (ref 78–100)
MONOCYTES ABSOLUTE: 0.6 K/CU MM
MONOCYTES RELATIVE PERCENT: 7 % (ref 0–4)
MYCOPLASMA PNEUMONIAE PCR: NOT DETECTED
PARAINFLUENZA 1 PCR: NOT DETECTED
PARAINFLUENZA 2 PCR: NOT DETECTED
PARAINFLUENZA 3 PCR: NOT DETECTED
PARAINFLUENZA 4 PCR: NOT DETECTED
PDW BLD-RTO: 14.1 % (ref 11.7–14.9)
PLATELET # BLD: 247 K/CU MM (ref 140–440)
PMV BLD AUTO: 9.2 FL (ref 7.5–11.1)
POTASSIUM SERPL-SCNC: 3.6 MMOL/L (ref 3.5–5.1)
PRO-BNP: 1619 PG/ML
RBC # BLD: 2.79 M/CU MM (ref 4.6–6.2)
RHINOVIRUS ENTEROVIRUS PCR: NOT DETECTED
RSV PCR: NOT DETECTED
SARS-COV-2, NAAT: NOT DETECTED
SARS-COV-2: NOT DETECTED
SEGMENTED NEUTROPHILS ABSOLUTE COUNT: 5.8 K/CU MM
SEGMENTED NEUTROPHILS RELATIVE PERCENT: 69 % (ref 36–66)
SODIUM BLD-SCNC: 133 MMOL/L (ref 135–145)
SOURCE: NORMAL
TOTAL PROTEIN: 6 GM/DL (ref 6.4–8.2)
TROPONIN T: 0.03 NG/ML
WBC # BLD: 8.4 K/CU MM (ref 4–10.5)

## 2022-10-08 PROCEDURE — 96365 THER/PROPH/DIAG IV INF INIT: CPT

## 2022-10-08 PROCEDURE — 6370000000 HC RX 637 (ALT 250 FOR IP): Performed by: STUDENT IN AN ORGANIZED HEALTH CARE EDUCATION/TRAINING PROGRAM

## 2022-10-08 PROCEDURE — 85027 COMPLETE CBC AUTOMATED: CPT

## 2022-10-08 PROCEDURE — 2580000003 HC RX 258: Performed by: EMERGENCY MEDICINE

## 2022-10-08 PROCEDURE — 83880 ASSAY OF NATRIURETIC PEPTIDE: CPT

## 2022-10-08 PROCEDURE — 0202U NFCT DS 22 TRGT SARS-COV-2: CPT

## 2022-10-08 PROCEDURE — 83605 ASSAY OF LACTIC ACID: CPT

## 2022-10-08 PROCEDURE — 6360000002 HC RX W HCPCS: Performed by: EMERGENCY MEDICINE

## 2022-10-08 PROCEDURE — 96367 TX/PROPH/DG ADDL SEQ IV INF: CPT

## 2022-10-08 PROCEDURE — 87635 SARS-COV-2 COVID-19 AMP PRB: CPT

## 2022-10-08 PROCEDURE — 71045 X-RAY EXAM CHEST 1 VIEW: CPT

## 2022-10-08 PROCEDURE — 1200000000 HC SEMI PRIVATE

## 2022-10-08 PROCEDURE — 85007 BL SMEAR W/DIFF WBC COUNT: CPT

## 2022-10-08 PROCEDURE — 2580000003 HC RX 258: Performed by: STUDENT IN AN ORGANIZED HEALTH CARE EDUCATION/TRAINING PROGRAM

## 2022-10-08 PROCEDURE — 93005 ELECTROCARDIOGRAM TRACING: CPT | Performed by: EMERGENCY MEDICINE

## 2022-10-08 PROCEDURE — 99285 EMERGENCY DEPT VISIT HI MDM: CPT

## 2022-10-08 PROCEDURE — 80053 COMPREHEN METABOLIC PANEL: CPT

## 2022-10-08 PROCEDURE — 87040 BLOOD CULTURE FOR BACTERIA: CPT

## 2022-10-08 PROCEDURE — 84484 ASSAY OF TROPONIN QUANT: CPT

## 2022-10-08 RX ORDER — PANTOPRAZOLE SODIUM 20 MG/1
20 TABLET, DELAYED RELEASE ORAL DAILY
Status: DISCONTINUED | OUTPATIENT
Start: 2022-10-09 | End: 2022-10-14 | Stop reason: HOSPADM

## 2022-10-08 RX ORDER — ASPIRIN 81 MG/1
81 TABLET, CHEWABLE ORAL DAILY
Status: DISCONTINUED | OUTPATIENT
Start: 2022-10-09 | End: 2022-10-14 | Stop reason: HOSPADM

## 2022-10-08 RX ORDER — BENZONATATE 100 MG/1
200 CAPSULE ORAL 3 TIMES DAILY PRN
Status: DISCONTINUED | OUTPATIENT
Start: 2022-10-08 | End: 2022-10-14 | Stop reason: HOSPADM

## 2022-10-08 RX ORDER — ATORVASTATIN CALCIUM 40 MG/1
40 TABLET, FILM COATED ORAL NIGHTLY
Status: DISCONTINUED | OUTPATIENT
Start: 2022-10-08 | End: 2022-10-14 | Stop reason: HOSPADM

## 2022-10-08 RX ORDER — MAGNESIUM HYDROXIDE/ALUMINUM HYDROXICE/SIMETHICONE 120; 1200; 1200 MG/30ML; MG/30ML; MG/30ML
30 SUSPENSION ORAL EVERY 6 HOURS PRN
Status: DISCONTINUED | OUTPATIENT
Start: 2022-10-08 | End: 2022-10-14 | Stop reason: HOSPADM

## 2022-10-08 RX ORDER — ACETAMINOPHEN 325 MG/1
650 TABLET ORAL EVERY 6 HOURS PRN
Status: DISCONTINUED | OUTPATIENT
Start: 2022-10-08 | End: 2022-10-14 | Stop reason: HOSPADM

## 2022-10-08 RX ORDER — LEVOTHYROXINE SODIUM 0.07 MG/1
75 TABLET ORAL DAILY
Status: DISCONTINUED | OUTPATIENT
Start: 2022-10-09 | End: 2022-10-14 | Stop reason: HOSPADM

## 2022-10-08 RX ORDER — ONDANSETRON 4 MG/1
4 TABLET, ORALLY DISINTEGRATING ORAL EVERY 8 HOURS PRN
Status: DISCONTINUED | OUTPATIENT
Start: 2022-10-08 | End: 2022-10-14 | Stop reason: HOSPADM

## 2022-10-08 RX ORDER — ENOXAPARIN SODIUM 100 MG/ML
30 INJECTION SUBCUTANEOUS EVERY EVENING
Status: DISCONTINUED | OUTPATIENT
Start: 2022-10-09 | End: 2022-10-11

## 2022-10-08 RX ORDER — ONDANSETRON 2 MG/ML
4 INJECTION INTRAMUSCULAR; INTRAVENOUS EVERY 6 HOURS PRN
Status: DISCONTINUED | OUTPATIENT
Start: 2022-10-08 | End: 2022-10-14 | Stop reason: HOSPADM

## 2022-10-08 RX ORDER — SODIUM CHLORIDE 0.9 % (FLUSH) 0.9 %
5-40 SYRINGE (ML) INJECTION EVERY 12 HOURS SCHEDULED
Status: DISCONTINUED | OUTPATIENT
Start: 2022-10-08 | End: 2022-10-14 | Stop reason: HOSPADM

## 2022-10-08 RX ORDER — SODIUM CHLORIDE 9 MG/ML
INJECTION, SOLUTION INTRAVENOUS PRN
Status: DISCONTINUED | OUTPATIENT
Start: 2022-10-08 | End: 2022-10-14 | Stop reason: HOSPADM

## 2022-10-08 RX ORDER — ESCITALOPRAM OXALATE 10 MG/1
5 TABLET ORAL DAILY
Status: DISCONTINUED | OUTPATIENT
Start: 2022-10-09 | End: 2022-10-10

## 2022-10-08 RX ORDER — POLYETHYLENE GLYCOL 3350 17 G/17G
17 POWDER, FOR SOLUTION ORAL 2 TIMES DAILY
Status: DISCONTINUED | OUTPATIENT
Start: 2022-10-08 | End: 2022-10-10

## 2022-10-08 RX ORDER — TAMSULOSIN HYDROCHLORIDE 0.4 MG/1
0.8 CAPSULE ORAL NIGHTLY
Status: DISCONTINUED | OUTPATIENT
Start: 2022-10-08 | End: 2022-10-14 | Stop reason: HOSPADM

## 2022-10-08 RX ORDER — QUETIAPINE FUMARATE 25 MG/1
50 TABLET, FILM COATED ORAL NIGHTLY
Status: DISCONTINUED | OUTPATIENT
Start: 2022-10-08 | End: 2022-10-14 | Stop reason: HOSPADM

## 2022-10-08 RX ORDER — SODIUM CHLORIDE 0.9 % (FLUSH) 0.9 %
5-40 SYRINGE (ML) INJECTION PRN
Status: DISCONTINUED | OUTPATIENT
Start: 2022-10-08 | End: 2022-10-14 | Stop reason: HOSPADM

## 2022-10-08 RX ORDER — ACETAMINOPHEN 650 MG/1
650 SUPPOSITORY RECTAL EVERY 6 HOURS PRN
Status: DISCONTINUED | OUTPATIENT
Start: 2022-10-08 | End: 2022-10-14 | Stop reason: HOSPADM

## 2022-10-08 RX ORDER — FINASTERIDE 5 MG/1
5 TABLET, FILM COATED ORAL DAILY
Status: DISCONTINUED | OUTPATIENT
Start: 2022-10-09 | End: 2022-10-14 | Stop reason: HOSPADM

## 2022-10-08 RX ORDER — SENNA AND DOCUSATE SODIUM 50; 8.6 MG/1; MG/1
2 TABLET, FILM COATED ORAL 2 TIMES DAILY
Status: DISCONTINUED | OUTPATIENT
Start: 2022-10-08 | End: 2022-10-10

## 2022-10-08 RX ADMIN — CEFEPIME HYDROCHLORIDE 2000 MG: 2 INJECTION, POWDER, FOR SOLUTION INTRAVENOUS at 18:10

## 2022-10-08 RX ADMIN — METOPROLOL TARTRATE 25 MG: 25 TABLET, FILM COATED ORAL at 21:26

## 2022-10-08 RX ADMIN — VANCOMYCIN HYDROCHLORIDE 1250 MG: 1.25 INJECTION, POWDER, LYOPHILIZED, FOR SOLUTION INTRAVENOUS at 21:42

## 2022-10-08 RX ADMIN — POLYETHYLENE GLYCOL (3350) 17 G: 17 POWDER, FOR SOLUTION ORAL at 21:24

## 2022-10-08 RX ADMIN — QUETIAPINE FUMARATE 50 MG: 25 TABLET ORAL at 21:25

## 2022-10-08 RX ADMIN — ATORVASTATIN CALCIUM 40 MG: 40 TABLET, FILM COATED ORAL at 21:25

## 2022-10-08 RX ADMIN — SENNOSIDES AND DOCUSATE SODIUM 2 TABLET: 50; 8.6 TABLET ORAL at 21:25

## 2022-10-08 RX ADMIN — TAMSULOSIN HYDROCHLORIDE 0.8 MG: 0.4 CAPSULE ORAL at 21:25

## 2022-10-08 RX ADMIN — SODIUM CHLORIDE, PRESERVATIVE FREE 10 ML: 5 INJECTION INTRAVENOUS at 21:45

## 2022-10-08 RX ADMIN — DEXTROSE MONOHYDRATE 500 MG: 50 INJECTION, SOLUTION INTRAVENOUS at 19:02

## 2022-10-08 ASSESSMENT — PAIN - FUNCTIONAL ASSESSMENT: PAIN_FUNCTIONAL_ASSESSMENT: NONE - DENIES PAIN

## 2022-10-08 NOTE — ED NOTES
ED TO INPATIENT SBAR HANDOFF    Patient Name: Sergio Piña   :  10/18/1930  80 y.o. MRN:  [id number]  Preferred Name    ED Room #:  ED15/ED-15  Caregiver Present yes   Restraints no   Sitter no   Sepsis Risk Score Sepsis Risk Score: 1.46    Situation  Code Status: Prior No additional code details. Allergies: Minocycline  Weight: Patient Vitals for the past 96 hrs (Last 3 readings):   Weight   10/08/22 1356 200 lb (90.7 kg)     Arrived from: home  Chief Complaint:   Chief Complaint   Patient presents with    Abnormal Lab     Called by urgent care doc to come to ER to be seen; was seen there on 10/6    Cough     Hospital Problem/Diagnosis:  Active Problems:    * No active hospital problems. *  Resolved Problems:    * No resolved hospital problems. *    Imaging:   XR CHEST PORTABLE   Final Result   Interval increase in left basilar parenchymal opacity which may represent   atelectasis versus developing pneumonia. Radiographic follow-up recommended   to document resolution.            Abnormal labs:   Abnormal Labs Reviewed   CBC WITH AUTO DIFFERENTIAL - Abnormal; Notable for the following components:       Result Value    RBC 2.79 (*)     Hemoglobin 8.6 (*)     Hematocrit 27.1 (*)     MCHC 31.7 (*)     Bands Relative 1 (*)     Segs Relative 69.0 (*)     Lymphocytes % 22.0 (*)     Monocytes % 7.0 (*)     All other components within normal limits   COMPREHENSIVE METABOLIC PANEL - Abnormal; Notable for the following components:    Sodium 133 (*)     BUN 33 (*)     Creatinine 2.4 (*)     Glucose 125 (*)     Total Protein 6.0 (*)     GFR Non- 26 (*)     GFR  31 (*)     All other components within normal limits   BRAIN NATRIURETIC PEPTIDE - Abnormal; Notable for the following components:    Pro-BNP 1,619 (*)     All other components within normal limits   TROPONIN - Abnormal; Notable for the following components:    Troponin T 0.028 (*)     All other components within normal limits Critical values: yes     Abnormal Assessment Findings:     Background  Hospital admissions in last 30 days?  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    Rt Tricep, Lt Superior Forearm       Assessment    Vitals/MEWS: MEWS Score: 1  Level of Consciousness: Alert (0)   Vitals:    10/08/22 1356   BP: 121/81   Pulse: 77   Resp: 19   Temp: 98.4 °F (36.9 °C)   TempSrc: Oral   SpO2: 100%   Weight: 200 lb (90.7 kg)   Height: 6' (1.829 m)     FiO2 (%):   O2 Flow Rate: O2 Device: None (Room air)    Cardiac Rhythm:    Pain Assessment: 0/10 [x] Verbal [] Aba Sharp Scale  Pain Scale: Pain Assessment  Pain Assessment: None - Denies Pain  Last documented pain score (0-10 scale)    Last documented pain medication administered:   Mental Status: oriented  C-SSRS: Risk of Suicide: No Risk  Bedside swallow:    [place] Coma Scale (GCS): Active LDA's:   Peripheral IV 10/08/22 Left Antecubital (Active)   Site Assessment Clean, dry & intact 10/08/22 1457   Line Status Blood return noted; Flushed;Normal saline locked;Specimen collected 10/08/22 [address] checked and tightened 10/08/22 1457   Phlebitis Assessment No symptoms 10/08/22 1457   Infiltration Assessment 0 10/08/22 1457   Alcohol Cap Used No 10/08/22 1457   Dressing Status New dressing applied;Clean, dry & intact 10/08/22 1457     PO Status: Regular  Pertinent or High Risk Medications/Drips: no   If Yes, please provide details:   Pending Blood Product Administration: no     Blood Cultures: see ED pt care timeline or ED Event log    Recommendation    Pending orders   Plan for Discharge (if known):    Additional Comments:    If any further questions, please call Sending RN at [phone]    Electronically signed by: Electronically signed by Herber Villarreal RN on 10/8/2022 at 6:31 PM      Claudette Ackerman RN  10/08/22 0628

## 2022-10-08 NOTE — ED PROVIDER NOTES
EMERGENCY DEPARTMENT ENCOUNTER      CHIEF COMPLAINT:   Shortness of breath  Cough    HPI: Cely Stark is a 80 y.o. male who presents to the Emergency Department complaining of shortness of breath and a cough. The patient has been having symptoms for the past several days. He states he has a cough productive for a large amount of mucus but he has trouble getting it all the way up. He has been short of breath which is much worse on exertion and better with rest.  He does not use oxygen at home. He went to the walk-in clinic on 10/6 and had an outpatient x-ray after that visit. He was called today and told to come to the ER for possible pneumonia. There is no known history of DVT or PE. The patient has been having slightly worsening leg swelling, but states that his legs are always swollen. . The patient denies fevers, chills, neck pain, chest pain, hemoptysis, abdominal pain, nausea, vomiting, numbness, tingling, weakness, or any other complaints. REVIEW OF SYSTEMS:  CONSTITUTIONAL:  Denies fever, chills, weight loss or weakness  EYES:  Denies photophobia or discharge  ENT:  Denies sore throat or ear pain  CARDIOVASCULAR: Denies chest pain or palpitations  RESPIRATORY:  Complains of cough and shortness of breath  GI:  Denies abdominal pain, nausea, vomiting, or diarrhea  MUSCULOSKELETAL:  Denies back pain  SKIN:  No rash  NEUROLOGIC:  Denies headache, focal weakness or sensory changes  All systems negative except as marked. \"Remaining review of systems reviewed and negative. I have reviewed the nursing triage documentation and agree unless otherwise noted below. 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 SURGICAL HISTORY:   Past Surgical History:   Procedure Laterality Date    CATARACT REMOVAL      CYSTOSCOPY N/A 3/29/2021    CYSTOSCOPY EVACUATION OF CLOTS, BLADDER FULGERATION, AND SUPRA-PUBIC CATHETER INSERTION performed by Riya Diaz MD at [address] [place] [address] N/A 2021    CYSTOSCOPY, PROSTATE FULGURATION, EVACUATION OF CLOTS & TURP performed by Yves Amaya MD at [address] [place] [address] N/A 3/4/2021    LAPAROSCOPIC LARGE HERNIA HIATAL REPAIR WITH GASTRIC OBSTRUCTION POSS OPEN performed by Monalisa Ferrari MD at [address] [place] [address]:   Family History   Problem Relation Age of Onset    Depression Mother     Diabetes Mother     COPD Father     Diabetes Brother     Diabetes Maternal Grandmother        SOCIAL HISTORY:   Social History     Socioeconomic History    Marital status:       Spouse name: Not on file    Number of children: Not on file    Years of education: Not on file    Highest education level: Not on file   Occupational History    Not on file   Tobacco Use    Smoking status: Former     Packs/day: 1.00     Years: 5.00     Pack years: 5.00     Types: Cigarettes     Start date: 1950     Quit date: 1955     Years since quittin.9    Smokeless tobacco: Never   Substance and Sexual Activity    Alcohol use: Not Currently    Drug use: Not Currently    Sexual activity: Not on file   Other Topics Concern    Not on file   Social History Narrative    Not on file     Social Determinants of Health     Financial Resource Strain: Low Risk     Difficulty of Paying Living Expenses: Not hard at all   Food Insecurity: No Food Insecurity    Worried About Running Out of Food in the Last Year: Never true    [address] in the Last Year: Never true   Transportation Needs: Not on file Physical Activity: Not on file   Stress: Not on file   Social Connections: Not on file   Intimate Partner Violence: Not on file   Housing Stability: Not on file       ALLERGIES: Minocycline    PHYSICAL EXAM:  VITAL SIGNS:   ED Triage Vitals [10/08/22 1356]   Enc Vitals Group      /81      Heart Rate 77      Resp 19      Temp 98.4 °F (36.9 °C)      Temp Source Oral      SpO2 100 %      Weight 200 lb (90.7 kg)      Height 6' (1.829 m)      Head Circumference       Peak Flow       Pain Score       Pain Loc       Pain Edu? Excl. in [address]? Constitutional:  Non-toxic appearance  HENT: Normocephalic, Atraumatic, Bilateral external ears normal, Oropharynx moist, No oral exudates, Nose normal.  Eyes:  PERRL, Conjunctiva normal, No discharge. Neck: Normal range of motion, No tenderness, Supple, No stridor, No lymphadenopathy. Cardiovascular:  Normal heart rate, Normal rhythm  Pulmonary/Chest: Lungs are diminished bilaterally with scattered rhonchi, no wheezing, intermittent cough  Abdomen:   Bowel sounds normal, Soft, No tenderness, No masses, No pulsatile masses  Extremities:  Normal range of motion, Intact distal pulses, pitting edema noted to bilateral lower extremities and feet, no tenderness  Skin:  Warm, Dry, No erythema, No rash    EKG Interpretation  Interpreted by me  Compared to 8/22/2022  Rhythm: normal sinus   Rate: normal 77  Axis: normal  Ectopy: none  Conduction: normal  ST Segments: no acute change  T Waves: no acute change  Clinical Impression: normal sinus rhythm, no acute change    Cardiac Monitor Strip Interpretation  Interpreted by me  Monitor strip interpreted for greater than 10 seconds  Rhythm: normal sinus  Rate: normal  Ectopy: none  ST Segments: normal    Radiology / Procedures:  XR CHEST PORTABLE (Final result)  Result time 10/08/22 17:25:22  Final result by Twyla Abdi MD (10/08/22 17:25:22)                Impression:    Interval increase in left basilar parenchymal opacity which may represent   atelectasis versus developing pneumonia. Radiographic follow-up recommended   to document resolution. Narrative:    EXAMINATION:   ONE XRAY VIEW OF THE CHEST     10/8/2022 4:57 pm     COMPARISON:   10/06/2022 and 08/31/2022     HISTORY:   ORDERING SYSTEM PROVIDED HISTORY: Cough   TECHNOLOGIST PROVIDED HISTORY:   Reason for exam:->Cough     FINDINGS:   Normal cardiac silhouette size. Enlarged left basilar parenchymal opacity. Minimal stable right basilar atelectasis. No effusion or pneumothorax   identified radiographically. No aggressive osseous lesion. Labs Reviewed   CBC WITH AUTO DIFFERENTIAL - Abnormal; Notable for the following components:       Result Value    RBC 2.79 (*)     Hemoglobin 8.6 (*)     Hematocrit 27.1 (*)     MCHC 31.7 (*)     Bands Relative 1 (*)     Segs Relative 69.0 (*)     Lymphocytes % 22.0 (*)     Monocytes % 7.0 (*)     All other components within normal limits   COMPREHENSIVE METABOLIC PANEL - Abnormal; Notable for the following components:    Sodium 133 (*)     BUN 33 (*)     Creatinine 2.4 (*)     Glucose 125 (*)     Total Protein 6.0 (*)     GFR Non- 26 (*)     GFR  31 (*)     All other components within normal limits   BRAIN NATRIURETIC PEPTIDE - Abnormal; Notable for the following components:    Pro-BNP 1,619 (*)     All other components within normal limits   TROPONIN - Abnormal; Notable for the following components:    Troponin T 0.028 (*)     All other components within normal limits   COVID-19, RAPID   CULTURE, BLOOD 2   CULTURE, BLOOD 1       ED COURSE & MEDICAL DECISION MAKING:  Pertinent Labs & Imaging studies reviewed. (See chart for details)  On exam, the patient is afebrile and nontoxic appearing. He is hemodynamically stable and neurologically intact. EKG shows a normal sinus rhythm with no ST elevation or depression.  Labs are obtained and are significant for a normal white blood cell count with a left shift, chronic kidney disease, detectable troponin that appears similar to a troponin from August of this year, and chronic kidney disease. Chest x-ray from today shows a left basilar parenchymal opacity which may represent atelectasis versus pneumonia. I reviewed the chest x-ray that was done on 10/6 which also had lateral view and I think there is a retrocardiac infiltrate. I suspect he has pneumonia. He will be treated with IV vancomycin, cefepime and Zithromax for potential hospital-acquired pneumonia as he was admitted to the hospital less than 2 months ago. I suspect that the patient has pneumonia causing a cough and shortness of breath. Fluid overload is also considered given the patient's bilateral lower extremity edema, however BNP is not significantly elevated. . I have a low suspicion for PTX, PE, STEMI,  flash pulmonary edema, impending respiratory failure or sepsis. I recommended admission to the hospital and the patient was agreeable. I discussed the case with the hospitalist who will admit the patient for further treatment and care. The patient is currently in stable condition awaiting admission. Clinical Impression:  1. Pneumonia of left lower lobe due to infectious organism    2. Subacute cough    3. Shortness of breath    4. Chronic kidney disease, unspecified CKD stage        Comment: Please note this report has been produced using speech recognition software and may contain errors related to that system including errors in grammar, punctuation, and spelling, as well as words and phrases that may be inappropriate. If there are any questions or concerns please feel free to contact the dictating provider for clarification.                Shaina Higginbotham MD  10/09/22 [id number]

## 2022-10-08 NOTE — H&P
week.  Patient unable to provide significant amount of details however denied any travel, sick contacts or fevers. Denied any headache, weakness, CP, nausea, vomiting, abdominal pain or urinary changes. His LBM was about 4 days ago. Denied any other complaints. He had CXR the day prior showed multifocal bilateral infiltrates worse on the left. In the ED, labs were unremarkable for significant acute abnormalities. Tn mildly elevated, ECG without acute ischemic changes. CXR showed left basilar infiltrate. He was started on Azithromycin, Cefepime and Vanc for the treatment of hospital-acquired monitor. ROS:     Ten point ROS reviewed negative, unless as noted above. Objective:     No intake or output data in the 24 hours ending 10/08/22 1959     Vitals:   Vitals:    10/08/22 1356   BP: 121/81   Pulse: 77   Resp: 19   Temp: 98.4 °F (36.9 °C)   TempSrc: Oral   SpO2: 100%   Weight: 200 lb (90.7 kg)   Height: 6' (1.829 m)     BMI: Body mass index is 27.12 kg/m². General: Awake. AAOx2 (incorrect year), able to state the days of the week backwards (attentive). HEENT: PERRLA. Vision grossly intact. Hard of hearing. Oropharynx clear. Neck: Supple. No JVD. CV: RRR. NL S1/S2. 2/6 SM at LLSB. CR <2 secs. 2+ BLE to upper shins without skin changes. Pulm: NL effort on RA. Mild left basilar rales. CW NTTP. GI: +BS x4. Soft. NT/ND. : No CVA or suprapubic tenderness. No Laureano catheter. Skin: Intact. Diffuse evidence of skin damage. MSK: No gross joint deformities. Full ROM. Neuro: CNs grossly intact. Normal speech. No focal deficit. Psych: Good judgement and reason. Past History:      PMHx:   Past Medical History:   Diagnosis Date    Anemia     Anxiety     Arthritis     BPH with urinary obstruction     Depression     GERD (gastroesophageal reflux disease)     Hemorrhoids     Hepatitis     Hernia of unspecified site of abdominal cavity without mention of obstruction or gangrene     Hyperlipidemia Hypotension     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    Rt Tricep, Lt Superior Forearm       PSHx:   Past Surgical History:   Procedure Laterality Date    CATARACT REMOVAL      CYSTOSCOPY N/A 3/29/2021    CYSTOSCOPY EVACUATION OF CLOTS, BLADDER FULGERATION, AND SUPRA-PUBIC CATHETER INSERTION performed by Sarahi Parker MD at [address] [place] [address] N/A 2021    CYSTOSCOPY, PROSTATE FULGURATION, EVACUATION OF CLOTS & TURP performed by Karri Wooten MD at [address] [place] [address] N/A 3/4/2021    LAPAROSCOPIC LARGE HERNIA HIATAL REPAIR WITH GASTRIC OBSTRUCTION POSS OPEN performed by Solitario Howell MD at [facility]         Allergies: Allergies   Allergen Reactions    Minocycline Other (See Comments)       FHx: family history includes COPD in his father; Depression in his mother; Diabetes in his brother, maternal grandmother, and mother. SHx:   Social History     Socioeconomic History    Marital status:      Spouse name: None    Number of children: None    Years of education: None    Highest education level: None   Tobacco Use    Smoking status: Former     Packs/day: 1.00     Years: 5.00     Pack years: 5.00     Types: Cigarettes     Start date: 1950     Quit date: 1955     Years since quittin.9    Smokeless tobacco: Never   Substance and Sexual Activity    Alcohol use: Not Currently    Drug use: Not Currently     Social Determinants of Health     Financial Resource Strain: Low Risk     Difficulty of Paying Living Expenses: Not hard at all   Food Insecurity: No Food Insecurity    Worried About Running Out of Food in the Last Year: Never true    Ran Out of Food in the Last Year: Never true       Medications Prior to Admission     Prior to Admission medications    Medication Sig Start Date End Date Taking?  Authorizing Provider   azithromycin (ZITHROMAX) 250 MG tablet Use as directed 10/6/22   Nathan Gonzalez PA-C   benzonatate (TESSALON) 200 MG capsule Take 1 capsule by mouth 3 times daily as needed for Cough 10/6/22   Michaela Nelson PA-C   atorvastatin (LIPITOR) 40 MG tablet Take 1 tablet by mouth nightly 8/31/22   Valery Parra MD   metoprolol tartrate (LOPRESSOR) 25 MG tablet Take 1 tablet by mouth 2 times daily 8/31/22   Valery Parra MD   aspirin 81 MG chewable tablet Take 1 tablet by mouth daily 9/1/22   Valery Parra MD   ELIQUIS 2.5 MG TABS tablet TAKE 1 TABLET BY MOUTH TWICE A DAY 8/30/22 8/31/22  Óscar Dick DO   escitalopram (LEXAPRO) 5 MG tablet Take 1 tablet by mouth daily 8/15/22   Historical Provider, MD   QUEtiapine (SEROQUEL) 100 MG tablet Take 1 tablet by mouth daily 8/15/22   Historical Provider, MD   furosemide (LASIX) 20 MG tablet Take 1 tablet by mouth daily 8/19/22 8/31/22  Historical Provider, MD   pantoprazole (PROTONIX) 20 MG tablet TAKE 1 TABLET BY MOUTH EVERY DAY 7/5/22   Óscar Dick DO   predniSONE (DELTASONE) 10 MG tablet TAKE 1 TABLET BY MOUTH EVERY DAY 6/3/22   Historical Provider, MD   levothyroxine (SYNTHROID) 75 MCG tablet TAKE 1 TABLET BY MOUTH EVERY DAY 5/2/22   Óscar Dick, DO   finasteride (PROSCAR) 5 MG tablet TAKE 1 TABLET BY MOUTH EVERY DAY 5/2/22   Óscar Dick DO   tamsulosin (FLOMAX) 0.4 MG capsule Take 2 capsules by mouth nightly 3/3/22   Adriane Houston,    sertraline (ZOLOFT) 50 MG tablet Take 50 mg by mouth daily    Historical Provider, MD   temazepam (RESTORIL) 7.5 MG capsule Take 7.5 mg by mouth nightly as needed for Sleep.     Historical Provider, MD       Data:     CBC:   Recent Labs     10/08/22  1440   WBC 8.4   HGB 8.6*      MCV 97.1   RDW 14.1   BANDSPCT 1*   LYMPHOPCT 22.0*   MONOPCT 7.0*   MONOSABS 0.6   LYMPHSABS 1.8   EOSABS 0.1     CMP:    Recent Labs     10/08/22  1440   *   K 3.6      CO2 26   BUN 33*   CREATININE 2.4*   GFRAA 31*   GLUCOSE 125*   LABALBU 3.5   CALCIUM 8.5   BILITOT 0.2   ALKPHOS 89   AST 17   ALT 15     Lipids:   Lab Q12H    [START ON 10/9/2022] azithromycin  500 mg IntraVENous Q24H        Infusions:       PRN Meds:        Chetan Serrato MD  10/08/22 7:59 PM

## 2022-10-08 NOTE — ED NOTES
Pt was seen by a PA at [facility] on 10/6 for URI like symptoms. Pt's family was called by PA today and advised that pt needed to come in to ER if symptoms were worse. Pt's son states that COVID tests have been negative and that PA mentioned possible pneumonia. Per chart review, PA advised family to have pt come in due to pneumonia and pleural effusion.      Alexia Valadez, JENNIFERP     Phong Marquez  10/08/22 2696

## 2022-10-09 ENCOUNTER — APPOINTMENT (OUTPATIENT)
Dept: CT IMAGING | Age: 87
DRG: 193 | End: 2022-10-09
Payer: MEDICARE

## 2022-10-09 LAB
ANION GAP SERPL CALCULATED.3IONS-SCNC: 7 MMOL/L (ref 4–16)
BACTERIA: NEGATIVE /HPF
BANDED NEUTROPHILS ABSOLUTE COUNT: 0.08 K/CU MM
BANDED NEUTROPHILS RELATIVE PERCENT: 1 % (ref 5–11)
BILIRUBIN URINE: NEGATIVE
BLOOD, URINE: NORMAL
BUN BLDV-MCNC: 34 MG/DL (ref 6–23)
CALCIUM SERPL-MCNC: 8.6 MG/DL (ref 8.3–10.6)
CHLORIDE BLD-SCNC: 105 MMOL/L (ref 99–110)
CLARITY: CLEAR
CO2: 27 MMOL/L (ref 21–32)
COLOR: YELLOW
CREAT SERPL-MCNC: 2.5 MG/DL (ref 0.9–1.3)
DIFFERENTIAL TYPE: ABNORMAL
EKG ATRIAL RATE: 77 BPM
EKG DIAGNOSIS: NORMAL
EKG P AXIS: 35 DEGREES
EKG P-R INTERVAL: 210 MS
EKG Q-T INTERVAL: 382 MS
EKG QRS DURATION: 94 MS
EKG QTC CALCULATION (BAZETT): 432 MS
EKG R AXIS: -43 DEGREES
EKG T AXIS: 16 DEGREES
EKG VENTRICULAR RATE: 77 BPM
EOSINOPHILS ABSOLUTE: 0.2 K/CU MM
EOSINOPHILS RELATIVE PERCENT: 3 % (ref 0–3)
GFR AFRICAN AMERICAN: 29 ML/MIN/1.73M2
GFR NON-AFRICAN AMERICAN: 24 ML/MIN/1.73M2
GLUCOSE BLD-MCNC: 99 MG/DL (ref 70–99)
GLUCOSE, URINE: NEGATIVE MG/DL
HCT VFR BLD CALC: 26.6 % (ref 42–52)
HEMOGLOBIN: 8.2 GM/DL (ref 13.5–18)
KETONES, URINE: NEGATIVE MG/DL
LEGIONELLA URINARY AG: NEGATIVE
LEUKOCYTE ESTERASE, URINE: NEGATIVE
LYMPHOCYTES ABSOLUTE: 1.7 K/CU MM
LYMPHOCYTES RELATIVE PERCENT: 21 % (ref 24–44)
MCH RBC QN AUTO: 30.3 PG (ref 27–31)
MCHC RBC AUTO-ENTMCNC: 30.8 % (ref 32–36)
MCV RBC AUTO: 98.2 FL (ref 78–100)
MONOCYTES ABSOLUTE: 0.1 K/CU MM
MONOCYTES RELATIVE PERCENT: 1 % (ref 0–4)
NITRITE URINE, QUANTITATIVE: NEGATIVE
PDW BLD-RTO: 14.5 % (ref 11.7–14.9)
PH, URINE: 6
PLATELET # BLD: 229 K/CU MM (ref 140–440)
PMV BLD AUTO: 9.1 FL (ref 7.5–11.1)
POTASSIUM SERPL-SCNC: 4.7 MMOL/L (ref 3.5–5.1)
PROCALCITONIN: 0.25
PROTEIN UA: NEGATIVE MG/DL
RBC # BLD: 2.71 M/CU MM (ref 4.6–6.2)
RBC URINE: 37 /HPF
SEGMENTED NEUTROPHILS ABSOLUTE COUNT: 5.8 K/CU MM
SEGMENTED NEUTROPHILS RELATIVE PERCENT: 74 % (ref 36–66)
SODIUM BLD-SCNC: 139 MMOL/L (ref 135–145)
SPECIFIC GRAVITY UA: 1.01
STREP PNEUMONIAE ANTIGEN: NORMAL
TRICHOMONAS: NORMAL /HPF
TSH HIGH SENSITIVITY: 2.87 UIU/ML (ref 0.27–4.2)
UROBILINOGEN, URINE: 0.2 MG/DL
WBC # BLD: 7.9 K/CU MM (ref 4–10.5)
WBC UA: 1 /HPF

## 2022-10-09 PROCEDURE — 6360000002 HC RX W HCPCS: Performed by: STUDENT IN AN ORGANIZED HEALTH CARE EDUCATION/TRAINING PROGRAM

## 2022-10-09 PROCEDURE — 80048 BASIC METABOLIC PNL TOTAL CA: CPT

## 2022-10-09 PROCEDURE — 87899 AGENT NOS ASSAY W/OPTIC: CPT

## 2022-10-09 PROCEDURE — 87449 NOS EACH ORGANISM AG IA: CPT

## 2022-10-09 PROCEDURE — 84145 PROCALCITONIN (PCT): CPT

## 2022-10-09 PROCEDURE — 6370000000 HC RX 637 (ALT 250 FOR IP)

## 2022-10-09 PROCEDURE — 1200000000 HC SEMI PRIVATE

## 2022-10-09 PROCEDURE — 93010 ELECTROCARDIOGRAM REPORT: CPT | Performed by: INTERNAL MEDICINE

## 2022-10-09 PROCEDURE — 2580000003 HC RX 258: Performed by: STUDENT IN AN ORGANIZED HEALTH CARE EDUCATION/TRAINING PROGRAM

## 2022-10-09 PROCEDURE — 94761 N-INVAS EAR/PLS OXIMETRY MLT: CPT

## 2022-10-09 PROCEDURE — 71250 CT THORAX DX C-: CPT

## 2022-10-09 PROCEDURE — 36415 COLL VENOUS BLD VENIPUNCTURE: CPT

## 2022-10-09 PROCEDURE — 6370000000 HC RX 637 (ALT 250 FOR IP): Performed by: STUDENT IN AN ORGANIZED HEALTH CARE EDUCATION/TRAINING PROGRAM

## 2022-10-09 PROCEDURE — 87081 CULTURE SCREEN ONLY: CPT

## 2022-10-09 PROCEDURE — 85027 COMPLETE CBC AUTOMATED: CPT

## 2022-10-09 PROCEDURE — 84443 ASSAY THYROID STIM HORMONE: CPT

## 2022-10-09 PROCEDURE — 6370000000 HC RX 637 (ALT 250 FOR IP): Performed by: INTERNAL MEDICINE

## 2022-10-09 PROCEDURE — 6360000002 HC RX W HCPCS: Performed by: INTERNAL MEDICINE

## 2022-10-09 PROCEDURE — 81001 URINALYSIS AUTO W/SCOPE: CPT

## 2022-10-09 PROCEDURE — 85007 BL SMEAR W/DIFF WBC COUNT: CPT

## 2022-10-09 RX ORDER — FUROSEMIDE 10 MG/ML
40 INJECTION INTRAMUSCULAR; INTRAVENOUS ONCE
Status: COMPLETED | OUTPATIENT
Start: 2022-10-09 | End: 2022-10-09

## 2022-10-09 RX ORDER — GUAIFENESIN 600 MG/1
600 TABLET, EXTENDED RELEASE ORAL 2 TIMES DAILY
Status: DISCONTINUED | OUTPATIENT
Start: 2022-10-09 | End: 2022-10-14 | Stop reason: HOSPADM

## 2022-10-09 RX ORDER — HYDROXYZINE HYDROCHLORIDE 25 MG/1
50 TABLET, FILM COATED ORAL ONCE
Status: COMPLETED | OUTPATIENT
Start: 2022-10-09 | End: 2022-10-09

## 2022-10-09 RX ADMIN — POLYETHYLENE GLYCOL (3350) 17 G: 17 POWDER, FOR SOLUTION ORAL at 07:55

## 2022-10-09 RX ADMIN — SENNOSIDES AND DOCUSATE SODIUM 2 TABLET: 50; 8.6 TABLET ORAL at 07:55

## 2022-10-09 RX ADMIN — SODIUM CHLORIDE, PRESERVATIVE FREE 10 ML: 5 INJECTION INTRAVENOUS at 20:13

## 2022-10-09 RX ADMIN — SODIUM CHLORIDE, PRESERVATIVE FREE 10 ML: 5 INJECTION INTRAVENOUS at 07:55

## 2022-10-09 RX ADMIN — ENOXAPARIN SODIUM 30 MG: 100 INJECTION SUBCUTANEOUS at 17:11

## 2022-10-09 RX ADMIN — ATORVASTATIN CALCIUM 40 MG: 40 TABLET, FILM COATED ORAL at 20:07

## 2022-10-09 RX ADMIN — QUETIAPINE FUMARATE 50 MG: 25 TABLET ORAL at 20:07

## 2022-10-09 RX ADMIN — ESCITALOPRAM OXALATE 5 MG: 10 TABLET ORAL at 07:54

## 2022-10-09 RX ADMIN — METOPROLOL TARTRATE 25 MG: 25 TABLET, FILM COATED ORAL at 07:54

## 2022-10-09 RX ADMIN — PANTOPRAZOLE SODIUM 20 MG: 20 TABLET, DELAYED RELEASE ORAL at 05:42

## 2022-10-09 RX ADMIN — LEVOTHYROXINE SODIUM 75 MCG: 0.07 TABLET ORAL at 05:41

## 2022-10-09 RX ADMIN — ACETAMINOPHEN 650 MG: 325 TABLET ORAL at 08:07

## 2022-10-09 RX ADMIN — CEFEPIME HYDROCHLORIDE 1000 MG: 1 INJECTION, POWDER, FOR SOLUTION INTRAMUSCULAR; INTRAVENOUS at 05:37

## 2022-10-09 RX ADMIN — HYDROXYZINE HYDROCHLORIDE 50 MG: 25 TABLET, FILM COATED ORAL at 22:44

## 2022-10-09 RX ADMIN — ASPIRIN 81 MG CHEWABLE TABLET 81 MG: 81 TABLET CHEWABLE at 07:55

## 2022-10-09 RX ADMIN — GUAIFENESIN 600 MG: 600 TABLET, EXTENDED RELEASE ORAL at 20:07

## 2022-10-09 RX ADMIN — TAMSULOSIN HYDROCHLORIDE 0.8 MG: 0.4 CAPSULE ORAL at 20:07

## 2022-10-09 RX ADMIN — FINASTERIDE 5 MG: 5 TABLET, FILM COATED ORAL at 07:55

## 2022-10-09 RX ADMIN — METOPROLOL TARTRATE 25 MG: 25 TABLET, FILM COATED ORAL at 20:07

## 2022-10-09 RX ADMIN — GUAIFENESIN 600 MG: 600 TABLET, EXTENDED RELEASE ORAL at 11:10

## 2022-10-09 RX ADMIN — FUROSEMIDE 40 MG: 10 INJECTION, SOLUTION INTRAMUSCULAR; INTRAVENOUS at 11:10

## 2022-10-09 ASSESSMENT — PAIN DESCRIPTION - DESCRIPTORS: DESCRIPTORS: ACHING

## 2022-10-09 ASSESSMENT — PAIN SCALES - GENERAL: PAINLEVEL_OUTOF10: 5

## 2022-10-09 ASSESSMENT — PAIN DESCRIPTION - LOCATION: LOCATION: HEAD

## 2022-10-09 NOTE — ED NOTES
Vancomycin medication given to transport to take to unit. Transport here at this time.       Love BARBARA Barron  10/08/22 2018

## 2022-10-09 NOTE — PROGRESS NOTES
Patient has had three very large bowel movements today, about 550 in fluid intake,  and 800ml out in catheter.

## 2022-10-09 NOTE — PROGRESS NOTES
V2.0  [facility]      Name:  Shahbaz Cast /Age/Sex: 10/18/1930  (80 y.o. male)   MRN & CSN:  [id number] & [id number] Encounter Date/Time: 10/9/2022 1:31 PM EDT    Location:  [address] [place] [address] PCP: No primary care provider on file. Hospital Day: 2    Assessment and Plan:   Shahbaz Cast is a 80 y.o. male with pmh CKD, hypothyroidism, depression, of  who presents with Community acquired pneumonia of left lung, unspecified part of lung    Dyspnea and cough  Likely acute exacerbation of CHF versus HCAP  Patient was seen at walk-in clinic on 10/6/2022 and had outpatient x-ray which showed pulmonary edema versus consolidation  Patient was advised to come to the ED for further evaluation  Chest x-ray repeated in the ED, in comparison, worsening left-sided compared to one done in 10/6/2022  Patient afebrile, no leukocytosis, no tachypnea  Will obtain CT chest without contrast to delineate left-sided consolidation versus pulmonary edema  Respiratory cultures and blood cultures have been sent  Received  cefepime and vancomycin in the ED . Continue cefepime. If procalcitonin is low, will discontinue   Start Mucinex twice daily as he feels his sputum is getting stuck on    HFpEF  Decompensated  proBNP 1619  Maintained on Lasix 20 mg daily at home  Patient has bilateral lower extremity swelling 3+ and is endorsing orthopnea  Give IV Lasix 40 mg once  Strict input output    CKD stage IV  Baseline appears around 2.3 to 2.4 mg/DL  Creatinine on presentation appears baseline    Elevated troponin  Likely due to decompensated heart failure    BPH  Continue Flomax and    Hypothyroidism  Continue home Synthroid    Diet ADULT DIET; Regular; 5 carb choices (75 gm/meal);  Low Fat/Low Chol/High Fiber/TEDDY; Low Sodium (2 gm)   DVT Prophylaxis [] Lovenox, []  Heparin, [] SCDs, [] Ambulation,  [] Eliquis, [] Xarelto  [] Coumadin   Code Status DNR-CC   Disposition From:   Expected Disposition:   Estimated Date of Discharge:   Patient requires continued admission due to CHF exacerbation    Surrogate Decision Maker/ POA      Subjective:     Chief Complaint: Abnormal Lab (Called by urgent care doc to come to ER to be seen; was seen there on 10/6) and Cough       Jackson Arredondo is a 80 y.o. male who presents with worsening shortness of breath and bilateral feet swelling. Patient hard of hearing. He is in room air, no resp distress. Endorses orthopnea and b/l leg swelling which has been going on for few weeks. Review of Systems:    Review of Systems    As above   Objective: Intake/Output Summary (Last 24 hours) at 10/9/2022 1331  Last data filed at 10/9/2022 0000  Gross per 24 hour   Intake --   Output 1500 ml   Net -1500 ml        Vitals:   Vitals:    10/09/22 0745   BP: (!) 153/79   Pulse: 65   Resp: 16   Temp: 98.6 °F (37 °C)   SpO2: 98%       Physical Exam:     General: NAD  Eyes: EOMI  ENT: neck supple  Cardiovascular: Regular rate. Respiratory: Clear to auscultation  Gastrointestinal: Soft, non tender  Genitourinary: no suprapubic tenderness  Musculoskeletal: b/l lower extremity edema 3 +  Skin: warm, dry  Neuro: Alert. Psych: Mood appropriate.      Medications:   Medications:    guaiFENesin  600 mg Oral BID    aspirin  81 mg Oral Daily    atorvastatin  40 mg Oral Nightly    escitalopram  5 mg Oral Daily    finasteride  5 mg Oral Daily    levothyroxine  75 mcg Oral Daily    metoprolol tartrate  25 mg Oral BID    pantoprazole  20 mg Oral Daily    tamsulosin  0.8 mg Oral Nightly    QUEtiapine  50 mg Oral Nightly    sodium chloride flush  5-40 mL IntraVENous 2 times per day    enoxaparin  30 mg SubCUTAneous QPM    polyethylene glycol  17 g Oral BID    sennosides-docusate sodium  2 tablet Oral BID    cefepime  1,000 mg IntraVENous Q12H      Infusions:    sodium chloride       PRN Meds: benzonatate, 200 mg, TID PRN  sodium chloride flush, 5-40 mL, PRN  sodium chloride, , PRN  ondansetron, 4 mg, Q8H PRN Or  ondansetron, 4 mg, Q6H PRN  aluminum & magnesium hydroxide-simethicone, 30 mL, Q6H PRN  acetaminophen, 650 mg, Q6H PRN   Or  acetaminophen, 650 mg, Q6H PRN        Labs      Recent Results (from the past 24 hour(s))   CBC with Auto Differential    Collection Time: 10/08/22  2:40 PM   Result Value Ref Range    WBC 8.4 4.0 - 10.5 K/CU MM    RBC 2.79 (L) 4.6 - 6.2 M/CU MM    Hemoglobin 8.6 (L) 13.5 - 18.0 GM/DL    Hematocrit 27.1 (L) 42 - 52 %    MCV 97.1 78 - 100 FL    MCH 30.8 27 - 31 PG    MCHC 31.7 (L) 32.0 - 36.0 %    RDW 14.1 11.7 - 14.9 %    Platelets 399 806 - 507 K/CU MM    MPV 9.2 7.5 - 11.1 FL    Bands Relative 1 (L) 5 - 11 %    Segs Relative 69.0 (H) 36 - 66 %    Eosinophils % 1.0 0 - 3 %    Lymphocytes % 22.0 (L) 24 - 44 %    Monocytes % 7.0 (H) 0 - 4 %    Bands Absolute 0.08 K/CU MM    Segs Absolute 5.8 K/CU MM    Eosinophils Absolute 0.1 K/CU MM    Lymphocytes Absolute 1.8 K/CU MM    Monocytes Absolute 0.6 K/CU MM    Differential Type MANUAL DIFFERENTIAL    Comprehensive Metabolic Panel    Collection Time: 10/08/22  2:40 PM   Result Value Ref Range    Sodium 133 (L) 135 - 145 MMOL/L    Potassium 3.6 3.5 - 5.1 MMOL/L    Chloride 100 99 - 110 mMol/L    CO2 26 21 - 32 MMOL/L    BUN 33 (H) 6 - 23 MG/DL    Creatinine 2.4 (H) 0.9 - 1.3 MG/DL    Glucose 125 (H) 70 - 99 MG/DL    Calcium 8.5 8.3 - 10.6 MG/DL    Albumin 3.5 3.4 - 5.0 GM/DL    Total Protein 6.0 (L) 6.4 - 8.2 GM/DL    Total Bilirubin 0.2 0.0 - 1.0 MG/DL    ALT 15 10 - 40 U/L    AST 17 15 - 37 IU/L    Alkaline Phosphatase 89 40 - 129 IU/L    GFR Non- 26 (L) >60 mL/min/1.73m2    GFR  31 (L) >60 mL/min/1.73m2    Anion Gap 7 4 - 16   Lactate, Sepsis    Collection Time: 10/08/22  2:40 PM   Result Value Ref Range    Lactic Acid, Sepsis 1.2 0.5 - 1.9 mMOL/L   Brain Natriuretic Peptide    Collection Time: 10/08/22  2:40 PM   Result Value Ref Range    Pro-BNP 1,619 (H) <300 PG/ML   Troponin    Collection Time: 10/08/22 2:40 PM   Result Value Ref Range    Troponin T 0.028 (H) <0.01 NG/ML   COVID-19, Rapid    Collection Time: 10/08/22  2:53 PM    Specimen: Nasopharyngeal   Result Value Ref Range    Source NARES     SARS-CoV-2, NAAT NOT DETECTED NOT DETECTED   EKG 12 Lead    Collection Time: 10/08/22  3:01 PM   Result Value Ref Range    Ventricular Rate 77 BPM    Atrial Rate 77 BPM    P-R Interval 210 ms    QRS Duration 94 ms    Q-T Interval 382 ms    QTc Calculation (Bazett) 432 ms    P Axis 35 degrees    R Axis -43 degrees    T Axis 16 degrees    Diagnosis       Sinus rhythm with 1st degree AV block with premature atrial complexes  Left axis deviation  Incomplete right bundle branch block  Abnormal ECG  When compared with ECG of 22-AUG-2022 13:31,  premature atrial complexes are now present  Incomplete right bundle branch block is now present     Lactate, Sepsis    Collection Time: 10/08/22  5:55 PM   Result Value Ref Range    Lactic Acid, Sepsis 0.9 0.5 - 1.9 mMOL/L   Respiratory Panel, Molecular, with COVID-19 (Restricted: peds pts or suitable admitted adults)    Collection Time: 10/08/22  8:08 PM    Specimen: Nasopharyngeal   Result Value Ref Range    Adenovirus Detection by PCR NOT DETECTED NOT DETECTED    Coronavirus 229E PCR NOT DETECTED NOT DETECTED    Coronavirus HKU1 PCR NOT DETECTED NOT DETECTED    Coronavirus NL63 PCR NOT DETECTED NOT DETECTED    Coronavirus OC43 PCR NOT DETECTED NOT DETECTED    SARS-CoV-2 NOT DETECTED NOT DETECTED    Human Metapneumovirus PCR NOT DETECTED NOT DETECTED    Rhinovirus Enterovirus PCR NOT DETECTED NOT DETECTED    Influenza A by PCR NOT DETECTED NOT DETECTED    Influenza A H1 Pandemic PCR NOT DETECTED NOT DETECTED    Influenza A H1 (2009) PCR NOT DETECTED NOT DETECTED    Influenza A H3 PCR NOT DETECTED NOT DETECTED    Influenza B by PCR NOT DETECTED NOT DETECTED    Parainfluenza 1 PCR NOT DETECTED NOT DETECTED    Parainfluenza 2 PCR NOT DETECTED NOT DETECTED    Parainfluenza 3 PCR NOT DETECTED NOT DETECTED    Parainfluenza 4 PCR NOT DETECTED NOT DETECTED    RSV PCR NOT DETECTED NOT DETECTED    Bordetella parapertussis by PCR NOT DETECTED NOT DETECTED    B Pertussis by PCR NOT DETECTED NOT DETECTED    Chlamydophila Pneumonia PCR NOT DETECTED NOT DETECTED    Mycoplasma pneumo by PCR NOT DETECTED NOT DETECTED   Urinalysis    Collection Time: 10/09/22  2:40 AM   Result Value Ref Range    Color, UA YELLOW     Clarity, UA CLEAR     Glucose, Urine NEGATIVE MG/DL    Bilirubin Urine NEGATIVE     Ketones, Urine NEGATIVE MG/DL    Specific Gravity, UA 1.010     Blood, Urine MODERATE     pH, Urine 6.0     Protein, UA NEGATIVE MG/DL    Urobilinogen, Urine 0.2 MG/DL    Nitrite Urine, Quantitative NEGATIVE     Leukocyte Esterase, Urine NEGATIVE    Microscopic Urinalysis    Collection Time: 10/09/22  2:40 AM   Result Value Ref Range    RBC, UA 37 /HPF    WBC, UA 1 /HPF    Bacteria, UA NEGATIVE /HPF    Trichomonas, UA NONE SEEN /HPF   TSH    Collection Time: 10/09/22  8:12 AM   Result Value Ref Range    TSH, High Sensitivity 2.870 0.270 - 4.20 uIu/ml   Basic Metabolic Panel w/ Reflex to MG    Collection Time: 10/09/22  8:12 AM   Result Value Ref Range    Sodium 139 135 - 145 MMOL/L    Potassium 4.7 3.5 - 5.1 MMOL/L    Chloride 105 99 - 110 mMol/L    CO2 27 21 - 32 MMOL/L    Anion Gap 7 4 - 16    BUN 34 (H) 6 - 23 MG/DL    Creatinine 2.5 (H) 0.9 - 1.3 MG/DL    Glucose 99 70 - 99 MG/DL    Calcium 8.6 8.3 - 10.6 MG/DL    GFR Non- 24 (L) >60 mL/min/1.73m2    GFR  29 (L) >60 mL/min/1.73m2   CBC with Auto Differential    Collection Time: 10/09/22  8:12 AM   Result Value Ref Range    WBC 7.9 4.0 - 10.5 K/CU MM    RBC 2.71 (L) 4.6 - 6.2 M/CU MM    Hemoglobin 8.2 (L) 13.5 - 18.0 GM/DL    Hematocrit 26.6 (L) 42 - 52 %    MCV 98.2 78 - 100 FL    MCH 30.3 27 - 31 PG    MCHC 30.8 (L) 32.0 - 36.0 %    RDW 14.5 11.7 - 14.9 %    Platelets 627 745 - 334 K/CU MM    MPV 9.1 7.5 - 11.1 FL    Bands Relative 1 (L) 5 - 11 %    Segs Relative 74.0 (H) 36 - 66 %    Eosinophils % 3.0 0 - 3 %    Lymphocytes % 21.0 (L) 24 - 44 %    Monocytes % 1.0 0 - 4 %    Bands Absolute 0.08 K/CU MM    Segs Absolute 5.8 K/CU MM    Eosinophils Absolute 0.2 K/CU MM    Lymphocytes Absolute 1.7 K/CU MM    Monocytes Absolute 0.1 K/CU MM    Differential Type MANUAL DIFFERENTIAL         Imaging/Diagnostics Last 24 Hours   XR CHEST PORTABLE    Result Date: 10/8/2022  EXAMINATION: ONE XRAY VIEW OF THE CHEST 10/8/2022 4:57 pm COMPARISON: 10/06/2022 and 08/31/2022 HISTORY: ORDERING SYSTEM PROVIDED HISTORY: Cough TECHNOLOGIST PROVIDED HISTORY: Reason for exam:->Cough FINDINGS: Normal cardiac silhouette size. Enlarged left basilar parenchymal opacity. Minimal stable right basilar atelectasis. No effusion or pneumothorax identified radiographically. No aggressive osseous lesion. Interval increase in left basilar parenchymal opacity which may represent atelectasis versus developing pneumonia. Radiographic follow-up recommended to document resolution.        Electronically signed by Love Santoro MD on 10/9/2022 at 1:31 PM

## 2022-10-10 ENCOUNTER — APPOINTMENT (OUTPATIENT)
Dept: GENERAL RADIOLOGY | Age: 87
DRG: 193 | End: 2022-10-10
Payer: MEDICARE

## 2022-10-10 LAB
ANION GAP SERPL CALCULATED.3IONS-SCNC: 12 MMOL/L (ref 4–16)
BUN BLDV-MCNC: 32 MG/DL (ref 6–23)
CALCIUM SERPL-MCNC: 9 MG/DL (ref 8.3–10.6)
CHLORIDE BLD-SCNC: 100 MMOL/L (ref 99–110)
CO2: 25 MMOL/L (ref 21–32)
CREAT SERPL-MCNC: 2.2 MG/DL (ref 0.9–1.3)
GFR AFRICAN AMERICAN: 34 ML/MIN/1.73M2
GFR NON-AFRICAN AMERICAN: 28 ML/MIN/1.73M2
GLUCOSE BLD-MCNC: 125 MG/DL (ref 70–99)
HCT VFR BLD CALC: 28.4 % (ref 42–52)
HEMOGLOBIN: 8.9 GM/DL (ref 13.5–18)
MCH RBC QN AUTO: 30.4 PG (ref 27–31)
MCHC RBC AUTO-ENTMCNC: 31.3 % (ref 32–36)
MCV RBC AUTO: 96.9 FL (ref 78–100)
PDW BLD-RTO: 14.5 % (ref 11.7–14.9)
PLATELET # BLD: 239 K/CU MM (ref 140–440)
PMV BLD AUTO: 9.5 FL (ref 7.5–11.1)
POTASSIUM SERPL-SCNC: 3.7 MMOL/L (ref 3.5–5.1)
RBC # BLD: 2.93 M/CU MM (ref 4.6–6.2)
SODIUM BLD-SCNC: 137 MMOL/L (ref 135–145)
WBC # BLD: 10.8 K/CU MM (ref 4–10.5)

## 2022-10-10 PROCEDURE — 6370000000 HC RX 637 (ALT 250 FOR IP): Performed by: INTERNAL MEDICINE

## 2022-10-10 PROCEDURE — 94761 N-INVAS EAR/PLS OXIMETRY MLT: CPT

## 2022-10-10 PROCEDURE — 80048 BASIC METABOLIC PNL TOTAL CA: CPT

## 2022-10-10 PROCEDURE — 6360000002 HC RX W HCPCS: Performed by: STUDENT IN AN ORGANIZED HEALTH CARE EDUCATION/TRAINING PROGRAM

## 2022-10-10 PROCEDURE — 97162 PT EVAL MOD COMPLEX 30 MIN: CPT

## 2022-10-10 PROCEDURE — 85027 COMPLETE CBC AUTOMATED: CPT

## 2022-10-10 PROCEDURE — 2580000003 HC RX 258: Performed by: INTERNAL MEDICINE

## 2022-10-10 PROCEDURE — 97535 SELF CARE MNGMENT TRAINING: CPT

## 2022-10-10 PROCEDURE — 1200000000 HC SEMI PRIVATE

## 2022-10-10 PROCEDURE — 71045 X-RAY EXAM CHEST 1 VIEW: CPT

## 2022-10-10 PROCEDURE — 6370000000 HC RX 637 (ALT 250 FOR IP): Performed by: STUDENT IN AN ORGANIZED HEALTH CARE EDUCATION/TRAINING PROGRAM

## 2022-10-10 PROCEDURE — 97166 OT EVAL MOD COMPLEX 45 MIN: CPT

## 2022-10-10 PROCEDURE — 2580000003 HC RX 258: Performed by: STUDENT IN AN ORGANIZED HEALTH CARE EDUCATION/TRAINING PROGRAM

## 2022-10-10 PROCEDURE — 6360000002 HC RX W HCPCS: Performed by: INTERNAL MEDICINE

## 2022-10-10 PROCEDURE — 97530 THERAPEUTIC ACTIVITIES: CPT

## 2022-10-10 PROCEDURE — 51702 INSERT TEMP BLADDER CATH: CPT

## 2022-10-10 RX ORDER — POLYETHYLENE GLYCOL 3350 17 G/17G
17 POWDER, FOR SOLUTION ORAL 2 TIMES DAILY PRN
Status: DISCONTINUED | OUTPATIENT
Start: 2022-10-10 | End: 2022-10-14 | Stop reason: HOSPADM

## 2022-10-10 RX ORDER — ALPRAZOLAM 0.25 MG/1
0.25 TABLET ORAL ONCE
Status: COMPLETED | OUTPATIENT
Start: 2022-10-10 | End: 2022-10-10

## 2022-10-10 RX ORDER — DIPHENHYDRAMINE HCL 25 MG
25 TABLET ORAL 2 TIMES DAILY PRN
Status: DISCONTINUED | OUTPATIENT
Start: 2022-10-10 | End: 2022-10-11

## 2022-10-10 RX ORDER — SENNA AND DOCUSATE SODIUM 50; 8.6 MG/1; MG/1
2 TABLET, FILM COATED ORAL 2 TIMES DAILY PRN
Status: DISCONTINUED | OUTPATIENT
Start: 2022-10-10 | End: 2022-10-14 | Stop reason: HOSPADM

## 2022-10-10 RX ORDER — FUROSEMIDE 10 MG/ML
40 INJECTION INTRAMUSCULAR; INTRAVENOUS DAILY
Status: DISCONTINUED | OUTPATIENT
Start: 2022-10-10 | End: 2022-10-11

## 2022-10-10 RX ORDER — ESCITALOPRAM OXALATE 10 MG/1
10 TABLET ORAL DAILY
Status: DISCONTINUED | OUTPATIENT
Start: 2022-10-11 | End: 2022-10-14 | Stop reason: HOSPADM

## 2022-10-10 RX ORDER — DIPHENHYDRAMINE HCL 25 MG
25 TABLET ORAL 2 TIMES DAILY PRN
Status: DISCONTINUED | OUTPATIENT
Start: 2022-10-10 | End: 2022-10-10

## 2022-10-10 RX ORDER — ZOLPIDEM TARTRATE 5 MG/1
2.5 TABLET ORAL NIGHTLY PRN
Status: DISCONTINUED | OUTPATIENT
Start: 2022-10-10 | End: 2022-10-14 | Stop reason: HOSPADM

## 2022-10-10 RX ORDER — ESCITALOPRAM OXALATE 10 MG/1
5 TABLET ORAL ONCE
Status: COMPLETED | OUTPATIENT
Start: 2022-10-10 | End: 2022-10-10

## 2022-10-10 RX ADMIN — ATORVASTATIN CALCIUM 40 MG: 40 TABLET, FILM COATED ORAL at 20:48

## 2022-10-10 RX ADMIN — ZOLPIDEM TARTRATE 2.5 MG: 5 TABLET ORAL at 20:48

## 2022-10-10 RX ADMIN — ESCITALOPRAM OXALATE 5 MG: 10 TABLET ORAL at 08:25

## 2022-10-10 RX ADMIN — METOPROLOL TARTRATE 25 MG: 25 TABLET, FILM COATED ORAL at 08:25

## 2022-10-10 RX ADMIN — GUAIFENESIN 600 MG: 600 TABLET, EXTENDED RELEASE ORAL at 08:25

## 2022-10-10 RX ADMIN — QUETIAPINE FUMARATE 50 MG: 25 TABLET ORAL at 20:47

## 2022-10-10 RX ADMIN — DIPHENHYDRAMINE HYDROCHLORIDE 25 MG: 25 TABLET ORAL at 23:44

## 2022-10-10 RX ADMIN — ALPRAZOLAM 0.25 MG: 0.25 TABLET ORAL at 16:09

## 2022-10-10 RX ADMIN — FINASTERIDE 5 MG: 5 TABLET, FILM COATED ORAL at 08:25

## 2022-10-10 RX ADMIN — ACETAMINOPHEN 650 MG: 325 TABLET ORAL at 23:44

## 2022-10-10 RX ADMIN — ASPIRIN 81 MG CHEWABLE TABLET 81 MG: 81 TABLET CHEWABLE at 08:25

## 2022-10-10 RX ADMIN — LEVOTHYROXINE SODIUM 75 MCG: 0.07 TABLET ORAL at 05:45

## 2022-10-10 RX ADMIN — SODIUM CHLORIDE, PRESERVATIVE FREE 10 ML: 5 INJECTION INTRAVENOUS at 08:42

## 2022-10-10 RX ADMIN — ESCITALOPRAM OXALATE 5 MG: 10 TABLET ORAL at 11:54

## 2022-10-10 RX ADMIN — METOPROLOL TARTRATE 25 MG: 25 TABLET, FILM COATED ORAL at 20:48

## 2022-10-10 RX ADMIN — ENOXAPARIN SODIUM 30 MG: 100 INJECTION SUBCUTANEOUS at 18:10

## 2022-10-10 RX ADMIN — PIPERACILLIN AND TAZOBACTAM 3375 MG: 3; .375 INJECTION, POWDER, LYOPHILIZED, FOR SOLUTION INTRAVENOUS at 14:24

## 2022-10-10 RX ADMIN — TAMSULOSIN HYDROCHLORIDE 0.8 MG: 0.4 CAPSULE ORAL at 20:48

## 2022-10-10 RX ADMIN — SODIUM CHLORIDE 25 ML: 9 INJECTION, SOLUTION INTRAVENOUS at 14:24

## 2022-10-10 RX ADMIN — PIPERACILLIN AND TAZOBACTAM 3375 MG: 3; .375 INJECTION, POWDER, LYOPHILIZED, FOR SOLUTION INTRAVENOUS at 08:44

## 2022-10-10 RX ADMIN — VANCOMYCIN HYDROCHLORIDE 1250 MG: 1.25 INJECTION, POWDER, LYOPHILIZED, FOR SOLUTION INTRAVENOUS at 12:48

## 2022-10-10 RX ADMIN — PANTOPRAZOLE SODIUM 20 MG: 20 TABLET, DELAYED RELEASE ORAL at 05:45

## 2022-10-10 RX ADMIN — FUROSEMIDE 40 MG: 10 INJECTION, SOLUTION INTRAMUSCULAR; INTRAVENOUS at 12:36

## 2022-10-10 RX ADMIN — ALPRAZOLAM 0.25 MG: 0.25 TABLET ORAL at 11:54

## 2022-10-10 RX ADMIN — GUAIFENESIN 600 MG: 600 TABLET, EXTENDED RELEASE ORAL at 20:48

## 2022-10-10 RX ADMIN — ACETAMINOPHEN 650 MG: 325 TABLET ORAL at 05:48

## 2022-10-10 RX ADMIN — SODIUM CHLORIDE, PRESERVATIVE FREE 10 ML: 5 INJECTION INTRAVENOUS at 20:48

## 2022-10-10 ASSESSMENT — PAIN DESCRIPTION - DESCRIPTORS: DESCRIPTORS: ACHING

## 2022-10-10 ASSESSMENT — PAIN - FUNCTIONAL ASSESSMENT: PAIN_FUNCTIONAL_ASSESSMENT: PREVENTS OR INTERFERES SOME ACTIVE ACTIVITIES AND ADLS

## 2022-10-10 ASSESSMENT — PAIN DESCRIPTION - LOCATION: LOCATION: HEAD

## 2022-10-10 ASSESSMENT — PAIN SCALES - GENERAL: PAINLEVEL_OUTOF10: 2

## 2022-10-10 NOTE — PROGRESS NOTES
Pt redirected multiple times throughout night on borden management, education ineffective, pt impulsive and tugging at borden as well as \"dragging\" on floor behind pt while ambulating. This resulting in bloody urine with clots present. Provider aware see new lab orders. Redirection and re securement. Ineffective at this time Bed in low position. Call light within reach, bed alarm on. Will continue to monitor.

## 2022-10-10 NOTE — CONSULTS
[address]  consulted by Dr. Angelina Monte for monitoring and adjustment. Indication for treatment: Pneumonia (HAP)  Goal trough: [] 10-15 mcg/mL or [x] 15-20 mcg/ml  AUC/TAMEKA: [] <500 or [] 400-600    Pertinent Laboratory Values:   Temp Readings from Last 3 Encounters:   10/10/22 98 °F (36.7 °C)   10/06/22 97.5 °F (36.4 °C)   08/31/22 98.2 °F (36.8 °C) (Oral)     Recent Labs     10/08/22  1440 10/09/22  0812 10/10/22  0345   WBC 8.4 7.9 10.8*     Recent Labs     10/08/22  1440 10/09/22  0812 10/10/22  0345   BUN 33* 34* 32*   CREATININE 2.4* 2.5* 2.2*     Estimated Creatinine Clearance: 24 mL/min (A) (based on SCr of 2.2 mg/dL (H)). Intake/Output Summary (Last 24 hours) at 10/10/2022 0857  Last data filed at 10/10/2022 0454  Gross per 24 hour   Intake --   Output 4050 ml   Net -4050 ml       Pertinent Cultures:  Date    Source    Results  10/9   Nasal MRSA screen  In process             Vancomycin level:   TROUGH:  No results for input(s): VANCOTROUGH in the last 72 hours. RANDOM:  No results for input(s): VANCORANDOM in the last 72 hours. Assessment:  SCr, BUN, and urine output: CKD4, SCR elevated @2.2 (baseline unknown)  Day(s) of therapy: 1 of 7  Vancomycin concentration:   10/11 - random @06:00    Plan:  Intermittent dosing based on vanco levels due to CKD4  The patient received an initial vancomycin dose of 1250mg on 10/8. Give vancomycin 1250mg ivpb x1 dose again today. Check the vanco level tomorrow am  Pharmacy will continue to monitor patient and adjust therapy as indicated    Hali 3 10/11 @06:00    Thank you for the consult. Dion Pereira, [facility]  10/10/2022 8:57 AM

## 2022-10-10 NOTE — PROGRESS NOTES
V2.0  [facility] Hospitalist Progress Note      Name:  Sergio Piña /Age/Sex: 10/18/1930  (80 y.o. male)   MRN & CSN:  [id number] & [id number] Encounter Date/Time: 10/10/2022 1:31 PM EDT    Location:  [address] [place] [address] PCP: No primary care provider on file. Hospital Day: 3    Assessment and Plan:   Sergio Piña is a 80 y.o. male with pmh CKD, hypothyroidism, depression, of  who presents with Community acquired pneumonia of left lung, unspecified part of lung    Dyspnea and cough  Likely acute exacerbation of CHF versus HCAP  Patient was seen at walk-in clinic on 10/6/2022 and had outpatient x-ray which showed pulmonary edema versus consolidation  Patient was advised to come to the ED for further evaluation  Chest x-ray repeated in the ED, in comparison, worsening left-sided compared to one done in 10/6/2022  Patient afebrile,no tachypnea  Respiratory cultures and blood cultures have been sent  Received  cefepime and vancomycin in the ED ; were discontinued yday. However CT chest reviewed. . He has left sided consolidation. Will resumed antibiotics today with IV zosyn and vancomycin. Await final resp cultures and MRSA nares      HFpEF  Decompensated  proBNP 1619  Maintained on Lasix 20 mg daily at home  Patient has bilateral lower extremity swelling 3+ and is endorsing orthopnea  Continue IV lasix daily ; diuresing well    CKD stage IV  Baseline appears around 2.3 to 2.4 mg/DL  Creatinine on presentation appears baseline    Elevated troponin  Likely due to decompensated heart failure    BPH  Continue Flomax and  Patient has borden catheter that was placed yday likely for urinary retention ; however no documentation was found of such   Will continue borden for now.  Voiding trial after few days of antibiotics   Patient is confused and pulls catheter ; sitter at bedside to be arranged     Hypothyroidism  Continue home Synthroid    H/o anxiety   Patient confused since last night   Increase lexapro to 10 mg daily   Xanax 0.25 mg x 1 for anxiety   Patient is redirectable but was crying in the morning    Diet ADULT DIET; Regular; 5 carb choices (75 gm/meal); Low Fat/Low Chol/High Fiber/TEDDY; Low Sodium (2 gm)   DVT Prophylaxis [] Lovenox, []  Heparin, [] SCDs, [] Ambulation,  [] Eliquis, [] Xarelto  [] Coumadin   Code Status DNR-CC   Disposition From:   Expected Disposition:   Estimated Date of Discharge:   Patient requires continued admission due to CHF exacerbation    Surrogate Decision Maker/ POA      Subjective:     Chief Complaint: Abnormal Lab (Called by urgent care doc to come to ER to be seen; was seen there on 10/6) and Cough       Joselo José is a 80 y.o. male who presents with worsening shortness of breath and bilateral feet swelling. Seen and examined in AM.  Patient was crying and was frustrated. He was confused and did not know where he was. Has been going on since last night. He says his mood has been bothering him and requested to send to mental health place. He is redirectable though. He understood that he needs continued admission due to his underlying pneumonia and CHF. Review of Systems:    Review of Systems    As above   Objective: Intake/Output Summary (Last 24 hours) at 10/10/2022 1354  Last data filed at 10/10/2022 1114  Gross per 24 hour   Intake --   Output 4500 ml   Net -4500 ml          Vitals:   Vitals:    10/10/22 0755   BP: (!) 151/80   Pulse: 78   Resp: 16   Temp: 98 °F (36.7 °C)   SpO2: 100%       Physical Exam:     General: NAD  Eyes: EOMI  ENT: neck supple  Cardiovascular: Regular rate. Respiratory: Clear to auscultation  Gastrointestinal: Soft, non tender  Genitourinary: no suprapubic tenderness  Musculoskeletal: b/l lower extremity edema 3 +  Skin: warm, dry  Neuro: Alert. Psych: Mood appropriate.      Medications:   Medications:    piperacillin-tazobactam  3,375 mg IntraVENous Q8H    furosemide  40 mg IntraVENous Daily    [START ON 10/11/2022] vancomycin (VANCOCIN) intermittent dosing (placeholder)   Other RX Placeholder    vancomycin  1,250 mg IntraVENous Once    [START ON 10/11/2022] escitalopram  10 mg Oral Daily    guaiFENesin  600 mg Oral BID    aspirin  81 mg Oral Daily    atorvastatin  40 mg Oral Nightly    finasteride  5 mg Oral Daily    levothyroxine  75 mcg Oral Daily    metoprolol tartrate  25 mg Oral BID    pantoprazole  20 mg Oral Daily    tamsulosin  0.8 mg Oral Nightly    QUEtiapine  50 mg Oral Nightly    sodium chloride flush  5-40 mL IntraVENous 2 times per day    enoxaparin  30 mg SubCUTAneous QPM      Infusions:    sodium chloride       PRN Meds: sennosides-docusate sodium, 2 tablet, BID PRN  polyethylene glycol, 17 g, BID PRN  zolpidem, 2.5 mg, Nightly PRN  benzonatate, 200 mg, TID PRN  sodium chloride flush, 5-40 mL, PRN  sodium chloride, , PRN  ondansetron, 4 mg, Q8H PRN   Or  ondansetron, 4 mg, Q6H PRN  aluminum & magnesium hydroxide-simethicone, 30 mL, Q6H PRN  acetaminophen, 650 mg, Q6H PRN   Or  acetaminophen, 650 mg, Q6H PRN      Labs      Recent Results (from the past 24 hour(s))   Basic Metabolic Panel    Collection Time: 10/10/22  3:45 AM   Result Value Ref Range    Sodium 137 135 - 145 MMOL/L    Potassium 3.7 3.5 - 5.1 MMOL/L    Chloride 100 99 - 110 mMol/L    CO2 25 21 - 32 MMOL/L    Anion Gap 12 4 - 16    BUN 32 (H) 6 - 23 MG/DL    Creatinine 2.2 (H) 0.9 - 1.3 MG/DL    Glucose 125 (H) 70 - 99 MG/DL    Calcium 9.0 8.3 - 10.6 MG/DL    GFR Non-African American 28 (L) >60 mL/min/1.73m2    GFR  34 (L) >60 mL/min/1.73m2   CBC    Collection Time: 10/10/22  3:45 AM   Result Value Ref Range    WBC 10.8 (H) 4.0 - 10.5 K/CU MM    RBC 2.93 (L) 4.6 - 6.2 M/CU MM    Hemoglobin 8.9 (L) 13.5 - 18.0 GM/DL    Hematocrit 28.4 (L) 42 - 52 %    MCV 96.9 78 - 100 FL    MCH 30.4 27 - 31 PG    MCHC 31.3 (L) 32.0 - 36.0 %    RDW 14.5 11.7 - 14.9 %    Platelets 235 268 - 331 K/CU MM    MPV 9.5 7.5 - 11.1 FL        Imaging/Diagnostics Last 24 Hours   XR CHEST PORTABLE    Result Date: 10/8/2022  EXAMINATION: ONE XRAY VIEW OF THE CHEST 10/8/2022 4:57 pm COMPARISON: 10/06/2022 and 08/31/2022 HISTORY: ORDERING SYSTEM PROVIDED HISTORY: Cough TECHNOLOGIST PROVIDED HISTORY: Reason for exam:->Cough FINDINGS: Normal cardiac silhouette size. Enlarged left basilar parenchymal opacity. Minimal stable right basilar atelectasis. No effusion or pneumothorax identified radiographically. No aggressive osseous lesion. Interval increase in left basilar parenchymal opacity which may represent atelectasis versus developing pneumonia. Radiographic follow-up recommended to document resolution.        Electronically signed by Tabatha Adorno MD on 10/10/2022 at 1:54 PM

## 2022-10-10 NOTE — PLAN OF CARE
Problem: Discharge Planning  Goal: Discharge to home or other facility with appropriate resources  Outcome: Progressing  Flowsheets (Taken 10/10/2022 1236)  Discharge to home or other facility with appropriate resources: Identify barriers to discharge with patient and caregiver     Problem: Safety - Adult  Goal: Free from fall injury  Outcome: Progressing

## 2022-10-10 NOTE — PROGRESS NOTES
Occupational Therapy    [facility] ACUTE CARE OCCUPATIONAL THERAPY EVALUATION  Asa Mcduffie, 10/18/1930, [id number], 10/10/2022    History  [place]:  The primary encounter diagnosis was Pneumonia of left lower lobe due to infectious organism. Diagnoses of Subacute cough, Shortness of breath, and Chronic kidney disease, unspecified CKD stage were also pertinent to this visit. Patient  has a past medical history of Anemia, Anxiety, Arthritis, BPH with urinary obstruction, Depression, GERD (gastroesophageal reflux disease), Hemorrhoids, Hepatitis, Hernia of unspecified site of abdominal cavity without mention of obstruction or gangrene, Hyperlipidemia, Hypotension, and SCC (squamous cell carcinoma). Patient  has a past surgical history that includes Neck surgery; Cataract removal; hernia repair; hiatal hernia repair (N/A, 3/4/2021); Cystoscopy (N/A, 3/29/2021); and Cystoscopy (N/A, 4/1/2021). Subjective:  Patient states:  \"I've been trying to get these off all day\" Pt pointed to IV and borden catheter.     Pain:  No.    Communication with other providers:  Handoff to RN  Restrictions: General Precautions, Fall Risk    Home Setup/Prior level of function   Social/Functional History  Lives With: Spouse  Type of Home: House  Home Layout: Laundry in basement, Able to Live on Main level with bedroom/bathroom, Two level  Home Access: Stairs to enter without rails  Entrance Stairs - Number of Steps: 3  Bathroom Shower/Tub: Tub/Shower unit, Shower chair with back  Bathroom Toilet: Standard  Bathroom Equipment: Shower chair  Home Equipment: Walker, rolling (pt unsure if standard or rolling walker)  Has the patient had two or more falls in the past year or any fall with injury in the past year?: Unknown  ADL Assistance: Independent  Homemaking Assistance: Needs assistance (wife assists)  Ambulation Assistance: Independent  Transfer Assistance: Independent  Active : No  Patient's  Info: son drives  Additional Comments: pt somewhat questionable historian this date. son lives in [facility]    Examination of body systems (includes body structures/functions, activity/participation limitations):  Observation:  Supine in bed upon arrival, agreeable to therapy   Vision:  Glasses  Hearing:  [place]  Cardiopulmonary:  No 02 needs      Body Systems and functions:  ROM R/L:  WFL. Strength R/L:  4+/5,   Sensation: WFL  Tone: Normal  Coordination: WFL  Perception: WNL    Activities of Daily Living (ADLs):  Feeding: Dashawn (pt feeding at end of session)  Grooming: SBA (washing hands/face w/ warm washcloth, brushing hair and oral care sitting upright in chair)  UB bathing: SBA  LB bathing: modA  UB dressing: SBA  LB dressing: modA (donning socks sitting EOB, assistance due to decreased dynamic sitting balance)  Toileting: Alfonzo    Cognitive and Psychosocial Functioning:  Overall cognitive status: AxO to self only, decreased short term memory, decreased   Affect: Normal        Mobility:  Supine to sit:  SBA  Transfers: CGA from EOB up to RW  Sitting balance:  SBA due to safety awareness. Standing balance:  CGA w/ RW. Functional Mobility Alfonzo w/ RW ~20 feet  Toilet/Shower Transfers: DNT             AM-[facility] Daily Activity Inpatient   How much help for putting on and taking off regular lower body clothing?: A Lot  How much help for Bathing?: A Little  How much help for Toileting?: A Little  How much help for putting on and taking off regular upper body clothing?: A Little  How much help for taking care of personal grooming?: A Little  How much help for eating meals?: None  AM-[facility] Inpatient Daily Activity Raw Score: 18  AM-PAC Inpatient ADL T-Scale Score : 38.66  ADL Inpatient CMS 0-100% Score: 46.65  ADL Inpatient CMS G-Code Modifier : CK    Treatment:  Self Care Training:   Cues were given for safety, sequence, UE/LE placement, visual cues, and balance.   Grooming, LB dressing     Safety: patient left in chair with chair alarm, call light within reach, RN notified, gait belt used. Assessment:  Pt is a 81 yo male admitted from home for pneumonia. Pt at baseline is Independent for ADLs needs assistance for high level IADLs and is Independent for functional transfers/mobility w/ AD. Pt currently presents w/ deficits in ADL and high level IADL independence, functional activity tolerance, dynamic sitting and standing balance and tolerance and functional transfers, BUE strength, cognition Pt would benefit from continued acute care OT services w/ discharge to SNF  Complexity: Moderate  Prognosis: Good, no significant barriers to participation at this time. Occupational Therapy Plan  Times Per Week: 3x+  Times Per Day:  Once a day  Current Treatment Recommendations: Strengthening, ROM, Balance training, Functional mobility training, Endurance training, Patient/Caregiver education & training, Home management training, Self-Care / ADL, Safety education & training, Pain management, Equipment evaluation, education, & procurement, Positioning, Cognitive/Perceptual training, Cognitive reorientation     Equipment: defer    Goals:  Pt goal: go home  Time Frame for STGs: discharge  Goal 1: Pt will perform UE ADLs Supervision  Goal 2: Pt will perform LE ADLs Supervision  Goal 3: Pt will perform toileting Supervision  Goal 4: Pt will perform functional transfer w/ AD Supervision  Goal 5: Pt will perform functional mobility w/ AD Supervision  Goal 6: Pt will perform therex/theract in order to increase functional activity tolerance and dynamic standing balance    Treatment plan:  Pt will perform functional task in stand reaching in all 3 planes in order to increase dynamic standing balance and functional activity tolerance    Recommendations for NURSING activity: Up to chair for all 3 meals and up to standard commode for all toileting needs    Time:   Time in: 0728  Time out: 0752  Timed treatment minutes: 9 minutes  Total time: 24 minutes    Electronically signed by:    Von CLAUDIO/L [id number]  10:00 AM,10/10/2022

## 2022-10-10 NOTE — CARE COORDINATION
Reviewed chart and  left for Daughter Kristopher Adamson for discharge planning d/t pt's confusion.   CM will revisit in am.

## 2022-10-10 NOTE — CONSULTS
[facility] PHYSICAL THERAPY EVALUATION  Tiffanie Koenig, 10/18/1930, [id number], 10/10/2022    History  [place]:  The primary encounter diagnosis was Pneumonia of left lower lobe due to infectious organism. Diagnoses of Subacute cough, Shortness of breath, and Chronic kidney disease, unspecified CKD stage were also pertinent to this visit. Patient  has a past medical history of Anemia, Anxiety, Arthritis, BPH with urinary obstruction, Depression, GERD (gastroesophageal reflux disease), Hemorrhoids, Hepatitis, Hernia of unspecified site of abdominal cavity without mention of obstruction or gangrene, Hyperlipidemia, Hypotension, and SCC (squamous cell carcinoma). Patient  has a past surgical history that includes Neck surgery; Cataract removal; hernia repair; hiatal hernia repair (N/A, 3/4/2021); Cystoscopy (N/A, 3/29/2021); and Cystoscopy (N/A, 4/1/2021). Subjective:  Patient states:  \"I've been crying non-stop, I need the mental health doctor\", \"I didn't want to worry my wife\". Pain:  Pt denies. Communication with other providers:  Handoff to RN, discussed with OT. Restrictions: fall risk, mild confusion, IV, tele, general.     Home Setup/Prior level of function     *Below taken from last admission, re-confirmed this session. Pt reports daughter is staying with pt wife at this time.    Lives With: Spouse  Type of Home: House  Home Layout: Laundry in basement, Able to Live on Main level with bedroom/bathroom, Two level  Home Access: Stairs to enter without rails  Entrance Stairs - Number of Steps: 3  Bathroom Shower/Tub: Tub/Shower unit, Shower chair with back  Bathroom Toilet: Standard  Bathroom Equipment: Shower chair  Home Equipment: Walker, rolling (pt unsure if standard or rolling walker)  Has the patient had two or more falls in the past year or any fall with injury in the past year?: Unknown  ADL Assistance: [facility]: Needs assistance (wife assists)  Ambulation Assistance: Independent  Transfer Assistance: Independent  Active : No  Patient's  Info: son drives  Additional Comments: pt somewhat questionable historian this date. son lives in [facility]    Examination of body systems (includes body structures/functions, activity/participation limitations):  Observation:  Pt is awake up in recliner speaking with physician upon arrival  Vision:  Glasses which pt states he \"does not use\"  Hearing:  [place]  Cardiopulmonary:  On RA, vitals stable  Cognition: Impaired insight, mild confusion, pt appears A&O x3, see OT/SLP note for further evaluation. Musculoskeletal  ROM R/L:  WFL BLE. Strength R/L:  Generally 4/5 except HS, Hip Flexors, and DF/PF 4-/5 decreased in function and endurance. Neuro:  Decreased balance, decreased APARNA sole of foot sensation    Gait pattern: Pt demonstrates step-through pattern with decreased foot clearance and mark, increased forward trunk flexion posture, decreased dynamic balance. Mobility:  Transfers: CGA  Sitting balance:  SBA. Standing balance:  CGA-Min.    Gait: CGA    AMPA 6 Clicks Inpatient Mobility:  AM-PAC Inpatient Mobility Raw Score : 17    Treatment:    *Upon entry pt is seated up in recliner, speaking with physician. Questionable pt cognition d/t need for repeated cues/explanation for why pt is at hospital. Min increased time to perform social/functional questionnaire d/t pt emotional state. Encouragement and support provided. Sitting balance: Seated EOB pt demonstrates fair balance, no UE support required for maintaining upright in static seated. For WS and scooting activity pt requires BUE support for balance, SBA. Pt min increased retro lean during LE AROM. Pt requires intermittent cues to prevent premature STS. Sit<>Stand: Pt performed STS from recliner to RW  with CGA, v/c for proper sequencing and safe UE placement with RW.  Pt demonstrates min increased time, increased forward lean to upright. Return to seated at standard chair pt demonstrates fair- control, v/c for safe sequencing with poor carryover. Sit>stand from chair to RW with cues for UE placement good carryover. Standing balance: Fair-, pt required UE support on RW throughout d/t postural sway. Gait: Pt AMB x70 ft +70 ft with RW, step-through pattern with decreased foot clearance and mark, increased forward trunk flexion posture, decreased dynamic balance. Seated rest break between gait trials x2' with SBA. Education: Discussed rehab stay and d/c recommendations, safety precautions, PT role and POC. End of session pt left in recliner with LE elevated  with lines managed, call light, phone, exit alarm, tray, all needs, RN aware. Assessment:    Pt is a 79 y/o male admitted 10/8 with c/o  PNA. Patient with significant h/o Anemia, Anxiety, Arthritis, BPH with urinary obstruction, Depression, GERD (gastroesophageal reflux disease), Hemorrhoids, Hepatitis, Hernia of unspecified site of abdominal cavity without mention of obstruction or gangrene, Hyperlipidemia, Hypotension,, see chart. Per pt report pt has been performing ADLs/IADLs with indeterminate level of assist d/t pt cognition, use of RW for AMB. At this time pt appears to be functioning below baseline. Pt is now presenting with impairments in LE strength, functional endurance, safety awareness, balance, gait, transfers, cognition. Pt would benefit from skilled PT services in order to address impairments and promote return to PLOF. PT to recommend d/c to SNF for rehab. Complexity: Moderate  Prognosis: Good, no significant barriers to participation at this time.    Plan General Plan: 3-5 times per week/week, 1 week,   Discharge Recommendations: Subacute/Skilled Nursing Facility  Equipment: Defer    Goals:  Short Term Goals  Time Frame for Short Term Goals: 1 week  Short Term Goal 1: Pt will AMB x200 ft with RW, CGA-SBA  Short Term Goal 2: Pt will perform sup<>sit with SBA and cues  Short Term Goal 3: Pt will tolerate standing dynamic balance tasks with light UE support x10 minutes CGA  Short Term Goal 4: Pt will perform x5 STS in 55 seconds CGA       Treatment plan:  Bed mobility, transfers, balance, gait, TA, TX,    Recommendations for NURSING mobility: AMB with RW and CGA    Time:   Time in: 09:50  Time out: 10:15  Timed treatment minutes: 15  Total time: 25    Electronically signed by:    Adriano Carrera PT  39/06/3761, 9:74 PM  PT Lic #: [id number]

## 2022-10-11 LAB
ANION GAP SERPL CALCULATED.3IONS-SCNC: 11 MMOL/L (ref 4–16)
BASOPHILS ABSOLUTE: 0.1 K/CU MM
BASOPHILS RELATIVE PERCENT: 0.7 % (ref 0–1)
BUN BLDV-MCNC: 31 MG/DL (ref 6–23)
CALCIUM SERPL-MCNC: 8.6 MG/DL (ref 8.3–10.6)
CHLORIDE BLD-SCNC: 100 MMOL/L (ref 99–110)
CO2: 27 MMOL/L (ref 21–32)
CREAT SERPL-MCNC: 2.8 MG/DL (ref 0.9–1.3)
CULTURE: NORMAL
DIFFERENTIAL TYPE: ABNORMAL
DOSE AMOUNT: NORMAL
DOSE TIME: NORMAL
EOSINOPHILS ABSOLUTE: 0.2 K/CU MM
EOSINOPHILS RELATIVE PERCENT: 2.7 % (ref 0–3)
GFR AFRICAN AMERICAN: 26 ML/MIN/1.73M2
GFR NON-AFRICAN AMERICAN: 21 ML/MIN/1.73M2
GLUCOSE BLD-MCNC: 146 MG/DL (ref 70–99)
HCT VFR BLD CALC: 27.7 % (ref 42–52)
HEMOGLOBIN: 8.6 GM/DL (ref 13.5–18)
IMMATURE NEUTROPHIL %: 1.3 % (ref 0–0.43)
LYMPHOCYTES ABSOLUTE: 0.9 K/CU MM
LYMPHOCYTES RELATIVE PERCENT: 10.7 % (ref 24–44)
Lab: NORMAL
MCH RBC QN AUTO: 30.5 PG (ref 27–31)
MCHC RBC AUTO-ENTMCNC: 31 % (ref 32–36)
MCV RBC AUTO: 98.2 FL (ref 78–100)
MONOCYTES ABSOLUTE: 0.7 K/CU MM
MONOCYTES RELATIVE PERCENT: 7.6 % (ref 0–4)
NUCLEATED RBC %: 0 %
PDW BLD-RTO: 14.3 % (ref 11.7–14.9)
PLATELET # BLD: 235 K/CU MM (ref 140–440)
PMV BLD AUTO: 9.4 FL (ref 7.5–11.1)
POTASSIUM SERPL-SCNC: 4 MMOL/L (ref 3.5–5.1)
RBC # BLD: 2.82 M/CU MM (ref 4.6–6.2)
SEGMENTED NEUTROPHILS ABSOLUTE COUNT: 6.6 K/CU MM
SEGMENTED NEUTROPHILS RELATIVE PERCENT: 77 % (ref 36–66)
SODIUM BLD-SCNC: 138 MMOL/L (ref 135–145)
SPECIMEN: NORMAL
TOTAL IMMATURE NEUTOROPHIL: 0.11 K/CU MM
TOTAL NUCLEATED RBC: 0 K/CU MM
VANCOMYCIN RANDOM: 17 UG/ML
WBC # BLD: 8.6 K/CU MM (ref 4–10.5)

## 2022-10-11 PROCEDURE — 85025 COMPLETE CBC W/AUTO DIFF WBC: CPT

## 2022-10-11 PROCEDURE — 80202 ASSAY OF VANCOMYCIN: CPT

## 2022-10-11 PROCEDURE — 6370000000 HC RX 637 (ALT 250 FOR IP): Performed by: STUDENT IN AN ORGANIZED HEALTH CARE EDUCATION/TRAINING PROGRAM

## 2022-10-11 PROCEDURE — 97116 GAIT TRAINING THERAPY: CPT

## 2022-10-11 PROCEDURE — 36415 COLL VENOUS BLD VENIPUNCTURE: CPT

## 2022-10-11 PROCEDURE — 2580000003 HC RX 258: Performed by: STUDENT IN AN ORGANIZED HEALTH CARE EDUCATION/TRAINING PROGRAM

## 2022-10-11 PROCEDURE — 94761 N-INVAS EAR/PLS OXIMETRY MLT: CPT

## 2022-10-11 PROCEDURE — 97530 THERAPEUTIC ACTIVITIES: CPT

## 2022-10-11 PROCEDURE — 97110 THERAPEUTIC EXERCISES: CPT

## 2022-10-11 PROCEDURE — 6360000002 HC RX W HCPCS: Performed by: INTERNAL MEDICINE

## 2022-10-11 PROCEDURE — 97535 SELF CARE MNGMENT TRAINING: CPT

## 2022-10-11 PROCEDURE — 2580000003 HC RX 258: Performed by: INTERNAL MEDICINE

## 2022-10-11 PROCEDURE — 6370000000 HC RX 637 (ALT 250 FOR IP): Performed by: INTERNAL MEDICINE

## 2022-10-11 PROCEDURE — 1200000000 HC SEMI PRIVATE

## 2022-10-11 PROCEDURE — 6360000002 HC RX W HCPCS: Performed by: STUDENT IN AN ORGANIZED HEALTH CARE EDUCATION/TRAINING PROGRAM

## 2022-10-11 PROCEDURE — 80048 BASIC METABOLIC PNL TOTAL CA: CPT

## 2022-10-11 RX ORDER — HEPARIN SODIUM 5000 [USP'U]/ML
5000 INJECTION, SOLUTION INTRAVENOUS; SUBCUTANEOUS EVERY 8 HOURS SCHEDULED
Status: DISCONTINUED | OUTPATIENT
Start: 2022-10-11 | End: 2022-10-14 | Stop reason: HOSPADM

## 2022-10-11 RX ORDER — FUROSEMIDE 20 MG/1
20 TABLET ORAL DAILY
Status: DISCONTINUED | OUTPATIENT
Start: 2022-10-11 | End: 2022-10-14 | Stop reason: HOSPADM

## 2022-10-11 RX ORDER — HYDROXYZINE HYDROCHLORIDE 10 MG/1
10 TABLET, FILM COATED ORAL 3 TIMES DAILY PRN
Status: DISCONTINUED | OUTPATIENT
Start: 2022-10-11 | End: 2022-10-14 | Stop reason: HOSPADM

## 2022-10-11 RX ADMIN — PANTOPRAZOLE SODIUM 20 MG: 20 TABLET, DELAYED RELEASE ORAL at 05:52

## 2022-10-11 RX ADMIN — SODIUM CHLORIDE, PRESERVATIVE FREE 10 ML: 5 INJECTION INTRAVENOUS at 20:59

## 2022-10-11 RX ADMIN — METOPROLOL TARTRATE 25 MG: 25 TABLET, FILM COATED ORAL at 20:59

## 2022-10-11 RX ADMIN — PIPERACILLIN AND TAZOBACTAM 3375 MG: 3; .375 INJECTION, POWDER, LYOPHILIZED, FOR SOLUTION INTRAVENOUS at 15:31

## 2022-10-11 RX ADMIN — LEVOTHYROXINE SODIUM 75 MCG: 0.07 TABLET ORAL at 05:52

## 2022-10-11 RX ADMIN — HEPARIN SODIUM 5000 UNITS: 5000 INJECTION INTRAVENOUS; SUBCUTANEOUS at 14:22

## 2022-10-11 RX ADMIN — ASPIRIN 81 MG CHEWABLE TABLET 81 MG: 81 TABLET CHEWABLE at 08:13

## 2022-10-11 RX ADMIN — GUAIFENESIN 600 MG: 600 TABLET, EXTENDED RELEASE ORAL at 08:13

## 2022-10-11 RX ADMIN — GUAIFENESIN 600 MG: 600 TABLET, EXTENDED RELEASE ORAL at 20:59

## 2022-10-11 RX ADMIN — PIPERACILLIN AND TAZOBACTAM 3375 MG: 3; .375 INJECTION, POWDER, LYOPHILIZED, FOR SOLUTION INTRAVENOUS at 08:14

## 2022-10-11 RX ADMIN — FUROSEMIDE 40 MG: 10 INJECTION, SOLUTION INTRAMUSCULAR; INTRAVENOUS at 08:18

## 2022-10-11 RX ADMIN — METOPROLOL TARTRATE 25 MG: 25 TABLET, FILM COATED ORAL at 08:13

## 2022-10-11 RX ADMIN — HEPARIN SODIUM 5000 UNITS: 5000 INJECTION INTRAVENOUS; SUBCUTANEOUS at 20:58

## 2022-10-11 RX ADMIN — ZOLPIDEM TARTRATE 2.5 MG: 5 TABLET ORAL at 20:59

## 2022-10-11 RX ADMIN — ESCITALOPRAM OXALATE 10 MG: 10 TABLET ORAL at 08:13

## 2022-10-11 RX ADMIN — PIPERACILLIN AND TAZOBACTAM 3375 MG: 3; .375 INJECTION, POWDER, LYOPHILIZED, FOR SOLUTION INTRAVENOUS at 01:08

## 2022-10-11 RX ADMIN — ATORVASTATIN CALCIUM 40 MG: 40 TABLET, FILM COATED ORAL at 20:58

## 2022-10-11 RX ADMIN — QUETIAPINE FUMARATE 50 MG: 25 TABLET ORAL at 20:59

## 2022-10-11 RX ADMIN — TAMSULOSIN HYDROCHLORIDE 0.8 MG: 0.4 CAPSULE ORAL at 20:58

## 2022-10-11 RX ADMIN — HYDROXYZINE HYDROCHLORIDE 10 MG: 10 TABLET ORAL at 20:58

## 2022-10-11 RX ADMIN — FINASTERIDE 5 MG: 5 TABLET, FILM COATED ORAL at 08:13

## 2022-10-11 ASSESSMENT — PAIN SCALES - GENERAL: PAINLEVEL_OUTOF10: 0

## 2022-10-11 NOTE — PROGRESS NOTES
Sitter removed from room and  placed for patient safety. Patient sitting in chair, call light in reach. Denies any needs at this time.      Ashia Bernard, HARPREETN, RN

## 2022-10-11 NOTE — PROGRESS NOTES
Physical Therapy    Physical Therapy Treatment Note  Name: Katy Mesa MRN: [id number] :   10/18/1930   Date:  10/11/2022   Admission Date: 10/8/2022 Room:  [address] [place], [place] [address]   Restrictions/Precautions:          fall risk, mild confusion, IV, tele, general, sitter  Communication with other providers:  Handoff to RN, discussed with STNA  Subjective:  Patient states: \"Let's walk\", \"Did I do anything good? \"  Pain:   Location, Type, Intensity (0/10 to 10/10):  pt denies pain  Objective:    Observation:  Pt is awake in semi-fowlers upon entry, IV on. RN called to d/c IV for gait bout. Objective Measures:  Vitals stable throughout, on room air. Treatment, including education/measures:  Bed mobility: PT encourages sup>sit, provides v/c for sequencing. Pt demonstrates fair ability to advance LE, requires US support, SBA for LE/ hips to EOB and SBA for trunk to upright. PT v/c for scooting to EOB, pt requires SBA for safety only, cues to prevent premature STS. Sitting balance: Seated EOB pt demonstrates fair- balance, UE support required for maintaining upright. Seated EOB PT instructs in TherEx: LAQ x10 APARNA, Marching x10 APARNA, ankle PF/DF x10 APARNA. Throughout activity pt maintains balance SBA. RN capped IV for mobility. Total seated time ~4'. Sit<>Stand: Pt performed STS from EOB to RW  with CGA, v/c for proper sequencing. Pt demonstrates BUE push-off from EOB, min increased time to upright. PT encouraged static stance at EOB to assess tolerance to change in position, x35 sec with CGA. Pt denies dizziness, no LOB. Gait: Pt AMB x120 ft with RW, step-through pattern with decreased foot clearance and mark, increased forward trunk flexion posture, decreased dynamic balance in turning. Overall pt tolerates well, min cues for upright posture and decreased excursion of RW with fair carryover. Pt fatigued by end of trial.   Pt returned to seated in recliner with CGA, cues to reach back to chair with poor carryover.  Seated rest break x3' with SBA. PT prepares standing balance activity during pt rest.   Sit>stand to RW with CGA, pt AMB x5 ft to windowsill. Pt completes dynamic balance window-washing task with x8 targets, CGA for safety. With reaching outside PEDRO pt requires x1 UE support and CGA as pt with increased postural sway. Standing balance x2' additional for game of tic-tac-toe, pt demonstrates good multi-tasking and coordination. Additional marching in place x1. 5' CGA. AMB x5 ft back to chair with RW and CGA. Pt positioned with LE elevated. End of session pt left in recliner, sitter present, with lines managed, call light, phone, exit alarm, tray, all needs, RN aware. Assessment / Impression:    Pt with improved mood stability this session. Improved gait distance.   Patient's tolerance of treatment:  good   Adverse Reaction: none  Significant change in status and impact:  none  Barriers to improvement:  none  Plan for Next Session:    Continue POC and balance progression  Time in:  10:00  Time out:  10:23  Timed treatment minutes:  23  Total treatment time:  23    Previously filed items:           Short Term Goals  Time Frame for Short Term Goals: 1 week  Short Term Goal 1: Pt will AMB x200 ft with RW, CGA-SBA  Short Term Goal 2: Pt will perform sup<>sit with SBA and cues  Short Term Goal 3: Pt will tolerate standing dynamic balance tasks with light UE support x10 minutes CGA  Short Term Goal 4: Pt will perform x5 STS in 55 seconds CGA    Electronically signed by:    Madalyn Ambriz, PT  10/11/2022, 10:27 AM

## 2022-10-11 NOTE — PROGRESS NOTES
V2.0  [facility] [place]      Name:  Shlomo Toledo /Age/Sex: 10/18/1930  (80 y.o. male)   MRN & CSN:  [id number] & [id number] Encounter Date/Time: 10/11/2022 1:31 PM EDT    Location:  [address] [place] [address] PCP: No primary care provider on file. Hospital Day: 4    Assessment and Plan:   Shlomo Toledo is a 80 y.o. male with pmh CKD, hypothyroidism, depression, of  who presents with Community acquired pneumonia of left lung, unspecified part of lung    *Left lower lobe pneumonia  Patient was seen at walk-in clinic on 10/6/2022 and had outpatient x-ray which showed pulmonary edema versus consolidation. Patient was advised to come to the ED for further evaluation. Will discontinue Vancomycin and continue Zosyn due to hospitalization in the past 90 days. Respiratory culture pending. MRSA pending. Incentive spirometry, Bed CPT    *Chronic Diastolic heart failure  Clinically Euvolemic  proBNP 1619  Hold lasix in lieu of KAVON   Monitor I/Os    *KAVON on CKD stage IV  Baseline creatinine around 2.3-2.5  Hold lasix  Monitor renal function parameters   Avoid nephrotoxic agents    *Elevated troponin, likely type II  Likely due to pneumonia and respiratory status  No chest pain endorsed  Will repeat in am    BPH  With urinary retention  Continue Flomax and proscar  Continue Laureano catheter, will attempt voiding trial in am    Hypothyroidism  Continue home Synthroid    Anxiety uncontrolled  Continue seroquel and lexapro  Psychiatry consulted, awaiting recommendations  As needed atarax    Chronic Normocytic Anemia  Stable Hb trend  Outpatient evaluation and management     -Sitter at bedside. Diet ADULT DIET; Regular; 5 carb choices (75 gm/meal);  Low Fat/Low Chol/High Fiber/TEDDY; Low Sodium (2 gm)   DVT Prophylaxis [] Lovenox, [x]  Heparin, [] SCDs, [] Ambulation,  [] Eliquis, [] Xarelto  [] Coumadin   Code Status DNR-CC   Disposition From: Home  Expected Disposition: SNF  Estimated Date of Discharge: 1-2 days  Patient requires continued admission due to Psych evaluation   Surrogate Decision Maker/ POA Children     Subjective:     Chief Complaint: Abnormal Lab (Called by urgent care doc to come to ER to be seen; was seen there on 10/6) and Cough       Israel Canchola is a 80 y.o. male who presents with cough and generalized malaise. Seen and examined early in AM. AAOx3, hemodynamically stable. No significant overnight events noticed except some episodes of anxiety. Patient was eating breakfast and was conversational.    Objective: Intake/Output Summary (Last 24 hours) at 10/11/2022 1727  Last data filed at 10/11/2022 0933  Gross per 24 hour   Intake 600 ml   Output 3100 ml   Net -2500 ml        Vitals:   Vitals:    10/11/22 1412   BP: 114/61   Pulse: 68   Resp: 16   Temp: 98.3 °F (36.8 °C)   SpO2: 97%       Physical Exam:     General: NAD  Eyes: EOMI  ENT: neck supple  Cardiovascular: Regular rate. Respiratory: Clear to auscultation  Gastrointestinal: Soft, non tender  Genitourinary: no suprapubic tenderness  Musculoskeletal: B/L lower extremity edema 1+  Skin: warm, dry  Neuro: Alert. Psych: Mood appropriate.      Medications:   Medications:    heparin (porcine)  5,000 Units SubCUTAneous 3 times per day    [Held by provider] furosemide  20 mg Oral Daily    piperacillin-tazobactam  3,375 mg IntraVENous Q8H    escitalopram  10 mg Oral Daily    guaiFENesin  600 mg Oral BID    aspirin  81 mg Oral Daily    atorvastatin  40 mg Oral Nightly    finasteride  5 mg Oral Daily    levothyroxine  75 mcg Oral Daily    metoprolol tartrate  25 mg Oral BID    pantoprazole  20 mg Oral Daily    tamsulosin  0.8 mg Oral Nightly    QUEtiapine  50 mg Oral Nightly    sodium chloride flush  5-40 mL IntraVENous 2 times per day      Infusions:    sodium chloride 25 mL (10/10/22 1424)     PRN Meds: hydrOXYzine HCl, 10 mg, TID PRN  sennosides-docusate sodium, 2 tablet, BID PRN  polyethylene glycol, 17 g, BID PRN  zolpidem, 2.5 mg, Nightly PRN  benzonatate, 200 mg, TID PRN  sodium chloride flush, 5-40 mL, PRN  sodium chloride, , PRN  ondansetron, 4 mg, Q8H PRN   Or  ondansetron, 4 mg, Q6H PRN  aluminum & magnesium hydroxide-simethicone, 30 mL, Q6H PRN  acetaminophen, 650 mg, Q6H PRN   Or  acetaminophen, 650 mg, Q6H PRN      Labs      Recent Results (from the past 24 hour(s))   Vancomycin Level, Random    Collection Time: 10/11/22  4:47 AM   Result Value Ref Range    Vancomycin Rm 17.0 UG/ML    DOSE AMOUNT DOSE AMT.  GIVEN - 1250MG     DOSE TIME DOSE TIME GIVEN - 10/10 @09:00    CBC with Auto Differential    Collection Time: 10/11/22 12:01 PM   Result Value Ref Range    WBC 8.6 4.0 - 10.5 K/CU MM    RBC 2.82 (L) 4.6 - 6.2 M/CU MM    Hemoglobin 8.6 (L) 13.5 - 18.0 GM/DL    Hematocrit 27.7 (L) 42 - 52 %    MCV 98.2 78 - 100 FL    MCH 30.5 27 - 31 PG    MCHC 31.0 (L) 32.0 - 36.0 %    RDW 14.3 11.7 - 14.9 %    Platelets 649 576 - 955 K/CU MM    MPV 9.4 7.5 - 11.1 FL    Differential Type AUTOMATED DIFFERENTIAL     Segs Relative 77.0 (H) 36 - 66 %    Lymphocytes % 10.7 (L) 24 - 44 %    Monocytes % 7.6 (H) 0 - 4 %    Eosinophils % 2.7 0 - 3 %    Basophils % 0.7 0 - 1 %    Segs Absolute 6.6 K/CU MM    Lymphocytes Absolute 0.9 K/CU MM    Monocytes Absolute 0.7 K/CU MM    Eosinophils Absolute 0.2 K/CU MM    Basophils Absolute 0.1 K/CU MM    Nucleated RBC % 0.0 %    Total Nucleated RBC 0.0 K/CU MM    Total Immature Neutrophil 0.11 K/CU MM    Immature Neutrophil % 1.3 (H) 0 - 0.43 %   Basic Metabolic Panel w/ Reflex to MG    Collection Time: 10/11/22 12:01 PM   Result Value Ref Range    Sodium 138 135 - 145 MMOL/L    Potassium 4.0 3.5 - 5.1 MMOL/L    Chloride 100 99 - 110 mMol/L    CO2 27 21 - 32 MMOL/L    Anion Gap 11 4 - 16    BUN 31 (H) 6 - 23 MG/DL    Creatinine 2.8 (H) 0.9 - 1.3 MG/DL    Glucose 146 (H) 70 - 99 MG/DL    Calcium 8.6 8.3 - 10.6 MG/DL    GFR Non- 21 (L) >60 mL/min/1.73m2    GFR  26 (L) >60 mL/min/1.73m2 Imaging/Diagnostics Last 24 Hours   XR CHEST PORTABLE    Result Date: 10/8/2022  EXAMINATION: ONE XRAY VIEW OF THE CHEST 10/8/2022 4:57 pm COMPARISON: 10/06/2022 and 08/31/2022 HISTORY: ORDERING SYSTEM PROVIDED HISTORY: Cough TECHNOLOGIST PROVIDED HISTORY: Reason for exam:->Cough FINDINGS: Normal cardiac silhouette size. Enlarged left basilar parenchymal opacity. Minimal stable right basilar atelectasis. No effusion or pneumothorax identified radiographically. No aggressive osseous lesion. Interval increase in left basilar parenchymal opacity which may represent atelectasis versus developing pneumonia. Radiographic follow-up recommended to document resolution.        Electronically signed by Zenobia Juarez MD on 10/11/2022 at 5:27 PM

## 2022-10-11 NOTE — CARE COORDINATION
Reviewed chart and discussed in IDR, pt with sitter and awaiting Psych eval.  VM left for Ivonne Richards and Akira Adjutant to discuss discharge needs/plans. CM will continue to try and reach family .

## 2022-10-11 NOTE — PROGRESS NOTES
Patient anxious, trying to get out of chair, pulling at lines.  not effective. Sitter re-placed for patient safety.      Tatyana Menendez, HARPREETN, RN

## 2022-10-11 NOTE — PROGRESS NOTES
Occupational Therapy  . Occupational Therapy Treatment Note    Name: Alan Amaya MRN: [id number] :   10/18/1930   Date:  10/11/2022   Admission Date: 10/8/2022 Room:  [facility]4/4114-A     Pt would benefit from continued acute care OT services w/ discharge to SNF    Primary Problem:      Restrictions/Precautions:          General precautions, fall risk      Communication with other providers: RN states he be came weepy within the past 10 min and was very happy and had  a BM  in the bathroom with staff recently. Subjective:  Patient states:  I want to go home, will they come get me today. Provided therapeutic conversation and listening to patient with comfort while patient visibly upset. Pain:   Location, Type, Intensity (0/10 to 10/10):  none stated    Objective:    Observation: patient seated upright on recliner. Patient is very weepy and tearful this date. He keeps repeating he wants to go home and his daughter will take care of him. He keeps asking if someone is coming to get him soon. Objective Measures:  tele    Treatment, including education:    ADL activity training was instructed today. Cues were given for safety, sequence, UE/LE placement, visual cues, and balance. Activities performed today included hand hygiene, and grooming. Facial hygiene- SBA while seated. Grooming- Patient SBA to blow nose and throw away tissue while seated- x2 trials. Patient declining other Adls at this time. BLE AROM exercises to include ankle pumps, ankle circles, marches,hip ab/adduction, heel slides x5-10 reps each. Cues for self-pacing c rest breaks PRN cues for redirection throughout. Patient very upset and would stop performing ex. Assisted with dialing phone number this date. Encouraged patient to get up and ambulate or ambulate to the bathroom. Patient declining this date despite encouragement. Patient educated on role of OT , benefits of OT and rationale for therapeutic intervention.    Benefit of OOB/EOB activities, benefit of movement. AE/AD, WS/EC,     All therapeutic intervention performed c emphasis on BLE strengthening and endurance to  increase strength for functional tasks / transfers. Patient left safely in bedside chair at end of session, with call light in reach, alarm on and nursing aware. Assessment / Impression:    Patient maybe better in the mornings for tx d/t cognition. Participated with PT earlier this date with no issues. Was very upset which caused him to not participate well with therapy this afternoon. Patient's tolerance of treatment: poor  Adverse Reaction: None  Significant change in status and impact: extremely upset and wanting to go home.  Hard to redirect,  Barriers to improvement: weakness,       Plan for Next Session:    Continue with OT POC      Time in:  1340  Time out:  1403  Timed treatment minutes:  23  Total treatment time:  23      Electronically signed by:    CARLOS Ortega COTA/LONA [id number]    10/11/2022, 2:02 PM

## 2022-10-12 ENCOUNTER — APPOINTMENT (OUTPATIENT)
Dept: ULTRASOUND IMAGING | Age: 87
DRG: 193 | End: 2022-10-12
Payer: MEDICARE

## 2022-10-12 LAB
AMMONIA: 22 UMOL/L (ref 16–60)
ANION GAP SERPL CALCULATED.3IONS-SCNC: 14 MMOL/L (ref 4–16)
BUN BLDV-MCNC: 29 MG/DL (ref 6–23)
CALCIUM SERPL-MCNC: 8.8 MG/DL (ref 8.3–10.6)
CHLORIDE BLD-SCNC: 97 MMOL/L (ref 99–110)
CO2: 25 MMOL/L (ref 21–32)
CREAT SERPL-MCNC: 3 MG/DL (ref 0.9–1.3)
FOLATE: >20 NG/ML (ref 3.1–17.5)
GFR AFRICAN AMERICAN: 24 ML/MIN/1.73M2
GFR NON-AFRICAN AMERICAN: 20 ML/MIN/1.73M2
GLUCOSE BLD-MCNC: 102 MG/DL (ref 70–99)
HCT VFR BLD CALC: 27.9 % (ref 42–52)
HEMOGLOBIN: 8.8 GM/DL (ref 13.5–18)
MCH RBC QN AUTO: 30.4 PG (ref 27–31)
MCHC RBC AUTO-ENTMCNC: 31.5 % (ref 32–36)
MCV RBC AUTO: 96.5 FL (ref 78–100)
PDW BLD-RTO: 14.4 % (ref 11.7–14.9)
PLATELET # BLD: 265 K/CU MM (ref 140–440)
PMV BLD AUTO: 9.3 FL (ref 7.5–11.1)
POTASSIUM SERPL-SCNC: 3.8 MMOL/L (ref 3.5–5.1)
RBC # BLD: 2.89 M/CU MM (ref 4.6–6.2)
SODIUM BLD-SCNC: 136 MMOL/L (ref 135–145)
VITAMIN B-12: 655.9 PG/ML (ref 211–911)
VITAMIN D 25-HYDROXY: 26.98 NG/ML
WBC # BLD: 9.8 K/CU MM (ref 4–10.5)

## 2022-10-12 PROCEDURE — 82607 VITAMIN B-12: CPT

## 2022-10-12 PROCEDURE — 94150 VITAL CAPACITY TEST: CPT

## 2022-10-12 PROCEDURE — 94664 DEMO&/EVAL PT USE INHALER: CPT

## 2022-10-12 PROCEDURE — 82306 VITAMIN D 25 HYDROXY: CPT

## 2022-10-12 PROCEDURE — 6360000002 HC RX W HCPCS: Performed by: INTERNAL MEDICINE

## 2022-10-12 PROCEDURE — 93971 EXTREMITY STUDY: CPT

## 2022-10-12 PROCEDURE — 6360000002 HC RX W HCPCS: Performed by: STUDENT IN AN ORGANIZED HEALTH CARE EDUCATION/TRAINING PROGRAM

## 2022-10-12 PROCEDURE — 85027 COMPLETE CBC AUTOMATED: CPT

## 2022-10-12 PROCEDURE — 94761 N-INVAS EAR/PLS OXIMETRY MLT: CPT

## 2022-10-12 PROCEDURE — 82140 ASSAY OF AMMONIA: CPT

## 2022-10-12 PROCEDURE — 1200000000 HC SEMI PRIVATE

## 2022-10-12 PROCEDURE — 2580000003 HC RX 258: Performed by: INTERNAL MEDICINE

## 2022-10-12 PROCEDURE — 6370000000 HC RX 637 (ALT 250 FOR IP): Performed by: INTERNAL MEDICINE

## 2022-10-12 PROCEDURE — 36415 COLL VENOUS BLD VENIPUNCTURE: CPT

## 2022-10-12 PROCEDURE — 51702 INSERT TEMP BLADDER CATH: CPT

## 2022-10-12 PROCEDURE — 6370000000 HC RX 637 (ALT 250 FOR IP): Performed by: NURSE PRACTITIONER

## 2022-10-12 PROCEDURE — 82746 ASSAY OF FOLIC ACID SERUM: CPT

## 2022-10-12 PROCEDURE — 81003 URINALYSIS AUTO W/O SCOPE: CPT

## 2022-10-12 PROCEDURE — 2580000003 HC RX 258: Performed by: STUDENT IN AN ORGANIZED HEALTH CARE EDUCATION/TRAINING PROGRAM

## 2022-10-12 PROCEDURE — 97530 THERAPEUTIC ACTIVITIES: CPT

## 2022-10-12 PROCEDURE — 94669 MECHANICAL CHEST WALL OSCILL: CPT

## 2022-10-12 PROCEDURE — 80048 BASIC METABOLIC PNL TOTAL CA: CPT

## 2022-10-12 PROCEDURE — 6370000000 HC RX 637 (ALT 250 FOR IP): Performed by: STUDENT IN AN ORGANIZED HEALTH CARE EDUCATION/TRAINING PROGRAM

## 2022-10-12 RX ADMIN — ESCITALOPRAM OXALATE 10 MG: 10 TABLET ORAL at 09:20

## 2022-10-12 RX ADMIN — METOPROLOL TARTRATE 25 MG: 25 TABLET, FILM COATED ORAL at 21:23

## 2022-10-12 RX ADMIN — GUAIFENESIN 600 MG: 600 TABLET, EXTENDED RELEASE ORAL at 21:23

## 2022-10-12 RX ADMIN — PIPERACILLIN AND TAZOBACTAM 3375 MG: 3; .375 INJECTION, POWDER, LYOPHILIZED, FOR SOLUTION INTRAVENOUS at 09:35

## 2022-10-12 RX ADMIN — SODIUM CHLORIDE, PRESERVATIVE FREE 10 ML: 5 INJECTION INTRAVENOUS at 21:23

## 2022-10-12 RX ADMIN — HEPARIN SODIUM 5000 UNITS: 5000 INJECTION INTRAVENOUS; SUBCUTANEOUS at 14:50

## 2022-10-12 RX ADMIN — QUETIAPINE FUMARATE 50 MG: 25 TABLET ORAL at 21:23

## 2022-10-12 RX ADMIN — METOPROLOL TARTRATE 25 MG: 25 TABLET, FILM COATED ORAL at 09:20

## 2022-10-12 RX ADMIN — DEXTROMETHORPHAN HYDROBROMIDE AND QUINIDINE SULFATE 1 CAPSULE: 20; 10 CAPSULE, GELATIN COATED ORAL at 16:43

## 2022-10-12 RX ADMIN — ATORVASTATIN CALCIUM 40 MG: 40 TABLET, FILM COATED ORAL at 21:23

## 2022-10-12 RX ADMIN — HYDROXYZINE HYDROCHLORIDE 10 MG: 10 TABLET ORAL at 21:23

## 2022-10-12 RX ADMIN — TAMSULOSIN HYDROCHLORIDE 0.8 MG: 0.4 CAPSULE ORAL at 21:23

## 2022-10-12 RX ADMIN — LEVOTHYROXINE SODIUM 75 MCG: 0.07 TABLET ORAL at 05:52

## 2022-10-12 RX ADMIN — SODIUM CHLORIDE 25 ML: 9 INJECTION, SOLUTION INTRAVENOUS at 00:55

## 2022-10-12 RX ADMIN — PANTOPRAZOLE SODIUM 20 MG: 20 TABLET, DELAYED RELEASE ORAL at 05:52

## 2022-10-12 RX ADMIN — ZOLPIDEM TARTRATE 2.5 MG: 5 TABLET ORAL at 21:23

## 2022-10-12 RX ADMIN — HEPARIN SODIUM 5000 UNITS: 5000 INJECTION INTRAVENOUS; SUBCUTANEOUS at 21:23

## 2022-10-12 RX ADMIN — SODIUM CHLORIDE, PRESERVATIVE FREE 10 ML: 5 INJECTION INTRAVENOUS at 09:20

## 2022-10-12 RX ADMIN — SODIUM CHLORIDE 25 ML: 9 INJECTION, SOLUTION INTRAVENOUS at 21:30

## 2022-10-12 RX ADMIN — GUAIFENESIN 600 MG: 600 TABLET, EXTENDED RELEASE ORAL at 09:20

## 2022-10-12 RX ADMIN — PIPERACILLIN AND TAZOBACTAM 3375 MG: 3; .375 INJECTION, POWDER, LYOPHILIZED, FOR SOLUTION INTRAVENOUS at 00:55

## 2022-10-12 RX ADMIN — HEPARIN SODIUM 5000 UNITS: 5000 INJECTION INTRAVENOUS; SUBCUTANEOUS at 05:52

## 2022-10-12 RX ADMIN — PIPERACILLIN AND TAZOBACTAM 3375 MG: 3; .375 INJECTION, POWDER, LYOPHILIZED, FOR SOLUTION INTRAVENOUS at 21:31

## 2022-10-12 RX ADMIN — ASPIRIN 81 MG CHEWABLE TABLET 81 MG: 81 TABLET CHEWABLE at 09:20

## 2022-10-12 RX ADMIN — HYDROXYZINE HYDROCHLORIDE 10 MG: 10 TABLET ORAL at 05:52

## 2022-10-12 RX ADMIN — ACETAMINOPHEN 650 MG: 325 TABLET ORAL at 21:23

## 2022-10-12 RX ADMIN — FINASTERIDE 5 MG: 5 TABLET, FILM COATED ORAL at 09:20

## 2022-10-12 ASSESSMENT — PAIN DESCRIPTION - DESCRIPTORS: DESCRIPTORS: ACHING

## 2022-10-12 ASSESSMENT — PAIN SCALES - GENERAL: PAINLEVEL_OUTOF10: 3

## 2022-10-12 ASSESSMENT — PAIN DESCRIPTION - PAIN TYPE: TYPE: ACUTE PAIN

## 2022-10-12 ASSESSMENT — PAIN DESCRIPTION - LOCATION: LOCATION: HEAD

## 2022-10-12 ASSESSMENT — PAIN DESCRIPTION - FREQUENCY: FREQUENCY: INTERMITTENT

## 2022-10-12 ASSESSMENT — PAIN - FUNCTIONAL ASSESSMENT: PAIN_FUNCTIONAL_ASSESSMENT: ACTIVITIES ARE NOT PREVENTED

## 2022-10-12 ASSESSMENT — PAIN DESCRIPTION - ONSET: ONSET: GRADUAL

## 2022-10-12 NOTE — PROGRESS NOTES
V2.0  [facility] [place] Hospitalist Progress Note      Name:  Mechelle Montoya /Age/Sex: 10/18/1930  (80 y.o. male)   MRN & CSN:  [id number] & [id number] Encounter Date/Time: 10/12/2022 1:31 PM EDT    Location:  [address] [place] [address] PCP: No primary care provider on file. Hospital Day: 5    Assessment and Plan:   Mechelle Montoya is a 80 y.o. male with pmh CKD, hypothyroidism, depression, of  who presents with Community acquired pneumonia of left lung, unspecified part of lung    *Left lower lobe pneumonia  Patient was seen at walk-in clinic on 10/6/2022 and had outpatient x-ray which showed pulmonary edema versus consolidation. Patient was advised to come to the ED for further evaluation. Will discontinue Vancomycin and continue Zosyn due to hospitalization in the past 90 days. Respiratory culture pending. MRSA pending. Incentive spirometry, Bed CPT    *Chronic Diastolic heart failure  Clinically Euvolemic  proBNP 1619  Hold lasix in lieu of KAVON   Monitor I/Os    *KAVON on CKD stage IV  Worsening creatinine  Baseline creatinine around 2.3-2.5  Hold lasix  Bladder scan and urinalysis ordered  Monitor renal function parameters   Avoid nephrotoxic agents    *Elevated troponin, likely type II  Likely due to pneumonia and respiratory status  No chest pain endorsed  Will repeat in am    BPH  With urinary retention  Continue Flomax and proscar  Continue Laureano catheter, will attempt voiding trial in am    Hypothyroidism  Continue home Synthroid    Anxiety uncontrolled  Continue seroquel and lexapro  Psychiatry consulted, appreciate recommendations  As needed atarax    Chronic Normocytic Anemia  Stable Hb trend  Outpatient evaluation and management     -Sitter at bedside. Diet ADULT DIET; Regular; 5 carb choices (75 gm/meal);  Low Fat/Low Chol/High Fiber/TEDDY; Low Sodium (2 gm)   DVT Prophylaxis [] Lovenox, [x]  Heparin, [] SCDs, [] Ambulation,  [] Eliquis, [] Xarelto  [] Coumadin   Code Status DNR-CC   Disposition From: Home  Expected Disposition: SNF  Estimated Date of Discharge: 1-2 days  Patient requires continued admission due to Psych evaluation and evaluation for acute kidney injury   Surrogate Decision Maker/ POA Children     Subjective:     Chief Complaint: Abnormal Lab (Called by urgent care doc to come to ER to be seen; was seen there on 10/6) and Cough       Phyllis Negrete is a 80 y.o. male who presents with cough and generalized malaise. Seen and examined early in AM. AAOx3, hemodynamically stable. No significant overnight events noticed except some episodes of anxiety. Patient was eating breakfast and was conversational.    Objective: Intake/Output Summary (Last 24 hours) at 10/12/2022 1643  Last data filed at 10/12/2022 1343  Gross per 24 hour   Intake 840 ml   Output 3050 ml   Net -2210 ml        Vitals:   Vitals:    10/12/22 1403   BP: 132/71   Pulse: 72   Resp: 18   Temp: 97.8 °F (36.6 °C)   SpO2: 98%       Physical Exam:     General: NAD  Eyes: EOMI  ENT: neck supple  Cardiovascular: Regular rate. Respiratory: Clear to auscultation  Gastrointestinal: Soft, non tender  Genitourinary: no suprapubic tenderness  Musculoskeletal: B/L lower extremity edema 1+  Skin: warm, dry  Neuro: Alert. Psych: Mood appropriate.      Medications:   Medications:    dextromethorphan-quiNIDine  1 capsule Oral Daily    Followed by    Errol Zheng ON 10/19/2022] dextromethorphan-quiNIDine  1 capsule Oral BID    piperacillin-tazobactam  3,375 mg IntraVENous Q12H    heparin (porcine)  5,000 Units SubCUTAneous 3 times per day    [Held by provider] furosemide  20 mg Oral Daily    escitalopram  10 mg Oral Daily    guaiFENesin  600 mg Oral BID    aspirin  81 mg Oral Daily    atorvastatin  40 mg Oral Nightly    finasteride  5 mg Oral Daily    levothyroxine  75 mcg Oral Daily    metoprolol tartrate  25 mg Oral BID    pantoprazole  20 mg Oral Daily    tamsulosin  0.8 mg Oral Nightly    QUEtiapine  50 mg Oral Nightly    sodium chloride flush  5-40 mL IntraVENous 2 times per day      Infusions:    sodium chloride 25 mL/hr at 10/12/22 0934     PRN Meds: hydrOXYzine HCl, 10 mg, TID PRN  sennosides-docusate sodium, 2 tablet, BID PRN  polyethylene glycol, 17 g, BID PRN  zolpidem, 2.5 mg, Nightly PRN  benzonatate, 200 mg, TID PRN  sodium chloride flush, 5-40 mL, PRN  sodium chloride, , PRN  ondansetron, 4 mg, Q8H PRN   Or  ondansetron, 4 mg, Q6H PRN  aluminum & magnesium hydroxide-simethicone, 30 mL, Q6H PRN  acetaminophen, 650 mg, Q6H PRN   Or  acetaminophen, 650 mg, Q6H PRN      Labs      Recent Results (from the past 24 hour(s))   CBC    Collection Time: 10/12/22 12:04 PM   Result Value Ref Range    WBC 9.8 4.0 - 10.5 K/CU MM    RBC 2.89 (L) 4.6 - 6.2 M/CU MM    Hemoglobin 8.8 (L) 13.5 - 18.0 GM/DL    Hematocrit 27.9 (L) 42 - 52 %    MCV 96.5 78 - 100 FL    MCH 30.4 27 - 31 PG    MCHC 31.5 (L) 32.0 - 36.0 %    RDW 14.4 11.7 - 14.9 %    Platelets 886 392 - 825 K/CU MM    MPV 9.3 7.5 - 11.1 FL   Basic Metabolic Panel w/ Reflex to MG    Collection Time: 10/12/22 12:04 PM   Result Value Ref Range    Sodium 136 135 - 145 MMOL/L    Potassium 3.8 3.5 - 5.1 MMOL/L    Chloride 97 (L) 99 - 110 mMol/L    CO2 25 21 - 32 MMOL/L    Anion Gap 14 4 - 16    BUN 29 (H) 6 - 23 MG/DL    Creatinine 3.0 (H) 0.9 - 1.3 MG/DL    Glucose 102 (H) 70 - 99 MG/DL    Calcium 8.8 8.3 - 10.6 MG/DL    GFR Non-African American 20 (L) >60 mL/min/1.73m2    GFR  24 (L) >60 mL/min/1.73m2   Ammonia    Collection Time: 10/12/22  3:14 PM   Result Value Ref Range    Ammonia 22 16 - 60 UMOL/L   Vitamin B12 & Folate    Collection Time: 10/12/22  3:14 PM   Result Value Ref Range    Vitamin B-12 655.9 211 - 911 pg/ml    Folate >20.0 (H) 3.1 - 17.5 NG/ML   Vitamin D 25 Hydroxy    Collection Time: 10/12/22  3:14 PM   Result Value Ref Range    Vit D, 25-Hydroxy 26.98 >20 NG/ML        Imaging/Diagnostics Last 24 Hours   XR CHEST PORTABLE    Result Date: 10/8/2022  EXAMINATION: ONE XRAY VIEW OF THE CHEST 10/8/2022 4:57 pm COMPARISON: 10/06/2022 and 08/31/2022 HISTORY: ORDERING SYSTEM PROVIDED HISTORY: Cough TECHNOLOGIST PROVIDED HISTORY: Reason for exam:->Cough FINDINGS: Normal cardiac silhouette size. Enlarged left basilar parenchymal opacity. Minimal stable right basilar atelectasis. No effusion or pneumothorax identified radiographically. No aggressive osseous lesion. Interval increase in left basilar parenchymal opacity which may represent atelectasis versus developing pneumonia. Radiographic follow-up recommended to document resolution.        Electronically signed by Carla Domínguez MD on 10/12/2022 at 4:43 PM

## 2022-10-12 NOTE — PROGRESS NOTES
Physician Progress Note      PATIENT:               Susan Howe  CSN #:                  [id number]  :                       10/18/1930  ADMIT DATE:       10/8/2022 1:51 PM  [address] [place] DATE:  Vearl Barthel  PROVIDER #:        Kerwin Mckeon MD          QUERY TEXT:    Pt admitted with dyspnea and cough. Pt noted to have HCAP. If possible,   please document in the progress notes and discharge summary if you are   evaluating and/or treating any of the following:    Note: CAP and HCAP indicate where the pneumonia was acquired, not a specific   type. The medical record reflects the following:  Risk Factors: Recent hospitalization,  Clinical Indicators: 10/11 - progress note \"Will discontinue Vancomycin and   continue Zosyn due to hospitalization in the past 90 days. \", neg MRSA and   MSSA, procal 0.246. Treatment: IV Zosyn, CXR, procal.        Thank you,  HARPREET MckenzieN, RN, CDS  [phone]  Options provided:  -- Gram negative pneumonia  -- Gram positive pneumonia  -- Other - I will add my own diagnosis  -- Disagree - Not applicable / Not valid  -- Disagree - Clinically unable to determine / Unknown  -- Refer to Clinical Documentation Reviewer    PROVIDER RESPONSE TEXT:    Provider is clinically unable to determine a response to this query. Query created by: Margaret Shanks on 10/12/2022 11:48 AM      QUERY TEXT:    Patient admitted with dyspnea and cough. Noted documentation of acute on   chronic diastolic CHF per  progress note and chronic diastolic CHF in    progress note. If possible, please document in progress notes and discharge summary if you   are evaluating and /or treating any of the following: The medical record reflects the following:  Risk Factors: Hx HPpEF, age  Clinical Indicators: 10/9 progress note \"HFpEF Decompensated proBNP   1619. ....bilateral lower extremity swelling 3+ and is endorsing   orthopnea\",10/11 progress note \"Chronic Diastolic heart failure Clinically Euvolemic. \"  Treatment: Pro-BNP, IV Lasix then changed to PO Lasix, CXR. Thank you,  ELIZA Monaco, RN, CDS  [phone]  Options provided:  -- Acute on chronic diastolic CHF confirmed and chronic diastolic CHF ruled   out  -- Chronic diastolic CHF confirmed and acute on chronic diastolic CHF ruled   out  -- Other - I will add my own diagnosis  -- Disagree - Not applicable / Not valid  -- Disagree - Clinically unable to determine / Unknown  -- Refer to Clinical Documentation Reviewer    PROVIDER RESPONSE TEXT:    After study, chronic diastolic CHF confirmed and acute on chronic diastolic   CHF ruled out.     Query created by: Harika Barclay on 10/12/2022 11:48 AM      Electronically signed by:  Timothy Ortega MD 10/12/2022 12:22 PM

## 2022-10-12 NOTE — PROGRESS NOTES
RENAL DOSE ADJUSTMENT MADE PER P/T PROTOCOL    PREVIOUS ORDER:  Zosyn 3.375g IVPB every 8 hours    Estimated Creatinine Clearance: 19 mL/min (A) (based on SCr of 3 mg/dL (H)).   Recent Labs     10/10/22  0345 10/11/22  1201 10/12/22  1204   BUN 32* 31* 29*   CREATININE 2.2* 2.8* 3.0*    235 265     NEW RENALLY ADJUSTED ORDER:  Zosyn 3.375g IVPB every 12 hours     Glenora Brunner, [facility]  10/12/2022 2:05 PM

## 2022-10-12 NOTE — PROGRESS NOTES
Occupational Therapy  . Occupational Therapy Treatment Note    Name: Debbie Loja MRN: [id number] :   10/18/1930   Date:  10/12/2022   Admission Date: 10/8/2022 Room:  [address] [place] [address]-A     Primary Problem:   The primary encounter diagnosis was Pneumonia of left lower lobe due to infectious organism. Diagnoses of Subacute cough, Shortness of breath, and Chronic kidney disease, unspecified CKD stage were also pertinent to this visit. Patient  has a past medical history of Anemia, Anxiety, Arthritis, BPH with urinary obstruction, Depression, GERD (gastroesophageal reflux disease), Hemorrhoids, Hepatitis, Hernia of unspecified site of abdominal cavity without mention of obstruction or gangrene, Hyperlipidemia, Hypotension, and SCC (squamous cell carcinoma). Patient  has a past surgical history that includes Neck surgery; Cataract removal; hernia repair; hiatal hernia repair (N/A, 3/4/2021); Cystoscopy (N/A, 3/29/2021); and Cystoscopy (N/A, 2021). Restrictions/Precautions:          General Precautions; Fall Risk    Communication with other providers: Per chart review, patient is appropriate for therapeutic intervention. Nurse Eloina Cordero      Subjective:  Patient states:  \"Where is my brother? Is he coming today? \" Pt agreeable to OT Tx session. Pain:   Location, Type, Intensity (0/10 to 10/10):  0/10, denies pain    Objective:    Observation: Pt tearful and calmly but repeatedly asking about family members coming to [facility]. Pt able to comforted / re-directed in the moment and for active participation, then resumes tearful demeanor once returned to chair. Sitter present. Objective Measures: Laureano catheter, IV    Treatment, including education:  Therapeutic Activity Training:   Therapeutic activity training was instructed today. Cues were given for safety, sequence, UE/LE placement, awareness, and balance. Activities performed today included transfer training for sit-stand, SPT and functional mobility.     Sit to

## 2022-10-12 NOTE — CONSULTS
Initial Psychiatric History and Physical    Trey Arevalo  [id number]  10/8/2022  10/12/22    ID: Patient is a 80 yrs y.o. male    CC:\"I don't know why I am crying. \"    HPI: Trey Arevalo is a 80 y.o. male with pmh CKD, hypothyroidism, depression, of  who presents with Community acquired pneumonia of left lung, unspecified part of lung. Psychiatry consulted by Dr Praveena Valdez due to \" h/o anxiety ; delirious and weepy ;\" and patient request.    Met with patient at bedside. He is alert and oriented to name and hospital. He is confused regarding the date, situation. He starts crying easily through out the assessment. Patient denies being sad, stops crying and goes onto next topic as though he was never crying. He denies depression. He states he does not know why he is crying as he is not sad nor depressed. He is confused and admits to that. He has difficult time remembering his sisters name, Heather Swartz but with time can remember it. He does not remember his children's name. Per nursing, he does appear to be more confused and disoriented in the evening. He becomes more restless in late afternoon- pulling on borden, getting up and is upset. Left message explaining the medication and dx to son Jessica Giordano at [phone], [address]    Past Psychiatric History:   Has been admitted to [facility]? For psychiatric reasons- patient does not elaborate further and cannot remember. Has hx of depression and anxiety. Psychiatric medications  Seroquel 50 mg po hs  Lexapro 10 mg po daily  Atarax 10 mg tid prn anxiety      Family Psychiatric History:   Family History   Problem Relation Age of Onset    Depression Mother     Diabetes Mother     COPD Father     Diabetes Brother     Diabetes Maternal Grandmother         Allergies:   Allergies   Allergen Reactions    Minocycline Other (See Comments)        OBJECTIVE  Vital Signs:  Vitals:    10/12/22 0822   BP: (!) 142/69   Pulse: 70   Resp: 18   Temp: 97.9 °F (36.6 °C)   SpO2: 99%       Labs:  Recent Results (from the past 48 hour(s))   Vancomycin Level, Random    Collection Time: 10/11/22  4:47 AM   Result Value Ref Range    Vancomycin Rm 17.0 UG/ML    DOSE AMOUNT DOSE AMT.  GIVEN - 1250MG     DOSE TIME DOSE TIME GIVEN - 10/10 @09:00    CBC with Auto Differential    Collection Time: 10/11/22 12:01 PM   Result Value Ref Range    WBC 8.6 4.0 - 10.5 K/CU MM    RBC 2.82 (L) 4.6 - 6.2 M/CU MM    Hemoglobin 8.6 (L) 13.5 - 18.0 GM/DL    Hematocrit 27.7 (L) 42 - 52 %    MCV 98.2 78 - 100 FL    MCH 30.5 27 - 31 PG    MCHC 31.0 (L) 32.0 - 36.0 %    RDW 14.3 11.7 - 14.9 %    Platelets 435 494 - 627 K/CU MM    MPV 9.4 7.5 - 11.1 FL    Differential Type AUTOMATED DIFFERENTIAL     Segs Relative 77.0 (H) 36 - 66 %    Lymphocytes % 10.7 (L) 24 - 44 %    Monocytes % 7.6 (H) 0 - 4 %    Eosinophils % 2.7 0 - 3 %    Basophils % 0.7 0 - 1 %    Segs Absolute 6.6 K/CU MM    Lymphocytes Absolute 0.9 K/CU MM    Monocytes Absolute 0.7 K/CU MM    Eosinophils Absolute 0.2 K/CU MM    Basophils Absolute 0.1 K/CU MM    Nucleated RBC % 0.0 %    Total Nucleated RBC 0.0 K/CU MM    Total Immature Neutrophil 0.11 K/CU MM    Immature Neutrophil % 1.3 (H) 0 - 0.43 %   Basic Metabolic Panel w/ Reflex to MG    Collection Time: 10/11/22 12:01 PM   Result Value Ref Range    Sodium 138 135 - 145 MMOL/L    Potassium 4.0 3.5 - 5.1 MMOL/L    Chloride 100 99 - 110 mMol/L    CO2 27 21 - 32 MMOL/L    Anion Gap 11 4 - 16    BUN 31 (H) 6 - 23 MG/DL    Creatinine 2.8 (H) 0.9 - 1.3 MG/DL    Glucose 146 (H) 70 - 99 MG/DL    Calcium 8.6 8.3 - 10.6 MG/DL    GFR Non- 21 (L) >60 mL/min/1.73m2    GFR  26 (L) >60 mL/min/1.73m2   CBC    Collection Time: 10/12/22 12:04 PM   Result Value Ref Range    WBC 9.8 4.0 - 10.5 K/CU MM    RBC 2.89 (L) 4.6 - 6.2 M/CU MM    Hemoglobin 8.8 (L) 13.5 - 18.0 GM/DL    Hematocrit 27.9 (L) 42 - 52 %    MCV 96.5 78 - 100 FL    MCH 30.4 27 - 31 PG    MCHC 31.5 (L) 32.0 - 36.0 %    RDW 14.4 11.7 - 14.9 % Platelets 064 813 - 136 K/CU MM    MPV 9.3 7.5 - 11.1 FL   Basic Metabolic Panel w/ Reflex to MG    Collection Time: 10/12/22 12:04 PM   Result Value Ref Range    Sodium 136 135 - 145 MMOL/L    Potassium 3.8 3.5 - 5.1 MMOL/L    Chloride 97 (L) 99 - 110 mMol/L    CO2 25 21 - 32 MMOL/L    Anion Gap 14 4 - 16    BUN 29 (H) 6 - 23 MG/DL    Creatinine 3.0 (H) 0.9 - 1.3 MG/DL    Glucose 102 (H) 70 - 99 MG/DL    Calcium 8.8 8.3 - 10.6 MG/DL    GFR Non-African American 20 (L) >60 mL/min/1.73m2    GFR  24 (L) >60 mL/min/1.73m2       Review of Systems:  Reports of no current cardiovascular, respiratory, gastrointestinal, genitourinary, integumentary, neurological, muscuoskeletal, or immunological symptoms today. PSYCHIATRIC: See HPI above.     PSYCHIATRIC EXAMINATION / MENTAL STATUS EXAM    CONSTITUTIONAL:    Vitals:   Vitals:    10/12/22 0822   BP: (!) 142/69   Pulse: 70   Resp: 18   Temp: 97.9 °F (36.6 °C)   SpO2: 99%      General appearance: [x] appears age, []  appears older than stated age,               [x]  adequately dressed and groomed, [] disheveled,               [x]  in no acute distress, [] appears mildly distressed, [] other           MUSCULOSKELETAL:   Gait:   [] normal, [] antalgic, [] unsteady, [] gait not evaluated   Station:             [] erect, [] sitting, [] recumbent, [] other        Strength/tone:  [x] muscle strength and tone appear consistent with age and                                        condition     [] atrophy      [] abnormal movements  PSYCHIATRIC:    Relatedness:  [x] cooperative, [] guarded, [] indifferent, [] hostile,      [] sedated  Speech:  [x] normal prosody, [] pressured, [] decreased volume,    [] increased volume [] slurred [] slowed, [] delayed     [] echolalia, [] incoherent, [] stuttering   Eye contact:  [] direct, [] fleeting , [] intense []  none  Kinetics:  [x] normal, [] increased, [] decreased  Mood:   [x] stable, [] depressed, [] anxious, [] irritable,     [] labile  [] euphoric   Affect:   [] normal range, [] constricted, [] depressed , [] anxious,  [] angry, [x]  blunted     [] mood incongruent, [] blunted  [] restricted   Hallucinations:  [] denies, [] auditory,  [] visual,  [] olfactory, [] tactile  Delusions:  [x] none, [] grandiose,  [] paranoid,  [] persecutory,  [] somatic,     [] bizarre  [] Zoroastrian/spiritual    Preoccupations:   [x] none, [] violence, [] obsessions, [] other     Suicidal ideation  [x] denies, [] endorses  Homicidal ideation [x] denies, [] endorses  Thought process: [x] logical , [] circumstantial, [] tangential, [] IVETTE,     [] simplistic, [] disorganized  [] FOI  [] concrete  [] nonsensical    Thought Content: [] future oriented [] goal directed  [] self-harm, [] guilt,     [] hopelessness  [] obsessive  [] superficial  [] preoccupation    Insight:   [] adequate , [x] limited , [] impaired    Judgment:  [] adequate , [x] limited  [] impaired  Associations:              []  Logical and coherent , [] loosening, [] disorganized   Attention and concentration:     [] intact [x] limited [] impaired , [] grossly impaired  Orientation:  [x] person, place, time, situation     [] disoriented to:     Memory:             [x] superficially intact, [] impaired       Vitals: Blood pressure (!) 142/69, pulse 70, temperature 97.9 °F (36.6 °C), temperature source Oral, resp. rate 18, height 6' (1.829 m), weight 210 lb 15.7 oz (95.7 kg), SpO2 99 %. CONSTITUTIONAL:    Appearance: appears stated age. alert and oriented to person, place, disoriented time & situation. no acute distress. Adequate grooming and hygeine. Good eye contact. No prominent physical abnormalities. Attitude: Manner is cooperative and pleasant but confused  Motor: No psychomotor agitation, retardation or abnormal movements noted  Speech: Clearly articulated; normal rate, volume, tone & amount.   Language: intact understanding and production  Mood: fine  Affect: crying , labile, not congruent with mood and content of speech  Thought Production: Spontaneous. Thought Form: Coherent, linear, logical & goal-directed. No tangentiality or circumstantiality. No flight of ideas or loosening of associations. Thought Content/Perceptions: No EDILSON, no AVH, no delusion  Insight:poor  Judgment- questionable  Memory: Immediate, recent, and remote appear iimpaired though not formally tested. Attention: maintained throughout interview  Fund of knowledge: Average  Gait/Balance: JEOVANNY    Impression:   Pseudobulbar effect  Delirium due to medical  pneumonia    Problem List:   Community acquired pneumonia of left lung, unspecified part of lung    Plan:  Recommend starting Marigene Milan for pseudobulbar effect. Continue with lexapro 10 mg( recently increased), seroquel 50 mg po at bedtime. If patient still unable to sleep may want to trial trazodone. If continue to be agitated may require depakote 250 mg po at bedtime to assist. Do not want to start now as Stephany Kin starting. Labs- urinalysis and ammonia, vit b 12 and folate due to acute confusion  Standard Delirium precautions:  o Redirect / reorient / reassure the patient  o Early mobilization (please allow patient to walk halls with assistance if needed)   o Reduce noise and provide adequate daytime light in the room; dianna-side room preferable if available  o Prevent sleep deprivation  o Minimize use of anticholinergic drugs, benzodiazepines, and narcotics  o Use of eyeglasses and hearing aids  o Treat volume depletion  o Bowel regimens  o Avoid lines or catheters if possible  o Monitor for unintentional self-harm  o Assess fall risk   4. Psychiatry will follow  5. T desmond you for this consult  Ps to Dr Komal Dunn regarding recommendations.   Electronically signed by ROSEMARY Guadarrama CNP on 10/12/2022 at 1:17 PM

## 2022-10-13 LAB
ANION GAP SERPL CALCULATED.3IONS-SCNC: 12 MMOL/L (ref 4–16)
BACTERIA: NEGATIVE /HPF
BILIRUBIN URINE: NEGATIVE MG/DL
BLOOD, URINE: ABNORMAL
BUN BLDV-MCNC: 25 MG/DL (ref 6–23)
CALCIUM SERPL-MCNC: 8.4 MG/DL (ref 8.3–10.6)
CHLORIDE BLD-SCNC: 101 MMOL/L (ref 99–110)
CLARITY: CLEAR
CO2: 25 MMOL/L (ref 21–32)
COLOR: YELLOW
CREAT SERPL-MCNC: 2.6 MG/DL (ref 0.9–1.3)
CULTURE: NORMAL
CULTURE: NORMAL
EOSINOPHIL, URINE: POSITIVE /HPF
GFR AFRICAN AMERICAN: 28 ML/MIN/1.73M2
GFR NON-AFRICAN AMERICAN: 23 ML/MIN/1.73M2
GLUCOSE BLD-MCNC: 122 MG/DL (ref 70–99)
GLUCOSE, URINE: NEGATIVE MG/DL
KETONES, URINE: NEGATIVE MG/DL
LEUKOCYTE ESTERASE, URINE: ABNORMAL
Lab: NORMAL
Lab: NORMAL
NITRITE URINE, QUANTITATIVE: NEGATIVE
PH, URINE: 7 (ref 5–8)
POTASSIUM SERPL-SCNC: 3.7 MMOL/L (ref 3.5–5.1)
PROTEIN UA: 30 MG/DL
RBC URINE: 292 /HPF (ref 0–3)
SODIUM BLD-SCNC: 138 MMOL/L (ref 135–145)
SPECIFIC GRAVITY UA: 1.01 (ref 1–1.03)
SPECIMEN: NORMAL
SPECIMEN: NORMAL
SQUAMOUS EPITHELIAL: 1 /HPF
TRICHOMONAS: ABNORMAL /HPF
UROBILINOGEN, URINE: 0.2 MG/DL (ref 0.2–1)
WBC UA: 22 /HPF (ref 0–2)

## 2022-10-13 PROCEDURE — 80048 BASIC METABOLIC PNL TOTAL CA: CPT

## 2022-10-13 PROCEDURE — 6370000000 HC RX 637 (ALT 250 FOR IP): Performed by: INTERNAL MEDICINE

## 2022-10-13 PROCEDURE — 81003 URINALYSIS AUTO W/O SCOPE: CPT

## 2022-10-13 PROCEDURE — 6370000000 HC RX 637 (ALT 250 FOR IP): Performed by: STUDENT IN AN ORGANIZED HEALTH CARE EDUCATION/TRAINING PROGRAM

## 2022-10-13 PROCEDURE — 1200000000 HC SEMI PRIVATE

## 2022-10-13 PROCEDURE — 2580000003 HC RX 258: Performed by: STUDENT IN AN ORGANIZED HEALTH CARE EDUCATION/TRAINING PROGRAM

## 2022-10-13 PROCEDURE — 6360000002 HC RX W HCPCS: Performed by: INTERNAL MEDICINE

## 2022-10-13 PROCEDURE — 87205 SMEAR GRAM STAIN: CPT

## 2022-10-13 PROCEDURE — 36415 COLL VENOUS BLD VENIPUNCTURE: CPT

## 2022-10-13 PROCEDURE — 6370000000 HC RX 637 (ALT 250 FOR IP): Performed by: NURSE PRACTITIONER

## 2022-10-13 PROCEDURE — 87086 URINE CULTURE/COLONY COUNT: CPT

## 2022-10-13 PROCEDURE — 97530 THERAPEUTIC ACTIVITIES: CPT

## 2022-10-13 PROCEDURE — 6360000002 HC RX W HCPCS: Performed by: STUDENT IN AN ORGANIZED HEALTH CARE EDUCATION/TRAINING PROGRAM

## 2022-10-13 PROCEDURE — 2580000003 HC RX 258: Performed by: INTERNAL MEDICINE

## 2022-10-13 PROCEDURE — 97110 THERAPEUTIC EXERCISES: CPT

## 2022-10-13 RX ADMIN — PANTOPRAZOLE SODIUM 20 MG: 20 TABLET, DELAYED RELEASE ORAL at 05:11

## 2022-10-13 RX ADMIN — GUAIFENESIN 600 MG: 600 TABLET, EXTENDED RELEASE ORAL at 09:27

## 2022-10-13 RX ADMIN — HEPARIN SODIUM 5000 UNITS: 5000 INJECTION INTRAVENOUS; SUBCUTANEOUS at 22:16

## 2022-10-13 RX ADMIN — HEPARIN SODIUM 5000 UNITS: 5000 INJECTION INTRAVENOUS; SUBCUTANEOUS at 05:12

## 2022-10-13 RX ADMIN — GUAIFENESIN 600 MG: 600 TABLET, EXTENDED RELEASE ORAL at 22:16

## 2022-10-13 RX ADMIN — SODIUM CHLORIDE, PRESERVATIVE FREE 10 ML: 5 INJECTION INTRAVENOUS at 09:27

## 2022-10-13 RX ADMIN — TAMSULOSIN HYDROCHLORIDE 0.8 MG: 0.4 CAPSULE ORAL at 22:15

## 2022-10-13 RX ADMIN — QUETIAPINE FUMARATE 50 MG: 25 TABLET ORAL at 22:16

## 2022-10-13 RX ADMIN — SODIUM CHLORIDE, PRESERVATIVE FREE 10 ML: 5 INJECTION INTRAVENOUS at 22:16

## 2022-10-13 RX ADMIN — ATORVASTATIN CALCIUM 40 MG: 40 TABLET, FILM COATED ORAL at 22:16

## 2022-10-13 RX ADMIN — HYDROXYZINE HYDROCHLORIDE 10 MG: 10 TABLET ORAL at 16:15

## 2022-10-13 RX ADMIN — PIPERACILLIN AND TAZOBACTAM 3375 MG: 3; .375 INJECTION, POWDER, LYOPHILIZED, FOR SOLUTION INTRAVENOUS at 09:33

## 2022-10-13 RX ADMIN — ESCITALOPRAM OXALATE 10 MG: 10 TABLET ORAL at 09:27

## 2022-10-13 RX ADMIN — HYDROXYZINE HYDROCHLORIDE 10 MG: 10 TABLET ORAL at 05:12

## 2022-10-13 RX ADMIN — ASPIRIN 81 MG CHEWABLE TABLET 81 MG: 81 TABLET CHEWABLE at 09:27

## 2022-10-13 RX ADMIN — METOPROLOL TARTRATE 25 MG: 25 TABLET, FILM COATED ORAL at 09:27

## 2022-10-13 RX ADMIN — LEVOTHYROXINE SODIUM 75 MCG: 0.07 TABLET ORAL at 05:11

## 2022-10-13 RX ADMIN — PIPERACILLIN AND TAZOBACTAM 3375 MG: 3; .375 INJECTION, POWDER, LYOPHILIZED, FOR SOLUTION INTRAVENOUS at 22:30

## 2022-10-13 RX ADMIN — HEPARIN SODIUM 5000 UNITS: 5000 INJECTION INTRAVENOUS; SUBCUTANEOUS at 16:57

## 2022-10-13 RX ADMIN — DEXTROMETHORPHAN HYDROBROMIDE AND QUINIDINE SULFATE 1 CAPSULE: 20; 10 CAPSULE, GELATIN COATED ORAL at 09:27

## 2022-10-13 RX ADMIN — FINASTERIDE 5 MG: 5 TABLET, FILM COATED ORAL at 09:27

## 2022-10-13 RX ADMIN — METOPROLOL TARTRATE 25 MG: 25 TABLET, FILM COATED ORAL at 22:15

## 2022-10-13 ASSESSMENT — PAIN SCALES - GENERAL: PAINLEVEL_OUTOF10: 2

## 2022-10-13 NOTE — PROGRESS NOTES
Physical Therapy  Name: Claudette Taylor MRN: [id number] :   10/18/1930   Date:  10/13/2022   Admission Date: 10/8/2022 Room:  [address] [place] [address]   Restrictions/Precautions:         General Precautions; Fall Risk, telesitter  Communication with other providers:  chart review indicates ok to see for therapy  Subjective:  Patient states:  '' I cant hear very well ''   Pain:   Location, Type, Intensity (0/10 to 10/10):  denies  Objective:    Observation:  pt was in low fowlers in bed. Treatment, including education/measures:  Pt presented in room and was agreeable to therapy. Oriented to self. He was able to perform 3 STS during session. His max standing tolerance is aprox 1m. Easily distracted. 1 LOB posterior with self correction. Cued to perform reaching activities in standing but could not follow cues to complete. He required demonstration for all exercises and TC to correctly perform. Upon completion he was left supine in bed with all needs met and telesitter  Transfers with line management of JASON borden  Rolling: SBA  Supine to sit :SBA  Sit to supine :SBA  Scooting :SBA  Sit to stand :CGA  Stand to sit :CGA  Gait:  Pt amb with 2WW for 15 ft In room with CGA assist  Pt needed VC's for safety   Gait Comments: with VC's' and TC's for B LE placement, walker placement and sequence throughout ambulation; with VC's and TC's to maintain upright posture in order to avoid COM shifting outside of PEDRO; with VC's for PLB throughout ambulation  Sitting Exercises: Ankle pumps x 15  Heel raises x 15  LAQ's x 15  Marching x 15       Therapeutic Exercise:  Therapeutic exercises were instructed today. Cues were given for technique, safety, recruitment, and rationale. Cues were verbal and/or tactile. Safety  Patient left safely in the bed, with call light/phone in reach with alarm applied. Gait belt was used for transfers and gait.   Assessment / Impression:     Patient's tolerance of treatment:  good   Adverse Reaction: no  Significant change in status and impact:  no  Barriers to improvement:  confusion  Plan for Next Session:    Will cont to work towards pt's goals per patient tolerance  Time in:  1:44  Time out:  2:08  Timed treatment minutes:  24  Total treatment time:  24  Previously filed items:     Short Term Goals  Time Frame for Short Term Goals: 1 week  Short Term Goal 1: Pt will AMB x200 ft with RW, CGA-SBA  Short Term Goal 2: Pt will perform sup<>sit with SBA and cues  Short Term Goal 3: Pt will tolerate standing dynamic balance tasks with light UE support x10 minutes CGA  Short Term Goal 4: Pt will perform x5 STS in 55 seconds CGA     Electronically signed by:    Aaron Posey PTA PTA  10/13/2022, 2:09 PM

## 2022-10-13 NOTE — PROGRESS NOTES
Patient had much more peaceful night than the night before. Seemed to have slept well only waking to urinate. No crying episodes. Patient woke early and seemed to be anxiously hyperventilating; was given 2L nasal cannula for comfort and anxiety resolved. Patient resting now with call light in reach.

## 2022-10-13 NOTE — PROGRESS NOTES
V2.0  [facility] [place] [place]      Name:  Joselo José /Age/Sex: 10/18/1930  (80 y.o. male)   MRN & CSN:  [id number] & [id number] Encounter Date/Time: 10/13/2022 1:31 PM EDT    Location:  [address] [place] [address] PCP: No primary care provider on file. Hospital Day: 6    Assessment and Plan:   Joselo José is a 80 y.o. male with pmh CKD, hypothyroidism, depression, of  who presents with Community acquired pneumonia of left lung, unspecified part of lung    *Left lower lobe pneumonia  Patient was seen at walk-in clinic on 10/6/2022 and had outpatient x-ray which showed pulmonary edema versus consolidation. Patient was advised to come to the ED for further evaluation. MRSA -ve  Continue Zosyn, complete 7 days  Incentive spirometry, Bed CPT    *Chronic Diastolic heart failure  Clinically Euvolemic  proBNP 1619  Hold lasix in lieu of KAVON   Monitor I/Os    *KAVON on CKD stage IV  Improving  Baseline creatinine around 2.3-2.5  Hold lasix    Monitor renal function parameters   Avoid nephrotoxic agents    *Elevated troponin, likely type II  Likely due to pneumonia and respiratory status  No chest pain endorsed    BPH  With urinary retention  Continue Flomax and proscar  Bladder scan q6h    Hypothyroidism  Continue home Synthroid    Anxiety uncontrolled  Tearful this am  Continue seroquel and lexapro  Psychiatry consulted, appreciate recommendations  As needed atarax    Chronic Normocytic Anemia  Stable Hb trend  Outpatient evaluation and management     Diet ADULT DIET; Regular; 5 carb choices (75 gm/meal);  Low Fat/Low Chol/High Fiber/TEDDY; Low Sodium (2 gm)   DVT Prophylaxis [] Lovenox, [x]  Heparin, [] SCDs, [] Ambulation,  [] Eliquis, [] Xarelto  [] Coumadin   Code Status DNR-CC   Disposition From: Home  Expected Disposition: Home  Estimated Date of Discharge: 1-2 days  Patient requires continued admission due to Psych evaluation   Surrogate Decision Maker/ POA Children     Subjective:     Chief Complaint: Abnormal Lab (Called by urgent care doc to come to ER to be seen; was seen there on 10/6) and Cough       Cely Stark is a 80 y.o. male who presents with cough and generalized malaise. Seen and examined early in AM. AAOx3, hemodynamically stable. No significant overnight events noticed except some episodes of anxiety. Patient was eating breakfast and was conversational. Patient was tearful. Calmed down after talking to him. Objective: Intake/Output Summary (Last 24 hours) at 10/13/2022 1457  Last data filed at 10/13/2022 0224  Gross per 24 hour   Intake --   Output 300 ml   Net -300 ml        Vitals:   Vitals:    10/13/22 1438   BP: (!) 142/75   Pulse: 67   Resp: 18   Temp: 98.4 °F (36.9 °C)   SpO2: 98%       Physical Exam:     General: NAD  Eyes: EOMI  ENT: neck supple  Cardiovascular: Regular rate. Respiratory: Clear to auscultation  Gastrointestinal: Soft, non tender  Genitourinary: no suprapubic tenderness  Musculoskeletal: B/L lower extremity edema 1+  Skin: warm, dry, seborrheic keratosis  Neuro: Alert. Psych: Mood appropriate.      Medications:   Medications:    dextromethorphan-quiNIDine  1 capsule Oral Daily    Followed by    Eve Lopez ON 10/19/2022] dextromethorphan-quiNIDine  1 capsule Oral BID    piperacillin-tazobactam  3,375 mg IntraVENous Q12H    heparin (porcine)  5,000 Units SubCUTAneous 3 times per day    [Held by provider] furosemide  20 mg Oral Daily    escitalopram  10 mg Oral Daily    guaiFENesin  600 mg Oral BID    aspirin  81 mg Oral Daily    atorvastatin  40 mg Oral Nightly    finasteride  5 mg Oral Daily    levothyroxine  75 mcg Oral Daily    metoprolol tartrate  25 mg Oral BID    pantoprazole  20 mg Oral Daily    tamsulosin  0.8 mg Oral Nightly    QUEtiapine  50 mg Oral Nightly    sodium chloride flush  5-40 mL IntraVENous 2 times per day      Infusions:    sodium chloride 25 mL/hr at 10/13/22 0930     PRN Meds: hydrOXYzine HCl, 10 mg, TID PRN  sennosides-docusate sodium, 2 tablet, BID PRN  polyethylene glycol, 17 g, BID PRN  zolpidem, 2.5 mg, Nightly PRN  benzonatate, 200 mg, TID PRN  sodium chloride flush, 5-40 mL, PRN  sodium chloride, , PRN  ondansetron, 4 mg, Q8H PRN   Or  ondansetron, 4 mg, Q6H PRN  aluminum & magnesium hydroxide-simethicone, 30 mL, Q6H PRN  acetaminophen, 650 mg, Q6H PRN   Or  acetaminophen, 650 mg, Q6H PRN      Labs      Recent Results (from the past 24 hour(s))   Ammonia    Collection Time: 10/12/22  3:14 PM   Result Value Ref Range    Ammonia 22 16 - 60 UMOL/L   Vitamin B12 & Folate    Collection Time: 10/12/22  3:14 PM   Result Value Ref Range    Vitamin B-12 655.9 211 - 911 pg/ml    Folate >20.0 (H) 3.1 - 17.5 NG/ML   Vitamin D 25 Hydroxy    Collection Time: 10/12/22  3:14 PM   Result Value Ref Range    Vit D, 25-Hydroxy 26.98 >20 NG/ML   Urinalysis with Reflex to Culture    Collection Time: 10/13/22  2:20 AM    Specimen: Urine   Result Value Ref Range    Color, UA YELLOW YELLOW    Clarity, UA CLEAR CLEAR    Glucose, Urine NEGATIVE NEGATIVE MG/DL    Bilirubin Urine NEGATIVE NEGATIVE MG/DL    Ketones, Urine NEGATIVE NEGATIVE MG/DL    Specific Gravity, UA 1.010 1.001 - 1.035    Blood, Urine LARGE NUMBER OR AMOUNT OF (A) NEGATIVE    pH, Urine 7.0 5.0 - 8.0    Protein, UA 30 (A) NEGATIVE MG/DL    Urobilinogen, Urine 0.2 0.2 - 1.0 MG/DL    Nitrite Urine, Quantitative NEGATIVE NEGATIVE    Leukocyte Esterase, Urine SMALL NUMBER OR AMOUNT OBSERVED (A) NEGATIVE    RBC,  (H) 0 - 3 /HPF    WBC, UA 22 (H) 0 - 2 /HPF    Bacteria, UA NEGATIVE NEGATIVE /HPF    Squam Epithel, UA 1 /HPF    Trichomonas, UA NONE SEEN NONE SEEN /HPF   Eosinophil Smear, Urine    Collection Time: 10/13/22  2:20 AM   Result Value Ref Range    Eosinophil, Urine POSITIVE /HPF   Basic Metabolic Panel w/ Reflex to MG    Collection Time: 10/13/22 11:35 AM   Result Value Ref Range    Sodium 138 135 - 145 MMOL/L    Potassium 3.7 3.5 - 5.1 MMOL/L    Chloride 101 99 - 110 mMol/L    CO2 25 21 - 32 MMOL/L Anion Gap 12 4 - 16    BUN 25 (H) 6 - 23 MG/DL    Creatinine 2.6 (H) 0.9 - 1.3 MG/DL    Glucose 122 (H) 70 - 99 MG/DL    Calcium 8.4 8.3 - 10.6 MG/DL    GFR Non- 23 (L) >60 mL/min/1.73m2    GFR  28 (L) >60 mL/min/1.73m2        Imaging/Diagnostics Last 24 Hours   XR CHEST PORTABLE    Result Date: 10/8/2022  EXAMINATION: ONE XRAY VIEW OF THE CHEST 10/8/2022 4:57 pm COMPARISON: 10/06/2022 and 08/31/2022 HISTORY: ORDERING SYSTEM PROVIDED HISTORY: Cough TECHNOLOGIST PROVIDED HISTORY: Reason for exam:->Cough FINDINGS: Normal cardiac silhouette size. Enlarged left basilar parenchymal opacity. Minimal stable right basilar atelectasis. No effusion or pneumothorax identified radiographically. No aggressive osseous lesion. Interval increase in left basilar parenchymal opacity which may represent atelectasis versus developing pneumonia. Radiographic follow-up recommended to document resolution.        Electronically signed by Lashanda Patel MD on 10/13/2022 at 2:57 PM

## 2022-10-14 VITALS
BODY MASS INDEX: 28.58 KG/M2 | HEIGHT: 72 IN | HEART RATE: 67 BPM | WEIGHT: 210.98 LBS | OXYGEN SATURATION: 99 % | SYSTOLIC BLOOD PRESSURE: 180 MMHG | DIASTOLIC BLOOD PRESSURE: 92 MMHG | RESPIRATION RATE: 16 BRPM | TEMPERATURE: 98.1 F

## 2022-10-14 LAB
ANION GAP SERPL CALCULATED.3IONS-SCNC: 11 MMOL/L (ref 4–16)
BUN BLDV-MCNC: 24 MG/DL (ref 6–23)
CALCIUM SERPL-MCNC: 8.4 MG/DL (ref 8.3–10.6)
CHLORIDE BLD-SCNC: 101 MMOL/L (ref 99–110)
CO2: 24 MMOL/L (ref 21–32)
CREAT SERPL-MCNC: 2.6 MG/DL (ref 0.9–1.3)
CULTURE: NORMAL
GFR AFRICAN AMERICAN: 28 ML/MIN/1.73M2
GFR NON-AFRICAN AMERICAN: 23 ML/MIN/1.73M2
GLUCOSE BLD-MCNC: 114 MG/DL (ref 70–99)
Lab: NORMAL
POTASSIUM SERPL-SCNC: 3.7 MMOL/L (ref 3.5–5.1)
SODIUM BLD-SCNC: 136 MMOL/L (ref 135–145)
SPECIMEN: NORMAL

## 2022-10-14 PROCEDURE — 6370000000 HC RX 637 (ALT 250 FOR IP): Performed by: STUDENT IN AN ORGANIZED HEALTH CARE EDUCATION/TRAINING PROGRAM

## 2022-10-14 PROCEDURE — 6360000002 HC RX W HCPCS: Performed by: STUDENT IN AN ORGANIZED HEALTH CARE EDUCATION/TRAINING PROGRAM

## 2022-10-14 PROCEDURE — 6370000000 HC RX 637 (ALT 250 FOR IP): Performed by: NURSE PRACTITIONER

## 2022-10-14 PROCEDURE — 94669 MECHANICAL CHEST WALL OSCILL: CPT

## 2022-10-14 PROCEDURE — 2700000000 HC OXYGEN THERAPY PER DAY

## 2022-10-14 PROCEDURE — 97535 SELF CARE MNGMENT TRAINING: CPT

## 2022-10-14 PROCEDURE — 94618 PULMONARY STRESS TESTING: CPT

## 2022-10-14 PROCEDURE — 2580000003 HC RX 258: Performed by: INTERNAL MEDICINE

## 2022-10-14 PROCEDURE — 97530 THERAPEUTIC ACTIVITIES: CPT

## 2022-10-14 PROCEDURE — 80048 BASIC METABOLIC PNL TOTAL CA: CPT

## 2022-10-14 PROCEDURE — 6370000000 HC RX 637 (ALT 250 FOR IP): Performed by: INTERNAL MEDICINE

## 2022-10-14 PROCEDURE — 36415 COLL VENOUS BLD VENIPUNCTURE: CPT

## 2022-10-14 PROCEDURE — 6360000002 HC RX W HCPCS: Performed by: INTERNAL MEDICINE

## 2022-10-14 PROCEDURE — 94150 VITAL CAPACITY TEST: CPT

## 2022-10-14 PROCEDURE — 2580000003 HC RX 258: Performed by: STUDENT IN AN ORGANIZED HEALTH CARE EDUCATION/TRAINING PROGRAM

## 2022-10-14 PROCEDURE — 94761 N-INVAS EAR/PLS OXIMETRY MLT: CPT

## 2022-10-14 RX ORDER — GUAIFENESIN 600 MG/1
600 TABLET, EXTENDED RELEASE ORAL 2 TIMES DAILY
Qty: 30 TABLET | Refills: 0 | Status: SHIPPED | OUTPATIENT
Start: 2022-10-14

## 2022-10-14 RX ORDER — QUETIAPINE FUMARATE 50 MG/1
50 TABLET, FILM COATED ORAL NIGHTLY
Qty: 30 TABLET | Refills: 0 | Status: SHIPPED | OUTPATIENT
Start: 2022-10-14

## 2022-10-14 RX ORDER — SENNA AND DOCUSATE SODIUM 50; 8.6 MG/1; MG/1
2 TABLET, FILM COATED ORAL 2 TIMES DAILY PRN
Qty: 30 TABLET | Refills: 0 | Status: SHIPPED | OUTPATIENT
Start: 2022-10-14

## 2022-10-14 RX ORDER — HYDROXYZINE HYDROCHLORIDE 10 MG/1
10 TABLET, FILM COATED ORAL 3 TIMES DAILY PRN
Qty: 30 TABLET | Refills: 0 | Status: SHIPPED | OUTPATIENT
Start: 2022-10-14 | End: 2022-10-24

## 2022-10-14 RX ADMIN — PANTOPRAZOLE SODIUM 20 MG: 20 TABLET, DELAYED RELEASE ORAL at 06:24

## 2022-10-14 RX ADMIN — SODIUM CHLORIDE, PRESERVATIVE FREE 10 ML: 5 INJECTION INTRAVENOUS at 09:04

## 2022-10-14 RX ADMIN — GUAIFENESIN 600 MG: 600 TABLET, EXTENDED RELEASE ORAL at 09:02

## 2022-10-14 RX ADMIN — ESCITALOPRAM OXALATE 10 MG: 10 TABLET ORAL at 09:02

## 2022-10-14 RX ADMIN — HEPARIN SODIUM 5000 UNITS: 5000 INJECTION INTRAVENOUS; SUBCUTANEOUS at 13:20

## 2022-10-14 RX ADMIN — FINASTERIDE 5 MG: 5 TABLET, FILM COATED ORAL at 09:02

## 2022-10-14 RX ADMIN — PIPERACILLIN AND TAZOBACTAM 3375 MG: 3; .375 INJECTION, POWDER, LYOPHILIZED, FOR SOLUTION INTRAVENOUS at 09:11

## 2022-10-14 RX ADMIN — HEPARIN SODIUM 5000 UNITS: 5000 INJECTION INTRAVENOUS; SUBCUTANEOUS at 06:24

## 2022-10-14 RX ADMIN — LEVOTHYROXINE SODIUM 75 MCG: 0.07 TABLET ORAL at 06:23

## 2022-10-14 RX ADMIN — ASPIRIN 81 MG CHEWABLE TABLET 81 MG: 81 TABLET CHEWABLE at 09:02

## 2022-10-14 RX ADMIN — SODIUM CHLORIDE: 9 INJECTION, SOLUTION INTRAVENOUS at 09:08

## 2022-10-14 RX ADMIN — METOPROLOL TARTRATE 25 MG: 25 TABLET, FILM COATED ORAL at 09:02

## 2022-10-14 RX ADMIN — DEXTROMETHORPHAN HYDROBROMIDE AND QUINIDINE SULFATE 1 CAPSULE: 20; 10 CAPSULE, GELATIN COATED ORAL at 09:02

## 2022-10-14 NOTE — DISCHARGE SUMMARY
V2.0  Discharge Summary    Name:  Shlomo Toledo /Age/Sex: 10/18/1930 (67 y.o. male)   Admit Date: 10/8/2022  Discharge Date: 10/14/22    MRN & CSN:  [id number] & [id number] Encounter Date and Time 10/14/22 2:46 PM EDT    Attending:  Matthias Sweeney MD Discharging Provider: Matthias Sweeney MD       Hospital Course:     Brief HPI: Shlomo Toledo is a 80 y.o. male with pmh CKD, hypothyroidism, depression, of  who presents with Community acquired pneumonia of left lung, unspecified part of lung. Brief Problem Based Course:        *Left lower lobe pneumonia  Patient was seen at walk-in clinic on 10/6/2022 and had outpatient x-ray which showed pulmonary edema versus consolidation. Patient was advised to come to the ED for further evaluation. MRSA -ve  Completed Zosyn for 5 days  Incentive spirometry, Bed CPT     *Chronic Diastolic heart failure  Clinically Euvolemic  proBNP 1619  Hold lasix in lieu of KAVON, recommend discussing with primary care physician before resumption of Lasix  Monitor I/Os     *KAVON on CKD stage IV  Stable creatinine  Baseline creatinine around 2.3-2.5  Hold lasix     Monitor renal function parameters   Avoid nephrotoxic agents     *Elevated troponin, likely type II  Likely due to pneumonia and respiratory status  No chest pain endorsed     BPH  With urinary retention  Continue Flomax and proscar     Hypothyroidism  Continue home Synthroid     Anxiety uncontrolled  Tearful this am  Continue seroquel and lexapro  Psychiatry consulted, appreciate recommendations, okay to continue Nuedexta. As needed atarax     Chronic Normocytic Anemia  Stable Hb trend  Outpatient evaluation and management     The patient expressed appropriate understanding of, and agreement with the discharge recommendations, medications, and plan.      Consults this admission:  IP CONSULT TO HOSPITALIST  IP CONSULT TO PSYCHIATRY    Subjective: Patient was seen and evaluated at bedside early in AM.  Patient had an episode of crying this a.m. per nursing. Patient was seen in alert oriented x3, hemodynamically stable. Discharge Diagnosis:   Community acquired pneumonia of left lung, unspecified part of lung    Chronic diastolic heart failure  KAVON on CKD stage IV  Elevated troponin  BPH    Discharge Instruction:   Follow up appointments: Primary care physician, psychiatry  Primary care physician: No primary care provider on file.  within 2 weeks  Diet: cardiac diet   Activity: activity as tolerated  Disposition: Discharged to:   [x]Home, []HHC, []SNF, []Acute Rehab, []Hospice   Condition on discharge: Stable  Labs and Tests to be Followed up as an outpatient by PCP or Specialist: CBC, BMP, magnesium, phosphorus in 5 days    Discharge Medications:        Medication List        START taking these medications      dextromethorphan-quiNIDine 20-10 MG Caps per capsule  Commonly known as: NUEDEXTA  Take 1 capsule by mouth in the morning and at bedtime  Start taking on: October 19, 2022     guaiFENesin 600 MG extended release tablet  Commonly known as: MUCINEX  Take 1 tablet by mouth 2 times daily     hydrOXYzine HCl 10 MG tablet  Commonly known as: ATARAX  Take 1 tablet by mouth 3 times daily as needed for Itching     sennosides-docusate sodium 8.6-50 MG tablet  Commonly known as: SENOKOT-S  Take 2 tablets by mouth 2 times daily as needed for Constipation            CHANGE how you take these medications      QUEtiapine 50 MG tablet  Commonly known as: SEROQUEL  Take 1 tablet by mouth nightly  What changed:   medication strength  how much to take  when to take this            CONTINUE taking these medications      aspirin 81 MG chewable tablet  Take 1 tablet by mouth daily     atorvastatin 40 MG tablet  Commonly known as: LIPITOR  Take 1 tablet by mouth nightly     benzonatate 200 MG capsule  Commonly known as: TESSALON  Take 1 capsule by mouth 3 times daily as needed for Cough     escitalopram 5 MG tablet  Commonly known as: Kaycee Quiñones finasteride 5 MG tablet  Commonly known as: PROSCAR  TAKE 1 TABLET BY MOUTH EVERY DAY     levothyroxine 75 MCG tablet  Commonly known as: SYNTHROID  TAKE 1 TABLET BY MOUTH EVERY DAY     metoprolol tartrate 25 MG tablet  Commonly known as: LOPRESSOR  Take 1 tablet by mouth 2 times daily     pantoprazole 20 MG tablet  Commonly known as: PROTONIX  TAKE 1 TABLET BY MOUTH EVERY DAY     sertraline 50 MG tablet  Commonly known as: ZOLOFT     tamsulosin 0.4 MG capsule  Commonly known as: Flomax  Take 2 capsules by mouth nightly     temazepam 7.5 MG capsule  Commonly known as: RESTORIL            STOP taking these medications      azithromycin 250 MG tablet  Commonly known as: ZITHROMAX     Eliquis 2.5 MG Tabs tablet  Generic drug: apixaban     furosemide 20 MG tablet  Commonly known as: LASIX     predniSONE 10 MG tablet  Commonly known as: DELTASONE               Where to Get Your Medications        These medications were sent to [address] [place] [address] 25[address] [id number]      Phone: [phone]   dextromethorphan-quiNIDine 20-10 MG Caps per capsule  guaiFENesin 600 MG extended release tablet  hydrOXYzine HCl 10 MG tablet  QUEtiapine 50 MG tablet  sennosides-docusate sodium 8.6-50 MG tablet        Objective Findings at Discharge:   BP (!) 180/92   Pulse 67   Temp 98.1 °F (36.7 °C)   Resp 16   Ht 6' (1.829 m)   Wt 210 lb 15.7 oz (95.7 kg)   SpO2 99%   BMI 28.61 kg/m²       Physical Exam:   Physical Exam  Vitals reviewed. Constitutional:       Appearance: Normal appearance. He is normal weight. HENT:      Head: Normocephalic and atraumatic. Nose: Nose normal.      Mouth/Throat:      Mouth: Mucous membranes are dry. Pharynx: Oropharynx is clear. Eyes:      General: No scleral icterus.      Conjunctiva/sclera: Conjunctivae normal.   Cardiovascular:      Rate and Rhythm: Normal rate and regular rhythm. Pulses: Normal pulses. Heart sounds: Normal heart sounds. No murmur heard. Pulmonary:      Effort: Pulmonary effort is normal.      Breath sounds: Normal breath sounds. No wheezing, rhonchi or rales. Abdominal:      General: Bowel sounds are normal. There is no distension. Palpations: Abdomen is soft. Tenderness: There is no abdominal tenderness. Musculoskeletal:         General: No deformity. Normal range of motion. Cervical back: Neck supple. No rigidity. Right lower leg: Edema present. Left lower leg: Edema present. Skin:     Coloration: Skin is not jaundiced or pale. Comments: Seborrheic keratosis   Neurological:      General: No focal deficit present. Mental Status: He is alert and oriented to person, place, and time. Mental status is at baseline. Psychiatric:      Comments: Multiple crying episodes            Labs and Imaging   CT CHEST WO CONTRAST    Result Date: 10/9/2022  EXAMINATION: CT OF THE CHEST WITHOUT CONTRAST 10/9/2022 4:03 pm TECHNIQUE: CT of the chest was performed without the administration of intravenous contrast. Multiplanar reformatted images are provided for review. Automated exposure control, iterative reconstruction, and/or weight based adjustment of the mA/kV was utilized to reduce the radiation dose to as low as reasonably achievable. COMPARISON: Chest x-ray from 10/08/2022 HISTORY: ORDERING SYSTEM PROVIDED HISTORY: pulm edema vs consolidation TECHNOLOGIST PROVIDED HISTORY: Reason for exam:->pulm edema vs consolidation Reason for Exam: pulm edema vs consolidation FINDINGS: Mediastinum: The heart is enlarged. Trace pericardial effusion. Atherosclerosis of the aorta. No mediastinal or hilar lymphadenopathy by CT criteria. Lungs/pleura: No pneumothorax. Left basilar consolidation favoring pneumonia, likely with some atelectasis. No pleural effusion. 4 mm pulmonary nodule near the right minor fissure.  Upper Abdomen: No acute findings within the visualized upper abdomen. Atrophy of the bilateral kidneys, likely with bilateral renal cysts. Low attenuating left adrenal adenoma. Small hiatal hernia. The esophagus is mildly dilated. Soft Tissues/Bones: No acute findings within the soft tissues or osseous structures. Mixed lucent and sclerotic appearance of T11. Preserved vertebral body heights. Underlying pathology or malignancy not excluded. Left lower lobe pneumonia. Clinical and imaging follow-up to resolution recommended. Cardiomegaly Abnormal appearance of the T11 vertebral body, slightly different than expected for intraosseous hemangioma and age indeterminate. Malignancy not excluded. Recommend attention on MRI with and without contrast, which could be performed on a nonemergent basis. RECOMMENDATIONS: Pathology: 4 mm right solid pulmonary nodule. No routine follow-up imaging is recommended per Fleischner Society Guidelines. These guidelines do not apply to immunocompromised patients and patients with cancer. Follow up in patients with significant comorbidities as clinically warranted. For lung cancer screening, adhere to Lung-RADS guidelines. Reference: Radiology. 2017; 284(1):228-43. XR CHEST PORTABLE    Result Date: 10/8/2022  EXAMINATION: ONE XRAY VIEW OF THE CHEST 10/8/2022 4:57 pm COMPARISON: 10/06/2022 and 08/31/2022 HISTORY: ORDERING SYSTEM PROVIDED HISTORY: Cough TECHNOLOGIST PROVIDED HISTORY: Reason for exam:->Cough FINDINGS: Normal cardiac silhouette size. Enlarged left basilar parenchymal opacity. Minimal stable right basilar atelectasis. No effusion or pneumothorax identified radiographically. No aggressive osseous lesion. Interval increase in left basilar parenchymal opacity which may represent atelectasis versus developing pneumonia. Radiographic follow-up recommended to document resolution.         CBC:   Recent Labs     10/12/22  1204   WBC 9.8   HGB 8.8*        BMP:    Recent Labs 10/12/22  1204 10/13/22  1135 10/14/22  1210    138 136   K 3.8 3.7 3.7   CL 97* 101 101   CO2 25 25 24   BUN 29* 25* 24*   CREATININE 3.0* 2.6* 2.6*   GLUCOSE 102* 122* 114*     Hepatic: No results for input(s): AST, ALT, ALB, BILITOT, ALKPHOS in the last 72 hours. Lipids:   Lab Results   Component Value Date/Time    CHOL 229 08/22/2022 02:17 AM    CHOL 157 11/19/2018 12:00 AM    HDL 69 08/22/2022 02:17 AM    TRIG 85 08/22/2022 02:17 AM     Hemoglobin A1C:   Lab Results   Component Value Date/Time    LABA1C 5.8 08/22/2022 02:17 AM     TSH:   Lab Results   Component Value Date/Time    TSH 4.07 05/24/2021 11:32 AM     Troponin:   Lab Results   Component Value Date/Time    TROPONINT 0.028 10/08/2022 02:40 PM    TROPONINT 0.022 08/22/2022 02:17 AM    TROPONINT 0.027 08/21/2022 06:50 PM     Lactic Acid: No results for input(s): LACTA in the last 72 hours. BNP: No results for input(s): PROBNP in the last 72 hours.   UA:  Lab Results   Component Value Date/Time    NITRU NEGATIVE 10/13/2022 02:20 AM    NITRU Negative 01/22/2021 09:24 AM    COLORU YELLOW 10/13/2022 02:20 AM    PHUR 6.0 01/22/2021 09:24 AM    WBCUA 22 10/13/2022 02:20 AM    RBCUA 292 10/13/2022 02:20 AM    MUCUS RARE 03/29/2022 09:11 AM    TRICHOMONAS NONE SEEN 10/13/2022 02:20 AM    YEAST RARE 03/29/2022 09:11 AM    BACTERIA NEGATIVE 10/13/2022 02:20 AM    CLARITYU CLEAR 10/13/2022 02:20 AM    SPECGRAV 1.010 10/13/2022 02:20 AM    LEUKOCYTESUR SMALL NUMBER OR AMOUNT OBSERVED 10/13/2022 02:20 AM    UROBILINOGEN 0.2 10/13/2022 02:20 AM    BILIRUBINUR NEGATIVE 10/13/2022 02:20 AM    BLOODU LARGE NUMBER OR AMOUNT OF 10/13/2022 02:20 AM    GLUCOSEU Negative 01/22/2021 09:24 AM    KETUA NEGATIVE 10/13/2022 02:20 AM     Urine Cultures: No results found for: LABURIN  Blood Cultures: No results found for: BC  No results found for: BLOODCULT2  Organism: No results found for: ORG    Time Spent Discharging patient 35 minutes    Electronically signed by Timothy Ortega MD on 10/14/2022 at 2:46 PM

## 2022-10-14 NOTE — PROGRESS NOTES
Occupational Therapy Treatment Note    Name: Haroon Murcia MRN: [id number] :   10/18/1930   Date:  10/14/2022   Admission Date: 10/8/2022 Room:  [facility]     Attempted to see pt this afternoon but pt is refusing therapy. \"You can't keep me here. I have son and daughter that can come get me! \" Pt educated on benefits of therapy. Will re-attempt as schedule allows.       Electronically signed by:    CARLOS Allen,   10/14/2022, 2:25 PM

## 2022-10-14 NOTE — PROGRESS NOTES
Nutrition Assessment     Type and Reason for Visit: Initial, RD Nutrition Re-Screen/LOS    Nutrition Recommendations/Plan:   Liberalize diet to Regular diet   Begin Low calorie, high protein oral nutrition supplement if pt continues during stay      Nutrition Assessment:  Admitted with CAP. Pt seen for length of stay. Pt on carb controlled 5 choice/cardiac/low sodium diet, pt denied meal today because he wants to go home. Pt became very tearful, encouraged pt that we all want to help his well-being. Noted pt with good appetite during stay, no recent wt loss, no significant wounds. Besides starting oral nutrition supplements, follow as low nutrition risk at this time. Nutrition Related Findings:   Glu 114 Wound Type: None    Current Nutrition Therapies:    ADULT DIET; Regular; 5 carb choices (75 gm/meal);  Low Fat/Low Chol/High Fiber/TEDDY; Low Sodium (2 gm)    Anthropometric Measures:  Height: 6' (182.9 cm)  Current Body Wt: 210 lb 15.7 oz (95.7 kg)   BMI: 28.6    Nutrition Diagnosis:   No nutrition diagnosis at this time     Nutrition Interventions:   Food and/or Nutrient Delivery: Modify Current Diet, Start Oral Nutrition Supplement  Nutrition Education/Counseling: No recommendation at this time  Coordination of Nutrition Care: Continue to monitor while inpatient     Goals:  Goals: Meet at least 75% of estimated needs       Nutrition Monitoring and Evaluation:   Behavioral-Environmental Outcomes: None Identified  Food/Nutrient Intake Outcomes: Food and Nutrient Intake, Supplement Intake  Physical Signs/Symptoms Outcomes: Biochemical Data, Weight, Meal Time Behavior    Discharge Planning:    Continue current diet, Continue Oral Nutrition Supplement     Larissa Sandhu RD, LD  Contact: [phone]

## 2022-10-14 NOTE — PROGRESS NOTES
10/14/2022 1:25 PM  Patient Room #: 5443/4891-Q       [x] Patient Does NOT qualify       Patient Name: Amrit Bhat    (Step 1 Done by RN if possible otherwise call Pulmonary Diagnostics)  Place patient on room air at rest for at least 30 minutes. If patient falls below 88% before 30 minutes then you can record the level and stop. Record room air saturation level _99_ %. If patient is at 88% or below, they will qualify for home oxygen and you can stop. If level does not fall below 88%, fill in level above. If indicated continue to Step 2. Signature:_Dee Prudenceluis Byrne RRT__ Date: _10/14/2022__  (Step 2&3 Done by RCP)  Ambulate patient on room air until saturation falls below 89%. Record level of room air saturation with ambulation_90__ %. Next, place patient back on ___lpm oxygen and ambulate, record level __%. (Note:  this level must show improvement from room air level done with ambulation.)  If patients saturation on room air with ambulation is 88% or below AND patient shows improvement with oxygen during ambulation, they will qualify for home oxygen and you can stop. If patient does not drop below 89%, then patient should have an overnight oximetry trending on room air to see if level falls below 88%. Complete level in Step 3 below. Room air overnight oximetry level 88 % for___  cumulative minutes. If patients room air oxygen level is < 89% for at least 5 cumulative minutes, patient will qualify for home oxygen and you can stop. (Attach Night Trending Report)    Complete order below: Diagnosis:__Pneumonia__  Home oxygen at:  Length of Need: ? Lifetime ?  3 Months     ___lpm or __%   via  [] nasal cannula  []mask  [] other: Conserving Device         []continuous []  with activity  []  Nocturnal   [] Portable Tanks []  Concentrator  [] Conserving Device        Therapist Signature:_______     Date:  ___  Physician Signature:  __Electronically Signed in EMR_    Date:___  Physician Printed Name:  Germán Rdz MD  NPI:  [id number]    [] Patient Qualifies      [x] Patient Does NOT qualify

## 2022-10-14 NOTE — PROGRESS NOTES
Patient walked in sanchez 20 feet and back to room. Oxygen saturation remained in the 90's. Patient tolerated well.

## 2022-10-14 NOTE — DISCHARGE INSTR - DIET

## 2022-10-17 ENCOUNTER — CARE COORDINATION (OUTPATIENT)
Dept: CASE MANAGEMENT | Age: 87
End: 2022-10-17

## 2022-10-17 NOTE — CARE COORDINATION
urinary or bowels issues. She reports he continues to have episodes of crying. When asking about starting the Nuedexta, she states she thinks he has because her brother takes care of all the medications. Reviewed new medications and she \"thinks\" he picked up the new ones and she \"thinks\" Mayra Tam is aware of the changed and stopped medications. CTN attempted to contact Mayra Tam and left voicemail. Shelly Isaacs states that Mayra Tam is busy working as a guidance counselor at [employer]. Shelly Isaacs also unsure of whether or not her father has a hospital follow up appointment. She states [facility] but unsure of the date. She states Mayra Tam makes the appointments and Oren Eaton, who has been patient's caregiver prior to Shelly Isaacs staying with her father has been taking him to all of his appointments. Discussed importance of hospital follow up with his provider. She is unsure of who his doctor is. CTN requested that when she talk to Mayra Tam if she could get the information for his appointments and make sure to review his medications. She verbalized understanding. Briefly discussed when to contact provider with any new or worsening symptoms. Shelly Isaacs requested that we contact her for follow up calls since she is with her father 24/7. She does not have any questions or needs at this time. Care Transition Nurse reviewed discharge instructions with family who verbalized understanding. The family was given an opportunity to ask questions and does not have any further questions or concerns at this time. Were discharge instructions available to patient? Yes. Reviewed appropriate site of care based on symptoms and resources available to patient including: PCP. The family agrees to contact the PCP office for questions related to their healthcare. Advance Care Planning:   Does patient have an Advance Directive: reviewed and current.     Medication reconciliation was NOT performed with family member because she states brother takes care of all medications. Was patient discharged with a pulse oximeter? no    Non-face-to-face services provided:  Obtained and reviewed discharge summary and/or continuity of care documents  Education of patient/family/caregiver/guardian to support self-management-when to contact provider    Offered patient enrollment in the Remote Patient Monitoring (RPM) program for in-home monitoring: NA.    Care Transitions 24 Hour Call    Do you have a copy of your discharge instructions?: Yes  Do you have all of your prescriptions and are they filled?: Yes (Comment: Per Shelly Isaacs dtr who is staying with pt, she thinks he has everything)  Have you been contacted by a [address]?: No  Have you scheduled your follow up appointment?: Yes  How are you going to get to your appointment?: Car - family or friend to transport  Do you feel like you have everything you need to keep you well at home?: Yes  Care Transitions Interventions         Follow Up  No future appointments. Care Transition Nurse provided contact information. Plan for follow-up call in 3-5 days based on severity of symptoms and risk factors.   Plan for next call: symptom management-SOB, cough, any new or worsening symptoms  follow-up appointment-ensure follow up appts have been schedulded    Pooja Fish RN   Care Transition Nurse

## 2022-10-21 ENCOUNTER — CARE COORDINATION (OUTPATIENT)
Dept: CASE MANAGEMENT | Age: 87
End: 2022-10-21

## 2022-10-21 NOTE — TELEPHONE ENCOUNTER
Patient is completely out of the Eliquis-----could this be sent in today?     LV  8/18/22    [place]  None      [facility] on [address]

## 2022-10-21 NOTE — CARE COORDINATION
[facility] Care Transitions Follow Up Call    Care Transition Nurse contacted the family by telephone to follow up after admission on 10/8/22. Verified name and  with family as identifiers. Patient: Asa Mcduffie  Patient : 10/18/1930   MRN: [id number]  Reason for Admission: PNA  Discharge Date: 10/14/22 RARS: Readmission Risk Score: 23.5      Needs to be reviewed by the provider   Additional needs identified to be addressed with provider: No  none             Method of communication with provider: none. Contacted patient for care transition follow up. Spoke with patient's daughter Juan Longoria who is staying with him. She states that he still has a good appetite. States he is not very mobile and only getting up to use the bathroom. He is using his walker but she is assisting him as well. Reports that he has not had very many episodes of crying. Unsure if medication has been working. States her brother takes care of all of his medications and appointments. Discussed importance of knowing medications and scheduling hospital follow up. She states she will get a list of medications from her brother and make sure he is aware that he schedules a follow up with PCP. She reports he continues to become short of breath with exertion. Continues to have a cough. Unsure of color of sputum. No c/o of chest pain/discomfort that she is aware of. Discussed when to contact provider with any new or worsening symptoms as well as when to report to the ED. Also discussed option for WIC/UC. She verbalized understanding. No other questions or needs at this time. Addressed changes since last contact:  none  Discussed follow-up appointments. If no appointment was previously scheduled, appointment scheduling offered: Yes. Is follow up appointment scheduled within 7 days of discharge? No.    Follow Up  No future appointments.   [facility]    Care Transition Nurse reviewed red flags with family and discussed any barriers to care and/or understanding of plan of care after discharge. Discussed appropriate site of care based on symptoms and resources available to patient including: PCP  Specialist. The family agrees to contact the PCP office for questions related to their healthcare. Patients top risk factors for readmission: depression, functional cognitive ability, functional physical ability, lack of knowledge about disease, medical condition-PNA, CKD stg IV, and polypharmacy  Interventions to address risk factors: Education of patient/family/caregiver/guardian to support self-management-when to contact providers    Offered patient enrollment in the Remote Patient Monitoring (RPM) program for in-home monitoring: NA.     Care Transitions Subsequent and Final Call    Subsequent and Final Calls  Do you have any questions related to your medications?: No  Do you have any needs or concerns that I can assist you with?: No  Care Transitions Interventions  Other Interventions:             Care Transition Nurse provided contact information for future needs. Plan for follow-up call in 7-10 days based on severity of symptoms and risk factors.   Plan for next call: symptom management-SOB, CP/discomfort, cough  follow-up appointment-ensure follow up has been scheduled    Ashley Martinez RN

## 2022-10-27 ENCOUNTER — CARE COORDINATION (OUTPATIENT)
Dept: CASE MANAGEMENT | Age: 87
End: 2022-10-27

## 2022-10-27 NOTE — CARE COORDINATION
[facility] Care Transitions Follow Up Call    Care Transition Nurse contacted the family by telephone to follow up after admission on 10/8/22. Verified name and  with family as identifiers. Patient: Loly Key  Patient : 10/18/1930   MRN: [id number]  Reason for Admission: PNA  Discharge Date: 10/14/22 RARS: Readmission Risk Score: 23.5      Needs to be reviewed by the provider   Additional needs identified to be addressed with provider: No  none             Method of communication with provider: none. Contacted patient for care transition follow up. Spoke with patient's daughter Sejal Joy. She states her father is doing pretty good. Denies having any c/o chest pain/discomfort, pressure, tightness, increased shortness of breath. May become short of breath with exertion. He continues to have a cough but \"not a lot\". Cough is productive. Denies having any fever or chills. He is eating well and drinking fluids. She denies her father having any urinary or bowel issues. He is using his walker when ambulating but Sejal Joy is also assisting him when he gets up. She is still unsure of whether or not her brother scheduled his follow up appointments. She still does not know what medications her father is on because Daren Dasilva sets it up weekly. Informed her that it is very important for him to follow up with his PCP for lab work. She verbalized understanding and states she will let Daren Dasilva know. Discussed when to contact provider with any new or worsening symptoms. She verbalized understanding. No questions or needs at this time. CTN attempted to contact patient's son Daren Dasilva. Unable to reach him. Left message with contact information and request for call back. Addressed changes since last contact:  none  Discussed follow-up appointments. If no appointment was previously scheduled, appointment scheduling offered: Yes. Is follow up appointment scheduled within 7 days of discharge?  No.    Follow Up  No future appointments. Care Transition Nurse reviewed red flags with family and discussed any barriers to care and/or understanding of plan of care after discharge. Discussed appropriate site of care based on symptoms and resources available to patient including: PCP. The family agrees to contact the PCP office for questions related to their healthcare. Patients top risk factors for readmission: depression, functional cognitive ability, functional physical ability, lack of knowledge about disease, medical condition-PNA, CKD stg IV, and polypharmacy  Interventions to address risk factors: Education of patient/family/caregiver/guardian to support self-management-when to contact providers    Offered patient enrollment in the Remote Patient Monitoring (RPM) program for in-home monitoring: NA.     Care Transitions Subsequent and Final Call    Subsequent and Final Calls  Do you have any ongoing symptoms?: Yes  Patient-reported symptoms: Cough, Shortness of Breath  Have your medications changed?: No  Do you have any questions related to your medications?: No  Do you currently have any active services?: No  Do you have any needs or concerns that I can assist you with?: No  Care Transitions Interventions  Other Interventions:             Care Transition Nurse provided contact information for future needs. Plan for follow-up call in 7-10 days based on severity of symptoms and risk factors.   Plan for next call: symptom management-productive cough, SOB, any new or worsening symptoms    Rohan BARBARA Otero

## 2022-11-04 ENCOUNTER — CARE COORDINATION (OUTPATIENT)
Dept: CASE MANAGEMENT | Age: 87
End: 2022-11-04

## 2022-11-04 NOTE — CARE COORDINATION
[facility] Care Transitions Follow Up Call    Care Transition Nurse contacted the family by telephone to follow up after admission on 10/8/22. Verified name and  with family as identifiers. Patient: Danny Graff  Patient : 10/18/1930   MRN: [id number]  Reason for Admission: PNA  Discharge Date: 10/14/22 RARS: Readmission Risk Score: 23.5      Needs to be reviewed by the provider   Additional needs identified to be addressed with provider: No  none             Method of communication with provider: none. Contacted patient for care transition follow up. Spoke with patient's daughter [employer]. States that her father had his first visit with PT ([address] [place] [address]) today. States he was very exhausted and resting now. He is using his walker when ambulating as well as stand by assist.  [employer] states someone is always with him. She denies patient having any c/o chest pain/discomfort, pressure, tightness, fever. Reports that he does get cold quite often but states he has always been that way. Reports cough has improved and may only have a cough on occasion. States when he does have a cough, he will cough up phlegm. She states she still does not have any appointments on the calendar. Again she states her brother sets up his medications and appointments. Informed her that CTN has tried to contact him with no response. Discussed importance of follow up appointments. She states it may be too difficult to get him to the office. Discussed possible virtual or telephonic visit. Also discussed WIC/UC options as well as when to contact provider with any new or worsening symptoms. She verbalized understanding. No questions or needs at this time. Addressed changes since last contact:   Working with PT through [address] [place] [address]  Discussed follow-up appointments. If no appointment was previously scheduled, appointment scheduling offered: Yes. Is follow up appointment scheduled within 7 days of discharge?  No.    Follow Up  No future appointments. Care Transition Nurse reviewed red flags with family and discussed any barriers to care and/or understanding of plan of care after discharge. Discussed appropriate site of care based on symptoms and resources available to patient including: PCP. The family agrees to contact the PCP office for questions related to their healthcare. Patients top risk factors for readmission: depression, functional cognitive ability, lack of knowledge about disease, and polypharmacy, medical condition-PNA, CKD stg IV  Interventions to address risk factors: Education of patient/family/caregiver/guardian to support self-management-when to contact providers    Offered patient enrollment in the Remote Patient Monitoring (RPM) program for in-home monitoring: NA.     Care Transitions Subsequent and Final Call    Subsequent and Final Calls  Do you have any ongoing symptoms?: Yes  Patient-reported symptoms: Shortness of Breath, Cough, Weakness  Have your medications changed?: No  Do you have any questions related to your medications?: No  Do you currently have any active services?: Yes  Are you currently active with any services?: Home Health  Do you have any needs or concerns that I can assist you with?: No  Care Transitions Interventions  Other Interventions:             Care Transition Nurse provided contact information for future needs. Plan for follow-up call in 7-10 days based on severity of symptoms and risk factors.   Plan for next call: symptom management-any new or worsening symptoms    Noah Milan RN 6

## 2022-11-07 DIAGNOSIS — N40.1 BPH WITH URINARY OBSTRUCTION: ICD-10-CM

## 2022-11-07 DIAGNOSIS — N13.8 BPH WITH URINARY OBSTRUCTION: ICD-10-CM

## 2022-11-07 RX ORDER — FINASTERIDE 5 MG/1
TABLET, FILM COATED ORAL
Qty: 90 TABLET | Refills: 1 | Status: SHIPPED | OUTPATIENT
Start: 2022-11-07

## 2022-11-10 ENCOUNTER — CARE COORDINATION (OUTPATIENT)
Dept: CASE MANAGEMENT | Age: 87
End: 2022-11-10

## 2022-11-10 NOTE — CARE COORDINATION
[facility] Care Transitions Follow Up Call    Care Transition Nurse contacted the family by telephone to follow up after admission on 10/8/22. Verified name and  with family as identifiers. Patient: Loly Key  Patient : 10/18/1930   MRN: [id number]  Reason for Admission: PNA  Discharge Date: 10/14/22 RARS: Readmission Risk Score: 23.5      Needs to be reviewed by the provider   Additional needs identified to be addressed with provider: No  none             Method of communication with provider: none. Contacted patient for care transition follow up. Spoke with patient's daughter Sejal Joy. She states that her father seems to be doing pretty good. States he started working with PT. Had a visit today. She states PT works him really hard and her father becomes exhausted. Patient currently taking a nap. She states she has noticed that he does become short of breath with activity especially during his exercises with PT. Denies distress. She denies her father having any c/o chest pain/discomfort. He continues to cough up phlegm. Reports sputum is clear. She denies Sidney Echavarria having a fever but states he has always been cold natured. She is still unsure of whether or not he had a follow up with any provider. We discussed when to contact provider as well as WIC/ options. She verbalized understanding. No questions or needs at this time. Addressed changes since last contact:  none  Discussed follow-up appointments. If no appointment was previously scheduled, appointment scheduling offered: Yes. Is follow up appointment scheduled within 7 days of discharge? No.    Follow Up  No future appointments. Care Transition Nurse reviewed red flags with family and discussed any barriers to care and/or understanding of plan of care after discharge. Discussed appropriate site of care based on symptoms and resources available to patient including: PCP.  The family agrees to contact the PCP office for questions related to their healthcare. Patients top risk factors for readmission: depression, functional cognitive ability, functional physical ability, lack of knowledge about disease, medical condition-PNA, CKD stg IV, and polypharmacy  Interventions to address risk factors: Education of patient/family/caregiver/guardian to support self-management-when to contact provider    Offered patient enrollment in the Remote Patient Monitoring (RPM) program for in-home monitoring: NA.     Care Transitions Subsequent and Final Call    Subsequent and Final Calls  Do you have any ongoing symptoms?: Yes  Patient-reported symptoms: Cough, Shortness of Breath  Have your medications changed?: No  Do you have any questions related to your medications?: No  Do you currently have any active services?: Yes  Are you currently active with any services?: Home Health  Do you have any needs or concerns that I can assist you with?: No  Care Transitions Interventions  Other Interventions:             Care Transition Nurse provided contact information for future needs. Plan for follow-up call in 5-7 days based on severity of symptoms and risk factors.   Plan for next call: symptom management-any new or worsening symptoms    Rohan BARBARA Otero

## 2022-11-13 ENCOUNTER — APPOINTMENT (OUTPATIENT)
Dept: ULTRASOUND IMAGING | Age: 87
DRG: 418 | End: 2022-11-13
Payer: MEDICARE

## 2022-11-13 ENCOUNTER — APPOINTMENT (OUTPATIENT)
Dept: CT IMAGING | Age: 87
DRG: 418 | End: 2022-11-13
Payer: MEDICARE

## 2022-11-13 ENCOUNTER — APPOINTMENT (OUTPATIENT)
Dept: GENERAL RADIOLOGY | Age: 87
DRG: 418 | End: 2022-11-13
Payer: MEDICARE

## 2022-11-13 ENCOUNTER — HOSPITAL ENCOUNTER (INPATIENT)
Age: 87
LOS: 9 days | Discharge: NURSING, REHAB OR LONG-TERM CARE | DRG: 418 | End: 2022-11-22
Attending: EMERGENCY MEDICINE | Admitting: STUDENT IN AN ORGANIZED HEALTH CARE EDUCATION/TRAINING PROGRAM
Payer: MEDICARE

## 2022-11-13 DIAGNOSIS — K81.0 ACUTE CHOLECYSTITIS: ICD-10-CM

## 2022-11-13 DIAGNOSIS — R55 SYNCOPE AND COLLAPSE: Primary | ICD-10-CM

## 2022-11-13 LAB
ALBUMIN SERPL-MCNC: 3.5 GM/DL (ref 3.4–5)
ALP BLD-CCNC: 86 IU/L (ref 40–129)
ALT SERPL-CCNC: 13 U/L (ref 10–40)
ANION GAP SERPL CALCULATED.3IONS-SCNC: 11 MMOL/L (ref 4–16)
AST SERPL-CCNC: 16 IU/L (ref 15–37)
BACTERIA: NEGATIVE /HPF
BASOPHILS ABSOLUTE: 0.1 K/CU MM
BASOPHILS RELATIVE PERCENT: 0.6 % (ref 0–1)
BILIRUB SERPL-MCNC: 0.2 MG/DL (ref 0–1)
BILIRUBIN URINE: NEGATIVE MG/DL
BLOOD, URINE: ABNORMAL
BUN BLDV-MCNC: 41 MG/DL (ref 6–23)
CALCIUM SERPL-MCNC: 8.7 MG/DL (ref 8.3–10.6)
CHLORIDE BLD-SCNC: 105 MMOL/L (ref 99–110)
CLARITY: ABNORMAL
CO2: 24 MMOL/L (ref 21–32)
COLOR: ABNORMAL
CREAT SERPL-MCNC: 2.6 MG/DL (ref 0.9–1.3)
DIFFERENTIAL TYPE: ABNORMAL
EKG ATRIAL RATE: 56 BPM
EKG DIAGNOSIS: NORMAL
EKG P AXIS: 16 DEGREES
EKG P-R INTERVAL: 210 MS
EKG Q-T INTERVAL: 432 MS
EKG QRS DURATION: 94 MS
EKG QTC CALCULATION (BAZETT): 416 MS
EKG R AXIS: -47 DEGREES
EKG T AXIS: -4 DEGREES
EKG VENTRICULAR RATE: 56 BPM
EOSINOPHILS ABSOLUTE: 0.5 K/CU MM
EOSINOPHILS RELATIVE PERCENT: 5.5 % (ref 0–3)
GFR SERPL CREATININE-BSD FRML MDRD: 22 ML/MIN/1.73M2
GLUCOSE BLD-MCNC: 161 MG/DL (ref 70–99)
GLUCOSE, URINE: NEGATIVE MG/DL
HCT VFR BLD CALC: 29.9 % (ref 42–52)
HEMOGLOBIN: 9.3 GM/DL (ref 13.5–18)
IMMATURE NEUTROPHIL %: 0.5 % (ref 0–0.43)
KETONES, URINE: NEGATIVE MG/DL
LACTATE: 1.8 MMOL/L (ref 0.4–2)
LEUKOCYTE ESTERASE, URINE: NEGATIVE
LYMPHOCYTES ABSOLUTE: 1.1 K/CU MM
LYMPHOCYTES RELATIVE PERCENT: 12.4 % (ref 24–44)
MAGNESIUM: 1.9 MG/DL (ref 1.8–2.4)
MCH RBC QN AUTO: 30.5 PG (ref 27–31)
MCHC RBC AUTO-ENTMCNC: 31.1 % (ref 32–36)
MCV RBC AUTO: 98 FL (ref 78–100)
MONOCYTES ABSOLUTE: 0.7 K/CU MM
MONOCYTES RELATIVE PERCENT: 7.9 % (ref 0–4)
NITRITE URINE, QUANTITATIVE: NEGATIVE
NUCLEATED RBC %: 0 %
PDW BLD-RTO: 14.8 % (ref 11.7–14.9)
PH, URINE: 6 (ref 5–8)
PLATELET # BLD: 166 K/CU MM (ref 140–440)
PMV BLD AUTO: 9.9 FL (ref 7.5–11.1)
POTASSIUM SERPL-SCNC: 4.1 MMOL/L (ref 3.5–5.1)
PRO-BNP: 828.7 PG/ML
PROTEIN UA: NEGATIVE MG/DL
RBC # BLD: 3.05 M/CU MM (ref 4.6–6.2)
RBC URINE: 219 /HPF (ref 0–3)
SEGMENTED NEUTROPHILS ABSOLUTE COUNT: 6.3 K/CU MM
SEGMENTED NEUTROPHILS RELATIVE PERCENT: 73.1 % (ref 36–66)
SODIUM BLD-SCNC: 140 MMOL/L (ref 135–145)
SPECIFIC GRAVITY UA: 1.01 (ref 1–1.03)
TOTAL IMMATURE NEUTOROPHIL: 0.04 K/CU MM
TOTAL NUCLEATED RBC: 0 K/CU MM
TOTAL PROTEIN: 6.1 GM/DL (ref 6.4–8.2)
TRICHOMONAS: ABNORMAL /HPF
TROPONIN T: 0.04 NG/ML
TROPONIN T: 0.06 NG/ML
UROBILINOGEN, URINE: 0.2 MG/DL (ref 0.2–1)
WBC # BLD: 8.7 K/CU MM (ref 4–10.5)
WBC UA: ABNORMAL /HPF (ref 0–2)

## 2022-11-13 PROCEDURE — 83735 ASSAY OF MAGNESIUM: CPT

## 2022-11-13 PROCEDURE — 93010 ELECTROCARDIOGRAM REPORT: CPT | Performed by: INTERNAL MEDICINE

## 2022-11-13 PROCEDURE — 72170 X-RAY EXAM OF PELVIS: CPT

## 2022-11-13 PROCEDURE — 84484 ASSAY OF TROPONIN QUANT: CPT

## 2022-11-13 PROCEDURE — 83605 ASSAY OF LACTIC ACID: CPT

## 2022-11-13 PROCEDURE — 72125 CT NECK SPINE W/O DYE: CPT

## 2022-11-13 PROCEDURE — 2580000003 HC RX 258: Performed by: NURSE PRACTITIONER

## 2022-11-13 PROCEDURE — 71045 X-RAY EXAM CHEST 1 VIEW: CPT

## 2022-11-13 PROCEDURE — 1200000000 HC SEMI PRIVATE

## 2022-11-13 PROCEDURE — 93005 ELECTROCARDIOGRAM TRACING: CPT | Performed by: NURSE PRACTITIONER

## 2022-11-13 PROCEDURE — 80053 COMPREHEN METABOLIC PANEL: CPT

## 2022-11-13 PROCEDURE — 99285 EMERGENCY DEPT VISIT HI MDM: CPT

## 2022-11-13 PROCEDURE — 85025 COMPLETE CBC W/AUTO DIFF WBC: CPT

## 2022-11-13 PROCEDURE — 36415 COLL VENOUS BLD VENIPUNCTURE: CPT

## 2022-11-13 PROCEDURE — 51701 INSERT BLADDER CATHETER: CPT

## 2022-11-13 PROCEDURE — 6370000000 HC RX 637 (ALT 250 FOR IP)

## 2022-11-13 PROCEDURE — 96374 THER/PROPH/DIAG INJ IV PUSH: CPT

## 2022-11-13 PROCEDURE — 87086 URINE CULTURE/COLONY COUNT: CPT

## 2022-11-13 PROCEDURE — 6360000002 HC RX W HCPCS: Performed by: NURSE PRACTITIONER

## 2022-11-13 PROCEDURE — 70450 CT HEAD/BRAIN W/O DYE: CPT

## 2022-11-13 PROCEDURE — 81001 URINALYSIS AUTO W/SCOPE: CPT

## 2022-11-13 PROCEDURE — 96361 HYDRATE IV INFUSION ADD-ON: CPT

## 2022-11-13 PROCEDURE — 51798 US URINE CAPACITY MEASURE: CPT

## 2022-11-13 PROCEDURE — 76705 ECHO EXAM OF ABDOMEN: CPT

## 2022-11-13 PROCEDURE — 87040 BLOOD CULTURE FOR BACTERIA: CPT

## 2022-11-13 PROCEDURE — 83880 ASSAY OF NATRIURETIC PEPTIDE: CPT

## 2022-11-13 PROCEDURE — 74176 CT ABD & PELVIS W/O CONTRAST: CPT

## 2022-11-13 PROCEDURE — 94761 N-INVAS EAR/PLS OXIMETRY MLT: CPT

## 2022-11-13 PROCEDURE — 2700000000 HC OXYGEN THERAPY PER DAY

## 2022-11-13 RX ORDER — ONDANSETRON 2 MG/ML
4 INJECTION INTRAMUSCULAR; INTRAVENOUS EVERY 6 HOURS PRN
Status: DISCONTINUED | OUTPATIENT
Start: 2022-11-13 | End: 2022-11-22 | Stop reason: HOSPADM

## 2022-11-13 RX ORDER — ACETAMINOPHEN 650 MG/1
650 SUPPOSITORY RECTAL EVERY 6 HOURS PRN
Status: DISCONTINUED | OUTPATIENT
Start: 2022-11-13 | End: 2022-11-22 | Stop reason: HOSPADM

## 2022-11-13 RX ORDER — POLYETHYLENE GLYCOL 3350 17 G/17G
17 POWDER, FOR SOLUTION ORAL DAILY PRN
Status: DISCONTINUED | OUTPATIENT
Start: 2022-11-13 | End: 2022-11-15

## 2022-11-13 RX ORDER — HEPARIN SODIUM 5000 [USP'U]/ML
5000 INJECTION, SOLUTION INTRAVENOUS; SUBCUTANEOUS EVERY 8 HOURS SCHEDULED
Status: DISCONTINUED | OUTPATIENT
Start: 2022-11-13 | End: 2022-11-15

## 2022-11-13 RX ORDER — ATORVASTATIN CALCIUM 40 MG/1
40 TABLET, FILM COATED ORAL NIGHTLY
Status: DISCONTINUED | OUTPATIENT
Start: 2022-11-13 | End: 2022-11-22 | Stop reason: HOSPADM

## 2022-11-13 RX ORDER — PANTOPRAZOLE SODIUM 20 MG/1
20 TABLET, DELAYED RELEASE ORAL DAILY
Status: DISCONTINUED | OUTPATIENT
Start: 2022-11-13 | End: 2022-11-15

## 2022-11-13 RX ORDER — SODIUM CHLORIDE 0.9 % (FLUSH) 0.9 %
5-40 SYRINGE (ML) INJECTION PRN
Status: DISCONTINUED | OUTPATIENT
Start: 2022-11-13 | End: 2022-11-22 | Stop reason: HOSPADM

## 2022-11-13 RX ORDER — 0.9 % SODIUM CHLORIDE 0.9 %
1000 INTRAVENOUS SOLUTION INTRAVENOUS ONCE
Status: DISCONTINUED | OUTPATIENT
Start: 2022-11-13 | End: 2022-11-13

## 2022-11-13 RX ORDER — ONDANSETRON 4 MG/1
4 TABLET, ORALLY DISINTEGRATING ORAL EVERY 8 HOURS PRN
Status: DISCONTINUED | OUTPATIENT
Start: 2022-11-13 | End: 2022-11-15

## 2022-11-13 RX ORDER — SODIUM CHLORIDE 0.9 % (FLUSH) 0.9 %
5-40 SYRINGE (ML) INJECTION EVERY 12 HOURS SCHEDULED
Status: DISCONTINUED | OUTPATIENT
Start: 2022-11-13 | End: 2022-11-22 | Stop reason: HOSPADM

## 2022-11-13 RX ORDER — ACETAMINOPHEN 325 MG/1
650 TABLET ORAL EVERY 6 HOURS PRN
Status: DISCONTINUED | OUTPATIENT
Start: 2022-11-13 | End: 2022-11-22 | Stop reason: HOSPADM

## 2022-11-13 RX ORDER — SODIUM CHLORIDE 9 MG/ML
INJECTION, SOLUTION INTRAVENOUS PRN
Status: DISCONTINUED | OUTPATIENT
Start: 2022-11-13 | End: 2022-11-22 | Stop reason: HOSPADM

## 2022-11-13 RX ORDER — GUAIFENESIN 600 MG/1
600 TABLET, EXTENDED RELEASE ORAL 2 TIMES DAILY
Status: DISCONTINUED | OUTPATIENT
Start: 2022-11-13 | End: 2022-11-15

## 2022-11-13 RX ORDER — LEVOTHYROXINE SODIUM 0.07 MG/1
75 TABLET ORAL DAILY
Status: DISCONTINUED | OUTPATIENT
Start: 2022-11-13 | End: 2022-11-22 | Stop reason: HOSPADM

## 2022-11-13 RX ORDER — FENTANYL CITRATE 50 UG/ML
25 INJECTION, SOLUTION INTRAMUSCULAR; INTRAVENOUS ONCE
Status: COMPLETED | OUTPATIENT
Start: 2022-11-13 | End: 2022-11-13

## 2022-11-13 RX ORDER — 0.9 % SODIUM CHLORIDE 0.9 %
500 INTRAVENOUS SOLUTION INTRAVENOUS ONCE
Status: COMPLETED | OUTPATIENT
Start: 2022-11-13 | End: 2022-11-13

## 2022-11-13 RX ORDER — QUETIAPINE FUMARATE 25 MG/1
50 TABLET, FILM COATED ORAL NIGHTLY
Status: DISCONTINUED | OUTPATIENT
Start: 2022-11-13 | End: 2022-11-22 | Stop reason: HOSPADM

## 2022-11-13 RX ADMIN — ACETAMINOPHEN 650 MG: 325 TABLET ORAL at 16:00

## 2022-11-13 RX ADMIN — GUAIFENESIN 600 MG: 600 TABLET, EXTENDED RELEASE ORAL at 20:39

## 2022-11-13 RX ADMIN — SODIUM CHLORIDE 500 ML: 9 INJECTION, SOLUTION INTRAVENOUS at 10:12

## 2022-11-13 RX ADMIN — ATORVASTATIN CALCIUM 40 MG: 40 TABLET, FILM COATED ORAL at 20:39

## 2022-11-13 RX ADMIN — PANTOPRAZOLE SODIUM 20 MG: 20 TABLET, DELAYED RELEASE ORAL at 16:00

## 2022-11-13 RX ADMIN — FENTANYL CITRATE 25 MCG: 50 INJECTION INTRAMUSCULAR; INTRAVENOUS at 11:04

## 2022-11-13 RX ADMIN — LEVOTHYROXINE SODIUM 75 MCG: 0.07 TABLET ORAL at 16:00

## 2022-11-13 RX ADMIN — SERTRALINE HYDROCHLORIDE 50 MG: 50 TABLET ORAL at 16:00

## 2022-11-13 ASSESSMENT — ENCOUNTER SYMPTOMS
BACK PAIN: 1
ABDOMINAL DISTENTION: 1
ABDOMINAL PAIN: 1

## 2022-11-13 ASSESSMENT — PAIN - FUNCTIONAL ASSESSMENT: PAIN_FUNCTIONAL_ASSESSMENT: NONE - DENIES PAIN

## 2022-11-13 ASSESSMENT — PAIN SCALES - GENERAL: PAINLEVEL_OUTOF10: 0

## 2022-11-13 NOTE — ED NOTES
Pt has moderate amount of blood and small clots from urethra post straight cath. This RN did experience small amount of difficulty when inserting cath due to enlarged prostate but catheter was not forced. Hernan Wynne NP notified of blood. States to monitor amount at this time.       Renae Mendes RN  11/13/22 9041

## 2022-11-13 NOTE — ED PROVIDER NOTES
[address] ENCOUNTER        Pt Name: Mickael Siemens  MRN: [id number]  [place] 10/18/1930  Date of evaluation: 11/13/2022  Provider: ROSEMARY Herring - CNP  PCP: No primary care provider on file. I have seen and evaluated this patient with my supervising physician Carson Austin MD.      [address]tw[address]       Chief Complaint   Patient presents with    Fall    Loss of Consciousness     Using restroom and passed out; fell off toilet and into wall in front of him. HISTORY OF PRESENT ILLNESS      Chief Complaint: fall, head injury    Mickael Siemens is a 80 y.o. male who presents by EMS for evaluation after a fall and head injury. Patient cannot quite recall what occurred but believes his blood pressure may have been too low. He is only able to report that he fell. He denies any pain at present. He lives with his daughter. Nursing Notes were all reviewed and agreed with or any disagreements were addressed in the HPI. REVIEW OF SYSTEMS     Unable to obtain ROS at this time as patient answers only basic questions.       PAST MEDICAL HISTORY     Past Medical History:   Diagnosis Date    Anemia     Anxiety     Arthritis     BPH with urinary obstruction     Depression     GERD (gastroesophageal reflux disease)     Hemorrhoids     Hepatitis     Hernia of unspecified site of abdominal cavity without mention of obstruction or gangrene     Hyperlipidemia     Hypotension     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    Rt Tricep, Lt Superior Forearm       SURGICAL HISTORY     Past Surgical History:   Procedure Laterality Date    CATARACT REMOVAL      CYSTOSCOPY N/A 3/29/2021    CYSTOSCOPY EVACUATION OF CLOTS, BLADDER FULGERATION, AND SUPRA-PUBIC CATHETER INSERTION performed by Tori Carlos MD at [address] N/A 4/1/2021    CYSTOSCOPY, PROSTATE FULGURATION, EVACUATION OF CLOTS & TURP performed by Yves Amaya MD at [address] [place] [address] N/A 3/4/2021    LAPAROSCOPIC LARGE HERNIA HIATAL REPAIR WITH GASTRIC OBSTRUCTION POSS OPEN performed by Monalisa Ferrari MD at [facility]       Previous Medications    ASPIRIN 81 MG CHEWABLE TABLET    Take 1 tablet by mouth daily    ATORVASTATIN (LIPITOR) 40 MG TABLET    Take 1 tablet by mouth nightly    BENZONATATE (TESSALON) 200 MG CAPSULE    Take 1 capsule by mouth 3 times daily as needed for Cough    DEXTROMETHORPHAN-QUINIDINE (NUEDEXTA) 20-10 MG CAPS PER CAPSULE    Take 1 capsule by mouth in the morning and at bedtime    ESCITALOPRAM (LEXAPRO) 5 MG TABLET    Take 1 tablet by mouth daily    FINASTERIDE (PROSCAR) 5 MG TABLET    TAKE 1 TABLET BY MOUTH EVERY DAY    GUAIFENESIN (MUCINEX) 600 MG EXTENDED RELEASE TABLET    Take 1 tablet by mouth 2 times daily    LEVOTHYROXINE (SYNTHROID) 75 MCG TABLET    TAKE 1 TABLET BY MOUTH EVERY DAY    METOPROLOL TARTRATE (LOPRESSOR) 25 MG TABLET    Take 1 tablet by mouth 2 times daily    PANTOPRAZOLE (PROTONIX) 20 MG TABLET    TAKE 1 TABLET BY MOUTH EVERY DAY    QUETIAPINE (SEROQUEL) 50 MG TABLET    Take 1 tablet by mouth nightly    SENNOSIDES-DOCUSATE SODIUM (SENOKOT-S) 8.6-50 MG TABLET    Take 2 tablets by mouth 2 times daily as needed for Constipation    SERTRALINE (ZOLOFT) 50 MG TABLET    Take 50 mg by mouth daily    TAMSULOSIN (FLOMAX) 0.4 MG CAPSULE    Take 2 capsules by mouth nightly    TEMAZEPAM (RESTORIL) 7.5 MG CAPSULE    Take 7.5 mg by mouth nightly as needed for Sleep. ALLERGIES     Minocycline    FAMILYHISTORY       Family History   Problem Relation Age of Onset    Depression Mother     Diabetes Mother     COPD Father     Diabetes Brother     Diabetes Maternal Grandmother         SOCIAL HISTORY       Social History     Socioeconomic History    Marital status:       Spouse name: None    Number of children: None    Years of education: None Highest education level: None   Tobacco Use    Smoking status: Former     Packs/day: 1.00     Years: 5.00     Pack years: 5.00     Types: Cigarettes     Start date: 1950     Quit date: 1955     Years since quittin.0    Smokeless tobacco: Never   Substance and Sexual Activity    Alcohol use: Not Currently    Drug use: Not Currently     Social Determinants of Health     Financial Resource Strain: Low Risk     Difficulty of Paying Living Expenses: Not hard at all   Food Insecurity: No Food Insecurity    Worried About Running Out of Food in the Last Year: Never true    Ran Out of Food in the Last Year: Never true       SCREENINGS   NIH Stroke Scale  Interval: Baseline  Level of Consciousness (1a): Alert  LOC Questions (1b): Answers one correctly  LOC Commands (1c): Performs both tasks correctly  Best Gaze (2): Normal  Visual (3): No visual loss  Facial Palsy (4): Normal symmetrical movement  Motor Arm, Left (5a): No drift  Motor Arm, Right (5b): No drift  Motor Leg, Left (6a): No drift  Motor Leg, Right (6b): No drift  Limb Ataxia (7): Absent  Sensory (8): Normal  Best Language (9): No aphasia  Dysarthria (10): Mild to moderate, slurs some words  Extinction and Inattention (11): No abnormality  Total: 2Glasgow Coma Scale  Eye Opening: Spontaneous  Best Verbal Response: Oriented  Best Motor Response: Obeys commands  Newcomb Coma Scale Score: 15      PHYSICAL EXAM       ED Triage Vitals [22 0914]   BP Temp Temp Source Heart Rate Resp SpO2 Height Weight   99/63 97.4 °F (36.3 °C) Oral 56 14 97 % 6' (1.829 m) 170 lb (77.1 kg)      Constitutional:  Well developed, Well nourished. No distress. Speech is clear but slow in response is sluggish. HENT:  Normocephalic, Atraumatic, PERRL. EOMI. Sclera clear. Conjunctiva normal, No discharge. Moist mucus membranes. Neck/Lymphatics: supple, no swollen nodes  Cardiovascular:   RRR, no murmurs/rubs/gallops.   Distal cap refill and pulses intact bilateral upper extremities. PT and DP pulses are diminished bilateral lower extremities with 2-3+ pitting edema distal to proximal shin. Respiratory:   Nonlabored breathing. Faint expiratory wheezing in bases noted. Abdomen: Bowel sounds normal, Soft, No tenderness, no masses. Musculoskeletal:  There is asymmetry or calf / thigh tenderness bilaterally. No cyanosis. Bilateral upper and lower extremity ROM intact without pain or obvious deficit. Denies pain with palpation over chest, pelvic rocking. Denies tenderness midline cervical, thoracic, or lumbar spinal tenderness. Integument:   Warm, Dry. Superficial abrasion to left side of scalp. Neurologic:  Alert & oriented to person and place but not time, No focal deficits noted. Cranial nerves II through XII grossly intact. Normal gross motor coordination & motor strength bilateral upper and lower extremities  Sensation intact. Initial NIHSS is 2.   Psychiatric:  Affect normal, Mood normal.     DIAGNOSTIC RESULTS   LABS:    Labs Reviewed   CBC WITH AUTO DIFFERENTIAL - Abnormal; Notable for the following components:       Result Value    RBC 3.05 (*)     Hemoglobin 9.3 (*)     Hematocrit 29.9 (*)     MCHC 31.1 (*)     Segs Relative 73.1 (*)     Lymphocytes % 12.4 (*)     Monocytes % 7.9 (*)     Eosinophils % 5.5 (*)     Immature Neutrophil % 0.5 (*)     All other components within normal limits   COMPREHENSIVE METABOLIC PANEL - Abnormal; Notable for the following components:    BUN 41 (*)     Creatinine 2.6 (*)     Est, Glom Filt Rate 22 (*)     Glucose 161 (*)     Total Protein 6.1 (*)     All other components within normal limits   TROPONIN - Abnormal; Notable for the following components:    Troponin T 0.057 (*)     All other components within normal limits   URINALYSIS - Abnormal; Notable for the following components:    Color, UA ORANGE (*)     Clarity, UA SLIGHTLY CLOUDY (*)     Blood, Urine LARGE NUMBER OR AMOUNT OBSERVED (*)     All other components within normal limits   BRAIN NATRIURETIC PEPTIDE - Abnormal; Notable for the following components:    Pro-.7 (*)     All other components within normal limits   CULTURE, BLOOD 1   CULTURE, BLOOD 1   CULTURE, URINE   MAGNESIUM   LACTIC ACID   MICROSCOPIC URINALYSIS       When ordered, only abnormal lab results are displayed. All other labs were within normal range or not returned as of this dictation. EKG: When ordered, EKG's are interpreted by the Emergency Department Physician in the absence of a cardiologist.  Please see their note for interpretation of EKG. RADIOLOGY:   Non-plain film images such as CT, Ultrasound and MRI are read by the radiologist. Plain radiographic images are visualized and preliminarily interpreted by the  ED Provider with the below findings:    Interpretation perthe Radiologist below, if available at the time of this note:    CT ABDOMEN PELVIS WO CONTRAST Additional Contrast? None   Final Result   1. Distended gallbladder. This likely is due to a prolonged fasting state. Otherwise, I do raise the possibility of acalculous cholecystitis. 2. Trace pleural effusions with lower lobe atelectasis. 3. Diverticulosis without obvious inflammation. 4. Prostate hypertrophy with mild component of bladder outlet obstruction   with multiple bladder diverticula. CT CSpine W/O Contrast   Final Result   No acute abnormality of the cervical spine. CT Head W/O Contrast   Final Result   No acute intracranial abnormality. XR PELVIS (1-2 VIEWS)   Final Result   Normal pelvic alignment with no acute fracture. XR CHEST PORTABLE   Final Result   1. Stable mild left pleural effusion. 2. Interval diffuse symmetric vascular changes suggesting pulmonary venous   hypertension and possible developing pulmonary edema. 3. Stable mild bibasilar consolidation more prominent on the left.            XR PELVIS (1-2 VIEWS)    Result Date: 11/13/2022  EXAMINATION: ONE XRAY VIEW OF THE PELVIS 11/13/2022 9:44 am COMPARISON: CT abdomen/pelvis 08/31/2022 HISTORY: ORDERING SYSTEM PROVIDED HISTORY: fall TECHNOLOGIST PROVIDED HISTORY: Reason for exam:->fall Reason for Exam: fall FINDINGS: Single view provided. Lower lumbar degenerative disc and joint disease. Normal bilateral sacroiliac and hip alignment. No acute fracture. Subtle right lakisha pelvic cortical and trabecular coarsening with intramedullary sclerotic foci consistent with Paget's disease. Normal pelvic alignment with no acute fracture. PROCEDURES   Unless otherwise noted below, none         CRITICAL CARE   CRITICAL CARE NOTE:   N/A    CONSULTS:  None      EMERGENCY DEPARTMENT COURSE and MDM:   Vitals:    Vitals:    11/13/22 1048 11/13/22 1117 11/13/22 1119 11/13/22 1132   BP: (!) 115/53 135/73  (!) 148/74   Pulse: 61 65 64 67   Resp: 16 11 10 15   Temp:       TempSrc:       SpO2: 97% (!) 89% 93% 100%   Weight:       Height:           Patient was given thefollowing medications:  Medications   0.9 % sodium chloride bolus (0 mLs IntraVENous Stopped 11/13/22 1213)   fentaNYL (SUBLIMAZE) injection 25 mcg (25 mcg IntraVENous Given 11/13/22 1104)         Is this patient to be included in the SEP-1 Core Measure due to severe sepsis or septic shock? No   Exclusion criteria - the patient is NOT to be included for SEP-1 Core Measure due to: Infection is not suspected    MDM:  Patient presents as above. Emergent etiologies considered. Patient seen and examined. Work-up initiated secondary to presentation, physical exam findings, vital signs and medical chart review. In brief, patient presented for evaluation after a fall with syncope. Patient has dementia and his daughter who care gets for him was not present at bedside but he believes it may have been from his blood pressure being low. He was borderline hypotensive on arrival.  He had no evident trauma on exam aside from an abrasion to the top of his scalp.   He was alert and oriented to person and place and NIH stroke scale was significant only for the mild disorientation and some mild dysarthria. The dysarthria seem to resolve through the course of his visit and may have been due to some grogginess after the syncope. Laboratory studies including a CMP and CBC were significant for BUN of 41 and a creatinine of 2.6 which is stable at baseline, H&H of 9.3/30 which is also stable. BNP was 828 which is stable. Lactate was 1.8 troponin was slightly elevated at 0.057 compared to his recent levels. ECG showed a normal sinus rhythm without any evidence of ACS. Please see attending note for complete interpretation. CT of the head and C-spine were negative for acute findings. X-ray of the pelvis was negative for fracture. Chest x-ray showed a stable mild left pleural effusion with increased vascular changes suggestive of pulmonary venous hypertension and possible early pulmonary edema. Patient was complaining of some epigastric abdominal discomfort after being here for a little while and did show a distended gallbladder likely prolonged fasting however a calculus cholecystitis could not be ruled out. There is also diverticulosis without inflammation and prostate hypertrophy with mild bladder outlet obstruction and bladder diverticula. A straight catheterization was performed to obtain a urine sample which is pending. There was some mild trauma during catheterization likely secondary to his prostate enlargement and mild outlet obstruction and he has had some mild bleeding from the urethra since which is clotting off. Patient denies any lower abdominal pain or pressure at this time. Patient will be admitted for further evaluation after syncope with possible developing pulmonary edema. 1325:  Discussed case with Kassie on hospitalist team and updated on ED presentation, course, lab and imaging results. Will accept patient. CLINICAL IMPRESSION      1.  Syncope and collapse          DISPOSITION/PLAN   DISPOSITION Decision To Admit 11/13/2022 01:25:49 PM      PATIENT REFERREDTO:  No follow-up provider specified.     DISCHARGE MEDICATIONS:  New Prescriptions    No medications on file       DISCONTINUED MEDICATIONS:  Discontinued Medications    No medications on file              (Please note that portions ofthis note were completed with a voice recognition program.  Efforts were made to edit the dictations but occasionally words are mis-transcribed.)    ROSEMARY Khalil CNP (electronically signed)             ROSEMARY Martin CNP  11/13/22 8000

## 2022-11-13 NOTE — PLAN OF CARE
This evening patient was admitted to [facility] after sustaining a fall at home. Neurology and cardiology on consult. Plan is to trend trops Q3H, Neuro Q4H, and orthostatics daily. Patient did not feel strong enough to stand so orthostatics were only partially completed. Bed locked in lowest position, bed alarm activated, call light within reach, and frequent rounding in progress. Will continue to monitor. Problem: Discharge Planning  Goal: Discharge to home or other facility with appropriate resources  Outcome: Progressing     Problem: Safety - Adult  Goal: Free from fall injury  Outcome: Progressing     Problem: ABCDS Injury Assessment  Goal: Absence of physical injury  Outcome: Progressing     Problem: Anxiety  Goal: Will report anxiety at manageable levels  Description: INTERVENTIONS:  1. Administer medication as ordered  2. Teach and rehearse alternative coping skills  3. Provide emotional support with 1:1 interaction with staff  Outcome: Progressing     Problem: Decision Making  Goal: Pt/Family able to effectively weigh alternatives and participate in decision making related to treatment and care  Description: INTERVENTIONS:  1. Determine when there are differences between patient's view, family's view, and healthcare provider's view of condition  2. Facilitate patient and family articulation of goals for care  3. Help patient and family identify pros/cons of alternative solutions  4. Provide information as requested by patient/family  5. Respect patient/family right to receive or not to receive information  6. Serve as a liaison between patient and family and health care team  7. Initiate Consults from Ethics, Palliative Care or initiate [address] as is appropriate  Outcome: Progressing     Problem: Confusion  Goal: Confusion, delirium, dementia, or psychosis is improved or at baseline  Description: INTERVENTIONS:  1.  Assess for possible contributors to thought disturbance, including medications, impaired vision or hearing, underlying metabolic abnormalities, dehydration, psychiatric diagnoses, and notify attending LIP  2. [place] high risk fall precautions, as indicated  3. Provide frequent short contacts to provide reality reorientation, refocusing and direction  4. Decrease environmental stimuli, including noise as appropriate  5. Monitor and intervene to maintain adequate nutrition, hydration, elimination, sleep and activity  6. If unable to ensure safety without constant attention obtain sitter and review sitter guidelines with assigned personnel  7.  Initiate Psychosocial CNS and Spiritual Care consult, as indicated  Outcome: Progressing     Problem: Neurosensory - Adult  Goal: Achieves stable or improved neurological status  Outcome: Progressing  Goal: Absence of seizures  Outcome: Progressing  Goal: Remains free of injury related to seizures activity  Outcome: Progressing  Goal: Achieves maximal functionality and self care  Outcome: Progressing     Problem: Respiratory - Adult  Goal: Achieves optimal ventilation and oxygenation  Outcome: Progressing     Problem: Cardiovascular - Adult  Goal: Maintains optimal cardiac output and hemodynamic stability  Outcome: Progressing  Goal: Absence of cardiac dysrhythmias or at baseline  Outcome: Progressing     Problem: Skin/Tissue Integrity - Adult  Goal: Skin integrity remains intact  Outcome: Progressing  Goal: Incisions, wounds, or drain sites healing without S/S of infection  Outcome: Progressing     Problem: Musculoskeletal - Adult  Goal: Return mobility to safest level of function  Outcome: Progressing  Goal: Maintain proper alignment of affected body part  Outcome: Progressing  Goal: Return ADL status to a safe level of function  Outcome: Progressing     Problem: Gastrointestinal - Adult  Goal: Minimal or absence of nausea and vomiting  Outcome: Progressing  Goal: Maintains or returns to baseline bowel function  Outcome: Progressing  Goal: Maintains adequate nutritional intake  Outcome: Progressing     Problem: Genitourinary - Adult  Goal: Absence of urinary retention  Outcome: Progressing     Problem: Infection - Adult  Goal: Absence of infection at discharge  Outcome: Progressing  Goal: Absence of infection during hospitalization  Outcome: Progressing  Goal: Absence of fever/infection during anticipated neutropenic period  Outcome: Progressing     Problem: Metabolic/Fluid and Electrolytes - Adult  Goal: Electrolytes maintained within normal limits  Outcome: Progressing  Goal: Hemodynamic stability and optimal renal function maintained  Outcome: Progressing     Problem: Hematologic - Adult  Goal: Maintains hematologic stability  Outcome: Progressing

## 2022-11-13 NOTE — H&P
V2.0  History and Physical      Name:  Geovanna Gordon /Age/Sex: 10/18/1930  (80 y.o. male)   MRN & CSN:  [id number] & [id number] Encounter Date/Time: 2022 1:17 PM EST   Location:   PCP: No primary care provider on file. Hospital Day: 1    Assessment and Plan:   Geovanna Gordon is a 80 y.o. male with a pmh of  Anemia, BPH, GERD, HLD, Depression  who presents with Syncope, unspecified syncope type    Hospital Problems             Last Modified POA    * (Principal) Syncope, unspecified syncope type 2022 Yes       Syncope and Collapse, undetermined LOC. unwitnessed  Head injury 2/2 to above  Cardiogenic vs neurogenic  --EKG shows SB with 1st degree AVB, Incomplete RBBB, No ST elevation  --Orthostatic vitals  --AM labs  --Troponin 0.057; trend trops  --Tele  --Seizure precautions  --pBNP 828.7  --Neurochecks q4  --Cardiology and Neurology consulted; Patient seen Dr. Jesusita Crespo during previous visit  -Lactate 1.8  --CTH and CT C-spine non acute  --UA shows large blood. As reported from ED, patient did experience bleeding during catheter insertion. Monitor for retention, and bleeding;  may consider urology consult if needed    Hypertension  --on BB and ACEi- hold at this time for hypotension and syncope    CKD stage IV  Creatine 2.6. appears baseline  --AM labs  --Avoid nephrotoxins  --hold Lasix    Chronic Anemia  --Hgb 9.3  --AM labs  Monitor for bleeding    GERD  --continue PPI     HLD  --continue statin    Abdominal pain  Hx Hepatitis   --CT Abd and pelvis shows distended gallbladder; possible acalculous cholecystitis;  Diverticulosis w/o inflammation; prostate hypertrophy with mild component of bladder outlet obstruction  --Abd U/S ordered    BPH  --Patient on Proscar and Flomax     History of DVT   No longer taking Eliquis    Hypothyroidism  -Continue Synthroid    Chronic Diastolic HF  --pBNP 893.6  --Appears Euvolemic  --Monitor       Disposition:   Current Living situation: Home Lives with Daughter  Expected Disposition: Home  Estimated D/C: 1-3 days    Diet No diet orders on file   DVT Prophylaxis [x] Lovenox, []  Heparin, [] SCDs, [] Ambulation,  [] Eliquis, [] Xarelto   Code Status Prior   Surrogate Decision Maker/ POA Son and daughter     History from:     patient, family member - son, electronic medical record    History of Present Illness:     Chief Complaint:Syncope  Israel Canchola is a 80 y.o. male with pmh of anemia, BPH, GERD, HLD, depression  who presents with syncope. Patient seen at bedside in the presence of his son. He presents for an Unwitnessed fall while in the bathroom. Patient is able to tell me that he was going  to the bathroom. Family was home, however did not see him fall. Denies history of seizures, patient has had similar episode in August 2022, and was admitted. Patient further complains of right side abd pain. Patient is unable to tell me onset. He has underlying cognition impairment per family. Denies CP, N,V. Exam is non focal.      Review of Systems: Need 10 Elements   Review of Systems   Gastrointestinal:  Positive for abdominal distention and abdominal pain. Musculoskeletal:  Positive for back pain. Neurological:  Positive for syncope. Psychiatric/Behavioral:  Positive for confusion. Baseline confusion; able to answer most orientation questions appropriately. All other systems reviewed and are negative. Objective: Intake/Output Summary (Last 24 hours) at 11/13/2022 1407  Last data filed at 11/13/2022 1225  Gross per 24 hour   Intake --   Output 450 ml   Net -450 ml      Vitals:   Vitals:    11/13/22 1234 11/13/22 1303 11/13/22 1333 11/13/22 1346   BP: 139/70 (!) 152/78 114/63 (!) 154/88   Pulse: 72 70 75 85   Resp: 12 13 14 17   Temp:   97.6 °F (36.4 °C)    TempSrc:   Oral    SpO2: 100% 100% 96% 93%   Weight:       Height:           Medications Prior to Admission     Prior to Admission medications    Medication Sig Start Date End Date Taking? Authorizing Provider   finasteride (PROSCAR) 5 MG tablet TAKE 1 TABLET BY MOUTH EVERY DAY 11/7/22   Óscar Dick, DO   QUEtiapine (SEROQUEL) 50 MG tablet Take 1 tablet by mouth nightly 10/14/22   Germán Rdz MD   dextromethorphan-quiNIDine (NUEDEXTA) 20-10 MG CAPS per capsule Take 1 capsule by mouth in the morning and at bedtime 10/19/22   Germán Rdz MD   guaiFENesin [facility]) 600 MG extended release tablet Take 1 tablet by mouth 2 times daily 10/14/22   Germán Rdz MD   sennosides-docusate sodium (SENOKOT-S) 8.6-50 MG tablet Take 2 tablets by mouth 2 times daily as needed for Constipation 10/14/22   Germán Rdz MD   benzonatate (TESSALON) 200 MG capsule Take 1 capsule by mouth 3 times daily as needed for Cough 10/6/22   Keerthi Daniel PA-C   atorvastatin (LIPITOR) 40 MG tablet Take 1 tablet by mouth nightly 8/31/22   Anel Julien MD   metoprolol tartrate (LOPRESSOR) 25 MG tablet Take 1 tablet by mouth 2 times daily 8/31/22   Anel Julien MD   aspirin 81 MG chewable tablet Take 1 tablet by mouth daily 9/1/22   Anel Julien MD   ELIQUIS 2.5 MG TABS tablet TAKE 1 TABLET BY MOUTH TWICE A DAY 8/30/22 8/31/22  Óscar Dick, DO   escitalopram (LEXAPRO) 5 MG tablet Take 1 tablet by mouth daily 8/15/22   Historical Provider, MD   furosemide (LASIX) 20 MG tablet Take 1 tablet by mouth daily 8/19/22 8/31/22  Historical Provider, MD   pantoprazole (PROTONIX) 20 MG tablet TAKE 1 TABLET BY MOUTH EVERY DAY 7/5/22   Óscar Dick DO   levothyroxine (SYNTHROID) 75 MCG tablet TAKE 1 TABLET BY MOUTH EVERY DAY 5/2/22   Óscar Dick, DO   tamsulosin (FLOMAX) 0.4 MG capsule Take 2 capsules by mouth nightly 3/3/22   Adriane Houston DO   sertraline (ZOLOFT) 50 MG tablet Take 50 mg by mouth daily    Historical Provider, MD   temazepam (RESTORIL) 7.5 MG capsule Take 7.5 mg by mouth nightly as needed for Sleep.     Historical Provider, MD       Physical Exam: Need 8 Elements   Physical Exam  Constitutional:       Appearance: Normal appearance. HENT:      Head:      Comments: Laceration to head  Pulmonary:      Effort: Pulmonary effort is normal.      Breath sounds: Normal breath sounds. Abdominal:      General: Bowel sounds are normal. There is distension. Tenderness: There is no guarding or rebound. Musculoskeletal:         General: Swelling present. Comments: BLE   Skin:     General: Skin is warm and dry. Neurological:      Mental Status: He is alert. Mental status is at baseline. Psychiatric:         Mood and Affect: Mood normal.      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 Allergies: Allergies   Allergen Reactions    Minocycline Other (See Comments)     Fam HX: family history includes COPD in his father; Depression in his mother; Diabetes in his brother, maternal grandmother, and mother. Soc HX:   Social History     Socioeconomic History    Marital status:       Spouse name: None    Number of children: None    Years of education: None    Highest education level: None   Tobacco Use    Smoking status: Former     Packs/day: 1.00     Years: 5.00     Pack years: 5.00     Types: Cigarettes     Start date: 1950     Quit date: 1955     Years since quittin.0    Smokeless tobacco: Never   Substance and Sexual Activity    Alcohol use: Not Currently    Drug use: Not Currently     Social Determinants of Health     Financial Resource Strain: Low Risk     Difficulty of Paying Living Expenses: Not hard at all   Food Insecurity: No Food Insecurity    Worried About Running Out of Food in the Last Year: Never true    Ran Out of Food in the Last Year: Never true       Medications:   Medications:    Infusions:   PRN Meds:     Labs      CBC:   Recent Labs     11/13/22 0936   WBC 8.7   HGB 9.3*        BMP:    Recent Labs     11/13/22 0936      K 4.1      CO2 24   BUN 41*   CREATININE 2.6*   GLUCOSE 161*     Hepatic:   Recent Labs     11/13/22  0936   AST 16   ALT 13   BILITOT 0.2   ALKPHOS 86     Lipids:   Lab Results   Component Value Date/Time    CHOL 229 08/22/2022 02:17 AM    CHOL 157 11/19/2018 12:00 AM    HDL 69 08/22/2022 02:17 AM    TRIG 85 08/22/2022 02:17 AM     Hemoglobin A1C:   Lab Results   Component Value Date/Time    LABA1C 5.8 08/22/2022 02:17 AM     TSH:   Lab Results   Component Value Date/Time    TSH 4.07 05/24/2021 11:32 AM     Troponin:   Lab Results   Component Value Date/Time    TROPONINT 0.057 11/13/2022 09:36 AM    TROPONINT 0.028 10/08/2022 02:40 PM    TROPONINT 0.022 08/22/2022 02:17 AM     Lactic Acid: No results for input(s): LACTA in the last 72 hours.   BNP:   Recent Labs     11/13/22 0936   PROBNP 828.7*     UA:  Lab Results   Component Value Date/Time    NITRU NEGATIVE 11/13/2022 12:23 PM    NITRU Negative 01/22/2021 09:24 AM    COLORU ORANGE 11/13/2022 12:23 PM    PHUR 6.0 01/22/2021 09:24 AM    WBCUA NONE SEEN 11/13/2022 12:23 PM    RBCUA 219 11/13/2022 12:23 PM    MUCUS RARE 03/29/2022 09:11 AM    TRICHOMONAS NONE SEEN 11/13/2022 12:23 PM    YEAST RARE 03/29/2022 09:11 AM    BACTERIA NEGATIVE 11/13/2022 12:23 PM    CLARITYU SLIGHTLY CLOUDY 11/13/2022 12:23 PM    SPECGRAV 1.015 11/13/2022 12:23 PM    LEUKOCYTESUR NEGATIVE 11/13/2022 12:23 PM    UROBILINOGEN 0.2 11/13/2022 12:23 PM    BILIRUBINUR NEGATIVE 11/13/2022 12:23 PM    BLOODU LARGE NUMBER OR AMOUNT OBSERVED 11/13/2022 12:23 PM    GLUCOSEU Negative 01/22/2021 09:24 AM    KETUA NEGATIVE 11/13/2022 12:23 PM     Urine Cultures: No results found for: Edmund Cox  Blood Cultures: No results found for: BC  No results found for: BLOODCULT2  Organism: No results found for: ORG    Imaging/Diagnostics Last 24 Hours   CT ABDOMEN PELVIS WO CONTRAST Additional Contrast? None    Result Date: 11/13/2022  EXAMINATION: CT OF THE ABDOMEN AND PELVIS WITHOUT CONTRAST 11/13/2022 11:32 am TECHNIQUE: CT of the abdomen and pelvis was performed without the administration of intravenous contrast. Multiplanar reformatted images are provided for review. Automated exposure control, iterative reconstruction, and/or weight based adjustment of the mA/kV was utilized to reduce the radiation dose to as low as reasonably achievable. COMPARISON: 08/31/2022. HISTORY: ORDERING SYSTEM PROVIDED HISTORY: ABD PAIN, FALL TECHNOLOGIST PROVIDED HISTORY: Reason for exam:->ABD PAIN, FALL Additional Contrast?->None Reason for Exam: ABD PAIN, FALL FINDINGS: Lower Chest: The lung bases demonstrate trace pleural effusions with lower lobe atelectasis. Organs: The liver, spleen, pancreas and right adrenal gland appear unremarkable for a non contrasted study. The gallbladder is distended. There is a low-attenuation nodule in the left adrenal gland measuring 1.8 cm and unchanged. The kidneys demonstrate no calcifications. No hydronephrosis is seen. There is a cyst in the left kidney measuring 4.5 cm. No myometrial or bladder calculi are seen. There is circumferential bladder wall thickening. There are multiple bladder diverticula. The prostate gland is mildly enlarged. GI/Bowel: Evaluation of the bowel is limited as no enteric contrast was given. No dilated loops of bowel are seen. There is diverticular disease involving the colon but no findings to suggest active inflammation. Note is made of a small hiatal hernia.   I do not see a dilated appendix. Pelvis: No pelvic masses or fluid collections are seen. The prostate gland is mildly enlarged. There are soft tissue densities seen in the region of the inguinal canal is likely from prior hernia repair and unchanged. Peritoneum/Retroperitoneum: The abdominal aorta is not aneurysmal.  There are shotty mesenteric and retroperitoneal lymph nodes but no lymphadenopathy is seen. Bones/Soft Tissues: No acute bony abnormalities are noted. 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 1. Distended gallbladder. This likely is due to a prolonged fasting state. Otherwise, I do raise the possibility of acalculous cholecystitis. 2. Trace pleural effusions with lower lobe atelectasis. 3. Diverticulosis without obvious inflammation. 4. Prostate hypertrophy with mild component of bladder outlet obstruction with multiple bladder diverticula. XR PELVIS (1-2 VIEWS)    Result Date: 11/13/2022  EXAMINATION: ONE XRAY VIEW OF THE PELVIS 11/13/2022 9:44 am COMPARISON: CT abdomen/pelvis 08/31/2022 HISTORY: ORDERING SYSTEM PROVIDED HISTORY: fall TECHNOLOGIST PROVIDED HISTORY: Reason for exam:->fall Reason for Exam: fall FINDINGS: Single view provided. Lower lumbar degenerative disc and joint disease. Normal bilateral sacroiliac and hip alignment. No acute fracture. Subtle right lakisha pelvic cortical and trabecular coarsening with intramedullary sclerotic foci consistent with Paget's disease. Normal pelvic alignment with no acute fracture. CT Head W/O Contrast    Result Date: 11/13/2022  EXAMINATION: CT OF THE HEAD WITHOUT CONTRAST  11/13/2022 9:54 am TECHNIQUE: CT of the head was performed without the administration of intravenous contrast. Automated exposure control, iterative reconstruction, and/or weight based adjustment of the mA/kV was utilized to reduce the radiation dose to as low as reasonably achievable. COMPARISON: None.  HISTORY: ORDERING SYSTEM PROVIDED HISTORY: fall, anticoagulated TECHNOLOGIST PROVIDED HISTORY: Reason for exam:->fall, anticoagulated Has a \"code stroke\" or \"stroke alert\" been called? ->No Decision Support Exception - unselect if not a suspected or confirmed emergency medical condition->Emergency Medical Condition (MA) Reason for Exam: fall, anticoagulated FINDINGS: BRAIN/VENTRICLES: There is a cerebral atrophy. The there are bilateral white matter hypodensities, in keeping with microvascular disease. There is no acute intracranial hemorrhage, mass effect or midline shift. No abnormal extra-axial fluid collection. The gray-white differentiation is maintained without evidence of an acute infarct. There is no evidence of hydrocephalus. ORBITS: The visualized portion of the orbits demonstrate no acute abnormality. SINUSES: The visualized paranasal sinuses and mastoid air cells demonstrate no acute abnormality. SOFT TISSUES/SKULL:  No acute abnormality of the visualized skull or soft tissues. No acute intracranial abnormality. CT CSpine W/O Contrast    Result Date: 11/13/2022  EXAMINATION: CT OF THE CERVICAL SPINE WITHOUT CONTRAST 11/13/2022 9:54 am TECHNIQUE: CT of the cervical spine was performed without the administration of intravenous contrast. Multiplanar reformatted images are provided for review. Automated exposure control, iterative reconstruction, and/or weight based adjustment of the mA/kV was utilized to reduce the radiation dose to as low as reasonably achievable. COMPARISON: None. HISTORY: ORDERING SYSTEM PROVIDED HISTORY: fall TECHNOLOGIST PROVIDED HISTORY: Reason for exam:->fall Decision Support Exception - unselect if not a suspected or confirmed emergency medical condition->Emergency Medical Condition (MA) Reason for Exam: fall, anticoagulated FINDINGS: BONES/ALIGNMENT: There is no acute fracture or traumatic malalignment.  DEGENERATIVE CHANGES: There is degenerative disc disease of the C6-C7 disc, with disc space narrowing and osteophytes. There are osteoarthritic changes of the left facet joints. SOFT TISSUES: There is no prevertebral soft tissue swelling. No acute abnormality of the cervical spine. XR CHEST PORTABLE    Result Date: 11/13/2022  EXAMINATION: ONE XRAY VIEW OF THE CHEST 11/13/2022 9:44 am COMPARISON: Chest radiograph 10/10/2022. HISTORY: ORDERING SYSTEM PROVIDED HISTORY: hypotension, fall TECHNOLOGIST PROVIDED HISTORY: Reason for exam:->hypotension, fall Reason for Exam: hhypotension, fall FINDINGS: Single view provided. Stable mediastinal and cardiac silhouettes with coronary and aortic atherosclerotic calcifications. Normal lung volumes. There is symmetric diffuse interstitial vascular prominence with some ill-defined margins. Stable mild patchy right lung base opacity. Stable mild left pleural effusion and lung base consolidation. No pneumothorax or free subdiaphragmatic air. 1. Stable mild left pleural effusion. 2. Interval diffuse symmetric vascular changes suggesting pulmonary venous hypertension and possible developing pulmonary edema. 3. Stable mild bibasilar consolidation more prominent on the left.        Personally reviewed Lab Studies, Imaging, and discussed case with Dr. Jack Sandhu    Electronically signed by ROSEMARY Stein CNP on 11/13/2022 at 2:07 PM

## 2022-11-13 NOTE — ED NOTES
Ransom Najjar NP at bedside speaking with patient's son     Danielleita BARBARA Calvillo  11/13/22 [id number]

## 2022-11-13 NOTE — ED PROVIDER NOTES
I independently examined and evaluated Jackson Arredondo. In brief, 72-year-old male presented by EMS after fall, head injury. He states \"maybe my blood pressure\" when asked why he is here. He is able to tell me who he is and where he is but not the time. He lives with daughter. He denies pain anywhere. EMS had reported that he was using the restroom and passed out and fell off the toilet into the wall in front of him, has abrasion to the left head. He denies neck and back pain. No chest pain or difficulty breathing. No known vomiting or fever recently. He does have a history of dementia, previous cardiac arrest, squamous cell carcinoma, upper GI bleed, hyperglycemia, chronic kidney disease, DVT. He is listed as being on blood thinners, but cannot tell us if he is taking them    Focused exam revealed patient seated on cot, regular rate and rhythm, nonlabored respirations. And soft and nondistended. No peripheral edema. He moves all limbs to command. Airway is intact. He has no obvious extremity trauma but does have an abrasion to his left top of head. .    ED course: Patient on blood thinners, limited history due to his age and dementia, CT head and cervical spine are ordered as well as chest x-ray and pelvic x-ray but his overall exam is very reassuring. Slight elevation in troponin but he does have a history of chronic kidney disease and appears that this is stable compared to his baseline, he is not having any chest pain, EKG as below. His imaging does not show any evidence of traumatic injury. However he is elderly with cardiovascular disease with syncopal event at home, plan for admission. EKG  Sinus bradycardia with first-degree AV block, rate of 56 bpm with W[place] interval of 210 ms. Otherwise normal intervals. Incomplete right bundle branch block. No ST elevation.   No previous to compare    All diagnostic, treatment, and disposition decisions were made by myself in conjunction with the advanced practice provider. I personally saw the patient and performed a substantive portion of the visit including all aspects of the medical decision making. For all further details of the patient's emergency department visit, please see the advanced practice provider's documentation. Comment: Please note this report has been produced using speech recognition software and may contain errors related to that system including errors in grammar, punctuation, and spelling, as well as words and phrases that may be inappropriate. If there are any questions or concerns please feel free to contact the dictating provider for clarification.        Severo Castellanos MD  11/13/22 [id number]

## 2022-11-13 NOTE — ED NOTES
ED TO INPATIENT SBAR HANDOFF    Patient Name: Joselo José   :  10/18/1930  80 y.o. MRN:  [id number]  Preferred Name  Antoinette Arango  ED Room #:  TR01/01TR-01  Family/Caregiver Present yes   Restraints no   Sitter no   Sepsis Risk Score Sepsis Risk Score: 0.97    Situation  Code Status: Prior No additional code details. Allergies: Minocycline  Weight: Patient Vitals for the past 96 hrs (Last 3 readings):   Weight   22 0914 170 lb (77.1 kg)     Arrived from: home  Chief Complaint:   Chief Complaint   Patient presents with    Fall    Loss of Consciousness     Using restroom and passed out; fell off toilet and into wall in front of him. Hospital Problem/Diagnosis:  Principal Problem:    Syncope, unspecified syncope type  Resolved Problems:    * No resolved hospital problems. *    Imaging:   CT ABDOMEN PELVIS WO CONTRAST Additional Contrast? None   Final Result   1. Distended gallbladder. This likely is due to a prolonged fasting state. Otherwise, I do raise the possibility of acalculous cholecystitis. 2. Trace pleural effusions with lower lobe atelectasis. 3. Diverticulosis without obvious inflammation. 4. Prostate hypertrophy with mild component of bladder outlet obstruction   with multiple bladder diverticula. CT CSpine W/O Contrast   Final Result   No acute abnormality of the cervical spine. CT Head W/O Contrast   Final Result   No acute intracranial abnormality. XR PELVIS (1-2 VIEWS)   Final Result   Normal pelvic alignment with no acute fracture. XR CHEST PORTABLE   Final Result   1. Stable mild left pleural effusion. 2. Interval diffuse symmetric vascular changes suggesting pulmonary venous   hypertension and possible developing pulmonary edema. 3. Stable mild bibasilar consolidation more prominent on the left.            Abnormal labs:   Abnormal Labs Reviewed   CBC WITH AUTO DIFFERENTIAL - Abnormal; Notable for the following components:       Result Value RBC 3.05 (*)     Hemoglobin 9.3 (*)     Hematocrit 29.9 (*)     MCHC 31.1 (*)     Segs Relative 73.1 (*)     Lymphocytes % 12.4 (*)     Monocytes % 7.9 (*)     Eosinophils % 5.5 (*)     Immature Neutrophil % 0.5 (*)     All other components within normal limits   COMPREHENSIVE METABOLIC PANEL - Abnormal; Notable for the following components:    BUN 41 (*)     Creatinine 2.6 (*)     Est, Glom Filt Rate 22 (*)     Glucose 161 (*)     Total Protein 6.1 (*)     All other components within normal limits   TROPONIN - Abnormal; Notable for the following components:    Troponin T 0.057 (*)     All other components within normal limits   URINALYSIS - Abnormal; Notable for the following components:    Color, UA ORANGE (*)     Clarity, UA SLIGHTLY CLOUDY (*)     Blood, Urine LARGE NUMBER OR AMOUNT OBSERVED (*)     All other components within normal limits   BRAIN NATRIURETIC PEPTIDE - Abnormal; Notable for the following components:    Pro-.7 (*)     All other components within normal limits   MICROSCOPIC URINALYSIS - Abnormal; Notable for the following components:    RBC,  (*)     All other components within normal limits     Critical values: no     Abnormal Assessment Findings: Pt is forgetful - son states this is baseline for patient. Pt also has moderate amount of blood from urethra post straight cath. NP in ED notified and states to watch amount and for clotting. Pt has hx of turp procedure about a year ago for enlarged prostate.      Background  History:   Past Medical History:   Diagnosis Date    Anemia     Anxiety     Arthritis     BPH with urinary obstruction     Depression     GERD (gastroesophageal reflux disease)     Hemorrhoids     Hepatitis     Hernia of unspecified site of abdominal cavity without mention of obstruction or gangrene     Hyperlipidemia     Hypotension     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    Rt Tricep, Lt Superior Forearm       Assessment    Vitals/MEWS: MEWS Score: 0  Level of Consciousness: Alert (0)   Vitals:    11/13/22 1132 11/13/22 1234 11/13/22 1303 11/13/22 1333   BP: (!) 148/74 139/70 (!) 152/78 114/63   Pulse: 67 72 70 75   Resp: 15 12 13 14   Temp:    97.6 °F (36.4 °C)   TempSrc:    Oral   SpO2: 100% 100% 100% 96%   Weight:       Height:         FiO2 (%): nasal cannula  O2 Flow Rate: O2 Device: Nasal cannula O2 Flow Rate (L/min): 2 L/min  Cardiac Rhythm:    Pain Assessment:  [x] Verbal [] Josefina Guild Scale  Pain Scale: Pain Assessment  Pain Assessment: None - Denies Pain  Last documented pain score (0-10 scale)    Last documented pain medication administered: fentanyl @ 1104  Mental Status: alert  NIH Score:    C-SSRS: Risk of Suicide: No Risk  Bedside swallow:    Kay Coma Scale (GCS): [place] Coma Scale  Eye Opening: Spontaneous  Best Verbal Response: Oriented  Best Motor Response: Obeys commands  [place] Coma Scale Score: 15  Active LDA's:   Peripheral IV 11/13/22 Left Antecubital (Active)   Site Assessment Clean, dry & intact 11/13/22 0938   Line Status Blood return noted;Normal saline locked;Specimen collected 11/13/22 0938   Phlebitis Assessment No symptoms 11/13/22 0938   Infiltration Assessment 0 11/13/22 0938   Alcohol Cap Used No 11/13/22 0938   Dressing Status New dressing applied 11/13/22 0938   Dressing Type Transparent 11/13/22 0938   Dressing Intervention New 11/13/22 0938     PO Status: Regular  Pertinent or High Risk Medications/Drips: no   If Yes, please provide details: n/a  Pending Blood Product Administration: no     You may also review the ED PT Care Timeline found under the Summary Nursing Index tab. Recommendation    Pending orders n/a  Plan for Discharge (if known):    Additional Comments: n/a   If any further questions, please call Sending RN at [phone]    Electronically signed by: Electronically signed by Alen Ledesma RN on 11/13/2022 at 1:38 PM       Alen Ledesma RN  11/13/22 Ryan Huffman RN  11/13/22 1502

## 2022-11-14 ENCOUNTER — ANESTHESIA EVENT (OUTPATIENT)
Dept: OPERATING ROOM | Age: 87
DRG: 418 | End: 2022-11-14
Payer: MEDICARE

## 2022-11-14 ENCOUNTER — CARE COORDINATION (OUTPATIENT)
Dept: CASE MANAGEMENT | Age: 87
End: 2022-11-14

## 2022-11-14 ENCOUNTER — ANESTHESIA (OUTPATIENT)
Dept: OPERATING ROOM | Age: 87
End: 2022-11-14

## 2022-11-14 ENCOUNTER — APPOINTMENT (OUTPATIENT)
Dept: MRI IMAGING | Age: 87
DRG: 418 | End: 2022-11-14
Payer: MEDICARE

## 2022-11-14 ENCOUNTER — ANESTHESIA EVENT (OUTPATIENT)
Dept: OPERATING ROOM | Age: 87
End: 2022-11-14

## 2022-11-14 DIAGNOSIS — R60.0 BILATERAL LEG EDEMA: ICD-10-CM

## 2022-11-14 PROBLEM — K80.20 GALLSTONES: Status: ACTIVE | Noted: 2022-11-14

## 2022-11-14 LAB
ALBUMIN SERPL-MCNC: 3.6 GM/DL (ref 3.4–5)
ALP BLD-CCNC: 209 IU/L (ref 40–129)
ALT SERPL-CCNC: 232 U/L (ref 10–40)
ANION GAP SERPL CALCULATED.3IONS-SCNC: 13 MMOL/L (ref 4–16)
APTT: 43.7 SECONDS (ref 25.1–37.1)
AST SERPL-CCNC: 392 IU/L (ref 15–37)
BASOPHILS ABSOLUTE: 0 K/CU MM
BASOPHILS RELATIVE PERCENT: 0.2 % (ref 0–1)
BILIRUB SERPL-MCNC: 1.1 MG/DL (ref 0–1)
BUN BLDV-MCNC: 41 MG/DL (ref 6–23)
C-REACTIVE PROTEIN, HIGH SENSITIVITY: 121.9 MG/L
CALCIUM SERPL-MCNC: 8.7 MG/DL (ref 8.3–10.6)
CHLORIDE BLD-SCNC: 102 MMOL/L (ref 99–110)
CO2: 23 MMOL/L (ref 21–32)
CREAT SERPL-MCNC: 2.4 MG/DL (ref 0.9–1.3)
CULTURE: NORMAL
DIFFERENTIAL TYPE: ABNORMAL
EOSINOPHILS ABSOLUTE: 0 K/CU MM
EOSINOPHILS RELATIVE PERCENT: 0 % (ref 0–3)
GFR SERPL CREATININE-BSD FRML MDRD: 25 ML/MIN/1.73M2
GLUCOSE BLD-MCNC: 139 MG/DL (ref 70–99)
HCT VFR BLD CALC: 33.6 % (ref 42–52)
HEMOGLOBIN: 10.8 GM/DL (ref 13.5–18)
IMMATURE NEUTROPHIL %: 0.9 % (ref 0–0.43)
INR BLD: 1.51 INDEX
LACTATE: 1.6 MMOL/L (ref 0.4–2)
LIPASE: 18 IU/L (ref 13–60)
LV EF: 48 %
LVEF MODALITY: NORMAL
LYMPHOCYTES ABSOLUTE: 0.6 K/CU MM
LYMPHOCYTES RELATIVE PERCENT: 3.9 % (ref 24–44)
Lab: NORMAL
MCH RBC QN AUTO: 30.5 PG (ref 27–31)
MCHC RBC AUTO-ENTMCNC: 32.1 % (ref 32–36)
MCV RBC AUTO: 94.9 FL (ref 78–100)
MONOCYTES ABSOLUTE: 1.1 K/CU MM
MONOCYTES RELATIVE PERCENT: 7.1 % (ref 0–4)
NUCLEATED RBC %: 0 %
PDW BLD-RTO: 14.3 % (ref 11.7–14.9)
PLATELET # BLD: 157 K/CU MM (ref 140–440)
PMV BLD AUTO: 9.5 FL (ref 7.5–11.1)
POTASSIUM SERPL-SCNC: 4.3 MMOL/L (ref 3.5–5.1)
PROCALCITONIN: 3.44
PROTHROMBIN TIME: 19.5 SECONDS (ref 11.7–14.5)
RBC # BLD: 3.54 M/CU MM (ref 4.6–6.2)
SEGMENTED NEUTROPHILS ABSOLUTE COUNT: 14.2 K/CU MM
SEGMENTED NEUTROPHILS RELATIVE PERCENT: 87.9 % (ref 36–66)
SODIUM BLD-SCNC: 138 MMOL/L (ref 135–145)
SPECIMEN: NORMAL
TOTAL IMMATURE NEUTOROPHIL: 0.15 K/CU MM
TOTAL NUCLEATED RBC: 0 K/CU MM
TOTAL PROTEIN: 6.1 GM/DL (ref 6.4–8.2)
TROPONIN T: 0.05 NG/ML
TROPONIN T: 0.06 NG/ML
TROPONIN T: 0.06 NG/ML
WBC # BLD: 16.2 K/CU MM (ref 4–10.5)

## 2022-11-14 PROCEDURE — 80053 COMPREHEN METABOLIC PANEL: CPT

## 2022-11-14 PROCEDURE — 36410 VNPNXR 3YR/> PHY/QHP DX/THER: CPT

## 2022-11-14 PROCEDURE — 84145 PROCALCITONIN (PCT): CPT

## 2022-11-14 PROCEDURE — 2500000003 HC RX 250 WO HCPCS: Performed by: NURSE PRACTITIONER

## 2022-11-14 PROCEDURE — 85610 PROTHROMBIN TIME: CPT

## 2022-11-14 PROCEDURE — 83690 ASSAY OF LIPASE: CPT

## 2022-11-14 PROCEDURE — 36415 COLL VENOUS BLD VENIPUNCTURE: CPT

## 2022-11-14 PROCEDURE — 93308 TTE F-UP OR LMTD: CPT

## 2022-11-14 PROCEDURE — 74181 MRI ABDOMEN W/O CONTRAST: CPT

## 2022-11-14 PROCEDURE — 6370000000 HC RX 637 (ALT 250 FOR IP)

## 2022-11-14 PROCEDURE — 76937 US GUIDE VASCULAR ACCESS: CPT

## 2022-11-14 PROCEDURE — 83605 ASSAY OF LACTIC ACID: CPT

## 2022-11-14 PROCEDURE — 6360000002 HC RX W HCPCS: Performed by: STUDENT IN AN ORGANIZED HEALTH CARE EDUCATION/TRAINING PROGRAM

## 2022-11-14 PROCEDURE — 94761 N-INVAS EAR/PLS OXIMETRY MLT: CPT

## 2022-11-14 PROCEDURE — 85025 COMPLETE CBC W/AUTO DIFF WBC: CPT

## 2022-11-14 PROCEDURE — 85730 THROMBOPLASTIN TIME PARTIAL: CPT

## 2022-11-14 PROCEDURE — 84484 ASSAY OF TROPONIN QUANT: CPT

## 2022-11-14 PROCEDURE — 99221 1ST HOSP IP/OBS SF/LOW 40: CPT | Performed by: SURGERY

## 2022-11-14 PROCEDURE — 2580000003 HC RX 258: Performed by: STUDENT IN AN ORGANIZED HEALTH CARE EDUCATION/TRAINING PROGRAM

## 2022-11-14 PROCEDURE — 2580000003 HC RX 258: Performed by: INTERNAL MEDICINE

## 2022-11-14 PROCEDURE — 86140 C-REACTIVE PROTEIN: CPT

## 2022-11-14 PROCEDURE — APPSS60 APP SPLIT SHARED TIME 46-60 MINUTES: Performed by: NURSE PRACTITIONER

## 2022-11-14 PROCEDURE — 1200000000 HC SEMI PRIVATE

## 2022-11-14 PROCEDURE — 2709999900 HC NON-CHARGEABLE SUPPLY

## 2022-11-14 RX ORDER — SODIUM CHLORIDE, SODIUM LACTATE, POTASSIUM CHLORIDE, CALCIUM CHLORIDE 600; 310; 30; 20 MG/100ML; MG/100ML; MG/100ML; MG/100ML
1000 INJECTION, SOLUTION INTRAVENOUS ONCE
Status: COMPLETED | OUTPATIENT
Start: 2022-11-14 | End: 2022-11-14

## 2022-11-14 RX ORDER — FUROSEMIDE 20 MG/1
TABLET ORAL
Qty: 30 TABLET | Refills: 1 | OUTPATIENT
Start: 2022-11-14

## 2022-11-14 RX ORDER — HYDRALAZINE HYDROCHLORIDE 20 MG/ML
5 INJECTION INTRAMUSCULAR; INTRAVENOUS EVERY 6 HOURS PRN
Status: DISCONTINUED | OUTPATIENT
Start: 2022-11-14 | End: 2022-11-22 | Stop reason: HOSPADM

## 2022-11-14 RX ORDER — SODIUM CHLORIDE, SODIUM LACTATE, POTASSIUM CHLORIDE, CALCIUM CHLORIDE 600; 310; 30; 20 MG/100ML; MG/100ML; MG/100ML; MG/100ML
INJECTION, SOLUTION INTRAVENOUS CONTINUOUS
Status: DISCONTINUED | OUTPATIENT
Start: 2022-11-14 | End: 2022-11-18

## 2022-11-14 RX ADMIN — VANCOMYCIN HYDROCHLORIDE 1500 MG: 5 INJECTION, POWDER, LYOPHILIZED, FOR SOLUTION INTRAVENOUS at 13:26

## 2022-11-14 RX ADMIN — GUAIFENESIN 600 MG: 600 TABLET, EXTENDED RELEASE ORAL at 20:43

## 2022-11-14 RX ADMIN — METOPROLOL TARTRATE 25 MG: 25 TABLET, FILM COATED ORAL at 20:43

## 2022-11-14 RX ADMIN — SODIUM CHLORIDE, POTASSIUM CHLORIDE, SODIUM LACTATE AND CALCIUM CHLORIDE 900 ML: 600; 310; 30; 20 INJECTION, SOLUTION INTRAVENOUS at 14:25

## 2022-11-14 RX ADMIN — MICONAZOLE NITRATE: 2 POWDER TOPICAL at 20:45

## 2022-11-14 RX ADMIN — MICONAZOLE NITRATE: 2 POWDER TOPICAL at 00:04

## 2022-11-14 RX ADMIN — CEFEPIME HYDROCHLORIDE 2000 MG: 2 INJECTION, POWDER, FOR SOLUTION INTRAVENOUS at 12:38

## 2022-11-14 RX ADMIN — SODIUM CHLORIDE, POTASSIUM CHLORIDE, SODIUM LACTATE AND CALCIUM CHLORIDE 1000 ML: 600; 310; 30; 20 INJECTION, SOLUTION INTRAVENOUS at 12:37

## 2022-11-14 ASSESSMENT — ENCOUNTER SYMPTOMS
NAUSEA: 0
ALLERGIC/IMMUNOLOGIC NEGATIVE: 1
EYES NEGATIVE: 1
CONSTIPATION: 0
ABDOMINAL PAIN: 1
VOMITING: 0
RESPIRATORY NEGATIVE: 1

## 2022-11-14 ASSESSMENT — PAIN - FUNCTIONAL ASSESSMENT: PAIN_FUNCTIONAL_ASSESSMENT: NONE - DENIES PAIN

## 2022-11-14 ASSESSMENT — PAIN SCALES - GENERAL: PAINLEVEL_OUTOF10: 0

## 2022-11-14 NOTE — ANESTHESIA PRE PROCEDURE
Department of Anesthesiology  Preprocedure Note       Name:  Mickael Siemens   Age:  80 y.o.  :  10/18/1930                                          MRN:  [id number]         Date:  2022      Surgeon: Catrachita Aquino):  Sima Ross MD    Procedure: Procedure(s):  CHOLECYSTECTOMY LAPAROSCOPIC    Medications prior to admission:   Prior to Admission medications    Medication Sig Start Date End Date Taking?  Authorizing Provider   finasteride (PROSCAR) 5 MG tablet TAKE 1 TABLET BY MOUTH EVERY DAY 22   Óscar Dick DO   QUEtiapine (SEROQUEL) 50 MG tablet Take 1 tablet by mouth nightly  Patient taking differently: Take 100 mg by mouth 2 times daily 50 in the morning and 100 at night 10/14/22   Timothy Ortega MD   sennosides-docusate sodium (SENOKOT-S) 8.6-50 MG tablet Take 2 tablets by mouth 2 times daily as needed for Constipation  Patient taking differently: Take 2 tablets by mouth every other day 10/14/22   Timothy Ortega MD   benzonatate (TESSALON) 200 MG capsule Take 1 capsule by mouth 3 times daily as needed for Cough 10/6/22   Felecia Vaughan PA-C   atorvastatin (LIPITOR) 40 MG tablet Take 1 tablet by mouth nightly 22   Eduard Goldman MD   metoprolol tartrate (LOPRESSOR) 25 MG tablet Take 1 tablet by mouth 2 times daily 22   Eduard Goldman MD   aspirin 81 MG chewable tablet Take 1 tablet by mouth daily 22   Eduard Godlman MD   ELIQUIS 2.5 MG TABS tablet TAKE 1 TABLET BY MOUTH TWICE A DAY 22  Óscar Dick, DO   furosemide (LASIX) 20 MG tablet Take 1 tablet by mouth daily 22  Historical Provider, MD   pantoprazole (PROTONIX) 20 MG tablet TAKE 1 TABLET BY MOUTH EVERY DAY 22   Óscar Dick DO   levothyroxine (SYNTHROID) 75 MCG tablet TAKE 1 TABLET BY MOUTH EVERY DAY 22   Óscar Dick, DO   tamsulosin (FLOMAX) 0.4 MG capsule Take 2 capsules by mouth nightly 3/3/22   Adriane Houston DO   sertraline (ZOLOFT) 50 MG tablet Take 50 mg by mouth daily    Historical Provider, MD   temazepam (RESTORIL) 7.5 MG capsule Take 7.5 mg by mouth at bedtime.     Historical Provider, MD       Current medications:    Current Facility-Administered Medications   Medication Dose Route Frequency Provider Last Rate Last Admin    lactated ringers infusion   IntraVENous Continuous Harper Leon MD        HYDROmorphone (DILAUDID) injection 0.5 mg  0.5 mg IntraVENous Q4H PRN Zarina Newberry MD        vancomycin (VANCOCIN) 1,500 mg in dextrose 5 % 500 mL IVPB  1,500 mg IntraVENous Once Zarina Newberry  mL/hr at 11/14/22 1326 1,500 mg at 11/14/22 1326    [START ON 11/15/2022] vancomycin (VANCOCIN) intermittent dosing (placeholder)   Other RX Placeholder MD Kelley Aceves ON 11/15/2022] cefepime (MAXIPIME) 1000 mg IVPB minibag  1,000 mg IntraVENous Q24H Zarina Newberry MD        sodium chloride flush 0.9 % injection 5-40 mL  5-40 mL IntraVENous 2 times per day Kassie D Onyedumekwu, APRN - CNP        sodium chloride flush 0.9 % injection 5-40 mL  5-40 mL IntraVENous PRN Kassie D Onyedumekwu, APRN - CNP        0.9 % sodium chloride infusion   IntraVENous PRN Kassie D Onyedumekwu, APRN - CNP        ondansetron (ZOFRAN-ODT) disintegrating tablet 4 mg  4 mg Oral Q8H PRN Kassie D Onyedumekwu, APRN - CNP        Or    ondansetron (ZOFRAN) injection 4 mg  4 mg IntraVENous Q6H PRN Kassie D Onyedumekwu, APRN - CNP        polyethylene glycol (GLYCOLAX) packet 17 g  17 g Oral Daily PRN Kassie D Onyedumekwu, APRN - CNP        acetaminophen (TYLENOL) tablet 650 mg  650 mg Oral Q6H PRN Kassie D Onyedumekwu, APRN - CNP   650 mg at 11/13/22 1600    Or    acetaminophen (TYLENOL) suppository 650 mg  650 mg Rectal Q6H PRN Kassie D Onyedumekwu, APRN - CNP        heparin (porcine) injection 5,000 Units  5,000 Units SubCUTAneous 3 times per day ROSEMARY Devine CNP        [Held by provider] atorvastatin (LIPITOR) tablet 40 mg  40 mg Oral Nightly ROSEMARY Devine CNP   40 mg at 11/13/22 2039    levothyroxine (SYNTHROID) tablet 75 mcg  75 mcg Oral Daily Kassie D Onyedumekwu, APRN - CNP   75 mcg at 11/13/22 1600    metoprolol tartrate (LOPRESSOR) tablet 25 mg  25 mg Oral BID Kassie D Onyedumekwu, APRN - CNP        pantoprazole (PROTONIX) tablet 20 mg  20 mg Oral Daily Kassie D Onyedumekwu, APRN - CNP   20 mg at 11/13/22 1600    [Held by provider] QUEtiapine (SEROQUEL) tablet 50 mg  50 mg Oral Nightly Kassie D Onyedumekwu, APRN - CNP        sertraline (ZOLOFT) tablet 50 mg  50 mg Oral Daily Kassie D Onyedumekwu, APRN - CNP   50 mg at 11/13/22 1600    guaiFENesin (MUCINEX) extended release tablet 600 mg  600 mg Oral BID Brenita Nallen, APRN - CNP   600 mg at 11/13/22 2039    miconazole (MICOTIN) 2 % powder   Topical BID Jm Zane, APRN - CNP   Given at 11/14/22 0004       Allergies: Allergies   Allergen Reactions    Minocycline Other (See Comments)       Problem List:    Patient Active Problem List   Diagnosis Code    Hyperlipidemia E78.5    Depression F32. 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    Atelectasis J98.11    Bandemia D72.825    Thrombocytopenia, unspecified D69.6    Leukocytosis D72.829    Skin ulcer, limited to breakdown of skin (HCC) L98.491    Current moderate episode of major depressive disorder, unspecified whether recurrent (HCC) F32.1    Chronic kidney disease, stage IV (severe) (HCC) N18.4    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    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    Chest pain R07.9    Cardiac arrest (HCC) I46.9    Community acquired pneumonia of left lung, unspecified part of lung J18.9    Syncope, unspecified syncope type R55    Gallstones K80.20       Past Medical History:        Diagnosis Date    Anemia     Anxiety     Arthritis     BPH with urinary obstruction     Depression     GERD (gastroesophageal reflux disease)     Hemorrhoids     Hepatitis     Hernia of unspecified site of abdominal cavity without mention of obstruction or gangrene     Hyperlipidemia     Hypotension     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    Rt Tricep, Lt Superior Forearm       Past Surgical History:        Procedure Laterality Date    CATARACT REMOVAL      CYSTOSCOPY N/A 3/29/2021    CYSTOSCOPY EVACUATION OF CLOTS, BLADDER FULGERATION, AND SUPRA-PUBIC CATHETER INSERTION performed by Christie Redman MD at [address] 2021    CYSTOSCOPY, PROSTATE FULGURATION, EVACUATION OF CLOTS & TURP performed by Rosalia Marion MD at [address] N/A 3/4/2021    LAPAROSCOPIC LARGE HERNIA HIATAL REPAIR WITH GASTRIC OBSTRUCTION POSS OPEN performed by Cara Gibson MD at [address] [facility]         Social History:    Social History     Tobacco Use    Smoking status: Former     Packs/day: 1.00     Years: 5.00     Pack years: 5.00     Types: Cigarettes     Start date: 1950     Quit date: 1955     Years since quittin.0    Smokeless tobacco: Never   Substance Use Topics    Alcohol use: Not Currently                                Counseling given: Not Answered      Vital Signs (Current):   Vitals:    22 1509 22 2035 22 0200 22 0914   BP: (!) 157/92 (!) 140/74 (!) 156/85 (!) 145/83   Pulse: 87 (!) 105 (!) 109 98   Resp:    Temp: 97.9 °F (36.6 °C) 98 °F (36.7 °C) 98 °F (36.7 °C) 98.2 °F (36.8 °C)   TempSrc: Oral Oral Oral Oral   SpO2: 99% 98% 99% 98%   Weight: 207 lb 1.6 oz (93.9 kg)  207 lb 14.4 oz (94.3 kg)    Height: 6' (1.829 m)                                                 BP Readings from Last 3 Encounters:   11/14/22 (!) 145/83   10/14/22 (!) 180/92   08/31/22 121/69       NPO Status:                                                                                 BMI:   Wt Readings from Last 3 Encounters:   11/14/22 207 lb 14.4 oz (94.3 kg)   11/14/22 205 lb (93 kg)   10/12/22 210 lb 15.7 oz (95.7 kg)     Body mass index is 28.2 kg/m². CBC:   Lab Results   Component Value Date/Time    WBC 16.2 11/14/2022 01:34 AM    RBC 3.54 11/14/2022 01:34 AM    HGB 10.8 11/14/2022 01:34 AM    HCT 33.6 11/14/2022 01:34 AM    MCV 94.9 11/14/2022 01:34 AM    RDW 14.3 11/14/2022 01:34 AM     11/14/2022 01:34 AM       CMP:   Lab Results   Component Value Date/Time     11/14/2022 01:34 AM    K 4.3 11/14/2022 01:34 AM     11/14/2022 01:34 AM    CO2 23 11/14/2022 01:34 AM    BUN 41 11/14/2022 01:34 AM    CREATININE 2.4 11/14/2022 01:34 AM    GFRAA 28 10/14/2022 12:10 PM    AGRATIO 1.4 01/21/2021 02:16 PM    LABGLOM 25 11/14/2022 01:34 AM    GLUCOSE 139 11/14/2022 01:34 AM    PROT 6.1 11/14/2022 01:34 AM    CALCIUM 8.7 11/14/2022 01:34 AM    BILITOT 1.1 11/14/2022 01:34 AM    ALKPHOS 209 11/14/2022 01:34 AM     11/14/2022 01:34 AM     11/14/2022 01:34 AM       POC Tests: No results for input(s): POCGLU, POCNA, POCK, POCCL, POCBUN, POCHEMO, POCHCT in the last 72 hours.     Coags:   Lab Results   Component Value Date/Time    PROTIME 19.5 11/14/2022 10:49 AM    INR 1.51 11/14/2022 10:49 AM    APTT 43.7 11/14/2022 10:49 AM       HCG (If Applicable): No results found for: PREGTESTUR, PREGSERUM, HCG, HCGQUANT     ABGs: No results found for: PHART, PO2ART, GHZ7YMI, UIO9ZOK, BEART, N3SBWPSI     Type & Screen (If Applicable):  No results found for: LABABO, LABRH    Drug/Infectious Status (If Applicable):  Lab Results   Component Value Date/Time    HEPCAB NON REACTIVE 08/22/2022 12:03 PM       COVID-19 Screening (If Applicable):   Lab Results   Component Value Date/Time    COVID19 NOT DETECTED 10/08/2022 08:08 PM           Anesthesia Evaluation  Patient summary reviewed and Nursing notes reviewed  Airway:           Dental:          Pulmonary:                              Cardiovascular:  Exercise tolerance: poor (<4 METS),               Echocardiogram reviewed    Cleared by cardiology     Beta Blocker:  No for medical reason      ROS comment: Echo 2022:    Left ventricular systolic function is hyperdynamic.   Ejection fraction is visually estimated at >50%.  Mild left ventricular hypertrophy.   Sclerotic, but non-stenotic aortic valve.   Mild to moderate tricuspid regurgitation; RVSP: 37mmHg.   No evidence of any pericardial effusion. Neuro/Psych:   (+) depression/anxiety             GI/Hepatic/Renal:   (+) GERD:, renal disease: CRI,           Endo/Other:    (+) hypothyroidism, blood dyscrasia: anemia:., malignancy/cancer (skin). Abdominal:             Vascular:   + DVT, . Other Findings:           Anesthesia Plan      general     ASA 3       Induction: intravenous.                             Deedee Campa DO   11/14/2022

## 2022-11-14 NOTE — PROGRESS NOTES
Brief Neurology Note:  Have attempted to see patient x 2 today, he has been off the floor for imaging and now surgery. Will plan to see and complete consult 11/15/22. Discussed with Dr. Saulo Lynn.   Robert Chery, ROSEMARY - CNP  11/14/22 1:44 PM

## 2022-11-14 NOTE — CONSULTS
[address] [facility]  consulted by Dr. Matteo Fenton for monitoring and adjustment. Indication for treatment: Vancomycin indication: Other: Intra-abdominal infection  Goal trough: Trough Goal: 10-15 mcg/mL  AUC/TAMEKA: <500    Risk Factors for MRSA Identified:   Hospitalization within the past 90 days    Pertinent Laboratory Values:   Temp Readings from Last 3 Encounters:   11/14/22 98.2 °F (36.8 °C) (Oral)   10/14/22 98.1 °F (36.7 °C)   10/06/22 97.5 °F (36.4 °C)     Recent Labs     11/13/22  0936 11/14/22  0134   WBC 8.7 16.2*   LACTATE 1.8  --      Recent Labs     11/13/22  0936 11/14/22  0134   BUN 41* 41*   CREATININE 2.6* 2.4*     Estimated Creatinine Clearance: 23 mL/min (A) (based on SCr of 2.4 mg/dL (H)). Intake/Output Summary (Last 24 hours) at 11/14/2022 1001  Last data filed at 11/14/2022 0300  Gross per 24 hour   Intake 365 ml   Output 1850 ml   Net -1485 ml       Pertinent Cultures:   Date    Source    Results  11/13   Urine                                    In process          11/14   Blood                         Ordered       Vancomycin level:   TROUGH:  No results for input(s): VANCOTROUGH in the last 72 hours. RANDOM:  No results for input(s): VANCORANDOM in the last 72 hours. Assessment:  HPI: Hx Hepatitis  - CT Abd and pelvis shows distended gallbladder; possible acalculous cholecystitis; Diverticulosis w/o inflammation; prostate hypertrophy with mild component of bladder outlet obstruction -- Abd U/S ordered  SCr, BUN, and urine output: KAVON on CKD4 (baseline of 2.2), UOP ok  Day(s) of therapy: 1 (duration to be determined)  Vancomycin concentration:   11/15 - random at 06:00    Plan:  Due to KAVON on CKD4, start intermittent dosing based on vanco levels.   Give vancomycin 1500mg ivpb x1 dose today  Check the vanco level tomorrow am  Pharmacy will continue to monitor patient and adjust therapy as indicated    [place] 3 11/15 @06:00    Thank you for the consult. Braxton Garaz, [facility]  11/14/2022 10:01 AM

## 2022-11-14 NOTE — CARE COORDINATION
Spoke with pt's son regarding discharge plans. Pt's dght is ling with pt in a one story home. Pt has a walker. Pt is active with [facility] (aka Alec Roldan). Pt's dght helps pt with is ADLs. Pt has been to [facility] in the past. Pt has a PCP and can afford his meds. Pt plans is to return home with [facility]. however son stated if pt needs placement they are good with pt going to [facility].

## 2022-11-14 NOTE — CONSULTS
Dictation # [id number]  Poor historian   Syncope and unwitnessed fall at home  Was in the bathroom at that time  Obtain orthostatic BP  Troponin mildly elevated on admission; however does have CKD, likely contributing to increase  Denies any CP or SOB  Had Echo done in Aug, stable EF at that time  EKG shows no ischemic changes; No pauses noted on tele; HR stable  Neuro is following as well  Possible vasovagal  Will cont' to follow    Echo 8/22/2022   Summary   Left ventricular systolic function is hyperdynamic. Ejection fraction is visually estimated at >50%. Mild left ventricular hypertrophy. Sclerotic, but non-stenotic aortic valve. Mild to moderate tricuspid regurgitation; RVSP: 37 mmHg. No evidence of any pericardial effusion. Pericardial fat pad visualized. CT head 11/13/2022  Impression   No acute intracranial abnormality. CT abdomen 11/13/2022  Impression   1. Distended gallbladder. This likely is due to a prolonged fasting state. Otherwise, I do raise the possibility of acalculous cholecystitis. 2. Trace pleural effusions with lower lobe atelectasis. 3. Diverticulosis without obvious inflammation. 4. Prostate hypertrophy with mild component of bladder outlet obstruction   with multiple bladder diverticula.        Electronically signed by Princess Mendoza MD on 11/14/22 at 11:40 AM EST    Discussed with primary team regarding elevated LFT   Surgery consulted for possible gall bladder issues  Renal insuffiencey noted   Elevated trop no ACs  Echo in 8/22 normal EF  Repeat echo pending  He had vaso vagal syncope in 8/22  Anemia hx  Hx of DVT     Will follow   Keep on lopressor for now

## 2022-11-14 NOTE — CONSULTS
[facility]  Consultation Note    Ramos Bernabe M.D.      Reason for Consult: Cholecystitis      Patient's Name/Date of Birth: Cely Stark / 10/18/1930 (89 y.o.)    Date: November 14, 2022     HPI:  80-year-old male came the emergency room and was diagnosed with calculus cholecystitis. The patient was a poor historian was seen in the emergency room and there was a question of upper abdominal discomfort and pain. Blood work revealed a leukocytosis and elevated transaminases and an MRI of the abdomen did show cholelithiasis with stones in the neck of the gallbladder. The patient was initially seen by Dr. Manfred Pal who did realize that I had operated on this patient for a partially obstructed hiatal hernia over a year ago. He had contacted me and I am seeing the patient in the preoperative area. The patient is not a great historian but did recognize me as doing his previous surgery he says he is not having any significant abdominal pain nausea or vomiting but does not have an appetite. He does not have family members with him. The patient claims that his memory is not good.     Past Medical History:   Diagnosis Date    Anemia     Anxiety     Arthritis     BPH with urinary obstruction     Depression     GERD (gastroesophageal reflux disease)     Hemorrhoids     Hepatitis     Hernia of unspecified site of abdominal cavity without mention of obstruction or gangrene     Hyperlipidemia     Hypotension     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    Rt Tricep, Lt Superior Forearm       Past Surgical History:   Procedure Laterality Date    CATARACT REMOVAL      CYSTOSCOPY N/A 3/29/2021    CYSTOSCOPY EVACUATION OF CLOTS, BLADDER FULGERATION, AND SUPRA-PUBIC CATHETER INSERTION performed by Aline Traore MD at [facility] 19 N/A 4/1/2021    CYSTOSCOPY, PROSTATE FULGURATION, EVACUATION OF CLOTS & TURP performed by Rosetta Randall MD at [facility] REPAIR N/A 3/4/2021    LAPAROSCOPIC LARGE HERNIA HIATAL REPAIR WITH GASTRIC OBSTRUCTION POSS OPEN performed by Robert Ray MD at [address] [place] [address]         Allergies   Allergen Reactions    Minocycline Other (See Comments)       Family History   Problem Relation Age of Onset    Depression Mother     Diabetes Mother     COPD Father     Diabetes Brother     Diabetes Maternal Grandmother        Social History     Socioeconomic History    Marital status:      Spouse name: Not on file    Number of children: Not on file    Years of education: Not on file    Highest education level: Not on file   Occupational History    Not on file   Tobacco Use    Smoking status: Former     Packs/day: 1.00     Years: 5.00     Pack years: 5.00     Types: Cigarettes     Start date: 1950     Quit date: 1955     Years since quittin.0    Smokeless tobacco: Never   Substance and Sexual Activity    Alcohol use: Not Currently    Drug use: Not Currently    Sexual activity: Not on file   Other Topics Concern    Not on file   Social History Narrative    Not on file     Social Determinants of Health     Financial Resource Strain: Low Risk     Difficulty of Paying Living Expenses: Not hard at all   Food Insecurity: No Food Insecurity    Worried About Running Out of Food in the Last Year: Never true    Ran Out of Food in the Last Year: Never true   Transportation Needs: Not on file   Physical Activity: Not on file   Stress: Not on file   Social Connections: Not on file   Intimate Partner Violence: Not on file   Housing Stability: Not on file       ROS: Non-contributory    Physical Exam:  Vitals:    22 1358   BP: (!) 148/78   Pulse: 98   Resp: 18   Temp: 99.6 °F (37.6 °C)   SpO2: 100%   Patient is awake and alert  HEENT patient has no sclera icterus she does have multiple seborrheic keratosis on the forehead and scalp    Chest: Breath sounds were clear and equal with no rales, wheezes, or rhonchi.   Respiratory effort was normal with no retractions or use of accessory muscles. Cardiovascular: Heart sounds were normal with a regular rate and rhythm. There were no murmurs or gallops. Abdomen: Bowel sounds were normal.  The abdomen was soft and mildly distended. There was minimal tenderness, no guarding, rebound, or rigidity. There was no masses, hepatosplenomegaly, or hernias. Genitourinary: No inguinal hernias were noted on coughing and straining. Labs    CMP:    Lab Results   Component Value Date/Time     11/14/2022 01:34 AM    K 4.3 11/14/2022 01:34 AM     11/14/2022 01:34 AM    CO2 23 11/14/2022 01:34 AM    BUN 41 11/14/2022 01:34 AM    CREATININE 2.4 11/14/2022 01:34 AM    GFRAA 28 10/14/2022 12:10 PM    AGRATIO 1.4 01/21/2021 02:16 PM    LABGLOM 25 11/14/2022 01:34 AM    GLUCOSE 139 11/14/2022 01:34 AM    PROT 6.1 11/14/2022 01:34 AM    LABALBU 3.6 11/14/2022 01:34 AM    CALCIUM 8.7 11/14/2022 01:34 AM    BILITOT 1.1 11/14/2022 01:34 AM    ALKPHOS 209 11/14/2022 01:34 AM     11/14/2022 01:34 AM     11/14/2022 01:34 AM         Assessment/Plan:  27-year-old male with probable calculus early cholecystitis biliary colic  MRI did not suggest common duct stones  Transaminases are elevated bilirubin 1.1  Admit to the hospital  IV antibiotic  IV fluid  Repeat liver function test in the morning  Patient tentatively scheduled for laparoscopic cholecystectomy tomorrow at noon  If liver function test continue to climb will suggest GI consult for possible ERCP  I will contact the patient's family to discuss the plan of care.     Edy Johnson MD   11/14/2022 at 2:41 PM

## 2022-11-14 NOTE — CONSULTS
[address], [address] [place] [address]                                  CONSULTATION    PATIENT NAME: Halley Manuel                        :        10/18/1930  MED REC NO:   [id number]                          ROOM:  ACCOUNT NO:   [de-identified]                           ADMIT DATE: 2022  PROVIDER:     Mary Pedraza    CONSULT DATE:  2022    INDICATIONS:  Syncope. HISTORY OF PRESENT ILLNESS:  The patient is a 72-year-old male with a  past medical history of anemia, BPH, GERD, hypertension, and DVT. He  came into the hospital for syncopal episode. The patient was in the  bathroom when he had the syncopal episode. He is a poor historian. The  information is coming from previous notes. No family members were in  the room. He lives with his family, with his daughter, who found him on  the floor from the bathroom. He has no recall of what happened. He  does have a wound on his forehead. He states that he was having a bowel  movement while he was going to the bathroom. The patient's blood  pressure on admission was low normal at 99/63. The patient denies any  chest pain. He denies any shortness of breath. The patient's EKG was  reviewed. It showed no ischemic changes. No pauses or blocked beats. He also had mild bump in troponin and history of CKD likely contributing  to that. The patient also is having small blood clots in the urethra as  well was noted. History of DVT, on Eliquis normally at home. His last echo was on 2022. At that time, it showed left  ventricular systolic function was hyperdynamic. EF was noted greater  than 50%. Mild left ventricular hypertrophy. Mild-to-moderate  tricuspid regurgitation. No evidence of any pericardial  effusion and  no pericardial pathology.     PAST MEDICAL HISTORY:  Anemia, anxiety, arthritis, BPH, depression,  GERD, hemorrhoids, hepatitis, hernia, hypotension, hyperlipidemia, and  squamous cell carcinoma. PAST SURGICAL HISTORY:  Cataracts removal, hernia repair, and neck  surgery. SOCIAL HISTORY:  Former smoker. He does not drink any alcohol. He does  not use any illicit drugs. He lives with daughter. ALLERGIES:  MINOCYCLINE. HOME MEDICATIONS:  He is on Proscar, Senna, aspirin, Eliquis, Lasix,  Flomax, Vistaril, Seroquel, Nasonex, Lipitor, Lopressor, Lexapro,  Protonix, Synthroid, and Zoloft. REVIEW OF SYSTEMS:  Not able to be completed due to the patient being a  poor historian and very hard of hearing. PHYSICAL EXAMINATION:  GENERAL:  The patient is awake, alert, answering questions  appropriately. The patient noted on the left forehead covered with  Band-Aid. VITAL SIGNS:  The patient is afebrile at 98.2. Respirations are 14. Pulse at 109. Blood pressure 156/80. Satting at 99% on 4 liters nasal  cannula. HEENT:  Head is normocephalic and atraumatic. Pupils are equal and  reactive to light. CHEST:  Chest is equal and expansive. LUNGS:  Lungs are clear to auscultation. No wheezes, rhonchi, or rales  noted. CARDIOVASCULAR:  Heart rate and rhythm irregular. Slightly tachycardic. No clicks, gallops, or murmurs noted. ABDOMEN:  Slightly distended. Bowel sounds are present in all four  quadrants. EXTREMITIES:  No clubbing or cyanosis. 1+ lower extremity edema noted. NEUROLOGICAL:  Cranial nerves II through XII are grossly intact. LABORATORY DATA:  Baseline creatinine 2.54. Latest troponin was 0.020. ALT is at 22. AST is 92. Hemoglobin at 10.3. Hematocrit 16.2. IMPRESSION:  The patient is a 80-year-old male with a past medical  history of DVT, BPH, anemia, and GERD, who came to the hospital for  syncopal episode. This occurred when he was going to the bathroom when  he was on the toilet _____ in the bathroom. Somehow, it sounds possible  vasovagal versus orthostatic in nature. We will obtain orthostatic  blood pressures. We will continue to watch blood pressure here. Heart  rate is a bit elevated and blood pressure was elevated this morning. He  is on low-dose Lopressor _____. At this time, we will continue to hold  it until further evaluation of blood pressure. Echo was done about  three months ago. We will now look at repeating that at this time. He  is on finasteride that could be contributing to orthostatic hypotension  but it is impossible. We will continue to follow alongside the case and  make recommendations when appropriate. This plan Was discussed with Dr. Joan Balderas.     Agree with above     Iliana Merchant    D: 11/14/2022 7:40:46       T: 11/14/2022 11:53:09     [email or web address]  Job#: [id number]     Doc#: [id number]    CC:

## 2022-11-14 NOTE — CONSULTS
IV Consult comlpete. Arrow Extended Dwell PIV inserted in RUE Brachial Vessel x1 attempt with ultrasound guidance. Brisk blood return, flushes easy.

## 2022-11-14 NOTE — CONSULTS
Neurology Service Consult Note  [place] [address]   Patient Name: Valerie Liu  : 10/18/1930        Subjective:   Reason for consult: Syncope  80 y.o.  male with history of  anxiety, anemia, BPH, depresison, HTN, HLD, hepatitis presenting to [place] [address] after fall with loss of consciousness at home. Patient was reportedly using the bathroom and passed out falling off of the toilet into the wall in front of him. He was noted to have sinus bradycardia with first degree AV block on EKG. Patient was noted to have cholecystitis and underwent cholecystectomy today. Patient was seen and assessed following surgery. He is drowsy but wakes easily to voice. He is hard of hearing despite hearing aids that are in place. He is alert and oriented to self, location and situation. He shares that he was sitting on the toilet having a bowel movement. When he went to stand up he lost his balance and fell. He denies loss of consciousness and is able to give details of his fall. He denies vision changes or new weakness or sensory change. His pain is well controlled post operatively.      Past Medical History:   Diagnosis Date    Anemia     Anxiety     Arthritis     BPH with urinary obstruction     Depression     GERD (gastroesophageal reflux disease)     Hemorrhoids     Hepatitis     Hernia of unspecified site of abdominal cavity without mention of obstruction or gangrene     Hyperlipidemia     Hypotension     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    Rt Tricep, Lt Superior Forearm    :   Past Surgical History:   Procedure Laterality Date    CATARACT REMOVAL      CYSTOSCOPY N/A 3/29/2021    CYSTOSCOPY EVACUATION OF CLOTS, BLADDER FULGERATION, AND SUPRA-PUBIC CATHETER INSERTION performed by Ravinder Cason MD at [address] N/A 2021    CYSTOSCOPY, PROSTATE FULGURATION, EVACUATION OF CLOTS & TURP performed by Alan Foster MD at [facility] 7708 3/4/2021    LAPAROSCOPIC LARGE HERNIA HIATAL REPAIR WITH GASTRIC OBSTRUCTION POSS OPEN performed by Bert Philip MD at [address]       Medications:  Scheduled Meds:   sodium chloride flush  5-40 mL IntraVENous 2 times per day    heparin (porcine)  5,000 Units SubCUTAneous 3 times per day    [Held by provider] atorvastatin  40 mg Oral Nightly    levothyroxine  75 mcg Oral Daily    metoprolol tartrate  25 mg Oral BID    pantoprazole  20 mg Oral Daily    [Held by provider] QUEtiapine  50 mg Oral Nightly    sertraline  50 mg Oral Daily    guaiFENesin  600 mg Oral BID    miconazole   Topical BID     Continuous Infusions:   lactated ringers      sodium chloride       PRN Meds:.sodium chloride flush, sodium chloride, ondansetron **OR** ondansetron, polyethylene glycol, acetaminophen **OR** acetaminophen    Allergies   Allergen Reactions    Minocycline Other (See Comments)     Social History     Socioeconomic History    Marital status:       Spouse name: Not on file    Number of children: Not on file    Years of education: Not on file    Highest education level: Not on file   Occupational History    Not on file   Tobacco Use    Smoking status: Former     Packs/day: 1.00     Years: 5.00     Pack years: 5.00     Types: Cigarettes     Start date: 1950     Quit date: 1955     Years since quittin.0    Smokeless tobacco: Never   Substance and Sexual Activity    Alcohol use: Not Currently    Drug use: Not Currently    Sexual activity: Not on file   Other Topics Concern    Not on file   Social History Narrative    Not on file     Social Determinants of Health     Financial Resource Strain: Low Risk     Difficulty of Paying Living Expenses: Not hard at all   Food Insecurity: No Food Insecurity    Worried About Running Out of Food in the Last Year: Never true    Ran Out of Food in the Last Year: Never true   Transportation Needs: Not on file   Physical Activity: Not on file   Stress: Not on file   Social Connections: Not on file   Intimate Partner Violence: Not on file   Housing Stability: Not on file      Family History   Problem Relation Age of Onset    Depression Mother     Diabetes Mother     COPD Father     Diabetes Brother     Diabetes Maternal Grandmother          ROS (10 systems)  In addition to that documented in the HPI above, the additional ROS was obtained:  Constitutional: Denies fevers or chills  Eyes: Denies vision changes  ENMT: Denies sore throat  CV: Denies chest pain  Resp: Denies SOB  GI: Denies vomiting or diarrhea  : Denies painful urination  MSK: Denies recent trauma  Skin: Denies new rashes  Neuro: Denies new numbness or tingling or weakness  Endocrine: Denies unexpected weight loss  Heme: Denies bleeding disorders    Physical Exam:       Wt Readings from Last 3 Encounters:   11/14/22 207 lb 14.4 oz (94.3 kg)   10/12/22 210 lb 15.7 oz (95.7 kg)   10/06/22 217 lb 9.6 oz (98.7 kg)     Temp Readings from Last 3 Encounters:   11/14/22 98 °F (36.7 °C) (Oral)   10/14/22 98.1 °F (36.7 °C)   10/06/22 97.5 °F (36.4 °C)     BP Readings from Last 3 Encounters:   11/14/22 (!) 156/85   10/14/22 (!) 180/92   08/31/22 121/69     Pulse Readings from Last 3 Encounters:   11/14/22 (!) 109   10/14/22 67   10/06/22 82        Gen: A&O x 3, NAD, cooperative  HEENT: NC/AT, EOMI, PERRL, mmm,  neck supple, no meningeal signs  Heart:NSR  Lungs: Respirations even and unlabored  Ext: no edema, no calf tenderness b/l  Psych: normal mood and affect  Skin: no rashes or lesions    NEUROLOGIC EXAM:    Mental Status: A&O to self, location, month, NAD, speech clear, language fluent, repetition and naming intact, follows commands appropriately    Cranial Nerve Exam:   CN II-XII: PERRL, VFF, no nystagmus, no gaze paresis, sensation V1-V3 intact b/l, muscles of facial expression symmetric; hearing intact to conversational tone, palate elevates symmetrically, shoulder elevation symmetric and tongue protrudes midline with movement side to side. Motor Exam:       Strength 5/5 UE's/LE's b/l  Tone and bulk normal   No pronator drift    Deep Tendon Reflexes: 1/4 biceps, triceps, brachioradialis, trace patellar, and achilles b/l; flexor plantar responses b/l    Sensation: Sensation intact to upper extremities bilaterally, significant decrease in sensation to the bilateral lower extremities from the level of the knee through the foot right greater than left    Coordination/Cerebellum:       Tremors--none      Rapidly alternating movements: no dysdiadochokinesia b/l                Heel-to-Shin: no dysmetria b/l      Finger-to-Nose: no dysmetria b/l    Gait and stance:      Gait: deferred      LABS:     Recent Labs     11/13/22  0936 11/14/22  0134   WBC 8.7 16.2*    138   K 4.1 4.3    102   CO2 24 23   BUN 41* 41*   CREATININE 2.6* 2.4*   GLUCOSE 161* 139*          IMAGING:    CT head w/o contrast:     Impression   No acute intracranial abnormality. CT c spine w/o contrast    Impression   No acute abnormality of the cervical spine. Echo 8/22/2022   Summary   Left ventricular systolic function is hyperdynamic. Ejection fraction is visually estimated at >50%. Mild left ventricular hypertrophy. Sclerotic, but non-stenotic aortic valve. Mild to moderate tricuspid regurgitation; RVSP: 37 mmHg. No evidence of any pericardial effusion. Pericardial fat pad visualized. All imaging was personally reviewed    ASSESSMENT/PLAN:   20-year-old male who fell off of toilet, denies loss of consciousness. Patient is seen today postop from cholecystectomy. He is sleeping but wakes easily to voice. He is alert and oriented x3. Hearing is impaired despite hearing aids in place. Speech is clear, language is fluent. He has no focal or lateralizing weakness on exam.  He does have significant loss of sensation in the lower extremities right greater than left from the level of the knee through the foot.   Fall from seated position when attempting to stand without loss of consciousness. Do not suspect stroke or seizure as a contributing factor. Patient denies loss of consciousness however orthostatic near syncope could be a consideration. Also feel that severe lower extremity polyneuropathy could be a contributing factor in impairing balance and leading to fall. Neuroimaging as above-nonacute  No evidence for stroke, will defer MRI brain at this time  Do not feel that seizure was a contributing factor, no need for EEG or AED  PT/OT as recommended  Patient would benefit from balance therapy when able  S/p cholecystectomy today, management per IM/surgery  First-degree heart block with sinus bradycardia and hypotension on admission, cardiology following  No further recommendations. We will sign off at this time. Please contact us with any new concerns. Follow up: No outpatient neurology follow up needed    > 65 minutes of time spent included chart review, obtaining history, patient examination, developing plan of care, and documentation. Patient was discussed with attending neurologist Dr. Catherine Gloria. Thank you for allowing us to participate in the care of your patient. If there are any questions regarding evaluation please feel free to contact us. ROSEMARY Chisholm CNP, 11/14/2022     ------------------------------------    Attending Note:  I have rounded on this patient with DON Ibarra. I have reviewed the chart and we have discussed this case in detail. The patient was seen and examined by myself. Pertinent labs and imaging have been personally reviewed. Our findings and impressions were discussed with the patient. I concur with the Nurse Specialist's assessment and plan. Patient seen and evaluated at bedside this afternoon. He describes having a bowel movement and upon standing being unsteady on his feet resulting in a fall. He denies that he had loss of consciousness.   On presentation to the emergency department he was noted to be hypotensive and bradycardic. Suspect that fall was either mechanical in nature to an underlying peripheral neuropathy or orthostasis was a contributing factor. Although orthostats were negative here, he was status post fluid resuscitation so may not be valid. Do not suspect seizure or stroke as etiology for patient's episode. Nothing further from a neurologic standpoint.     Laura Brar DO 8/13/2022 2:08 PM

## 2022-11-14 NOTE — CARE COORDINATION
[facility] Care Transitions Follow Up Call      Patient: Mickael Siemens  Patient : 10/18/1930   MRN: [id number]  Reason for Admission:   Discharge Date: 10/14/22 RARS: Readmission Risk Score: 24.6    Patient readmitted on 22 for syncope. Episode resolved per protocol. CT staff to follow if criteria met upon discharge.       Tami Molina RN

## 2022-11-14 NOTE — CONSULTS
Department of General Surgery   Surgical Service Dr. Glo Sam   Consult Note    Date of Consult: 11/14/22    Reason for Consult:  abdominal pain    Requesting Physician:  hospitalist    CHIEF COMPLAINT:  syncope    History Obtained From:  patient, electronic medical record    HISTORY OF PRESENT ILLNESS:      The patient is a 80 y.o. male who presented to the ED with complaints described as:    Location: epigastric, RUQ  Quality: achy  Severity: does not specify /10  Duration: unknown  Timing: acute  Context: denies  Modifying factors: denies  Associated signs and symptoms: denies Mickael Siemens is a 81 yo male that presented to the ED with complaints of passing out. States that he fell off his toilet. When asked about abdominal pain he denies but on exam he is having some mid epigastric tenderness and RUQ tenderness. He denies nausea/vomiting, fever/chills, CP/shortness of breath    Consult was placed to general surgery due to new leukocytosis, transaminitis, and uptrending bilirubin. Concern for cholecystitis.       Past Medical History:    Past Medical History:   Diagnosis Date    Anemia     Anxiety     Arthritis     BPH with urinary obstruction     Depression     GERD (gastroesophageal reflux disease)     Hemorrhoids     Hepatitis     Hernia of unspecified site of abdominal cavity without mention of obstruction or gangrene     Hyperlipidemia     Hypotension     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    Rt Tricep, Lt Superior Forearm       Past Surgical History:    Past Surgical History:   Procedure Laterality Date    CATARACT REMOVAL      CYSTOSCOPY N/A 3/29/2021    CYSTOSCOPY EVACUATION OF CLOTS, BLADDER FULGERATION, AND SUPRA-PUBIC CATHETER INSERTION performed by Tori Carlos MD at [address] [place] [address] N/A 4/1/2021    CYSTOSCOPY, PROSTATE FULGURATION, EVACUATION OF CLOTS & TURP performed by Sara Dacosta MD at [facility] 3/4/2021    LAPAROSCOPIC LARGE HERNIA HIATAL REPAIR WITH GASTRIC OBSTRUCTION POSS OPEN performed by Madisyn Vu MD at [address] [place] [address]         Current Medications:   Current Facility-Administered Medications   Medication Dose Route Frequency Provider Last Rate Last Admin    lactated ringers infusion   IntraVENous Continuous Jocelyn Hill MD        lactated ringers infusion 1,000 mL  1,000 mL IntraVENous Once Sarbjit Crook MD        HYDROmorphone (DILAUDID) injection 0.5 mg  0.5 mg IntraVENous Q4H PRN Sarbjit Crook MD        cefepime (MAXIPIME) 2000 mg IVPB minibag  2,000 mg IntraVENous Once Sarbjit Crook MD        Followed by    Nila Garnica ON 11/15/2022] cefepime (MAXIPIME) 2000 mg IVPB minibag  2,000 mg IntraVENous Q24H Sarbjit Crook MD        sodium chloride flush 0.9 % injection 5-40 mL  5-40 mL IntraVENous 2 times per day Kassie D Onyedumekwu, APRN - CNP        sodium chloride flush 0.9 % injection 5-40 mL  5-40 mL IntraVENous PRN Kassie D Onyedumekwu, APRN - CNP        0.9 % sodium chloride infusion   IntraVENous PRN Kassie D Onyedumekwu, APRN - CNP        ondansetron (ZOFRAN-ODT) disintegrating tablet 4 mg  4 mg Oral Q8H PRN Kassie D Onyedumekwu, APRN - CNP        Or    ondansetron (ZOFRAN) injection 4 mg  4 mg IntraVENous Q6H PRN Kassie D Onyedumekwu, APRN - CNP        polyethylene glycol (GLYCOLAX) packet 17 g  17 g Oral Daily PRN Kassie D Onyedumekwu, APRN - CNP        acetaminophen (TYLENOL) tablet 650 mg  650 mg Oral Q6H PRN Kassie D Onyedumekwu, APRN - CNP   650 mg at 11/13/22 1600    Or    acetaminophen (TYLENOL) suppository 650 mg  650 mg Rectal Q6H PRN Kassie D Onyedumekwu, APRN - CNP        heparin (porcine) injection 5,000 Units  5,000 Units SubCUTAneous 3 times per day Kassie D Onyedumekwu, APRN - CNP        [Held by provider] atorvastatin (LIPITOR) tablet 40 mg  40 mg Oral Nightly Kassie D Onyedumekwu, APRN - CNP   40 mg at 11/13/22 2039    levothyroxine (SYNTHROID) tablet 75 mcg  75 mcg Oral Daily ROSEMARY Devine CNP   75 mcg at 22 1600    metoprolol tartrate (LOPRESSOR) tablet 25 mg  25 mg Oral BID Kassie D Onyemekwu, APRN - CNP        pantoprazole (PROTONIX) tablet 20 mg  20 mg Oral Daily Kassie D kwu, APRN - CNP   20 mg at 22 1600    [Held by provider] QUEtiapine (SEROQUEL) tablet 50 mg  50 mg Oral Nightly Kassie D Onyedumekwu, APRN - CNP        sertraline (ZOLOFT) tablet 50 mg  50 mg Oral Daily Kassie D Onyemekwu, APRN - CNP   50 mg at 22 1600    guaiFENesin (MUCINEX) extended release tablet 600 mg  600 mg Oral BID Leobardoa Lorrie, APRN - CNP   600 mg at 22    miconazole (MICOTIN) 2 % powder   Topical BID Clay Elaineris, APRN - CNP   Given at 22 0004       Allergies:  Minocycline    Social History:   Social History     Socioeconomic History    Marital status:      Spouse name: None    Number of children: None    Years of education: None    Highest education level: None   Tobacco Use    Smoking status: Former     Packs/day: 1.00     Years: 5.00     Pack years: 5.00     Types: Cigarettes     Start date: 1950     Quit date: 1955     Years since quittin.0    Smokeless tobacco: Never   Substance and Sexual Activity    Alcohol use: Not Currently    Drug use: Not Currently     Social Determinants of Health     Financial Resource Strain: Low Risk     Difficulty of Paying Living Expenses: Not hard at all   Food Insecurity: No Food Insecurity    Worried About Running Out of Food in the Last Year: Never true    Ran Out of Food in the Last Year: Never true       Family History:   Family History   Problem Relation Age of Onset    Depression Mother     Diabetes Mother     COPD Father     Diabetes Brother     Diabetes Maternal Grandmother        REVIEW OFSYSTEMS:    Review of Systems   Constitutional:  Negative for chills and fever. HENT:  Positive for hearing loss. Wears hearing aids but currently does not have them   Eyes: Negative. Respiratory: Negative. Cardiovascular: Negative. Gastrointestinal:  Positive for abdominal pain. Negative for constipation, nausea and vomiting. Endocrine: Negative. Genitourinary: Negative. Musculoskeletal: Negative. Skin: Negative. Allergic/Immunologic: Negative. Neurological:  Positive for syncope. Hematological: Negative. Psychiatric/Behavioral: Negative. PHYSICAL EXAM:  Vitals:    11/13/22 1509 11/13/22 2035 11/14/22 0200 11/14/22 0914   BP: (!) 157/92 (!) 140/74 (!) 156/85 (!) 145/83   Pulse: 87 (!) 105 (!) 109 98   Resp: 20 17 14 17   Temp: 97.9 °F (36.6 °C) 98 °F (36.7 °C) 98 °F (36.7 °C) 98.2 °F (36.8 °C)   TempSrc: Oral Oral Oral Oral   SpO2: 99% 98% 99% 98%   Weight: 207 lb 1.6 oz (93.9 kg)  207 lb 14.4 oz (94.3 kg)    Height: 6' (1.829 m)          Physical Exam  Vitals and nursing note reviewed. HENT:      Head: Normocephalic and atraumatic. Right Ear: External ear normal.      Left Ear: External ear normal.      Nose: Nose normal.      Mouth/Throat:      Mouth: Mucous membranes are dry. Eyes:      Extraocular Movements: Extraocular movements intact. Conjunctiva/sclera: Conjunctivae normal.      Pupils: Pupils are equal, round, and reactive to light. Cardiovascular:      Rate and Rhythm: Normal rate. Pulses: Normal pulses. Pulmonary:      Effort: Pulmonary effort is normal.   Abdominal:      Palpations: Abdomen is soft. Tenderness: There is abdominal tenderness. Comments: Epigastric and RUQ. Musculoskeletal:         General: Normal range of motion. Cervical back: Normal range of motion and neck supple. Skin:     General: Skin is warm and dry. Neurological:      General: No focal deficit present. Mental Status: He is alert. Mental status is at baseline. Comments: Answers questions appropriately.  Unaware of year   Psychiatric:         Mood and Affect: Mood normal.         Behavior: Behavior normal.     DATA:    CBC with Differential:    Lab Results   Component Value Date/Time    WBC 16.2 11/14/2022 01:34 AM    RBC 3.54 11/14/2022 01:34 AM    HGB 10.8 11/14/2022 01:34 AM    HCT 33.6 11/14/2022 01:34 AM     11/14/2022 01:34 AM    MCV 94.9 11/14/2022 01:34 AM    MCH 30.5 11/14/2022 01:34 AM    MCHC 32.1 11/14/2022 01:34 AM    RDW 14.3 11/14/2022 01:34 AM    NRBC 1 03/09/2021 08:00 AM    SEGSPCT 87.9 11/14/2022 01:34 AM    BANDSPCT 1 10/09/2022 08:12 AM    LYMPHOPCT 3.9 11/14/2022 01:34 AM    PROMYELOPCT 1 07/22/2021 11:15 AM    MONOPCT 7.1 11/14/2022 01:34 AM    MYELOPCT 1 09/03/2021 01:30 PM    EOSPCT 6.1 06/17/2021 12:00 AM    BASOPCT 0.2 11/14/2022 01:34 AM    MONOSABS 1.1 11/14/2022 01:34 AM    LYMPHSABS 0.6 11/14/2022 01:34 AM    EOSABS 0.0 11/14/2022 01:34 AM    BASOSABS 0.0 11/14/2022 01:34 AM    DIFFTYPE AUTOMATED DIFFERENTIAL 11/14/2022 01:34 AM     CMP:    Lab Results   Component Value Date/Time     11/14/2022 01:34 AM    K 4.3 11/14/2022 01:34 AM     11/14/2022 01:34 AM    CO2 23 11/14/2022 01:34 AM    BUN 41 11/14/2022 01:34 AM    CREATININE 2.4 11/14/2022 01:34 AM    GFRAA 28 10/14/2022 12:10 PM    AGRATIO 1.4 01/21/2021 02:16 PM    LABGLOM 25 11/14/2022 01:34 AM    GLUCOSE 139 11/14/2022 01:34 AM    PROT 6.1 11/14/2022 01:34 AM    LABALBU 3.6 11/14/2022 01:34 AM    CALCIUM 8.7 11/14/2022 01:34 AM    BILITOT 1.1 11/14/2022 01:34 AM    ALKPHOS 209 11/14/2022 01:34 AM     11/14/2022 01:34 AM     11/14/2022 01:34 AM       IMPRESSION:      US abdomen  Impression   1. Cholelithiasis with gallbladder distention. No ultrasound evidence of   acute cholecystitis. 2. Borderline distended common bile duct measuring 0.7 cm in diameter. 3. No ascites. CT AP  Impression   1. Distended gallbladder. This likely is due to a prolonged fasting state. Otherwise, I do raise the possibility of acalculous cholecystitis. 2. Trace pleural effusions with lower lobe atelectasis.    3. Diverticulosis without obvious inflammation. 4. Prostate hypertrophy with mild component of bladder outlet obstruction   with multiple bladder diverticula. Patient Active Problem List:     Hyperlipidemia     Depression     Primary insomnia     Dysuria     Vitamin D deficiency     Seborrheic keratosis, inflamed     Actinic keratosis     Seborrheic keratosis     SCC (squamous cell carcinoma)     Varicose 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     Anxiety     BPH with urinary obstruction     Acquired hypothyroidism     UGIB (upper gastrointestinal bleed)     Hematuria     Hyperglycemia     Bullous pemphigoid     Atelectasis     Bandemia     Thrombocytopenia, unspecified     Leukocytosis     Skin ulcer, limited to breakdown of skin (HCC)     Current moderate episode of major depressive disorder, unspecified whether recurrent (HCC)     Chronic kidney disease, stage IV (severe) (HCC)     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 - Will obtain MRCP to rule out CBD stone. ERCP if stone is present  - Antibiotics  - Discussed with patient that if he needed surgery for his gallbladder is this is something that he would want and he was agreeable. Consent obtained for laparoscopic cholecystectomy. Will call and discuss with son Yovanny Woodall who is POA. - Will follow. - Patient seen with Dr. Carrol Ortega, ROSEMARY - CNP    Reviewed chart, labs, images. Pt came in with CC of fall. Agree with above. Pt evaluated. Denies abdominal pain.   He does have some epigastric pain on palpation without much RUQ pain. But during previous exams reportedly had RUQ pain. On U/S pt had gallstones and GB distention without any evidence of acute cholecystitis. Since that time (yesterday) pt had increase in WBC count and elevated liver enzymes. He had MRI ordered which is being done currently. If MRCP shows acute cholecystitis will plan lap shaka. Above plan d/w pt. Will follow. Madison Ojeda II, MD      MRI:  Impression   1. Calculi are visualized within the gallbladder neck and proximal cystic   duct with moderate upstream gallbladder distension and mild pericholecystic   fluid/stranding. No significant gallbladder wall thickening. Findings are   favored to represent mild/early acute cholecystitis. 2.  There are multiple T2 hyperintense pancreatic lesions measuring up to 3.4   cm. Per ACR White Paper recommendations, follow-up imaging with contrast   should be obtained in 6 months. 3.  A previously described left adrenal nodule demonstrates characteristics   consistent with a benign adrenal adenoma. While reviewing MRI also reviewed chart further and discovered pt was previously operated on by Dr. Hodan Eduardo (3/2021). Given this patient is known to him, I will defer further management to him. I did call Dr. Hodan Eduardo via [address] [place] [address] however was unable to reach. I subsequently texted him to update him.      Peg Salinas MD

## 2022-11-14 NOTE — PROGRESS NOTES
V2.0  [facility] [place] Hospitalist Progress Note      Name:  Shahbaz Cast /Age/Sex: 10/18/1930  (80 y.o. male)   MRN & CSN:  [id number] & [id number] Encounter Date/Time: 2022 4:16 PM EST    Location:  5681/7919-P PCP: No primary care provider on file. Hospital Day: 2    Assessment and Plan:   Shahbaz Cast is a 80 y.o. male with pmh of BPH, HLD, history of DVT, hypothyroidism, chronic diastolic heart failure, GERD, chronic anemia, HTN, CKD 4 who presents with Syncope, unspecified syncope type    Interval events:  Briefly, patient is 80years old male with a forementioned comorbidities who presented with syncope episode. As per report at the time of admission, patient was seen at bedside in the presence of son, status post unwitnessed fall in the bathroom. Patient was going to the bathroom. Family was home, however did not see him fall. Patient denied history of seizure, similar episode noticed in 2022 which followed admission. Patient also complained of right-sided abdomen pain. Patient unable to tell about onset. Reportedly patient has underlying issues with cognition per family. Patient denies chest pain, nausea, vomiting and exam was nonfocal.  Initial labs with BUN 41/creatinine 2.6, glucose 161, proBNP 828, troponin 0.057, hemoglobin 9.3/hematocrit 29, no leukocytosis. Blood and urine cultures were drawn. Urinalysis with large blood, 2.9 RBCs, CT head with no acute intracranial abnormality. CT cervical spine unremarkable. CT abdomen/pelvis \"1. Distended gallbladder. This likely is due to a prolonged fasting state. Otherwise, I do raise the possibility of acalculous cholecystitis. 2. Trace pleural effusions with lower lobe atelectasis. 3. Diverticulosis without obvious inflammation. 4. Prostate hypertrophy with mild component of bladder outlet obstruction with multiple bladder diverticula\". RUQ US \"1. Cholelithiasis with gallbladder distention.   No ultrasound evidence of acute cholecystitis. 2. Borderline distended common bile duct measuring 0.7 cm in diameter. 3. No ascites\". EKG with sinus bradycardia with RBBB, left anterior fascicular block. -11/14-Patient seen and examined at bedside, vital signs with patient tachycardic 200s, currently on 2 L/min oxygen via nasal cannula, complaining of right flank pain, examination with right upper quadrant tenderness on deep palpation. Labs with new leukocytosis 16.2k<8.7k, hemoglobin 10.8<9.3, , troponin leak 0.056<0.052<0.041<0.057, uptrending transaminitis alkaline phosphatase 209<86, <13, <16, bilirubin 1.1<0.2. Patient was started on pain control with as needed IV Dilaudid for severe pain, given 1 L bolus and started with vancomycin/cefepime. Surgery service was consulted. As per verbal report, surgeon will speak with the family and discuss about possible surgery Tuesday or Wednesday. GI consult was recommended and GI was consulted for possible ERCP. We will repeat liver panel in the p.m. to monitor. Patient is low threshold for escalation in ICU discussion if patient deteriorates. Will attempt to inform family over phone call. MRCP as\"1. Calculi are visualized within the gallbladder neck and proximal cystic  duct with moderate upstream gallbladder distension and mild pericholecystic  fluid/stranding. No significant gallbladder wall thickening. Findings are favored to represent mild/early acute cholecystitis. 2.  There are multiple T2 hyperintense pancreatic lesions measuring up to 3.4 cm. Per ACR White Paper recommendations, follow-up imaging with contrast should be obtained in 6 months. 3.  A previously described left adrenal nodule demonstrates characteristics consistent with a benign adrenal adenoma\". -Tried to reach out family members multiple times by numbers listed and couldn't get  in touch with them regarding possible [address] discussion. Pt's Code status DNR CC at this time.  Surgeon planning on Surgery   -Will repeat labs with CBC        Plan:  Syncope and collapse, undetermined loss of consciousness, unwitnessed  Head injury secondary to above  Cardiogenic versus neurogenic  -Admit to medicine  -Telemetry  -Orthostatic vitals  -Trend tropes  -Seizure precautions  -proBNP is given  -Neurochecks every 4 hours  -Cardiology and neurology consulted  -Lactate 1.8, will repeat lactate  -CT head/CT C-spine as above  -UA with large blood, will consult urology  Cholecystitis versus choledocholithiasis:  -Pain control  -IV fluids  -IV antibiotics as above  -Follow blood cultures  -Follow urine cultures  -Follow fever/WBC curve  -Surgery and GI on board  -Possible surgery on Tuesday or Wednesday  Hypertension:  -Beta-blocker/ACE inhibitor, currently on hold given hypotension and syncope on admission  CKD stage IV:  -Creatinine 2.6, at baseline  -Avoid nephrotoxic medications  -Strict intake/output records  -Lasix on hold currently  Chronic anemia:  -Hemoglobin 9.3  -Improved to 10 in a.m.  -Monitor CBC  Leukocytosis:  -Possibly in the setting of infection  -Follow fever/WBC curve  -IV antibiotics  -Monitor CBC  Abdomen pain:  -History of hepatitis:  -CT A/P as above  -US A/P as above  -MRCP with    Diet ADULT DIET;  Full Liquid  Diet NPO Exceptions are: Ice Chips, Sips of Water with Meds   DVT Prophylaxis [] Lovenox, [x]  Heparin, [] SCDs, [] Ambulation,  [] Eliquis, [] Xarelto  [] Coumadin   Code Status DNR-CC   Disposition From: Home  Expected Disposition: TBD  Estimated Date of Discharge: TBD  Patient requires continued admission due to currently having early acute cholecystitis   Surrogate Decision Maker/ POA Son and daughter     Subjective:     Chief Complaint: Fall and Loss of Consciousness (Using restroom and passed out; fell off toilet and into wall in front of him. )       Danny Graff is a 80 y.o. male who presents with syncope episode     Patient currently complaining of right posterior pain, awaiting GI/surgery/urology input    Review of Systems:    Review of Systems    Review of Systems:   Constitutional: Negative. Negative for activity change, appetite change, chills, diaphoresis, fatigue, fever and unexpected weight change. HENT: Negative. Negative for congestion, dental problem, drooling, ear discharge, ear pain, facial swelling, hearing loss, mouth sores, nosebleeds, postnasal drip, rhinorrhea, sinus pressure, sinus pain, sneezing, sore throat, tinnitus, trouble swallowing and voice change. Eyes: Negative. Negative for photophobia, pain, discharge, redness, itching and visual disturbance. Respiratory: Negative. Negative for apnea, cough, choking, chest tightness, shortness of breath, wheezing and stridor. Cardiovascular: Negative. Negative for chest pain and palpitations. Gastrointestinal: Right-sided pain, unclear location  Endocrine: Negative. Negative for cold intolerance, heat intolerance, polydipsia, polyphagia and polyuria. Genitourinary: Negative. Negative for decreased urine volume, difficulty urinating, dysuria, enuresis, flank pain, frequency, genital sores, hematuria, penile discharge, penile pain, penile swelling, scrotal swelling, testicular pain and urgency. Musculoskeletal: Negative. Negative for arthralgias, back pain, gait problem, joint swelling, myalgias, neck pain and neck stiffness. Skin: Negative. Negative for color change, pallor, rash and wound. Allergic/Immunologic: Negative for environmental allergies, food allergies and immunocompromised state. Neurological: Negative. Negative for dizziness, tremors, seizures, syncope, facial asymmetry, speech difficulty, weakness, light-headedness, numbness and headaches. Hematological: Negative. Negative for adenopathy. Does not bruise/bleed easily. Psychiatric/Behavioral: Negative.   Negative for agitation, behavioral problems, confusion, decreased concentration, dysphoric mood, hallucinations, self-injury, sleep disturbance and suicidal ideas. The patient is not nervous/anxious and is not hyperactive. Objective: Intake/Output Summary (Last 24 hours) at 11/14/2022 1616  Last data filed at 11/14/2022 0300  Gross per 24 hour   Intake 360 ml   Output 1400 ml   Net -1040 ml        Vitals:   Vitals:    11/14/22 1459   BP: (!) 159/90   Pulse: (!) 104   Resp: 21   Temp: 98.2 °F (36.8 °C)   SpO2: 100%       Physical Exam:   Patient hard of hearing  General: NAD  Eyes: EOMI  ENT: neck supple  Cardiovascular: Regular rate. Respiratory: Clear to auscultation  Gastrointestinal: No TTP on superficial palpation, deep palpation with mild tenderness  Genitourinary: no suprapubic tenderness  Musculoskeletal: No edema  Skin: warm, dry  Neuro: Alert. Psych: Mood appropriate.      Medications:   Medications:    [START ON 11/15/2022] vancomycin (VANCOCIN) intermittent dosing (placeholder)   Other RX Placeholder    [START ON 11/15/2022] cefepime  1,000 mg IntraVENous Q24H    sodium chloride flush  5-40 mL IntraVENous 2 times per day    heparin (porcine)  5,000 Units SubCUTAneous 3 times per day    [Held by provider] atorvastatin  40 mg Oral Nightly    levothyroxine  75 mcg Oral Daily    metoprolol tartrate  25 mg Oral BID    pantoprazole  20 mg Oral Daily    [Held by provider] QUEtiapine  50 mg Oral Nightly    sertraline  50 mg Oral Daily    guaiFENesin  600 mg Oral BID    miconazole   Topical BID      Infusions:    lactated ringers 900 mL (11/14/22 1425)    sodium chloride       PRN Meds: HYDROmorphone, 0.5 mg, Q4H PRN  sodium chloride flush, 5-40 mL, PRN  sodium chloride, , PRN  ondansetron, 4 mg, Q8H PRN   Or  ondansetron, 4 mg, Q6H PRN  polyethylene glycol, 17 g, Daily PRN  acetaminophen, 650 mg, Q6H PRN   Or  acetaminophen, 650 mg, Q6H PRN        Labs      Recent Results (from the past 24 hour(s))   Troponin    Collection Time: 11/13/22 10:01 PM   Result Value Ref Range    Troponin T 0.041 (H) <0.01 NG/ML   Troponin Collection Time: 11/14/22  1:34 AM   Result Value Ref Range    Troponin T 0.048 (H) <0.01 NG/ML   Comprehensive Metabolic Panel w/ Reflex to MG    Collection Time: 11/14/22  1:34 AM   Result Value Ref Range    Sodium 138 135 - 145 MMOL/L    Potassium 4.3 3.5 - 5.1 MMOL/L    Chloride 102 99 - 110 mMol/L    CO2 23 21 - 32 MMOL/L    BUN 41 (H) 6 - 23 MG/DL    Creatinine 2.4 (H) 0.9 - 1.3 MG/DL    Est, Glom Filt Rate 25 (L) >60 mL/min/1.73m2    Glucose 139 (H) 70 - 99 MG/DL    Calcium 8.7 8.3 - 10.6 MG/DL    Albumin 3.6 3.4 - 5.0 GM/DL    Total Protein 6.1 (L) 6.4 - 8.2 GM/DL    Total Bilirubin 1.1 (H) 0.0 - 1.0 MG/DL     (H) 10 - 40 U/L     (H) 15 - 37 IU/L    Alkaline Phosphatase 209 (H) 40 - 129 IU/L    Anion Gap 13 4 - 16   CBC with Auto Differential    Collection Time: 11/14/22  1:34 AM   Result Value Ref Range    WBC 16.2 (H) 4.0 - 10.5 K/CU MM    RBC 3.54 (L) 4.6 - 6.2 M/CU MM    Hemoglobin 10.8 (L) 13.5 - 18.0 GM/DL    Hematocrit 33.6 (L) 42 - 52 %    MCV 94.9 78 - 100 FL    MCH 30.5 27 - 31 PG    MCHC 32.1 32.0 - 36.0 %    RDW 14.3 11.7 - 14.9 %    Platelets 723 725 - 941 K/CU MM    MPV 9.5 7.5 - 11.1 FL    Differential Type AUTOMATED DIFFERENTIAL     Segs Relative 87.9 (H) 36 - 66 %    Lymphocytes % 3.9 (L) 24 - 44 %    Monocytes % 7.1 (H) 0 - 4 %    Eosinophils % 0.0 0 - 3 %    Basophils % 0.2 0 - 1 %    Segs Absolute 14.2 K/CU MM    Lymphocytes Absolute 0.6 K/CU MM    Monocytes Absolute 1.1 K/CU MM    Eosinophils Absolute 0.0 K/CU MM    Basophils Absolute 0.0 K/CU MM    Nucleated RBC % 0.0 %    Total Nucleated RBC 0.0 K/CU MM    Total Immature Neutrophil 0.15 K/CU MM    Immature Neutrophil % 0.9 (H) 0 - 0.43 %   Troponin    Collection Time: 11/14/22  5:46 AM   Result Value Ref Range    Troponin T 0.052 (H) <0.01 NG/ML   Troponin    Collection Time: 11/14/22  7:48 AM   Result Value Ref Range    Troponin T 0.052 (H) <0.01 NG/ML   Troponin    Collection Time: 11/14/22 10:49 AM   Result Value Ref Range    Troponin T 0.051 (H) <0.01 NG/ML   Protime/INR & PTT    Collection Time: 11/14/22 10:49 AM   Result Value Ref Range    Protime 19.5 (H) 11.7 - 14.5 SECONDS    INR 1.51 INDEX    aPTT 43.7 (H) 25.1 - 37.1 SECONDS   Lipase    Collection Time: 11/14/22 10:49 AM   Result Value Ref Range    Lipase 18 13 - 60 IU/L   Lactic Acid    Collection Time: 11/14/22 10:49 AM   Result Value Ref Range    Lactate 1.6 0.4 - 2.0 mMOL/L   C-Reactive Protein    Collection Time: 11/14/22 10:49 AM   Result Value Ref Range    CRP High Sensitivity 121.9 (H) <5.0 mg/L   Procalcitonin    Collection Time: 11/14/22 10:49 AM   Result Value Ref Range    Procalcitonin 3.44    Troponin    Collection Time: 11/14/22 12:40 PM   Result Value Ref Range    Troponin T 0.056 (H) <0.01 NG/ML        Imaging/Diagnostics Last 24 Hours   CT ABDOMEN PELVIS WO CONTRAST Additional Contrast? None    Result Date: 11/13/2022  EXAMINATION: CT OF THE ABDOMEN AND PELVIS WITHOUT CONTRAST 11/13/2022 11:32 am TECHNIQUE: CT of the abdomen and pelvis was performed without the administration of intravenous contrast. Multiplanar reformatted images are provided for review. Automated exposure control, iterative reconstruction, and/or weight based adjustment of the mA/kV was utilized to reduce the radiation dose to as low as reasonably achievable. COMPARISON: 08/31/2022. HISTORY: ORDERING SYSTEM PROVIDED HISTORY: ABD PAIN, FALL TECHNOLOGIST PROVIDED HISTORY: Reason for exam:->ABD PAIN, FALL Additional Contrast?->None Reason for Exam: ABD PAIN, FALL FINDINGS: Lower Chest: The lung bases demonstrate trace pleural effusions with lower lobe atelectasis. Organs: The liver, spleen, pancreas and right adrenal gland appear unremarkable for a non contrasted study. The gallbladder is distended. There is a low-attenuation nodule in the left adrenal gland measuring 1.8 cm and unchanged. The kidneys demonstrate no calcifications. No hydronephrosis is seen.   There is a cyst in the left kidney measuring 4.5 cm. No myometrial or bladder calculi are seen. There is circumferential bladder wall thickening. There are multiple bladder diverticula. The prostate gland is mildly enlarged. GI/Bowel: Evaluation of the bowel is limited as no enteric contrast was given. No dilated loops of bowel are seen. There is diverticular disease involving the colon but no findings to suggest active inflammation. Note is made of a small hiatal hernia. I do not see a dilated appendix. Pelvis: No pelvic masses or fluid collections are seen. The prostate gland is mildly enlarged. There are soft tissue densities seen in the region of the inguinal canal is likely from prior hernia repair and unchanged. Peritoneum/Retroperitoneum: The abdominal aorta is not aneurysmal.  There are shotty mesenteric and retroperitoneal lymph nodes but no lymphadenopathy is seen. Bones/Soft Tissues: No acute bony abnormalities are noted. 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 1. Distended gallbladder. This likely is due to a prolonged fasting state. Otherwise, I do raise the possibility of acalculous cholecystitis. 2. Trace pleural effusions with lower lobe atelectasis. 3. Diverticulosis without obvious inflammation. 4. Prostate hypertrophy with mild component of bladder outlet obstruction with multiple bladder diverticula. XR PELVIS (1-2 VIEWS)    Result Date: 11/13/2022  EXAMINATION: ONE XRAY VIEW OF THE PELVIS 11/13/2022 9:44 am COMPARISON: CT abdomen/pelvis 08/31/2022 HISTORY: ORDERING SYSTEM PROVIDED HISTORY: fall TECHNOLOGIST PROVIDED HISTORY: Reason for exam:->fall Reason for Exam: fall FINDINGS: Single view provided. Lower lumbar degenerative disc and joint disease. Normal bilateral sacroiliac and hip alignment. No acute fracture. Subtle right lakisha pelvic cortical and trabecular coarsening with intramedullary sclerotic foci consistent with Paget's disease.      Normal pelvic COMPARISON: None. HISTORY: ORDERING SYSTEM PROVIDED HISTORY: fall TECHNOLOGIST PROVIDED HISTORY: Reason for exam:->fall Decision Support Exception - unselect if not a suspected or confirmed emergency medical condition->Emergency Medical Condition (MA) Reason for Exam: fall, anticoagulated FINDINGS: BONES/ALIGNMENT: There is no acute fracture or traumatic malalignment. DEGENERATIVE CHANGES: There is degenerative disc disease of the C6-C7 disc, with disc space narrowing and osteophytes. There are osteoarthritic changes of the left facet joints. SOFT TISSUES: There is no prevertebral soft tissue swelling. No acute abnormality of the cervical spine. XR CHEST PORTABLE    Result Date: 11/13/2022  EXAMINATION: ONE XRAY VIEW OF THE CHEST 11/13/2022 9:44 am COMPARISON: Chest radiograph 10/10/2022. HISTORY: ORDERING SYSTEM PROVIDED HISTORY: hypotension, fall TECHNOLOGIST PROVIDED HISTORY: Reason for exam:->hypotension, fall Reason for Exam: hhypotension, fall FINDINGS: Single view provided. Stable mediastinal and cardiac silhouettes with coronary and aortic atherosclerotic calcifications. Normal lung volumes. There is symmetric diffuse interstitial vascular prominence with some ill-defined margins. Stable mild patchy right lung base opacity. Stable mild left pleural effusion and lung base consolidation. No pneumothorax or free subdiaphragmatic air. 1. Stable mild left pleural effusion. 2. Interval diffuse symmetric vascular changes suggesting pulmonary venous hypertension and possible developing pulmonary edema. 3. Stable mild bibasilar consolidation more prominent on the left.      MRI ABDOMEN WO CONTRAST MRCP    Result Date: 11/14/2022  EXAMINATION: MRI OF THE ABDOMEN WITHOUT CONTRAST AND MRCP 11/14/2022 11:43 am TECHNIQUE: Multiplanar multisequence MRI of the abdomen was performed without the administration of intravenous contrast.  After initial T2 axial and coronal images, thick slab, thin slab and 3D coronal MRCP sequences were obtained without the administration of intravenous contrast.  MIP images are provided for review. COMPARISON: 11/13/2022 CT abdomen/pelvis, 11/13/2022 abdominal ultrasound HISTORY: ORDERING SYSTEM PROVIDED HISTORY: RUQ tenderness, cholelithiasis, uptrending WBC and liver pnel TECHNOLOGIST PROVIDED HISTORY: Reason for exam:->RUQ tenderness, cholelithiasis, uptrending WBC and liver pnel Reason for Exam: Fall; Loss of Consciousness FINDINGS: Lung Bases: Trace bilateral pleural effusions. Liver: The liver is normal in contour. No focal suspicious liver lesion. Biliary and Gallbladder: No biliary ductal dilation. Multiple calculi are visualized the level of the gallbladder neck/proximal cystic duct with moderate upstream gallbladder distension. There is mild pericholecystic fluid and stranding. No significant gallbladder wall thickening. Spleen: The spleen is unremarkable. Pancreas: Multiple T2 hyperintense lesions arise from the pancreas, the largest which is seen at the pancreatic uncinate process measuring 2.4 x 2.1 x 3.4 cm (series 7, image 15 and series 6, image 13). 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 Adrenal Glands: There is a 1.6 cm left adrenal lesion which demonstrates signal dropout on out of phase imaging, consistent with a benign adrenal adenoma. Kidneys: Bilateral renal cortical atrophy. There is a 4.1 cm T2 hyperintense lesion arising from the superior pole of left kidney which demonstrates thin internal septations, compatible with a Bosniak 2 cyst.  Smaller scattered T2 hyperintense renal lesions are also present, likely representing additional cysts. No hydronephrosis. Abdominal Vasculature: The abdominal aorta is normal in diameter. Gastrointestinal Tract: No evidence of bowel obstruction. Peritoneum/Mesentery/Retroperitoneum: No peritoneal or retroperitoneal masses.  Lymph Nodes: No retroperitoneal lymphadenopathy. Musculoskeletal: No suspicious osseous findings. 1.  Calculi are visualized within the gallbladder neck and proximal cystic duct with moderate upstream gallbladder distension and mild pericholecystic fluid/stranding. No significant gallbladder wall thickening. Findings are favored to represent mild/early acute cholecystitis. 2.  There are multiple T2 hyperintense pancreatic lesions measuring up to 3.4 cm. Per ACR White Paper recommendations, follow-up imaging with contrast should be obtained in 6 months. 3.  A previously described left adrenal nodule demonstrates characteristics consistent with a benign adrenal adenoma. US ABDOMEN LIMITED Specify organ? LIVER    Result Date: 11/13/2022  EXAMINATION: RIGHT UPPER QUADRANT ULTRASOUND 11/13/2022 3:09 pm COMPARISON: CT abdomen/pelvis 11/13/2022 HISTORY: ORDERING SYSTEM PROVIDED HISTORY: gallbladder distention seen on CT TECHNOLOGIST PROVIDED HISTORY: Reason for exam:->gallbladder distention seen on CT Specify organ?->LIVER Reason for Exam: distended gb on ct scan FINDINGS: Pancreas:  Nonvisualization of the gallbladder due to overlying bowel gas. Liver:  Normal contour with diffuse heterogeneous parenchymal echotexture and loss of the visualized portal triads consistent with hepatic steatosis. No intrahepatic mass biliary dilatation. Gallbladder: Moderate distention with a hyperechoic nonshadowing gallstone at the gallbladder neck. Dependent biliary sludge identified. No significant gallbladder wall thickening. No pericholecystic fluid. The technologist documented absence of sonographic Jose's sign. Common bile duct:  Borderline distended measuring 0.7 cm in AP dimension. Other:  No ascites. The right kidney demonstrates normal contour and parenchymal echotexture with cortical thickness. No hydronephrosis. No parenchymal solid or or cystic mass. 1. Cholelithiasis with gallbladder distention.   No ultrasound evidence of acute cholecystitis. 2. Borderline distended common bile duct measuring 0.7 cm in diameter. 3. No ascites.        Electronically signed by Toya Lutz MD on 11/14/2022 at 4:16 PM

## 2022-11-14 NOTE — ANESTHESIA PRE PROCEDURE
Department of Anesthesiology  Preprocedure Note       Name:  Temi Canas   Age:  80 y.o.  :  10/18/1930                                          MRN:  [id number]         Date:  2022      Surgeon: Louie Carter):  Wilda Conrad MD    Procedure: Procedure(s):  CHOLECYSTECTOMY LAPAROSCOPIC    Medications prior to admission:   Prior to Admission medications    Medication Sig Start Date End Date Taking?  Authorizing Provider   finasteride (PROSCAR) 5 MG tablet TAKE 1 TABLET BY MOUTH EVERY DAY 22   Óscar Dick DO   QUEtiapine (SEROQUEL) 50 MG tablet Take 1 tablet by mouth nightly  Patient taking differently: Take 100 mg by mouth 2 times daily 50 in the morning and 100 at night 10/14/22   Hue Mcmillan MD   sennosides-docusate sodium (SENOKOT-S) 8.6-50 MG tablet Take 2 tablets by mouth 2 times daily as needed for Constipation  Patient taking differently: Take 2 tablets by mouth every other day 10/14/22   Hue Mcmillan MD   benzonatate (TESSALON) 200 MG capsule Take 1 capsule by mouth 3 times daily as needed for Cough 10/6/22   Blade Min PA-C   atorvastatin (LIPITOR) 40 MG tablet Take 1 tablet by mouth nightly 22   Christian Lund MD   metoprolol tartrate (LOPRESSOR) 25 MG tablet Take 1 tablet by mouth 2 times daily 22   Christian Lund MD   aspirin 81 MG chewable tablet Take 1 tablet by mouth daily 22   Christian Lund MD   ELIQUIS 2.5 MG TABS tablet TAKE 1 TABLET BY MOUTH TWICE A DAY 22  Óscar Dick DO   furosemide (LASIX) 20 MG tablet Take 1 tablet by mouth daily 22  Historical Provider, MD   pantoprazole (PROTONIX) 20 MG tablet TAKE 1 TABLET BY MOUTH EVERY DAY 22   Óscar Dick DO   levothyroxine (SYNTHROID) 75 MCG tablet TAKE 1 TABLET BY MOUTH EVERY DAY 22   Óscar Dick DO   tamsulosin (FLOMAX) 0.4 MG capsule Take 2 capsules by mouth nightly 3/3/22   Adriane Houston DO   sertraline (ZOLOFT) 50 MG tablet Take 50 mg by mouth daily    Historical Provider, MD   temazepam (RESTORIL) 7.5 MG capsule Take 7.5 mg by mouth at bedtime.     Historical Provider, MD       Current medications:    Current Facility-Administered Medications   Medication Dose Route Frequency Provider Last Rate Last Admin    lactated ringers infusion   IntraVENous Continuous Ifrah Sheth MD 50 mL/hr at 11/14/22 1425 900 mL at 11/14/22 1425    HYDROmorphone (DILAUDID) injection 0.5 mg  0.5 mg IntraVENous Q4H PRN Clarice Brennan MD       Ritta Bring Deisy Mei ON 11/15/2022] vancomycin (VANCOCIN) intermittent dosing (placeholder)   Other RX Placeholder MD Kelley Sierra ON 11/15/2022] cefepime (MAXIPIME) 1000 mg IVPB minibag  1,000 mg IntraVENous Q24H Clarice Brennan MD        sodium chloride flush 0.9 % injection 5-40 mL  5-40 mL IntraVENous 2 times per day Kassie D Onyedumekwu, APRN - CNP        sodium chloride flush 0.9 % injection 5-40 mL  5-40 mL IntraVENous PRN Kassie D Onyedumekwu, APRN - CNP        0.9 % sodium chloride infusion   IntraVENous PRN Kassie D Onyedumekwu, APRN - CNP        ondansetron (ZOFRAN-ODT) disintegrating tablet 4 mg  4 mg Oral Q8H PRN Kassie D Onyedumekwu, APRN - CNP        Or    ondansetron (ZOFRAN) injection 4 mg  4 mg IntraVENous Q6H PRN Kassie D Onyedumekwu, APRN - CNP        polyethylene glycol (GLYCOLAX) packet 17 g  17 g Oral Daily PRN Kassie D Onyedumekwu, APRN - CNP        acetaminophen (TYLENOL) tablet 650 mg  650 mg Oral Q6H PRN Kassie D Onyedumekwu, APRN - CNP   650 mg at 11/13/22 1600    Or    acetaminophen (TYLENOL) suppository 650 mg  650 mg Rectal Q6H PRN Kassie D Onyedumekwu, APRN - CNP        heparin (porcine) injection 5,000 Units  5,000 Units SubCUTAneous 3 times per day Kassie D Onyedumekwu, APRN - CNP        [Held by provider] atorvastatin (LIPITOR) tablet 40 mg  40 mg Oral Nightly Kassie D OnROSEMARY ayala - CNP   40 mg at 11/13/22 2039    levothyroxine (SYNTHROID) tablet 75 mcg  75 mcg Oral Daily Kassie D Onyedumejessieu APRN - CNP   75 mcg at 11/13/22 1600    metoprolol tartrate (LOPRESSOR) tablet 25 mg  25 mg Oral BID Kassie D Onyedumekwu, APRN - CNP        pantoprazole (PROTONIX) tablet 20 mg  20 mg Oral Daily Kassie D Onyedumekwu, APRN - CNP   20 mg at 11/13/22 1600    [Held by provider] QUEtiapine (SEROQUEL) tablet 50 mg  50 mg Oral Nightly Kassie D Onyedumekwu, APRN - CNP        sertraline (ZOLOFT) tablet 50 mg  50 mg Oral Daily Kassie D Onyedumekwu, APRN - CNP   50 mg at 11/13/22 1600    guaiFENesin (MUCINEX) extended release tablet 600 mg  600 mg Oral BID Brenita Barnum, APRN - CNP   600 mg at 11/13/22 2039    miconazole (MICOTIN) 2 % powder   Topical BID Jm Zane, APRN - CNP   Given at 11/14/22 0004       Allergies: Allergies   Allergen Reactions    Minocycline Other (See Comments)       Problem List:    Patient Active Problem List   Diagnosis Code    Hyperlipidemia E78.5    Depression F32. 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    Atelectasis J98.11    Bandemia D72.825    Thrombocytopenia, unspecified D69.6    Leukocytosis D72.829    Skin ulcer, limited to breakdown of skin (HCC) L98.491    Current moderate episode of major depressive disorder, unspecified whether recurrent (HCC) F32.1    Chronic kidney disease, stage IV (severe) (HCC) N18.4    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    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    Chest pain R07.9    Cardiac arrest ([facility] [address] I46.9    Community acquired pneumonia of left lung, unspecified part of lung J18.9    Syncope, unspecified syncope type R55    Gallstones K80.20       Past Medical History:        Diagnosis Date    Anemia     Anxiety     Arthritis     BPH with urinary obstruction     Depression     GERD (gastroesophageal reflux disease)     Hemorrhoids     Hepatitis     Hernia of unspecified site of abdominal cavity without mention of obstruction or gangrene     Hyperlipidemia     Hypotension     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    Rt Tricep, Lt Superior Forearm       Past Surgical History:        Procedure Laterality Date    CATARACT REMOVAL      CYSTOSCOPY N/A 3/29/2021    CYSTOSCOPY EVACUATION OF CLOTS, BLADDER FULGERATION, AND SUPRA-PUBIC CATHETER INSERTION performed by Diane Guzmán MD at [address] 2021    CYSTOSCOPY, PROSTATE FULGURATION, EVACUATION OF CLOTS & TURP performed by Trupti Garcia MD at [address] N/A 3/4/2021    LAPAROSCOPIC LARGE HERNIA HIATAL REPAIR WITH GASTRIC OBSTRUCTION POSS OPEN performed by Reji Amos MD at [facility]         Social History:    Social History     Tobacco Use    Smoking status: Former     Packs/day: 1.00     Years: 5.00     Pack years: 5.00     Types: Cigarettes     Start date: 1950     Quit date: 1955     Years since quittin.0    Smokeless tobacco: Never   Substance Use Topics    Alcohol use: Not Currently                                Counseling given: Not Answered      Vital Signs (Current):   Vitals:    22 0200 22 0914 22 1358 22 1459   BP: (!) 156/85 (!) 145/83 (!) 148/78 (!) 159/90   Pulse: (!) 109 98 98 (!) 104   Resp:    Temp: 98 °F (36.7 °C) 98.2 °F (36.8 °C) 99.6 °F (37.6 °C) 98.2 °F (36.8 °C)   TempSrc: Oral Oral Oral Oral   SpO2: 99% 98% 100% 100%   Weight: 207 lb 14.4 oz (94.3 kg)      Height:   6' (1.829 m)                                               BP Readings from Last 3 Encounters:   11/14/22 (!) 159/90   10/14/22 (!) 180/92   08/31/22 121/69       NPO Status: Time of last liquid consumption: 2359                        Time of last solid consumption: 2359                        Date of last liquid consumption: 11/13/22                        Date of last solid food consumption: 11/13/22    BMI:   Wt Readings from Last 3 Encounters:   11/14/22 207 lb 14.4 oz (94.3 kg)   11/14/22 205 lb (93 kg)   10/12/22 210 lb 15.7 oz (95.7 kg)     Body mass index is 27.8 kg/m². CBC:   Lab Results   Component Value Date/Time    WBC 16.2 11/14/2022 01:34 AM    RBC 3.54 11/14/2022 01:34 AM    HGB 10.8 11/14/2022 01:34 AM    HCT 33.6 11/14/2022 01:34 AM    MCV 94.9 11/14/2022 01:34 AM    RDW 14.3 11/14/2022 01:34 AM     11/14/2022 01:34 AM       CMP:   Lab Results   Component Value Date/Time     11/14/2022 01:34 AM    K 4.3 11/14/2022 01:34 AM     11/14/2022 01:34 AM    CO2 23 11/14/2022 01:34 AM    BUN 41 11/14/2022 01:34 AM    CREATININE 2.4 11/14/2022 01:34 AM    GFRAA 28 10/14/2022 12:10 PM    AGRATIO 1.4 01/21/2021 02:16 PM    LABGLOM 25 11/14/2022 01:34 AM    GLUCOSE 139 11/14/2022 01:34 AM    PROT 6.1 11/14/2022 01:34 AM    CALCIUM 8.7 11/14/2022 01:34 AM    BILITOT 1.1 11/14/2022 01:34 AM    ALKPHOS 209 11/14/2022 01:34 AM     11/14/2022 01:34 AM     11/14/2022 01:34 AM       POC Tests: No results for input(s): POCGLU, POCNA, POCK, POCCL, POCBUN, POCHEMO, POCHCT in the last 72 hours.     Coags:   Lab Results   Component Value Date/Time    PROTIME 19.5 11/14/2022 10:49 AM    INR 1.51 11/14/2022 10:49 AM    APTT 43.7 11/14/2022 10:49 AM       HCG (If Applicable): No results found for: PREGTESTUR, PREGSERUM, HCG, HCGQUANT     ABGs: No results found for: PHART, PO2ART, USW0SUI, CBT6SJX, BEART, X9GHQIQR     Type & Screen (If Applicable):  No results found for: LABABO, LABRH    Drug/Infectious Status (If Applicable):  Lab Results   Component Value Date/Time    HEPCAB NON REACTIVE 08/22/2022 12:03 PM       COVID-19 Screening (If Applicable):   Lab Results   Component Value Date/Time    COVID19 NOT DETECTED 10/08/2022 08:08 PM           Anesthesia Evaluation  Patient summary reviewed  Airway: Mallampati: Unable to assess / NA          Dental:          Pulmonary:   (+) pneumonia:  decreased breath sounds                           ROS comment: Former smoker   Cardiovascular:  Exercise tolerance: poor (<4 METS),   (+) hyperlipidemia        Rhythm: regular             Beta Blocker:  Dose within 24 Hrs      ROS comment: Echo 2022:    Left ventricular systolic function is hyperdynamic.   Ejection fraction is visually estimated at >50%.  Mild left ventricular hypertrophy.   Sclerotic, but non-stenotic aortic valve.   Mild to moderate tricuspid regurgitation; RVSP: 37mmHg.   No evidence of any pericardial effusion. Neuro/Psych:   (+) depression/anxiety dementia            GI/Hepatic/Renal:   (+) GERD:, liver disease:,           Endo/Other:    (+) hypothyroidism: arthritis: OA., malignancy/cancer (skin). Abdominal:   (+) obese,           Vascular: negative vascular ROS. Other Findings:           Anesthesia Plan      general     ASA 4       Induction: intravenous. MIPS: Postoperative opioids intended. Anesthetic plan and risks discussed with patient. Plan discussed with CRNA. Attending anesthesiologist reviewed and agrees with Preprocedure content          Pre Anesthesia Assessment complete. Chart reviewed on 11/14/2022. This is a chart review only.

## 2022-11-14 NOTE — PLAN OF CARE
Problem: Discharge Planning  Goal: Discharge to home or other facility with appropriate resources  11/14/2022 1016 by Ronald Fairchild RN  Outcome: Progressing  11/14/2022 0059 by Leeroy Bolivar RN  Outcome: Progressing     Problem: Safety - Adult  Goal: Free from fall injury  11/14/2022 1016 by Ronald Fairchild RN  Outcome: Progressing  11/14/2022 0059 by Leeroy Bolivar RN  Outcome: Progressing     Problem: ABCDS Injury Assessment  Goal: Absence of physical injury  11/14/2022 1016 by Ronald Fairchild RN  Outcome: Progressing  11/14/2022 0059 by Leeroy Bolivar RN  Outcome: Progressing     Problem: Anxiety  Goal: Will report anxiety at manageable levels  Description: INTERVENTIONS:  1. Administer medication as ordered  2. Teach and rehearse alternative coping skills  3. Provide emotional support with 1:1 interaction with staff  11/14/2022 1016 by Ronald Fairchild RN  Outcome: Progressing  11/14/2022 0059 by Leeroy Bolivar RN  Outcome: Progressing     Problem: Decision Making  Goal: Pt/Family able to effectively weigh alternatives and participate in decision making related to treatment and care  Description: INTERVENTIONS:  1. Determine when there are differences between patient's view, family's view, and healthcare provider's view of condition  2. Facilitate patient and family articulation of goals for care  3. Help patient and family identify pros/cons of alternative solutions  4. Provide information as requested by patient/family  5. Respect patient/family right to receive or not to receive information  6. Serve as a liaison between patient and family and health care team  7. Initiate Consults from Ethics, Palliative Care or initiate [address] as is appropriate  11/14/2022 1016 by Ronald Fairchild RN  Outcome: Progressing  11/14/2022 0059 by Leeroy Bolivar RN  Outcome: Progressing     Problem: Confusion  Goal: Confusion, delirium, dementia, or psychosis is improved or at baseline  Description: INTERVENTIONS:  1.  Assess for possible contributors to thought disturbance, including medications, impaired vision or hearing, underlying metabolic abnormalities, dehydration, psychiatric diagnoses, and notify attending LIP  2. [place] high risk fall precautions, as indicated  3. Provide frequent short contacts to provide reality reorientation, refocusing and direction  4. Decrease environmental stimuli, including noise as appropriate  5. Monitor and intervene to maintain adequate nutrition, hydration, elimination, sleep and activity  6. If unable to ensure safety without constant attention obtain sitter and review sitter guidelines with assigned personnel  7.  Initiate Psychosocial CNS and Spiritual Care consult, as indicated  11/14/2022 1016 by Jez Young RN  Outcome: Progressing  11/14/2022 0059 by Bita Mcdowell RN  Outcome: Progressing     Problem: Neurosensory - Adult  Goal: Achieves stable or improved neurological status  11/14/2022 1016 by Jez Young RN  Outcome: Progressing  11/14/2022 0059 by Bita Mcdowell RN  Outcome: Progressing  Goal: Absence of seizures  11/14/2022 1016 by Jez Young RN  Outcome: Progressing  11/14/2022 0059 by Bita Mcdowell RN  Outcome: Progressing  Goal: Remains free of injury related to seizures activity  11/14/2022 1016 by Jez Young RN  Outcome: Progressing  11/14/2022 0059 by Bita Mcdowell RN  Outcome: Progressing  Goal: Achieves maximal functionality and self care  11/14/2022 1016 by Jez Young RN  Outcome: Progressing  11/14/2022 0059 by Bita Mcdowell RN  Outcome: Progressing     Problem: Respiratory - Adult  Goal: Achieves optimal ventilation and oxygenation  11/14/2022 1016 by Jez Young RN  Outcome: Progressing  11/14/2022 0059 by Bita Mcdowell RN  Outcome: Progressing     Problem: Cardiovascular - Adult  Goal: Maintains optimal cardiac output and hemodynamic stability  11/14/2022 1016 by Jez Young RN  Outcome: Progressing  11/14/2022 0059 by Bita Mcdowell RN  Outcome: Progressing  Goal: Absence of cardiac dysrhythmias or at baseline  11/14/2022 1016 by Ronald Fairchild RN  Outcome: Progressing  11/14/2022 0059 by Leeroy Bolivar RN  Outcome: Progressing     Problem: Skin/Tissue Integrity - Adult  Goal: Skin integrity remains intact  11/14/2022 1016 by Ronald Fairchild RN  Outcome: Progressing  11/14/2022 0059 by Leeroy Bolivar RN  Outcome: Progressing  Goal: Incisions, wounds, or drain sites healing without S/S of infection  11/14/2022 1016 by Ronald Fairchild RN  Outcome: Progressing  11/14/2022 0059 by Leeroy Bolivar RN  Outcome: Progressing     Problem: Musculoskeletal - Adult  Goal: Return mobility to safest level of function  11/14/2022 1016 by Ronald Fairchild RN  Outcome: Progressing  11/14/2022 0059 by Leeroy Bolivar RN  Outcome: Progressing  Goal: Maintain proper alignment of affected body part  11/14/2022 1016 by Ronald Fairchild RN  Outcome: Progressing  11/14/2022 0059 by Leeroy Bolivar RN  Outcome: Progressing  Goal: Return ADL status to a safe level of function  11/14/2022 1016 by Ronald Fairchild RN  Outcome: Progressing  11/14/2022 0059 by Leeroy Bolivar RN  Outcome: Progressing     Problem: Gastrointestinal - Adult  Goal: Minimal or absence of nausea and vomiting  11/14/2022 1016 by Ronald Fairchild RN  Outcome: Progressing  11/14/2022 0059 by Leeroy Bolivar RN  Outcome: Progressing  Goal: Maintains or returns to baseline bowel function  11/14/2022 1016 by Ronald Fairchild RN  Outcome: Progressing  11/14/2022 0059 by Leeroy Bolivar RN  Outcome: Progressing  Goal: Maintains adequate nutritional intake  11/14/2022 1016 by Ronald Fairchild RN  Outcome: Progressing  11/14/2022 0059 by Leeroy Bolivar RN  Outcome: Progressing     Problem: Genitourinary - Adult  Goal: Absence of urinary retention  11/14/2022 1016 by Ronald Fairchild RN  Outcome: Progressing  11/14/2022 0059 by Leeroy Bolivar RN  Outcome: Progressing     Problem: Infection - Adult  Goal: Absence of infection at discharge  11/14/2022 1016 by Ronald Fairchild RN  Outcome: Progressing  11/14/2022 0059 by Maritza Terrell RN  Outcome: Progressing  Goal: Absence of infection during hospitalization  11/14/2022 1016 by Rizwana Yin RN  Outcome: Progressing  11/14/2022 0059 by Maritza Terrell RN  Outcome: Progressing  Goal: Absence of fever/infection during anticipated neutropenic period  11/14/2022 1016 by Rizwana Yin RN  Outcome: Progressing  11/14/2022 0059 by Maritza Terrell RN  Outcome: Progressing     Problem: Metabolic/Fluid and Electrolytes - Adult  Goal: Electrolytes maintained within normal limits  11/14/2022 1016 by Rizwana Yin RN  Outcome: Progressing  11/14/2022 0059 by Maritza Terrell RN  Outcome: Progressing  Goal: Hemodynamic stability and optimal renal function maintained  11/14/2022 1016 by Rizwana Yin RN  Outcome: Progressing  11/14/2022 0059 by Maritza Terrell RN  Outcome: Progressing     Problem: Hematologic - Adult  Goal: Maintains hematologic stability  11/14/2022 1016 by Rizwana Yin RN  Outcome: Progressing  11/14/2022 0059 by Maritza Terrell RN  Outcome: Progressing     Problem: Pain  Goal: Verbalizes/displays adequate comfort level or baseline comfort level  11/14/2022 1016 by Rizwana Yin RN  Outcome: Progressing  11/14/2022 0059 by Maritza Terrell RN  Outcome: Progressing

## 2022-11-15 ENCOUNTER — APPOINTMENT (OUTPATIENT)
Dept: GENERAL RADIOLOGY | Age: 87
DRG: 418 | End: 2022-11-15
Payer: MEDICARE

## 2022-11-15 ENCOUNTER — ANESTHESIA (OUTPATIENT)
Dept: OPERATING ROOM | Age: 87
DRG: 418 | End: 2022-11-15
Payer: MEDICARE

## 2022-11-15 LAB
ALBUMIN SERPL-MCNC: 3.2 GM/DL (ref 3.4–5)
ALBUMIN SERPL-MCNC: 3.5 GM/DL (ref 3.4–5)
ALBUMIN SERPL-MCNC: 3.5 GM/DL (ref 3.4–5)
ALP BLD-CCNC: 261 IU/L (ref 40–129)
ALP BLD-CCNC: 321 IU/L (ref 40–128)
ALP BLD-CCNC: 324 IU/L (ref 40–129)
ALT SERPL-CCNC: 395 U/L (ref 10–40)
ALT SERPL-CCNC: 491 U/L (ref 10–40)
ALT SERPL-CCNC: 503 U/L (ref 10–40)
AMYLASE: 37 U/L (ref 25–115)
ANION GAP SERPL CALCULATED.3IONS-SCNC: 10 MMOL/L (ref 4–16)
ANION GAP SERPL CALCULATED.3IONS-SCNC: 16 MMOL/L (ref 4–16)
APTT: 42.9 SECONDS (ref 25.1–37.1)
AST SERPL-CCNC: 299 IU/L (ref 15–37)
AST SERPL-CCNC: 426 IU/L (ref 15–37)
AST SERPL-CCNC: 438 IU/L (ref 15–37)
BASOPHILS ABSOLUTE: 0 K/CU MM
BASOPHILS ABSOLUTE: 0 K/CU MM
BASOPHILS RELATIVE PERCENT: 0.1 % (ref 0–1)
BASOPHILS RELATIVE PERCENT: 0.1 % (ref 0–1)
BILIRUB SERPL-MCNC: 1.5 MG/DL (ref 0–1)
BILIRUB SERPL-MCNC: 1.9 MG/DL (ref 0–1)
BILIRUB SERPL-MCNC: 1.9 MG/DL (ref 0–1)
BILIRUBIN DIRECT: 1.6 MG/DL (ref 0–0.3)
BILIRUBIN, INDIRECT: 0.3 MG/DL (ref 0–0.7)
BUN BLDV-MCNC: 38 MG/DL (ref 6–23)
BUN BLDV-MCNC: 39 MG/DL (ref 6–23)
CALCIUM SERPL-MCNC: 8.2 MG/DL (ref 8.3–10.6)
CALCIUM SERPL-MCNC: 8.6 MG/DL (ref 8.3–10.6)
CHLORIDE BLD-SCNC: 104 MMOL/L (ref 99–110)
CHLORIDE BLD-SCNC: 99 MMOL/L (ref 99–110)
CO2: 21 MMOL/L (ref 21–32)
CO2: 22 MMOL/L (ref 21–32)
CREAT SERPL-MCNC: 2.4 MG/DL (ref 0.9–1.3)
CREAT SERPL-MCNC: 2.5 MG/DL (ref 0.9–1.3)
DIFFERENTIAL TYPE: ABNORMAL
DIFFERENTIAL TYPE: ABNORMAL
DOSE AMOUNT: NORMAL
DOSE TIME: NORMAL
EOSINOPHILS ABSOLUTE: 0 K/CU MM
EOSINOPHILS ABSOLUTE: 0 K/CU MM
EOSINOPHILS RELATIVE PERCENT: 0 % (ref 0–3)
EOSINOPHILS RELATIVE PERCENT: 0 % (ref 0–3)
GFR SERPL CREATININE-BSD FRML MDRD: 24 ML/MIN/1.73M2
GFR SERPL CREATININE-BSD FRML MDRD: 25 ML/MIN/1.73M2
GLUCOSE BLD-MCNC: 112 MG/DL (ref 70–99)
GLUCOSE BLD-MCNC: 113 MG/DL (ref 70–99)
HCT VFR BLD CALC: 28.4 % (ref 42–52)
HCT VFR BLD CALC: 29.3 % (ref 42–52)
HCT VFR BLD CALC: 34.6 % (ref 42–52)
HEMOGLOBIN: 11.2 GM/DL (ref 13.5–18)
HEMOGLOBIN: 8.9 GM/DL (ref 13.5–18)
HEMOGLOBIN: 9.4 GM/DL (ref 13.5–18)
IMMATURE NEUTROPHIL %: 0.9 % (ref 0–0.43)
IMMATURE NEUTROPHIL %: 1.1 % (ref 0–0.43)
INR BLD: 1.67 INDEX
LACTATE DEHYDROGENASE: 332 IU/L (ref 120–246)
LACTATE: 0.7 MMOL/L (ref 0.4–2)
LACTATE: 1.4 MMOL/L (ref 0.4–2)
LIPASE: 19 IU/L (ref 13–60)
LYMPHOCYTES ABSOLUTE: 0.5 K/CU MM
LYMPHOCYTES ABSOLUTE: 0.5 K/CU MM
LYMPHOCYTES RELATIVE PERCENT: 2.7 % (ref 24–44)
LYMPHOCYTES RELATIVE PERCENT: 2.8 % (ref 24–44)
MCH RBC QN AUTO: 30 PG (ref 27–31)
MCH RBC QN AUTO: 30.3 PG (ref 27–31)
MCH RBC QN AUTO: 30.5 PG (ref 27–31)
MCHC RBC AUTO-ENTMCNC: 31.3 % (ref 32–36)
MCHC RBC AUTO-ENTMCNC: 32.1 % (ref 32–36)
MCHC RBC AUTO-ENTMCNC: 32.4 % (ref 32–36)
MCV RBC AUTO: 93.6 FL (ref 78–100)
MCV RBC AUTO: 94.3 FL (ref 78–100)
MCV RBC AUTO: 96.6 FL (ref 78–100)
MONOCYTES ABSOLUTE: 1.2 K/CU MM
MONOCYTES ABSOLUTE: 1.3 K/CU MM
MONOCYTES RELATIVE PERCENT: 7 % (ref 0–4)
MONOCYTES RELATIVE PERCENT: 7.3 % (ref 0–4)
NUCLEATED RBC %: 0 %
NUCLEATED RBC %: 0 %
PDW BLD-RTO: 14.7 % (ref 11.7–14.9)
PDW BLD-RTO: 14.8 % (ref 11.7–14.9)
PDW BLD-RTO: 15 % (ref 11.7–14.9)
PLATELET # BLD: 144 K/CU MM (ref 140–440)
PLATELET # BLD: 153 K/CU MM (ref 140–440)
PLATELET # BLD: 197 K/CU MM (ref 140–440)
PMV BLD AUTO: 10.1 FL (ref 7.5–11.1)
PMV BLD AUTO: 11 FL (ref 7.5–11.1)
PMV BLD AUTO: 9.7 FL (ref 7.5–11.1)
POTASSIUM SERPL-SCNC: 4.3 MMOL/L (ref 3.5–5.1)
POTASSIUM SERPL-SCNC: 4.6 MMOL/L (ref 3.5–5.1)
PROTHROMBIN TIME: 21.7 SECONDS (ref 11.7–14.5)
RBC # BLD: 2.94 M/CU MM (ref 4.6–6.2)
RBC # BLD: 3.13 M/CU MM (ref 4.6–6.2)
RBC # BLD: 3.67 M/CU MM (ref 4.6–6.2)
SEGMENTED NEUTROPHILS ABSOLUTE COUNT: 15.3 K/CU MM
SEGMENTED NEUTROPHILS ABSOLUTE COUNT: 15.8 K/CU MM
SEGMENTED NEUTROPHILS RELATIVE PERCENT: 88.9 % (ref 36–66)
SEGMENTED NEUTROPHILS RELATIVE PERCENT: 89.1 % (ref 36–66)
SODIUM BLD-SCNC: 135 MMOL/L (ref 135–145)
SODIUM BLD-SCNC: 137 MMOL/L (ref 135–145)
TOTAL IMMATURE NEUTOROPHIL: 0.16 K/CU MM
TOTAL IMMATURE NEUTOROPHIL: 0.19 K/CU MM
TOTAL NUCLEATED RBC: 0 K/CU MM
TOTAL NUCLEATED RBC: 0 K/CU MM
TOTAL PROTEIN: 5.3 GM/DL (ref 6.4–8.2)
TOTAL PROTEIN: 5.9 GM/DL (ref 6.4–8.2)
TOTAL PROTEIN: 5.9 GM/DL (ref 6.4–8.2)
VANCOMYCIN RANDOM: 11.6 UG/ML
WBC # BLD: 16.2 K/CU MM (ref 4–10.5)
WBC # BLD: 17.2 K/CU MM (ref 4–10.5)
WBC # BLD: 17.7 K/CU MM (ref 4–10.5)

## 2022-11-15 PROCEDURE — 6370000000 HC RX 637 (ALT 250 FOR IP): Performed by: SURGERY

## 2022-11-15 PROCEDURE — 6370000000 HC RX 637 (ALT 250 FOR IP)

## 2022-11-15 PROCEDURE — 6360000002 HC RX W HCPCS: Performed by: STUDENT IN AN ORGANIZED HEALTH CARE EDUCATION/TRAINING PROGRAM

## 2022-11-15 PROCEDURE — 2500000003 HC RX 250 WO HCPCS: Performed by: SURGERY

## 2022-11-15 PROCEDURE — 36415 COLL VENOUS BLD VENIPUNCTURE: CPT

## 2022-11-15 PROCEDURE — 2000000000 HC ICU R&B

## 2022-11-15 PROCEDURE — 88304 TISSUE EXAM BY PATHOLOGIST: CPT | Performed by: PATHOLOGY

## 2022-11-15 PROCEDURE — 2720000010 HC SURG SUPPLY STERILE: Performed by: SURGERY

## 2022-11-15 PROCEDURE — 86850 RBC ANTIBODY SCREEN: CPT

## 2022-11-15 PROCEDURE — 83690 ASSAY OF LIPASE: CPT

## 2022-11-15 PROCEDURE — 6360000002 HC RX W HCPCS: Performed by: NURSE ANESTHETIST, CERTIFIED REGISTERED

## 2022-11-15 PROCEDURE — 3700000001 HC ADD 15 MINUTES (ANESTHESIA): Performed by: SURGERY

## 2022-11-15 PROCEDURE — 84484 ASSAY OF TROPONIN QUANT: CPT

## 2022-11-15 PROCEDURE — 2580000003 HC RX 258: Performed by: NURSE ANESTHETIST, CERTIFIED REGISTERED

## 2022-11-15 PROCEDURE — 7100000000 HC PACU RECOVERY - FIRST 15 MIN: Performed by: SURGERY

## 2022-11-15 PROCEDURE — 86900 BLOOD TYPING SEROLOGIC ABO: CPT

## 2022-11-15 PROCEDURE — 3600000014 HC SURGERY LEVEL 4 ADDTL 15MIN: Performed by: SURGERY

## 2022-11-15 PROCEDURE — 7100000001 HC PACU RECOVERY - ADDTL 15 MIN: Performed by: SURGERY

## 2022-11-15 PROCEDURE — 82150 ASSAY OF AMYLASE: CPT

## 2022-11-15 PROCEDURE — 99222 1ST HOSP IP/OBS MODERATE 55: CPT | Performed by: STUDENT IN AN ORGANIZED HEALTH CARE EDUCATION/TRAINING PROGRAM

## 2022-11-15 PROCEDURE — 2580000003 HC RX 258: Performed by: SURGERY

## 2022-11-15 PROCEDURE — 6360000002 HC RX W HCPCS: Performed by: SURGERY

## 2022-11-15 PROCEDURE — 94761 N-INVAS EAR/PLS OXIMETRY MLT: CPT

## 2022-11-15 PROCEDURE — 83615 LACTATE (LD) (LDH) ENZYME: CPT

## 2022-11-15 PROCEDURE — 2709999900 HC NON-CHARGEABLE SUPPLY: Performed by: SURGERY

## 2022-11-15 PROCEDURE — 83605 ASSAY OF LACTIC ACID: CPT

## 2022-11-15 PROCEDURE — 80202 ASSAY OF VANCOMYCIN: CPT

## 2022-11-15 PROCEDURE — 3700000000 HC ANESTHESIA ATTENDED CARE: Performed by: SURGERY

## 2022-11-15 PROCEDURE — 3600000004 HC SURGERY LEVEL 4 BASE: Performed by: SURGERY

## 2022-11-15 PROCEDURE — 85610 PROTHROMBIN TIME: CPT

## 2022-11-15 PROCEDURE — 86922 COMPATIBILITY TEST ANTIGLOB: CPT

## 2022-11-15 PROCEDURE — 2580000003 HC RX 258: Performed by: STUDENT IN AN ORGANIZED HEALTH CARE EDUCATION/TRAINING PROGRAM

## 2022-11-15 PROCEDURE — 0FT44ZZ RESECTION OF GALLBLADDER, PERCUTANEOUS ENDOSCOPIC APPROACH: ICD-10-PCS | Performed by: SURGERY

## 2022-11-15 PROCEDURE — 85027 COMPLETE CBC AUTOMATED: CPT

## 2022-11-15 PROCEDURE — 85025 COMPLETE CBC W/AUTO DIFF WBC: CPT

## 2022-11-15 PROCEDURE — 86901 BLOOD TYPING SEROLOGIC RH(D): CPT

## 2022-11-15 PROCEDURE — P9045 ALBUMIN (HUMAN), 5%, 250 ML: HCPCS | Performed by: NURSE ANESTHETIST, CERTIFIED REGISTERED

## 2022-11-15 PROCEDURE — 2500000003 HC RX 250 WO HCPCS: Performed by: NURSE ANESTHETIST, CERTIFIED REGISTERED

## 2022-11-15 PROCEDURE — 80076 HEPATIC FUNCTION PANEL: CPT

## 2022-11-15 PROCEDURE — 80053 COMPREHEN METABOLIC PANEL: CPT

## 2022-11-15 PROCEDURE — 85730 THROMBOPLASTIN TIME PARTIAL: CPT

## 2022-11-15 PROCEDURE — 6360000002 HC RX W HCPCS: Performed by: ANESTHESIOLOGY

## 2022-11-15 RX ORDER — LIDOCAINE HYDROCHLORIDE 20 MG/ML
INJECTION, SOLUTION INTRAVENOUS PRN
Status: DISCONTINUED | OUTPATIENT
Start: 2022-11-15 | End: 2022-11-15 | Stop reason: SDUPTHER

## 2022-11-15 RX ORDER — DEXAMETHASONE SODIUM PHOSPHATE 4 MG/ML
INJECTION, SOLUTION INTRA-ARTICULAR; INTRALESIONAL; INTRAMUSCULAR; INTRAVENOUS; SOFT TISSUE PRN
Status: DISCONTINUED | OUTPATIENT
Start: 2022-11-15 | End: 2022-11-15 | Stop reason: SDUPTHER

## 2022-11-15 RX ORDER — FENTANYL CITRATE 50 UG/ML
INJECTION, SOLUTION INTRAMUSCULAR; INTRAVENOUS PRN
Status: DISCONTINUED | OUTPATIENT
Start: 2022-11-15 | End: 2022-11-15 | Stop reason: SDUPTHER

## 2022-11-15 RX ORDER — AMLODIPINE BESYLATE 5 MG/1
5 TABLET ORAL DAILY
Status: DISCONTINUED | OUTPATIENT
Start: 2022-11-15 | End: 2022-11-22 | Stop reason: HOSPADM

## 2022-11-15 RX ORDER — FENTANYL CITRATE 50 UG/ML
50 INJECTION, SOLUTION INTRAMUSCULAR; INTRAVENOUS EVERY 5 MIN PRN
Status: DISCONTINUED | OUTPATIENT
Start: 2022-11-15 | End: 2022-11-15 | Stop reason: HOSPADM

## 2022-11-15 RX ORDER — SODIUM CHLORIDE 0.9 % (FLUSH) 0.9 %
5-40 SYRINGE (ML) INJECTION EVERY 12 HOURS SCHEDULED
Status: DISCONTINUED | OUTPATIENT
Start: 2022-11-15 | End: 2022-11-15 | Stop reason: HOSPADM

## 2022-11-15 RX ORDER — BUPIVACAINE HYDROCHLORIDE 5 MG/ML
INJECTION, SOLUTION EPIDURAL; INTRACAUDAL
Status: COMPLETED | OUTPATIENT
Start: 2022-11-15 | End: 2022-11-15

## 2022-11-15 RX ORDER — LABETALOL HYDROCHLORIDE 5 MG/ML
10 INJECTION, SOLUTION INTRAVENOUS
Status: DISCONTINUED | OUTPATIENT
Start: 2022-11-15 | End: 2022-11-15 | Stop reason: HOSPADM

## 2022-11-15 RX ORDER — SODIUM CHLORIDE 0.9 % (FLUSH) 0.9 %
5-40 SYRINGE (ML) INJECTION PRN
Status: DISCONTINUED | OUTPATIENT
Start: 2022-11-15 | End: 2022-11-15 | Stop reason: HOSPADM

## 2022-11-15 RX ORDER — ONDANSETRON 2 MG/ML
4 INJECTION INTRAMUSCULAR; INTRAVENOUS
Status: DISCONTINUED | OUTPATIENT
Start: 2022-11-15 | End: 2022-11-15 | Stop reason: HOSPADM

## 2022-11-15 RX ORDER — HYDRALAZINE HYDROCHLORIDE 20 MG/ML
10 INJECTION INTRAMUSCULAR; INTRAVENOUS
Status: DISCONTINUED | OUTPATIENT
Start: 2022-11-15 | End: 2022-11-15 | Stop reason: HOSPADM

## 2022-11-15 RX ORDER — SODIUM CHLORIDE, SODIUM LACTATE, POTASSIUM CHLORIDE, CALCIUM CHLORIDE 600; 310; 30; 20 MG/100ML; MG/100ML; MG/100ML; MG/100ML
INJECTION, SOLUTION INTRAVENOUS CONTINUOUS PRN
Status: DISCONTINUED | OUTPATIENT
Start: 2022-11-15 | End: 2022-11-15 | Stop reason: SDUPTHER

## 2022-11-15 RX ORDER — TAMSULOSIN HYDROCHLORIDE 0.4 MG/1
0.4 CAPSULE ORAL DAILY
Status: DISCONTINUED | OUTPATIENT
Start: 2022-11-15 | End: 2022-11-22 | Stop reason: HOSPADM

## 2022-11-15 RX ORDER — ALBUMIN, HUMAN INJ 5% 5 %
SOLUTION INTRAVENOUS PRN
Status: DISCONTINUED | OUTPATIENT
Start: 2022-11-15 | End: 2022-11-15 | Stop reason: SDUPTHER

## 2022-11-15 RX ORDER — ONDANSETRON 2 MG/ML
INJECTION INTRAMUSCULAR; INTRAVENOUS PRN
Status: DISCONTINUED | OUTPATIENT
Start: 2022-11-15 | End: 2022-11-15 | Stop reason: SDUPTHER

## 2022-11-15 RX ORDER — FENTANYL CITRATE 50 UG/ML
25 INJECTION, SOLUTION INTRAMUSCULAR; INTRAVENOUS EVERY 5 MIN PRN
Status: DISCONTINUED | OUTPATIENT
Start: 2022-11-15 | End: 2022-11-15 | Stop reason: HOSPADM

## 2022-11-15 RX ORDER — SODIUM CHLORIDE 9 MG/ML
25 INJECTION, SOLUTION INTRAVENOUS PRN
Status: DISCONTINUED | OUTPATIENT
Start: 2022-11-15 | End: 2022-11-15 | Stop reason: HOSPADM

## 2022-11-15 RX ORDER — OXYCODONE HYDROCHLORIDE 5 MG/1
10 TABLET ORAL PRN
Status: DISCONTINUED | OUTPATIENT
Start: 2022-11-15 | End: 2022-11-15 | Stop reason: HOSPADM

## 2022-11-15 RX ORDER — PROPOFOL 10 MG/ML
INJECTION, EMULSION INTRAVENOUS PRN
Status: DISCONTINUED | OUTPATIENT
Start: 2022-11-15 | End: 2022-11-15 | Stop reason: SDUPTHER

## 2022-11-15 RX ORDER — OXYCODONE HYDROCHLORIDE 5 MG/1
5 TABLET ORAL PRN
Status: DISCONTINUED | OUTPATIENT
Start: 2022-11-15 | End: 2022-11-15 | Stop reason: HOSPADM

## 2022-11-15 RX ORDER — ROCURONIUM BROMIDE 10 MG/ML
INJECTION, SOLUTION INTRAVENOUS PRN
Status: DISCONTINUED | OUTPATIENT
Start: 2022-11-15 | End: 2022-11-15 | Stop reason: SDUPTHER

## 2022-11-15 RX ADMIN — SODIUM CHLORIDE, POTASSIUM CHLORIDE, SODIUM LACTATE AND CALCIUM CHLORIDE: 600; 310; 30; 20 INJECTION, SOLUTION INTRAVENOUS at 12:21

## 2022-11-15 RX ADMIN — PROPOFOL 40 MG: 10 INJECTION, EMULSION INTRAVENOUS at 12:28

## 2022-11-15 RX ADMIN — LIDOCAINE HYDROCHLORIDE 100 MG: 20 INJECTION, SOLUTION INTRAVENOUS at 12:28

## 2022-11-15 RX ADMIN — ALBUMIN (HUMAN) 250 ML: 12.5 INJECTION, SOLUTION INTRAVENOUS at 13:30

## 2022-11-15 RX ADMIN — FENTANYL CITRATE 25 MCG: 50 INJECTION INTRAMUSCULAR; INTRAVENOUS at 14:25

## 2022-11-15 RX ADMIN — ONDANSETRON 4 MG: 2 INJECTION INTRAMUSCULAR; INTRAVENOUS at 13:25

## 2022-11-15 RX ADMIN — HYDRALAZINE HYDROCHLORIDE 5 MG: 20 INJECTION INTRAMUSCULAR; INTRAVENOUS at 02:17

## 2022-11-15 RX ADMIN — SODIUM CHLORIDE, POTASSIUM CHLORIDE, SODIUM LACTATE AND CALCIUM CHLORIDE: 600; 310; 30; 20 INJECTION, SOLUTION INTRAVENOUS at 21:44

## 2022-11-15 RX ADMIN — FENTANYL CITRATE 100 MCG: 50 INJECTION, SOLUTION INTRAMUSCULAR; INTRAVENOUS at 12:28

## 2022-11-15 RX ADMIN — HYDROMORPHONE HYDROCHLORIDE 0.5 MG: 1 INJECTION, SOLUTION INTRAMUSCULAR; INTRAVENOUS; SUBCUTANEOUS at 16:37

## 2022-11-15 RX ADMIN — METOPROLOL TARTRATE 25 MG: 25 TABLET, FILM COATED ORAL at 10:24

## 2022-11-15 RX ADMIN — SODIUM CHLORIDE, POTASSIUM CHLORIDE, SODIUM LACTATE AND CALCIUM CHLORIDE: 600; 310; 30; 20 INJECTION, SOLUTION INTRAVENOUS at 03:29

## 2022-11-15 RX ADMIN — SODIUM CHLORIDE, PRESERVATIVE FREE 10 ML: 5 INJECTION INTRAVENOUS at 20:41

## 2022-11-15 RX ADMIN — LEVOTHYROXINE SODIUM 75 MCG: 0.07 TABLET ORAL at 05:29

## 2022-11-15 RX ADMIN — TAMSULOSIN HYDROCHLORIDE 0.4 MG: 0.4 CAPSULE ORAL at 16:34

## 2022-11-15 RX ADMIN — PHENYLEPHRINE HYDROCHLORIDE 150 MCG: 10 INJECTION INTRAVENOUS at 12:41

## 2022-11-15 RX ADMIN — HYDROMORPHONE HYDROCHLORIDE 0.5 MG: 1 INJECTION, SOLUTION INTRAMUSCULAR; INTRAVENOUS; SUBCUTANEOUS at 03:27

## 2022-11-15 RX ADMIN — CEFEPIME HYDROCHLORIDE 1000 MG: 1 INJECTION, POWDER, FOR SOLUTION INTRAMUSCULAR; INTRAVENOUS at 10:42

## 2022-11-15 RX ADMIN — METOPROLOL TARTRATE 25 MG: 25 TABLET, FILM COATED ORAL at 20:41

## 2022-11-15 RX ADMIN — AMLODIPINE BESYLATE 5 MG: 5 TABLET ORAL at 16:34

## 2022-11-15 RX ADMIN — DEXAMETHASONE SODIUM PHOSPHATE 5 MG: 4 INJECTION, SOLUTION INTRAMUSCULAR; INTRAVENOUS at 12:28

## 2022-11-15 RX ADMIN — SUGAMMADEX 200 MG: 100 INJECTION, SOLUTION INTRAVENOUS at 13:51

## 2022-11-15 RX ADMIN — PHENYLEPHRINE HYDROCHLORIDE 150 MCG: 10 INJECTION INTRAVENOUS at 12:46

## 2022-11-15 RX ADMIN — HYDROMORPHONE HYDROCHLORIDE 0.5 MG: 1 INJECTION, SOLUTION INTRAMUSCULAR; INTRAVENOUS; SUBCUTANEOUS at 20:41

## 2022-11-15 RX ADMIN — ROCURONIUM BROMIDE 50 MG: 10 INJECTION, SOLUTION INTRAVENOUS at 12:28

## 2022-11-15 RX ADMIN — PHENYLEPHRINE HYDROCHLORIDE 150 MCG: 10 INJECTION INTRAVENOUS at 12:35

## 2022-11-15 ASSESSMENT — PAIN SCALES - GENERAL
PAINLEVEL_OUTOF10: 0
PAINLEVEL_OUTOF10: 8
PAINLEVEL_OUTOF10: 7
PAINLEVEL_OUTOF10: 0
PAINLEVEL_OUTOF10: 7
PAINLEVEL_OUTOF10: 0
PAINLEVEL_OUTOF10: 3
PAINLEVEL_OUTOF10: 0
PAINLEVEL_OUTOF10: 6
PAINLEVEL_OUTOF10: 0
PAINLEVEL_OUTOF10: 0

## 2022-11-15 ASSESSMENT — PAIN - FUNCTIONAL ASSESSMENT
PAIN_FUNCTIONAL_ASSESSMENT: PREVENTS OR INTERFERES SOME ACTIVE ACTIVITIES AND ADLS
PAIN_FUNCTIONAL_ASSESSMENT: ACTIVITIES ARE NOT PREVENTED
PAIN_FUNCTIONAL_ASSESSMENT: PREVENTS OR INTERFERES SOME ACTIVE ACTIVITIES AND ADLS

## 2022-11-15 ASSESSMENT — PAIN DESCRIPTION - ORIENTATION
ORIENTATION: RIGHT
ORIENTATION: OTHER (COMMENT)
ORIENTATION: MID
ORIENTATION: RIGHT;MID
ORIENTATION: RIGHT

## 2022-11-15 ASSESSMENT — PAIN DESCRIPTION - LOCATION
LOCATION: ABDOMEN
LOCATION: CHEST;RIB CAGE
LOCATION: BACK

## 2022-11-15 ASSESSMENT — PAIN DESCRIPTION - ONSET
ONSET: GRADUAL
ONSET: ON-GOING

## 2022-11-15 ASSESSMENT — PAIN DESCRIPTION - DESCRIPTORS
DESCRIPTORS: ACHING
DESCRIPTORS: PATIENT UNABLE TO DESCRIBE
DESCRIPTORS: ACHING;THROBBING;TENDER
DESCRIPTORS: PATIENT UNABLE TO DESCRIBE
DESCRIPTORS: DISCOMFORT;ACHING

## 2022-11-15 ASSESSMENT — PAIN DESCRIPTION - FREQUENCY
FREQUENCY: CONTINUOUS
FREQUENCY: INTERMITTENT

## 2022-11-15 ASSESSMENT — PAIN DESCRIPTION - DIRECTION: RADIATING_TOWARDS: NO WHERE

## 2022-11-15 ASSESSMENT — PAIN DESCRIPTION - PAIN TYPE
TYPE: SURGICAL PAIN;ACUTE PAIN
TYPE: SURGICAL PAIN

## 2022-11-15 NOTE — ANESTHESIA POSTPROCEDURE EVALUATION
Department of Anesthesiology  Postprocedure Note    Patient: Darnell Jackson  MRN: [id number]  [place]: 10/18/1930  Date of evaluation: 11/15/2022      Procedure Summary     Date: 11/15/22 Room / Location: [address]    Anesthesia Start: 4608 Anesthesia Stop: 9401    Procedure: CHOLECYSTECTOMY LAPAROSCOPIC (Abdomen) Diagnosis:       Acute cholecystitis      (Acute cholecystitis [K81.0])    Surgeons: Orville Bonilla MD Responsible Provider: Yulisa Gold MD    Anesthesia Type: General ASA Status: 4          Anesthesia Type: General    Jaison Phase I:      Jaison Phase II:        Anesthesia Post Evaluation    Patient location during evaluation: PACU  Patient participation: complete - patient participated  Level of consciousness: awake and alert  Pain score: 1  Airway patency: patent  Nausea & Vomiting: no nausea and no vomiting  Complications: no  Cardiovascular status: blood pressure returned to baseline and hemodynamically stable  Respiratory status: acceptable, face mask, nonlabored ventilation and spontaneous ventilation  Hydration status: euvolemic  Multimodal analgesia pain management approach

## 2022-11-15 NOTE — PROGRESS NOTES
Contacted by urology. Told to bladder scan to see retention since pt been voiding in urinal. Will bladder scan and monitor.

## 2022-11-15 NOTE — PROGRESS NOTES
V2.0  [facility] [place]      Name:  Casimiro Lynch /Age/Sex: 10/18/1930  (80 y.o. male)   MRN & CSN:  [id number] & [id number] Encounter Date/Time: 11/15/2022 1:44 PM EST    Location:  OR/NONE PCP: No primary care provider on file. Hospital Day: 3    Assessment and Plan:   Casimiro Lynch is a 80 y.o. male  pmh of BPH, HLD, history of DVT, hypothyroidism, chronic diastolic heart failure, GERD, chronic anemia, HTN, CKD 4 who presents with Syncope,       # Syncope: History of fall at home  # Head injury secondary to above  # Cardiogenic versus neurogenic  - Orthostatics negative, CT head negative  - Cardiology and neurology consulted, echocardiogram obtained low normal EF  - Continue on telemetry  - Fall precautions    # Elevated liver enzymes  # Choledocholithiasis versus cholecystitis  - CT abdomen showed distended gallbladder, MRI abdomen showed mild/early acute cholecystitis   - General surgery planned for cholecystectomy on 11/15 today  - On cefepime and vancomycin continue    # Microscopic hematuria  # BPH   - UA showed large blood and following straight cath  - Urology consulted  - Recommended Flomax  - Recommended to hold off on straight caths unless patient is symptomatic or PVR notably elevated from this baseline of 350 cc    # Hypertension:  On presentation hypotensive  - Blood pressure elevated since   - Started on amlodipine 5 mg and metoprolol 25 mg twice daily  ,- Continue to monitor    # Hypothyroidism continue levothyroxine  Diet Diet NPO Exceptions are: Ice Chips, Sips of Water with Meds   DVT Prophylaxis [] Lovenox, [x]  Heparin, [] SCDs, [] Ambulation,  [] Eliquis, [] Xarelto  [] Coumadin   Code Status DNR-CC   Disposition From: Home  Expected Disposition: Home versus SNF  Estimated Date of Discharge: TBD  Patient requires continued admission due to OR for cholecystectomy   Surrogate Decision Maker/ POA Son and daughter     Subjective:     Chief Complaint: Fall and Loss of Consciousness (Using restroom and passed out; fell off toilet and into wall in front of him. )       Patient seen and examined at bedside. Patient states he have no complaints now awaiting surgery         Review of Systems:    Review of Systems    Constitutional: No fever no chills  HEENT: No headache no dizziness  Cardiovascular: No chest pain no palpitations  Respiratory: No cough no shortness of breath  Abdomen: No diarrhea, no nausea, no vomiting, no abdominal pain  Musculoskeletal: No swelling  Neuro: No numbness or tingling  Skin: No rash      Objective: Intake/Output Summary (Last 24 hours) at 11/15/2022 1344  Last data filed at 11/15/2022 0430  Gross per 24 hour   Intake --   Output 600 ml   Net -600 ml        Vitals:   Vitals:    11/15/22 1124   BP: (!) 140/85   Pulse: 87   Resp: 16   Temp: 98.3 °F (36.8 °C)   SpO2: 98%       Physical Exam:     General: Elderly male lying down in bed in no distress but hard of hearing  Eyes: EOMI  ENT: neck supple  Cardiovascular: Regular rate. Respiratory: Clear to auscultation  Gastrointestinal: Soft, mild tenderness in right upper quadrant bowel sounds normal  Genitourinary: no suprapubic tenderness  Musculoskeletal: No edema  Skin: warm, dry  Neuro: Alert. Psych: Mood appropriate.      Medications:   Medications:    tamsulosin  0.4 mg Oral Daily    amLODIPine  5 mg Oral Daily    vancomycin (VANCOCIN) intermittent dosing (placeholder)   Other RX Placeholder    cefepime  1,000 mg IntraVENous Q24H    sodium chloride flush  5-40 mL IntraVENous 2 times per day    heparin (porcine)  5,000 Units SubCUTAneous 3 times per day    [Held by provider] atorvastatin  40 mg Oral Nightly    levothyroxine  75 mcg Oral Daily    metoprolol tartrate  25 mg Oral BID    pantoprazole  20 mg Oral Daily    [Held by provider] QUEtiapine  50 mg Oral Nightly    sertraline  50 mg Oral Daily    guaiFENesin  600 mg Oral BID    miconazole   Topical BID      Infusions:    lactated ringers 100 mL/hr at 11/15/22 0329    sodium chloride       PRN Meds: HYDROmorphone, 0.5 mg, Q4H PRN  hydrALAZINE, 5 mg, Q6H PRN  sodium chloride flush, 5-40 mL, PRN  sodium chloride, , PRN  ondansetron, 4 mg, Q8H PRN   Or  ondansetron, 4 mg, Q6H PRN  polyethylene glycol, 17 g, Daily PRN  acetaminophen, 650 mg, Q6H PRN   Or  acetaminophen, 650 mg, Q6H PRN        Labs      Recent Results (from the past 24 hour(s))   Troponin    Collection Time: 11/14/22  5:03 PM   Result Value Ref Range    Troponin T 0.059 (H) <0.01 NG/ML   Comprehensive Metabolic Panel    Collection Time: 11/15/22 12:53 AM   Result Value Ref Range    Sodium 137 135 - 145 MMOL/L    Potassium 4.6 3.5 - 5.1 MMOL/L    Chloride 99 99 - 110 mMol/L    CO2 22 21 - 32 MMOL/L    BUN 39 (H) 6 - 23 MG/DL    Creatinine 2.4 (H) 0.9 - 1.3 MG/DL    Est, Glom Filt Rate 25 (L) >60 mL/min/1.73m2    Glucose 112 (H) 70 - 99 MG/DL    Calcium 8.6 8.3 - 10.6 MG/DL    Albumin 3.5 3.4 - 5.0 GM/DL    Total Protein 5.9 (L) 6.4 - 8.2 GM/DL    Total Bilirubin 1.9 (H) 0.0 - 1.0 MG/DL     (H) 10 - 40 U/L     (H) 15 - 37 IU/L    Alkaline Phosphatase 321 (H) 40 - 128 IU/L    Anion Gap 16 4 - 16   Lactic Acid    Collection Time: 11/15/22 12:53 AM   Result Value Ref Range    Lactate 1.4 0.4 - 2.0 mMOL/L   Hepatic Function Panel    Collection Time: 11/15/22 12:53 AM   Result Value Ref Range    Albumin 3.5 3.4 - 5.0 GM/DL    Total Bilirubin 1.9 (H) 0.0 - 1.0 MG/DL    Bilirubin, Direct 1.6 (H) 0.0 - 0.3 MG/DL    Bilirubin, Indirect 0.3 0 - 0.7 MG/DL    Alkaline Phosphatase 324 (H) 40 - 129 IU/L     (H) 15 - 37 IU/L     (H) 10 - 40 U/L    Total Protein 5.9 (L) 6.4 - 8.2 GM/DL   Lactate Dehydrogenase    Collection Time: 11/15/22 12:53 AM   Result Value Ref Range     (H) 120 - 246 IU/L   CBC with Auto Differential    Collection Time: 11/15/22 12:53 AM   Result Value Ref Range    WBC 17.2 (H) 4.0 - 10.5 K/CU MM    RBC 3.67 (L) 4.6 - 6.2 M/CU MM    Hemoglobin 11.2 (L) 13.5 - 18.0 GM/DL    Hematocrit 34.6 (L) 42 - 52 %    MCV 94.3 78 - 100 FL    MCH 30.5 27 - 31 PG    MCHC 32.4 32.0 - 36.0 %    RDW 14.7 11.7 - 14.9 %    Platelets 170 542 - 215 K/CU MM    MPV 10.1 7.5 - 11.1 FL    Differential Type AUTOMATED DIFFERENTIAL     Segs Relative 88.9 (H) 36 - 66 %    Lymphocytes % 2.8 (L) 24 - 44 %    Monocytes % 7.3 (H) 0 - 4 %    Eosinophils % 0.0 0 - 3 %    Basophils % 0.1 0 - 1 %    Segs Absolute 15.3 K/CU MM    Lymphocytes Absolute 0.5 K/CU MM    Monocytes Absolute 1.3 K/CU MM    Eosinophils Absolute 0.0 K/CU MM    Basophils Absolute 0.0 K/CU MM    Nucleated RBC % 0.0 %    Total Nucleated RBC 0.0 K/CU MM    Total Immature Neutrophil 0.16 K/CU MM    Immature Neutrophil % 0.9 (H) 0 - 0.43 %   Vancomycin Level, Random    Collection Time: 11/15/22  5:09 AM   Result Value Ref Range    Vancomycin Rm 11.6 UG/ML    DOSE AMOUNT DOSE AMT.  GIVEN - 1500mg     DOSE TIME DOSE TIME GIVEN - 11/14 @11:00    Amylase    Collection Time: 11/15/22  5:09 AM   Result Value Ref Range    Amylase 37 25 - 115 U/L   CBC with Auto Differential    Collection Time: 11/15/22  5:09 AM   Result Value Ref Range    WBC 17.7 (H) 4.0 - 10.5 K/CU MM    RBC 3.13 (L) 4.6 - 6.2 M/CU MM    Hemoglobin 9.4 (L) 13.5 - 18.0 GM/DL    Hematocrit 29.3 (L) 42 - 52 %    MCV 93.6 78 - 100 FL    MCH 30.0 27 - 31 PG    MCHC 32.1 32.0 - 36.0 %    RDW 14.8 11.7 - 14.9 %    Platelets 591 895 - 472 K/CU MM    MPV 9.7 7.5 - 11.1 FL    Differential Type AUTOMATED DIFFERENTIAL     Segs Relative 89.1 (H) 36 - 66 %    Lymphocytes % 2.7 (L) 24 - 44 %    Monocytes % 7.0 (H) 0 - 4 %    Eosinophils % 0.0 0 - 3 %    Basophils % 0.1 0 - 1 %    Segs Absolute 15.8 K/CU MM    Lymphocytes Absolute 0.5 K/CU MM    Monocytes Absolute 1.2 K/CU MM    Eosinophils Absolute 0.0 K/CU MM    Basophils Absolute 0.0 K/CU MM    Nucleated RBC % 0.0 %    Total Nucleated RBC 0.0 K/CU MM    Total Immature Neutrophil 0.19 K/CU MM    Immature Neutrophil % 1.1 (H) 0 - 0.43 % Comprehensive Metabolic Panel    Collection Time: 11/15/22  5:09 AM   Result Value Ref Range    Sodium 135 135 - 145 MMOL/L    Potassium 4.3 3.5 - 5.1 MMOL/L    Chloride 104 99 - 110 mMol/L    CO2 21 21 - 32 MMOL/L    BUN 38 (H) 6 - 23 MG/DL    Creatinine 2.5 (H) 0.9 - 1.3 MG/DL    Est, Glom Filt Rate 24 (L) >60 mL/min/1.73m2    Glucose 113 (H) 70 - 99 MG/DL    Calcium 8.2 (L) 8.3 - 10.6 MG/DL    Albumin 3.2 (L) 3.4 - 5.0 GM/DL    Total Protein 5.3 (L) 6.4 - 8.2 GM/DL    Total Bilirubin 1.5 (H) 0.0 - 1.0 MG/DL     (H) 10 - 40 U/L     (H) 15 - 37 IU/L    Alkaline Phosphatase 261 (H) 40 - 129 IU/L    Anion Gap 10 4 - 16   Lipase    Collection Time: 11/15/22  5:09 AM   Result Value Ref Range    Lipase 19 13 - 60 IU/L   Protime/INR & PTT    Collection Time: 11/15/22  5:09 AM   Result Value Ref Range    Protime 21.7 (H) 11.7 - 14.5 SECONDS    INR 1.67 INDEX    aPTT 42.9 (H) 25.1 - 37.1 SECONDS   Lactic Acid    Collection Time: 11/15/22  5:09 AM   Result Value Ref Range    Lactate 0.7 0.4 - 2.0 mMOL/L        Imaging/Diagnostics Last 24 Hours   MRI ABDOMEN WO CONTRAST MRCP    Result Date: 11/14/2022  EXAMINATION: MRI OF THE ABDOMEN WITHOUT CONTRAST AND MRCP 11/14/2022 11:43 am TECHNIQUE: Multiplanar multisequence MRI of the abdomen was performed without the administration of intravenous contrast.  After initial T2 axial and coronal images, thick slab, thin slab and 3D coronal MRCP sequences were obtained without the administration of intravenous contrast.  MIP images are provided for review. COMPARISON: 11/13/2022 CT abdomen/pelvis, 11/13/2022 abdominal ultrasound HISTORY: ORDERING SYSTEM PROVIDED HISTORY: RUQ tenderness, cholelithiasis, uptrending WBC and liver pnel TECHNOLOGIST PROVIDED HISTORY: Reason for exam:->RUQ tenderness, cholelithiasis, uptrending WBC and liver pnel Reason for Exam: Fall; Loss of Consciousness FINDINGS: Lung Bases: Trace bilateral pleural effusions. Liver:  The liver is normal in contour. No focal suspicious liver lesion. Biliary and Gallbladder: No biliary ductal dilation. Multiple calculi are visualized the level of the gallbladder neck/proximal cystic duct with moderate upstream gallbladder distension. There is mild pericholecystic fluid and stranding. No significant gallbladder wall thickening. Spleen: The spleen is unremarkable. Pancreas: Multiple T2 hyperintense lesions arise from the pancreas, the largest which is seen at the pancreatic uncinate process measuring 2.4 x 2.1 x 3.4 cm (series 7, image 15 and series 6, image 13). 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 Adrenal Glands: There is a 1.6 cm left adrenal lesion which demonstrates signal dropout on out of phase imaging, consistent with a benign adrenal adenoma. Kidneys: Bilateral renal cortical atrophy. There is a 4.1 cm T2 hyperintense lesion arising from the superior pole of left kidney which demonstrates thin internal septations, compatible with a Bosniak 2 cyst.  Smaller scattered T2 hyperintense renal lesions are also present, likely representing additional cysts. No hydronephrosis. Abdominal Vasculature: The abdominal aorta is normal in diameter. Gastrointestinal Tract: No evidence of bowel obstruction. Peritoneum/Mesentery/Retroperitoneum: No peritoneal or retroperitoneal masses. Lymph Nodes: No retroperitoneal lymphadenopathy. Musculoskeletal: No suspicious osseous findings. 1.  Calculi are visualized within the gallbladder neck and proximal cystic duct with moderate upstream gallbladder distension and mild pericholecystic fluid/stranding. No significant gallbladder wall thickening. Findings are favored to represent mild/early acute cholecystitis. 2.  There are multiple T2 hyperintense pancreatic lesions measuring up to 3.4 cm.   Per ACR White Paper recommendations, follow-up imaging with contrast should be obtained in 6 months. 3.  A previously described left adrenal nodule demonstrates characteristics consistent with a benign adrenal adenoma. US ABDOMEN LIMITED Specify organ? LIVER    Result Date: 11/13/2022  EXAMINATION: RIGHT UPPER QUADRANT ULTRASOUND 11/13/2022 3:09 pm COMPARISON: CT abdomen/pelvis 11/13/2022 HISTORY: ORDERING SYSTEM PROVIDED HISTORY: gallbladder distention seen on CT TECHNOLOGIST PROVIDED HISTORY: Reason for exam:->gallbladder distention seen on CT Specify organ?->LIVER Reason for Exam: distended gb on ct scan FINDINGS: Pancreas:  Nonvisualization of the gallbladder due to overlying bowel gas. Liver:  Normal contour with diffuse heterogeneous parenchymal echotexture and loss of the visualized portal triads consistent with hepatic steatosis. No intrahepatic mass biliary dilatation. Gallbladder: Moderate distention with a hyperechoic nonshadowing gallstone at the gallbladder neck. Dependent biliary sludge identified. No significant gallbladder wall thickening. No pericholecystic fluid. The technologist documented absence of sonographic Jose's sign. Common bile duct:  Borderline distended measuring 0.7 cm in AP dimension. Other:  No ascites. The right kidney demonstrates normal contour and parenchymal echotexture with cortical thickness. No hydronephrosis. No parenchymal solid or or cystic mass. 1. Cholelithiasis with gallbladder distention. No ultrasound evidence of acute cholecystitis. 2. Borderline distended common bile duct measuring 0.7 cm in diameter. 3. No ascites.        Electronically signed by Kenton Fernandes MD on 11/15/2022 at 1:44 PM

## 2022-11-15 NOTE — PROGRESS NOTES
1357- Patient arrived in PACU from OR, PACU monitors applied and alarms set. [facility] Karen Bojor[facility] CRNA. Patient appears in no acute distress, alert but hard of hearing to answer questions, respirations equal and unlabored on 6 LPM simple mask. Surgical sites clean, dry, and intact. 1425- Patient medicated for pain. 1430- This RN attempted x2 to draw ordered labs, unsuccessful. Call to lab for phlebotomy to attempt draw. 1445- Phlebotomy at bedside and attempted for blood draw x3, unsuccessful  1459- Patient taken from unit to ICU by this RN on transport monitor. Bedside report to Cindy Montano RN in ICU. Assisted to transfer patient to ICU bed and rolled side to side for skin check and remove extra linens. ICU staff at bedside upon this RN departure.

## 2022-11-15 NOTE — PROGRESS NOTES
4 Eyes Skin Assessment     NAME:  Alan Amaya  YOB: 1930  MEDICAL RECORD NUMBER:  [id number]    The patient is being assess for  Admission    I agree that 2 RN's have performed a thorough Head to Toe Skin Assessment on the patient. ALL assessment sites listed below have been assessed. Areas assessed by both nurses:    Head, Face, Ears, Shoulders, Back, Chest, Arms, Elbows, Hands, Sacrum. Buttock, Coccyx, Ischium, and Legs. Feet and Heels        Does the Patient have a Wound? Yes wound(s) were present on assessment. LDA wound assessment was Initiated and completed ; scattered bruising, abrasion on forehead RUE & RLE, surgical lap sites x2 and ALEJANDRA drain RLQ abdomen.         Jasmeet Prevention initiated:  No   Wound Care Orders initiated:  No    Pressure Injury (Stage 3,4, Unstageable, DTI, NWPT, and Complex wounds) if present place referral/consult order under [de-identified] No    New and Established Ostomies if present place consult order under : No      Nurse 1 eSignature: Electronically signed by Valery lAba RN on 11/15/22 at 3:34 PM EST    **SHARE this note so that the co-signing nurse is able to place an eSignature**    Nurse 2 eSignature: Electronically signed by Roopa Greene RN on 11/15/22 at 4:04 PM EST

## 2022-11-15 NOTE — PLAN OF CARE
Problem: Discharge Planning  Goal: Discharge to home or other facility with appropriate resources  Outcome: Progressing     Problem: Safety - Adult  Goal: Free from fall injury  Outcome: Progressing     Problem: ABCDS Injury Assessment  Goal: Absence of physical injury  Outcome: Progressing     Problem: Anxiety  Goal: Will report anxiety at manageable levels  Description: INTERVENTIONS:  1. Administer medication as ordered  2. Teach and rehearse alternative coping skills  3. Provide emotional support with 1:1 interaction with staff  Outcome: Progressing     Problem: Decision Making  Goal: Pt/Family able to effectively weigh alternatives and participate in decision making related to treatment and care  Description: INTERVENTIONS:  1. Determine when there are differences between patient's view, family's view, and healthcare provider's view of condition  2. Facilitate patient and family articulation of goals for care  3. Help patient and family identify pros/cons of alternative solutions  4. Provide information as requested by patient/family  5. Respect patient/family right to receive or not to receive information  6. Serve as a liaison between patient and family and health care team  7. Initiate Consults from Ethics, Palliative Care or initiate [facility] as is appropriate  Outcome: Progressing     Problem: Confusion  Goal: Confusion, delirium, dementia, or psychosis is improved or at baseline  Description: INTERVENTIONS:  1. Assess for possible contributors to thought disturbance, including medications, impaired vision or hearing, underlying metabolic abnormalities, dehydration, psychiatric diagnoses, and notify attending LIP  2. [place] high risk fall precautions, as indicated  3. Provide frequent short contacts to provide reality reorientation, refocusing and direction  4. Decrease environmental stimuli, including noise as appropriate  5.  Monitor and intervene to maintain adequate nutrition, hydration, elimination, sleep and activity  6. If unable to ensure safety without constant attention obtain sitter and review sitter guidelines with assigned personnel  7.  Initiate Psychosocial CNS and Spiritual Care consult, as indicated  Outcome: Progressing     Problem: Neurosensory - Adult  Goal: Achieves stable or improved neurological status  Outcome: Progressing  Goal: Absence of seizures  Outcome: Progressing  Goal: Remains free of injury related to seizures activity  Outcome: Progressing  Goal: Achieves maximal functionality and self care  Outcome: Progressing     Problem: Respiratory - Adult  Goal: Achieves optimal ventilation and oxygenation  Outcome: Progressing     Problem: Cardiovascular - Adult  Goal: Maintains optimal cardiac output and hemodynamic stability  Outcome: Progressing  Goal: Absence of cardiac dysrhythmias or at baseline  Outcome: Progressing     Problem: Skin/Tissue Integrity - Adult  Goal: Skin integrity remains intact  Outcome: Progressing  Goal: Incisions, wounds, or drain sites healing without S/S of infection  Outcome: Progressing     Problem: Musculoskeletal - Adult  Goal: Return mobility to safest level of function  Outcome: Progressing  Goal: Maintain proper alignment of affected body part  Outcome: Progressing  Goal: Return ADL status to a safe level of function  Outcome: Progressing     Problem: Gastrointestinal - Adult  Goal: Minimal or absence of nausea and vomiting  Outcome: Progressing  Goal: Maintains or returns to baseline bowel function  Outcome: Progressing  Goal: Maintains adequate nutritional intake  Outcome: Progressing     Problem: Genitourinary - Adult  Goal: Absence of urinary retention  Outcome: Progressing     Problem: Infection - Adult  Goal: Absence of infection at discharge  Outcome: Progressing  Goal: Absence of infection during hospitalization  Outcome: Progressing  Goal: Absence of fever/infection during anticipated neutropenic period  Outcome: Progressing     Problem: Metabolic/Fluid and Electrolytes - Adult  Goal: Electrolytes maintained within normal limits  Outcome: Progressing  Goal: Hemodynamic stability and optimal renal function maintained  Outcome: Progressing     Problem: Hematologic - Adult  Goal: Maintains hematologic stability  Outcome: Progressing     Problem: Pain  Goal: Verbalizes/displays adequate comfort level or baseline comfort level  Outcome: Progressing

## 2022-11-15 NOTE — PROGRESS NOTES
Physician Progress Note      PATIENT:               Sulema Lyons  CSN #:                  [id number]  :                       10/18/1930  ADMIT DATE:       2022 9:12 AM  DISCH DATE:  RESPONDING  PROVIDER #:        Efrem Amin MD          QUERY TEXT:    Pt admitted with acute cholecystitis. Pt noted to have developed leukocytosis,   tachycardia, tachypnea, and elevated CRP. If possible, please document in the   progress notes and discharge summary if you are evaluating and /or treating   any of the following: The medical record reflects the following:  Risk Factors: acute cholecystitis  Clinical Indicators: Admitted on  WBC in ED 8.7 increased to 16.2 about   16 hours later, .9, T- max 99.6, p 56 - 109, R 15 - 28. Treatment: 0.9%  ml bolus in ED, LR 1 liter bolus on , IV cefepime,   IV vanc, CBC. Thank you,  Roxy Bazzi, HARPREETN, RN, CDS  [phone]  Options provided:  -- Sepsis, present on admission  -- Sepsis, developed following admission  -- Acute cholecystitis without Sepsis  -- Other - I will add my own diagnosis  -- Disagree - Not applicable / Not valid  -- Disagree - Clinically unable to determine / Unknown  -- Refer to Clinical Documentation Reviewer    PROVIDER RESPONSE TEXT:    This patient has acute cholecystitis without Sepsis.     Query created by: Jeannie Dietz on 11/15/2022 8:50 AM      Electronically signed by:  Efrem Amin MD 11/15/2022 3:27 PM

## 2022-11-15 NOTE — CONSULTS
[facility] [address]   Consult Note  [facility] 1 2 3 4 5  Date: 11/15/2022   Patient: Marietta Tipton   : 10/18/1930   DOA: 2022   MRN: [id number]   ROOM#: [id number]     Reason for Consult:  Hematuria, urine retention  Requesting Physician:  Dr. Kay Jenkins  Collaborating Urologist on Call at time of admission:  [address] [facility]:  Fall at home    History Obtained From:  patient, electronic medical record    HISTORY OF PRESENT ILLNESS:                The patient is a 80 y.o. male with significant past medical history of BPH, HTN, HLD, CKD, hepatitis, anxiety/depression, squamous cell carcinoma of the skin, and arthritis who presented with fall at home while using the bathroom. Fall was not witnessed, no apparent trauma to the head. EKG shows sinus bradycardia with first-degree AV block. Patient noted to be hypertension. Creatinine at baseline. Hemoglobin stable given history of chronic anemia. WBC 16.2 and rising. Large blood noted on UA following straight cath. Patient with known history of BPH, was seen during hospitalization 2022 due to for hematuria/clots via Laureano. Bladder scan yesterday evening around 350 cc, patient reportedly voided a large amount spontaneously (>700 cc) without issue. Patient has been seen by urology previously, s/p cystoscopy/clot evacuation/TURP/fulguration 2021. Recently seen 2022 for gross hematuria likely related to Laureano trauma in the setting of Eliquis. While discussing with patient and family at bedside, surgery transport team arrived to take patient for cholecystectomy. Patient denies any issues with voiding or any gross hematuria. No dysuria.     Past Medical History:        Diagnosis Date    Anemia     Anxiety     Arthritis     BPH with urinary obstruction     Depression     GERD (gastroesophageal reflux disease)     Hemorrhoids     Hepatitis     Hernia of unspecified site of abdominal cavity without mention of obstruction or gangrene     Hyperlipidemia     Hypotension     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    Rt Tricep, Lt Superior Forearm     Past Surgical History:        Procedure Laterality Date    CATARACT REMOVAL      CYSTOSCOPY N/A 3/29/2021    CYSTOSCOPY EVACUATION OF CLOTS, BLADDER FULGERATION, AND SUPRA-PUBIC CATHETER INSERTION [facility]  N/A 4/1/2021    CYSTOSCOPY, PROSTATE FULGURATION, EVACUATION OF CLOTS & TURP performed by Kaity Hobson MD at [address] [place] [address] N/A 3/4/2021    LAPAROSCOPIC LARGE HERNIA HIATAL REPAIR WITH GASTRIC OBSTRUCTION POSS OPEN performed by Malka Blood MD at [place] [address]       Current Medications:   Current Facility-Administered Medications: lactated ringers infusion, , IntraVENous, Continuous  HYDROmorphone (DILAUDID) injection 0.5 mg, 0.5 mg, IntraVENous, Q4H PRN  vancomycin (VANCOCIN) intermittent dosing (placeholder), , Other, RX Placeholder  [COMPLETED] cefepime (MAXIPIME) 2000 mg IVPB minibag, 2,000 mg, IntraVENous, Once **FOLLOWED BY** cefepime (MAXIPIME) 1000 mg IVPB minibag, 1,000 mg, IntraVENous, Q24H  hydrALAZINE (APRESOLINE) injection 5 mg, 5 mg, IntraVENous, Q6H PRN  sodium chloride flush 0.9 % injection 5-40 mL, 5-40 mL, IntraVENous, 2 times per day  sodium chloride flush 0.9 % injection 5-40 mL, 5-40 mL, IntraVENous, PRN  0.9 % sodium chloride infusion, , IntraVENous, PRN  ondansetron (ZOFRAN-ODT) disintegrating tablet 4 mg, 4 mg, Oral, Q8H PRN **OR** ondansetron (ZOFRAN) injection 4 mg, 4 mg, IntraVENous, Q6H PRN  polyethylene glycol (GLYCOLAX) packet 17 g, 17 g, Oral, Daily PRN  acetaminophen (TYLENOL) tablet 650 mg, 650 mg, Oral, Q6H PRN **OR** acetaminophen (TYLENOL) suppository 650 mg, 650 mg, Rectal, Q6H PRN  heparin (porcine) injection 5,000 Units, 5,000 Units, SubCUTAneous, 3 times per day  [Held by provider] atorvastatin (LIPITOR) tablet 40 mg, 40 mg, Oral, Nightly  levothyroxine (SYNTHROID) tablet 75 mcg, 75 mcg, Oral, Daily  metoprolol tartrate (LOPRESSOR) tablet 25 mg, 25 mg, Oral, BID  pantoprazole (PROTONIX) tablet 20 mg, 20 mg, Oral, Daily  [Held by provider] QUEtiapine (SEROQUEL) tablet 50 mg, 50 mg, Oral, Nightly  sertraline (ZOLOFT) tablet 50 mg, 50 mg, Oral, Daily  guaiFENesin (MUCINEX) extended release tablet 600 mg, 600 mg, Oral, BID  miconazole (MICOTIN) 2 % powder, , Topical, BID    Allergies:  Minocycline    Social History:   TOBACCO:   reports that he quit smoking about 67 years ago. His smoking use included cigarettes. He started smoking about 72 years ago. He has a 5.00 pack-year smoking history. He has never used smokeless tobacco.  ETOH:   reports that he does not currently use alcohol. DRUGS:   reports that he does not currently use drugs. Family History:       Problem Relation Age of Onset    Depression Mother     Diabetes Mother     COPD Father     Diabetes Brother     Diabetes Maternal Grandmother        REVIEW OF SYSTEMS:     CONSTITUTIONAL:  negative for  fevers and chills  RESPIRATORY:  positive for cough  CARDIOVASCULAR:  positive for  syncope  GASTROINTESTINAL:  positive for abdominal pain  GENITOURINARY:  positive for decreased stream    PHYSICAL EXAM:      VITALS:  BP (!) 129/92   Pulse 88   Temp 98.4 °F (36.9 °C) (Oral)   Resp 16   Ht 6' (1.829 m)   Wt 207 lb 14.4 oz (94.3 kg)   SpO2 97%   BMI 27.80 kg/m²      TEMPERATURE:  Current - Temp: 98.4 °F (36.9 °C); Max - Temp  Av.5 °F (36.9 °C)  Min: 98 °F (36.7 °C)  Max: 99.6 °F (37.6 °C)  24HR BLOOD PRESSURE RANGE:  Systolic (43KCD), BRF:489 , Min:129 , BNT:205   ; Diastolic (17OWA), DNU:73, Min:74, Max:94    8HR INTAKE OUTPUT:  No intake/output data recorded.   URINARY CATHETER OUTPUT (Laureano):     DRAIN/TUBE OUTPUT:        General appearance: alert, appears stated age, cooperative, and no distress  Head: Normocephalic, without obvious abnormality, atraumatic  Back:  No CVA tenderness  Abdomen: Soft, nondistended, mildly tender only to deep palpation    DATA:    WBC:    Lab Results   Component Value Date/Time    WBC 17.7 11/15/2022 05:09 AM     Hemoglobin/Hematocrit:    Lab Results   Component Value Date/Time    HGB 9.4 11/15/2022 05:09 AM    HCT 29.3 11/15/2022 05:09 AM     BMP:    Lab Results   Component Value Date/Time     11/15/2022 05:09 AM    K 4.3 11/15/2022 05:09 AM     11/15/2022 05:09 AM    CO2 21 11/15/2022 05:09 AM    BUN 38 11/15/2022 05:09 AM    LABALBU 3.2 11/15/2022 05:09 AM    CREATININE 2.5 11/15/2022 05:09 AM    CALCIUM 8.2 11/15/2022 05:09 AM    GFRAA 28 10/14/2022 12:10 PM    LABGLOM 24 11/15/2022 05:09 AM     PT/INR:    Lab Results   Component Value Date/Time    PROTIME 21.7 11/15/2022 05:09 AM    INR 1.67 11/15/2022 05:09 AM     Blood Culture: NGTD  Urine Culture: No growth    Imaging:  CT ABDOMEN PELVIS WO CONTRAST Additional Contrast? None    Result Date: 11/13/2022  EXAMINATION: CT OF THE ABDOMEN AND PELVIS WITHOUT CONTRAST 11/13/2022 11:32 am TECHNIQUE: CT of the abdomen and pelvis was performed without the administration of intravenous contrast. Multiplanar reformatted images are provided for review. Automated exposure control, iterative reconstruction, and/or weight based adjustment of the mA/kV was utilized to reduce the radiation dose to as low as reasonably achievable. COMPARISON: 08/31/2022. HISTORY: ORDERING SYSTEM PROVIDED HISTORY: ABD PAIN, FALL TECHNOLOGIST PROVIDED HISTORY: Reason for exam:->ABD PAIN, FALL Additional Contrast?->None Reason for Exam: ABD PAIN, FALL FINDINGS: Lower Chest: The lung bases demonstrate trace pleural effusions with lower lobe atelectasis. Organs: The liver, spleen, pancreas and right adrenal gland appear unremarkable for a non contrasted study. The gallbladder is distended. There is a low-attenuation nodule in the left adrenal gland measuring 1.8 cm and unchanged. The kidneys demonstrate no calcifications. No hydronephrosis is seen. There is a cyst in the left kidney measuring 4.5 cm. No myometrial or bladder calculi are seen. There is circumferential bladder wall thickening. There are multiple bladder diverticula. The prostate gland is mildly enlarged. GI/Bowel: Evaluation of the bowel is limited as no enteric contrast was given. No dilated loops of bowel are seen. There is diverticular disease involving the colon but no findings to suggest active inflammation. Note is made of a small hiatal hernia. I do not see a dilated appendix. Pelvis: No pelvic masses or fluid collections are seen. The prostate gland is mildly enlarged. There are soft tissue densities seen in the region of the inguinal canal is likely from prior hernia repair and unchanged. Peritoneum/Retroperitoneum: The abdominal aorta is not aneurysmal.  There are shotty mesenteric and retroperitoneal lymph nodes but no lymphadenopathy is seen. Bones/Soft Tissues: No acute bony abnormalities are noted. 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 1. Distended gallbladder. This likely is due to a prolonged fasting state. Otherwise, I do raise the possibility of acalculous cholecystitis. 2. Trace pleural effusions with lower lobe atelectasis. 3. Diverticulosis without obvious inflammation. 4. Prostate hypertrophy with mild component of bladder outlet obstruction with multiple bladder diverticula. XR PELVIS (1-2 VIEWS)    Result Date: 11/13/2022  EXAMINATION: ONE XRAY VIEW OF THE PELVIS 11/13/2022 9:44 am COMPARISON: CT abdomen/pelvis 08/31/2022 HISTORY: ORDERING SYSTEM PROVIDED HISTORY: fall TECHNOLOGIST PROVIDED HISTORY: Reason for exam:->fall Reason for Exam: fall FINDINGS: Single view provided. Lower lumbar degenerative disc and joint disease. Normal bilateral sacroiliac and hip alignment. No acute fracture.   Subtle right lakisha pelvic cortical and trabecular coarsening with intramedullary sclerotic foci radiation dose to as low as reasonably achievable. COMPARISON: None. HISTORY: ORDERING SYSTEM PROVIDED HISTORY: fall TECHNOLOGIST PROVIDED HISTORY: Reason for exam:->fall Decision Support Exception - unselect if not a suspected or confirmed emergency medical condition->Emergency Medical Condition (MA) Reason for Exam: fall, anticoagulated FINDINGS: BONES/ALIGNMENT: There is no acute fracture or traumatic malalignment. DEGENERATIVE CHANGES: There is degenerative disc disease of the C6-C7 disc, with disc space narrowing and osteophytes. There are osteoarthritic changes of the left facet joints. SOFT TISSUES: There is no prevertebral soft tissue swelling. No acute abnormality of the cervical spine. XR CHEST PORTABLE    Result Date: 11/13/2022  EXAMINATION: ONE XRAY VIEW OF THE CHEST 11/13/2022 9:44 am COMPARISON: Chest radiograph 10/10/2022. HISTORY: ORDERING SYSTEM PROVIDED HISTORY: hypotension, fall TECHNOLOGIST PROVIDED HISTORY: Reason for exam:->hypotension, fall Reason for Exam: hhypotension, fall FINDINGS: Single view provided. Stable mediastinal and cardiac silhouettes with coronary and aortic atherosclerotic calcifications. Normal lung volumes. There is symmetric diffuse interstitial vascular prominence with some ill-defined margins. Stable mild patchy right lung base opacity. Stable mild left pleural effusion and lung base consolidation. No pneumothorax or free subdiaphragmatic air. 1. Stable mild left pleural effusion. 2. Interval diffuse symmetric vascular changes suggesting pulmonary venous hypertension and possible developing pulmonary edema. 3. Stable mild bibasilar consolidation more prominent on the left.      MRI ABDOMEN WO CONTRAST MRCP    Result Date: 11/14/2022  EXAMINATION: MRI OF THE ABDOMEN WITHOUT CONTRAST AND MRCP 11/14/2022 11:43 am TECHNIQUE: Multiplanar multisequence MRI of the abdomen was performed without the administration of intravenous contrast.  After initial T2 axial and coronal images, thick slab, thin slab and 3D coronal MRCP sequences were obtained without the administration of intravenous contrast.  MIP images are provided for review. COMPARISON: 11/13/2022 CT abdomen/pelvis, 11/13/2022 abdominal ultrasound HISTORY: ORDERING SYSTEM PROVIDED HISTORY: RUQ tenderness, cholelithiasis, uptrending WBC and liver pnel TECHNOLOGIST PROVIDED HISTORY: Reason for exam:->RUQ tenderness, cholelithiasis, uptrending WBC and liver pnel Reason for Exam: Fall; Loss of Consciousness FINDINGS: Lung Bases: Trace bilateral pleural effusions. Liver: The liver is normal in contour. No focal suspicious liver lesion. Biliary and Gallbladder: No biliary ductal dilation. Multiple calculi are visualized the level of the gallbladder neck/proximal cystic duct with moderate upstream gallbladder distension. There is mild pericholecystic fluid and stranding. No significant gallbladder wall thickening. Spleen: The spleen is unremarkable. Pancreas: Multiple T2 hyperintense lesions arise from the pancreas, the largest which is seen at the pancreatic uncinate process measuring 2.4 x 2.1 x 3.4 cm (series 7, image 15 and series 6, image 13). 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 Adrenal Glands: There is a 1.6 cm left adrenal lesion which demonstrates signal dropout on out of phase imaging, consistent with a benign adrenal adenoma. Kidneys: Bilateral renal cortical atrophy. There is a 4.1 cm T2 hyperintense lesion arising from the superior pole of left kidney which demonstrates thin internal septations, compatible with a Bosniak 2 cyst.  Smaller scattered T2 hyperintense renal lesions are also present, likely representing additional cysts. No hydronephrosis. Abdominal Vasculature: The abdominal aorta is normal in diameter. Gastrointestinal Tract: No evidence of bowel obstruction.  Peritoneum/Mesentery/Retroperitoneum: No peritoneal or retroperitoneal masses. Lymph Nodes: No retroperitoneal lymphadenopathy. Musculoskeletal: No suspicious osseous findings. 1.  Calculi are visualized within the gallbladder neck and proximal cystic duct with moderate upstream gallbladder distension and mild pericholecystic fluid/stranding. No significant gallbladder wall thickening. Findings are favored to represent mild/early acute cholecystitis. 2.  There are multiple T2 hyperintense pancreatic lesions measuring up to 3.4 cm. Per ACR White Paper recommendations, follow-up imaging with contrast should be obtained in 6 months. 3.  A previously described left adrenal nodule demonstrates characteristics consistent with a benign adrenal adenoma. US ABDOMEN LIMITED Specify organ? LIVER    Result Date: 11/13/2022  EXAMINATION: RIGHT UPPER QUADRANT ULTRASOUND 11/13/2022 3:09 pm COMPARISON: CT abdomen/pelvis 11/13/2022 HISTORY: ORDERING SYSTEM PROVIDED HISTORY: gallbladder distention seen on CT TECHNOLOGIST PROVIDED HISTORY: Reason for exam:->gallbladder distention seen on CT Specify organ?->LIVER Reason for Exam: distended gb on ct scan FINDINGS: Pancreas:  Nonvisualization of the gallbladder due to overlying bowel gas. Liver:  Normal contour with diffuse heterogeneous parenchymal echotexture and loss of the visualized portal triads consistent with hepatic steatosis. No intrahepatic mass biliary dilatation. Gallbladder: Moderate distention with a hyperechoic nonshadowing gallstone at the gallbladder neck. Dependent biliary sludge identified. No significant gallbladder wall thickening. No pericholecystic fluid. The technologist documented absence of sonographic Jose's sign. Common bile duct:  Borderline distended measuring 0.7 cm in AP dimension. Other:  No ascites. The right kidney demonstrates normal contour and parenchymal echotexture with cortical thickness. No hydronephrosis. No parenchymal solid or or cystic mass.      1. Cholelithiasis with gallbladder distention. No ultrasound evidence of acute cholecystitis. 2. Borderline distended common bile duct measuring 0.7 cm in diameter. 3. No ascites. Assessment & Plan:      Joselo José is a 80 y.o. male admitted 11/13/2022 for unwitnessed fall at home. 1) BPH with microscopic hematuria: UA with large blood and following straight cath. Patient with known history of BPH previously requiring Laureano. No gross hematuria. H/o 90gm gland, chronically on Flomax/Proscar prior to admission   Added Flomax. Currently hypertensive but hypotension noted upon arrival.  Recommend continuing at least perioperatively and dc as needed for hypotension. Voiding well with elevated PVR around 350cc yesterday evening. Bladder scan as needed. OK to hold off on straight cath unless patient is symptomatic or PVR notably elevated from this baseline. Urine cytology sent    Will follow. Patient seen and examined, chart reviewed.      Electronically signed by MICHELLE Mast on 11/15/2022 at 10:03 AM

## 2022-11-15 NOTE — CONSULTS
Consult completed. Nexiva 20g 1.75 inch catheter inserted via ultrasound in patient's RFA. Brisk blood return noted and catheter flushes with ease. Patient tolerated well. Consult IV/PICC team for any questions or if patient's needs change.

## 2022-11-15 NOTE — CONSULTS
[address] [facility]  consulted by Dr. Chance Garza for monitoring and adjustment. Indication for treatment: Vancomycin indication: Other: Intra-abdominal infection  Goal trough: Trough Goal: 10-15 mcg/mL  AUC/TAMEKA: <500    Risk Factors for MRSA Identified:   Hospitalization within the past 90 days    Pertinent Laboratory Values:   Temp Readings from Last 3 Encounters:   11/15/22 98.4 °F (36.9 °C) (Oral)   10/14/22 98.1 °F (36.7 °C)   10/06/22 97.5 °F (36.4 °C)     Recent Labs     11/14/22  0134 11/14/22  1049 11/15/22  0053 11/15/22  0509   WBC 16.2*  --  17.2* 17.7*   LACTATE  --  1.6 1.4 0.7       Recent Labs     11/14/22  0134 11/15/22  0053 11/15/22  0509   BUN 41* 39* 38*   CREATININE 2.4* 2.4* 2.5*       Estimated Creatinine Clearance: 22 mL/min (A) (based on SCr of 2.5 mg/dL (H)). Intake/Output Summary (Last 24 hours) at 11/15/2022 1020  Last data filed at 11/15/2022 0430  Gross per 24 hour   Intake --   Output 600 ml   Net -600 ml         Pertinent Cultures:   Date    Source    Results  11/13   Urine                                    In process          11/14   Blood                         Ordered       Vancomycin level:   TROUGH:  No results for input(s): VANCOTROUGH in the last 72 hours. RANDOM:    Recent Labs     11/15/22  0509   VANCORANDOM 11.6       Assessment:  HPI: Hx Hepatitis  - CT Abd and pelvis shows distended gallbladder; possible acalculous cholecystitis;  Diverticulosis w/o inflammation; prostate hypertrophy with mild component of bladder outlet obstruction -- Abd U/S ordered  SCr, BUN, and urine output: KAVON on CKD4 (baseline of 2.2)  Day(s) of therapy: 2 (duration to be determined)  Vancomycin concentration:   11/15 - 11.6, 16h post-dose, appropriate to re-dose    Plan:  Intermittent vancomycin dosing based on levels with KAVON  Based on level, re-dose with 1000 mg x1  Repeat level tomorrow AM  Pharmacy will continue to monitor patient and adjust therapy as indicated    VANCOMYCIN CONCENTRATION SCHEDULED FOR 11/16 @ 0600    Thank you for the consult,  Ziggy [facility]QUEZ [facility] [place], PharmD  11/15/2022 10:20 AM

## 2022-11-15 NOTE — CONSULTS
[address], [facility]                                  CONSULTATION    PATIENT NAME: Luis Antonio Monreal                        :        10/18/1930  MED REC NO:   [id number]                          ROOM:       3007  ACCOUNT NO:   [de-identified]                           ADMIT DATE: 2022  PROVIDER:     Rc Kearney MD    CONSULT DATE:  2022    CHIEF COMPLAINT:  Upper abdominal pain with abnormal LFTs, rule out  common bile duct stone. HISTORY OF PRESENT ILLNESS:  The patient is a 42-year-old white  gentleman with a past medical history significant for hiatal hernia  status post repair done by Dr. Mary Dillard in the past, hyperlipidemia,  gastroesophageal reflux disease, depression, BPH, osteoarthritis,  anxiety disorder, anemia who was admitted to the hospital on 2022  after a syncopal episode. The patient also complained of mild  nonspecific upper abdominal pain. There is no history of vomiting,  hematemesis, fever, chills, anorexia or recent weight loss. The blood  workup done upon admission comprised a set of LFTs which were within  normal limits yesterday, but today the patient's LFTs have worsened with  a total bilirubin of 1.1, AST of 392, ALT of 232 with alkaline  phosphatase of 209. The patient had an ultrasound done of the right  upper quadrant which showed cholelithiasis with gallbladder distention. The common bile duct was slightly dilated at 7 mm. CAT scan of the  abdomen and pelvis was subsequently done and showed distended  gallbladder, however, no biliary ductal dilatation was noted. This was  followed with an MRCP today which showed multiple gallstones in the  gallbladder neck and proximal cystic duct with moderate upstream  gallbladder distention and mild pericholecystic fluid/stranding. No  biliary ductal dilatation was noted and also there was no evidence of  cholelithiasis.   However, multiple pancreatic lesions were noted  measuring up to 2.4 cm. The patient has been seen by the surgical  consultant, Dr. Desmond Owen and laparoscopic cholecystectomy is in order. The patient is hemodynamically stable. The patient has not had a recent EGD or colonoscopy done. REVIEW OF SYSTEMS:  Cannot be adequately assessed since the patient is  confused. PAST MEDICAL HISTORY:  Significant for history of gastroesophageal  reflux disease with hiatal hernia repair, depression, BPH,  osteoarthritis, anxiety disorder, anemia, and hyperlipidemia. FAMILY HISTORY:  Noncontributory. MEDICATIONS:  Please refer to the chart. SOCIOECONOMIC HISTORY:  No history of EtOH abuse. The patient is a  former smoker. SURGERIES:  The patient has had cataract surgery done, cystoscopy,  hiatal hernia repair done by Dr. Desmond Owen on 03/04/2021, and neck  surgery. ALLERGIES:  The patient is allergic to MINOCYCLINE. PHYSICAL Examination:  GENERAL:  A 17-year-old white male of average build and nutritional  status for his age who is lying flat in bed in no acute distress. He is  awake, alert and oriented, and pleasant to talk with. VITAL SIGNS:  Stable. HEENT:  Examination shows skull to be atraumatic. NECK:  Supple. CHEST:  Shows diminished breath sounds. HEART:  S1, S2 is normal.  ABDOMEN:  Soft, nondistended, and nontender. Liver and spleen are not  palpable. Bowel sounds are present. RECTAL:  Deferred. CNS:  Exam shows the patient to be awake but slightly confused. The  patient is moving all four extremities. MUSCULOSKELETAL SYSTEM:  Exam shows evidence of degenerative joint  disease changes. LABORATORY DATA:  The labs drawn during the present hospitalization  comprised a set of LFTs which are abnormal as described above. The  patient's CBC showed a WBC count of 16.2, hemoglobin 10.8, and platelet  count of 563,[phone].     IMPRESSION:  3  A 17-year-old white male admitted after a syncopal episode and also  complains of mild upper abdominal pain with abnormal LFTs and imaging  and elevated WBC count of 16.2, rule out acute cholecystitis. 2.  Rule out choledocholithiasis? RECOMMENDATIONS:  1. Agree with present management with IV antibiotics. 2.  Laparoscopic cholecystectomy per Dr. Ree Lucas and would suggest doing  an intraoperative cholangiogram also if possible to rule out common bile  duct stones in view of the patient's abnormal LFTs. 3.  If common bile duct stones are present on intraoperative  cholangiogram, we will do a postop ERCP with endoscopic sphincterotomy  to clear the common bile duct off the stones. 4.  The case and plan has been discussed with the hospitalist and also  discussed with the surgical consultant, Dr. Ree Lucas as well. 5.  We will check CBC, CMP, amylase, lipase, PT/PTT, INR in a.m.           Usama Betts MD    D: 11/14/2022 15:21:46       T: 11/14/2022 15:25:02     [email or web address]  Job#: [id number]     Doc#: [id number]    CC:

## 2022-11-15 NOTE — PROGRESS NOTES
Daily Progress Note  Subjective:  Awake in room  Does not appear to be in distress  BP and HR stable  NSR on tele  Possible surgery today    Attending Note:  Patient is awake alert  Remain in sinus rate Is stable  He is going for possible gall bladder surgery per patient  Cr is stable  2.5  Elevated trop no ACS  HTN adjust meds   Lipitor on hold due to elevated LFT  Hx of anemia  Hx of DVt     Impression and Plan:   Syncope    When getting up from the bathroom    Unwitnessed at home    Orthostatic vs. Vasovagal?     Not much of change in BP from laying to sitting    Very similar episode over the summer    Neuro has also been consulted    Echo showed low normal EF    Tele reviewed    Elevated liver enzymes    GI and general surgery is following     LFT's Cont' to rise    WBC on the rise as well    Cholecystis vs. Choledocholithiasis    Possible surgery    Possible ERCP    ABX per primary    Mildly elevation in troponin; no ACS in nature     Most Recent Echo  Echo 11/14/2022   This is a limited echocardiogram.   Left ventricular systolic function is low normal.   Ejection fraction is visually estimated at 45-50%. Mild tricuspid regurgitation; RVSP: 37 mmHg. No evidence of any pericardial effusion. Dilated aortic root measuring 4.0cm. Dilated ascending aorta measuring 4.0cm. Technically difficult study, due to body habitus    Radiology  MRI abdomen 11/14/2022  Impression   1. Calculi are visualized within the gallbladder neck and proximal cystic   duct with moderate upstream gallbladder distension and mild pericholecystic   fluid/stranding. No significant gallbladder wall thickening. Findings are   favored to represent mild/early acute cholecystitis. 2.  There are multiple T2 hyperintense pancreatic lesions measuring up to 3.4   cm. Per ACR White Paper recommendations, follow-up imaging with contrast   should be obtained in 6 months.        3.  A previously described left adrenal nodule demonstrates characteristics   consistent with a benign adrenal adenoma. CT head 11/13/2022  Impression   No acute intracranial abnormality. CT abdomen 11/13/2022  Impression   1. Distended gallbladder. This likely is due to a prolonged fasting state. Otherwise, I do raise the possibility of acalculous cholecystitis. 2. Trace pleural effusions with lower lobe atelectasis. 3. Diverticulosis without obvious inflammation. 4. Prostate hypertrophy with mild component of bladder outlet obstruction   with multiple bladder diverticula. PAST MEDICAL HISTORY:  Anemia, anxiety, arthritis, BPH, depression,  GERD, hemorrhoids, hepatitis, hernia, hypotension, hyperlipidemia, and  squamous cell carcinoma. PAST SURGICAL HISTORY:  Cataracts removal, hernia repair, and neck  surgery. SOCIAL HISTORY:  Former smoker. He does not drink any alcohol. He does  not use any illicit drugs. He lives with daughter. ALLERGIES:  MINOCYCLINE.   Objective:   BP (!) 129/92   Pulse 88   Temp 98.4 °F (36.9 °C) (Oral)   Resp 16   Ht 6' (1.829 m)   Wt 207 lb 14.4 oz (94.3 kg)   SpO2 97%   BMI 27.80 kg/m²     Intake/Output Summary (Last 24 hours) at 11/15/2022 1010  Last data filed at 11/15/2022 0430  Gross per 24 hour   Intake --   Output 600 ml   Net -600 ml       Medications:   Scheduled Meds:   vancomycin (VANCOCIN) intermittent dosing (placeholder)   Other RX Placeholder    cefepime  1,000 mg IntraVENous Q24H    sodium chloride flush  5-40 mL IntraVENous 2 times per day    heparin (porcine)  5,000 Units SubCUTAneous 3 times per day    [Held by provider] atorvastatin  40 mg Oral Nightly    levothyroxine  75 mcg Oral Daily    metoprolol tartrate  25 mg Oral BID    pantoprazole  20 mg Oral Daily    [Held by provider] QUEtiapine  50 mg Oral Nightly    sertraline  50 mg Oral Daily    guaiFENesin  600 mg Oral BID    miconazole   Topical BID      Infusions:   lactated ringers 100 mL/hr at 11/15/22 2429    sodium chloride        PRN Meds:  HYDROmorphone, hydrALAZINE, sodium chloride flush, sodium chloride, ondansetron **OR** ondansetron, polyethylene glycol, acetaminophen **OR** acetaminophen     Physical Exam:  Vitals:    11/15/22 0745   BP: (!) 129/92   Pulse: 88   Resp: 16   Temp: 98.4 °F (36.9 °C)   SpO2: 97%        General: confused and [place]  Chest: Nontender  Cardiac: First and Second Heart Sounds are Normal, No Murmurs or Gallops noted  Lungs:Clear to auscultation and percussion. Abdomen: Soft, NT, ND, +BS  Extremities: No clubbing, no edema  Vascular:  Equal 2+ peripheral pulses. Lab Data:  CBC:   Recent Labs     11/14/22  0134 11/15/22  0053 11/15/22  0509   WBC 16.2* 17.2* 17.7*   HGB 10.8* 11.2* 9.4*   HCT 33.6* 34.6* 29.3*   MCV 94.9 94.3 93.6    153 197     BMP:   Recent Labs     11/14/22  0134 11/15/22  0053 11/15/22  0509    137 135   K 4.3 4.6 4.3    99 104   CO2 23 22 21   BUN 41* 39* 38*   CREATININE 2.4* 2.4* 2.5*     LIVER PROFILE:   Recent Labs     11/14/22 0134 11/14/22  1049 11/15/22  0053 11/15/22  0509   *  --  438*  426* 299*   *  --  503*  491* 395*   LIPASE  --  18  --  19   BILIDIR  --   --  1.6*  --    BILITOT 1.1*  --  1.9*  1.9* 1.5*   ALKPHOS 209*  --  321*  324* 261*     PT/INR:   Recent Labs     11/14/22  1049 11/15/22  0509   PROTIME 19.5* 21.7*   INR 1.51 1.67     APTT:   Recent Labs     11/14/22  1049 11/15/22  0509   APTT 43.7* 42.9*     BNP:  No results for input(s): BNP in the last 72 hours.       Assessment:  Patient Active Problem List    Diagnosis Date Noted    Gallstones 11/14/2022    Syncope, unspecified syncope type 11/13/2022    Community acquired pneumonia of left lung, unspecified part of lung 10/08/2022    [facility] 08/25/2022    Chest pain 08/21/2022    Agitation due to dementia 08/18/2022    Varicose 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 08/15/2015    Skin ulcer, limited to breakdown of skin ([facility] 02/21/2022    Current moderate episode of major depressive disorder, unspecified whether recurrent ([facility] 02/21/2022    Chronic kidney disease, stage IV (severe) ([facility] 02/21/2022    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 02/21/2022    Leukocytosis 09/08/2021    Thrombocytopenia, unspecified 08/31/2021    Bandemia 08/10/2021    Atelectasis 08/05/2021    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       Electronically signed by ROSEMARY Baptiste CNP on 11/15/2022 at 10:10 AM      Electronically signed by Jeny Ibarra MD on 11/15/22 at 11:25 AM EST

## 2022-11-16 PROBLEM — K80.00 ACUTE CALCULOUS CHOLECYSTITIS: Status: ACTIVE | Noted: 2022-11-16

## 2022-11-16 LAB
ALBUMIN SERPL-MCNC: 2.8 GM/DL (ref 3.4–5)
ALP BLD-CCNC: 172 IU/L (ref 40–128)
ALT SERPL-CCNC: 219 U/L (ref 10–40)
ANION GAP SERPL CALCULATED.3IONS-SCNC: 13 MMOL/L (ref 4–16)
AST SERPL-CCNC: 109 IU/L (ref 15–37)
BILIRUB SERPL-MCNC: 0.5 MG/DL (ref 0–1)
BUN BLDV-MCNC: 44 MG/DL (ref 6–23)
CALCIUM SERPL-MCNC: 8 MG/DL (ref 8.3–10.6)
CHLORIDE BLD-SCNC: 102 MMOL/L (ref 99–110)
CO2: 22 MMOL/L (ref 21–32)
CREAT SERPL-MCNC: 2.6 MG/DL (ref 0.9–1.3)
GFR SERPL CREATININE-BSD FRML MDRD: 22 ML/MIN/1.73M2
GLUCOSE BLD-MCNC: 111 MG/DL (ref 70–99)
HCT VFR BLD CALC: 23.3 % (ref 42–52)
HCT VFR BLD CALC: 26.8 % (ref 42–52)
HEMOGLOBIN: 7.2 GM/DL (ref 13.5–18)
HEMOGLOBIN: 8.5 GM/DL (ref 13.5–18)
MAGNESIUM: 1.8 MG/DL (ref 1.8–2.4)
MCH RBC QN AUTO: 30.1 PG (ref 27–31)
MCHC RBC AUTO-ENTMCNC: 30.9 % (ref 32–36)
MCV RBC AUTO: 97.5 FL (ref 78–100)
PDW BLD-RTO: 14.8 % (ref 11.7–14.9)
PLATELET # BLD: 134 K/CU MM (ref 140–440)
PMV BLD AUTO: 10 FL (ref 7.5–11.1)
POTASSIUM SERPL-SCNC: 4.8 MMOL/L (ref 3.5–5.1)
RBC # BLD: 2.39 M/CU MM (ref 4.6–6.2)
SODIUM BLD-SCNC: 137 MMOL/L (ref 135–145)
TOTAL PROTEIN: 4.7 GM/DL (ref 6.4–8.2)
WBC # BLD: 10.4 K/CU MM (ref 4–10.5)

## 2022-11-16 PROCEDURE — 6370000000 HC RX 637 (ALT 250 FOR IP): Performed by: FAMILY MEDICINE

## 2022-11-16 PROCEDURE — 6360000002 HC RX W HCPCS: Performed by: SURGERY

## 2022-11-16 PROCEDURE — 6370000000 HC RX 637 (ALT 250 FOR IP): Performed by: SURGERY

## 2022-11-16 PROCEDURE — 51702 INSERT TEMP BLADDER CATH: CPT

## 2022-11-16 PROCEDURE — 2000000000 HC ICU R&B

## 2022-11-16 PROCEDURE — P9016 RBC LEUKOCYTES REDUCED: HCPCS

## 2022-11-16 PROCEDURE — 85027 COMPLETE CBC AUTOMATED: CPT

## 2022-11-16 PROCEDURE — 85018 HEMOGLOBIN: CPT

## 2022-11-16 PROCEDURE — 2580000003 HC RX 258: Performed by: SURGERY

## 2022-11-16 PROCEDURE — 94761 N-INVAS EAR/PLS OXIMETRY MLT: CPT

## 2022-11-16 PROCEDURE — 83735 ASSAY OF MAGNESIUM: CPT

## 2022-11-16 PROCEDURE — 2700000000 HC OXYGEN THERAPY PER DAY

## 2022-11-16 PROCEDURE — 80053 COMPREHEN METABOLIC PANEL: CPT

## 2022-11-16 PROCEDURE — 36430 TRANSFUSION BLD/BLD COMPNT: CPT

## 2022-11-16 PROCEDURE — 2060000000 HC ICU INTERMEDIATE R&B

## 2022-11-16 PROCEDURE — 85014 HEMATOCRIT: CPT

## 2022-11-16 RX ORDER — HYDROXYZINE PAMOATE 25 MG/1
25 CAPSULE ORAL ONCE
Status: COMPLETED | OUTPATIENT
Start: 2022-11-16 | End: 2022-11-16

## 2022-11-16 RX ORDER — SODIUM CHLORIDE 9 MG/ML
INJECTION, SOLUTION INTRAVENOUS PRN
Status: DISCONTINUED | OUTPATIENT
Start: 2022-11-16 | End: 2022-11-22 | Stop reason: HOSPADM

## 2022-11-16 RX ADMIN — LEVOTHYROXINE SODIUM 75 MCG: 0.07 TABLET ORAL at 06:06

## 2022-11-16 RX ADMIN — METOPROLOL TARTRATE 25 MG: 25 TABLET, FILM COATED ORAL at 20:32

## 2022-11-16 RX ADMIN — METOPROLOL TARTRATE 25 MG: 25 TABLET, FILM COATED ORAL at 10:20

## 2022-11-16 RX ADMIN — TAMSULOSIN HYDROCHLORIDE 0.4 MG: 0.4 CAPSULE ORAL at 10:20

## 2022-11-16 RX ADMIN — SERTRALINE HYDROCHLORIDE 50 MG: 50 TABLET ORAL at 10:20

## 2022-11-16 RX ADMIN — HYDROXYZINE PAMOATE 25 MG: 25 CAPSULE ORAL at 21:55

## 2022-11-16 RX ADMIN — QUETIAPINE FUMARATE 50 MG: 25 TABLET ORAL at 20:32

## 2022-11-16 RX ADMIN — SODIUM CHLORIDE, PRESERVATIVE FREE 10 ML: 5 INJECTION INTRAVENOUS at 10:20

## 2022-11-16 RX ADMIN — CEFEPIME HYDROCHLORIDE 1000 MG: 1 INJECTION, POWDER, FOR SOLUTION INTRAMUSCULAR; INTRAVENOUS at 10:25

## 2022-11-16 RX ADMIN — SODIUM CHLORIDE, POTASSIUM CHLORIDE, SODIUM LACTATE AND CALCIUM CHLORIDE: 600; 310; 30; 20 INJECTION, SOLUTION INTRAVENOUS at 10:21

## 2022-11-16 RX ADMIN — HYDROMORPHONE HYDROCHLORIDE 0.5 MG: 1 INJECTION, SOLUTION INTRAMUSCULAR; INTRAVENOUS; SUBCUTANEOUS at 06:06

## 2022-11-16 RX ADMIN — SODIUM CHLORIDE, PRESERVATIVE FREE 5 ML: 5 INJECTION INTRAVENOUS at 20:15

## 2022-11-16 ASSESSMENT — PAIN DESCRIPTION - FREQUENCY: FREQUENCY: INTERMITTENT

## 2022-11-16 ASSESSMENT — PAIN - FUNCTIONAL ASSESSMENT: PAIN_FUNCTIONAL_ASSESSMENT: ACTIVITIES ARE NOT PREVENTED

## 2022-11-16 ASSESSMENT — PAIN DESCRIPTION - ORIENTATION: ORIENTATION: RIGHT

## 2022-11-16 ASSESSMENT — PAIN SCALES - GENERAL
PAINLEVEL_OUTOF10: 0
PAINLEVEL_OUTOF10: 7

## 2022-11-16 ASSESSMENT — PAIN DESCRIPTION - DESCRIPTORS: DESCRIPTORS: ACHING

## 2022-11-16 ASSESSMENT — PAIN DESCRIPTION - ONSET: ONSET: GRADUAL

## 2022-11-16 ASSESSMENT — PAIN DESCRIPTION - LOCATION: LOCATION: ABDOMEN

## 2022-11-16 ASSESSMENT — PAIN DESCRIPTION - DIRECTION: RADIATING_TOWARDS: RIGHT SIDE

## 2022-11-16 ASSESSMENT — PAIN DESCRIPTION - PAIN TYPE: TYPE: SURGICAL PAIN

## 2022-11-16 NOTE — PROGRESS NOTES
Consent to transfuse blood obtained over the phone by doc Hernandez, [facility] and verified by myself and Luna RN. Consent placed in chart.

## 2022-11-16 NOTE — CONSENT
Informed Consent for Blood Component Transfusion Note    I have discussed with the patient the rationale for blood component transfusion; its benefits in treating or preventing fatigue, organ damage, or death; and its risk which includes mild transfusion reactions, rare risk of blood borne infection, or more serious but rare reactions. I have discussed the alternatives to transfusion, including the risk and consequences of not receiving transfusion. The patient had an opportunity to ask questions and had agreed to proceed with transfusion of blood components.     Electronically signed by Edy Johnson MD on 11/16/22 at 12:31 PM EST

## 2022-11-16 NOTE — CARE COORDINATION
Pt transferred from [address] to OR to ICU 11/15 following lap shaka. CM notes reviewed. Pt has been tearful before and after surgery per progress notes. Perfect Serve to Dr Zhou Adan re; possible order for PT/OT evals when medically appropriate to help determine best plan for pt at discharge; home with [facility]. vs possible need for short term rehab at [facility]. Cm to follow.

## 2022-11-16 NOTE — PROGRESS NOTES
Pt has been very tearful overnight and today. Spoke with Dr. Juan J Shafer, okay to restart home seroquel.

## 2022-11-16 NOTE — PLAN OF CARE
Problem: Discharge Planning  Goal: Discharge to home or other facility with appropriate resources  11/16/2022 0040 by Claudell Anda, RN  Outcome: Progressing  11/15/2022 1044 by Jacobo Fernandez RN  Outcome: Progressing     Problem: Safety - Adult  Goal: Free from fall injury  11/16/2022 0040 by Claudell Anda, RN  Outcome: Progressing  11/15/2022 1044 by Jacobo Fernandez RN  Outcome: Progressing     Problem: ABCDS Injury Assessment  Goal: Absence of physical injury  11/16/2022 0040 by Claudell Anda, RN  Outcome: Progressing  11/15/2022 1044 by Jacobo Fernandez RN  Outcome: Progressing     Problem: Anxiety  Goal: Will report anxiety at manageable levels  Description: INTERVENTIONS:  1. Administer medication as ordered  2. Teach and rehearse alternative coping skills  3. Provide emotional support with 1:1 interaction with staff  11/16/2022 0040 by Claudell Anda, RN  Outcome: Progressing  11/15/2022 1044 by Jacobo Fernandez RN  Outcome: Progressing     Problem: Decision Making  Goal: Pt/Family able to effectively weigh alternatives and participate in decision making related to treatment and care  Description: INTERVENTIONS:  1. Determine when there are differences between patient's view, family's view, and healthcare provider's view of condition  2. Facilitate patient and family articulation of goals for care  3. Help patient and family identify pros/cons of alternative solutions  4. Provide information as requested by patient/family  5. Respect patient/family right to receive or not to receive information  6. Serve as a liaison between patient and family and health care team  7.  Initiate Consults from Ethics, Palliative Care or initiate [facility] as is appropriate  11/16/2022 0040 by Claudell Anda, RN  Outcome: Progressing  11/15/2022 1044 by Jacobo Fernandez RN  Outcome: Progressing     Problem: Confusion  Goal: Confusion, delirium, dementia, or psychosis is improved or at baseline  Description: INTERVENTIONS:  1. Assess for possible contributors to thought disturbance, including medications, impaired vision or hearing, underlying metabolic abnormalities, dehydration, psychiatric diagnoses, and notify attending LIP  2. [place] high risk fall precautions, as indicated  3. Provide frequent short contacts to provide reality reorientation, refocusing and direction  4. Decrease environmental stimuli, including noise as appropriate  5. Monitor and intervene to maintain adequate nutrition, hydration, elimination, sleep and activity  6. If unable to ensure safety without constant attention obtain sitter and review sitter guidelines with assigned personnel  7.  Initiate Psychosocial CNS and Spiritual Care consult, as indicated  11/16/2022 0040 by Corine Barth RN  Outcome: Progressing  11/15/2022 1044 by Mercedez Miller RN  Outcome: Progressing     Problem: Neurosensory - Adult  Goal: Achieves stable or improved neurological status  11/16/2022 0040 by Corine Barth RN  Outcome: Progressing  11/15/2022 1044 by Mercedez Miller RN  Outcome: Progressing  Goal: Absence of seizures  11/16/2022 0040 by Corine Barth RN  Outcome: Progressing  11/15/2022 1044 by Mercedez Miller RN  Outcome: Progressing  Goal: Remains free of injury related to seizures activity  11/16/2022 0040 by Corine Barth RN  Outcome: Progressing  11/15/2022 1044 by Mercedez Miller RN  Outcome: Progressing  Goal: Achieves maximal functionality and self care  11/16/2022 0040 by Corine Barth RN  Outcome: Progressing  11/15/2022 1044 by Mercedez Miller RN  Outcome: Progressing     Problem: Respiratory - Adult  Goal: Achieves optimal ventilation and oxygenation  11/16/2022 0040 by Corine Barth RN  Outcome: Progressing  11/15/2022 1044 by Mercedez Miller RN  Outcome: Progressing     Problem: Cardiovascular - Adult  Goal: Maintains optimal cardiac output and hemodynamic stability  11/16/2022 0040 by Royer Saldaña Yazan Adler RN  Outcome: Progressing  11/15/2022 1044 by Chepe Gloria RN  Outcome: Progressing  Goal: Absence of cardiac dysrhythmias or at baseline  11/16/2022 0040 by Johny Hernandez RN  Outcome: Progressing  11/15/2022 1044 by Chepe Gloria RN  Outcome: Progressing     Problem: Skin/Tissue Integrity - Adult  Goal: Skin integrity remains intact  11/16/2022 0040 by Johny Hernandez RN  Outcome: Progressing  Flowsheets (Taken 11/16/2022 0039)  Skin Integrity Remains Intact:   Monitor for areas of redness and/or skin breakdown   Assess vascular access sites hourly   Every 4-6 hours minimum: Change oxygen saturation probe site   Every 4-6 hours: If on nasal continuous positive airway pressure, respiratory therapy assesses nares and determine need for appliance change or resting period  11/15/2022 1044 by Chepe Gloria RN  Outcome: Progressing  Goal: Incisions, wounds, or drain sites healing without S/S of infection  11/16/2022 0040 by Johny Hernandez RN  Outcome: Progressing  11/15/2022 1044 by Chepe Gloria RN  Outcome: Progressing     Problem: Musculoskeletal - Adult  Goal: Return mobility to safest level of function  11/16/2022 0040 by Johny Hernandez RN  Outcome: Progressing  11/15/2022 1044 by Chepe Gloria RN  Outcome: Progressing  Goal: Maintain proper alignment of affected body part  11/16/2022 0040 by Johny Hernandez RN  Outcome: Progressing  11/15/2022 1044 by Chepe Gloria RN  Outcome: Progressing  Goal: Return ADL status to a safe level of function  11/16/2022 0040 by Johny Hernandez RN  Outcome: Progressing  11/15/2022 1044 by Chepe Gloria RN  Outcome: Progressing     Problem: Gastrointestinal - Adult  Goal: Minimal or absence of nausea and vomiting  11/16/2022 0040 by Johny Hernandez RN  Outcome: Progressing  11/15/2022 1044 by Chepe Gloria RN  Outcome: Progressing  Goal: Maintains or returns to baseline bowel function  11/16/2022 0040 by Johny Hernandez RN  Outcome: Progressing  11/15/2022 1044 by Arnulfo Armstrong RN  Outcome: Progressing  Goal: Maintains adequate nutritional intake  11/16/2022 0040 by Kayleigh Brian RN  Outcome: Progressing  11/15/2022 1044 by Arnulfo Armstrong RN  Outcome: Progressing     Problem: Genitourinary - Adult  Goal: Absence of urinary retention  11/16/2022 0040 by Kayleigh Brian RN  Outcome: Progressing  11/15/2022 1044 by Arnulfo Armstrong RN  Outcome: Progressing     Problem: Infection - Adult  Goal: Absence of infection at discharge  11/16/2022 0040 by Kayleigh Brian RN  Outcome: Progressing  11/15/2022 1044 by Arnulfo Armstrong RN  Outcome: Progressing  Goal: Absence of infection during hospitalization  11/16/2022 0040 by Kayleigh Brian RN  Outcome: Progressing  11/15/2022 1044 by Arnulfo Armstrong RN  Outcome: Progressing  Goal: Absence of fever/infection during anticipated neutropenic period  11/16/2022 0040 by Kayleigh Brian RN  Outcome: Progressing  11/15/2022 1044 by Arnulfo Armstrong RN  Outcome: Progressing     Problem: Metabolic/Fluid and Electrolytes - Adult  Goal: Electrolytes maintained within normal limits  11/16/2022 0040 by Kayleigh Brian RN  Outcome: Progressing  11/15/2022 1044 by Arnulfo Armstrong RN  Outcome: Progressing  Goal: Hemodynamic stability and optimal renal function maintained  11/16/2022 0040 by Kayleigh Brian RN  Outcome: Progressing  11/15/2022 1044 by Arnulfo Armstrong RN  Outcome: Progressing     Problem: Hematologic - Adult  Goal: Maintains hematologic stability  11/16/2022 0040 by Kayleigh Brian RN  Outcome: Progressing  11/15/2022 1044 by Arnulfo Armstrong RN  Outcome: Progressing     Problem: Pain  Goal: Verbalizes/displays adequate comfort level or baseline comfort level  11/16/2022 0040 by Kayleigh Brian RN  Outcome: Progressing  11/15/2022 1044 by Arnulfo Armsrtong RN  Outcome: Progressing

## 2022-11-16 NOTE — PROGRESS NOTES
V2.0  [facility] Hospitalist Progress Note      Name:  Jackson Arredondo /Age/Sex: 10/18/1930  (80 y.o. male)   MRN & CSN:  [id number] & [id number] Encounter Date/Time: 2022 1:44 PM EST    Location:  -A PCP: No primary care provider on file. Hospital Day: 4    Assessment and Plan:   Jackson Arredondo is a 80 y.o. male  pmh of BPH, HLD, history of DVT, hypothyroidism, chronic diastolic heart failure, GERD, chronic anemia, HTN, CKD 4 who presents with Syncope,     # Acute cholecystitis s/p cholecystectomy  - CT abdomen showed distended gallbladder, MRI abdomen showed mild/early acute cholecystitis   - General surgery following, drain per general surgery  - On cefepime continue patient's WBC dropped from 16.2-10.4 postop  - On clear liquid diet, patient receiving 1 unit of packed red blood cells as his hemoglobin dropped to 7.2 postop  -Patient transferred to ICU postop for observation  -Can transfer to stepdown orders placed    # Syncope: History of fall at home  # Head injury secondary to above  # Cardiogenic versus neurogenic  - Orthostatics negative, CT head negative  - Cardiology and neurology consulted, echocardiogram obtained low normal EF  - Continue on telemetry  - Fall precautions      # Microscopic hematuria  # BPH   - UA showed large blood and following straight cath  - Urology consulted  - Recommended Flomax  - Recommended to hold off on straight caths unless patient is symptomatic or PVR notably elevated from this baseline of 350 cc    # Hypertension: On presentation hypotensive  - Blood pressure elevated since   - Started on amlodipine 5 mg and metoprolol 25 mg twice daily  ,- Continue to monitor    # Hypothyroidism continue levothyroxine  Diet ADULT DIET;  Clear Liquid   DVT Prophylaxis [] Lovenox, [x]  Heparin, [] SCDs, [] Ambulation,  [] Eliquis, [] Xarelto  [] Coumadin   Code Status DNR-CC   Disposition From: Home  Expected Disposition: Home versus SNF  Estimated Date of Discharge: TBD  Patient requires continued admission due to postop   Surrogate Decision Maker/ POA Son and daughter     Subjective:     Chief Complaint: Fall and Loss of Consciousness (Using restroom and passed out; fell off toilet and into wall in front of him. )       Patient seen and examined at bedside. Patient's nurse at bedside, patient crying need frequent reorientation as patient does not know where easy. Review of Systems:    Review of Systems    unable to obtain ROS    Objective: Intake/Output Summary (Last 24 hours) at 11/16/2022 1556  Last data filed at 11/16/2022 1406  Gross per 24 hour   Intake 2699.77 ml   Output 1095 ml   Net 1604.77 ml          Vitals:   Vitals:    11/16/22 1541   BP:    Pulse:    Resp:    Temp:    SpO2: 99%       Physical Exam:     General: Elderly male lying down in bed in no distress but hard of hearing crying  Eyes: EOMI  ENT: neck supple  Cardiovascular: Regular rate. Respiratory: Clear to auscultation  Gastrointestinal: Soft, mild tenderness in right upper quadrant bowel sounds normal drain in place  Genitourinary: no suprapubic tenderness  Musculoskeletal: No edema  Skin: warm, dry  Neuro: Alert. Psych: Mood appropriate.      Medications:   Medications:    tamsulosin  0.4 mg Oral Daily    [Held by provider] amLODIPine  5 mg Oral Daily    cefepime  1,000 mg IntraVENous Q24H    sodium chloride flush  5-40 mL IntraVENous 2 times per day    [Held by provider] atorvastatin  40 mg Oral Nightly    levothyroxine  75 mcg Oral Daily    metoprolol tartrate  25 mg Oral BID    QUEtiapine  50 mg Oral Nightly    sertraline  50 mg Oral Daily      Infusions:    sodium chloride      lactated ringers 100 mL/hr at 11/16/22 1021    sodium chloride       PRN Meds: sodium chloride, , PRN  HYDROmorphone, 0.5 mg, Q3H PRN  hydrALAZINE, 5 mg, Q6H PRN  sodium chloride flush, 5-40 mL, PRN  sodium chloride, , PRN  ondansetron, 4 mg, Q6H PRN  acetaminophen, 650 mg, Q6H PRN   Or  acetaminophen, 650 mg, Q6H PRN      Labs      Recent Results (from the past 24 hour(s))   CBC    Collection Time: 11/16/22  5:00 AM   Result Value Ref Range    WBC 10.4 4.0 - 10.5 K/CU MM    RBC 2.39 (L) 4.6 - 6.2 M/CU MM    Hemoglobin 7.2 (L) 13.5 - 18.0 GM/DL    Hematocrit 23.3 (L) 42 - 52 %    MCV 97.5 78 - 100 FL    MCH 30.1 27 - 31 PG    MCHC 30.9 (L) 32.0 - 36.0 %    RDW 14.8 11.7 - 14.9 %    Platelets 206 (L) 571 - 440 K/CU MM    MPV 10.0 7.5 - 11.1 FL   Comprehensive Metabolic Panel    Collection Time: 11/16/22  5:00 AM   Result Value Ref Range    Sodium 137 135 - 145 MMOL/L    Potassium 4.8 3.5 - 5.1 MMOL/L    Chloride 102 99 - 110 mMol/L    CO2 22 21 - 32 MMOL/L    BUN 44 (H) 6 - 23 MG/DL    Creatinine 2.6 (H) 0.9 - 1.3 MG/DL    Est, Glom Filt Rate 22 (L) >60 mL/min/1.73m2    Glucose 111 (H) 70 - 99 MG/DL    Calcium 8.0 (L) 8.3 - 10.6 MG/DL    Albumin 2.8 (L) 3.4 - 5.0 GM/DL    Total Protein 4.7 (L) 6.4 - 8.2 GM/DL    Total Bilirubin 0.5 0.0 - 1.0 MG/DL     (H) 10 - 40 U/L     (H) 15 - 37 IU/L    Alkaline Phosphatase 172 (H) 40 - 128 IU/L    Anion Gap 13 4 - 16   Magnesium    Collection Time: 11/16/22  5:00 AM   Result Value Ref Range    Magnesium 1.8 1.8 - 2.4 mg/dl        Imaging/Diagnostics Last 24 Hours   MRI ABDOMEN WO CONTRAST MRCP    Result Date: 11/14/2022  EXAMINATION: MRI OF THE ABDOMEN WITHOUT CONTRAST AND MRCP 11/14/2022 11:43 am TECHNIQUE: Multiplanar multisequence MRI of the abdomen was performed without the administration of intravenous contrast.  After initial T2 axial and coronal images, thick slab, thin slab and 3D coronal MRCP sequences were obtained without the administration of intravenous contrast.  MIP images are provided for review.  COMPARISON: 11/13/2022 CT abdomen/pelvis, 11/13/2022 abdominal ultrasound HISTORY: ORDERING SYSTEM PROVIDED HISTORY: RUQ tenderness, cholelithiasis, uptrending WBC and liver pnel TECHNOLOGIST PROVIDED HISTORY: Reason for exam:->RUQ tenderness, cholelithiasis, uptrending WBC and liver pnel Reason for Exam: Fall; Loss of Consciousness FINDINGS: Lung Bases: Trace bilateral pleural effusions. Liver: The liver is normal in contour. No focal suspicious liver lesion. Biliary and Gallbladder: No biliary ductal dilation. Multiple calculi are visualized the level of the gallbladder neck/proximal cystic duct with moderate upstream gallbladder distension. There is mild pericholecystic fluid and stranding. No significant gallbladder wall thickening. Spleen: The spleen is unremarkable. Pancreas: Multiple T2 hyperintense lesions arise from the pancreas, the largest which is seen at the pancreatic uncinate process measuring 2.4 x 2.1 x 3.4 cm (series 7, image 15 and series 6, image 13). 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 Adrenal Glands: There is a 1.6 cm left adrenal lesion which demonstrates signal dropout on out of phase imaging, consistent with a benign adrenal adenoma. Kidneys: Bilateral renal cortical atrophy. There is a 4.1 cm T2 hyperintense lesion arising from the superior pole of left kidney which demonstrates thin internal septations, compatible with a Bosniak 2 cyst.  Smaller scattered T2 hyperintense renal lesions are also present, likely representing additional cysts. No hydronephrosis. Abdominal Vasculature: The abdominal aorta is normal in diameter. Gastrointestinal Tract: No evidence of bowel obstruction. Peritoneum/Mesentery/Retroperitoneum: No peritoneal or retroperitoneal masses. Lymph Nodes: No retroperitoneal lymphadenopathy. Musculoskeletal: No suspicious osseous findings. 1.  Calculi are visualized within the gallbladder neck and proximal cystic duct with moderate upstream gallbladder distension and mild pericholecystic fluid/stranding. No significant gallbladder wall thickening. Findings are favored to represent mild/early acute cholecystitis.  2.  There are multiple T2 hyperintense pancreatic lesions measuring up to 3.4 cm. Per ACR White Paper recommendations, follow-up imaging with contrast should be obtained in 6 months. 3.  A previously described left adrenal nodule demonstrates characteristics consistent with a benign adrenal adenoma. US ABDOMEN LIMITED Specify organ? LIVER    Result Date: 11/13/2022  EXAMINATION: RIGHT UPPER QUADRANT ULTRASOUND 11/13/2022 3:09 pm COMPARISON: CT abdomen/pelvis 11/13/2022 HISTORY: ORDERING SYSTEM PROVIDED HISTORY: gallbladder distention seen on CT TECHNOLOGIST PROVIDED HISTORY: Reason for exam:->gallbladder distention seen on CT Specify organ?->LIVER Reason for Exam: distended gb on ct scan FINDINGS: Pancreas:  Nonvisualization of the gallbladder due to overlying bowel gas. Liver:  Normal contour with diffuse heterogeneous parenchymal echotexture and loss of the visualized portal triads consistent with hepatic steatosis. No intrahepatic mass biliary dilatation. Gallbladder: Moderate distention with a hyperechoic nonshadowing gallstone at the gallbladder neck. Dependent biliary sludge identified. No significant gallbladder wall thickening. No pericholecystic fluid. The technologist documented absence of sonographic Jose's sign. Common bile duct:  Borderline distended measuring 0.7 cm in AP dimension. Other:  No ascites. The right kidney demonstrates normal contour and parenchymal echotexture with cortical thickness. No hydronephrosis. No parenchymal solid or or cystic mass. 1. Cholelithiasis with gallbladder distention. No ultrasound evidence of acute cholecystitis. 2. Borderline distended common bile duct measuring 0.7 cm in diameter. 3. No ascites.        Electronically signed by Mk Vanessa MD on 11/16/2022 at 3:56 PM

## 2022-11-16 NOTE — PROGRESS NOTES
Daily Progress Note  Subjective:  Awake and confused  BP and HR stable; NSR on tele  Anemic; transfusion today  Surgery yesterday   Supportive care    Attending Note:    S/p gall bladder surgery   He is anemic receiving blood  Moved to ICU post surgery  He is  awake alert feeling ok  Remain In sinus rate is stable  Surgery noted reviewed  He may underlying CAD supportive care   Syncope for now watch   His echo showed EF 50% range  LFT improving   Chronic renal insuffiencey   Keep on low dose lopressor for now         Impression and Plan:   Syncope    When getting up from the bathroom    Unwitnessed at home    Orthostatic vs. Vasovagal?     Not much of change in BP from laying to sitting    Very similar episode over the summer    Neuro has also been consulted    Echo showed low normal EF    Tele reviewed     Elevated liver enzymes    GI and general surgery is following     LFT's Cont' to rise    WBC on the rise as well    Cholecystis vs. Choledocholithiasis    Lap Dai completed 11/15/2022    Drain in place    May need possible ERCP    ABX per primary    Clear liquids    Anemia    Last hgb was 7.2    S/p surgery    1 unit of PRBC      Mildly elevation in troponin; no ACS in nature      Most Recent Echo  Echo 11/14/2022   This is a limited echocardiogram.   Left ventricular systolic function is low normal.   Ejection fraction is visually estimated at 45-50%. Mild tricuspid regurgitation; RVSP: 37 mmHg. No evidence of any pericardial effusion. Dilated aortic root measuring 4.0cm. Dilated ascending aorta measuring 4.0cm. Technically difficult study, due to body habitus     Radiology  MRI abdomen 11/14/2022  Impression   1. Calculi are visualized within the gallbladder neck and proximal cystic   duct with moderate upstream gallbladder distension and mild pericholecystic   fluid/stranding. No significant gallbladder wall thickening. Findings are   favored to represent mild/early acute cholecystitis. 2.  There are multiple T2 hyperintense pancreatic lesions measuring up to 3.4   cm. Per ACR White Paper recommendations, follow-up imaging with contrast   should be obtained in 6 months. 3.  A previously described left adrenal nodule demonstrates characteristics   consistent with a benign adrenal adenoma. CT head 11/13/2022  Impression   No acute intracranial abnormality. CT abdomen 11/13/2022  Impression   1. Distended gallbladder. This likely is due to a prolonged fasting state. Otherwise, I do raise the possibility of acalculous cholecystitis. 2. Trace pleural effusions with lower lobe atelectasis. 3. Diverticulosis without obvious inflammation. 4. Prostate hypertrophy with mild component of bladder outlet obstruction   with multiple bladder diverticula. PAST MEDICAL HISTORY:  Anemia, anxiety, arthritis, BPH, depression,  GERD, hemorrhoids, hepatitis, hernia, hypotension, hyperlipidemia, and  squamous cell carcinoma. PAST SURGICAL HISTORY:  Cataracts removal, hernia repair, and neck  surgery. SOCIAL HISTORY:  Former smoker. He does not drink any alcohol. He does  not use any illicit drugs. He lives with daughter. ALLERGIES:  MINOCYCLINE.     Objective:   BP (!) 146/77   Pulse 91   Temp 98.1 °F (36.7 °C) (Oral)   Resp 16   Ht 6' (1.829 m)   Wt 207 lb 14.4 oz (94.3 kg)   SpO2 100%   BMI 27.80 kg/m²     Intake/Output Summary (Last 24 hours) at 11/16/2022 1137  Last data filed at 11/16/2022 1029  Gross per 24 hour   Intake 2699.77 ml   Output 1230 ml   Net 1469.77 ml       Medications:   Scheduled Meds:   tamsulosin  0.4 mg Oral Daily    [Held by provider] amLODIPine  5 mg Oral Daily    cefepime  1,000 mg IntraVENous Q24H    sodium chloride flush  5-40 mL IntraVENous 2 times per day    [Held by provider] atorvastatin  40 mg Oral Nightly    levothyroxine  75 mcg Oral Daily    metoprolol tartrate  25 mg Oral BID    QUEtiapine  50 mg Oral Nightly    sertraline  50 mg Oral Daily      Infusions:   sodium chloride      lactated ringers 100 mL/hr at 11/16/22 1021    sodium chloride        PRN Meds:  sodium chloride, HYDROmorphone, hydrALAZINE, sodium chloride flush, sodium chloride, [DISCONTINUED] ondansetron **OR** ondansetron, acetaminophen **OR** acetaminophen     Physical Exam:  Vitals:    11/16/22 1000   BP: (!) 146/77   Pulse: 91   Resp: 16   Temp:    SpO2:         General: Confused  Chest: Nontender  Cardiac: First and Second Heart Sounds are Normal, No Murmurs or Gallops noted  Lungs:Clear to auscultation and percussion. Abdomen: Soft, NT, ND, +BS  Extremities: No clubbing, no edema  Vascular:  Equal 2+ peripheral pulses. Lab Data:  CBC:   Recent Labs     11/15/22  0509 11/15/22  1530 11/16/22  0500   WBC 17.7* 16.2* 10.4   HGB 9.4* 8.9* 7.2*   HCT 29.3* 28.4* 23.3*   MCV 93.6 96.6 97.5    144 134*     BMP:   Recent Labs     11/15/22  0053 11/15/22  0509 11/16/22  0500    135 137   K 4.6 4.3 4.8   CL 99 104 102   CO2 22 21 22   BUN 39* 38* 44*   CREATININE 2.4* 2.5* 2.6*     LIVER PROFILE:   Recent Labs     11/14/22  1049 11/15/22  0053 11/15/22  0509 11/16/22  0500   AST  --  438*  426* 299* 109*   ALT  --  503*  491* 395* 219*   LIPASE 18  --  19  --    BILIDIR  --  1.6*  --   --    BILITOT  --  1.9*  1.9* 1.5* 0.5   ALKPHOS  --  321*  324* 261* 172*     PT/INR:   Recent Labs     11/14/22  1049 11/15/22  0509   PROTIME 19.5* 21.7*   INR 1.51 1.67     APTT:   Recent Labs     11/14/22  1049 11/15/22  0509   APTT 43.7* 42.9*     BNP:  No results for input(s): BNP in the last 72 hours.       Assessment:  Patient Active Problem List    Diagnosis Date Noted    Acute calculous cholecystitis 11/16/2022    Gallstones 11/14/2022    Syncope, unspecified syncope type 11/13/2022    Community acquired pneumonia of left lung, unspecified part of lung 10/08/2022    Cardiac arrest ([facility] [address] 08/25/2022    Chest pain 08/21/2022    Agitation due to dementia 08/18/2022 Varicose 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 08/15/2015    Skin ulcer, limited to breakdown of skin ([facility] [address] 02/21/2022    Current moderate episode of major depressive disorder, unspecified whether recurrent [facility] 02/21/2022    Chronic kidney disease, stage IV (severe) ([facility] [address] 02/21/2022    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 ([facility] [address] 02/21/2022    Leukocytosis 09/08/2021    Thrombocytopenia, unspecified 08/31/2021    Bandemia 08/10/2021    Atelectasis 08/05/2021    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       Electronically signed by ROSEMARY Fischer CNP on 11/16/2022 at 11:37 AM    Electronically signed by Yady Regalado MD on 11/16/22 at 5:57 PM EST

## 2022-11-16 NOTE — PROGRESS NOTES
Pt does not have blood transfusion consent in chart. Attempted to reach pts Rani BARCLAY on home and cell phone and he was not available. POAs wife stated he is at work. Will continue to try to get ahold of him.

## 2022-11-16 NOTE — FLOWSHEET NOTE
Patient crying and reaching out saying \"help me I am going crazy. I don't know what I am doing. \" Reoriented the patient to place and time and informed him that he had surgery and that anesthesia makes one more forgetful. Emotional support given. Patient complains of discomfort in right side of his abdomen. No increase swelling noted or increased drainage of ALEJANDRA drain. Drain color is dark sang.

## 2022-11-16 NOTE — PROGRESS NOTES
Progress Note    Subjective:      Lethaniel Jose C  Patient has remained hemodynamically stable but he is quite emotional and tearful this morning. He just received some IV pain medication. There was approximately 2 to 300 cc blood loss and his hemoglobin has dropped to 7.2. He has been typed and crossed and will receive 1 units of packed cells for oxygen delivery and for his advanced age with a history of probable coronary artery disease. The patient also has had urinary incontinence and a Laureano catheter will be required to more closely monitor his urinary output. Objective:     BP (!) 109/56   Pulse 96   Temp 98 °F (36.7 °C) (Oral)   Resp 15   Ht 6' (1.829 m)   Wt 207 lb 14.4 oz (94.3 kg)   SpO2 97%   BMI 27.80 kg/m²     In: 2699.8 [P.O.:60; I.V.:2589.8]  Out: 750 [Urine:300; Drains:150]         General: Tearful but responsive no focal deficits  Abdomen: Tender in the right side of the abdomen drain is nonbilious serosanguineous  Lungs: Clear  Other: Urinary incontinence with pad    Labs:   CBC:   Lab Results   Component Value Date/Time    WBC 10.4 11/16/2022 05:00 AM    RBC 2.39 11/16/2022 05:00 AM    HGB 7.2 11/16/2022 05:00 AM    HCT 23.3 11/16/2022 05:00 AM    MCV 97.5 11/16/2022 05:00 AM    MCH 30.1 11/16/2022 05:00 AM    MCHC 30.9 11/16/2022 05:00 AM    RDW 14.8 11/16/2022 05:00 AM     11/16/2022 05:00 AM    MPV 10.0 11/16/2022 05:00 AM        Assessment:     1. Severe acute cholecystitis status post laparoscopic cholecystectomy  2. Acute blood loss anemia following difficult laparoscopic cholecystectomy  3. Urinary incontinence       Plan:   1. Laureano catheter  2. Transfuse 1 unit of packed red blood cell  3. Continue IV antibiotics  4.   Begin clear liquid diet  Discussed plan of care with the patient's nurse

## 2022-11-16 NOTE — OP NOTE
[address] [place] [address], [address] [place] [address]                                OPERATIVE REPORT    PATIENT NAME: Ramsey Vale                        :        10/18/1930  MED REC NO:   [id number]                          ROOM:       2119  ACCOUNT NO:   [de-identified]                           ADMIT DATE: 2022  PROVIDER:     Deangelo Venegas MD    DATE OF PROCEDURE:  11/15/2022    PREOPERATIVE DIAGNOSIS:  Acute calculous cholecystitis. POSTOPERATIVE DIAGNOSIS:  Acute calculous cholecystitis. OPERATION PERFORMED:  Laparoscopic cholecystectomy. SURGEON:  Deangelo Venegas MD    ANESTHESIA:  General anesthesia. BLOOD LOSS:  Approximately 200 mL. DRAINS:  Subhepatic 15-Uzbek closed suction drain. COMPLICATIONS:  None. OPERATIVE FINDINGS:  Severe acute calculous cholecystitis with evidence  of gallbladder microperforation with bile tinged perihepatic fluid. DETAILS OF THIS PROCEDURE:  This patient was brought to the operating  room. Preoperative antibiotics were administered. General anesthetic  was administered and the abdomen was prepped and draped in normal  sterile fashion. Small incision was made above the umbilicus in the  midline. The Veress needle inserted. Pneumoperitoneum was achieved and  a 5-mm port was placed. Laparoscope was inserted into the peritoneal  cavity. Under direct vision, I placed an 11-mm port in the upper  midline and a five port in the right lateral abdomen. The patient was  positioned. The colon and omentum were pulled away from the liver. Immediately I saw a very inflamed acutely infected gallbladder and there  was some ascites above the liver, which was slightly bile stained. It  was necessary to purposely decompress the gallbladder.   I did aspirate  the gallbladder and there was hydrops of the gallbladder, which raised a  concern given that there was some bile tinged ascites above the pneumoperitoneum and removed the  ports. I closed the fascia with Vicryl suture in the superior midline  and the skin was closed with Prolene suture. Sterile dressings were  applied. Due to the patient's advanced age, the blood loss,  preoperative anemia and need for close observation, he will be  transferred to the ICU for postoperative care.         Preston Scales MD    D: 11/15/2022 13:56:45       T: 11/15/2022 13:59:00     [id number]  Job#: [id number]     Doc#: [id number]    CC:

## 2022-11-16 NOTE — PROGRESS NOTES
[facility]  [facility] [address]   Progress Note  [facility] 0 1 2  Date: 2022   Patient: Phyllis Negrete   : 10/18/1930   DOA: 2022   MRN: [id number]   ROOM#: [id number]     Admit Date: 2022     Collaborating Urologist on Call at time of admission: Dr. Katalina Keane    CC: BPH    Subjective:     Pain: mild, no nausea and no vomiting,   Bowel Movement/Flatus:   Yes  Voiding:  Indwelling Laureano, urine clear yellow     Patient doing ok, slightly confused today, just pulled out his IV before I walked in  Laureano was placed postop    Objective:      Vitals:    BP (!) 101/38   Pulse 78   Temp 98 °F (36.7 °C) (Oral)   Resp (!) 9   Ht 6' (1.829 m)   Wt 207 lb 14.4 oz (94.3 kg)   SpO2 98%   BMI 27.80 kg/m²    Temp  Av °F (36.7 °C)  Min: 97.6 °F (36.4 °C)  Max: 98.4 °F (36.9 °C)     Intake/Output Summary (Last 24 hours) at 2022 9386  Last data filed at 2022 0700  Gross per 24 hour   Intake 2699.77 ml   Output 1050 ml   Net 1649.77 ml       Physical Exam:   General appearance: appears stated age, no distress, and somewhat confused  Head: Normocephalic, without obvious abnormality, atraumatic  Back:  No CVA tenderness  Abdomen:  Soft, mildly tender  : Laureano catheter with clear yellow urine    Labs:   WBC:    Lab Results   Component Value Date/Time    WBC 10.4 2022 05:00 AM      Hemoglobin/Hematocrit:    Lab Results   Component Value Date/Time    HGB 7.2 2022 05:00 AM    HCT 23.3 2022 05:00 AM      BMP:   Lab Results   Component Value Date/Time     2022 05:00 AM    K 4.8 2022 05:00 AM     2022 05:00 AM    CO2 22 2022 05:00 AM    BUN 44 2022 05:00 AM    LABALBU 2.8 2022 05:00 AM    CREATININE 2.6 2022 05:00 AM    CALCIUM 8.0 2022 05:00 AM    GFRAA 28 10/14/2022 12:10 PM    LABGLOM 22 2022 05:00 AM      PT/INR:    Lab Results   Component Value Date/Time    PROTIME 21.7 11/15/2022 05:09 AM    INR 1.67 11/15/2022 05:09 AM      PTT:    Lab Results   Component Value Date/Time    APTT 42.9 11/15/2022 05:09 AM      Blood Culture:  NGTD   Urine Culture:  No growth    Imaging:   CT ABDOMEN PELVIS WO CONTRAST Additional Contrast? None    Result Date: 11/13/2022  EXAMINATION: CT OF THE ABDOMEN AND PELVIS WITHOUT CONTRAST 11/13/2022 11:32 am TECHNIQUE: CT of the abdomen and pelvis was performed without the administration of intravenous contrast. Multiplanar reformatted images are provided for review. Automated exposure control, iterative reconstruction, and/or weight based adjustment of the mA/kV was utilized to reduce the radiation dose to as low as reasonably achievable. COMPARISON: 08/31/2022. HISTORY: ORDERING SYSTEM PROVIDED HISTORY: ABD PAIN, FALL TECHNOLOGIST PROVIDED HISTORY: Reason for exam:->ABD PAIN, FALL Additional Contrast?->None Reason for Exam: ABD PAIN, FALL FINDINGS: Lower Chest: The lung bases demonstrate trace pleural effusions with lower lobe atelectasis. Organs: The liver, spleen, pancreas and right adrenal gland appear unremarkable for a non contrasted study. The gallbladder is distended. There is a low-attenuation nodule in the left adrenal gland measuring 1.8 cm and unchanged. The kidneys demonstrate no calcifications. No hydronephrosis is seen. There is a cyst in the left kidney measuring 4.5 cm. No myometrial or bladder calculi are seen. There is circumferential bladder wall thickening. There are multiple bladder diverticula. The prostate gland is mildly enlarged. GI/Bowel: Evaluation of the bowel is limited as no enteric contrast was given. No dilated loops of bowel are seen. There is diverticular disease involving the colon but no findings to suggest active inflammation. Note is made of a small hiatal hernia. I do not see a dilated appendix. Pelvis: No pelvic masses or fluid collections are seen. The prostate gland is mildly enlarged.   There are soft tissue densities seen in the region of the inguinal canal is likely from prior hernia repair and unchanged. Peritoneum/Retroperitoneum: The abdominal aorta is not aneurysmal.  There are shotty mesenteric and retroperitoneal lymph nodes but no lymphadenopathy is seen. Bones/Soft Tissues: No acute bony abnormalities are noted. 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 1. Distended gallbladder. This likely is due to a prolonged fasting state. Otherwise, I do raise the possibility of acalculous cholecystitis. 2. Trace pleural effusions with lower lobe atelectasis. 3. Diverticulosis without obvious inflammation. 4. Prostate hypertrophy with mild component of bladder outlet obstruction with multiple bladder diverticula. XR PELVIS (1-2 VIEWS)    Result Date: 11/13/2022  EXAMINATION: ONE XRAY VIEW OF THE PELVIS 11/13/2022 9:44 am COMPARISON: CT abdomen/pelvis 08/31/2022 HISTORY: ORDERING SYSTEM PROVIDED HISTORY: fall TECHNOLOGIST PROVIDED HISTORY: Reason for exam:->fall Reason for Exam: fall FINDINGS: Single view provided. Lower lumbar degenerative disc and joint disease. Normal bilateral sacroiliac and hip alignment. No acute fracture. Subtle right lakisha pelvic cortical and trabecular coarsening with intramedullary sclerotic foci consistent with Paget's disease. Normal pelvic alignment with no acute fracture. CT Head W/O Contrast    Result Date: 11/13/2022  EXAMINATION: CT OF THE HEAD WITHOUT CONTRAST  11/13/2022 9:54 am TECHNIQUE: CT of the head was performed without the administration of intravenous contrast. Automated exposure control, iterative reconstruction, and/or weight based adjustment of the mA/kV was utilized to reduce the radiation dose to as low as reasonably achievable. COMPARISON: None. HISTORY: ORDERING SYSTEM PROVIDED HISTORY: fall, anticoagulated TECHNOLOGIST PROVIDED HISTORY: Reason for exam:->fall, anticoagulated Has a \"code stroke\" or \"stroke alert\" been called? ->No Decision Support Exception - unselect if not a suspected or confirmed emergency medical condition->Emergency Medical Condition (MA) Reason for Exam: fall, anticoagulated FINDINGS: BRAIN/VENTRICLES: There is a cerebral atrophy. The there are bilateral white matter hypodensities, in keeping with microvascular disease. There is no acute intracranial hemorrhage, mass effect or midline shift. No abnormal extra-axial fluid collection. The gray-white differentiation is maintained without evidence of an acute infarct. There is no evidence of hydrocephalus. ORBITS: The visualized portion of the orbits demonstrate no acute abnormality. SINUSES: The visualized paranasal sinuses and mastoid air cells demonstrate no acute abnormality. SOFT TISSUES/SKULL:  No acute abnormality of the visualized skull or soft tissues. No acute intracranial abnormality. CT CSpine W/O Contrast    Result Date: 11/13/2022  EXAMINATION: CT OF THE CERVICAL SPINE WITHOUT CONTRAST 11/13/2022 9:54 am TECHNIQUE: CT of the cervical spine was performed without the administration of intravenous contrast. Multiplanar reformatted images are provided for review. Automated exposure control, iterative reconstruction, and/or weight based adjustment of the mA/kV was utilized to reduce the radiation dose to as low as reasonably achievable. COMPARISON: None. HISTORY: ORDERING SYSTEM PROVIDED HISTORY: fall TECHNOLOGIST PROVIDED HISTORY: Reason for exam:->fall Decision Support Exception - unselect if not a suspected or confirmed emergency medical condition->Emergency Medical Condition (MA) Reason for Exam: fall, anticoagulated FINDINGS: BONES/ALIGNMENT: There is no acute fracture or traumatic malalignment. DEGENERATIVE CHANGES: There is degenerative disc disease of the C6-C7 disc, with disc space narrowing and osteophytes. There are osteoarthritic changes of the left facet joints. SOFT TISSUES: There is no prevertebral soft tissue swelling.      No acute abnormality of the cervical spine. XR CHEST PORTABLE    Result Date: 11/13/2022  EXAMINATION: ONE XRAY VIEW OF THE CHEST 11/13/2022 9:44 am COMPARISON: Chest radiograph 10/10/2022. HISTORY: ORDERING SYSTEM PROVIDED HISTORY: hypotension, fall TECHNOLOGIST PROVIDED HISTORY: Reason for exam:->hypotension, fall Reason for Exam: hhypotension, fall FINDINGS: Single view provided. Stable mediastinal and cardiac silhouettes with coronary and aortic atherosclerotic calcifications. Normal lung volumes. There is symmetric diffuse interstitial vascular prominence with some ill-defined margins. Stable mild patchy right lung base opacity. Stable mild left pleural effusion and lung base consolidation. No pneumothorax or free subdiaphragmatic air. 1. Stable mild left pleural effusion. 2. Interval diffuse symmetric vascular changes suggesting pulmonary venous hypertension and possible developing pulmonary edema. 3. Stable mild bibasilar consolidation more prominent on the left. MRI ABDOMEN WO CONTRAST MRCP    Result Date: 11/14/2022  EXAMINATION: MRI OF THE ABDOMEN WITHOUT CONTRAST AND MRCP 11/14/2022 11:43 am TECHNIQUE: Multiplanar multisequence MRI of the abdomen was performed without the administration of intravenous contrast.  After initial T2 axial and coronal images, thick slab, thin slab and 3D coronal MRCP sequences were obtained without the administration of intravenous contrast.  MIP images are provided for review. COMPARISON: 11/13/2022 CT abdomen/pelvis, 11/13/2022 abdominal ultrasound HISTORY: ORDERING SYSTEM PROVIDED HISTORY: RUQ tenderness, cholelithiasis, uptrending WBC and liver pnel TECHNOLOGIST PROVIDED HISTORY: Reason for exam:->RUQ tenderness, cholelithiasis, uptrending WBC and liver pnel Reason for Exam: Fall; Loss of Consciousness FINDINGS: Lung Bases: Trace bilateral pleural effusions. Liver: The liver is normal in contour. No focal suspicious liver lesion.  Biliary and Gallbladder: No biliary ductal dilation. Multiple calculi are visualized the level of the gallbladder neck/proximal cystic duct with moderate upstream gallbladder distension. There is mild pericholecystic fluid and stranding. No significant gallbladder wall thickening. Spleen: The spleen is unremarkable. Pancreas: Multiple T2 hyperintense lesions arise from the pancreas, the largest which is seen at the pancreatic uncinate process measuring 2.4 x 2.1 x 3.4 cm (series 7, image 15 and series 6, image 13). 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 Adrenal Glands: There is a 1.6 cm left adrenal lesion which demonstrates signal dropout on out of phase imaging, consistent with a benign adrenal adenoma. Kidneys: Bilateral renal cortical atrophy. There is a 4.1 cm T2 hyperintense lesion arising from the superior pole of left kidney which demonstrates thin internal septations, compatible with a Bosniak 2 cyst.  Smaller scattered T2 hyperintense renal lesions are also present, likely representing additional cysts. No hydronephrosis. Abdominal Vasculature: The abdominal aorta is normal in diameter. Gastrointestinal Tract: No evidence of bowel obstruction. Peritoneum/Mesentery/Retroperitoneum: No peritoneal or retroperitoneal masses. Lymph Nodes: No retroperitoneal lymphadenopathy. Musculoskeletal: No suspicious osseous findings. 1.  Calculi are visualized within the gallbladder neck and proximal cystic duct with moderate upstream gallbladder distension and mild pericholecystic fluid/stranding. No significant gallbladder wall thickening. Findings are favored to represent mild/early acute cholecystitis. 2.  There are multiple T2 hyperintense pancreatic lesions measuring up to 3.4 cm. Per ACR White Paper recommendations, follow-up imaging with contrast should be obtained in 6 months.  3.  A previously described left adrenal nodule demonstrates characteristics consistent with a benign adrenal adenoma. US ABDOMEN LIMITED Specify organ? LIVER    Result Date: 11/13/2022  EXAMINATION: RIGHT UPPER QUADRANT ULTRASOUND 11/13/2022 3:09 pm COMPARISON: CT abdomen/pelvis 11/13/2022 HISTORY: ORDERING SYSTEM PROVIDED HISTORY: gallbladder distention seen on CT TECHNOLOGIST PROVIDED HISTORY: Reason for exam:->gallbladder distention seen on CT Specify organ?->LIVER Reason for Exam: distended gb on ct scan FINDINGS: Pancreas:  Nonvisualization of the gallbladder due to overlying bowel gas. Liver:  Normal contour with diffuse heterogeneous parenchymal echotexture and loss of the visualized portal triads consistent with hepatic steatosis. No intrahepatic mass biliary dilatation. Gallbladder: Moderate distention with a hyperechoic nonshadowing gallstone at the gallbladder neck. Dependent biliary sludge identified. No significant gallbladder wall thickening. No pericholecystic fluid. The technologist documented absence of sonographic Jose's sign. Common bile duct:  Borderline distended measuring 0.7 cm in AP dimension. Other:  No ascites. The right kidney demonstrates normal contour and parenchymal echotexture with cortical thickness. No hydronephrosis. No parenchymal solid or or cystic mass. 1. Cholelithiasis with gallbladder distention. No ultrasound evidence of acute cholecystitis. 2. Borderline distended common bile duct measuring 0.7 cm in diameter. 3. No ascites. Assessment & Plan:      Jackson Arredondo is a 80 y.o. male admitted 11/13/2022 for fall at home, cholecystitis. 1) BPH with microscopic hematuria: UA with large blood and following straight cath. Patient with known history of BPH previously requiring Laureano. No gross hematuria. H/o 90gm gland, chronically on Flomax/Proscar prior to admission              Laureano placed postop cholecystectomy. Started on Flomax. Plan for voiding trial once improved.               Urine for cytology    Will follow. Patient seen and examined, chart reviewed.      Electronically signed by MICHELLE Shea on 11/16/2022 at 9:42 AM

## 2022-11-16 NOTE — PROGRESS NOTES
Pt had pulled out 2nd IV and RN was unable to obtain additional access on pt. IV antibiotic was running, now stopped. Notified Blood bank okay to send blood at this time.

## 2022-11-17 LAB
ABO/RH: NORMAL
ALBUMIN SERPL-MCNC: 3 GM/DL (ref 3.4–5)
ALP BLD-CCNC: 153 IU/L (ref 40–129)
ALT SERPL-CCNC: 158 U/L (ref 10–40)
ANION GAP SERPL CALCULATED.3IONS-SCNC: 12 MMOL/L (ref 4–16)
ANTIBODY SCREEN: NEGATIVE
AST SERPL-CCNC: 56 IU/L (ref 15–37)
BILIRUB SERPL-MCNC: 0.5 MG/DL (ref 0–1)
BILIRUBIN DIRECT: 0.2 MG/DL (ref 0–0.3)
BILIRUBIN, INDIRECT: 0.3 MG/DL (ref 0–0.7)
BUN BLDV-MCNC: 43 MG/DL (ref 6–23)
CALCIUM SERPL-MCNC: 8.2 MG/DL (ref 8.3–10.6)
CHLORIDE BLD-SCNC: 101 MMOL/L (ref 99–110)
CO2: 23 MMOL/L (ref 21–32)
COMPONENT: NORMAL
CREAT SERPL-MCNC: 2.4 MG/DL (ref 0.9–1.3)
CROSSMATCH RESULT: NORMAL
GFR SERPL CREATININE-BSD FRML MDRD: 25 ML/MIN/1.73M2
GLUCOSE BLD-MCNC: 107 MG/DL (ref 70–99)
HCT VFR BLD CALC: 24.4 % (ref 42–52)
HEMOGLOBIN: 7.9 GM/DL (ref 13.5–18)
MCH RBC QN AUTO: 29.5 PG (ref 27–31)
MCHC RBC AUTO-ENTMCNC: 32.4 % (ref 32–36)
MCV RBC AUTO: 91 FL (ref 78–100)
PDW BLD-RTO: 16.3 % (ref 11.7–14.9)
PLATELET # BLD: 142 K/CU MM (ref 140–440)
PMV BLD AUTO: 10.4 FL (ref 7.5–11.1)
POTASSIUM SERPL-SCNC: 4 MMOL/L (ref 3.5–5.1)
RBC # BLD: 2.68 M/CU MM (ref 4.6–6.2)
SODIUM BLD-SCNC: 136 MMOL/L (ref 135–145)
STATUS: NORMAL
TOTAL PROTEIN: 4.9 GM/DL (ref 6.4–8.2)
TRANSFUSION STATUS: NORMAL
UNIT DIVISION: 0
UNIT NUMBER: NORMAL
WBC # BLD: 6.7 K/CU MM (ref 4–10.5)

## 2022-11-17 PROCEDURE — 2580000003 HC RX 258: Performed by: SURGERY

## 2022-11-17 PROCEDURE — 2060000000 HC ICU INTERMEDIATE R&B

## 2022-11-17 PROCEDURE — 82248 BILIRUBIN DIRECT: CPT

## 2022-11-17 PROCEDURE — 6370000000 HC RX 637 (ALT 250 FOR IP): Performed by: SURGERY

## 2022-11-17 PROCEDURE — 6360000002 HC RX W HCPCS: Performed by: SURGERY

## 2022-11-17 PROCEDURE — 85027 COMPLETE CBC AUTOMATED: CPT

## 2022-11-17 PROCEDURE — 94761 N-INVAS EAR/PLS OXIMETRY MLT: CPT

## 2022-11-17 PROCEDURE — 80053 COMPREHEN METABOLIC PANEL: CPT

## 2022-11-17 RX ORDER — FUROSEMIDE 20 MG/1
20 TABLET ORAL DAILY
Status: DISCONTINUED | OUTPATIENT
Start: 2022-11-17 | End: 2022-11-22 | Stop reason: HOSPADM

## 2022-11-17 RX ADMIN — TAMSULOSIN HYDROCHLORIDE 0.4 MG: 0.4 CAPSULE ORAL at 10:15

## 2022-11-17 RX ADMIN — QUETIAPINE FUMARATE 50 MG: 25 TABLET ORAL at 20:41

## 2022-11-17 RX ADMIN — SERTRALINE HYDROCHLORIDE 50 MG: 50 TABLET ORAL at 10:15

## 2022-11-17 RX ADMIN — SODIUM CHLORIDE, PRESERVATIVE FREE 10 ML: 5 INJECTION INTRAVENOUS at 10:15

## 2022-11-17 RX ADMIN — METOPROLOL TARTRATE 25 MG: 25 TABLET, FILM COATED ORAL at 20:41

## 2022-11-17 RX ADMIN — FUROSEMIDE 20 MG: 20 TABLET ORAL at 12:21

## 2022-11-17 RX ADMIN — METOPROLOL TARTRATE 25 MG: 25 TABLET, FILM COATED ORAL at 10:15

## 2022-11-17 RX ADMIN — CEFEPIME HYDROCHLORIDE 1000 MG: 1 INJECTION, POWDER, FOR SOLUTION INTRAMUSCULAR; INTRAVENOUS at 10:16

## 2022-11-17 RX ADMIN — LEVOTHYROXINE SODIUM 75 MCG: 0.07 TABLET ORAL at 10:15

## 2022-11-17 RX ADMIN — HYDROMORPHONE HYDROCHLORIDE 0.5 MG: 1 INJECTION, SOLUTION INTRAMUSCULAR; INTRAVENOUS; SUBCUTANEOUS at 11:24

## 2022-11-17 ASSESSMENT — PAIN SCALES - GENERAL
PAINLEVEL_OUTOF10: 0
PAINLEVEL_OUTOF10: 8
PAINLEVEL_OUTOF10: 0
PAINLEVEL_OUTOF10: 0
PAINLEVEL_OUTOF10: 3

## 2022-11-17 ASSESSMENT — PAIN DESCRIPTION - ONSET: ONSET: GRADUAL

## 2022-11-17 ASSESSMENT — PAIN DESCRIPTION - DIRECTION: RADIATING_TOWARDS: RIGHT SIDE

## 2022-11-17 ASSESSMENT — PAIN DESCRIPTION - DESCRIPTORS: DESCRIPTORS: ACHING;CRAMPING

## 2022-11-17 ASSESSMENT — PAIN DESCRIPTION - PAIN TYPE: TYPE: SURGICAL PAIN

## 2022-11-17 ASSESSMENT — PAIN DESCRIPTION - LOCATION: LOCATION: ABDOMEN

## 2022-11-17 ASSESSMENT — PAIN DESCRIPTION - FREQUENCY: FREQUENCY: INTERMITTENT

## 2022-11-17 ASSESSMENT — PAIN DESCRIPTION - ORIENTATION: ORIENTATION: RIGHT;LEFT

## 2022-11-17 ASSESSMENT — PAIN - FUNCTIONAL ASSESSMENT: PAIN_FUNCTIONAL_ASSESSMENT: PREVENTS OR INTERFERES SOME ACTIVE ACTIVITIES AND ADLS

## 2022-11-17 NOTE — PROGRESS NOTES
[facility]   Progress Note  [facility] 0 1 2  Date: 2022   Patient: Shahbaz Cast   : 10/18/1930   DOA: 2022   MRN: [id number]   ROOM#: [facility]9/2119-A     Admit Date: 2022     Collaborating Urologist on Call at time of admission: Dr. Nam Rudd    CC: BPH    Subjective:     Pain: mild, no nausea and no vomiting,   Bowel Movement/Flatus:   Yes  Voiding:  Indwelling Laureano, urine clear yellow     Patient doing ok, resting comfortably.      Objective:      Vitals:    BP (!) 142/67   Pulse 77   Temp 97.9 °F (36.6 °C) (Oral)   Resp 12   Ht 6' (1.829 m)   Wt 207 lb 14.4 oz (94.3 kg)   SpO2 98%   BMI 27.80 kg/m²    Temp  Av.1 °F (36.7 °C)  Min: 97.7 °F (36.5 °C)  Max: 98.4 °F (36.9 °C)     Intake/Output Summary (Last 24 hours) at 2022 0910  Last data filed at 2022 0600  Gross per 24 hour   Intake 400 ml   Output 1745 ml   Net -1345 ml       Physical Exam:   General appearance: appears stated age, no distress, and somewhat confused  Head: Normocephalic, without obvious abnormality, atraumatic  Back:  No CVA tenderness  Abdomen:  Soft, mildly tender  : Laureano catheter with clear yellow urine    Labs:   WBC:    Lab Results   Component Value Date/Time    WBC 6.7 2022 05:10 AM      Hemoglobin/Hematocrit:    Lab Results   Component Value Date/Time    HGB 7.9 2022 05:10 AM    HCT 24.4 2022 05:10 AM      BMP:   Lab Results   Component Value Date/Time     2022 05:10 AM    K 4.0 2022 05:10 AM     2022 05:10 AM    CO2 23 2022 05:10 AM    BUN 43 2022 05:10 AM    LABALBU 3.0 2022 05:10 AM    CREATININE 2.4 2022 05:10 AM    CALCIUM 8.2 2022 05:10 AM    GFRAA 28 10/14/2022 12:10 PM    LABGLOM 25 2022 05:10 AM      PT/INR:    Lab Results   Component Value Date/Time    PROTIME 21.7 11/15/2022 05:09 AM    INR 1.67 11/15/2022 05:09 AM      PTT:    Lab Results   Component Value Date/Time    APTT 42.9 11/15/2022 05:09 AM      Blood Culture:  NGTD   Urine Culture:  No growth    Imaging:   CT ABDOMEN PELVIS WO CONTRAST Additional Contrast? None    Result Date: 11/13/2022  EXAMINATION: CT OF THE ABDOMEN AND PELVIS WITHOUT CONTRAST 11/13/2022 11:32 am TECHNIQUE: CT of the abdomen and pelvis was performed without the administration of intravenous contrast. Multiplanar reformatted images are provided for review. Automated exposure control, iterative reconstruction, and/or weight based adjustment of the mA/kV was utilized to reduce the radiation dose to as low as reasonably achievable. COMPARISON: 08/31/2022. HISTORY: ORDERING SYSTEM PROVIDED HISTORY: ABD PAIN, FALL TECHNOLOGIST PROVIDED HISTORY: Reason for exam:->ABD PAIN, FALL Additional Contrast?->None Reason for Exam: ABD PAIN, FALL FINDINGS: Lower Chest: The lung bases demonstrate trace pleural effusions with lower lobe atelectasis. Organs: The liver, spleen, pancreas and right adrenal gland appear unremarkable for a non contrasted study. The gallbladder is distended. There is a low-attenuation nodule in the left adrenal gland measuring 1.8 cm and unchanged. The kidneys demonstrate no calcifications. No hydronephrosis is seen. There is a cyst in the left kidney measuring 4.5 cm. No myometrial or bladder calculi are seen. There is circumferential bladder wall thickening. There are multiple bladder diverticula. The prostate gland is mildly enlarged. GI/Bowel: Evaluation of the bowel is limited as no enteric contrast was given. No dilated loops of bowel are seen. There is diverticular disease involving the colon but no findings to suggest active inflammation. Note is made of a small hiatal hernia. I do not see a dilated appendix. Pelvis: No pelvic masses or fluid collections are seen. The prostate gland is mildly enlarged. There are soft tissue densities seen in the region of the inguinal canal is likely from prior hernia repair and unchanged. Peritoneum/Retroperitoneum: The abdominal aorta is not aneurysmal.  There are shotty mesenteric and retroperitoneal lymph nodes but no lymphadenopathy is seen. Bones/Soft Tissues: No acute bony abnormalities are noted. 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 1. Distended gallbladder. This likely is due to a prolonged fasting state. Otherwise, I do raise the possibility of acalculous cholecystitis. 2. Trace pleural effusions with lower lobe atelectasis. 3. Diverticulosis without obvious inflammation. 4. Prostate hypertrophy with mild component of bladder outlet obstruction with multiple bladder diverticula. XR PELVIS (1-2 VIEWS)    Result Date: 11/13/2022  EXAMINATION: ONE XRAY VIEW OF THE PELVIS 11/13/2022 9:44 am COMPARISON: CT abdomen/pelvis 08/31/2022 HISTORY: ORDERING SYSTEM PROVIDED HISTORY: fall TECHNOLOGIST PROVIDED HISTORY: Reason for exam:->fall Reason for Exam: fall FINDINGS: Single view provided. Lower lumbar degenerative disc and joint disease. Normal bilateral sacroiliac and hip alignment. No acute fracture. Subtle right lakisha pelvic cortical and trabecular coarsening with intramedullary sclerotic foci consistent with Paget's disease. Normal pelvic alignment with no acute fracture. CT Head W/O Contrast    Result Date: 11/13/2022  EXAMINATION: CT OF THE HEAD WITHOUT CONTRAST  11/13/2022 9:54 am TECHNIQUE: CT of the head was performed without the administration of intravenous contrast. Automated exposure control, iterative reconstruction, and/or weight based adjustment of the mA/kV was utilized to reduce the radiation dose to as low as reasonably achievable. COMPARISON: None. HISTORY: ORDERING SYSTEM PROVIDED HISTORY: fall, anticoagulated TECHNOLOGIST PROVIDED HISTORY: Reason for exam:->fall, anticoagulated Has a \"code stroke\" or \"stroke alert\" been called? ->No Decision Support Exception - unselect if not a suspected or confirmed emergency medical condition->Emergency Medical Condition (MA) Reason for Exam: fall, anticoagulated FINDINGS: BRAIN/VENTRICLES: There is a cerebral atrophy. The there are bilateral white matter hypodensities, in keeping with microvascular disease. There is no acute intracranial hemorrhage, mass effect or midline shift. No abnormal extra-axial fluid collection. The gray-white differentiation is maintained without evidence of an acute infarct. There is no evidence of hydrocephalus. ORBITS: The visualized portion of the orbits demonstrate no acute abnormality. SINUSES: The visualized paranasal sinuses and mastoid air cells demonstrate no acute abnormality. SOFT TISSUES/SKULL:  No acute abnormality of the visualized skull or soft tissues. No acute intracranial abnormality. CT CSpine W/O Contrast    Result Date: 11/13/2022  EXAMINATION: CT OF THE CERVICAL SPINE WITHOUT CONTRAST 11/13/2022 9:54 am TECHNIQUE: CT of the cervical spine was performed without the administration of intravenous contrast. Multiplanar reformatted images are provided for review. Automated exposure control, iterative reconstruction, and/or weight based adjustment of the mA/kV was utilized to reduce the radiation dose to as low as reasonably achievable. COMPARISON: None. HISTORY: ORDERING SYSTEM PROVIDED HISTORY: fall TECHNOLOGIST PROVIDED HISTORY: Reason for exam:->fall Decision Support Exception - unselect if not a suspected or confirmed emergency medical condition->Emergency Medical Condition (MA) Reason for Exam: fall, anticoagulated FINDINGS: BONES/ALIGNMENT: There is no acute fracture or traumatic malalignment. DEGENERATIVE CHANGES: There is degenerative disc disease of the C6-C7 disc, with disc space narrowing and osteophytes. There are osteoarthritic changes of the left facet joints. SOFT TISSUES: There is no prevertebral soft tissue swelling. No acute abnormality of the cervical spine.      XR CHEST PORTABLE    Result Date: 11/13/2022  EXAMINATION: ONE XRAY VIEW OF THE CHEST 11/13/2022 9:44 am COMPARISON: Chest radiograph 10/10/2022. HISTORY: ORDERING SYSTEM PROVIDED HISTORY: hypotension, fall TECHNOLOGIST PROVIDED HISTORY: Reason for exam:->hypotension, fall Reason for Exam: hhypotension, fall FINDINGS: Single view provided. Stable mediastinal and cardiac silhouettes with coronary and aortic atherosclerotic calcifications. Normal lung volumes. There is symmetric diffuse interstitial vascular prominence with some ill-defined margins. Stable mild patchy right lung base opacity. Stable mild left pleural effusion and lung base consolidation. No pneumothorax or free subdiaphragmatic air. 1. Stable mild left pleural effusion. 2. Interval diffuse symmetric vascular changes suggesting pulmonary venous hypertension and possible developing pulmonary edema. 3. Stable mild bibasilar consolidation more prominent on the left. MRI ABDOMEN WO CONTRAST MRCP    Result Date: 11/14/2022  EXAMINATION: MRI OF THE ABDOMEN WITHOUT CONTRAST AND MRCP 11/14/2022 11:43 am TECHNIQUE: Multiplanar multisequence MRI of the abdomen was performed without the administration of intravenous contrast.  After initial T2 axial and coronal images, thick slab, thin slab and 3D coronal MRCP sequences were obtained without the administration of intravenous contrast.  MIP images are provided for review. COMPARISON: 11/13/2022 CT abdomen/pelvis, 11/13/2022 abdominal ultrasound HISTORY: ORDERING SYSTEM PROVIDED HISTORY: RUQ tenderness, cholelithiasis, uptrending WBC and liver pnel TECHNOLOGIST PROVIDED HISTORY: Reason for exam:->RUQ tenderness, cholelithiasis, uptrending WBC and liver pnel Reason for Exam: Fall; Loss of Consciousness FINDINGS: Lung Bases: Trace bilateral pleural effusions. Liver: The liver is normal in contour. No focal suspicious liver lesion. Biliary and Gallbladder: No biliary ductal dilation.  Multiple calculi are visualized the level of the gallbladder neck/proximal cystic duct with moderate upstream gallbladder distension. There is mild pericholecystic fluid and stranding. No significant gallbladder wall thickening. Spleen: The spleen is unremarkable. Pancreas: Multiple T2 hyperintense lesions arise from the pancreas, the largest which is seen at the pancreatic uncinate process measuring 2.4 x 2.1 x 3.4 cm (series 7, image 15 and series 6, image 13). 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 Adrenal Glands: There is a 1.6 cm left adrenal lesion which demonstrates signal dropout on out of phase imaging, consistent with a benign adrenal adenoma. Kidneys: Bilateral renal cortical atrophy. There is a 4.1 cm T2 hyperintense lesion arising from the superior pole of left kidney which demonstrates thin internal septations, compatible with a Bosniak 2 cyst.  Smaller scattered T2 hyperintense renal lesions are also present, likely representing additional cysts. No hydronephrosis. Abdominal Vasculature: The abdominal aorta is normal in diameter. Gastrointestinal Tract: No evidence of bowel obstruction. Peritoneum/Mesentery/Retroperitoneum: No peritoneal or retroperitoneal masses. Lymph Nodes: No retroperitoneal lymphadenopathy. Musculoskeletal: No suspicious osseous findings. 1.  Calculi are visualized within the gallbladder neck and proximal cystic duct with moderate upstream gallbladder distension and mild pericholecystic fluid/stranding. No significant gallbladder wall thickening. Findings are favored to represent mild/early acute cholecystitis. 2.  There are multiple T2 hyperintense pancreatic lesions measuring up to 3.4 cm. Per ACR White Paper recommendations, follow-up imaging with contrast should be obtained in 6 months. 3.  A previously described left adrenal nodule demonstrates characteristics consistent with a benign adrenal adenoma.      US ABDOMEN LIMITED Specify organ? LIVER    Result Date: 11/13/2022  EXAMINATION: RIGHT UPPER QUADRANT ULTRASOUND 11/13/2022 3:09 pm COMPARISON: CT abdomen/pelvis 11/13/2022 HISTORY: ORDERING SYSTEM PROVIDED HISTORY: gallbladder distention seen on CT TECHNOLOGIST PROVIDED HISTORY: Reason for exam:->gallbladder distention seen on CT Specify organ?->LIVER Reason for Exam: distended gb on ct scan FINDINGS: Pancreas:  Nonvisualization of the gallbladder due to overlying bowel gas. Liver:  Normal contour with diffuse heterogeneous parenchymal echotexture and loss of the visualized portal triads consistent with hepatic steatosis. No intrahepatic mass biliary dilatation. Gallbladder: Moderate distention with a hyperechoic nonshadowing gallstone at the gallbladder neck. Dependent biliary sludge identified. No significant gallbladder wall thickening. No pericholecystic fluid. The technologist documented absence of sonographic Jose's sign. Common bile duct:  Borderline distended measuring 0.7 cm in AP dimension. Other:  No ascites. The right kidney demonstrates normal contour and parenchymal echotexture with cortical thickness. No hydronephrosis. No parenchymal solid or or cystic mass. 1. Cholelithiasis with gallbladder distention. No ultrasound evidence of acute cholecystitis. 2. Borderline distended common bile duct measuring 0.7 cm in diameter. 3. No ascites. Assessment & Plan:      Casimiro Lynch is a 80 y.o. male admitted 11/13/2022 for fall at home, cholecystitis. 1) BPH with microscopic hematuria: UA with large blood following straight cath. Patient with known history of BPH previously requiring Laureano. No gross hematuria. H/o 90gm gland, chronically on Flomax/Proscar prior to admission              Laureano placed postop cholecystectomy. Started on Flomax. Plan for voiding trial once improved. Urine for cytology    Will follow. Patient seen and examined, chart reviewed. Electronically signed by MICHELLE Huizar on 11/17/2022 at 9:10 AM

## 2022-11-17 NOTE — PLAN OF CARE
Problem: Discharge Planning  Goal: Discharge to home or other facility with appropriate resources  Outcome: Progressing     Problem: Safety - Adult  Goal: Free from fall injury  Outcome: Progressing     Problem: ABCDS Injury Assessment  Goal: Absence of physical injury  Outcome: Progressing     Problem: Anxiety  Goal: Will report anxiety at manageable levels  Description: INTERVENTIONS:  1. Administer medication as ordered  2. Teach and rehearse alternative coping skills  3. Provide emotional support with 1:1 interaction with staff  Outcome: Progressing     Problem: Decision Making  Goal: Pt/Family able to effectively weigh alternatives and participate in decision making related to treatment and care  Description: INTERVENTIONS:  1. Determine when there are differences between patient's view, family's view, and healthcare provider's view of condition  2. Facilitate patient and family articulation of goals for care  3. Help patient and family identify pros/cons of alternative solutions  4. Provide information as requested by patient/family  5. Respect patient/family right to receive or not to receive information  6. Serve as a liaison between patient and family and health care team  7. Initiate Consults from Ethics, Palliative Care or initiate [facility] as is appropriate  Outcome: Progressing     Problem: Confusion  Goal: Confusion, delirium, dementia, or psychosis is improved or at baseline  Description: INTERVENTIONS:  1. Assess for possible contributors to thought disturbance, including medications, impaired vision or hearing, underlying metabolic abnormalities, dehydration, psychiatric diagnoses, and notify attending LIP  2. [place] high risk fall precautions, as indicated  3. Provide frequent short contacts to provide reality reorientation, refocusing and direction  4. Decrease environmental stimuli, including noise as appropriate  5.  Monitor and intervene to maintain adequate nutrition, hydration, elimination, sleep and activity  6. If unable to ensure safety without constant attention obtain sitter and review sitter guidelines with assigned personnel  7.  Initiate Psychosocial CNS and Spiritual Care consult, as indicated  Outcome: Progressing     Problem: Neurosensory - Adult  Goal: Achieves stable or improved neurological status  Outcome: Progressing  Goal: Absence of seizures  Outcome: Progressing  Goal: Remains free of injury related to seizures activity  Outcome: Progressing  Goal: Achieves maximal functionality and self care  Outcome: Progressing     Problem: Respiratory - Adult  Goal: Achieves optimal ventilation and oxygenation  Outcome: Progressing     Problem: Cardiovascular - Adult  Goal: Maintains optimal cardiac output and hemodynamic stability  Outcome: Progressing  Goal: Absence of cardiac dysrhythmias or at baseline  Outcome: Progressing     Problem: Skin/Tissue Integrity - Adult  Goal: Skin integrity remains intact  Outcome: Progressing  Flowsheets (Taken 11/17/2022 0900)  Skin Integrity Remains Intact: Monitor for areas of redness and/or skin breakdown  Goal: Incisions, wounds, or drain sites healing without S/S of infection  Outcome: Progressing     Problem: Musculoskeletal - Adult  Goal: Return mobility to safest level of function  Outcome: Progressing  Goal: Maintain proper alignment of affected body part  Outcome: Progressing  Goal: Return ADL status to a safe level of function  Outcome: Progressing     Problem: Gastrointestinal - Adult  Goal: Minimal or absence of nausea and vomiting  Outcome: Progressing  Goal: Maintains or returns to baseline bowel function  Outcome: Progressing  Goal: Maintains adequate nutritional intake  Outcome: Progressing     Problem: Genitourinary - Adult  Goal: Absence of urinary retention  Outcome: Progressing     Problem: Infection - Adult  Goal: Absence of infection at discharge  Outcome: Progressing  Goal: Absence of infection during hospitalization  Outcome: Progressing  Goal: Absence of fever/infection during anticipated neutropenic period  Outcome: Progressing     Problem: Metabolic/Fluid and Electrolytes - Adult  Goal: Electrolytes maintained within normal limits  Outcome: Progressing  Goal: Hemodynamic stability and optimal renal function maintained  Outcome: Progressing     Problem: Hematologic - Adult  Goal: Maintains hematologic stability  Outcome: Progressing     Problem: Pain  Goal: Verbalizes/displays adequate comfort level or baseline comfort level  Outcome: Progressing     Problem: Skin/Tissue Integrity  Goal: Absence of new skin breakdown  Description: 1. Monitor for areas of redness and/or skin breakdown  2. Assess vascular access sites hourly  3. Every 4-6 hours minimum:  Change oxygen saturation probe site  4. Every 4-6 hours:  If on nasal continuous positive airway pressure, respiratory therapy assess nares and determine need for appliance change or resting period.   Outcome: Progressing

## 2022-11-17 NOTE — CARE COORDINATION
Cm received VM message from pt's son, Laila Dunn, requesting return call to # [phone]. CM attempted to contact son back with voice mail message requesting return call. Cm notes that PT/OT have not been ordered yet as requested by perfect serve message to Dr Colleen Quiles when pt medically appropriate. Cm spoke with Dr Colleen Quiles re; PT/OT and he states that he spoke with nurse about it. Cm contacted pt's nurse, Gilbert Arciniega RN, who confirms she spoke with physician about therapies and will place order. White board to therapy that pt will need evals for possible SNF placement with precert. Cm will reach out to [facility] to check bed availability should pt need SNF (family expressed previous interest in [place] if needed). CM to follow.

## 2022-11-17 NOTE — PROGRESS NOTES
Postop day #2 from laparoscopic cholecystectomy for hemorrhagic severe cholecystitis  Patient is less agitated and more his normal self. He did receive 1 unit packed red blood cells and his hemoglobin today was 7.8. I am going to advance his diet  Case management consult patient may require extended care facility placement  P.o.  Lasix daily and decrease IV fluid  Physical therapy seeing patient  Repeat CBC tomorrow

## 2022-11-17 NOTE — CARE COORDINATION
Cm in to see pt. Pt sleeping and did not awaken to name called. CM awaiting PT/OT input re; plan for home with family vs SNF ([facility]). Two patient identifiers verified.  Allergies reviewed. Kenalog 40mg  IM administered to L/Deltoid per MD order.  Patient tolerated injection well; no redness, bleeding, or bruising noted to injection site.  Patient instructed to remain in clinic setting for 15 minutes.  Verbalizes understanding.

## 2022-11-17 NOTE — CARE COORDINATION
Received return call from pt's son re; discharge plan. Explained that Cm is awaiting PT/OT eval for recs to determine if best plan would be home with [facility]. and dgt assistance vs SNF. Pt has been to [facility] x 2 previously. Son expresses concern as he feels that pt is having increased difficulty or risk of falling and also that when this happens dgt is unable to get pt off the floor. Son advised that Cm will update on recs from therapy as soon as they are received. Cm expressed concern as well that pt has been in bed 4 days and will likely be weaker at discharge than prior to admission. Son can be reached at his work ph# during the day at ph# [phone].

## 2022-11-17 NOTE — PROGRESS NOTES
V2.0  [facility] [place]      Name:  Cely Stark /Age/Sex: 10/18/1930  (80 y.o. male)   MRN & CSN:  [id number] & [id number] Encounter Date/Time: 2022 1:44 PM EST    Location:  -A PCP: No primary care provider on file. Hospital Day: 5    Assessment and Plan:   Cely Stark is a 80 y.o. male  pmh of BPH, HLD, history of DVT, hypothyroidism, chronic diastolic heart failure, GERD, chronic anemia, HTN, CKD 4 who presents with Syncope,     # Acute cholecystitis s/p cholecystectomy  - CT abdomen showed distended gallbladder, MRI abdomen showed mild/early acute cholecystitis   - General surgery following, drain per general surgery  - On cefepime continue patient's WBC dropped from 16.2-10.4 postop  -And patient received 1 unit of packed red blood cells  -Patient transferred to ICU postop for observation  -Can transfer to stepdown orders placed and advance diet as tolerated    # Syncope: History of fall at home  # Head injury secondary to above  # Cardiogenic versus neurogenic  - Orthostatics negative, CT head negative  - Cardiology and neurology consulted, echocardiogram obtained low normal EF  - Continue on telemetry  - Fall precautions      # Microscopic hematuria  # BPH   - UA showed large blood and following straight cath  - Urology consulted  - Recommended Flomax  -Patient is on [facility] after surgery voiding trial once improved will attempt tomorrow    # Hypertension: On presentation hypotensive  - Blood pressure elevated since   - Started on amlodipine 5 mg and metoprolol 25 mg twice daily  ,- Continue to monitor    # Hypothyroidism continue levothyroxine  Diet ADULT DIET;  Regular; Low Fat/Low Chol/High Fiber/TEDDY   DVT Prophylaxis [] Lovenox, [x]  Heparin, [] SCDs, [] Ambulation,  [] Eliquis, [] Xarelto  [] Coumadin   Code Status DNR-CC   Disposition From: Home  Expected Disposition: Home versus SNF  Estimated Date of Discharge: TBD  Patient requires continued admission due to postop   Surrogate Decision Maker/ POA Son and daughter     Subjective:     Chief Complaint: Fall and Loss of Consciousness (Using restroom and passed out; fell off toilet and into wall in front of him. )       Patient seen and examined at bedside. Patient's nurse at bedside, patient alert and smiling today states he do not have any complaints denies any fever, chills, nausea, vomiting, abdominal pain         Review of Systems:    Review of Systems    As above    Objective: Intake/Output Summary (Last 24 hours) at 11/17/2022 1526  Last data filed at 11/17/2022 0900  Gross per 24 hour   Intake 400 ml   Output 1140 ml   Net -740 ml          Vitals:   Vitals:    11/17/22 1124   BP: 135/69   Pulse: 87   Resp: 16   Temp: 98.1 °F (36.7 °C)   SpO2:        Physical Exam:     General: Elderly male lying down in bed in no distress but hard of hearing smiling today  Eyes: EOMI  ENT: neck supple  Cardiovascular: Regular rate. Respiratory: Clear to auscultation  Gastrointestinal: Soft, mild tenderness in right upper quadrant bowel sounds normal drain in place drained bloody fluid about 100 mL  Genitourinary: no suprapubic tenderness  Musculoskeletal: No edema  Skin: warm, dry  Neuro: Alert. Psych: Mood appropriate.      Medications:   Medications:    furosemide  20 mg Oral Daily    tamsulosin  0.4 mg Oral Daily    [Held by provider] amLODIPine  5 mg Oral Daily    cefepime  1,000 mg IntraVENous Q24H    sodium chloride flush  5-40 mL IntraVENous 2 times per day    [Held by provider] atorvastatin  40 mg Oral Nightly    levothyroxine  75 mcg Oral Daily    metoprolol tartrate  25 mg Oral BID    QUEtiapine  50 mg Oral Nightly    sertraline  50 mg Oral Daily      Infusions:    sodium chloride      lactated ringers 50 mL/hr at 11/17/22 1221    sodium chloride       PRN Meds: sodium chloride, , PRN  HYDROmorphone, 0.5 mg, Q3H PRN  hydrALAZINE, 5 mg, Q6H PRN  sodium chloride flush, 5-40 mL, PRN  sodium chloride, , PRN  ondansetron, 4 mg, Q6H PRN  acetaminophen, 650 mg, Q6H PRN   Or  acetaminophen, 650 mg, Q6H PRN      Labs      Recent Results (from the past 24 hour(s))   Hemoglobin and Hematocrit    Collection Time: 11/16/22  4:49 PM   Result Value Ref Range    Hemoglobin 8.5 (L) 13.5 - 18.0 GM/DL    Hematocrit 26.8 (L) 42 - 52 %   CBC    Collection Time: 11/17/22  5:10 AM   Result Value Ref Range    WBC 6.7 4.0 - 10.5 K/CU MM    RBC 2.68 (L) 4.6 - 6.2 M/CU MM    Hemoglobin 7.9 (L) 13.5 - 18.0 GM/DL    Hematocrit 24.4 (L) 42 - 52 %    MCV 91.0 78 - 100 FL    MCH 29.5 27 - 31 PG    MCHC 32.4 32.0 - 36.0 %    RDW 16.3 (H) 11.7 - 14.9 %    Platelets 090 221 - 935 K/CU MM    MPV 10.4 7.5 - 11.1 FL   Hepatic Function Panel    Collection Time: 11/17/22  5:10 AM   Result Value Ref Range    Albumin 3.0 (L) 3.4 - 5.0 GM/DL    Total Bilirubin 0.5 0.0 - 1.0 MG/DL    Bilirubin, Direct 0.2 0.0 - 0.3 MG/DL    Bilirubin, Indirect 0.3 0 - 0.7 MG/DL    Alkaline Phosphatase 153 (H) 40 - 129 IU/L    AST 56 (H) 15 - 37 IU/L     (H) 10 - 40 U/L    Total Protein 4.9 (L) 6.4 - 8.2 GM/DL   Basic Metabolic Panel w/ Reflex to MG    Collection Time: 11/17/22  5:10 AM   Result Value Ref Range    Sodium 136 135 - 145 MMOL/L    Potassium 4.0 3.5 - 5.1 MMOL/L    Chloride 101 99 - 110 mMol/L    CO2 23 21 - 32 MMOL/L    Anion Gap 12 4 - 16    BUN 43 (H) 6 - 23 MG/DL    Creatinine 2.4 (H) 0.9 - 1.3 MG/DL    Est, Glom Filt Rate 25 (L) >60 mL/min/1.73m2    Glucose 107 (H) 70 - 99 MG/DL    Calcium 8.2 (L) 8.3 - 10.6 MG/DL        Imaging/Diagnostics Last 24 Hours   MRI ABDOMEN WO CONTRAST MRCP    Result Date: 11/14/2022  EXAMINATION: MRI OF THE ABDOMEN WITHOUT CONTRAST AND MRCP 11/14/2022 11:43 am TECHNIQUE: Multiplanar multisequence MRI of the abdomen was performed without the administration of intravenous contrast.  After initial T2 axial and coronal images, thick slab, thin slab and 3D coronal MRCP sequences were obtained without the administration of intravenous contrast.  MIP images are provided for review. COMPARISON: 11/13/2022 CT abdomen/pelvis, 11/13/2022 abdominal ultrasound HISTORY: ORDERING SYSTEM PROVIDED HISTORY: RUQ tenderness, cholelithiasis, uptrending WBC and liver pnel TECHNOLOGIST PROVIDED HISTORY: Reason for exam:->RUQ tenderness, cholelithiasis, uptrending WBC and liver pnel Reason for Exam: Fall; Loss of Consciousness FINDINGS: Lung Bases: Trace bilateral pleural effusions. Liver: The liver is normal in contour. No focal suspicious liver lesion. Biliary and Gallbladder: No biliary ductal dilation. Multiple calculi are visualized the level of the gallbladder neck/proximal cystic duct with moderate upstream gallbladder distension. There is mild pericholecystic fluid and stranding. No significant gallbladder wall thickening. Spleen: The spleen is unremarkable. Pancreas: Multiple T2 hyperintense lesions arise from the pancreas, the largest which is seen at the pancreatic uncinate process measuring 2.4 x 2.1 x 3.4 cm (series 7, image 15 and series 6, image 13). 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 Adrenal Glands: There is a 1.6 cm left adrenal lesion which demonstrates signal dropout on out of phase imaging, consistent with a benign adrenal adenoma. Kidneys: Bilateral renal cortical atrophy. There is a 4.1 cm T2 hyperintense lesion arising from the superior pole of left kidney which demonstrates thin internal septations, compatible with a Bosniak 2 cyst.  Smaller scattered T2 hyperintense renal lesions are also present, likely representing additional cysts. No hydronephrosis. Abdominal Vasculature: The abdominal aorta is normal in diameter. Gastrointestinal Tract: No evidence of bowel obstruction. Peritoneum/Mesentery/Retroperitoneum: No peritoneal or retroperitoneal masses. Lymph Nodes: No retroperitoneal lymphadenopathy.  Musculoskeletal: No suspicious osseous findings. 1.  Calculi are visualized within the gallbladder neck and proximal cystic duct with moderate upstream gallbladder distension and mild pericholecystic fluid/stranding. No significant gallbladder wall thickening. Findings are favored to represent mild/early acute cholecystitis. 2.  There are multiple T2 hyperintense pancreatic lesions measuring up to 3.4 cm. Per ACR White Paper recommendations, follow-up imaging with contrast should be obtained in 6 months. 3.  A previously described left adrenal nodule demonstrates characteristics consistent with a benign adrenal adenoma. US ABDOMEN LIMITED Specify organ? LIVER    Result Date: 11/13/2022  EXAMINATION: RIGHT UPPER QUADRANT ULTRASOUND 11/13/2022 3:09 pm COMPARISON: CT abdomen/pelvis 11/13/2022 HISTORY: ORDERING SYSTEM PROVIDED HISTORY: gallbladder distention seen on CT TECHNOLOGIST PROVIDED HISTORY: Reason for exam:->gallbladder distention seen on CT Specify organ?->LIVER Reason for Exam: distended gb on ct scan FINDINGS: Pancreas:  Nonvisualization of the gallbladder due to overlying bowel gas. Liver:  Normal contour with diffuse heterogeneous parenchymal echotexture and loss of the visualized portal triads consistent with hepatic steatosis. No intrahepatic mass biliary dilatation. Gallbladder: Moderate distention with a hyperechoic nonshadowing gallstone at the gallbladder neck. Dependent biliary sludge identified. No significant gallbladder wall thickening. No pericholecystic fluid. The technologist documented absence of sonographic Jose's sign. Common bile duct:  Borderline distended measuring 0.7 cm in AP dimension. Other:  No ascites. The right kidney demonstrates normal contour and parenchymal echotexture with cortical thickness. No hydronephrosis. No parenchymal solid or or cystic mass. 1. Cholelithiasis with gallbladder distention. No ultrasound evidence of acute cholecystitis.  2. Borderline distended common bile duct measuring 0.7 cm in diameter. 3. No ascites.        Electronically signed by Ti Marquis MD on 11/17/2022 at 3:26 PM

## 2022-11-17 NOTE — PROGRESS NOTES
Daily Progress Note  Subjective:  Awake and confused, hard of hearing  Complaining of abdominal pain  Discussed with Nurse  BP and HR stable  NSR on tele    Attending Note:  Patient is awake heard of hearing   No chest pain  Heart rate and BP stable  Remain in sinus rate is stable  Abdominal surgery for gallbladder   S/p transfusion   Surgery note reviewed  Elevated LFT improving   Keep on lopressor     Impression and Plan:   Syncope    When getting up from the bathroom    Unwitnessed at home    Orthostatic vs. Vasovagal?     Not much of change in BP from laying to sitting    Very similar episode over the summer    Neuro has also been consulted    Echo showed low normal EF    Tele reviewed     Elevated liver enzymes    GI and general surgery is following     LFT's Cont' to rise    WBC on the rise as well    Cholecystis vs. Choledocholithiasis    Lap Dai completed 11/15/2022    Drain in place      LFT's are improving    ABX per primary     Anemia    Last hgb was 7.9    S/p surgery    1 unit of PRBC      Mildly elevation in troponin; no ACS in nature      Most Recent Echo  Echo 11/14/2022   This is a limited echocardiogram.   Left ventricular systolic function is low normal.   Ejection fraction is visually estimated at 45-50%. Mild tricuspid regurgitation; RVSP: 37 mmHg. No evidence of any pericardial effusion. Dilated aortic root measuring 4.0cm. Dilated ascending aorta measuring 4.0cm. Technically difficult study, due to body habitus     Radiology  MRI abdomen 11/14/2022  Impression   1. Calculi are visualized within the gallbladder neck and proximal cystic   duct with moderate upstream gallbladder distension and mild pericholecystic   fluid/stranding. No significant gallbladder wall thickening. Findings are   favored to represent mild/early acute cholecystitis. 2.  There are multiple T2 hyperintense pancreatic lesions measuring up to 3.4   cm.   Per ACR White Paper recommendations, follow-up imaging with contrast   should be obtained in 6 months. 3.  A previously described left adrenal nodule demonstrates characteristics   consistent with a benign adrenal adenoma. CT head 11/13/2022  Impression   No acute intracranial abnormality. CT abdomen 11/13/2022  Impression   1. Distended gallbladder. This likely is due to a prolonged fasting state. Otherwise, I do raise the possibility of acalculous cholecystitis. 2. Trace pleural effusions with lower lobe atelectasis. 3. Diverticulosis without obvious inflammation. 4. Prostate hypertrophy with mild component of bladder outlet obstruction   with multiple bladder diverticula. PAST MEDICAL HISTORY:  Anemia, anxiety, arthritis, BPH, depression,  GERD, hemorrhoids, hepatitis, hernia, hypotension, hyperlipidemia, and  squamous cell carcinoma. PAST SURGICAL HISTORY:  Cataracts removal, hernia repair, and neck  surgery. SOCIAL HISTORY:  Former smoker. He does not drink any alcohol. He does  not use any illicit drugs. He lives with daughter. ALLERGIES:  MINOCYCLINE.        Objective:   /69   Pulse 87   Temp 98.1 °F (36.7 °C) (Oral)   Resp 16   Ht 6' (1.829 m)   Wt 207 lb 14.4 oz (94.3 kg)   SpO2 94%   BMI 27.80 kg/m²     Intake/Output Summary (Last 24 hours) at 11/17/2022 1155  Last data filed at 11/17/2022 0900  Gross per 24 hour   Intake 400 ml   Output 1625 ml   Net -1225 ml       Medications:   Scheduled Meds:   furosemide  20 mg Oral Daily    tamsulosin  0.4 mg Oral Daily    [Held by provider] amLODIPine  5 mg Oral Daily    cefepime  1,000 mg IntraVENous Q24H    sodium chloride flush  5-40 mL IntraVENous 2 times per day    [Held by provider] atorvastatin  40 mg Oral Nightly    levothyroxine  75 mcg Oral Daily    metoprolol tartrate  25 mg Oral BID    QUEtiapine  50 mg Oral Nightly    sertraline  50 mg Oral Daily      Infusions:   sodium chloride      lactated ringers 100 mL/hr at 11/16/22 1021    sodium chloride        PRN Meds:  sodium chloride, HYDROmorphone, hydrALAZINE, sodium chloride flush, sodium chloride, [DISCONTINUED] ondansetron **OR** ondansetron, acetaminophen **OR** acetaminophen     Physical Exam:  Vitals:    11/17/22 1124   BP: 135/69   Pulse: 87   Resp: 16   Temp: 98.1 °F (36.7 °C)   SpO2:         General: Confused; [place]  Chest: Nontender  Cardiac: First and Second Heart Sounds are Normal, No Murmurs or Gallops noted  Lungs:Clear to auscultation and percussion. Abdomen: Soft, NT, ND, +BS  Extremities: No clubbing, no edema  Vascular:  Equal 2+ peripheral pulses. Lab Data:  CBC:   Recent Labs     11/15/22  1530 11/16/22  0500 11/16/22  1649 11/17/22  0510   WBC 16.2* 10.4  --  6.7   HGB 8.9* 7.2* 8.5* 7.9*   HCT 28.4* 23.3* 26.8* 24.4*   MCV 96.6 97.5  --  91.0    134*  --  142     BMP:   Recent Labs     11/15/22  0509 11/16/22  0500 11/17/22  0510    137 136   K 4.3 4.8 4.0    102 101   CO2 21 22 23   BUN 38* 44* 43*   CREATININE 2.5* 2.6* 2.4*     LIVER PROFILE:   Recent Labs     11/15/22  0053 11/15/22  0509 11/16/22  0500 11/17/22  0510   *  426* 299* 109* 56*   *  491* 395* 219* 158*   LIPASE  --  19  --   --    BILIDIR 1.6*  --   --  0.2   BILITOT 1.9*  1.9* 1.5* 0.5 0.5   ALKPHOS 321*  324* 261* 172* 153*     PT/INR:   Recent Labs     11/15/22  0509   PROTIME 21.7*   INR 1.67     APTT:   Recent Labs     11/15/22  0509   APTT 42.9*     BNP:  No results for input(s): BNP in the last 72 hours.       Assessment:  Patient Active Problem List    Diagnosis Date Noted    Acute calculous cholecystitis 11/16/2022    Gallstones 11/14/2022    Syncope, unspecified syncope type 11/13/2022    Community acquired pneumonia of left lung, unspecified part of lung 10/08/2022    Cardiac arrest ([facility] 08/25/2022    Chest pain 08/21/2022    Agitation due to dementia 08/18/2022    Varicose 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 08/15/2015    Skin ulcer, limited to breakdown of skin ([facility] 02/21/2022    Current moderate episode of major depressive disorder, unspecified whether recurrent ([facility] 02/21/2022    Chronic kidney disease, stage IV (severe) ([facility] 02/21/2022    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 ([facility] 02/21/2022    Leukocytosis 09/08/2021    Thrombocytopenia, unspecified 08/31/2021    Bandemia 08/10/2021    Atelectasis 08/05/2021    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       Electronically signed by ROSEMARY Palmer CNP on 11/17/2022 at 11:55 AM    Electronically signed by Liang Boone MD on 11/17/22 at 3:14 PM EST

## 2022-11-18 LAB
CULTURE: NORMAL
CULTURE: NORMAL
HCT VFR BLD CALC: 24.8 % (ref 42–52)
HEMOGLOBIN: 8 GM/DL (ref 13.5–18)
Lab: NORMAL
Lab: NORMAL
MCH RBC QN AUTO: 29.5 PG (ref 27–31)
MCHC RBC AUTO-ENTMCNC: 32.3 % (ref 32–36)
MCV RBC AUTO: 91.5 FL (ref 78–100)
PDW BLD-RTO: 15.9 % (ref 11.7–14.9)
PLATELET # BLD: 128 K/CU MM (ref 140–440)
PMV BLD AUTO: 9.8 FL (ref 7.5–11.1)
RBC # BLD: 2.71 M/CU MM (ref 4.6–6.2)
SPECIMEN: NORMAL
SPECIMEN: NORMAL
WBC # BLD: 5.8 K/CU MM (ref 4–10.5)

## 2022-11-18 PROCEDURE — 97162 PT EVAL MOD COMPLEX 30 MIN: CPT

## 2022-11-18 PROCEDURE — 1200000000 HC SEMI PRIVATE

## 2022-11-18 PROCEDURE — 6370000000 HC RX 637 (ALT 250 FOR IP): Performed by: SURGERY

## 2022-11-18 PROCEDURE — 85027 COMPLETE CBC AUTOMATED: CPT

## 2022-11-18 PROCEDURE — 51701 INSERT BLADDER CATHETER: CPT

## 2022-11-18 PROCEDURE — 97530 THERAPEUTIC ACTIVITIES: CPT

## 2022-11-18 PROCEDURE — 6360000002 HC RX W HCPCS: Performed by: SURGERY

## 2022-11-18 PROCEDURE — 97166 OT EVAL MOD COMPLEX 45 MIN: CPT

## 2022-11-18 PROCEDURE — 97112 NEUROMUSCULAR REEDUCATION: CPT

## 2022-11-18 PROCEDURE — 88108 CYTOPATH CONCENTRATE TECH: CPT | Performed by: PATHOLOGY

## 2022-11-18 PROCEDURE — 2580000003 HC RX 258: Performed by: SURGERY

## 2022-11-18 PROCEDURE — 94761 N-INVAS EAR/PLS OXIMETRY MLT: CPT

## 2022-11-18 PROCEDURE — 51798 US URINE CAPACITY MEASURE: CPT

## 2022-11-18 RX ADMIN — FUROSEMIDE 20 MG: 20 TABLET ORAL at 09:13

## 2022-11-18 RX ADMIN — ATORVASTATIN CALCIUM 40 MG: 40 TABLET, FILM COATED ORAL at 21:01

## 2022-11-18 RX ADMIN — HYDROMORPHONE HYDROCHLORIDE 0.5 MG: 1 INJECTION, SOLUTION INTRAMUSCULAR; INTRAVENOUS; SUBCUTANEOUS at 13:37

## 2022-11-18 RX ADMIN — TAMSULOSIN HYDROCHLORIDE 0.4 MG: 0.4 CAPSULE ORAL at 09:13

## 2022-11-18 RX ADMIN — METOPROLOL TARTRATE 25 MG: 25 TABLET, FILM COATED ORAL at 21:01

## 2022-11-18 RX ADMIN — SERTRALINE HYDROCHLORIDE 50 MG: 50 TABLET ORAL at 09:13

## 2022-11-18 RX ADMIN — LEVOTHYROXINE SODIUM 75 MCG: 0.07 TABLET ORAL at 06:05

## 2022-11-18 RX ADMIN — SODIUM CHLORIDE, PRESERVATIVE FREE 10 ML: 5 INJECTION INTRAVENOUS at 09:13

## 2022-11-18 RX ADMIN — CEFEPIME HYDROCHLORIDE 1000 MG: 1 INJECTION, POWDER, FOR SOLUTION INTRAMUSCULAR; INTRAVENOUS at 09:14

## 2022-11-18 RX ADMIN — METOPROLOL TARTRATE 25 MG: 25 TABLET, FILM COATED ORAL at 09:13

## 2022-11-18 RX ADMIN — QUETIAPINE FUMARATE 50 MG: 25 TABLET ORAL at 21:00

## 2022-11-18 ASSESSMENT — PAIN DESCRIPTION - LOCATION: LOCATION: ABDOMEN

## 2022-11-18 ASSESSMENT — PAIN SCALES - GENERAL
PAINLEVEL_OUTOF10: 0
PAINLEVEL_OUTOF10: 5

## 2022-11-18 ASSESSMENT — PAIN DESCRIPTION - ORIENTATION: ORIENTATION: RIGHT

## 2022-11-18 ASSESSMENT — PAIN DESCRIPTION - PAIN TYPE: TYPE: SURGICAL PAIN

## 2022-11-18 NOTE — PROGRESS NOTES
Comprehensive Nutrition Assessment    Type and Reason for Visit:  Initial (los)    Nutrition Recommendations/Plan:   Continue current diet as ordered  Oral nutrition supplements available per chart review  Will monitor po intake, weight trends, poc     Malnutrition Assessment:  Malnutrition Status:  No malnutrition (11/18/22 1406)    Context:  Acute Illness       Nutrition Assessment:    Pt admitted for syncope, PMH: SCC, HTN, HLD, Pt currently on cardiac diet, meal of % documented today, POD #3 s/p lap shaka, some weight variance noted, no wounds noted, will follow at moderate nutrition risk    Nutrition Related Findings:    elevated LFTs, H/H: 8.0/24.8 Wound Type: None       Current Nutrition Intake & Therapies:    Average Meal Intake: % (1 meal documented in the past 72 hr)  Average Supplements Intake: None Ordered  ADULT DIET; Regular; Low Fat/Low Chol/High Fiber/TEDDY    Anthropometric Measures:  Height: 6' 0.01\" (182.9 cm)  Ideal Body Weight (IBW): 178 lbs (81 kg)       Current Body Weight: 207 lb 14.3 oz (94.3 kg), 116.8 % IBW. Weight Source: Bed Scale  Current BMI (kg/m2): 28.2        Weight Adjustment For: No Adjustment                 BMI Categories: Overweight (BMI 25.0-29. 9)    Estimated Daily Nutrient Needs:  Energy Requirements Based On: Formula  Weight Used for Energy Requirements: Current  Energy (kcal/day): 5372-6473 ([address] w/ stress factor 1.0-1.2)  Weight Used for Protein Requirements: Ideal  Protein (g/day): 81-97 (1.0-1.2 g/kg)  Method Used for Fluid Requirements: 1 ml/kcal  Fluid (ml/day): 3551-8276    Nutrition Diagnosis:   No nutrition diagnosis at this time   Nutrition Interventions:   Food and/or Nutrient Delivery: Continue Current Diet  Nutrition Education/Counseling: No recommendation at this time  Coordination of Nutrition Care: Continue to monitor while inpatient       Goals:     Goals: PO intake 75% or greater, by next RD assessment       Nutrition Monitoring and Evaluation:   Behavioral-Environmental Outcomes: None Identified  Food/Nutrient Intake Outcomes: Food and Nutrient Intake  Physical Signs/Symptoms Outcomes: Biochemical Data, GI Status, Hemodynamic Status, Meal Time Behavior, Weight    Discharge Planning:     Too soon to determine     Sandra Vasquez [address], [address] [place] [address],   Contact: [phone]

## 2022-11-18 NOTE — CARE COORDINATION
Cm received return call from Adolfo patiño at [facility] stating that they will be able to accept pt at discharge and will initiate precert today. Vm message left for pt's son re; the above.

## 2022-11-18 NOTE — FLOWSHEET NOTE
Patient pulled out IV, third time in as many days. Per Dr Lupe Brantley, [facility] to leave out.

## 2022-11-18 NOTE — PROGRESS NOTES
Patient sitting up in chair  No signs are stable  Hemoglobin 8.0  Patient has emotional fluctuation with tearfulness  Gasper-Blunt drain shows old blood  Remove drain  Continue antibiotics which can be switched to oral antibiotics tomorrow would recommend Augmentin 875 twice daily for 5 days  Consult was obtained for ECF placement

## 2022-11-18 NOTE — PROGRESS NOTES
[facility] THERAPY EVALUATION    Jackson Arredondo, 10/18/1930, 2119/2119-A, 11/18/2022    Discharge Recommendation: SNF    History:  [place]:  The primary encounter diagnosis was Syncope and collapse. A diagnosis of Acute cholecystitis was also pertinent to this visit. Patient  has a past medical history of Anemia, Anxiety, Arthritis, BPH with urinary obstruction, Depression, GERD (gastroesophageal reflux disease), Hemorrhoids, Hepatitis, Hernia of unspecified site of abdominal cavity without mention of obstruction or gangrene, Hyperlipidemia, Hypotension, and SCC (squamous cell carcinoma). Patient  has a past surgical history that includes Neck surgery; Cataract removal; hernia repair; hiatal hernia repair (N/A, 3/4/2021); Cystoscopy (N/A, 3/29/2021); Cystoscopy (N/A, 4/1/2021); and Cholecystectomy, laparoscopic (N/A, 11/15/2022). Subjective:  Patient states: Amie Asiya would you want to run away from a good lookin' man like me? \"  Pain: denies at rest, c/o abdominal and borden site pain with bed mobility  Communication with other providers: cleared with RN, coeval with PT Pa Donaldson  Restrictions: general precautions, fall risk, cog, abdominal precautions, ALEJANDRA drain    Home Setup/Prior level of function:  Social/Functional History  Lives With: Alone  Type of Home: House  Home Layout: Two level, Laundry in basement  Home Access: Stairs to enter with rails  Entrance Stairs - Number of Steps: 3  Bathroom Shower/Tub: Tub/Shower unit  Bathroom Toilet: Standard  Bathroom Equipment: Grab bars in [facility]: [place], rolling, Wheelchair-manual  Has the patient had two or more falls in the past year or any fall with injury in the past year?: No  ADL Assistance: Independent  Homemaking Assistance: Needs assistance (daughter performs)  Ambulation Assistance: Independent (mod I with FWW, wc for community mobility)  Transfer Assistance: Independent  Active : Yes  Occupation: Retired  Additional Comments: daughter visiting from out of state, pt states she may be staying with him. pt may be questionable historian. Pt's son lives in [address] [place] [address], is recently     Examination:  Observation: Pt was semi fowlers in bed upon arrival, agreeable to session. AMITA, michi, ALEJANDRA drain  Vision: WFL  Hearing: very [place]  Objective Measures: stable throughout    Body Systems and functions:  ROM: WFL in BUE  Strength: 4-/5 in BUE  Sensation: WFL  Tone: normotonic in BUE  Coordination: movements fluid and coordinated  Activity Tolerance: fair    Activities of Daily Living (ADLs):  Feeding: setup/SBA  Grooming: min A  Toileting: dep  UB dressing/bathing: mod A  LB dressing/bathing: dep    *pt ADL function inferred from gross functional assessment of mobility, balance, posture, safety awareness, activity tolerance (unless otherwise indicated)    Cognitive and Psychosocial Functioning:  Overall cognitive status: A/Ox3 (time with options/cueing, not situation), generally confused throughout session. Inc cueing required for sequencing, processing. Requires frequent redirection to task and reorientation. Follows 5/5 commands. Poor safety awareness, insight into deficits, and impulse control  Affect: pleasantly confused    Functional Mobility:  Bed Mobility: min A  Sit <> Stand: mod Ax2    Ambulation: mod Ax2 for SPT using FW      AM-PAC 6 click short form for inpatient daily activity:   How much help from another person does the patient currently need. .. Unable  Dep A Lot  Max A A Lot   Mod A A Little  Min A A Little   CGA  SBA None   Mod I  Indep  Sup   1. Putting on and taking off regular lower body clothing? [x] 1    [] 2   [] 2   [] 3   [] 3   [] 4      2. Bathing (including washing, rinsing, drying)? [x] 1   [] 2   [] 2 [] 3 [] 3 [] 4   3. Toileting, which includes using toilet, bedpan, or urinal? [x] 1    [] 2   [] 2   [] 3   [] 3   [] 4     4. Putting on and taking off regular upper body clothing?  [] 1   [] 2   [x] 2   [] 3   [] 3    [] 4      5. Taking care of personal grooming such as brushing teeth? [] 1   [] 2    [] 2 [x] 3    [] 3   [] 4      6. Eating meals? [] 1   [] 2   [] 2   [] 3   [x] 3   [] 4        Raw Score:  11      24/24 = unimpaired  23/24 = 1-20% impaired   20/24-22/24 = 21-40% impaired  15/24-19/24 = 41-59% impaired   10/24-14/24 = 60%-79% impaired  7/24-9/24 = 80%-99% impaired  6/24 = 100% impaired     Treatment:    Therapeutic Activity Training (20 minutes): Therapeutic activity training was instructed today. Cues were given for safety, sequence, UE/LE placement, visual cues, and balance. Activities performed today included pt requiring inc encouragement for participation initially today d/t confusion. Pt attempting to don socks but unable, doing so with total A. Pt eventually demo supine to sit EOB with min A for BLE and scooting to EOB. Pt requiring inc time and cueing for sequencing of bed mobility. Pt seated at EOB with CGA-intermittent min A- attempts several times to lie back down. Pt demo STS and SPT to chair with mod Ax2 using no AD d/t pt inc pain and safety. Pt sitting in chair with CGA, positioned for comfort. *inc time required throughout session for inc cueing/redirection 2/2 cog and hearing impairments    Education: Role of OT, OT POC, discharge needs, safety, benefits of EOB/OOB activity, AD/DME needs, Home safety  Safety Measures: Gait belt used for safety of pt and therapist, Left in recliner, Alarm in place, call light and phone within reach, lines managed    Assessment:  Pt is a 80 yr old male from home alone (?) who presents with syncope, cholecystitis. Prior to admission, pt was ADLs and mobility using FWW/wc. Pt currently well below baseline, mod Ax2 for limited mobility, SBA-total A for ADLs. Pt also presents with generalized weakness, activity tolerance, impaired mobility and cognition/safety, inc pain.  Pt would benefit from continued IP OT services during their stay and discharge to SNF.     Complexity: mod  Prognosis: fair  Barriers: deconditioning, cog/safety, pain    Plan:  Plan: 3+/week    Treatment to include: Strengthening, ROM, Balance Training, Functional Mobility Training, Endurance Training, Gait Training, Pain Management, Safety Education and Training, Patient+Caregiver Education and Training, Equipment Evaluation Education and Procurement, Positioning, Self Care Training, Home Management Training, Coordination Training, cognitive reorientation, cognitive/perceptual training    Pt Would Benefit from Continued Edu on safety, cog training  Adaptive Equipment Recommendations: none    Goals:  Time frame for goals: 2 weeks  Pt will complete feeding tasks with ind  Pt will complete grooming tasks with CGA standing at sink  Pt will complete toileting tasks with min A using BSC  Pt will complete UB dressing and bathing tasks with ind  Pt will complete LB dressing and bathing tasks with mod A  Pt will practice functional transfers and mobility with AD for increased safety and independence    Time:   Time in: 1000  Time out: 1032  Treatment Minutes: 20  Evaluation Minutes: 10  Total time: 32    Electronically signed by:      KENY Gaines  11/18/2022, 12:08 PM

## 2022-11-18 NOTE — PROGRESS NOTES
-- DO NOT REPLY / DO NOT REPLY ALL --  -- Message is from the [facility]--    Patient is requesting a medication refill - the medication was not listed on the patient's active medication list.    Prescribing Provider:  Dr. Smith    RX Name:  Humalog Mix Quick Pen   Dose:  75/ 25  Quantity:  N/a   Instruction(s):  N/a     Duration: 30 days    [facility] Phone Number ; [phone]      Pharmacy  [id number] - [facility] [address] At [address]    Patient confirmed the above pharmacy as correct?  Yes        Please call Erin Ernst @ [phone]       Patient has not had insulin since the 3 /11/      Patient's spouse stated something is wrong with the system and the medication needs to be called in     Caller Information       Type Contact Phone    03/14/2020 10:38 AM Phone (Incoming)  Lilliam Magaña (Spouse)           Alternative phone number: n/a     Turnaround time given to caller:   \"Your doctor's office is closed. This message will be sent to our nurse. They will return your call as soon as they review your message. (Calls after 10 PM will be returned tomorrow morning.)\"   V2.0  [facility] Hospitalist Progress Note      Name:  Phyllis Negrete /Age/Sex: 10/18/1930  (80 y.o. male)   MRN & CSN:  [id number] & [id number] Encounter Date/Time: 2022 1:44 PM EST    Location:  -A PCP: No primary care provider on file. Hospital Day: 6    Assessment and Plan:   Phyllis Negrete is a 80 y.o. male  pmh of BPH, HLD, history of DVT, hypothyroidism, chronic diastolic heart failure, GERD, chronic anemia, HTN, CKD 4 who presents with Syncope,     # Acute cholecystitis s/p cholecystectomy  - CT abdomen showed distended gallbladder, MRI abdomen showed mild/early acute cholecystitis   - General surgery following, drain per general surgery  - On cefepime continue patient's WBC dropped from 16.2-5.8 postop  -And patient received 1 unit of packed red blood cells  -Patient transferred to ICU postop for observation  -Can transfer to [place] [id number]  -PT/OT evaluated recommended SNF    # Syncope: History of fall at home  # Head injury secondary to above  # Cardiogenic versus neurogenic  - Orthostatics negative, CT head negative  - Cardiology and neurology consulted, echocardiogram obtained low normal EF  - Continue on telemetry  - Fall precautions      # Microscopic hematuria  # BPH   - UA showed large blood and following straight cath  - Urology consulted  - Recommended Flomax  -Patient is on [facility] after surgery voiding trial once improved will attempt     # Hypertension: On presentation hypotensive  - Blood pressure elevated since   - Started on amlodipine 5 mg and metoprolol 25 mg twice daily  ,- Continue to monitor    # Hypothyroidism continue levothyroxine  Diet ADULT DIET;  Regular; Low Fat/Low Chol/High Fiber/TEDDY   DVT Prophylaxis [] Lovenox, [x]  Heparin, [] SCDs, [] Ambulation,  [] Eliquis, [] Xarelto  [] Coumadin   Code Status DNR-CC   Disposition From: Home  Expected Disposition: Home versus SNF  Estimated Date of Discharge: TBD  Patient requires continued admission due to SNF pre-CERT Surrogate Decision Maker/ POA Son and daughter     Subjective:     Chief Complaint: Fall and Loss of Consciousness (Using restroom and passed out; fell off toilet and into wall in front of him. )       Patient seen and examined at bedside. Patient's nurse at bedside, patient alert and smiling today states he do not have any complaints denies any fever, chills, nausea, vomiting, abdominal pain. Called patients doc Marley to update about the patient's condition could reach the voice mail, left a message. Will try tomorrow again. Review of Systems:    Review of Systems    As above    Objective: Intake/Output Summary (Last 24 hours) at 11/18/2022 1452  Last data filed at 11/18/2022 0919  Gross per 24 hour   Intake 2329.31 ml   Output 2355 ml   Net -25.69 ml          Vitals:   Vitals:    11/18/22 0915   BP: 138/64   Pulse: 82   Resp: 16   Temp: 98.6 °F (37 °C)   SpO2: 97%       Physical Exam:     General: Elderly male lying down in bed in no distress but hard of hearing smiling today  Eyes: EOMI  ENT: neck supple  Cardiovascular: Regular rate. Respiratory: Clear to auscultation  Gastrointestinal: Soft, mild tenderness in right upper quadrant bowel sounds normal drain in place drained bloody fluid about 100 mL  Genitourinary: no suprapubic tenderness  Musculoskeletal: No edema  Skin: warm, dry  Neuro: Alert. Psych: Mood appropriate.      Medications:   Medications:    furosemide  20 mg Oral Daily    tamsulosin  0.4 mg Oral Daily    [Held by provider] amLODIPine  5 mg Oral Daily    cefepime  1,000 mg IntraVENous Q24H    sodium chloride flush  5-40 mL IntraVENous 2 times per day    [Held by provider] atorvastatin  40 mg Oral Nightly    levothyroxine  75 mcg Oral Daily    metoprolol tartrate  25 mg Oral BID    QUEtiapine  50 mg Oral Nightly    sertraline  50 mg Oral Daily      Infusions:    sodium chloride      sodium chloride       PRN Meds: sodium chloride, , PRN  HYDROmorphone, 0.5 mg, Q3H PRN  hydrALAZINE, 5 mg, Q6H PRN  sodium chloride flush, 5-40 mL, PRN  sodium chloride, , PRN  ondansetron, 4 mg, Q6H PRN  acetaminophen, 650 mg, Q6H PRN   Or  acetaminophen, 650 mg, Q6H PRN      Labs      Recent Results (from the past 24 hour(s))   CBC    Collection Time: 11/18/22  6:10 AM   Result Value Ref Range    WBC 5.8 4.0 - 10.5 K/CU MM    RBC 2.71 (L) 4.6 - 6.2 M/CU MM    Hemoglobin 8.0 (L) 13.5 - 18.0 GM/DL    Hematocrit 24.8 (L) 42 - 52 %    MCV 91.5 78 - 100 FL    MCH 29.5 27 - 31 PG    MCHC 32.3 32.0 - 36.0 %    RDW 15.9 (H) 11.7 - 14.9 %    Platelets 422 (L) 302 - 440 K/CU MM    MPV 9.8 7.5 - 11.1 FL        Imaging/Diagnostics Last 24 Hours   MRI ABDOMEN WO CONTRAST MRCP    Result Date: 11/14/2022  EXAMINATION: MRI OF THE ABDOMEN WITHOUT CONTRAST AND MRCP 11/14/2022 11:43 am TECHNIQUE: Multiplanar multisequence MRI of the abdomen was performed without the administration of intravenous contrast.  After initial T2 axial and coronal images, thick slab, thin slab and 3D coronal MRCP sequences were obtained without the administration of intravenous contrast.  MIP images are provided for review. COMPARISON: 11/13/2022 CT abdomen/pelvis, 11/13/2022 abdominal ultrasound HISTORY: ORDERING SYSTEM PROVIDED HISTORY: RUQ tenderness, cholelithiasis, uptrending WBC and liver pnel TECHNOLOGIST PROVIDED HISTORY: Reason for exam:->RUQ tenderness, cholelithiasis, uptrending WBC and liver pnel Reason for Exam: Fall; Loss of Consciousness FINDINGS: Lung Bases: Trace bilateral pleural effusions. Liver: The liver is normal in contour. No focal suspicious liver lesion. Biliary and Gallbladder: No biliary ductal dilation. Multiple calculi are visualized the level of the gallbladder neck/proximal cystic duct with moderate upstream gallbladder distension. There is mild pericholecystic fluid and stranding. No significant gallbladder wall thickening. Spleen: The spleen is unremarkable.  Pancreas: Multiple T2 hyperintense lesions arise from the pancreas, the largest which is seen at the pancreatic uncinate process measuring 2.4 x 2.1 x 3.4 cm (series 7, image 15 and series 6, image 13). 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 Adrenal Glands: There is a 1.6 cm left adrenal lesion which demonstrates signal dropout on out of phase imaging, consistent with a benign adrenal adenoma. Kidneys: Bilateral renal cortical atrophy. There is a 4.1 cm T2 hyperintense lesion arising from the superior pole of left kidney which demonstrates thin internal septations, compatible with a Bosniak 2 cyst.  Smaller scattered T2 hyperintense renal lesions are also present, likely representing additional cysts. No hydronephrosis. Abdominal Vasculature: The abdominal aorta is normal in diameter. Gastrointestinal Tract: No evidence of bowel obstruction. Peritoneum/Mesentery/Retroperitoneum: No peritoneal or retroperitoneal masses. Lymph Nodes: No retroperitoneal lymphadenopathy. Musculoskeletal: No suspicious osseous findings. 1.  Calculi are visualized within the gallbladder neck and proximal cystic duct with moderate upstream gallbladder distension and mild pericholecystic fluid/stranding. No significant gallbladder wall thickening. Findings are favored to represent mild/early acute cholecystitis. 2.  There are multiple T2 hyperintense pancreatic lesions measuring up to 3.4 cm. Per ACR White Paper recommendations, follow-up imaging with contrast should be obtained in 6 months. 3.  A previously described left adrenal nodule demonstrates characteristics consistent with a benign adrenal adenoma. US ABDOMEN LIMITED Specify organ?  LIVER    Result Date: 11/13/2022  EXAMINATION: RIGHT UPPER QUADRANT ULTRASOUND 11/13/2022 3:09 pm COMPARISON: CT abdomen/pelvis 11/13/2022 HISTORY: ORDERING SYSTEM PROVIDED HISTORY: gallbladder distention seen on CT TECHNOLOGIST PROVIDED HISTORY: Reason for exam:->gallbladder distention seen on CT Specify organ?->LIVER Reason for Exam: distended gb on ct scan FINDINGS: Pancreas:  Nonvisualization of the gallbladder due to overlying bowel gas. Liver:  Normal contour with diffuse heterogeneous parenchymal echotexture and loss of the visualized portal triads consistent with hepatic steatosis. No intrahepatic mass biliary dilatation. Gallbladder: Moderate distention with a hyperechoic nonshadowing gallstone at the gallbladder neck. Dependent biliary sludge identified. No significant gallbladder wall thickening. No pericholecystic fluid. The technologist documented absence of sonographic Jose's sign. Common bile duct:  Borderline distended measuring 0.7 cm in AP dimension. Other:  No ascites. The right kidney demonstrates normal contour and parenchymal echotexture with cortical thickness. No hydronephrosis. No parenchymal solid or or cystic mass. 1. Cholelithiasis with gallbladder distention. No ultrasound evidence of acute cholecystitis. 2. Borderline distended common bile duct measuring 0.7 cm in diameter. 3. No ascites.        Electronically signed by Efrem Amin MD on 11/18/2022 at 2:52 PM

## 2022-11-18 NOTE — CONSULTS
[id number] [facility], 10/18/1930, 2119/2119-A, 11/18/2022    History  [place]:  The primary encounter diagnosis was Syncope and collapse. A diagnosis of Acute cholecystitis was also pertinent to this visit. Patient  has a past medical history of Anemia, Anxiety, Arthritis, BPH with urinary obstruction, Depression, GERD (gastroesophageal reflux disease), Hemorrhoids, Hepatitis, Hernia of unspecified site of abdominal cavity without mention of obstruction or gangrene, Hyperlipidemia, Hypotension, and SCC (squamous cell carcinoma). Patient  has a past surgical history that includes Neck surgery; Cataract removal; hernia repair; hiatal hernia repair (N/A, 3/4/2021); Cystoscopy (N/A, 3/29/2021); Cystoscopy (N/A, 4/1/2021); and Cholecystectomy, laparoscopic (N/A, 11/15/2022). Subjective:  Patient states:  Valeriano Gusman would you want to run away from a good looking man like me? \"    Pain:  denies pain. Communication with other providers:  Handoff to RN, OT  Restrictions: general precautions, fall risk, ALEJANDRA drain, borden    Home Setup/Prior level of function  Social/Functional History  Lives With: Alone  Type of Home: House  Home Layout: Two level, Laundry in basement  Home Access: Stairs to enter with rails  Entrance Stairs - Number of Steps: 3  Bathroom Shower/Tub: Tub/Shower unit  Bathroom Toilet: Standard  Bathroom Equipment: Grab bars in [address]: [address]  Has the patient had two or more falls in the past year or any fall with injury in the past year?: No  ADL Assistance: Independent  Homemaking Assistance: Needs assistance (daughter performs)  Ambulation Assistance: Independent (mod I with FWW, wc for community mobility)  Transfer Assistance: Independent  Active : Yes  Occupation: Retired  Additional Comments: daughter visiting from out of state, pt states she may be staying with him. pt may be questionable historian.  Pt's care    Goals:  Short Term Goals  Time Frame for Short Term Goals: 1 week  Short Term Goal 1: Pt to complete all bed mobility CGA  Short Term Goal 2: Pt to complete all STS transfers to/from bed, commode, and chair mod A  Short Term Goal 3: Pt to ambulate 25' with LRAD min A       Treatment plan:  Bed mobility, transfers, balance, gait, TA, TX    Recommendations for NURSING mobility: stand pivot x2 assist with gait belt    Time:   Time in: 0959  Time out: 1032  Timed treatment minutes: 23  Total time: 33    Electronically signed by:    Cherise Vo, PT  11/18/2022, 12:33 PM

## 2022-11-18 NOTE — PROGRESS NOTES
[facility] [address], [address]   Progress Note  [address] 0 1 2  Date: 2022   Patient: Katherin Palmer   : 10/18/1930   DOA: 2022   MRN: [id number]   ROOM#: [facility]9/2119-A     Admit Date: 2022     Collaborating Urologist on Call at time of admission: Dr. Catrachito Palma    CC: BPH    Subjective:     Pain: mild, no nausea and no vomiting,   Bowel Movement/Flatus:   Yes  Voiding:  Indwelling Laureano, urine clear yellow     Patient doing ok, resting comfortably. Became tearful as I left the room, states he doesn't know how to help.     Objective:      Vitals:    BP (!) 153/74   Pulse 85   Temp 98.9 °F (37.2 °C) (Oral)   Resp 22   Ht 6' (1.829 m)   Wt 207 lb 14.4 oz (94.3 kg)   SpO2 97%   BMI 27.80 kg/m²    Temp  Av.4 °F (36.9 °C)  Min: 98.1 °F (36.7 °C)  Max: 98.9 °F (37.2 °C)     Intake/Output Summary (Last 24 hours) at 2022 0838  Last data filed at 2022 0659  Gross per 24 hour   Intake 1919.31 ml   Output 2375 ml   Net -455.69 ml       Physical Exam:   General appearance: appears stated age, no distress, and somewhat confused  Head: Normocephalic, without obvious abnormality, atraumatic  Back:  No CVA tenderness  Abdomen:  Soft, mild right-sided tenderness  : Laureaon catheter with clear yellow urine    Labs:   WBC:    Lab Results   Component Value Date/Time    WBC 5.8 2022 06:10 AM      Hemoglobin/Hematocrit:    Lab Results   Component Value Date/Time    HGB 8.0 2022 06:10 AM    HCT 24.8 2022 06:10 AM      BMP:   Lab Results   Component Value Date/Time     2022 05:10 AM    K 4.0 2022 05:10 AM     2022 05:10 AM    CO2 23 2022 05:10 AM    BUN 43 2022 05:10 AM    LABALBU 3.0 2022 05:10 AM    CREATININE 2.4 2022 05:10 AM    CALCIUM 8.2 2022 05:10 AM    GFRAA 28 10/14/2022 12:10 PM    LABGLOM 25 2022 05:10 AM      PT/INR:    Lab Results   Component Value Date/Time    PROTIME 21.7 11/15/2022 05:09 AM INR 1.67 11/15/2022 05:09 AM      PTT:    Lab Results   Component Value Date/Time    APTT 42.9 11/15/2022 05:09 AM      Blood Culture:  NGTD   Urine Culture:  No growth    Imaging:   CT ABDOMEN PELVIS WO CONTRAST Additional Contrast? None    Result Date: 11/13/2022  EXAMINATION: CT OF THE ABDOMEN AND PELVIS WITHOUT CONTRAST 11/13/2022 11:32 am TECHNIQUE: CT of the abdomen and pelvis was performed without the administration of intravenous contrast. Multiplanar reformatted images are provided for review. Automated exposure control, iterative reconstruction, and/or weight based adjustment of the mA/kV was utilized to reduce the radiation dose to as low as reasonably achievable. COMPARISON: 08/31/2022. HISTORY: ORDERING SYSTEM PROVIDED HISTORY: ABD PAIN, FALL TECHNOLOGIST PROVIDED HISTORY: Reason for exam:->ABD PAIN, FALL Additional Contrast?->None Reason for Exam: ABD PAIN, FALL FINDINGS: Lower Chest: The lung bases demonstrate trace pleural effusions with lower lobe atelectasis. Organs: The liver, spleen, pancreas and right adrenal gland appear unremarkable for a non contrasted study. The gallbladder is distended. There is a low-attenuation nodule in the left adrenal gland measuring 1.8 cm and unchanged. The kidneys demonstrate no calcifications. No hydronephrosis is seen. There is a cyst in the left kidney measuring 4.5 cm. No myometrial or bladder calculi are seen. There is circumferential bladder wall thickening. There are multiple bladder diverticula. The prostate gland is mildly enlarged. GI/Bowel: Evaluation of the bowel is limited as no enteric contrast was given. No dilated loops of bowel are seen. There is diverticular disease involving the colon but no findings to suggest active inflammation. Note is made of a small hiatal hernia. I do not see a dilated appendix. Pelvis: No pelvic masses or fluid collections are seen. The prostate gland is mildly enlarged.   There are soft tissue densities seen in the region of the inguinal canal is likely from prior hernia repair and unchanged. Peritoneum/Retroperitoneum: The abdominal aorta is not aneurysmal.  There are shotty mesenteric and retroperitoneal lymph nodes but no lymphadenopathy is seen. Bones/Soft Tissues: No acute bony abnormalities are noted. 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 1. Distended gallbladder. This likely is due to a prolonged fasting state. Otherwise, I do raise the possibility of acalculous cholecystitis. 2. Trace pleural effusions with lower lobe atelectasis. 3. Diverticulosis without obvious inflammation. 4. Prostate hypertrophy with mild component of bladder outlet obstruction with multiple bladder diverticula. XR PELVIS (1-2 VIEWS)    Result Date: 11/13/2022  EXAMINATION: ONE XRAY VIEW OF THE PELVIS 11/13/2022 9:44 am COMPARISON: CT abdomen/pelvis 08/31/2022 HISTORY: ORDERING SYSTEM PROVIDED HISTORY: fall TECHNOLOGIST PROVIDED HISTORY: Reason for exam:->fall Reason for Exam: fall FINDINGS: Single view provided. Lower lumbar degenerative disc and joint disease. Normal bilateral sacroiliac and hip alignment. No acute fracture. Subtle right lakisha pelvic cortical and trabecular coarsening with intramedullary sclerotic foci consistent with Paget's disease. Normal pelvic alignment with no acute fracture. CT Head W/O Contrast    Result Date: 11/13/2022  EXAMINATION: CT OF THE HEAD WITHOUT CONTRAST  11/13/2022 9:54 am TECHNIQUE: CT of the head was performed without the administration of intravenous contrast. Automated exposure control, iterative reconstruction, and/or weight based adjustment of the mA/kV was utilized to reduce the radiation dose to as low as reasonably achievable. COMPARISON: None. HISTORY: ORDERING SYSTEM PROVIDED HISTORY: fall, anticoagulated TECHNOLOGIST PROVIDED HISTORY: Reason for exam:->fall, anticoagulated Has a \"code stroke\" or \"stroke alert\" been called? ->No Decision Support Exception - unselect if not a suspected or confirmed emergency medical condition->Emergency Medical Condition (MA) Reason for Exam: fall, anticoagulated FINDINGS: BRAIN/VENTRICLES: There is a cerebral atrophy. The there are bilateral white matter hypodensities, in keeping with microvascular disease. There is no acute intracranial hemorrhage, mass effect or midline shift. No abnormal extra-axial fluid collection. The gray-white differentiation is maintained without evidence of an acute infarct. There is no evidence of hydrocephalus. ORBITS: The visualized portion of the orbits demonstrate no acute abnormality. SINUSES: The visualized paranasal sinuses and mastoid air cells demonstrate no acute abnormality. SOFT TISSUES/SKULL:  No acute abnormality of the visualized skull or soft tissues. No acute intracranial abnormality. CT CSpine W/O Contrast    Result Date: 11/13/2022  EXAMINATION: CT OF THE CERVICAL SPINE WITHOUT CONTRAST 11/13/2022 9:54 am TECHNIQUE: CT of the cervical spine was performed without the administration of intravenous contrast. Multiplanar reformatted images are provided for review. Automated exposure control, iterative reconstruction, and/or weight based adjustment of the mA/kV was utilized to reduce the radiation dose to as low as reasonably achievable. COMPARISON: None. HISTORY: ORDERING SYSTEM PROVIDED HISTORY: fall TECHNOLOGIST PROVIDED HISTORY: Reason for exam:->fall Decision Support Exception - unselect if not a suspected or confirmed emergency medical condition->Emergency Medical Condition (MA) Reason for Exam: fall, anticoagulated FINDINGS: BONES/ALIGNMENT: There is no acute fracture or traumatic malalignment. DEGENERATIVE CHANGES: There is degenerative disc disease of the C6-C7 disc, with disc space narrowing and osteophytes. There are osteoarthritic changes of the left facet joints. SOFT TISSUES: There is no prevertebral soft tissue swelling.      No acute abnormality of the cervical spine. XR CHEST PORTABLE    Result Date: 11/13/2022  EXAMINATION: ONE XRAY VIEW OF THE CHEST 11/13/2022 9:44 am COMPARISON: Chest radiograph 10/10/2022. HISTORY: ORDERING SYSTEM PROVIDED HISTORY: hypotension, fall TECHNOLOGIST PROVIDED HISTORY: Reason for exam:->hypotension, fall Reason for Exam: hhypotension, fall FINDINGS: Single view provided. Stable mediastinal and cardiac silhouettes with coronary and aortic atherosclerotic calcifications. Normal lung volumes. There is symmetric diffuse interstitial vascular prominence with some ill-defined margins. Stable mild patchy right lung base opacity. Stable mild left pleural effusion and lung base consolidation. No pneumothorax or free subdiaphragmatic air. 1. Stable mild left pleural effusion. 2. Interval diffuse symmetric vascular changes suggesting pulmonary venous hypertension and possible developing pulmonary edema. 3. Stable mild bibasilar consolidation more prominent on the left. MRI ABDOMEN WO CONTRAST MRCP    Result Date: 11/14/2022  EXAMINATION: MRI OF THE ABDOMEN WITHOUT CONTRAST AND MRCP 11/14/2022 11:43 am TECHNIQUE: Multiplanar multisequence MRI of the abdomen was performed without the administration of intravenous contrast.  After initial T2 axial and coronal images, thick slab, thin slab and 3D coronal MRCP sequences were obtained without the administration of intravenous contrast.  MIP images are provided for review. COMPARISON: 11/13/2022 CT abdomen/pelvis, 11/13/2022 abdominal ultrasound HISTORY: ORDERING SYSTEM PROVIDED HISTORY: RUQ tenderness, cholelithiasis, uptrending WBC and liver pnel TECHNOLOGIST PROVIDED HISTORY: Reason for exam:->RUQ tenderness, cholelithiasis, uptrending WBC and liver pnel Reason for Exam: Fall; Loss of Consciousness FINDINGS: Lung Bases: Trace bilateral pleural effusions. Liver: The liver is normal in contour. No focal suspicious liver lesion.  Biliary and Gallbladder: No biliary characteristics consistent with a benign adrenal adenoma. US ABDOMEN LIMITED Specify organ? LIVER    Result Date: 11/13/2022  EXAMINATION: RIGHT UPPER QUADRANT ULTRASOUND 11/13/2022 3:09 pm COMPARISON: CT abdomen/pelvis 11/13/2022 HISTORY: ORDERING SYSTEM PROVIDED HISTORY: gallbladder distention seen on CT TECHNOLOGIST PROVIDED HISTORY: Reason for exam:->gallbladder distention seen on CT Specify organ?->LIVER Reason for Exam: distended gb on ct scan FINDINGS: Pancreas:  Nonvisualization of the gallbladder due to overlying bowel gas. Liver:  Normal contour with diffuse heterogeneous parenchymal echotexture and loss of the visualized portal triads consistent with hepatic steatosis. No intrahepatic mass biliary dilatation. Gallbladder: Moderate distention with a hyperechoic nonshadowing gallstone at the gallbladder neck. Dependent biliary sludge identified. No significant gallbladder wall thickening. No pericholecystic fluid. The technologist documented absence of sonographic Jose's sign. Common bile duct:  Borderline distended measuring 0.7 cm in AP dimension. Other:  No ascites. The right kidney demonstrates normal contour and parenchymal echotexture with cortical thickness. No hydronephrosis. No parenchymal solid or or cystic mass. 1. Cholelithiasis with gallbladder distention. No ultrasound evidence of acute cholecystitis. 2. Borderline distended common bile duct measuring 0.7 cm in diameter. 3. No ascites. Assessment & Plan:      Anika Velazquez is a 80 y.o. male admitted 11/13/2022 for fall at home, cholecystitis. 1) BPH with microscopic hematuria: UA with large blood following straight cath. Patient with known history of BPH previously requiring Laureano. No gross hematuria. H/o 90gm gland, chronically on Flomax/Proscar prior to admission              Laureano placed postop cholecystectomy. Started on Flomax. Plan for voiding trial prior to discharge.               Urine for cytology - unsure if already sent, will order now    Patient stable from a  standpoint. Will sign off. Please call with any questions. Outpatient follow up in 3-4 weeks, or sooner if Laureano replaced prior to discharge. Patient seen and examined, chart reviewed.      Electronically signed by MICHELLE Mccoy on 11/18/2022 at 8:38 AM

## 2022-11-18 NOTE — DISCHARGE INSTR - COC
Continuity of Care Form    Patient Name: Brent Magdaleno   :  10/18/1930  MRN:  [id number]    Admit date:  2022  Discharge date:2022    Code Status Order: [facility]   Advance Directives:   [place] Directive Type of Healthcare Directive Copy in [address] Agent's Name Healthcare Agent's Phone Number    11/15/22 1126 Yes, patient has an advance directive for healthcare treatment Durable power of  for health care; Health care treatment directive Yes, copy in chart Adult [address]:  Brissa Gonzalez MD  PCP: No primary care provider on file. Discharging Nurse:  Edward Núñez Unit/Room#: 2119/2119-A  Discharging Unit Phone Number: [phone]    Emergency Contact:   Extended Emergency Contact Information  Primary Emergency Contact: [place] Phone: [phone]  Work Phone: [phone]  Mobile Phone: [phone]  Relation: Child  Secondary Emergency Contact: Vidya Mills [facility] Phone: [phone]  Mobile Phone: [phone]  Relation: Child    Past Surgical History:  Past Surgical History:   Procedure Laterality Date    CATARACT REMOVAL      CHOLECYSTECTOMY, LAPAROSCOPIC N/A 11/15/2022    CHOLECYSTECTOMY LAPAROSCOPIC performed by Robert Ray MD at [address] [place] [address] 3/29/2021    CYSTOSCOPY EVACUATION OF CLOTS, BLADDER FULGERATION, AND SUPRA-PUBIC CATHETER INSERTION performed by Isa Dorsey MD at [address] [place] [address] N/A 2021    CYSTOSCOPY, PROSTATE FULGURATION, EVACUATION OF CLOTS & TURP performed by Diane Carrington MD at [address] [place] [address] N/A 3/4/2021    LAPAROSCOPIC LARGE HERNIA HIATAL REPAIR WITH GASTRIC OBSTRUCTION POSS OPEN performed by Robert Ray MD at [address] [place] [address]         Immunization History:   Immunization History   Administered Date(s) Administered COVID-19, MODERNA BLUE border, Primary or Immunocompromised, (age 12y+), IM, 100 mcg/0.5mL 01/29/2021, 04/13/2021    COVID-19, [place] Vaccine, Vaccine Unspecified 01/29/2021, 04/13/2021, 11/08/2021    Influenza A ([id number]) Vaccine PF IM 12/29/2009    Influenza Virus Vaccine 09/21/2012, 10/01/2018, 09/05/2019, 10/30/2020    Influenza, FLUAD, (age 72 y+), Adjuvanted, 0.5mL 09/08/2021    Influenza, FLUZONE (age 72 y+), High Dose, 0.7mL 10/28/2020, 11/05/2021    Influenza, High Dose (Fluzone 65 yrs and older) 10/27/2017, 10/10/2018, 09/05/2019    Pneumococcal Conjugate 13-valent ([id number]) 08/05/2016    Pneumococcal Polysaccharide (Jtwuyorbo15) 12/07/2021    Tdap (Boostrix, Adacel) 08/27/2013, 05/04/2018    Zoster Live (Zostavax) 07/22/2015       Active Problems:  Patient Active Problem List   Diagnosis Code    Hyperlipidemia E78.5    Depression F32. 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    Atelectasis J98.11    Bandemia D72.825    Thrombocytopenia, unspecified D69.6    Leukocytosis D72.829    Skin ulcer, limited to breakdown of skin (HCC) L98.491    Current moderate episode of major depressive disorder, unspecified whether recurrent (HCC) F32.1    Chronic kidney disease, stage IV (severe) (HCC) N18.4    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    Chest pain R07.9    Cardiac arrest ([facility]) I46.9    Community acquired pneumonia of left lung, unspecified part of lung J18.9    Syncope, unspecified syncope type R55 Gallstones K80.20    Acute calculous cholecystitis K80.00       Isolation/Infection:   Isolation            No Isolation          Patient Infection Status       Infection Onset Added Last Indicated Last Indicated By Review Planned Expiration Resolved Resolved By    None active    Resolved    COVID-19 (Rule Out) 10/08/22 10/08/22 10/08/22 Respiratory Panel, Molecular, with COVID-19 (Restricted: peds pts or suitable admitted adults) (Ordered)   10/08/22 Rule-Out Test Resulted    COVID-19 (Rule Out) 10/08/22 10/08/22 10/08/22 COVID-19, Rapid (Ordered)   10/08/22 Rule-Out Test Resulted    COVID-19 (Rule Out) 10/06/22 10/06/22 10/06/22 COVID-19 (Ordered)   10/07/22 Rule-Out Test Resulted    COVID-19 (Rule Out) 03/03/21 03/03/21 03/03/21 COVID-19, Rapid (Ordered)   03/03/21 Rule-Out Test Resulted            Nurse Assessment:  Last Vital Signs: /64   Pulse 82   Temp 98.6 °F (37 °C) (Oral)   Resp 16   Ht 6' (1.829 m)   Wt 207 lb 14.4 oz (94.3 kg)   SpO2 97%   BMI 27.80 kg/m²     Last documented pain score (0-10 scale): Pain Level: 0  Last Weight:   Wt Readings from Last 1 Encounters:   11/14/22 207 lb 14.4 oz (94.3 kg)     Mental Status:  oriented, alert, thought processes intact, and able to concentrate and follow conversation    IV Access:  - None    Nursing Mobility/ADLs:  Walking   Assisted  Transfer  Assisted  Bathing  Dependent  Dressing  Dependent  Toileting  Assisted  Feeding  Independent  Med Admin  Dependent  Med Delivery   whole    Wound Care Documentation and Therapy:  Wound 08/22/22 Radial Right; Anterior large skin tear (Active)   Number of days: 87       Incision 11/15/22 Abdomen (Active)   Dressing Status Clean;Dry; Intact 11/18/22 0900   Dressing/Treatment Gauze dressing/dressing sponge 11/18/22 0900   Closure Sutures 11/17/22 1457   Margins Approximated 11/17/22 1457   Drainage Amount None 11/17/22 1457   Odor None 11/17/22 1457   Number of days: 2       Incision 03/29/21 Anterior (Active) Number of days: 598        Elimination:  Continence: Bowel: Yes  Bladder: Yes  Urinary Catheter: None   Colostomy/Ileostomy/Ileal Conduit: No       Date of Last BM: 11/21/2022    Intake/Output Summary (Last 24 hours) at 11/18/2022 1134  Last data filed at 11/18/2022 0919  Gross per 24 hour   Intake 2329.31 ml   Output 2355 ml   Net -25.69 ml     I/O last 3 completed shifts: In: 1919.3 [I.V.:1919.3]  Out: 7845 [Urine:3325; Drains:100]    Safety Concerns:     HX of falls, intermittent confusion    Impairments/Disabilities:      Vision and Hearing      Patient's personal belongings (please select all that are sent with patient):  Glasses, Hearing Aides right    RN SIGNATURE:  Electronically signed by Jillian Littlejohn RN on 11/22/22 at 12:04 PM EST    CASE MANAGEMENT/SOCIAL WORK SECTION    Inpatient Status Date: ***    Readmission Risk Assessment Score:  Readmission Risk              Risk of Unplanned Readmission:  25           Discharging to Facility/ Agency   Name:   Address:  Phone:  Fax:    Dialysis Facility (if applicable)   Name:  Address:  Dialysis Schedule:  Phone:  Fax:    / signature: {Esignature:[id number]}    PHYSICIAN SECTION    Nutrition Therapy:  Current Nutrition Therapy:   - Oral Diet:  Carb Control 5 carbs/meal (2000kcals/day)    Routes of Feeding: Oral  Liquids: No Restrictions  Daily Fluid Restriction: no  Last Modified Barium Swallow with Video (Video Swallowing Test): not done    Treatments at the Time of Hospital Discharge:   Respiratory Treatments: None  Oxygen Therapy:  is not on home oxygen therapy. Ventilator:    - No ventilator support    Rehab Therapies: Physical Therapy  Weight Bearing Status/Restrictions: No weight bearing restrictions      Prognosis: Fair    Condition at Discharge: Stable    Rehab Potential (if transferring to Rehab):  Fair    Recommended Labs or Other Treatments After Discharge:     Physician Certification: I certify the above information and transfer of Claudette Taylor  is necessary for the continuing treatment of the diagnosis listed and that he requires [facility] for greater 30 days.      Update Admission H&P: No change in H&P    PHYSICIAN SIGNATURE:  Electronically signed by Eligio Alan MD, MD on 11/22/22 at 1:05 PM EST

## 2022-11-18 NOTE — FLOWSHEET NOTE
Patient transferred to room 1111 via [facility], rec'd by JORGE Cobb. Patient transferred to bed. Skin check noted new skin tear on R knee; lap shaka sites & s/p ALEJANDRA drain site dressings dry & intact.   Report called via telephone pre-transfer at receiving nurse's request.

## 2022-11-18 NOTE — CARE COORDINATION
Cm contacted [facility] admissions and spoke with Tirso archuleta; referral for this pt. PT/OT evals remain pending. Pt insurance will require precert for SNF placement.

## 2022-11-18 NOTE — PROGRESS NOTES
Daily Progress Note  Subjective:    Pt. Awake alert and doing ok  BP and HR stable  NSR on tele    Attending Note:    Patient is awake alert  Crying this morning very emotional  Wants some thing for anxiety  Heart rate and BP stable  S/p gall bladder surgery  Keep on low dose lopressor for now  Increase diet and activity     Impression and Plan:     Syncope    When getting up from the bathroom    Unwitnessed at home    Orthostatic vs. Vasovagal?     Not much of change in BP from laying to sitting    Very similar episode over the summer    Neuro has also been consulted    Echo showed low normal EF    Tele reviewed    Did well with PT this am per RN-sitting in chair currently     Elevated liver enzymes    GI and general surgery is following     LFT's Cont' to rise    WBC on the rise as well    Cholecystis vs. Choledocholithiasis    Lap Dai completed 11/15/2022    Drain in place      LFT's are improving    ABX per primary     Anemia    S/p surgery    1 unit of PRBC      Mildly elevation in troponin; no ACS in nature   Will follow-will need SNF on D/C I would think- will see PT/OT recs. Most Recent Echo  Echo 11/14/2022   This is a limited echocardiogram.   Left ventricular systolic function is low normal.   Ejection fraction is visually estimated at 45-50%. Mild tricuspid regurgitation; RVSP: 37 mmHg. No evidence of any pericardial effusion. Dilated aortic root measuring 4.0cm. Dilated ascending aorta measuring 4.0cm. Technically difficult study, due to body habitus     Radiology  MRI abdomen 11/14/2022  Impression   1. Calculi are visualized within the gallbladder neck and proximal cystic   duct with moderate upstream gallbladder distension and mild pericholecystic   fluid/stranding. No significant gallbladder wall thickening. Findings are   favored to represent mild/early acute cholecystitis. 2.  There are multiple T2 hyperintense pancreatic lesions measuring up to 3.4   cm.   Per ACR White Paper recommendations, follow-up imaging with contrast   should be obtained in 6 months. 3.  A previously described left adrenal nodule demonstrates characteristics   consistent with a benign adrenal adenoma. CT head 11/13/2022  Impression   No acute intracranial abnormality. CT abdomen 11/13/2022  Impression   1. Distended gallbladder. This likely is due to a prolonged fasting state. Otherwise, I do raise the possibility of acalculous cholecystitis. 2. Trace pleural effusions with lower lobe atelectasis. 3. Diverticulosis without obvious inflammation. 4. Prostate hypertrophy with mild component of bladder outlet obstruction   with multiple bladder diverticula. PAST MEDICAL HISTORY:  Anemia, anxiety, arthritis, BPH, depression,  GERD, hemorrhoids, hepatitis, hernia, hypotension, hyperlipidemia, and  squamous cell carcinoma. PAST SURGICAL HISTORY:  Cataracts removal, hernia repair, and neck  surgery. SOCIAL HISTORY:  Former smoker. He does not drink any alcohol. He does  not use any illicit drugs. He lives with daughter. ALLERGIES:  MINOCYCLINE.       Objective:   /64   Pulse 82   Temp 98.6 °F (37 °C) (Oral)   Resp 16   Ht 6' (1.829 m)   Wt 207 lb 14.4 oz (94.3 kg)   SpO2 97%   BMI 27.80 kg/m²     Intake/Output Summary (Last 24 hours) at 11/18/2022 1040  Last data filed at 11/18/2022 0919  Gross per 24 hour   Intake 2329.31 ml   Output 2355 ml   Net -25.69 ml       Medications:   Scheduled Meds:   furosemide  20 mg Oral Daily    tamsulosin  0.4 mg Oral Daily    [Held by provider] amLODIPine  5 mg Oral Daily    cefepime  1,000 mg IntraVENous Q24H    sodium chloride flush  5-40 mL IntraVENous 2 times per day    [Held by provider] atorvastatin  40 mg Oral Nightly    levothyroxine  75 mcg Oral Daily    metoprolol tartrate  25 mg Oral BID    QUEtiapine  50 mg Oral Nightly    sertraline  50 mg Oral Daily      Infusions:   sodium chloride      sodium chloride PRN Meds:  sodium chloride, HYDROmorphone, hydrALAZINE, sodium chloride flush, sodium chloride, [DISCONTINUED] ondansetron **OR** ondansetron, acetaminophen **OR** acetaminophen     Physical Exam:  Vitals:    11/18/22 0915   BP: 138/64   Pulse: 82   Resp: 16   Temp: 98.6 °F (37 °C)   SpO2: 97%        General: AAO, NAD  Chest: Nontender  Cardiac: First and Second Heart Sounds are Normal, No Murmurs or Gallops noted  Lungs:Clear to auscultation and percussion. Abdomen: Soft, NT, ND, +BS  Extremities: No clubbing, no edema  Vascular:  Equal 2+ peripheral pulses. Lab Data:  CBC:   Recent Labs     11/16/22  0500 11/16/22  1649 11/17/22  0510 11/18/22  0610   WBC 10.4  --  6.7 5.8   HGB 7.2* 8.5* 7.9* 8.0*   HCT 23.3* 26.8* 24.4* 24.8*   MCV 97.5  --  91.0 91.5   *  --  142 128*     BMP:   Recent Labs     11/16/22  0500 11/17/22  0510    136   K 4.8 4.0    101   CO2 22 23   BUN 44* 43*   CREATININE 2.6* 2.4*     LIVER PROFILE:   Recent Labs     11/16/22  0500 11/17/22  0510   * 56*   * 158*   BILIDIR  --  0.2   BILITOT 0.5 0.5   ALKPHOS 172* 153*     PT/INR: No results for input(s): PROTIME, INR in the last 72 hours. APTT: No results for input(s): APTT in the last 72 hours. BNP:  No results for input(s): BNP in the last 72 hours.       Assessment:  Patient Active Problem List    Diagnosis Date Noted    Acute calculous cholecystitis 11/16/2022    Gallstones 11/14/2022    Syncope, unspecified syncope type 11/13/2022    Community acquired pneumonia of left lung, unspecified part of lung 10/08/2022    Cardiac arrest ([facility] [address] 08/25/2022    Chest pain 08/21/2022    Agitation due to dementia 08/18/2022    Varicose 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 08/15/2015    [facility] 02/21/2022    Current moderate episode of major depressive disorder, unspecified whether recurrent ([facility] 02/21/2022    Chronic kidney disease, stage IV (severe) ([facility] 02/21/2022    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       Electronically signed by Romi Gonzalez PA-C on 11/18/2022 at 10:40 AM    Electronically signed by Sylvester Sim MD on 11/18/22 at 10:47 AM EST

## 2022-11-19 LAB
ANION GAP SERPL CALCULATED.3IONS-SCNC: 10 MMOL/L (ref 4–16)
BASOPHILS ABSOLUTE: 0 K/CU MM
BASOPHILS RELATIVE PERCENT: 0.5 % (ref 0–1)
BUN BLDV-MCNC: 34 MG/DL (ref 6–23)
CALCIUM SERPL-MCNC: 7.9 MG/DL (ref 8.3–10.6)
CHLORIDE BLD-SCNC: 103 MMOL/L (ref 99–110)
CO2: 22 MMOL/L (ref 21–32)
CREAT SERPL-MCNC: 2.1 MG/DL (ref 0.9–1.3)
DIFFERENTIAL TYPE: ABNORMAL
EOSINOPHILS ABSOLUTE: 0.4 K/CU MM
EOSINOPHILS RELATIVE PERCENT: 6.6 % (ref 0–3)
GFR SERPL CREATININE-BSD FRML MDRD: 29 ML/MIN/1.73M2
GLUCOSE BLD-MCNC: 116 MG/DL (ref 70–99)
HCT VFR BLD CALC: 25.6 % (ref 42–52)
HEMOGLOBIN: 8.1 GM/DL (ref 13.5–18)
IMMATURE NEUTROPHIL %: 1.8 % (ref 0–0.43)
LYMPHOCYTES ABSOLUTE: 0.8 K/CU MM
LYMPHOCYTES RELATIVE PERCENT: 13.3 % (ref 24–44)
MCH RBC QN AUTO: 29.1 PG (ref 27–31)
MCHC RBC AUTO-ENTMCNC: 31.6 % (ref 32–36)
MCV RBC AUTO: 92.1 FL (ref 78–100)
MONOCYTES ABSOLUTE: 0.7 K/CU MM
MONOCYTES RELATIVE PERCENT: 11.5 % (ref 0–4)
NUCLEATED RBC %: 0 %
PDW BLD-RTO: 15.2 % (ref 11.7–14.9)
PLATELET # BLD: 153 K/CU MM (ref 140–440)
PMV BLD AUTO: 10.2 FL (ref 7.5–11.1)
POTASSIUM SERPL-SCNC: 3.6 MMOL/L (ref 3.5–5.1)
RBC # BLD: 2.78 M/CU MM (ref 4.6–6.2)
SEGMENTED NEUTROPHILS ABSOLUTE COUNT: 4.2 K/CU MM
SEGMENTED NEUTROPHILS RELATIVE PERCENT: 66.3 % (ref 36–66)
SODIUM BLD-SCNC: 135 MMOL/L (ref 135–145)
TOTAL IMMATURE NEUTOROPHIL: 0.11 K/CU MM
TOTAL NUCLEATED RBC: 0 K/CU MM
WBC # BLD: 6.3 K/CU MM (ref 4–10.5)

## 2022-11-19 PROCEDURE — 36415 COLL VENOUS BLD VENIPUNCTURE: CPT

## 2022-11-19 PROCEDURE — 2500000003 HC RX 250 WO HCPCS

## 2022-11-19 PROCEDURE — 80048 BASIC METABOLIC PNL TOTAL CA: CPT

## 2022-11-19 PROCEDURE — 6370000000 HC RX 637 (ALT 250 FOR IP): Performed by: SURGERY

## 2022-11-19 PROCEDURE — 1200000000 HC SEMI PRIVATE

## 2022-11-19 PROCEDURE — 94761 N-INVAS EAR/PLS OXIMETRY MLT: CPT

## 2022-11-19 PROCEDURE — 6370000000 HC RX 637 (ALT 250 FOR IP): Performed by: STUDENT IN AN ORGANIZED HEALTH CARE EDUCATION/TRAINING PROGRAM

## 2022-11-19 PROCEDURE — 85025 COMPLETE CBC W/AUTO DIFF WBC: CPT

## 2022-11-19 PROCEDURE — 6360000002 HC RX W HCPCS: Performed by: STUDENT IN AN ORGANIZED HEALTH CARE EDUCATION/TRAINING PROGRAM

## 2022-11-19 RX ORDER — AMOXICILLIN AND CLAVULANATE POTASSIUM 875; 125 MG/1; MG/1
1 TABLET, FILM COATED ORAL EVERY 12 HOURS SCHEDULED
Status: DISCONTINUED | OUTPATIENT
Start: 2022-11-19 | End: 2022-11-19

## 2022-11-19 RX ORDER — HEPARIN SODIUM 5000 [USP'U]/ML
5000 INJECTION, SOLUTION INTRAVENOUS; SUBCUTANEOUS 2 TIMES DAILY
Status: CANCELLED | OUTPATIENT
Start: 2022-11-19

## 2022-11-19 RX ORDER — AMOXICILLIN AND CLAVULANATE POTASSIUM 500; 125 MG/1; MG/1
1 TABLET, FILM COATED ORAL EVERY 12 HOURS SCHEDULED
Status: DISCONTINUED | OUTPATIENT
Start: 2022-11-19 | End: 2022-11-22 | Stop reason: HOSPADM

## 2022-11-19 RX ORDER — HALOPERIDOL 5 MG/ML
2 INJECTION INTRAMUSCULAR ONCE
Status: COMPLETED | OUTPATIENT
Start: 2022-11-19 | End: 2022-11-19

## 2022-11-19 RX ORDER — OLANZAPINE 10 MG/1
5 INJECTION, POWDER, LYOPHILIZED, FOR SOLUTION INTRAMUSCULAR
Status: COMPLETED | OUTPATIENT
Start: 2022-11-19 | End: 2022-11-19

## 2022-11-19 RX ORDER — HEPARIN SODIUM 5000 [USP'U]/ML
5000 INJECTION, SOLUTION INTRAVENOUS; SUBCUTANEOUS 2 TIMES DAILY
Status: DISCONTINUED | OUTPATIENT
Start: 2022-11-19 | End: 2022-11-22 | Stop reason: HOSPADM

## 2022-11-19 RX ADMIN — AMOXICILLIN AND CLAVULANATE POTASSIUM 1 TABLET: 500; 125 TABLET, FILM COATED ORAL at 20:55

## 2022-11-19 RX ADMIN — AMOXICILLIN AND CLAVULANATE POTASSIUM 1 TABLET: 500; 125 TABLET, FILM COATED ORAL at 10:58

## 2022-11-19 RX ADMIN — SERTRALINE HYDROCHLORIDE 50 MG: 50 TABLET ORAL at 10:57

## 2022-11-19 RX ADMIN — FUROSEMIDE 20 MG: 20 TABLET ORAL at 10:58

## 2022-11-19 RX ADMIN — TAMSULOSIN HYDROCHLORIDE 0.4 MG: 0.4 CAPSULE ORAL at 10:57

## 2022-11-19 RX ADMIN — OLANZAPINE 5 MG: 10 INJECTION, POWDER, FOR SOLUTION INTRAMUSCULAR at 20:55

## 2022-11-19 RX ADMIN — AMLODIPINE BESYLATE 5 MG: 5 TABLET ORAL at 10:58

## 2022-11-19 RX ADMIN — ATORVASTATIN CALCIUM 40 MG: 40 TABLET, FILM COATED ORAL at 20:55

## 2022-11-19 RX ADMIN — LEVOTHYROXINE SODIUM 75 MCG: 0.07 TABLET ORAL at 07:15

## 2022-11-19 RX ADMIN — QUETIAPINE FUMARATE 50 MG: 25 TABLET ORAL at 20:55

## 2022-11-19 RX ADMIN — METOPROLOL TARTRATE 25 MG: 25 TABLET, FILM COATED ORAL at 20:55

## 2022-11-19 RX ADMIN — METOPROLOL TARTRATE 25 MG: 25 TABLET, FILM COATED ORAL at 10:58

## 2022-11-19 RX ADMIN — HALOPERIDOL LACTATE 2 MG: 5 INJECTION, SOLUTION INTRAMUSCULAR at 15:24

## 2022-11-19 RX ADMIN — HEPARIN SODIUM 5000 UNITS: 5000 INJECTION INTRAVENOUS; SUBCUTANEOUS at 20:55

## 2022-11-19 ASSESSMENT — PAIN SCALES - GENERAL
PAINLEVEL_OUTOF10: 4
PAINLEVEL_OUTOF10: 5

## 2022-11-19 ASSESSMENT — PAIN DESCRIPTION - LOCATION: LOCATION: ABDOMEN

## 2022-11-19 ASSESSMENT — PAIN SCALES - WONG BAKER
WONGBAKER_NUMERICALRESPONSE: 4
WONGBAKER_NUMERICALRESPONSE: 4

## 2022-11-19 ASSESSMENT — PAIN - FUNCTIONAL ASSESSMENT: PAIN_FUNCTIONAL_ASSESSMENT: PREVENTS OR INTERFERES SOME ACTIVE ACTIVITIES AND ADLS

## 2022-11-19 ASSESSMENT — PAIN DESCRIPTION - ONSET: ONSET: GRADUAL

## 2022-11-19 ASSESSMENT — PAIN DESCRIPTION - PAIN TYPE: TYPE: SURGICAL PAIN

## 2022-11-19 ASSESSMENT — PAIN DESCRIPTION - DESCRIPTORS: DESCRIPTORS: ACHING;DISCOMFORT

## 2022-11-19 ASSESSMENT — PAIN DESCRIPTION - FREQUENCY: FREQUENCY: INTERMITTENT

## 2022-11-19 ASSESSMENT — PAIN DESCRIPTION - ORIENTATION: ORIENTATION: RIGHT

## 2022-11-19 NOTE — PROGRESS NOTES
[place]2.0  [facility]      Name:  Juan Remy /Age/Sex: 10/18/1930  (80 y.o. male)   MRN & CSN:  [id number] & [id number] Encounter Date/Time: 2022 1:44 PM EST    Location:  [facility]/1111-A PCP: No primary care provider on file. Hospital Day: 7    Assessment and Plan:   Juan Remy is a 80 y.o. male  pmh of BPH, HLD, history of DVT, hypothyroidism, chronic diastolic heart failure, GERD, chronic anemia, HTN, CKD 4 who presents with Syncope,     # Acute cholecystitis s/p cholecystectomy  - CT abdomen showed distended gallbladder, MRI abdomen showed mild/early acute cholecystitis   - General surgery following, drain per general surgery  - Switch cefepime to Augmentin for 5 days end date   - PT/OT evaluated recommended SNF    # Syncope: History of fall at home  # Head injury secondary to above  # Cardiogenic versus neurogenic  - Orthostatics negative, CT head negative  - Cardiology and neurology consulted, echocardiogram obtained low normal EF  - Continue on telemetry  - Fall precautions, need to discuss about Eliquis before discharge for now start on heparin for DVT prophylaxis and monitor for anemia      # Microscopic hematuria  # BPH   # Acute retention of Urine  - UA showed large blood and following straight cath  - Urology consulted  - Recommended Flomax  - Voiding trial done on  patient retained bladder scan showed 263 mL straight cath 280 mL, Bladder scan 365 postvoid, Will place borden. OP FU with urology in 1-2 weeks after discharge for voiding. # Hypertension: On presentation hypotensive stable  - Continue amlodipine 5 mg and metoprolol 25 mg twice daily  ,- Continue to monitor    # Hypothyroidism continue levothyroxine    Diet ADULT DIET;  Regular; Low Fat/Low Chol/High Fiber/TEDDY   DVT Prophylaxis [] Lovenox, [x]  Heparin, [] SCDs, [] Ambulation,  [] Eliquis, [] Xarelto  [] Coumadin   Code Status DNR-CC   Disposition From: Home  Expected Disposition: SNF  Estimated Date of Discharge: TBD  Patient requires continued admission due to SNF pre-CERT   Surrogate Decision Maker/ POA Son and daughter     Subjective:     Chief Complaint: Fall and Loss of Consciousness (Using restroom and passed out; fell off toilet and into wall in front of him. )       Patient seen and examined at bedside. Patient's nurse at bedside, patient alert and smiling today states he do not have any complaints denies any fever, chills, nausea, vomiting, abdominal pain. Review of Systems:    Review of Systems    As above    Objective: Intake/Output Summary (Last 24 hours) at 11/19/2022 0733  Last data filed at 11/18/2022 2246  Gross per 24 hour   Intake 410 ml   Output 1651 ml   Net -1241 ml          Vitals:   Vitals:    11/19/22 0720   BP: (!) 145/64   Pulse: 77   Resp: 17   Temp: 98.1 °F (36.7 °C)   SpO2: 94%       Physical Exam:     General: Elderly male lying down in bed in no distress but hard of hearing smiling today  Eyes: EOMI  ENT: neck supple  Cardiovascular: Regular rate. Respiratory: Clear to auscultation  Gastrointestinal: Soft, mild tenderness in right upper quadrant bowel sounds normal drain removed  on 11/19 and dressing in place. Genitourinary: no suprapubic tenderness  Musculoskeletal: No edema  Skin: warm, dry  Neuro: Alert. Psych: Mood appropriate.      Medications:   Medications:    heparin (porcine)  5,000 Units SubCUTAneous BID    amoxicillin-clavulanate  1 tablet Oral 2 times per day    furosemide  20 mg Oral Daily    tamsulosin  0.4 mg Oral Daily    amLODIPine  5 mg Oral Daily    sodium chloride flush  5-40 mL IntraVENous 2 times per day    atorvastatin  40 mg Oral Nightly    levothyroxine  75 mcg Oral Daily    metoprolol tartrate  25 mg Oral BID    QUEtiapine  50 mg Oral Nightly    sertraline  50 mg Oral Daily      Infusions:    sodium chloride      sodium chloride       PRN Meds: sodium chloride, , PRN  HYDROmorphone, 0.5 mg, Q3H PRN  hydrALAZINE, 5 mg, Q6H PRN  sodium chloride flush, 5-40 mL, PRN  sodium chloride, , PRN  ondansetron, 4 mg, Q6H PRN  acetaminophen, 650 mg, Q6H PRN   Or  acetaminophen, 650 mg, Q6H PRN      Labs      No results found for this or any previous visit (from the past 24 hour(s)). Imaging/Diagnostics Last 24 Hours   MRI ABDOMEN WO CONTRAST MRCP    Result Date: 11/14/2022  EXAMINATION: MRI OF THE ABDOMEN WITHOUT CONTRAST AND MRCP 11/14/2022 11:43 am TECHNIQUE: Multiplanar multisequence MRI of the abdomen was performed without the administration of intravenous contrast.  After initial T2 axial and coronal images, thick slab, thin slab and 3D coronal MRCP sequences were obtained without the administration of intravenous contrast.  MIP images are provided for review. COMPARISON: 11/13/2022 CT abdomen/pelvis, 11/13/2022 abdominal ultrasound HISTORY: ORDERING SYSTEM PROVIDED HISTORY: RUQ tenderness, cholelithiasis, uptrending WBC and liver pnel TECHNOLOGIST PROVIDED HISTORY: Reason for exam:->RUQ tenderness, cholelithiasis, uptrending WBC and liver pnel Reason for Exam: Fall; Loss of Consciousness FINDINGS: Lung Bases: Trace bilateral pleural effusions. Liver: The liver is normal in contour. No focal suspicious liver lesion. Biliary and Gallbladder: No biliary ductal dilation. Multiple calculi are visualized the level of the gallbladder neck/proximal cystic duct with moderate upstream gallbladder distension. There is mild pericholecystic fluid and stranding. No significant gallbladder wall thickening. Spleen: The spleen is unremarkable. Pancreas: Multiple T2 hyperintense lesions arise from the pancreas, the largest which is seen at the pancreatic uncinate process measuring 2.4 x 2.1 x 3.4 cm (series 7, image 15 and series 6, image 13). 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 Adrenal Glands:  There is a 1.6 cm left adrenal lesion which demonstrates signal dropout on out of phase imaging, consistent with a benign adrenal adenoma. Kidneys: Bilateral renal cortical atrophy. There is a 4.1 cm T2 hyperintense lesion arising from the superior pole of left kidney which demonstrates thin internal septations, compatible with a Bosniak 2 cyst.  Smaller scattered T2 hyperintense renal lesions are also present, likely representing additional cysts. No hydronephrosis. Abdominal Vasculature: The abdominal aorta is normal in diameter. Gastrointestinal Tract: No evidence of bowel obstruction. Peritoneum/Mesentery/Retroperitoneum: No peritoneal or retroperitoneal masses. Lymph Nodes: No retroperitoneal lymphadenopathy. Musculoskeletal: No suspicious osseous findings. 1.  Calculi are visualized within the gallbladder neck and proximal cystic duct with moderate upstream gallbladder distension and mild pericholecystic fluid/stranding. No significant gallbladder wall thickening. Findings are favored to represent mild/early acute cholecystitis. 2.  There are multiple T2 hyperintense pancreatic lesions measuring up to 3.4 cm. Per ACR White Paper recommendations, follow-up imaging with contrast should be obtained in 6 months. 3.  A previously described left adrenal nodule demonstrates characteristics consistent with a benign adrenal adenoma. US ABDOMEN LIMITED Specify organ? LIVER    Result Date: 11/13/2022  EXAMINATION: RIGHT UPPER QUADRANT ULTRASOUND 11/13/2022 3:09 pm COMPARISON: CT abdomen/pelvis 11/13/2022 HISTORY: ORDERING SYSTEM PROVIDED HISTORY: gallbladder distention seen on CT TECHNOLOGIST PROVIDED HISTORY: Reason for exam:->gallbladder distention seen on CT Specify organ?->LIVER Reason for Exam: distended gb on ct scan FINDINGS: Pancreas:  Nonvisualization of the gallbladder due to overlying bowel gas. Liver:  Normal contour with diffuse heterogeneous parenchymal echotexture and loss of the visualized portal triads consistent with hepatic steatosis.   No intrahepatic mass biliary dilatation. Gallbladder: Moderate distention with a hyperechoic nonshadowing gallstone at the gallbladder neck. Dependent biliary sludge identified. No significant gallbladder wall thickening. No pericholecystic fluid. The technologist documented absence of sonographic Jose's sign. Common bile duct:  Borderline distended measuring 0.7 cm in AP dimension. Other:  No ascites. The right kidney demonstrates normal contour and parenchymal echotexture with cortical thickness. No hydronephrosis. No parenchymal solid or or cystic mass. 1. Cholelithiasis with gallbladder distention. No ultrasound evidence of acute cholecystitis. 2. Borderline distended common bile duct measuring 0.7 cm in diameter. 3. No ascites.        Electronically signed by Jose Sheffield MD on 11/19/2022 at 7:34 AM

## 2022-11-19 NOTE — CONSULTS
RENAL DOSE ADJUSTMENT MADE PER P/T PROTOCOL    PREVIOUS ORDER:  Augmentin 875mg po bid for 5 days    Estimated Creatinine Clearance: 23 mL/min (A) (based on SCr of 2.4 mg/dL (H)). Recent Labs     11/17/22  0510 11/18/22  0610   BUN 43*  --    CREATININE 2.4*  --     128*     NEW RENALLY ADJUSTED ORDER:  AUGMENTIN 500MG PO BID FOR 5 DAYS    TAYLOR Paz [facility]  11/19/2022 7:54 AM

## 2022-11-19 NOTE — PROGRESS NOTES
Patient voided 200ml. Bladder scanner showed 263ml. Straight catheterized 280ml urine out. Post void urine 66ml.

## 2022-11-19 NOTE — PROGRESS NOTES
Daily Progress Note  Subjective:    Pt. Awake alert and doing ok  BP and HR stable, off tele  No CP, SOB today per pt. Attending Note:      Impression and Plan:     Syncope    When getting up from the bathroom    Unwitnessed at home    Appears to be Vasovagal    Not much of change in BP from laying to sitting-orthostatics neg. Very similar episode over the summer    Neuro has also been consulted    Echo showed low normal EF    Tele reviewed- off now as transferred to     Did well with PT yesterday- no sig. dizziness     Elevated liver enzymes    GI and general surgery is following     LFT's Cont' to rise    WBC on the rise as well    Cholecystis vs. Choledocholithiasis    Lap Dai completed 11/15/2022    LFT's are improving    ABX per primary     Anemia    S/p surgery    1 unit of PRBC      Mildly elevation in troponin; no ACS in nature   Will need SNF at D/C per notes  Will sign off at this point-please reconsult as needed     Most Recent Echo  Echo 11/14/2022   This is a limited echocardiogram.   Left ventricular systolic function is low normal.   Ejection fraction is visually estimated at 45-50%. Mild tricuspid regurgitation; RVSP: 37 mmHg. No evidence of any pericardial effusion. Dilated aortic root measuring 4.0cm. Dilated ascending aorta measuring 4.0cm. Technically difficult study, due to body habitus     Radiology  MRI abdomen 11/14/2022  Impression   1. Calculi are visualized within the gallbladder neck and proximal cystic   duct with moderate upstream gallbladder distension and mild pericholecystic   fluid/stranding. No significant gallbladder wall thickening. Findings are   favored to represent mild/early acute cholecystitis. 2.  There are multiple T2 hyperintense pancreatic lesions measuring up to 3.4   cm. Per ACR White Paper recommendations, follow-up imaging with contrast   should be obtained in 6 months.        3.  A previously described left adrenal nodule demonstrates characteristics   consistent with a benign adrenal adenoma. CT head 11/13/2022  Impression   No acute intracranial abnormality. CT abdomen 11/13/2022  Impression   1. Distended gallbladder. This likely is due to a prolonged fasting state. Otherwise, I do raise the possibility of acalculous cholecystitis. 2. Trace pleural effusions with lower lobe atelectasis. 3. Diverticulosis without obvious inflammation. 4. Prostate hypertrophy with mild component of bladder outlet obstruction   with multiple bladder diverticula. PAST MEDICAL HISTORY:  Anemia, anxiety, arthritis, BPH, depression,  GERD, hemorrhoids, hepatitis, hernia, hypotension, hyperlipidemia, and  squamous cell carcinoma. PAST SURGICAL HISTORY:  Cataracts removal, hernia repair, and neck  surgery. SOCIAL HISTORY:  Former smoker. He does not drink any alcohol. He does  not use any illicit drugs. He lives with daughter. ALLERGIES:  MINOCYCLINE.       Objective:   BP (!) 146/80   Pulse 75   Temp 98.4 °F (36.9 °C) (Oral)   Resp 16   Ht 6' 0.01\" (1.829 m)   Wt 207 lb 9.6 oz (94.2 kg)   SpO2 97%   BMI 28.15 kg/m²     Intake/Output Summary (Last 24 hours) at 11/19/2022 2195  Last data filed at 11/18/2022 2246  Gross per 24 hour   Intake 410 ml   Output 1651 ml   Net -1241 ml       Medications:   Scheduled Meds:   furosemide  20 mg Oral Daily    tamsulosin  0.4 mg Oral Daily    amLODIPine  5 mg Oral Daily    cefepime  1,000 mg IntraVENous Q24H    sodium chloride flush  5-40 mL IntraVENous 2 times per day    atorvastatin  40 mg Oral Nightly    levothyroxine  75 mcg Oral Daily    metoprolol tartrate  25 mg Oral BID    QUEtiapine  50 mg Oral Nightly    sertraline  50 mg Oral Daily      Infusions:   sodium chloride      sodium chloride        PRN Meds:  sodium chloride, HYDROmorphone, hydrALAZINE, sodium chloride flush, sodium chloride, [DISCONTINUED] ondansetron **OR** ondansetron, acetaminophen **OR** acetaminophen     Physical Exam:  Vitals:    11/19/22 0324   BP: (!) 146/80   Pulse: 75   Resp: 16   Temp: 98.4 °F (36.9 °C)   SpO2: 97%        General: AAO, NAD  Chest: Nontender  Cardiac: First and Second Heart Sounds are Normal, No Murmurs or Gallops noted  Lungs:Clear to auscultation and percussion. Abdomen: Soft, NT, ND, +BS  Extremities: No clubbing, no edema  Vascular:  Equal 2+ peripheral pulses. Lab Data:  CBC:   Recent Labs     11/16/22  1649 11/17/22  0510 11/18/22  0610   WBC  --  6.7 5.8   HGB 8.5* 7.9* 8.0*   HCT 26.8* 24.4* 24.8*   MCV  --  91.0 91.5   PLT  --  142 128*     BMP:   Recent Labs     11/17/22  0510      K 4.0      CO2 23   BUN 43*   CREATININE 2.4*     LIVER PROFILE:   Recent Labs     11/17/22  0510   AST 56*   *   BILIDIR 0.2   BILITOT 0.5   ALKPHOS 153*     PT/INR: No results for input(s): PROTIME, INR in the last 72 hours. APTT: No results for input(s): APTT in the last 72 hours. BNP:  No results for input(s): BNP in the last 72 hours.       Assessment:  Patient Active Problem List    Diagnosis Date Noted    Acute calculous cholecystitis 11/16/2022    Gallstones 11/14/2022    Syncope, unspecified syncope type 11/13/2022    Community acquired pneumonia of left lung, unspecified part of lung 10/08/2022    Cardiac arrest ([facility] [address] 08/25/2022    Chest pain 08/21/2022    Agitation due to dementia 08/18/2022    Varicose 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 08/15/2015    Skin ulcer, limited to breakdown of skin ([address] 02/21/2022    Current moderate episode of major depressive disorder, unspecified whether recurrent ([address] 02/21/2022    Chronic kidney disease, stage IV (severe) ([facility] 02/21/2022    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 ([facility] 02/21/2022    Leukocytosis 09/08/2021    Thrombocytopenia, unspecified 08/31/2021    Bandemia 08/10/2021    Atelectasis 08/05/2021    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       Electronically signed by Deandre Valdes PA-C on 11/19/2022 at 7:14 AM  Patient seen and examined agree with above assessment and plan  Electronically signed by Vida Sutherland MD on 11/19/2022 at 11:34 AM

## 2022-11-19 NOTE — PROGRESS NOTES
Patient deals with anxiety and agitation but from my standpoint he is recovered from his laparoscopic cholecystectomy for severe cholecystitis. His abdomen is soft his drain has been removed there is no signs of active bleeding. Patient can be discharged to a care facility accepted. I will sign off the case at this time please call me as needed.

## 2022-11-20 PROCEDURE — 6370000000 HC RX 637 (ALT 250 FOR IP): Performed by: STUDENT IN AN ORGANIZED HEALTH CARE EDUCATION/TRAINING PROGRAM

## 2022-11-20 PROCEDURE — 6370000000 HC RX 637 (ALT 250 FOR IP): Performed by: SURGERY

## 2022-11-20 PROCEDURE — 6360000002 HC RX W HCPCS: Performed by: STUDENT IN AN ORGANIZED HEALTH CARE EDUCATION/TRAINING PROGRAM

## 2022-11-20 PROCEDURE — 94761 N-INVAS EAR/PLS OXIMETRY MLT: CPT

## 2022-11-20 PROCEDURE — 1200000000 HC SEMI PRIVATE

## 2022-11-20 RX ADMIN — LEVOTHYROXINE SODIUM 75 MCG: 0.07 TABLET ORAL at 06:12

## 2022-11-20 RX ADMIN — TAMSULOSIN HYDROCHLORIDE 0.4 MG: 0.4 CAPSULE ORAL at 08:17

## 2022-11-20 RX ADMIN — METOPROLOL TARTRATE 25 MG: 25 TABLET, FILM COATED ORAL at 08:17

## 2022-11-20 RX ADMIN — METOPROLOL TARTRATE 25 MG: 25 TABLET, FILM COATED ORAL at 20:50

## 2022-11-20 RX ADMIN — AMLODIPINE BESYLATE 5 MG: 5 TABLET ORAL at 08:17

## 2022-11-20 RX ADMIN — AMOXICILLIN AND CLAVULANATE POTASSIUM 1 TABLET: 500; 125 TABLET, FILM COATED ORAL at 08:17

## 2022-11-20 RX ADMIN — FUROSEMIDE 20 MG: 20 TABLET ORAL at 08:17

## 2022-11-20 RX ADMIN — SERTRALINE HYDROCHLORIDE 50 MG: 50 TABLET ORAL at 08:17

## 2022-11-20 RX ADMIN — AMOXICILLIN AND CLAVULANATE POTASSIUM 1 TABLET: 500; 125 TABLET, FILM COATED ORAL at 20:50

## 2022-11-20 RX ADMIN — HEPARIN SODIUM 5000 UNITS: 5000 INJECTION INTRAVENOUS; SUBCUTANEOUS at 08:18

## 2022-11-20 RX ADMIN — HEPARIN SODIUM 5000 UNITS: 5000 INJECTION INTRAVENOUS; SUBCUTANEOUS at 20:51

## 2022-11-20 RX ADMIN — QUETIAPINE FUMARATE 50 MG: 25 TABLET ORAL at 20:50

## 2022-11-20 RX ADMIN — ATORVASTATIN CALCIUM 40 MG: 40 TABLET, FILM COATED ORAL at 20:50

## 2022-11-20 ASSESSMENT — PAIN SCALES - GENERAL: PAINLEVEL_OUTOF10: 0

## 2022-11-20 NOTE — PROGRESS NOTES
Patient in bed reading the newspaper  Afebrile vital signs stable  Awaiting placement  I am signing off the case  Please call me for any questions or concerns

## 2022-11-20 NOTE — CONSENT
Informed Consent for Blood Component Transfusion Note    I have discussed with the patient the rationale for blood component transfusion; its benefits in treating or preventing fatigue, organ damage, or death; and its risk which includes mild transfusion reactions, rare risk of blood borne infection, or more serious but rare reactions. I have discussed the alternatives to transfusion, including the risk and consequences of not receiving transfusion. The patient had an opportunity to ask questions and had agreed to proceed with transfusion of blood components.     Electronically signed by Ronald Bedolla MD on 11/20/22 at 10:09 AM EST

## 2022-11-20 NOTE — PROGRESS NOTES
V2.0  [facility] [place], [place] Hospitalist Progress Note      Name:  Brent Magdaleno /Age/Sex: 10/18/1930  (80 y.o. male)   MRN & CSN:  [id number] & [id number] Encounter Date/Time: 2022 1:44 PM EST    Location:  1111/1111-A PCP: No primary care provider on file. Hospital Day: 8    Assessment and Plan:   Brent Magdaleno is a 80 y.o. male  pmh of BPH, HLD, history of DVT, hypothyroidism, chronic diastolic heart failure, GERD, chronic anemia, HTN, CKD 4 who presents with Syncope,     # Acute cholecystitis s/p cholecystectomy  - CT abdomen showed distended gallbladder, MRI abdomen showed mild/early acute cholecystitis   - Drain removed on , General surgery signed off  - Continue Augmentin for 5 days end date   - PT/OT evaluated recommended SNF    # Syncope: History of fall at home  # Head injury secondary to above  # Cardiogenic versus neurogenic  - Orthostatics negative, CT head negative  - Cardiology and neurology consulted, echocardiogram obtained low normal EF  - Continue on telemetry  - Fall precautions, need to discuss about Eliquis before discharge for now start on heparin for DVT prophylaxis and monitor for anemia      # Microscopic hematuria  # BPH   # Acute retention of Urine  - UA showed large blood and following straight cath  - Urology consulted, Recommended Flomax, continue  - Voiding trial done on  patient retained bladder scan showed 263 mL straight cath 280 mL, Bladder scan 365 postvoid, Will place borden. OP FU with urology in 1-2 weeks after discharge for voiding. # Chronic anemia: Hemoglobin stable at 8.1, anemia work-up iron panel and ferritin and B12/folate from -10/22 normal  - Considering patient's CODE STATUS DNR CC will not do any further work-up but will continue to monitor if needed will transfuse    # Hypertension:  On presentation hypotensive stable now  - Continue amlodipine 5 mg and metoprolol 25 mg twice daily  ,- Continue to monitor    # Hypothyroidism continue levothyroxine    Diet ADULT DIET; Regular; Low Fat/Low Chol/High Fiber/TEDDY   DVT Prophylaxis [] Lovenox, [x]  Heparin, [] SCDs, [] Ambulation,  [] Eliquis, [] Xarelto  [] Coumadin   Code Status DNR-CC   Disposition From: Home  Expected Disposition: SNF  Estimated Date of Discharge: TBD  Patient requires continued admission due to SNF pre-CERT   Surrogate Decision Maker/ POA Son and daughter     Subjective:     Chief Complaint: Fall and Loss of Consciousness (Using restroom and passed out; fell off toilet and into wall in front of him. )       Patient seen and examined at bedside. Patient sitting on the bedside reading newspaper and smiling he answers and states he do not have any complaints when asked if what time his son is coming he stated he do not know denies any fever, chills, nausea, vomiting, diarrhea, abdominal pain, back pain         Review of Systems:    Review of Systems    As above    Objective: Intake/Output Summary (Last 24 hours) at 11/20/2022 0733  Last data filed at 11/19/2022 1643  Gross per 24 hour   Intake --   Output 1000 ml   Net -1000 ml          Vitals:   Vitals:    11/20/22 0730   BP: 135/69   Pulse: 74   Resp: 17   Temp: 98.6 °F (37 °C)   SpO2: 100%       Physical Exam:     General: Elderly male lying down in bed in no distress but hard of hearing smiling today  Eyes: EOMI  ENT: neck supple  Cardiovascular: Regular rate. Respiratory: Clear to auscultation  Gastrointestinal: Soft, mild tenderness in right upper quadrant bowel sounds normal dressing in place. Genitourinary: no suprapubic tenderness  Musculoskeletal: No edema  Skin: warm, dry  Neuro: Alert. Psych: Mood appropriate.      Medications:   Medications:    heparin (porcine)  5,000 Units SubCUTAneous BID    amoxicillin-clavulanate  1 tablet Oral 2 times per day    furosemide  20 mg Oral Daily    tamsulosin  0.4 mg Oral Daily    amLODIPine  5 mg Oral Daily    sodium chloride flush  5-40 mL IntraVENous 2 times per day atorvastatin  40 mg Oral Nightly    levothyroxine  75 mcg Oral Daily    metoprolol tartrate  25 mg Oral BID    QUEtiapine  50 mg Oral Nightly    sertraline  50 mg Oral Daily      Infusions:    sodium chloride      sodium chloride       PRN Meds: sodium chloride, , PRN  HYDROmorphone, 0.5 mg, Q3H PRN  hydrALAZINE, 5 mg, Q6H PRN  sodium chloride flush, 5-40 mL, PRN  sodium chloride, , PRN  ondansetron, 4 mg, Q6H PRN  acetaminophen, 650 mg, Q6H PRN   Or  acetaminophen, 650 mg, Q6H PRN      Labs      Recent Results (from the past 24 hour(s))   CBC with Auto Differential    Collection Time: 11/19/22  7:53 AM   Result Value Ref Range    WBC 6.3 4.0 - 10.5 K/CU MM    RBC 2.78 (L) 4.6 - 6.2 M/CU MM    Hemoglobin 8.1 (L) 13.5 - 18.0 GM/DL    Hematocrit 25.6 (L) 42 - 52 %    MCV 92.1 78 - 100 FL    MCH 29.1 27 - 31 PG    MCHC 31.6 (L) 32.0 - 36.0 %    RDW 15.2 (H) 11.7 - 14.9 %    Platelets 255 736 - 482 K/CU MM    MPV 10.2 7.5 - 11.1 FL    Differential Type AUTOMATED DIFFERENTIAL     Segs Relative 66.3 (H) 36 - 66 %    Lymphocytes % 13.3 (L) 24 - 44 %    Monocytes % 11.5 (H) 0 - 4 %    Eosinophils % 6.6 (H) 0 - 3 %    Basophils % 0.5 0 - 1 %    Segs Absolute 4.2 K/CU MM    Lymphocytes Absolute 0.8 K/CU MM    Monocytes Absolute 0.7 K/CU MM    Eosinophils Absolute 0.4 K/CU MM    Basophils Absolute 0.0 K/CU MM    Nucleated RBC % 0.0 %    Total Nucleated RBC 0.0 K/CU MM    Total Immature Neutrophil 0.11 K/CU MM    Immature Neutrophil % 1.8 (H) 0 - 0.43 %   Basic Metabolic Panel w/ Reflex to MG    Collection Time: 11/19/22  7:53 AM   Result Value Ref Range    Sodium 135 135 - 145 MMOL/L    Potassium 3.6 3.5 - 5.1 MMOL/L    Chloride 103 99 - 110 mMol/L    CO2 22 21 - 32 MMOL/L    Anion Gap 10 4 - 16    BUN 34 (H) 6 - 23 MG/DL    Creatinine 2.1 (H) 0.9 - 1.3 MG/DL    Est, Glom Filt Rate 29 (L) >60 mL/min/1.73m2    Glucose 116 (H) 70 - 99 MG/DL    Calcium 7.9 (L) 8.3 - 10.6 MG/DL          Imaging/Diagnostics Last 24 Hours   MRI ABDOMEN WO CONTRAST MRCP    Result Date: 11/14/2022  EXAMINATION: MRI OF THE ABDOMEN WITHOUT CONTRAST AND MRCP 11/14/2022 11:43 am TECHNIQUE: Multiplanar multisequence MRI of the abdomen was performed without the administration of intravenous contrast.  After initial T2 axial and coronal images, thick slab, thin slab and 3D coronal MRCP sequences were obtained without the administration of intravenous contrast.  MIP images are provided for review. COMPARISON: 11/13/2022 CT abdomen/pelvis, 11/13/2022 abdominal ultrasound HISTORY: ORDERING SYSTEM PROVIDED HISTORY: RUQ tenderness, cholelithiasis, uptrending WBC and liver pnel TECHNOLOGIST PROVIDED HISTORY: Reason for exam:->RUQ tenderness, cholelithiasis, uptrending WBC and liver pnel Reason for Exam: Fall; Loss of Consciousness FINDINGS: Lung Bases: Trace bilateral pleural effusions. Liver: The liver is normal in contour. No focal suspicious liver lesion. Biliary and Gallbladder: No biliary ductal dilation. Multiple calculi are visualized the level of the gallbladder neck/proximal cystic duct with moderate upstream gallbladder distension. There is mild pericholecystic fluid and stranding. No significant gallbladder wall thickening. Spleen: The spleen is unremarkable. Pancreas: Multiple T2 hyperintense lesions arise from the pancreas, the largest which is seen at the pancreatic uncinate process measuring 2.4 x 2.1 x 3.4 cm (series 7, image 15 and series 6, image 13). 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 Adrenal Glands: There is a 1.6 cm left adrenal lesion which demonstrates signal dropout on out of phase imaging, consistent with a benign adrenal adenoma. Kidneys: Bilateral renal cortical atrophy.   There is a 4.1 cm T2 hyperintense lesion arising from the superior pole of left kidney which demonstrates thin internal septations, compatible with a Bosniak 2 cyst.  Smaller scattered T2 hyperintense renal lesions are also present, likely representing additional cysts. No hydronephrosis. Abdominal Vasculature: The abdominal aorta is normal in diameter. Gastrointestinal Tract: No evidence of bowel obstruction. Peritoneum/Mesentery/Retroperitoneum: No peritoneal or retroperitoneal masses. Lymph Nodes: No retroperitoneal lymphadenopathy. Musculoskeletal: No suspicious osseous findings. 1.  Calculi are visualized within the gallbladder neck and proximal cystic duct with moderate upstream gallbladder distension and mild pericholecystic fluid/stranding. No significant gallbladder wall thickening. Findings are favored to represent mild/early acute cholecystitis. 2.  There are multiple T2 hyperintense pancreatic lesions measuring up to 3.4 cm. Per ACR White Paper recommendations, follow-up imaging with contrast should be obtained in 6 months. 3.  A previously described left adrenal nodule demonstrates characteristics consistent with a benign adrenal adenoma. US ABDOMEN LIMITED Specify organ? LIVER    Result Date: 11/13/2022  EXAMINATION: RIGHT UPPER QUADRANT ULTRASOUND 11/13/2022 3:09 pm COMPARISON: CT abdomen/pelvis 11/13/2022 HISTORY: ORDERING SYSTEM PROVIDED HISTORY: gallbladder distention seen on CT TECHNOLOGIST PROVIDED HISTORY: Reason for exam:->gallbladder distention seen on CT Specify organ?->LIVER Reason for Exam: distended gb on ct scan FINDINGS: Pancreas:  Nonvisualization of the gallbladder due to overlying bowel gas. Liver:  Normal contour with diffuse heterogeneous parenchymal echotexture and loss of the visualized portal triads consistent with hepatic steatosis. No intrahepatic mass biliary dilatation. Gallbladder: Moderate distention with a hyperechoic nonshadowing gallstone at the gallbladder neck. Dependent biliary sludge identified. No significant gallbladder wall thickening. No pericholecystic fluid.   The technologist documented absence of sonographic Jose's sign. Common bile duct:  Borderline distended measuring 0.7 cm in AP dimension. Other:  No ascites. The right kidney demonstrates normal contour and parenchymal echotexture with cortical thickness. No hydronephrosis. No parenchymal solid or or cystic mass. 1. Cholelithiasis with gallbladder distention. No ultrasound evidence of acute cholecystitis. 2. Borderline distended common bile duct measuring 0.7 cm in diameter. 3. No ascites.        Electronically signed by Rachel Moulton MD on 11/20/2022 at 7:33 AM

## 2022-11-21 LAB
ANION GAP SERPL CALCULATED.3IONS-SCNC: 9 MMOL/L (ref 4–16)
BANDED NEUTROPHILS ABSOLUTE COUNT: 0.26 K/CU MM
BANDED NEUTROPHILS RELATIVE PERCENT: 4 % (ref 5–11)
BASOPHILS ABSOLUTE: 0.1 K/CU MM
BASOPHILS RELATIVE PERCENT: 2 % (ref 0–1)
BUN BLDV-MCNC: 32 MG/DL (ref 6–23)
CALCIUM SERPL-MCNC: 7.9 MG/DL (ref 8.3–10.6)
CHLORIDE BLD-SCNC: 105 MMOL/L (ref 99–110)
CO2: 24 MMOL/L (ref 21–32)
CREAT SERPL-MCNC: 2.2 MG/DL (ref 0.9–1.3)
DIFFERENTIAL TYPE: ABNORMAL
EOSINOPHILS ABSOLUTE: 0.4 K/CU MM
EOSINOPHILS RELATIVE PERCENT: 6 % (ref 0–3)
GFR SERPL CREATININE-BSD FRML MDRD: 27 ML/MIN/1.73M2
GLUCOSE BLD-MCNC: 114 MG/DL (ref 70–99)
HCT VFR BLD CALC: 23.4 % (ref 42–52)
HEMOGLOBIN: 7.6 GM/DL (ref 13.5–18)
LYMPHOCYTES ABSOLUTE: 1 K/CU MM
LYMPHOCYTES RELATIVE PERCENT: 15 % (ref 24–44)
MAGNESIUM: 1.7 MG/DL (ref 1.8–2.4)
MCH RBC QN AUTO: 29.8 PG (ref 27–31)
MCHC RBC AUTO-ENTMCNC: 32.5 % (ref 32–36)
MCV RBC AUTO: 91.8 FL (ref 78–100)
MONOCYTES ABSOLUTE: 0.4 K/CU MM
MONOCYTES RELATIVE PERCENT: 7 % (ref 0–4)
PDW BLD-RTO: 15.2 % (ref 11.7–14.9)
PLATELET # BLD: 183 K/CU MM (ref 140–440)
PMV BLD AUTO: 9.6 FL (ref 7.5–11.1)
POTASSIUM SERPL-SCNC: 3.4 MMOL/L (ref 3.5–5.1)
RBC # BLD: 2.55 M/CU MM (ref 4.6–6.2)
SEGMENTED NEUTROPHILS ABSOLUTE COUNT: 4.2 K/CU MM
SEGMENTED NEUTROPHILS RELATIVE PERCENT: 66 % (ref 36–66)
SODIUM BLD-SCNC: 138 MMOL/L (ref 135–145)
WBC # BLD: 6.4 K/CU MM (ref 4–10.5)

## 2022-11-21 PROCEDURE — 2580000003 HC RX 258: Performed by: SURGERY

## 2022-11-21 PROCEDURE — 6370000000 HC RX 637 (ALT 250 FOR IP): Performed by: SURGERY

## 2022-11-21 PROCEDURE — 85007 BL SMEAR W/DIFF WBC COUNT: CPT

## 2022-11-21 PROCEDURE — 80048 BASIC METABOLIC PNL TOTAL CA: CPT

## 2022-11-21 PROCEDURE — 97535 SELF CARE MNGMENT TRAINING: CPT

## 2022-11-21 PROCEDURE — 36415 COLL VENOUS BLD VENIPUNCTURE: CPT

## 2022-11-21 PROCEDURE — 1200000000 HC SEMI PRIVATE

## 2022-11-21 PROCEDURE — 85027 COMPLETE CBC AUTOMATED: CPT

## 2022-11-21 PROCEDURE — 6360000002 HC RX W HCPCS: Performed by: STUDENT IN AN ORGANIZED HEALTH CARE EDUCATION/TRAINING PROGRAM

## 2022-11-21 PROCEDURE — 83735 ASSAY OF MAGNESIUM: CPT

## 2022-11-21 PROCEDURE — 6370000000 HC RX 637 (ALT 250 FOR IP): Performed by: STUDENT IN AN ORGANIZED HEALTH CARE EDUCATION/TRAINING PROGRAM

## 2022-11-21 PROCEDURE — 97116 GAIT TRAINING THERAPY: CPT

## 2022-11-21 PROCEDURE — 94761 N-INVAS EAR/PLS OXIMETRY MLT: CPT

## 2022-11-21 RX ORDER — POTASSIUM CHLORIDE 20 MEQ/1
40 TABLET, EXTENDED RELEASE ORAL ONCE
Status: COMPLETED | OUTPATIENT
Start: 2022-11-21 | End: 2022-11-21

## 2022-11-21 RX ORDER — MAGNESIUM SULFATE IN WATER 40 MG/ML
2000 INJECTION, SOLUTION INTRAVENOUS ONCE
Status: COMPLETED | OUTPATIENT
Start: 2022-11-21 | End: 2022-11-21

## 2022-11-21 RX ADMIN — METOPROLOL TARTRATE 25 MG: 25 TABLET, FILM COATED ORAL at 20:32

## 2022-11-21 RX ADMIN — POTASSIUM CHLORIDE 40 MEQ: 1500 TABLET, EXTENDED RELEASE ORAL at 09:12

## 2022-11-21 RX ADMIN — MAGNESIUM SULFATE HEPTAHYDRATE 2000 MG: 2 INJECTION, SOLUTION INTRAVENOUS at 11:01

## 2022-11-21 RX ADMIN — AMOXICILLIN AND CLAVULANATE POTASSIUM 1 TABLET: 500; 125 TABLET, FILM COATED ORAL at 09:17

## 2022-11-21 RX ADMIN — METOPROLOL TARTRATE 25 MG: 25 TABLET, FILM COATED ORAL at 09:12

## 2022-11-21 RX ADMIN — QUETIAPINE FUMARATE 50 MG: 25 TABLET ORAL at 20:30

## 2022-11-21 RX ADMIN — AMLODIPINE BESYLATE 5 MG: 5 TABLET ORAL at 09:12

## 2022-11-21 RX ADMIN — TAMSULOSIN HYDROCHLORIDE 0.4 MG: 0.4 CAPSULE ORAL at 09:12

## 2022-11-21 RX ADMIN — ATORVASTATIN CALCIUM 40 MG: 40 TABLET, FILM COATED ORAL at 20:33

## 2022-11-21 RX ADMIN — HEPARIN SODIUM 5000 UNITS: 5000 INJECTION INTRAVENOUS; SUBCUTANEOUS at 20:33

## 2022-11-21 RX ADMIN — HEPARIN SODIUM 5000 UNITS: 5000 INJECTION INTRAVENOUS; SUBCUTANEOUS at 09:13

## 2022-11-21 RX ADMIN — AMOXICILLIN AND CLAVULANATE POTASSIUM 1 TABLET: 500; 125 TABLET, FILM COATED ORAL at 20:32

## 2022-11-21 RX ADMIN — SODIUM CHLORIDE, PRESERVATIVE FREE 10 ML: 5 INJECTION INTRAVENOUS at 20:34

## 2022-11-21 RX ADMIN — FUROSEMIDE 20 MG: 20 TABLET ORAL at 09:12

## 2022-11-21 RX ADMIN — SERTRALINE HYDROCHLORIDE 50 MG: 50 TABLET ORAL at 09:12

## 2022-11-21 RX ADMIN — LEVOTHYROXINE SODIUM 75 MCG: 0.07 TABLET ORAL at 05:20

## 2022-11-21 NOTE — PROGRESS NOTES
Occupational Therapy Treatment Note  Name: Mickael Siemens MRN: [id number] :   10/18/1930   Date:  2022   Admission Date: 2022 Room:  [facility]     Primary Problem:    [place]:  The primary encounter diagnosis was Syncope and collapse. A diagnosis of Acute cholecystitis was also pertinent to this visit. Patient  has a past medical history of Anemia, Anxiety, Arthritis, BPH with urinary obstruction, Depression, GERD (gastroesophageal reflux disease), Hemorrhoids, Hepatitis, Hernia of unspecified site of abdominal cavity without mention of obstruction or gangrene, Hyperlipidemia, Hypotension, and SCC (squamous cell carcinoma). Patient  has a past surgical history that includes Neck surgery; Cataract removal; hernia repair; hiatal hernia repair (N/A, 3/4/2021); Cystoscopy (N/A, 3/29/2021); Cystoscopy (N/A, 2021); and Cholecystectomy, laparoscopic (N/A, 11/15/2022). Communication with other providers:  cotx with PT Dennis Gomez for safety and pt tolerance    Subjective:  Patient states:  \"I'd much rather stay here in bed, I run cold! \"  Pain: denies  Restrictions: general precautions, fall risk, [place]    Objective:    Observation: pt was low fowlers in bed upon arrival, agreeable to session  Objective Measures: stable    Treatment, including education:  Self Care Training (15 minutes):   Self care training was performed today. Cues were given for safety, sequence, UE/LE placement, visual cues, and balance. Activities performed today included pt demo supine to sit EOB with SBA, standing with CGA. Pt ambulating with quick pace to bathroom with CGA using FWW. Pt rushing and slightly unsafe, sitting on toilet with min A for controlled sit. Therapist reminding pt that he still has depends on. Pt standing with mod A using grab bars, doffing depends with CGA. Pt sitting on toilet again with CGA. Pt voiding, demo pericare SBA seated.  Pt standing with mod A, defers washing hands at sink, pulling up depends with min A. Pt ambulating short distance in hallway with CGA using FWW, returning to room. Pt sitting in recliner with min A for controlled sit, positioned for comfort with warm blankets and lunch setup. Education: Role of OT, OT POC, safety, benefits of EOB/OOB activity, rationale for treatment  Safety Measures: Gait belt used for safety of pt and therapist, Left in recliner, Alarm in place, call light and phone within reach, lines managed    Assessment / Impression:    Pt tolerating session well today, has significantly improved since eval. Pt's goals and d/c recs remain appropriate from OT standpoint.     Patient's tolerance of treatment: fair+  Adverse Reaction: fatigue  Significant change in status and impact:  progressing steadily  Barriers to improvement: cog/safety, [place], deconditioning    Plan for Next Session:    Continue OT POC    Time in:  1142  Time out:  1157  Timed treatment minutes:  15  Total treatment time:  15    Electronically signed by:    Jian Villagran OT, OTR/L  11/21/2022, 12:01 PM

## 2022-11-21 NOTE — PROGRESS NOTES
V2.0  [facility] [place] Hospitalist Progress Note      Name:  Temi Canas /Age/Sex: 10/18/1930  (80 y.o. male)   MRN & CSN:  [id number] & [id number] Encounter Date/Time: 2022 1:44 PM EST    Location:  [facility]/1111-A PCP: No primary care provider on file. Hospital Day: 9    Assessment and Plan:   Temi Canas is a 80 y.o. male  pmh of BPH, HLD, history of DVT, hypothyroidism, chronic diastolic heart failure, GERD, chronic anemia, HTN, CKD 4 who presents with Syncope,     # Acute cholecystitis s/p cholecystectomy  - CT abdomen showed distended gallbladder, MRI abdomen showed mild/early acute cholecystitis   - Drain removed on , General surgery signed off  - Continue Augmentin for 5 days end date   - PT/OT evaluated recommended SNF,CM working on placement. # Syncope: History of fall at home  # Head injury secondary to above  # Cardiogenic versus neurogenic  - Orthostatics negative, CT head negative  - Cardiology and neurology consulted, echocardiogram obtained low normal EF  - Continue on telemetry  - Fall precautions, need to discuss about Eliquis before discharge for now start on heparin for DVT prophylaxis and monitor for anemia      # Microscopic hematuria  # BPH   # Acute retention of Urine  - UA showed large blood and following straight cath  - Urology consulted, Recommended Flomax, continue  - Voiding trial done on  patient retained bladder scan showed 263 mL straight cath 280 mL, Bladder scan 365 postvoid, Will place borden. OP FU with urology in 1-2 weeks after discharge for voiding. # Chronic anemia: Hemoglobin stable at 8.1, anemia work-up iron panel and ferritin and B12/folate from -10/22 normal  - Considering patient's CODE STATUS DNR CC will not do any further work-up but will continue to monitor if needed will transfuse    # Hypertension:  On presentation hypotensive stable now  - Continue amlodipine 5 mg and metoprolol 25 mg twice daily  ,- Continue to monitor    # Hypothyroidism continue levothyroxine    Diet ADULT DIET; Regular; Low Fat/Low Chol/High Fiber/TEDDY   DVT Prophylaxis [] Lovenox, [x]  Heparin, [] SCDs, [] Ambulation,  [] Eliquis, [] Xarelto  [] Coumadin   Code Status DNR-CC   Disposition From: Home  Expected Disposition: SNF  Estimated Date of Discharge: TBD  Patient requires continued admission due to SNF pre-CERT   Surrogate Decision Maker/ POA Son and daughter     Subjective:     Chief Complaint: Fall and Loss of Consciousness (Using restroom and passed out; fell off toilet and into wall in front of him. )       Patient seen and examined at bedside. Patient sitting on the bed smiling he answers and states he do not have any complaints denies any fever, chills, nausea, vomiting, diarrhea, abdominal pain, back pain. Of note: To update about the patient's condition called Mr. Mikey Sagastume at [phone] but could not reach him but only voicemail and left a voice message about the condition and discharge plan. Review of Systems:    Review of Systems    As above    Objective: Intake/Output Summary (Last 24 hours) at 11/21/2022 1021  Last data filed at 11/21/2022 0226  Gross per 24 hour   Intake --   Output 600 ml   Net -600 ml          Vitals:   Vitals:    11/21/22 0732   BP: (!) 156/73   Pulse: 69   Resp: 16   Temp: 97.9 °F (36.6 °C)   SpO2: 96%       Physical Exam:     General: Elderly male lying down in bed in no distress but hard of hearing smiling today  Eyes: EOMI  ENT: neck supple  Cardiovascular: Regular rate. Respiratory: Clear to auscultation  Gastrointestinal: Soft, mild tenderness in right upper quadrant bowel sounds normal   Genitourinary: no suprapubic tenderness  Musculoskeletal: No edema  Skin: warm, dry  Neuro: Alert. Psych: Mood appropriate.      Medications:   Medications:    magnesium sulfate  2,000 mg IntraVENous Once    heparin (porcine)  5,000 Units SubCUTAneous BID    amoxicillin-clavulanate  1 tablet Oral 2 times per day    furosemide  20 mg Oral Daily    tamsulosin  0.4 mg Oral Daily    amLODIPine  5 mg Oral Daily    sodium chloride flush  5-40 mL IntraVENous 2 times per day    atorvastatin  40 mg Oral Nightly    levothyroxine  75 mcg Oral Daily    metoprolol tartrate  25 mg Oral BID    QUEtiapine  50 mg Oral Nightly    sertraline  50 mg Oral Daily      Infusions:    sodium chloride      sodium chloride       PRN Meds: sodium chloride, , PRN  HYDROmorphone, 0.5 mg, Q3H PRN  hydrALAZINE, 5 mg, Q6H PRN  sodium chloride flush, 5-40 mL, PRN  sodium chloride, , PRN  ondansetron, 4 mg, Q6H PRN  acetaminophen, 650 mg, Q6H PRN   Or  acetaminophen, 650 mg, Q6H PRN      Labs      Recent Results (from the past 24 hour(s))   CBC with Auto Differential    Collection Time: 11/21/22  3:56 AM   Result Value Ref Range    WBC 6.4 4.0 - 10.5 K/CU MM    RBC 2.55 (L) 4.6 - 6.2 M/CU MM    Hemoglobin 7.6 (L) 13.5 - 18.0 GM/DL    Hematocrit 23.4 (L) 42 - 52 %    MCV 91.8 78 - 100 FL    MCH 29.8 27 - 31 PG    MCHC 32.5 32.0 - 36.0 %    RDW 15.2 (H) 11.7 - 14.9 %    Platelets 100 900 - 006 K/CU MM    MPV 9.6 7.5 - 11.1 FL    Bands Relative 4 (L) 5 - 11 %    Segs Relative 66.0 36 - 66 %    Eosinophils % 6.0 (H) 0 - 3 %    Basophils % 2.0 (H) 0 - 1 %    Lymphocytes % 15.0 (L) 24 - 44 %    Monocytes % 7.0 (H) 0 - 4 %    Bands Absolute 0.26 K/CU MM    Segs Absolute 4.2 K/CU MM    Eosinophils Absolute 0.4 K/CU MM    Basophils Absolute 0.1 K/CU MM    Lymphocytes Absolute 1.0 K/CU MM    Monocytes Absolute 0.4 K/CU MM    Differential Type MANUAL DIFFERENTIAL    Basic Metabolic Panel w/ Reflex to MG    Collection Time: 11/21/22  3:56 AM   Result Value Ref Range    Sodium 138 135 - 145 MMOL/L    Potassium 3.4 (L) 3.5 - 5.1 MMOL/L    Chloride 105 99 - 110 mMol/L    CO2 24 21 - 32 MMOL/L    Anion Gap 9 4 - 16    BUN 32 (H) 6 - 23 MG/DL    Creatinine 2.2 (H) 0.9 - 1.3 MG/DL    Est, Glom Filt Rate 27 (L) >60 mL/min/1.73m2    Glucose 114 (H) 70 - 99 MG/DL    Calcium 7.9 (L) 8.3 - 10.6 MG/DL Magnesium    Collection Time: 11/21/22  3:56 AM   Result Value Ref Range    Magnesium 1.7 (L) 1.8 - 2.4 mg/dl          Imaging/Diagnostics Last 24 Hours   MRI ABDOMEN WO CONTRAST MRCP    Result Date: 11/14/2022  EXAMINATION: MRI OF THE ABDOMEN WITHOUT CONTRAST AND MRCP 11/14/2022 11:43 am TECHNIQUE: Multiplanar multisequence MRI of the abdomen was performed without the administration of intravenous contrast.  After initial T2 axial and coronal images, thick slab, thin slab and 3D coronal MRCP sequences were obtained without the administration of intravenous contrast.  MIP images are provided for review. COMPARISON: 11/13/2022 CT abdomen/pelvis, 11/13/2022 abdominal ultrasound HISTORY: ORDERING SYSTEM PROVIDED HISTORY: RUQ tenderness, cholelithiasis, uptrending WBC and liver pnel TECHNOLOGIST PROVIDED HISTORY: Reason for exam:->RUQ tenderness, cholelithiasis, uptrending WBC and liver pnel Reason for Exam: Fall; Loss of Consciousness FINDINGS: Lung Bases: Trace bilateral pleural effusions. Liver: The liver is normal in contour. No focal suspicious liver lesion. Biliary and Gallbladder: No biliary ductal dilation. Multiple calculi are visualized the level of the gallbladder neck/proximal cystic duct with moderate upstream gallbladder distension. There is mild pericholecystic fluid and stranding. No significant gallbladder wall thickening. Spleen: The spleen is unremarkable. Pancreas: Multiple T2 hyperintense lesions arise from the pancreas, the largest which is seen at the pancreatic uncinate process measuring 2.4 x 2.1 x 3.4 cm (series 7, image 15 and series 6, image 13). 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 Adrenal Glands: There is a 1.6 cm left adrenal lesion which demonstrates signal dropout on out of phase imaging, consistent with a benign adrenal adenoma. Kidneys: Bilateral renal cortical atrophy.   There is a 4.1 cm T2 hyperintense lesion arising from the superior pole of left kidney which demonstrates thin internal septations, compatible with a Bosniak 2 cyst.  Smaller scattered T2 hyperintense renal lesions are also present, likely representing additional cysts. No hydronephrosis. Abdominal Vasculature: The abdominal aorta is normal in diameter. Gastrointestinal Tract: No evidence of bowel obstruction. Peritoneum/Mesentery/Retroperitoneum: No peritoneal or retroperitoneal masses. Lymph Nodes: No retroperitoneal lymphadenopathy. Musculoskeletal: No suspicious osseous findings. 1.  Calculi are visualized within the gallbladder neck and proximal cystic duct with moderate upstream gallbladder distension and mild pericholecystic fluid/stranding. No significant gallbladder wall thickening. Findings are favored to represent mild/early acute cholecystitis. 2.  There are multiple T2 hyperintense pancreatic lesions measuring up to 3.4 cm. Per ACR White Paper recommendations, follow-up imaging with contrast should be obtained in 6 months. 3.  A previously described left adrenal nodule demonstrates characteristics consistent with a benign adrenal adenoma. US ABDOMEN LIMITED Specify organ? LIVER    Result Date: 11/13/2022  EXAMINATION: RIGHT UPPER QUADRANT ULTRASOUND 11/13/2022 3:09 pm COMPARISON: CT abdomen/pelvis 11/13/2022 HISTORY: ORDERING SYSTEM PROVIDED HISTORY: gallbladder distention seen on CT TECHNOLOGIST PROVIDED HISTORY: Reason for exam:->gallbladder distention seen on CT Specify organ?->LIVER Reason for Exam: distended gb on ct scan FINDINGS: Pancreas:  Nonvisualization of the gallbladder due to overlying bowel gas. Liver:  Normal contour with diffuse heterogeneous parenchymal echotexture and loss of the visualized portal triads consistent with hepatic steatosis. No intrahepatic mass biliary dilatation. Gallbladder: Moderate distention with a hyperechoic nonshadowing gallstone at the gallbladder neck.

## 2022-11-21 NOTE — PROGRESS NOTES
Physical Therapy Treatment Note  Name: Phyllis Negrete MRN: [id number] :   10/18/1930   Date:  2022   Admission Date: 2022 Room:  [address] [place], [place] [address]A     Restrictions/Precautions:          general precautions, fall risk    Communication with other providers:  OT    Subjective:  Patient states:  \"I run too cold to be up\"  Pain:   Location, Type, Intensity (0/10 to 10/10):  denies pain    Objective:    Observation:  Pt supine in bed upon entry and agreeable to session after encouragement    Treatment, including education/measures:    Bed mobility: pt completed supine to sitting EOB SBA with safe sequencing    Transfers: Pt completed STS from EOB CGA, to/from commode mod A, and to chair CGA with cues for hand placement and sequencing    Gait: Pt ambulated 10' + 25' with RW with decreased mark, forward flexed posture, and maintained walker too far forward.  Cues provided for pathway negotiation and walker management throughout    Assessment / Impression:    Pt up in chair at end of session with needs in reach and alarm on    Patient's tolerance of treatment:  fair   Adverse Reaction: n/a  Significant change in status and impact:  n/a  Barriers to improvement:  decreased endurance, impaired transfers, impaired gait    Plan for Next Session:    Continue to address transfer training and gait training in future sessions    Time in:  1142  Time out:  1157  Timed treatment minutes:  15  Total treatment time:  15    Previously filed items:  Social/Functional History  Lives With: Alone  Type of Home: House  Home Layout: Two level, Laundry in basement  Home Access: Stairs to enter with rails  Entrance Stairs - Number of Steps: 3  Bathroom Shower/Tub: Tub/Shower unit  Bathroom Toilet: Standard  Bathroom Equipment: Grab bars in [address]: [address]  Has the patient had two or more falls in the past year or any fall with injury in the past year?: No  ADL Assistance: Independent  Homemaking Assistance: Needs assistance (daughter performs)  Ambulation Assistance: Independent (mod I with FWW, wc for community mobility)  Transfer Assistance: Independent  Active : Yes  Occupation: Retired  Additional Comments: daughter visiting from out of state, pt states she may be staying with him. pt may be questionable historian.  Pt's son lives in [address] [place] [address], is recently         Short Term Goals  Time Frame for Short Term Goals: 1 week  Short Term Goal 1: Pt to complete all bed mobility CGA  Short Term Goal 2: Pt to complete all STS transfers to/from bed, commode, and chair mod A  Short Term Goal 3: Pt to ambulate 25' with LRAD min A    Electronically signed by:    Shona Ranikn PT  11/21/2022, 12:37 PM

## 2022-11-22 VITALS
HEART RATE: 60 BPM | HEIGHT: 72 IN | WEIGHT: 218.03 LBS | SYSTOLIC BLOOD PRESSURE: 153 MMHG | DIASTOLIC BLOOD PRESSURE: 72 MMHG | RESPIRATION RATE: 18 BRPM | OXYGEN SATURATION: 97 % | TEMPERATURE: 98.1 F | BODY MASS INDEX: 29.53 KG/M2

## 2022-11-22 PROBLEM — K80.00 ACUTE CALCULOUS CHOLECYSTITIS: Status: RESOLVED | Noted: 2022-11-16 | Resolved: 2022-11-22

## 2022-11-22 PROBLEM — K80.20 GALLSTONES: Status: RESOLVED | Noted: 2022-11-14 | Resolved: 2022-11-22

## 2022-11-22 PROBLEM — R55 SYNCOPE, UNSPECIFIED SYNCOPE TYPE: Status: RESOLVED | Noted: 2022-11-13 | Resolved: 2022-11-22

## 2022-11-22 LAB
ALBUMIN SERPL-MCNC: 3.1 GM/DL (ref 3.4–5)
ALP BLD-CCNC: 118 IU/L (ref 40–129)
ALT SERPL-CCNC: 43 U/L (ref 10–40)
ANION GAP SERPL CALCULATED.3IONS-SCNC: 9 MMOL/L (ref 4–16)
AST SERPL-CCNC: 18 IU/L (ref 15–37)
BASOPHILS ABSOLUTE: 0 K/CU MM
BASOPHILS RELATIVE PERCENT: 0.5 % (ref 0–1)
BILIRUB SERPL-MCNC: 0.3 MG/DL (ref 0–1)
BUN BLDV-MCNC: 30 MG/DL (ref 6–23)
CALCIUM SERPL-MCNC: 8.1 MG/DL (ref 8.3–10.6)
CHLORIDE BLD-SCNC: 106 MMOL/L (ref 99–110)
CO2: 26 MMOL/L (ref 21–32)
CREAT SERPL-MCNC: 2.3 MG/DL (ref 0.9–1.3)
DIFFERENTIAL TYPE: ABNORMAL
EOSINOPHILS ABSOLUTE: 0.3 K/CU MM
EOSINOPHILS RELATIVE PERCENT: 4.8 % (ref 0–3)
GFR SERPL CREATININE-BSD FRML MDRD: 26 ML/MIN/1.73M2
GLUCOSE BLD-MCNC: 106 MG/DL (ref 70–99)
HCT VFR BLD CALC: 25.1 % (ref 42–52)
HEMOGLOBIN: 7.9 GM/DL (ref 13.5–18)
IMMATURE NEUTROPHIL %: 2.9 % (ref 0–0.43)
LYMPHOCYTES ABSOLUTE: 1.1 K/CU MM
LYMPHOCYTES RELATIVE PERCENT: 17.5 % (ref 24–44)
MCH RBC QN AUTO: 29.4 PG (ref 27–31)
MCHC RBC AUTO-ENTMCNC: 31.5 % (ref 32–36)
MCV RBC AUTO: 93.3 FL (ref 78–100)
MONOCYTES ABSOLUTE: 0.7 K/CU MM
MONOCYTES RELATIVE PERCENT: 11.6 % (ref 0–4)
NUCLEATED RBC %: 0 %
PDW BLD-RTO: 15.1 % (ref 11.7–14.9)
PLATELET # BLD: 219 K/CU MM (ref 140–440)
PMV BLD AUTO: 9.7 FL (ref 7.5–11.1)
POTASSIUM SERPL-SCNC: 4 MMOL/L (ref 3.5–5.1)
RBC # BLD: 2.69 M/CU MM (ref 4.6–6.2)
SARS-COV-2, NAAT: NOT DETECTED
SEGMENTED NEUTROPHILS ABSOLUTE COUNT: 4 K/CU MM
SEGMENTED NEUTROPHILS RELATIVE PERCENT: 62.7 % (ref 36–66)
SODIUM BLD-SCNC: 141 MMOL/L (ref 135–145)
SOURCE: NORMAL
TOTAL IMMATURE NEUTOROPHIL: 0.18 K/CU MM
TOTAL NUCLEATED RBC: 0 K/CU MM
TOTAL PROTEIN: 5.4 GM/DL (ref 6.4–8.2)
WBC # BLD: 6.3 K/CU MM (ref 4–10.5)

## 2022-11-22 PROCEDURE — 6370000000 HC RX 637 (ALT 250 FOR IP): Performed by: STUDENT IN AN ORGANIZED HEALTH CARE EDUCATION/TRAINING PROGRAM

## 2022-11-22 PROCEDURE — 85025 COMPLETE CBC W/AUTO DIFF WBC: CPT

## 2022-11-22 PROCEDURE — 94761 N-INVAS EAR/PLS OXIMETRY MLT: CPT

## 2022-11-22 PROCEDURE — 6360000002 HC RX W HCPCS: Performed by: STUDENT IN AN ORGANIZED HEALTH CARE EDUCATION/TRAINING PROGRAM

## 2022-11-22 PROCEDURE — 6370000000 HC RX 637 (ALT 250 FOR IP): Performed by: SURGERY

## 2022-11-22 PROCEDURE — 80053 COMPREHEN METABOLIC PANEL: CPT

## 2022-11-22 PROCEDURE — 36415 COLL VENOUS BLD VENIPUNCTURE: CPT

## 2022-11-22 PROCEDURE — 6360000002 HC RX W HCPCS: Performed by: SURGERY

## 2022-11-22 PROCEDURE — 2580000003 HC RX 258: Performed by: SURGERY

## 2022-11-22 PROCEDURE — 87635 SARS-COV-2 COVID-19 AMP PRB: CPT

## 2022-11-22 RX ORDER — AMOXICILLIN AND CLAVULANATE POTASSIUM 500; 125 MG/1; MG/1
1 TABLET, FILM COATED ORAL EVERY 12 HOURS SCHEDULED
Qty: 4 TABLET | Refills: 0 | Status: SHIPPED | OUTPATIENT
Start: 2022-11-22 | End: 2022-11-24

## 2022-11-22 RX ORDER — AMLODIPINE BESYLATE 5 MG/1
5 TABLET ORAL DAILY
Qty: 30 TABLET | Refills: 3 | Status: SHIPPED | OUTPATIENT
Start: 2022-11-23

## 2022-11-22 RX ADMIN — HEPARIN SODIUM 5000 UNITS: 5000 INJECTION INTRAVENOUS; SUBCUTANEOUS at 08:44

## 2022-11-22 RX ADMIN — METOPROLOL TARTRATE 25 MG: 25 TABLET, FILM COATED ORAL at 08:44

## 2022-11-22 RX ADMIN — HYDROMORPHONE HYDROCHLORIDE 0.5 MG: 1 INJECTION, SOLUTION INTRAMUSCULAR; INTRAVENOUS; SUBCUTANEOUS at 05:58

## 2022-11-22 RX ADMIN — SERTRALINE HYDROCHLORIDE 50 MG: 50 TABLET ORAL at 08:43

## 2022-11-22 RX ADMIN — LEVOTHYROXINE SODIUM 75 MCG: 0.07 TABLET ORAL at 06:14

## 2022-11-22 RX ADMIN — FUROSEMIDE 20 MG: 20 TABLET ORAL at 08:43

## 2022-11-22 RX ADMIN — TAMSULOSIN HYDROCHLORIDE 0.4 MG: 0.4 CAPSULE ORAL at 08:43

## 2022-11-22 RX ADMIN — SODIUM CHLORIDE, PRESERVATIVE FREE 10 ML: 5 INJECTION INTRAVENOUS at 08:44

## 2022-11-22 RX ADMIN — AMLODIPINE BESYLATE 5 MG: 5 TABLET ORAL at 08:44

## 2022-11-22 RX ADMIN — ACETAMINOPHEN 650 MG: 325 TABLET ORAL at 11:16

## 2022-11-22 RX ADMIN — AMOXICILLIN AND CLAVULANATE POTASSIUM 1 TABLET: 500; 125 TABLET, FILM COATED ORAL at 08:44

## 2022-11-22 ASSESSMENT — PAIN SCALES - WONG BAKER
WONGBAKER_NUMERICALRESPONSE: 4

## 2022-11-22 ASSESSMENT — PAIN SCALES - GENERAL
PAINLEVEL_OUTOF10: 5
PAINLEVEL_OUTOF10: 8

## 2022-11-22 ASSESSMENT — PAIN DESCRIPTION - ORIENTATION: ORIENTATION: RIGHT;LEFT;MID

## 2022-11-22 ASSESSMENT — PAIN DESCRIPTION - LOCATION: LOCATION: HEAD

## 2022-11-22 ASSESSMENT — PAIN DESCRIPTION - DESCRIPTORS
DESCRIPTORS: ACHING;BURNING
DESCRIPTORS: ACHING

## 2022-11-22 NOTE — PROGRESS NOTES
Outpatient Pharmacy Progress Note for Meds-to-Beds    The outpatient pharmacy received prescription(s) for the patient, however, the patient is going to an assisted living facility or SNF. The facility will provide the patient's home medications. The outpatient pharmacy will profile the prescriptions and have them on file. A medication list and facesheet should be provided to facility so their staff can provide the appropriate medications. Thank you for letting us serve your patients.   [facility]    [address], [address] [facility]    Phone: [phone]    Fax: [phone]

## 2022-11-22 NOTE — CARE COORDINATION
CM called Adriano Cha with  to set up transportation to [facility]. Transport time is for 3 pm. CM called Cherlyn Litten at [employer] and updated her on the transport time. CM updated Rhonda JIMENEZ of transport time. CM called Santos Schwarz son and left a VM of the above information. CM called Jesus Arreola and left a VM regarding the discharge time.  [address] [facility]

## 2022-11-22 NOTE — PROGRESS NOTES
V2.0  [facility] [place]      Name:  Long Andrews /Age/Sex: 10/18/1930  (80 y.o. male)   MRN & CSN:  [id number] & [id number] Encounter Date/Time: 2022 1:44 PM EST    Location:  1111/1111-A PCP: No primary care provider on file. Hospital Day: 10    Assessment and Plan:   Long Andrews is a 80 y.o. male  pmh of BPH, HLD, history of DVT, hypothyroidism, chronic diastolic heart failure, GERD, chronic anemia, HTN, CKD 4 who presents with Syncope,     Acute cholecystitis s/p cholecystectomy: CT abdomen showed distended gallbladder, MRI abdomen showed mild/early acute cholecystitis. Drain removed on , General surgery signed off. Continue Augmentin for 5 days end date . PT/OT evaluated recommended SNF,CM working on placement. Syncope: History of fall at home: Head injury secondary to above. Cardiogenic versus neurogenic. Orthostatics negative, CT head negative. Cardiology and neurology consulted, echocardiogram obtained low normal EF. Continue on telemetry. Fall precautions, need to discuss about Eliquis before discharge for now start on heparin for DVT prophylaxis and monitor for anemia  Microscopic hematuria with BPH and acute retention of Urine: UA showed large blood and following straight cath. Urology consulted, Recommended Flomax, continue. Voiding trial done on  patient retained bladder scan showed 263 mL straight cath 280 mL, Bladder scan 365 postvoid, Will place borden. OP FU with urology in 1-2 weeks after discharge for voiding. Normocytic normochromic anemia: Hemoglobin stable at 8.1, anemia work-up iron panel and ferritin and B12/folate from -10/22 normal. Considering patient's CODE STATUS DNR CC will not do any further work-up but will continue to monitor if needed will transfuse  Benign Essential Hypertension: On presentation hypotensive stable now. Continue amlodipine 5 mg and metoprolol 25 mg twice daily.  Continue to monitor  Hypothyroidism continue levothyroxine    Diet ADULT DIET; Regular; Low Fat/Low Chol/High Fiber/TEDDY   DVT Prophylaxis [] Lovenox, [x]  Heparin, [] SCDs, [] Ambulation,  [] Eliquis, [] Xarelto  [] Coumadin   Code Status DNR-CC   Disposition From: Home  Expected Disposition: SNF  Estimated Date of Discharge: TBD  Patient requires continued admission due to SNF pre-CERT   Surrogate Decision Maker/ POA Son and daughter     Subjective:     Chief Complaint: Fall and Loss of Consciousness (Using restroom and passed out; fell off toilet and into wall in front of him. )       Patient seen and examined at bedside. Mika Mitchell. Pending placement. Tomorrow will be last day of Abx      Review of Systems:    Review of Systems    As above    Objective:   No intake or output data in the 24 hours ending 11/22/22 0758       Vitals:   Vitals:    11/22/22 0729   BP: 129/65   Pulse: 69   Resp: 18   Temp: 98.1 °F (36.7 °C)   SpO2: 95%       Physical Exam:     General: Elderly male lying down in bed in no distress but hard of hearing smiling today  Eyes: EOMI  ENT: neck supple  Cardiovascular: Regular rate. Respiratory: Clear to auscultation  Gastrointestinal: Soft, mild tenderness in right upper quadrant bowel sounds normal   Genitourinary: no suprapubic tenderness  Musculoskeletal: No edema  Skin: warm, dry  Neuro: Alert. Psych: Mood appropriate.      Medications:   Medications:    heparin (porcine)  5,000 Units SubCUTAneous BID    amoxicillin-clavulanate  1 tablet Oral 2 times per day    furosemide  20 mg Oral Daily    tamsulosin  0.4 mg Oral Daily    amLODIPine  5 mg Oral Daily    sodium chloride flush  5-40 mL IntraVENous 2 times per day    atorvastatin  40 mg Oral Nightly    levothyroxine  75 mcg Oral Daily    metoprolol tartrate  25 mg Oral BID    QUEtiapine  50 mg Oral Nightly    sertraline  50 mg Oral Daily      Infusions:    sodium chloride      sodium chloride       PRN Meds: sodium chloride, , PRN  HYDROmorphone, 0.5 mg, Q3H PRN  hydrALAZINE, 5 mg, Q6H PRN  sodium chloride flush, 5-40 mL, PRN  sodium chloride, , PRN  ondansetron, 4 mg, Q6H PRN  acetaminophen, 650 mg, Q6H PRN   Or  acetaminophen, 650 mg, Q6H PRN      Labs      Recent Results (from the past 24 hour(s))   CBC with Auto Differential    Collection Time: 11/22/22  4:20 AM   Result Value Ref Range    WBC 6.3 4.0 - 10.5 K/CU MM    RBC 2.69 (L) 4.6 - 6.2 M/CU MM    Hemoglobin 7.9 (L) 13.5 - 18.0 GM/DL    Hematocrit 25.1 (L) 42 - 52 %    MCV 93.3 78 - 100 FL    MCH 29.4 27 - 31 PG    MCHC 31.5 (L) 32.0 - 36.0 %    RDW 15.1 (H) 11.7 - 14.9 %    Platelets 044 819 - 795 K/CU MM    MPV 9.7 7.5 - 11.1 FL    Differential Type AUTOMATED DIFFERENTIAL     Segs Relative 62.7 36 - 66 %    Lymphocytes % 17.5 (L) 24 - 44 %    Monocytes % 11.6 (H) 0 - 4 %    Eosinophils % 4.8 (H) 0 - 3 %    Basophils % 0.5 0 - 1 %    Segs Absolute 4.0 K/CU MM    Lymphocytes Absolute 1.1 K/CU MM    Monocytes Absolute 0.7 K/CU MM    Eosinophils Absolute 0.3 K/CU MM    Basophils Absolute 0.0 K/CU MM    Nucleated RBC % 0.0 %    Total Nucleated RBC 0.0 K/CU MM    Total Immature Neutrophil 0.18 K/CU MM    Immature Neutrophil % 2.9 (H) 0 - 0.43 %   Comprehensive Metabolic Panel w/ Reflex to MG    Collection Time: 11/22/22  4:20 AM   Result Value Ref Range    Sodium 141 135 - 145 MMOL/L    Potassium 4.0 3.5 - 5.1 MMOL/L    Chloride 106 99 - 110 mMol/L    CO2 26 21 - 32 MMOL/L    BUN 30 (H) 6 - 23 MG/DL    Creatinine 2.3 (H) 0.9 - 1.3 MG/DL    Est, Glom Filt Rate 26 (L) >60 mL/min/1.73m2    Glucose 106 (H) 70 - 99 MG/DL    Calcium 8.1 (L) 8.3 - 10.6 MG/DL    Albumin 3.1 (L) 3.4 - 5.0 GM/DL    Total Protein 5.4 (L) 6.4 - 8.2 GM/DL    Total Bilirubin 0.3 0.0 - 1.0 MG/DL    ALT 43 (H) 10 - 40 U/L    AST 18 15 - 37 IU/L    Alkaline Phosphatase 118 40 - 129 IU/L    Anion Gap 9 4 - 16          Imaging/Diagnostics Last 24 Hours   MRI ABDOMEN WO CONTRAST MRCP    Result Date: 11/14/2022  EXAMINATION: MRI OF THE ABDOMEN WITHOUT CONTRAST AND MRCP 11/14/2022 11:43 am TECHNIQUE: Multiplanar multisequence MRI of the abdomen was performed without the administration of intravenous contrast.  After initial T2 axial and coronal images, thick slab, thin slab and 3D coronal MRCP sequences were obtained without the administration of intravenous contrast.  MIP images are provided for review. COMPARISON: 11/13/2022 CT abdomen/pelvis, 11/13/2022 abdominal ultrasound HISTORY: ORDERING SYSTEM PROVIDED HISTORY: RUQ tenderness, cholelithiasis, uptrending WBC and liver pnel TECHNOLOGIST PROVIDED HISTORY: Reason for exam:->RUQ tenderness, cholelithiasis, uptrending WBC and liver pnel Reason for Exam: Fall; Loss of Consciousness FINDINGS: Lung Bases: Trace bilateral pleural effusions. Liver: The liver is normal in contour. No focal suspicious liver lesion. Biliary and Gallbladder: No biliary ductal dilation. Multiple calculi are visualized the level of the gallbladder neck/proximal cystic duct with moderate upstream gallbladder distension. There is mild pericholecystic fluid and stranding. No significant gallbladder wall thickening. Spleen: The spleen is unremarkable. Pancreas: Multiple T2 hyperintense lesions arise from the pancreas, the largest which is seen at the pancreatic uncinate process measuring 2.4 x 2.1 x 3.4 cm (series 7, image 15 and series 6, image 13). 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 Adrenal Glands: There is a 1.6 cm left adrenal lesion which demonstrates signal dropout on out of phase imaging, consistent with a benign adrenal adenoma. Kidneys: Bilateral renal cortical atrophy. There is a 4.1 cm T2 hyperintense lesion arising from the superior pole of left kidney which demonstrates thin internal septations, compatible with a Bosniak 2 cyst.  Smaller scattered T2 hyperintense renal lesions are also present, likely representing additional cysts.   No hydronephrosis. Abdominal Vasculature: The abdominal aorta is normal in diameter. Gastrointestinal Tract: No evidence of bowel obstruction. Peritoneum/Mesentery/Retroperitoneum: No peritoneal or retroperitoneal masses. Lymph Nodes: No retroperitoneal lymphadenopathy. Musculoskeletal: No suspicious osseous findings. 1.  Calculi are visualized within the gallbladder neck and proximal cystic duct with moderate upstream gallbladder distension and mild pericholecystic fluid/stranding. No significant gallbladder wall thickening. Findings are favored to represent mild/early acute cholecystitis. 2.  There are multiple T2 hyperintense pancreatic lesions measuring up to 3.4 cm. Per ACR White Paper recommendations, follow-up imaging with contrast should be obtained in 6 months. 3.  A previously described left adrenal nodule demonstrates characteristics consistent with a benign adrenal adenoma. US ABDOMEN LIMITED Specify organ? LIVER    Result Date: 11/13/2022  EXAMINATION: RIGHT UPPER QUADRANT ULTRASOUND 11/13/2022 3:09 pm COMPARISON: CT abdomen/pelvis 11/13/2022 HISTORY: ORDERING SYSTEM PROVIDED HISTORY: gallbladder distention seen on CT TECHNOLOGIST PROVIDED HISTORY: Reason for exam:->gallbladder distention seen on CT Specify organ?->LIVER Reason for Exam: distended gb on ct scan FINDINGS: Pancreas:  Nonvisualization of the gallbladder due to overlying bowel gas. Liver:  Normal contour with diffuse heterogeneous parenchymal echotexture and loss of the visualized portal triads consistent with hepatic steatosis. No intrahepatic mass biliary dilatation. Gallbladder: Moderate distention with a hyperechoic nonshadowing gallstone at the gallbladder neck. Dependent biliary sludge identified. No significant gallbladder wall thickening. No pericholecystic fluid. The technologist documented absence of sonographic Jose's sign. Common bile duct:  Borderline distended measuring 0.7 cm in AP dimension.  Other:  No ascites. The right kidney demonstrates normal contour and parenchymal echotexture with cortical thickness. No hydronephrosis. No parenchymal solid or or cystic mass. 1. Cholelithiasis with gallbladder distention. No ultrasound evidence of acute cholecystitis. 2. Borderline distended common bile duct measuring 0.7 cm in diameter. 3. No ascites.        Electronically signed by Lisa Christensen MD, MD on 11/22/2022 at 7:58 AM

## 2022-11-22 NOTE — DISCHARGE SUMMARY
V2.0  Discharge Summary    Name:  Claudette Taylor /Age/Sex: 10/18/1930 (44 y.o. male)   Admit Date: 2022  Discharge Date: 22    MRN & CSN:  [id number] & [id number] Encounter Date and Time 22 1:09 PM EST    Attending:  Eligio Alan MD Discharging Provider: Eligio Alan MD, MD       Hospital Course:     Brief HPI: Claudette Taylor is a 80 y.o. male  pmh of BPH, HLD, history of DVT, hypothyroidism, chronic diastolic heart failure, GERD, chronic anemia, HTN, CKD 4 who presents with Syncope,      Acute cholecystitis s/p cholecystectomy: CT abdomen showed distended gallbladder, MRI abdomen showed mild/early acute cholecystitis. Drain removed on , General surgery signed off. Continue Augmentin for 5 days end date . PT/OT evaluated recommended SNF  Syncope: History of fall at home: Head injury secondary to above. Cardiogenic versus neurogenic. Orthostatics negative, CT head negative. Cardiology and neurology consulted, echocardiogram obtained low normal EF. Continue on telemetry. Fall precautions, We will defer to PCP for elliquis use. High fall risk will hold off on elliquis for now  Microscopic hematuria with BPH and acute retention of Urine: UA showed large blood and following straight cath. Urology consulted, Recommended Flomax, continue. Voiding trial done on  patient retained bladder scan showed 263 mL straight cath 280 mL, Bladder scan 365 postvoid, Will place borden. OP FU with urology in 1-2 weeks after discharge for voiding. Normocytic normochromic anemia: Hemoglobin stable at 8.1, anemia work-up iron panel and ferritin and B12/folate from -10/22 normal. Considering patient's CODE STATUS DNR CC will not do any further work-up but will continue to monitor if needed will transfuse  Benign Essential Hypertension: On presentation hypotensive stable now. Continue amlodipine 5 mg and metoprolol 25 mg twice daily.  Continue to monitor  Hypothyroidism continue levothyroxine      The patient expressed appropriate understanding of, and agreement with the discharge recommendations, medications, and plan. Consults this admission:  IP CONSULT TO CARDIOLOGY  IP CONSULT TO NEUROLOGY  IP CONSULT TO GI  PHARMACY TO DOSE VANCOMYCIN  IP CONSULT TO PHARMACY  IP CONSULT TO GENERAL SURGERY  IP CONSULT TO IV TEAM  IP CONSULT TO UROLOGY  IP CONSULT TO IV TEAM  IP CONSULT TO CASE MANAGEMENT    Discharge Diagnosis:   Syncope, unspecified syncope type        Discharge Instruction:   Follow up appointments: P{CP, Urology, Card,Surgery   Primary care physician: No primary care provider on file.  within 2 weeks  Diet: cardiac diet   Activity: activity as tolerated  Disposition: Discharged to:   []Home, []C, [x]SNF, []Acute Rehab, []Hospice   Condition on discharge: Stable  Labs and Tests to be Followed up as an outpatient by PCP or Specialist:     Discharge Medications:        Medication List        START taking these medications      amLODIPine 5 MG tablet  Commonly known as: NORVASC  Take 1 tablet by mouth daily  Start taking on: November 23, 2022     amoxicillin-clavulanate 500-125 MG per tablet  Commonly known as: AUGMENTIN  Take 1 tablet by mouth every 12 hours for 2 days            CHANGE how you take these medications      QUEtiapine 50 MG tablet  Commonly known as: SEROQUEL  Take 1 tablet by mouth nightly  What changed:   how much to take  when to take this  additional instructions     sennosides-docusate sodium 8.6-50 MG tablet  Commonly known as: SENOKOT-S  Take 2 tablets by mouth 2 times daily as needed for Constipation  What changed: when to take this            CONTINUE taking these medications      aspirin 81 MG chewable tablet  Take 1 tablet by mouth daily     atorvastatin 40 MG tablet  Commonly known as: LIPITOR  Take 1 tablet by mouth nightly     benzonatate 200 MG capsule  Commonly known as: TESSALON  Take 1 capsule by mouth 3 times daily as needed for Cough     finasteride 5 MG tablet  Commonly known as: PROSCAR  TAKE 1 TABLET BY MOUTH EVERY DAY     levothyroxine 75 MCG tablet  Commonly known as: SYNTHROID  TAKE 1 TABLET BY MOUTH EVERY DAY     metoprolol tartrate 25 MG tablet  Commonly known as: LOPRESSOR  Take 1 tablet by mouth 2 times daily     pantoprazole 20 MG tablet  Commonly known as: PROTONIX  TAKE 1 TABLET BY MOUTH EVERY DAY     sertraline 50 MG tablet  Commonly known as: ZOLOFT     tamsulosin 0.4 MG capsule  Commonly known as: Flomax  Take 2 capsules by mouth nightly     temazepam 7.5 MG capsule  Commonly known as: RESTORIL            STOP taking these medications      Eliquis 2.5 MG Tabs tablet  Generic drug: apixaban     furosemide 20 MG tablet  Commonly known as: LASIX               Where to Get Your Medications        Information about where to get these medications is not yet available    Ask your nurse or doctor about these medications  amLODIPine 5 MG tablet  amoxicillin-clavulanate 500-125 MG per tablet        Objective Findings at Discharge:   /65   Pulse 69   Temp 98.1 °F (36.7 °C) (Oral)   Resp 18   Ht 6' 0.01\" (1.829 m)   Wt 218 lb 0.6 oz (98.9 kg)   SpO2 95%   BMI 29.56 kg/m²       Physical Exam:   Physical Exam  Vitals reviewed. Constitutional:       Appearance: Normal appearance. He is normal weight. HENT:      Head: Normocephalic. Nose: Nose normal.      Mouth/Throat:      Mouth: Mucous membranes are moist.   Eyes:      Conjunctiva/sclera: Conjunctivae normal.      Pupils: Pupils are equal, round, and reactive to light. Cardiovascular:      Rate and Rhythm: Normal rate and regular rhythm. Pulses: Normal pulses. Heart sounds: Normal heart sounds. No murmur heard. Pulmonary:      Effort: Pulmonary effort is normal.      Breath sounds: Rales present. No wheezing or rhonchi. Abdominal:      General: Abdomen is flat. Bowel sounds are normal. There is distension. Palpations: Abdomen is soft. Tenderness: There is no abdominal tenderness. Musculoskeletal:         General: No deformity. Normal range of motion. Cervical back: Normal range of motion and neck supple. Right lower leg: No edema. Left lower leg: No edema. Skin:     Coloration: Skin is not jaundiced or pale. Neurological:      General: No focal deficit present. Mental Status: He is alert and oriented to person, place, and time. Mental status is at baseline. Motor: Weakness present. Labs and Imaging   CT ABDOMEN PELVIS WO CONTRAST Additional Contrast? None    Result Date: 11/13/2022  EXAMINATION: CT OF THE ABDOMEN AND PELVIS WITHOUT CONTRAST 11/13/2022 11:32 am TECHNIQUE: CT of the abdomen and pelvis was performed without the administration of intravenous contrast. Multiplanar reformatted images are provided for review. Automated exposure control, iterative reconstruction, and/or weight based adjustment of the mA/kV was utilized to reduce the radiation dose to as low as reasonably achievable. COMPARISON: 08/31/2022. HISTORY: ORDERING SYSTEM PROVIDED HISTORY: ABD PAIN, FALL TECHNOLOGIST PROVIDED HISTORY: Reason for exam:->ABD PAIN, FALL Additional Contrast?->None Reason for Exam: ABD PAIN, FALL FINDINGS: Lower Chest: The lung bases demonstrate trace pleural effusions with lower lobe atelectasis. Organs: The liver, spleen, pancreas and right adrenal gland appear unremarkable for a non contrasted study. The gallbladder is distended. There is a low-attenuation nodule in the left adrenal gland measuring 1.8 cm and unchanged. The kidneys demonstrate no calcifications. No hydronephrosis is seen. There is a cyst in the left kidney measuring 4.5 cm. No myometrial or bladder calculi are seen. There is circumferential bladder wall thickening. There are multiple bladder diverticula. The prostate gland is mildly enlarged. GI/Bowel: Evaluation of the bowel is limited as no enteric contrast was given. No dilated loops of bowel are seen.   There is diverticular disease involving the colon but no findings to suggest active inflammation. Note is made of a small hiatal hernia. I do not see a dilated appendix. Pelvis: No pelvic masses or fluid collections are seen. The prostate gland is mildly enlarged. There are soft tissue densities seen in the region of the inguinal canal is likely from prior hernia repair and unchanged. Peritoneum/Retroperitoneum: The abdominal aorta is not aneurysmal.  There are shotty mesenteric and retroperitoneal lymph nodes but no lymphadenopathy is seen. Bones/Soft Tissues: No acute bony abnormalities are noted. 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 1. Distended gallbladder. This likely is due to a prolonged fasting state. Otherwise, I do raise the possibility of acalculous cholecystitis. 2. Trace pleural effusions with lower lobe atelectasis. 3. Diverticulosis without obvious inflammation. 4. Prostate hypertrophy with mild component of bladder outlet obstruction with multiple bladder diverticula. XR PELVIS (1-2 VIEWS)    Result Date: 11/13/2022  EXAMINATION: ONE XRAY VIEW OF THE PELVIS 11/13/2022 9:44 am COMPARISON: CT abdomen/pelvis 08/31/2022 HISTORY: ORDERING SYSTEM PROVIDED HISTORY: fall TECHNOLOGIST PROVIDED HISTORY: Reason for exam:->fall Reason for Exam: fall FINDINGS: Single view provided. Lower lumbar degenerative disc and joint disease. Normal bilateral sacroiliac and hip alignment. No acute fracture. Subtle right lakisha pelvic cortical and trabecular coarsening with intramedullary sclerotic foci consistent with Paget's disease. Normal pelvic alignment with no acute fracture.      CT Head W/O Contrast    Result Date: 11/13/2022  EXAMINATION: CT OF THE HEAD WITHOUT CONTRAST  11/13/2022 9:54 am TECHNIQUE: CT of the head was performed without the administration of intravenous contrast. Automated exposure control, iterative reconstruction, and/or weight based adjustment of the mA/kV was utilized to reduce the radiation dose to as low as reasonably achievable. COMPARISON: None. HISTORY: ORDERING SYSTEM PROVIDED HISTORY: fall, anticoagulated TECHNOLOGIST PROVIDED HISTORY: Reason for exam:->fall, anticoagulated Has a \"code stroke\" or \"stroke alert\" been called? ->No Decision Support Exception - unselect if not a suspected or confirmed emergency medical condition->Emergency Medical Condition (MA) Reason for Exam: fall, anticoagulated FINDINGS: BRAIN/VENTRICLES: There is a cerebral atrophy. The there are bilateral white matter hypodensities, in keeping with microvascular disease. There is no acute intracranial hemorrhage, mass effect or midline shift. No abnormal extra-axial fluid collection. The gray-white differentiation is maintained without evidence of an acute infarct. There is no evidence of hydrocephalus. ORBITS: The visualized portion of the orbits demonstrate no acute abnormality. SINUSES: The visualized paranasal sinuses and mastoid air cells demonstrate no acute abnormality. SOFT TISSUES/SKULL:  No acute abnormality of the visualized skull or soft tissues. No acute intracranial abnormality. CT CSpine W/O Contrast    Result Date: 11/13/2022  EXAMINATION: CT OF THE CERVICAL SPINE WITHOUT CONTRAST 11/13/2022 9:54 am TECHNIQUE: CT of the cervical spine was performed without the administration of intravenous contrast. Multiplanar reformatted images are provided for review. Automated exposure control, iterative reconstruction, and/or weight based adjustment of the mA/kV was utilized to reduce the radiation dose to as low as reasonably achievable. COMPARISON: None.  HISTORY: ORDERING SYSTEM PROVIDED HISTORY: fall TECHNOLOGIST PROVIDED HISTORY: Reason for exam:->fall Decision Support Exception - unselect if not a suspected or confirmed emergency medical condition->Emergency Medical Condition (MA) Reason for Exam: fall, anticoagulated FINDINGS: BONES/ALIGNMENT: There is no acute fracture or traumatic malalignment. DEGENERATIVE CHANGES: There is degenerative disc disease of the C6-C7 disc, with disc space narrowing and osteophytes. There are osteoarthritic changes of the left facet joints. SOFT TISSUES: There is no prevertebral soft tissue swelling. No acute abnormality of the cervical spine. XR CHEST PORTABLE    Result Date: 11/13/2022  EXAMINATION: ONE XRAY VIEW OF THE CHEST 11/13/2022 9:44 am COMPARISON: Chest radiograph 10/10/2022. HISTORY: ORDERING SYSTEM PROVIDED HISTORY: hypotension, fall TECHNOLOGIST PROVIDED HISTORY: Reason for exam:->hypotension, fall Reason for Exam: hhypotension, fall FINDINGS: Single view provided. Stable mediastinal and cardiac silhouettes with coronary and aortic atherosclerotic calcifications. Normal lung volumes. There is symmetric diffuse interstitial vascular prominence with some ill-defined margins. Stable mild patchy right lung base opacity. Stable mild left pleural effusion and lung base consolidation. No pneumothorax or free subdiaphragmatic air. 1. Stable mild left pleural effusion. 2. Interval diffuse symmetric vascular changes suggesting pulmonary venous hypertension and possible developing pulmonary edema. 3. Stable mild bibasilar consolidation more prominent on the left. MRI ABDOMEN WO CONTRAST MRCP    Result Date: 11/14/2022  EXAMINATION: MRI OF THE ABDOMEN WITHOUT CONTRAST AND MRCP 11/14/2022 11:43 am TECHNIQUE: Multiplanar multisequence MRI of the abdomen was performed without the administration of intravenous contrast.  After initial T2 axial and coronal images, thick slab, thin slab and 3D coronal MRCP sequences were obtained without the administration of intravenous contrast.  MIP images are provided for review.  COMPARISON: 11/13/2022 CT abdomen/pelvis, 11/13/2022 abdominal ultrasound HISTORY: ORDERING SYSTEM PROVIDED HISTORY: RUQ tenderness, cholelithiasis, uptrending WBC and liver pnel TECHNOLOGIST PROVIDED HISTORY: Reason for exam:->RUQ tenderness, cholelithiasis, uptrending WBC and liver pnel Reason for Exam: Fall; Loss of Consciousness FINDINGS: Lung Bases: Trace bilateral pleural effusions. Liver: The liver is normal in contour. No focal suspicious liver lesion. Biliary and Gallbladder: No biliary ductal dilation. Multiple calculi are visualized the level of the gallbladder neck/proximal cystic duct with moderate upstream gallbladder distension. There is mild pericholecystic fluid and stranding. No significant gallbladder wall thickening. Spleen: The spleen is unremarkable. Pancreas: Multiple T2 hyperintense lesions arise from the pancreas, the largest which is seen at the pancreatic uncinate process measuring 2.4 x 2.1 x 3.4 cm (series 7, image 15 and series 6, image 13). 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 Adrenal Glands: There is a 1.6 cm left adrenal lesion which demonstrates signal dropout on out of phase imaging, consistent with a benign adrenal adenoma. Kidneys: Bilateral renal cortical atrophy. There is a 4.1 cm T2 hyperintense lesion arising from the superior pole of left kidney which demonstrates thin internal septations, compatible with a Bosniak 2 cyst.  Smaller scattered T2 hyperintense renal lesions are also present, likely representing additional cysts. No hydronephrosis. Abdominal Vasculature: The abdominal aorta is normal in diameter. Gastrointestinal Tract: No evidence of bowel obstruction. Peritoneum/Mesentery/Retroperitoneum: No peritoneal or retroperitoneal masses. Lymph Nodes: No retroperitoneal lymphadenopathy. Musculoskeletal: No suspicious osseous findings. 1.  Calculi are visualized within the gallbladder neck and proximal cystic duct with moderate upstream gallbladder distension and mild pericholecystic fluid/stranding. No significant gallbladder wall thickening.   Findings are favored to represent mild/early acute cholecystitis. 2.  There are multiple T2 hyperintense pancreatic lesions measuring up to 3.4 cm. Per ACR White Paper recommendations, follow-up imaging with contrast should be obtained in 6 months. 3.  A previously described left adrenal nodule demonstrates characteristics consistent with a benign adrenal adenoma. US ABDOMEN LIMITED Specify organ? LIVER    Result Date: 11/13/2022  EXAMINATION: RIGHT UPPER QUADRANT ULTRASOUND 11/13/2022 3:09 pm COMPARISON: CT abdomen/pelvis 11/13/2022 HISTORY: ORDERING SYSTEM PROVIDED HISTORY: gallbladder distention seen on CT TECHNOLOGIST PROVIDED HISTORY: Reason for exam:->gallbladder distention seen on CT Specify organ?->LIVER Reason for Exam: distended gb on ct scan FINDINGS: Pancreas:  Nonvisualization of the gallbladder due to overlying bowel gas. Liver:  Normal contour with diffuse heterogeneous parenchymal echotexture and loss of the visualized portal triads consistent with hepatic steatosis. No intrahepatic mass biliary dilatation. Gallbladder: Moderate distention with a hyperechoic nonshadowing gallstone at the gallbladder neck. Dependent biliary sludge identified. No significant gallbladder wall thickening. No pericholecystic fluid. The technologist documented absence of sonographic Jose's sign. Common bile duct:  Borderline distended measuring 0.7 cm in AP dimension. Other:  No ascites. The right kidney demonstrates normal contour and parenchymal echotexture with cortical thickness. No hydronephrosis. No parenchymal solid or or cystic mass. 1. Cholelithiasis with gallbladder distention. No ultrasound evidence of acute cholecystitis. 2. Borderline distended common bile duct measuring 0.7 cm in diameter. 3. No ascites.         CBC:   Recent Labs     11/21/22  0356 11/22/22  0420   WBC 6.4 6.3   HGB 7.6* 7.9*    219     BMP:    Recent Labs     11/21/22  0356 11/22/22  0420    141   K 3.4* 4.0    106   CO2 24 26   BUN 32* 30*   CREATININE 2.2* 2.3*   GLUCOSE 114* 106*     Hepatic:   Recent Labs     11/22/22  0420   AST 18   ALT 43*   BILITOT 0.3   ALKPHOS 118     Lipids:   Lab Results   Component Value Date/Time    CHOL 229 08/22/2022 02:17 AM    CHOL 157 11/19/2018 12:00 AM    HDL 69 08/22/2022 02:17 AM    TRIG 85 08/22/2022 02:17 AM     Hemoglobin A1C:   Lab Results   Component Value Date/Time    LABA1C 5.8 08/22/2022 02:17 AM     TSH:   Lab Results   Component Value Date/Time    TSH 4.07 05/24/2021 11:32 AM     Troponin:   Lab Results   Component Value Date/Time    TROPONINT 0.059 11/14/2022 05:03 PM    TROPONINT 0.056 11/14/2022 12:40 PM    TROPONINT 0.051 11/14/2022 10:49 AM     Lactic Acid: No results for input(s): LACTA in the last 72 hours. BNP: No results for input(s): PROBNP in the last 72 hours.   UA:  Lab Results   Component Value Date/Time    NITRU NEGATIVE 11/13/2022 12:23 PM    NITRU Negative 01/22/2021 09:24 AM    COLORU ORANGE 11/13/2022 12:23 PM    PHUR 6.0 01/22/2021 09:24 AM    WBCUA NONE SEEN 11/13/2022 12:23 PM    RBCUA 219 11/13/2022 12:23 PM    MUCUS RARE 03/29/2022 09:11 AM    TRICHOMONAS NONE SEEN 11/13/2022 12:23 PM    YEAST RARE 03/29/2022 09:11 AM    BACTERIA NEGATIVE 11/13/2022 12:23 PM    CLARITYU SLIGHTLY CLOUDY 11/13/2022 12:23 PM    SPECGRAV 1.015 11/13/2022 12:23 PM    LEUKOCYTESUR NEGATIVE 11/13/2022 12:23 PM    UROBILINOGEN 0.2 11/13/2022 12:23 PM    BILIRUBINUR NEGATIVE 11/13/2022 12:23 PM    BLOODU LARGE NUMBER OR AMOUNT OBSERVED 11/13/2022 12:23 PM    GLUCOSEU Negative 01/22/2021 09:24 AM    KETUA NEGATIVE 11/13/2022 12:23 PM     Urine Cultures: No results found for: LABURIN  Blood Cultures: No results found for: BC  No results found for: BLOODCULT2  Organism: No results found for: ORG    Time Spent Discharging patient 35 minutes    Electronically signed by Franco Emerson MD, MD on 11/22/2022 at 1:11 PM

## 2022-11-22 NOTE — CARE COORDINATION
Phoned Penny Wills at [facility] to let her know that plan is for discharge today and will let her know once patient has discharge order and transport has been arranged

## 2022-11-22 NOTE — DISCHARGE INSTR - DIET

## 2023-02-12 ENCOUNTER — APPOINTMENT (OUTPATIENT)
Dept: CT IMAGING | Age: 88
End: 2023-02-12
Payer: OTHER GOVERNMENT

## 2023-02-12 ENCOUNTER — HOSPITAL ENCOUNTER (INPATIENT)
Age: 88
LOS: 5 days | Discharge: NURSING, REHAB OR LONG-TERM CARE | End: 2023-02-17
Attending: EMERGENCY MEDICINE | Admitting: STUDENT IN AN ORGANIZED HEALTH CARE EDUCATION/TRAINING PROGRAM
Payer: OTHER GOVERNMENT

## 2023-02-12 ENCOUNTER — APPOINTMENT (OUTPATIENT)
Dept: GENERAL RADIOLOGY | Age: 88
End: 2023-02-12
Payer: OTHER GOVERNMENT

## 2023-02-12 DIAGNOSIS — E86.0 DEHYDRATION: ICD-10-CM

## 2023-02-12 DIAGNOSIS — N18.9 ACUTE RENAL FAILURE SUPERIMPOSED ON CHRONIC KIDNEY DISEASE, UNSPECIFIED CKD STAGE, UNSPECIFIED ACUTE RENAL FAILURE TYPE (HCC): ICD-10-CM

## 2023-02-12 DIAGNOSIS — I95.9 HYPOTENSION, UNSPECIFIED HYPOTENSION TYPE: ICD-10-CM

## 2023-02-12 DIAGNOSIS — N17.9 ACUTE RENAL FAILURE SUPERIMPOSED ON CHRONIC KIDNEY DISEASE, UNSPECIFIED CKD STAGE, UNSPECIFIED ACUTE RENAL FAILURE TYPE (HCC): ICD-10-CM

## 2023-02-12 DIAGNOSIS — R79.89 ELEVATED LACTIC ACID LEVEL: ICD-10-CM

## 2023-02-12 DIAGNOSIS — I95.1 ORTHOSTATIC SYNCOPE: ICD-10-CM

## 2023-02-12 DIAGNOSIS — R55 SYNCOPE, UNSPECIFIED SYNCOPE TYPE: Primary | ICD-10-CM

## 2023-02-12 PROBLEM — R42 DIZZINESS: Status: ACTIVE | Noted: 2023-02-12

## 2023-02-12 LAB
ALBUMIN SERPL-MCNC: 3.3 GM/DL (ref 3.4–5)
ALP BLD-CCNC: 96 IU/L (ref 40–129)
ALT SERPL-CCNC: 7 U/L (ref 10–40)
ANION GAP SERPL CALCULATED.3IONS-SCNC: 16 MMOL/L (ref 4–16)
AST SERPL-CCNC: 18 IU/L (ref 15–37)
BASOPHILS ABSOLUTE: 0.1 K/CU MM
BASOPHILS RELATIVE PERCENT: 0.8 % (ref 0–1)
BILIRUB SERPL-MCNC: 0.4 MG/DL (ref 0–1)
BUN SERPL-MCNC: 26 MG/DL (ref 6–23)
CALCIUM SERPL-MCNC: 8.4 MG/DL (ref 8.3–10.6)
CHLORIDE BLD-SCNC: 104 MMOL/L (ref 99–110)
CO2: 18 MMOL/L (ref 21–32)
CREAT SERPL-MCNC: 2.9 MG/DL (ref 0.9–1.3)
DIFFERENTIAL TYPE: ABNORMAL
EOSINOPHILS ABSOLUTE: 0.7 K/CU MM
EOSINOPHILS RELATIVE PERCENT: 10 % (ref 0–3)
GFR SERPL CREATININE-BSD FRML MDRD: 20 ML/MIN/1.73M2
GLUCOSE SERPL-MCNC: 171 MG/DL (ref 70–99)
HCT VFR BLD CALC: 33.4 % (ref 42–52)
HEMOGLOBIN: 10 GM/DL (ref 13.5–18)
IMMATURE NEUTROPHIL %: 0.6 % (ref 0–0.43)
LACTIC ACID, SEPSIS: 2.1 MMOL/L (ref 0.5–1.9)
LACTIC ACID, SEPSIS: 3.1 MMOL/L (ref 0.5–1.9)
LYMPHOCYTES ABSOLUTE: 1.8 K/CU MM
LYMPHOCYTES RELATIVE PERCENT: 26.5 % (ref 24–44)
MCH RBC QN AUTO: 28.8 PG (ref 27–31)
MCHC RBC AUTO-ENTMCNC: 29.9 % (ref 32–36)
MCV RBC AUTO: 96.3 FL (ref 78–100)
MONOCYTES ABSOLUTE: 0.6 K/CU MM
MONOCYTES RELATIVE PERCENT: 9.7 % (ref 0–4)
NUCLEATED RBC %: 0 %
PDW BLD-RTO: 15.2 % (ref 11.7–14.9)
PLATELET # BLD: 189 K/CU MM (ref 140–440)
PMV BLD AUTO: 9.8 FL (ref 7.5–11.1)
POTASSIUM SERPL-SCNC: 3.6 MMOL/L (ref 3.5–5.1)
RBC # BLD: 3.47 M/CU MM (ref 4.6–6.2)
SEGMENTED NEUTROPHILS ABSOLUTE COUNT: 3.5 K/CU MM
SEGMENTED NEUTROPHILS RELATIVE PERCENT: 52.4 % (ref 36–66)
SODIUM BLD-SCNC: 138 MMOL/L (ref 135–145)
TOTAL IMMATURE NEUTOROPHIL: 0.04 K/CU MM
TOTAL NUCLEATED RBC: 0 K/CU MM
TOTAL PROTEIN: 5.7 GM/DL (ref 6.4–8.2)
TROPONIN T: 0.07 NG/ML
WBC # BLD: 6.6 K/CU MM (ref 4–10.5)

## 2023-02-12 PROCEDURE — 93005 ELECTROCARDIOGRAM TRACING: CPT | Performed by: EMERGENCY MEDICINE

## 2023-02-12 PROCEDURE — 80053 COMPREHEN METABOLIC PANEL: CPT

## 2023-02-12 PROCEDURE — 1200000000 HC SEMI PRIVATE

## 2023-02-12 PROCEDURE — 99285 EMERGENCY DEPT VISIT HI MDM: CPT

## 2023-02-12 PROCEDURE — 72125 CT NECK SPINE W/O DYE: CPT

## 2023-02-12 PROCEDURE — 87040 BLOOD CULTURE FOR BACTERIA: CPT

## 2023-02-12 PROCEDURE — 85025 COMPLETE CBC W/AUTO DIFF WBC: CPT

## 2023-02-12 PROCEDURE — 2140000000 HC CCU INTERMEDIATE R&B

## 2023-02-12 PROCEDURE — 71045 X-RAY EXAM CHEST 1 VIEW: CPT

## 2023-02-12 PROCEDURE — 83605 ASSAY OF LACTIC ACID: CPT

## 2023-02-12 PROCEDURE — 84484 ASSAY OF TROPONIN QUANT: CPT

## 2023-02-12 PROCEDURE — 87150 DNA/RNA AMPLIFIED PROBE: CPT

## 2023-02-12 PROCEDURE — G0378 HOSPITAL OBSERVATION PER HR: HCPCS

## 2023-02-12 PROCEDURE — 70450 CT HEAD/BRAIN W/O DYE: CPT

## 2023-02-12 RX ORDER — 0.9 % SODIUM CHLORIDE 0.9 %
1000 INTRAVENOUS SOLUTION INTRAVENOUS ONCE
Status: DISCONTINUED | OUTPATIENT
Start: 2023-02-12 | End: 2023-02-16

## 2023-02-12 RX ADMIN — Medication 1000 ML: at 18:19

## 2023-02-12 ASSESSMENT — ENCOUNTER SYMPTOMS
ABDOMINAL PAIN: 0
COLOR CHANGE: 1
VOICE CHANGE: 0
DIARRHEA: 0
SINUS PAIN: 0
NAUSEA: 0
COUGH: 0
CHEST TIGHTNESS: 0
EYE PAIN: 0
VOMITING: 0
SHORTNESS OF BREATH: 0
BLOOD IN STOOL: 0
EYE DISCHARGE: 0
BACK PAIN: 1
SINUS PRESSURE: 0

## 2023-02-12 ASSESSMENT — PAIN - FUNCTIONAL ASSESSMENT: PAIN_FUNCTIONAL_ASSESSMENT: NONE - DENIES PAIN

## 2023-02-12 NOTE — ED PROVIDER NOTES
EMERGENCY DEPARTMENT ENCOUNTER      CHIEF COMPLAINT:   Syncope    HPI: Jyoti Vaughan is a 80 y.o. male who presents to the ED, via EMS, for evaluation after having a syncopal episode. Son at bedside who witnessed the event and helps provide the history. He states that the patient was sitting in a shower chair when he slumped over and lost consciousness. He was unresponsive. This lasted for a few minutes and they called EMS. On EMS arrival, the patient was awake but confused. He is at his baseline at this time per son. The patient is amnestic to the event. There was no tonic-clonic movements noted. He denies any pain. Son denies any knowledge of fevers, chills, cough, vomiting or any other complaints. REVIEW OF SYSTEMS:   Constitutional:  Denies fever or chills  Eyes:  Denies change in visual acuity  HENT:  Denies nasal congestion or sore throat  Respiratory:  Denies cough or shortness of breath  Cardiovascular:  Denies chest pain or edema  GI:  Denies abdominal pain, nausea, vomiting, bloody stools or diarrhea  :  Denies dysuria  Musculoskeletal:  Denies back pain or joint pain  Integument:  Denies rash  Neurologic: See HPI  \"Remaining review of systems reviewed and negative. I have reviewed the nursing triage documentation and agree unless otherwise noted below. \"      PAST MEDICAL HISTORY:   Past Medical History:   Diagnosis Date    Anemia     Anxiety     Arthritis     BPH with urinary obstruction     Depression     GERD (gastroesophageal reflux disease)     Hemorrhoids     Hepatitis     Hernia of unspecified site of abdominal cavity without mention of obstruction or gangrene     Hyperlipidemia     Hypotension     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    Rt Tricep, Lt Superior Forearm       CURRENT MEDICATIONS:   Home medications reviewed.     SURGICAL HISTORY:   Past Surgical History:   Procedure Laterality Date    CATARACT REMOVAL      CHOLECYSTECTOMY, LAPAROSCOPIC N/A 11/15/2022    CHOLECYSTECTOMY LAPAROSCOPIC performed by Camelia Arellano MD at [address] [place] [address] N/A 3/29/2021    CYSTOSCOPY EVACUATION OF CLOTS, BLADDER FULGERATION, AND SUPRA-PUBIC CATHETER INSERTION performed by Conchita Christensen MD at [address] [place] [address] N/A 2021    CYSTOSCOPY, PROSTATE FULGURATION, EVACUATION OF CLOTS & TURP performed by Triston Godinez MD at [address] [place] [address] N/A 3/4/2021    LAPAROSCOPIC LARGE HERNIA HIATAL REPAIR WITH GASTRIC OBSTRUCTION POSS OPEN performed by Camelia Arellano MD at [address] [place] [address]:   Family History   Problem Relation Age of Onset    Depression Mother     Diabetes Mother     COPD Father     Diabetes Brother     Diabetes Maternal Grandmother        SOCIAL HISTORY:   Social History     Socioeconomic History    Marital status:       Spouse name: Not on file    Number of children: Not on file    Years of education: Not on file    Highest education level: Not on file   Occupational History    Not on file   Tobacco Use    Smoking status: Former     Packs/day: 1.00     Years: 5.00     Pack years: 5.00     Types: Cigarettes     Start date: 1950     Quit date: 1955     Years since quittin.3    Smokeless tobacco: Never   Substance and Sexual Activity    Alcohol use: Not Currently    Drug use: Not Currently    Sexual activity: Not on file   Other Topics Concern    Not on file   Social History Narrative    Not on file     Social Determinants of Health     Financial Resource Strain: Low Risk     Difficulty of Paying Living Expenses: Not hard at all   Food Insecurity: No Food Insecurity    Worried About Running Out of Food in the Last Year: Never true    Ran Out of Food in the Last Year: Never true   Transportation Needs: Not on file   Physical Activity: Not on file   Stress: Not on file   Social Connections: Not on file   Intimate Partner Violence: Not on file   Housing Stability: Not on file       ALLERGIES: Minocycline    PHYSICAL EXAM:  VITAL SIGNS:   ED Triage Vitals   Enc Vitals Group      BP 02/12/23 1624 (!) 85/48      Heart Rate 02/12/23 1619 57      Resp 02/12/23 1619 13      Temp --       Temp src --       SpO2 02/12/23 1619 95 %      Weight --       Height --       Head Circumference --       Peak Flow --       Pain Score --       Pain Loc --       Pain Edu? --       Excl. in [address]? --      Constitutional:  Non-toxic appearance, hard of hearing  HENT: Normocephalic, Atraumatic, Bilateral external ears normal, Oropharynx moist, No oral exudates, Nose normal.  Eyes:  PERRL,  Conjunctiva normal, No discharge. Neck: Normal range of motion, No tenderness, Supple, No stridor, No lymphadenopathy  Cardiovascular:  Bradycardic, Regular rhythm  Pulmonary/Chest:  Normal breath sounds, No respiratory distress, No wheezing  Abdomen: Bowel sounds normal, Soft, No tenderness, No masses, No pulsatile masses  Extremities:  Normal range of motion, Intact distal pulses, No edema, No tenderness  Neurologic: Awake and alert, somewhat confused, hard of hearing, speech clear,, Normal motor function, Sensation intact to light touch throughout,  No focal deficits  Skin:  Warm, Dry, No erythema, No rash      EKG Interpretation  Interpreted by me  Compared to 11/13/22  Rhythm: sinus bradycardia  Rate: bradycardic 55  Axis: left  Ectopy: none  Conduction: 1st degree AV block  ST Segments: no acute change  T Waves: no acute change  Clinical Impression: sinus bradycardia, LAD, 1st degree AV block    Cardiac Monitor Strip Interpretation  Interpreted by me  Monitor strip interpreted for greater than 10 seconds  Rhythm: sinus bradycardia  Rate: bradycardic  Ectopy: none  ST Segments: normal      Radiology / Procedures:     CT CERVICAL SPINE WO CONTRAST (Final result)  Result time 02/12/23 18:25:03  Final result by Mati Shultz MD (02/12/23 18:25:03)                Impression:    No acute cervical spine fracture.      Spinal degenerative changes as described. Narrative:    EXAMINATION:   CT OF THE CERVICAL SPINE WITHOUT CONTRAST 2/12/2023 2:46 pm     TECHNIQUE:   CT of the cervical spine was performed without the administration of   intravenous contrast. Multiplanar reformatted images are provided for review. Automated exposure control, iterative reconstruction, and/or weight based   adjustment of the mA/kV was utilized to reduce the radiation dose to as low   as reasonably achievable. COMPARISON:   11/13/2022. HISTORY:   ORDERING SYSTEM PROVIDED HISTORY: Fall   TECHNOLOGIST PROVIDED HISTORY:   Reason for exam:->Fall   Decision Support Exception - unselect if not a suspected or confirmed   emergency medical condition->Emergency Medical Condition (MA)   Reason for Exam: Fall     FINDINGS:   BONES/ALIGNMENT: There is normal spinal alignment. C1 and C2 have a normal   relationship. No acute fracture is identified. DEGENERATIVE CHANGES: Multilevel degenerative disc disease is most prominent   and moderate to advanced at C6-C7. Multilevel facet arthropathy is worst and   severe on the left at C3-C4 and C4-C5. No critical central canal narrowing   is identified. SOFT TISSUES: There is no prevertebral soft tissue swelling. CT HEAD WO CONTRAST (Final result)  Result time 02/12/23 18:16:05  Final result by Mati Shultz MD (02/12/23 18:16:05)                Impression:    No acute intracranial findings. Narrative:    EXAMINATION:   CT OF THE HEAD WITHOUT CONTRAST  2/12/2023 2:46 pm     TECHNIQUE:   CT of the head was performed without the administration of intravenous   contrast. Automated exposure control, iterative reconstruction, and/or weight   based adjustment of the mA/kV was utilized to reduce the radiation dose to as   low as reasonably achievable. COMPARISON:   11/13/2022.      HISTORY:   ORDERING SYSTEM PROVIDED HISTORY: Fall   TECHNOLOGIST PROVIDED HISTORY:   Has a \"code stroke\" or \"stroke alert\" been called? ->No   Reason for exam:->Fall   Decision Support Exception - unselect if not a suspected or confirmed   emergency medical condition->Emergency Medical Condition (MA)   Reason for Exam: Fall     FINDINGS:   BRAIN/VENTRICLES: There is no acute intracranial hemorrhage, mass effect or   midline shift. No abnormal extra-axial fluid collection. The gray-white   differentiation is maintained without evidence of an acute infarct. There is   no evidence of hydrocephalus. There is generalized volume loss. Areas of   white matter hypoattenuation appears similar to the prior study. ORBITS: The visualized portion of the orbits demonstrate no acute abnormality. SINUSES: The visualized paranasal sinuses and mastoid air cells demonstrate   no acute abnormality. SOFT TISSUES/SKULL:  No acute abnormality of the visualized skull or soft   tissues. XR CHEST PORTABLE (Final result)  Result time 02/12/23 18:21:51  Final result by Nguyen Ziegler MD (02/12/23 18:21:51)                Impression:    Fundus congestion along with increased interstitial opacity. Please   correlate with any clinical evidence of interstitial edema. Narrative:    EXAMINATION:   ONE XRAY VIEW OF THE CHEST     2/12/2023 2:11 pm     COMPARISON:   11/13/2022     HISTORY:   ORDERING SYSTEM PROVIDED HISTORY: chest pain   TECHNOLOGIST PROVIDED HISTORY:   Reason for exam:->chest pain   Reason for Exam: chest pain     FINDINGS:   Cardial pericardial silhouette is unremarkable. Pulmonary vasculature is   congested and there is increased interstitial opacity. Chronic pleural   effusion on the left is noted. No pneumothorax. No free air. No acute bony   abnormality.                    Labs Reviewed   CBC WITH AUTO DIFFERENTIAL - Abnormal; Notable for the following components:       Result Value    RBC 3.47 (*)     Hemoglobin 10.0 (*)     Hematocrit 33.4 (*)     MCHC 29.9 (*) RDW 15.2 (*)     Monocytes % 9.7 (*)     Eosinophils % 10.0 (*)     Immature Neutrophil % 0.6 (*)     All other components within normal limits   COMPREHENSIVE METABOLIC PANEL - Abnormal; Notable for the following components:    CO2 18 (*)     BUN 26 (*)     Creatinine 2.9 (*)     Est, Glom Filt Rate 20 (*)     Glucose 171 (*)     Albumin 3.3 (*)     Total Protein 5.7 (*)     ALT 7 (*)     All other components within normal limits   TROPONIN - Abnormal; Notable for the following components:    Troponin T 0.072 (*)     All other components within normal limits   LACTATE, SEPSIS - Abnormal; Notable for the following components:    Lactic Acid, Sepsis 3.1 (*)     All other components within normal limits   LACTATE, SEPSIS - Abnormal; Notable for the following components:    Lactic Acid, Sepsis 2.1 (*)     All other components within normal limits   CULTURE, BLOOD 1   CULTURE, BLOOD 2       ED COURSE & MEDICAL DECISION MAKING:  Idalmis Tripp is a 80 y.o. male who presents to the ED, via EMS, for evaluation after having a syncopal episode. Son at bedside who witnessed the event and helps provide the history. He states that the patient was sitting in a shower chair when he slumped over and lost consciousness. He was unresponsive. This lasted for a few minutes and they called EMS. On EMS arrival, the patient was awake but confused. He is at his baseline at this time per son. The patient is amnestic to the event. There was no tonic-clonic movements noted. He denies any pain. Son denies any knowledge of fevers, chills, cough, vomiting or any other complaints. History from : Patient, Family son at bedside, and EMS    Limitations to history : Patient is hard of hearing    Chronic conditions affecting care: None    Social Determinants : None    Records Reviewed : None    On exam, the patient is afebrile and nontoxic appearing. He is hypotensive on arrival but this resolves with IV fluids. He is bradycardic.   He is otherwise hemodynamically stable and neurologically intact. Physical exam is unremarkable. EKG shows sinus bradycardia with no ST elevation or depression. Labs are obtained and are significant for an elevated lactic acid of 3.1, a decreased bicarb of 18, chronic anemia and a detectable troponin of 0.072 which appears to be chronically elevated. Chest x-ray shows congestion with increased interstitial opacities that may represent interstitial edema. CT head is negative for acute intracranial abnormality. CT cervical spine is negative the patient was treated with medications as below and felt significantly better. Patient was given the following medications:  Medications   0.9 % sodium chloride bolus (1,000 mLs IntraVENous Bolus from Bag 2/12/23 1819)       Diagnosis and Differential Diagnosis:  I suspect that the patient had a syncopal episode. There were no tonic-clonic movements and no loss of bowel or bladder function, which makes seizure seem unlikely. I have a low suspicion for seizure, concussion, arrhythmia, psychosis, overdose, cardiac or respiratory arrest, status epilepticus, meningitis, or sepsis. Disposition Considerations:  I recommended admission to the hospital and the patient was agreeable. I discussed the case with the hospitalist who will admit the patient for further treatment and care. The patient is currently in stable condition awaiting admission. I am the Primary Clinician of Record. Clinical Impression:  1. Syncope, unspecified syncope type    2. Hypotension, unspecified hypotension type    3. Acute renal failure superimposed on chronic kidney disease, unspecified CKD stage, unspecified acute renal failure type (HCC)    4. Elevated lactic acid level    5.  Dehydration        Comment: Please note this report has been produced using speech recognition software and may contain errors related to that system including errors in grammar, punctuation, and spelling, as well as words and phrases that may be inappropriate. If there are any questions or concerns please feel free to contact the dictating provider for clarification.         Noah Hawk MD  02/17/23 3553

## 2023-02-12 NOTE — ED NOTES
Patient arrives from home after syncopal episode in the shower. Family states he was sitting on a shower chair and he lost consciousness and was not responsive. When medics arrived the patient was awake and confused which seems to be baseline. Family was not able to give any patient history to medics or medications. Patient is awake and responsive to voce but not really talking and keep falling asleep. Patient is resting comfortably in bed. Fluids started to support low BP at this time.       Roc Galeas RN  02/12/23 0529

## 2023-02-12 NOTE — CARE COORDINATION
MCG criteria for Syncope reviewed at this time, criteria supports Inpatient Admission.  TOSHIA,RN/CM

## 2023-02-13 LAB
ANION GAP SERPL CALCULATED.3IONS-SCNC: 12 MMOL/L (ref 4–16)
BUN SERPL-MCNC: 24 MG/DL (ref 6–23)
CALCIUM SERPL-MCNC: 8.2 MG/DL (ref 8.3–10.6)
CHLORIDE BLD-SCNC: 107 MMOL/L (ref 99–110)
CO2: 23 MMOL/L (ref 21–32)
CREAT SERPL-MCNC: 2.8 MG/DL (ref 0.9–1.3)
EKG ATRIAL RATE: 55 BPM
EKG DIAGNOSIS: NORMAL
EKG P AXIS: 33 DEGREES
EKG P-R INTERVAL: 232 MS
EKG Q-T INTERVAL: 468 MS
EKG QRS DURATION: 94 MS
EKG QTC CALCULATION (BAZETT): 447 MS
EKG R AXIS: -32 DEGREES
EKG T AXIS: 0 DEGREES
EKG VENTRICULAR RATE: 55 BPM
GFR SERPL CREATININE-BSD FRML MDRD: 21 ML/MIN/1.73M2
GLUCOSE SERPL-MCNC: 98 MG/DL (ref 70–99)
HCT VFR BLD CALC: 26.6 % (ref 42–52)
HCT VFR BLD CALC: 30.1 % (ref 42–52)
HEMOGLOBIN: 8.1 GM/DL (ref 13.5–18)
HEMOGLOBIN: 9.4 GM/DL (ref 13.5–18)
LACTATE: 1.5 MMOL/L (ref 0.5–1.9)
LACTATE: 8.2 MMOL/L (ref 0.5–1.9)
MAGNESIUM: 1.8 MG/DL (ref 1.8–2.4)
MCH RBC QN AUTO: 29 PG (ref 27–31)
MCHC RBC AUTO-ENTMCNC: 30.5 % (ref 32–36)
MCV RBC AUTO: 95.3 FL (ref 78–100)
PDW BLD-RTO: 14.8 % (ref 11.7–14.9)
PHOSPHORUS: 3.1 MG/DL (ref 2.5–4.9)
PLATELET # BLD: 171 K/CU MM (ref 140–440)
PMV BLD AUTO: 9.5 FL (ref 7.5–11.1)
POTASSIUM SERPL-SCNC: 3.6 MMOL/L (ref 3.5–5.1)
RBC # BLD: 2.79 M/CU MM (ref 4.6–6.2)
SODIUM BLD-SCNC: 142 MMOL/L (ref 135–145)
TROPONIN T: 0.04 NG/ML
TROPONIN T: 0.05 NG/ML
TROPONIN T: 0.06 NG/ML
WBC # BLD: 5.2 K/CU MM (ref 4–10.5)

## 2023-02-13 PROCEDURE — 84540 ASSAY OF URINE/UREA-N: CPT

## 2023-02-13 PROCEDURE — 2580000003 HC RX 258: Performed by: STUDENT IN AN ORGANIZED HEALTH CARE EDUCATION/TRAINING PROGRAM

## 2023-02-13 PROCEDURE — 97166 OT EVAL MOD COMPLEX 45 MIN: CPT

## 2023-02-13 PROCEDURE — 83735 ASSAY OF MAGNESIUM: CPT

## 2023-02-13 PROCEDURE — 2500000003 HC RX 250 WO HCPCS

## 2023-02-13 PROCEDURE — 6360000002 HC RX W HCPCS: Performed by: STUDENT IN AN ORGANIZED HEALTH CARE EDUCATION/TRAINING PROGRAM

## 2023-02-13 PROCEDURE — 85018 HEMOGLOBIN: CPT

## 2023-02-13 PROCEDURE — 82436 ASSAY OF URINE CHLORIDE: CPT

## 2023-02-13 PROCEDURE — 83605 ASSAY OF LACTIC ACID: CPT

## 2023-02-13 PROCEDURE — 1200000000 HC SEMI PRIVATE

## 2023-02-13 PROCEDURE — 84133 ASSAY OF URINE POTASSIUM: CPT

## 2023-02-13 PROCEDURE — 97116 GAIT TRAINING THERAPY: CPT

## 2023-02-13 PROCEDURE — 93010 ELECTROCARDIOGRAM REPORT: CPT | Performed by: INTERNAL MEDICINE

## 2023-02-13 PROCEDURE — 36415 COLL VENOUS BLD VENIPUNCTURE: CPT

## 2023-02-13 PROCEDURE — 84484 ASSAY OF TROPONIN QUANT: CPT

## 2023-02-13 PROCEDURE — 97162 PT EVAL MOD COMPLEX 30 MIN: CPT

## 2023-02-13 PROCEDURE — 85014 HEMATOCRIT: CPT

## 2023-02-13 PROCEDURE — 6370000000 HC RX 637 (ALT 250 FOR IP): Performed by: STUDENT IN AN ORGANIZED HEALTH CARE EDUCATION/TRAINING PROGRAM

## 2023-02-13 PROCEDURE — 80048 BASIC METABOLIC PNL TOTAL CA: CPT

## 2023-02-13 PROCEDURE — 94761 N-INVAS EAR/PLS OXIMETRY MLT: CPT

## 2023-02-13 PROCEDURE — 85027 COMPLETE CBC AUTOMATED: CPT

## 2023-02-13 PROCEDURE — 97535 SELF CARE MNGMENT TRAINING: CPT

## 2023-02-13 PROCEDURE — 84100 ASSAY OF PHOSPHORUS: CPT

## 2023-02-13 PROCEDURE — 83540 ASSAY OF IRON: CPT

## 2023-02-13 RX ORDER — FERROUS SULFATE 325(65) MG
325 TABLET ORAL
COMMUNITY

## 2023-02-13 RX ORDER — TAMSULOSIN HYDROCHLORIDE 0.4 MG/1
0.4 CAPSULE ORAL DAILY
Status: DISCONTINUED | OUTPATIENT
Start: 2023-02-13 | End: 2023-02-17 | Stop reason: HOSPADM

## 2023-02-13 RX ORDER — ATORVASTATIN CALCIUM 40 MG/1
40 TABLET, FILM COATED ORAL NIGHTLY
Status: DISCONTINUED | OUTPATIENT
Start: 2023-02-13 | End: 2023-02-17 | Stop reason: HOSPADM

## 2023-02-13 RX ORDER — FUROSEMIDE 20 MG/1
20 TABLET ORAL DAILY
Status: ON HOLD | COMMUNITY
End: 2023-02-16 | Stop reason: HOSPADM

## 2023-02-13 RX ORDER — QUETIAPINE FUMARATE 100 MG/1
100 TABLET, FILM COATED ORAL NIGHTLY
Status: ON HOLD | COMMUNITY
End: 2023-02-16 | Stop reason: HOSPADM

## 2023-02-13 RX ORDER — ONDANSETRON 4 MG/1
4 TABLET, ORALLY DISINTEGRATING ORAL EVERY 8 HOURS PRN
Status: DISCONTINUED | OUTPATIENT
Start: 2023-02-13 | End: 2023-02-17 | Stop reason: HOSPADM

## 2023-02-13 RX ORDER — ASPIRIN 81 MG/1
81 TABLET, CHEWABLE ORAL DAILY
Status: DISCONTINUED | OUTPATIENT
Start: 2023-02-13 | End: 2023-02-17 | Stop reason: HOSPADM

## 2023-02-13 RX ORDER — SODIUM CHLORIDE 9 MG/ML
INJECTION, SOLUTION INTRAVENOUS PRN
Status: DISCONTINUED | OUTPATIENT
Start: 2023-02-13 | End: 2023-02-17 | Stop reason: HOSPADM

## 2023-02-13 RX ORDER — TEMAZEPAM 7.5 MG/1
7.5 CAPSULE ORAL NIGHTLY
Status: DISCONTINUED | OUTPATIENT
Start: 2023-02-13 | End: 2023-02-17 | Stop reason: HOSPADM

## 2023-02-13 RX ORDER — SENNA AND DOCUSATE SODIUM 50; 8.6 MG/1; MG/1
2 TABLET, FILM COATED ORAL 2 TIMES DAILY PRN
Status: DISCONTINUED | OUTPATIENT
Start: 2023-02-13 | End: 2023-02-17 | Stop reason: HOSPADM

## 2023-02-13 RX ORDER — ACETAMINOPHEN 325 MG/1
650 TABLET ORAL EVERY 6 HOURS PRN
Status: DISCONTINUED | OUTPATIENT
Start: 2023-02-13 | End: 2023-02-17 | Stop reason: HOSPADM

## 2023-02-13 RX ORDER — ESCITALOPRAM OXALATE 5 MG/1
5 TABLET ORAL DAILY
Status: ON HOLD | COMMUNITY
End: 2023-02-16 | Stop reason: HOSPADM

## 2023-02-13 RX ORDER — SODIUM BICARBONATE 325 MG/1
650 TABLET ORAL DAILY
COMMUNITY

## 2023-02-13 RX ORDER — PANTOPRAZOLE SODIUM 20 MG/1
20 TABLET, DELAYED RELEASE ORAL DAILY
Status: DISCONTINUED | OUTPATIENT
Start: 2023-02-13 | End: 2023-02-17 | Stop reason: HOSPADM

## 2023-02-13 RX ORDER — HYDRALAZINE HYDROCHLORIDE 20 MG/ML
10 INJECTION INTRAMUSCULAR; INTRAVENOUS EVERY 4 HOURS PRN
Status: DISCONTINUED | OUTPATIENT
Start: 2023-02-13 | End: 2023-02-17 | Stop reason: HOSPADM

## 2023-02-13 RX ORDER — M-VIT,TX,IRON,MINS/CALC/FOLIC 27MG-0.4MG
1 TABLET ORAL
COMMUNITY

## 2023-02-13 RX ORDER — ACETAMINOPHEN 650 MG/1
650 SUPPOSITORY RECTAL EVERY 6 HOURS PRN
Status: DISCONTINUED | OUTPATIENT
Start: 2023-02-13 | End: 2023-02-17 | Stop reason: HOSPADM

## 2023-02-13 RX ORDER — SODIUM CHLORIDE 9 MG/ML
INJECTION, SOLUTION INTRAVENOUS CONTINUOUS
Status: DISCONTINUED | OUTPATIENT
Start: 2023-02-13 | End: 2023-02-14

## 2023-02-13 RX ORDER — SODIUM CHLORIDE 0.9 % (FLUSH) 0.9 %
5-40 SYRINGE (ML) INJECTION PRN
Status: DISCONTINUED | OUTPATIENT
Start: 2023-02-13 | End: 2023-02-17 | Stop reason: HOSPADM

## 2023-02-13 RX ORDER — QUETIAPINE FUMARATE 25 MG/1
50 TABLET, FILM COATED ORAL NIGHTLY
Status: DISCONTINUED | OUTPATIENT
Start: 2023-02-13 | End: 2023-02-17 | Stop reason: HOSPADM

## 2023-02-13 RX ORDER — LEVOTHYROXINE SODIUM 0.05 MG/1
50 TABLET ORAL DAILY
Status: ON HOLD | COMMUNITY
End: 2023-02-16 | Stop reason: HOSPADM

## 2023-02-13 RX ORDER — POLYETHYLENE GLYCOL 3350 17 G/17G
17 POWDER, FOR SOLUTION ORAL DAILY PRN
Status: DISCONTINUED | OUTPATIENT
Start: 2023-02-13 | End: 2023-02-17 | Stop reason: HOSPADM

## 2023-02-13 RX ORDER — SODIUM CHLORIDE 0.9 % (FLUSH) 0.9 %
5-40 SYRINGE (ML) INJECTION EVERY 12 HOURS SCHEDULED
Status: DISCONTINUED | OUTPATIENT
Start: 2023-02-13 | End: 2023-02-17 | Stop reason: HOSPADM

## 2023-02-13 RX ORDER — LEVOTHYROXINE SODIUM 0.05 MG/1
50 TABLET ORAL DAILY
Status: DISCONTINUED | OUTPATIENT
Start: 2023-02-13 | End: 2023-02-17 | Stop reason: HOSPADM

## 2023-02-13 RX ORDER — SODIUM CHLORIDE, SODIUM LACTATE, POTASSIUM CHLORIDE, CALCIUM CHLORIDE 600; 310; 30; 20 MG/100ML; MG/100ML; MG/100ML; MG/100ML
INJECTION, SOLUTION INTRAVENOUS CONTINUOUS
Status: DISCONTINUED | OUTPATIENT
Start: 2023-02-13 | End: 2023-02-13

## 2023-02-13 RX ORDER — BENZONATATE 100 MG/1
200 CAPSULE ORAL 3 TIMES DAILY PRN
Status: DISCONTINUED | OUTPATIENT
Start: 2023-02-13 | End: 2023-02-17 | Stop reason: HOSPADM

## 2023-02-13 RX ORDER — ENOXAPARIN SODIUM 100 MG/ML
30 INJECTION SUBCUTANEOUS DAILY
Status: DISCONTINUED | OUTPATIENT
Start: 2023-02-13 | End: 2023-02-17 | Stop reason: HOSPADM

## 2023-02-13 RX ORDER — FINASTERIDE 5 MG/1
5 TABLET, FILM COATED ORAL DAILY
Status: DISCONTINUED | OUTPATIENT
Start: 2023-02-13 | End: 2023-02-17 | Stop reason: HOSPADM

## 2023-02-13 RX ORDER — ONDANSETRON 2 MG/ML
4 INJECTION INTRAMUSCULAR; INTRAVENOUS EVERY 6 HOURS PRN
Status: DISCONTINUED | OUTPATIENT
Start: 2023-02-13 | End: 2023-02-17 | Stop reason: HOSPADM

## 2023-02-13 RX ADMIN — SODIUM CHLORIDE, PRESERVATIVE FREE 5 ML: 5 INJECTION INTRAVENOUS at 21:20

## 2023-02-13 RX ADMIN — FINASTERIDE 5 MG: 5 TABLET, FILM COATED ORAL at 16:41

## 2023-02-13 RX ADMIN — TEMAZEPAM 7.5 MG: 7.5 CAPSULE ORAL at 21:19

## 2023-02-13 RX ADMIN — SODIUM CHLORIDE, POTASSIUM CHLORIDE, SODIUM LACTATE AND CALCIUM CHLORIDE: 600; 310; 30; 20 INJECTION, SOLUTION INTRAVENOUS at 02:15

## 2023-02-13 RX ADMIN — MICONAZOLE NITRATE: 2 POWDER TOPICAL at 21:15

## 2023-02-13 RX ADMIN — TAMSULOSIN HYDROCHLORIDE 0.4 MG: 0.4 CAPSULE ORAL at 16:41

## 2023-02-13 RX ADMIN — SERTRALINE HYDROCHLORIDE 50 MG: 50 TABLET ORAL at 16:41

## 2023-02-13 RX ADMIN — PANTOPRAZOLE SODIUM 20 MG: 20 TABLET, DELAYED RELEASE ORAL at 16:41

## 2023-02-13 RX ADMIN — ASPIRIN 81 MG: 81 TABLET, CHEWABLE ORAL at 16:41

## 2023-02-13 RX ADMIN — MICONAZOLE NITRATE: 2 POWDER TOPICAL at 05:12

## 2023-02-13 RX ADMIN — METOPROLOL TARTRATE 25 MG: 25 TABLET, FILM COATED ORAL at 21:14

## 2023-02-13 RX ADMIN — SODIUM CHLORIDE: 9 INJECTION, SOLUTION INTRAVENOUS at 16:27

## 2023-02-13 RX ADMIN — ATORVASTATIN CALCIUM 40 MG: 40 TABLET, FILM COATED ORAL at 21:14

## 2023-02-13 RX ADMIN — QUETIAPINE FUMARATE 50 MG: 25 TABLET ORAL at 21:14

## 2023-02-13 NOTE — PROGRESS NOTES
Nurse to bedside in response to bed alarm. Patient kneeling on sofa looking out of the window. Patient confused. Patient returned to bed, depend changed, in NAD.  Bed alarm reset

## 2023-02-13 NOTE — PROGRESS NOTES
Occupational Therapy    [facility] ACUTE CARE OCCUPATIONAL THERAPY EVALUATION  Tarik Rogers, 10/18/1930, 3125/3125-A, 2/13/2023    History  [place]:  The primary encounter diagnosis was Syncope, unspecified syncope type. Diagnoses of Hypotension, unspecified hypotension type, Acute renal failure superimposed on chronic kidney disease, unspecified CKD stage, unspecified acute renal failure type ([address] Elevated lactic acid level, and Dehydration were also pertinent to this visit. Patient  has a past medical history of Anemia, Anxiety, Arthritis, BPH with urinary obstruction, Depression, GERD (gastroesophageal reflux disease), Hemorrhoids, Hepatitis, Hernia of unspecified site of abdominal cavity without mention of obstruction or gangrene, Hyperlipidemia, Hypotension, and SCC (squamous cell carcinoma). Patient  has a past surgical history that includes Neck surgery; Cataract removal; hernia repair; hiatal hernia repair (N/A, 3/4/2021); Cystoscopy (N/A, 3/29/2021); Cystoscopy (N/A, 4/1/2021); and Cholecystectomy, laparoscopic (N/A, 11/15/2022). Subjective:  Patient states:  Pt repeating conversations due to decreased cognition. Pain:  No.    Communication with other providers:  Handoff to RN, co-eval with PT.    Restrictions: General Precautions, Fall Risk    Home Setup/Prior level of function  Social/Functional History  Lives With: Family  Bathroom Equipment: Grab bars in shower  Home Equipment: Jack Highman, rolling, [place]  Has the patient had two or more falls in the past year or any fall with injury in the past year?: No  ADL Assistance: Independent  Homemaking Assistance: Needs assistance (daughter performs)  Ambulation Assistance: Independent (mod I with FWW, wc for community mobility)  Transfer Assistance: Independent  Active : Yes  Occupation: Retired    Pt has had multiple falls before recent hospitalization     Examination of body systems (includes body structures/functions, activity/participation limitations):  Observation:  Sitting upright in chair, agreeable to therapy   Vision:  Glasses  Hearing:  [place] Bilateral Hearing Aides  Cardiopulmonary:  No 02 needs. BP in stand: 155/86 (103)      Body Systems and functions:  ROM R/L:  WFL. Strength R/L:  4+/5,   Sensation: WFL  Tone: Normal  Coordination: WFL  Perception: min decreased initiation/sequencing    Activities of Daily Living (ADLs):  Feeding: Supervision (sitting upright in chair drinking water at end of session)  Grooming: CGA (washing hands in stand at sink w/ frequent Vcs for sequencing)  UB bathing: SBA  LB bathing: CGA  UB dressing: SBA  LB dressing: CGA  Toileting: CGA    Cognitive and Psychosocial Functioning:  Overall cognitive status: AxO to self only, decreased problem solving, decreased safety awareness, follows one step commands w/ repetition, decreased initiation/sequencing  Affect: Pleasantly confused       Mobility:  Supine to sit:  pt received sitting upright in chair  Transfers: Alfonzo from reclining chair up to RW, Alfonzo from standard commode  Sitting balance:  Supervision. Standing balance:  CGA w/ RW.   Functional Mobility Alfonzo w/ RW due to poor sequencing and problem solving  Toilet/Shower Transfers: Alfonzo to/from standard commode             AM-PAC Daily Activity - Inpatient   How much help is needed for putting on and taking off regular lower body clothing?: A Little  How much help is needed for bathing (which includes washing, rinsing, drying)?: A Little  How much help is needed for toileting (which includes using toilet, bedpan, or urinal)?: A Little  How much help is needed for putting on and taking off regular upper body clothing?: A Little  How much help is needed for taking care of personal grooming?: A Little  How much help for eating meals?: None  AM-[facility] Inpatient Daily Activity Raw Score: 19  AM-PAC Inpatient ADL T-Scale Score : 40.22  ADL Inpatient CMS 0-100% Score: 42.8  ADL Inpatient CMS G-Code Modifier : CK    Treatment:  Self Care Training:   Cues were given for safety, sequence, UE/LE placement, visual cues, and balance. Activities performed today included grooming, toilet transfer, feeding     Safety: patient left in chair with chair alarm, call light within reach, RN notified, gait belt used. Assessment:  Pt is a 81 yo male admitted from home for dizziness. Pt currently presents w/ deficits in ADL and high level IADL independence, functional activity tolerance, dynamic sitting and standing balance and tolerance and functional transfers, BUE strength, initiation/sequencing and cognition. Pt would benefit from continued acute care OT services w/ discharge to SNF  Complexity: Moderate  Prognosis: Good, no significant barriers to participation at this time. Occupational Therapy Plan  Times Per Week: 3x+  Times Per Day:  Once a day  Current Treatment Recommendations: Strengthening, Balance training, ROM, Functional mobility training, Safety education & training, Endurance training, Patient/Caregiver education & training, Pain management, Equipment evaluation, education, & procurement, Cognitive reorientation, Positioning, Self-Care / ADL, Home management training, Cognitive/Perceptual training, Coordination training     Equipment: defer    Goals:  Pt goal: go home  Time Frame for STGs: discharge  Goal 1: Pt will perform UE ADLs Supervision  Goal 2: Pt will perform LE ADLs Supervision  Goal 3: Pt will perform toileting Supervision  Goal 4: Pt will perform functional transfer w/ AD Supervision  Goal 5: Pt will perform functional mobility w/ AD Supervision  Goal 6: Pt will perform therex/theract in order to increase functional activity tolerance and dynamic standing balance    Treatment plan:  Pt will perform functional task in stand reaching in all 3 planes in order to increase dynamic standing balance and functional activity tolerance    Recommendations for NURSING activity: Up to chair for all 3 meals and up to standard commode for all toileting needs    Time:   Time in: 1347  Time out: 1411  Timed treatment minutes: 9 minutes  Total time: 24 minutes    Electronically signed by:    Sascha CLAUDIO/L [id number]  2:39 PM,2/13/2023

## 2023-02-13 NOTE — CONSULTS
[facility],2Nd Floor ACUTE CARE PHYSICAL THERAPY EVALUATION  Kulwant Jane, 10/18/1930, 3125/3125-A, 2/13/2023    History  [place]:  The primary encounter diagnosis was Syncope, unspecified syncope type. Diagnoses of Hypotension, unspecified hypotension type, Acute renal failure superimposed on chronic kidney disease, unspecified CKD stage, unspecified acute renal failure type ([address] Elevated lactic acid level, and Dehydration were also pertinent to this visit. Patient  has a past medical history of Anemia, Anxiety, Arthritis, BPH with urinary obstruction, Depression, GERD (gastroesophageal reflux disease), Hemorrhoids, Hepatitis, Hernia of unspecified site of abdominal cavity without mention of obstruction or gangrene, Hyperlipidemia, Hypotension, and SCC (squamous cell carcinoma). Patient  has a past surgical history that includes Neck surgery; Cataract removal; hernia repair; hiatal hernia repair (N/A, 3/4/2021); Cystoscopy (N/A, 3/29/2021); Cystoscopy (N/A, 4/1/2021); and Cholecystectomy, laparoscopic (N/A, 11/15/2022). Subjective:  Patient states: \"My Son had a Pringle Retriever that was smarter than most humans! \". Pain:  pt without c/o. Communication with other providers:  Handoff to RN, co-eval with OT, [address] [place] [address] for safety. Discussed with CM [place]  Restrictions: Confusion/dementia, fall risk, tele, IV, borden, general    Home Setup/Prior level of function    Lives With: Son, grandson (? Questionable) who assist pt in ADLs. *Below information taken from last admission and updated as able. Pt poor historian.     Type of Home: House  Home Layout: Laundry in basement, Able to Live on Main level with bedroom/bathroom, Two level  Home Access: Stairs to enter without rails  Entrance Stairs - Number of Steps: 3  Bathroom Shower/Tub: Tub/Shower unit, Shower chair with back  Bathroom Toilet: Standard  Bathroom Equipment: Shower chair  Home Equipment: Walker, rolling (pt unsure if standard or rolling walker)  Has the patient had two or more falls in the past year or any fall with injury in the past year?: x2 recently. Pt syncope in shower  ADL Assistance: Needs assist  Homemaking Assistance: Needs assistance (Son)  Ambulation Assistance: Independent with RW  Transfer Assistance: Independent  Active : No  Patient's  Info: son drives  Additional Comments: pt somewhat questionable historian this date. son lives in [address] [place] [address]    Examination of body systems (includes body structures/functions, activity/participation limitations):  Observation:  pt is awake up in chair upon arrival  Vision:  [facility]  Hearing:  [place] with hearing aides   Cardiopulmonary:  On RA, BP in stand: 155/86 (103)  Cognition: h/o dementia, pt requires increased cues and assist for safety and direction, see OT/SLP note for further evaluation. Musculoskeletal  ROM R/L:  WFL BLE. Strength R/L:  Generally 4-/5, decreased in function and endurance. Neuro:  pt reported intact LE sensation, balance fair- with RW    Gait pattern: Pt demonstrates step-to pattern APARNA with overall decreased foot clearance and stride length, increased kyphotic posture with cues for decreased excursion of RW. Mobility:  Transfers: CGA-Mod  Sitting balance:  SBA. Standing balance:  CGA. Gait: CGA    [facility] 6 Clicks Inpatient Mobility:  AM-PAC Inpatient Mobility Raw Score : 16    Treatment:  *pt required significant cues throughout for guidance d/t confusion. Sitting balance: Pt demonstrates ability to WS and scoot to front of recliner with UE support, CGA and cues to prevent premature STS. Pt able to perform trunk anterior WS multiple reps with cues and SBA. STS: Pt performs STS from recliner to RW with Min A for balance with transition to upright, cues for UE placement with AD. Min increased time to complete. Standing balance: Pt with mild initial A/P sway without LOB, CGA for safety.      Gait: Pt AMB x8 ft to BR with RW, CGA, cues for direction. Preparing for transfer to commode pt required Mod A and heavy cueing/RW management d/t safety concerns and pt poor sequencing. RTS at commode with max cues and Min A to control. Pt seated x30 sec, mild confusion, initiates STS impulsively, Min A for balance. AMB to sink x2 ft with Min A for RW management and direction. Standing balance at sink w/ intermittent UE support x2 min CGA. AMB x8 ft back to chair with RW CGA. RTS with CGA and cues for seated rest.    STS: From recliner to RW with CGA and cues, pt increased time to upright. Gait: Pt AMB in hallway an additional x20+ x20 ft with brief standing rest break, chair follow, RW, close CGA. Pt demonstrates step-to pattern APARNA with overall decreased foot clearance and stride length, increased kyphotic posture with cues for decreased excursion of RW. End of trial RTS at chair with cues and Min A for control. Pt returned to room via chair    Safety: patient left in recliner with LE elevated, tray overtop, chair alarm, call light within reach, RN notified, gait belt used. Assessment:    Pt is a80 y/o male admitted 2/12 with c/o  dizziness. Pt with syncopal episode at home, 2 recent falls. Patient with significant h/o Anxiety, Arthritis, BPH with urinary obstruction, Depression, GERD (gastroesophageal reflux disease), Hemorrhoids, Hepatitis, Hernia of unspecified site of abdominal cavity without mention of obstruction or gangrene, Hyperlipidemia, Hypotension, and SCC (squamous cell carcinoma). , see chart. Pt is poor historian at this time d/t baseline dementia. Pt states has been performing ADLs/IADLs with family '' sometimes' helping, pt does AMB with RW. At this time pt appears to be functioning below baseline. Pt is now presenting with impairments in LE strength, functional endurance, safety awareness, balance, cognition, transfers, gait.  Pt would benefit from skilled PT services in order to address impairments and promote return to PLOF. PT to recommend d/c to SNF. Complexity: Moderate  Prognosis: Good, no significant barriers to participation at this time. Plan General Plan: 2-3 times per week/week, 1 week,   Discharge Recommendations: Subacute/Skilled Nursing Facility  Equipment: Defer    Goals:  Short Term Goals  Time Frame for Short Term Goals: 1 week  Short Term Goal 1: Pt will AMB x35 ft with RW, SBA, cues for path  Short Term Goal 2: Pt will perform all bed mobility with cues and CGA. Short Term Goal 3: Pt will participate in x5 minutes standing dynamic balance training with UE support, CGA.        Treatment plan:  Bed mobility, transfers, balance, gait, TA, TX    Recommendations for NURSING mobility: AMB short distance with RW and Min A    Time:   Time in: 13:47  Time out: 14:10  Timed treatment minutes: 13  Total time: 23    Electronically signed by:    Rosie Hernandez, PT  8/93/3058, 9:42 PM  PT Lic #: [id number]

## 2023-02-13 NOTE — CARE COORDINATION
.Case Management Assessment  Initial Evaluation    Date/Time of Evaluation: 2/13/2023 12:40 PM  Assessment Completed by: Nelda Shultz RN    If patient is discharged prior to next notation, then this note serves as note for discharge by case management. Patient Name: Kash Taylor                   YOB: 1930  Diagnosis: Syncope and collapse [R55]  Dehydration [E86.0]  Dizziness [R42]  Elevated lactic acid level [R79.89]  Hypotension, unspecified hypotension type [I95.9]  Syncope, unspecified syncope type [R55]  Acute renal failure superimposed on chronic kidney disease, unspecified CKD stage, unspecified acute renal failure type ([facility] [address] [N17.9, N18.9]                   Date / Time: 2/12/2023  4:16 PM    Patient Admission Status: Inpatient   Readmission Risk (Low < 19, Mod (19-27), High > 27): Readmission Risk Score: 22.7    Current PCP: No primary care provider on file. PCP verified by CM? Yes    Chart Reviewed: Yes      History Provided by: Child/Family (MPOA/son/Charlie [place])  Patient Orientation: Person    Patient Cognition: Short Term Memory Deficit    Hospitalization in the last 30 days (Readmission):  No    If yes, Readmission Assessment in CM Navigator will be completed. Advance Directives:      Code Status: Full Code   Patient's Primary Decision Maker is:      Primary Decision Maker (Active):  WhitCharlie - Child - [phone]    Discharge Planning:    Patient lives with:   Type of Home:    Primary Care Giver: Family  Patient Support Systems include: Children, Family Members   Current Financial resources: Medicare  Current community resources: ECF/Home Care  Current services prior to admission:              Current DME:              Type of Home Care services:       ADLS  Prior functional level: Assistance with the following:, Bathing, Dressing, Toileting, Housework, Cooking, Shopping, Mobility  Current functional level: Assistance with the following:, Bathing, Dressing, Toileting    PT AM-PAC:   /24  OT AM-PAC:   /24    Family can provide assistance at DC: Yes  Would you like Case Management to discuss the discharge plan with any other family members/significant others, and if so, who? Yes  Plans to Return to Present Housing: Other (see comment) (Son has requested SNF d/t pt is very weak)  Other Identified Issues/Barriers to RETURNING to current housing: pt very weak, has been falling. Potential Assistance needed at discharge:              Potential DME:    Patient expects to discharge to:    Plan for transportation at discharge:      Financial    Payor: Aziza Galicia / Plan: Kenji  14. / Product Type: *No Product type* /     Does insurance require precert for SNF: Yes    Potential assistance Purchasing Medications:    Meds-to-Beds request: Yes      [id number] [place] [facility] [phone] - F [phone]  [address] [address]  Phone: [phone] Fax: [phone]    [address] [place] [address] [phone] - f [phone]  ANGELINA Lira [address] [place] [address]  Phone: [phone] Fax: [phone]      Notes:    Factors facilitating achievement of predicted outcomes: Family support and Cooperative    Barriers to discharge: Confusion, Cognitive deficit, Limited safety awareness, Limited insight into deficits, Decreased endurance, and Medical complications    Additional Case Management Notes: Met with pt and son/KAIDEN/Charlie for d/c planning d/t pt is confused. Pt is from home with daughter. Family provides his care. Pt has a walker, w/c, shower seat, and a BSC. [facility]. Pt's son states that pt needs to go to a SNF d/t he is very weak and has been falling. SNF list offered ,son declined. He has requested [facility] d/t pt has been there before. Referral made to [facility]. PT/OT ordered and requested via WB. Pre-cert required.   TE     The Plan for Transition of Care is related to the following treatment goals of Syncope and collapse [R55]  Dehydration [E86.0]  Dizziness [R42]  Elevated lactic acid level [R79.89]  Hypotension, unspecified hypotension type [I95.9]  Syncope, unspecified syncope type [R55]  Acute renal failure superimposed on chronic kidney disease, unspecified CKD stage, unspecified acute renal failure type ([facility] [address] [N17.9, S54.8]    IF APPLICABLE: The Patient and/or patient representative Nanette Weiner and his family were provided with a choice of provider and agrees with the discharge plan. Freedom of choice list with basic dialogue that supports the patient's individualized plan of care/goals and shares the quality data associated with the providers was provided to:     Patient Representative Name:       The Patient and/or Patient Representative Agree with the Discharge Plan?       Saul Gandhi RN  Case Management Department

## 2023-02-13 NOTE — ED NOTES
ED TO INPATIENT SBAR HANDOFF    Patient Name: Yao Flores   :  10/18/1930  80 y.o. MRN:  [id number]  Preferred Name  Skyler Jain  ED Room #:  TR02/02TR-02  Family/Caregiver Present no   Restraints no   Sitter no   Sepsis Risk Score Sepsis Risk Score: 1.28    Situation  Code Status: Prior No additional code details. Allergies: Minocycline  Weight:   Patient Vitals for the past 96 hrs (Last 3 readings):   Weight   23 1929 220 lb (99.8 kg)     Arrived from: home  Chief Complaint:   Chief Complaint   Patient presents with    Loss of Consciousness     Fall from sitting in shower chair with loss of consciousness. Syncope for several seconds and low blood pressure     Hospital Problem/Diagnosis:  Active Problems:    * No active hospital problems. *  Resolved Problems:    * No resolved hospital problems. *    Imaging:   CT CERVICAL SPINE WO CONTRAST   Final Result   No acute cervical spine fracture. Spinal degenerative changes as described. CT HEAD WO CONTRAST   Final Result   No acute intracranial findings. XR CHEST PORTABLE   Final Result   Fundus congestion along with increased interstitial opacity. Please   correlate with any clinical evidence of interstitial edema.            Abnormal labs:   Abnormal Labs Reviewed   CBC WITH AUTO DIFFERENTIAL - Abnormal; Notable for the following components:       Result Value    RBC 3.47 (*)     Hemoglobin 10.0 (*)     Hematocrit 33.4 (*)     MCHC 29.9 (*)     RDW 15.2 (*)     Monocytes % 9.7 (*)     Eosinophils % 10.0 (*)     Immature Neutrophil % 0.6 (*)     All other components within normal limits   COMPREHENSIVE METABOLIC PANEL - Abnormal; Notable for the following components:    CO2 18 (*)     BUN 26 (*)     Creatinine 2.9 (*)     Est, Glom Filt Rate 20 (*)     Glucose 171 (*)     Albumin 3.3 (*)     Total Protein 5.7 (*)     ALT 7 (*)     All other components within normal limits   TROPONIN - Abnormal; Notable for the following components: Troponin T 0.072 (*)     All other components within normal limits   LACTATE, SEPSIS - Abnormal; Notable for the following components:    Lactic Acid, Sepsis 3.1 (*)     All other components within normal limits   LACTATE, SEPSIS - Abnormal; Notable for the following components:    Lactic Acid, Sepsis 2.1 (*)     All other components within normal limits     Critical values: yes     Abnormal Assessment Findings: see labs    Background  History:   Past Medical History:   Diagnosis Date    Anemia     Anxiety     Arthritis     BPH with urinary obstruction     Depression     GERD (gastroesophageal reflux disease)     Hemorrhoids     Hepatitis     Hernia of unspecified site of abdominal cavity without mention of obstruction or gangrene     Hyperlipidemia     Hypotension     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    Rt Tricep, Lt Superior Forearm       Assessment    Vitals/MEWS: MEWS Score: 1  Level of Consciousness: Alert (0)   Vitals:    02/12/23 1845 02/12/23 1929 02/12/23 2117 02/12/23 2132   BP: (!) 151/78 (!) 142/78 129/62 (!) 141/68   Pulse: 71 80 78 79   Resp: 13 17 15 11   Temp:   97.8 °F (36.6 °C)    TempSrc:   Oral    SpO2:  99%     Weight:  220 lb (99.8 kg)     Height:  6' (1.829 m)       FiO2 (%):   O2 Flow Rate: O2 Device: None (Room air)    Cardiac Rhythm:   Pain Assessment:  [] Verbal [] [employer] Scale  Pain Scale: Pain Assessment  Pain Assessment: None - Denies Pain  Last documented pain score (0-10 scale)    Last documented pain medication administered:   Mental Status: oriented, alert, coherent, logical, thought processes intact, and able to concentrate and follow conversation  NIH Score: NIH     C-SSRS: Risk of Suicide: No Risk  Bedside swallow:    Henry Coma Scale (GCS): Kay Coma Scale  Eye Opening: Spontaneous  Best Verbal Response: Confused  Best Motor Response: Obeys commands  Kay Coma Scale Score: 14  Active LDA's:   Peripheral IV 02/12/23 Left; Anterior Hand (Active) PO Status: Nothing by Mouth  Pertinent or High Risk Medications/Drips: no   o If Yes, please provide details:   Pending Blood Product Administration: no     You may also review the ED PT Care Timeline found under the Summary Nursing Index tab. Recommendation    Pending orders   Plan for Discharge (if known):    Additional Comments:    If any further questions, please call Sending RN at [phone]      Electronically signed by: Electronically signed by Vadim Stacy RN on 2/12/2023 at 9:34 PM       Vadim Stacy RN  02/12/23 2117       Vadim Stacy RN  02/12/23 2118       Vadim Stacy RN  02/12/23 2138

## 2023-02-13 NOTE — CARE COORDINATION
Referral made to [facility]. She will review clinicals and PT/OT notes when in and will notify CM of decision to accept or not. Pt will require a pre-cert.   TE

## 2023-02-13 NOTE — PROGRESS NOTES
V2.0  [facility]      Name:  Sameer Dennison /Age/Sex: 10/18/1930  (80 y.o. male)   MRN & CSN:  [id number] & [id number] Encounter Date/Time: 2023 2:55 PM EST    Location:  [facility]-A PCP: No primary care provider on file. Hospital Day: 2    Assessment and Plan:   Sameer Dennison is a 80 y.o. male with pmh of HTN, BPH, depression, HLD, A-fib on Eliquis, diastolic heart failure, hypothyroidism, history of frequent frequent falls who presents with Dizziness      Plan:  Easiness and lightheadedness, hypotension with suspicion for syncope, likely in the setting of severe dehydration:  -Patient reported to have poor p.o. intake, along with poor appetite and severely dehydrated on initial exam, patient reported that he had episodes of loss of consciousness but was unsure of details on admission. Lactate elevation initially thought to be in the setting of starvation ketosis. Rogelio lactic acid level significantly elevated to 8.2 the patient symptomatically improved, vital signs are stable with no acute distress, no leukocytosis or fever. Trend lactate and follow-up blood culture/inflammatory markers and monitor for now. Patient was not started on IV antibiotics per admitting team giver he did not meet the SIRS criteria but was started on LR at 100 mm/h for hydration. Urinalysis pending. Status post 1 L. Possibility of neurogenic versus cardiogenic syncope, will order TTE and consult cardiology/neurology. Proximal atrial fibrillation:  -Patient on Eliquis 2.5 twice daily with history of frequent falls, will consider discussion with primary attending for Eliquis due to recurrent falls.   We will continue to monitor telemetry  CKD:  -Creatinine 2.8 compared to 2.3 at baseline  -We will consult nephrology  -Strict intake and output record and daily weight  -We will avoid nephrotoxic medication  -We will adjust medications according to patient's creatinine clearance  -Send urine electrolytes and urine urea, calculate FeUrea  Lactic acidosis: Likely in the setting of type II in the setting of dehydration, will trend lactic acid  Benign essential hypertension:  -Hypotensive on admission, will continue as needed medications if blood pressure above 160/100 mmHg  BPH:  -On Flomax and finasteride, discontinued because of possible orthostatic blood pressure related hypotension  HLD:  Diastolic heart failure, chronic with preserved ejection fraction: On Lasix  Hypothyroidism: On levothyroxine  History of frequent falls: Patient reports 2 falls in the past 3 days. Diet ADULT DIET; Regular; 5 carb choices (75 gm/meal)   DVT Prophylaxis [x] Lovenox, []  Heparin, [] SCDs, [] Ambulation,  [] Eliquis, [] Xarelto  [] Coumadin   Code Status Full Code   Disposition From: Home  Expected Disposition: Possibly SNF  Estimated Date of Discharge: 2-3 days  Patient requires continued admission due to currently having acute confusion/work-up for syncope   Surrogate Decision Maker/ POA      Subjective:     Chief Complaint: Loss of Consciousness (Fall from sitting in shower chair with loss of consciousness. Syncope for several seconds and low blood pressure)       Tianna Allred is a 80 y.o. male who presents with falls     Patient currently denies any significant symptoms, is AAO X2    Review of Systems:    Review of Systems    Unremarkable except as above although unreliable given patient is AAO X2    Objective: Intake/Output Summary (Last 24 hours) at 2/13/2023 1455  Last data filed at 2/13/2023 0809  Gross per 24 hour   Intake 450 ml   Output 1000 ml   Net -550 ml        Vitals:   Vitals:    02/13/23 1156   BP:    Pulse: 86   Resp: 14   Temp:    SpO2: 97%       Physical Exam:     General: NAD  Eyes: EOMI  ENT: neck supple  Cardiovascular: Regular rate.   Respiratory: Clear to auscultation  Gastrointestinal: Soft, non tender  Genitourinary: no suprapubic tenderness  Musculoskeletal: No edema  Skin: warm, dry  Neuro: Alert.  Psych: Mood appropriate.      Medications:   Medications:    sodium chloride flush  5-40 mL IntraVENous 2 times per day    enoxaparin  30 mg SubCUTAneous Daily    miconazole   Topical BID    sodium chloride  1,000 mL IntraVENous Once      Infusions:    sodium chloride      lactated ringers IV soln 75 mL/hr at 02/13/23 0215     PRN Meds: sodium chloride flush, 5-40 mL, PRN  sodium chloride, , PRN  ondansetron, 4 mg, Q8H PRN   Or  ondansetron, 4 mg, Q6H PRN  polyethylene glycol, 17 g, Daily PRN  acetaminophen, 650 mg, Q6H PRN   Or  acetaminophen, 650 mg, Q6H PRN        Labs      Recent Results (from the past 24 hour(s))   CBC with Auto Differential    Collection Time: 02/12/23  4:29 PM   Result Value Ref Range    WBC 6.6 4.0 - 10.5 K/CU MM    RBC 3.47 (L) 4.6 - 6.2 M/CU MM    Hemoglobin 10.0 (L) 13.5 - 18.0 GM/DL    Hematocrit 33.4 (L) 42 - 52 %    MCV 96.3 78 - 100 FL    MCH 28.8 27 - 31 PG    MCHC 29.9 (L) 32.0 - 36.0 %    RDW 15.2 (H) 11.7 - 14.9 %    Platelets 644 755 - 529 K/CU MM    MPV 9.8 7.5 - 11.1 FL    Differential Type AUTOMATED DIFFERENTIAL     Segs Relative 52.4 36 - 66 %    Lymphocytes % 26.5 24 - 44 %    Monocytes % 9.7 (H) 0 - 4 %    Eosinophils % 10.0 (H) 0 - 3 %    Basophils % 0.8 0 - 1 %    Segs Absolute 3.5 K/CU MM    Lymphocytes Absolute 1.8 K/CU MM    Monocytes Absolute 0.6 K/CU MM    Eosinophils Absolute 0.7 K/CU MM    Basophils Absolute 0.1 K/CU MM    Nucleated RBC % 0.0 %    Total Nucleated RBC 0.0 K/CU MM    Total Immature Neutrophil 0.04 K/CU MM    Immature Neutrophil % 0.6 (H) 0 - 0.43 %   Comprehensive Metabolic Panel    Collection Time: 02/12/23  4:29 PM   Result Value Ref Range    Sodium 138 135 - 145 MMOL/L    Potassium 3.6 3.5 - 5.1 MMOL/L    Chloride 104 99 - 110 mMol/L    CO2 18 (L) 21 - 32 MMOL/L    BUN 26 (H) 6 - 23 MG/DL    Creatinine 2.9 (H) 0.9 - 1.3 MG/DL    Est, Glom Filt Rate 20 (L) >60 mL/min/1.73m2    Glucose 171 (H) 70 - 99 MG/DL    Calcium 8.4 8.3 - 10.6 MG/DL Albumin 3.3 (L) 3.4 - 5.0 GM/DL    Total Protein 5.7 (L) 6.4 - 8.2 GM/DL    Total Bilirubin 0.4 0.0 - 1.0 MG/DL    ALT 7 (L) 10 - 40 U/L    AST 18 15 - 37 IU/L    Alkaline Phosphatase 96 40 - 129 IU/L    Anion Gap 16 4 - 16   Lactate, Sepsis    Collection Time: 02/12/23  4:29 PM   Result Value Ref Range    Lactic Acid, Sepsis 3.1 (HH) 0.5 - 1.9 mMOL/L   EKG 12 Lead    Collection Time: 02/12/23  4:30 PM   Result Value Ref Range    Ventricular Rate 55 BPM    Atrial Rate 55 BPM    P-R Interval 232 ms    QRS Duration 94 ms    Q-T Interval 468 ms    QTc Calculation (Bazett) 447 ms    P Axis 33 degrees    R Axis -32 degrees    T Axis 0 degrees    Diagnosis       Sinus bradycardia with 1st degree AV block  Left axis deviation  Cannot rule out Anterior infarct , age undetermined  Abnormal ECG  When compared with ECG of 13-NOV-2022 09:16,  Minimal criteria for Anterior infarct are now present  Confirmed by [facility] Demetrius KOCH ([id number]) on 2/13/2023 9:46:12 AM     Troponin    Collection Time: 02/12/23  4:45 PM   Result Value Ref Range    Troponin T 0.072 (H) <0.01 NG/ML   Lactate, Sepsis    Collection Time: 02/12/23  6:17 PM   Result Value Ref Range    Lactic Acid, Sepsis 2.1 (HH) 0.5 - 1.9 mMOL/L   Troponin    Collection Time: 02/13/23  3:16 AM   Result Value Ref Range    Troponin T 0.051 (H) <0.01 NG/ML   Lactic Acid    Collection Time: 02/13/23  3:16 AM   Result Value Ref Range    Lactate 8.2 (HH) 0.5 - 1.9 mMOL/L   CBC    Collection Time: 02/13/23  3:16 AM   Result Value Ref Range    WBC 5.2 4.0 - 10.5 K/CU MM    RBC 2.79 (L) 4.6 - 6.2 M/CU MM    Hemoglobin 8.1 (L) 13.5 - 18.0 GM/DL    Hematocrit 26.6 (L) 42 - 52 %    MCV 95.3 78 - 100 FL    MCH 29.0 27 - 31 PG    MCHC 30.5 (L) 32.0 - 36.0 %    RDW 14.8 11.7 - 14.9 %    Platelets 483 468 - 679 K/CU MM    MPV 9.5 7.5 - 11.1 FL   Basic Metabolic Panel    Collection Time: 02/13/23  3:16 AM   Result Value Ref Range    Sodium 142 135 - 145 MMOL/L    Potassium 3.6 3.5 - 5.1 MMOL/L Chloride 107 99 - 110 mMol/L    CO2 23 21 - 32 MMOL/L    Anion Gap 12 4 - 16    BUN 24 (H) 6 - 23 MG/DL    Creatinine 2.8 (H) 0.9 - 1.3 MG/DL    Est, Glom Filt Rate 21 (L) >60 mL/min/1.73m2    Glucose 98 70 - 99 MG/DL    Calcium 8.2 (L) 8.3 - 10.6 MG/DL   Magnesium    Collection Time: 02/13/23  3:16 AM   Result Value Ref Range    Magnesium 1.8 1.8 - 2.4 mg/dl   Phosphorus    Collection Time: 02/13/23  3:16 AM   Result Value Ref Range    Phosphorus 3.1 2.5 - 4.9 MG/DL   Troponin    Collection Time: 02/13/23  9:32 AM   Result Value Ref Range    Troponin T 0.061 (H) <0.01 NG/ML        Imaging/Diagnostics Last 24 Hours   CT HEAD WO CONTRAST    Result Date: 2/12/2023  EXAMINATION: CT OF THE HEAD WITHOUT CONTRAST  2/12/2023 2:46 pm TECHNIQUE: CT of the head was performed without the administration of intravenous contrast. Automated exposure control, iterative reconstruction, and/or weight based adjustment of the mA/kV was utilized to reduce the radiation dose to as low as reasonably achievable. COMPARISON: 11/13/2022. HISTORY: ORDERING SYSTEM PROVIDED HISTORY: Fall TECHNOLOGIST PROVIDED HISTORY: Has a \"code stroke\" or \"stroke alert\" been called? ->No Reason for exam:->Fall Decision Support Exception - unselect if not a suspected or confirmed emergency medical condition->Emergency Medical Condition (MA) Reason for Exam: Fall FINDINGS: BRAIN/VENTRICLES: There is no acute intracranial hemorrhage, mass effect or midline shift. No abnormal extra-axial fluid collection. The gray-white differentiation is maintained without evidence of an acute infarct. There is no evidence of hydrocephalus. There is generalized volume loss. Areas of white matter hypoattenuation appears similar to the prior study. ORBITS: The visualized portion of the orbits demonstrate no acute abnormality. SINUSES: The visualized paranasal sinuses and mastoid air cells demonstrate no acute abnormality.  SOFT TISSUES/SKULL:  No acute abnormality of the visualized skull or soft tissues. No acute intracranial findings. CT CERVICAL SPINE WO CONTRAST    Result Date: 2/12/2023  EXAMINATION: CT OF THE CERVICAL SPINE WITHOUT CONTRAST 2/12/2023 2:46 pm TECHNIQUE: CT of the cervical spine was performed without the administration of intravenous contrast. Multiplanar reformatted images are provided for review. Automated exposure control, iterative reconstruction, and/or weight based adjustment of the mA/kV was utilized to reduce the radiation dose to as low as reasonably achievable. COMPARISON: 11/13/2022. HISTORY: ORDERING SYSTEM PROVIDED HISTORY: Fall TECHNOLOGIST PROVIDED HISTORY: Reason for exam:->Fall Decision Support Exception - unselect if not a suspected or confirmed emergency medical condition->Emergency Medical Condition (MA) Reason for Exam: Fall FINDINGS: BONES/ALIGNMENT: There is normal spinal alignment. C1 and C2 have a normal relationship. No acute fracture is identified. DEGENERATIVE CHANGES: Multilevel degenerative disc disease is most prominent and moderate to advanced at C6-C7. Multilevel facet arthropathy is worst and severe on the left at C3-C4 and C4-C5. No critical central canal narrowing is identified. SOFT TISSUES: There is no prevertebral soft tissue swelling. No acute cervical spine fracture. Spinal degenerative changes as described. XR CHEST PORTABLE    Result Date: 2/12/2023  EXAMINATION: ONE XRAY VIEW OF THE CHEST 2/12/2023 2:11 pm COMPARISON: 11/13/2022 HISTORY: ORDERING SYSTEM PROVIDED HISTORY: chest pain TECHNOLOGIST PROVIDED HISTORY: Reason for exam:->chest pain Reason for Exam: chest pain FINDINGS: Cardial pericardial silhouette is unremarkable. Pulmonary vasculature is congested and there is increased interstitial opacity. Chronic pleural effusion on the left is noted. No pneumothorax. No free air. No acute bony abnormality. Fundus congestion along with increased interstitial opacity.   Please correlate with any clinical evidence of interstitial edema.        Electronically signed by Catrachita Lopez MD on 2/13/2023 at 2:55 PM

## 2023-02-13 NOTE — PROGRESS NOTES
4 Eyes Skin Assessment     NAME:  Tanvir Vasquez  YOB: 1930  MEDICAL RECORD NUMBER:  [id number]    The patient is being assessed for  Admission    I agree that One RN has performed a thorough Head to Toe Skin Assessment on the patient. ALL assessment sites listed below have been assessed. Areas assessed by both nurses:  Whole body        Does the Patient have a Wound? Yes wound(s) were present on assessment.  LDA wound assessment was Initiated and completed by RN       Jasmeet Prevention initiated by RN: No   Wound Care Orders initiated by RN: No    Pressure Injury (Stage 3,4, Unstageable, DTI, NWPT, and Complex wounds) if present, place referral order by RN under : No    New and Established Ostomies, if present place, referral order under : No      Nurse 1 eSignature: Electronically signed by Prince Castillo RN on 2/13/23 at 4:08 AM EST    **SHARE this note so that the co-signing nurse can place an eSignature**    Nurse 2 eSignature: Electronically signed by Michael Burton RN on 2/13/23 at 4:34 AM EST

## 2023-02-13 NOTE — CARE COORDINATION
Chart reviewed. Noted that pt is weak an confused. PT/OT ordered and requested vai w/b to help determine a safe d/c plan. Will call son for d/c planning.  TE

## 2023-02-13 NOTE — H&P
V2.0  History and Physical      Name:  Ric Aguilar /Age/Sex: 10/18/1930  (80 y.o. male)   MRN & CSN:  [id number] & [id number] Encounter Date/Time: 2023 11:31 PM EST   Location:  [facility] PCP: No primary care provider on file. Hospital Day: 1    Assessment and Plan:   Ric Aguilar is a 80 y.o. male with a pmh of Hypertension, BPH, depression, hyperlipidemia, atrial fibrillation on Eliquis, diastolic heart failure, hypothyroidism, history of frequent falls who presents with Dizziness    Hospital Problems             Last Modified POA    * (Principal) Dizziness 2023 Yes       Dizziness and lightheadedness hypotension with suspicion for syncope? Likely in the setting of severe dehydration: Has not been eating well. Poor appetite. Severely dehydrated. Patient said that patient may have had loss of consciousness but unsure? Lactate is elevated likely related to starvation ketosis. UA is pending. As discussed with the ED team does not meet SIRS criteria for sepsis not started on sepsis protocol but started on lactate assessment every few hours along with LR at 100 mm/h for hydration purposes. Not started on antibiotics. Consider antibiotics if the sepsis signs develop. Paroxysmal atrial fibrillation on Eliquis 2.5 mg twice daily: Patient has frequent falls in the last 1 year would recommend discussion with the primary attending in the morning regarding need for Eliquis because the risk versus benefits.   Continue telemetry  Lactic acidosis likely type B in the setting of severe dehydration continue to monitor  Benign essential hypertension  BPH on Flomax and finasteride discontinued the patient orthostatic avoid for now  Hyperlipidemia  Diastolic heart failure chronic with preserved ejection fraction on Lasix  Hypothyroidism on levothyroxine  History of frequent falls, patient had 2 falls in the last 3 days    Disposition:   Current Living situation: Home  Expected Disposition: Likely rehab  Estimated D/C: 3 to 4 days    Diet No diet orders on file   DVT Prophylaxis [] Lovenox, []  Heparin, [] SCDs, [] Ambulation,  [] Eliquis, [] Xarelto   Code Status Prior   Surrogate Decision Maker/ POA      History from:     patient    History of Present Illness:     Chief Complaint: Dizziness  Mulugeta Diego is a 80 y.o. male who presents with syncope? Flores Gomez The patient is a poor historian due to bilateral hearing loss but was able to tell me that for the last several weeks patient has been having frequent falls. 2 falls in the last 3 days. Reportedly patient went to the restroom and felt like losing consciousness sat down and transiently lost consciousness for a few seconds. Called EMS and was brought to the hospital.  Patient denies any chest pain leg pain leg swelling nausea vomiting diarrhea or constipation. Patient reports mild headache did not hit his head. Was not confused after waking up. He is more concerned about the falls and the loss of consciousness. .  Patient does get lightheaded when he stands up when the patient arrived patient blood pressure was low which got much better significantly with 1 L of bolus of LR. Lactate is improving with IV hydration does not meet SIRS criteria for sepsis. Admitted for dehydration and hypotension management. Review of Systems: Need 10 Elements   Review of Systems   Constitutional:  Positive for activity change, appetite change, fatigue and unexpected weight change. Negative for chills and fever. HENT:  Positive for hearing loss. Negative for ear pain, sinus pressure, sinus pain and voice change. Eyes:  Negative for pain, discharge and visual disturbance. Respiratory:  Negative for cough, chest tightness and shortness of breath. Cardiovascular:  Negative for chest pain, palpitations and leg swelling. Gastrointestinal:  Negative for abdominal pain, blood in stool, diarrhea, nausea and vomiting. Genitourinary:  Negative for dysuria and frequency. Musculoskeletal:  Positive for arthralgias and back pain. Skin:  Positive for color change and pallor. Neurological:  Positive for weakness. Negative for dizziness, light-headedness and headaches. Psychiatric/Behavioral:  Positive for decreased concentration and sleep disturbance. Objective:   No intake or output data in the 24 hours ending 02/12/23 2331   Vitals:   Vitals:    02/12/23 1929 02/12/23 2117 02/12/23 2132 02/12/23 2247   BP: (!) 142/78 129/62 (!) 141/68 (!) 116/53   Pulse: 80 78 79 78   Resp: 17 15 11 10   Temp:  97.8 °F (36.6 °C)     TempSrc:  Oral     SpO2: 99%      Weight: 220 lb (99.8 kg)      Height: 6' (1.829 m)          Medications Prior to Admission     Prior to Admission medications    Medication Sig Start Date End Date Taking?  Authorizing Provider   amLODIPine (NORVASC) 5 MG tablet Take 2 tablets by mouth daily 2/3/23   Adriane Houston DO   metoprolol tartrate (LOPRESSOR) 25 MG tablet Take 1 tablet by mouth 2 times daily 2/3/23   Adriane Houston DO   tamsulosin (FLOMAX) 0.4 MG capsule Take 1 capsule by mouth nightly 2/3/23   Adriane Houston DO   finasteride (PROSCAR) 5 MG tablet TAKE 1 TABLET BY MOUTH EVERY DAY 11/7/22   Óscar Dick DO   QUEtiapine (SEROQUEL) 50 MG tablet Take 1 tablet by mouth nightly 10/14/22   Cristi Barton MD   sennosides-docusate sodium (SENOKOT-S) 8.6-50 MG tablet Take 2 tablets by mouth 2 times daily as needed for Constipation 10/14/22   Cristi Barton MD   benzonatate (TESSALON) 200 MG capsule Take 1 capsule by mouth 3 times daily as needed for Cough 10/6/22   Antoni Anders PA-C   atorvastatin (LIPITOR) 40 MG tablet Take 1 tablet by mouth nightly 8/31/22   Osman Correa MD   aspirin 81 MG chewable tablet Take 1 tablet by mouth daily 9/1/22   Osman Correa MD   ELIQUIS 2.5 MG TABS tablet TAKE 1 TABLET BY MOUTH TWICE A DAY 8/30/22 8/31/22  Óscar Dick DO   furosemide (LASIX) 20 MG tablet Take 1 tablet by mouth daily 8/19/22 8/31/22  Historical Provider, MD   pantoprazole (PROTONIX) 20 MG tablet TAKE 1 TABLET BY MOUTH EVERY DAY 7/5/22   Óscar Dick DO   levothyroxine (SYNTHROID) 75 MCG tablet TAKE 1 TABLET BY MOUTH EVERY DAY 5/2/22   Óscar Dick, DO   sertraline (ZOLOFT) 50 MG tablet Take 50 mg by mouth daily    Historical Provider, MD   temazepam (RESTORIL) 7.5 MG capsule Take 7.5 mg by mouth at bedtime. Historical Provider, MD       Physical Exam: Need 8 Elements   Physical Exam  Vitals reviewed. Constitutional:       Appearance: Normal appearance. He is normal weight. HENT:      Head: Normocephalic. Right Ear: Tympanic membrane normal.      Left Ear: Tympanic membrane normal.      Nose: Nose normal.      Mouth/Throat:      Mouth: Mucous membranes are moist.   Eyes:      Conjunctiva/sclera: Conjunctivae normal.      Pupils: Pupils are equal, round, and reactive to light. Cardiovascular:      Rate and Rhythm: Normal rate and regular rhythm. Pulses: Normal pulses. Heart sounds: Normal heart sounds. No murmur heard. Pulmonary:      Effort: Pulmonary effort is normal.      Breath sounds: Normal breath sounds. No wheezing, rhonchi or rales. Abdominal:      General: Abdomen is flat. Bowel sounds are normal. There is no distension. Palpations: Abdomen is soft. Tenderness: There is no abdominal tenderness. Musculoskeletal:         General: No deformity. Normal range of motion. Cervical back: Normal range of motion and neck supple. Right lower leg: No edema. Left lower leg: No edema. Skin:     Coloration: Skin is not jaundiced or pale. Neurological:      General: No focal deficit present. Mental Status: He is alert and oriented to person, place, and time. Mental status is at baseline. Motor: Weakness present.    Psychiatric:         Mood and Affect: Mood normal.         Behavior: Behavior normal.          Past History:   PMHx   Past Medical History:   Diagnosis Date    Anemia     Anxiety     Arthritis     BPH with urinary obstruction     Depression     GERD (gastroesophageal reflux disease)     Hemorrhoids     Hepatitis     Hernia of unspecified site of abdominal cavity without mention of obstruction or gangrene     Hyperlipidemia     Hypotension     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    Rt Tricep, Lt Superior Forearm        PSHX:  has a past surgical history that includes Neck surgery; Cataract removal; hernia repair; hiatal hernia repair (N/A, 3/4/2021); Cystoscopy (N/A, 3/29/2021); Cystoscopy (N/A, 2021); and Cholecystectomy, laparoscopic (N/A, 11/15/2022). Fam HX: family history includes COPD in his father; Depression in his mother; Diabetes in his brother, maternal grandmother, and mother. Soc HX:   Social History     Socioeconomic History    Marital status:    Tobacco Use    Smoking status: Former     Packs/day: 1.00     Years: 5.00     Pack years: 5.00     Types: Cigarettes     Start date: 1950     Quit date: 1955     Years since quittin.3    Smokeless tobacco: Never   Substance and Sexual Activity    Alcohol use: Not Currently    Drug use: Not Currently     Social Determinants of Health     Financial Resource Strain: Low Risk     Difficulty of Paying Living Expenses: Not hard at all   Food Insecurity: No Food Insecurity    Worried About Running Out of Food in the Last Year: Never true    Ran Out of Food in the Last Year: Never true       Allergies: Allergies:    Allergies   Allergen Reactions    Minocycline Other (See Comments)       Medications:   Medications:    sodium chloride  1,000 mL IntraVENous Once      Infusions:   PRN Meds:      Labs      CBC:   Recent Labs     23  1629   WBC 6.6   HGB 10.0*        BMP:    Recent Labs     23  1629      K 3.6      CO2 18*   BUN 26*   CREATININE 2.9*   GLUCOSE 171*     Hepatic:   Recent Labs     23  1629   AST 18   ALT 7*   BILITOT 0.4   ALKPHOS 96 Lipids:   Lab Results   Component Value Date/Time    CHOL 229 08/22/2022 02:17 AM    CHOL 157 11/19/2018 12:00 AM    HDL 69 08/22/2022 02:17 AM    TRIG 85 08/22/2022 02:17 AM     Hemoglobin A1C:   Lab Results   Component Value Date/Time    LABA1C 5.8 08/22/2022 02:17 AM     TSH:   Lab Results   Component Value Date/Time    TSH 4.07 05/24/2021 11:32 AM     Troponin:   Lab Results   Component Value Date/Time    TROPONINT 0.072 02/12/2023 04:45 PM    TROPONINT 0.059 11/14/2022 05:03 PM    TROPONINT 0.056 11/14/2022 12:40 PM     Lactic Acid: No results for input(s): LACTA in the last 72 hours. BNP: No results for input(s): PROBNP in the last 72 hours.   UA:  Lab Results   Component Value Date/Time    NITRU NEGATIVE 11/13/2022 12:23 PM    NITRU Negative 01/22/2021 09:24 AM    COLORU ORANGE 11/13/2022 12:23 PM    PHUR 6.0 01/22/2021 09:24 AM    WBCUA NONE SEEN 11/13/2022 12:23 PM    RBCUA 219 11/13/2022 12:23 PM    MUCUS RARE 03/29/2022 09:11 AM    TRICHOMONAS NONE SEEN 11/13/2022 12:23 PM    YEAST RARE 03/29/2022 09:11 AM    BACTERIA NEGATIVE 11/13/2022 12:23 PM    CLARITYU SLIGHTLY CLOUDY 11/13/2022 12:23 PM    SPECGRAV 1.015 11/13/2022 12:23 PM    LEUKOCYTESUR NEGATIVE 11/13/2022 12:23 PM    UROBILINOGEN 0.2 11/13/2022 12:23 PM    BILIRUBINUR NEGATIVE 11/13/2022 12:23 PM    BLOODU LARGE NUMBER OR AMOUNT OBSERVED 11/13/2022 12:23 PM    GLUCOSEU Negative 01/22/2021 09:24 AM    KETUA NEGATIVE 11/13/2022 12:23 PM     Urine Cultures: No results found for: Teresa Cordon  Blood Cultures: No results found for: BC  No results found for: BLOODCULT2  Organism: No results found for: ORG    Imaging/Diagnostics Last 24 Hours   CT HEAD WO CONTRAST    Result Date: 2/12/2023  EXAMINATION: CT OF THE HEAD WITHOUT CONTRAST  2/12/2023 2:46 pm TECHNIQUE: CT of the head was performed without the administration of intravenous contrast. Automated exposure control, iterative reconstruction, and/or weight based adjustment of the mA/kV was utilized to reduce the radiation dose to as low as reasonably achievable. COMPARISON: 11/13/2022. HISTORY: ORDERING SYSTEM PROVIDED HISTORY: Fall TECHNOLOGIST PROVIDED HISTORY: Has a \"code stroke\" or \"stroke alert\" been called? ->No Reason for exam:->Fall Decision Support Exception - unselect if not a suspected or confirmed emergency medical condition->Emergency Medical Condition (MA) Reason for Exam: Fall FINDINGS: BRAIN/VENTRICLES: There is no acute intracranial hemorrhage, mass effect or midline shift. No abnormal extra-axial fluid collection. The gray-white differentiation is maintained without evidence of an acute infarct. There is no evidence of hydrocephalus. There is generalized volume loss. Areas of white matter hypoattenuation appears similar to the prior study. ORBITS: The visualized portion of the orbits demonstrate no acute abnormality. SINUSES: The visualized paranasal sinuses and mastoid air cells demonstrate no acute abnormality. SOFT TISSUES/SKULL:  No acute abnormality of the visualized skull or soft tissues. No acute intracranial findings. CT CERVICAL SPINE WO CONTRAST    Result Date: 2/12/2023  EXAMINATION: CT OF THE CERVICAL SPINE WITHOUT CONTRAST 2/12/2023 2:46 pm TECHNIQUE: CT of the cervical spine was performed without the administration of intravenous contrast. Multiplanar reformatted images are provided for review. Automated exposure control, iterative reconstruction, and/or weight based adjustment of the mA/kV was utilized to reduce the radiation dose to as low as reasonably achievable. COMPARISON: 11/13/2022. HISTORY: ORDERING SYSTEM PROVIDED HISTORY: Fall TECHNOLOGIST PROVIDED HISTORY: Reason for exam:->Fall Decision Support Exception - unselect if not a suspected or confirmed emergency medical condition->Emergency Medical Condition (MA) Reason for Exam: Fall FINDINGS: BONES/ALIGNMENT: There is normal spinal alignment. C1 and C2 have a normal relationship.   No acute fracture is identified. DEGENERATIVE CHANGES: Multilevel degenerative disc disease is most prominent and moderate to advanced at C6-C7. Multilevel facet arthropathy is worst and severe on the left at C3-C4 and C4-C5. No critical central canal narrowing is identified. SOFT TISSUES: There is no prevertebral soft tissue swelling. No acute cervical spine fracture. Spinal degenerative changes as described. XR CHEST PORTABLE    Result Date: 2/12/2023  EXAMINATION: ONE XRAY VIEW OF THE CHEST 2/12/2023 2:11 pm COMPARISON: 11/13/2022 HISTORY: ORDERING SYSTEM PROVIDED HISTORY: chest pain TECHNOLOGIST PROVIDED HISTORY: Reason for exam:->chest pain Reason for Exam: chest pain FINDINGS: Cardial pericardial silhouette is unremarkable. Pulmonary vasculature is congested and there is increased interstitial opacity. Chronic pleural effusion on the left is noted. No pneumothorax. No free air. No acute bony abnormality. Fundus congestion along with increased interstitial opacity. Please correlate with any clinical evidence of interstitial edema.        Personally reviewed Lab Studies, Imaging    Electronically signed by Miguel Ángel Ordonez MD, MD on 2/12/2023 at 11:31 PM

## 2023-02-13 NOTE — DISCHARGE INSTR - COC
Continuity of Care Form    Patient Name: Charly Alexander   :  10/18/1930  MRN:  [id number]    Admit date:  2023  Discharge date:  23    Code Status Order: Full Code   Advance Directives:     Admitting Physician:  Alize Galeana MD  PCP: No primary care provider on file.     Discharging Nurse: Efrem Ward Unit/Room#: 7991/1524-Z  Discharging Unit Phone Number: [phone]    Emergency Contact:   Extended Emergency Contact Information  Primary Emergency Contact: [place] Phone: [phone]  Work Phone: [phone]  Mobile Phone: [phone]  Relation: Child  Secondary Emergency Contact: V[address]ya [address] Phone: [phone]  Mobile Phone: [phone]  Relation: Child    Past Surgical History:  Past Surgical History:   Procedure Laterality Date    CATARACT REMOVAL      CHOLECYSTECTOMY, LAPAROSCOPIC N/A 11/15/2022    CHOLECYSTECTOMY LAPAROSCOPIC performed by Noel Hawkins MD at [address] [place] [address] 3/29/2021    CYSTOSCOPY EVACUATION OF CLOTS, BLADDER FULGERATION, AND SUPRA-PUBIC CATHETER INSERTION performed by Oren Mera MD at [facility] N/A 2021    CYSTOSCOPY, PROSTATE FULGURATION, EVACUATION OF CLOTS & TURP performed by Hawk Capps MD at [address] [place] [address] N/A 3/4/2021    LAPAROSCOPIC [facility] WITH GASTRIC OBSTRUCTION POSS OPEN performed by Noel Hawkins MD at [address] [place] [address]         Immunization History:   Immunization History   Administered Date(s) Administered    COVID-19, MODERNA BLUE border, Primary or Immunocompromised, (age 12y+), IM, 100 mcg/0.5mL 2021, 2021    COVID-19, [place] Vaccine, Vaccine Unspecified 2021, 2021, 2021    Influenza A ([id number]) Vaccine PF IM 2009    Influenza Virus Vaccine 2012, 10/01/2018, 2019, 10/30/2020    Influenza, FLUAD, (age 72 y+), Adjuvanted, 0.5mL 2021    Influenza, FLUZONE (age 72 y+), High Dose, 0.7mL 10/28/2020, 11/05/2021    Influenza, High Dose (Fluzone 65 yrs and older) 10/27/2017, 10/10/2018, 09/05/2019    Pneumococcal Conjugate 13-valent ([id number]) 08/05/2016    Pneumococcal Polysaccharide ([id number]) 12/07/2021    Tdap (Boostrix, Adacel) 08/27/2013, 05/04/2018    Zoster Live (Zostavax) 07/22/2015       Active Problems:  Patient Active Problem List   Diagnosis Code    Hyperlipidemia E78.5    Depression F32. 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) L98.491    Current moderate episode of major depressive disorder, unspecified whether recurrent ([facility]) F32.1    Chronic kidney disease, stage IV (severe) (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) I82.401    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    Chest pain R07.9    Cardiac arrest ([facility]) I46.9    Community acquired pneumonia of left lung, unspecified part of lung J18.9    Dizziness R42    Syncope and collapse R55       Isolation/Infection:   Isolation            No Isolation          Patient Infection Status       Infection Onset Added Last Indicated Last Indicated By Review Planned Expiration Resolved Resolved By    None active    Resolved    COVID-19 (Rule Out) 10/08/22 10/08/22 10/08/22 Respiratory Panel, Molecular, with COVID-19 (Restricted: peds pts or suitable admitted adults) (Ordered)   10/08/22 Rule-Out Test Resulted    COVID-19 (Rule Out) 10/08/22 10/08/22 10/08/22 COVID-19, Rapid (Ordered)   10/08/22 Rule-Out Test Resulted    COVID-19 (Rule Out) 10/06/22 10/06/22 10/06/22 COVID-19 (Ordered)   10/07/22 Rule-Out Test Resulted    COVID-19 (Rule Out) 03/03/21 03/03/21 03/03/21 COVID-19, Rapid (Ordered)   03/03/21 Rule-Out Test Resulted            Nurse Assessment:  Last Vital Signs: BP (!) 147/76   Pulse 86   Temp 97.8 °F (36.6 °C) (Oral)   Resp 14   Ht 6' (1.829 m)   Wt 220 lb (99.8 kg)   SpO2 97%   BMI 29.84 kg/m²     Last documented pain score (0-10 scale):    Last Weight:   Wt Readings from Last 1 Encounters:   02/12/23 220 lb (99.8 kg)     Mental Status:  alert/disoriented    IV Access:  - None    Nursing Mobility/ADLs:  Walking   Assisted  Transfer  Assisted  Bathing  Assisted  Dressing  Assisted  Toileting  Assisted  Feeding  Independent  Med Admin  Assisted  Med Delivery   whole    Wound Care Documentation and Therapy:  Wound 08/22/22 Radial Right; Anterior large skin tear (Active)   Number of days: 174       Incision 11/15/22 Abdomen (Active)   Number of days: 89       Incision 03/29/21 Anterior (Active)   Number of days: 685        Elimination:  Continence: Bowel: Yes  Bladder: Yes incontinent at [facility]  Urinary Catheter: None   Colostomy/Ileostomy/Ileal Conduit: No       Date of Last BM: 2/16/23    Intake/Output Summary (Last 24 hours) at 2/13/2023 1235  Last data filed at 2/13/2023 0809  Gross per 24 hour   Intake 450 ml   Output 1000 ml   Net -550 ml     I/O last 3 completed shifts: In: 450 [P.O.:450]  Out: -     Safety Concerns:      At Risk for Falls    Impairments/Disabilities:      Hearing      Patient's personal belongings (please select all that are sent with patient):  Hearing Aides {LEFT/RIGHT/BILATERAL:[id number]}    RN SIGNATURE:  Electronically signed by Zackary Rivera RN on 2/17/23 at 1:28 PM EST    CASE MANAGEMENT/SOCIAL WORK SECTION    Inpatient Status Date: 2/12/23    Readmission Risk Assessment Score:  Readmission Risk              Risk of Unplanned Readmission:  32           Discharging to Facility/ Agency   Name: [facility]   Address: [address] [facility]  Phone: [phone]  Fax: [phone]    Dialysis Facility (if applicable)   Name:  Address:  Dialysis Schedule:  Phone:  Fax:      PHYSICIAN SECTION    Nutrition Therapy:  Current Nutrition Therapy:   - Oral Diet:  General, Diabetic    Routes of Feeding: Oral  Liquids: No Restrictions  Daily Fluid Restriction: no  Last Modified Barium Swallow with Video (Video Swallowing Test): not done    Treatments at the Time of Hospital Discharge:   Respiratory Treatments: N/A  Oxygen Therapy:  is not on home oxygen therapy. Ventilator:    - No ventilator support    Rehab Therapies: Physical Therapy, Occupational Therapy, and Speech/Language Therapy  Weight Bearing Status/Restrictions: No weight bearing restrictions  Other Medical Equipment (for information only, NOT a DME order):  wheelchair, cane, and walker  Other Treatments:     Prognosis: Fair    Condition at Discharge: Stable    Rehab Potential (if transferring to Rehab): Fair    Recommended Labs or Other Treatments After Discharge: Follow repeat CBC/ CMP/ Mag and Phos. Please ensure adequate hydration and nutrition, patient requires PT/OT with gait/ balance therapy to help with avoiding falls given peripheral neuropathy contributing to falls. Patient also having orthosatic hypotension intermittently and needs some more eduction on position changes and compression stockings. Patient's Elliquis has been held by cardiology given multiple falls. Delirium precautions should be observed given patient's history of dementia, AAOx1-2 while inpatient.  Pt can follow up with his PCP/ Nephrologist/ Cardiologist / Neurologist    Physician Certification: I certify the above information and transfer of [place]  is necessary for the continuing treatment of the diagnosis listed and that he requires [facility] for greater 30 days.      Update Admission H&P: No change in H&P    PHYSICIAN SIGNATURE:  Electronically signed by David Roberts MD on 2/17/23 at 12:51 PM EST

## 2023-02-14 ENCOUNTER — APPOINTMENT (OUTPATIENT)
Dept: ULTRASOUND IMAGING | Age: 88
End: 2023-02-14
Payer: OTHER GOVERNMENT

## 2023-02-14 PROBLEM — I95.1 ORTHOSTATIC SYNCOPE: Status: ACTIVE | Noted: 2023-02-14

## 2023-02-14 LAB
ANION GAP SERPL CALCULATED.3IONS-SCNC: 12 MMOL/L (ref 4–16)
BUN SERPL-MCNC: 23 MG/DL (ref 6–23)
CALCIUM SERPL-MCNC: 8.7 MG/DL (ref 8.3–10.6)
CHLORIDE BLD-SCNC: 102 MMOL/L (ref 99–110)
CO2: 24 MMOL/L (ref 21–32)
CREAT SERPL-MCNC: 2.7 MG/DL (ref 0.9–1.3)
GFR SERPL CREATININE-BSD FRML MDRD: 21 ML/MIN/1.73M2
GLUCOSE SERPL-MCNC: 104 MG/DL (ref 70–99)
HCT VFR BLD CALC: 31.7 % (ref 42–52)
HCT VFR BLD CALC: 32.4 % (ref 42–52)
HCT VFR BLD CALC: 36.2 % (ref 42–52)
HEMOGLOBIN: 11.5 GM/DL (ref 13.5–18)
HEMOGLOBIN: 9.7 GM/DL (ref 13.5–18)
HEMOGLOBIN: 9.9 GM/DL (ref 13.5–18)
LACTATE: 1.4 MMOL/L (ref 0.5–1.9)
LV EF: 45 %
LVEF MODALITY: NORMAL
MAGNESIUM: 1.8 MG/DL (ref 1.8–2.4)
MCH RBC QN AUTO: 29.3 PG (ref 27–31)
MCHC RBC AUTO-ENTMCNC: 31.8 % (ref 32–36)
MCV RBC AUTO: 92.3 FL (ref 78–100)
PDW BLD-RTO: 14.6 % (ref 11.7–14.9)
PHOSPHORUS: 3.2 MG/DL (ref 2.5–4.9)
PLATELET # BLD: 211 K/CU MM (ref 140–440)
PMV BLD AUTO: 9.6 FL (ref 7.5–11.1)
POTASSIUM SERPL-SCNC: 3.3 MMOL/L (ref 3.5–5.1)
RBC # BLD: 3.92 M/CU MM (ref 4.6–6.2)
SODIUM BLD-SCNC: 138 MMOL/L (ref 135–145)
TROPONIN T: 0.05 NG/ML
WBC # BLD: 5.4 K/CU MM (ref 4–10.5)

## 2023-02-14 PROCEDURE — 2580000003 HC RX 258: Performed by: STUDENT IN AN ORGANIZED HEALTH CARE EDUCATION/TRAINING PROGRAM

## 2023-02-14 PROCEDURE — 6370000000 HC RX 637 (ALT 250 FOR IP): Performed by: INTERNAL MEDICINE

## 2023-02-14 PROCEDURE — 6360000002 HC RX W HCPCS: Performed by: STUDENT IN AN ORGANIZED HEALTH CARE EDUCATION/TRAINING PROGRAM

## 2023-02-14 PROCEDURE — 85027 COMPLETE CBC AUTOMATED: CPT

## 2023-02-14 PROCEDURE — 76937 US GUIDE VASCULAR ACCESS: CPT

## 2023-02-14 PROCEDURE — 36415 COLL VENOUS BLD VENIPUNCTURE: CPT

## 2023-02-14 PROCEDURE — 2580000003 HC RX 258: Performed by: INTERNAL MEDICINE

## 2023-02-14 PROCEDURE — 1200000000 HC SEMI PRIVATE

## 2023-02-14 PROCEDURE — 80048 BASIC METABOLIC PNL TOTAL CA: CPT

## 2023-02-14 PROCEDURE — 93306 TTE W/DOPPLER COMPLETE: CPT

## 2023-02-14 PROCEDURE — 85018 HEMOGLOBIN: CPT

## 2023-02-14 PROCEDURE — 97116 GAIT TRAINING THERAPY: CPT

## 2023-02-14 PROCEDURE — 6370000000 HC RX 637 (ALT 250 FOR IP): Performed by: FAMILY MEDICINE

## 2023-02-14 PROCEDURE — 83735 ASSAY OF MAGNESIUM: CPT

## 2023-02-14 PROCEDURE — 84100 ASSAY OF PHOSPHORUS: CPT

## 2023-02-14 PROCEDURE — 6370000000 HC RX 637 (ALT 250 FOR IP): Performed by: STUDENT IN AN ORGANIZED HEALTH CARE EDUCATION/TRAINING PROGRAM

## 2023-02-14 PROCEDURE — 85014 HEMATOCRIT: CPT

## 2023-02-14 PROCEDURE — 97530 THERAPEUTIC ACTIVITIES: CPT

## 2023-02-14 PROCEDURE — 84484 ASSAY OF TROPONIN QUANT: CPT

## 2023-02-14 PROCEDURE — 97110 THERAPEUTIC EXERCISES: CPT

## 2023-02-14 PROCEDURE — 99222 1ST HOSP IP/OBS MODERATE 55: CPT | Performed by: STUDENT IN AN ORGANIZED HEALTH CARE EDUCATION/TRAINING PROGRAM

## 2023-02-14 PROCEDURE — 94761 N-INVAS EAR/PLS OXIMETRY MLT: CPT

## 2023-02-14 PROCEDURE — 97535 SELF CARE MNGMENT TRAINING: CPT

## 2023-02-14 PROCEDURE — 93880 EXTRACRANIAL BILAT STUDY: CPT

## 2023-02-14 PROCEDURE — 83605 ASSAY OF LACTIC ACID: CPT

## 2023-02-14 RX ORDER — POTASSIUM CHLORIDE 20 MEQ/1
40 TABLET, EXTENDED RELEASE ORAL ONCE
Status: COMPLETED | OUTPATIENT
Start: 2023-02-14 | End: 2023-02-14

## 2023-02-14 RX ORDER — LANOLIN ALCOHOL/MO/W.PET/CERES
400 CREAM (GRAM) TOPICAL DAILY
Status: DISCONTINUED | OUTPATIENT
Start: 2023-02-14 | End: 2023-02-17 | Stop reason: HOSPADM

## 2023-02-14 RX ORDER — HALOPERIDOL 5 MG/ML
2 INJECTION INTRAMUSCULAR ONCE
Status: COMPLETED | OUTPATIENT
Start: 2023-02-14 | End: 2023-02-14

## 2023-02-14 RX ADMIN — QUETIAPINE FUMARATE 50 MG: 25 TABLET ORAL at 21:17

## 2023-02-14 RX ADMIN — SODIUM CHLORIDE, PRESERVATIVE FREE 10 ML: 5 INJECTION INTRAVENOUS at 21:17

## 2023-02-14 RX ADMIN — MICONAZOLE NITRATE: 2 POWDER TOPICAL at 21:34

## 2023-02-14 RX ADMIN — SODIUM CHLORIDE: 9 INJECTION, SOLUTION INTRAVENOUS at 06:29

## 2023-02-14 RX ADMIN — HALOPERIDOL LACTATE 2 MG: 5 INJECTION, SOLUTION INTRAMUSCULAR at 19:09

## 2023-02-14 RX ADMIN — LEVOTHYROXINE SODIUM 50 MCG: 0.05 TABLET ORAL at 10:22

## 2023-02-14 RX ADMIN — ASPIRIN 81 MG: 81 TABLET, CHEWABLE ORAL at 10:22

## 2023-02-14 RX ADMIN — MAGNESIUM OXIDE 400 MG (241.3 MG MAGNESIUM) TABLET 400 MG: TABLET at 10:23

## 2023-02-14 RX ADMIN — PANTOPRAZOLE SODIUM 20 MG: 20 TABLET, DELAYED RELEASE ORAL at 10:23

## 2023-02-14 RX ADMIN — ENOXAPARIN SODIUM 30 MG: 100 INJECTION SUBCUTANEOUS at 10:21

## 2023-02-14 RX ADMIN — MICONAZOLE NITRATE: 2 POWDER TOPICAL at 10:26

## 2023-02-14 RX ADMIN — METOPROLOL TARTRATE 25 MG: 25 TABLET, FILM COATED ORAL at 10:23

## 2023-02-14 RX ADMIN — Medication: at 10:24

## 2023-02-14 RX ADMIN — TAMSULOSIN HYDROCHLORIDE 0.4 MG: 0.4 CAPSULE ORAL at 10:22

## 2023-02-14 RX ADMIN — METOPROLOL TARTRATE 25 MG: 25 TABLET, FILM COATED ORAL at 21:17

## 2023-02-14 RX ADMIN — SERTRALINE HYDROCHLORIDE 50 MG: 50 TABLET ORAL at 10:22

## 2023-02-14 RX ADMIN — FINASTERIDE 5 MG: 5 TABLET, FILM COATED ORAL at 10:22

## 2023-02-14 RX ADMIN — ATORVASTATIN CALCIUM 40 MG: 40 TABLET, FILM COATED ORAL at 21:17

## 2023-02-14 RX ADMIN — POTASSIUM CHLORIDE 40 MEQ: 1500 TABLET, EXTENDED RELEASE ORAL at 10:22

## 2023-02-14 RX ADMIN — TEMAZEPAM 7.5 MG: 7.5 CAPSULE ORAL at 21:17

## 2023-02-14 NOTE — PROGRESS NOTES
Name:  Tianna Allred /Age/Sex: 10/18/1930  (80 y.o. male)   MRN & CSN:  [id number] & [id number] Encounter Date/Time: 2023 12:46 AM EST    Location:  [facility]6/1126-A PCP: No primary care provider on file. Call addressed around 2023 12:25 AM EST     Vitals:   Vitals:    23 0015   BP: (!) 162/94   Pulse: 84   Resp: 19   Temp: 98 °F (36.7 °C)   SpO2: 100%         APRN was called for witnessed fall. Patient seen and examined in 1126       OBJECTIVE:  Per RN, pt was standing at bedside and 2 RN's were able to get to him prior to falling but pt did go to floor and hit his LEFT knee and LEFT elbow with minor skin tear; this was a previous skin tear that reopened with drsg intact. Pt did not hit his head or had LOC. Pt is pleasantly confused upon APRN entering room and is laying in bed. He states, \"those bowling alley floors are slippery. \"      General: NAD  HEENT: PERRLA. Vision grossly intact. Hearing grossly intact. Oropharynx clear. Neck: Supple. No JVD. CV: RRR. Pulm: CTA, no audible wheezing, stridor  GI: NEG N/V, No ABD tenderness/distention  : No CVA or suprapubic tenderness. Skin: Warm to touch distally. Previous skin tear on LEFT arm - reopened; drsg intact  MSK: No gross joint deformities. No swelling, No edema. No hematoma. LEFT knee ROM intact. Lifted off of bed upon instruction. No edema, no hematoma noted on LEFT elbow. LEFT elbow ROM intact. Neuro: CNs grossly intact. Normal speech. No focal deficit. Psych: Alert, pleasantly confused. ASSESSMENT/PLAN: No CTA head as pt did not hit head or had LOC. Witnessed fall. Strict fall precautions with floor mats advised to place. Consider RENÉ however, pt is confused; Sitter considered if pt unable to redirect; increase rounding for bedside checks. Will continue to monitor LEFT knee and LEFT elbow overnight. RN charge aware.         Electronically signed by ROSEMARY Cullen CNP on 2023 at 12:46 AM

## 2023-02-14 NOTE — CONSULTS
[id number]  Syncope  Zio patch as outpatient  Check carotid US  He had syncope in 11/22 also  Thought it was vaso -vagal syncope  Will check office records for last stress test\  Conservative treatment     .   Electronically signed by Moon Rosa MD on 2/13/23 at 8:01 PM EST      No record of stress test  Will get zio as outpatient   Check carotid  No plan for stress test due to his age -conservative treatment

## 2023-02-14 NOTE — PROGRESS NOTES
Daily Progress Note  Subjective:  Awake and alert; sitter at bedside  [facility] last night  BP and HR stable  Some confusion noted    Attending Note:  He is awake but very confused  He fell last night  Moved from 4th to 1st floor  Remain in sinus rate is stable  Hx of syncope in 11/22 also  Plan for  zio/holter patch as outpatient  Hx of renal insuffiencey   Hx of PAFIB-now sinus -believe is not a good candidate for [facility] due to age and fall hx  Conservative treatment -no plan for cath or stress test  Elevated trop  no ACS-due to renal dx   Carotid US pending   Echo shows EF 50% range     Impression and Plan:   Syncopal episode    Reported loss of consciousness, details unknown    Poor oral intake before admission    Had similar episode on Nov    Echo pending for today    Carotid duplex    BP and HR stable    MRI brain consulted    Neuro is following    Will need outpatient monitor to ensure to arrhthymias or pauses that may have contributed to episode      Hx of PAF   CHADVASC is 3    Eliquis is on hold; recent falls including last night    Continue lopressor    CKD    Renal following    Avoid nephro toxic medication     Conservative treatment    Most Recent Echo   11/24/2022    Summary   This is a limited echocardiogram.   Left ventricular systolic function is low normal.   Ejection fraction is visually estimated at 45-50%. Mild tricuspid regurgitation; RVSP: 37 mmHg. No evidence of any pericardial effusion. Dilated aortic root measuring 4.0cm. Dilated ascending aorta measuring 4.0cm. Technically difficult study, due to body habitus.      [id number] Score for Atrial Fibrillation Stroke Risk   Risk   Factors  Component Value   C CHF No 0   H HTN Yes 1   A2 Age >= 76 Yes,  (80 y.o.) 2   D DM No 0   S2 Prior Stroke/TIA No 0   V Vascular Disease No 0   A Age 74-69 No,  (80 y.o.) 0   Sc Sex male 0    [id number]  Score  3   Score last updated 2/14/23 40:93 AM EST    Click here for a link to the UpSymtextDate guideline \"Atrial Fibrillation: Anticoagulation therapy to prevent embolization    Disclaimer: Risk Score calculation is dependent on accuracy of patient problem list and past encounter diagnosis. PAST MEDICAL HISTORY:  He is having syncope present. History of having  anemia, anxiety, renal surgery, BPH, depression, GERD, hemorrhoids,  hepatitis, hernia repair, hypertension, hyperlipidemia, and squamous  cell cancer present. PAST SURGICAL HISTORY:  He has a history of carpal tunnel surgery,  hernia surgery, and neck surgery done. SOCIAL HISTORY:  He does not smoke. He does not drink. ALLERGIES:  MINOCYCLINE.   Objective:   BP (!) 147/79   Pulse 78   Temp 98.3 °F (36.8 °C) (Oral)   Resp 18   Ht 6' (1.829 m)   Wt 220 lb (99.8 kg)   SpO2 100%   BMI 29.84 kg/m²     Intake/Output Summary (Last 24 hours) at 2/14/2023 1115  Last data filed at 2/13/2023 2058  Gross per 24 hour   Intake --   Output 800 ml   Net -800 ml       Medications:   Scheduled Meds:   mupirocin   Topical Daily    magnesium oxide  400 mg Oral Daily    sodium chloride flush  5-40 mL IntraVENous 2 times per day    enoxaparin  30 mg SubCUTAneous Daily    miconazole   Topical BID    aspirin  81 mg Oral Daily    atorvastatin  40 mg Oral Nightly    finasteride  5 mg Oral Daily    levothyroxine  50 mcg Oral Daily    metoprolol tartrate  25 mg Oral BID    pantoprazole  20 mg Oral Daily    QUEtiapine  50 mg Oral Nightly    sertraline  50 mg Oral Daily    tamsulosin  0.4 mg Oral Daily    temazepam  7.5 mg Oral Nightly    sodium chloride  1,000 mL IntraVENous Once      Infusions:   sodium chloride      sodium chloride 75 mL/hr at 02/14/23 0629      PRN Meds:  sodium chloride flush, sodium chloride, ondansetron **OR** ondansetron, polyethylene glycol, acetaminophen **OR** acetaminophen, hydrALAZINE, benzonatate, sennosides-docusate sodium     Physical Exam:  Vitals:    02/14/23 1023   BP: (!) 147/79   Pulse: 78   Resp:    Temp:    SpO2: General: some confusion noted  Chest: Nontender  Cardiac: First and Second Heart Sounds are Normal, No Murmurs or Gallops noted  Lungs:Clear to auscultation and percussion. Abdomen: Soft, NT, ND, +BS  Extremities: No clubbing, 1+ edema  Vascular:  Equal 2+ peripheral pulses. Lab Data:  CBC:   Recent Labs     02/12/23  1629 02/13/23  0316 02/13/23  2211 02/14/23  0143 02/14/23  0920   WBC 6.6 5.2  --  5.4  --    HGB 10.0* 8.1* 9.4* 11.5* 9.9*   HCT 33.4* 26.6* 30.1* 36.2* 31.7*   MCV 96.3 95.3  --  92.3  --     171  --  211  --      BMP:   Recent Labs     02/12/23  1629 02/13/23  0316 02/14/23  0143    142 138   K 3.6 3.6 3.3*    107 102   CO2 18* 23 24   PHOS  --  3.1 3.2   BUN 26* 24* 23   CREATININE 2.9* 2.8* 2.7*     LIVER PROFILE:   Recent Labs     02/12/23  1629   AST 18   ALT 7*   BILITOT 0.4   ALKPHOS 96     PT/INR: No results for input(s): PROTIME, INR in the last 72 hours. APTT: No results for input(s): APTT in the last 72 hours. BNP:  No results for input(s): BNP in the last 72 hours.       Assessment:  Patient Active Problem List    Diagnosis Date Noted    Dizziness 02/12/2023    Syncope and collapse 02/12/2023    Community acquired pneumonia of left lung, unspecified part of lung 10/08/2022    Cardiac arrest ([facility] [address] 08/25/2022    Chest pain 08/21/2022    Agitation due to dementia 08/18/2022    Varicose 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 08/15/2015    Skin ulcer, limited to breakdown of skin ([facility] [address] 02/21/2022    Current moderate episode of major depressive disorder, unspecified whether recurrent ([facility] [address] 02/21/2022    Chronic kidney disease, stage IV (severe) ([address] 02/21/2022    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 ([facility] 02/21/2022    Leukocytosis 09/08/2021    Thrombocytopenia, unspecified 08/31/2021    Bandemia 08/10/2021    Atelectasis 08/05/2021    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       Electronically signed by ROSEMARY Monzon CNP on 2/14/2023 at 11:15 AM    Electronically signed by Garnell Harada, MD on 2/14/23 at 12:26 PM EST

## 2023-02-14 NOTE — PROGRESS NOTES
Getting cleaned up this am  Laureano cath in place  BP elevated  Continue IVF until order expires  Replete potassium      -KAVON: cr 2.8 on admission//ddx: volume depletion vs obstruction vs ATN  -CKD4  -Lactatemia  -Syncope at home  -Hx HTN  -BPH:     Suggest:  -Continue IVF  -Ok to stop flomax which can cause orthostasis but finasteride can be continued  -Please avoid nephrotoxins  -May have to reconsider the daily lasix, before dc  -Will follow

## 2023-02-14 NOTE — PROGRESS NOTES
V2.0  [facility] Hospitalist Progress Note      Name:  Jay Jacob /Age/Sex: 10/18/1930  (80 y.o. male)   MRN & CSN:  [id number] & [id number] Encounter Date/Time: 2023 2:55 PM EST    [facility]:  [facility]-A PCP: No primary care provider on file. Hospital Day: 3    Assessment and Plan:   Jay Jacob is a 80 y.o. male with pmh of HTN, BPH, depression, HLD, A-fib on Eliquis, diastolic heart failure, hypothyroidism, history of frequent frequent falls who presents with Dizziness      Plan:  Dizziness and lightheadedness, hypotension with suspicion for syncope, likely in the setting of severe dehydration:  -Patient reported to have poor p.o. intake, along with poor appetite and severely dehydrated on initial exam, patient reported that he had episodes of loss of consciousness but was unsure of details on admission. Lactate elevation initially thought to be in the setting of starvation ketosis. Rogelio lactic acid level significantly elevated to 8.2 the patient symptomatically improved, vital signs are stable with no acute distress, no leukocytosis or fever. Trend lactate and follow-up blood culture/inflammatory markers and monitor for now. Patient was not started on IV antibiotics per admitting team giver he did not meet the SIRS criteria but was started on LR at 100 mm/h for hydration. Urinalysis pending. Status post 1 L. Possibility of neurogenic versus cardiogenic syncope, will order TTE and consult cardiology/neurology. Plan for Ana Doom as outpatient / Check carotid US, no plan for stress test/ conservative management given patient's age. Witnessed Fall:        -Pt reportedly had witnessed fall 12:07am 23,   Hypokalemia:        -3.3, s/p repletion, will replete to keep K level >4  Proximal atrial fibrillation:  -Patient on Eliquis 2.5 twice daily with history of frequent falls,held after d/w Cardiologist given recurrent falls.  We will continue to monitor telemetry  CKD:  -Creatinine 2.7<<2.9 on admission, compared to 2.3 at baseline  -Nephrology onboard  -Strict intake and output record and daily weight  -We will avoid nephrotoxic medication  -We will adjust medications according to patient's creatinine clearance  -Send urine electrolytes and urine urea, calculate FeUrea  Lactic acidosis: Likely in the setting of type II in the setting of dehydration, will trend lactic acid, currently stable <2  Benign essential hypertension:  -Hypotensive on admission, will continue as needed medications if blood pressure above 160/100 mmHg  BPH:  -On Flomax and finasteride, discontinued because of possible orthostatic blood pressure related hypotension  HLD:  Diastolic heart failure, chronic with preserved ejection fraction: On Lasix  Hypothyroidism: On levothyroxine  History of frequent falls: Patient reports 2 falls in the past 3 days. Diet ADULT DIET; Regular; 5 carb choices (75 gm/meal)   DVT Prophylaxis [x] Lovenox, []  Heparin, [] SCDs, [] Ambulation,  [] Eliquis, [] Xarelto  [] Coumadin   Code Status Full Code   Disposition From: Home  Expected Disposition: Possibly SNF  Estimated Date of Discharge: 2-3 days  Patient requires continued admission due to currently having acute confusion/work-up for syncope   Surrogate Decision Maker/ POA      Subjective:     Chief Complaint: Loss of Consciousness (Fall from sitting in shower chair with loss of consciousness. Syncope for several seconds and low blood pressure)       Tarik Rogers is a 80 y.o. male who presents with falls     Patient currently denies any significant symptoms, is AAO X2    Review of Systems:    Review of Systems    Unremarkable except as above although unreliable given patient is AAO X2    Objective:      Intake/Output Summary (Last 24 hours) at 2/14/2023 0844  Last data filed at 2/13/2023 2058  Gross per 24 hour   Intake --   Output 800 ml   Net -800 ml          Vitals:   Vitals:    02/14/23 0230   BP: (!) 169/89   Pulse: 81   Resp: 16   Temp: 98.3 °F (36.8 °C)   SpO2: 95%       Physical Exam:     General: NAD  Eyes: EOMI  ENT: neck supple  Cardiovascular: Regular rate. Respiratory: Clear to auscultation  Gastrointestinal: Soft, non tender  Genitourinary: no suprapubic tenderness  Musculoskeletal: No edema  Skin: warm, dry  Neuro: Alert. Psych: Mood appropriate.      Medications:   Medications:    potassium chloride  40 mEq Oral Once    mupirocin   Topical Daily    magnesium oxide  400 mg Oral Daily    sodium chloride flush  5-40 mL IntraVENous 2 times per day    enoxaparin  30 mg SubCUTAneous Daily    miconazole   Topical BID    aspirin  81 mg Oral Daily    atorvastatin  40 mg Oral Nightly    finasteride  5 mg Oral Daily    levothyroxine  50 mcg Oral Daily    metoprolol tartrate  25 mg Oral BID    pantoprazole  20 mg Oral Daily    QUEtiapine  50 mg Oral Nightly    sertraline  50 mg Oral Daily    tamsulosin  0.4 mg Oral Daily    temazepam  7.5 mg Oral Nightly    sodium chloride  1,000 mL IntraVENous Once      Infusions:    sodium chloride      sodium chloride 75 mL/hr at 02/14/23 0629     PRN Meds: sodium chloride flush, 5-40 mL, PRN  sodium chloride, , PRN  ondansetron, 4 mg, Q8H PRN   Or  ondansetron, 4 mg, Q6H PRN  polyethylene glycol, 17 g, Daily PRN  acetaminophen, 650 mg, Q6H PRN   Or  acetaminophen, 650 mg, Q6H PRN  hydrALAZINE, 10 mg, Q4H PRN  benzonatate, 200 mg, TID PRN  sennosides-docusate sodium, 2 tablet, BID PRN      Labs      Recent Results (from the past 24 hour(s))   Troponin    Collection Time: 02/13/23  9:32 AM   Result Value Ref Range    Troponin T 0.061 (H) <0.01 NG/ML   Lactic Acid    Collection Time: 02/13/23 10:11 PM   Result Value Ref Range    Lactate 1.5 0.5 - 1.9 mMOL/L   Hemoglobin and Hematocrit    Collection Time: 02/13/23 10:11 PM   Result Value Ref Range    Hemoglobin 9.4 (L) 13.5 - 18.0 GM/DL    Hematocrit 30.1 (L) 42 - 52 %   Troponin    Collection Time: 02/13/23 10:11 PM   Result Value Ref Range    Troponin T 0.042 (H) <0.01 NG/ML   Lactic Acid    Collection Time: 02/14/23  1:43 AM   Result Value Ref Range    Lactate 1.4 0.5 - 1.9 mMOL/L   Troponin    Collection Time: 02/14/23  1:43 AM   Result Value Ref Range    Troponin T 0.049 (H) <0.01 NG/ML   CBC    Collection Time: 02/14/23  1:43 AM   Result Value Ref Range    WBC 5.4 4.0 - 10.5 K/CU MM    RBC 3.92 (L) 4.6 - 6.2 M/CU MM    Hemoglobin 11.5 (L) 13.5 - 18.0 GM/DL    Hematocrit 36.2 (L) 42 - 52 %    MCV 92.3 78 - 100 FL    MCH 29.3 27 - 31 PG    MCHC 31.8 (L) 32.0 - 36.0 %    RDW 14.6 11.7 - 14.9 %    Platelets 101 205 - 289 K/CU MM    MPV 9.6 7.5 - 11.1 FL   Basic Metabolic Panel    Collection Time: 02/14/23  1:43 AM   Result Value Ref Range    Sodium 138 135 - 145 MMOL/L    Potassium 3.3 (L) 3.5 - 5.1 MMOL/L    Chloride 102 99 - 110 mMol/L    CO2 24 21 - 32 MMOL/L    Anion Gap 12 4 - 16    BUN 23 6 - 23 MG/DL    Creatinine 2.7 (H) 0.9 - 1.3 MG/DL    Est, Glom Filt Rate 21 (L) >60 mL/min/1.73m2    Glucose 104 (H) 70 - 99 MG/DL    Calcium 8.7 8.3 - 10.6 MG/DL   Magnesium    Collection Time: 02/14/23  1:43 AM   Result Value Ref Range    Magnesium 1.8 1.8 - 2.4 mg/dl   Phosphorus    Collection Time: 02/14/23  1:43 AM   Result Value Ref Range    Phosphorus 3.2 2.5 - 4.9 MG/DL        Imaging/Diagnostics Last 24 Hours   CT HEAD WO CONTRAST    Result Date: 2/12/2023  EXAMINATION: CT OF THE HEAD WITHOUT CONTRAST  2/12/2023 2:46 pm TECHNIQUE: CT of the head was performed without the administration of intravenous contrast. Automated exposure control, iterative reconstruction, and/or weight based adjustment of the mA/kV was utilized to reduce the radiation dose to as low as reasonably achievable. COMPARISON: 11/13/2022. HISTORY: ORDERING SYSTEM PROVIDED HISTORY: Fall TECHNOLOGIST PROVIDED HISTORY: Has a \"code stroke\" or \"stroke alert\" been called? ->No Reason for exam:->Fall Decision Support Exception - unselect if not a suspected or confirmed emergency medical condition->Emergency Medical Condition (MA) Reason for Exam: Fall FINDINGS: BRAIN/VENTRICLES: There is no acute intracranial hemorrhage, mass effect or midline shift. No abnormal extra-axial fluid collection. The gray-white differentiation is maintained without evidence of an acute infarct. There is no evidence of hydrocephalus. There is generalized volume loss. Areas of white matter hypoattenuation appears similar to the prior study. ORBITS: The visualized portion of the orbits demonstrate no acute abnormality. SINUSES: The visualized paranasal sinuses and mastoid air cells demonstrate no acute abnormality. SOFT TISSUES/SKULL:  No acute abnormality of the visualized skull or soft tissues. No acute intracranial findings. CT CERVICAL SPINE WO CONTRAST    Result Date: 2/12/2023  EXAMINATION: CT OF THE CERVICAL SPINE WITHOUT CONTRAST 2/12/2023 2:46 pm TECHNIQUE: CT of the cervical spine was performed without the administration of intravenous contrast. Multiplanar reformatted images are provided for review. Automated exposure control, iterative reconstruction, and/or weight based adjustment of the mA/kV was utilized to reduce the radiation dose to as low as reasonably achievable. COMPARISON: 11/13/2022. HISTORY: ORDERING SYSTEM PROVIDED HISTORY: Fall TECHNOLOGIST PROVIDED HISTORY: Reason for exam:->Fall Decision Support Exception - unselect if not a suspected or confirmed emergency medical condition->Emergency Medical Condition (MA) Reason for Exam: Fall FINDINGS: BONES/ALIGNMENT: There is normal spinal alignment. C1 and C2 have a normal relationship. No acute fracture is identified. DEGENERATIVE CHANGES: Multilevel degenerative disc disease is most prominent and moderate to advanced at C6-C7. Multilevel facet arthropathy is worst and severe on the left at C3-C4 and C4-C5. No critical central canal narrowing is identified. SOFT TISSUES: There is no prevertebral soft tissue swelling. No acute cervical spine fracture.  Spinal degenerative changes as described. XR CHEST PORTABLE    Result Date: 2/12/2023  EXAMINATION: ONE XRAY VIEW OF THE CHEST 2/12/2023 2:11 pm COMPARISON: 11/13/2022 HISTORY: ORDERING SYSTEM PROVIDED HISTORY: chest pain TECHNOLOGIST PROVIDED HISTORY: Reason for exam:->chest pain Reason for Exam: chest pain FINDINGS: Cardial pericardial silhouette is unremarkable. Pulmonary vasculature is congested and there is increased interstitial opacity. Chronic pleural effusion on the left is noted. No pneumothorax. No free air. No acute bony abnormality. Fundus congestion along with increased interstitial opacity. Please correlate with any clinical evidence of interstitial edema.        Electronically signed by Rodrick Cooney MD on 2/14/2023 at 8:44 AM

## 2023-02-14 NOTE — PROGRESS NOTES
APRN notified of ABD sutures x5 and x1 suture at umbilicus from previous Lap Dai noted 11/15/22. No recent procedures noted. Removed total of x6 intact sutures - notified Dr. Celine Michele of this and will c/s gen surgery for FU. Ordered topical Bactroban to be applied. Incision well approximated with erythema noted. Please see uploaded image on file.

## 2023-02-14 NOTE — PROGRESS NOTES
Pt needs questionnaire completed. Darlene Fails called earlier to Romy Presume to and informed her that the MRI Q needed completed.  Q is still incomplete at this time will continue checking

## 2023-02-14 NOTE — PROGRESS NOTES
4 Eyes Skin Assessment     NAME:  Trisha Espinoza  YOB: 1930  MEDICAL RECORD NUMBER:  [id number]    The patient is being assessed for  Transfer to New Unit    I agree that One RN has performed a thorough Head to Toe Skin Assessment on the patient. ALL assessment sites listed below have been assessed. Areas assessed by both nurses:    Head, Face, Ears, Shoulders, Back, Chest, Arms, Elbows, Hands, Sacrum. Buttock, Coccyx, Ischium, and Legs. Feet and Heels        Does the Patient have a Wound? Yes wound(s) were present on assessment.  LDA wound assessment was Initiated and completed by RN (groin area)       Jasmeet Prevention initiated by RN: NA   Wound Care Orders initiated by RN: Yes    Pressure Injury (Stage 3,4, Unstageable, DTI, NWPT, and Complex wounds) if present, place referral order by RN under : No (already present)    New and Established Ostomies, if present place, referral order under : NA      Nurse 1 eSignature: Electronically signed by Kayla Ludwig RN on 2/14/23 at 6:59 AM EST    **SHARE this note so that the co-signing nurse can place an eSignature**    Nurse 2 eSignature: Electronically signed by Jose C Irene LPN on 9/53/71 at 4:55 AM EST

## 2023-02-14 NOTE — CONSULTS
[address] [place] [address], [facility]                                  CONSULTATION    PATIENT NAME: Brian Church                        :        10/18/1930  MED REC NO:   [id number]                          ROOM:       [id number]  ACCOUNT NO:   [de-identified]                           ADMIT DATE: 2023  PROVIDER:     Denilson Brown MD    CONSULT DATE:  2023    INDICATIONS:  Syncope. HISTORY OF PRESENT ILLNESS:  This is a 70-year-old male patient, who has  been getting a bath yesterday at home. Then, all of a sudden, he has  slumped and fell. The patient is sitting in the chair right now, given  IV fluids. The patient was found to have orthostasis present. The  patient was seen by Renal.  The plan is to stop Flomax but continue  finasteride. The patient denies any chest pain. He is awake, alert,  and oriented x3. He notes _____. No fevers and no chills. No cough or  sputum production. No other  or GI complaints present. His blood  pressure was 85/48 on admission. He seems to be doing better right now. He is sitting in a chair. His labs showed that his creatinine is 2.8 and his creatinine baseline  is 2.1 to 2.3. It is slightly elevated. His troponin is elevated, but  not positive. LFTs are normal.  Hemoglobin is 8.1 from 10. Platelet  count is 989,129. The patient had an MRI of the brain pending. CT of  the cervical spine was negative. Chest x-ray was negative. CT of the  head was also negative. The echo is pending at this time, but he had an echo done back in  2022. At that time, his LV function was around 45% to 50% range  present. The patient had a syncope back in November also and at that time, he was  also in the bathroom, he had an unwitnessed fall and syncope at that  time also present. PAST MEDICAL HISTORY:  He is having syncope present.   History of having  anemia, anxiety, renal surgery, BPH, depression, GERD, hemorrhoids,  hepatitis, hernia repair, hypertension, hyperlipidemia, and squamous  cell cancer present. PAST SURGICAL HISTORY:  He has a history of carpal tunnel surgery,  hernia surgery, and neck surgery done. SOCIAL HISTORY:  He does not smoke. He does not drink. ALLERGIES:  MINOCYCLINE. PHYSICAL EXAMINATION:  GENERAL:  The patient is awake, alert, and answering questions, not in  acute distress. He is sitting in a chair. VITAL SIGNS:  Temperature is afebrile. Pulse is 80, sinus rhythm. Blood pressure is 115/80. HEENT:  Head is atraumatic. Pupils are equal and reactive. CHEST:  Equal expansion. LUNGS:  Clear to auscultation. No wheezing or rhonchi present. HEART:  Regular rhythm. ABDOMEN:  Soft and nontender. Bowel sounds are present. No  hepatosplenomegaly or guarding appreciated. EXTREMITIES:  No cyanosis noted. NEUROLOGIC:  Cranial nerves are grossly intact. LABORATORY DATA:  Creatinine is 2.8. Troponin is elevated. LFTs are  normal.  CBC is within normal range. DIAGNOSTIC DATA:  The patient's EKG showed a sinus rhythm present. Some  T-wave inversions noted in the anterior leads present, which was there  back in November also. The patient's last echo was in November. LV function was slightly  decreased, but stable. I am not sure when his last cardiac testing was  done. We thought he probably had a vasovagal syncope at that time. IMPRESSION AND PLAN:  This is a 70-year-old male patient who comes in  from home after a fall. The patient is awake, alert, and answering the  questions, not in acute distress. Possible dehydration leading to  syncope and he had a same kind of syncope back in November without  orthostatic hypotension or vasovagal syncope at that time. He was  treated conservatively at that time also. I am not sure when his last  cardiac testing was done. We will review the office records also.   I do  not think that he had ever had a Holter monitor. We may consider doing  a Holter monitor on him to further evaluate and he never had a carotid  ultrasound, may be, I will review that also. We will make further  recommendations based on hospital course.         Gabriel Rueda MD    D: 02/13/2023 20:00:28       T: 02/14/2023 0:10:34     [id number]  Job#: [id number]     Doc#: [id number]    CC:

## 2023-02-14 NOTE — CARE COORDINATION
Myriam Gonzalez will be able to accept pt when medially ready per Briseida/GHAZAL. Pt will require a pre-cert and  rapid covid. Pt was transferred to  last night and will have a new CM.    TE

## 2023-02-14 NOTE — PROGRESS NOTES
POST FALL MANAGEMENT    Yao Flores  MEDICAL RECORD NUMBER:  [id number]  AGE: 80 y.o. GENDER: male  : 10/18/1930  TODAYS DATE:  2023    Details     Fall Occurred: Yes    Was the Fall Witnessed:  Yes       Brief Review of Event:   Bed alarm went off as patient was getting up in the side of the bed. 2 RN's responded right away. Patient lost his balance and his both knees hit the floor followed by left elbow. Left elbow skin tear noted post fall event. Who found the patient: Shelley Villafuerte and Johnny Walker      Where was the patient at the time of the fall: in the room      Patient Comments:        Date Fall Occurred:  2023 . Time Fall Occurred: 12:07a.m. Assessment     Post Fall Head to Toe Assessment Completed: Yes    Post Fall Predictive Analytic Score Reviewed: Yes     Post Fall Vitals Completed: Yes    Post Fall Neuro Checks Completed: Yes    Injury Occurred(if yes, describe injury):  yes - skin tear.  Left elbow           Did the Patient Experience:(Check Humaira Lazar all that apply)    [] Patient hit head  [] Loss of consciousness  [] Change in mental status following the fall  [x] Patient is on an anticoagulant medication      CT Performed:  no    Follow-up     Persons Notified of Fall:  (Provide names of persons notified)   [] Physician:   [x] Serena Stanley  [x] Nursing Supervisior: Wilner Carcamo  [x] Manager: Artie Weeks  [] Pharmacist:  [] Family:  [] Other:      Electronically signed by Iraj Oconnor RN 2023 at 1:38 AM

## 2023-02-14 NOTE — PROGRESS NOTES
Occupational Therapy      Occupational Therapy Treatment Note    Name: Ángel Castle MRN: [id number] :   10/18/1930   Date:  2023   Admission Date: 2023 Room:  [address] [place] [address]-     Primary Problem:  Dizziness    Restrictions/Precautions:          General Precautions, Fall Risk, Sitter    Communication with other providers: Nursing handoff    Subjective:  Patient states:  Pt emotionally labile crying at beginning of session  Pain:   Location, Type, Intensity (0/10 to 10/10):  No    Objective:    Observation: Pt received sitting upright in chair, sitter in room, agreeable to therapy   Objective Measures:  WFL    Treatment, including education:  Self Care Training:   Cues were given for safety, sequence, UE/LE placement, visual cues, and balance. Activities performed today included UB/LB bathing/dressing, grooming, toileting    Therapeutic Activity Training:   Therapeutic activity training was instructed today. Cues were given for safety, sequence, UE/LE placement, awareness, and balance. Activities performed today included STS, functional mobility, stand to sit    Grooming washing face/hands w/ warm washcloth SBA, UB dressing doffing/donning gown SBA, UB bathing washing abdomen, trunk, BUE arms SBA, STS from reclining chair up to RW Alfonzo, in stand to wash brunilda area/buttocks maxA, pt able to wash upper legs, assistance to wash lower legs in sit, stand to sit Alfonzo. LB dressing doffing/donning socks maxA. STS from reclining chair up to RW Alfonzo, functional mobility in room w/ RW Alfonzo to sink ~10 feet, in stand ~3 minutes grooming oral care in stand CGA, stand to sit Alfonzo from sink to reclining chair.     Pt sitting upright in chair, chair alarm on, call light at side, nursing notified       Assessment / Impression:    Patient's tolerance of treatment: Well  Adverse Reaction: None  Significant change in status and impact: Improved from initial evaluation  Barriers to improvement: None noted      Plan for Next Session:    Continue OT POC    Time in:  1247  Time out:  1312  Timed treatment minutes:  25 minutes  Total treatment time:  25 minutes      Electronically signed by:    Juliet CLAUDIO/LONA [id number]  1:58 PM,2/14/2023

## 2023-02-14 NOTE — CONSULTS
Neurology Service Consult Note  [facility]   Patient Name: Sundar Hi  : 10/18/1930        Subjective:   Reason for consult: Acute confusional state with multiple falls  80 y.o. male history of  a fib on eliquis, anxiety, anemia, BPH, depresison, HTN, HLD, hepatitis presenting to [place] [address] from home following syncopal episode in the shower. Per chart review the patient was sitting on shower chair when he lost consciousness and was not responsive. Patient regained consciousness after a brief period but was noted to be confused. He was alert when EMS arrived to transport to hospital.  Patient has been having more  frequent falls over the last several weeks. Patient is known to this service and was seen for similar complaint 2022. During that admission there was no evidence for stroke or seizure and MRI brain was not completed. Loss of consciousness felt secondary to orthostatic near syncope. Additionally patient was noted to have severe lower extremity polyneuropathy. Patient has reportedly had poor oral intake at home and had elevated Lactic on admission. This has since normalized. Blood cultures have been drawn with no growth to date. Chart was reviewed in detail. Patient was seen and assessed. He is up in the chair at bedside, lights are on in the room. He has a sitter at bedside for safety today. He has significant hearing loss which does limit ability to communicate with the patient. He is alert to self, location but does not seem to understand why he is at the hospital.  He does express frustration that he has been sitting there for \"3 days, 18 hours a day\" without any answers. No family is at bedside during the time of exam today. Patient denies headache, vision changes or weakness. He says repeatedly that he would just like to go home. Patient did have a fall last night.   It appears that he was confused and got up out of bed without assistance.       Past Medical History:   Diagnosis Date    Anemia     Anxiety     Arthritis     BPH with urinary obstruction     Depression     GERD (gastroesophageal reflux disease)     Hemorrhoids     Hepatitis     Hernia of unspecified site of abdominal cavity without mention of obstruction or gangrene     Hyperlipidemia     Hypotension     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    Rt Tricep, Lt Superior Forearm    :   Past Surgical History:   Procedure Laterality Date    CATARACT REMOVAL      CHOLECYSTECTOMY, LAPAROSCOPIC N/A 11/15/2022    CHOLECYSTECTOMY LAPAROSCOPIC performed by Gisela Cruz MD at [address] [place] [address] 3/29/2021    CYSTOSCOPY EVACUATION OF CLOTS, BLADDER FULGERATION, AND SUPRA-PUBIC CATHETER INSERTION performed by Shalonda Long MD at [facility] N/A 4/1/2021    CYSTOSCOPY, PROSTATE FULGURATION, EVACUATION OF CLOTS & TURP performed by Jim Owen MD at [address] [place] [address] N/A 3/4/2021    LAPAROSCOPIC LARGE HERNIA HIATAL REPAIR WITH GASTRIC OBSTRUCTION POSS OPEN performed by Gisela Cruz MD at [address] [facility]       Medications:  Scheduled Meds:   potassium chloride  40 mEq Oral Once    mupirocin   Topical Daily    magnesium oxide  400 mg Oral Daily    sodium chloride flush  5-40 mL IntraVENous 2 times per day    enoxaparin  30 mg SubCUTAneous Daily    miconazole   Topical BID    aspirin  81 mg Oral Daily    atorvastatin  40 mg Oral Nightly    finasteride  5 mg Oral Daily    levothyroxine  50 mcg Oral Daily    metoprolol tartrate  25 mg Oral BID    pantoprazole  20 mg Oral Daily    QUEtiapine  50 mg Oral Nightly    sertraline  50 mg Oral Daily    tamsulosin  0.4 mg Oral Daily    temazepam  7.5 mg Oral Nightly    sodium chloride  1,000 mL IntraVENous Once     Continuous Infusions:   sodium chloride      sodium chloride 75 mL/hr at 02/14/23 0629     PRN Meds:.sodium chloride flush, sodium chloride, ondansetron **OR** ondansetron, polyethylene glycol, acetaminophen **OR** acetaminophen, hydrALAZINE, benzonatate, sennosides-docusate sodium    Allergies   Allergen Reactions    Minocycline Other (See Comments)     Social History     Socioeconomic History    Marital status:       Spouse name: Not on file    Number of children: Not on file    Years of education: Not on file    Highest education level: Not on file   Occupational History    Not on file   Tobacco Use    Smoking status: Former     Packs/day: 1.00     Years: 5.00     Pack years: 5.00     Types: Cigarettes     Start date: 1950     Quit date: 1955     Years since quittin.3    Smokeless tobacco: Never   Substance and Sexual Activity    Alcohol use: Not Currently    Drug use: Not Currently    Sexual activity: Not on file   Other Topics Concern    Not on file   Social History Narrative    Not on file     Social Determinants of Health     Financial Resource Strain: Low Risk     Difficulty of Paying Living Expenses: Not hard at all   Food Insecurity: No Food Insecurity    Worried About Running Out of Food in the Last Year: Never true    [address] in the Last Year: Never true   Transportation Needs: Not on file   Physical Activity: Not on file   Stress: Not on file   Social Connections: Not on file   Intimate Partner Violence: Not on file   Housing Stability: Not on file      Family History   Problem Relation Age of Onset    Depression Mother     Diabetes Mother     COPD Father     Diabetes Brother     Diabetes Maternal Grandmother          ROS (10 systems)  In addition to that documented in the HPI above, the additional ROS was obtained:  Constitutional: Denies fevers or chills  Eyes: Denies vision changes  ENMT: Denies sore throat  CV: Denies chest pain  Resp: Denies SOB  GI: Denies vomiting or diarrhea  : Denies painful urination  MSK: Denies recent trauma  Skin: Denies new rashes  Neuro: Denies new numbness or tingling or weakness  Endocrine: Denies unexpected weight loss  Heme: Denies bleeding disorders    Physical Exam:       Wt Readings from Last 3 Encounters:   02/12/23 220 lb (99.8 kg)   11/20/22 218 lb 0.6 oz (98.9 kg)   11/14/22 205 lb (93 kg)     Temp Readings from Last 3 Encounters:   02/14/23 98.3 °F (36.8 °C) (Oral)   11/22/22 98.1 °F (36.7 °C) (Oral)   10/14/22 98.1 °F (36.7 °C)     BP Readings from Last 3 Encounters:   02/14/23 (!) 169/89   11/22/22 (!) 153/72   10/14/22 (!) 180/92     Pulse Readings from Last 3 Encounters:   02/14/23 81   11/22/22 60   10/14/22 67        Gen: A&O x 2, NAD, cooperative  HEENT: NC/AT, EOMI, PERRL, mmm,  neck supple, no meningeal signs  Heart: NSR  Lungs: Respirations even and unlabored  Ext: no edema, no calf tenderness b/l  Psych: normal mood and confused affect  Skin: no rashes or lesions    NEUROLOGIC EXAM:    Mental Status: A&O to self, location only, NAD, speech clear, language fluent, repetition and naming intact, follows commands appropriately    Cranial Nerve Exam:   CN II-XII:  PERRL, VFF, no nystagmus, no gaze paresis, sensation V1-V3 intact b/l, muscles of facial expression symmetric; hearing significantly impaired to conversational tone, palate elevates symmetrically, shoulder elevation symmetric and tongue protrudes midline with movement side to side.     Motor Exam:       Strength 5/5 UE's/LE's b/l  Tone and bulk normal   No pronator drift    Deep Tendon Reflexes: 1/4 biceps, triceps, brachioradialis, trace patellar, and achilles b/l; flexor plantar responses b/l    Sensation: Decreased to light touch/pinprick/vibration LE's b/l    Coordination/Cerebellum:       Tremors--none      Rapidly alternating movements: no dysdiadochokinesia b/l                Heel-to-Shin: no dysmetria b/l      Finger-to-Nose: no dysmetria b/l    Gait and stance:      Gait: deferred      LABS:     Recent Labs     02/12/23  1629 02/13/23  0316 02/14/23  0143   WBC 6.6 5.2 5.4    142 138   K 3.6 3.6 3.3*    107 102   CO2 18* 23 24   BUN 26* 24* 23   CREATININE 2.9* 2.8* 2.7*   GLUCOSE 171* 98 104*       IMAGING:    CT head w/o contrast:  Impression   No acute intracranial findings. TTE:  Pending    All imaging was personally reviewed     ASSESSMENT/PLAN:   54-year-old male presenting with syncopal episode at home. Patient is alert to self and location only. He is impulsive at times and is requiring a sitter at bedside for safety. Patient did sustain a fall last night after getting out of bed without assistance. Face is symmetric, tongue is midline. Speech is clear, language is fluent. Patient has profound hearing loss. Strength is intact in upper and lower extremities. Significant sensory loss in the lower extremities unchanged from prior exam.  Patient is able to follow commands without difficulty when able to hear what those commands are. There are no focal or lateralizing deficits on exam.  Syncopal episodes and falls at home likely secondary to orthostatic hypotension and lower extremity peripheral neuropathy superimposed on poor oral intake with hypotension, impulsiveness and underlying diminished cognitive reserve. Low suspicion for acute stroke as a causative factor. Neuroimaging as above-nonacute  MRI pending  From neurology standpoint patient's exam not concerning for acute stroke. Do not feel that MRI is warranted at this time. Unsure given patient's current confusion if he will be able to tolerate MRI study. Do not feel that he should be sedated for this exam as it would likely worsen his current confusion  TTE pending final read  Carotid ultrasound pending  Cardiology following -plan for outpatient 30-day monitor  Continue Lipitor, aspirin, Eliquis for secondary stroke prevention  Patient currently with Eliquis on hold given recent fall.   Recommend restarting if benefit outweighs risk given history of A-fib  PT/OT as recommended  Strict delirium protocol: Encourage wakefulness during the daytime hours with lights on, shades open and reorienting as necessary. Minimize interruptions to sleep during the nighttime hours as able. No further recommendations. We will sign off at this time. Please contact us with any new concerns    Patient was discussed with attending neurologist Dr. Dayna Gonzalez. Thank you for allowing us to participate in the care of your patient. If there are any questions regarding evaluation please feel free to contact us. ROSEMARY Bronson CNP, 2/14/2023     ------------------------------------    Attending Note:  I have rounded on this patient with DON Bose. I have reviewed the chart and we have discussed this case in detail. The patient was seen and examined by myself. Pertinent labs and imaging have been personally reviewed. Our findings and impressions were discussed with the patient. I concur with the Nurse Specialist's assessment and plan. Patient seen and evaluated at bedside this afternoon. He is pleasant for me. He has a significant peripheral neuropathy on examination as well as hearing loss. Given his profound peripheral neuropathy, it is very possible that he has an autonomic neuropathy leading to neurocardiogenic syncope. Also noted to be hypotensive upon arrival, possibly pointing towards dehydration and subsequent orthostatic hypotension. Appropriate treatment of this is maintain adequate fluid hydration and medication such as midodrine/Northera could be considered especially if a autonomic neuropathy is suspected. Low suspicion for any central neurologic etiology and as such I do not feel that MRI brain is necessarily indicated. Even if an incidental small stroke is found he is medically optimized on Eliquis, aspirin and Lipitor. Will defer to internal medicine as they are the ordering team.  [address] cardiology input well. I will be ordering no further neurologic testing.     Aman Montgomery DO 8/13/2022 2:08 PM

## 2023-02-14 NOTE — PROGRESS NOTES
Physical Therapy  Name: Jigar Lazcano MRN: [id number] :   10/18/1930   Date:  2023   Admission Date: 2023 Room:  [address] [place] [address]   Restrictions/Precautions:        Confusion/dementia, [place], fall risk, tele, IV, borden, general    Communication with other providers:  Sitter/tech Precious Carlson present for safety    Subjective:  Patient states:  Patient solemnly states \"I think this is last time I'm doing to a nursing home\"   Pain:   Location, Type, Intensity (0/10 to 10/10): Denies    Objective:    Observation:  Patient is seated in recliner upon arrival.     Treatment, including education/measures:    Transfers with line management of none  Sit to stand : CGA with slow rise to RW from recliner  Stand to sit : Min A to recliner d/t fair eccentric control  Standing balance: Patient has moderate reliance on BUE support on RW with CGA for static balance, Min A for light dynamic balance. He demo's forward flexed posture. Cues do not improve  SPT:Stand pivot to recliner Min A for balance    Gait:  Pt amb with RW for 50 ft with CGA assist. When patient turns head to speak to others, patient increases in lateral instability/near LOB and needs Min A to correct. Patient needs mod-max verbal cues for seated rest break d/t forward flexed posture and step length declining towards end of ambulation distance. Exercises  Sitting Exercises: Ankle pumps x 10  LAQ's x 10  Marching x 10   Hip Abd x 10  Shoulder abd with scapular retraction and cervical extension x 10  Forward trunk flexion seated x 10    Therapeutic exercises were instructed today. Cues were given for technique, safety, recruitment, and rationale. Cues were verbal and/or tactile. Safety  Patient left safely in the recliner, with call light/phone in reach with alarm applied. Gait belt and mask were used for transfers and gait.     Assessment / Impression:     Patient's tolerance of treatment:  Good   Adverse Reaction: none  Significant change in status and impact:  none  Barriers to improvement:  balance and endurance    Plan for Next Session:    Will cont to work towards pt's goals per patient tolerance  Time in:  1418  Time out:  1441  Timed treatment minutes:  23  Total treatment time:  23  Previously filed items:  Social/Functional History  Lives With: Family  Short Term Goals  Time Frame for Short Term Goals: 1 week  Short Term Goal 1: Pt will AMB x35 ft with RW, SBA, cues for path  Short Term Goal 2: Pt will perform all bed mobility with cues and CGA. Short Term Goal 3: Pt will participate in x5 minutes standing dynamic balance training with UE support, CGA. Electronically signed by:     Deandra Ball PTA  2/14/2023, 2:47 PM

## 2023-02-15 ENCOUNTER — APPOINTMENT (OUTPATIENT)
Dept: MRI IMAGING | Age: 88
End: 2023-02-15
Payer: OTHER GOVERNMENT

## 2023-02-15 LAB
ANION GAP SERPL CALCULATED.3IONS-SCNC: 11 MMOL/L (ref 4–16)
BUN SERPL-MCNC: 25 MG/DL (ref 6–23)
CALCIUM SERPL-MCNC: 8.1 MG/DL (ref 8.3–10.6)
CHLORIDE BLD-SCNC: 105 MMOL/L (ref 99–110)
CO2: 23 MMOL/L (ref 21–32)
CREAT SERPL-MCNC: 2.8 MG/DL (ref 0.9–1.3)
GFR SERPL CREATININE-BSD FRML MDRD: 21 ML/MIN/1.73M2
GLUCOSE SERPL-MCNC: 113 MG/DL (ref 70–99)
HCT VFR BLD CALC: 29.2 % (ref 42–52)
HCT VFR BLD CALC: 29.7 % (ref 42–52)
HCT VFR BLD CALC: 30.2 % (ref 42–52)
HCT VFR BLD CALC: 31.9 % (ref 42–52)
HEMOGLOBIN: 10.2 GM/DL (ref 13.5–18)
HEMOGLOBIN: 9.1 GM/DL (ref 13.5–18)
HEMOGLOBIN: 9.4 GM/DL (ref 13.5–18)
HEMOGLOBIN: 9.4 GM/DL (ref 13.5–18)
MAGNESIUM: 1.9 MG/DL (ref 1.8–2.4)
MCH RBC QN AUTO: 28.8 PG (ref 27–31)
MCHC RBC AUTO-ENTMCNC: 31.1 % (ref 32–36)
MCV RBC AUTO: 92.6 FL (ref 78–100)
PDW BLD-RTO: 14.6 % (ref 11.7–14.9)
PHOSPHORUS: 2.7 MG/DL (ref 2.5–4.9)
PLATELET # BLD: 217 K/CU MM (ref 140–440)
PMV BLD AUTO: 10.1 FL (ref 7.5–11.1)
POTASSIUM SERPL-SCNC: 3.7 MMOL/L (ref 3.5–5.1)
RBC # BLD: 3.26 M/CU MM (ref 4.6–6.2)
SODIUM BLD-SCNC: 139 MMOL/L (ref 135–145)
WBC # BLD: 5.7 K/CU MM (ref 4–10.5)

## 2023-02-15 PROCEDURE — 80048 BASIC METABOLIC PNL TOTAL CA: CPT

## 2023-02-15 PROCEDURE — 94761 N-INVAS EAR/PLS OXIMETRY MLT: CPT

## 2023-02-15 PROCEDURE — 97535 SELF CARE MNGMENT TRAINING: CPT

## 2023-02-15 PROCEDURE — 1200000000 HC SEMI PRIVATE

## 2023-02-15 PROCEDURE — 6370000000 HC RX 637 (ALT 250 FOR IP): Performed by: INTERNAL MEDICINE

## 2023-02-15 PROCEDURE — 2580000003 HC RX 258: Performed by: STUDENT IN AN ORGANIZED HEALTH CARE EDUCATION/TRAINING PROGRAM

## 2023-02-15 PROCEDURE — 51701 INSERT BLADDER CATHETER: CPT

## 2023-02-15 PROCEDURE — 87040 BLOOD CULTURE FOR BACTERIA: CPT

## 2023-02-15 PROCEDURE — 85014 HEMATOCRIT: CPT

## 2023-02-15 PROCEDURE — 6360000002 HC RX W HCPCS: Performed by: STUDENT IN AN ORGANIZED HEALTH CARE EDUCATION/TRAINING PROGRAM

## 2023-02-15 PROCEDURE — 70551 MRI BRAIN STEM W/O DYE: CPT

## 2023-02-15 PROCEDURE — 83735 ASSAY OF MAGNESIUM: CPT

## 2023-02-15 PROCEDURE — 6370000000 HC RX 637 (ALT 250 FOR IP): Performed by: STUDENT IN AN ORGANIZED HEALTH CARE EDUCATION/TRAINING PROGRAM

## 2023-02-15 PROCEDURE — 85027 COMPLETE CBC AUTOMATED: CPT

## 2023-02-15 PROCEDURE — 84100 ASSAY OF PHOSPHORUS: CPT

## 2023-02-15 PROCEDURE — 6370000000 HC RX 637 (ALT 250 FOR IP): Performed by: FAMILY MEDICINE

## 2023-02-15 PROCEDURE — 51798 US URINE CAPACITY MEASURE: CPT

## 2023-02-15 PROCEDURE — 85018 HEMOGLOBIN: CPT

## 2023-02-15 PROCEDURE — 97112 NEUROMUSCULAR REEDUCATION: CPT

## 2023-02-15 PROCEDURE — 36415 COLL VENOUS BLD VENIPUNCTURE: CPT

## 2023-02-15 RX ADMIN — PANTOPRAZOLE SODIUM 20 MG: 20 TABLET, DELAYED RELEASE ORAL at 13:31

## 2023-02-15 RX ADMIN — SERTRALINE HYDROCHLORIDE 50 MG: 50 TABLET ORAL at 13:32

## 2023-02-15 RX ADMIN — TAMSULOSIN HYDROCHLORIDE 0.4 MG: 0.4 CAPSULE ORAL at 13:29

## 2023-02-15 RX ADMIN — LEVOTHYROXINE SODIUM 50 MCG: 0.05 TABLET ORAL at 13:29

## 2023-02-15 RX ADMIN — MICONAZOLE NITRATE: 2 POWDER TOPICAL at 13:38

## 2023-02-15 RX ADMIN — ENOXAPARIN SODIUM 30 MG: 100 INJECTION SUBCUTANEOUS at 13:31

## 2023-02-15 RX ADMIN — METOPROLOL TARTRATE 25 MG: 25 TABLET, FILM COATED ORAL at 13:30

## 2023-02-15 RX ADMIN — Medication: at 13:31

## 2023-02-15 RX ADMIN — MAGNESIUM OXIDE 400 MG (241.3 MG MAGNESIUM) TABLET 400 MG: TABLET at 13:32

## 2023-02-15 RX ADMIN — FINASTERIDE 5 MG: 5 TABLET, FILM COATED ORAL at 13:30

## 2023-02-15 RX ADMIN — ASPIRIN 81 MG: 81 TABLET, CHEWABLE ORAL at 13:29

## 2023-02-15 RX ADMIN — SODIUM CHLORIDE, PRESERVATIVE FREE 10 ML: 5 INJECTION INTRAVENOUS at 13:37

## 2023-02-15 NOTE — PROGRESS NOTES
Occupational Therapy      Occupational Therapy Treatment Note    Name: Julio César Black MRN: [id number] :   10/18/1930   Date:  2/15/2023   Admission Date: 2023 Room:  [address] [place] [address]-A     Primary Problem: Dizzines    Restrictions/Precautions:           General Precautions, Fall Risk    Communication with other providers: Nursing handoff, PT handoff    Subjective:  Patient states:  \"I'll do it\" Pt stated when asked if he wanted to wash his hands\"  Pain:   Location, Type, Intensity (0/10 to 10/10):  No    Objective:    Observation: Pt received sitting upright in chair, agreeable to therapy   Objective Measures:  WFL    Treatment, including education:  Self Care Training:   Cues were given for safety, sequence, UE/LE placement, visual cues, and balance. Activities performed today included grooming, feeding    Feeding sitting upright in chair feeding Dashawn.  Grooming washing face w/ warm washcloth SBA, STS from reclining chair up to RW CGA< functional mobility in room w/ RW CGA w/ Vcs for safety awareness, in stand at sink to wash hands CGA, handoff to PT who resumed CGA w/ RW assist of pt       Assessment / Impression:    Patient's tolerance of treatment: Well  Adverse Reaction: None  Significant change in status and impact: Improved from initial evaluation  Barriers to improvement: None noted      Plan for Next Session:    Continue OT POC    Time in:  1112  Time out:  1120  Timed treatment minutes:  8 minutes  Total treatment time:  8 minutes      Electronically signed by:    Grant CLAUDIO/L [id number]  11:26 AM,2/15/2023

## 2023-02-15 NOTE — PROGRESS NOTES
Physical Therapy    Physical Therapy Treatment Note  Name: Kenneth Gustafson MRN: [id number] :   10/18/1930   Date:  2/15/2023   Admission Date: 2023 Room:  [address] [place] [address]   Restrictions/Precautions:          Confusion/dementia, fall risk, tele, IV, borden, general  Communication with other providers:  Partial co-tx with OT for CM note  Subjective:  Patient states: \"My hearing is real bad today\"  Pain:   Location, Type, Intensity (0/10 to 10/10):  No c/o  Objective:    Observation:  pt is awake up in recliner with lunch tray, sitter present  Objective Measures:  Vitals stable. Treatment, including education/measures:  STS: from recliner to RW with min A and cues. AMB from recliner to in-room sink x5 ft fot hand hygiene tasks, pt with poor path and RW management requiring cues and Min-CGA. Standing balance at sink x3' for hygiene, CGA for safety. PT setup of cone stacking activity. Max cues for side-stepping x3 ft to countertop, Min A.   Balance: PT demos reaching activity for pt carryover. Pt able to reach anterior/inferior to 1.5 ft high surface to retreive x6 cones, turn and place into basket at countertop to pt L. Pt with increasing fatigue and decreased trunk control. CGA-Min. RTS required. Pt becomes min unsafe, required Mod A+Max cues for direction back to chair x5 ft. Pt difficulty with retro stepping and Mod A back to seated in recliner. Seated rest x3'   PT advanced pt to window via chair. STS from chair>window for Tic-tac -toe balance activity in standing x3 min. CGA throughout for safety. PT with x1 UE support throughout. Seated TherEx: Toe-tapping to 4'' high target x10 reps BLE, PT varies location of target for coordination. LAQ to target x10 BLE.   STS: From recliner to RW max cues, Min A  Standing marching x10 reps with CGA. Pt quickly fatiguing, RTS Min A. Safety: patient left in recliner with LE elevated, tray overtop, chair alarm, call light within reach, RN notified, gait belt used. Sitter present  Assessment / Impression:    Pt is limited by significant fatigue this session. Gait distance limited. Patient's tolerance of treatment:  fair-   Adverse Reaction: fatigue  Significant change in status and impact:  none  Barriers to improvement:  none  Plan for Next Session:    Cont POC  Time in:  11:12  Time out:  11:31  Timed treatment minutes:  15    Total treatment time:  19    Previously filed items:  Social/Functional History  Lives With: Family        Short Term Goals  Time Frame for Short Term Goals: 1 week  Short Term Goal 1: Pt will AMB x35 ft with RW, SBA, cues for path  Short Term Goal 2: Pt will perform all bed mobility with cues and CGA. Short Term Goal 3: Pt will participate in x5 minutes standing dynamic balance training with UE support, CGA.     Electronically signed by:    Aubree Lyon PT  2/15/2023, 12:48 PM

## 2023-02-15 NOTE — PROGRESS NOTES
Patient became very combative with staff, hitting, kicking, biting and clawing. SOS called, security responded and assisted with patient holding his legs and arms. Dr. Migue Gaxiola contacted orders obtained for Haldol as well as wrist restraints. Restraints applied, Haldol given as ordered.

## 2023-02-15 NOTE — PROGRESS NOTES
Physician Progress Note      PATIENT:               Puneet Melvin  CSN #:                  [id number]  :                       10/18/1930  ADMIT DATE:       2023 4:16 PM  [address] [facility] DATE:  RESPONDING  PROVIDER #:        Amanda Miller MD          QUERY TEXT:    Hospitalists,    Pt admitted with syncope due to dehydration, orthostatic  hypotension,   acidosis and has confusion documented. If possible, please document in the   progress notes and discharge summary if you are evaluating and / or treating   any of the following: The medical record reflects the following:  Risk Factors: dehydration  Clinical Indicators: documentation of confusion, impulsiveness and climbing   OOB, hypotension, acidosis, KAVON documented by Nephrology  Treatment: labs, imaging, observation    Thank you,  Rai Adamson RN CDS  [phone]  Options provided:  -- Encephalopathy due to  ##please specify cause, please specify cause. -- Metabolic encephalopathy  -- Toxic metabolic encephalopathy  -- Delirium due to, Please specify cause. -- Delirium  -- Other - I will add my own diagnosis  -- Disagree - Not applicable / Not valid  -- Disagree - Clinically unable to determine / Unknown  -- Refer to Clinical Documentation Reviewer    PROVIDER RESPONSE TEXT:    This patient has metabolic encephalopathy. Query created by: Christine Gonzalez on 2023 3:00 PM      QUERY TEXT:    Hospitalists,    Pt admitted with syncope, dehydration and noted documentation of KAVON by the   Nephrologist consultant. If possible, please document in progress notes and   discharge summary:    The medical record reflects the following:  Risk Factors: dehydration, hypotension, CKD  Clinical Indicators: documentation of KAVON, creatinine 2.9 on admit, last   obtained creatinine on 22 was 2.3.   Treatment: labs, Nephrology consult, IVF, I&O    Thank you,  Rai Adamson RN CDS  [phone]  Options provided:  -- KAVON  confirmed present on admission  -- KAVON confirmed not present on admission  -- KAVON ruled out  -- Other - I will add my own diagnosis  -- Disagree - Not applicable / Not valid  -- Disagree - Clinically unable to determine / Unknown  -- Refer to Clinical Documentation Reviewer    PROVIDER RESPONSE TEXT:    The diagnosis of KAVON was confirmed as present on admission.     Query created by: Tamanna Laboy on 2/14/2023 3:08 PM      Electronically signed by:  David Roberts MD 2/14/2023 7:23 PM

## 2023-02-15 NOTE — PROGRESS NOTES
02/15/23 2715   Encounter Summary   Encounter Overview/Reason  Attempted Encounter   Service Provided For: Patient not available   Referral/Consult From: [address] [facility]   Last Encounter  02/15/23  (Patient not available;  Sitter in room; pt. sleeping.  Silent prayer said for patient.)   Complexity of Encounter Low   Begin Time 0924   End Time  0929   Total Time Calculated 5 min   Encounter    Type Initial Screen/Assessment   Spiritual/Emotional needs   Type Spiritual Support   Assessment/Intervention/Outcome   Assessment Unable to assess   Plan and Referrals   Plan/Referrals Continue to visit, (comment)

## 2023-02-15 NOTE — PLAN OF CARE
Problem: Discharge Planning  Goal: Discharge to home or other facility with appropriate resources  2/15/2023 0033 by Matias Beebe RN  Outcome: Progressing  2/14/2023 1811 by Dorina Kee RN  Outcome: Progressing     Problem: Skin/Tissue Integrity  Goal: Absence of new skin breakdown  Description: 1. Monitor for areas of redness and/or skin breakdown  2. Assess vascular access sites hourly  3. Every 4-6 hours minimum:  Change oxygen saturation probe site  4. Every 4-6 hours:  If on nasal continuous positive airway pressure, respiratory therapy assess nares and determine need for appliance change or resting period. 2/15/2023 0033 by Matias Beebe RN  Outcome: Progressing  2/14/2023 1811 by Dorina Kee RN  Outcome: Progressing     Problem: Safety - Violent/Self-destructive Restraint  Goal: Remains free of injury from restraints (Restraint for Violent/Self-Destructive Behavior)  Description: INTERVENTIONS:  1. Determine that de-escalation and other, less restrictive measures have been tried or would not be effective before applying the restraint  2. Identify and document the criteria for restraint  3. Evaluate the patient's condition at the time of restraint application  4. Inform patient/family regarding the reason for restraint/seclusion  5. Q2H: Monitor comfort, nutrition and hydration needs  6. Q15M: Perform safety checks including skin, circulation, sensory, respiratory and psychological status  7. Ensure continuous observation  8. Identify and implement measures to help patient regain control, assess readiness for release and initiate progressive release per policy  Outcome: Progressing     Problem: Safety - Medical Restraint  Goal: Remains free of injury from restraints (Restraint for Interference with Medical Device)  Description: INTERVENTIONS:  1. Determine that other, less restrictive measures have been tried or would not be effective before applying the restraint  2.  Evaluate the patient's condition at the time of restraint application  3. Inform patient/family regarding the reason for restraint  4.  Q2H: Monitor safety, psychosocial status, comfort, nutrition and hydration  Outcome: Progressing     Problem: Safety - Adult  Goal: Free from fall injury  Outcome: Progressing

## 2023-02-15 NOTE — PROGRESS NOTES
Daily Progress Note  Subjective:  Awake and confused up in chair  Sitter at bedside  Bp and HR stable  NSR on tele    Attending Note:    He is confused has a sitter he is also in restrains  Heart rate is stable sinus noted  For now from cardiac stand he is stable   Continue supportive care    Impression and Plan:   Syncopal episode    Reported loss of consciousness, details unknown    Poor oral intake before admission    Had similar episode on Nov    Echo stable    Carotid duplex negative for stenosis    BP and HR stable    MRI brain consulted    Neuro is following    Will need outpatient monitor to ensure to arrhthymias or pauses that may have contributed to episode      Hx of PAF   CHADVASC is 3    Eliquis is on hold; recent falls including last night    Continue lopressor    Elevated troponin    No active chest pain    No EKG changes    Likely d/t CKD    Cont' with conservative treatment    CKD    Renal following    Avoid nephro toxic medication      Conservative treatment     Most Recent Echo  2/14/2023   Summary   Left ventricular systolic function is abnormal.   Ejection fraction is visually estimated at 40-50%. Global hypokinesis noted. Grade I diastolic dysfunction. Trivial aortic regurgitation noted with color doppler. Mild tricuspid regurgitation; RVSP: 53 mmHg. No evidence of any pericardial effusion. [id number] Score for Atrial Fibrillation Stroke Risk    Risk   Factors   Component Value   C CHF No 0   H HTN Yes 1   A2 Age >= 76 Yes,  (80 y.o.) 2   D DM No 0   S2 Prior Stroke/TIA No 0   V Vascular Disease No 0   A Age 74-69 No,  (80 y.o.) 0   Sc Sex male 0     [id number]  Score   3   Score last updated 2/14/23 68:16 AM EST     Click here for a link to the UpToDate guideline \"Atrial Fibrillation: Anticoagulation therapy to prevent embolization     Disclaimer: Risk Score calculation is dependent on accuracy of patient problem list and past encounter diagnosis.         PAST MEDICAL HISTORY:  He is having syncope present. History of having  anemia, anxiety, renal surgery, BPH, depression, GERD, hemorrhoids,  hepatitis, hernia repair, hypertension, hyperlipidemia, and squamous  cell cancer present. PAST SURGICAL HISTORY:  He has a history of carpal tunnel surgery,  hernia surgery, and neck surgery done. SOCIAL HISTORY:  He does not smoke. He does not drink. ALLERGIES:  MINOCYCLINE. Objective:   BP (!) 152/76   Pulse 70   Temp 98.3 °F (36.8 °C) (Axillary)   Resp 11   Ht 6' (1.829 m)   Wt 201 lb (91.2 kg)   SpO2 95%   BMI 27.26 kg/m²     Intake/Output Summary (Last 24 hours) at 2/15/2023 1107  Last data filed at 2/15/2023 0589  Gross per 24 hour   Intake --   Output 600 ml   Net -600 ml       Medications:   Scheduled Meds:   mupirocin   Topical Daily    magnesium oxide  400 mg Oral Daily    sodium chloride flush  5-40 mL IntraVENous 2 times per day    enoxaparin  30 mg SubCUTAneous Daily    miconazole   Topical BID    aspirin  81 mg Oral Daily    atorvastatin  40 mg Oral Nightly    finasteride  5 mg Oral Daily    levothyroxine  50 mcg Oral Daily    metoprolol tartrate  25 mg Oral BID    pantoprazole  20 mg Oral Daily    QUEtiapine  50 mg Oral Nightly    sertraline  50 mg Oral Daily    tamsulosin  0.4 mg Oral Daily    temazepam  7.5 mg Oral Nightly    sodium chloride  1,000 mL IntraVENous Once      Infusions:   sodium chloride        PRN Meds:  sodium chloride flush, sodium chloride, ondansetron **OR** ondansetron, polyethylene glycol, acetaminophen **OR** acetaminophen, hydrALAZINE, benzonatate, sennosides-docusate sodium     Physical Exam:  Vitals:    02/15/23 1009   BP: (!) 152/76   Pulse: 70   Resp: 11   Temp:    SpO2: 95%        General: confusion noted  Chest: Nontender  Cardiac: First and Second Heart Sounds are Normal, No Murmurs or Gallops noted  Lungs:Clear to auscultation and percussion.   Abdomen: Soft, NT, ND, +BS  Extremities: No clubbing, no edema  Vascular: Equal 2+ peripheral pulses. Lab Data:  CBC:   Recent Labs     02/13/23  0316 02/13/23  2211 02/14/23  0143 02/14/23  0920 02/14/23  1547 02/15/23  0003 02/15/23  0442   WBC 5.2  --  5.4  --   --  5.7  --    HGB 8.1*   < > 11.5*   < > 9.7* 9.4*  9.4* 10.2*   HCT 26.6*   < > 36.2*   < > 32.4* 29.7*  30.2* 31.9*   MCV 95.3  --  92.3  --   --  92.6  --      --  211  --   --  217  --     < > = values in this interval not displayed. BMP:   Recent Labs     02/13/23  0316 02/14/23  0143 02/15/23  0003    138 139   K 3.6 3.3* 3.7    102 105   CO2 23 24 23   PHOS 3.1 3.2 2.7   BUN 24* 23 25*   CREATININE 2.8* 2.7* 2.8*     LIVER PROFILE:   Recent Labs     02/12/23  1629   AST 18   ALT 7*   BILITOT 0.4   ALKPHOS 96     PT/INR: No results for input(s): PROTIME, INR in the last 72 hours. APTT: No results for input(s): APTT in the last 72 hours. BNP:  No results for input(s): BNP in the last 72 hours.       Assessment:  Patient Active Problem List    Diagnosis Date Noted    Orthostatic syncope 02/14/2023    Dizziness 02/12/2023    Syncope and collapse 02/12/2023    Community acquired pneumonia of left lung, unspecified part of lung 10/08/2022    Cardiac arrest ([facility] [address] 08/25/2022    Chest pain 08/21/2022    Agitation due to dementia 08/18/2022    Varicose 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 08/15/2015    Skin ulcer, limited to breakdown of skin ([facility] [address] 02/21/2022    Current moderate episode of major depressive disorder, unspecified whether recurrent ([facility] [address] 02/21/2022    Chronic kidney disease, stage IV (severe) ([facility] [address] 02/21/2022    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 ([facility] [address] 02/21/2022    Leukocytosis 09/08/2021    Thrombocytopenia, unspecified 08/31/2021    Bandemia 08/10/2021    Atelectasis 08/05/2021    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    Hyperglycemia 05/25/2021    Hematuria 03/22/2021    UGIB (upper gastrointestinal bleed) 03/02/2021    Acquired hypothyroidism 12/30/2020 Anxiety     BPH with urinary obstruction     Seborrheic keratosis, inflamed 03/10/2014    Actinic keratosis 03/10/2014    Seborrheic keratosis 03/10/2014    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    Hyperlipidemia 09/21/2012    Depression 09/21/2012    Primary insomnia 09/21/2012    Dysuria 09/21/2012    Vitamin D deficiency 09/21/2012       Electronically signed by ROSEMARY Clark CNP on 2/15/2023 at 11:07 AM    Electronically signed by Michell Alexander MD on 2/15/23 at 12:45 PM EST

## 2023-02-15 NOTE — PROGRESS NOTES
V2.0  [facility] Hospitalist Progress Note      Name:  Julio César Black /Age/Sex: 10/18/1930  (80 y.o. male)   MRN & CSN:  [id number] & [id number] Encounter Date/Time: 2/15/2023 2:55 PM EST    Location:  [address] [place] [address]-A PCP: No primary care provider on file. Hospital Day: 4    Assessment and Plan:   Julio César Black is a 80 y.o. male with pmh of HTN, BPH, depression, HLD, A-fib on Eliquis, diastolic heart failure, hypothyroidism, history of frequent frequent falls who presents with Dizziness      Plan:  Dizziness and lightheadedness, hypotension with suspicion for syncope, likely in the setting of severe dehydration:  -Patient reported to have poor p.o. intake, along with poor appetite and severely dehydrated on initial exam, patient reported that he had episodes of loss of consciousness but was unsure of details on admission. Lactate elevation initially thought to be in the setting of starvation ketosis. Rogelio lactic acid level significantly elevated to 8.2 the patient symptomatically improved, vital signs are stable with no acute distress, no leukocytosis or fever. Trend lactate and follow-up blood culture/inflammatory markers and monitor for now. Patient was not started on IV antibiotics per admitting team giver he did not meet the SIRS criteria but was started on LR at 100 ml/h for hydration. Urinalysis pending. Status post 1 L. Possibility of neurogenic versus cardiogenic syncope, will order TTE and consult cardiology/neurology. Plan for Peola Grout as outpatient / Carotid Doppler w no significant stenosis b/l, no plan for stress test/ conservative management given patient's age. Delirium precaution, no further interventions as per Neuro.   Witnessed Fall:        -Pt reportedly had witnessed fall 12:07am 23, PT/OT  Hypokalemia:        -3.3, s/p repletion, will replete to keep K level >4  Proximal atrial fibrillation:  -Patient on Eliquis 2.5 twice daily with history of frequent falls,held after d/w Cardiologist given recurrent falls. We will continue to monitor telemetry  CKD:  -Creatinine 2.7<<2.9 on admission, compared to 2.3 at baseline  -Nephrology onboard  -Strict intake and output record and daily weight  -We will avoid nephrotoxic medication  -We will adjust medications according to patient's creatinine clearance  -Send urine electrolytes and urine urea, calculate FeUrea  Lactic acidosis: Likely in the setting of type II in the setting of dehydration, will trend lactic acid, currently stable <2  Benign essential hypertension:  -Hypotensive on admission, will continue as needed medications if blood pressure above 160/100 mmHg  BPH:  -On Flomax and finasteride, discontinued because of possible orthostatic blood pressure related hypotension  HLD:  Diastolic heart failure, chronic with preserved ejection fraction: On Lasix  Hypothyroidism: On levothyroxine  History of frequent falls: Patient reports 2 falls in the past 3 days.    Diet ADULT DIET; Regular; 5 carb choices (75 gm/meal)   DVT Prophylaxis [x] Lovenox, []  Heparin, [] SCDs, [] Ambulation,  [] Eliquis, [] Xarelto  [] Coumadin   Code Status Full Code   Disposition From: Home  Expected Disposition: Possibly SNF  Estimated Date of Discharge: 2-3 days  Patient requires continued admission due to currently having acute confusion/work-up for syncope   Surrogate Decision Maker/ POA      Subjective:     Chief Complaint: Loss of Consciousness (Fall from sitting in shower chair with loss of consciousness. Syncope for several seconds and low blood pressure)       Nino Sherman is a 89 y.o. male who presents with falls     Patient currently denies any significant symptoms, is AAO X2,     Review of Systems:    Review of Systems    Unremarkable except as above although unreliable given patient is AAO X2    Objective:     Intake/Output Summary (Last 24 hours) at 2/15/2023 0832  Last data filed at 2/15/2023 0520  Gross per 24 hour   Intake --   Output 600 ml   Net -600 ml       Vitals:   Vitals:    02/15/23 0800   BP: (!) 160/73   Pulse: 77   Resp: 12   Temp: 98.3 °F (36.8 °C)   SpO2: 96%       Physical Exam:     General: NAD  Eyes: EOMI  ENT: neck supple  Cardiovascular: Regular rate. Respiratory: Clear to auscultation  Gastrointestinal: Soft, non tender  Genitourinary: no suprapubic tenderness  Musculoskeletal: No edema  Skin: warm, dry  Neuro: Alert. Psych: Mood appropriate.      Medications:   Medications:    mupirocin   Topical Daily    magnesium oxide  400 mg Oral Daily    sodium chloride flush  5-40 mL IntraVENous 2 times per day    enoxaparin  30 mg SubCUTAneous Daily    miconazole   Topical BID    aspirin  81 mg Oral Daily    atorvastatin  40 mg Oral Nightly    finasteride  5 mg Oral Daily    levothyroxine  50 mcg Oral Daily    metoprolol tartrate  25 mg Oral BID    pantoprazole  20 mg Oral Daily    QUEtiapine  50 mg Oral Nightly    sertraline  50 mg Oral Daily    tamsulosin  0.4 mg Oral Daily    temazepam  7.5 mg Oral Nightly    sodium chloride  1,000 mL IntraVENous Once      Infusions:    sodium chloride       PRN Meds: sodium chloride flush, 5-40 mL, PRN  sodium chloride, , PRN  ondansetron, 4 mg, Q8H PRN   Or  ondansetron, 4 mg, Q6H PRN  polyethylene glycol, 17 g, Daily PRN  acetaminophen, 650 mg, Q6H PRN   Or  acetaminophen, 650 mg, Q6H PRN  hydrALAZINE, 10 mg, Q4H PRN  benzonatate, 200 mg, TID PRN  sennosides-docusate sodium, 2 tablet, BID PRN      Labs      Recent Results (from the past 24 hour(s))   Hemoglobin and Hematocrit    Collection Time: 02/14/23  9:20 AM   Result Value Ref Range    Hemoglobin 9.9 (L) 13.5 - 18.0 GM/DL    Hematocrit 31.7 (L) 42 - 52 %   Hemoglobin and Hematocrit    Collection Time: 02/14/23  3:47 PM   Result Value Ref Range    Hemoglobin 9.7 (L) 13.5 - 18.0 GM/DL    Hematocrit 32.4 (L) 42 - 52 %   Hemoglobin and Hematocrit    Collection Time: 02/15/23 12:03 AM   Result Value Ref Range    Hemoglobin 9.4 (L) 13.5 - 18.0 GM/DL    Hematocrit 29.7 (L) 42 - 52 %   CBC    Collection Time: 02/15/23 12:03 AM   Result Value Ref Range    WBC 5.7 4.0 - 10.5 K/CU MM    RBC 3.26 (L) 4.6 - 6.2 M/CU MM    Hemoglobin 9.4 (L) 13.5 - 18.0 GM/DL    Hematocrit 30.2 (L) 42 - 52 %    MCV 92.6 78 - 100 FL    MCH 28.8 27 - 31 PG    MCHC 31.1 (L) 32.0 - 36.0 %    RDW 14.6 11.7 - 14.9 %    Platelets 228 963 - 849 K/CU MM    MPV 10.1 7.5 - 11.1 FL   Basic Metabolic Panel    Collection Time: 02/15/23 12:03 AM   Result Value Ref Range    Sodium 139 135 - 145 MMOL/L    Potassium 3.7 3.5 - 5.1 MMOL/L    Chloride 105 99 - 110 mMol/L    CO2 23 21 - 32 MMOL/L    Anion Gap 11 4 - 16    BUN 25 (H) 6 - 23 MG/DL    Creatinine 2.8 (H) 0.9 - 1.3 MG/DL    Est, Glom Filt Rate 21 (L) >60 mL/min/1.73m2    Glucose 113 (H) 70 - 99 MG/DL    Calcium 8.1 (L) 8.3 - 10.6 MG/DL   Magnesium    Collection Time: 02/15/23 12:03 AM   Result Value Ref Range    Magnesium 1.9 1.8 - 2.4 mg/dl   Phosphorus    Collection Time: 02/15/23 12:03 AM   Result Value Ref Range    Phosphorus 2.7 2.5 - 4.9 MG/DL   Hemoglobin and Hematocrit    Collection Time: 02/15/23  4:42 AM   Result Value Ref Range    Hemoglobin 10.2 (L) 13.5 - 18.0 GM/DL    Hematocrit 31.9 (L) 42 - 52 %        Imaging/Diagnostics Last 24 Hours   CT HEAD WO CONTRAST    Result Date: 2/12/2023  EXAMINATION: CT OF THE HEAD WITHOUT CONTRAST  2/12/2023 2:46 pm TECHNIQUE: CT of the head was performed without the administration of intravenous contrast. Automated exposure control, iterative reconstruction, and/or weight based adjustment of the mA/kV was utilized to reduce the radiation dose to as low as reasonably achievable. COMPARISON: 11/13/2022. HISTORY: ORDERING SYSTEM PROVIDED HISTORY: Fall TECHNOLOGIST PROVIDED HISTORY: Has a \"code stroke\" or \"stroke alert\" been called? ->No Reason for exam:->Fall Decision Support Exception - unselect if not a suspected or confirmed emergency medical condition->Emergency Medical Condition (MA) Reason for Exam: Fall FINDINGS: BRAIN/VENTRICLES: There is no acute intracranial hemorrhage, mass effect or midline shift. No abnormal extra-axial fluid collection. The gray-white differentiation is maintained without evidence of an acute infarct. There is no evidence of hydrocephalus. There is generalized volume loss. Areas of white matter hypoattenuation appears similar to the prior study. ORBITS: The visualized portion of the orbits demonstrate no acute abnormality. SINUSES: The visualized paranasal sinuses and mastoid air cells demonstrate no acute abnormality. SOFT TISSUES/SKULL:  No acute abnormality of the visualized skull or soft tissues. No acute intracranial findings. CT CERVICAL SPINE WO CONTRAST    Result Date: 2/12/2023  EXAMINATION: CT OF THE CERVICAL SPINE WITHOUT CONTRAST 2/12/2023 2:46 pm TECHNIQUE: CT of the cervical spine was performed without the administration of intravenous contrast. Multiplanar reformatted images are provided for review. Automated exposure control, iterative reconstruction, and/or weight based adjustment of the mA/kV was utilized to reduce the radiation dose to as low as reasonably achievable. COMPARISON: 11/13/2022. HISTORY: ORDERING SYSTEM PROVIDED HISTORY: Fall TECHNOLOGIST PROVIDED HISTORY: Reason for exam:->Fall Decision Support Exception - unselect if not a suspected or confirmed emergency medical condition->Emergency Medical Condition (MA) Reason for Exam: Fall FINDINGS: BONES/ALIGNMENT: There is normal spinal alignment. C1 and C2 have a normal relationship. No acute fracture is identified. DEGENERATIVE CHANGES: Multilevel degenerative disc disease is most prominent and moderate to advanced at C6-C7. Multilevel facet arthropathy is worst and severe on the left at C3-C4 and C4-C5. No critical central canal narrowing is identified. SOFT TISSUES: There is no prevertebral soft tissue swelling. No acute cervical spine fracture. Spinal degenerative changes as described.      XR CHEST PORTABLE    Result Date: 2/12/2023  EXAMINATION: ONE XRAY VIEW OF THE CHEST 2/12/2023 2:11 pm COMPARISON: 11/13/2022 HISTORY: ORDERING SYSTEM PROVIDED HISTORY: chest pain TECHNOLOGIST PROVIDED HISTORY: Reason for exam:->chest pain Reason for Exam: chest pain FINDINGS: Cardial pericardial silhouette is unremarkable. Pulmonary vasculature is congested and there is increased interstitial opacity. Chronic pleural effusion on the left is noted. No pneumothorax. No free air. No acute bony abnormality. Fundus congestion along with increased interstitial opacity. Please correlate with any clinical evidence of interstitial edema.        Electronically signed by Nick Sheth MD on 2/15/2023 at 8:32 AM

## 2023-02-15 NOTE — PROGRESS NOTES
This nurse called patient son during patient becoming upset, patient son tried to calm patient, that was not able to happen patient is still upset. Patient nurse at bedside at this time.

## 2023-02-15 NOTE — PROGRESS NOTES
Nephrology Progress Note         [address], [address] [address]  Phone: [phone]  Office Hours: 8:30AM - 4:30PM  Monday - Friday        2/15/2023 6:42 AM  Subjective:   Admit Date: 2023  PCP: No primary care provider on file. Interval History:   Confused  In restraints    Diet: ADULT DIET; Regular; 5 carb choices (75 gm/meal)      Data:   Scheduled Meds:   mupirocin   Topical Daily    magnesium oxide  400 mg Oral Daily    sodium chloride flush  5-40 mL IntraVENous 2 times per day    enoxaparin  30 mg SubCUTAneous Daily    miconazole   Topical BID    aspirin  81 mg Oral Daily    atorvastatin  40 mg Oral Nightly    finasteride  5 mg Oral Daily    levothyroxine  50 mcg Oral Daily    metoprolol tartrate  25 mg Oral BID    pantoprazole  20 mg Oral Daily    QUEtiapine  50 mg Oral Nightly    sertraline  50 mg Oral Daily    tamsulosin  0.4 mg Oral Daily    temazepam  7.5 mg Oral Nightly    sodium chloride  1,000 mL IntraVENous Once     Continuous Infusions:   sodium chloride       PRN Meds:sodium chloride flush, sodium chloride, ondansetron **OR** ondansetron, polyethylene glycol, acetaminophen **OR** acetaminophen, hydrALAZINE, benzonatate, sennosides-docusate sodium  I/O last 3 completed shifts:  In: -   Out: 1800 [SPOU]  I/O this shift:  In: -   Out: 600 [Urine:600]    Intake/Output Summary (Last 24 hours) at 2/15/2023 0642  Last data filed at 2/15/2023 0520  Gross per 24 hour   Intake --   Output 600 ml   Net -600 ml       CBC:   Recent Labs     236 23  2211 23  0143 23  0920 23  1547 02/15/23  0003 02/15/23  0442   WBC 5.2  --  5.4  --   --  5.7  --    HGB 8.1*   < > 11.5*   < > 9.7* 9.4*  9.4* 10.2*     --  211  --   --  217  --     < > = values in this interval not displayed.        BMP:    Recent Labs     23 23  0143 02/15/23  0003    138 139   K 3.6 3.3* 3.7    102 105   CO2 23 24 23   BUN 24* 23 25* CREATININE 2.8* 2.7* 2.8*   GLUCOSE 98 104* 113*     Hepatic:   Recent Labs     02/12/23  1629   AST 18   ALT 7*   BILITOT 0.4   ALKPHOS 96     Troponin: No results for input(s): TROPONINI in the last 72 hours. BNP: No results for input(s): BNP in the last 72 hours. Lipids: No results for input(s): CHOL, HDL in the last 72 hours. Invalid input(s): LDLCALCU  ABGs: No results found for: PHART, PO2ART, DSA6YID  INR: No results for input(s): INR in the last 72 hours. Objective:   Vitals: /70   Pulse 72   Temp 97.3 °F (36.3 °C) (Oral)   Resp 15   Ht 6' (1.829 m)   Wt 201 lb (91.2 kg)   SpO2 94%   BMI 27.26 kg/m²   General appearance:in no acute distress  HEENT: normocephalic, atraumatic,   Neck: supple, trachea midline  Lungs: breathing comfortably on room air  Heart[de-identified] regular rate and rhythm,  Extremities: extremities atraumatic, no cyanosis or edema  Neurologic: alert, confsued    MEDICAL DECISION MAKING   -KAVON form ATN: cr 2.8 on admission//  -CKD4  -Lactatemia  -Syncope at home  -Hx HTN  -BPH:  -EF 40%    Cr stable, new baseline?   Continue to avoid nephrotoxins                    Electronically signed by Sharita Alexander DO on 2/15/2023 at MD Ekta Granado DO [address] [address],  [facility]  [address], [place] [address]  PHONE: [phone]  FAX: [phone]

## 2023-02-16 LAB
CULTURE: ABNORMAL
CULTURE: ABNORMAL
HCT VFR BLD CALC: 28.7 % (ref 42–52)
HEMOGLOBIN: 9 GM/DL (ref 13.5–18)
Lab: ABNORMAL
SARS-COV-2 RDRP RESP QL NAA+PROBE: NOT DETECTED
SOURCE: NORMAL
SPECIMEN: ABNORMAL

## 2023-02-16 PROCEDURE — 85014 HEMATOCRIT: CPT

## 2023-02-16 PROCEDURE — 6370000000 HC RX 637 (ALT 250 FOR IP): Performed by: FAMILY MEDICINE

## 2023-02-16 PROCEDURE — 6370000000 HC RX 637 (ALT 250 FOR IP): Performed by: STUDENT IN AN ORGANIZED HEALTH CARE EDUCATION/TRAINING PROGRAM

## 2023-02-16 PROCEDURE — 6370000000 HC RX 637 (ALT 250 FOR IP): Performed by: INTERNAL MEDICINE

## 2023-02-16 PROCEDURE — 2580000003 HC RX 258: Performed by: STUDENT IN AN ORGANIZED HEALTH CARE EDUCATION/TRAINING PROGRAM

## 2023-02-16 PROCEDURE — 1200000000 HC SEMI PRIVATE

## 2023-02-16 PROCEDURE — 85018 HEMOGLOBIN: CPT

## 2023-02-16 PROCEDURE — 94761 N-INVAS EAR/PLS OXIMETRY MLT: CPT

## 2023-02-16 PROCEDURE — 36415 COLL VENOUS BLD VENIPUNCTURE: CPT

## 2023-02-16 PROCEDURE — 6360000002 HC RX W HCPCS: Performed by: STUDENT IN AN ORGANIZED HEALTH CARE EDUCATION/TRAINING PROGRAM

## 2023-02-16 PROCEDURE — 87040 BLOOD CULTURE FOR BACTERIA: CPT

## 2023-02-16 PROCEDURE — 87635 SARS-COV-2 COVID-19 AMP PRB: CPT

## 2023-02-16 RX ORDER — FINASTERIDE 5 MG/1
5 TABLET, FILM COATED ORAL DAILY
Qty: 30 TABLET | Refills: 3 | Status: SHIPPED | OUTPATIENT
Start: 2023-02-17

## 2023-02-16 RX ORDER — LEVOTHYROXINE SODIUM 0.05 MG/1
50 TABLET ORAL DAILY
Qty: 30 TABLET | Refills: 3 | Status: SHIPPED | OUTPATIENT
Start: 2023-02-17

## 2023-02-16 RX ORDER — LANOLIN ALCOHOL/MO/W.PET/CERES
400 CREAM (GRAM) TOPICAL DAILY
Qty: 14 TABLET | Refills: 0 | Status: SHIPPED | OUTPATIENT
Start: 2023-02-17 | End: 2023-03-03

## 2023-02-16 RX ADMIN — ACETAMINOPHEN 650 MG: 325 TABLET ORAL at 09:52

## 2023-02-16 RX ADMIN — ASPIRIN 81 MG: 81 TABLET, CHEWABLE ORAL at 09:51

## 2023-02-16 RX ADMIN — METOPROLOL TARTRATE 25 MG: 25 TABLET, FILM COATED ORAL at 00:16

## 2023-02-16 RX ADMIN — LEVOTHYROXINE SODIUM 50 MCG: 0.05 TABLET ORAL at 09:52

## 2023-02-16 RX ADMIN — Medication: at 09:54

## 2023-02-16 RX ADMIN — METOPROLOL TARTRATE 25 MG: 25 TABLET, FILM COATED ORAL at 20:08

## 2023-02-16 RX ADMIN — TEMAZEPAM 7.5 MG: 7.5 CAPSULE ORAL at 00:16

## 2023-02-16 RX ADMIN — SODIUM CHLORIDE, PRESERVATIVE FREE 10 ML: 5 INJECTION INTRAVENOUS at 09:53

## 2023-02-16 RX ADMIN — SODIUM CHLORIDE, PRESERVATIVE FREE 10 ML: 5 INJECTION INTRAVENOUS at 00:16

## 2023-02-16 RX ADMIN — PANTOPRAZOLE SODIUM 20 MG: 20 TABLET, DELAYED RELEASE ORAL at 09:51

## 2023-02-16 RX ADMIN — ATORVASTATIN CALCIUM 40 MG: 40 TABLET, FILM COATED ORAL at 00:16

## 2023-02-16 RX ADMIN — MAGNESIUM OXIDE 400 MG (241.3 MG MAGNESIUM) TABLET 400 MG: TABLET at 09:55

## 2023-02-16 RX ADMIN — FINASTERIDE 5 MG: 5 TABLET, FILM COATED ORAL at 09:52

## 2023-02-16 RX ADMIN — TEMAZEPAM 7.5 MG: 7.5 CAPSULE ORAL at 20:09

## 2023-02-16 RX ADMIN — TAMSULOSIN HYDROCHLORIDE 0.4 MG: 0.4 CAPSULE ORAL at 09:51

## 2023-02-16 RX ADMIN — MICONAZOLE NITRATE: 2 POWDER TOPICAL at 20:11

## 2023-02-16 RX ADMIN — QUETIAPINE FUMARATE 50 MG: 25 TABLET ORAL at 20:08

## 2023-02-16 RX ADMIN — METOPROLOL TARTRATE 25 MG: 25 TABLET, FILM COATED ORAL at 09:52

## 2023-02-16 RX ADMIN — ENOXAPARIN SODIUM 30 MG: 100 INJECTION SUBCUTANEOUS at 09:54

## 2023-02-16 RX ADMIN — SODIUM CHLORIDE, PRESERVATIVE FREE 10 ML: 5 INJECTION INTRAVENOUS at 20:09

## 2023-02-16 RX ADMIN — QUETIAPINE FUMARATE 50 MG: 25 TABLET ORAL at 00:16

## 2023-02-16 RX ADMIN — ATORVASTATIN CALCIUM 40 MG: 40 TABLET, FILM COATED ORAL at 20:09

## 2023-02-16 RX ADMIN — MICONAZOLE NITRATE: 2 POWDER TOPICAL at 00:17

## 2023-02-16 RX ADMIN — SERTRALINE HYDROCHLORIDE 50 MG: 50 TABLET ORAL at 09:51

## 2023-02-16 RX ADMIN — MICONAZOLE NITRATE: 2 POWDER TOPICAL at 09:55

## 2023-02-16 ASSESSMENT — PAIN SCALES - GENERAL: PAINLEVEL_OUTOF10: 1

## 2023-02-16 NOTE — CARE COORDINATION
CM received call from Rodrigo Powers with [facility] stating that the patient's insurance pre-cert has been approved and they can accept the patient today if medically stable for discharge. Patient will need a rapid COVID test prior to discharge. CM will follow. 10:19 AM  PASDEBORAH completed online. 1 PM  CM spoke with patient's nurse regarding discharge who advised that the patient currently has a sitter at the bedside but they were working on getting a [address] placed in the room so the sitter could be discontinued. CM notified the hospitalist that the patient could not be discharged until the patient was free of a bedside sitter for 24 hours. Patient's insurance pre-cert will  tomorrow at midnight. CM will follow.

## 2023-02-16 NOTE — PROGRESS NOTES
Eating breakfast  On room air  Good BP    CR STAYING at 2.8, will monitor    Thank you    Patient Active Problem List    Diagnosis Date Noted    Orthostatic syncope 02/14/2023    Dizziness 02/12/2023    Syncope and collapse 02/12/2023    Community acquired pneumonia of left lung, unspecified part of lung 10/08/2022    Cardiac arrest (HCC) 08/25/2022    Chest pain 08/21/2022    Agitation due to dementia 08/18/2022    Varicose 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 08/15/2015    Skin ulcer, limited to breakdown of skin ([facility]) 02/21/2022    Current moderate episode of major depressive disorder, unspecified whether recurrent ([facility]) 02/21/2022    Chronic kidney disease, stage IV (severe) ([facility]) 02/21/2022    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 ([facility]) 02/21/2022    Leukocytosis 09/08/2021    Thrombocytopenia, unspecified 08/31/2021    Bandemia 08/10/2021    Atelectasis 08/05/2021    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

## 2023-02-16 NOTE — PROGRESS NOTES
Daily Progress Note  Subjective:  Awake and alert; eating in bed  Orthostatic today; however ambulated to bathroom without any issue  Sitter at bedside  [employer] at d/c  Stable from cardiac standpoint    Attending Note:  Overall doing ok  Ate breakfast  Off restraints  Medical treatment from cardiac stand  Hx of dementia  Ok to d/c to [facility]     Impression and Plan:   Syncopal episode    Reported loss of consciousness, details unknown    Poor oral intake before admission    Had similar episode on Nov    Echo stable    Carotid duplex negative for stenosis    BP and HR stable    MRI brain consulted    Neuro is following    Orthostatic today; recommend compression stockings    Will need outpatient monitor to ensure to arrhthymias or pauses that may have contributed to episode      Hx of PAF   CHADVASC is 3    Eliquis is on hold; recent falls including last night    Continue lopressor     Elevated troponin    No active chest pain    No EKG changes    Likely d/t CKD    Cont' with conservative treatment    CKD    Renal following    Avoid nephro toxic medication      Conservative treatment; Stable from cardiac standpoint     Most Recent Echo  2/14/2023   Summary   Left ventricular systolic function is abnormal.   Ejection fraction is visually estimated at 40-50%. Global hypokinesis noted. Grade I diastolic dysfunction. Trivial aortic regurgitation noted with color doppler. Mild tricuspid regurgitation; RVSP: 53 mmHg. No evidence of any pericardial effusion.         [id number] Score for Atrial Fibrillation Stroke Risk    Risk   Factors   Component Value   C CHF No 0   H HTN Yes 1   A2 Age >= 76 Yes,  (80 y.o.) 2   D DM No 0   S2 Prior Stroke/TIA No 0   V Vascular Disease No 0   A Age 74-69 No,  (80 y.o.) 0   Sc Sex male 0     [id number]  Score   3   Score last updated 2/14/23 70:61 AM EST     Click here for a link to the UpToDate guideline \"Atrial Fibrillation: Anticoagulation therapy to prevent embolization     Disclaimer: Risk Score calculation is dependent on accuracy of patient problem list and past encounter diagnosis. PAST MEDICAL HISTORY:  He is having syncope present. History of having  anemia, anxiety, renal surgery, BPH, depression, GERD, hemorrhoids,  hepatitis, hernia repair, hypertension, hyperlipidemia, and squamous  cell cancer present. PAST SURGICAL HISTORY:  He has a history of carpal tunnel surgery,  hernia surgery, and neck surgery done. SOCIAL HISTORY:  He does not smoke. He does not drink. ALLERGIES:  MINOCYCLINE.   Objective:   BP (!) 154/76   Pulse 78   Temp 98.3 °F (36.8 °C) (Oral)   Resp 17   Ht 6' (1.829 m)   Wt 204 lb 9.6 oz (92.8 kg)   SpO2 95%   BMI 27.75 kg/m²     Intake/Output Summary (Last 24 hours) at 2/16/2023 1156  Last data filed at 2/16/2023 0557  Gross per 24 hour   Intake 320 ml   Output 525 ml   Net -205 ml       Medications:   Scheduled Meds:   mupirocin   Topical Daily    magnesium oxide  400 mg Oral Daily    sodium chloride flush  5-40 mL IntraVENous 2 times per day    enoxaparin  30 mg SubCUTAneous Daily    miconazole   Topical BID    aspirin  81 mg Oral Daily    atorvastatin  40 mg Oral Nightly    finasteride  5 mg Oral Daily    levothyroxine  50 mcg Oral Daily    metoprolol tartrate  25 mg Oral BID    pantoprazole  20 mg Oral Daily    QUEtiapine  50 mg Oral Nightly    sertraline  50 mg Oral Daily    tamsulosin  0.4 mg Oral Daily    temazepam  7.5 mg Oral Nightly      Infusions:   sodium chloride        PRN Meds:  sodium chloride flush, sodium chloride, ondansetron **OR** ondansetron, polyethylene glycol, acetaminophen **OR** acetaminophen, hydrALAZINE, benzonatate, sennosides-docusate sodium     Physical Exam:  Vitals:    02/16/23 1048   BP: (!) 154/76   Pulse: 78   Resp: 17   Temp: 98.3 °F (36.8 °C)   SpO2: 95%        General: AAO, NAD  Chest: Nontender  Cardiac: First and Second Heart Sounds are Normal, No Murmurs or Gallops noted  Lungs:Clear to auscultation and percussion. Abdomen: Soft, NT, ND, +BS  Extremities: No clubbing, no edema  Vascular:  Equal 2+ peripheral pulses. Lab Data:  CBC:   Recent Labs     02/14/23  0143 02/14/23  0920 02/15/23  0003 02/15/23  0442 02/15/23  2309 02/16/23  0920   WBC 5.4  --  5.7  --   --   --    HGB 11.5*   < > 9.4*  9.4* 10.2* 9.1* 9.0*   HCT 36.2*   < > 29.7*  30.2* 31.9* 29.2* 28.7*   MCV 92.3  --  92.6  --   --   --      --  217  --   --   --     < > = values in this interval not displayed. BMP:   Recent Labs     02/14/23  0143 02/15/23  0003    139   K 3.3* 3.7    105   CO2 24 23   PHOS 3.2 2.7   BUN 23 25*   CREATININE 2.7* 2.8*     LIVER PROFILE: No results for input(s): AST, ALT, LIPASE, BILIDIR, BILITOT, ALKPHOS in the last 72 hours. Invalid input(s): AMYLASE,  ALB  PT/INR: No results for input(s): PROTIME, INR in the last 72 hours. APTT: No results for input(s): APTT in the last 72 hours. BNP:  No results for input(s): BNP in the last 72 hours.       Assessment:  Patient Active Problem List    Diagnosis Date Noted    Orthostatic syncope 02/14/2023    Dizziness 02/12/2023    Syncope and collapse 02/12/2023    Community acquired pneumonia of left lung, unspecified part of lung 10/08/2022    [facility] 08/25/2022    Chest pain 08/21/2022    Agitation due to dementia 08/18/2022    Varicose 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 08/15/2015    Skin ulcer, limited to breakdown of skin ([facility] 02/21/2022    Current moderate episode of major depressive disorder, unspecified whether recurrent ([facility] 02/21/2022    Chronic kidney disease, stage IV (severe) ([facility] 02/21/2022    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 ([facility] 02/21/2022    Leukocytosis 09/08/2021    Thrombocytopenia, unspecified 08/31/2021    Bandemia 08/10/2021    Atelectasis 08/05/2021    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       Electronically signed by ROSEMARY Clark CNP on 2/16/2023 at 11:56 AM    Electronically signed by Michell Alexander MD on 2/16/23 at 1:10 PM EST

## 2023-02-16 NOTE — CARE COORDINATION
NURSING: The patient will be discharged to [facility] today. Please complete the [address] form, call report to [phone], and notify the patient's family. Medical transport has been arranged at 2:30 PM through [facility]. The patient will need a rapid COVID test prior to discharge.

## 2023-02-16 NOTE — CONSULTS
Consulted to see patient for a post-op visit after his gallbladder. Patient states he has been having diarrhea. He was admitted for dehydration, dizziness and falls. PE:  Vitals:    02/16/23 0200 02/16/23 0738 02/16/23 1048 02/16/23 1553   BP: 118/64 138/75 (!) 154/76 120/68   Pulse: 71 66 78    Resp: 12 16 17    Temp: 98.3 °F (36.8 °C) 98.2 °F (36.8 °C) 98.3 °F (36.8 °C) 98.7 °F (37.1 °C)   TempSrc: Oral Oral Oral Oral   SpO2: 97% 94% 95%    Weight: 204 lb 9.6 oz (92.8 kg)      Height:         Abd: incisions are well healed, sutures have been removed, abd is protuberant, soft    A/P:  Patient is healing s/p laparoscopic cholecystectomy  If diarrhea persists and is non-infectious, he can be started on cholestyramine with meals.

## 2023-02-16 NOTE — DISCHARGE SUMMARY
V2.0  Discharge Summary    Name:  Dami Stanley /Age/Sex: 10/18/1930 (32 y.o. male)   Admit Date: 2023  Discharge Date: 23    MRN & CSN:  [id number] & [id number] Encounter Date and Time 23 12:37 PM EST    Attending:  Jaida Harrell MD Discharging Provider: Jaida Harrell MD       Hospital Course:     Brief HPI: Dami Stanley is a 80 y.o. male with pmh of HTN, BPH, depression, HLD, A-fib on Eliquis, diastolic heart failure, hypothyroidism, history of frequent frequent falls who presents with Dizziness. Pt having symptoms of  Dizziness and lightheadedness, presenting w hypotension with suspicion for syncope as reported per patient, likely in the setting of severe dehydration. Patient reported to have poor p.o. intake, along with poor appetite and severely dehydrated on initial exam, patient reported that he had episodes of loss of consciousness but was unsure of details on admission. Lactate elevation initially thought to be in the setting of starvation ketosis. Repeat lactate normalized s/p IV hydration, with no antibiotics given pt did not meet SIRS criterion. Possibility of Neurogenic vs cardiogenic syncope. Neurology was consulted, suspected syncopal episodes likely iso orthostatic hypotension and lower extremity peripheral neuropathy superimposed on decreased PO intake w hypotension, and underlying diminished cognitive reserve. CT Head/ CT C spine w no acute changes. Carotid US unremarkable. MRI brain w Prominent perivascular spaces versus old lacunar infarcts in the bilateral frontal parietal corona radiata. Moderate parenchymal volume loss. Mild to moderate chronic microvascular disease. Moderate bilateral mastoid effusions. Mildly prominent size of the pituitary gland, nonspecific. Underlying  Micro adenoma cannot be excluded. Case d/w Neurology and Cardiology. Findings appear possibly chronic and will plan for discharge with follow up.  Patient's VS intermittently orthostatic but patient able to tolerate and was walking upto bathroom with no issues. Ordered for compression stockings. Will need to ensure adequate hydration and PT/ OT w gait and balance therapy. TTE w EF 40-50%, global hypokinesis, G1 diastolic dysfunction, trivial AR, mild TR. Lasix was held while inpatient and on discharge after d/w cardiology team. Patient's Elliquis was also held per cardiology as patient having multiple falls and risk of bleed. PT/OT assessed patient and will be sent to [facility] at discharge. Hospital course also complicated by KAV[facility] w nephrology service onboard, patient creatinine improved and stablized ~2.7-2.8, considered new baseline. Pt appears AAOx2, likely contributed strongly to by advance dementia. Brief Problem Based Course:   Dizziness and lightheadedness, hypotension with suspicion for syncope, likely in the setting of severe dehydration-Improved s/p hydration, hypotension resolved, compression stockings for orthostatic hypotension   Fall iso dehydration/orthostasis, peripheral neuropathy may contribute- PT/OT w gait/ balance therapy, Rehab  Hypokalemia- resolved  Paroxysmal Atrial Fibrillation- Elliquis held per cardiology given recurrent falls  CKD- Cr stable 2.7-2.8  HTN  BPH  HLD  Diastolic Heart Failure  Hypothyroidism        The patient expressed appropriate understanding of, and agreement with the discharge recommendations, medications, and plan. Consults this admission:  IP CONSULT TO NEUROLOGY  IP CONSULT TO CARDIOLOGY  IP CONSULT TO NEPHROLOGY  IP CONSULT TO GENERAL SURGERY  IP CONSULT TO IV TEAM    Discharge Diagnosis:   Dizziness        Discharge Instruction:   Follow up appointments: [facility] care physician, Cardiologist,  Primary care physician: No primary care provider on file.  within 2 weeks  Diet: cardiac diet   Activity: activity as tolerated  Disposition: Discharged to:   []Home, []HHC, [x]SNF, []Acute Rehab, []Hospice   Condition on discharge: Stable  Labs and Tests to be Followed up as an outpatient by PCP or Specialist: CBC/ CMP/ Mag/ Phos as indicated    Discharge Medications:        Medication List        START taking these medications      magnesium oxide 400 (240 Mg) MG tablet  Commonly known as: MAG-OX  Take 1 tablet by mouth daily for 14 days  Start taking on: February 17, 2023     miconazole 2 % powder  Commonly known as: MICOTIN  Apply topically 2 times daily. mupirocin 2 % ointment  Commonly known as: BACTROBAN  Apply topically 3 times daily. Start taking on: February 17, 2023            CHANGE how you take these medications      finasteride 5 MG tablet  Commonly known as: PROSCAR  Take 1 tablet by mouth daily  Start taking on: February 17, 2023  What changed: See the new instructions. levothyroxine 50 MCG tablet  Commonly known as: SYNTHROID  Take 1 tablet by mouth daily  Start taking on: February 17, 2023  What changed:   medication strength  See the new instructions. Another medication with the same name was removed. Continue taking this medication, and follow the directions you see here.             CONTINUE taking these medications      aspirin 81 MG chewable tablet  Take 1 tablet by mouth daily     atorvastatin 40 MG tablet  Commonly known as: LIPITOR  Take 1 tablet by mouth nightly     benzonatate 200 MG capsule  Commonly known as: TESSALON  Take 1 capsule by mouth 3 times daily as needed for Cough     ferrous sulfate 325 (65 Fe) MG tablet  Commonly known as: IRON 325     metoprolol tartrate 25 MG tablet  Commonly known as: LOPRESSOR  Take 1 tablet by mouth 2 times daily     pantoprazole 20 MG tablet  Commonly known as: PROTONIX  TAKE 1 TABLET BY MOUTH EVERY DAY     sertraline 50 MG tablet  Commonly known as: ZOLOFT  Take 1 tablet by mouth daily     sodium bicarbonate 325 MG tablet     tamsulosin 0.4 MG capsule  Commonly known as: Flomax  Take 1 capsule by mouth nightly     temazepam 7.5 MG capsule  Commonly known as: RESTORIL     therapeutic multivitamin-minerals tablet            STOP taking these medications      amLODIPine 5 MG tablet  Commonly known as: NORVASC     Eliquis 2.5 MG Tabs tablet  Generic drug: apixaban     furosemide 20 MG tablet  Commonly known as: LASIX     Lexapro 5 MG tablet  Generic drug: escitalopram     QUEtiapine 100 MG tablet  Commonly known as: SEROQUEL            ASK your doctor about these medications      QUEtiapine 50 MG tablet  Commonly known as: SEROQUEL  Take 1 tablet by mouth nightly     sennosides-docusate sodium 8.6-50 MG tablet  Commonly known as: SENOKOT-S  Take 2 tablets by mouth 2 times daily as needed for Constipation               Where to Get Your Medications        These medications were sent to [facility]/pharmacy #[id number]- [place], [place] - [address] [facility] [phone] - F [phone]  [facility] [facility] [address]      Phone: [phone]   finasteride 5 MG tablet  levothyroxine 50 MCG tablet  magnesium oxide 400 (240 Mg) MG tablet  metoprolol tartrate 25 MG tablet  miconazole 2 % powder  mupirocin 2 % ointment  sertraline 50 MG tablet        Objective Findings at Discharge:   BP (!) 154/76   Pulse 78   Temp 98.3 °F (36.8 °C) (Oral)   Resp 17   Ht 6' (1.829 m)   Wt 204 lb 9.6 oz (92.8 kg)   SpO2 95%   BMI 27.75 kg/m²       Physical Exam:   General: NAD  Eyes: EOMI  ENT: neck supple  Cardiovascular: Regular rate. Respiratory: Clear to auscultation  Gastrointestinal: Soft, non tender  Genitourinary: no suprapubic tenderness  Musculoskeletal: No edema  Skin: warm, dry  Neuro: Alert. Psych: Mood appropriate. Labs and Imaging   CT HEAD WO CONTRAST    Result Date: 2/12/2023  EXAMINATION: CT OF THE HEAD WITHOUT CONTRAST  2/12/2023 2:46 pm TECHNIQUE: CT of the head was performed without the administration of intravenous contrast. Automated exposure control, iterative reconstruction, and/or weight based adjustment of the mA/kV was utilized to reduce the radiation dose to as low as reasonably achievable. COMPARISON: 11/13/2022. HISTORY: ORDERING SYSTEM PROVIDED HISTORY: Fall TECHNOLOGIST PROVIDED HISTORY: Has a \"code stroke\" or \"stroke alert\" been called? ->No Reason for exam:->Fall Decision Support Exception - unselect if not a suspected or confirmed emergency medical condition->Emergency Medical Condition (MA) Reason for Exam: Fall FINDINGS: BRAIN/VENTRICLES: There is no acute intracranial hemorrhage, mass effect or midline shift. No abnormal extra-axial fluid collection. The gray-white differentiation is maintained without evidence of an acute infarct. There is no evidence of hydrocephalus. There is generalized volume loss. Areas of white matter hypoattenuation appears similar to the prior study. ORBITS: The visualized portion of the orbits demonstrate no acute abnormality. SINUSES: The visualized paranasal sinuses and mastoid air cells demonstrate no acute abnormality. SOFT TISSUES/SKULL:  No acute abnormality of the visualized skull or soft tissues. No acute intracranial findings. CT CERVICAL SPINE WO CONTRAST    Result Date: 2/12/2023  EXAMINATION: CT OF THE CERVICAL SPINE WITHOUT CONTRAST 2/12/2023 2:46 pm TECHNIQUE: CT of the cervical spine was performed without the administration of intravenous contrast. Multiplanar reformatted images are provided for review. Automated exposure control, iterative reconstruction, and/or weight based adjustment of the mA/kV was utilized to reduce the radiation dose to as low as reasonably achievable. COMPARISON: 11/13/2022. HISTORY: ORDERING SYSTEM PROVIDED HISTORY: Fall TECHNOLOGIST PROVIDED HISTORY: Reason for exam:->Fall Decision Support Exception - unselect if not a suspected or confirmed emergency medical condition->Emergency Medical Condition (MA) Reason for Exam: Fall FINDINGS: BONES/ALIGNMENT: There is normal spinal alignment. C1 and C2 have a normal relationship. No acute fracture is identified.  DEGENERATIVE CHANGES: Multilevel degenerative disc disease is most prominent and moderate to advanced at C6-C7. Multilevel facet arthropathy is worst and severe on the left at C3-C4 and C4-C5. No critical central canal narrowing is identified. SOFT TISSUES: There is no prevertebral soft tissue swelling. No acute cervical spine fracture. Spinal degenerative changes as described. XR CHEST PORTABLE    Result Date: 2/12/2023  EXAMINATION: ONE XRAY VIEW OF THE CHEST 2/12/2023 2:11 pm COMPARISON: 11/13/2022 HISTORY: ORDERING SYSTEM PROVIDED HISTORY: chest pain TECHNOLOGIST PROVIDED HISTORY: Reason for exam:->chest pain Reason for Exam: chest pain FINDINGS: Cardial pericardial silhouette is unremarkable. Pulmonary vasculature is congested and there is increased interstitial opacity. Chronic pleural effusion on the left is noted. No pneumothorax. No free air. No acute bony abnormality. Fundus congestion along with increased interstitial opacity. Please correlate with any clinical evidence of interstitial edema. VL DUP CAROTID BILATERAL    Result Date: 2/14/2023  EXAMINATION: ULTRASOUND EVALUATION OF THE CAROTID ARTERIES 2/14/2023 TECHNIQUE: Duplex ultrasound using B-mode/gray scaled imaging, Doppler spectral analysis and color flow Doppler was obtained of the carotid arteries. COMPARISON: None. HISTORY: ORDERING SYSTEM PROVIDED HISTORY: syncope TECHNOLOGIST PROVIDED HISTORY: Reason for exam:->syncope FINDINGS: RIGHT: The right common carotid artery demonstrates peak systolic velocities of 53 and 30 cm/sec in the proximal and distal segments respectively. The right common carotid artery demonstrates end-diastolic velocities of 8 and 18 cm/sec in the proximal and distal segments respectively. The right internal carotid artery demonstrates the systolic velocities of 941, 114 and 82 cm/sec in the proximal, mid and distal segments respectively.  The right internal carotid artery demonstrates the end-diastolic velocities of 42, 39 and 21 cm/sec in the proximal, mid and distal segments respectively. The external carotid artery appears patent. The vertebral artery demonstrates normal antegrade flow. Mild smooth atherosclerotic plaque. ICA/CCA ratio of 2.2 LEFT: The left common carotid artery demonstrates peak systolic velocities of 92 and 45 cm/sec in the proximal and distal segments respectively. The left common carotid artery demonstrates end-diastolic velocities of 16 and 0 cm/sec in the proximal and distal segments respectively. The left internal carotid artery demonstrates the systolic velocities of 46, 44 and 51 cm/sec in the proximal, mid and distal segments respectively. The left internal carotid artery demonstrates the end-diastolic velocities of 8, 17 and 6 cm/sec in the proximal, mid and distal segments respectively. The external carotid artery appears patent. The vertebral artery demonstrates normal antegrade flow. Mild smooth atherosclerotic plaque ICA/CCA ratio of 0.6     The right internal carotid artery demonstrates 0-50% stenosis. The left internal carotid artery demonstrates 0-50% stenosis. Bilateral vertebral arteries are patent with flow in the normal direction. MRI BRAIN WO CONTRAST    Result Date: 2/15/2023  EXAMINATION: MRI OF THE BRAIN WITHOUT CONTRAST  2/15/2023 6:52 pm TECHNIQUE: Multiplanar multisequence MRI of the brain was performed without the administration of intravenous contrast. COMPARISON: None. HISTORY: ORDERING SYSTEM PROVIDED HISTORY: multiple falls, acute confusion TECHNOLOGIST PROVIDED HISTORY: Reason for exam:->multiple falls, acute confusion Reason for Exam:  multiple falls, acute confusion FINDINGS: INTRACRANIAL STRUCTURES/VENTRICLES: There is moderate parenchymal volume loss. There is T2/FLAIR hyperintensity in the periventricular and subcortical white matter, likely related to mild to moderate chronic microvascular disease.   There are prominent perivascular spaces versus old lacunar infarcts in the bilateral frontal parietal corona radiata. There is no acute infarct. No mass effect or midline shift. No evidence of an acute intracranial hemorrhage. There is no evidence of hydrocephalus. The normal signal voids within the major intracranial vessels appear maintained. There is mild prominent size of the pituitary gland, nonspecific. ORBITS: The visualized portion of the orbits demonstrate no acute abnormality. SINUSES: There is scattered minimal mucosal thickening in the paranasal sinuses. There are moderate bilateral mastoid effusions. BONES/SOFT TISSUES: The bone marrow signal intensity appears normal. The soft tissues demonstrate no acute abnormality. No acute intracranial abnormality. Prominent perivascular spaces versus old lacunar infarcts in the bilateral frontal parietal corona radiata. Moderate parenchymal volume loss. Mild to moderate chronic microvascular disease. Moderate bilateral mastoid effusions. Mildly prominent size of the pituitary gland, nonspecific. Underlying microadenoma cannot be excluded. CBC:   Recent Labs     02/14/23  0143 02/14/23  0920 02/15/23  0003 02/15/23  0442 02/15/23  2309 02/16/23 0920   WBC 5.4  --  5.7  --   --   --    HGB 11.5*   < > 9.4*  9.4* 10.2* 9.1* 9.0*     --  217  --   --   --     < > = values in this interval not displayed. BMP:    Recent Labs     02/14/23  0143 02/15/23  0003    139   K 3.3* 3.7    105   CO2 24 23   BUN 23 25*   CREATININE 2.7* 2.8*   GLUCOSE 104* 113*     Hepatic: No results for input(s): AST, ALT, ALB, BILITOT, ALKPHOS in the last 72 hours.   Lipids:   Lab Results   Component Value Date/Time    CHOL 229 08/22/2022 02:17 AM    CHOL 157 11/19/2018 12:00 AM    HDL 69 08/22/2022 02:17 AM    TRIG 85 08/22/2022 02:17 AM     Hemoglobin A1C:   Lab Results   Component Value Date/Time    LABA1C 5.8 08/22/2022 02:17 AM     TSH:   Lab Results   Component Value Date/Time    TSH 4.07 05/24/2021 11:32 AM     Troponin:   Lab Results Component Value Date/Time    TROPONINT 0.049 02/14/2023 01:43 AM    TROPONINT 0.042 02/13/2023 10:11 PM    TROPONINT 0.061 02/13/2023 09:32 AM     Lactic Acid: No results for input(s): LACTA in the last 72 hours. BNP: No results for input(s): PROBNP in the last 72 hours.   UA:  Lab Results   Component Value Date/Time    NITRU NEGATIVE 11/13/2022 12:23 PM    NITRU Negative 01/22/2021 09:24 AM    COLORU ORANGE 11/13/2022 12:23 PM    PHUR 6.0 01/22/2021 09:24 AM    WBCUA NONE SEEN 11/13/2022 12:23 PM    RBCUA 219 11/13/2022 12:23 PM    MUCUS RARE 03/29/2022 09:11 AM    TRICHOMONAS NONE SEEN 11/13/2022 12:23 PM    YEAST RARE 03/29/2022 09:11 AM    BACTERIA NEGATIVE 11/13/2022 12:23 PM    CLARITYU SLIGHTLY CLOUDY 11/13/2022 12:23 PM    SPECGRAV 1.015 11/13/2022 12:23 PM    LEUKOCYTESUR NEGATIVE 11/13/2022 12:23 PM    UROBILINOGEN 0.2 11/13/2022 12:23 PM    BILIRUBINUR NEGATIVE 11/13/2022 12:23 PM    BLOODU LARGE NUMBER OR AMOUNT OBSERVED 11/13/2022 12:23 PM    GLUCOSEU Negative 01/22/2021 09:24 AM    KETUA NEGATIVE 11/13/2022 12:23 PM     Urine Cultures: No results found for: LABURIN  Blood Cultures: No results found for: BC  No results found for: BLOODCULT2  Organism: No results found for: ORG    Time Spent Discharging patient 35 minutes    Electronically signed by Carleen Marvin MD on 2/16/2023 at 12:37 PM

## 2023-02-16 NOTE — PLAN OF CARE
Problem: Discharge Planning  Goal: Discharge to home or other facility with appropriate resources  Outcome: Progressing     Problem: Skin/Tissue Integrity  Goal: Absence of new skin breakdown  Description: 1. Monitor for areas of redness and/or skin breakdown  2. Assess vascular access sites hourly  3. Every 4-6 hours minimum:  Change oxygen saturation probe site  4. Every 4-6 hours:  If on nasal continuous positive airway pressure, respiratory therapy assess nares and determine need for appliance change or resting period. Outcome: Progressing     Problem: Safety - Medical Restraint  Goal: Remains free of injury from restraints (Restraint for Interference with Medical Device)  Description: INTERVENTIONS:  1. Determine that other, less restrictive measures have been tried or would not be effective before applying the restraint  2. Evaluate the patient's condition at the time of restraint application  3. Inform patient/family regarding the reason for restraint  4.  Q2H: Monitor safety, psychosocial status, comfort, nutrition and hydration  Outcome: Progressing     Problem: Safety - Adult  Goal: Free from fall injury  Outcome: Progressing

## 2023-02-16 NOTE — PROGRESS NOTES
V2.0  [facility] Hospitalist Progress Note      Name:  Gennie Runner /Age/Sex: 10/18/1930  (80 y.o. male)   MRN & CSN:  [id number] & [id number] Encounter Date/Time: 2023 2:55 PM EST    [facility]  [facility] PCP: No primary care provider on file. Hospital Day: 5    Assessment and Plan:   Gennie Runner is a 80 y.o. male with pmh of HTN, BPH, depression, HLD, A-fib on Eliquis, diastolic heart failure, hypothyroidism, history of frequent frequent falls who presents with Dizziness      Plan:  Dizziness and lightheadedness, hypotension with suspicion for syncope, likely in the setting of severe dehydration:  -Patient reported to have poor p.o. intake, along with poor appetite and severely dehydrated on initial exam, patient reported that he had episodes of loss of consciousness but was unsure of details on admission. Lactate elevation initially thought to be in the setting of starvation ketosis. Rogelio lactic acid level significantly elevated to 8.2 the patient symptomatically improved, vital signs are stable with no acute distress, no leukocytosis or fever. Trend lactate and follow-up blood culture/inflammatory markers and monitor for now. Patient was not started on IV antibiotics per admitting team giver he did not meet the SIRS criteria but was started on LR at 100 ml/h for hydration. Urinalysis pending. Status post 1 L. Possibility of neurogenic versus cardiogenic syncope, will order TTE and consult cardiology/neurology. Plan for [facility] as outpatient / Carotid Doppler w no significant stenosis b/l, no plan for stress test/ conservative management given patient's age. Delirium precaution, no further interventions as per Neuro.   Witnessed Fall:        -Pt reportedly had witnessed fall 12:07am 23, PT/OT  Hypokalemia:        -3.3, s/p repletion, will replete to keep K level >4  Proximal atrial fibrillation:  -Patient on Eliquis 2.5 twice daily with history of frequent falls,held after d/w Cardiologist given recurrent falls. We will continue to monitor telemetry  CKD:  -Creatinine 2.7<<2.9 on admission, compared to 2.3 at baseline  -Nephrology onboard  -Strict intake and output record and daily weight  -We will avoid nephrotoxic medication  -We will adjust medications according to patient's creatinine clearance  -Send urine electrolytes and urine urea, calculate FeUrea  Lactic acidosis: Likely in the setting of type II in the setting of dehydration, will trend lactic acid, currently stable <2  Benign essential hypertension:  -Hypotensive on admission, will continue as needed medications if blood pressure above 160/100 mmHg  BPH:  -On Flomax and finasteride, discontinued because of possible orthostatic blood pressure related hypotension  HLD:  Diastolic heart failure, chronic with preserved ejection fraction: On Lasix  Hypothyroidism: On levothyroxine  History of frequent falls: Patient reports 2 falls in the past 3 days. Diet ADULT DIET; Regular; 5 carb choices (75 gm/meal)   DVT Prophylaxis [x] Lovenox, []  Heparin, [] SCDs, [] Ambulation,  [] Eliquis, [] Xarelto  [] Coumadin   Code Status Full Code   Disposition From: Home  Expected Disposition: Possibly SNF  Estimated Date of Discharge: 1 day  Patient requires continued admission due to pending placement   Surrogate Decision Maker/ POA      Subjective:     Chief Complaint: Loss of Consciousness (Fall from sitting in shower chair with loss of consciousness. Syncope for several seconds and low blood pressure)       Sundar Hi is a 80 y.o. 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 Respiratory: Clear to auscultation  Gastrointestinal: Soft, non tender  Genitourinary: no suprapubic tenderness  Musculoskeletal: No edema  Skin: warm, dry  Neuro: Alert. Psych: Mood appropriate.      Medications:   Medications:    mupirocin   Topical Daily    magnesium oxide  400 mg Oral Daily    sodium chloride flush  5-40 mL IntraVENous 2 times per day    enoxaparin  30 mg SubCUTAneous Daily    miconazole   Topical BID    aspirin  81 mg Oral Daily    atorvastatin  40 mg Oral Nightly    finasteride  5 mg Oral Daily    levothyroxine  50 mcg Oral Daily    metoprolol tartrate  25 mg Oral BID    pantoprazole  20 mg Oral Daily    QUEtiapine  50 mg Oral Nightly    sertraline  50 mg Oral Daily    tamsulosin  0.4 mg Oral Daily    temazepam  7.5 mg Oral Nightly      Infusions:    sodium chloride       PRN Meds: sodium chloride flush, 5-40 mL, PRN  sodium chloride, , PRN  ondansetron, 4 mg, Q8H PRN   Or  ondansetron, 4 mg, Q6H PRN  polyethylene glycol, 17 g, Daily PRN  acetaminophen, 650 mg, Q6H PRN   Or  acetaminophen, 650 mg, Q6H PRN  hydrALAZINE, 10 mg, Q4H PRN  benzonatate, 200 mg, TID PRN  sennosides-docusate sodium, 2 tablet, BID PRN      Labs      Recent Results (from the past 24 hour(s))   Hemoglobin and Hematocrit    Collection Time: 02/15/23 11:09 PM   Result Value Ref Range    Hemoglobin 9.1 (L) 13.5 - 18.0 GM/DL    Hematocrit 29.2 (L) 42 - 52 %   Hemoglobin and Hematocrit    Collection Time: 02/16/23  9:20 AM   Result Value Ref Range    Hemoglobin 9.0 (L) 13.5 - 18.0 GM/DL    Hematocrit 28.7 (L) 42 - 52 %        Imaging/Diagnostics Last 24 Hours   CT HEAD WO CONTRAST    Result Date: 2/12/2023  EXAMINATION: CT OF THE HEAD WITHOUT CONTRAST  2/12/2023 2:46 pm TECHNIQUE: CT of the head was performed without the administration of intravenous contrast. Automated exposure control, iterative reconstruction, and/or weight based adjustment of the mA/kV was utilized to reduce the radiation dose to as low as reasonably achievable. COMPARISON: 11/13/2022. HISTORY: ORDERING SYSTEM PROVIDED HISTORY: Fall TECHNOLOGIST PROVIDED HISTORY: Has a \"code stroke\" or \"stroke alert\" been called? ->No Reason for exam:->Fall Decision Support Exception - unselect if not a suspected or confirmed emergency medical condition->Emergency Medical Condition (MA) Reason for Exam: Fall FINDINGS: BRAIN/VENTRICLES: There is no acute intracranial hemorrhage, mass effect or midline shift. No abnormal extra-axial fluid collection. The gray-white differentiation is maintained without evidence of an acute infarct. There is no evidence of hydrocephalus. There is generalized volume loss. Areas of white matter hypoattenuation appears similar to the prior study. ORBITS: The visualized portion of the orbits demonstrate no acute abnormality. SINUSES: The visualized paranasal sinuses and mastoid air cells demonstrate no acute abnormality. SOFT TISSUES/SKULL:  No acute abnormality of the visualized skull or soft tissues. No acute intracranial findings. CT CERVICAL SPINE WO CONTRAST    Result Date: 2/12/2023  EXAMINATION: CT OF THE CERVICAL SPINE WITHOUT CONTRAST 2/12/2023 2:46 pm TECHNIQUE: CT of the cervical spine was performed without the administration of intravenous contrast. Multiplanar reformatted images are provided for review. Automated exposure control, iterative reconstruction, and/or weight based adjustment of the mA/kV was utilized to reduce the radiation dose to as low as reasonably achievable. COMPARISON: 11/13/2022.  HISTORY: ORDERING SYSTEM PROVIDED HISTORY: Fall TECHNOLOGIST PROVIDED HISTORY: Reason for exam:->Fall Decision Support Exception - unselect if not a suspected or confirmed emergency medical condition->Emergency Medical Condition (MA) Reason for Exam: Fall FINDINGS: BONES/ALIGNMENT: There is normal spinal alignment. C1 and C2 have a normal relationship. No acute fracture is identified. DEGENERATIVE CHANGES: Multilevel degenerative disc disease is most prominent and moderate to advanced at C6-C7. Multilevel facet arthropathy is worst and severe on the left at C3-C4 and C4-C5. No critical central canal narrowing is identified. SOFT TISSUES: There is no prevertebral soft tissue swelling. No acute cervical spine fracture. Spinal degenerative changes as described. XR CHEST PORTABLE    Result Date: 2/12/2023  EXAMINATION: ONE XRAY VIEW OF THE CHEST 2/12/2023 2:11 pm COMPARISON: 11/13/2022 HISTORY: ORDERING SYSTEM PROVIDED HISTORY: chest pain TECHNOLOGIST PROVIDED HISTORY: Reason for exam:->chest pain Reason for Exam: chest pain FINDINGS: Cardial pericardial silhouette is unremarkable. Pulmonary vasculature is congested and there is increased interstitial opacity. Chronic pleural effusion on the left is noted. No pneumothorax. No free air. No acute bony abnormality. Fundus congestion along with increased interstitial opacity. Please correlate with any clinical evidence of interstitial edema.        Electronically signed by Lisa Levine MD on 2/16/2023 at 1:03 PM

## 2023-02-17 ENCOUNTER — APPOINTMENT (OUTPATIENT)
Dept: GENERAL RADIOLOGY | Age: 88
End: 2023-02-17
Payer: MEDICARE

## 2023-02-17 ENCOUNTER — APPOINTMENT (OUTPATIENT)
Dept: CT IMAGING | Age: 88
End: 2023-02-17
Payer: MEDICARE

## 2023-02-17 ENCOUNTER — HOSPITAL ENCOUNTER (EMERGENCY)
Age: 88
Discharge: NURSING, REHAB OR LONG-TERM CARE | End: 2023-02-17
Attending: EMERGENCY MEDICINE
Payer: MEDICARE

## 2023-02-17 VITALS
SYSTOLIC BLOOD PRESSURE: 174 MMHG | BODY MASS INDEX: 27.63 KG/M2 | HEART RATE: 63 BPM | HEIGHT: 72 IN | OXYGEN SATURATION: 97 % | RESPIRATION RATE: 18 BRPM | WEIGHT: 204 LBS | TEMPERATURE: 98.1 F | DIASTOLIC BLOOD PRESSURE: 82 MMHG

## 2023-02-17 VITALS
RESPIRATION RATE: 11 BRPM | TEMPERATURE: 98.3 F | OXYGEN SATURATION: 96 % | HEART RATE: 68 BPM | DIASTOLIC BLOOD PRESSURE: 75 MMHG | WEIGHT: 204.6 LBS | SYSTOLIC BLOOD PRESSURE: 146 MMHG | HEIGHT: 72 IN | BODY MASS INDEX: 27.71 KG/M2

## 2023-02-17 DIAGNOSIS — T14.8XXA MULTIPLE SKIN TEARS: ICD-10-CM

## 2023-02-17 DIAGNOSIS — S09.90XA CLOSED HEAD INJURY, INITIAL ENCOUNTER: ICD-10-CM
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
## 2023-02-17 LAB
ABO/RH: NORMAL
ALBUMIN SERPL-MCNC: 3.3 GM/DL (ref 3.4–5)
ALP BLD-CCNC: 95 IU/L (ref 40–129)
ALT SERPL-CCNC: 9 U/L (ref 10–40)
ANION GAP SERPL CALCULATED.3IONS-SCNC: 10 MMOL/L (ref 4–16)
ANTIBODY SCREEN: NEGATIVE
APTT: 33.6 SECONDS (ref 25.1–37.1)
AST SERPL-CCNC: 16 IU/L (ref 15–37)
BASOPHILS ABSOLUTE: 0.1 K/CU MM
BASOPHILS RELATIVE PERCENT: 1 % (ref 0–1)
BILIRUB SERPL-MCNC: 0.2 MG/DL (ref 0–1)
BUN SERPL-MCNC: 27 MG/DL (ref 6–23)
CALCIUM SERPL-MCNC: 8.3 MG/DL (ref 8.3–10.6)
CHLORIDE BLD-SCNC: 107 MMOL/L (ref 99–110)
CO2: 21 MMOL/L (ref 21–32)
CREAT SERPL-MCNC: 2.7 MG/DL (ref 0.9–1.3)
CULTURE: NORMAL
DIFFERENTIAL TYPE: ABNORMAL
EOSINOPHILS ABSOLUTE: 0.6 K/CU MM
EOSINOPHILS RELATIVE PERCENT: 9.7 % (ref 0–3)
GFR SERPL CREATININE-BSD FRML MDRD: 21 ML/MIN/1.73M2
GLUCOSE SERPL-MCNC: 126 MG/DL (ref 70–99)
HCT VFR BLD CALC: 28.4 % (ref 42–52)
HEMOGLOBIN: 9.1 GM/DL (ref 13.5–18)
IMMATURE NEUTROPHIL %: 1.8 % (ref 0–0.43)
INR BLD: 0.96 INDEX
LYMPHOCYTES ABSOLUTE: 1.2 K/CU MM
LYMPHOCYTES RELATIVE PERCENT: 19.7 % (ref 24–44)
Lab: NORMAL
MCH RBC QN AUTO: 29.4 PG (ref 27–31)
MCHC RBC AUTO-ENTMCNC: 32 % (ref 32–36)
MCV RBC AUTO: 91.9 FL (ref 78–100)
MONOCYTES ABSOLUTE: 0.7 K/CU MM
MONOCYTES RELATIVE PERCENT: 10.7 % (ref 0–4)
NUCLEATED RBC %: 0 %
PDW BLD-RTO: 15 % (ref 11.7–14.9)
PLATELET # BLD: 226 K/CU MM (ref 140–440)
PMV BLD AUTO: 9.5 FL (ref 7.5–11.1)
POTASSIUM SERPL-SCNC: 4.1 MMOL/L (ref 3.5–5.1)
PROTHROMBIN TIME: 12.4 SECONDS (ref 11.7–14.5)
RBC # BLD: 3.09 M/CU MM (ref 4.6–6.2)
SEGMENTED NEUTROPHILS ABSOLUTE COUNT: 3.5 K/CU MM
SEGMENTED NEUTROPHILS RELATIVE PERCENT: 57.1 % (ref 36–66)
SODIUM BLD-SCNC: 138 MMOL/L (ref 135–145)
SPECIMEN: NORMAL
TOTAL IMMATURE NEUTOROPHIL: 0.11 K/CU MM
TOTAL NUCLEATED RBC: 0 K/CU MM
TOTAL PROTEIN: 5.7 GM/DL (ref 6.4–8.2)
WBC # BLD: 6.1 K/CU MM (ref 4–10.5)

## 2023-02-17 PROCEDURE — 86850 RBC ANTIBODY SCREEN: CPT

## 2023-02-17 PROCEDURE — 6370000000 HC RX 637 (ALT 250 FOR IP): Performed by: STUDENT IN AN ORGANIZED HEALTH CARE EDUCATION/TRAINING PROGRAM

## 2023-02-17 PROCEDURE — 6370000000 HC RX 637 (ALT 250 FOR IP): Performed by: FAMILY MEDICINE

## 2023-02-17 PROCEDURE — 6360000002 HC RX W HCPCS: Performed by: EMERGENCY MEDICINE

## 2023-02-17 PROCEDURE — 73130 X-RAY EXAM OF HAND: CPT

## 2023-02-17 PROCEDURE — 6370000000 HC RX 637 (ALT 250 FOR IP): Performed by: INTERNAL MEDICINE

## 2023-02-17 PROCEDURE — 90715 TDAP VACCINE 7 YRS/> IM: CPT | Performed by: EMERGENCY MEDICINE

## 2023-02-17 PROCEDURE — 85610 PROTHROMBIN TIME: CPT

## 2023-02-17 PROCEDURE — 90471 IMMUNIZATION ADMIN: CPT | Performed by: EMERGENCY MEDICINE

## 2023-02-17 PROCEDURE — 6370000000 HC RX 637 (ALT 250 FOR IP): Performed by: EMERGENCY MEDICINE

## 2023-02-17 PROCEDURE — 72125 CT NECK SPINE W/O DYE: CPT

## 2023-02-17 PROCEDURE — 94761 N-INVAS EAR/PLS OXIMETRY MLT: CPT

## 2023-02-17 PROCEDURE — 99284 EMERGENCY DEPT VISIT MOD MDM: CPT | Performed by: EMERGENCY MEDICINE

## 2023-02-17 PROCEDURE — 85730 THROMBOPLASTIN TIME PARTIAL: CPT

## 2023-02-17 PROCEDURE — 6360000002 HC RX W HCPCS: Performed by: STUDENT IN AN ORGANIZED HEALTH CARE EDUCATION/TRAINING PROGRAM

## 2023-02-17 PROCEDURE — 70450 CT HEAD/BRAIN W/O DYE: CPT

## 2023-02-17 PROCEDURE — 86900 BLOOD TYPING SEROLOGIC ABO: CPT

## 2023-02-17 PROCEDURE — 86901 BLOOD TYPING SEROLOGIC RH(D): CPT

## 2023-02-17 PROCEDURE — 80053 COMPREHEN METABOLIC PANEL: CPT

## 2023-02-17 PROCEDURE — 85025 COMPLETE CBC W/AUTO DIFF WBC: CPT

## 2023-02-17 RX ORDER — GINSENG 100 MG
CAPSULE ORAL ONCE
Status: COMPLETED | OUTPATIENT
Start: 2023-02-17 | End: 2023-02-17

## 2023-02-17 RX ORDER — ACETAMINOPHEN 325 MG/1
650 TABLET ORAL ONCE
Status: COMPLETED | OUTPATIENT
Start: 2023-02-17 | End: 2023-02-17

## 2023-02-17 RX ADMIN — SERTRALINE HYDROCHLORIDE 50 MG: 50 TABLET ORAL at 11:08

## 2023-02-17 RX ADMIN — LEVOTHYROXINE SODIUM 50 MCG: 0.05 TABLET ORAL at 11:07

## 2023-02-17 RX ADMIN — ENOXAPARIN SODIUM 30 MG: 100 INJECTION SUBCUTANEOUS at 11:08

## 2023-02-17 RX ADMIN — METOPROLOL TARTRATE 25 MG: 25 TABLET, FILM COATED ORAL at 11:07

## 2023-02-17 RX ADMIN — TETANUS TOXOID, REDUCED DIPHTHERIA TOXOID AND ACELLULAR PERTUSSIS VACCINE, ADSORBED 0.5 ML: 5; 2.5; 8; 8; 2.5 SUSPENSION INTRAMUSCULAR at 17:04

## 2023-02-17 RX ADMIN — ASPIRIN 81 MG: 81 TABLET, CHEWABLE ORAL at 11:08

## 2023-02-17 RX ADMIN — MICONAZOLE NITRATE: 2 POWDER TOPICAL at 11:10

## 2023-02-17 RX ADMIN — MAGNESIUM OXIDE 400 MG (241.3 MG MAGNESIUM) TABLET 400 MG: TABLET at 11:08

## 2023-02-17 RX ADMIN — PANTOPRAZOLE SODIUM 20 MG: 20 TABLET, DELAYED RELEASE ORAL at 11:07

## 2023-02-17 RX ADMIN — FINASTERIDE 5 MG: 5 TABLET, FILM COATED ORAL at 11:07

## 2023-02-17 RX ADMIN — BACITRACIN: 500 OINTMENT TOPICAL at 20:40

## 2023-02-17 RX ADMIN — ACETAMINOPHEN 650 MG: 325 TABLET ORAL at 17:59

## 2023-02-17 RX ADMIN — BACITRACIN: 500 OINTMENT TOPICAL at 20:39

## 2023-02-17 RX ADMIN — TAMSULOSIN HYDROCHLORIDE 0.4 MG: 0.4 CAPSULE ORAL at 11:07

## 2023-02-17 ASSESSMENT — PAIN - FUNCTIONAL ASSESSMENT
PAIN_FUNCTIONAL_ASSESSMENT: NONE - DENIES PAIN

## 2023-02-17 NOTE — PROGRESS NOTES
Doing ok  Talking with staff  BP at goal  Cr 2.8 , could be his new baseline  His EF is 40%: may need instructions on lasix at least prn fluid retention if not to be taken daily      A&P:  -KAVON from volume depletion// cr 2.8   -CKD4  -Lactatemia  -Syncope at home  -Hx HTN  -BPH:

## 2023-02-17 NOTE — ED PROVIDER NOTES
Triage Chief Complaint:   Fall    [place]:  Jyoti Vaughan is a 80 y.o. male that presents following a fall. Patient was in baseline state of health until today when he returned to his rehab facility/skilled nursing facility. Patient was briefly there before he sustained another fall. Patient injured his left hand and right cheek as well as right forearm with associated skin tears. Patient did lose consciousness. Patient is anticoagulated on aspirin only. Patient denying any significant pain at this time although patient's history is limited given his dementia. Son is present and confirms above history reports he was just discharged from the hospital today after admission for syncope. Patient was discharged to rehab facility for further treatment. Son is requesting additional resources upon being discharged from the rehab facility as he does not know if patient will be able to return to living with his sister.     ROS:  General:  No fevers, no chills, no weakness  Eyes:  No recent vison changes, no discharge  ENT:  No difficulty swallowing, no blood from nose, no hearing changes  Cardiovascular:  No chest pain, no palpitations  Respiratory:  No shortness of breath, no coughing up blood, no wheezing  Gastrointestinal:  No pain, no nausea, no vomiting, no diarrhea  Musculoskeletal:  No muscle pain, no joint pain, no back pain  Skin:  No rash, + cuts, no easy bruising  Neurologic:  No speech problems, no headache, no extremity numbness, no extremity tingling, no extremity weakness  Psychiatric:  No anxiety  Genitourinary:  No dysuria, no hematuria  Extremities:  no edema, + pain    Past Medical History:   Diagnosis Date    Anemia     Anxiety     Arthritis     BPH with urinary obstruction     Depression     GERD (gastroesophageal reflux disease)     Hemorrhoids     Hepatitis     Hernia of unspecified site of abdominal cavity without mention of obstruction or gangrene     Hyperlipidemia     Hypotension     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    Rt Tricep, Lt Superior Forearm     Past Surgical History:   Procedure Laterality Date    CATARACT REMOVAL      CHOLECYSTECTOMY, LAPAROSCOPIC N/A 11/15/2022    CHOLECYSTECTOMY LAPAROSCOPIC performed by Boni Velasco MD at [place] [address] N/A 3/29/2021    CYSTOSCOPY EVACUATION OF CLOTS, BLADDER FULGERATION, AND SUPRA-PUBIC CATHETER INSERTION performed by Nonah Sicard, MD at [place] [address] N/A 2021    CYSTOSCOPY, PROSTATE FULGURATION, EVACUATION OF CLOTS & TURP performed by Samina Mckinley MD at [address] [place] [address] N/A 3/4/2021    LAPAROSCOPIC LARGE HERNIA HIATAL REPAIR WITH GASTRIC OBSTRUCTION POSS OPEN performed by Boni Velasco MD at [address] [place] [address]       Family History   Problem Relation Age of Onset    Depression Mother     Diabetes Mother     COPD Father     Diabetes Brother     Diabetes Maternal Grandmother      Social History     Socioeconomic History    Marital status:       Spouse name: Not on file    Number of children: Not on file    Years of education: Not on file    Highest education level: Not on file   Occupational History    Not on file   Tobacco Use    Smoking status: Former     Packs/day: 1.00     Years: 5.00     Pack years: 5.00     Types: Cigarettes     Start date: 1950     Quit date: 1955     Years since quittin.3    Smokeless tobacco: Never   Substance and Sexual Activity    Alcohol use: Not Currently    Drug use: Not Currently    Sexual activity: Not on file   Other Topics Concern    Not on file   Social History Narrative    Not on file     Social Determinants of Health     Financial Resource Strain: Low Risk     Difficulty of Paying Living Expenses: Not hard at all   Food Insecurity: No Food Insecurity    Worried About Running Out of Food in the Last Year: Never true    [address] in the Last Year: Never true   Transportation Needs: Not on file   Physical Activity: Not on file   Stress: Not on file   Social Connections: Not on file   Intimate Partner Violence: Not on file   Housing Stability: Not on file     Current Facility-Administered Medications   Medication Dose Route Frequency Provider Last Rate Last Admin    bacitracin ointment   Topical Once Lowanda Foot, MD        bacitracin ointment   Topical Once Lowanda Foot, MD         Current Outpatient Medications   Medication Sig Dispense Refill    sertraline (ZOLOFT) 50 MG tablet Take 1 tablet by mouth daily 30 tablet 3    miconazole (MICOTIN) 2 % powder Apply topically 2 times daily. 45 g 1    mupirocin (BACTROBAN) 2 % ointment Apply topically 3 times daily.  1 each 3    magnesium oxide (MAG-OX) 400 (240 Mg) MG tablet Take 1 tablet by mouth daily for 14 days 14 tablet 0    finasteride (PROSCAR) 5 MG tablet Take 1 tablet by mouth daily 30 tablet 3    levothyroxine (SYNTHROID) 50 MCG tablet Take 1 tablet by mouth daily 30 tablet 3    metoprolol tartrate (LOPRESSOR) 25 MG tablet Take 1 tablet by mouth 2 times daily 60 tablet 0    sodium bicarbonate 325 MG tablet Take 650 mg by mouth daily      Multiple Vitamins-Minerals (THERAPEUTIC MULTIVITAMIN-MINERALS) tablet Take 1 tablet by mouth Daily with supper      ferrous sulfate (IRON 325) 325 (65 Fe) MG tablet Take 325 mg by mouth Daily with supper      tamsulosin (FLOMAX) 0.4 MG capsule Take 1 capsule by mouth nightly 60 capsule 1    QUEtiapine (SEROQUEL) 50 MG tablet Take 1 tablet by mouth nightly (Patient taking differently: Take 50 mg by mouth 2 times daily at 0800 and 1400) 30 tablet 0    sennosides-docusate sodium (SENOKOT-S) 8.6-50 MG tablet Take 2 tablets by mouth 2 times daily as needed for Constipation (Patient taking differently: Take 2 tablets by mouth Daily with lunch) 30 tablet 0    benzonatate (TESSALON) 200 MG capsule Take 1 capsule by mouth 3 times daily as needed for Cough 15 capsule 0    atorvastatin (LIPITOR) 40 MG tablet Take 1 tablet by mouth nightly 30 tablet 0    aspirin 81 MG chewable tablet Take 1 tablet by mouth daily 30 tablet 0    pantoprazole (PROTONIX) 20 MG tablet TAKE 1 TABLET BY MOUTH EVERY DAY 90 tablet 1    temazepam (RESTORIL) 7.5 MG capsule Take 7.5 mg by mouth at bedtime. Allergies   Allergen Reactions    Minocycline Other (See Comments)       Nursing Notes Reviewed    Physical Exam:  ED Triage Vitals [02/17/23 1645]   Enc Vitals Group      BP (!) 169/91      Heart Rate 66      Resp 16      Temp       Temp src       SpO2 96 %      Weight 204 lb (92.5 kg)      Height 6' (1.829 m)      Head Circumference       Peak Flow       Pain Score       Pain Loc       Pain Edu? Excl. in [address]? My pulse ox interpretation is -96% on room air    General appearance:  No acute distress. Scattered skin tears and dressings in place. Hard of hearing. Skin:  Warm. Dry. Extensive skin tear to the dorsal aspect of the left palm. There is a very linear superficial skin tear to the right cheek and skin tear to the right elbow. No deep lacerations. Venous oozing is present to the skin tears  Eye:  Extraocular movements intact. Pupils equal round reactive to light. Ears, nose, mouth and throat:  No cephalohematoma, randolph sign or raccoon eyes. Midface is stable. No dental malocclusion. Neck:  Trachea midline. No midline bony cervical tenderness. Extremity:  No swelling. Normal ROM. No gross deformity ×4 extremities. Extremities are nontender. Heart:  Regular rate and rhythm, normal S1 & S2, no extra heart sounds. Perfusion:  Intact   Respiratory:  Lungs clear to auscultation bilaterally. Respirations nonlabored. Chest wall is nontender. No crepitance. Abdominal:  Normal bowel sounds. Soft. Nontender. Non distended. Back:  No midline bony TLS tenderness or step-off. Neurological:  Alert and oriented times 3. Moving x4 extremities.            Psychiatric:  Appropriate    I have reviewed and interpreted all of the currently available lab results from this visit (if applicable):  Results for orders placed or performed during the hospital encounter of 02/17/23   CBC with Auto Differential   Result Value Ref Range    WBC 6.1 4.0 - 10.5 K/CU MM    RBC 3.09 (L) 4.6 - 6.2 M/CU MM    Hemoglobin 9.1 (L) 13.5 - 18.0 GM/DL    Hematocrit 28.4 (L) 42 - 52 %    MCV 91.9 78 - 100 FL    MCH 29.4 27 - 31 PG    MCHC 32.0 32.0 - 36.0 %    RDW 15.0 (H) 11.7 - 14.9 %    Platelets 051 917 - 387 K/CU MM    MPV 9.5 7.5 - 11.1 FL    Differential Type AUTOMATED DIFFERENTIAL     Segs Relative 57.1 36 - 66 %    Lymphocytes % 19.7 (L) 24 - 44 %    Monocytes % 10.7 (H) 0 - 4 %    Eosinophils % 9.7 (H) 0 - 3 %    Basophils % 1.0 0 - 1 %    Segs Absolute 3.5 K/CU MM    Lymphocytes Absolute 1.2 K/CU MM    Monocytes Absolute 0.7 K/CU MM    Eosinophils Absolute 0.6 K/CU MM    Basophils Absolute 0.1 K/CU MM    Nucleated RBC % 0.0 %    Total Nucleated RBC 0.0 K/CU MM    Total Immature Neutrophil 0.11 K/CU MM    Immature Neutrophil % 1.8 (H) 0 - 0.43 %   Comprehensive Metabolic Panel   Result Value Ref Range    Sodium 138 135 - 145 MMOL/L    Potassium 4.1 3.5 - 5.1 MMOL/L    Chloride 107 99 - 110 mMol/L    CO2 21 21 - 32 MMOL/L    BUN 27 (H) 6 - 23 MG/DL    Creatinine 2.7 (H) 0.9 - 1.3 MG/DL    Est, Glom Filt Rate 21 (L) >60 mL/min/1.73m2    Glucose 126 (H) 70 - 99 MG/DL    Calcium 8.3 8.3 - 10.6 MG/DL    Albumin 3.3 (L) 3.4 - 5.0 GM/DL    Total Protein 5.7 (L) 6.4 - 8.2 GM/DL    Total Bilirubin 0.2 0.0 - 1.0 MG/DL    ALT 9 (L) 10 - 40 U/L    AST 16 15 - 37 IU/L    Alkaline Phosphatase 95 40 - 129 IU/L    Anion Gap 10 4 - 16   Protime/INR & PTT   Result Value Ref Range    Protime 12.4 11.7 - 14.5 SECONDS    INR 0.96 INDEX    aPTT 33.6 25.1 - 37.1 SECONDS   TYPE AND SCREEN   Result Value Ref Range    ABO/Rh O POSITIVE     Antibody Screen NEGATIVE       Radiographs (if obtained):  [] The following radiograph was interpreted by myself in the absence of a radiologist:   [x] Radiologist's Report Reviewed:  CT CERVICAL SPINE WO CONTRAST   Final Result   No acute intracranial abnormality. No acute abnormality in the cervical spine. CT HEAD WO CONTRAST   Final Result   No acute intracranial abnormality. No acute abnormality in the cervical spine. XR HAND LEFT (MIN 3 VIEWS)   Preliminary Result   No acute bony abnormality is seen in the left hand. 1.4 mm radiopaque foreign body is seen in the radial sided soft tissues of   the wrist.      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 EKG (if obtained): (All EKG's are interpreted by myself in the absence of a cardiologist)    Chart review shows recent radiographs:  CT HEAD WO CONTRAST    Result Date: 2/12/2023  EXAMINATION: CT OF THE HEAD WITHOUT CONTRAST  2/12/2023 2:46 pm TECHNIQUE: CT of the head was performed without the administration of intravenous contrast. Automated exposure control, iterative reconstruction, and/or weight based adjustment of the mA/kV was utilized to reduce the radiation dose to as low as reasonably achievable. COMPARISON: 11/13/2022. HISTORY: ORDERING SYSTEM PROVIDED HISTORY: Fall TECHNOLOGIST PROVIDED HISTORY: Has a \"code stroke\" or \"stroke alert\" been called? ->No Reason for exam:->Fall Decision Support Exception - unselect if not a suspected or confirmed emergency medical condition->Emergency Medical Condition (MA) Reason for Exam: Fall FINDINGS: BRAIN/VENTRICLES: There is no acute intracranial hemorrhage, mass effect or midline shift. No abnormal extra-axial fluid collection. The gray-white differentiation is maintained without evidence of an acute infarct. There is no evidence of hydrocephalus. There is generalized volume loss. Areas of white matter hypoattenuation appears similar to the prior study. ORBITS: The visualized portion of the orbits demonstrate no acute abnormality.  SINUSES: The visualized paranasal sinuses and mastoid air cells demonstrate no acute abnormality. SOFT TISSUES/SKULL:  No acute abnormality of the visualized skull or soft tissues. No acute intracranial findings. CT CERVICAL SPINE WO CONTRAST    Result Date: 2/12/2023  EXAMINATION: CT OF THE CERVICAL SPINE WITHOUT CONTRAST 2/12/2023 2:46 pm TECHNIQUE: CT of the cervical spine was performed without the administration of intravenous contrast. Multiplanar reformatted images are provided for review. Automated exposure control, iterative reconstruction, and/or weight based adjustment of the mA/kV was utilized to reduce the radiation dose to as low as reasonably achievable. COMPARISON: 11/13/2022. HISTORY: ORDERING SYSTEM PROVIDED HISTORY: Fall TECHNOLOGIST PROVIDED HISTORY: Reason for exam:->Fall Decision Support Exception - unselect if not a suspected or confirmed emergency medical condition->Emergency Medical Condition (MA) Reason for Exam: Fall FINDINGS: BONES/ALIGNMENT: There is normal spinal alignment. C1 and C2 have a normal relationship. No acute fracture is identified. DEGENERATIVE CHANGES: Multilevel degenerative disc disease is most prominent and moderate to advanced at C6-C7. Multilevel facet arthropathy is worst and severe on the left at C3-C4 and C4-C5. No critical central canal narrowing is identified. SOFT TISSUES: There is no prevertebral soft tissue swelling. No acute cervical spine fracture. Spinal degenerative changes as described. XR CHEST PORTABLE    Result Date: 2/12/2023  EXAMINATION: ONE XRAY VIEW OF THE CHEST 2/12/2023 2:11 pm COMPARISON: 11/13/2022 HISTORY: ORDERING SYSTEM PROVIDED HISTORY: chest pain TECHNOLOGIST PROVIDED HISTORY: Reason for exam:->chest pain Reason for Exam: chest pain FINDINGS: Cardial pericardial silhouette is unremarkable. Pulmonary vasculature is congested and there is increased interstitial opacity. Chronic pleural effusion on the left is noted. No pneumothorax. No free air. No acute bony abnormality.      Fundus congestion along with increased interstitial opacity. Please correlate with any clinical evidence of interstitial edema. VL DUP CAROTID BILATERAL    Result Date: 2/14/2023  EXAMINATION: ULTRASOUND EVALUATION OF THE CAROTID ARTERIES 2/14/2023 TECHNIQUE: Duplex ultrasound using B-mode/gray scaled imaging, Doppler spectral analysis and color flow Doppler was obtained of the carotid arteries. COMPARISON: None. HISTORY: ORDERING SYSTEM PROVIDED HISTORY: syncope TECHNOLOGIST PROVIDED HISTORY: Reason for exam:->syncope FINDINGS: RIGHT: The right common carotid artery demonstrates peak systolic velocities of 53 and 30 cm/sec in the proximal and distal segments respectively. The right common carotid artery demonstrates end-diastolic velocities of 8 and 18 cm/sec in the proximal and distal segments respectively. The right internal carotid artery demonstrates the systolic velocities of 455, 114 and 82 cm/sec in the proximal, mid and distal segments respectively. The right internal carotid artery demonstrates the end-diastolic velocities of 42, 39 and 21 cm/sec in the proximal, mid and distal segments respectively. The external carotid artery appears patent. The vertebral artery demonstrates normal antegrade flow. Mild smooth atherosclerotic plaque. ICA/CCA ratio of 2.2 LEFT: The left common carotid artery demonstrates peak systolic velocities of 92 and 45 cm/sec in the proximal and distal segments respectively. The left common carotid artery demonstrates end-diastolic velocities of 16 and 0 cm/sec in the proximal and distal segments respectively. The left internal carotid artery demonstrates the systolic velocities of 46, 44 and 51 cm/sec in the proximal, mid and distal segments respectively. The left internal carotid artery demonstrates the end-diastolic velocities of 8, 17 and 6 cm/sec in the proximal, mid and distal segments respectively. The external carotid artery appears patent.   The vertebral artery demonstrates normal antegrade flow. Mild smooth atherosclerotic plaque ICA/CCA ratio of 0.6     The right internal carotid artery demonstrates 0-50% stenosis. The left internal carotid artery demonstrates 0-50% stenosis. Bilateral vertebral arteries are patent with flow in the normal direction. MRI BRAIN WO CONTRAST    Result Date: 2/15/2023  EXAMINATION: MRI OF THE BRAIN WITHOUT CONTRAST  2/15/2023 6:52 pm TECHNIQUE: Multiplanar multisequence MRI of the brain was performed without the administration of intravenous contrast. COMPARISON: None. HISTORY: ORDERING SYSTEM PROVIDED HISTORY: multiple falls, acute confusion TECHNOLOGIST PROVIDED HISTORY: Reason for exam:->multiple falls, acute confusion Reason for Exam:  multiple falls, acute confusion FINDINGS: INTRACRANIAL STRUCTURES/VENTRICLES: There is moderate parenchymal volume loss. There is T2/FLAIR hyperintensity in the periventricular and subcortical white matter, likely related to mild to moderate chronic microvascular disease. There are prominent perivascular spaces versus old lacunar infarcts in the bilateral frontal parietal corona radiata. There is no acute infarct. No mass effect or midline shift. No evidence of an acute intracranial hemorrhage. There is no evidence of hydrocephalus. The normal signal voids within the major intracranial vessels appear maintained. There is mild prominent size of the pituitary gland, nonspecific. ORBITS: The visualized portion of the orbits demonstrate no acute abnormality. SINUSES: There is scattered minimal mucosal thickening in the paranasal sinuses. There are moderate bilateral mastoid effusions. BONES/SOFT TISSUES: The bone marrow signal intensity appears normal. The soft tissues demonstrate no acute abnormality. No acute intracranial abnormality. Prominent perivascular spaces versus old lacunar infarcts in the bilateral frontal parietal corona radiata. Moderate parenchymal volume loss. Mild to moderate chronic microvascular disease. Moderate bilateral mastoid effusions. Mildly prominent size of the pituitary gland, nonspecific. Underlying microadenoma cannot be excluded. MDM:  CC/HPI Summary, DDx, ED Course, and Reassessment:   Pt presents as above. Emergent conditions considered. Presentation prompted initial labs and imaging. Labs are per patient's baseline and consistent with labs obtained yesterday morning while in the hospital.  CT imaging and x-ray imaging is negative for acute osseous abnormality. There is a small foreign body seen on x-ray of wrist and this does correlate with superficial foreign body which is removed on secondary recheck. I did Steri-Strip and cleanse right cheek skin tear and further skin tears are cleansed and treated with Betadine, Xeroform and then nonadherent dressing and Kerlix wrap. Tetanus is updated. Patient treated with Tylenol. Patient is evaluated by case management and he is to return to his skilled nursing facility for further rehab. Case management did meet with patient's son and provided resources for potential further placement following rehab. Patient will be discharged back to rehab facility. History from : Patient and Family son    Limitations to history :  Altered Mental Status (dementia)    Patient was given the following medications:  Medications   bacitracin ointment (has no administration in time range)   bacitracin ointment (has no administration in time range)   tetanus-diphth-acell pertussis (BOOSTRIX) injection 0.5 mL (0.5 mLs IntraMUSCular Given 2/17/23 1704)   acetaminophen (TYLENOL) tablet 650 mg (650 mg Oral Given 2/17/23 1759)       Independent Imaging Interpretation by me: XR hand    EKG (if obtained): (All EKG's are interpreted by myself in the absence of a cardiologist) NA    Chronic conditions affecting care: frequent falls    Discussion with Other Profesionals : None    Social Determinants : None    Records Reviewed : Inpatient Notes including discharge summary from this morning. Disposition Considerations (tests considered but not done, Shared Decision Making, Pt Expectation of Test or Tx.):   Appropriate for outpatient management and return to rehab facility  I discussed specific signs and symptoms on when to return to the emergency department as well as the need for close outpatient follow-up. Questions sought and answered with the patient. They voice understanding and agree with plan. I am the Primary Clinician of Record. The care of this patient did occur during the COVID-19 pandemic. Clinical Impression:  1. Fall, initial encounter    2. Closed head injury, initial encounter    3. Multiple skin tears      Disposition referral (if applicable):  No follow-up provider specified. Disposition medications (if applicable):  New Prescriptions    No medications on file       Comment: Please note this report has been produced using speech recognition software and may contain errors related to that system including errors in grammar, punctuation, and spelling, as well as words and phrases that may be inappropriate. If there are any questions or concerns please feel free to contact the dictating provider for clarification.         Pooja Mata MD  02/17/23 2016

## 2023-02-17 NOTE — CARE COORDINATION
NURSING: The patient will be discharged to [facility] today. Please complete the [address] form, call report to [phone], and notify the patient's family. Transport has been arranged at 2 PM through [facility].

## 2023-02-17 NOTE — PROGRESS NOTES
Daily Progress Note  Subjective:  Awake and alert; sitting in bed  RENÉ at bedside  BP and HR stable  NSR on tele  Stable from cardiac standpoint to d/c    Attending Note:  Awake but confused remain in sinus  Stable from cardiac stand  Conservative treatment  Had no suri or tachy arrhythmia while in the hospital  Plan to d/c today  Off restraints  Hx of dementia present     Impression and Plan:   Syncopal episode    Reported loss of consciousness, details unknown    Poor oral intake before admission    Had similar episode on Nov    Echo stable    Carotid duplex negative for stenosis    BP and HR stable    MRI brain    Neuro is following    Orthostatic today; recommend compression stockings    Will need outpatient monitor to ensure to arrhthymias or pauses that may have contributed to episode      Hx of PAF   CHADVASC is 3    Eliquis is on hold; recent falls including last night    Continue lopressor     Elevated troponin    No active chest pain    No EKG changes    Likely d/t CKD    Cont' with conservative treatment    CKD    Renal following    Avoid nephro toxic medication      Conservative treatment; Stable from cardiac standpoint     Most Recent Echo  2/14/2023   Summary   Left ventricular systolic function is abnormal.   Ejection fraction is visually estimated at 40-50%. Global hypokinesis noted. Grade I diastolic dysfunction. Trivial aortic regurgitation noted with color doppler. Mild tricuspid regurgitation; RVSP: 53 mmHg. No evidence of any pericardial effusion.         [id number] Score for Atrial Fibrillation Stroke Risk    Risk   Factors   Component Value   C CHF No 0   H HTN Yes 1   A2 Age >= 76 Yes,  (80 y.o.) 2   D DM No 0   S2 Prior Stroke/TIA No 0   V Vascular Disease No 0   A Age 74-69 No,  (80 y.o.) 0   Sc Sex male 0     [id number]  Score   3   Score last updated 2/14/23 79:76 AM EST     Click here for a link to the UpToDate guideline \"Atrial Fibrillation: Anticoagulation therapy to prevent embolization     Disclaimer: Risk Score calculation is dependent on accuracy of patient problem list and past encounter diagnosis. PAST MEDICAL HISTORY:  He is having syncope present. History of having  anemia, anxiety, renal surgery, BPH, depression, GERD, hemorrhoids,  hepatitis, hernia repair, hypertension, hyperlipidemia, and squamous  cell cancer present. PAST SURGICAL HISTORY:  He has a history of carpal tunnel surgery,  hernia surgery, and neck surgery done. SOCIAL HISTORY:  He does not smoke. He does not drink. ALLERGIES:  MINOCYCLINE.   Objective:   BP (!) 146/75   Pulse 66   Temp 98.3 °F (36.8 °C)   Resp 18   Ht 6' (1.829 m)   Wt 204 lb 9.6 oz (92.8 kg)   SpO2 96%   BMI 27.75 kg/m²     Intake/Output Summary (Last 24 hours) at 2/17/2023 1128  Last data filed at 2/17/2023 0100  Gross per 24 hour   Intake --   Output 120 ml   Net -120 ml       Medications:   Scheduled Meds:   mupirocin   Topical Daily    magnesium oxide  400 mg Oral Daily    sodium chloride flush  5-40 mL IntraVENous 2 times per day    enoxaparin  30 mg SubCUTAneous Daily    miconazole   Topical BID    aspirin  81 mg Oral Daily    atorvastatin  40 mg Oral Nightly    finasteride  5 mg Oral Daily    levothyroxine  50 mcg Oral Daily    metoprolol tartrate  25 mg Oral BID    pantoprazole  20 mg Oral Daily    QUEtiapine  50 mg Oral Nightly    sertraline  50 mg Oral Daily    tamsulosin  0.4 mg Oral Daily    temazepam  7.5 mg Oral Nightly      Infusions:   sodium chloride        PRN Meds:  sodium chloride flush, sodium chloride, ondansetron **OR** ondansetron, polyethylene glycol, acetaminophen **OR** acetaminophen, hydrALAZINE, benzonatate, sennosides-docusate sodium     Physical Exam:  Vitals:    02/17/23 1104   BP: (!) 146/75   Pulse:    Resp:    Temp: 98.3 °F (36.8 °C)   SpO2: 96%        General: AAO, NAD; some confusion noted  Chest: Nontender  Cardiac: First and Second Heart Sounds are Normal, No Murmurs or Gallops noted  Lungs:Clear to auscultation and percussion. Abdomen: Soft, NT, ND, +BS  Extremities: No clubbing, NP edema  Vascular:  Equal 2+ peripheral pulses. Lab Data:  CBC:   Recent Labs     02/15/23  0003 02/15/23  0442 02/15/23  2309 02/16/23  0920   WBC 5.7  --   --   --    HGB 9.4*  9.4* 10.2* 9.1* 9.0*   HCT 29.7*  30.2* 31.9* 29.2* 28.7*   MCV 92.6  --   --   --      --   --   --      BMP:   Recent Labs     02/15/23  0003      K 3.7      CO2 23   PHOS 2.7   BUN 25*   CREATININE 2.8*     LIVER PROFILE: No results for input(s): AST, ALT, LIPASE, BILIDIR, BILITOT, ALKPHOS in the last 72 hours. Invalid input(s): AMYLASE,  ALB  PT/INR: No results for input(s): PROTIME, INR in the last 72 hours. APTT: No results for input(s): APTT in the last 72 hours. BNP:  No results for input(s): BNP in the last 72 hours.       Assessment:  Patient Active Problem List    Diagnosis Date Noted    Orthostatic syncope 02/14/2023    Dizziness 02/12/2023    Syncope and collapse 02/12/2023    Community acquired pneumonia of left lung, unspecified part of lung 10/08/2022    Cardiac arrest ([facility] [address] 08/25/2022    Chest pain 08/21/2022    Agitation due to dementia 08/18/2022    Varicose 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 08/15/2015    Skin ulcer, limited to breakdown of skin ([address] 02/21/2022    Current moderate episode of major depressive disorder, unspecified whether recurrent ([address] 02/21/2022    Chronic kidney disease, stage IV (severe) ([address] 02/21/2022    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       Electronically signed by ROSEMARY Monge CNP on 2/17/2023 at 11:28 AM    Electronically signed by Kellie Arredondo MD on 2/17/23 at 12:23 PM EST

## 2023-02-17 NOTE — CARE COORDINATION
SHEFALI arranged medical transport to [facility] at 2 PM through [facility]. Patient needs to remain free of a bedside sitter for 24 hours prior to discharge to [facility], sitter was discontinued at 1:30 PM 2/16/23.      8:10 AM  SHEFALI notified the patient's nurse of the transportation time. 8:14 AM  SHEFALI spoke with Evern Nearing at [employer] to notify her of the time of transportation. 10:05 AM  SHEFALI left a voicemail for the patient's son Carol Goldberg ([phone]) with notification of discharge and time of transportation.

## 2023-02-17 NOTE — ED NOTES
Pt had a  Mechanical fall at [facility]-  Is not on blood thinners, pt has a skin tear to R cheek, R forearm and injury to L hand.       Bryce Goel RN  02/17/23 3628

## 2023-02-17 NOTE — CARE COORDINATION
Patient is 80year old male that just presented to [facility] today after discharge from [facility]. Patient was only at SNF for approximately 2 hours when patient had a mechanical fall. CM received consult on patient b/c son is wanting to talk to someone about possible long term care. CM went to patients' room. Patient not in room as patient was in CT. Seamus Campbellr @ [phone],\" was at bedside. Son reported that patient normally uses a walker and is planning on stopping in at [facility] and discussing placing a bed alarm and possible Life Alert. Son reports that his family have used [facility] in past and son is very familiar with staff and has knowledge that [facility] offers both to residents. Son also requested that patient be tested for a UTI. Son reports that at times; patient can get Sundowners, but  there have also been times that patient has UTI and will cause memory loss and what appears to be Sundowners. The long term plan is for patient to return home after his stay at [facility] and return living with his daughter, Elton Mariee,\" but son--Charlie reports that he and sister are bother wanting to get some information on possible LTC in case Sameer Barrera is unable to care for patient once his skilled days are used at [facility]. CM explained that LTC would something that patient and family would need to discuss with [facility]. CM did give son a copy of all area nursing facilities in [facility], listing of all [facility] agencies and information regarding [facility] programs. Son shared that son would speak to [facility] regarding possible LTC. CM shared the above with Dr. David Jenkins. Dr. David Jenkins ordered urine test.     Son requested that CM let Dr. David Jenkins know that son is leaving in case Dr. David Jenkins would like to talk to him. Son requested that he be called once patient is either discharged back to [facility] or if patient gets admitted. No further needs.      19:15 Son called this CM back and reported son had went to [facility] and [facility] does not have alarms for bed. Son not sure what to do, but did state that if patient falls again; they may look at placing patient in one of [facility] sister facilities that carries alarms for patients.

## 2023-02-17 NOTE — PROGRESS NOTES
REport called to [facility] at [facility]

## 2023-02-18 NOTE — ED NOTES
Care assumed at this time from [facility]. Patient resting in room no distress noted. Call light in reach.       Junior Lee RN  02/1935

## 2023-02-18 NOTE — ED NOTES
Shaina EMT from [facility] here to transport patient at this time. Report given to them and all questions answered. Patient alert and oriented at time of transfer.       Christin Cox RN  02/17/23 2121

## 2023-02-18 NOTE — ED NOTES
Report called to [facility] at this time . Report given to [facility]. All questions answered and ETA provided.      Reyes Yoder RN  02/17/23 2045

## 2023-02-18 NOTE — ED NOTES
Patients skin tear to left hand and right elbow this nurse cleansed with normal saline and placed versatil on and then nonadhesive and wrapped with kerlex per Dr Cece Melchor order. Patient tolerated well.       Jemima Pate RN  02/17/23 2044

## 2023-02-19 LAB
CULTURE: NORMAL
CULTURE: NORMAL
Lab: NORMAL
Lab: NORMAL
SPECIMEN: NORMAL
SPECIMEN: NORMAL

## 2023-02-20 RX ORDER — FERROUS SULFATE 325(65) MG
TABLET ORAL
Qty: 30 TABLET | Refills: 2 | OUTPATIENT
Start: 2023-02-20

## 2023-02-21 LAB
CULTURE: NORMAL
CULTURE: NORMAL
Lab: NORMAL
Lab: NORMAL
SPECIMEN: NORMAL
SPECIMEN: NORMAL

## 2023-03-06 ENCOUNTER — HOSPITAL ENCOUNTER (INPATIENT)
Age: 88
LOS: 22 days | Discharge: HOSPICE | DRG: 553 | End: 2023-03-28
Attending: EMERGENCY MEDICINE | Admitting: INTERNAL MEDICINE
Payer: OTHER GOVERNMENT

## 2023-03-06 ENCOUNTER — APPOINTMENT (OUTPATIENT)
Dept: CT IMAGING | Age: 88
DRG: 553 | End: 2023-03-06
Payer: OTHER GOVERNMENT

## 2023-03-06 DIAGNOSIS — R53.81 DEBILITY: ICD-10-CM

## 2023-03-06 DIAGNOSIS — M25.551 RIGHT HIP PAIN: Primary | ICD-10-CM

## 2023-03-06 DIAGNOSIS — Z51.5 HOSPICE CARE: ICD-10-CM

## 2023-03-06 DIAGNOSIS — M25.559 HIP PAIN: ICD-10-CM

## 2023-03-06 LAB
ANION GAP SERPL CALCULATED.3IONS-SCNC: 11 MMOL/L (ref 4–16)
BASOPHILS ABSOLUTE: 0.1 K/CU MM
BASOPHILS RELATIVE PERCENT: 1.1 % (ref 0–1)
BUN SERPL-MCNC: 39 MG/DL (ref 6–23)
CALCIUM SERPL-MCNC: 8.2 MG/DL (ref 8.3–10.6)
CHLORIDE BLD-SCNC: 107 MMOL/L (ref 99–110)
CO2: 22 MMOL/L (ref 21–32)
CREAT SERPL-MCNC: 2.9 MG/DL (ref 0.9–1.3)
CRP SERPL HS-MCNC: 3 MG/L
DIFFERENTIAL TYPE: ABNORMAL
EOSINOPHILS ABSOLUTE: 0.7 K/CU MM
EOSINOPHILS RELATIVE PERCENT: 8.3 % (ref 0–3)
ERYTHROCYTE SEDIMENTATION RATE: 32 MM/HR (ref 0–20)
GFR SERPL CREATININE-BSD FRML MDRD: 20 ML/MIN/1.73M2
GLUCOSE SERPL-MCNC: 111 MG/DL (ref 70–99)
HCT VFR BLD CALC: 28.1 % (ref 42–52)
HEMOGLOBIN: 8.5 GM/DL (ref 13.5–18)
IMMATURE NEUTROPHIL %: 1 % (ref 0–0.43)
LYMPHOCYTES ABSOLUTE: 1.3 K/CU MM
LYMPHOCYTES RELATIVE PERCENT: 15.7 % (ref 24–44)
MCH RBC QN AUTO: 29.5 PG (ref 27–31)
MCHC RBC AUTO-ENTMCNC: 30.2 % (ref 32–36)
MCV RBC AUTO: 97.6 FL (ref 78–100)
MONOCYTES ABSOLUTE: 0.8 K/CU MM
MONOCYTES RELATIVE PERCENT: 9.2 % (ref 0–4)
NUCLEATED RBC %: 0 %
PDW BLD-RTO: 16.1 % (ref 11.7–14.9)
PLATELET # BLD: 223 K/CU MM (ref 140–440)
PMV BLD AUTO: 9.4 FL (ref 7.5–11.1)
POTASSIUM SERPL-SCNC: 4.4 MMOL/L (ref 3.5–5.1)
RBC # BLD: 2.88 M/CU MM (ref 4.6–6.2)
SEGMENTED NEUTROPHILS ABSOLUTE COUNT: 5.3 K/CU MM
SEGMENTED NEUTROPHILS RELATIVE PERCENT: 64.7 % (ref 36–66)
SODIUM BLD-SCNC: 140 MMOL/L (ref 135–145)
TOTAL IMMATURE NEUTOROPHIL: 0.08 K/CU MM
TOTAL NUCLEATED RBC: 0 K/CU MM
WBC # BLD: 8.2 K/CU MM (ref 4–10.5)

## 2023-03-06 PROCEDURE — 94761 N-INVAS EAR/PLS OXIMETRY MLT: CPT

## 2023-03-06 PROCEDURE — 74176 CT ABD & PELVIS W/O CONTRAST: CPT

## 2023-03-06 PROCEDURE — 1200000000 HC SEMI PRIVATE

## 2023-03-06 PROCEDURE — 86140 C-REACTIVE PROTEIN: CPT

## 2023-03-06 PROCEDURE — 6370000000 HC RX 637 (ALT 250 FOR IP): Performed by: INTERNAL MEDICINE

## 2023-03-06 PROCEDURE — 6360000002 HC RX W HCPCS: Performed by: INTERNAL MEDICINE

## 2023-03-06 PROCEDURE — 72192 CT PELVIS W/O DYE: CPT

## 2023-03-06 PROCEDURE — 2580000003 HC RX 258: Performed by: INTERNAL MEDICINE

## 2023-03-06 PROCEDURE — 6370000000 HC RX 637 (ALT 250 FOR IP): Performed by: EMERGENCY MEDICINE

## 2023-03-06 PROCEDURE — 85025 COMPLETE CBC W/AUTO DIFF WBC: CPT

## 2023-03-06 PROCEDURE — 80048 BASIC METABOLIC PNL TOTAL CA: CPT

## 2023-03-06 PROCEDURE — 99285 EMERGENCY DEPT VISIT HI MDM: CPT

## 2023-03-06 PROCEDURE — 85652 RBC SED RATE AUTOMATED: CPT

## 2023-03-06 RX ORDER — ONDANSETRON 2 MG/ML
4 INJECTION INTRAMUSCULAR; INTRAVENOUS EVERY 6 HOURS PRN
Status: DISCONTINUED | OUTPATIENT
Start: 2023-03-06 | End: 2023-03-28 | Stop reason: HOSPADM

## 2023-03-06 RX ORDER — SODIUM CHLORIDE 0.9 % (FLUSH) 0.9 %
5-40 SYRINGE (ML) INJECTION PRN
Status: DISCONTINUED | OUTPATIENT
Start: 2023-03-06 | End: 2023-03-28 | Stop reason: HOSPADM

## 2023-03-06 RX ORDER — TAMSULOSIN HYDROCHLORIDE 0.4 MG/1
0.4 CAPSULE ORAL NIGHTLY
Status: DISCONTINUED | OUTPATIENT
Start: 2023-03-06 | End: 2023-03-16

## 2023-03-06 RX ORDER — FERROUS SULFATE 325(65) MG
325 TABLET ORAL
Status: DISCONTINUED | OUTPATIENT
Start: 2023-03-06 | End: 2023-03-14 | Stop reason: ALTCHOICE

## 2023-03-06 RX ORDER — ACETAMINOPHEN 500 MG
1000 TABLET ORAL ONCE
Status: COMPLETED | OUTPATIENT
Start: 2023-03-06 | End: 2023-03-06

## 2023-03-06 RX ORDER — ACETAMINOPHEN 325 MG/1
650 TABLET ORAL EVERY 6 HOURS PRN
Status: DISCONTINUED | OUTPATIENT
Start: 2023-03-06 | End: 2023-03-28 | Stop reason: HOSPADM

## 2023-03-06 RX ORDER — LEVOTHYROXINE SODIUM 0.05 MG/1
50 TABLET ORAL DAILY
Status: DISCONTINUED | OUTPATIENT
Start: 2023-03-06 | End: 2023-03-11

## 2023-03-06 RX ORDER — BISACODYL 10 MG
10 SUPPOSITORY, RECTAL RECTAL ONCE
Status: COMPLETED | OUTPATIENT
Start: 2023-03-06 | End: 2023-03-06

## 2023-03-06 RX ORDER — SODIUM CHLORIDE 0.9 % (FLUSH) 0.9 %
5-40 SYRINGE (ML) INJECTION EVERY 12 HOURS SCHEDULED
Status: DISCONTINUED | OUTPATIENT
Start: 2023-03-06 | End: 2023-03-28 | Stop reason: HOSPADM

## 2023-03-06 RX ORDER — ONDANSETRON 4 MG/1
4 TABLET, ORALLY DISINTEGRATING ORAL EVERY 8 HOURS PRN
Status: DISCONTINUED | OUTPATIENT
Start: 2023-03-06 | End: 2023-03-28 | Stop reason: HOSPADM

## 2023-03-06 RX ORDER — POLYETHYLENE GLYCOL 3350 17 G/17G
17 POWDER, FOR SOLUTION ORAL 2 TIMES DAILY
Status: DISCONTINUED | OUTPATIENT
Start: 2023-03-06 | End: 2023-03-19

## 2023-03-06 RX ORDER — SODIUM CHLORIDE 9 MG/ML
INJECTION, SOLUTION INTRAVENOUS PRN
Status: DISCONTINUED | OUTPATIENT
Start: 2023-03-06 | End: 2023-03-28 | Stop reason: HOSPADM

## 2023-03-06 RX ORDER — SENNA AND DOCUSATE SODIUM 50; 8.6 MG/1; MG/1
2 TABLET, FILM COATED ORAL 2 TIMES DAILY
Status: DISCONTINUED | OUTPATIENT
Start: 2023-03-06 | End: 2023-03-19

## 2023-03-06 RX ORDER — ATORVASTATIN CALCIUM 40 MG/1
40 TABLET, FILM COATED ORAL NIGHTLY
Status: DISCONTINUED | OUTPATIENT
Start: 2023-03-06 | End: 2023-03-28 | Stop reason: HOSPADM

## 2023-03-06 RX ORDER — LIDOCAINE 4 G/G
1 PATCH TOPICAL DAILY
Status: DISCONTINUED | OUTPATIENT
Start: 2023-03-06 | End: 2023-03-28 | Stop reason: HOSPADM

## 2023-03-06 RX ORDER — FINASTERIDE 5 MG/1
5 TABLET, FILM COATED ORAL DAILY
Status: DISCONTINUED | OUTPATIENT
Start: 2023-03-06 | End: 2023-03-28 | Stop reason: HOSPADM

## 2023-03-06 RX ORDER — MORPHINE SULFATE 2 MG/ML
1 INJECTION, SOLUTION INTRAMUSCULAR; INTRAVENOUS EVERY 4 HOURS PRN
Status: DISCONTINUED | OUTPATIENT
Start: 2023-03-06 | End: 2023-03-07

## 2023-03-06 RX ORDER — LIDOCAINE 4 G/G
1 PATCH TOPICAL DAILY
Status: DISCONTINUED | OUTPATIENT
Start: 2023-03-06 | End: 2023-03-06

## 2023-03-06 RX ORDER — ASPIRIN 81 MG/1
81 TABLET, CHEWABLE ORAL DAILY
Status: DISCONTINUED | OUTPATIENT
Start: 2023-03-06 | End: 2023-03-28 | Stop reason: HOSPADM

## 2023-03-06 RX ORDER — POLYETHYLENE GLYCOL 3350 17 G/17G
17 POWDER, FOR SOLUTION ORAL DAILY PRN
Status: DISCONTINUED | OUTPATIENT
Start: 2023-03-06 | End: 2023-03-28 | Stop reason: HOSPADM

## 2023-03-06 RX ORDER — ACETAMINOPHEN 650 MG/1
650 SUPPOSITORY RECTAL EVERY 6 HOURS PRN
Status: DISCONTINUED | OUTPATIENT
Start: 2023-03-06 | End: 2023-03-28 | Stop reason: HOSPADM

## 2023-03-06 RX ORDER — QUETIAPINE FUMARATE 25 MG/1
50 TABLET, FILM COATED ORAL 2 TIMES DAILY
Status: DISCONTINUED | OUTPATIENT
Start: 2023-03-06 | End: 2023-03-13

## 2023-03-06 RX ORDER — ENOXAPARIN SODIUM 100 MG/ML
30 INJECTION SUBCUTANEOUS DAILY
Status: DISCONTINUED | OUTPATIENT
Start: 2023-03-06 | End: 2023-03-28 | Stop reason: HOSPADM

## 2023-03-06 RX ADMIN — SODIUM CHLORIDE, PRESERVATIVE FREE 10 ML: 5 INJECTION INTRAVENOUS at 11:33

## 2023-03-06 RX ADMIN — BISACODYL 10 MG: 10 SUPPOSITORY RECTAL at 11:27

## 2023-03-06 RX ADMIN — SODIUM CHLORIDE, PRESERVATIVE FREE 10 ML: 5 INJECTION INTRAVENOUS at 21:10

## 2023-03-06 RX ADMIN — METOPROLOL TARTRATE 25 MG: 25 TABLET, FILM COATED ORAL at 21:02

## 2023-03-06 RX ADMIN — SERTRALINE HYDROCHLORIDE 50 MG: 50 TABLET ORAL at 11:28

## 2023-03-06 RX ADMIN — LEVOTHYROXINE SODIUM 50 MCG: 50 TABLET ORAL at 11:27

## 2023-03-06 RX ADMIN — QUETIAPINE FUMARATE 50 MG: 25 TABLET ORAL at 15:45

## 2023-03-06 RX ADMIN — METOPROLOL TARTRATE 25 MG: 25 TABLET, FILM COATED ORAL at 11:27

## 2023-03-06 RX ADMIN — ATORVASTATIN CALCIUM 40 MG: 40 TABLET, FILM COATED ORAL at 21:02

## 2023-03-06 RX ADMIN — QUETIAPINE FUMARATE 50 MG: 25 TABLET ORAL at 11:27

## 2023-03-06 RX ADMIN — FINASTERIDE 5 MG: 5 TABLET, FILM COATED ORAL at 11:27

## 2023-03-06 RX ADMIN — POLYETHYLENE GLYCOL 3350 17 G: 17 POWDER, FOR SOLUTION ORAL at 21:02

## 2023-03-06 RX ADMIN — TAMSULOSIN HYDROCHLORIDE 0.4 MG: 0.4 CAPSULE ORAL at 21:02

## 2023-03-06 RX ADMIN — ASPIRIN 81 MG CHEWABLE TABLET 81 MG: 81 TABLET CHEWABLE at 11:27

## 2023-03-06 RX ADMIN — ENOXAPARIN SODIUM 30 MG: 100 INJECTION SUBCUTANEOUS at 11:27

## 2023-03-06 RX ADMIN — FERROUS SULFATE TAB 325 MG (65 MG ELEMENTAL FE) 325 MG: 325 (65 FE) TAB at 15:45

## 2023-03-06 RX ADMIN — MORPHINE SULFATE 1 MG: 2 INJECTION, SOLUTION INTRAMUSCULAR; INTRAVENOUS at 19:00

## 2023-03-06 RX ADMIN — SENNOSIDES AND DOCUSATE SODIUM 2 TABLET: 50; 8.6 TABLET ORAL at 21:02

## 2023-03-06 RX ADMIN — ACETAMINOPHEN 1000 MG: 500 TABLET ORAL at 04:34

## 2023-03-06 ASSESSMENT — PAIN DESCRIPTION - LOCATION
LOCATION: HIP
LOCATION: HIP

## 2023-03-06 ASSESSMENT — PAIN SCALES - GENERAL
PAINLEVEL_OUTOF10: 7
PAINLEVEL_OUTOF10: 6
PAINLEVEL_OUTOF10: 4
PAINLEVEL_OUTOF10: 0
PAINLEVEL_OUTOF10: 0

## 2023-03-06 ASSESSMENT — PAIN DESCRIPTION - ORIENTATION: ORIENTATION: RIGHT

## 2023-03-06 ASSESSMENT — PAIN - FUNCTIONAL ASSESSMENT: PAIN_FUNCTIONAL_ASSESSMENT: 0-10

## 2023-03-06 ASSESSMENT — PAIN DESCRIPTION - DESCRIPTORS: DESCRIPTORS: ACHING;THROBBING

## 2023-03-06 NOTE — ED PROVIDER NOTES
Take 1 tablet by mouth daily    ATORVASTATIN (LIPITOR) 40 MG TABLET    Take 1 tablet by mouth nightly    BENZONATATE (TESSALON) 200 MG CAPSULE    Take 1 capsule by mouth 3 times daily as needed for Cough    FERROUS SULFATE (IRON 325) 325 (65 FE) MG TABLET    Take 325 mg by mouth Daily with supper    FINASTERIDE (PROSCAR) 5 MG TABLET    Take 1 tablet by mouth daily    LEVOTHYROXINE (SYNTHROID) 50 MCG TABLET    Take 1 tablet by mouth daily    MAGNESIUM OXIDE (MAG-OX) 400 (240 MG) MG TABLET    Take 1 tablet by mouth daily for 14 days    METOPROLOL TARTRATE (LOPRESSOR) 25 MG TABLET    Take 1 tablet by mouth 2 times daily    MICONAZOLE (MICOTIN) 2 % POWDER    Apply topically 2 times daily. MULTIPLE VITAMINS-MINERALS (THERAPEUTIC MULTIVITAMIN-MINERALS) TABLET    Take 1 tablet by mouth Daily with supper    PANTOPRAZOLE (PROTONIX) 20 MG TABLET    TAKE 1 TABLET BY MOUTH EVERY DAY    QUETIAPINE (SEROQUEL) 50 MG TABLET    Take 1 tablet by mouth nightly    SENNOSIDES-DOCUSATE SODIUM (SENOKOT-S) 8.6-50 MG TABLET    Take 2 tablets by mouth 2 times daily as needed for Constipation    SERTRALINE (ZOLOFT) 50 MG TABLET    Take 1 tablet by mouth daily    SODIUM BICARBONATE 325 MG TABLET    Take 650 mg by mouth daily    TAMSULOSIN (FLOMAX) 0.4 MG CAPSULE    Take 1 capsule by mouth nightly    TEMAZEPAM (RESTORIL) 7.5 MG CAPSULE    Take 7.5 mg by mouth at bedtime. ALLERGIES  Allergies   Allergen Reactions    Minocycline Other (See Comments)       Nursing notes reviewed by myself for past medical history, family history, social history, surgical history, current medications, and allergies.     PHYSICAL EXAM  VITAL SIGNS: Triage VS:    ED Triage Vitals   Enc Vitals Group      BP 03/06/23 0257 139/70      Heart Rate 03/06/23 0257 62      Resp 03/06/23 0257 16      Temp 03/06/23 0257 98.3 °F (36.8 °C)      Temp Source 03/06/23 0257 Oral      SpO2 03/06/23 0257 97 %      Weight 03/06/23 0251 204 lb (92.5 kg)      Height 03/06/23 0251 6'

## 2023-03-06 NOTE — ED NOTES
ED TO INPATIENT SBAR HANDOFF    Patient Name: Kenneth Gustfason   :  10/18/1930  80 y.o. MRN:  [id number]  Preferred Name  Karri Navarro  ED Room #:  ED26/ED-26  Family/Caregiver Present yes   Restraints no   Sitter no   Sepsis Risk Score Sepsis Risk Score: 1.2    Situation  Code Status: Prior No additional code details. Allergies: Minocycline  Weight: Patient Vitals for the past 96 hrs (Last 3 readings):   Weight   23 0251 204 lb (92.5 kg)     Arrived from: home  Chief Complaint:   Chief Complaint   Patient presents with    Hip Pain     R hip pain. Was released from [facility] but states he had fallen there at some point     Hospital Problem/Diagnosis:  Principal Problem:    Hip pain  Resolved Problems:    * No resolved hospital problems. *    Imaging:   CT PELVIS WO CONTRAST Additional Contrast? None   Final Result   1. No acute fracture or dislocation at the pelvis and hips. 2.  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 3.  Sclerosis in the superior humeral heads suggesting some avascular   necrosis but there is no collapse. 4.  Urinary bladder has wall thickening and multiple bladder diverticula. Mildly enlarged prostate bulging into the bladder floor. Had similar   features on the previous study. Unsure how much is related to the chronic   outlet obstruction versus some mild cystitis.          CT ABDOMEN PELVIS WO CONTRAST Additional Contrast? None    (Results Pending)     Abnormal labs:   Abnormal Labs Reviewed   CBC WITH AUTO DIFFERENTIAL - Abnormal; Notable for the following components:       Result Value    RBC 2.88 (*)     Hemoglobin 8.5 (*)     Hematocrit 28.1 (*)     MCHC 30.2 (*)     RDW 16.1 (*)     Lymphocytes % 15.7 (*)     Monocytes % 9.2 (*)     Eosinophils % 8.3 (*)     Basophils % 1.1 (*)     Immature Neutrophil % 1.0 (*)     All other components within normal limits   BASIC METABOLIC PANEL - Abnormal; Notable for the following components:    BUN 39 (*)

## 2023-03-06 NOTE — ED NOTES
Hand-off report received from [facility], [address] [facility]  03/06/23 3548

## 2023-03-06 NOTE — ED TRIAGE NOTES
Pt brought in by EMS from home. Was released from [facility] yesterday and had fallen sometime there. Pt complains of R hip pain. Cannot tell me how long this pain has been going on but oriented to person place and time(president).

## 2023-03-07 LAB
ANION GAP SERPL CALCULATED.3IONS-SCNC: 12 MMOL/L (ref 4–16)
BASOPHILS ABSOLUTE: 0.1 K/CU MM
BASOPHILS RELATIVE PERCENT: 1 % (ref 0–1)
BILIRUBIN URINE: NEGATIVE MG/DL
BLOOD, URINE: NEGATIVE
BUN SERPL-MCNC: 40 MG/DL (ref 6–23)
CALCIUM SERPL-MCNC: 8.8 MG/DL (ref 8.3–10.6)
CHLORIDE BLD-SCNC: 107 MMOL/L (ref 99–110)
CLARITY: CLEAR
CO2: 22 MMOL/L (ref 21–32)
COLOR: YELLOW
COMMENT UA: NORMAL
CREAT SERPL-MCNC: 2.8 MG/DL (ref 0.9–1.3)
DIFFERENTIAL TYPE: ABNORMAL
EOSINOPHILS ABSOLUTE: 0.6 K/CU MM
EOSINOPHILS RELATIVE PERCENT: 7.7 % (ref 0–3)
GFR SERPL CREATININE-BSD FRML MDRD: 21 ML/MIN/1.73M2
GLUCOSE SERPL-MCNC: 93 MG/DL (ref 70–99)
GLUCOSE, URINE: NEGATIVE MG/DL
HCT VFR BLD CALC: 29 % (ref 42–52)
HEMOGLOBIN: 9 GM/DL (ref 13.5–18)
IMMATURE NEUTROPHIL %: 0.8 % (ref 0–0.43)
KETONES, URINE: NEGATIVE MG/DL
LEUKOCYTE ESTERASE, URINE: NEGATIVE
LYMPHOCYTES ABSOLUTE: 1.1 K/CU MM
LYMPHOCYTES RELATIVE PERCENT: 13.5 % (ref 24–44)
MCH RBC QN AUTO: 30 PG (ref 27–31)
MCHC RBC AUTO-ENTMCNC: 31 % (ref 32–36)
MCV RBC AUTO: 96.7 FL (ref 78–100)
MONOCYTES ABSOLUTE: 0.8 K/CU MM
MONOCYTES RELATIVE PERCENT: 9 % (ref 0–4)
NITRITE URINE, QUANTITATIVE: NEGATIVE
NUCLEATED RBC %: 0 %
PDW BLD-RTO: 15.9 % (ref 11.7–14.9)
PH, URINE: 6 (ref 5–8)
PLATELET # BLD: 214 K/CU MM (ref 140–440)
PMV BLD AUTO: 9.6 FL (ref 7.5–11.1)
POTASSIUM SERPL-SCNC: 4.9 MMOL/L (ref 3.5–5.1)
PROTEIN UA: NEGATIVE MG/DL
RBC # BLD: 3 M/CU MM (ref 4.6–6.2)
SEGMENTED NEUTROPHILS ABSOLUTE COUNT: 5.7 K/CU MM
SEGMENTED NEUTROPHILS RELATIVE PERCENT: 68 % (ref 36–66)
SODIUM BLD-SCNC: 141 MMOL/L (ref 135–145)
SPECIFIC GRAVITY UA: 1.01 (ref 1–1.03)
TOTAL IMMATURE NEUTOROPHIL: 0.07 K/CU MM
TOTAL NUCLEATED RBC: 0 K/CU MM
UROBILINOGEN, URINE: 0.2 MG/DL (ref 0.2–1)
WBC # BLD: 8.4 K/CU MM (ref 4–10.5)

## 2023-03-07 PROCEDURE — 6360000002 HC RX W HCPCS: Performed by: STUDENT IN AN ORGANIZED HEALTH CARE EDUCATION/TRAINING PROGRAM

## 2023-03-07 PROCEDURE — 6360000002 HC RX W HCPCS: Performed by: INTERNAL MEDICINE

## 2023-03-07 PROCEDURE — 99213 OFFICE O/P EST LOW 20 MIN: CPT

## 2023-03-07 PROCEDURE — 80048 BASIC METABOLIC PNL TOTAL CA: CPT

## 2023-03-07 PROCEDURE — 36415 COLL VENOUS BLD VENIPUNCTURE: CPT

## 2023-03-07 PROCEDURE — 94761 N-INVAS EAR/PLS OXIMETRY MLT: CPT

## 2023-03-07 PROCEDURE — 1200000000 HC SEMI PRIVATE

## 2023-03-07 PROCEDURE — 6370000000 HC RX 637 (ALT 250 FOR IP): Performed by: EMERGENCY MEDICINE

## 2023-03-07 PROCEDURE — 6370000000 HC RX 637 (ALT 250 FOR IP): Performed by: INTERNAL MEDICINE

## 2023-03-07 PROCEDURE — 85025 COMPLETE CBC W/AUTO DIFF WBC: CPT

## 2023-03-07 PROCEDURE — 81003 URINALYSIS AUTO W/O SCOPE: CPT

## 2023-03-07 PROCEDURE — 2580000003 HC RX 258: Performed by: INTERNAL MEDICINE

## 2023-03-07 PROCEDURE — 6370000000 HC RX 637 (ALT 250 FOR IP): Performed by: STUDENT IN AN ORGANIZED HEALTH CARE EDUCATION/TRAINING PROGRAM

## 2023-03-07 RX ORDER — MORPHINE SULFATE 2 MG/ML
1 INJECTION, SOLUTION INTRAMUSCULAR; INTRAVENOUS EVERY 6 HOURS PRN
Status: DISCONTINUED | OUTPATIENT
Start: 2023-03-07 | End: 2023-03-10

## 2023-03-07 RX ADMIN — ASPIRIN 81 MG CHEWABLE TABLET 81 MG: 81 TABLET CHEWABLE at 08:49

## 2023-03-07 RX ADMIN — QUETIAPINE FUMARATE 50 MG: 25 TABLET ORAL at 14:55

## 2023-03-07 RX ADMIN — FERROUS SULFATE TAB 325 MG (65 MG ELEMENTAL FE) 325 MG: 325 (65 FE) TAB at 16:31

## 2023-03-07 RX ADMIN — ACETAMINOPHEN 650 MG: 325 TABLET ORAL at 14:55

## 2023-03-07 RX ADMIN — METOPROLOL TARTRATE 25 MG: 25 TABLET, FILM COATED ORAL at 08:49

## 2023-03-07 RX ADMIN — FINASTERIDE 5 MG: 5 TABLET, FILM COATED ORAL at 08:49

## 2023-03-07 RX ADMIN — POLYETHYLENE GLYCOL 3350 17 G: 17 POWDER, FOR SOLUTION ORAL at 08:49

## 2023-03-07 RX ADMIN — TAMSULOSIN HYDROCHLORIDE 0.4 MG: 0.4 CAPSULE ORAL at 21:09

## 2023-03-07 RX ADMIN — SERTRALINE HYDROCHLORIDE 50 MG: 50 TABLET ORAL at 08:49

## 2023-03-07 RX ADMIN — QUETIAPINE FUMARATE 50 MG: 25 TABLET ORAL at 08:49

## 2023-03-07 RX ADMIN — POLYETHYLENE GLYCOL 3350 17 G: 17 POWDER, FOR SOLUTION ORAL at 21:09

## 2023-03-07 RX ADMIN — METOPROLOL TARTRATE 25 MG: 25 TABLET, FILM COATED ORAL at 21:09

## 2023-03-07 RX ADMIN — SENNOSIDES AND DOCUSATE SODIUM 2 TABLET: 50; 8.6 TABLET ORAL at 21:09

## 2023-03-07 RX ADMIN — COLLAGENASE SANTYL: 250 OINTMENT TOPICAL at 12:10

## 2023-03-07 RX ADMIN — ENOXAPARIN SODIUM 30 MG: 100 INJECTION SUBCUTANEOUS at 08:50

## 2023-03-07 RX ADMIN — SENNOSIDES AND DOCUSATE SODIUM 2 TABLET: 50; 8.6 TABLET ORAL at 08:49

## 2023-03-07 RX ADMIN — SODIUM CHLORIDE, PRESERVATIVE FREE 10 ML: 5 INJECTION INTRAVENOUS at 08:50

## 2023-03-07 RX ADMIN — SODIUM CHLORIDE, PRESERVATIVE FREE 10 ML: 5 INJECTION INTRAVENOUS at 21:11

## 2023-03-07 RX ADMIN — MORPHINE SULFATE 1 MG: 2 INJECTION, SOLUTION INTRAMUSCULAR; INTRAVENOUS at 20:40

## 2023-03-07 RX ADMIN — ATORVASTATIN CALCIUM 40 MG: 40 TABLET, FILM COATED ORAL at 21:11

## 2023-03-07 RX ADMIN — LEVOTHYROXINE SODIUM 50 MCG: 50 TABLET ORAL at 05:22

## 2023-03-07 RX ADMIN — MORPHINE SULFATE 1 MG: 2 INJECTION, SOLUTION INTRAMUSCULAR; INTRAVENOUS at 04:44

## 2023-03-07 ASSESSMENT — PAIN DESCRIPTION - LOCATION
LOCATION: HIP
LOCATION: HIP

## 2023-03-07 ASSESSMENT — PAIN DESCRIPTION - ORIENTATION
ORIENTATION: RIGHT
ORIENTATION: RIGHT

## 2023-03-07 ASSESSMENT — PAIN SCALES - GENERAL
PAINLEVEL_OUTOF10: 4
PAINLEVEL_OUTOF10: 8
PAINLEVEL_OUTOF10: 9

## 2023-03-07 ASSESSMENT — PAIN DESCRIPTION - DESCRIPTORS: DESCRIPTORS: DULL

## 2023-03-07 ASSESSMENT — PAIN - FUNCTIONAL ASSESSMENT: PAIN_FUNCTIONAL_ASSESSMENT: PREVENTS OR INTERFERES SOME ACTIVE ACTIVITIES AND ADLS

## 2023-03-07 ASSESSMENT — PAIN SCALES - WONG BAKER: WONGBAKER_NUMERICALRESPONSE: 4

## 2023-03-08 ENCOUNTER — TELEPHONE (OUTPATIENT)
Dept: ORTHOPEDIC SURGERY | Age: 88
End: 2023-03-08

## 2023-03-08 LAB
25(OH)D3 SERPL-MCNC: 24.99 NG/ML
ALBUMIN SERPL-MCNC: 3.6 GM/DL (ref 3.4–5)
ALP BLD-CCNC: 100 IU/L (ref 40–129)
ALT SERPL-CCNC: 12 U/L (ref 10–40)
ANION GAP SERPL CALCULATED.3IONS-SCNC: 12 MMOL/L (ref 4–16)
AST SERPL-CCNC: 21 IU/L (ref 15–37)
BASOPHILS ABSOLUTE: 0.1 K/CU MM
BASOPHILS RELATIVE PERCENT: 0.9 % (ref 0–1)
BILIRUB SERPL-MCNC: 0.3 MG/DL (ref 0–1)
BUN SERPL-MCNC: 39 MG/DL (ref 6–23)
CALCIUM SERPL-MCNC: 9.1 MG/DL (ref 8.3–10.6)
CHLORIDE BLD-SCNC: 106 MMOL/L (ref 99–110)
CO2: 22 MMOL/L (ref 21–32)
CREAT SERPL-MCNC: 2.8 MG/DL (ref 0.9–1.3)
DIFFERENTIAL TYPE: ABNORMAL
EOSINOPHILS ABSOLUTE: 0.5 K/CU MM
EOSINOPHILS RELATIVE PERCENT: 5.5 % (ref 0–3)
GFR SERPL CREATININE-BSD FRML MDRD: 21 ML/MIN/1.73M2
GLUCOSE SERPL-MCNC: 146 MG/DL (ref 70–99)
HCT VFR BLD CALC: 30 % (ref 42–52)
HEMOGLOBIN: 9.4 GM/DL (ref 13.5–18)
IMMATURE NEUTROPHIL %: 0.8 % (ref 0–0.43)
LYMPHOCYTES ABSOLUTE: 1.2 K/CU MM
LYMPHOCYTES RELATIVE PERCENT: 13.5 % (ref 24–44)
MCH RBC QN AUTO: 30.3 PG (ref 27–31)
MCHC RBC AUTO-ENTMCNC: 31.3 % (ref 32–36)
MCV RBC AUTO: 96.8 FL (ref 78–100)
MONOCYTES ABSOLUTE: 1 K/CU MM
MONOCYTES RELATIVE PERCENT: 11.7 % (ref 0–4)
NUCLEATED RBC %: 0 %
PDW BLD-RTO: 16 % (ref 11.7–14.9)
PLATELET # BLD: 219 K/CU MM (ref 140–440)
PMV BLD AUTO: 9.7 FL (ref 7.5–11.1)
POTASSIUM SERPL-SCNC: 4.7 MMOL/L (ref 3.5–5.1)
RBC # BLD: 3.1 M/CU MM (ref 4.6–6.2)
SEGMENTED NEUTROPHILS ABSOLUTE COUNT: 6 K/CU MM
SEGMENTED NEUTROPHILS RELATIVE PERCENT: 67.6 % (ref 36–66)
SODIUM BLD-SCNC: 140 MMOL/L (ref 135–145)
TOTAL IMMATURE NEUTOROPHIL: 0.07 K/CU MM
TOTAL NUCLEATED RBC: 0 K/CU MM
TOTAL PROTEIN: 6.1 GM/DL (ref 6.4–8.2)
WBC # BLD: 8.9 K/CU MM (ref 4–10.5)

## 2023-03-08 PROCEDURE — 80053 COMPREHEN METABOLIC PANEL: CPT

## 2023-03-08 PROCEDURE — 6370000000 HC RX 637 (ALT 250 FOR IP): Performed by: INTERNAL MEDICINE

## 2023-03-08 PROCEDURE — 82306 VITAMIN D 25 HYDROXY: CPT

## 2023-03-08 PROCEDURE — 1200000000 HC SEMI PRIVATE

## 2023-03-08 PROCEDURE — 94761 N-INVAS EAR/PLS OXIMETRY MLT: CPT

## 2023-03-08 PROCEDURE — 85025 COMPLETE CBC W/AUTO DIFF WBC: CPT

## 2023-03-08 PROCEDURE — 2580000003 HC RX 258: Performed by: INTERNAL MEDICINE

## 2023-03-08 PROCEDURE — 6370000000 HC RX 637 (ALT 250 FOR IP): Performed by: STUDENT IN AN ORGANIZED HEALTH CARE EDUCATION/TRAINING PROGRAM

## 2023-03-08 PROCEDURE — 36415 COLL VENOUS BLD VENIPUNCTURE: CPT

## 2023-03-08 PROCEDURE — 6360000002 HC RX W HCPCS: Performed by: INTERNAL MEDICINE

## 2023-03-08 PROCEDURE — 6370000000 HC RX 637 (ALT 250 FOR IP): Performed by: EMERGENCY MEDICINE

## 2023-03-08 RX ORDER — FAMOTIDINE 20 MG/1
10 TABLET, FILM COATED ORAL DAILY
Status: DISCONTINUED | OUTPATIENT
Start: 2023-03-08 | End: 2023-03-19

## 2023-03-08 RX ADMIN — METOPROLOL TARTRATE 25 MG: 25 TABLET, FILM COATED ORAL at 09:05

## 2023-03-08 RX ADMIN — FINASTERIDE 5 MG: 5 TABLET, FILM COATED ORAL at 09:04

## 2023-03-08 RX ADMIN — SODIUM CHLORIDE, PRESERVATIVE FREE 10 ML: 5 INJECTION INTRAVENOUS at 22:16

## 2023-03-08 RX ADMIN — SODIUM CHLORIDE, PRESERVATIVE FREE 5 ML: 5 INJECTION INTRAVENOUS at 09:38

## 2023-03-08 RX ADMIN — SENNOSIDES AND DOCUSATE SODIUM 2 TABLET: 50; 8.6 TABLET ORAL at 09:03

## 2023-03-08 RX ADMIN — POLYETHYLENE GLYCOL 3350 17 G: 17 POWDER, FOR SOLUTION ORAL at 22:11

## 2023-03-08 RX ADMIN — TAMSULOSIN HYDROCHLORIDE 0.4 MG: 0.4 CAPSULE ORAL at 22:11

## 2023-03-08 RX ADMIN — METOPROLOL TARTRATE 25 MG: 25 TABLET, FILM COATED ORAL at 22:11

## 2023-03-08 RX ADMIN — POLYETHYLENE GLYCOL 3350 17 G: 17 POWDER, FOR SOLUTION ORAL at 09:05

## 2023-03-08 RX ADMIN — ENOXAPARIN SODIUM 30 MG: 100 INJECTION SUBCUTANEOUS at 09:05

## 2023-03-08 RX ADMIN — ATORVASTATIN CALCIUM 40 MG: 40 TABLET, FILM COATED ORAL at 22:11

## 2023-03-08 RX ADMIN — ACETAMINOPHEN 650 MG: 325 TABLET ORAL at 04:02

## 2023-03-08 RX ADMIN — ASPIRIN 81 MG CHEWABLE TABLET 81 MG: 81 TABLET CHEWABLE at 09:03

## 2023-03-08 RX ADMIN — SENNOSIDES AND DOCUSATE SODIUM 2 TABLET: 50; 8.6 TABLET ORAL at 22:11

## 2023-03-08 RX ADMIN — COLLAGENASE SANTYL: 250 OINTMENT TOPICAL at 09:39

## 2023-03-08 RX ADMIN — FAMOTIDINE 10 MG: 20 TABLET ORAL at 16:20

## 2023-03-08 RX ADMIN — ACETAMINOPHEN 650 MG: 325 TABLET ORAL at 16:20

## 2023-03-08 RX ADMIN — SERTRALINE HYDROCHLORIDE 50 MG: 50 TABLET ORAL at 09:05

## 2023-03-08 RX ADMIN — QUETIAPINE FUMARATE 50 MG: 25 TABLET ORAL at 09:04

## 2023-03-08 RX ADMIN — QUETIAPINE FUMARATE 50 MG: 25 TABLET ORAL at 16:21

## 2023-03-08 RX ADMIN — LEVOTHYROXINE SODIUM 50 MCG: 50 TABLET ORAL at 05:33

## 2023-03-08 RX ADMIN — FERROUS SULFATE TAB 325 MG (65 MG ELEMENTAL FE) 325 MG: 325 (65 FE) TAB at 16:20

## 2023-03-08 ASSESSMENT — PAIN DESCRIPTION - ORIENTATION: ORIENTATION: RIGHT

## 2023-03-08 ASSESSMENT — PAIN DESCRIPTION - DESCRIPTORS: DESCRIPTORS: DULL

## 2023-03-08 ASSESSMENT — ENCOUNTER SYMPTOMS
SHORTNESS OF BREATH: 0
COUGH: 0
VOICE CHANGE: 0
VOMITING: 0
BACK PAIN: 0
SORE THROAT: 0
WHEEZING: 0
COLOR CHANGE: 0
NAUSEA: 0

## 2023-03-08 ASSESSMENT — PAIN SCALES - WONG BAKER
WONGBAKER_NUMERICALRESPONSE: 2
WONGBAKER_NUMERICALRESPONSE: 2

## 2023-03-08 ASSESSMENT — PAIN SCALES - GENERAL: PAINLEVEL_OUTOF10: 6

## 2023-03-08 ASSESSMENT — PAIN DESCRIPTION - LOCATION: LOCATION: HIP

## 2023-03-08 NOTE — TELEPHONE ENCOUNTER
I talked with his daughter about scheduling a 2 week follow up. She said that she would call when he is discharged to schedule. He will be transferring to [facility] upon discharge.

## 2023-03-09 LAB
ALBUMIN SERPL-MCNC: 3.6 GM/DL (ref 3.4–5)
ALP BLD-CCNC: 109 IU/L (ref 40–128)
ALT SERPL-CCNC: 11 U/L (ref 10–40)
ANION GAP SERPL CALCULATED.3IONS-SCNC: 14 MMOL/L (ref 4–16)
AST SERPL-CCNC: 22 IU/L (ref 15–37)
BASOPHILS ABSOLUTE: 0.1 K/CU MM
BASOPHILS RELATIVE PERCENT: 1.2 % (ref 0–1)
BILIRUB SERPL-MCNC: 0.2 MG/DL (ref 0–1)
BUN SERPL-MCNC: 45 MG/DL (ref 6–23)
CALCIUM SERPL-MCNC: 8.8 MG/DL (ref 8.3–10.6)
CHLORIDE BLD-SCNC: 108 MMOL/L (ref 99–110)
CO2: 20 MMOL/L (ref 21–32)
CREAT SERPL-MCNC: 3.1 MG/DL (ref 0.9–1.3)
DIFFERENTIAL TYPE: ABNORMAL
EOSINOPHILS ABSOLUTE: 0.6 K/CU MM
EOSINOPHILS RELATIVE PERCENT: 7.7 % (ref 0–3)
GFR SERPL CREATININE-BSD FRML MDRD: 18 ML/MIN/1.73M2
GLUCOSE BLD-MCNC: 123 MG/DL (ref 70–99)
GLUCOSE SERPL-MCNC: 126 MG/DL (ref 70–99)
HCT VFR BLD CALC: 35.3 % (ref 42–52)
HEMOGLOBIN: 9.8 GM/DL (ref 13.5–18)
IMMATURE NEUTROPHIL %: 0.5 % (ref 0–0.43)
LYMPHOCYTES ABSOLUTE: 1.6 K/CU MM
LYMPHOCYTES RELATIVE PERCENT: 22.4 % (ref 24–44)
MCH RBC QN AUTO: 30.5 PG (ref 27–31)
MCHC RBC AUTO-ENTMCNC: 27.8 % (ref 32–36)
MCV RBC AUTO: 110 FL (ref 78–100)
MONOCYTES ABSOLUTE: 0.8 K/CU MM
MONOCYTES RELATIVE PERCENT: 11.4 % (ref 0–4)
NUCLEATED RBC %: 0 %
PDW BLD-RTO: 15.9 % (ref 11.7–14.9)
PLATELET # BLD: 198 K/CU MM (ref 140–440)
PMV BLD AUTO: 9.3 FL (ref 7.5–11.1)
POTASSIUM SERPL-SCNC: 4.8 MMOL/L (ref 3.5–5.1)
RBC # BLD: 3.21 M/CU MM (ref 4.6–6.2)
SEGMENTED NEUTROPHILS ABSOLUTE COUNT: 4.1 K/CU MM
SEGMENTED NEUTROPHILS RELATIVE PERCENT: 56.8 % (ref 36–66)
SODIUM BLD-SCNC: 142 MMOL/L (ref 135–145)
TOTAL IMMATURE NEUTOROPHIL: 0.04 K/CU MM
TOTAL NUCLEATED RBC: 0 K/CU MM
TOTAL PROTEIN: 6.1 GM/DL (ref 6.4–8.2)
WBC # BLD: 7.3 K/CU MM (ref 4–10.5)

## 2023-03-09 PROCEDURE — 6360000002 HC RX W HCPCS: Performed by: STUDENT IN AN ORGANIZED HEALTH CARE EDUCATION/TRAINING PROGRAM

## 2023-03-09 PROCEDURE — 2580000003 HC RX 258: Performed by: INTERNAL MEDICINE

## 2023-03-09 PROCEDURE — 97530 THERAPEUTIC ACTIVITIES: CPT

## 2023-03-09 PROCEDURE — 80053 COMPREHEN METABOLIC PANEL: CPT

## 2023-03-09 PROCEDURE — 94761 N-INVAS EAR/PLS OXIMETRY MLT: CPT

## 2023-03-09 PROCEDURE — 6360000002 HC RX W HCPCS: Performed by: INTERNAL MEDICINE

## 2023-03-09 PROCEDURE — 2580000003 HC RX 258: Performed by: STUDENT IN AN ORGANIZED HEALTH CARE EDUCATION/TRAINING PROGRAM

## 2023-03-09 PROCEDURE — 1200000000 HC SEMI PRIVATE

## 2023-03-09 PROCEDURE — 36415 COLL VENOUS BLD VENIPUNCTURE: CPT

## 2023-03-09 PROCEDURE — 6370000000 HC RX 637 (ALT 250 FOR IP): Performed by: STUDENT IN AN ORGANIZED HEALTH CARE EDUCATION/TRAINING PROGRAM

## 2023-03-09 PROCEDURE — 6370000000 HC RX 637 (ALT 250 FOR IP): Performed by: INTERNAL MEDICINE

## 2023-03-09 PROCEDURE — 97162 PT EVAL MOD COMPLEX 30 MIN: CPT

## 2023-03-09 PROCEDURE — 97167 OT EVAL HIGH COMPLEX 60 MIN: CPT

## 2023-03-09 PROCEDURE — 6370000000 HC RX 637 (ALT 250 FOR IP): Performed by: FAMILY MEDICINE

## 2023-03-09 PROCEDURE — 82962 GLUCOSE BLOOD TEST: CPT

## 2023-03-09 PROCEDURE — 85025 COMPLETE CBC W/AUTO DIFF WBC: CPT

## 2023-03-09 PROCEDURE — 6370000000 HC RX 637 (ALT 250 FOR IP): Performed by: EMERGENCY MEDICINE

## 2023-03-09 RX ORDER — SODIUM CHLORIDE, SODIUM LACTATE, POTASSIUM CHLORIDE, CALCIUM CHLORIDE 600; 310; 30; 20 MG/100ML; MG/100ML; MG/100ML; MG/100ML
INJECTION, SOLUTION INTRAVENOUS ONCE
Status: COMPLETED | OUTPATIENT
Start: 2023-03-09 | End: 2023-03-09

## 2023-03-09 RX ORDER — HYDROCODONE BITARTRATE AND ACETAMINOPHEN 5; 325 MG/1; MG/1
1 TABLET ORAL EVERY 6 HOURS PRN
Status: DISCONTINUED | OUTPATIENT
Start: 2023-03-09 | End: 2023-03-28 | Stop reason: HOSPADM

## 2023-03-09 RX ADMIN — ENOXAPARIN SODIUM 30 MG: 100 INJECTION SUBCUTANEOUS at 09:59

## 2023-03-09 RX ADMIN — ATORVASTATIN CALCIUM 40 MG: 40 TABLET, FILM COATED ORAL at 20:30

## 2023-03-09 RX ADMIN — SENNOSIDES AND DOCUSATE SODIUM 2 TABLET: 50; 8.6 TABLET ORAL at 20:30

## 2023-03-09 RX ADMIN — METOPROLOL TARTRATE 25 MG: 25 TABLET, FILM COATED ORAL at 10:00

## 2023-03-09 RX ADMIN — DENOSUMAB 60 MG: 60 INJECTION SUBCUTANEOUS at 10:53

## 2023-03-09 RX ADMIN — FAMOTIDINE 10 MG: 20 TABLET ORAL at 09:59

## 2023-03-09 RX ADMIN — POLYETHYLENE GLYCOL 3350 17 G: 17 POWDER, FOR SOLUTION ORAL at 09:58

## 2023-03-09 RX ADMIN — SODIUM CHLORIDE, PRESERVATIVE FREE 5 ML: 5 INJECTION INTRAVENOUS at 10:29

## 2023-03-09 RX ADMIN — HYDROCODONE BITARTRATE AND ACETAMINOPHEN 1 TABLET: 5; 325 TABLET ORAL at 22:05

## 2023-03-09 RX ADMIN — POLYETHYLENE GLYCOL 3350 17 G: 17 POWDER, FOR SOLUTION ORAL at 20:30

## 2023-03-09 RX ADMIN — QUETIAPINE FUMARATE 50 MG: 25 TABLET ORAL at 16:37

## 2023-03-09 RX ADMIN — MORPHINE SULFATE 1 MG: 2 INJECTION, SOLUTION INTRAMUSCULAR; INTRAVENOUS at 11:22

## 2023-03-09 RX ADMIN — SENNOSIDES AND DOCUSATE SODIUM 2 TABLET: 50; 8.6 TABLET ORAL at 09:59

## 2023-03-09 RX ADMIN — METOPROLOL TARTRATE 25 MG: 25 TABLET, FILM COATED ORAL at 20:32

## 2023-03-09 RX ADMIN — LEVOTHYROXINE SODIUM 50 MCG: 50 TABLET ORAL at 06:11

## 2023-03-09 RX ADMIN — FERROUS SULFATE TAB 325 MG (65 MG ELEMENTAL FE) 325 MG: 325 (65 FE) TAB at 16:52

## 2023-03-09 RX ADMIN — SODIUM CHLORIDE, POTASSIUM CHLORIDE, SODIUM LACTATE AND CALCIUM CHLORIDE: 600; 310; 30; 20 INJECTION, SOLUTION INTRAVENOUS at 10:19

## 2023-03-09 RX ADMIN — MORPHINE SULFATE 1 MG: 2 INJECTION, SOLUTION INTRAMUSCULAR; INTRAVENOUS at 20:30

## 2023-03-09 RX ADMIN — QUETIAPINE FUMARATE 50 MG: 25 TABLET ORAL at 09:59

## 2023-03-09 RX ADMIN — FINASTERIDE 5 MG: 5 TABLET, FILM COATED ORAL at 10:29

## 2023-03-09 RX ADMIN — SERTRALINE HYDROCHLORIDE 50 MG: 50 TABLET ORAL at 10:00

## 2023-03-09 RX ADMIN — TAMSULOSIN HYDROCHLORIDE 0.4 MG: 0.4 CAPSULE ORAL at 20:30

## 2023-03-09 RX ADMIN — ASPIRIN 81 MG CHEWABLE TABLET 81 MG: 81 TABLET CHEWABLE at 09:59

## 2023-03-09 RX ADMIN — MORPHINE SULFATE 1 MG: 2 INJECTION, SOLUTION INTRAMUSCULAR; INTRAVENOUS at 03:04

## 2023-03-09 RX ADMIN — COLLAGENASE SANTYL: 250 OINTMENT TOPICAL at 10:28

## 2023-03-09 ASSESSMENT — PAIN DESCRIPTION - FREQUENCY
FREQUENCY: CONTINUOUS

## 2023-03-09 ASSESSMENT — PAIN SCALES - GENERAL
PAINLEVEL_OUTOF10: 10
PAINLEVEL_OUTOF10: 10
PAINLEVEL_OUTOF10: 8
PAINLEVEL_OUTOF10: 8
PAINLEVEL_OUTOF10: 2
PAINLEVEL_OUTOF10: 2
PAINLEVEL_OUTOF10: 10

## 2023-03-09 ASSESSMENT — PAIN SCALES - WONG BAKER
WONGBAKER_NUMERICALRESPONSE: 6
WONGBAKER_NUMERICALRESPONSE: 10
WONGBAKER_NUMERICALRESPONSE: 0
WONGBAKER_NUMERICALRESPONSE: 10
WONGBAKER_NUMERICALRESPONSE: 4
WONGBAKER_NUMERICALRESPONSE: 2
WONGBAKER_NUMERICALRESPONSE: 10
WONGBAKER_NUMERICALRESPONSE: 0

## 2023-03-09 ASSESSMENT — PAIN - FUNCTIONAL ASSESSMENT
PAIN_FUNCTIONAL_ASSESSMENT: PREVENTS OR INTERFERES SOME ACTIVE ACTIVITIES AND ADLS

## 2023-03-09 ASSESSMENT — PAIN DESCRIPTION - LOCATION
LOCATION: HIP

## 2023-03-09 ASSESSMENT — PAIN DESCRIPTION - ONSET
ONSET: ON-GOING

## 2023-03-09 ASSESSMENT — PAIN DESCRIPTION - DESCRIPTORS
DESCRIPTORS: HEAVINESS
DESCRIPTORS: DULL
DESCRIPTORS: HEAVINESS

## 2023-03-09 ASSESSMENT — PAIN DESCRIPTION - DIRECTION
RADIATING_TOWARDS: SHOULDERS
RADIATING_TOWARDS: SHOULDERS

## 2023-03-09 ASSESSMENT — PAIN DESCRIPTION - ORIENTATION
ORIENTATION: RIGHT

## 2023-03-09 ASSESSMENT — PAIN DESCRIPTION - PAIN TYPE
TYPE: CHRONIC PAIN

## 2023-03-10 LAB
ALBUMIN SERPL-MCNC: 3.6 GM/DL (ref 3.4–5)
ALP BLD-CCNC: 97 IU/L (ref 40–129)
ALT SERPL-CCNC: 14 U/L (ref 10–40)
ANION GAP SERPL CALCULATED.3IONS-SCNC: 15 MMOL/L (ref 4–16)
AST SERPL-CCNC: 32 IU/L (ref 15–37)
BILIRUB SERPL-MCNC: 0.3 MG/DL (ref 0–1)
BUN SERPL-MCNC: 44 MG/DL (ref 6–23)
CALCIUM SERPL-MCNC: 9 MG/DL (ref 8.3–10.6)
CHLORIDE BLD-SCNC: 108 MMOL/L (ref 99–110)
CO2: 20 MMOL/L (ref 21–32)
CREAT SERPL-MCNC: 2.9 MG/DL (ref 0.9–1.3)
GFR SERPL CREATININE-BSD FRML MDRD: 20 ML/MIN/1.73M2
GLUCOSE SERPL-MCNC: 113 MG/DL (ref 70–99)
POTASSIUM SERPL-SCNC: 5 MMOL/L (ref 3.5–5.1)
SODIUM BLD-SCNC: 143 MMOL/L (ref 135–145)
TOTAL PROTEIN: 6.2 GM/DL (ref 6.4–8.2)

## 2023-03-10 PROCEDURE — 2580000003 HC RX 258: Performed by: INTERNAL MEDICINE

## 2023-03-10 PROCEDURE — 1200000000 HC SEMI PRIVATE

## 2023-03-10 PROCEDURE — 6370000000 HC RX 637 (ALT 250 FOR IP): Performed by: STUDENT IN AN ORGANIZED HEALTH CARE EDUCATION/TRAINING PROGRAM

## 2023-03-10 PROCEDURE — 51798 US URINE CAPACITY MEASURE: CPT

## 2023-03-10 PROCEDURE — 6370000000 HC RX 637 (ALT 250 FOR IP): Performed by: FAMILY MEDICINE

## 2023-03-10 PROCEDURE — 6360000002 HC RX W HCPCS: Performed by: INTERNAL MEDICINE

## 2023-03-10 PROCEDURE — 94761 N-INVAS EAR/PLS OXIMETRY MLT: CPT

## 2023-03-10 PROCEDURE — 92610 EVALUATE SWALLOWING FUNCTION: CPT

## 2023-03-10 PROCEDURE — 51701 INSERT BLADDER CATHETER: CPT

## 2023-03-10 PROCEDURE — 6370000000 HC RX 637 (ALT 250 FOR IP): Performed by: EMERGENCY MEDICINE

## 2023-03-10 PROCEDURE — 80053 COMPREHEN METABOLIC PANEL: CPT

## 2023-03-10 PROCEDURE — 36415 COLL VENOUS BLD VENIPUNCTURE: CPT

## 2023-03-10 PROCEDURE — 6370000000 HC RX 637 (ALT 250 FOR IP): Performed by: INTERNAL MEDICINE

## 2023-03-10 PROCEDURE — 6360000002 HC RX W HCPCS: Performed by: STUDENT IN AN ORGANIZED HEALTH CARE EDUCATION/TRAINING PROGRAM

## 2023-03-10 PROCEDURE — 93005 ELECTROCARDIOGRAM TRACING: CPT | Performed by: STUDENT IN AN ORGANIZED HEALTH CARE EDUCATION/TRAINING PROGRAM

## 2023-03-10 PROCEDURE — 2580000003 HC RX 258: Performed by: STUDENT IN AN ORGANIZED HEALTH CARE EDUCATION/TRAINING PROGRAM

## 2023-03-10 RX ORDER — RISPERIDONE 0.5 MG/1
0.5 TABLET ORAL NIGHTLY
Status: DISCONTINUED | OUTPATIENT
Start: 2023-03-10 | End: 2023-03-13

## 2023-03-10 RX ORDER — AMLODIPINE BESYLATE 5 MG/1
5 TABLET ORAL DAILY
Status: DISCONTINUED | OUTPATIENT
Start: 2023-03-10 | End: 2023-03-28 | Stop reason: HOSPADM

## 2023-03-10 RX ORDER — SODIUM CHLORIDE, SODIUM LACTATE, POTASSIUM CHLORIDE, CALCIUM CHLORIDE 600; 310; 30; 20 MG/100ML; MG/100ML; MG/100ML; MG/100ML
INJECTION, SOLUTION INTRAVENOUS CONTINUOUS
Status: DISCONTINUED | OUTPATIENT
Start: 2023-03-10 | End: 2023-03-13

## 2023-03-10 RX ADMIN — COLLAGENASE SANTYL: 250 OINTMENT TOPICAL at 09:52

## 2023-03-10 RX ADMIN — SODIUM CHLORIDE, POTASSIUM CHLORIDE, SODIUM LACTATE AND CALCIUM CHLORIDE: 600; 310; 30; 20 INJECTION, SOLUTION INTRAVENOUS at 13:01

## 2023-03-10 RX ADMIN — HYDROCODONE BITARTRATE AND ACETAMINOPHEN 1 TABLET: 5; 325 TABLET ORAL at 22:07

## 2023-03-10 RX ADMIN — SODIUM CHLORIDE, PRESERVATIVE FREE 10 ML: 5 INJECTION INTRAVENOUS at 09:53

## 2023-03-10 RX ADMIN — HYDROCODONE BITARTRATE AND ACETAMINOPHEN 1 TABLET: 5; 325 TABLET ORAL at 06:54

## 2023-03-10 RX ADMIN — METOPROLOL TARTRATE 25 MG: 25 TABLET, FILM COATED ORAL at 22:09

## 2023-03-10 RX ADMIN — SENNOSIDES AND DOCUSATE SODIUM 2 TABLET: 50; 8.6 TABLET ORAL at 22:07

## 2023-03-10 RX ADMIN — ATORVASTATIN CALCIUM 40 MG: 40 TABLET, FILM COATED ORAL at 22:07

## 2023-03-10 RX ADMIN — HYDROMORPHONE HYDROCHLORIDE 0.5 MG: 1 INJECTION, SOLUTION INTRAMUSCULAR; INTRAVENOUS; SUBCUTANEOUS at 17:53

## 2023-03-10 RX ADMIN — RISPERIDONE 0.5 MG: 0.5 TABLET ORAL at 22:09

## 2023-03-10 RX ADMIN — LEVOTHYROXINE SODIUM 50 MCG: 50 TABLET ORAL at 06:54

## 2023-03-10 RX ADMIN — MORPHINE SULFATE 1 MG: 2 INJECTION, SOLUTION INTRAMUSCULAR; INTRAVENOUS at 11:29

## 2023-03-10 RX ADMIN — ENOXAPARIN SODIUM 30 MG: 100 INJECTION SUBCUTANEOUS at 09:52

## 2023-03-10 ASSESSMENT — PAIN DESCRIPTION - DESCRIPTORS
DESCRIPTORS: OTHER (COMMENT)
DESCRIPTORS: ACHING
DESCRIPTORS: HEAVINESS
DESCRIPTORS: ACHING

## 2023-03-10 ASSESSMENT — PAIN SCALES - WONG BAKER
WONGBAKER_NUMERICALRESPONSE: 0
WONGBAKER_NUMERICALRESPONSE: 2
WONGBAKER_NUMERICALRESPONSE: 8
WONGBAKER_NUMERICALRESPONSE: 8
WONGBAKER_NUMERICALRESPONSE: 2
WONGBAKER_NUMERICALRESPONSE: 8
WONGBAKER_NUMERICALRESPONSE: 8

## 2023-03-10 ASSESSMENT — PAIN DESCRIPTION - ORIENTATION
ORIENTATION: OTHER (COMMENT)
ORIENTATION: RIGHT

## 2023-03-10 ASSESSMENT — PAIN DESCRIPTION - LOCATION
LOCATION: OTHER (COMMENT)
LOCATION: HIP

## 2023-03-10 ASSESSMENT — PAIN DESCRIPTION - ONSET: ONSET: ON-GOING

## 2023-03-10 ASSESSMENT — PAIN DESCRIPTION - PAIN TYPE: TYPE: CHRONIC PAIN

## 2023-03-10 ASSESSMENT — PAIN - FUNCTIONAL ASSESSMENT
PAIN_FUNCTIONAL_ASSESSMENT: ACTIVITIES ARE NOT PREVENTED
PAIN_FUNCTIONAL_ASSESSMENT: PREVENTS OR INTERFERES SOME ACTIVE ACTIVITIES AND ADLS

## 2023-03-10 ASSESSMENT — PAIN DESCRIPTION - FREQUENCY: FREQUENCY: CONTINUOUS

## 2023-03-10 ASSESSMENT — PAIN SCALES - GENERAL
PAINLEVEL_OUTOF10: 0
PAINLEVEL_OUTOF10: 10
PAINLEVEL_OUTOF10: 2

## 2023-03-10 NOTE — PLAN OF CARE
Problem: Discharge Planning  Goal: Discharge to home or other facility with appropriate resources  3/10/2023 0119 by Elissa Santana RN  Outcome: Progressing  3/10/2023 0119 by Elissa Santana RN  Outcome: Progressing     Problem: Pain  Goal: Verbalizes/displays adequate comfort level or baseline comfort level  3/10/2023 0119 by Elissa Santana RN  Outcome: Progressing  3/10/2023 0119 by Elissa Santana RN  Outcome: Progressing     Problem: ABCDS Injury Assessment  Goal: Absence of physical injury  3/10/2023 0119 by Elissa Santana RN  Outcome: Progressing  3/10/2023 0119 by Elissa Santana RN  Outcome: Progressing     Problem: Skin/Tissue Integrity  Goal: Absence of new skin breakdown  Description: 1. Monitor for areas of redness and/or skin breakdown  2. Assess vascular access sites hourly  3. Every 4-6 hours minimum:  Change oxygen saturation probe site  4. Every 4-6 hours:  If on nasal continuous positive airway pressure, respiratory therapy assess nares and determine need for appliance change or resting period.   3/10/2023 0119 by Elissa Santana RN  Outcome: Progressing  3/10/2023 0119 by Elissa Santana RN  Outcome: Progressing     Problem: Safety - Adult  Goal: Free from fall injury  3/10/2023 0119 by Elissa Santana RN  Outcome: Progressing  3/10/2023 0119 by Elissa Santana RN  Outcome: Progressing

## 2023-03-11 ENCOUNTER — APPOINTMENT (OUTPATIENT)
Dept: GENERAL RADIOLOGY | Age: 88
DRG: 553 | End: 2023-03-11
Payer: OTHER GOVERNMENT

## 2023-03-11 LAB
AMMONIA: 22 UMOL/L (ref 16–60)
ANION GAP SERPL CALCULATED.3IONS-SCNC: 13 MMOL/L (ref 4–16)
BUN SERPL-MCNC: 48 MG/DL (ref 6–23)
CALCIUM IONIZED: 4.76 MG/DL (ref 4.48–5.28)
CALCIUM SERPL-MCNC: 8.7 MG/DL (ref 8.3–10.6)
CHLORIDE BLD-SCNC: 112 MMOL/L (ref 99–110)
CO2: 20 MMOL/L (ref 21–32)
CREAT SERPL-MCNC: 2.9 MG/DL (ref 0.9–1.3)
FOLATE SERPL-MCNC: 18.9 NG/ML (ref 3.1–17.5)
GFR SERPL CREATININE-BSD FRML MDRD: 20 ML/MIN/1.73M2
GLUCOSE SERPL-MCNC: 117 MG/DL (ref 70–99)
IONIZED CA: 1.19 MMOL/L (ref 1.12–1.32)
POTASSIUM SERPL-SCNC: 4.8 MMOL/L (ref 3.5–5.1)
REASON FOR REJECTION: NORMAL
REJECTED TEST: NORMAL
SODIUM BLD-SCNC: 145 MMOL/L (ref 135–145)
T4 FREE SERPL-MCNC: 1.03 NG/DL (ref 0.9–1.8)
TSH SERPL DL<=0.005 MIU/L-ACNC: 4.66 UIU/ML (ref 0.27–4.2)
VITAMIN B-12: 418.9 PG/ML (ref 211–911)

## 2023-03-11 PROCEDURE — 51798 US URINE CAPACITY MEASURE: CPT

## 2023-03-11 PROCEDURE — 99222 1ST HOSP IP/OBS MODERATE 55: CPT | Performed by: PSYCHIATRY & NEUROLOGY

## 2023-03-11 PROCEDURE — 83970 ASSAY OF PARATHORMONE: CPT

## 2023-03-11 PROCEDURE — 82746 ASSAY OF FOLIC ACID SERUM: CPT

## 2023-03-11 PROCEDURE — 82330 ASSAY OF CALCIUM: CPT

## 2023-03-11 PROCEDURE — 6370000000 HC RX 637 (ALT 250 FOR IP): Performed by: STUDENT IN AN ORGANIZED HEALTH CARE EDUCATION/TRAINING PROGRAM

## 2023-03-11 PROCEDURE — 84439 ASSAY OF FREE THYROXINE: CPT

## 2023-03-11 PROCEDURE — 6360000002 HC RX W HCPCS: Performed by: INTERNAL MEDICINE

## 2023-03-11 PROCEDURE — 80048 BASIC METABOLIC PNL TOTAL CA: CPT

## 2023-03-11 PROCEDURE — 6370000000 HC RX 637 (ALT 250 FOR IP): Performed by: INTERNAL MEDICINE

## 2023-03-11 PROCEDURE — 84443 ASSAY THYROID STIM HORMONE: CPT

## 2023-03-11 PROCEDURE — 6370000000 HC RX 637 (ALT 250 FOR IP): Performed by: EMERGENCY MEDICINE

## 2023-03-11 PROCEDURE — 36415 COLL VENOUS BLD VENIPUNCTURE: CPT

## 2023-03-11 PROCEDURE — 94761 N-INVAS EAR/PLS OXIMETRY MLT: CPT

## 2023-03-11 PROCEDURE — 82607 VITAMIN B-12: CPT

## 2023-03-11 PROCEDURE — 86592 SYPHILIS TEST NON-TREP QUAL: CPT

## 2023-03-11 PROCEDURE — 2580000003 HC RX 258: Performed by: INTERNAL MEDICINE

## 2023-03-11 PROCEDURE — 82140 ASSAY OF AMMONIA: CPT

## 2023-03-11 PROCEDURE — 6370000000 HC RX 637 (ALT 250 FOR IP): Performed by: FAMILY MEDICINE

## 2023-03-11 PROCEDURE — 82310 ASSAY OF CALCIUM: CPT

## 2023-03-11 PROCEDURE — 1200000000 HC SEMI PRIVATE

## 2023-03-11 PROCEDURE — 74018 RADEX ABDOMEN 1 VIEW: CPT

## 2023-03-11 RX ORDER — LEVOTHYROXINE SODIUM 0.07 MG/1
75 TABLET ORAL DAILY
Status: DISCONTINUED | OUTPATIENT
Start: 2023-03-12 | End: 2023-03-16

## 2023-03-11 RX ORDER — VITAMIN B COMPLEX
2000 TABLET ORAL DAILY
Status: DISCONTINUED | OUTPATIENT
Start: 2023-03-11 | End: 2023-03-28 | Stop reason: HOSPADM

## 2023-03-11 RX ADMIN — SENNOSIDES AND DOCUSATE SODIUM 2 TABLET: 50; 8.6 TABLET ORAL at 22:31

## 2023-03-11 RX ADMIN — SERTRALINE HYDROCHLORIDE 50 MG: 50 TABLET ORAL at 09:05

## 2023-03-11 RX ADMIN — QUETIAPINE FUMARATE 50 MG: 25 TABLET ORAL at 13:33

## 2023-03-11 RX ADMIN — POLYETHYLENE GLYCOL 3350 17 G: 17 POWDER, FOR SOLUTION ORAL at 22:31

## 2023-03-11 RX ADMIN — FINASTERIDE 5 MG: 5 TABLET, FILM COATED ORAL at 09:04

## 2023-03-11 RX ADMIN — COLLAGENASE SANTYL: 250 OINTMENT TOPICAL at 09:05

## 2023-03-11 RX ADMIN — ENOXAPARIN SODIUM 30 MG: 100 INJECTION SUBCUTANEOUS at 09:05

## 2023-03-11 RX ADMIN — TAMSULOSIN HYDROCHLORIDE 0.4 MG: 0.4 CAPSULE ORAL at 22:31

## 2023-03-11 RX ADMIN — METOPROLOL TARTRATE 25 MG: 25 TABLET, FILM COATED ORAL at 22:31

## 2023-03-11 RX ADMIN — FAMOTIDINE 10 MG: 20 TABLET ORAL at 09:04

## 2023-03-11 RX ADMIN — LEVOTHYROXINE SODIUM 50 MCG: 50 TABLET ORAL at 06:14

## 2023-03-11 RX ADMIN — METOPROLOL TARTRATE 25 MG: 25 TABLET, FILM COATED ORAL at 09:05

## 2023-03-11 RX ADMIN — POLYETHYLENE GLYCOL 3350 17 G: 17 POWDER, FOR SOLUTION ORAL at 09:05

## 2023-03-11 RX ADMIN — QUETIAPINE FUMARATE 50 MG: 25 TABLET ORAL at 09:05

## 2023-03-11 RX ADMIN — Medication 2000 UNITS: at 09:04

## 2023-03-11 RX ADMIN — ATORVASTATIN CALCIUM 40 MG: 40 TABLET, FILM COATED ORAL at 22:31

## 2023-03-11 RX ADMIN — ASPIRIN 81 MG CHEWABLE TABLET 81 MG: 81 TABLET CHEWABLE at 09:04

## 2023-03-11 RX ADMIN — SODIUM CHLORIDE, PRESERVATIVE FREE 10 ML: 5 INJECTION INTRAVENOUS at 09:06

## 2023-03-11 RX ADMIN — AMLODIPINE BESYLATE 5 MG: 5 TABLET ORAL at 09:04

## 2023-03-11 RX ADMIN — SENNOSIDES AND DOCUSATE SODIUM 2 TABLET: 50; 8.6 TABLET ORAL at 09:04

## 2023-03-11 RX ADMIN — HYDROCODONE BITARTRATE AND ACETAMINOPHEN 1 TABLET: 5; 325 TABLET ORAL at 02:45

## 2023-03-11 RX ADMIN — RISPERIDONE 0.5 MG: 0.5 TABLET ORAL at 22:31

## 2023-03-11 ASSESSMENT — PAIN DESCRIPTION - LOCATION: LOCATION: OTHER (COMMENT)

## 2023-03-11 ASSESSMENT — PAIN DESCRIPTION - ORIENTATION: ORIENTATION: OTHER (COMMENT)

## 2023-03-11 ASSESSMENT — PAIN DESCRIPTION - DESCRIPTORS: DESCRIPTORS: OTHER (COMMENT)

## 2023-03-11 ASSESSMENT — PAIN SCALES - WONG BAKER
WONGBAKER_NUMERICALRESPONSE: 0

## 2023-03-11 ASSESSMENT — PAIN SCALES - GENERAL
PAINLEVEL_OUTOF10: 0
PAINLEVEL_OUTOF10: 2
PAINLEVEL_OUTOF10: 0

## 2023-03-12 LAB — RPR SER QL: NON REACTIVE

## 2023-03-12 PROCEDURE — 6370000000 HC RX 637 (ALT 250 FOR IP): Performed by: EMERGENCY MEDICINE

## 2023-03-12 PROCEDURE — 6370000000 HC RX 637 (ALT 250 FOR IP): Performed by: FAMILY MEDICINE

## 2023-03-12 PROCEDURE — 6360000002 HC RX W HCPCS: Performed by: INTERNAL MEDICINE

## 2023-03-12 PROCEDURE — 6360000002 HC RX W HCPCS: Performed by: STUDENT IN AN ORGANIZED HEALTH CARE EDUCATION/TRAINING PROGRAM

## 2023-03-12 PROCEDURE — 6370000000 HC RX 637 (ALT 250 FOR IP): Performed by: INTERNAL MEDICINE

## 2023-03-12 PROCEDURE — 2580000003 HC RX 258: Performed by: INTERNAL MEDICINE

## 2023-03-12 PROCEDURE — 94761 N-INVAS EAR/PLS OXIMETRY MLT: CPT

## 2023-03-12 PROCEDURE — 6370000000 HC RX 637 (ALT 250 FOR IP): Performed by: STUDENT IN AN ORGANIZED HEALTH CARE EDUCATION/TRAINING PROGRAM

## 2023-03-12 PROCEDURE — 1200000000 HC SEMI PRIVATE

## 2023-03-12 PROCEDURE — 80048 BASIC METABOLIC PNL TOTAL CA: CPT

## 2023-03-12 RX ADMIN — HYDROMORPHONE HYDROCHLORIDE 0.25 MG: 1 INJECTION, SOLUTION INTRAMUSCULAR; INTRAVENOUS; SUBCUTANEOUS at 00:04

## 2023-03-12 RX ADMIN — HYDROMORPHONE HYDROCHLORIDE 0.25 MG: 1 INJECTION, SOLUTION INTRAMUSCULAR; INTRAVENOUS; SUBCUTANEOUS at 14:59

## 2023-03-12 RX ADMIN — QUETIAPINE FUMARATE 50 MG: 25 TABLET ORAL at 14:56

## 2023-03-12 RX ADMIN — SODIUM CHLORIDE, PRESERVATIVE FREE 10 ML: 5 INJECTION INTRAVENOUS at 08:41

## 2023-03-12 RX ADMIN — RISPERIDONE 0.5 MG: 0.5 TABLET ORAL at 22:06

## 2023-03-12 RX ADMIN — QUETIAPINE FUMARATE 50 MG: 25 TABLET ORAL at 08:41

## 2023-03-12 RX ADMIN — HYDROCODONE BITARTRATE AND ACETAMINOPHEN 1 TABLET: 5; 325 TABLET ORAL at 22:13

## 2023-03-12 RX ADMIN — FAMOTIDINE 10 MG: 20 TABLET ORAL at 08:40

## 2023-03-12 RX ADMIN — SENNOSIDES AND DOCUSATE SODIUM 2 TABLET: 50; 8.6 TABLET ORAL at 08:41

## 2023-03-12 RX ADMIN — ENOXAPARIN SODIUM 30 MG: 100 INJECTION SUBCUTANEOUS at 08:41

## 2023-03-12 RX ADMIN — COLLAGENASE SANTYL: 250 OINTMENT TOPICAL at 08:39

## 2023-03-12 RX ADMIN — FINASTERIDE 5 MG: 5 TABLET, FILM COATED ORAL at 08:40

## 2023-03-12 RX ADMIN — TAMSULOSIN HYDROCHLORIDE 0.4 MG: 0.4 CAPSULE ORAL at 22:06

## 2023-03-12 RX ADMIN — AMLODIPINE BESYLATE 5 MG: 5 TABLET ORAL at 08:40

## 2023-03-12 RX ADMIN — ASPIRIN 81 MG CHEWABLE TABLET 81 MG: 81 TABLET CHEWABLE at 08:40

## 2023-03-12 RX ADMIN — POLYETHYLENE GLYCOL 3350 17 G: 17 POWDER, FOR SOLUTION ORAL at 08:40

## 2023-03-12 RX ADMIN — SERTRALINE HYDROCHLORIDE 50 MG: 50 TABLET ORAL at 08:41

## 2023-03-12 RX ADMIN — FERROUS SULFATE TAB 325 MG (65 MG ELEMENTAL FE) 325 MG: 325 (65 FE) TAB at 16:15

## 2023-03-12 RX ADMIN — LEVOTHYROXINE SODIUM 75 MCG: 0.07 TABLET ORAL at 08:40

## 2023-03-12 RX ADMIN — METOPROLOL TARTRATE 25 MG: 25 TABLET, FILM COATED ORAL at 08:41

## 2023-03-12 RX ADMIN — Medication 2000 UNITS: at 08:40

## 2023-03-12 ASSESSMENT — PAIN DESCRIPTION - ORIENTATION: ORIENTATION: RIGHT

## 2023-03-12 ASSESSMENT — PAIN DESCRIPTION - LOCATION
LOCATION: HIP
LOCATION: GENERALIZED

## 2023-03-12 ASSESSMENT — PAIN - FUNCTIONAL ASSESSMENT: PAIN_FUNCTIONAL_ASSESSMENT: PREVENTS OR INTERFERES SOME ACTIVE ACTIVITIES AND ADLS

## 2023-03-12 ASSESSMENT — PAIN SCALES - WONG BAKER
WONGBAKER_NUMERICALRESPONSE: 8
WONGBAKER_NUMERICALRESPONSE: 2

## 2023-03-12 ASSESSMENT — PAIN SCALES - GENERAL: PAINLEVEL_OUTOF10: 10

## 2023-03-13 PROBLEM — F33.0 MDD (MAJOR DEPRESSIVE DISORDER), RECURRENT EPISODE, MILD (HCC): Status: ACTIVE | Noted: 2023-03-13

## 2023-03-13 PROBLEM — F41.1 GAD (GENERALIZED ANXIETY DISORDER): Status: ACTIVE | Noted: 2023-03-13

## 2023-03-13 PROBLEM — F01.C0: Status: ACTIVE | Noted: 2023-03-13

## 2023-03-13 PROBLEM — F05 DELIRIUM DUE TO MULTIPLE ETIOLOGIES: Status: ACTIVE | Noted: 2023-03-13

## 2023-03-13 PROBLEM — I69.898 OTHER SEQUELAE OF OTHER CEREBROVASCULAR DISEASE: Status: ACTIVE | Noted: 2023-03-13

## 2023-03-13 LAB
ANION GAP SERPL CALCULATED.3IONS-SCNC: 13 MMOL/L (ref 4–16)
ANION GAP SERPL CALCULATED.3IONS-SCNC: 14 MMOL/L (ref 4–16)
ANION GAP SERPL CALCULATED.3IONS-SCNC: 15 MMOL/L (ref 4–16)
BUN SERPL-MCNC: 55 MG/DL (ref 6–23)
BUN SERPL-MCNC: 56 MG/DL (ref 6–23)
BUN SERPL-MCNC: 58 MG/DL (ref 6–23)
CALCIUM SERPL-MCNC: 8.5 MG/DL (ref 8.3–10.6)
CALCIUM SERPL-MCNC: 8.6 MG/DL (ref 8.3–10.6)
CALCIUM SERPL-MCNC: 8.7 MG/DL (ref 8.3–10.6)
CHLORIDE BLD-SCNC: 116 MMOL/L (ref 99–110)
CHLORIDE BLD-SCNC: 119 MMOL/L (ref 99–110)
CHLORIDE BLD-SCNC: 122 MMOL/L (ref 99–110)
CO2: 18 MMOL/L (ref 21–32)
CO2: 18 MMOL/L (ref 21–32)
CO2: 19 MMOL/L (ref 21–32)
CREAT SERPL-MCNC: 3 MG/DL (ref 0.9–1.3)
CREAT SERPL-MCNC: 3.1 MG/DL (ref 0.9–1.3)
CREAT SERPL-MCNC: 3.2 MG/DL (ref 0.9–1.3)
EKG ATRIAL RATE: 84 BPM
EKG DIAGNOSIS: NORMAL
EKG P-R INTERVAL: 182 MS
EKG Q-T INTERVAL: 382 MS
EKG QRS DURATION: 104 MS
EKG QTC CALCULATION (BAZETT): 451 MS
EKG R AXIS: 270 DEGREES
EKG T AXIS: 93 DEGREES
EKG VENTRICULAR RATE: 84 BPM
GFR SERPL CREATININE-BSD FRML MDRD: 17 ML/MIN/1.73M2
GFR SERPL CREATININE-BSD FRML MDRD: 18 ML/MIN/1.73M2
GFR SERPL CREATININE-BSD FRML MDRD: 19 ML/MIN/1.73M2
GLUCOSE SERPL-MCNC: 113 MG/DL (ref 70–99)
GLUCOSE SERPL-MCNC: 138 MG/DL (ref 70–99)
GLUCOSE SERPL-MCNC: 165 MG/DL (ref 70–99)
POTASSIUM SERPL-SCNC: 4.2 MMOL/L (ref 3.5–5.1)
POTASSIUM SERPL-SCNC: 4.3 MMOL/L (ref 3.5–5.1)
POTASSIUM SERPL-SCNC: 4.6 MMOL/L (ref 3.5–5.1)
SODIUM BLD-SCNC: 149 MMOL/L (ref 135–145)
SODIUM BLD-SCNC: 151 MMOL/L (ref 135–145)
SODIUM BLD-SCNC: 154 MMOL/L (ref 135–145)

## 2023-03-13 PROCEDURE — 6360000002 HC RX W HCPCS: Performed by: INTERNAL MEDICINE

## 2023-03-13 PROCEDURE — 2580000003 HC RX 258: Performed by: INTERNAL MEDICINE

## 2023-03-13 PROCEDURE — 6370000000 HC RX 637 (ALT 250 FOR IP): Performed by: INTERNAL MEDICINE

## 2023-03-13 PROCEDURE — 6360000002 HC RX W HCPCS: Performed by: STUDENT IN AN ORGANIZED HEALTH CARE EDUCATION/TRAINING PROGRAM

## 2023-03-13 PROCEDURE — 1200000000 HC SEMI PRIVATE

## 2023-03-13 PROCEDURE — 6370000000 HC RX 637 (ALT 250 FOR IP): Performed by: STUDENT IN AN ORGANIZED HEALTH CARE EDUCATION/TRAINING PROGRAM

## 2023-03-13 PROCEDURE — 93010 ELECTROCARDIOGRAM REPORT: CPT | Performed by: INTERNAL MEDICINE

## 2023-03-13 PROCEDURE — 94761 N-INVAS EAR/PLS OXIMETRY MLT: CPT

## 2023-03-13 PROCEDURE — 80048 BASIC METABOLIC PNL TOTAL CA: CPT

## 2023-03-13 PROCEDURE — 6370000000 HC RX 637 (ALT 250 FOR IP): Performed by: EMERGENCY MEDICINE

## 2023-03-13 PROCEDURE — 6370000000 HC RX 637 (ALT 250 FOR IP): Performed by: FAMILY MEDICINE

## 2023-03-13 PROCEDURE — 97530 THERAPEUTIC ACTIVITIES: CPT

## 2023-03-13 PROCEDURE — 36415 COLL VENOUS BLD VENIPUNCTURE: CPT

## 2023-03-13 PROCEDURE — 99221 1ST HOSP IP/OBS SF/LOW 40: CPT | Performed by: NURSE PRACTITIONER

## 2023-03-13 RX ORDER — ESCITALOPRAM OXALATE 10 MG/1
10 TABLET ORAL DAILY
Status: DISCONTINUED | OUTPATIENT
Start: 2023-03-14 | End: 2023-03-28 | Stop reason: HOSPADM

## 2023-03-13 RX ORDER — HYDROXYZINE HYDROCHLORIDE 25 MG/1
25 TABLET, FILM COATED ORAL ONCE
Status: COMPLETED | OUTPATIENT
Start: 2023-03-13 | End: 2023-03-13

## 2023-03-13 RX ORDER — QUETIAPINE FUMARATE 25 MG/1
50 TABLET, FILM COATED ORAL NIGHTLY
Status: DISCONTINUED | OUTPATIENT
Start: 2023-03-14 | End: 2023-03-28 | Stop reason: HOSPADM

## 2023-03-13 RX ORDER — DEXTROSE MONOHYDRATE 50 MG/ML
INJECTION, SOLUTION INTRAVENOUS CONTINUOUS
Status: DISCONTINUED | OUTPATIENT
Start: 2023-03-13 | End: 2023-03-14

## 2023-03-13 RX ORDER — QUETIAPINE FUMARATE 25 MG/1
25 TABLET, FILM COATED ORAL
Status: DISCONTINUED | OUTPATIENT
Start: 2023-03-14 | End: 2023-03-28 | Stop reason: HOSPADM

## 2023-03-13 RX ORDER — SODIUM CHLORIDE 450 MG/100ML
INJECTION, SOLUTION INTRAVENOUS ONCE
Status: DISCONTINUED | OUTPATIENT
Start: 2023-03-13 | End: 2023-03-13

## 2023-03-13 RX ADMIN — COLLAGENASE SANTYL: 250 OINTMENT TOPICAL at 11:33

## 2023-03-13 RX ADMIN — DEXTROSE MONOHYDRATE: 50 INJECTION, SOLUTION INTRAVENOUS at 10:47

## 2023-03-13 RX ADMIN — ATORVASTATIN CALCIUM 40 MG: 40 TABLET, FILM COATED ORAL at 21:02

## 2023-03-13 RX ADMIN — LEVOTHYROXINE SODIUM 75 MCG: 0.07 TABLET ORAL at 10:31

## 2023-03-13 RX ADMIN — FINASTERIDE 5 MG: 5 TABLET, FILM COATED ORAL at 10:31

## 2023-03-13 RX ADMIN — TAMSULOSIN HYDROCHLORIDE 0.4 MG: 0.4 CAPSULE ORAL at 21:02

## 2023-03-13 RX ADMIN — Medication 2000 UNITS: at 10:31

## 2023-03-13 RX ADMIN — SENNOSIDES AND DOCUSATE SODIUM 2 TABLET: 50; 8.6 TABLET ORAL at 10:31

## 2023-03-13 RX ADMIN — SODIUM CHLORIDE, PRESERVATIVE FREE 10 ML: 5 INJECTION INTRAVENOUS at 00:35

## 2023-03-13 RX ADMIN — QUETIAPINE FUMARATE 50 MG: 25 TABLET ORAL at 10:53

## 2023-03-13 RX ADMIN — POLYETHYLENE GLYCOL 3350 17 G: 17 POWDER, FOR SOLUTION ORAL at 10:32

## 2023-03-13 RX ADMIN — AMLODIPINE BESYLATE 5 MG: 5 TABLET ORAL at 10:32

## 2023-03-13 RX ADMIN — METOPROLOL TARTRATE 25 MG: 25 TABLET, FILM COATED ORAL at 21:02

## 2023-03-13 RX ADMIN — DEXTROSE MONOHYDRATE: 50 INJECTION, SOLUTION INTRAVENOUS at 21:27

## 2023-03-13 RX ADMIN — FAMOTIDINE 10 MG: 20 TABLET ORAL at 10:32

## 2023-03-13 RX ADMIN — SENNOSIDES AND DOCUSATE SODIUM 2 TABLET: 50; 8.6 TABLET ORAL at 21:02

## 2023-03-13 RX ADMIN — METOPROLOL TARTRATE 25 MG: 25 TABLET, FILM COATED ORAL at 10:32

## 2023-03-13 RX ADMIN — FERROUS SULFATE TAB 325 MG (65 MG ELEMENTAL FE) 325 MG: 325 (65 FE) TAB at 16:49

## 2023-03-13 RX ADMIN — HYDROMORPHONE HYDROCHLORIDE 0.25 MG: 1 INJECTION, SOLUTION INTRAMUSCULAR; INTRAVENOUS; SUBCUTANEOUS at 00:26

## 2023-03-13 RX ADMIN — ASPIRIN 81 MG CHEWABLE TABLET 81 MG: 81 TABLET CHEWABLE at 10:32

## 2023-03-13 RX ADMIN — HYDROXYZINE HYDROCHLORIDE 25 MG: 25 TABLET, FILM COATED ORAL at 23:04

## 2023-03-13 RX ADMIN — SODIUM CHLORIDE, PRESERVATIVE FREE 10 ML: 5 INJECTION INTRAVENOUS at 00:27

## 2023-03-13 RX ADMIN — ENOXAPARIN SODIUM 30 MG: 100 INJECTION SUBCUTANEOUS at 10:33

## 2023-03-13 RX ADMIN — SERTRALINE HYDROCHLORIDE 50 MG: 50 TABLET ORAL at 10:31

## 2023-03-13 ASSESSMENT — PAIN SCALES - WONG BAKER
WONGBAKER_NUMERICALRESPONSE: 6
WONGBAKER_NUMERICALRESPONSE: 6
WONGBAKER_NUMERICALRESPONSE: 0

## 2023-03-13 ASSESSMENT — PAIN SCALES - GENERAL
PAINLEVEL_OUTOF10: 8
PAINLEVEL_OUTOF10: 6
PAINLEVEL_OUTOF10: 0
PAINLEVEL_OUTOF10: 5

## 2023-03-13 ASSESSMENT — PAIN DESCRIPTION - ORIENTATION: ORIENTATION: RIGHT

## 2023-03-13 ASSESSMENT — PAIN DESCRIPTION - LOCATION: LOCATION: HIP;LEG

## 2023-03-13 ASSESSMENT — PAIN DESCRIPTION - DESCRIPTORS: DESCRIPTORS: THROBBING;DULL;DISCOMFORT

## 2023-03-14 LAB
ALBUMIN SERPL-MCNC: 3.2 GM/DL (ref 3.4–5)
ALP BLD-CCNC: 79 IU/L (ref 40–128)
ALT SERPL-CCNC: 12 U/L (ref 10–40)
ANION GAP SERPL CALCULATED.3IONS-SCNC: 13 MMOL/L (ref 4–16)
AST SERPL-CCNC: 18 IU/L (ref 15–37)
BILIRUB SERPL-MCNC: 0.4 MG/DL (ref 0–1)
BUN SERPL-MCNC: 53 MG/DL (ref 6–23)
CALCIUM IONIZED: ABNORMAL MG/DL (ref 4.48–5.28)
CALCIUM SERPL-MCNC: 8.6 MG/DL (ref 8.3–10.6)
CALCIUM SERPL-MCNC: 9 MG/DL (ref 8.3–10.6)
CHLORIDE BLD-SCNC: 114 MMOL/L (ref 99–110)
CO2: 19 MMOL/L (ref 21–32)
CREAT SERPL-MCNC: 2.9 MG/DL (ref 0.9–1.3)
GFR SERPL CREATININE-BSD FRML MDRD: 20 ML/MIN/1.73M2
GLUCOSE SERPL-MCNC: 148 MG/DL (ref 70–99)
HCT VFR BLD CALC: 31.3 % (ref 42–52)
HEMOGLOBIN: 8.5 GM/DL (ref 13.5–18)
IONIZED CA: ABNORMAL MMOL/L (ref 1.12–1.32)
MCH RBC QN AUTO: 30 PG (ref 27–31)
MCHC RBC AUTO-ENTMCNC: 27.2 % (ref 32–36)
MCV RBC AUTO: 110.6 FL (ref 78–100)
PDW BLD-RTO: 15.8 % (ref 11.7–14.9)
PLATELET # BLD: 189 K/CU MM (ref 140–440)
PMV BLD AUTO: 9.8 FL (ref 7.5–11.1)
POTASSIUM SERPL-SCNC: 4 MMOL/L (ref 3.5–5.1)
PTH-INTACT SERPL-MCNC: 95 PG/ML (ref 15–65)
RBC # BLD: 2.83 M/CU MM (ref 4.6–6.2)
SODIUM BLD-SCNC: 146 MMOL/L (ref 135–145)
TOTAL PROTEIN: 5.6 GM/DL (ref 6.4–8.2)
WBC # BLD: 7.4 K/CU MM (ref 4–10.5)

## 2023-03-14 PROCEDURE — 6370000000 HC RX 637 (ALT 250 FOR IP): Performed by: EMERGENCY MEDICINE

## 2023-03-14 PROCEDURE — 2500000003 HC RX 250 WO HCPCS: Performed by: STUDENT IN AN ORGANIZED HEALTH CARE EDUCATION/TRAINING PROGRAM

## 2023-03-14 PROCEDURE — 6360000002 HC RX W HCPCS: Performed by: INTERNAL MEDICINE

## 2023-03-14 PROCEDURE — 6370000000 HC RX 637 (ALT 250 FOR IP): Performed by: INTERNAL MEDICINE

## 2023-03-14 PROCEDURE — 6370000000 HC RX 637 (ALT 250 FOR IP): Performed by: NURSE PRACTITIONER

## 2023-03-14 PROCEDURE — 2580000003 HC RX 258: Performed by: INTERNAL MEDICINE

## 2023-03-14 PROCEDURE — 1200000000 HC SEMI PRIVATE

## 2023-03-14 PROCEDURE — 99213 OFFICE O/P EST LOW 20 MIN: CPT

## 2023-03-14 PROCEDURE — 6370000000 HC RX 637 (ALT 250 FOR IP): Performed by: FAMILY MEDICINE

## 2023-03-14 PROCEDURE — 85027 COMPLETE CBC AUTOMATED: CPT

## 2023-03-14 PROCEDURE — 6370000000 HC RX 637 (ALT 250 FOR IP): Performed by: STUDENT IN AN ORGANIZED HEALTH CARE EDUCATION/TRAINING PROGRAM

## 2023-03-14 PROCEDURE — 51702 INSERT TEMP BLADDER CATH: CPT

## 2023-03-14 PROCEDURE — 36415 COLL VENOUS BLD VENIPUNCTURE: CPT

## 2023-03-14 PROCEDURE — 94761 N-INVAS EAR/PLS OXIMETRY MLT: CPT

## 2023-03-14 PROCEDURE — 6360000002 HC RX W HCPCS: Performed by: STUDENT IN AN ORGANIZED HEALTH CARE EDUCATION/TRAINING PROGRAM

## 2023-03-14 PROCEDURE — 80053 COMPREHEN METABOLIC PANEL: CPT

## 2023-03-14 RX ORDER — FERROUS SULFATE 300 MG/5ML
300 LIQUID (ML) ORAL
Status: DISCONTINUED | OUTPATIENT
Start: 2023-03-14 | End: 2023-03-28 | Stop reason: HOSPADM

## 2023-03-14 RX ADMIN — SODIUM CHLORIDE, PRESERVATIVE FREE 10 ML: 5 INJECTION INTRAVENOUS at 10:14

## 2023-03-14 RX ADMIN — HYDROCODONE BITARTRATE AND ACETAMINOPHEN 1 TABLET: 5; 325 TABLET ORAL at 05:04

## 2023-03-14 RX ADMIN — FAMOTIDINE 10 MG: 20 TABLET ORAL at 10:13

## 2023-03-14 RX ADMIN — ATORVASTATIN CALCIUM 40 MG: 40 TABLET, FILM COATED ORAL at 21:17

## 2023-03-14 RX ADMIN — ENOXAPARIN SODIUM 30 MG: 100 INJECTION SUBCUTANEOUS at 10:12

## 2023-03-14 RX ADMIN — SODIUM CHLORIDE, PRESERVATIVE FREE 10 ML: 5 INJECTION INTRAVENOUS at 21:23

## 2023-03-14 RX ADMIN — ESCITALOPRAM OXALATE 10 MG: 10 TABLET, FILM COATED ORAL at 10:20

## 2023-03-14 RX ADMIN — TAMSULOSIN HYDROCHLORIDE 0.4 MG: 0.4 CAPSULE ORAL at 21:17

## 2023-03-14 RX ADMIN — AMLODIPINE BESYLATE 5 MG: 5 TABLET ORAL at 10:12

## 2023-03-14 RX ADMIN — HYDROMORPHONE HYDROCHLORIDE 0.25 MG: 1 INJECTION, SOLUTION INTRAMUSCULAR; INTRAVENOUS; SUBCUTANEOUS at 12:20

## 2023-03-14 RX ADMIN — LEVOTHYROXINE SODIUM 75 MCG: 0.07 TABLET ORAL at 05:04

## 2023-03-14 RX ADMIN — METOPROLOL TARTRATE 25 MG: 25 TABLET, FILM COATED ORAL at 10:12

## 2023-03-14 RX ADMIN — SENNOSIDES AND DOCUSATE SODIUM 2 TABLET: 50; 8.6 TABLET ORAL at 10:20

## 2023-03-14 RX ADMIN — COLLAGENASE SANTYL: 250 OINTMENT TOPICAL at 10:19

## 2023-03-14 RX ADMIN — ASPIRIN 81 MG CHEWABLE TABLET 81 MG: 81 TABLET CHEWABLE at 10:16

## 2023-03-14 RX ADMIN — METOPROLOL TARTRATE 25 MG: 25 TABLET, FILM COATED ORAL at 21:17

## 2023-03-14 RX ADMIN — HYDROCODONE BITARTRATE AND ACETAMINOPHEN 1 TABLET: 5; 325 TABLET ORAL at 14:30

## 2023-03-14 RX ADMIN — QUETIAPINE FUMARATE 25 MG: 25 TABLET ORAL at 14:28

## 2023-03-14 RX ADMIN — POLYETHYLENE GLYCOL 3350 17 G: 17 POWDER, FOR SOLUTION ORAL at 21:17

## 2023-03-14 RX ADMIN — Medication 2000 UNITS: at 10:14

## 2023-03-14 RX ADMIN — MICONAZOLE NITRATE: 2 POWDER TOPICAL at 15:58

## 2023-03-14 RX ADMIN — MINERAL SUPPLEMENT IRON 300 MG / 5 ML STRENGTH LIQUID 100 PER BOX UNFLAVORED 300 MG: at 17:49

## 2023-03-14 RX ADMIN — POLYETHYLENE GLYCOL 3350 17 G: 17 POWDER, FOR SOLUTION ORAL at 10:14

## 2023-03-14 RX ADMIN — HYDROCODONE BITARTRATE AND ACETAMINOPHEN 1 TABLET: 5; 325 TABLET ORAL at 21:17

## 2023-03-14 RX ADMIN — QUETIAPINE FUMARATE 25 MG: 25 TABLET ORAL at 10:13

## 2023-03-14 RX ADMIN — FINASTERIDE 5 MG: 5 TABLET, FILM COATED ORAL at 10:14

## 2023-03-14 RX ADMIN — QUETIAPINE FUMARATE 50 MG: 25 TABLET ORAL at 21:17

## 2023-03-14 ASSESSMENT — ENCOUNTER SYMPTOMS
SINUS PRESSURE: 0
NAUSEA: 0
DIARRHEA: 0
CHEST TIGHTNESS: 0
CONSTIPATION: 0
VOICE CHANGE: 0
WHEEZING: 0
COUGH: 0
SHORTNESS OF BREATH: 0
VOMITING: 0
TROUBLE SWALLOWING: 0
BACK PAIN: 0
SORE THROAT: 0
SINUS PAIN: 0
COLOR CHANGE: 0
ABDOMINAL PAIN: 0

## 2023-03-14 ASSESSMENT — PAIN DESCRIPTION - DESCRIPTORS: DESCRIPTORS: ACHING;DISCOMFORT;CRAMPING;SORE

## 2023-03-14 ASSESSMENT — PAIN SCALES - GENERAL
PAINLEVEL_OUTOF10: 7
PAINLEVEL_OUTOF10: 10
PAINLEVEL_OUTOF10: 8
PAINLEVEL_OUTOF10: 4
PAINLEVEL_OUTOF10: 8

## 2023-03-14 ASSESSMENT — PAIN DESCRIPTION - ORIENTATION
ORIENTATION: RIGHT
ORIENTATION: RIGHT;LEFT;LOWER

## 2023-03-14 ASSESSMENT — PAIN DESCRIPTION - LOCATION
LOCATION: OTHER (COMMENT)
LOCATION: ABDOMEN;LEG
LOCATION: HIP

## 2023-03-14 ASSESSMENT — PAIN - FUNCTIONAL ASSESSMENT: PAIN_FUNCTIONAL_ASSESSMENT: ACTIVITIES ARE NOT PREVENTED

## 2023-03-14 ASSESSMENT — PAIN DESCRIPTION - PAIN TYPE: TYPE: CHRONIC PAIN;SURGICAL PAIN

## 2023-03-14 ASSESSMENT — PAIN SCALES - WONG BAKER: WONGBAKER_NUMERICALRESPONSE: 8

## 2023-03-14 NOTE — PLAN OF CARE
Problem: Discharge Planning  Goal: Discharge to home or other facility with appropriate resources  Outcome: Progressing     Problem: Pain  Goal: Verbalizes/displays adequate comfort level or baseline comfort level  Outcome: Progressing     Problem: ABCDS Injury Assessment  Goal: Absence of physical injury  Outcome: Progressing     Problem: Skin/Tissue Integrity  Goal: Absence of new skin breakdown  Description: 1. Monitor for areas of redness and/or skin breakdown  2. Assess vascular access sites hourly  3. Every 4-6 hours minimum:  Change oxygen saturation probe site  4. Every 4-6 hours:  If on nasal continuous positive airway pressure, respiratory therapy assess nares and determine need for appliance change or resting period.   Outcome: Progressing     Problem: Safety - Adult  Goal: Free from fall injury  Outcome: Progressing     Problem: Nutrition Deficit:  Goal: Optimize nutritional status  Outcome: Progressing

## 2023-03-15 LAB
ANION GAP SERPL CALCULATED.3IONS-SCNC: 11 MMOL/L (ref 4–16)
BUN SERPL-MCNC: 46 MG/DL (ref 6–23)
CALCIUM SERPL-MCNC: 8.6 MG/DL (ref 8.3–10.6)
CHLORIDE BLD-SCNC: 115 MMOL/L (ref 99–110)
CO2: 18 MMOL/L (ref 21–32)
CREAT SERPL-MCNC: 2.6 MG/DL (ref 0.9–1.3)
GFR SERPL CREATININE-BSD FRML MDRD: 22 ML/MIN/1.73M2
GLUCOSE SERPL-MCNC: 113 MG/DL (ref 70–99)
POTASSIUM SERPL-SCNC: 4.2 MMOL/L (ref 3.5–5.1)
SODIUM BLD-SCNC: 144 MMOL/L (ref 135–145)

## 2023-03-15 PROCEDURE — 2500000003 HC RX 250 WO HCPCS: Performed by: STUDENT IN AN ORGANIZED HEALTH CARE EDUCATION/TRAINING PROGRAM

## 2023-03-15 PROCEDURE — 6370000000 HC RX 637 (ALT 250 FOR IP): Performed by: INTERNAL MEDICINE

## 2023-03-15 PROCEDURE — 2580000003 HC RX 258: Performed by: INTERNAL MEDICINE

## 2023-03-15 PROCEDURE — 51702 INSERT TEMP BLADDER CATH: CPT

## 2023-03-15 PROCEDURE — 6360000002 HC RX W HCPCS: Performed by: STUDENT IN AN ORGANIZED HEALTH CARE EDUCATION/TRAINING PROGRAM

## 2023-03-15 PROCEDURE — 6370000000 HC RX 637 (ALT 250 FOR IP): Performed by: FAMILY MEDICINE

## 2023-03-15 PROCEDURE — 94761 N-INVAS EAR/PLS OXIMETRY MLT: CPT

## 2023-03-15 PROCEDURE — 97530 THERAPEUTIC ACTIVITIES: CPT

## 2023-03-15 PROCEDURE — 1200000000 HC SEMI PRIVATE

## 2023-03-15 PROCEDURE — 6370000000 HC RX 637 (ALT 250 FOR IP): Performed by: STUDENT IN AN ORGANIZED HEALTH CARE EDUCATION/TRAINING PROGRAM

## 2023-03-15 PROCEDURE — 6360000002 HC RX W HCPCS: Performed by: INTERNAL MEDICINE

## 2023-03-15 PROCEDURE — 36415 COLL VENOUS BLD VENIPUNCTURE: CPT

## 2023-03-15 PROCEDURE — 6370000000 HC RX 637 (ALT 250 FOR IP): Performed by: EMERGENCY MEDICINE

## 2023-03-15 PROCEDURE — 80048 BASIC METABOLIC PNL TOTAL CA: CPT

## 2023-03-15 PROCEDURE — 97535 SELF CARE MNGMENT TRAINING: CPT

## 2023-03-15 PROCEDURE — 97112 NEUROMUSCULAR REEDUCATION: CPT

## 2023-03-15 PROCEDURE — 6370000000 HC RX 637 (ALT 250 FOR IP): Performed by: NURSE PRACTITIONER

## 2023-03-15 RX ADMIN — FAMOTIDINE 10 MG: 20 TABLET ORAL at 11:13

## 2023-03-15 RX ADMIN — SODIUM CHLORIDE, PRESERVATIVE FREE 10 ML: 5 INJECTION INTRAVENOUS at 11:17

## 2023-03-15 RX ADMIN — ATORVASTATIN CALCIUM 40 MG: 40 TABLET, FILM COATED ORAL at 21:14

## 2023-03-15 RX ADMIN — QUETIAPINE FUMARATE 25 MG: 25 TABLET ORAL at 16:09

## 2023-03-15 RX ADMIN — HYDROCODONE BITARTRATE AND ACETAMINOPHEN 1 TABLET: 5; 325 TABLET ORAL at 13:48

## 2023-03-15 RX ADMIN — ESCITALOPRAM OXALATE 10 MG: 10 TABLET, FILM COATED ORAL at 11:13

## 2023-03-15 RX ADMIN — ASPIRIN 81 MG CHEWABLE TABLET 81 MG: 81 TABLET CHEWABLE at 11:14

## 2023-03-15 RX ADMIN — SENNOSIDES AND DOCUSATE SODIUM 2 TABLET: 50; 8.6 TABLET ORAL at 11:13

## 2023-03-15 RX ADMIN — MINERAL SUPPLEMENT IRON 300 MG / 5 ML STRENGTH LIQUID 100 PER BOX UNFLAVORED 300 MG: at 16:09

## 2023-03-15 RX ADMIN — LEVOTHYROXINE SODIUM 75 MCG: 0.07 TABLET ORAL at 06:14

## 2023-03-15 RX ADMIN — QUETIAPINE FUMARATE 25 MG: 25 TABLET ORAL at 11:14

## 2023-03-15 RX ADMIN — QUETIAPINE FUMARATE 50 MG: 25 TABLET ORAL at 21:14

## 2023-03-15 RX ADMIN — METOPROLOL TARTRATE 25 MG: 25 TABLET, FILM COATED ORAL at 11:14

## 2023-03-15 RX ADMIN — SODIUM CHLORIDE, PRESERVATIVE FREE 10 ML: 5 INJECTION INTRAVENOUS at 21:14

## 2023-03-15 RX ADMIN — ENOXAPARIN SODIUM 30 MG: 100 INJECTION SUBCUTANEOUS at 11:16

## 2023-03-15 RX ADMIN — FINASTERIDE 5 MG: 5 TABLET, FILM COATED ORAL at 11:14

## 2023-03-15 RX ADMIN — METOPROLOL TARTRATE 25 MG: 25 TABLET, FILM COATED ORAL at 21:16

## 2023-03-15 RX ADMIN — MICONAZOLE NITRATE: 2 POWDER TOPICAL at 11:15

## 2023-03-15 RX ADMIN — AMLODIPINE BESYLATE 5 MG: 5 TABLET ORAL at 11:14

## 2023-03-15 RX ADMIN — Medication 2000 UNITS: at 11:14

## 2023-03-15 RX ADMIN — MICONAZOLE NITRATE: 2 POWDER TOPICAL at 21:15

## 2023-03-15 RX ADMIN — TAMSULOSIN HYDROCHLORIDE 0.4 MG: 0.4 CAPSULE ORAL at 21:14

## 2023-03-15 ASSESSMENT — ENCOUNTER SYMPTOMS
COUGH: 0
NAUSEA: 0
ABDOMINAL PAIN: 0
CHEST TIGHTNESS: 0
SINUS PAIN: 0
VOMITING: 0
CONSTIPATION: 0
BACK PAIN: 0
VOICE CHANGE: 0
DIARRHEA: 0
WHEEZING: 0
SORE THROAT: 0
SINUS PRESSURE: 0
SHORTNESS OF BREATH: 0
COLOR CHANGE: 0
TROUBLE SWALLOWING: 0

## 2023-03-15 ASSESSMENT — PAIN DESCRIPTION - DESCRIPTORS: DESCRIPTORS: ACHING;DISCOMFORT

## 2023-03-15 ASSESSMENT — PAIN DESCRIPTION - ORIENTATION: ORIENTATION: RIGHT

## 2023-03-15 ASSESSMENT — PAIN SCALES - GENERAL: PAINLEVEL_OUTOF10: 7

## 2023-03-15 ASSESSMENT — PAIN - FUNCTIONAL ASSESSMENT: PAIN_FUNCTIONAL_ASSESSMENT: PREVENTS OR INTERFERES WITH MANY ACTIVE NOT PASSIVE ACTIVITIES

## 2023-03-15 ASSESSMENT — PAIN SCALES - WONG BAKER: WONGBAKER_NUMERICALRESPONSE: 4

## 2023-03-15 ASSESSMENT — PAIN DESCRIPTION - LOCATION: LOCATION: HIP

## 2023-03-16 LAB
ANION GAP SERPL CALCULATED.3IONS-SCNC: 10 MMOL/L (ref 4–16)
BUN SERPL-MCNC: 53 MG/DL (ref 6–23)
CALCIUM SERPL-MCNC: 8.5 MG/DL (ref 8.3–10.6)
CHLORIDE BLD-SCNC: 113 MMOL/L (ref 99–110)
CO2: 21 MMOL/L (ref 21–32)
CREAT SERPL-MCNC: 2.6 MG/DL (ref 0.9–1.3)
GFR SERPL CREATININE-BSD FRML MDRD: 22 ML/MIN/1.73M2
GLUCOSE SERPL-MCNC: 130 MG/DL (ref 70–99)
POTASSIUM SERPL-SCNC: 4.6 MMOL/L (ref 3.5–5.1)
SODIUM BLD-SCNC: 144 MMOL/L (ref 135–145)

## 2023-03-16 PROCEDURE — 6370000000 HC RX 637 (ALT 250 FOR IP): Performed by: NURSE PRACTITIONER

## 2023-03-16 PROCEDURE — 6370000000 HC RX 637 (ALT 250 FOR IP): Performed by: PHYSICIAN ASSISTANT

## 2023-03-16 PROCEDURE — 92526 ORAL FUNCTION THERAPY: CPT

## 2023-03-16 PROCEDURE — 6370000000 HC RX 637 (ALT 250 FOR IP): Performed by: INTERNAL MEDICINE

## 2023-03-16 PROCEDURE — 6370000000 HC RX 637 (ALT 250 FOR IP): Performed by: STUDENT IN AN ORGANIZED HEALTH CARE EDUCATION/TRAINING PROGRAM

## 2023-03-16 PROCEDURE — 6370000000 HC RX 637 (ALT 250 FOR IP): Performed by: FAMILY MEDICINE

## 2023-03-16 PROCEDURE — 6370000000 HC RX 637 (ALT 250 FOR IP): Performed by: EMERGENCY MEDICINE

## 2023-03-16 PROCEDURE — 6360000002 HC RX W HCPCS: Performed by: INTERNAL MEDICINE

## 2023-03-16 PROCEDURE — 1200000000 HC SEMI PRIVATE

## 2023-03-16 PROCEDURE — 36415 COLL VENOUS BLD VENIPUNCTURE: CPT

## 2023-03-16 PROCEDURE — 2580000003 HC RX 258: Performed by: INTERNAL MEDICINE

## 2023-03-16 PROCEDURE — 80048 BASIC METABOLIC PNL TOTAL CA: CPT

## 2023-03-16 PROCEDURE — 94761 N-INVAS EAR/PLS OXIMETRY MLT: CPT

## 2023-03-16 RX ORDER — TAMSULOSIN HYDROCHLORIDE 0.4 MG/1
0.8 CAPSULE ORAL NIGHTLY
Status: DISCONTINUED | OUTPATIENT
Start: 2023-03-16 | End: 2023-03-28 | Stop reason: HOSPADM

## 2023-03-16 RX ORDER — LEVOTHYROXINE SODIUM 0.1 MG/1
100 TABLET ORAL DAILY
Status: DISCONTINUED | OUTPATIENT
Start: 2023-03-17 | End: 2023-03-28 | Stop reason: HOSPADM

## 2023-03-16 RX ADMIN — FAMOTIDINE 10 MG: 20 TABLET ORAL at 08:48

## 2023-03-16 RX ADMIN — HYDROCODONE BITARTRATE AND ACETAMINOPHEN 1 TABLET: 5; 325 TABLET ORAL at 17:05

## 2023-03-16 RX ADMIN — Medication 2000 UNITS: at 08:48

## 2023-03-16 RX ADMIN — AMLODIPINE BESYLATE 5 MG: 5 TABLET ORAL at 08:48

## 2023-03-16 RX ADMIN — QUETIAPINE FUMARATE 25 MG: 25 TABLET ORAL at 14:49

## 2023-03-16 RX ADMIN — ENOXAPARIN SODIUM 30 MG: 100 INJECTION SUBCUTANEOUS at 08:49

## 2023-03-16 RX ADMIN — LEVOTHYROXINE SODIUM 75 MCG: 0.07 TABLET ORAL at 06:00

## 2023-03-16 RX ADMIN — FINASTERIDE 5 MG: 5 TABLET, FILM COATED ORAL at 08:49

## 2023-03-16 RX ADMIN — ASPIRIN 81 MG CHEWABLE TABLET 81 MG: 81 TABLET CHEWABLE at 08:48

## 2023-03-16 RX ADMIN — QUETIAPINE FUMARATE 50 MG: 25 TABLET ORAL at 21:06

## 2023-03-16 RX ADMIN — SODIUM CHLORIDE, PRESERVATIVE FREE 10 ML: 5 INJECTION INTRAVENOUS at 21:08

## 2023-03-16 RX ADMIN — TAMSULOSIN HYDROCHLORIDE 0.8 MG: 0.4 CAPSULE ORAL at 21:07

## 2023-03-16 RX ADMIN — MICONAZOLE NITRATE: 2 POWDER TOPICAL at 21:16

## 2023-03-16 RX ADMIN — SODIUM CHLORIDE, PRESERVATIVE FREE 10 ML: 5 INJECTION INTRAVENOUS at 08:49

## 2023-03-16 RX ADMIN — METOPROLOL TARTRATE 25 MG: 25 TABLET, FILM COATED ORAL at 08:48

## 2023-03-16 RX ADMIN — ATORVASTATIN CALCIUM 40 MG: 40 TABLET, FILM COATED ORAL at 21:07

## 2023-03-16 RX ADMIN — METOPROLOL TARTRATE 25 MG: 25 TABLET, FILM COATED ORAL at 21:07

## 2023-03-16 RX ADMIN — ESCITALOPRAM OXALATE 10 MG: 10 TABLET, FILM COATED ORAL at 08:48

## 2023-03-16 RX ADMIN — MINERAL SUPPLEMENT IRON 300 MG / 5 ML STRENGTH LIQUID 100 PER BOX UNFLAVORED 300 MG: at 16:57

## 2023-03-16 RX ADMIN — POLYETHYLENE GLYCOL 3350 17 G: 17 POWDER, FOR SOLUTION ORAL at 16:57

## 2023-03-16 RX ADMIN — QUETIAPINE FUMARATE 25 MG: 25 TABLET ORAL at 08:48

## 2023-03-16 ASSESSMENT — ENCOUNTER SYMPTOMS
SORE THROAT: 0
COUGH: 0
NAUSEA: 0
WHEEZING: 0
COLOR CHANGE: 0
TROUBLE SWALLOWING: 0
VOMITING: 0
SINUS PAIN: 0
VOICE CHANGE: 0
SINUS PRESSURE: 0
DIARRHEA: 0
ABDOMINAL PAIN: 0
BACK PAIN: 0
CHEST TIGHTNESS: 0
SHORTNESS OF BREATH: 0
CONSTIPATION: 0

## 2023-03-16 ASSESSMENT — PAIN SCALES - GENERAL: PAINLEVEL_OUTOF10: 6

## 2023-03-16 ASSESSMENT — PAIN DESCRIPTION - LOCATION: LOCATION: HIP

## 2023-03-16 ASSESSMENT — PAIN DESCRIPTION - ORIENTATION: ORIENTATION: RIGHT

## 2023-03-16 ASSESSMENT — PAIN DESCRIPTION - DESCRIPTORS: DESCRIPTORS: ACHING

## 2023-03-16 ASSESSMENT — PAIN - FUNCTIONAL ASSESSMENT: PAIN_FUNCTIONAL_ASSESSMENT: PREVENTS OR INTERFERES SOME ACTIVE ACTIVITIES AND ADLS

## 2023-03-16 NOTE — PLAN OF CARE
Problem: Discharge Planning  Goal: Discharge to home or other facility with appropriate resources  3/16/2023 1257 by Perry Goerges RN  Outcome: Progressing  3/16/2023 0117 by Pal Pinzon RN  Outcome: Progressing     Problem: Pain  Goal: Verbalizes/displays adequate comfort level or baseline comfort level  3/16/2023 1257 by Perry Georges RN  Outcome: Progressing  3/16/2023 0117 by Pal Pinzon RN  Outcome: Progressing     Problem: ABCDS Injury Assessment  Goal: Absence of physical injury  3/16/2023 1257 by Perry Georges RN  Outcome: Progressing  3/16/2023 0117 by Pal Pinzon RN  Outcome: Progressing     Problem: Skin/Tissue Integrity  Goal: Absence of new skin breakdown  Description: 1. Monitor for areas of redness and/or skin breakdown  2. Assess vascular access sites hourly  3. Every 4-6 hours minimum:  Change oxygen saturation probe site  4. Every 4-6 hours:  If on nasal continuous positive airway pressure, respiratory therapy assess nares and determine need for appliance change or resting period.   3/16/2023 1257 by Perry Georges RN  Outcome: Progressing  3/16/2023 0117 by Pal Pinzon RN  Outcome: Progressing     Problem: Safety - Adult  Goal: Free from fall injury  3/16/2023 1257 by Perry Georges RN  Outcome: Progressing  3/16/2023 0117 by Pal Pinzon RN  Outcome: Progressing     Problem: Nutrition Deficit:  Goal: Optimize nutritional status  3/16/2023 1257 by Perry Georges RN  Outcome: Progressing  Flowsheets (Taken 3/16/2023 1201 by Kanika Martin RD, LD)  Nutrient intake appropriate for improving, restoring, or maintaining nutritional needs:   Monitor oral intake, labs, and treatment plans   Recommend appropriate diets, oral nutritional supplements, and vitamin/mineral supplements  3/16/2023 0117 by Pal Pinzon RN  Outcome: Progressing

## 2023-03-17 LAB
ANION GAP SERPL CALCULATED.3IONS-SCNC: 10 MMOL/L (ref 4–16)
BUN SERPL-MCNC: 44 MG/DL (ref 6–23)
CALCIUM SERPL-MCNC: 8.7 MG/DL (ref 8.3–10.6)
CHLORIDE BLD-SCNC: 112 MMOL/L (ref 99–110)
CO2: 21 MMOL/L (ref 21–32)
CREAT SERPL-MCNC: 2.5 MG/DL (ref 0.9–1.3)
GFR SERPL CREATININE-BSD FRML MDRD: 24 ML/MIN/1.73M2
GLUCOSE SERPL-MCNC: 108 MG/DL (ref 70–99)
POTASSIUM SERPL-SCNC: 4.3 MMOL/L (ref 3.5–5.1)
SODIUM BLD-SCNC: 143 MMOL/L (ref 135–145)

## 2023-03-17 PROCEDURE — 6360000002 HC RX W HCPCS: Performed by: STUDENT IN AN ORGANIZED HEALTH CARE EDUCATION/TRAINING PROGRAM

## 2023-03-17 PROCEDURE — 80048 BASIC METABOLIC PNL TOTAL CA: CPT

## 2023-03-17 PROCEDURE — 6370000000 HC RX 637 (ALT 250 FOR IP): Performed by: INTERNAL MEDICINE

## 2023-03-17 PROCEDURE — 36415 COLL VENOUS BLD VENIPUNCTURE: CPT

## 2023-03-17 PROCEDURE — 6370000000 HC RX 637 (ALT 250 FOR IP): Performed by: NURSE PRACTITIONER

## 2023-03-17 PROCEDURE — 6370000000 HC RX 637 (ALT 250 FOR IP): Performed by: STUDENT IN AN ORGANIZED HEALTH CARE EDUCATION/TRAINING PROGRAM

## 2023-03-17 PROCEDURE — 6370000000 HC RX 637 (ALT 250 FOR IP): Performed by: PHYSICIAN ASSISTANT

## 2023-03-17 PROCEDURE — 6370000000 HC RX 637 (ALT 250 FOR IP): Performed by: FAMILY MEDICINE

## 2023-03-17 PROCEDURE — 2580000003 HC RX 258: Performed by: INTERNAL MEDICINE

## 2023-03-17 PROCEDURE — 94761 N-INVAS EAR/PLS OXIMETRY MLT: CPT

## 2023-03-17 PROCEDURE — 6360000002 HC RX W HCPCS: Performed by: INTERNAL MEDICINE

## 2023-03-17 PROCEDURE — 1200000000 HC SEMI PRIVATE

## 2023-03-17 PROCEDURE — 6370000000 HC RX 637 (ALT 250 FOR IP): Performed by: EMERGENCY MEDICINE

## 2023-03-17 RX ADMIN — Medication 2000 UNITS: at 08:30

## 2023-03-17 RX ADMIN — ASPIRIN 81 MG CHEWABLE TABLET 81 MG: 81 TABLET CHEWABLE at 08:30

## 2023-03-17 RX ADMIN — MINERAL SUPPLEMENT IRON 300 MG / 5 ML STRENGTH LIQUID 100 PER BOX UNFLAVORED 300 MG: at 16:53

## 2023-03-17 RX ADMIN — SENNOSIDES AND DOCUSATE SODIUM 2 TABLET: 50; 8.6 TABLET ORAL at 08:43

## 2023-03-17 RX ADMIN — HYDROMORPHONE HYDROCHLORIDE 0.25 MG: 1 INJECTION, SOLUTION INTRAMUSCULAR; INTRAVENOUS; SUBCUTANEOUS at 23:43

## 2023-03-17 RX ADMIN — METOPROLOL TARTRATE 25 MG: 25 TABLET, FILM COATED ORAL at 22:11

## 2023-03-17 RX ADMIN — METOPROLOL TARTRATE 25 MG: 25 TABLET, FILM COATED ORAL at 08:31

## 2023-03-17 RX ADMIN — AMLODIPINE BESYLATE 5 MG: 5 TABLET ORAL at 08:31

## 2023-03-17 RX ADMIN — HYDROCODONE BITARTRATE AND ACETAMINOPHEN 1 TABLET: 5; 325 TABLET ORAL at 02:40

## 2023-03-17 RX ADMIN — QUETIAPINE FUMARATE 25 MG: 25 TABLET ORAL at 14:15

## 2023-03-17 RX ADMIN — SODIUM CHLORIDE, PRESERVATIVE FREE 10 ML: 5 INJECTION INTRAVENOUS at 08:32

## 2023-03-17 RX ADMIN — ESCITALOPRAM OXALATE 10 MG: 10 TABLET, FILM COATED ORAL at 08:30

## 2023-03-17 RX ADMIN — SENNOSIDES AND DOCUSATE SODIUM 2 TABLET: 50; 8.6 TABLET ORAL at 22:12

## 2023-03-17 RX ADMIN — SODIUM CHLORIDE, PRESERVATIVE FREE 10 ML: 5 INJECTION INTRAVENOUS at 22:12

## 2023-03-17 RX ADMIN — MICONAZOLE NITRATE: 2 POWDER TOPICAL at 16:52

## 2023-03-17 RX ADMIN — ENOXAPARIN SODIUM 30 MG: 100 INJECTION SUBCUTANEOUS at 08:42

## 2023-03-17 RX ADMIN — POLYETHYLENE GLYCOL 3350 17 G: 17 POWDER, FOR SOLUTION ORAL at 21:45

## 2023-03-17 RX ADMIN — MICONAZOLE NITRATE: 2 POWDER TOPICAL at 21:45

## 2023-03-17 RX ADMIN — SODIUM CHLORIDE, PRESERVATIVE FREE 10 ML: 5 INJECTION INTRAVENOUS at 23:43

## 2023-03-17 RX ADMIN — TAMSULOSIN HYDROCHLORIDE 0.8 MG: 0.4 CAPSULE ORAL at 22:11

## 2023-03-17 RX ADMIN — HYDROCODONE BITARTRATE AND ACETAMINOPHEN 1 TABLET: 5; 325 TABLET ORAL at 10:55

## 2023-03-17 RX ADMIN — POLYETHYLENE GLYCOL 3350 17 G: 17 POWDER, FOR SOLUTION ORAL at 08:30

## 2023-03-17 RX ADMIN — QUETIAPINE FUMARATE 50 MG: 25 TABLET ORAL at 22:11

## 2023-03-17 RX ADMIN — LEVOTHYROXINE SODIUM 100 MCG: 100 TABLET ORAL at 05:14

## 2023-03-17 RX ADMIN — HYDROCODONE BITARTRATE AND ACETAMINOPHEN 1 TABLET: 5; 325 TABLET ORAL at 18:36

## 2023-03-17 RX ADMIN — ATORVASTATIN CALCIUM 40 MG: 40 TABLET, FILM COATED ORAL at 22:12

## 2023-03-17 RX ADMIN — QUETIAPINE FUMARATE 25 MG: 25 TABLET ORAL at 08:32

## 2023-03-17 RX ADMIN — HYDROMORPHONE HYDROCHLORIDE 0.25 MG: 1 INJECTION, SOLUTION INTRAMUSCULAR; INTRAVENOUS; SUBCUTANEOUS at 04:10

## 2023-03-17 RX ADMIN — FINASTERIDE 5 MG: 5 TABLET, FILM COATED ORAL at 08:32

## 2023-03-17 RX ADMIN — FAMOTIDINE 10 MG: 20 TABLET ORAL at 08:30

## 2023-03-17 ASSESSMENT — PAIN SCALES - GENERAL
PAINLEVEL_OUTOF10: 8
PAINLEVEL_OUTOF10: 7
PAINLEVEL_OUTOF10: 7
PAINLEVEL_OUTOF10: 5

## 2023-03-17 ASSESSMENT — ENCOUNTER SYMPTOMS
ABDOMINAL PAIN: 0
SINUS PAIN: 0
DIARRHEA: 0
BACK PAIN: 0
COLOR CHANGE: 0
TROUBLE SWALLOWING: 0
SHORTNESS OF BREATH: 0
VOICE CHANGE: 0
VOMITING: 0
CONSTIPATION: 0
WHEEZING: 0
SINUS PRESSURE: 0
NAUSEA: 0
CHEST TIGHTNESS: 0
SORE THROAT: 0
COUGH: 0

## 2023-03-17 ASSESSMENT — PAIN DESCRIPTION - ORIENTATION
ORIENTATION: RIGHT
ORIENTATION: RIGHT

## 2023-03-17 ASSESSMENT — PAIN DESCRIPTION - FREQUENCY
FREQUENCY: CONTINUOUS
FREQUENCY: CONTINUOUS

## 2023-03-17 ASSESSMENT — PAIN - FUNCTIONAL ASSESSMENT
PAIN_FUNCTIONAL_ASSESSMENT: PREVENTS OR INTERFERES SOME ACTIVE ACTIVITIES AND ADLS

## 2023-03-17 ASSESSMENT — PAIN DESCRIPTION - DESCRIPTORS
DESCRIPTORS: ACHING

## 2023-03-17 ASSESSMENT — PAIN DESCRIPTION - LOCATION
LOCATION: HIP

## 2023-03-17 ASSESSMENT — PAIN DESCRIPTION - ONSET
ONSET: ON-GOING
ONSET: ON-GOING

## 2023-03-17 ASSESSMENT — PAIN DESCRIPTION - PAIN TYPE
TYPE: CHRONIC PAIN;SURGICAL PAIN
TYPE: CHRONIC PAIN;SURGICAL PAIN

## 2023-03-18 ENCOUNTER — APPOINTMENT (OUTPATIENT)
Dept: GENERAL RADIOLOGY | Age: 88
DRG: 553 | End: 2023-03-18
Payer: OTHER GOVERNMENT

## 2023-03-18 LAB
ANION GAP SERPL CALCULATED.3IONS-SCNC: 11 MMOL/L (ref 4–16)
BUN SERPL-MCNC: 44 MG/DL (ref 6–23)
CALCIUM SERPL-MCNC: 8.8 MG/DL (ref 8.3–10.6)
CHLORIDE BLD-SCNC: 111 MMOL/L (ref 99–110)
CO2: 20 MMOL/L (ref 21–32)
CREAT SERPL-MCNC: 2.6 MG/DL (ref 0.9–1.3)
GFR SERPL CREATININE-BSD FRML MDRD: 22 ML/MIN/1.73M2
GLUCOSE SERPL-MCNC: 120 MG/DL (ref 70–99)
POTASSIUM SERPL-SCNC: 4.4 MMOL/L (ref 3.5–5.1)
SODIUM BLD-SCNC: 142 MMOL/L (ref 135–145)

## 2023-03-18 PROCEDURE — 80048 BASIC METABOLIC PNL TOTAL CA: CPT

## 2023-03-18 PROCEDURE — 6360000002 HC RX W HCPCS: Performed by: INTERNAL MEDICINE

## 2023-03-18 PROCEDURE — 6370000000 HC RX 637 (ALT 250 FOR IP): Performed by: NURSE PRACTITIONER

## 2023-03-18 PROCEDURE — 6370000000 HC RX 637 (ALT 250 FOR IP): Performed by: EMERGENCY MEDICINE

## 2023-03-18 PROCEDURE — 86900 BLOOD TYPING SEROLOGIC ABO: CPT

## 2023-03-18 PROCEDURE — 6370000000 HC RX 637 (ALT 250 FOR IP): Performed by: FAMILY MEDICINE

## 2023-03-18 PROCEDURE — 85027 COMPLETE CBC AUTOMATED: CPT

## 2023-03-18 PROCEDURE — 86850 RBC ANTIBODY SCREEN: CPT

## 2023-03-18 PROCEDURE — 6370000000 HC RX 637 (ALT 250 FOR IP): Performed by: INTERNAL MEDICINE

## 2023-03-18 PROCEDURE — 2580000003 HC RX 258: Performed by: INTERNAL MEDICINE

## 2023-03-18 PROCEDURE — 6370000000 HC RX 637 (ALT 250 FOR IP): Performed by: PHYSICIAN ASSISTANT

## 2023-03-18 PROCEDURE — 74018 RADEX ABDOMEN 1 VIEW: CPT

## 2023-03-18 PROCEDURE — 86901 BLOOD TYPING SEROLOGIC RH(D): CPT

## 2023-03-18 PROCEDURE — 82272 OCCULT BLD FECES 1-3 TESTS: CPT

## 2023-03-18 PROCEDURE — 94761 N-INVAS EAR/PLS OXIMETRY MLT: CPT

## 2023-03-18 PROCEDURE — 36415 COLL VENOUS BLD VENIPUNCTURE: CPT

## 2023-03-18 PROCEDURE — 6370000000 HC RX 637 (ALT 250 FOR IP): Performed by: STUDENT IN AN ORGANIZED HEALTH CARE EDUCATION/TRAINING PROGRAM

## 2023-03-18 PROCEDURE — 1200000000 HC SEMI PRIVATE

## 2023-03-18 RX ADMIN — QUETIAPINE FUMARATE 50 MG: 25 TABLET ORAL at 21:45

## 2023-03-18 RX ADMIN — Medication 2000 UNITS: at 08:53

## 2023-03-18 RX ADMIN — TAMSULOSIN HYDROCHLORIDE 0.8 MG: 0.4 CAPSULE ORAL at 21:45

## 2023-03-18 RX ADMIN — FAMOTIDINE 10 MG: 20 TABLET ORAL at 08:54

## 2023-03-18 RX ADMIN — ATORVASTATIN CALCIUM 40 MG: 40 TABLET, FILM COATED ORAL at 21:45

## 2023-03-18 RX ADMIN — ONDANSETRON 4 MG: 2 INJECTION INTRAMUSCULAR; INTRAVENOUS at 08:47

## 2023-03-18 RX ADMIN — FINASTERIDE 5 MG: 5 TABLET, FILM COATED ORAL at 08:54

## 2023-03-18 RX ADMIN — LEVOTHYROXINE SODIUM 100 MCG: 100 TABLET ORAL at 06:49

## 2023-03-18 RX ADMIN — POLYETHYLENE GLYCOL 3350 17 G: 17 POWDER, FOR SOLUTION ORAL at 08:55

## 2023-03-18 RX ADMIN — POLYETHYLENE GLYCOL 3350 17 G: 17 POWDER, FOR SOLUTION ORAL at 21:46

## 2023-03-18 RX ADMIN — METOPROLOL TARTRATE 25 MG: 25 TABLET, FILM COATED ORAL at 21:45

## 2023-03-18 RX ADMIN — ESCITALOPRAM OXALATE 10 MG: 10 TABLET, FILM COATED ORAL at 08:54

## 2023-03-18 RX ADMIN — ONDANSETRON 4 MG: 2 INJECTION INTRAMUSCULAR; INTRAVENOUS at 14:36

## 2023-03-18 RX ADMIN — AMLODIPINE BESYLATE 5 MG: 5 TABLET ORAL at 08:54

## 2023-03-18 RX ADMIN — MICONAZOLE NITRATE: 2 POWDER TOPICAL at 22:10

## 2023-03-18 RX ADMIN — SENNOSIDES AND DOCUSATE SODIUM 2 TABLET: 50; 8.6 TABLET ORAL at 21:45

## 2023-03-18 RX ADMIN — HYDROCODONE BITARTRATE AND ACETAMINOPHEN 1 TABLET: 5; 325 TABLET ORAL at 08:53

## 2023-03-18 RX ADMIN — ASPIRIN 81 MG CHEWABLE TABLET 81 MG: 81 TABLET CHEWABLE at 08:54

## 2023-03-18 RX ADMIN — SENNOSIDES AND DOCUSATE SODIUM 2 TABLET: 50; 8.6 TABLET ORAL at 08:54

## 2023-03-18 RX ADMIN — METOPROLOL TARTRATE 25 MG: 25 TABLET, FILM COATED ORAL at 08:54

## 2023-03-18 RX ADMIN — MICONAZOLE NITRATE: 2 POWDER TOPICAL at 10:20

## 2023-03-18 RX ADMIN — QUETIAPINE FUMARATE 25 MG: 25 TABLET ORAL at 08:54

## 2023-03-18 RX ADMIN — ENOXAPARIN SODIUM 30 MG: 100 INJECTION SUBCUTANEOUS at 08:53

## 2023-03-18 RX ADMIN — SODIUM CHLORIDE, PRESERVATIVE FREE 10 ML: 5 INJECTION INTRAVENOUS at 22:10

## 2023-03-18 ASSESSMENT — ENCOUNTER SYMPTOMS
SINUS PRESSURE: 0
SINUS PAIN: 0
CONSTIPATION: 0
CHEST TIGHTNESS: 0
BACK PAIN: 0
SHORTNESS OF BREATH: 0
VOMITING: 0
COLOR CHANGE: 0
ABDOMINAL PAIN: 0
VOICE CHANGE: 0
NAUSEA: 0
COUGH: 0
WHEEZING: 0
SORE THROAT: 0
DIARRHEA: 0
TROUBLE SWALLOWING: 0

## 2023-03-18 ASSESSMENT — PAIN DESCRIPTION - DESCRIPTORS: DESCRIPTORS: ACHING

## 2023-03-18 ASSESSMENT — PAIN DESCRIPTION - LOCATION: LOCATION: HIP

## 2023-03-18 ASSESSMENT — PAIN SCALES - GENERAL: PAINLEVEL_OUTOF10: 7

## 2023-03-18 NOTE — PLAN OF CARE
Problem: Discharge Planning  Goal: Discharge to home or other facility with appropriate resources  3/18/2023 1545 by Chuy Dalal RN  Outcome: Progressing  3/18/2023 0159 by Allen Funk RN  Outcome: Progressing     Problem: Pain  Goal: Verbalizes/displays adequate comfort level or baseline comfort level  3/18/2023 1545 by Chuy Dalal RN  Outcome: Progressing  3/18/2023 0159 by Allen Funk RN  Outcome: Progressing     Problem: ABCDS Injury Assessment  Goal: Absence of physical injury  3/18/2023 1545 by Chuy Dalal RN  Outcome: Progressing  3/18/2023 0159 by Allen Funk RN  Outcome: Progressing     Problem: Skin/Tissue Integrity  Goal: Absence of new skin breakdown  Description: 1. Monitor for areas of redness and/or skin breakdown  2. Assess vascular access sites hourly  3. Every 4-6 hours minimum:  Change oxygen saturation probe site  4. Every 4-6 hours:  If on nasal continuous positive airway pressure, respiratory therapy assess nares and determine need for appliance change or resting period.   3/18/2023 1545 by Chuy Dalal RN  Outcome: Progressing  3/18/2023 0159 by Allen Funk RN  Outcome: Progressing     Problem: Safety - Adult  Goal: Free from fall injury  3/18/2023 1545 by Chuy Dalal RN  Outcome: Progressing  3/18/2023 0159 by Allen Funk RN  Outcome: Progressing     Problem: Nutrition Deficit:  Goal: Optimize nutritional status  3/18/2023 1545 by Chuy Dalal RN  Outcome: Progressing  3/18/2023 0159 by Allen Funk RN  Outcome: Progressing

## 2023-03-19 ENCOUNTER — APPOINTMENT (OUTPATIENT)
Dept: GENERAL RADIOLOGY | Age: 88
DRG: 553 | End: 2023-03-19
Payer: OTHER GOVERNMENT

## 2023-03-19 PROBLEM — K92.0 HEMATEMESIS WITH NAUSEA: Status: ACTIVE | Noted: 2023-03-19

## 2023-03-19 LAB
ABO/RH: NORMAL
ALBUMIN SERPL-MCNC: 3 GM/DL (ref 3.4–5)
ALP BLD-CCNC: 70 IU/L (ref 40–129)
ALT SERPL-CCNC: 6 U/L (ref 10–40)
ANTIBODY SCREEN: NEGATIVE
AST SERPL-CCNC: 13 IU/L (ref 15–37)
BILIRUB SERPL-MCNC: 0.2 MG/DL (ref 0–1)
BILIRUBIN DIRECT: 0.2 MG/DL (ref 0–0.3)
BILIRUBIN, INDIRECT: 0 MG/DL (ref 0–0.7)
GLUCOSE BLD-MCNC: 156 MG/DL (ref 70–99)
HCT VFR BLD CALC: 24.7 % (ref 42–52)
HCT VFR BLD CALC: 28 % (ref 42–52)
HCT VFR BLD CALC: 32.9 % (ref 42–52)
HEMOGLOBIN: 10.4 GM/DL (ref 13.5–18)
HEMOGLOBIN: 7.1 GM/DL (ref 13.5–18)
HEMOGLOBIN: 8.7 GM/DL (ref 13.5–18)
INR BLD: 1.64 INDEX
LIPASE: 50 IU/L (ref 13–60)
MCH RBC QN AUTO: 30.1 PG (ref 27–31)
MCHC RBC AUTO-ENTMCNC: 31.6 % (ref 32–36)
MCV RBC AUTO: 95.1 FL (ref 78–100)
PDW BLD-RTO: 15.4 % (ref 11.7–14.9)
PLATELET # BLD: 270 K/CU MM (ref 140–440)
PMV BLD AUTO: 10.4 FL (ref 7.5–11.1)
PROTHROMBIN TIME: 21.2 SECONDS (ref 11.7–14.5)
RBC # BLD: 3.46 M/CU MM (ref 4.6–6.2)
REASON FOR REJECTION: NORMAL
REJECTED TEST: NORMAL
TOTAL PROTEIN: 5.3 GM/DL (ref 6.4–8.2)
WBC # BLD: 13.2 K/CU MM (ref 4–10.5)

## 2023-03-19 PROCEDURE — 85018 HEMOGLOBIN: CPT

## 2023-03-19 PROCEDURE — 82962 GLUCOSE BLOOD TEST: CPT

## 2023-03-19 PROCEDURE — 2500000003 HC RX 250 WO HCPCS: Performed by: STUDENT IN AN ORGANIZED HEALTH CARE EDUCATION/TRAINING PROGRAM

## 2023-03-19 PROCEDURE — 51798 US URINE CAPACITY MEASURE: CPT

## 2023-03-19 PROCEDURE — 36415 COLL VENOUS BLD VENIPUNCTURE: CPT

## 2023-03-19 PROCEDURE — 2580000003 HC RX 258: Performed by: STUDENT IN AN ORGANIZED HEALTH CARE EDUCATION/TRAINING PROGRAM

## 2023-03-19 PROCEDURE — 6360000002 HC RX W HCPCS: Performed by: SPECIALIST

## 2023-03-19 PROCEDURE — 74018 RADEX ABDOMEN 1 VIEW: CPT

## 2023-03-19 PROCEDURE — 6370000000 HC RX 637 (ALT 250 FOR IP): Performed by: INTERNAL MEDICINE

## 2023-03-19 PROCEDURE — 51701 INSERT BLADDER CATHETER: CPT

## 2023-03-19 PROCEDURE — 83690 ASSAY OF LIPASE: CPT

## 2023-03-19 PROCEDURE — 1200000000 HC SEMI PRIVATE

## 2023-03-19 PROCEDURE — 87081 CULTURE SCREEN ONLY: CPT

## 2023-03-19 PROCEDURE — 6360000002 HC RX W HCPCS: Performed by: STUDENT IN AN ORGANIZED HEALTH CARE EDUCATION/TRAINING PROGRAM

## 2023-03-19 PROCEDURE — 71045 X-RAY EXAM CHEST 1 VIEW: CPT

## 2023-03-19 PROCEDURE — 94761 N-INVAS EAR/PLS OXIMETRY MLT: CPT

## 2023-03-19 PROCEDURE — 6370000000 HC RX 637 (ALT 250 FOR IP): Performed by: EMERGENCY MEDICINE

## 2023-03-19 PROCEDURE — C9113 INJ PANTOPRAZOLE SODIUM, VIA: HCPCS | Performed by: SPECIALIST

## 2023-03-19 PROCEDURE — 2580000003 HC RX 258: Performed by: INTERNAL MEDICINE

## 2023-03-19 PROCEDURE — 0D9670Z DRAINAGE OF STOMACH WITH DRAINAGE DEVICE, VIA NATURAL OR ARTIFICIAL OPENING: ICD-10-PCS | Performed by: RADIOLOGY

## 2023-03-19 PROCEDURE — 80076 HEPATIC FUNCTION PANEL: CPT

## 2023-03-19 PROCEDURE — 87040 BLOOD CULTURE FOR BACTERIA: CPT

## 2023-03-19 PROCEDURE — 99222 1ST HOSP IP/OBS MODERATE 55: CPT | Performed by: SPECIALIST

## 2023-03-19 PROCEDURE — 6370000000 HC RX 637 (ALT 250 FOR IP): Performed by: PHYSICIAN ASSISTANT

## 2023-03-19 PROCEDURE — 85014 HEMATOCRIT: CPT

## 2023-03-19 PROCEDURE — 80048 BASIC METABOLIC PNL TOTAL CA: CPT

## 2023-03-19 PROCEDURE — A4216 STERILE WATER/SALINE, 10 ML: HCPCS

## 2023-03-19 PROCEDURE — 85610 PROTHROMBIN TIME: CPT

## 2023-03-19 PROCEDURE — 2580000003 HC RX 258

## 2023-03-19 RX ORDER — SODIUM CHLORIDE 9 MG/ML
INJECTION INTRAVENOUS
Status: COMPLETED
Start: 2023-03-19 | End: 2023-03-19

## 2023-03-19 RX ORDER — POLYETHYLENE GLYCOL 3350 17 G/17G
17 POWDER, FOR SOLUTION ORAL DAILY
Status: DISCONTINUED | OUTPATIENT
Start: 2023-03-20 | End: 2023-03-28 | Stop reason: HOSPADM

## 2023-03-19 RX ORDER — PANTOPRAZOLE SODIUM 40 MG/10ML
40 INJECTION, POWDER, LYOPHILIZED, FOR SOLUTION INTRAVENOUS EVERY 12 HOURS
Status: DISCONTINUED | OUTPATIENT
Start: 2023-03-19 | End: 2023-03-23

## 2023-03-19 RX ORDER — OLANZAPINE 10 MG/1
5 INJECTION, POWDER, LYOPHILIZED, FOR SOLUTION INTRAMUSCULAR NIGHTLY
Status: DISCONTINUED | OUTPATIENT
Start: 2023-03-19 | End: 2023-03-22

## 2023-03-19 RX ORDER — OLANZAPINE 10 MG/1
2.5 INJECTION, POWDER, LYOPHILIZED, FOR SOLUTION INTRAMUSCULAR DAILY
Status: DISCONTINUED | OUTPATIENT
Start: 2023-03-20 | End: 2023-03-22

## 2023-03-19 RX ORDER — OLANZAPINE 10 MG/1
2.5 INJECTION, POWDER, LYOPHILIZED, FOR SOLUTION INTRAMUSCULAR 2 TIMES DAILY
Status: DISCONTINUED | OUTPATIENT
Start: 2023-03-19 | End: 2023-03-19

## 2023-03-19 RX ORDER — SODIUM CHLORIDE, SODIUM LACTATE, POTASSIUM CHLORIDE, CALCIUM CHLORIDE 600; 310; 30; 20 MG/100ML; MG/100ML; MG/100ML; MG/100ML
INJECTION, SOLUTION INTRAVENOUS CONTINUOUS
Status: DISPENSED | OUTPATIENT
Start: 2023-03-19 | End: 2023-03-20

## 2023-03-19 RX ORDER — PANTOPRAZOLE SODIUM 40 MG/10ML
40 INJECTION, POWDER, LYOPHILIZED, FOR SOLUTION INTRAVENOUS DAILY
Status: DISCONTINUED | OUTPATIENT
Start: 2023-03-19 | End: 2023-03-19

## 2023-03-19 RX ORDER — SENNA AND DOCUSATE SODIUM 50; 8.6 MG/1; MG/1
2 TABLET, FILM COATED ORAL DAILY
Status: DISCONTINUED | OUTPATIENT
Start: 2023-03-20 | End: 2023-03-28 | Stop reason: HOSPADM

## 2023-03-19 RX ORDER — OLANZAPINE 10 MG/1
2.5 INJECTION, POWDER, LYOPHILIZED, FOR SOLUTION INTRAMUSCULAR ONCE
Status: COMPLETED | OUTPATIENT
Start: 2023-03-19 | End: 2023-03-19

## 2023-03-19 RX ORDER — LINEZOLID 2 MG/ML
600 INJECTION, SOLUTION INTRAVENOUS EVERY 12 HOURS
Status: DISCONTINUED | OUTPATIENT
Start: 2023-03-19 | End: 2023-03-22

## 2023-03-19 RX ADMIN — HYDROMORPHONE HYDROCHLORIDE 0.25 MG: 1 INJECTION, SOLUTION INTRAMUSCULAR; INTRAVENOUS; SUBCUTANEOUS at 07:44

## 2023-03-19 RX ADMIN — METOPROLOL TARTRATE 25 MG: 25 TABLET, FILM COATED ORAL at 21:06

## 2023-03-19 RX ADMIN — TAMSULOSIN HYDROCHLORIDE 0.8 MG: 0.4 CAPSULE ORAL at 21:06

## 2023-03-19 RX ADMIN — FINASTERIDE 5 MG: 5 TABLET, FILM COATED ORAL at 11:29

## 2023-03-19 RX ADMIN — MICONAZOLE NITRATE: 2 POWDER TOPICAL at 11:34

## 2023-03-19 RX ADMIN — HYDROMORPHONE HYDROCHLORIDE 0.25 MG: 1 INJECTION, SOLUTION INTRAMUSCULAR; INTRAVENOUS; SUBCUTANEOUS at 15:13

## 2023-03-19 RX ADMIN — SODIUM CHLORIDE, PRESERVATIVE FREE 10 ML: 5 INJECTION INTRAVENOUS at 21:08

## 2023-03-19 RX ADMIN — ATORVASTATIN CALCIUM 40 MG: 40 TABLET, FILM COATED ORAL at 21:06

## 2023-03-19 RX ADMIN — PIPERACILLIN AND TAZOBACTAM 3375 MG: 3; .375 INJECTION, POWDER, LYOPHILIZED, FOR SOLUTION INTRAVENOUS at 17:10

## 2023-03-19 RX ADMIN — OLANZAPINE 2.5 MG: 10 INJECTION, POWDER, FOR SOLUTION INTRAMUSCULAR at 15:47

## 2023-03-19 RX ADMIN — LINEZOLID 600 MG: 600 INJECTION, SOLUTION INTRAVENOUS at 11:28

## 2023-03-19 RX ADMIN — OLANZAPINE 2.5 MG: 10 INJECTION, POWDER, FOR SOLUTION INTRAMUSCULAR at 11:23

## 2023-03-19 RX ADMIN — SODIUM CHLORIDE 10 ML: 9 INJECTION, SOLUTION INTRAMUSCULAR; INTRAVENOUS; SUBCUTANEOUS at 22:50

## 2023-03-19 RX ADMIN — OLANZAPINE 5 MG: 10 INJECTION, POWDER, FOR SOLUTION INTRAMUSCULAR at 21:05

## 2023-03-19 RX ADMIN — Medication 2000 UNITS: at 11:29

## 2023-03-19 RX ADMIN — MICONAZOLE NITRATE: 2 POWDER TOPICAL at 21:09

## 2023-03-19 RX ADMIN — PIPERACILLIN AND TAZOBACTAM 3375 MG: 3; .375 INJECTION, POWDER, LYOPHILIZED, FOR SOLUTION INTRAVENOUS at 12:44

## 2023-03-19 RX ADMIN — SODIUM CHLORIDE, POTASSIUM CHLORIDE, SODIUM LACTATE AND CALCIUM CHLORIDE: 600; 310; 30; 20 INJECTION, SOLUTION INTRAVENOUS at 07:50

## 2023-03-19 RX ADMIN — PANTOPRAZOLE SODIUM 40 MG: 40 INJECTION, POWDER, FOR SOLUTION INTRAVENOUS at 22:50

## 2023-03-19 RX ADMIN — SODIUM CHLORIDE, PRESERVATIVE FREE 10 ML: 5 INJECTION INTRAVENOUS at 11:38

## 2023-03-19 RX ADMIN — METOPROLOL TARTRATE 25 MG: 25 TABLET, FILM COATED ORAL at 11:29

## 2023-03-19 ASSESSMENT — ENCOUNTER SYMPTOMS
DIARRHEA: 0
WHEEZING: 0
TROUBLE SWALLOWING: 0
CHEST TIGHTNESS: 0
SORE THROAT: 0
VOICE CHANGE: 0
SINUS PRESSURE: 0
BACK PAIN: 0
SHORTNESS OF BREATH: 1
COLOR CHANGE: 0
ABDOMINAL PAIN: 0
NAUSEA: 1
CONSTIPATION: 0
VOMITING: 1
SINUS PAIN: 0
COUGH: 1

## 2023-03-19 ASSESSMENT — PAIN SCALES - GENERAL: PAINLEVEL_OUTOF10: 9

## 2023-03-19 NOTE — SIGNIFICANT EVENT
Code Status Update: Limited (No to CPR, Defibrillation, and intubation). Patient's son is present bedside and we confirmed the patient's code status. Patient has DNR paperwork and his desire is for full care at this time, but in the event of an arrest the patient would not want CPR or defibrillation. The patient and his son also do not want intubation either in a code setting or any setting. The patient's code status was updated in the computer to Limited (no to everything).     Nichole Bonilla MD  Hospitalist

## 2023-03-20 ENCOUNTER — APPOINTMENT (OUTPATIENT)
Dept: ULTRASOUND IMAGING | Age: 88
DRG: 553 | End: 2023-03-20
Payer: OTHER GOVERNMENT

## 2023-03-20 ENCOUNTER — APPOINTMENT (OUTPATIENT)
Dept: GENERAL RADIOLOGY | Age: 88
DRG: 553 | End: 2023-03-20
Payer: OTHER GOVERNMENT

## 2023-03-20 LAB
ANION GAP SERPL CALCULATED.3IONS-SCNC: 16 MMOL/L (ref 4–16)
ANION GAP SERPL CALCULATED.3IONS-SCNC: 16 MMOL/L (ref 4–16)
BASOPHILS ABSOLUTE: 0 K/CU MM
BASOPHILS ABSOLUTE: 0 K/CU MM
BASOPHILS RELATIVE PERCENT: 0.2 % (ref 0–1)
BASOPHILS RELATIVE PERCENT: 0.3 % (ref 0–1)
BUN SERPL-MCNC: 85 MG/DL (ref 6–23)
BUN SERPL-MCNC: 88 MG/DL (ref 6–23)
CALCIUM SERPL-MCNC: 8.2 MG/DL (ref 8.3–10.6)
CALCIUM SERPL-MCNC: 8.5 MG/DL (ref 8.3–10.6)
CHLORIDE BLD-SCNC: 109 MMOL/L (ref 99–110)
CHLORIDE BLD-SCNC: 110 MMOL/L (ref 99–110)
CO2: 16 MMOL/L (ref 21–32)
CO2: 18 MMOL/L (ref 21–32)
CREAT SERPL-MCNC: 5.4 MG/DL (ref 0.9–1.3)
CREAT SERPL-MCNC: 5.7 MG/DL (ref 0.9–1.3)
DIFFERENTIAL TYPE: ABNORMAL
DIFFERENTIAL TYPE: ABNORMAL
EOSINOPHILS ABSOLUTE: 0 K/CU MM
EOSINOPHILS ABSOLUTE: 0.1 K/CU MM
EOSINOPHILS RELATIVE PERCENT: 0.2 % (ref 0–3)
EOSINOPHILS RELATIVE PERCENT: 0.7 % (ref 0–3)
GFR SERPL CREATININE-BSD FRML MDRD: 9 ML/MIN/1.73M2
GFR SERPL CREATININE-BSD FRML MDRD: 9 ML/MIN/1.73M2
GLUCOSE SERPL-MCNC: 113 MG/DL (ref 70–99)
GLUCOSE SERPL-MCNC: 150 MG/DL (ref 70–99)
HCT VFR BLD CALC: 27.3 % (ref 42–52)
HCT VFR BLD CALC: 28.9 % (ref 42–52)
HCT VFR BLD CALC: 29 % (ref 42–52)
HCT VFR BLD CALC: 34.7 % (ref 42–52)
HEMOCCULT STL QL: POSITIVE
HEMOGLOBIN: 8.4 GM/DL (ref 13.5–18)
HEMOGLOBIN: 8.8 GM/DL (ref 13.5–18)
HEMOGLOBIN: 8.8 GM/DL (ref 13.5–18)
HEMOGLOBIN: 9.5 GM/DL (ref 13.5–18)
IMMATURE NEUTROPHIL %: 1.5 % (ref 0–0.43)
IMMATURE NEUTROPHIL %: 1.6 % (ref 0–0.43)
LYMPHOCYTES ABSOLUTE: 0.7 K/CU MM
LYMPHOCYTES ABSOLUTE: 0.9 K/CU MM
LYMPHOCYTES RELATIVE PERCENT: 8 % (ref 24–44)
LYMPHOCYTES RELATIVE PERCENT: 8.8 % (ref 24–44)
MAGNESIUM: 2.4 MG/DL (ref 1.8–2.4)
MCH RBC QN AUTO: 30 PG (ref 27–31)
MCH RBC QN AUTO: 30.6 PG (ref 27–31)
MCHC RBC AUTO-ENTMCNC: 27.4 % (ref 32–36)
MCHC RBC AUTO-ENTMCNC: 30.4 % (ref 32–36)
MCV RBC AUTO: 100.3 FL (ref 78–100)
MCV RBC AUTO: 109.5 FL (ref 78–100)
MONOCYTES ABSOLUTE: 0.8 K/CU MM
MONOCYTES ABSOLUTE: 1 K/CU MM
MONOCYTES RELATIVE PERCENT: 9 % (ref 0–4)
MONOCYTES RELATIVE PERCENT: 9.7 % (ref 0–4)
NUCLEATED RBC %: 0 %
NUCLEATED RBC %: 0 %
PDW BLD-RTO: 15.8 % (ref 11.7–14.9)
PDW BLD-RTO: 15.9 % (ref 11.7–14.9)
PLATELET # BLD: 245 K/CU MM (ref 140–440)
PLATELET # BLD: 253 K/CU MM (ref 140–440)
PMV BLD AUTO: 10.5 FL (ref 7.5–11.1)
PMV BLD AUTO: 10.5 FL (ref 7.5–11.1)
POTASSIUM SERPL-SCNC: 4.7 MMOL/L (ref 3.5–5.1)
POTASSIUM SERPL-SCNC: 4.7 MMOL/L (ref 3.5–5.1)
RBC # BLD: 2.88 M/CU MM (ref 4.6–6.2)
RBC # BLD: 3.17 M/CU MM (ref 4.6–6.2)
SEGMENTED NEUTROPHILS ABSOLUTE COUNT: 7.4 K/CU MM
SEGMENTED NEUTROPHILS ABSOLUTE COUNT: 8.1 K/CU MM
SEGMENTED NEUTROPHILS RELATIVE PERCENT: 79.6 % (ref 36–66)
SEGMENTED NEUTROPHILS RELATIVE PERCENT: 80.4 % (ref 36–66)
SODIUM BLD-SCNC: 142 MMOL/L (ref 135–145)
SODIUM BLD-SCNC: 143 MMOL/L (ref 135–145)
TOTAL IMMATURE NEUTOROPHIL: 0.15 K/CU MM
TOTAL IMMATURE NEUTOROPHIL: 0.15 K/CU MM
TOTAL NUCLEATED RBC: 0 K/CU MM
TOTAL NUCLEATED RBC: 0 K/CU MM
WBC # BLD: 10.1 K/CU MM (ref 4–10.5)
WBC # BLD: 9.2 K/CU MM (ref 4–10.5)

## 2023-03-20 PROCEDURE — 6360000002 HC RX W HCPCS: Performed by: SPECIALIST

## 2023-03-20 PROCEDURE — 76775 US EXAM ABDO BACK WALL LIM: CPT

## 2023-03-20 PROCEDURE — 85025 COMPLETE CBC W/AUTO DIFF WBC: CPT

## 2023-03-20 PROCEDURE — 74018 RADEX ABDOMEN 1 VIEW: CPT

## 2023-03-20 PROCEDURE — 6370000000 HC RX 637 (ALT 250 FOR IP): Performed by: INTERNAL MEDICINE

## 2023-03-20 PROCEDURE — 2580000003 HC RX 258: Performed by: INTERNAL MEDICINE

## 2023-03-20 PROCEDURE — 85018 HEMOGLOBIN: CPT

## 2023-03-20 PROCEDURE — 6370000000 HC RX 637 (ALT 250 FOR IP): Performed by: STUDENT IN AN ORGANIZED HEALTH CARE EDUCATION/TRAINING PROGRAM

## 2023-03-20 PROCEDURE — 2580000003 HC RX 258: Performed by: STUDENT IN AN ORGANIZED HEALTH CARE EDUCATION/TRAINING PROGRAM

## 2023-03-20 PROCEDURE — 83735 ASSAY OF MAGNESIUM: CPT

## 2023-03-20 PROCEDURE — 6370000000 HC RX 637 (ALT 250 FOR IP): Performed by: PHYSICIAN ASSISTANT

## 2023-03-20 PROCEDURE — 86901 BLOOD TYPING SEROLOGIC RH(D): CPT

## 2023-03-20 PROCEDURE — C9113 INJ PANTOPRAZOLE SODIUM, VIA: HCPCS | Performed by: SPECIALIST

## 2023-03-20 PROCEDURE — 51702 INSERT TEMP BLADDER CATH: CPT

## 2023-03-20 PROCEDURE — 2060000000 HC ICU INTERMEDIATE R&B

## 2023-03-20 PROCEDURE — 6360000002 HC RX W HCPCS: Performed by: STUDENT IN AN ORGANIZED HEALTH CARE EDUCATION/TRAINING PROGRAM

## 2023-03-20 PROCEDURE — 86900 BLOOD TYPING SEROLOGIC ABO: CPT

## 2023-03-20 PROCEDURE — 86850 RBC ANTIBODY SCREEN: CPT

## 2023-03-20 PROCEDURE — 2500000003 HC RX 250 WO HCPCS: Performed by: INTERNAL MEDICINE

## 2023-03-20 PROCEDURE — 85014 HEMATOCRIT: CPT

## 2023-03-20 PROCEDURE — 80048 BASIC METABOLIC PNL TOTAL CA: CPT

## 2023-03-20 PROCEDURE — 36415 COLL VENOUS BLD VENIPUNCTURE: CPT

## 2023-03-20 RX ORDER — SODIUM CHLORIDE, SODIUM LACTATE, POTASSIUM CHLORIDE, CALCIUM CHLORIDE 600; 310; 30; 20 MG/100ML; MG/100ML; MG/100ML; MG/100ML
INJECTION, SOLUTION INTRAVENOUS CONTINUOUS
Status: DISCONTINUED | OUTPATIENT
Start: 2023-03-20 | End: 2023-03-20

## 2023-03-20 RX ADMIN — Medication 2000 UNITS: at 14:31

## 2023-03-20 RX ADMIN — LEVOTHYROXINE SODIUM 100 MCG: 100 TABLET ORAL at 05:57

## 2023-03-20 RX ADMIN — ATORVASTATIN CALCIUM 40 MG: 40 TABLET, FILM COATED ORAL at 21:10

## 2023-03-20 RX ADMIN — PIPERACILLIN AND TAZOBACTAM 3375 MG: 3; .375 INJECTION, POWDER, LYOPHILIZED, FOR SOLUTION INTRAVENOUS at 14:58

## 2023-03-20 RX ADMIN — MICONAZOLE NITRATE: 2 POWDER TOPICAL at 21:53

## 2023-03-20 RX ADMIN — LINEZOLID 600 MG: 600 INJECTION, SOLUTION INTRAVENOUS at 01:43

## 2023-03-20 RX ADMIN — SODIUM BICARBONATE: 84 INJECTION, SOLUTION INTRAVENOUS at 14:13

## 2023-03-20 RX ADMIN — LINEZOLID 600 MG: 600 INJECTION, SOLUTION INTRAVENOUS at 14:28

## 2023-03-20 RX ADMIN — SODIUM CHLORIDE, PRESERVATIVE FREE 10 ML: 5 INJECTION INTRAVENOUS at 14:32

## 2023-03-20 RX ADMIN — PANTOPRAZOLE SODIUM 40 MG: 40 INJECTION, POWDER, FOR SOLUTION INTRAVENOUS at 21:12

## 2023-03-20 RX ADMIN — TAMSULOSIN HYDROCHLORIDE 0.8 MG: 0.4 CAPSULE ORAL at 21:10

## 2023-03-20 RX ADMIN — FINASTERIDE 5 MG: 5 TABLET, FILM COATED ORAL at 14:31

## 2023-03-20 RX ADMIN — PIPERACILLIN AND TAZOBACTAM 3375 MG: 3; .375 INJECTION, POWDER, LYOPHILIZED, FOR SOLUTION INTRAVENOUS at 01:37

## 2023-03-20 RX ADMIN — LINEZOLID 600 MG: 600 INJECTION, SOLUTION INTRAVENOUS at 23:48

## 2023-03-20 RX ADMIN — PANTOPRAZOLE SODIUM 40 MG: 40 INJECTION, POWDER, FOR SOLUTION INTRAVENOUS at 14:11

## 2023-03-20 RX ADMIN — DOCUSATE SODIUM 50MG AND SENNOSIDES 8.6MG 2 TABLET: 8.6; 5 TABLET, FILM COATED ORAL at 14:31

## 2023-03-20 RX ADMIN — POLYETHYLENE GLYCOL 3350 17 G: 17 POWDER, FOR SOLUTION ORAL at 14:31

## 2023-03-20 ASSESSMENT — ENCOUNTER SYMPTOMS
SINUS PAIN: 0
COUGH: 1
VOICE CHANGE: 0
COLOR CHANGE: 0
NAUSEA: 1
ABDOMINAL PAIN: 0
CONSTIPATION: 0
DIARRHEA: 0
WHEEZING: 0
CHEST TIGHTNESS: 0
SORE THROAT: 0
VOMITING: 1
TROUBLE SWALLOWING: 0
SINUS PRESSURE: 0
BACK PAIN: 0

## 2023-03-20 NOTE — PLAN OF CARE
Problem: Discharge Planning  Goal: Discharge to home or other facility with appropriate resources  3/20/2023 1129 by Kathryn Cantor RN  Outcome: Progressing  3/20/2023 0010 by Santos Soliman RN  Outcome: Progressing     Problem: Pain  Goal: Verbalizes/displays adequate comfort level or baseline comfort level  3/20/2023 1129 by Kathryn Cantor RN  Outcome: Progressing  3/20/2023 0010 by Santos Soliman RN  Outcome: Progressing

## 2023-03-21 LAB
ANION GAP SERPL CALCULATED.3IONS-SCNC: 16 MMOL/L (ref 4–16)
BASOPHILS ABSOLUTE: 0 K/CU MM
BASOPHILS RELATIVE PERCENT: 0.3 % (ref 0–1)
BUN SERPL-MCNC: 89 MG/DL (ref 6–23)
CALCIUM SERPL-MCNC: 8.1 MG/DL (ref 8.3–10.6)
CHLORIDE BLD-SCNC: 105 MMOL/L (ref 99–110)
CO2: 22 MMOL/L (ref 21–32)
CREAT SERPL-MCNC: 6.1 MG/DL (ref 0.9–1.3)
CULTURE: NORMAL
DIFFERENTIAL TYPE: ABNORMAL
EOSINOPHILS ABSOLUTE: 0.2 K/CU MM
EOSINOPHILS RELATIVE PERCENT: 2 % (ref 0–3)
GFR SERPL CREATININE-BSD FRML MDRD: 8 ML/MIN/1.73M2
GLUCOSE SERPL-MCNC: 162 MG/DL (ref 70–99)
HCT VFR BLD CALC: 25.6 % (ref 42–52)
HCT VFR BLD CALC: 27.5 % (ref 42–52)
HCT VFR BLD CALC: 27.7 % (ref 42–52)
HEMOGLOBIN: 8.1 GM/DL (ref 13.5–18)
HEMOGLOBIN: 8.5 GM/DL (ref 13.5–18)
HEMOGLOBIN: 8.6 GM/DL (ref 13.5–18)
IMMATURE NEUTROPHIL %: 2 % (ref 0–0.43)
LYMPHOCYTES ABSOLUTE: 0.8 K/CU MM
LYMPHOCYTES RELATIVE PERCENT: 8.1 % (ref 24–44)
Lab: NORMAL
MCH RBC QN AUTO: 29.5 PG (ref 27–31)
MCHC RBC AUTO-ENTMCNC: 30.9 % (ref 32–36)
MCV RBC AUTO: 95.5 FL (ref 78–100)
MONOCYTES ABSOLUTE: 0.7 K/CU MM
MONOCYTES RELATIVE PERCENT: 7.5 % (ref 0–4)
NUCLEATED RBC %: 0.2 %
PDW BLD-RTO: 15.2 % (ref 11.7–14.9)
PLATELET # BLD: 242 K/CU MM (ref 140–440)
PMV BLD AUTO: 10 FL (ref 7.5–11.1)
POTASSIUM SERPL-SCNC: 3.8 MMOL/L (ref 3.5–5.1)
RBC # BLD: 2.88 M/CU MM (ref 4.6–6.2)
SEGMENTED NEUTROPHILS ABSOLUTE COUNT: 7.9 K/CU MM
SEGMENTED NEUTROPHILS RELATIVE PERCENT: 80.1 % (ref 36–66)
SODIUM BLD-SCNC: 143 MMOL/L (ref 135–145)
SPECIMEN: NORMAL
TOTAL IMMATURE NEUTOROPHIL: 0.2 K/CU MM
TOTAL NUCLEATED RBC: 0 K/CU MM
WBC # BLD: 9.8 K/CU MM (ref 4–10.5)

## 2023-03-21 PROCEDURE — 6370000000 HC RX 637 (ALT 250 FOR IP): Performed by: PHYSICIAN ASSISTANT

## 2023-03-21 PROCEDURE — 2060000000 HC ICU INTERMEDIATE R&B

## 2023-03-21 PROCEDURE — 6360000002 HC RX W HCPCS: Performed by: STUDENT IN AN ORGANIZED HEALTH CARE EDUCATION/TRAINING PROGRAM

## 2023-03-21 PROCEDURE — 85025 COMPLETE CBC W/AUTO DIFF WBC: CPT

## 2023-03-21 PROCEDURE — 80048 BASIC METABOLIC PNL TOTAL CA: CPT

## 2023-03-21 PROCEDURE — 2580000003 HC RX 258: Performed by: INTERNAL MEDICINE

## 2023-03-21 PROCEDURE — 85014 HEMATOCRIT: CPT

## 2023-03-21 PROCEDURE — 2580000003 HC RX 258: Performed by: STUDENT IN AN ORGANIZED HEALTH CARE EDUCATION/TRAINING PROGRAM

## 2023-03-21 PROCEDURE — 36415 COLL VENOUS BLD VENIPUNCTURE: CPT

## 2023-03-21 PROCEDURE — 85018 HEMOGLOBIN: CPT

## 2023-03-21 PROCEDURE — 6370000000 HC RX 637 (ALT 250 FOR IP): Performed by: INTERNAL MEDICINE

## 2023-03-21 PROCEDURE — 99211 OFF/OP EST MAY X REQ PHY/QHP: CPT

## 2023-03-21 PROCEDURE — 6370000000 HC RX 637 (ALT 250 FOR IP): Performed by: STUDENT IN AN ORGANIZED HEALTH CARE EDUCATION/TRAINING PROGRAM

## 2023-03-21 PROCEDURE — 94761 N-INVAS EAR/PLS OXIMETRY MLT: CPT

## 2023-03-21 PROCEDURE — C9113 INJ PANTOPRAZOLE SODIUM, VIA: HCPCS | Performed by: SPECIALIST

## 2023-03-21 PROCEDURE — 2500000003 HC RX 250 WO HCPCS: Performed by: STUDENT IN AN ORGANIZED HEALTH CARE EDUCATION/TRAINING PROGRAM

## 2023-03-21 PROCEDURE — 2500000003 HC RX 250 WO HCPCS: Performed by: INTERNAL MEDICINE

## 2023-03-21 PROCEDURE — 6360000002 HC RX W HCPCS: Performed by: SPECIALIST

## 2023-03-21 RX ADMIN — DOCUSATE SODIUM 50MG AND SENNOSIDES 8.6MG 2 TABLET: 8.6; 5 TABLET, FILM COATED ORAL at 09:41

## 2023-03-21 RX ADMIN — PANTOPRAZOLE SODIUM 40 MG: 40 INJECTION, POWDER, FOR SOLUTION INTRAVENOUS at 09:41

## 2023-03-21 RX ADMIN — SODIUM BICARBONATE: 84 INJECTION, SOLUTION INTRAVENOUS at 02:11

## 2023-03-21 RX ADMIN — POLYETHYLENE GLYCOL 3350 17 G: 17 POWDER, FOR SOLUTION ORAL at 09:42

## 2023-03-21 RX ADMIN — PANTOPRAZOLE SODIUM 40 MG: 40 INJECTION, POWDER, FOR SOLUTION INTRAVENOUS at 19:54

## 2023-03-21 RX ADMIN — SODIUM CHLORIDE, PRESERVATIVE FREE 10 ML: 5 INJECTION INTRAVENOUS at 09:43

## 2023-03-21 RX ADMIN — PIPERACILLIN AND TAZOBACTAM 3375 MG: 3; .375 INJECTION, POWDER, LYOPHILIZED, FOR SOLUTION INTRAVENOUS at 02:10

## 2023-03-21 RX ADMIN — OLANZAPINE 5 MG: 10 INJECTION, POWDER, FOR SOLUTION INTRAMUSCULAR at 19:52

## 2023-03-21 RX ADMIN — LEVOTHYROXINE SODIUM 100 MCG: 100 TABLET ORAL at 05:24

## 2023-03-21 RX ADMIN — FINASTERIDE 5 MG: 5 TABLET, FILM COATED ORAL at 09:42

## 2023-03-21 RX ADMIN — Medication 2000 UNITS: at 09:44

## 2023-03-21 RX ADMIN — TAMSULOSIN HYDROCHLORIDE 0.8 MG: 0.4 CAPSULE ORAL at 19:53

## 2023-03-21 RX ADMIN — LINEZOLID 600 MG: 600 INJECTION, SOLUTION INTRAVENOUS at 13:20

## 2023-03-21 RX ADMIN — ATORVASTATIN CALCIUM 40 MG: 40 TABLET, FILM COATED ORAL at 19:54

## 2023-03-21 RX ADMIN — PIPERACILLIN AND TAZOBACTAM 3375 MG: 3; .375 INJECTION, POWDER, LYOPHILIZED, FOR SOLUTION INTRAVENOUS at 17:33

## 2023-03-21 RX ADMIN — MICONAZOLE NITRATE: 2 POWDER TOPICAL at 09:43

## 2023-03-21 RX ADMIN — OLANZAPINE 2.5 MG: 10 INJECTION, POWDER, FOR SOLUTION INTRAMUSCULAR at 09:44

## 2023-03-21 RX ADMIN — SODIUM CHLORIDE, PRESERVATIVE FREE 10 ML: 5 INJECTION INTRAVENOUS at 22:25

## 2023-03-21 RX ADMIN — OLANZAPINE 5 MG: 10 INJECTION, POWDER, FOR SOLUTION INTRAMUSCULAR at 01:17

## 2023-03-21 RX ADMIN — MICONAZOLE NITRATE: 2 POWDER TOPICAL at 22:25

## 2023-03-21 ASSESSMENT — PAIN SCALES - WONG BAKER: WONGBAKER_NUMERICALRESPONSE: 4

## 2023-03-22 LAB
ANION GAP SERPL CALCULATED.3IONS-SCNC: 16 MMOL/L (ref 4–16)
BASOPHILS ABSOLUTE: 0 K/CU MM
BASOPHILS RELATIVE PERCENT: 0.5 % (ref 0–1)
BUN SERPL-MCNC: 78 MG/DL (ref 6–23)
CALCIUM SERPL-MCNC: 7.7 MG/DL (ref 8.3–10.6)
CHLORIDE BLD-SCNC: 103 MMOL/L (ref 99–110)
CO2: 27 MMOL/L (ref 21–32)
CREAT SERPL-MCNC: 6 MG/DL (ref 0.9–1.3)
DIFFERENTIAL TYPE: ABNORMAL
EOSINOPHILS ABSOLUTE: 0.2 K/CU MM
EOSINOPHILS RELATIVE PERCENT: 3 % (ref 0–3)
GFR SERPL CREATININE-BSD FRML MDRD: 8 ML/MIN/1.73M2
GLUCOSE SERPL-MCNC: 135 MG/DL (ref 70–99)
HCT VFR BLD CALC: 25.1 % (ref 42–52)
HCT VFR BLD CALC: 27.7 % (ref 42–52)
HCT VFR BLD CALC: 32.5 % (ref 42–52)
HEMOGLOBIN: 7.8 GM/DL (ref 13.5–18)
HEMOGLOBIN: 8.7 GM/DL (ref 13.5–18)
HEMOGLOBIN: 9.5 GM/DL (ref 13.5–18)
IMMATURE NEUTROPHIL %: 2.8 % (ref 0–0.43)
LYMPHOCYTES ABSOLUTE: 0.8 K/CU MM
LYMPHOCYTES RELATIVE PERCENT: 10.6 % (ref 24–44)
MAGNESIUM: 2 MG/DL (ref 1.8–2.4)
MCH RBC QN AUTO: 28.9 PG (ref 27–31)
MCHC RBC AUTO-ENTMCNC: 31.1 % (ref 32–36)
MCV RBC AUTO: 93 FL (ref 78–100)
MONOCYTES ABSOLUTE: 0.8 K/CU MM
MONOCYTES RELATIVE PERCENT: 9.8 % (ref 0–4)
NUCLEATED RBC %: 0 %
PDW BLD-RTO: 14.7 % (ref 11.7–14.9)
PLATELET # BLD: 230 K/CU MM (ref 140–440)
PMV BLD AUTO: 10 FL (ref 7.5–11.1)
POTASSIUM SERPL-SCNC: 3.4 MMOL/L (ref 3.5–5.1)
PROCALCITONIN SERPL-MCNC: 11.11 NG/ML
RBC # BLD: 2.7 M/CU MM (ref 4.6–6.2)
SEGMENTED NEUTROPHILS ABSOLUTE COUNT: 5.8 K/CU MM
SEGMENTED NEUTROPHILS RELATIVE PERCENT: 73.3 % (ref 36–66)
SODIUM BLD-SCNC: 146 MMOL/L (ref 135–145)
TOTAL IMMATURE NEUTOROPHIL: 0.22 K/CU MM
TOTAL NUCLEATED RBC: 0 K/CU MM
WBC # BLD: 7.9 K/CU MM (ref 4–10.5)

## 2023-03-22 PROCEDURE — 6370000000 HC RX 637 (ALT 250 FOR IP): Performed by: PHYSICIAN ASSISTANT

## 2023-03-22 PROCEDURE — 6370000000 HC RX 637 (ALT 250 FOR IP): Performed by: NURSE PRACTITIONER

## 2023-03-22 PROCEDURE — 6370000000 HC RX 637 (ALT 250 FOR IP): Performed by: FAMILY MEDICINE

## 2023-03-22 PROCEDURE — 85014 HEMATOCRIT: CPT

## 2023-03-22 PROCEDURE — 6360000002 HC RX W HCPCS: Performed by: STUDENT IN AN ORGANIZED HEALTH CARE EDUCATION/TRAINING PROGRAM

## 2023-03-22 PROCEDURE — 6370000000 HC RX 637 (ALT 250 FOR IP): Performed by: STUDENT IN AN ORGANIZED HEALTH CARE EDUCATION/TRAINING PROGRAM

## 2023-03-22 PROCEDURE — 94761 N-INVAS EAR/PLS OXIMETRY MLT: CPT

## 2023-03-22 PROCEDURE — 2580000003 HC RX 258: Performed by: INTERNAL MEDICINE

## 2023-03-22 PROCEDURE — 6360000002 HC RX W HCPCS: Performed by: INTERNAL MEDICINE

## 2023-03-22 PROCEDURE — 97535 SELF CARE MNGMENT TRAINING: CPT

## 2023-03-22 PROCEDURE — 36415 COLL VENOUS BLD VENIPUNCTURE: CPT

## 2023-03-22 PROCEDURE — 97530 THERAPEUTIC ACTIVITIES: CPT

## 2023-03-22 PROCEDURE — 6370000000 HC RX 637 (ALT 250 FOR IP): Performed by: EMERGENCY MEDICINE

## 2023-03-22 PROCEDURE — C9113 INJ PANTOPRAZOLE SODIUM, VIA: HCPCS | Performed by: SPECIALIST

## 2023-03-22 PROCEDURE — 87205 SMEAR GRAM STAIN: CPT

## 2023-03-22 PROCEDURE — 83735 ASSAY OF MAGNESIUM: CPT

## 2023-03-22 PROCEDURE — 80048 BASIC METABOLIC PNL TOTAL CA: CPT

## 2023-03-22 PROCEDURE — 92526 ORAL FUNCTION THERAPY: CPT | Performed by: SPEECH-LANGUAGE PATHOLOGIST

## 2023-03-22 PROCEDURE — 87899 AGENT NOS ASSAY W/OPTIC: CPT

## 2023-03-22 PROCEDURE — 6370000000 HC RX 637 (ALT 250 FOR IP): Performed by: INTERNAL MEDICINE

## 2023-03-22 PROCEDURE — 87070 CULTURE OTHR SPECIMN AEROBIC: CPT

## 2023-03-22 PROCEDURE — 6360000002 HC RX W HCPCS: Performed by: SPECIALIST

## 2023-03-22 PROCEDURE — 85018 HEMOGLOBIN: CPT

## 2023-03-22 PROCEDURE — 2580000003 HC RX 258: Performed by: STUDENT IN AN ORGANIZED HEALTH CARE EDUCATION/TRAINING PROGRAM

## 2023-03-22 PROCEDURE — 2060000000 HC ICU INTERMEDIATE R&B

## 2023-03-22 PROCEDURE — 87449 NOS EACH ORGANISM AG IA: CPT

## 2023-03-22 PROCEDURE — 76937 US GUIDE VASCULAR ACCESS: CPT

## 2023-03-22 PROCEDURE — 85025 COMPLETE CBC W/AUTO DIFF WBC: CPT

## 2023-03-22 PROCEDURE — 84145 PROCALCITONIN (PCT): CPT

## 2023-03-22 RX ORDER — OLANZAPINE 10 MG/1
5 INJECTION, POWDER, LYOPHILIZED, FOR SOLUTION INTRAMUSCULAR DAILY PRN
Status: DISCONTINUED | OUTPATIENT
Start: 2023-03-22 | End: 2023-03-28 | Stop reason: HOSPADM

## 2023-03-22 RX ADMIN — LEVOTHYROXINE SODIUM 100 MCG: 100 TABLET ORAL at 05:30

## 2023-03-22 RX ADMIN — POLYETHYLENE GLYCOL 3350 17 G: 17 POWDER, FOR SOLUTION ORAL at 08:46

## 2023-03-22 RX ADMIN — LINEZOLID 600 MG: 600 INJECTION, SOLUTION INTRAVENOUS at 00:14

## 2023-03-22 RX ADMIN — PANTOPRAZOLE SODIUM 40 MG: 40 INJECTION, POWDER, FOR SOLUTION INTRAVENOUS at 22:37

## 2023-03-22 RX ADMIN — PANTOPRAZOLE SODIUM 40 MG: 40 INJECTION, POWDER, FOR SOLUTION INTRAVENOUS at 08:44

## 2023-03-22 RX ADMIN — ACETAMINOPHEN 650 MG: 325 TABLET ORAL at 03:30

## 2023-03-22 RX ADMIN — QUETIAPINE FUMARATE 50 MG: 25 TABLET ORAL at 22:36

## 2023-03-22 RX ADMIN — QUETIAPINE FUMARATE 25 MG: 25 TABLET ORAL at 14:41

## 2023-03-22 RX ADMIN — AMLODIPINE BESYLATE 5 MG: 5 TABLET ORAL at 08:45

## 2023-03-22 RX ADMIN — MICONAZOLE NITRATE: 2 POWDER TOPICAL at 22:37

## 2023-03-22 RX ADMIN — DOCUSATE SODIUM 50MG AND SENNOSIDES 8.6MG 2 TABLET: 8.6; 5 TABLET, FILM COATED ORAL at 08:44

## 2023-03-22 RX ADMIN — TAMSULOSIN HYDROCHLORIDE 0.8 MG: 0.4 CAPSULE ORAL at 22:36

## 2023-03-22 RX ADMIN — POTASSIUM CHLORIDE: 2 INJECTION, SOLUTION, CONCENTRATE INTRAVENOUS at 06:39

## 2023-03-22 RX ADMIN — ATORVASTATIN CALCIUM 40 MG: 40 TABLET, FILM COATED ORAL at 22:36

## 2023-03-22 RX ADMIN — PIPERACILLIN AND TAZOBACTAM 3375 MG: 3; .375 INJECTION, POWDER, LYOPHILIZED, FOR SOLUTION INTRAVENOUS at 03:29

## 2023-03-22 RX ADMIN — FINASTERIDE 5 MG: 5 TABLET, FILM COATED ORAL at 08:45

## 2023-03-22 RX ADMIN — ESCITALOPRAM OXALATE 10 MG: 10 TABLET, FILM COATED ORAL at 08:44

## 2023-03-22 RX ADMIN — SODIUM CHLORIDE, PRESERVATIVE FREE 10 ML: 5 INJECTION INTRAVENOUS at 23:27

## 2023-03-22 RX ADMIN — Medication 2000 UNITS: at 08:54

## 2023-03-22 RX ADMIN — PIPERACILLIN AND TAZOBACTAM 3375 MG: 3; .375 INJECTION, POWDER, LYOPHILIZED, FOR SOLUTION INTRAVENOUS at 14:46

## 2023-03-22 RX ADMIN — MICONAZOLE NITRATE: 2 POWDER TOPICAL at 08:53

## 2023-03-22 RX ADMIN — SODIUM CHLORIDE, PRESERVATIVE FREE 10 ML: 5 INJECTION INTRAVENOUS at 08:46

## 2023-03-22 RX ADMIN — HYDROCODONE BITARTRATE AND ACETAMINOPHEN 1 TABLET: 5; 325 TABLET ORAL at 01:24

## 2023-03-22 ASSESSMENT — PAIN DESCRIPTION - LOCATION
LOCATION: HIP
LOCATION: HEAD

## 2023-03-22 ASSESSMENT — PAIN SCALES - WONG BAKER
WONGBAKER_NUMERICALRESPONSE: 0
WONGBAKER_NUMERICALRESPONSE: 4

## 2023-03-22 ASSESSMENT — PAIN SCALES - GENERAL
PAINLEVEL_OUTOF10: 4
PAINLEVEL_OUTOF10: 5
PAINLEVEL_OUTOF10: 3
PAINLEVEL_OUTOF10: 5

## 2023-03-22 ASSESSMENT — PAIN DESCRIPTION - DESCRIPTORS: DESCRIPTORS: ACHING

## 2023-03-22 ASSESSMENT — PAIN DESCRIPTION - PAIN TYPE: TYPE: CHRONIC PAIN;SURGICAL PAIN

## 2023-03-22 ASSESSMENT — PAIN DESCRIPTION - FREQUENCY: FREQUENCY: CONTINUOUS

## 2023-03-22 ASSESSMENT — PAIN DESCRIPTION - ORIENTATION
ORIENTATION: RIGHT

## 2023-03-22 ASSESSMENT — PAIN DESCRIPTION - ONSET: ONSET: ON-GOING

## 2023-03-22 ASSESSMENT — PAIN - FUNCTIONAL ASSESSMENT: PAIN_FUNCTIONAL_ASSESSMENT: ACTIVITIES ARE NOT PREVENTED

## 2023-03-23 ENCOUNTER — APPOINTMENT (OUTPATIENT)
Dept: GENERAL RADIOLOGY | Age: 88
DRG: 553 | End: 2023-03-23
Payer: OTHER GOVERNMENT

## 2023-03-23 ENCOUNTER — ANESTHESIA (OUTPATIENT)
Dept: ENDOSCOPY | Age: 88
DRG: 553 | End: 2023-03-23
Payer: OTHER GOVERNMENT

## 2023-03-23 ENCOUNTER — ANESTHESIA EVENT (OUTPATIENT)
Dept: ENDOSCOPY | Age: 88
DRG: 553 | End: 2023-03-23
Payer: OTHER GOVERNMENT

## 2023-03-23 LAB
ALBUMIN SERPL-MCNC: 3 GM/DL (ref 3.4–5)
ALBUMIN SERPL-MCNC: 3 GM/DL (ref 3.4–5)
ALP BLD-CCNC: 75 IU/L (ref 40–129)
ALP BLD-CCNC: 75 IU/L (ref 40–129)
ALT SERPL-CCNC: 6 U/L (ref 10–40)
ALT SERPL-CCNC: 6 U/L (ref 10–40)
ANION GAP SERPL CALCULATED.3IONS-SCNC: 14 MMOL/L (ref 4–16)
ANION GAP SERPL CALCULATED.3IONS-SCNC: 14 MMOL/L (ref 4–16)
APTT: 44.2 SECONDS (ref 25.1–37.1)
AST SERPL-CCNC: 13 IU/L (ref 15–37)
AST SERPL-CCNC: 13 IU/L (ref 15–37)
BILIRUB SERPL-MCNC: 0.3 MG/DL (ref 0–1)
BILIRUB SERPL-MCNC: 0.3 MG/DL (ref 0–1)
BILIRUBIN DIRECT: 0.2 MG/DL (ref 0–0.3)
BILIRUBIN, INDIRECT: 0.1 MG/DL (ref 0–0.7)
BUN SERPL-MCNC: 77 MG/DL (ref 6–23)
BUN SERPL-MCNC: 79 MG/DL (ref 6–23)
CALCIUM SERPL-MCNC: 7.7 MG/DL (ref 8.3–10.6)
CALCIUM SERPL-MCNC: 7.7 MG/DL (ref 8.3–10.6)
CHLORIDE BLD-SCNC: 102 MMOL/L (ref 99–110)
CHLORIDE BLD-SCNC: 97 MMOL/L (ref 99–110)
CO2: 24 MMOL/L (ref 21–32)
CO2: 25 MMOL/L (ref 21–32)
CREAT SERPL-MCNC: 6.3 MG/DL (ref 0.9–1.3)
CREAT SERPL-MCNC: 6.4 MG/DL (ref 0.9–1.3)
GFR SERPL CREATININE-BSD FRML MDRD: 8 ML/MIN/1.73M2
GFR SERPL CREATININE-BSD FRML MDRD: 8 ML/MIN/1.73M2
GLUCOSE SERPL-MCNC: 105 MG/DL (ref 70–99)
GLUCOSE SERPL-MCNC: 126 MG/DL (ref 70–99)
HCT VFR BLD CALC: 24.4 % (ref 42–52)
HCT VFR BLD CALC: 26.8 % (ref 42–52)
HEMOGLOBIN: 7.9 GM/DL (ref 13.5–18)
HEMOGLOBIN: 8.5 GM/DL (ref 13.5–18)
INR BLD: 1.12 INDEX
L PNEUMO AG UR QL IA: NEGATIVE
LIPASE: 27 IU/L (ref 13–60)
MCH RBC QN AUTO: 29.6 PG (ref 27–31)
MCHC RBC AUTO-ENTMCNC: 32.4 % (ref 32–36)
MCV RBC AUTO: 91.4 FL (ref 78–100)
PDW BLD-RTO: 14.6 % (ref 11.7–14.9)
PLATELET # BLD: 219 K/CU MM (ref 140–440)
PMV BLD AUTO: 9.7 FL (ref 7.5–11.1)
POTASSIUM SERPL-SCNC: 3.6 MMOL/L (ref 3.5–5.1)
POTASSIUM SERPL-SCNC: 4 MMOL/L (ref 3.5–5.1)
PROTHROMBIN TIME: 14.4 SECONDS (ref 11.7–14.5)
RBC # BLD: 2.67 M/CU MM (ref 4.6–6.2)
S PNEUM AG CSF QL: NORMAL
SODIUM BLD-SCNC: 135 MMOL/L (ref 135–145)
SODIUM BLD-SCNC: 141 MMOL/L (ref 135–145)
TOTAL PROTEIN: 5.5 GM/DL (ref 6.4–8.2)
TOTAL PROTEIN: 5.5 GM/DL (ref 6.4–8.2)
WBC # BLD: 10.7 K/CU MM (ref 4–10.5)

## 2023-03-23 PROCEDURE — 80048 BASIC METABOLIC PNL TOTAL CA: CPT

## 2023-03-23 PROCEDURE — 6370000000 HC RX 637 (ALT 250 FOR IP): Performed by: PHYSICIAN ASSISTANT

## 2023-03-23 PROCEDURE — 0DJ08ZZ INSPECTION OF UPPER INTESTINAL TRACT, VIA NATURAL OR ARTIFICIAL OPENING ENDOSCOPIC: ICD-10-PCS | Performed by: SPECIALIST

## 2023-03-23 PROCEDURE — 94761 N-INVAS EAR/PLS OXIMETRY MLT: CPT

## 2023-03-23 PROCEDURE — 83690 ASSAY OF LIPASE: CPT

## 2023-03-23 PROCEDURE — 2580000003 HC RX 258: Performed by: STUDENT IN AN ORGANIZED HEALTH CARE EDUCATION/TRAINING PROGRAM

## 2023-03-23 PROCEDURE — 73502 X-RAY EXAM HIP UNI 2-3 VIEWS: CPT

## 2023-03-23 PROCEDURE — 3609017100 HC EGD: Performed by: SPECIALIST

## 2023-03-23 PROCEDURE — 6370000000 HC RX 637 (ALT 250 FOR IP): Performed by: FAMILY MEDICINE

## 2023-03-23 PROCEDURE — 6370000000 HC RX 637 (ALT 250 FOR IP): Performed by: EMERGENCY MEDICINE

## 2023-03-23 PROCEDURE — 85027 COMPLETE CBC AUTOMATED: CPT

## 2023-03-23 PROCEDURE — 80053 COMPREHEN METABOLIC PANEL: CPT

## 2023-03-23 PROCEDURE — 85014 HEMATOCRIT: CPT

## 2023-03-23 PROCEDURE — 2580000003 HC RX 258: Performed by: INTERNAL MEDICINE

## 2023-03-23 PROCEDURE — 2709999900 HC NON-CHARGEABLE SUPPLY: Performed by: SPECIALIST

## 2023-03-23 PROCEDURE — 6360000002 HC RX W HCPCS: Performed by: STUDENT IN AN ORGANIZED HEALTH CARE EDUCATION/TRAINING PROGRAM

## 2023-03-23 PROCEDURE — 6370000000 HC RX 637 (ALT 250 FOR IP): Performed by: INTERNAL MEDICINE

## 2023-03-23 PROCEDURE — 6370000000 HC RX 637 (ALT 250 FOR IP): Performed by: NURSE PRACTITIONER

## 2023-03-23 PROCEDURE — 3700000000 HC ANESTHESIA ATTENDED CARE: Performed by: SPECIALIST

## 2023-03-23 PROCEDURE — 2060000000 HC ICU INTERMEDIATE R&B

## 2023-03-23 PROCEDURE — 3700000001 HC ADD 15 MINUTES (ANESTHESIA): Performed by: SPECIALIST

## 2023-03-23 PROCEDURE — 80076 HEPATIC FUNCTION PANEL: CPT

## 2023-03-23 PROCEDURE — 36415 COLL VENOUS BLD VENIPUNCTURE: CPT

## 2023-03-23 PROCEDURE — 85018 HEMOGLOBIN: CPT

## 2023-03-23 PROCEDURE — 85610 PROTHROMBIN TIME: CPT

## 2023-03-23 PROCEDURE — C9113 INJ PANTOPRAZOLE SODIUM, VIA: HCPCS | Performed by: SPECIALIST

## 2023-03-23 PROCEDURE — 85730 THROMBOPLASTIN TIME PARTIAL: CPT

## 2023-03-23 PROCEDURE — 6360000002 HC RX W HCPCS: Performed by: SPECIALIST

## 2023-03-23 RX ORDER — PANTOPRAZOLE SODIUM 40 MG/10ML
40 INJECTION, POWDER, LYOPHILIZED, FOR SOLUTION INTRAVENOUS DAILY
Status: DISCONTINUED | OUTPATIENT
Start: 2023-03-24 | End: 2023-03-28 | Stop reason: HOSPADM

## 2023-03-23 RX ORDER — PROPOFOL 10 MG/ML
INJECTION, EMULSION INTRAVENOUS PRN
Status: DISCONTINUED | OUTPATIENT
Start: 2023-03-23 | End: 2023-03-23 | Stop reason: SDUPTHER

## 2023-03-23 RX ORDER — SODIUM CHLORIDE 9 MG/ML
INJECTION, SOLUTION INTRAVENOUS CONTINUOUS PRN
Status: DISCONTINUED | OUTPATIENT
Start: 2023-03-23 | End: 2023-03-23 | Stop reason: SDUPTHER

## 2023-03-23 RX ADMIN — MICONAZOLE NITRATE: 2 POWDER TOPICAL at 09:06

## 2023-03-23 RX ADMIN — PROPOFOL 50 MG: 10 INJECTION, EMULSION INTRAVENOUS at 13:58

## 2023-03-23 RX ADMIN — SODIUM CHLORIDE, PRESERVATIVE FREE 10 ML: 5 INJECTION INTRAVENOUS at 20:31

## 2023-03-23 RX ADMIN — PIPERACILLIN AND TAZOBACTAM 3375 MG: 3; .375 INJECTION, POWDER, LYOPHILIZED, FOR SOLUTION INTRAVENOUS at 03:16

## 2023-03-23 RX ADMIN — QUETIAPINE FUMARATE 50 MG: 25 TABLET ORAL at 20:31

## 2023-03-23 RX ADMIN — TAMSULOSIN HYDROCHLORIDE 0.8 MG: 0.4 CAPSULE ORAL at 20:31

## 2023-03-23 RX ADMIN — PANTOPRAZOLE SODIUM 40 MG: 40 INJECTION, POWDER, FOR SOLUTION INTRAVENOUS at 09:05

## 2023-03-23 RX ADMIN — ATORVASTATIN CALCIUM 40 MG: 40 TABLET, FILM COATED ORAL at 20:31

## 2023-03-23 RX ADMIN — HYDROCODONE BITARTRATE AND ACETAMINOPHEN 1 TABLET: 5; 325 TABLET ORAL at 03:15

## 2023-03-23 RX ADMIN — PROPOFOL 50 MG: 10 INJECTION, EMULSION INTRAVENOUS at 14:05

## 2023-03-23 RX ADMIN — MICONAZOLE NITRATE: 2 POWDER TOPICAL at 20:32

## 2023-03-23 RX ADMIN — SODIUM CHLORIDE: 9 INJECTION, SOLUTION INTRAVENOUS at 13:53

## 2023-03-23 RX ADMIN — LEVOTHYROXINE SODIUM 100 MCG: 100 TABLET ORAL at 06:31

## 2023-03-23 RX ADMIN — PIPERACILLIN AND TAZOBACTAM 3375 MG: 3; .375 INJECTION, POWDER, LYOPHILIZED, FOR SOLUTION INTRAVENOUS at 15:28

## 2023-03-23 RX ADMIN — SODIUM CHLORIDE, PRESERVATIVE FREE 10 ML: 5 INJECTION INTRAVENOUS at 09:05

## 2023-03-23 ASSESSMENT — PAIN SCALES - GENERAL
PAINLEVEL_OUTOF10: 0
PAINLEVEL_OUTOF10: 4
PAINLEVEL_OUTOF10: 0
PAINLEVEL_OUTOF10: 0

## 2023-03-23 ASSESSMENT — ENCOUNTER SYMPTOMS
VOMITING: 0
VOICE CHANGE: 0
COLOR CHANGE: 0
COUGH: 0
BACK PAIN: 0
SHORTNESS OF BREATH: 0
SORE THROAT: 0
WHEEZING: 0
NAUSEA: 0

## 2023-03-23 NOTE — BRIEF OP NOTE
Brief Postoperative Note      Dm Martinez is a 80 y.o. male     Pre-operative Diagnosis: HEMATEMESIS    Post-operative Diagnosis: S/P NISSEN / MILD GASTRITIS / FOOD PARTICLES NOTED IN THE STOMACH / NO EVIDENCE OF GOO    Procedure: EGD    Anesthesia: MAC    Surgeons/Assistants:Felipe Tristan MD     Estimated Blood Loss: NIL    Complications: None    Specimens: were not obtained      Laurence Paul MD   3/23/2023   2:10 PM

## 2023-03-23 NOTE — ANESTHESIA POSTPROCEDURE EVALUATION
Department of Anesthesiology  Postprocedure Note    Patient: Sundar Hi  MRN: [id number]  YOB: 1930  Date of evaluation: 3/23/2023      Procedure Summary     Date: 03/23/23 Room / Location: [address] [place] [address]    Anesthesia Start: 1353 Anesthesia Stop:     Procedure: EGD DIAGNOSTIC ONLY Diagnosis:       Upper GI bleed      (Upper GI bleed [K92.2])    Surgeons: Katerin Simmons MD Responsible Provider: Brissa Hawthorne MD    Anesthesia Type: MAC ASA Status: 4          Anesthesia Type: No value filed.     Jaison Phase I: Jaison Score: 9    Jaison Phase II:        Anesthesia Post Evaluation    Patient location during evaluation: bedside  Level of consciousness: awake  Airway patency: patent  Nausea & Vomiting: no nausea and no vomiting  Complications: no  Cardiovascular status: hemodynamically stable  Respiratory status: acceptable, spontaneous ventilation, nonlabored ventilation and room air  Hydration status: stable

## 2023-03-23 NOTE — ANESTHESIA PRE PROCEDURE
BP Readings from Last 3 Encounters:   03/23/23 115/63   02/17/23 (!) 174/82   02/17/23 (!) 146/75       NPO Status: Time of last liquid consumption: 2300                        Time of last solid consumption: 2300                        Date of last liquid consumption: 03/22/23                        Date of last solid food consumption: 03/22/23    BMI:   Wt Readings from Last 3 Encounters:   03/20/23 190 lb 14.4 oz (86.6 kg)   02/17/23 204 lb (92.5 kg)   02/16/23 204 lb 9.6 oz (92.8 kg)     Body mass index is 25.89 kg/m². CBC:   Lab Results   Component Value Date/Time    WBC 10.7 03/23/2023 02:18 AM    RBC 2.67 03/23/2023 02:18 AM    HGB 8.5 03/23/2023 10:05 AM    HCT 26.8 03/23/2023 10:05 AM    MCV 91.4 03/23/2023 02:18 AM    RDW 14.6 03/23/2023 02:18 AM     03/23/2023 02:18 AM       CMP:   Lab Results   Component Value Date/Time     03/23/2023 10:09 AM    K 4.0 03/23/2023 10:09 AM     03/23/2023 10:09 AM    CO2 25 03/23/2023 10:09 AM    BUN 77 03/23/2023 10:09 AM    CREATININE 6.4 03/23/2023 10:09 AM    GFRAA 28 10/14/2022 12:10 PM    AGRATIO 1.4 01/21/2021 02:16 PM    LABGLOM 8 03/23/2023 10:09 AM    GLUCOSE 105 03/23/2023 10:09 AM    PROT 5.5 03/23/2023 02:18 AM    PROT 5.5 03/23/2023 02:18 AM    CALCIUM 7.7 03/23/2023 10:09 AM    BILITOT 0.3 03/23/2023 02:18 AM    BILITOT 0.3 03/23/2023 02:18 AM    ALKPHOS 75 03/23/2023 02:18 AM    ALKPHOS 75 03/23/2023 02:18 AM    AST 13 03/23/2023 02:18 AM    AST 13 03/23/2023 02:18 AM    ALT 6 03/23/2023 02:18 AM    ALT 6 03/23/2023 02:18 AM       POC Tests: No results for input(s): POCGLU, POCNA, POCK, POCCL, POCBUN, POCHEMO, POCHCT in the last 72 hours.     Coags:   Lab Results   Component Value Date/Time    PROTIME 14.4 03/23/2023 02:18 AM    INR 1.12 03/23/2023 02:18 AM    APTT 44.2 03/23/2023 02:18 AM       HCG (If Applicable): No results found for: PREGTESTUR, PREGSERUM, HCG, HCGQUANT     ABGs: No results found for: PHART,

## 2023-03-24 LAB
ANION GAP SERPL CALCULATED.3IONS-SCNC: 15 MMOL/L (ref 4–16)
BUN SERPL-MCNC: 75 MG/DL (ref 6–23)
CALCIUM SERPL-MCNC: 8 MG/DL (ref 8.3–10.6)
CHLORIDE BLD-SCNC: 103 MMOL/L (ref 99–110)
CO2: 23 MMOL/L (ref 21–32)
CREAT SERPL-MCNC: 6.9 MG/DL (ref 0.9–1.3)
CULTURE: NORMAL
CULTURE: NORMAL
GFR SERPL CREATININE-BSD FRML MDRD: 7 ML/MIN/1.73M2
GLUCOSE SERPL-MCNC: 98 MG/DL (ref 70–99)
HCT VFR BLD CALC: 26.5 % (ref 42–52)
HEMOGLOBIN: 8.3 GM/DL (ref 13.5–18)
Lab: NORMAL
Lab: NORMAL
MCH RBC QN AUTO: 29.6 PG (ref 27–31)
MCHC RBC AUTO-ENTMCNC: 31.3 % (ref 32–36)
MCV RBC AUTO: 94.6 FL (ref 78–100)
PDW BLD-RTO: 14.9 % (ref 11.7–14.9)
PLATELET # BLD: 237 K/CU MM (ref 140–440)
PMV BLD AUTO: 9.6 FL (ref 7.5–11.1)
POTASSIUM SERPL-SCNC: 3.8 MMOL/L (ref 3.5–5.1)
RBC # BLD: 2.8 M/CU MM (ref 4.6–6.2)
SODIUM BLD-SCNC: 141 MMOL/L (ref 135–145)
SPECIMEN: NORMAL
SPECIMEN: NORMAL
WBC # BLD: 9.7 K/CU MM (ref 4–10.5)

## 2023-03-24 PROCEDURE — 94761 N-INVAS EAR/PLS OXIMETRY MLT: CPT

## 2023-03-24 PROCEDURE — 85014 HEMATOCRIT: CPT

## 2023-03-24 PROCEDURE — 80048 BASIC METABOLIC PNL TOTAL CA: CPT

## 2023-03-24 PROCEDURE — 6370000000 HC RX 637 (ALT 250 FOR IP): Performed by: INTERNAL MEDICINE

## 2023-03-24 PROCEDURE — 6370000000 HC RX 637 (ALT 250 FOR IP): Performed by: FAMILY MEDICINE

## 2023-03-24 PROCEDURE — 6370000000 HC RX 637 (ALT 250 FOR IP): Performed by: PHYSICIAN ASSISTANT

## 2023-03-24 PROCEDURE — 6360000002 HC RX W HCPCS: Performed by: SPECIALIST

## 2023-03-24 PROCEDURE — 6370000000 HC RX 637 (ALT 250 FOR IP): Performed by: EMERGENCY MEDICINE

## 2023-03-24 PROCEDURE — 85018 HEMOGLOBIN: CPT

## 2023-03-24 PROCEDURE — 2580000003 HC RX 258: Performed by: INTERNAL MEDICINE

## 2023-03-24 PROCEDURE — 6370000000 HC RX 637 (ALT 250 FOR IP): Performed by: STUDENT IN AN ORGANIZED HEALTH CARE EDUCATION/TRAINING PROGRAM

## 2023-03-24 PROCEDURE — 6370000000 HC RX 637 (ALT 250 FOR IP): Performed by: NURSE PRACTITIONER

## 2023-03-24 PROCEDURE — 6360000002 HC RX W HCPCS: Performed by: STUDENT IN AN ORGANIZED HEALTH CARE EDUCATION/TRAINING PROGRAM

## 2023-03-24 PROCEDURE — 2580000003 HC RX 258: Performed by: STUDENT IN AN ORGANIZED HEALTH CARE EDUCATION/TRAINING PROGRAM

## 2023-03-24 PROCEDURE — 36415 COLL VENOUS BLD VENIPUNCTURE: CPT

## 2023-03-24 PROCEDURE — 2060000000 HC ICU INTERMEDIATE R&B

## 2023-03-24 PROCEDURE — 85027 COMPLETE CBC AUTOMATED: CPT

## 2023-03-24 PROCEDURE — C9113 INJ PANTOPRAZOLE SODIUM, VIA: HCPCS | Performed by: SPECIALIST

## 2023-03-24 RX ADMIN — Medication 2000 UNITS: at 09:37

## 2023-03-24 RX ADMIN — MICONAZOLE NITRATE: 2 POWDER TOPICAL at 09:38

## 2023-03-24 RX ADMIN — ATORVASTATIN CALCIUM 40 MG: 40 TABLET, FILM COATED ORAL at 20:26

## 2023-03-24 RX ADMIN — HYDROCODONE BITARTRATE AND ACETAMINOPHEN 1 TABLET: 5; 325 TABLET ORAL at 07:59

## 2023-03-24 RX ADMIN — PANTOPRAZOLE SODIUM 40 MG: 40 INJECTION, POWDER, FOR SOLUTION INTRAVENOUS at 13:32

## 2023-03-24 RX ADMIN — FINASTERIDE 5 MG: 5 TABLET, FILM COATED ORAL at 09:37

## 2023-03-24 RX ADMIN — POLYETHYLENE GLYCOL 3350 17 G: 17 POWDER, FOR SOLUTION ORAL at 09:37

## 2023-03-24 RX ADMIN — TAMSULOSIN HYDROCHLORIDE 0.8 MG: 0.4 CAPSULE ORAL at 20:26

## 2023-03-24 RX ADMIN — QUETIAPINE FUMARATE 25 MG: 25 TABLET ORAL at 07:59

## 2023-03-24 RX ADMIN — HYDROCODONE BITARTRATE AND ACETAMINOPHEN 1 TABLET: 5; 325 TABLET ORAL at 20:26

## 2023-03-24 RX ADMIN — MICONAZOLE NITRATE: 2 POWDER TOPICAL at 20:27

## 2023-03-24 RX ADMIN — ESCITALOPRAM OXALATE 10 MG: 10 TABLET, FILM COATED ORAL at 09:55

## 2023-03-24 RX ADMIN — DOCUSATE SODIUM 50MG AND SENNOSIDES 8.6MG 2 TABLET: 8.6; 5 TABLET, FILM COATED ORAL at 09:37

## 2023-03-24 RX ADMIN — QUETIAPINE FUMARATE 50 MG: 25 TABLET ORAL at 20:26

## 2023-03-24 RX ADMIN — QUETIAPINE FUMARATE 25 MG: 25 TABLET ORAL at 13:32

## 2023-03-24 RX ADMIN — LEVOTHYROXINE SODIUM 100 MCG: 100 TABLET ORAL at 06:46

## 2023-03-24 RX ADMIN — AMLODIPINE BESYLATE 5 MG: 5 TABLET ORAL at 09:37

## 2023-03-24 RX ADMIN — PIPERACILLIN AND TAZOBACTAM 3375 MG: 3; .375 INJECTION, POWDER, LYOPHILIZED, FOR SOLUTION INTRAVENOUS at 13:36

## 2023-03-24 RX ADMIN — SODIUM CHLORIDE, PRESERVATIVE FREE 10 ML: 5 INJECTION INTRAVENOUS at 20:27

## 2023-03-24 ASSESSMENT — PAIN SCALES - PAIN ASSESSMENT IN ADVANCED DEMENTIA (PAINAD)
CONSOLABILITY: 1
BODYLANGUAGE: 1
NEGVOCALIZATION: 1
TOTALSCORE: 6
TOTALSCORE: 5
FACIALEXPRESSION: 2
FACIALEXPRESSION: 1
BREATHING: 1
BODYLANGUAGE: 1
CONSOLABILITY: 1
BREATHING: 1
NEGVOCALIZATION: 1

## 2023-03-24 ASSESSMENT — PAIN SCALES - WONG BAKER
WONGBAKER_NUMERICALRESPONSE: 0

## 2023-03-24 ASSESSMENT — PAIN SCALES - GENERAL
PAINLEVEL_OUTOF10: 0
PAINLEVEL_OUTOF10: 0
PAINLEVEL_OUTOF10: 7

## 2023-03-24 ASSESSMENT — PAIN DESCRIPTION - ORIENTATION: ORIENTATION: RIGHT

## 2023-03-24 ASSESSMENT — PAIN DESCRIPTION - LOCATION: LOCATION: HIP

## 2023-03-24 ASSESSMENT — PAIN DESCRIPTION - DESCRIPTORS: DESCRIPTORS: ACHING

## 2023-03-24 ASSESSMENT — PAIN - FUNCTIONAL ASSESSMENT: PAIN_FUNCTIONAL_ASSESSMENT: ACTIVITIES ARE NOT PREVENTED

## 2023-03-24 NOTE — OP NOTE
[address], [facility]                                OPERATIVE REPORT    PATIENT NAME: Anabelle Lawrence                        :        10/18/1930  MED REC NO:   [id number]                          ROOM:         ACCOUNT NO:   [de-identified]                           ADMIT DATE: 2023  PROVIDER:     Emilia Montoya MD    DATE OF PROCEDURE:  2023    PROCEDURE:  EGD. CHIEF COMPLAINT:  History of hematemesis; rule out upper GI bleeding. PREMEDICATION:  Please refer to the anesthesiologist's notes. SURGEON:  Emilia Montyoa MD    PROCEDURE DETAILS:  The patient was placed in the left lateral decubitus  position and the video Olympus gastroscope was introduced in back of  throat and was advanced into the esophagus. ESOPHAGUS:  The mucosa of the esophagus was unremarkable. There was no  evidence of hyperemia, erosion, ulcer, stricture, Blackwood's esophagus or  mass lesion. No difficulty was encountered in advancing the gastroscope  through the GE junction distally into the stomach. STOMACH:  The fundus, cardia, body, antrum, lesser curvature, greater  curvature, and the pyloric regions of the stomach were examined. Small-to-moderate amount of partially digested food particles were seen  in the fundus and cardia of the stomach but no erosion, ulcers or mass  lesions were seen. The gastroscope was retroflexed and postsurgical  changes were noted from previous Nissen fundoplication. DUODENUM:  The first and second portions of the duodenum were examined  and mucosa was unremarkable. No erosion or ulcers were seen. Also,  there was no evidence of gastric outlet obstruction. POSTOPERATIVE DIAGNOSES:  1. Postsurgical changes noted from previous Nissen fundoplication. 2.  Moderate amount of partially digested food particles seen in the  stomach. 3.  Mild superficial gastritis.   4.  No evidence of gastric

## 2023-03-24 NOTE — DISCHARGE INSTR - COC
Ends___ O'Clock 0 03/22/23 2100   Undermining Maxium Distance (cm) 0 03/22/23 2100   Wound Assessment Pink/red 03/23/23 1300   Drainage Amount Scant 03/22/23 2100   Drainage Description Serosanguinous 03/22/23 2100   Odor None 03/23/23 1300   Tiffanie-wound Assessment Edematous; Blisters 03/23/23 1300   Margins Defined edges 03/22/23 2100   Wound Thickness Description not for Pressure Injury Partial thickness 03/22/23 2100   Number of days: 16       Wound 03/07/23 Groin Anterior;Right cluster (Active)   Wound Image   03/14/23 0900   Wound Etiology Other 03/23/23 1300   Dressing Status Clean;Dry; Intact 03/23/23 1300   Wound Cleansed Not Cleansed 03/22/23 2100   Dressing/Treatment Silicone border 69/21/10 1300   Wound Length (cm) 0 cm 03/21/23 1340   Wound Width (cm) 0 cm 03/21/23 1340   Wound Depth (cm) 0 cm 03/21/23 1340   Wound Surface Area (cm^2) 0 cm^2 03/21/23 1340   Change in Wound Size % (l*w) 100 03/21/23 1340   Wound Volume (cm^3) 0 cm^3 03/21/23 1340   Wound Healing % 100 03/21/23 1340   Distance Tunneling (cm) 0 cm 03/22/23 2100   Tunneling Position ___ O'Clock 0 03/22/23 2100   Undermining Starts ___ O'Clock 0 03/22/23 2100   Undermining Ends___ O'Clock 0 03/22/23 2100   Undermining Maxium Distance (cm) 0 03/22/23 2100   Wound Assessment Pink/red;Purple/maroon 03/23/23 1300   Drainage Amount Scant 03/22/23 2100   Drainage Description Serosanguinous 03/22/23 2100   Odor None 03/23/23 1300   Tiffanie-wound Assessment Excoriated 03/23/23 1300   Margins Defined edges 03/22/23 2100   Wound Thickness Description not for Pressure Injury Partial thickness 03/22/23 2100   Number of days: 16       Wound 03/07/23 Groin Anterior; Left and thigh cluster (Active)   Wound Image   03/21/23 1340   Wound Etiology Other 03/23/23 1300   Dressing Status Other (Comment) 03/23/23 1300   Wound Cleansed Not Cleansed 03/22/23 2100   Dressing/Treatment Open to air 03/23/23 1300   Wound Length (cm) 0.6 cm 03/21/23 1340   Wound Width (cm) 0.7

## 2023-03-25 LAB
ANION GAP SERPL CALCULATED.3IONS-SCNC: 17 MMOL/L (ref 4–16)
BUN SERPL-MCNC: 70 MG/DL (ref 6–23)
CALCIUM SERPL-MCNC: 8 MG/DL (ref 8.3–10.6)
CHLORIDE BLD-SCNC: 101 MMOL/L (ref 99–110)
CO2: 22 MMOL/L (ref 21–32)
CREAT SERPL-MCNC: 6.8 MG/DL (ref 0.9–1.3)
CULTURE: NORMAL
GFR SERPL CREATININE-BSD FRML MDRD: 7 ML/MIN/1.73M2
GLUCOSE SERPL-MCNC: 112 MG/DL (ref 70–99)
GRAM SMEAR: NORMAL
HCT VFR BLD CALC: 27.1 % (ref 42–52)
HEMOGLOBIN: 8.5 GM/DL (ref 13.5–18)
Lab: NORMAL
MCH RBC QN AUTO: 29.8 PG (ref 27–31)
MCHC RBC AUTO-ENTMCNC: 31.4 % (ref 32–36)
MCV RBC AUTO: 95.1 FL (ref 78–100)
PDW BLD-RTO: 14.6 % (ref 11.7–14.9)
PLATELET # BLD: 249 K/CU MM (ref 140–440)
PMV BLD AUTO: 10 FL (ref 7.5–11.1)
POTASSIUM SERPL-SCNC: 3.8 MMOL/L (ref 3.5–5.1)
RBC # BLD: 2.85 M/CU MM (ref 4.6–6.2)
SODIUM BLD-SCNC: 140 MMOL/L (ref 135–145)
SPECIMEN: NORMAL
WBC # BLD: 9 K/CU MM (ref 4–10.5)

## 2023-03-25 PROCEDURE — 2580000003 HC RX 258: Performed by: STUDENT IN AN ORGANIZED HEALTH CARE EDUCATION/TRAINING PROGRAM

## 2023-03-25 PROCEDURE — C9113 INJ PANTOPRAZOLE SODIUM, VIA: HCPCS | Performed by: SPECIALIST

## 2023-03-25 PROCEDURE — 6360000002 HC RX W HCPCS: Performed by: STUDENT IN AN ORGANIZED HEALTH CARE EDUCATION/TRAINING PROGRAM

## 2023-03-25 PROCEDURE — 6370000000 HC RX 637 (ALT 250 FOR IP): Performed by: NURSE PRACTITIONER

## 2023-03-25 PROCEDURE — 6370000000 HC RX 637 (ALT 250 FOR IP): Performed by: STUDENT IN AN ORGANIZED HEALTH CARE EDUCATION/TRAINING PROGRAM

## 2023-03-25 PROCEDURE — 36415 COLL VENOUS BLD VENIPUNCTURE: CPT

## 2023-03-25 PROCEDURE — 6370000000 HC RX 637 (ALT 250 FOR IP): Performed by: INTERNAL MEDICINE

## 2023-03-25 PROCEDURE — 80048 BASIC METABOLIC PNL TOTAL CA: CPT

## 2023-03-25 PROCEDURE — 2060000000 HC ICU INTERMEDIATE R&B

## 2023-03-25 PROCEDURE — 94761 N-INVAS EAR/PLS OXIMETRY MLT: CPT

## 2023-03-25 PROCEDURE — 85027 COMPLETE CBC AUTOMATED: CPT

## 2023-03-25 PROCEDURE — 51702 INSERT TEMP BLADDER CATH: CPT

## 2023-03-25 PROCEDURE — 6360000002 HC RX W HCPCS: Performed by: SPECIALIST

## 2023-03-25 PROCEDURE — 2580000003 HC RX 258

## 2023-03-25 PROCEDURE — 2500000003 HC RX 250 WO HCPCS: Performed by: INTERNAL MEDICINE

## 2023-03-25 PROCEDURE — 2580000003 HC RX 258: Performed by: INTERNAL MEDICINE

## 2023-03-25 PROCEDURE — 6370000000 HC RX 637 (ALT 250 FOR IP): Performed by: PHYSICIAN ASSISTANT

## 2023-03-25 PROCEDURE — 6370000000 HC RX 637 (ALT 250 FOR IP): Performed by: FAMILY MEDICINE

## 2023-03-25 PROCEDURE — 6370000000 HC RX 637 (ALT 250 FOR IP): Performed by: EMERGENCY MEDICINE

## 2023-03-25 PROCEDURE — A4216 STERILE WATER/SALINE, 10 ML: HCPCS

## 2023-03-25 RX ORDER — WATER 1000 ML/1000ML
INJECTION, SOLUTION INTRAVENOUS
Status: DISCONTINUED
Start: 2023-03-25 | End: 2023-03-25 | Stop reason: WASHOUT

## 2023-03-25 RX ORDER — SODIUM CHLORIDE 9 MG/ML
INJECTION INTRAVENOUS
Status: COMPLETED
Start: 2023-03-25 | End: 2023-03-25

## 2023-03-25 RX ADMIN — SODIUM CHLORIDE 10 ML: 9 INJECTION, SOLUTION INTRAMUSCULAR; INTRAVENOUS; SUBCUTANEOUS at 18:16

## 2023-03-25 RX ADMIN — MICONAZOLE NITRATE: 2 POWDER TOPICAL at 20:55

## 2023-03-25 RX ADMIN — Medication 2000 UNITS: at 11:43

## 2023-03-25 RX ADMIN — SODIUM CHLORIDE, PRESERVATIVE FREE 10 ML: 5 INJECTION INTRAVENOUS at 18:15

## 2023-03-25 RX ADMIN — MICONAZOLE NITRATE: 2 POWDER TOPICAL at 11:44

## 2023-03-25 RX ADMIN — TAMSULOSIN HYDROCHLORIDE 0.8 MG: 0.4 CAPSULE ORAL at 20:54

## 2023-03-25 RX ADMIN — SODIUM CHLORIDE, PRESERVATIVE FREE 10 ML: 5 INJECTION INTRAVENOUS at 18:17

## 2023-03-25 RX ADMIN — ATORVASTATIN CALCIUM 40 MG: 40 TABLET, FILM COATED ORAL at 20:54

## 2023-03-25 RX ADMIN — HYDROCODONE BITARTRATE AND ACETAMINOPHEN 1 TABLET: 5; 325 TABLET ORAL at 11:29

## 2023-03-25 RX ADMIN — SODIUM CHLORIDE 25 ML: 9 INJECTION, SOLUTION INTRAVENOUS at 18:41

## 2023-03-25 RX ADMIN — FINASTERIDE 5 MG: 5 TABLET, FILM COATED ORAL at 11:34

## 2023-03-25 RX ADMIN — AMLODIPINE BESYLATE 5 MG: 5 TABLET ORAL at 11:41

## 2023-03-25 RX ADMIN — PIPERACILLIN AND TAZOBACTAM 3375 MG: 3; .375 INJECTION, POWDER, LYOPHILIZED, FOR SOLUTION INTRAVENOUS at 18:41

## 2023-03-25 RX ADMIN — QUETIAPINE FUMARATE 50 MG: 25 TABLET ORAL at 20:54

## 2023-03-25 RX ADMIN — OLANZAPINE 5 MG: 10 INJECTION, POWDER, LYOPHILIZED, FOR SOLUTION INTRAMUSCULAR at 23:38

## 2023-03-25 RX ADMIN — PIPERACILLIN AND TAZOBACTAM 3375 MG: 3; .375 INJECTION, POWDER, LYOPHILIZED, FOR SOLUTION INTRAVENOUS at 03:01

## 2023-03-25 RX ADMIN — SODIUM CHLORIDE, PRESERVATIVE FREE 10 ML: 5 INJECTION INTRAVENOUS at 20:55

## 2023-03-25 RX ADMIN — PANTOPRAZOLE SODIUM 40 MG: 40 INJECTION, POWDER, FOR SOLUTION INTRAVENOUS at 18:17

## 2023-03-25 RX ADMIN — DOCUSATE SODIUM 50MG AND SENNOSIDES 8.6MG 2 TABLET: 8.6; 5 TABLET, FILM COATED ORAL at 11:43

## 2023-03-25 RX ADMIN — QUETIAPINE FUMARATE 25 MG: 25 TABLET ORAL at 11:33

## 2023-03-25 RX ADMIN — POLYETHYLENE GLYCOL 3350 17 G: 17 POWDER, FOR SOLUTION ORAL at 11:40

## 2023-03-25 RX ADMIN — ESCITALOPRAM OXALATE 10 MG: 10 TABLET, FILM COATED ORAL at 11:41

## 2023-03-25 RX ADMIN — LEVOTHYROXINE SODIUM 100 MCG: 100 TABLET ORAL at 06:32

## 2023-03-25 ASSESSMENT — PAIN SCALES - PAIN ASSESSMENT IN ADVANCED DEMENTIA (PAINAD)
CONSOLABILITY: 0
BREATHING: 0
CONSOLABILITY: 0
NEGVOCALIZATION: 0
NEGVOCALIZATION: 0
BODYLANGUAGE: 0
TOTALSCORE: 0
FACIALEXPRESSION: 0
BODYLANGUAGE: 0
FACIALEXPRESSION: 0
TOTALSCORE: 0
BREATHING: 0

## 2023-03-25 ASSESSMENT — PAIN DESCRIPTION - LOCATION: LOCATION: HIP;HEAD;BACK

## 2023-03-25 ASSESSMENT — PAIN SCALES - GENERAL
PAINLEVEL_OUTOF10: 0
PAINLEVEL_OUTOF10: 8

## 2023-03-26 LAB
ANION GAP SERPL CALCULATED.3IONS-SCNC: 17 MMOL/L (ref 4–16)
BUN SERPL-MCNC: 72 MG/DL (ref 6–23)
CALCIUM SERPL-MCNC: 7.8 MG/DL (ref 8.3–10.6)
CHLORIDE BLD-SCNC: 106 MMOL/L (ref 99–110)
CO2: 21 MMOL/L (ref 21–32)
CREAT SERPL-MCNC: 7.1 MG/DL (ref 0.9–1.3)
GFR SERPL CREATININE-BSD FRML MDRD: 7 ML/MIN/1.73M2
GLUCOSE SERPL-MCNC: 86 MG/DL (ref 70–99)
POTASSIUM SERPL-SCNC: 3.8 MMOL/L (ref 3.5–5.1)
SODIUM BLD-SCNC: 144 MMOL/L (ref 135–145)

## 2023-03-26 PROCEDURE — 2580000003 HC RX 258: Performed by: STUDENT IN AN ORGANIZED HEALTH CARE EDUCATION/TRAINING PROGRAM

## 2023-03-26 PROCEDURE — 6370000000 HC RX 637 (ALT 250 FOR IP): Performed by: EMERGENCY MEDICINE

## 2023-03-26 PROCEDURE — 6370000000 HC RX 637 (ALT 250 FOR IP): Performed by: INTERNAL MEDICINE

## 2023-03-26 PROCEDURE — 6370000000 HC RX 637 (ALT 250 FOR IP): Performed by: NURSE PRACTITIONER

## 2023-03-26 PROCEDURE — 6360000002 HC RX W HCPCS: Performed by: STUDENT IN AN ORGANIZED HEALTH CARE EDUCATION/TRAINING PROGRAM

## 2023-03-26 PROCEDURE — C9113 INJ PANTOPRAZOLE SODIUM, VIA: HCPCS | Performed by: SPECIALIST

## 2023-03-26 PROCEDURE — 6360000002 HC RX W HCPCS: Performed by: SPECIALIST

## 2023-03-26 PROCEDURE — 6370000000 HC RX 637 (ALT 250 FOR IP): Performed by: STUDENT IN AN ORGANIZED HEALTH CARE EDUCATION/TRAINING PROGRAM

## 2023-03-26 PROCEDURE — 6370000000 HC RX 637 (ALT 250 FOR IP): Performed by: PHYSICIAN ASSISTANT

## 2023-03-26 PROCEDURE — 2580000003 HC RX 258: Performed by: INTERNAL MEDICINE

## 2023-03-26 PROCEDURE — 80048 BASIC METABOLIC PNL TOTAL CA: CPT

## 2023-03-26 PROCEDURE — 36415 COLL VENOUS BLD VENIPUNCTURE: CPT

## 2023-03-26 PROCEDURE — 2060000000 HC ICU INTERMEDIATE R&B

## 2023-03-26 RX ADMIN — QUETIAPINE FUMARATE 25 MG: 25 TABLET ORAL at 10:13

## 2023-03-26 RX ADMIN — TAMSULOSIN HYDROCHLORIDE 0.8 MG: 0.4 CAPSULE ORAL at 20:25

## 2023-03-26 RX ADMIN — ESCITALOPRAM OXALATE 10 MG: 10 TABLET, FILM COATED ORAL at 10:14

## 2023-03-26 RX ADMIN — MICONAZOLE NITRATE: 2 POWDER TOPICAL at 10:12

## 2023-03-26 RX ADMIN — PIPERACILLIN AND TAZOBACTAM 3375 MG: 3; .375 INJECTION, POWDER, LYOPHILIZED, FOR SOLUTION INTRAVENOUS at 03:13

## 2023-03-26 RX ADMIN — ATORVASTATIN CALCIUM 40 MG: 40 TABLET, FILM COATED ORAL at 20:25

## 2023-03-26 RX ADMIN — MICONAZOLE NITRATE: 2 POWDER TOPICAL at 20:25

## 2023-03-26 RX ADMIN — SODIUM CHLORIDE, PRESERVATIVE FREE 10 ML: 5 INJECTION INTRAVENOUS at 10:18

## 2023-03-26 RX ADMIN — LEVOTHYROXINE SODIUM 100 MCG: 100 TABLET ORAL at 06:28

## 2023-03-26 RX ADMIN — Medication 2000 UNITS: at 10:13

## 2023-03-26 RX ADMIN — QUETIAPINE FUMARATE 50 MG: 25 TABLET ORAL at 20:25

## 2023-03-26 RX ADMIN — FINASTERIDE 5 MG: 5 TABLET, FILM COATED ORAL at 10:14

## 2023-03-26 RX ADMIN — SODIUM CHLORIDE, PRESERVATIVE FREE 10 ML: 5 INJECTION INTRAVENOUS at 20:26

## 2023-03-26 RX ADMIN — AMLODIPINE BESYLATE 5 MG: 5 TABLET ORAL at 10:14

## 2023-03-26 RX ADMIN — PANTOPRAZOLE SODIUM 40 MG: 40 INJECTION, POWDER, FOR SOLUTION INTRAVENOUS at 18:15

## 2023-03-26 NOTE — CONSULTS
[address] [facility],[address] ACUTE CARE PHYSICAL THERAPY EVALUATION  Jaren Schmitz, 10/18/1930, 1109/1109-A, 3/9/2023    History  [place]:  The primary encounter diagnosis was Right hip pain. A diagnosis of Debility was also pertinent to this visit. Patient  has a past medical history of Anemia, Anxiety, Arthritis, BPH with urinary obstruction, Depression, GERD (gastroesophageal reflux disease), Hemorrhoids, Hepatitis, Hernia of unspecified site of abdominal cavity without mention of obstruction or gangrene, Hyperlipidemia, Hypotension, and SCC (squamous cell carcinoma). Patient  has a past surgical history that includes Neck surgery; Cataract removal; hernia repair; hiatal hernia repair (N/A, 3/4/2021); Cystoscopy (N/A, 3/29/2021); Cystoscopy (N/A, 4/1/2021); and Cholecystectomy, laparoscopic (N/A, 11/15/2022). Subjective:  Patient states:  \"Now what? \". Pain:  Pt with difficulty attending to questions re:pain, pt wincing and grabbing R thigh with any mobility. Communication with other providers:  Handoff to RNRUBY present throughout to assist, discussed with MD  Restrictions: 50% WB RLE, [place], confusion, fall risk, general    Home Setup/Prior level of function   Pt is poor historian at this time. Per charting pt with recent d/c to Home from SNF. Below information taken from last eval 2/13. Lives With: Son, grandson (? Questionable) who assist pt in ADLs. *Below information taken from last admission and updated as able. Pt poor historian.     Type of Home: House  Home Layout: Laundry in basement, Able to Live on Main level with bedroom/bathroom, Two level  Home Access: Stairs to enter without rails  Entrance Stairs - Number of Steps: 3  Bathroom Shower/Tub: Tub/Shower unit, Shower chair with back  Bathroom Toilet: Standard  Bathroom Equipment: Shower chair  Home Equipment: Walker, rolling (pt unsure if standard or rolling walker)  Has the patient had two or more falls in the past year or any fall with injury
Consult completed. Nexiva Diffusics 22g 1 inch catheter inserted via ultrasound in patient's LFA. Brisk blood return noted and catheter flushes with ease. Patient tolerated well. Mary Jo Sarmiento, primary RN notified. Consult IV/PICC team if patient's needs change.
STOP 1/2NS  START D5W 100ML/HR    THANK YOU
[facility] 75 Continence Nurse  Consult Note       Gennie Runner  AGE: 80 y.o.    GENDER: male  : 10/18/1930  TODAY'S DATE:  3/7/2023    Subjective:     Reason for Evaluation and Assessment: wound care eval. Gennie Runner is a 80 y.o. male referred by:   [x] Physician  [] Nursing  [] Other:     Wound Identification:  Wound Type: pressure and blisters  Contributing Factors: chronic pressure and decreased mobility        PAST MEDICAL HISTORY        Diagnosis Date    Anemia     Anxiety     Arthritis     BPH with urinary obstruction     Depression     GERD (gastroesophageal reflux disease)     Hemorrhoids     Hepatitis     Hernia of unspecified site of abdominal cavity without mention of obstruction or gangrene     Hyperlipidemia     Hypotension     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    Rt Tricep, Lt Superior Forearm       PAST SURGICAL HISTORY    Past Surgical History:   Procedure Laterality Date    CATARACT REMOVAL      CHOLECYSTECTOMY, LAPAROSCOPIC N/A 11/15/2022    CHOLECYSTECTOMY LAPAROSCOPIC performed by Melissa Irwin MD at [address] [place] [address] N/A 3/29/2021    CYSTOSCOPY EVACUATION OF CLOTS, BLADDER FULGERATION, AND SUPRA-PUBIC CATHETER INSERTION performed by Emma Goldberg MD at [address] [place] [address] N/A 2021    CYSTOSCOPY, PROSTATE FULGURATION, EVACUATION OF CLOTS & TURP performed by Reid Guevara MD at [address] [place] [address] N/A 3/4/2021    LAPAROSCOPIC LARGE HERNIA HIATAL REPAIR WITH GASTRIC OBSTRUCTION POSS OPEN performed by Melissa Irwin MD at [address] [place] [address]    Family History   Problem Relation Age of Onset    Depression Mother     Diabetes Mother     COPD Father     Diabetes Brother     Diabetes Maternal Grandmother        SOCIAL HISTORY    Social History     Tobacco Use    Smoking status: Former     Packs/day: 1.00     Years: 5.00     Pack years: 5.00     Types: Cigarettes     Start date: 1950     Quit
chest pain, shortness of breath, numbness, tingling, back pain, loss of bowel control, loss of bladder control, saddle anesthesia.     Past Medical History:        Diagnosis Date    Anemia     Anxiety     Arthritis     BPH with urinary obstruction     Depression     GERD (gastroesophageal reflux disease)     Hemorrhoids     Hepatitis     Hernia of unspecified site of abdominal cavity without mention of obstruction or gangrene     Hyperlipidemia     Hypotension     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    Rt Tricep, Lt Superior Forearm     Past Surgical History:        Procedure Laterality Date    CATARACT REMOVAL      CHOLECYSTECTOMY, LAPAROSCOPIC N/A 11/15/2022    CHOLECYSTECTOMY LAPAROSCOPIC performed by Huber Lee MD at [address] [place] [address] N/A 3/29/2021    CYSTOSCOPY EVACUATION OF CLOTS, BLADDER FULGERATION, AND SUPRA-PUBIC CATHETER INSERTION performed by Dorita Bedolla MD at [address] [place] [address] N/A 4/1/2021    CYSTOSCOPY, PROSTATE FULGURATION, EVACUATION OF CLOTS & TURP performed by Ed Garcia MD at [address] [place] [address] N/A 3/4/2021    LAPAROSCOPIC [facility] WITH GASTRIC OBSTRUCTION POSS OPEN performed by Huber Lee MD at [address] [place] [address]       Current Medications:   Current Facility-Administered Medications: [START ON 3/17/2023] levothyroxine (SYNTHROID) tablet 100 mcg, 100 mcg, Oral, Daily  miconazole (MICOTIN) 2 % powder, , Topical, BID  ferrous sulfate 300 (60 Fe) MG/5ML syrup 300 mg, 300 mg, Oral, Dinner  QUEtiapine (SEROQUEL) tablet 50 mg, 50 mg, Oral, Nightly  QUEtiapine (SEROQUEL) tablet 25 mg, 25 mg, Oral, 2 times per day  escitalopram (LEXAPRO) tablet 10 mg, 10 mg, Oral, Daily  Vitamin D (CHOLECALCIFEROL) tablet 2,000 Units, 2,000 Units, Oral, Daily  amLODIPine (NORVASC) tablet 5 mg, 5 mg, Oral, Daily  HYDROmorphone (DILAUDID) injection 0.25 mg, 0.25 mg, IntraVENous, Q8H PRN  HYDROcodone-acetaminophen (Melany Churn) 5-325
Years since quittin.3    Smokeless tobacco: Never   Substance and Sexual Activity    Alcohol use: Not Currently    Drug use: Not Currently     Social Determinants of Health     Financial Resource Strain: Low Risk     Difficulty of Paying Living Expenses: Not hard at all   Food Insecurity: No Food Insecurity    Worried About Running Out of Food in the Last Year: Never true    Ran Out of Food in the Last Year: Never true     Current Facility-Administered Medications   Medication Dose Route Frequency Provider Last Rate Last Admin    collagenase ointment   Topical Daily Harper Zaldivar MD   Given at 23 1210    morphine (PF) injection 1 mg  1 mg IntraVENous Q6H PRN Boby Laughlin MD   1 mg at 23 204    lidocaine 4 % external patch 1 patch  1 patch TransDERmal Daily Israel Mar DO   1 patch at 23    aspirin chewable tablet 81 mg  81 mg Oral Daily Anabel Shrestha MD   81 mg at 23 09    atorvastatin (LIPITOR) tablet 40 mg  40 mg Oral Nightly Elsa Santiago MD   40 mg at 23 211    ferrous sulfate (IRON 325) tablet 325 mg  325 mg Oral Dinner Elsa Santiago MD   325 mg at 23 1631    finasteride (PROSCAR) tablet 5 mg  5 mg Oral Daily Elsa Santiago MD   5 mg at 23 0904    levothyroxine (SYNTHROID) tablet 50 mcg  50 mcg Oral Daily Elsa Santiago MD   50 mcg at 23 0533    metoprolol tartrate (LOPRESSOR) tablet 25 mg  25 mg Oral BID Elsa Santiago MD   25 mg at 23 09    QUEtiapine (SEROQUEL) tablet 50 mg  50 mg Oral BID Elsa Santiago MD   50 mg at 23 09    sertraline (ZOLOFT) tablet 50 mg  50 mg Oral Daily Anabel Shrestha MD   50 mg at 23    tamsulosin (FLOMAX) capsule 0.4 mg  0.4 mg Oral Nightly Anabel Shrestha MD   0.4 mg at 23    sodium chloride flush 0.9 % injection 5-40 mL  5-40 mL IntraVENous 2 times per day Elsa Santiago MD   10 mL at 23    sodium chloride flush 0.9 %
gastrointestinal, genitourinary, integumentary, neurological, muscuoskeletal, or immunological symptoms today. PSYCHIATRIC: See HPI above. Review of Systems:  Reports of no current cardiovascular, respiratory, gastrointestinal, genitourinary, integumentary, neurological, muscuoskeletal, or immunological symptoms today. PSYCHIATRIC: See HPI above. Neurologic examination:  Mental status: The patient is alert to zero. PSYCHIATRIC EXAMINATION / MENTAL STATUS EXAM                     General appearance: [x] appears age, []  appears older than stated age,                                     [x]  adequately dressed and groomed, [] disheveled,                                     [x]  in no acute distress, [] appears mildly distressed, [] other                                                                     MUSCULOSKELETAL:            Gait:                             [] normal, [] antalgic, [] unsteady, [x] gait not evaluated   Station:                        [] erect, [] sitting, [x] recumbent, [] other                             Strength/tone:             [x] muscle strength and tone appear consistent with age and                                        condition                                      [] atrophy                                       [] abnormal movements  PSYCHIATRIC:    Appearance: appears stated age. alert and oriented zero. Attitude: Manner is cooperative and pleasant  Motor: No psychomotor agitation, retardation or abnormal movements noted  Speech: Clearly articulated; normal rate, volume, tone & amount. Language: intact understanding and production  Mood: okay  Affect: confused  Thought Production: unable to assess  Thought Form: unable to assess  Thought Content/Perceptions: No EDILSON, noted AVH, noted delusion  Insight: poor  Judgment: poor  Memory: Immediate, recent, and remote appear impaired, though not formally tested.   Attention:poor   Fund of knowledge:
Partner Violence: Not on file   Housing Stability: Not on file       Family History   Problem Relation Age of Onset    Depression Mother     Diabetes Mother     COPD Father     Diabetes Brother     Diabetes Maternal Grandmother        Allergies   Allergen Reactions    Minocycline Other (See Comments)       Medication list reviewed  Prior to Admission medications    Medication Sig Start Date End Date Taking? Authorizing Provider   sertraline (ZOLOFT) 50 MG tablet Take 1 tablet by mouth daily 2/16/23   Carleen Marvin MD   miconazole (MICOTIN) 2 % powder Apply topically 2 times daily.  2/16/23   Carleen Marvin MD   magnesium oxide (MAG-OX) 400 (240 Mg) MG tablet Take 1 tablet by mouth daily for 14 days 2/17/23 3/3/23  Carleen Marvin MD   finasteride (PROSCAR) 5 MG tablet Take 1 tablet by mouth daily 2/17/23   Carleen Marvin MD   levothyroxine (SYNTHROID) 50 MCG tablet Take 1 tablet by mouth daily 2/17/23   Carleen Marvin MD   metoprolol tartrate (LOPRESSOR) 25 MG tablet Take 1 tablet by mouth 2 times daily 2/16/23   Carleen Marvin MD   sodium bicarbonate 325 MG tablet Take 650 mg by mouth daily    Historical Provider, MD   Multiple Vitamins-Minerals (THERAPEUTIC MULTIVITAMIN-MINERALS) tablet Take 1 tablet by mouth Daily with supper    Historical Provider, MD   ferrous sulfate (IRON 325) 325 (65 Fe) MG tablet Take 325 mg by mouth Daily with supper    Historical Provider, MD   tamsulosin (FLOMAX) 0.4 MG capsule Take 1 capsule by mouth nightly 2/3/23   Adriane Houston DO   QUEtiapine (SEROQUEL) 50 MG tablet Take 1 tablet by mouth nightly  Patient taking differently: Take 50 mg by mouth 2 times daily at 0800 and 1400 10/14/22   Leonides Bansal MD   sennosides-docusate sodium (SENOKOT-S) 8.6-50 MG tablet Take 2 tablets by mouth 2 times daily as needed for Constipation  Patient taking differently: Take 2 tablets by mouth Daily with lunch 10/14/22   Leonides Bansal MD   benzonatate (TESSALON) 200 MG capsule Take 1 capsule by mouth
Primary insomnia    Dysuria    Vitamin D deficiency    Seborrheic keratosis, inflamed    Actinic keratosis    Seborrheic keratosis    SCC (squamous cell carcinoma)    Varicose 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    Anxiety    BPH with urinary obstruction    Acquired hypothyroidism    UGIB (upper gastrointestinal bleed)    Hematuria    Hyperglycemia    Bullous pemphigoid    Atelectasis    Bandemia    Thrombocytopenia, unspecified    Leukocytosis    Skin ulcer, limited to breakdown of skin (HCC)    Current moderate episode of major depressive disorder, unspecified whether recurrent (HCC)    Chronic kidney disease, stage IV (severe) (HCC)    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)    Agitation due to dementia    Chest pain    Cardiac arrest [facility])    Community acquired pneumonia of left lung, unspecified part of lung    Dizziness    Syncope and collapse    Orthostatic syncope    Hip pain    Delirium due to multiple etiologies    MAGO (generalized anxiety disorder)    MDD (major depressive disorder), recurrent episode, mild (HCC)    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, without behavioral disturbance, severe    Other sequelae of other cerebrovascular disease    Hematemesis with nausea       Measurements:  Wound 08/22/22 Radial Right; Anterior large skin tear (Active)   Number of days: 211       Wound 03/07/23 Thigh Left;Lateral (Active)   Wound Image   03/21/23 1340   Wound Etiology Other 03/21/23 1340   Dressing Status Other (Comment) 03/21/23 1340   Wound Cleansed Cleansed with saline 03/21/23 1340   Dressing/Treatment Open to air 03/21/23 1340   Wound Length (cm) 1.5 cm 03/21/23 1340   Wound Width (cm) 1.8 cm 03/21/23 1340   Wound Depth (cm) 0.1 cm 03/21/23 1340   Wound Surface Area (cm^2) 2.7 cm^2 03/21/23 1340   Change in Wound Size % (l*w) -80 03/21/23 1340   Wound Volume (cm^3) 0.27 cm^3 03/21/23 1340   Wound Healing % -80 03/21/23 1340   Distance Tunneling
tomorrow and follow        Marleny Morin.  Cheyanne Stephens M.D
keratosis    Seborrheic keratosis    SCC (squamous cell carcinoma)    Varicose 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    Anxiety    BPH with urinary obstruction    Acquired hypothyroidism    UGIB (upper gastrointestinal bleed)    Hematuria    Hyperglycemia    Bullous pemphigoid    Atelectasis    Bandemia    Thrombocytopenia, unspecified    Leukocytosis    Skin ulcer, limited to breakdown of skin (HCC)    Current moderate episode of major depressive disorder, unspecified whether recurrent (HCC)    Chronic kidney disease, stage IV (severe) (HCC)    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)    Agitation due to dementia    Chest pain    Cardiac arrest ([facility] [address]    Community acquired pneumonia of left lung, unspecified part of lung    Dizziness    Syncope and collapse    Orthostatic syncope    Hip pain         RECOMMENDATIONS:      Reviewed POC blood glucose . Labs and X ray results   Reviewed Home and Current Medicines   X-rays of right hip pelvic bones. 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 The best treatment in these cases is Reclast 1 dose but is sufficient enough but patient has severe kidney disease that will make it difficult to give him Reclast or bisphosphonate  1 option would be to give him Actonel or Fosamax but again is very difficult to administer in his case was confused  Next option would be to give him 1 single dose of Prolia every 6 months is doable and practical and is not heavily influenced like other drugs with renal functions as patient has compromised renal functions at this time       Will follow with you  Again thank you for sharing pt's care with me.      Truly yours,       Berry Russ MD
last 72 hours. I/O last 3 completed shifts:   In: 1200 [P.O.:1200]  Out: 1200 [Urine:1200]         Electronically signed by Fabricio Darby DO on 3/14/2023 at 7:03 AM    MD Antonina Mejia DO [place] [address] [place], [place] [address]  PHONE: [phone]  FAX: [phone]
thickness 03/14/23 0900   Number of days: 6       Wound 03/07/23 Groin Anterior;Right cluster (Active)   Wound Image   03/14/23 0900   Wound Etiology Other 03/14/23 0900   Dressing Status Clean;Dry; Intact 03/09/23 2027   Wound Cleansed Cleansed with saline 03/14/23 0900   Dressing/Treatment Silicone border 63/90/85 2027   Wound Length (cm) 2 cm 03/14/23 0900   Wound Width (cm) 1.5 cm 03/14/23 0900   Wound Depth (cm) 0.1 cm 03/14/23 0900   Wound Surface Area (cm^2) 3 cm^2 03/14/23 0900   Change in Wound Size % (l*w) 95.83 03/14/23 0900   Wound Volume (cm^3) 0.3 cm^3 03/14/23 0900   Wound Healing % 96 03/14/23 0900   Distance Tunneling (cm) 0 cm 03/14/23 0900   Tunneling Position ___ O'Clock 0 03/14/23 0900   Undermining Starts ___ O'Clock 0 03/14/23 0900   Undermining Ends___ O'Clock 0 03/14/23 0900   Undermining Maxium Distance (cm) 0 03/14/23 0900   Wound Assessment Pink/red;Purple/maroon 03/14/23 0900   Drainage Amount Small 03/14/23 0900   Drainage Description Serosanguinous 03/14/23 0900   Odor None 03/14/23 0900   Tiffanie-wound Assessment Excoriated 03/14/23 0900   Margins Defined edges 03/14/23 0900   Wound Thickness Description not for Pressure Injury Partial thickness 03/14/23 0900   Number of days: 6       Wound 03/07/23 Groin Anterior; Left and thigh cluster (Active)   Wound Image   03/14/23 0900   Wound Etiology Other 03/14/23 0900   Dressing Status Clean;Dry; Intact 03/09/23 2027   Wound Cleansed Cleansed with saline 03/14/23 0900   Dressing/Treatment Open to air 03/14/23 0900   Wound Length (cm) 6.5 cm 03/14/23 0900   Wound Width (cm) 3 cm 03/14/23 0900   Wound Depth (cm) 0.1 cm 03/14/23 0900   Wound Surface Area (cm^2) 19.5 cm^2 03/14/23 0900   Change in Wound Size % (l*w) 79.69 03/14/23 0900   Wound Volume (cm^3) 1.95 cm^3 03/14/23 0900   Distance Tunneling (cm) 0 cm 03/14/23 0900   Tunneling Position ___ O'Clock 0 03/14/23 0900   Undermining Starts ___ O'Clock 0 03/14/23 0900   Undermining Ends___ O'Clock

## 2023-03-27 LAB
ANION GAP SERPL CALCULATED.3IONS-SCNC: 15 MMOL/L (ref 4–16)
BUN SERPL-MCNC: 65 MG/DL (ref 6–23)
CALCIUM SERPL-MCNC: 8.1 MG/DL (ref 8.3–10.6)
CHLORIDE BLD-SCNC: 107 MMOL/L (ref 99–110)
CO2: 20 MMOL/L (ref 21–32)
CREAT SERPL-MCNC: 7 MG/DL (ref 0.9–1.3)
GFR SERPL CREATININE-BSD FRML MDRD: 7 ML/MIN/1.73M2
GLUCOSE SERPL-MCNC: 113 MG/DL (ref 70–99)
POTASSIUM SERPL-SCNC: 3.6 MMOL/L (ref 3.5–5.1)
SODIUM BLD-SCNC: 142 MMOL/L (ref 135–145)

## 2023-03-27 PROCEDURE — 6370000000 HC RX 637 (ALT 250 FOR IP): Performed by: STUDENT IN AN ORGANIZED HEALTH CARE EDUCATION/TRAINING PROGRAM

## 2023-03-27 PROCEDURE — 6370000000 HC RX 637 (ALT 250 FOR IP): Performed by: INTERNAL MEDICINE

## 2023-03-27 PROCEDURE — 6370000000 HC RX 637 (ALT 250 FOR IP): Performed by: PHYSICIAN ASSISTANT

## 2023-03-27 PROCEDURE — 6360000002 HC RX W HCPCS: Performed by: SPECIALIST

## 2023-03-27 PROCEDURE — 2580000003 HC RX 258: Performed by: INTERNAL MEDICINE

## 2023-03-27 PROCEDURE — 2060000000 HC ICU INTERMEDIATE R&B

## 2023-03-27 PROCEDURE — 80048 BASIC METABOLIC PNL TOTAL CA: CPT

## 2023-03-27 PROCEDURE — 6370000000 HC RX 637 (ALT 250 FOR IP): Performed by: EMERGENCY MEDICINE

## 2023-03-27 PROCEDURE — 6370000000 HC RX 637 (ALT 250 FOR IP): Performed by: NURSE PRACTITIONER

## 2023-03-27 PROCEDURE — C9113 INJ PANTOPRAZOLE SODIUM, VIA: HCPCS | Performed by: SPECIALIST

## 2023-03-27 PROCEDURE — 36415 COLL VENOUS BLD VENIPUNCTURE: CPT

## 2023-03-27 RX ORDER — AMLODIPINE BESYLATE 5 MG/1
5 TABLET ORAL DAILY
Qty: 30 TABLET | Refills: 0
Start: 2023-03-28

## 2023-03-27 RX ORDER — HYDROCODONE BITARTRATE AND ACETAMINOPHEN 5; 325 MG/1; MG/1
1 TABLET ORAL EVERY 8 HOURS PRN
Qty: 9 TABLET | Refills: 0 | Status: SHIPPED | OUTPATIENT
Start: 2023-03-27 | End: 2023-03-30

## 2023-03-27 RX ORDER — LEVOTHYROXINE SODIUM 0.1 MG/1
100 TABLET ORAL DAILY
Qty: 30 TABLET | Refills: 3
Start: 2023-03-28

## 2023-03-27 RX ORDER — TAMSULOSIN HYDROCHLORIDE 0.4 MG/1
0.8 CAPSULE ORAL NIGHTLY
Qty: 30 CAPSULE | Refills: 3
Start: 2023-03-27

## 2023-03-27 RX ORDER — ESCITALOPRAM OXALATE 10 MG/1
10 TABLET ORAL DAILY
Qty: 30 TABLET | Refills: 3
Start: 2023-03-28

## 2023-03-27 RX ORDER — QUETIAPINE FUMARATE 25 MG/1
25 TABLET, FILM COATED ORAL 2 TIMES DAILY
Qty: 60 TABLET | Refills: 3
Start: 2023-03-27

## 2023-03-27 RX ORDER — CHOLECALCIFEROL (VITAMIN D3) 50 MCG
2000 TABLET ORAL DAILY
Qty: 60 TABLET | Refills: 0
Start: 2023-03-28

## 2023-03-27 RX ADMIN — QUETIAPINE FUMARATE 25 MG: 25 TABLET ORAL at 08:42

## 2023-03-27 RX ADMIN — ESCITALOPRAM OXALATE 10 MG: 10 TABLET, FILM COATED ORAL at 08:43

## 2023-03-27 RX ADMIN — TAMSULOSIN HYDROCHLORIDE 0.8 MG: 0.4 CAPSULE ORAL at 19:55

## 2023-03-27 RX ADMIN — SODIUM CHLORIDE, PRESERVATIVE FREE 10 ML: 5 INJECTION INTRAVENOUS at 19:56

## 2023-03-27 RX ADMIN — Medication 2000 UNITS: at 08:43

## 2023-03-27 RX ADMIN — FINASTERIDE 5 MG: 5 TABLET, FILM COATED ORAL at 08:43

## 2023-03-27 RX ADMIN — AMLODIPINE BESYLATE 5 MG: 5 TABLET ORAL at 08:42

## 2023-03-27 RX ADMIN — ATORVASTATIN CALCIUM 40 MG: 40 TABLET, FILM COATED ORAL at 19:56

## 2023-03-27 RX ADMIN — DOCUSATE SODIUM 50MG AND SENNOSIDES 8.6MG 2 TABLET: 8.6; 5 TABLET, FILM COATED ORAL at 08:42

## 2023-03-27 RX ADMIN — MICONAZOLE NITRATE: 2 POWDER TOPICAL at 19:57

## 2023-03-27 RX ADMIN — LEVOTHYROXINE SODIUM 100 MCG: 100 TABLET ORAL at 06:07

## 2023-03-27 RX ADMIN — MICONAZOLE NITRATE: 2 POWDER TOPICAL at 08:44

## 2023-03-27 RX ADMIN — POLYETHYLENE GLYCOL 3350 17 G: 17 POWDER, FOR SOLUTION ORAL at 08:42

## 2023-03-27 RX ADMIN — PANTOPRAZOLE SODIUM 40 MG: 40 INJECTION, POWDER, FOR SOLUTION INTRAVENOUS at 08:42

## 2023-03-27 RX ADMIN — QUETIAPINE FUMARATE 50 MG: 25 TABLET ORAL at 19:55

## 2023-03-27 RX ADMIN — SODIUM CHLORIDE, PRESERVATIVE FREE 10 ML: 5 INJECTION INTRAVENOUS at 08:43

## 2023-03-27 ASSESSMENT — PAIN SCALES - GENERAL
PAINLEVEL_OUTOF10: 0
PAINLEVEL_OUTOF10: 0

## 2023-03-27 NOTE — DISCHARGE SUMMARY
1 tablet by mouth 2 times daily  What changed:   medication strength  how much to take  when to take this     tamsulosin 0.4 MG capsule  Commonly known as: Flomax  Take 2 capsules by mouth nightly  What changed: how much to take            CONTINUE taking these medications      atorvastatin 40 MG tablet  Commonly known as: LIPITOR  Take 1 tablet by mouth nightly     benzonatate 200 MG capsule  Commonly known as: TESSALON  Take 1 capsule by mouth 3 times daily as needed for Cough     finasteride 5 MG tablet  Commonly known as: PROSCAR  Take 1 tablet by mouth daily     magnesium oxide 400 (240 Mg) MG tablet  Commonly known as: MAG-OX  Take 1 tablet by mouth daily for 14 days     miconazole 2 % powder  Commonly known as: MICOTIN  Apply topically 2 times daily.      pantoprazole 20 MG tablet  Commonly known as: PROTONIX  TAKE 1 TABLET BY MOUTH EVERY DAY     sodium bicarbonate 325 MG tablet     therapeutic multivitamin-minerals tablet            STOP taking these medications      aspirin 81 MG chewable tablet     Eliquis 2.5 MG Tabs tablet  Generic drug: apixaban     ferrous sulfate 325 (65 Fe) MG tablet  Commonly known as: IRON 325     metoprolol tartrate 25 MG tablet  Commonly known as: LOPRESSOR     sertraline 50 MG tablet  Commonly known as: ZOLOFT     temazepam 7.5 MG capsule  Commonly known as: RESTORIL            ASK your doctor about these medications      sennosides-docusate sodium 8.6-50 MG tablet  Commonly known as: SENOKOT-S  Take 2 tablets by mouth 2 times daily as needed for Constipation               Where to Get Your Medications        You can get these medications from any pharmacy    Bring a paper prescription for each of these medications  HYDROcodone-acetaminophen 5-325 MG per tablet       Information about where to get these medications is not yet available    Ask your nurse or doctor about these medications  amLODIPine 5 MG tablet  escitalopram 10 MG tablet  levothyroxine 100 MCG tablet  QUEtiapine

## 2023-03-28 VITALS
WEIGHT: 190.9 LBS | OXYGEN SATURATION: 95 % | HEIGHT: 72 IN | SYSTOLIC BLOOD PRESSURE: 116 MMHG | DIASTOLIC BLOOD PRESSURE: 58 MMHG | RESPIRATION RATE: 16 BRPM | HEART RATE: 94 BPM | TEMPERATURE: 99.1 F | BODY MASS INDEX: 25.86 KG/M2

## 2023-03-28 LAB
ANION GAP SERPL CALCULATED.3IONS-SCNC: 11 MMOL/L (ref 4–16)
BUN SERPL-MCNC: 64 MG/DL (ref 6–23)
CALCIUM SERPL-MCNC: 7.9 MG/DL (ref 8.3–10.6)
CHLORIDE BLD-SCNC: 105 MMOL/L (ref 99–110)
CO2: 22 MMOL/L (ref 21–32)
CREAT SERPL-MCNC: 6.8 MG/DL (ref 0.9–1.3)
GFR SERPL CREATININE-BSD FRML MDRD: 7 ML/MIN/1.73M2
GLUCOSE SERPL-MCNC: 111 MG/DL (ref 70–99)
POTASSIUM SERPL-SCNC: 3.6 MMOL/L (ref 3.5–5.1)
SODIUM BLD-SCNC: 138 MMOL/L (ref 135–145)

## 2023-03-28 PROCEDURE — 94761 N-INVAS EAR/PLS OXIMETRY MLT: CPT

## 2023-03-28 PROCEDURE — 80048 BASIC METABOLIC PNL TOTAL CA: CPT

## 2023-03-28 PROCEDURE — 6370000000 HC RX 637 (ALT 250 FOR IP): Performed by: INTERNAL MEDICINE

## 2023-03-28 PROCEDURE — 36415 COLL VENOUS BLD VENIPUNCTURE: CPT

## 2023-03-28 RX ADMIN — LEVOTHYROXINE SODIUM 100 MCG: 100 TABLET ORAL at 05:56

## 2023-03-28 NOTE — PLAN OF CARE
Problem: Discharge Planning  Goal: Discharge to home or other facility with appropriate resources  3/28/2023 1055 by Ever Tavares RN  Outcome: Completed  3/28/2023 1055 by Ever Tavares RN  Outcome: Adequate for Discharge     Problem: Pain  Goal: Verbalizes/displays adequate comfort level or baseline comfort level  3/28/2023 1055 by Ever Tavares RN  Outcome: Completed  3/28/2023 1055 by Ever Tavares RN  Outcome: Adequate for Discharge     Problem: ABCDS Injury Assessment  Goal: Absence of physical injury  3/28/2023 1055 by Ever Tavares RN  Outcome: Completed  3/28/2023 1055 by Ever Tavares RN  Outcome: Adequate for Discharge     Problem: Skin/Tissue Integrity  Goal: Absence of new skin breakdown  Description: 1. Monitor for areas of redness and/or skin breakdown  2. Assess vascular access sites hourly  3. Every 4-6 hours minimum:  Change oxygen saturation probe site  4. Every 4-6 hours:  If on nasal continuous positive airway pressure, respiratory therapy assess nares and determine need for appliance change or resting period.   3/28/2023 1055 by Ever Tavares RN  Outcome: Completed  3/28/2023 1055 by Ever Tavares RN  Outcome: Adequate for Discharge     Problem: Safety - Adult  Goal: Free from fall injury  3/28/2023 1055 by Ever Tavares RN  Outcome: Completed  3/28/2023 1055 by Ever Tavares RN  Outcome: Adequate for Discharge     Problem: Nutrition Deficit:  Goal: Optimize nutritional status  3/28/2023 1055 by Ever Tavares RN  Outcome: Completed  3/28/2023 1055 by Ever Tavares RN  Outcome: Adequate for Discharge

## 2023-03-28 NOTE — PLAN OF CARE
Problem: Discharge Planning  Goal: Discharge to home or other facility with appropriate resources  Outcome: Adequate for Discharge     Problem: Pain  Goal: Verbalizes/displays adequate comfort level or baseline comfort level  Outcome: Adequate for Discharge     Problem: ABCDS Injury Assessment  Goal: Absence of physical injury  Outcome: Adequate for Discharge     Problem: Skin/Tissue Integrity  Goal: Absence of new skin breakdown  Description: 1. Monitor for areas of redness and/or skin breakdown  2. Assess vascular access sites hourly  3. Every 4-6 hours minimum:  Change oxygen saturation probe site  4. Every 4-6 hours:  If on nasal continuous positive airway pressure, respiratory therapy assess nares and determine need for appliance change or resting period.   Outcome: Adequate for Discharge     Problem: Safety - Adult  Goal: Free from fall injury  Outcome: Adequate for Discharge     Problem: Nutrition Deficit:  Goal: Optimize nutritional status  Outcome: Adequate for Discharge

## 2023-03-28 NOTE — CARE COORDINATION
[address] notified CM that they are waiting for family to call them back with a meeting time.   TE
CM reviewed chart and discussed in IDR. Pt is not medically ready at this time. CM spoke with brittani with [facility]. She did not proceed with the referral as there is a Covid outbreak in the facility. She will consider accepting pt when there is a discharge plan for pt after discharging from . As they cannot accept [employer] benefits for LTC. 15:48 Pt's son, Telma Hernandez, called CM for an update. CM explained that  has a Covid outbreak and that they were concerned where the pt would discharge to after leaving . Telma Hernandez stated that pt would go back to his home if he could do so safely if not Telma Hernandez is working on applying for [employer]. In the meantime Telma Hernandez is interested in pt going to swing bed. Telma Hernandez also informed CM that pt has additional insurance. He has [facility] medicare/[employer]. Telma Hernandez will bring the card in for registration.
Confidential VM left for Mi/Masonic to notify CM of decision to accept or not. Requested updated PT/OT notes via WB.  TE
Dario Patel, nurse from [facility], to [facility]. Pt will be transported tomorrow to [facility]. Transport time unknown at this time. 12:26 CM spoke to Jose Juan with [facility] to inform her of the plan for tomorrow. Jose Juan had spoken to pt's son, Tristian Vargas, when he came in to tour the facility. She was aware of the the discharge.
[facility] unable to accept patient at this time due to behaviors and sitter. I will follow up with patient's son and see if they have another choice or if they want patient to still go to SNF at [facility].
Left voicemail for patient's son to see if they still want [facility] as a SNF choice. I have asked [employer] to return my call. Once I have a choice I will send the referral to the [address] [place] [address]
[facility] notified CM that they will not be able to accept pt. CM to f/u in am for new SNF choice. Updated PT/OT notes were requested earlier today.   SANAM
Patient and family would like referral sent to [facility] since [facility] is no longer an option at this time due to Covid outbreak. Referral sent.
Pt is on weekend f/u list to f/u with [facility] for decision to accept or not. Called [facility] and left a confidential VM to notify this CM with decision.   TE
Pt's nurse notified CM that a Hospice Consult has been ordered for most likely [facility].  discussed Dottie Kahn with pt per nurse. Referral made to [facility]. She will notify CM of the meeting time after they arrange with son.   TE
Pt's son Dorothy Graham spoke with Teto Mayer as this RN case manager was unavailable. HE would like referral made to [facility] as pt's primary insurance is [facility]. Referral made.  Pt moved to weekend list .
SHEFALI spoke with Chasidy Davis with [address] who informed CM that transportation for the pt will be here at noon. Horace Acuna made aware. E-pass completed.
SHEFALI spoke with Javan Singer from [facility]. They will accept the pt as long as he is sitter free for 24 hours. Discussed in IDR. Pt's creatinine is 6.1, nephro needs to see pt and pt might need HD. Pre-cert not started at this time.
SHEFALI spoke with Lennox Whitney at [facility]. They are unable to accept the pt due to his history of behaviors. SHEFALI placed call to pt's son, Julia Whitaker, and left a VM. CM received call from Julia Whitaker. Discussed [facility] denying pt. The next choice is [facility]. 15:00 SHEFALI called the referral to [facility] with [facility], the information was left on a secure VM.
Spoke with Barron. She states that she will have to check on decision to accept or not and will notify this CM.   TE
Spoke with son Telma Hernandez as he called liaison phone asking about his dad going skilled. Telma Hernandez stated that his loved one can go to [facility] or Swing bed. Left sticky note for Magnus MEEHAN to return call after looking into both facilities.
This RN case manager received a VM from pt's son Willy Pittman and he advised that he is going to look at other SNF options and will let me know what he chooses. CM will make appropriate referral to facility chosen. Will also discuss Sw[facility] bed with son when he calls back after he is out of work.
This RN case manager spoke with Andrea Moore with [facility]. She is going to review patient's information. Referral pending.
This RN case manager spoke with Emmy Baez with [facility]. They are unable to take patient due to [address] I have left a voicemail for pt's son, Raul Carreon.
Identified Issues/Barriers to RETURNING to current housing: unknown  Potential Assistance needed at discharge:              Potential DME:    Patient expects to discharge to:    Plan for transportation at discharge:      Financial    Payor: Aziza Galicia / Plan: Kenji  14. / Product Type: *No Product type* /     Does insurance require precert for SNF: Yes    Potential assistance Purchasing Medications:    Meds-to-Beds request:        [facility] [address]  [address] [phone] - F [phone]  [facility] [address]  Phone: [phone] Fax: [phone]    [facility], [address] [place] [address] [phone] - f [phone]  ANGELINA Lira [address]  Phone: [phone] Fax: [phone]      Notes:    Factors facilitating achievement of predicted outcomes: Family support    Barriers to discharge: Impulsivity    Additional Case Management Notes: Family and patient are interested in SNF if recommended by therapy. Son is going to tour [facility] today as they also have a  memory unit. I will discuss therapy recs with patient and family once they are in. The Plan for Transition of Care is related to the following treatment goals of Debility [R53.81]  Hip pain [M25.559]  Right hip pain [J07.564]    IF APPLICABLE: The Patient and/or patient representative Kemar Huynh and his family were provided with a choice of provider and agrees with the discharge plan. Freedom of choice list with basic dialogue that supports the patient's individualized plan of care/goals and shares the quality data associated with the providers was provided to:     Patient Representative Name:       The Patient and/or Patient Representative Agree with the Discharge Plan?       Krysten Thompson RN  Case Management Department  Ph:  Fax:

## 2023-03-29 ENCOUNTER — APPOINTMENT (OUTPATIENT)
Dept: ULTRASOUND IMAGING | Age: 88
End: 2023-03-29
Payer: OTHER GOVERNMENT

## 2023-03-29 ENCOUNTER — APPOINTMENT (OUTPATIENT)
Dept: CT IMAGING | Age: 88
End: 2023-03-29
Payer: OTHER GOVERNMENT

## 2023-03-29 ENCOUNTER — HOSPITAL ENCOUNTER (EMERGENCY)
Age: 88
Discharge: NURSING, REHAB OR LONG-TERM CARE | End: 2023-03-29
Attending: EMERGENCY MEDICINE
Payer: OTHER GOVERNMENT

## 2023-03-29 VITALS
HEART RATE: 67 BPM | DIASTOLIC BLOOD PRESSURE: 52 MMHG | RESPIRATION RATE: 16 BRPM | TEMPERATURE: 98.1 F | OXYGEN SATURATION: 98 % | SYSTOLIC BLOOD PRESSURE: 124 MMHG

## 2023-03-29 DIAGNOSIS — J21.9 ACUTE BRONCHIOLITIS DUE TO UNSPECIFIED ORGANISM: ICD-10-CM

## 2023-03-29 DIAGNOSIS — E27.8 ADRENAL MASS (HCC): ICD-10-CM

## 2023-03-29 DIAGNOSIS — T83.9XXA COMPLICATION OF FOLEY CATHETER, INITIAL ENCOUNTER (HCC): Primary | ICD-10-CM

## 2023-03-29 LAB
ANION GAP SERPL CALCULATED.3IONS-SCNC: 11 MMOL/L (ref 4–16)
BASOPHILS ABSOLUTE: 0.1 K/CU MM
BASOPHILS RELATIVE PERCENT: 0.6 % (ref 0–1)
BUN SERPL-MCNC: 63 MG/DL (ref 6–23)
CALCIUM SERPL-MCNC: 8.5 MG/DL (ref 8.3–10.6)
CHLORIDE BLD-SCNC: 104 MMOL/L (ref 99–110)
CO2: 22 MMOL/L (ref 21–32)
CREAT SERPL-MCNC: 6.4 MG/DL (ref 0.9–1.3)
DIFFERENTIAL TYPE: ABNORMAL
EOSINOPHILS ABSOLUTE: 0.4 K/CU MM
EOSINOPHILS RELATIVE PERCENT: 3.7 % (ref 0–3)
GFR SERPL CREATININE-BSD FRML MDRD: 8 ML/MIN/1.73M2
GLUCOSE SERPL-MCNC: 96 MG/DL (ref 70–99)
HCT VFR BLD CALC: 23.8 % (ref 42–52)
HEMOGLOBIN: 7.4 GM/DL (ref 13.5–18)
IMMATURE NEUTROPHIL %: 2.1 % (ref 0–0.43)
LYMPHOCYTES ABSOLUTE: 1.1 K/CU MM
LYMPHOCYTES RELATIVE PERCENT: 11.9 % (ref 24–44)
MCH RBC QN AUTO: 29.7 PG (ref 27–31)
MCHC RBC AUTO-ENTMCNC: 31.1 % (ref 32–36)
MCV RBC AUTO: 95.6 FL (ref 78–100)
MONOCYTES ABSOLUTE: 0.6 K/CU MM
MONOCYTES RELATIVE PERCENT: 6.9 % (ref 0–4)
NUCLEATED RBC %: 0 %
PDW BLD-RTO: 14.6 % (ref 11.7–14.9)
PLATELET # BLD: 223 K/CU MM (ref 140–440)
PMV BLD AUTO: 9.8 FL (ref 7.5–11.1)
POTASSIUM SERPL-SCNC: 4.7 MMOL/L (ref 3.5–5.1)
RBC # BLD: 2.49 M/CU MM (ref 4.6–6.2)
SEGMENTED NEUTROPHILS ABSOLUTE COUNT: 7 K/CU MM
SEGMENTED NEUTROPHILS RELATIVE PERCENT: 74.8 % (ref 36–66)
SODIUM BLD-SCNC: 137 MMOL/L (ref 135–145)
TOTAL IMMATURE NEUTOROPHIL: 0.2 K/CU MM
TOTAL NUCLEATED RBC: 0 K/CU MM
WBC # BLD: 9.3 K/CU MM (ref 4–10.5)

## 2023-03-29 PROCEDURE — 80048 BASIC METABOLIC PNL TOTAL CA: CPT

## 2023-03-29 PROCEDURE — 6370000000 HC RX 637 (ALT 250 FOR IP): Performed by: EMERGENCY MEDICINE

## 2023-03-29 PROCEDURE — 74176 CT ABD & PELVIS W/O CONTRAST: CPT

## 2023-03-29 PROCEDURE — 99284 EMERGENCY DEPT VISIT MOD MDM: CPT

## 2023-03-29 PROCEDURE — 76857 US EXAM PELVIC LIMITED: CPT

## 2023-03-29 PROCEDURE — 96374 THER/PROPH/DIAG INJ IV PUSH: CPT

## 2023-03-29 PROCEDURE — 6360000002 HC RX W HCPCS: Performed by: EMERGENCY MEDICINE

## 2023-03-29 PROCEDURE — 85025 COMPLETE CBC W/AUTO DIFF WBC: CPT

## 2023-03-29 RX ORDER — DIAZEPAM 5 MG/ML
2.5 INJECTION, SOLUTION INTRAMUSCULAR; INTRAVENOUS ONCE
Status: COMPLETED | OUTPATIENT
Start: 2023-03-29 | End: 2023-03-29

## 2023-03-29 RX ORDER — MINERAL OIL
OIL (ML) MISCELLANEOUS ONCE
Status: COMPLETED | OUTPATIENT
Start: 2023-03-29 | End: 2023-03-29

## 2023-03-29 RX ORDER — AZITHROMYCIN 250 MG/1
TABLET, FILM COATED ORAL
Qty: 1 PACKET | Refills: 0 | Status: SHIPPED | OUTPATIENT
Start: 2023-03-29 | End: 2023-04-02

## 2023-03-29 RX ORDER — LIDOCAINE HYDROCHLORIDE 20 MG/ML
JELLY TOPICAL PRN
Status: DISCONTINUED | OUTPATIENT
Start: 2023-03-29 | End: 2023-03-29 | Stop reason: HOSPADM

## 2023-03-29 RX ORDER — AZITHROMYCIN 250 MG/1
500 TABLET, FILM COATED ORAL ONCE
Status: COMPLETED | OUTPATIENT
Start: 2023-03-29 | End: 2023-03-29

## 2023-03-29 RX ADMIN — DIAZEPAM 2.5 MG: 5 INJECTION, SOLUTION INTRAMUSCULAR; INTRAVENOUS at 18:28

## 2023-03-29 RX ADMIN — AZITHROMYCIN MONOHYDRATE 500 MG: 250 TABLET ORAL at 20:35

## 2023-03-29 RX ADMIN — Medication: at 19:20

## 2023-03-29 RX ADMIN — LIDOCAINE HYDROCHLORIDE: 20 JELLY TOPICAL at 19:20

## 2023-03-29 NOTE — ED NOTES
[address] Jarad Montoya taking call for [facility]     Cal Dennis  03/29/23 1722    1720 Sherren Chad NP with [facility] returned call      Cal Dennis  03/29/23 1720

## 2023-03-29 NOTE — DISCHARGE INSTRUCTIONS
Follow-up with primary care physician in 1 to 2 days for reevaluation. Call for an appointment of the  Return to the emergency department immediately pain fever chills nausea vomiting dizzy lightheadedness or any worsening symptoms.

## 2023-03-29 NOTE — CARE COORDINATION
Pt is noted for possible readmission. Pt was recently admitted 3/6-3/27/23 for hip pain. Pt has PCP and insurance. Pt has family support of son and a daughter that live with him. Family was looking at [address] [place] [address]. Pt has history of dementia. Pt was d/c to [facility] with [facility] following. Pt returns from [facility] for issues with urinary catheter. Pt was treated and will be d/c back to [facility].

## 2023-03-29 NOTE — ED PROVIDER NOTES
3/4/2021    LAPAROSCOPIC LARGE HERNIA HIATAL REPAIR WITH GASTRIC OBSTRUCTION POSS OPEN performed by Candice Rahman MD at [facility] ENDOSCOPY N/A 3/23/2023    EGD DIAGNOSTIC ONLY performed by Anjum Lane MD at [facility] ENDOSCOPY     Family History   Problem Relation Age of Onset    Depression Mother     Diabetes Mother     COPD Father     Diabetes Brother     Diabetes Maternal Grandmother      Social History     Socioeconomic History    Marital status:      Spouse name: Not on file    Number of children: Not on file    Years of education: Not on file    Highest education level: Not on file   Occupational History    Not on file   Tobacco Use    Smoking status: Former     Packs/day: 1.00     Years: 5.00     Pack years: 5.00     Types: Cigarettes     Start date: 1950     Quit date: 1955     Years since quittin.4    Smokeless tobacco: Never   Substance and Sexual Activity    Alcohol use: Not Currently    Drug use: Not Currently    Sexual activity: Not on file   Other Topics Concern    Not on file   Social History Narrative    Not on file     Social Determinants of Health     Financial Resource Strain: Low Risk     Difficulty of Paying Living Expenses: Not hard at all   Food Insecurity: No Food Insecurity    Worried About Running Out of Food in the Last Year: Never true    Ran Out of Food in the Last Year: Never true   Transportation Needs: Not on file   Physical Activity: Not on file   Stress: Not on file   Social Connections: Not on file   Intimate Partner Violence: Not on file   Housing Stability: Not on file     No current facility-administered medications for this encounter. Current Outpatient Medications   Medication Sig Dispense Refill    HYDROcodone-acetaminophen (NORCO) 5-325 MG per tablet Take 1 tablet by mouth every 8 hours as needed for Pain for up to 3 days.  Max Daily Amount: 3 tablets 9 tablet 0    escitalopram (LEXAPRO) 10 MG tablet Take 1

## 2023-03-29 NOTE — CONSULTS
Medical/Surgical History: Confirmation of course of NG/OG/NE tube and location of tip of tube FINDINGS: Chest: Single view provided. Stable mediastinal and cardiac silhouettes with aortic atherosclerosis. Enteric tube follows the course of the esophagus crossing the GE junction. Normal lung volumes. Bilateral asymmetric patchy pulmonary opacities, right greater than left. No lobar consolidation. No large effusion or pneumothorax. No free subdiaphragmatic air. Abdomen: Single supine view provided. Enteric tube crosses the GE junction and extends into the stomach. The side port is at the level of the proximal gastric body and the tip at the level the proximal to mid gastric body. Stable mild small bowel and colonic gaseous distention with normal proportions. 1. Bilateral asymmetric (right greater than left) patchy pulmonary opacities which may represent developing pulmonary edema versus multifocal pneumonia. 2. Enteric tube in the stomach with the tip and side port at the proximal to mid gastric body. 3. Stable proportional mild gaseous bowel distention with no evidence of obstruction. US RETROPERITONEAL LIMITED    Result Date: 3/20/2023  EXAMINATION: ULTRASOUND OF THE KIDNEYS 3/20/2023 8:42 pm COMPARISON: CT abdomen and pelvis 03/06/2023. Right upper quadrant ultrasound 11/13/2022. Renal ultrasound 03/29/2022. HISTORY: ORDERING SYSTEM PROVIDED HISTORY: eval for hydronephrosis. thank you TECHNOLOGIST PROVIDED HISTORY: Reason for exam:->eval for hydronephrosis. thank you FINDINGS: The right kidney measures 10.7 cm in length and the left kidney measures 10.4 cm in length. No hydronephrosis. 4.5 x 3.5 x 3.2 cm mildly complex cyst in the upper pole of the left kidney stable to mildly increased in size from at least 03/29/2022. Mildly increased bilateral renal cortical echogenicity. 1. No hydronephrosis.  2. Mildly increased bilateral renal cortical echogenicity compatible with chronic medical renal

## 2023-03-30 ENCOUNTER — HOSPITAL ENCOUNTER (EMERGENCY)
Age: 88
Discharge: NURSING, REHAB OR LONG-TERM CARE | End: 2023-03-30
Attending: EMERGENCY MEDICINE
Payer: OTHER GOVERNMENT

## 2023-03-30 VITALS
TEMPERATURE: 98 F | HEIGHT: 72 IN | OXYGEN SATURATION: 98 % | HEART RATE: 60 BPM | BODY MASS INDEX: 25.73 KG/M2 | RESPIRATION RATE: 14 BRPM | DIASTOLIC BLOOD PRESSURE: 71 MMHG | SYSTOLIC BLOOD PRESSURE: 153 MMHG | WEIGHT: 190 LBS

## 2023-03-30 DIAGNOSIS — T83.9XXA PROBLEM WITH FOLEY CATHETER, INITIAL ENCOUNTER (HCC): Primary | ICD-10-CM

## 2023-03-30 PROCEDURE — 99283 EMERGENCY DEPT VISIT LOW MDM: CPT

## 2023-03-30 PROCEDURE — 51702 INSERT TEMP BLADDER CATH: CPT

## 2023-03-30 NOTE — CARE COORDINATION
CM consult per Dr Matt Alegria to assist with discharge planning for pt who is here from [address] facility for 2nd day in a row for borden cath problems. Dr Matt Alegria wants to make sure pt is safe to return to facility. Review of pt chart pt was discharged from inpatient at [facility] to [facility] with borden catheter due to urinary retention on 3/28/23    9/29/23 Yesterday pt sent to ER from [facility] due to borden catheter problem: Retained Urethral foreign body, ER Dr Adrianna Duarte consulted urology who came to ER while in ER. Documentation from Urology Vamsi Faustin, APRN-CNP of Urology department removed indwelling borden cath that had been cut off and the remaining proximal portion remained in the bladder. Approx 5 CM was able to be visualized outside of the urethra. Urology was able to remove the that had been found to have been cut and a knot had been tied in the end. See Urology notes for complete documentation. Once the borden that had been cut was removed drom pt urethra and bladder. Borden 18 Fr borden replaced clear urine draining, pt tolerated well. Pt was discharged back to [facility]. Pt returns to ER today with c/o of pt pulling out his borden catheter. Catheter was successfully replaced. Dr Matt Alegria ask that CM address the findings of the borden found to be cut and tied in a knot, prior to our sending pt back to [facility] making sure he is in safe environment. CM called [facility] left  received a call back from Yuki Valentine [phone], she explains that if family has concerns and want to report they are to call [phone], if CM wants to make a report go to web site [address] search for Nursing home complaint form complete and submit. Call to [facility] only # I have for admissions Ely Nye # [phone]. She gives this CM Gaby LIU # ,still at facility # [id number]. Spoke with Felicia LIU at [facility] cell # [phone].  She tells this CM they

## 2023-03-30 NOTE — ED PROVIDER NOTES
Patient handed off to me by colleague Dr. Alex Goldberg pending case management contacting the [facility] about Laureano catheter and care at the [facility] nursing [facility] for this patient.  Mrs. Nelson did speak with the director of nursing at facility and they have been made aware of the problem will continue to evaluate situation and patient. Patient will be discharged back to the [facility]. ICD-10-CM    1. Problem with Laureano catheter, [facility]  [address] Duncan DO  03/30/23 6001

## 2023-03-30 NOTE — CARE COORDINATION
CM - -Tr-1 Interviewed Pt , he is pleasant, very [place] , disoriented to time , otherwise oriented . Mr Mellisa Snyder is not able or does not know of the circumstances of how his borden catheter became problematic in the last day or days. Currently he is a resident of [facility] There are notes from Urology on this , otherwise the details relating to his borden catheter are not clear.  For decision making , the [place] will be called by John Glez / [facility]

## 2023-03-30 NOTE — ED NOTES
Report given to [facility] transport team. All pt belongings and paperwork sent with transport.      Tasha Jeffries RN  03/29/23 2047

## 2023-03-30 NOTE — ED PROVIDER NOTES
Father     Diabetes Brother     Diabetes Maternal Grandmother      Social History     Socioeconomic History    Marital status:      Spouse name: Not on file    Number of children: Not on file    Years of education: Not on file    Highest education level: Not on file   Occupational History    Not on file   Tobacco Use    Smoking status: Former     Packs/day: 1.00     Years: 5.00     Pack years: 5.00     Types: Cigarettes     Start date: 1950     Quit date: 1955     Years since quittin.4    Smokeless tobacco: Never   Substance and Sexual Activity    Alcohol use: Not Currently    Drug use: Not Currently    Sexual activity: Not on file   Other Topics Concern    Not on file   Social History Narrative    Not on file     Social Determinants of Health     Financial Resource Strain: Low Risk     Difficulty of Paying Living Expenses: Not hard at all   Food Insecurity: No Food Insecurity    Worried About Running Out of Food in the Last Year: Never true    Ran Out of Food in the Last Year: Never true   Transportation Needs: Not on file   Physical Activity: Not on file   Stress: Not on file   Social Connections: Not on file   Intimate Partner Violence: Not on file   Housing Stability: Not on file     No current facility-administered medications for this encounter. Current Outpatient Medications   Medication Sig Dispense Refill    azithromycin (ZITHROMAX Z-CAMILLA) 250 MG tablet Take 2 tablets (500 mg) on Day 1, and then take 1 tablet (250 mg) on days 2 through 5. 1 packet 0    HYDROcodone-acetaminophen (NORCO) 5-325 MG per tablet Take 1 tablet by mouth every 8 hours as needed for Pain for up to 3 days.  Max Daily Amount: 3 tablets 9 tablet 0    escitalopram (LEXAPRO) 10 MG tablet Take 1 tablet by mouth daily 30 tablet 3    QUEtiapine (SEROQUEL) 25 MG tablet Take 1 tablet by mouth 2 times daily 60 tablet 3    amLODIPine (NORVASC) 5 MG tablet Take 1 tablet by mouth daily 30 tablet 0    tamsulosin (FLOMAX) 0.4 wire to pass, this not was all the way up inside his penile urethra, not clear how that could have even happened. So then they were able to fully remove the Borden and replace another 1. From the report this morning it sounds like he had just pulled it out this morning, there was no other information from the nursing home for us to understand if they attempted to replace it or why they did not. He also has the sores as above. I am very concerned given this history from yesterday, on how that could have even happened, I am consulting case management, story does not seem appropriate. Patient remains in no distress. Patient does have BPH on reviewd and chronic indwelling borden, so we will replace. We were able to replace Borden easily, yellow urine in the bag    Chronic conditions affecting care: BPH, dementia    Discussion with Other Profesionals : Case Management 4854-they are aware of my concerns, will file with [facility] and they are calling the director at [facility] as well as the [place] [address] to follow-up and ensure an appropriate discharge plan and will also discuss with POA. Disposition Considerations (tests considered but not done, Shared Decision Making, Pt Expectation of Test or Tx.):   Pending CM investigation. I am the Primary Clinician of Record. Signed out to Dr. Brayan Munroe for discharge vs other placement. Pending CM investigation    Clinical Impression:  1. Problem with Borden catheter, initial encounter [facility])      Disposition referral (if applicable):  No follow-up provider specified. Disposition medications (if applicable):  New Prescriptions    No medications on file     ED Provider Disposition Time  DISPOSITION        Comment: Please note this report has been produced using speech recognition software and may contain errors related to that system including errors in grammar, punctuation, and spelling, as well as words and phrases that may be inappropriate.   Efforts were made to edit the

## 2023-03-30 NOTE — ED NOTES
Patient son called for and update. Was told someone from nursing home will be calling to update him. Nabil Kimble.  BARBARA Avilez  03/30/23 0458

## 2023-03-30 NOTE — ED NOTES
[facility] called [facility] for BLS unit to transport patient back to [facility]. Spoke with Cheryl Masterson at intake. ETA 15minutes.      Rodri Ng  03/30/23 7212

## 2023-03-30 NOTE — DISCHARGE INSTRUCTIONS
Follow-up with primary care physician in 1 to 2 days for reevaluation. Call for an appointment  Return to the emergency department immediately pain fever chills nausea vomiting dizzy lightheadedness or any worsening symptoms.

## 2023-04-02 ENCOUNTER — HOSPITAL ENCOUNTER (INPATIENT)
Age: 88
LOS: 1 days | Discharge: HOME | DRG: 811 | End: 2023-04-04
Attending: EMERGENCY MEDICINE | Admitting: STUDENT IN AN ORGANIZED HEALTH CARE EDUCATION/TRAINING PROGRAM
Payer: MEDICARE

## 2023-04-02 DIAGNOSIS — R31.9 HEMATURIA, UNSPECIFIED TYPE: ICD-10-CM

## 2023-04-02 DIAGNOSIS — F39 MOOD DISORDER (HCC): ICD-10-CM

## 2023-04-02 DIAGNOSIS — D64.9 ANEMIA, UNSPECIFIED TYPE: Primary | ICD-10-CM

## 2023-04-02 DIAGNOSIS — R82.81 PYURIA: ICD-10-CM

## 2023-04-02 LAB
ACETAMINOPHEN LEVEL: <5 UG/ML (ref 15–30)
ALBUMIN SERPL-MCNC: 2.8 GM/DL (ref 3.4–5)
ALCOHOL SCREEN SERUM: <0.01 %WT/VOL
ALP BLD-CCNC: 108 IU/L (ref 40–128)
ALT SERPL-CCNC: 12 U/L (ref 10–40)
AMPHETAMINES: NEGATIVE
ANION GAP SERPL CALCULATED.3IONS-SCNC: 13 MMOL/L (ref 4–16)
AST SERPL-CCNC: 19 IU/L (ref 15–37)
BARBITURATE SCREEN URINE: NEGATIVE
BASOPHILS ABSOLUTE: 0 K/CU MM
BASOPHILS RELATIVE PERCENT: 0.3 % (ref 0–1)
BENZODIAZEPINE SCREEN, URINE: NEGATIVE
BILIRUB SERPL-MCNC: 0.2 MG/DL (ref 0–1)
BILIRUBIN URINE: NEGATIVE
BLOOD, URINE: NORMAL
BUN SERPL-MCNC: 68 MG/DL (ref 6–23)
CALCIUM SERPL-MCNC: 8.3 MG/DL (ref 8.3–10.6)
CANNABINOID SCREEN URINE: NEGATIVE
CHLORIDE BLD-SCNC: 101 MMOL/L (ref 99–110)
CLARITY: NORMAL
CO2: 21 MMOL/L (ref 21–32)
COCAINE METABOLITE: NEGATIVE
COLOR: YELLOW
CREAT SERPL-MCNC: 6 MG/DL (ref 0.9–1.3)
DIFFERENTIAL TYPE: ABNORMAL
DOSE AMOUNT: ABNORMAL
DOSE AMOUNT: ABNORMAL
DOSE TIME: ABNORMAL
DOSE TIME: ABNORMAL
EOSINOPHILS ABSOLUTE: 0.2 K/CU MM
EOSINOPHILS RELATIVE PERCENT: 1.6 % (ref 0–3)
GFR SERPL CREATININE-BSD FRML MDRD: 8 ML/MIN/1.73M2
GLUCOSE SERPL-MCNC: 127 MG/DL (ref 70–99)
GLUCOSE, URINE: NEGATIVE MG/DL
HCT VFR BLD CALC: 18.9 % (ref 42–52)
HEMOGLOBIN: 5.7 GM/DL (ref 13.5–18)
IMMATURE NEUTROPHIL %: 0.7 % (ref 0–0.43)
KETONES, URINE: NEGATIVE MG/DL
LEUKOCYTE ESTERASE, URINE: NORMAL
LYMPHOCYTES ABSOLUTE: 1.5 K/CU MM
LYMPHOCYTES RELATIVE PERCENT: 11.5 % (ref 24–44)
MCH RBC QN AUTO: 29.4 PG (ref 27–31)
MCHC RBC AUTO-ENTMCNC: 30.2 % (ref 32–36)
MCV RBC AUTO: 97.4 FL (ref 78–100)
MONOCYTES ABSOLUTE: 1.2 K/CU MM
MONOCYTES RELATIVE PERCENT: 9.4 % (ref 0–4)
NITRITE URINE, QUANTITATIVE: NEGATIVE
NUCLEATED RBC %: 0 %
OPIATES, URINE: NEGATIVE
OXYCODONE: NEGATIVE
PDW BLD-RTO: 14.6 % (ref 11.7–14.9)
PH, URINE: 5.5
PHENCYCLIDINE, URINE: NEGATIVE
PLATELET # BLD: 266 K/CU MM (ref 140–440)
PMV BLD AUTO: 9.6 FL (ref 7.5–11.1)
POTASSIUM SERPL-SCNC: 4.4 MMOL/L (ref 3.5–5.1)
PROTEIN UA: 100 MG/DL
RBC # BLD: 1.94 M/CU MM (ref 4.6–6.2)
SALICYLATE LEVEL: <0.3 MG/DL (ref 15–30)
SEGMENTED NEUTROPHILS ABSOLUTE COUNT: 9.8 K/CU MM
SEGMENTED NEUTROPHILS RELATIVE PERCENT: 76.5 % (ref 36–66)
SODIUM BLD-SCNC: 135 MMOL/L (ref 135–145)
SPECIFIC GRAVITY UA: 1.02
TOTAL IMMATURE NEUTOROPHIL: 0.09 K/CU MM
TOTAL NUCLEATED RBC: 0 K/CU MM
TOTAL PROTEIN: 6.6 GM/DL (ref 6.4–8.2)
UROBILINOGEN, URINE: 0.2 MG/DL
WBC # BLD: 12.9 K/CU MM (ref 4–10.5)

## 2023-04-02 PROCEDURE — 81001 URINALYSIS AUTO W/SCOPE: CPT

## 2023-04-02 PROCEDURE — 85025 COMPLETE CBC W/AUTO DIFF WBC: CPT

## 2023-04-02 PROCEDURE — G0480 DRUG TEST DEF 1-7 CLASSES: HCPCS

## 2023-04-02 PROCEDURE — 80053 COMPREHEN METABOLIC PANEL: CPT

## 2023-04-02 PROCEDURE — 87186 SC STD MICRODIL/AGAR DIL: CPT

## 2023-04-02 PROCEDURE — 99285 EMERGENCY DEPT VISIT HI MDM: CPT

## 2023-04-02 PROCEDURE — 80307 DRUG TEST PRSMV CHEM ANLYZR: CPT

## 2023-04-02 PROCEDURE — 87077 CULTURE AEROBIC IDENTIFY: CPT

## 2023-04-02 PROCEDURE — 87086 URINE CULTURE/COLONY COUNT: CPT

## 2023-04-02 NOTE — Clinical Note
Discharge Plan[de-identified] Other/[facility])   Telemetry/Cardiac Monitoring Required?: No

## 2023-04-03 PROBLEM — D64.9 NORMOCYTIC ANEMIA: Status: ACTIVE | Noted: 2023-04-03

## 2023-04-03 LAB
BACTERIA: NORMAL /HPF
FERRITIN: 164 NG/ML (ref 30–400)
HCT VFR BLD CALC: 28.4 % (ref 42–52)
HCT VFR BLD CALC: 28.5 % (ref 42–52)
HCT VFR BLD CALC: 33.5 % (ref 42–52)
HEMOGLOBIN: 9.2 GM/DL (ref 13.5–18)
HEMOGLOBIN: 9.3 GM/DL (ref 13.5–18)
HEMOGLOBIN: 9.9 GM/DL (ref 13.5–18)
IRON: 42 UG/DL (ref 59–158)
PCT TRANSFERRIN: 22 % (ref 10–44)
RBC URINE: 76 /HPF
TOTAL IRON BINDING CAPACITY: 194 UG/DL (ref 250–450)
TRICHOMONAS: NORMAL /HPF
UNSATURATED IRON BINDING CAPACITY: 152 UG/DL (ref 110–370)
WBC CLUMP: NORMAL /HPF
WBC UA: 1638 /HPF

## 2023-04-03 PROCEDURE — 83550 IRON BINDING TEST: CPT

## 2023-04-03 PROCEDURE — 82728 ASSAY OF FERRITIN: CPT

## 2023-04-03 PROCEDURE — C9113 INJ PANTOPRAZOLE SODIUM, VIA: HCPCS | Performed by: STUDENT IN AN ORGANIZED HEALTH CARE EDUCATION/TRAINING PROGRAM

## 2023-04-03 PROCEDURE — 94761 N-INVAS EAR/PLS OXIMETRY MLT: CPT

## 2023-04-03 PROCEDURE — 6360000002 HC RX W HCPCS: Performed by: EMERGENCY MEDICINE

## 2023-04-03 PROCEDURE — 2580000003 HC RX 258: Performed by: EMERGENCY MEDICINE

## 2023-04-03 PROCEDURE — 6370000000 HC RX 637 (ALT 250 FOR IP): Performed by: STUDENT IN AN ORGANIZED HEALTH CARE EDUCATION/TRAINING PROGRAM

## 2023-04-03 PROCEDURE — 86901 BLOOD TYPING SEROLOGIC RH(D): CPT

## 2023-04-03 PROCEDURE — A4216 STERILE WATER/SALINE, 10 ML: HCPCS

## 2023-04-03 PROCEDURE — 2580000003 HC RX 258: Performed by: STUDENT IN AN ORGANIZED HEALTH CARE EDUCATION/TRAINING PROGRAM

## 2023-04-03 PROCEDURE — 99211 OFF/OP EST MAY X REQ PHY/QHP: CPT

## 2023-04-03 PROCEDURE — 6370000000 HC RX 637 (ALT 250 FOR IP): Performed by: INTERNAL MEDICINE

## 2023-04-03 PROCEDURE — 86922 COMPATIBILITY TEST ANTIGLOB: CPT

## 2023-04-03 PROCEDURE — 1200000000 HC SEMI PRIVATE

## 2023-04-03 PROCEDURE — 85014 HEMATOCRIT: CPT

## 2023-04-03 PROCEDURE — 36415 COLL VENOUS BLD VENIPUNCTURE: CPT

## 2023-04-03 PROCEDURE — 86900 BLOOD TYPING SEROLOGIC ABO: CPT

## 2023-04-03 PROCEDURE — P9016 RBC LEUKOCYTES REDUCED: HCPCS

## 2023-04-03 PROCEDURE — 85018 HEMOGLOBIN: CPT

## 2023-04-03 PROCEDURE — 6360000002 HC RX W HCPCS: Performed by: STUDENT IN AN ORGANIZED HEALTH CARE EDUCATION/TRAINING PROGRAM

## 2023-04-03 PROCEDURE — 86850 RBC ANTIBODY SCREEN: CPT

## 2023-04-03 PROCEDURE — 83540 ASSAY OF IRON: CPT

## 2023-04-03 PROCEDURE — 2580000003 HC RX 258

## 2023-04-03 RX ORDER — SODIUM CHLORIDE 0.9 % (FLUSH) 0.9 %
5-40 SYRINGE (ML) INJECTION EVERY 12 HOURS SCHEDULED
Status: DISCONTINUED | OUTPATIENT
Start: 2023-04-03 | End: 2023-04-04 | Stop reason: HOSPADM

## 2023-04-03 RX ORDER — ACETAMINOPHEN 325 MG/1
650 TABLET ORAL EVERY 6 HOURS PRN
Status: DISCONTINUED | OUTPATIENT
Start: 2023-04-03 | End: 2023-04-04 | Stop reason: HOSPADM

## 2023-04-03 RX ORDER — ONDANSETRON 4 MG/1
4 TABLET, ORALLY DISINTEGRATING ORAL EVERY 8 HOURS PRN
Status: DISCONTINUED | OUTPATIENT
Start: 2023-04-03 | End: 2023-04-04 | Stop reason: HOSPADM

## 2023-04-03 RX ORDER — ONDANSETRON 2 MG/ML
4 INJECTION INTRAMUSCULAR; INTRAVENOUS EVERY 6 HOURS PRN
Status: DISCONTINUED | OUTPATIENT
Start: 2023-04-03 | End: 2023-04-04 | Stop reason: HOSPADM

## 2023-04-03 RX ORDER — QUETIAPINE FUMARATE 25 MG/1
25 TABLET, FILM COATED ORAL 2 TIMES DAILY
Status: DISCONTINUED | OUTPATIENT
Start: 2023-04-03 | End: 2023-04-04 | Stop reason: HOSPADM

## 2023-04-03 RX ORDER — SODIUM CHLORIDE 0.9 % (FLUSH) 0.9 %
5-40 SYRINGE (ML) INJECTION PRN
Status: DISCONTINUED | OUTPATIENT
Start: 2023-04-03 | End: 2023-04-04 | Stop reason: HOSPADM

## 2023-04-03 RX ORDER — ACETAMINOPHEN 650 MG/1
650 SUPPOSITORY RECTAL EVERY 6 HOURS PRN
Status: DISCONTINUED | OUTPATIENT
Start: 2023-04-03 | End: 2023-04-04 | Stop reason: HOSPADM

## 2023-04-03 RX ORDER — SODIUM CHLORIDE 9 MG/ML
INJECTION INTRAVENOUS
Status: COMPLETED
Start: 2023-04-03 | End: 2023-04-03

## 2023-04-03 RX ORDER — SODIUM CHLORIDE 9 MG/ML
INJECTION, SOLUTION INTRAVENOUS PRN
Status: DISCONTINUED | OUTPATIENT
Start: 2023-04-03 | End: 2023-04-04 | Stop reason: HOSPADM

## 2023-04-03 RX ORDER — ESCITALOPRAM OXALATE 10 MG/1
10 TABLET ORAL DAILY
Status: DISCONTINUED | OUTPATIENT
Start: 2023-04-03 | End: 2023-04-04 | Stop reason: HOSPADM

## 2023-04-03 RX ORDER — FINASTERIDE 5 MG/1
5 TABLET, FILM COATED ORAL DAILY
Status: DISCONTINUED | OUTPATIENT
Start: 2023-04-03 | End: 2023-04-04 | Stop reason: HOSPADM

## 2023-04-03 RX ORDER — LEVOTHYROXINE SODIUM 0.1 MG/1
100 TABLET ORAL DAILY
Status: DISCONTINUED | OUTPATIENT
Start: 2023-04-03 | End: 2023-04-04 | Stop reason: HOSPADM

## 2023-04-03 RX ORDER — OXYCODONE HYDROCHLORIDE AND ACETAMINOPHEN 5; 325 MG/1; MG/1
1 TABLET ORAL ONCE
Status: COMPLETED | OUTPATIENT
Start: 2023-04-03 | End: 2023-04-03

## 2023-04-03 RX ORDER — POLYETHYLENE GLYCOL 3350 17 G/17G
17 POWDER, FOR SOLUTION ORAL DAILY PRN
Status: DISCONTINUED | OUTPATIENT
Start: 2023-04-03 | End: 2023-04-04 | Stop reason: HOSPADM

## 2023-04-03 RX ORDER — PANTOPRAZOLE SODIUM 40 MG/10ML
40 INJECTION, POWDER, LYOPHILIZED, FOR SOLUTION INTRAVENOUS 2 TIMES DAILY
Status: DISCONTINUED | OUTPATIENT
Start: 2023-04-03 | End: 2023-04-04 | Stop reason: HOSPADM

## 2023-04-03 RX ORDER — AMLODIPINE BESYLATE 5 MG/1
5 TABLET ORAL DAILY
Status: DISCONTINUED | OUTPATIENT
Start: 2023-04-03 | End: 2023-04-04 | Stop reason: HOSPADM

## 2023-04-03 RX ORDER — SODIUM BICARBONATE 650 MG/1
650 TABLET ORAL DAILY
Status: DISCONTINUED | OUTPATIENT
Start: 2023-04-03 | End: 2023-04-04 | Stop reason: HOSPADM

## 2023-04-03 RX ORDER — HYDRALAZINE HYDROCHLORIDE 20 MG/ML
10 INJECTION INTRAMUSCULAR; INTRAVENOUS EVERY 6 HOURS PRN
Status: DISCONTINUED | OUTPATIENT
Start: 2023-04-03 | End: 2023-04-04 | Stop reason: HOSPADM

## 2023-04-03 RX ADMIN — LEVOTHYROXINE SODIUM 100 MCG: 0.1 TABLET ORAL at 08:55

## 2023-04-03 RX ADMIN — OXYCODONE HYDROCHLORIDE AND ACETAMINOPHEN 1 TABLET: 5; 325 TABLET ORAL at 19:15

## 2023-04-03 RX ADMIN — ACETAMINOPHEN 650 MG: 325 TABLET ORAL at 18:39

## 2023-04-03 RX ADMIN — SODIUM CHLORIDE 10 ML: 9 INJECTION INTRAMUSCULAR; INTRAVENOUS; SUBCUTANEOUS at 12:30

## 2023-04-03 RX ADMIN — CEFTRIAXONE SODIUM 1000 MG: 1 INJECTION, POWDER, FOR SOLUTION INTRAMUSCULAR; INTRAVENOUS at 02:44

## 2023-04-03 RX ADMIN — QUETIAPINE FUMARATE 25 MG: 25 TABLET ORAL at 19:15

## 2023-04-03 RX ADMIN — SODIUM BICARBONATE 650 MG: 650 TABLET ORAL at 08:55

## 2023-04-03 RX ADMIN — QUETIAPINE FUMARATE 25 MG: 25 TABLET ORAL at 08:55

## 2023-04-03 RX ADMIN — PANTOPRAZOLE SODIUM 40 MG: 40 INJECTION, POWDER, FOR SOLUTION INTRAVENOUS at 19:15

## 2023-04-03 RX ADMIN — SODIUM CHLORIDE, PRESERVATIVE FREE 10 ML: 5 INJECTION INTRAVENOUS at 19:17

## 2023-04-03 RX ADMIN — PANTOPRAZOLE SODIUM 40 MG: 40 INJECTION, POWDER, FOR SOLUTION INTRAVENOUS at 12:30

## 2023-04-03 RX ADMIN — SODIUM CHLORIDE, PRESERVATIVE FREE 10 ML: 5 INJECTION INTRAVENOUS at 09:02

## 2023-04-03 RX ADMIN — FINASTERIDE 5 MG: 5 TABLET, FILM COATED ORAL at 08:55

## 2023-04-03 RX ADMIN — ESCITALOPRAM OXALATE 10 MG: 10 TABLET ORAL at 08:55

## 2023-04-03 ASSESSMENT — PAIN SCALES - GENERAL
PAINLEVEL_OUTOF10: 0
PAINLEVEL_OUTOF10: 7
PAINLEVEL_OUTOF10: 3
PAINLEVEL_OUTOF10: 10

## 2023-04-03 ASSESSMENT — PAIN SCALES - WONG BAKER: WONGBAKER_NUMERICALRESPONSE: 0

## 2023-04-03 ASSESSMENT — PAIN DESCRIPTION - DESCRIPTORS: DESCRIPTORS: SHARP

## 2023-04-03 ASSESSMENT — PAIN DESCRIPTION - ORIENTATION: ORIENTATION: MID

## 2023-04-03 ASSESSMENT — PAIN DESCRIPTION - LOCATION: LOCATION: HEAD

## 2023-04-03 NOTE — ED PROVIDER NOTES
He does have a chronic indwelling Laureano catheter. Reportedly he had a tough day at his facility today became upset with staff believing that he has been mistreated and did become combative with them. He did want to come to the emergency department for reported mental health reasons. No report of suicidality, homicidality or auditory visual hallucinations. In the emergency department he continues to deny any suicidality. He is not on a pink slip. He does present with elevated blood pressure. Vitals otherwise unremarkable. Is afebrile. No tachycardia. Respirations are within normal limits. His oxygen saturations are in the high 90s on room air. Physical and neurological exams are grossly nonfocal.  He does have a indwelling Laureano catheter. Screening labs are obtained. Labs including CBC, CMP, salicylates, acetaminophen, urinalysis and urine tox screen. His CBC was remarkable for a low hemoglobin of 5.7. Patient does not report any signs of bleeding. Does not report any melena or hematochezia. I did call and discuss with his facility the events of the day as well as if he has had any recent signs of bleeding. Nursing staff his facility did not report that he has had any melena hematochezia or hematuria emesis. They do report that he had the behavioral issues earlier today. His labs are otherwise stable. 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 Serum creatinine today is 6 which is baseline for him. Electrolytes overall unremarkable. His urinalysis does show some hematuria, urine occasional bacteria. He is a DNR CC. We did paperwork faxed over from his facility. I did call and speak with his POA who is his son Mayra Tam. He does state that he would like patient to be admitted for blood transfusion. I did perform a rectal exam on patient. There is no az bleeding. Stool was brown and nonmelanotic. He was fecal occult positive upon testing.     He does have have fecal occult stool as well as

## 2023-04-03 NOTE — ED NOTES
Lab critical values of Hgb. 5.7 and Hct. 18.9. Dr. Manfred Sheikh notified.       Kamaljit Gunderson RN  04/02/23 6247

## 2023-04-03 NOTE — H&P
(SYNTHROID) 100 MCG tablet Take 1 tablet by mouth daily 3/28/23   Abena Payan MD   Vitamin D (CHOLECALCIFEROL) 50 MCG (2000 UT) TABS tablet Take 1 tablet by mouth daily 3/28/23   Abena Payan MD   miconazole (MICOTIN) 2 % powder Apply topically 2 times daily. 2/16/23   Chong Sheffield MD   magnesium oxide (MAG-OX) 400 (240 Mg) MG tablet Take 1 tablet by mouth daily for 14 days 2/17/23 3/3/23  Chong Sheffield MD   finasteride (PROSCAR) 5 MG tablet Take 1 tablet by mouth daily 2/17/23   Chong Sheffield MD   sodium bicarbonate 325 MG tablet Take 650 mg by mouth daily    Historical Provider, MD   Multiple Vitamins-Minerals (THERAPEUTIC MULTIVITAMIN-MINERALS) tablet Take 1 tablet by mouth Daily with supper    Historical Provider, MD   sennosides-docusate sodium (SENOKOT-S) 8.6-50 MG tablet Take 2 tablets by mouth 2 times daily as needed for Constipation  Patient taking differently: Take 2 tablets by mouth Daily with lunch 10/14/22   Mikey Ibarra MD   benzonatate (TESSALON) 200 MG capsule Take 1 capsule by mouth 3 times daily as needed for Cough 10/6/22   Shon Hollingsworth PA-C   atorvastatin (LIPITOR) 40 MG tablet Take 1 tablet by mouth nightly 8/31/22   Emily Gordon MD   ELIQUIS 2.5 MG TABS tablet TAKE 1 TABLET BY MOUTH TWICE A DAY 8/30/22 8/31/22  Óscar Dick DO   pantoprazole (PROTONIX) 20 MG tablet TAKE 1 TABLET BY MOUTH EVERY DAY 7/5/22   Óscar Dick DO       Physical Exam: Need 8 Elements   Physical Exam     General: NAD  Eyes: EOMI  ENT: neck supple  Cardiovascular: Regular rate. Respiratory: Clear to auscultation  Gastrointestinal: Soft, non tender  Genitourinary: no suprapubic tenderness  Musculoskeletal: No edema  Skin: warm, dry  Neuro: Alert. Psych: Mood appropriate.        Past Medical History:   PMHx   Past Medical History:   Diagnosis Date    Anemia     Anxiety     Arthritis     BPH with urinary obstruction     Depression     GERD (gastroesophageal reflux disease)     Hemorrhoids     Hepatitis

## 2023-04-03 NOTE — PROGRESS NOTES
04/03/23 1222   Encounter Summary   Encounter Overview/Reason  Initial Encounter   Service Provided For: Patient and family together   Referral/Consult From: [facility]   Last Encounter  04/03/23  (Patient spoke to family in the room offered prayer and spiritual support.  Patient did not speak but was peaceful.)   Complexity of Encounter Low   Begin Time 1217   End Time  1224   Total Time Calculated 7 min   Encounter    Type Initial Screen/Assessment   Spiritual/Emotional needs   Type Spiritual Support   Assessment/Intervention/Outcome   Assessment Peaceful   Intervention Active listening;Discussed belief system/Moravian practices/fernando;Discussed relationship with God;Prayer (assurance of)/Addison;Sustaining Presence/Ministry of presence   Outcome Expressed Gratitude   Plan and Referrals   Plan/Referrals Continue Support (comment)  (as needed)

## 2023-04-03 NOTE — ED NOTES
Copy of DNR received. Provided to registration to scan in to chart. Copy to be placed in pt's soft chart.      Alla Espino RN  04/03/23 BARBARA Sales  04/03/23 3690

## 2023-04-03 NOTE — PLAN OF CARE
Problem: Safety - Adult  Goal: Free from fall injury  4/3/2023 0458 by Rafael Medina RN  Outcome: Progressing  4/3/2023 0458 by Rafael Medina RN  Outcome: Progressing     Problem: Discharge Planning  Goal: Discharge to home or other facility with appropriate resources  4/3/2023 0458 by Rafael Medina RN  Outcome: Progressing  4/3/2023 0458 by Rafael Medina RN  Outcome: Progressing     Problem: Skin/Tissue Integrity  Goal: Absence of new skin breakdown  Description: 1. Monitor for areas of redness and/or skin breakdown  2. Assess vascular access sites hourly  3. Every 4-6 hours minimum:  Change oxygen saturation probe site  4. Every 4-6 hours:  If on nasal continuous positive airway pressure, respiratory therapy assess nares and determine need for appliance change or resting period.   Outcome: Progressing

## 2023-04-03 NOTE — PROGRESS NOTES
4 Eyes Skin Assessment     NAME:  Loly Key  YOB: 1930  MEDICAL RECORD NUMBER:  [id number]    The patient is being assess for  Admission    I agree that 2 RN's have performed a thorough Head to Toe Skin Assessment on the patient. ALL assessment sites listed below have been assessed. Areas assessed by both nurses:    Head, Face, Ears, Shoulders, Back, Chest, Arms, Elbows, Hands, Sacrum. Buttock, Coccyx, Ischium, and Legs. Feet and Heels        Does the Patient have a Wound? Yes wound(s) were present on assessment.  LDA wound assessment was Initiated and completed by BARBARA [place] Prevention initiated:  Yes   Wound Care Orders initiated:  Yes    Pressure Injury (Stage 3,4, Unstageable, DTI, NWPT, and Complex wounds) if present place consult order under [de-identified] No    New and Established Ostomies if present place consult order under : No      Nurse 1 eSignature: Electronically signed by Bart Zhang RN on 4/3/23 at 4:55 AM EDT    **SHARE this note so that the co-signing nurse is able to place an eSignature**    Nurse 2 eSignature: {Esignature:[id number]}

## 2023-04-03 NOTE — ED NOTES
This RN called [address] [place] [address] to verify pt's code status. Per EHR, pt has DNR-[facility]; SNF did not send w/ initial paperwork. Staff to fax over copy.      Brayan Dunn RN  04/02/23 1703

## 2023-04-03 NOTE — ED NOTES
ED TO INPATIENT SBAR HANDOFF    Patient Name: Tiffanie Koenig   :  10/18/1930  80 y.o. MRN:  [id number]  Preferred Name    ED Room #:  TR01/01TR-01  Family/Caregiver Present no   Restraints no   Sitter no   Sepsis Risk Score Sepsis Risk Score: 2.46    Situation  Code Status: Prior Limited Code details: Intubation/Re-intubation No Comment; Defibrillation/Cardioversion No Comment; Chest Compressions No Comment; Resuscitative Medications No Comment; Other No Comment. Allergies: Minocycline  Weight: No data found.   Arrived from: nursing home  Chief Complaint:   Chief Complaint   Patient presents with    Mental Health Problem     Imaging:   No orders to display     Abnormal labs:   Abnormal Labs Reviewed   COMPREHENSIVE METABOLIC PANEL - Abnormal; Notable for the following components:       Result Value    BUN 68 (*)     Creatinine 6.0 (*)     Est, Glom Filt Rate 8 (*)     Glucose 127 (*)     Albumin 2.8 (*)     All other components within normal limits   CBC WITH AUTO DIFFERENTIAL - Abnormal; Notable for the following components:    WBC 12.9 (*)     RBC 1.94 (*)     Hemoglobin 5.7 (*)     Hematocrit 18.9 (*)     MCHC 30.2 (*)     Segs Relative 76.5 (*)     Lymphocytes % 11.5 (*)     Monocytes % 9.4 (*)     Immature Neutrophil % 0.7 (*)     All other components within normal limits   SALICYLATE LEVEL - Abnormal; Notable for the following components:    Salicylate Lvl <4.6 (*)     All other components within normal limits   ACETAMINOPHEN LEVEL - Abnormal; Notable for the following components:    Acetaminophen Level <5.0 (*)     All other components within normal limits     Critical values: yes; HGB: 5.8    Abnormal Assessment Findings: chronic borden, denies pain    Background  History:   Past Medical History:   Diagnosis Date    Anemia     Anxiety     Arthritis     BPH with urinary obstruction     Depression     GERD (gastroesophageal reflux disease)     Hemorrhoids     Hepatitis     Hernia of unspecified site of

## 2023-04-03 NOTE — CONSULTS
Amount None 04/03/23 1411   Odor None 04/03/23 1411   Tiffanie-wound Assessment Intact 04/03/23 1411   Margins Attached edges 04/03/23 1411   Number of days: 0       Wound 04/03/23 Thigh Anterior; Left (Active)   Wound Image   04/03/23 1411   Wound Etiology Other 04/03/23 1411   Dressing Status New dressing applied; Other (Comment) 04/03/23 1411   Wound Cleansed Cleansed with saline 04/03/23 1411   Dressing/Treatment Silicone border 61/01/02 1411   Wound Length (cm) 2.5 cm 04/03/23 1411   Wound Width (cm) 3 cm 04/03/23 1411   Wound Depth (cm) 0.1 cm 04/03/23 1411   Wound Surface Area (cm^2) 7.5 cm^2 04/03/23 1411   Wound Volume (cm^3) 0.75 cm^3 04/03/23 1411   Distance Tunneling (cm) 0 cm 04/03/23 1411   Tunneling Position ___ O'Clock 0 04/03/23 1411   Undermining Starts ___ O'Clock 0 04/03/23 1411   Undermining Ends___ O'Clock 0 04/03/23 1411   Undermining Maxium Distance (cm) 0 04/03/23 1411   Wound Assessment Pink/red;Purple/maroon 04/03/23 1411   Drainage Amount Small 04/03/23 1411   Drainage Description Serosanguinous 04/03/23 1411   Odor None 04/03/23 1411   Tiffanie-wound Assessment Intact 04/03/23 1411   Margins Defined edges 04/03/23 1411   Wound Thickness Description not for Pressure Injury Partial thickness 04/03/23 1411   Number of days: 0       Wound 04/03/23 Elbow Right;Posterior (Active)   Wound Image   04/03/23 1411   Wound Etiology Skin Tear 04/03/23 1411   Dressing Status New dressing applied 04/03/23 1411   Wound Cleansed Cleansed with saline 04/03/23 1411   Dressing/Treatment Silicone border 01/13/47 1411   Wound Length (cm) 2 cm 04/03/23 1411   Wound Width (cm) 4.5 cm 04/03/23 1411   Wound Depth (cm) 0.1 cm 04/03/23 1411   Wound Surface Area (cm^2) 9 cm^2 04/03/23 1411   Wound Volume (cm^3) 0.9 cm^3 04/03/23 1411   Distance Tunneling (cm) 0 cm 04/03/23 1411   Tunneling Position ___ O'Clock 0 04/03/23 1411   Undermining Starts ___ O'Clock 0 04/03/23 1411   Undermining Ends___ O'Clock 0 04/03/23 1411

## 2023-04-03 NOTE — ED NOTES
MD Jb at bedside with this RN for rectal exam. Fecal occult positive.      Karli Conrad RN  04/03/23 4521

## 2023-04-04 VITALS
SYSTOLIC BLOOD PRESSURE: 185 MMHG | OXYGEN SATURATION: 98 % | RESPIRATION RATE: 16 BRPM | HEIGHT: 72 IN | TEMPERATURE: 96.8 F | HEART RATE: 77 BPM | BODY MASS INDEX: 24.93 KG/M2 | DIASTOLIC BLOOD PRESSURE: 88 MMHG | WEIGHT: 184.08 LBS

## 2023-04-04 LAB
ABO/RH: NORMAL
ANTIBODY SCREEN: NEGATIVE
COMPONENT: NORMAL
CROSSMATCH RESULT: NORMAL
HCT VFR BLD CALC: 30.1 % (ref 42–52)
HEMOGLOBIN: 9.4 GM/DL (ref 13.5–18)
STATUS: NORMAL
TRANSFUSION STATUS: NORMAL
UNIT DIVISION: 0
UNIT NUMBER: NORMAL

## 2023-04-04 PROCEDURE — 2580000003 HC RX 258: Performed by: STUDENT IN AN ORGANIZED HEALTH CARE EDUCATION/TRAINING PROGRAM

## 2023-04-04 PROCEDURE — 85018 HEMOGLOBIN: CPT

## 2023-04-04 PROCEDURE — C9113 INJ PANTOPRAZOLE SODIUM, VIA: HCPCS | Performed by: STUDENT IN AN ORGANIZED HEALTH CARE EDUCATION/TRAINING PROGRAM

## 2023-04-04 PROCEDURE — 6370000000 HC RX 637 (ALT 250 FOR IP): Performed by: STUDENT IN AN ORGANIZED HEALTH CARE EDUCATION/TRAINING PROGRAM

## 2023-04-04 PROCEDURE — 6360000002 HC RX W HCPCS: Performed by: STUDENT IN AN ORGANIZED HEALTH CARE EDUCATION/TRAINING PROGRAM

## 2023-04-04 PROCEDURE — 94761 N-INVAS EAR/PLS OXIMETRY MLT: CPT

## 2023-04-04 PROCEDURE — 85014 HEMATOCRIT: CPT

## 2023-04-04 PROCEDURE — 36415 COLL VENOUS BLD VENIPUNCTURE: CPT

## 2023-04-04 RX ORDER — SENNA AND DOCUSATE SODIUM 50; 8.6 MG/1; MG/1
2 TABLET, FILM COATED ORAL
Qty: 60 TABLET | Refills: 0
Start: 2023-04-04

## 2023-04-04 RX ORDER — SENNA AND DOCUSATE SODIUM 50; 8.6 MG/1; MG/1
2 TABLET, FILM COATED ORAL
Qty: 60 TABLET | Refills: 0 | Status: SHIPPED | OUTPATIENT
Start: 2023-04-04 | End: 2023-04-04 | Stop reason: SDUPTHER

## 2023-04-04 RX ADMIN — QUETIAPINE FUMARATE 25 MG: 25 TABLET ORAL at 08:22

## 2023-04-04 RX ADMIN — SODIUM BICARBONATE 650 MG: 650 TABLET ORAL at 08:22

## 2023-04-04 RX ADMIN — FINASTERIDE 5 MG: 5 TABLET, FILM COATED ORAL at 08:22

## 2023-04-04 RX ADMIN — SODIUM CHLORIDE, PRESERVATIVE FREE 10 ML: 5 INJECTION INTRAVENOUS at 08:23

## 2023-04-04 RX ADMIN — CEFTRIAXONE SODIUM 1000 MG: 1 INJECTION, POWDER, FOR SOLUTION INTRAMUSCULAR; INTRAVENOUS at 02:32

## 2023-04-04 RX ADMIN — LEVOTHYROXINE SODIUM 100 MCG: 0.1 TABLET ORAL at 06:03

## 2023-04-04 RX ADMIN — ESCITALOPRAM OXALATE 10 MG: 10 TABLET ORAL at 08:22

## 2023-04-04 RX ADMIN — PANTOPRAZOLE SODIUM 40 MG: 40 INJECTION, POWDER, FOR SOLUTION INTRAVENOUS at 11:32

## 2023-04-04 ASSESSMENT — PAIN SCALES - GENERAL: PAINLEVEL_OUTOF10: 0

## 2023-04-04 NOTE — DISCHARGE INSTR - COC
09/05/2019, 10/30/2020    Influenza, FLUAD, (age 72 y+), Adjuvanted, 0.5mL 09/08/2021    Influenza, FLUZONE (age 72 y+), High Dose, 0.7mL 10/28/2020, 11/05/2021    Influenza, High Dose (Fluzone 65 yrs and older) 10/27/2017, 10/10/2018, 09/05/2019    Pneumococcal, PCV-13, PREVNAR 13, (age 6w+), IM, 0.5mL 08/05/2016    Pneumococcal, PPSV23, PNEUMOVAX 23, (age 2y+), SC/IM, 0.5mL 12/07/2021    TDaP, ADACEL (age 10y-63y), BOOSTRIX (age 10y+), IM, 0.5mL 08/27/2013, 05/04/2018, 02/17/2023    Zoster Live (Zostavax) 07/22/2015       Active Problems:  Patient Active Problem List   Diagnosis Code    Hyperlipidemia E78.5    Depression F32. 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    Atelectasis J98.11    Bandemia D72.825    Thrombocytopenia, unspecified D69.6    Leukocytosis D72.829    Skin ulcer, limited to breakdown of skin (HCC) L98.491    Current moderate episode of major depressive disorder, unspecified whether recurrent (HCC) F32.1    Chronic kidney disease, stage IV (severe) (HCC) N18.4    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

## 2023-04-04 NOTE — CARE COORDINATION
Received call from pt's son Eve Magdaleno, he is in agreement to pt returning to [facility] whenever medically ready. Advised that physician indicated that pt may be medically ready for discharge today and that she would be call him to discuss. Per Eve Magdaleno he will be at [facility] by 3- 3:30pm as well. Pt on discharge, set up with [facility] for 4pm.  Left  for Dayla Skbrens at [facility]. Nursing to call report to [phone]. TC to pt's son Ho Harris, [phone] to update.     Faxed AVS to [phone]

## 2023-04-04 NOTE — DISCHARGE SUMMARY
therapeutic multivitamin-minerals tablet            STOP taking these medications      amLODIPine 5 MG tablet  Commonly known as: NORVASC     atorvastatin 40 MG tablet  Commonly known as: LIPITOR     azithromycin 250 MG tablet  Commonly known as: Zithromax Z-Anibal     Eliquis 2.5 MG Tabs tablet  Generic drug: apixaban     miconazole 2 % powder  Commonly known as: MICOTIN     vitamin D 50 MCG (2000 UT) Tabs tablet  Commonly known as: CHOLECALCIFEROL               Where to Get Your Medications        Information about where to get these medications is not yet available    Ask your nurse or doctor about these medications  sennosides-docusate sodium 8.6-50 MG tablet         Objective Findings at Discharge:       BMP/CBC  Recent Labs     04/02/23  2241 04/03/23  0613 04/03/23  1012 04/03/23  1929 04/04/23  0739     --   --   --   --    K 4.4  --   --   --   --      --   --   --   --    CO2 21  --   --   --   --    BUN 68*  --   --   --   --    CREATININE 6.0*  --   --   --   --    WBC 12.9*  --   --   --   --    HCT 18.9*   < > 28.5* 33.5* 30.1*     --   --   --   --     < > = values in this interval not displayed. IMAGING:      Additional Information: Patient seen and examined day of discharge.  For more information regarding patient's care please contact [facility] records [phone]    Discharge Time of 35 minutes    Electronically signed by Vineet Bhakta MD on 4/4/2023 at 5:35 PM

## 2023-04-04 NOTE — PROGRESS NOTES
[address] [place] [address]    I just found out that this patient on hospice at his Nursing Home was sent to the ED on 4/2/23 for behavior issues and admitted by the hospitalist team for a hemoglobin 5.7 gm/dl and transfused. Post transfusion hemoglobin 9.2 to 9.9 range and there is no active bleeding. The Nursing Home never let hospice know that the patient was sent to the ED and we had no opportunity to try to manage at the facility. This morning, the patient wants to be left alone and sleep. He denies pain. Laureano is draining. There are no family here. .       I have collaborated with Dr. Vidal Davenport, and appreciate the assistance of the hospitalists. The patient is to be discharged to the Nursing Home and hospice can follow there.  I have collaborated with the hospice team.     Narda Baker MD

## 2023-04-04 NOTE — PLAN OF CARE
Problem: Safety - Adult  Goal: Free from fall injury  Outcome: Progressing     Problem: Discharge Planning  Goal: Discharge to home or other facility with appropriate resources  Outcome: Progressing     Problem: Skin/Tissue Integrity  Goal: Absence of new skin breakdown  Description: 1. Monitor for areas of redness and/or skin breakdown  2. Assess vascular access sites hourly  3. Every 4-6 hours minimum:  Change oxygen saturation probe site  4. Every 4-6 hours:  If on nasal continuous positive airway pressure, respiratory therapy assess nares and determine need for appliance change or resting period. Outcome: Progressing     Problem: Confusion  Goal: Confusion, delirium, dementia, or psychosis is improved or at baseline  Description: INTERVENTIONS:  1. Assess for possible contributors to thought disturbance, including medications, impaired vision or hearing, underlying metabolic abnormalities, dehydration, psychiatric diagnoses, and notify attending LIP  2. [place] high risk fall precautions, as indicated  3. Provide frequent short contacts to provide reality reorientation, refocusing and direction  4. Decrease environmental stimuli, including noise as appropriate  5. Monitor and intervene to maintain adequate nutrition, hydration, elimination, sleep and activity  6. If unable to ensure safety without constant attention obtain sitter and review sitter guidelines with assigned personnel  7.  Initiate Psychosocial CNS and Spiritual Care consult, as indicated  Outcome: Progressing     Problem: ABCDS Injury Assessment  Goal: Absence of physical injury  Outcome: Progressing

## 2023-04-05 LAB
CULTURE: ABNORMAL
CULTURE: ABNORMAL
Lab: ABNORMAL
SPECIMEN: ABNORMAL

## 2023-04-10 ENCOUNTER — TELEMEDICINE (OUTPATIENT)
Dept: FAMILY MEDICINE CLINIC | Age: 88
End: 2023-04-10

## 2023-04-10 DIAGNOSIS — F03.911 AGITATION DUE TO DEMENTIA (HCC): ICD-10-CM

## 2023-04-10 DIAGNOSIS — E03.9 ACQUIRED HYPOTHYROIDISM: Primary | ICD-10-CM

## 2023-04-10 DIAGNOSIS — F41.9 ANXIETY: ICD-10-CM

## 2023-04-10 SDOH — ECONOMIC STABILITY: FOOD INSECURITY: WITHIN THE PAST 12 MONTHS, THE FOOD YOU BOUGHT JUST DIDN'T LAST AND YOU DIDN'T HAVE MONEY TO GET MORE.: PATIENT DECLINED

## 2023-04-10 SDOH — ECONOMIC STABILITY: FOOD INSECURITY: WITHIN THE PAST 12 MONTHS, YOU WORRIED THAT YOUR FOOD WOULD RUN OUT BEFORE YOU GOT MONEY TO BUY MORE.: PATIENT DECLINED

## 2023-04-10 SDOH — ECONOMIC STABILITY: INCOME INSECURITY: HOW HARD IS IT FOR YOU TO PAY FOR THE VERY BASICS LIKE FOOD, HOUSING, MEDICAL CARE, AND HEATING?: HARD

## 2023-04-10 SDOH — ECONOMIC STABILITY: TRANSPORTATION INSECURITY
IN THE PAST 12 MONTHS, HAS LACK OF TRANSPORTATION KEPT YOU FROM MEETINGS, WORK, OR FROM GETTING THINGS NEEDED FOR DAILY LIVING?: PATIENT DECLINED

## 2023-04-10 SDOH — ECONOMIC STABILITY: HOUSING INSECURITY
IN THE LAST 12 MONTHS, WAS THERE A TIME WHEN YOU DID NOT HAVE A STEADY PLACE TO SLEEP OR SLEPT IN A SHELTER (INCLUDING NOW)?: PATIENT REFUSED

## 2024-02-15 NOTE — PROGRESS NOTES
Referral for Dr. Pimentel for surgery  CPT: 84936, 74482  DX: D06.9   Told by SDS to hold all meds except for metoprolol and antibiotic IV. This nurse did. Pt still in room crying. Pt able to answer question if nurse yells in ear.

## 2024-04-09 NOTE — CARE COORDINATION
History of TIA:    - At this moment in time, I do not see any reason as to why the patient cannot continue with physical therapy and Occupational Therapy for his balance issues.  It did seem that the patient had appropriate strength of the bilateral upper and lower extremities, no significant degree of balance or instability issues detected.  Patient had difficulty with proprioception on the right side of his body but outside of this no other significant issues noted.  Would feel strongly about the patient still continuing physical therapy for his walking at this time.  - Advised the patient to continue to follow with cardiology in regards to his Zio patch readings.  If the Zio patch shows no signs of atrial fibrillation, since we were not able to confirm the patient's presence of actually having a stroke or not due to not being able to obtain an MRI of the brain I would say that the patient does not likely need a loop recorder for further monitoring.  Would also continue to follow with cardiology in regards to the patient's blood pressure medication management, although 150/86 from today's visit is much improved from before I would still like this to ideally be lower around 130/80.  - Carotid artery ultrasound ordered at this time for evaluation of the mild atherosclerotic disease patient's bilateral carotid arteries.  - Recommend that the patient complete sleep study for evaluation of sleep apnea as well.    - For ongoing stroke prevention continue: Aspirin 81 mg once daily, Lipitor 40 mg once daily  - Discussed the importance of antiplatelet management with the patient to prevent future strokes.   - Recommend to check blood pressure occasionally away from the doctor's office to make sure that those numbers are typically less than 130/80.  If they are frequently higher than that, we recommend checking a little more often and to follow up with primary care team   - Will defer to primary care team for monitoring of  LSW reviewed patient chart. Patient is pre-cert pending to [facility] [place]. LSW placed whiteboard asking for updated therapy notes. cholesterol panel and blood sugar numbers with target LDL cholesterol of less than 70 and hemoglobin A1c less than 7%  - Recommend following a low salt, mediterranean diet   - Recommend routine physical exercise as tolerated     We will plan for him to return to the office in 6 months time to see on of the APPs or Dr. Ahmadi but would be happy to see him sooner if the need should arise.  If he has any symptoms concerning for TIA or stroke including sudden painless loss of vision or double vision, difficulty speaking or swallowing, vertigo/room spinning that does not quickly resolve, or weakness/numbness/loss of coordination affecting 1 side of the face or body he should proceed by ambulance to the nearest emergency room immediately.

## 2024-11-25 NOTE — PROGRESS NOTES
notified for bloody urine in borden catheter.  At bedside assessing pt. No new order at this time. Dr. Shelley Conroy will try to contact pt's family for code status discussion.
03/06/23 1017   Encounter Summary   Encounter Overview/Reason  Attempted Encounter   Service Provided For: Patient not available   Last Encounter  03/06/23  (patient was deeply sleeping)   Begin Time 1004   End Time  1005   Total Time Calculated 1 min   Assessment/Intervention/Outcome   Assessment Unable to assess   Plan and Referrals   Plan/Referrals Continue to visit, (comment)  (as needed)
4 Eyes Skin Assessment     NAME:  mD Martinez  YOB: 1930  MEDICAL RECORD NUMBER:  [id number]    The patient is being assessed for  Admission    I agree that One RN has performed a thorough Head to Toe Skin Assessment on the patient. ALL assessment sites listed below have been assessed. Areas assessed by both nurses:    Head, Face, Ears, Shoulders, Back, Chest, Arms, Elbows, Hands, Sacrum. Buttock, Coccyx, Ischium, and Legs. Feet and Heels        Does the Patient have a Wound? Yes wound(s) were present on assessment.  LDA wound assessment was Initiated and completed by RN       Jasmeet Prevention initiated by RN: Yes   Wound Care Orders initiated by RN: Yes    Pressure Injury (Stage 3,4, Unstageable, DTI, NWPT, and Complex wounds) if present, place referral order by RN under : NO    New and Established Ostomies, if present place, referral order under : Yes      Nurse 1 eSignature: Electronically signed by Carmita Deleon RN on 3/6/23 at 12:54 PM EST    **SHARE this note so that the co-signing nurse can place an eSignature**    Nurse 2 eSignature: Electronically signed by Edda Alvarado RN on 3/6/23 at 12:55 PM EST
Attempted to call patient's poa to complete checklist and make him aware that Dr Sandeep Portillo would be calling to obtain consent for the procedure, no answer, all #'s on emergency contact list were tried, no answer from any of them, messages left for all #'s. Adri gomes made aware, Dr Sandeep Portillo made aware as well. Patient sleeping, VSS, call light in reach, bed in lowest position, RN to continue to monitor.
Attempted to feed pt breakfast but he refused. Pt has been crying since 0700 with no success at consoling pt and trying to get him to engage in activities.
Bedside sitter discontinued at this time.
Comprehensive Nutrition Assessment    Type and Reason for Visit:  Reassess    Nutrition Recommendations/Plan:   Continue current diet  Trial standard oral nutrition supplement TID  Assist pt w/ meals  Please encourage and document po intakes  Monitor weight, po intakes, labs     Malnutrition Assessment:  Malnutrition Status: At risk for malnutrition (Comment) (03/13/23 1026)    Context:  Acute Illness       Nutrition Assessment:    Pt sleeping soundly at visit, lunch tray at bedside untouched. Noted poor po intakes per chart, 0-25%, po intakes appear inadequate since admission. Previously recieving standard oral supplement, will order TID. Pt noted to require assistance w/ eating. Will continue to follow at high nutrition risk. Nutrition Related Findings:    glycolax, senokot, vit D; hgn 8.3, hct 26.5, Ca 8.0, BUN 75, Cr 6.9 Wound Type:  (cluster wounds noted to thigh, groin, back, scrotum)       Current Nutrition Intake & Therapies:    Average Meal Intake: 0%, 1-25%  Average Supplements Intake: None Ordered  ADULT DIET; Regular    Anthropometric Measures:  Height: 6' (182.9 cm)  Ideal Body Weight (IBW): 178 lbs (81 kg)    Admission Body Weight: 202 lb (91.6 kg)  Current Body Weight: 190 lb 14.7 oz (86.6 kg), 106.7 % IBW. Weight Source: Bed Scale  Current BMI (kg/m2): 25.9  Usual Body Weight: 217 lb (98.4 kg) (10/2022)  % Weight Change (Calculated): -12.4  Weight Adjustment For: No Adjustment                 BMI Categories: Overweight (BMI 25.0-29. 9)    Estimated Daily Nutrient Needs:  Energy Requirements Based On: Formula  Weight Used for Energy Requirements: Current  Energy (kcal/day): 2230-7569 (MSJ)  Weight Used for Protein Requirements: Ideal  Protein (g/day): 81-97 (1-1.2 g/kg IBW)  Method Used for Fluid Requirements: 1 ml/kcal  Fluid (ml/day): 1747-4246 (Monitor per MD orders as well)    Nutrition Diagnosis:   Inadequate oral intake related to lack or limited access to food as evidenced by intake 0-25%
Cr stable at 7, not improving despite borden  Pt going on hospice  No HD planned    Thank you
Creat remains elevated despite borden  Hospice is consulted  Dr Tho Ledezma will be back in am
DOING WELL NO ACTIVE GIT BLEEDING  VITALS STABLE   LABS NOTED HB 9.5   WILL CPM F/U H/H EGD IN AM
Dr. Salvatore Canas is ordering for pt to be med surg but does not want pt transferred from the unit.
Event Note
Hospitalist Progress Note      Name:  Alysha Hook /Age/Sex: 10/18/1930  (80 y.o. male)   MRN & CSN:  [id number] & [id number] Admission Date/Time: 3/6/2023  2:56 AM   Location:  -A PCP: No primary care provider on file. Hospital Day: 19    Assessment and Plan:   Alysha Hook is a 80 y.o.  male with past medical history of Paget's disease, hypothyroidism, BPH, hyperlipidemia, was admitted on 3/6/2023 for evaluation of right hip pain. Diagnosed with Paget's disease of right hip. Ortho was consulted. No surgical intervention, recommended to work on hip range of motion and strengthening. Hospital course was complicated by KAVON/coffee-ground emesis/urinary retention/healthcare associated pneumonia/delirium. Seen by nephrology, GI, urology and psychiatry. Patient was also seen by endocrinology for Paget's disease and was started on Prolia/vitamin D supplement. EGD 3/23/2023  1. Postsurgical changes noted from previous Nissen fundoplication. 2.  Moderate amount of partially digested food particles seen in the  stomach. 3.  Mild superficial gastritis. 4.  No evidence of gastric outlet obstruction. X ray hip bilateral 2023  Stable findings of Paget's disease of the right pelvis. No acute osseous abnormality. Assessment    KAVON on CKD stage IV-worsening  Hypernatremia - Resolved  Urinary retention secondary to BPH/failed voiding trial  Hematuria  Nausea/vomiting with coffee-ground emesis-improved  Healthcare associated pneumonia  Right hip pain secondary to Paget's disease  Hyponatremia-resolved  Delirium with major depressive disorder  Dementia  Frequent falls  Hypothyroidism  Vitamin D deficiency  Chronic anemia-multifactorial    Plan    Creatinine 6.3-->6.4-->6.9 today  Monitor urine output  Nephrology evaluation appreciated. No plans for HD. Recommend to discuss advanced directives.   Given hematuria, discussed with urology, advised bladder irrigation and if
Hospitalist Progress Note      Name:  Brock Eduardo /Age/Sex: 10/18/1930  (80 y.o. male)   MRN & CSN:  [id number] & [id number] Admission Date/Time: 3/6/2023  2:56 AM   Location:  -A PCP: No primary care provider on file. Hospital Day: 21    Assessment and Plan:   Brock Eduardo is a 80 y.o.  male with past medical history of Paget's disease, hypothyroidism, BPH, hyperlipidemia, was admitted on 3/6/2023 for evaluation of right hip pain. Diagnosed with Paget's disease of right hip. Ortho was consulted. No surgical intervention, recommended to work on hip range of motion and strengthening. Hospital course was complicated by KAVON/coffee-ground emesis/urinary retention/healthcare associated pneumonia/delirium. Seen by nephrology, GI, urology and psychiatry. Patient was also seen by endocrinology for Paget's disease and was started on Prolia/vitamin D supplement. EGD 3/23/2023  1. Postsurgical changes noted from previous Nissen fundoplication. 2.  Moderate amount of partially digested food particles seen in the  stomach. 3.  Mild superficial gastritis. 4.  No evidence of gastric outlet obstruction. X ray hip bilateral 2023  Stable findings of Paget's disease of the right pelvis. No acute osseous abnormality. Assessment    KAVON on CKD stage IV- worsened  Hypernatremia - Resolved  Urinary retention secondary to BPH/failed voiding trial  Hematuria-possibly traumatic  Nausea/vomiting with coffee-ground emesis-improved  Healthcare associated pneumonia  Right hip pain secondary to Paget's disease  Hyponatremia-resolved  Delirium with major depressive disorder  Dementia  Frequent falls  Hypothyroidism  Vitamin D deficiency  Chronic anemia-multifactorial    Plan    Hospice evaluation appreciated. Patient is eligible for receiving hospice services. Creatinine 6.3-->6.4-->6.9-->6.8-->7.1 today  Monitor urine output  Nephrology evaluation appreciated. No plans for HD.
Hospitalist Progress Note      Name:  Jyoti Vaughan /Age/Sex: 10/18/1930  (80 y.o. male)   MRN & CSN:  [id number] & [id number] Admission Date/Time: 3/6/2023  2:56 AM   Location:  -A PCP: No primary care provider on file. Hospital Day: 17    Assessment and Plan:   Jyoti Vaughan is a 80 y.o.  male with past medical history of Paget's disease, hypothyroidism, BPH, hyperlipidemia, was admitted on 3/6/2023 for evaluation of right hip pain. Diagnosed with Paget's disease of right hip. Ortho was consulted. No surgical intervention, recommended to work on hip range of motion and strengthening. Hospital course was complicated by KAVON/coffee-ground emesis/urinary retention/healthcare associated pneumonia/delirium. Seen by nephrology, GI, urology and psychiatry. Patient was also seen by endocrinology for Paget's disease and was started on Prolia/vitamin D supplement. Assessment    KAVON on CKD stage IV  Hyponatremia  Urinary retention secondary to BPH/failed voiding trial  Nausea/vomiting with coffee-ground emesis-improved  Healthcare associated pneumonia  Right hip pain secondary to Paget's disease  Hyponatremia-resolved  Delirium with major depressive disorder  Dementia  Frequent falls  Hypothyroidism  Vitamin D deficiency  Chronic anemia-multifactorial    Plan    Creatinine stable at 6 today  Nephrology evaluation appreciated. Started on D5W  Urology evaluation appreciated. Continue Laureano catheter. Patient failed removal of Laureano catheter. Continue Proscar. Outpatient follow-up with urology  GI evaluation appreciated. S/p NG tube which was later discontinued. GI will plan EGD later  Currently on clear liquid diet  Hemoglobin slightly trended down. 7.8 g today. No further episode of coffee-ground emesis. Aspirin/Lovenox on hold. Continue IV Zosyn for possible healthcare associated pneumonia. Zyvox discontinued given MRSA nasal negative result.   Check urine Legionella/streptococcal
Hospitalist Progress Note      Name:  Tarik Rogers /Age/Sex: 10/18/1930  (80 y.o. male)   MRN & CSN:  [id number] & [id number] Admission Date/Time: 3/6/2023  2:56 AM   Location:  -A PCP: No primary care provider on file. Hospital Day:     Assessment and Plan:   Tarik Rogers is a 80 y.o.  male with past medical history of Paget's disease, hypothyroidism, BPH, hyperlipidemia, was admitted on 3/6/2023 for evaluation of right hip pain. Diagnosed with Paget's disease of right hip. Ortho was consulted. No surgical intervention, recommended to work on hip range of motion and strengthening. Hospital course was complicated by KAVON/coffee-ground emesis/urinary retention/healthcare associated pneumonia/delirium. Seen by nephrology, GI, urology and psychiatry. Patient was also seen by endocrinology for Paget's disease and was started on Prolia/vitamin D supplement. EGD 3/23/2023  1. Postsurgical changes noted from previous Nissen fundoplication. 2.  Moderate amount of partially digested food particles seen in the  stomach. 3.  Mild superficial gastritis. 4.  No evidence of gastric outlet obstruction. X ray hip bilateral 2023  Stable findings of Paget's disease of the right pelvis. No acute osseous abnormality. Assessment    KAVON on CKD stage IV- worsened  Hypernatremia - Resolved  Urinary retention secondary to BPH/failed voiding trial  Hematuria-possibly traumatic-resolved  Nausea/vomiting with coffee-ground emesis-improved  Healthcare associated pneumonia  Right hip pain secondary to Paget's disease  Hyponatremia-resolved  Delirium with major depressive disorder  Dementia  Frequent falls  Hypothyroidism  Vitamin D deficiency  Chronic anemia-multifactorial    Plan    Hospice evaluation appreciated. Patient is eligible for receiving hospice services. Family meeting done today. Patient to be transported tomorrow to [facility].   CODE STATUS DNR CC  Creatinine
Nephrology Progress Note        [address], [address] [address]  Phone: [phone]  Office Hours: 8:30AM - 4:30PM  Monday - Friday        3/16/2023 7:14 AM  Subjective:   Admit Date: 3/6/2023  PCP: No primary care provider on file. Interval History:   Crying this morning    Diet: ADULT DIET; Dysphagia - Pureed; Mildly Thick (Nectar)  ADULT ORAL NUTRITION SUPPLEMENT; Lunch, Dinner, Breakfast; Fortified Pudding Oral Supplement  ADULT ORAL NUTRITION SUPPLEMENT; Dinner, Lunch; Frozen Oral Supplement      Data:   Scheduled Meds:   miconazole   Topical BID    ferrous sulfate  300 mg Oral Dinner    QUEtiapine  50 mg Oral Nightly    QUEtiapine  25 mg Oral 2 times per day    escitalopram  10 mg Oral Daily    levothyroxine  75 mcg Oral Daily    Vitamin D  2,000 Units Oral Daily    amLODIPine  5 mg Oral Daily    famotidine  10 mg Oral Daily    lidocaine  1 patch TransDERmal Daily    aspirin  81 mg Oral Daily    atorvastatin  40 mg Oral Nightly    finasteride  5 mg Oral Daily    metoprolol tartrate  25 mg Oral BID    tamsulosin  0.4 mg Oral Nightly    sodium chloride flush  5-40 mL IntraVENous 2 times per day    enoxaparin  30 mg SubCUTAneous Daily    polyethylene glycol  17 g Oral BID    sennosides-docusate sodium  2 tablet Oral BID     Continuous Infusions:   sodium chloride       PRN Meds:HYDROmorphone, HYDROcodone-acetaminophen, sodium chloride flush, sodium chloride, ondansetron **OR** ondansetron, polyethylene glycol, acetaminophen **OR** acetaminophen  I/O last 3 completed shifts:  In: -   Out: 8311 [Urine:1450]  No intake/output data recorded.     Intake/Output Summary (Last 24 hours) at 3/16/2023 0714  Last data filed at 3/16/2023 0328  Gross per 24 hour   Intake --   Output 650 ml   Net -650 ml       CBC:   Recent Labs     03/14/23 0423   WBC 7.4   HGB 8.5*          BMP:    Recent Labs     03/14/23  0423 03/15/23  0622 03/16/23  0122   * 144 144   K 4.0 4.2 4.6   *
Nephrology Progress Note        [address], [address] [address]  Phone: [phone]  Office Hours: 8:30AM - 4:30PM  Monday - Friday        3/15/2023 7:46 AM  Subjective:   Admit Date: 3/6/2023  PCP: No primary care provider on file. Interval History:   On room air    Diet: ADULT DIET; Dysphagia - Pureed; Mildly Thick (Nectar)  ADULT ORAL NUTRITION SUPPLEMENT; Lunch, Dinner, Breakfast; Fortified Pudding Oral Supplement  ADULT ORAL NUTRITION SUPPLEMENT; Dinner, Lunch; Frozen Oral Supplement      Data:   Scheduled Meds:   miconazole   Topical BID    ferrous sulfate  300 mg Oral Dinner    QUEtiapine  50 mg Oral Nightly    QUEtiapine  25 mg Oral 2 times per day    escitalopram  10 mg Oral Daily    levothyroxine  75 mcg Oral Daily    Vitamin D  2,000 Units Oral Daily    amLODIPine  5 mg Oral Daily    famotidine  10 mg Oral Daily    lidocaine  1 patch TransDERmal Daily    aspirin  81 mg Oral Daily    atorvastatin  40 mg Oral Nightly    finasteride  5 mg Oral Daily    metoprolol tartrate  25 mg Oral BID    tamsulosin  0.4 mg Oral Nightly    sodium chloride flush  5-40 mL IntraVENous 2 times per day    enoxaparin  30 mg SubCUTAneous Daily    polyethylene glycol  17 g Oral BID    sennosides-docusate sodium  2 tablet Oral BID     Continuous Infusions:   sodium chloride       PRN Meds:HYDROmorphone, HYDROcodone-acetaminophen, sodium chloride flush, sodium chloride, ondansetron **OR** ondansetron, polyethylene glycol, acetaminophen **OR** acetaminophen  I/O last 3 completed shifts: In: 720 [P.O.:720]  Out: 1850 [Urine:1850]  No intake/output data recorded.     Intake/Output Summary (Last 24 hours) at 3/15/2023 0746  Last data filed at 3/15/2023 5869  Gross per 24 hour   Intake --   Output 1000 ml   Net -1000 ml       CBC:   Recent Labs     03/14/23 0423   WBC 7.4   HGB 8.5*          BMP:    Recent Labs     03/13/23  2002 03/14/23  0423 03/15/23  0622   * 146* 144   K 4.2 4.0 4.2   CL
Nephrology Progress Note        [address], [address] [address]  Phone: [phone]  Office Hours: 8:30AM - 4:30PM  Monday - Friday        3/17/2023 7:08 AM  Subjective:   Admit Date: 3/6/2023  PCP: No primary care provider on file. Interval History:   Resting  Sitter at bedside    Diet: ADULT ORAL NUTRITION SUPPLEMENT; Lunch, Dinner, Breakfast; Fortified Pudding Oral Supplement  ADULT ORAL NUTRITION SUPPLEMENT; Dinner, Lunch; Frozen Oral Supplement  ADULT DIET; Dysphagia - Soft and Bite Sized  ADULT ORAL NUTRITION SUPPLEMENT; Dinner; Standard High Calorie/High Protein Oral Supplement      Data:   Scheduled Meds:   levothyroxine  100 mcg Oral Daily    tamsulosin  0.8 mg Oral Nightly    miconazole   Topical BID    ferrous sulfate  300 mg Oral Dinner    QUEtiapine  50 mg Oral Nightly    QUEtiapine  25 mg Oral 2 times per day    escitalopram  10 mg Oral Daily    Vitamin D  2,000 Units Oral Daily    amLODIPine  5 mg Oral Daily    famotidine  10 mg Oral Daily    lidocaine  1 patch TransDERmal Daily    aspirin  81 mg Oral Daily    atorvastatin  40 mg Oral Nightly    finasteride  5 mg Oral Daily    metoprolol tartrate  25 mg Oral BID    sodium chloride flush  5-40 mL IntraVENous 2 times per day    enoxaparin  30 mg SubCUTAneous Daily    polyethylene glycol  17 g Oral BID    sennosides-docusate sodium  2 tablet Oral BID     Continuous Infusions:   sodium chloride       PRN Meds:HYDROmorphone, HYDROcodone-acetaminophen, sodium chloride flush, sodium chloride, ondansetron **OR** ondansetron, polyethylene glycol, acetaminophen **OR** acetaminophen  I/O last 3 completed shifts:  In: -   Out: 1400 [Urine:1400]  I/O this shift:  In: -   Out: 500 [Urine:500]    Intake/Output Summary (Last 24 hours) at 3/17/2023 0708  Last data filed at 3/17/2023 0707  Gross per 24 hour   Intake --   Output 1250 ml   Net -1250 ml       CBC: No results for input(s): WBC, HGB, PLT in the last 72 hours.     BMP:    Recent
Nephrology Progress Note        [address], [address] [address]  Phone: [phone]  Office Hours: 8:30AM - 4:30PM  Monday - Friday        3/21/2023 7:14 AM  Subjective:   Admit Date: 3/6/2023  PCP: No primary care provider on file. Interval History:   Resting on room air  Laureano cath in place    Diet: ADULT DIET; Clear Liquid      Data:   Scheduled Meds:   piperacillin-tazobactam  3,375 mg IntraVENous Q12H    pantoprazole  40 mg IntraVENous Q12H    linezolid  600 mg IntraVENous Q12H    sennosides-docusate sodium  2 tablet Oral Daily    polyethylene glycol  17 g Oral Daily    OLANZapine  2.5 mg IntraMUSCular Daily    OLANZapine  5 mg IntraMUSCular Nightly    levothyroxine  100 mcg Oral Daily    tamsulosin  0.8 mg Oral Nightly    miconazole   Topical BID    [Held by provider] ferrous sulfate  300 mg Oral Dinner    [Held by provider] QUEtiapine  50 mg Oral Nightly    [Held by provider] QUEtiapine  25 mg Oral 2 times per day    [Held by provider] escitalopram  10 mg Oral Daily    Vitamin D  2,000 Units Oral Daily    [Held by provider] amLODIPine  5 mg Oral Daily    lidocaine  1 patch TransDERmal Daily    [Held by provider] aspirin  81 mg Oral Daily    atorvastatin  40 mg Oral Nightly    finasteride  5 mg Oral Daily    [Held by provider] metoprolol tartrate  25 mg Oral BID    sodium chloride flush  5-40 mL IntraVENous 2 times per day    [Held by provider] enoxaparin  30 mg SubCUTAneous Daily     Continuous Infusions:   sodium bicarbonate infusion 100 mL/hr at 03/21/23 0211    sodium chloride       PRN Meds:HYDROmorphone, HYDROcodone-acetaminophen, sodium chloride flush, sodium chloride, ondansetron **OR** ondansetron, polyethylene glycol, acetaminophen **OR** acetaminophen  I/O last 3 completed shifts:  In: -   Out: 2600 [Urine:1500; Emesis/NG output:1100]  No intake/output data recorded.     Intake/Output Summary (Last 24 hours) at 3/21/2023 1346  Last data filed at 3/20/2023 1303  Gross
Nephrology Progress Note        [address], [address] [address]  Phone: [phone]  Office Hours: 8:30AM - 4:30PM  Monday - Friday        3/22/2023 7:03 AM  Subjective:   Admit Date: 3/6/2023  PCP: No primary care provider on file. Interval History:     Diet: ADULT DIET; Clear Liquid      Data:   Scheduled Meds:   piperacillin-tazobactam  3,375 mg IntraVENous Q12H    pantoprazole  40 mg IntraVENous Q12H    linezolid  600 mg IntraVENous Q12H    sennosides-docusate sodium  2 tablet Oral Daily    polyethylene glycol  17 g Oral Daily    OLANZapine  2.5 mg IntraMUSCular Daily    OLANZapine  5 mg IntraMUSCular Nightly    levothyroxine  100 mcg Oral Daily    tamsulosin  0.8 mg Oral Nightly    miconazole   Topical BID    [Held by provider] ferrous sulfate  300 mg Oral Dinner    [Held by provider] QUEtiapine  50 mg Oral Nightly    [Held by provider] QUEtiapine  25 mg Oral 2 times per day    [Held by provider] escitalopram  10 mg Oral Daily    Vitamin D  2,000 Units Oral Daily    amLODIPine  5 mg Oral Daily    lidocaine  1 patch TransDERmal Daily    [Held by provider] aspirin  81 mg Oral Daily    atorvastatin  40 mg Oral Nightly    finasteride  5 mg Oral Daily    [Held by provider] metoprolol tartrate  25 mg Oral BID    sodium chloride flush  5-40 mL IntraVENous 2 times per day    [Held by provider] enoxaparin  30 mg SubCUTAneous Daily     Continuous Infusions:   IV infusion builder 50 mL/hr at 03/22/23 0639    sodium chloride       PRN Meds:HYDROmorphone, HYDROcodone-acetaminophen, sodium chloride flush, sodium chloride, ondansetron **OR** ondansetron, polyethylene glycol, acetaminophen **OR** acetaminophen  I/O last 3 completed shifts: In: 1118 [P.O.:268; I.V.:500; IV Piggyback:350]  Out: 1350 [Urine:1350]  No intake/output data recorded.     Intake/Output Summary (Last 24 hours) at 3/22/2023 0703  Last data filed at 3/22/2023 9643  Gross per 24 hour   Intake 1118 ml   Output 1350 ml   Net
Nephrology Progress Note        [address], [address] [address]  Phone: [phone]  Office Hours: 8:30AM - 4:30PM  Monday - Friday        3/23/2023 6:50 AM  Subjective:   Admit Date: 3/6/2023  PCP: No primary care provider on file. Interval History:   Resting on room air  Less than 1L urine yesterday? Diet: Diet NPO      Data:   Scheduled Meds:   piperacillin-tazobactam  3,375 mg IntraVENous Q12H    pantoprazole  40 mg IntraVENous Q12H    sennosides-docusate sodium  2 tablet Oral Daily    polyethylene glycol  17 g Oral Daily    levothyroxine  100 mcg Oral Daily    tamsulosin  0.8 mg Oral Nightly    miconazole   Topical BID    [Held by provider] ferrous sulfate  300 mg Oral Dinner    QUEtiapine  50 mg Oral Nightly    QUEtiapine  25 mg Oral 2 times per day    escitalopram  10 mg Oral Daily    Vitamin D  2,000 Units Oral Daily    amLODIPine  5 mg Oral Daily    lidocaine  1 patch TransDERmal Daily    [Held by provider] aspirin  81 mg Oral Daily    atorvastatin  40 mg Oral Nightly    finasteride  5 mg Oral Daily    [Held by provider] metoprolol tartrate  25 mg Oral BID    sodium chloride flush  5-40 mL IntraVENous 2 times per day    [Held by provider] enoxaparin  30 mg SubCUTAneous Daily     Continuous Infusions:   sodium chloride       PRN Meds:OLANZapine, HYDROmorphone, HYDROcodone-acetaminophen, sodium chloride flush, sodium chloride, ondansetron **OR** ondansetron, polyethylene glycol, acetaminophen **OR** acetaminophen  I/O last 3 completed shifts: In: 1118 [P.O.:268;  I.V.:500; IV Piggyback:350]  Out: 5004 [Urine:1550]  I/O this shift:  In: -   Out: 650 [Urine:650]    Intake/Output Summary (Last 24 hours) at 3/23/2023 0650  Last data filed at 3/23/2023 0535  Gross per 24 hour   Intake --   Output 850 ml   Net -850 ml       CBC:   Recent Labs     03/21/23  0308 03/21/23  2230 03/22/23  0346 03/22/23  1252 03/22/23  2048 03/23/23  0218   WBC 9.8  --  7.9  --   --  10.7*   HGB 8.6*
Occupational Therapy  Pt moved to room 2026 with nursing reports higher level of care at this time and not appropriate for therapy.  Will update evaluating therapist.     Keshawn FERNANDEZ/LONA
On room air  Cr 6.8, hopefully on a downtrend now    Patient Active Problem List    Diagnosis Date Noted    Hematemesis with nausea 03/19/2023    Delirium due to multiple etiologies 03/13/2023    MAGO (generalized anxiety disorder) 03/13/2023    MDD (major depressive disorder), recurrent episode, mild ([address] 03/13/2023    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, without behavioral disturbance, severe 03/13/2023    Other sequelae of other cerebrovascular disease 03/13/2023    Hip pain 03/06/2023    Orthostatic syncope 02/14/2023    Dizziness 02/12/2023    Syncope and collapse 02/12/2023    Community acquired pneumonia of left lung, unspecified part of lung 10/08/2022    Cardiac arrest ([address] 08/25/2022    Chest pain 08/21/2022    Agitation due to dementia 08/18/2022    Varicose 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 08/15/2015    Skin ulcer, limited to breakdown of skin ([address] 02/21/2022    Current moderate episode of major depressive disorder, unspecified whether recurrent ([address] 02/21/2022    Chronic kidney disease, stage IV (severe) ([address] 02/21/2022    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
PT KNOWN TO ME CHART REVIEWED AND PT EXAMINED APPRECIATE DR SWEET NOTE PT WITH SOME N/ VOMITING AND HEMATEMESIS NO OUTPUT  FROM NG TODAY NG STILL IN PLACE RENAL FUNCTIONS WORSENING VITALS STABLE   LABS NOTED HB 8.8   AXR OK D/W DARLINE JIMENEZ MAY D/C NG  WILL DO EGD LATER
Patient A&O to self only on room air saturating 95% in no distress. Patient is due for H&H labs but refused on three attempts. 0300. He is due for Zosyn infusion. Writer noted pt IV is kinked and removed it. Zosyn was not completed as ordered. Charge Nurse and MD Anthony Linda notified.
Patient continue to refuse tele monitoring. Charge Nurse aware. Has episodes of agitations and crying. Zyprexa PRN given. Has 3 loose small BM's. Moisture associated skin breakdown below scutum. Cleaned with soap and water and applied Miconazole powder. Will update incoming RN and place wound consult.
Patient is cognitively improving. He is Alert to self, place and able to state his  this am. Was tearful early morning. When asked he states \"am praying for my mother, father and brothers\". Laureano remain in place draining 700cc clear yellow urine. Vitals stable but still refused Tele monitoring. All other safety precautions observed.
Patient is refusing to participate in care. Patient is refusing vital signs and medications at this time.
Patient was discharged into hospice care yesterday within the day. Transport is arranged for this morning. Patient is in stable and unchanged condition since yesterday and will really leave the hospital soon.     Noah Urban MD
Per IV team Sarahi, RN unable to insert ultra sound IV.  Per Marilia Gamble RN will come and try later
Physical Therapy    Physical Therapy Treatment Note  Name: Alesha Ramirez MRN: [id number] :   10/18/1930   Date:  3/21/2023   Admission Date: 3/6/2023 Room:  -A     PT attempt to see pt at this time. Sitter is present and reports pt has been refusing RN staff and requesting to sleep. Pt awakens to v/c, however despite encouragement pt is refusing activity at this time. PT will re-attempt 3/22.      Electronically signed by:    Anaid Mei, PT  3/21/2023, 5:56 PM'
Physical Therapy    Physical Therapy Treatment Note  Name: Desiree Jackson MRN: [id number] :   10/18/1930   Date:  3/28/2023   Admission Date: 3/6/2023 Room:  -A     Per chart review pt is to be under [address] [place] [address], therapy will sign off at this time per protocol.                 Short Term Goals  Time Frame for Short Term Goals: 1 week  Short Term Goal 1: Pt will complete x5 STS EOB<>RW with Mod A , Max cues+assist for RLE 50% WB  Short Term Goal 2: Pt will participate in seated LE TherEx with Min A for RLE, x10 ea  Short Term Goal 3: Pt will complete SPT with Min x2, Max cues + t/c for 50% WB RLE    Electronically signed by:    Mendoza Jane, PT  3/28/2023, 8:07 AM
Physical Therapy  Attempted to see patient today. He ws on 1N, 1N states transfers d/t declined in kidney function. Current RN for the patient states that he needs higher level care today and would like rehab to defer today. Will update Mae PT on patient status.    Dwight Muldoon, [place]  11:49 AM  3/20/2023
Physical Therapy  Name: Gennie Runner MRN: [id number] :   10/18/1930   Date:  3/15/2023   Admission Date: 3/6/2023 Room:  [address] [place] [address]   Restrictions/Precautions:        50% WB RLE, [place], confusion, fall risk, general    Communication with other providers:  Sitter Sandy present upon arrival, absent during session. RN arrives to [facility] head to toe, assist from RN to bring patient to EOB. RN Kaylen notified that patient would benefit from pain medication     Subjective:  Patient states:  Patient is agreeable to participated in mobility today  Pain:   Location, Type, Intensity (0/10 to 10/10):  demos mod-max pain    Objective:    Observation:  Patient is supine in bed upon arrival. He is not orientated to situation, or date. He is oriented to self     Treatment, including education/measures:    Transfers with line management of Catheter,   Supine to sit : Max A x 2 for gradual BLE to EOB and for trunk support  Seated balance: Patient initially needs Max A for seated balance, and improves to CGA after PEDRO set up   Neuro-don: Patient needs manual assist and verbal cues for weight shift over PEDRO. He has fair- balance with BUE needed into the bed. He has near LOB to the Rt, d/t patient impulsively attempting to return to bed d/t pain. He is cued to reach forward and participated in light dynamic activities, he does not carry over cues. He tolerates ~25' on EOB  Scooting :Seated scooting Mod A x 1 for hip translation to EOB, Supine scooting Max A x 2  SPT: Patient participated in SPT with bed raised. Tranfers between EOB to recliner at Max A x 2  Scooting in recliner: Mod A to adjust to midline in recliner    Safety  Patient left safely in the , with call light/phone in reach with alarm applied. Gait belt and mask were used for transfers and gait. Assessment / Impression:   Patient has fair tolerance with rehab today. He attempts to impulsively return to bed.   Patient's tolerance of treatment:  fair   Adverse Reaction:
Physical Therapy  Name: Sundar Hi MRN: [id number] :   10/18/1930   Date:  3/13/2023   Admission Date: 3/6/2023 Room:  [address] [place] [address]   Restrictions/Precautions:         50% WB RLE, [place], confusion, fall risk, general  Communication with other providers:  Appropriate via chart review. Subjective:  Patient states:  Patient does not respond to commands   Pain:   Location, Type, Intensity (0/10 to 10/10):  does not complain of pain  Objective:    Observation:  Patient is supine in bd, high fowlers, lethargic, in low postioning with feet against foot board. Sitter present  Treatment, including education/measures:    Transfers with line management of Tele Monitor, IV  Scooting: Supine scoot to HOB Max A x 2, trunk alignment to midline Max A   Positioning: Heels floated on pillow     Supine exercises: Cued for exercises, patient does not participate    Safety  Patient left safely in the bed, with call light/phone in reach with alarm applied. Gait belt and mask were used for transfers and gait. Assessment / Impression:     Patient's tolerance of treatment:  Poor   Adverse Reaction: decrease participation  Significant change in status and impact:  decrease participation   Barriers to improvement:  cognition  Plan for Next Session:    Will cont to work towards pt's goals per patient tolerance  Time in:  [phone]  Time out:  1525  Timed treatment minutes:  9  Total treatment time:  9    Previously filed items:     Short Term Goals  Time Frame for Short Term Goals: 1 week  Short Term Goal 1: Pt will complete x5 STS EOB<>RW with Mod A , Max cues+assist for RLE 50% WB  Short Term Goal 2: Pt will participate in seated LE TherEx with Min A for RLE, x10 ea  Short Term Goal 3: Pt will complete SPT with Min x2, Max cues + t/c for 50% WB RLE       Electronically signed by:     Monik Zabala PTA  3/13/2023, 3:25 PM
Progress Note( Dr. Ari Sandoval)  3/25/2023  Subjective:   Admit Date: 3/6/2023  PCP: No primary care provider on file. 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 Will start on vitamin D p.o. Considered to be sober and very cooperative this morning  He is not needing an observer to in his room    Patient was very confused but iks a bit sober this morning    no new bowel or bladder symptoms. Is Laureano catheter      Intake/Output Summary (Last 24 hours) at 3/25/2023 1805  Last data filed at 3/25/2023 1555  Gross per 24 hour   Intake --   Output 290 ml   Net -290 ml         DATA    CBC:   Recent Labs     03/23/23  0218 03/23/23  1005 03/24/23  0750 03/25/23  0028   WBC 10.7*  --  9.7 9.0   HGB 7.9* 8.5* 8.3* 8.5*     --  237 249      CMP:  Recent Labs     03/23/23  0218 03/23/23  1009 03/24/23  0750 03/25/23  0028    141 141 140   K 3.6 4.0 3.8 3.8   CL 97* 102 103 101   CO2 24 25 23 22   BUN 79* 77* 75* 70*   CREATININE 6.3* 6.4* 6.9* 6.8*   CALCIUM 7.7* 7.7* 8.0* 8.0*   PROT 5.5*  5.5*  --   --   --    LABALBU 3.0*  3.0*  --   --   --    BILITOT 0.3  0.3  --   --   --    ALKPHOS 75  75  --   --   --    AST 13*  13*  --   --   --    ALT 6*  6*  --   --   --        Lipids:   Lab Results   Component Value Date/Time    CHOL 229 08/22/2022 02:17 AM    CHOL 157 11/19/2018 12:00 AM    HDL 69 08/22/2022 02:17 AM    TRIG 85 08/22/2022 02:17 AM     Glucose:No results for input(s): POCGLU in the last 72 hours.     [id number]:  Lab Results   Component Value Date/Time    LABA1C 5.8 08/22/2022 02:17 AM     High Sensitivity TSH:   Lab Results   Component Value
Progress Note( Dr. Ashley Key)  3/21/2023  Subjective:   Admit Date: 3/6/2023  PCP: No primary care provider on file. 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 Will start on vitamin D p.o. His abdomen is no longer distended. Is very soft  Discontinued NG tube. Started on clear liquid diet but is not eating well     Considered to be sober still needs tan attendant to watch in the room    Patient was very confused but iks a bit sober this morning    no new bowel or bladder symptoms.        Intake/Output Summary (Last 24 hours) at 3/21/2023 0726  Last data filed at 3/20/2023 1832  Gross per 24 hour   Intake --   Output 1100 ml   Net -1100 ml         DATA    CBC:   Recent Labs     03/20/23  0409 03/20/23  1103 03/20/23  1515 03/20/23  2045 03/21/23  0308   WBC 10.1 9.2  --   --  9.8   HGB 9.5* 8.8* 8.8* 8.4* 8.6*  8.5*    253  --   --  242      CMP:  Recent Labs     03/19/23  1545 03/20/23  0409 03/20/23  1103 03/21/23  0308   NA  --  142 143 143   K  --  4.7 4.7 3.8   CL  --  110 109 105   CO2  --  16* 18* 22   BUN  --  85* 88* 89*   CREATININE  --  5.4* 5.7* 6.1*   CALCIUM  --  8.2* 8.5 8.1*   PROT 5.3*  --   --   --    LABALBU 3.0*  --   --   --    BILITOT 0.2  --   --   --    ALKPHOS 70  --   --   --    AST 13*  --   --   --    ALT 6*  --   --   --        Lipids:   Lab Results   Component Value Date/Time    CHOL 229 08/22/2022 02:17 AM    CHOL 157 11/19/2018 12:00 AM    HDL 69 08/22/2022 02:17 AM    TRIG 85 08/22/2022 02:17 AM     Glucose:  Recent Labs     03/19/23  0729   POCGLU 156*       [id number]:  Lab Results   Component Value Date/Time    LABA1C 5.8 08/22/2022 02:17 AM     High Sensitivity TSH:   Lab Results   Component Value Date/Time    TSHHS 4.660 03/11/2023 12:11 AM     Free T3: No results found
Progress Note( Dr. Brisa Go)  3/11/2023  Subjective:   Admit Date: 3/6/2023  PCP: No primary care provider on file. 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 Will start on vitamin D p.o. Patient is not eating enough hard to communicate with him  No new bowel or bladder symptoms. Intake/Output Summary (Last 24 hours) at 3/11/2023 1002  Last data filed at 3/11/2023 0815  Gross per 24 hour   Intake --   Output 1050 ml   Net -1050 ml       DATA    CBC:   Recent Labs     03/09/23 0223   WBC 7.3   HGB 9.8*         CMP:  Recent Labs     03/09/23  0223 03/10/23  0007 03/11/23  0011    143 145   K 4.8 5.0 4.8    108 112*   CO2 20* 20* 20*   BUN 45* 44* 48*   CREATININE 3.1* 2.9* 2.9*   CALCIUM 8.8 9.0 8.7   PROT 6.1* 6.2*  --    LABALBU 3.6 3.6  --    BILITOT 0.2 0.3  --    ALKPHOS 109 97  --    AST 22 32  --    ALT 11 14  --        Lipids:   Lab Results   Component Value Date/Time    CHOL 229 08/22/2022 02:17 AM    CHOL 157 11/19/2018 12:00 AM    HDL 69 08/22/2022 02:17 AM    TRIG 85 08/22/2022 02:17 AM     Glucose:  Recent Labs     03/09/23  1017   POCGLU 123*       [id number]:  Lab Results   Component Value Date/Time    LABA1C 5.8 08/22/2022 02:17 AM     High Sensitivity TSH:   Lab Results   Component Value Date/Time    TSHHS 4.660 03/11/2023 12:11 AM     Free T3: No results found for: FT3  Free T4:  Lab Results   Component Value Date/Time    T4FREE 1.03 03/11/2023 12:11 AM       CT ABDOMEN PELVIS WO CONTRAST Additional Contrast? None   Final Result   1. Mild bronchial wall thickening potentially due to pulmonary vascular   congestion, reactive airways disease, or bronchitis. 2. Patchy infectious or inflammatory bronchiolitis most prominent in the left   lower lobe.   Consider aspiration
Progress Note( Dr. Dow Senior)    Subjective:   Admit Date: 3/6/2023  PCP: No primary care provider on file. 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 Will start on vitamin D p.o. Patient was very confused and noncooperative very belligerent tried to hit me     no new bowel or bladder symptoms. Intake/Output Summary (Last 24 hours) at 3/15/2023 0850  Last data filed at 3/15/2023 8589  Gross per 24 hour   Intake --   Output 1000 ml   Net -1000 ml         DATA    CBC:   Recent Labs     03/14/23 0423   WBC 7.4   HGB 8.5*         CMP:  Recent Labs     03/13/23  2002 03/14/23  0423 03/15/23  0622   * 146* 144   K 4.2 4.0 4.2   * 114* 115*   CO2 18* 19* 18*   BUN 56* 53* 46*   CREATININE 3.1* 2.9* 2.6*   CALCIUM 8.6 8.6 8.6   PROT  --  5.6*  --    LABALBU  --  3.2*  --    BILITOT  --  0.4  --    ALKPHOS  --  79  --    AST  --  18  --    ALT  --  12  --        Lipids:   Lab Results   Component Value Date/Time    CHOL 229 08/22/2022 02:17 AM    CHOL 157 11/19/2018 12:00 AM    HDL 69 08/22/2022 02:17 AM    TRIG 85 08/22/2022 02:17 AM     Glucose:No results for input(s): POCGLU in the last 72 hours. RsepasxggfP9C:  Lab Results   Component Value Date/Time    LABA1C 5.8 08/22/2022 02:17 AM     High Sensitivity TSH:   Lab Results   Component Value Date/Time    TSHHS 4.660 03/11/2023 12:11 AM     Free T3: No results found for: FT3  Free T4:  Lab Results   Component Value Date/Time    T4FREE 1.03 03/11/2023 12:11 AM       XR ABDOMEN (KUB) (SINGLE AP VIEW)   Final Result   No acute findings with no radiographic evidence of bowel obstruction. CT ABDOMEN PELVIS WO CONTRAST Additional Contrast? None   Final Result   1.  Mild bronchial wall thickening potentially due to pulmonary vascular   congestion,
Progress Note( Dr. Jeannette Rosales)  3/10/2023  Subjective:   Admit Date: 3/6/2023  PCP: No primary care provider on file. 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 No intake or output data in the 24 hours ending 03/10/23 1750    DATA    CBC:   Recent Labs     03/08/23 0926 03/09/23 0223   WBC 8.9 7.3   HGB 9.4* 9.8*    198      CMP:  Recent Labs     03/08/23  0926 03/09/23  0223 03/10/23  0007    142 143   K 4.7 4.8 5.0    108 108   CO2 22 20* 20*   BUN 39* 45* 44*   CREATININE 2.8* 3.1* 2.9*   CALCIUM 9.1 8.8 9.0   PROT 6.1* 6.1* 6.2*   LABALBU 3.6 3.6 3.6   BILITOT 0.3 0.2 0.3   ALKPHOS 100 109 97   AST 21 22 32   ALT 12 11 14       Lipids:   Lab Results   Component Value Date/Time    CHOL 229 08/22/2022 02:17 AM    CHOL 157 11/19/2018 12:00 AM    HDL 69 08/22/2022 02:17 AM    TRIG 85 08/22/2022 02:17 AM     Glucose:  Recent Labs     03/09/23  1017   POCGLU 123*     EbxcphunngF9R:  Lab Results   Component Value Date/Time    LABA1C 5.8 08/22/2022 02:17 AM     High Sensitivity TSH:   Lab Results   Component Value Date/Time    TSHHS 2.870 10/09/2022 08:12 AM     Free T3: No results found for: FT3  Free T4:  Lab Results   Component Value Date/Time    T4FREE 1.16 08/22/2022 12:03 PM       CT ABDOMEN PELVIS WO CONTRAST Additional Contrast? None   Final Result   1. Mild bronchial wall thickening potentially due to pulmonary vascular   congestion, reactive airways disease, or bronchitis. 2. Patchy infectious or inflammatory bronchiolitis most prominent in the left   lower lobe. Consider aspiration pneumonitis.    3. Suspected circumferential wall thickening in the distal thoracic esophagus   potentially due to reflux esophagitis given adjacent
Progress Note( Dr. Kenyon Settler)  3/15/2023  Subjective:   Admit Date: 3/6/2023  PCP: No primary care provider on file. 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 Will start on vitamin D p.o. Patient was very confused and noncooperative very belligerent tried to hit me     no new bowel or bladder symptoms. Intake/Output Summary (Last 24 hours) at 3/15/2023 0849  Last data filed at 3/15/2023 8407  Gross per 24 hour   Intake --   Output 1000 ml   Net -1000 ml         DATA    CBC:   Recent Labs     03/14/23 0423   WBC 7.4   HGB 8.5*         CMP:  Recent Labs     03/13/23  2002 03/14/23  0423 03/15/23  0622   * 146* 144   K 4.2 4.0 4.2   * 114* 115*   CO2 18* 19* 18*   BUN 56* 53* 46*   CREATININE 3.1* 2.9* 2.6*   CALCIUM 8.6 8.6 8.6   PROT  --  5.6*  --    LABALBU  --  3.2*  --    BILITOT  --  0.4  --    ALKPHOS  --  79  --    AST  --  18  --    ALT  --  12  --        Lipids:   Lab Results   Component Value Date/Time    CHOL 229 08/22/2022 02:17 AM    CHOL 157 11/19/2018 12:00 AM    HDL 69 08/22/2022 02:17 AM    TRIG 85 08/22/2022 02:17 AM     Glucose:No results for input(s): POCGLU in the last 72 hours. [id number]:  Lab Results   Component Value Date/Time    LABA1C 5.8 08/22/2022 02:17 AM     High Sensitivity TSH:   Lab Results   Component Value Date/Time    TSHHS 4.660 03/11/2023 12:11 AM     Free T3: No results found for: FT3  Free T4:  Lab Results   Component Value Date/Time    T4FREE 1.03 03/11/2023 12:11 AM       XR ABDOMEN (KUB) (SINGLE AP VIEW)   Final Result   No acute findings with no radiographic evidence of bowel obstruction. CT ABDOMEN PELVIS WO CONTRAST Additional Contrast? None   Final Result   1.  Mild bronchial wall thickening potentially due to pulmonary vascular
Progress Note( Dr. Leonard November)  3/27/2023  Subjective:   Admit Date: 3/6/2023  PCP: No primary care provider on file. 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 Will start on vitamin D p.o. Patient appears to be  to be sober and very cooperative this morning  He is not needing an observer to in his room    Patient was very confused but iks a bit sober this morning    no new bowel or bladder symptoms. Is Laureano catheter    No intake or output data in the 24 hours ending 03/27/23 2105      DATA    CBC:   Recent Labs     03/25/23  0028   WBC 9.0   HGB 8.5*         CMP:  Recent Labs     03/25/23  0028 03/26/23  0557 03/27/23  0543    144 142   K 3.8 3.8 3.6    106 107   CO2 22 21 20*   BUN 70* 72* 65*   CREATININE 6.8* 7.1* 7.0*   CALCIUM 8.0* 7.8* 8.1*       Lipids:   Lab Results   Component Value Date/Time    CHOL 229 08/22/2022 02:17 AM    CHOL 157 11/19/2018 12:00 AM    HDL 69 08/22/2022 02:17 AM    TRIG 85 08/22/2022 02:17 AM     Glucose:No results for input(s): POCGLU in the last 72 hours. [id number]:  Lab Results   Component Value Date/Time    LABA1C 5.8 08/22/2022 02:17 AM     High Sensitivity TSH:   Lab Results   Component Value Date/Time    TSHHS 4.660 03/11/2023 12:11 AM     Free T3: No results found for: FT3  Free T4:  Lab Results   Component Value Date/Time    T4FREE 1.03 03/11/2023 12:11 AM       XR HIP 2-3 VW W PELVIS RIGHT   Preliminary Result   Stable findings of Paget's disease of the right pelvis. No acute osseous abnormality. US RETROPERITONEAL LIMITED   Final Result   1. No hydronephrosis.    2. Mildly increased
Progress Note( Dr. Solo Chol)  3/28/2023  Subjective:   Admit Date: 3/6/2023  PCP: No primary care provider on file. 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 Will start on vitamin D p.o. Patient appears to be  to be sober and very cooperative this morning  He is not needing an observer to in his room    Patient was very confused but iks a bit sober this morning    no new bowel or bladder symptoms. Is Laureano catheter      Intake/Output Summary (Last 24 hours) at 3/28/2023 1238  Last data filed at 3/28/2023 0558  Gross per 24 hour   Intake --   Output 750 ml   Net -750 ml         DATA    CBC:   No results for input(s): WBC, HGB, PLT in the last 72 hours. CMP:  Recent Labs     03/26/23  0557 03/27/23  0543 03/28/23  0321    142 138   K 3.8 3.6 3.6    107 105   CO2 21 20* 22   BUN 72* 65* 64*   CREATININE 7.1* 7.0* 6.8*   CALCIUM 7.8* 8.1* 7.9*       Lipids:   Lab Results   Component Value Date/Time    CHOL 229 08/22/2022 02:17 AM    CHOL 157 11/19/2018 12:00 AM    HDL 69 08/22/2022 02:17 AM    TRIG 85 08/22/2022 02:17 AM     Glucose:No results for input(s): POCGLU in the last 72 hours. KlpbsqziffL4C:  Lab Results   Component Value Date/Time    LABA1C 5.8 08/22/2022 02:17 AM     High Sensitivity TSH:   Lab Results   Component Value Date/Time    TSHHS 4.660 03/11/2023 12:11 AM     Free T3: No results found for: FT3  Free T4:  Lab Results   Component Value Date/Time    T4FREE 1.03 03/11/2023 12:11 AM       XR HIP 2-3 VW W PELVIS RIGHT   Final Result   Stable findings of Paget's disease of the right pelvis. No acute osseous abnormality.          [address]
Progress Note( Dr. Tracy Clemens)  3/18/2023  Subjective:   Admit Date: 3/6/2023  PCP: No primary care provider on file. 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 Will start on vitamin D p.o. Patient was very confused much sober this morning    no new bowel or bladder symptoms. Intake/Output Summary (Last 24 hours) at 3/18/2023 1001  Last data filed at 3/18/2023 0655  Gross per 24 hour   Intake --   Output 550 ml   Net -550 ml         DATA    CBC:   No results for input(s): WBC, HGB, PLT in the last 72 hours. CMP:  Recent Labs     03/16/23  0122 03/17/23  0953 03/18/23  0026    143 142   K 4.6 4.3 4.4   * 112* 111*   CO2 21 21 20*   BUN 53* 44* 44*   CREATININE 2.6* 2.5* 2.6*   CALCIUM 8.5 8.7 8.8       Lipids:   Lab Results   Component Value Date/Time    CHOL 229 08/22/2022 02:17 AM    CHOL 157 11/19/2018 12:00 AM    HDL 69 08/22/2022 02:17 AM    TRIG 85 08/22/2022 02:17 AM     Glucose:No results for input(s): POCGLU in the last 72 hours. [id number]:  Lab Results   Component Value Date/Time    LABA1C 5.8 08/22/2022 02:17 AM     High Sensitivity TSH:   Lab Results   Component Value Date/Time    TSHHS 4.660 03/11/2023 12:11 AM     Free T3: No results found for: FT3  Free T4:  Lab Results   Component Value Date/Time    T4FREE 1.03 03/11/2023 12:11 AM       XR ABDOMEN (KUB) (SINGLE AP VIEW)   Final Result   No acute findings with no radiographic evidence of bowel obstruction. CT ABDOMEN PELVIS WO CONTRAST Additional Contrast? None   Final Result   1. Mild bronchial wall thickening potentially due to pulmonary vascular   congestion, reactive airways disease, or bronchitis. 2. Patchy infectious or inflammatory bronchiolitis most prominent in the left   lower lobe.   Consider aspiration
Pt found with gown and tele wires off. Pt was also playing with borden tubing and removed coban that was covering new IV site. Pt stated that he wanted to be undressed. This nurse educated pt of risks in pulling on borden tubing including accidental removal. This nurse redressed pt, wrapped and taped kerlix from hand to elbow to cover IV, and replaced borden statlock, placing tubing under pt's leg. Pt immediately fell back to sleep.    Eleanor Blizzard, RN
Pt had no IV upon assessment. This RN tried to insert an IV without success. IV consulted.  Dr. Shaw Due aware
Pt transported to same day surgery at this time.
Pt unable to take morning synthroid, d/t nausea and vomiting.
RENAL DOSE ADJUSTMENT MADE PER P/T PROTOCOL    PREVIOUS ORDER:  Piperacillin-tazobactam 3.375g IVPB q8h    Estimated Creatinine Clearance: 9 mL/min (A) (based on SCr of 5.7 mg/dL (H)). Recent Labs     03/18/23  0026 03/18/23  2352 03/19/23  1545 03/20/23  0409 03/20/23  1103   BUN 44*  --   --  85* 88*   CREATININE 2.6*  --   --  5.4* 5.7*   PLT  --    < >  --  245 253   INR  --   --  1.64  --   --     < > = values in this interval not displayed.      NEW RENALLY ADJUSTED ORDER:  Piperacillin-tazobactam 3.375g IVPB q12h for a total of 7 days  Indication: [address], [facility], PharmD  3/20/2023 2:15 PM
Shift assessment complete. Pt lying in bed, A/Ox4, clear, yellow urine in borden bag. All VS WNL at this time, pt pain per pt. During this nurses's time in room, pt begins crying, Tech states he does this from time to time. This nurse asked pt why he is crying. He states \"bc of my health, I get a little better and then something happens. \" This nurse provides reassurance to pt. Call light in reach, bed locked in lowest position, bed alarm on, sitter at bedside for safety of lines/tubes.   Fern Coulter, RN
Speech Language Pathology      Attempted to see pt this date for diet tolerance monitoring and trials for advancement, however, pt unable to remain awake for participation with PO trials.       Moshe Robertson MA [facility] SLP  Speech Language Pathologist
Speech Language Pathology      Pt deferred for re-evaluation ordered 3/19. Per/RN Arnulfo Yang, pt is NPO due to suspected SBO. Changed order to NPO per/physician note. ST will follow.     Abraham Quiroga MA [facility] SLP  Speech Language Pathologist
Speech Language Pathology  Gennie Runner  10/18/1930  [id number]      Attempted to see Gennie Runner for a bedside swallowing treatment and diet tolerance monitoring 03/17/23. Deferred assessment- pt was moaning d/t pain (nursing aware) and unable to participate in PO trials at this time. Pt sitter at bedside reports pt has been tolerating sips of thin liquids this AM without difficulty. SLP will continue to follow.       Sandra Singh [address], [facility]-SLP, 3/17/2023
Speech Language Pathology  Jay Jacob  10/18/1930  [id number]      Attempted to see Jay Jacob for a bedside swallowing treatment and possible diet level advancement 03/11/23. Deferred assessment- pt was lethargic and began shouting with repositioning, he was unable to be redirected and eventually fell back asleep. Sitter a bedside states pt was \"spitting out\" all of his food and liquids on breakfast tray this AM.  Recommend continue current diet, however only feed when pt is alert and willing to participate. SLP will follow.     Sandra Moe 87, [facility]-SLP, 3/11/2023
Spoke with pt son. Son stated he had voicemail from [facility] and would call her tomorrow.
Stat type & screen and CBC collected from venipuncture by RN. Sent to lab.
This nurse entered room to find pt in chair with PIV and tele leads removed. IV was sitting next to pt on the bedside table with a few napkins that had blood on them from pt cleaning the iv site. Pt stated that, \"it was hurting so I took it out. \" This nurse placed dressing on old IV site,  replaced tele wires and assisted pt back to bed, pt immediately fell asleep. IV team consult placed.   Mary Arciniega RN
V2.0  [facility] [place]      Name:  Nam Barraza /Age/Sex: 10/18/1930  (80 y.o. male)   MRN & CSN:  [id number] & [id number] Encounter Date/Time: 3/17/2023 1:37 PM EDT    [facility]: No primary care provider on file. Hospital Day: 12    Assessment and Plan:   Nam Barraza is a 80 y.o. male with pmh of Paget's disease, hypothyroidism, BPH, hyperlipidemia who presents with Hip pain      Plan:    Right hip pain  Pagets disease  CT pelvis showing no acute fracture or dislocation, Paget's disease of the right hemipelvis noted, sclerosis and superior humeral head suggesting some avascular necrosis but no collapse. Ortho was consulted, appreciate recs. Partial weightbearing for the next 2 weeks, need for work on hip range of motion and strengthening. Plan to reevaluate in 2 weeks  PT OT: Recommends placement  Pain management  Given Prolia by endocrine    Hypernatremia  Patient sodium about 154. Now improved and resolved  Patient received IV fluids at the discretion of nephrology. Encourage oral hydration of fluids    Delirium  Major depressive  Psychiatry consulted  Seroquel per psych  Lexapro 10 mg daily    Constipation  Resolved with bowel regimen    Urinary retention  BPH  Patient with urinary care to require multiple straight caths. Continue Flomax  Continue Proscar 5 mg  Urology consulted as patient required Laureano: We will attempt to remove Laureano today  To follow-up with urology in 2 to 4 weeks for further evaluation    Frequent falls  PT and OT  Fall precautions    KAVON on CKD  Creatinine is elevated around 3. Nephrology on board    Hypothyroidism  Continue Synthroid    Vitamin D deficiency  Vitamin D supplementation      Diet ADULT ORAL NUTRITION SUPPLEMENT; Lunch, Dinner, Breakfast; Fortified Pudding Oral Supplement  ADULT ORAL NUTRITION SUPPLEMENT; Dinner, Lunch; Frozen Oral Supplement  ADULT DIET;  Dysphagia - Soft and Bite Sized  ADULT ORAL NUTRITION SUPPLEMENT;
V2.0  [facility] [place] [place] Hospitalist Progress Note      Name:  Madelyn Padron /Age/Sex: 10/18/1930  (80 y.o. male)   MRN & CSN:  [id number] & [id number] Encounter Date/Time: 3/10/2023 7:34 AM EST    Location:  [address] [place] [address]-A PCP: No primary care provider on file. Hospital Day: 5    Assessment and Plan:   Madelyn Padron is a 80 y.o. male with pmh of hypothyroidism, HLD, BPH who presents with Hip pain      Plan:  #Right hip pain  #Pagets disease  --Started suddenly, denied any trauma. CT pelvis showing no acute fracture or dislocation, Paget's disease of the right hemipelvis noted, sclerosis and superior humeral head suggesting some avascular necrosis but no collapse  --Ortho consult, appreciate recs. Partial weightbearing for the next 2 weeks, need for work on hip range of motion and strengthening. Plan to reevaluate in 2 weeks  --PT/OT evaluation, patient will likely require placement given the restrictions on weightbearing  --Pain mgmt per pain scale and lidocaine patch  --Endo on board, patient given one time dose of Denosumab to be followed up in 6 months. #Abdominal tenderness with rigidity - Resolved  #? Constipation  --Some circumferential wall thickening in the distal thoracic esophagus may be due to reflux esophagitis and the presence of a small sliding hiatal hernia. Redundant sigmoid colon, normal course and caliber of the stomach small bowel colon and rectum without obstruction.   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  --f/u imaging with pancreas protocol MRI (preferred) or CT in 2024 per ACR recommendations
V2.0  [facility] [place] Hospitalist Progress Note      Name:  Gennie Runner /Age/Sex: 10/18/1930  (80 y.o. male)   MRN & CSN:  [id number] & [id number] Encounter Date/Time: 3/9/2023 7:34 AM EST    [facility] P[facility]: No primary care provider on file. Hospital Day: 4    Assessment and Plan:   Gennie Runner is a 80 y.o. male with pmh of hypothyroidism, HLD, BPH who presents with Hip pain      Plan:  #Right hip pain  #Pagets disease  --Started suddenly 2 days ago, denies any trauma. CT pelvis showing no acute fracture or dislocation, Paget's disease of the right hemipelvis noted, sclerosis and superior humeral head suggesting some avascular necrosis but no collapse  --Ortho consult, appreciate recs. Partial weightbearing for the next 2 weeks, need for work on hip range of motion and strengthening. Plan to reevaluate in 2 weeks  --PT/OT evaluation, patient will likely require placement given the restrictions on weightbearing  --Pain mgmt per pain scale     #Abdominal tenderness with rigidity - Resolved  #? Constipation  --Some circumferential wall thickening in the distal thoracic esophagus may be due to reflux esophagitis and the presence of a small sliding hiatal hernia. Redundant sigmoid colon, normal course and caliber of the stomach small bowel colon and rectum without obstruction.   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  --f/u imaging with pancreas protocol MRI (preferred) or CT in 2024 per ACR recommendations     #BPH  --Continue tamsulosin     #Hyperlipidemia  --Continue statin     #Hypothyroidism  --Continue
V2.0  [facility] [place]      Name:  Yao Flores /Age/Sex: 10/18/1930  (80 y.o. male)   MRN & CSN:  [id number] & [id number] Encounter Date/Time: 3/16/2023 1:37 PM EDT    Location:  [address] [place], [place] [address]A PCP: No primary care provider on file. Hospital Day: 11    Assessment and Plan:   Yao Flores is a 80 y.o. male with pmh of Paget's disease, hypothyroidism, BPH, hyperlipidemia who presents with Hip pain      Plan:    Right hip pain  Pagets disease  CT pelvis showing no acute fracture or dislocation, Paget's disease of the right hemipelvis noted, sclerosis and superior humeral head suggesting some avascular necrosis but no collapse. Ortho was consulted, appreciate recs. Partial weightbearing for the next 2 weeks, need for work on hip range of motion and strengthening. Plan to reevaluate in 2 weeks  PT OT  Pain management  Given Prolia by endocrine    Hypernatremia  Patient sodium about 154. Now improved and resolved  Patient received IV fluids at the discretion of nephrology. Encourage oral hydration of fluids    Delirium  Major depressive  Psychiatry consulted  Seroquel per psych  Lexapro 10 mg daily    Constipation  Resolved with bowel regimen    Urinary retention  BPH  Patient with urinary care to require multiple straight caths. Continue Flomax  Continue Proscar 5 mg  Urology consulted as patient required Laureano: We will attempt to remove Laureano prior to discharge  To follow-up with urology in 2 to 4 weeks for further evaluation    Frequent falls  PT and OT  Fall precautions    KAVON on CKD  Creatinine is elevated around 3. Nephrology on board    Hypothyroidism  Continue Synthroid    Vitamin D deficiency  Vitamin D supplementation      Diet ADULT ORAL NUTRITION SUPPLEMENT; Lunch, Dinner, Breakfast; Fortified Pudding Oral Supplement  ADULT ORAL NUTRITION SUPPLEMENT; Dinner, Lunch; Frozen Oral Supplement  ADULT DIET;  Dysphagia - Soft and Bite Sized  ADULT ORAL NUTRITION SUPPLEMENT; Dinner;
V2.0  [facility] [place] Hospitalist Progress Note      Name:  Dami Stanley /Age/Sex: 10/18/1930  (80 y.o. male)   MRN & CSN:  [id number] & [id number] Encounter Date/Time: 3/11/2023 7:34 AM EST    [facility] PCP: No primary care provider on file. Hospital Day: 6    Assessment and Plan:   Dami Stanley is a 80 y.o. male with pmh of hypothyroidism, HLD, BPH who presents with Hip pain      Plan:  #Right hip pain  #Pagets disease  --Started suddenly, denied any trauma. CT pelvis showing no acute fracture or dislocation, Paget's disease of the right hemipelvis noted, sclerosis and superior humeral head suggesting some avascular necrosis but no collapse  --Ortho was consulted, appreciate recs. Partial weightbearing for the next 2 weeks, need for work on hip range of motion and strengthening. Plan to reevaluate in 2 weeks  --PT/OT evaluation, patient will likely require placement given the restrictions on weightbearing  --Pain mgmt per pain scale and lidocaine patch  --Endo on board, patient given one time dose of Denosumab to be followed up in 6 months. #Abdominal tenderness with rigidity   #? Constipation - resolving  --Some circumferential wall thickening in the distal thoracic esophagus may be due to reflux esophagitis and the presence of a small sliding hiatal hernia. Redundant sigmoid colon, normal course and caliber of the stomach small bowel colon and rectum without obstruction. Some mild stool and moderate colonic diverticulosis without findings of acute diverticulitis  --Bowel regimen and an H2 blocker, patient is having bowel movements, will obtain a KUB to evaluate     #Urinary retention  --Patient was bladder scanned and noted to have greater than 300 ml in his bladder, straight cathed and over 500cc removed, repeat scan concerning for ongoing retention, borden cath in placed  --Continue to monitor    #Mood deterioration  #? Delirium  --Patient noted to be crying and seems near inconsolable at
V2.0  [facility] [place] Hospital[place]      Name:  Alysha Hook /Age/Sex: 10/18/1930  (80 y.o. male)   MRN & CSN:  [id number] & [id number] Encounter Date/Time: 3/14/2023 1:37 PM EDT    Location:  [address] [place] [address]-A PCP: No primary care provider on file. Hospital Day: 9    Assessment and Plan:   Alysha Hook is a 80 y.o. male with pmh of Paget's disease, hypothyroidism, BPH, hyperlipidemia who presents with Hip pain      Plan:    Right hip pain  Pagets disease  CT pelvis showing no acute fracture or dislocation, Paget's disease of the right hemipelvis noted, sclerosis and superior humeral head suggesting some avascular necrosis but no collapse. Ortho was consulted, appreciate recs. Partial weightbearing for the next 2 weeks, need for work on hip range of motion and strengthening. Plan to reevaluate in 2 weeks  PT OT  Pain management  Given Prolia by endocrine    Hypernatremia  Patient sodium about 154. Continue IV fluids per nephrology. Using D5. Delirium  Major depressive  Psychiatry consulted  Seroquel per psych  Lexapro 10 mg daily    Constipation  Resolved with bowel regimen    Urinary retention  BPH  Patient with urinary care to require multiple straight caths. Continue Flomax    Frequent falls  PT and OT  Fall precautions    KAVON on CKD  Creatinine is elevated around 3. Nephrology on board    Hypothyroidism  Continue Synthroid    Diet ADULT DIET; Dysphagia - Pureed; Mildly Thick (Nectar)  ADULT ORAL NUTRITION SUPPLEMENT; Lunch, Dinner, Breakfast; Fortified Pudding Oral Supplement  ADULT ORAL NUTRITION SUPPLEMENT; Dinner, Lunch; Frozen Oral Supplement   DVT Prophylaxis [x] Lovenox, []  Heparin, [] SCDs, [] Ambulation,    [] Eliquis, [] Xarelto  [] Coumadin [] other   Code Status Full Code   Disposition From: HOME  Expected Disposition: SNF  Estimated Date of Discharge: 1-2 days  Patient requires continued admission due to hyponatremia and pain control.   Will need placement   1-2       Subjective:
While in the patients room to do shift assessment this nurse noticed that the patient had old emesis in his mouth that were black and tarry. While auscultating pts lung sounds patient began having emesis again that was black and tarry, and smelled like feces. Pt also has moderate amount of thick frothy sputum. Pt oral suctioned with jakob Pickering contacted via perfect serve and new orders obtained and carried out.
199 lb 4.7 oz (90.4 kg), 112 % IBW. Weight Source: Bed Scale  Current BMI (kg/m2): 27  Usual Body Weight: 217 lb 9.6 oz (98.7 kg) (10/2022)  % Weight Change (Calculated): -8.4  Weight Adjustment For: No Adjustment                 BMI Categories: Overweight (BMI 25.0-29. 9)    Estimated Daily Nutrient Needs:  Energy Requirements Based On: Formula  Weight Used for Energy Requirements: Current  Energy (kcal/day): 9910-8351 (MSJ)  Weight Used for Protein Requirements: Ideal  Protein (g/day): 81-97 (1-1.2 g/kg)  Method Used for Fluid Requirements: 1 ml/kcal  Fluid (ml/day): 2000    Nutrition Diagnosis:   Inadequate oral intake related to cognitive or neurological impairment, acute injury/trauma, swallowing difficulty as evidenced by swallow study results, intake 0-25%    Nutrition Interventions:   Food and/or Nutrient Delivery: Continue Current Diet, Start Oral Nutrition Supplement, IV Fluid Delivery  Nutrition Education/Counseling: Education not indicated  Coordination of Nutrition Care: Continue to monitor while inpatient, Coordination of Care  Plan of Care discussed with: eva physician    Goals:     Goals: PO intake 50% or greater       Nutrition Monitoring and Evaluation:   Behavioral-Environmental Outcomes: None Identified  Food/Nutrient Intake Outcomes: Food and Nutrient Intake, Supplement Intake  Physical Signs/Symptoms Outcomes: Biochemical Data, Weight, Meal Time Behavior, Nutrition Focused Physical Findings    Discharge Planning:     Too soon to determine     Emily Felix RD, LD  Contact: [phone]
Current  Energy (kcal/day): 2071-2230 (MSJ)  Weight Used for Protein Requirements: Ideal  Protein (g/day): 81-97 (1-1.2 g/kg)  Method Used for Fluid Requirements: 1 ml/kcal  Fluid (ml/day): 2000    Nutrition Diagnosis:   Inadequate oral intake related to cognitive or neurological impairment, acute injury/trauma, swallowing difficulty as evidenced by swallow study results, intake 0-25%    Nutrition Interventions:   Food and/or Nutrient Delivery: Continue Current Diet, Continue Oral Nutrition Supplement  Nutrition Education/Counseling: Education not indicated  Coordination of Nutrition Care: Continue to monitor while inpatient, Feeding Assistance/Environment Change  Plan of Care discussed with: sitter in room    Goals:  Previous Goal Met: Progressing toward Goal(s) (slow progress)  Goals: PO intake 50% or greater       Nutrition Monitoring and Evaluation:   Behavioral-Environmental Outcomes: None Identified  Food/Nutrient Intake Outcomes: Food and Nutrient Intake, Diet Advancement/Tolerance, Supplement Intake  Physical Signs/Symptoms Outcomes: Biochemical Data, Chewing or Swallowing, Meal Time Behavior, Skin, Weight    Discharge Planning:    Continue Oral Nutrition Supplement     Kanika Martin, [address] [place] [address],   Contact: [phone]
Impaired  Perception: Impaired    Activities of Daily Living (ADLs):  Feeding: Max A (in supported sitting w/ [place] assist)  Grooming: Max A (in supported sitting w/ [place] assist)   UB bathing: Dependent   LB bathing: Dependent   UB dressing: Dependent   LB dressing: Dependent   Toileting: Dependent     Cognitive and Psychosocial Functioning:  Overall cognitive status: Impaired- pt A&O to self only; pt generally confused throughout session; dec processing, attention, insight, awareness of deficits requiring inc cueing; inconsistently follows 1 step command w/ frequent repetition; cues for initiation, sequencing, problem solving; hx of dementia  Affect: Normal     Balance:   Sitting: Min-Mod A static sitting EOB   Standing: NT- not safe at this time    Functional Mobility:  Bed Mobility: Max A x2 sitting EOB to supine; Max x2 scooting hips up in bed  Transfers: Max A x2 SPT from chair to EOB    Ambulation: NT- not safe at this time      AM-PAC 6 click short form for inpatient daily activity:   How much help from another person does the patient currently need. .. Unable  Dep A Lot  Max A A Lot   Mod A A Little  Min A A Little   CGA  SBA None   Mod I  Indep  Sup   1. Putting on and taking off regular lower body clothing? [x] 1    [] 2   [] 2   [] 3   [] 3   [] 4      2. Bathing (including washing, rinsing, drying)? [x] 1   [] 2   [] 2 [] 3 [] 3 [] 4   3. Toileting, which includes using toilet, bedpan, or urinal? [x] 1    [] 2   [] 2   [] 3   [] 3   [] 4     4. Putting on and taking off regular upper body clothing? [x] 1   [] 2   [] 2   [] 3   [] 3    [] 4      5. Taking care of personal grooming such as brushing teeth? [] 1   [x] 2    [] 2 [] 3    [] 3   [] 4      6. Eating meals?    [] 1   [x] 2   [] 2   [] 3   [] 3   [] 4      Raw Score:  8     [24=0% impaired(CH), 23=1-19%(CI), 20-22=20-39%(CJ), 15-19=40-59%(CK), 10-14=60-79%(CL), 7-9=80-99%(CM), 6=100%(CN)]     Treatment:  Therapeutic Activity Training:   Therapeutic
In: 1220  Time Out: 1250  Minutes: Nicolas [address] [place] [address] SLP  Speech Language Pathologist
RATING (0-10 Scale): denies   Time in/Time out: SLP Individual Minutes  Time In: 0900  Time Out: 0930  Minutes: 30    Visit number: [address] Sw[address] Chans[address], [facility]-SLP, 3/16/2023
Recent Labs     03/16/23  0122 03/17/23  0953 03/18/23  0026    143 142   K 4.6 4.3 4.4   * 112* 111*   CO2 21 21 20*   BUN 53* 44* 44*   CREATININE 2.6* 2.5* 2.6*   GLUCOSE 130* 108* 120*         Objective:   Vitals: BP (!) 165/83   Pulse 100   Temp 99 °F (37.2 °C) (Oral)   Resp 16   Ht 6' (1.829 m)   Wt 195 lb 4.8 oz (88.6 kg)   SpO2 95%   BMI 26.49 kg/m²   General appearance:  in no acute distress  HEENT: normocephalic, atraumatic,   Neck: supple, trachea midline  Lungs:  breathing comfortably   Extremities: extremities atraumatic, no cyanosis or edema  Neurologic: drowsy    MEDICAL DECISION MAKING     -KAVON: cr 3//from volume depletion//resolved  -CKD4: baseline cr in the 2s  -Hypernatremia : 154//resolved with hypotonic fluids  -Metabolic acidosis  -Urinary retention;oley in place  -Paget dz of right hip  -Agitation     Suggest:  -Cr stable at 2.6  Na 142  -encourage po intake- he needs to be fed   Increase Po fluids  -Avoid nephrotoxins                  Electronically signed by Cammy Muñoz MD on 3/18/2023 at 11:35 AM    [address] MD Nery Borges DO [place] ,  [facility] [address]  PHONE: [phone]  FAX: [phone]
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 No acute osseous abnormality. US RETROPERITONEAL LIMITED   Final Result   1. No hydronephrosis. 2. Mildly increased bilateral renal cortical echogenicity compatible with   chronic medical renal disease. 3. 4.5 cm mildly complex left renal cyst stable to mildly increased in size   from 03/29/2022. Consider further evaluation with a nonemergent renal   protocol MRI or CT as clinically indicated. XR ABDOMEN FOR NG/OG/NE TUBE PLACEMENT   Final Result   Enteric tube terminating in the distal body of the stomach. XR CHEST PORTABLE   Final Result   1. Bilateral asymmetric (right greater than left) patchy pulmonary opacities   which may represent developing pulmonary edema versus multifocal pneumonia. 2. Enteric tube in the stomach with the tip and side port at the proximal to   mid gastric body. 3. Stable proportional mild gaseous bowel distention with no evidence of   obstruction. XR ABDOMEN FOR NG/OG/NE TUBE PLACEMENT   Final Result   1. Bilateral asymmetric (right greater than left) patchy pulmonary opacities   which may represent developing pulmonary edema versus multifocal pneumonia. 2. Enteric tube in the stomach with the tip and side port at the proximal to   mid gastric body. 3. Stable proportional mild gaseous bowel distention with no evidence of   obstruction. XR ABDOMEN (KUB) (SINGLE AP VIEW)   Final Result   1. No evidence of bowel obstruction. 2. No significant fecal retention. XR ABDOMEN (KUB) (SINGLE AP VIEW)   Final Result   No acute findings with no radiographic evidence of bowel obstruction. CT ABDOMEN PELVIS WO CONTRAST Additional Contrast? None   Final Result   1.  Mild bronchial wall thickening potentially due to pulmonary vascular
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 No acute osseous abnormality. US RETROPERITONEAL LIMITED   Final Result   1. No hydronephrosis. 2. Mildly increased bilateral renal cortical echogenicity compatible with   chronic medical renal disease. 3. 4.5 cm mildly complex left renal cyst stable to mildly increased in size   from 03/29/2022. Consider further evaluation with a nonemergent renal   protocol MRI or CT as clinically indicated. XR ABDOMEN FOR NG/OG/NE TUBE PLACEMENT   Final Result   Enteric tube terminating in the distal body of the stomach. XR CHEST PORTABLE   Final Result   1. Bilateral asymmetric (right greater than left) patchy pulmonary opacities   which may represent developing pulmonary edema versus multifocal pneumonia. 2. Enteric tube in the stomach with the tip and side port at the proximal to   mid gastric body. 3. Stable proportional mild gaseous bowel distention with no evidence of   obstruction. XR ABDOMEN FOR NG/OG/NE TUBE PLACEMENT   Final Result   1. Bilateral asymmetric (right greater than left) patchy pulmonary opacities   which may represent developing pulmonary edema versus multifocal pneumonia. 2. Enteric tube in the stomach with the tip and side port at the proximal to   mid gastric body. 3. Stable proportional mild gaseous bowel distention with no evidence of   obstruction. XR ABDOMEN (KUB) (SINGLE AP VIEW)   Final Result   1. No evidence of bowel obstruction. 2. No significant fecal retention. XR ABDOMEN (KUB) (SINGLE AP VIEW)   Final Result   No acute findings with no radiographic evidence of bowel obstruction.          CT ABDOMEN PELVIS WO
advised bladder irrigation. Discussed with urology yesterday, advised bladder irrigation and if hematuria persist to consult again  As per prior urology evaluation ,  Continue Laureano catheter. Patient failed removal of Laureano catheter and passed. Continue Proscar. Outpatient follow-up with urology  GI evaluation appreciated. Status post EGD, noted as above. Hemoglobin stable 8.5 g today. No further episode of coffee-ground emesis. Continue PPI  Aspirin/Lovenox on hold. Resume once hematuria improves. Continue IV Zosyn x7 days for possible healthcare associated pneumonia. MRSA nares negative. Respiratory culture shows normal diamante. Endocrinology evaluation appreciated. S/p 1 dose of Prolia. Continue vitamin D supplement  PT/OT evaluation  Psychiatry evaluation appreciated. Sitter discontinued given improvement in his mentation. Resumed Seroquel and Lexapro   Continue Synthroid  As per ortho eval \"No concerning findings on x-ray or physical exam today. This was also confirmed by the radiologist.  Patient can weight-bear as tolerated. He can follow-up with me as needed in office 2 to 4 weeks after discharge\"      3/24/2023  Advance directives discussed with patient's son Layne Roland over the phone. He states his father has previously indicated to him that he does not want any CPR or intubation. CODE STATUS changed to DNR CCA. Also at this time he was informed about worsening renal function and no plans for hemodialysis and soon we may have to talk about comfort measures/hospice. Layne Roland verbalized understanding. 3/25/2023  Patient able to participate in conversation. Understands CODE STATUS discussion. States he does not want CPR or any tubes. He wishes no aggressive medical management. He wishes to be kept comfortable only. Will change the CODE STATUS to DNR CC   Attempted to call patient's son Layne Roland , to inform about above conversation. Left voicemail. Diet ADULT DIET;  Regular  ADULT ORAL
and redirection to task. Patient tolerated x30 Min EOB. SPT to recliner with bed height raised- Max x2    Scooting back in recliner- Max x2    Patient educated on role of OT , benefits of OT and rationale for therapeutic intervention. Benefit of OOB/EOB activities, benefit of movement. AE/AD, WS/EC,     All therapeutic intervention performed c emphasis on dynamic balance / standing tolerance to increase strength, endurance and activity tolerance for increased [place] c ADL tasks and func transfers / mobility. Patient left safely in bedside chair at end of session, with call light in reach, alarm on and nursing aware. Gait belt was used for func transfers / mobility. Assessment / Impression:    patient requires redirection to task trhoughout. Is pleasantly confused this date. His pain is hindering his mobility at this time.    Patient's tolerance of treatment: fair  Adverse Reaction: None  Significant change in status and impact:   Barriers to improvement: cogn, pain      Plan for Next Session:    Continue with OT POC      Time in:  841  Time out:  922  Timed treatment minutes:  35  Total treatment time:  41      Electronically signed by:    CARLOS Kramer COTA/L [id number]    3/15/2023, 11:29 AM
disease, or bronchitis. 2. Patchy infectious or inflammatory bronchiolitis most prominent in the left   lower lobe. Consider aspiration pneumonitis. 3. Suspected circumferential wall thickening in the distal thoracic esophagus   potentially due to reflux esophagitis given adjacent fluid or inflammatory   stranding and the presence of a small sliding hiatal hernia. Malignancy   could appear similar, but no overtly suspicious features are present. 4. Trace bilateral pleural effusions, trace pericardial effusion, and minimal   anasarca potentially due to volume overload. 5. 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 Recommend follow-up with pancreas protocol abdomen MRI (preferred) or CT in   November 2024 for per the ACR recommendations for incidental pancreatic cysts. 6. Persistent focal dilation of the mid left ureter not significantly changed   since 11/13/2022 but increased since 08/31/2022 and not present prior to   that. Considerations include a focal post inflammatory or malignant   stricture versus less likely peristalsis. Consider CT urography on an   outpatient basis. 7. Additional incidental findings as above for which no dedicated follow-up   is recommended. CT PELVIS WO CONTRAST Additional Contrast? None   Final Result   1. No acute fracture or dislocation at the pelvis and hips. 2.  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 3.  Sclerosis in the superior humeral heads suggesting some avascular   necrosis but there is no collapse. 4.  Urinary bladder has wall thickening and multiple bladder diverticula. Mildly enlarged prostate bulging into the bladder floor. Had similar   features on the previous study. Unsure how much is related to the chronic   outlet obstruction versus some mild cystitis.               Scheduled Medicines   Medications:
follow, MAX cues for 50% WB RLE fair-poor carryover, chair follow. Pt tolerates well, no c/o pain, and is in good spirits throughout. RTS at recliner with Min A to control descent. Postioning: In chair pt required Min A for UE support to achieve forward trunk lean for placement of pillows x2 for comfort. Patient left safely in recliner with LE elevated, with call light/phone in reach with alarm applied. Gait belt used for transfers and gait. Assessment / Impression:    PT to continue original recommendation for SNF d/c. Patient's tolerance of treatment:  good   Adverse Reaction: none  Significant change in status and impact:  none  Barriers to improvement:  WB status and cognition  Plan for Next Session:    Cont POC, follow up pending X-ray results. Per PS Ortho will re-assess pt WB status once X-rays obtained.    Time in:  10:33  Time out:  11:03  Timed treatment minutes:  25  Total treatment time:  30    Previously filed items:           Short Term Goals  Time Frame for Short Term Goals: 1 week  Short Term Goal 1: Pt will complete x5 STS EOB<>RW with Mod A , Max cues+assist for RLE 50% WB  Short Term Goal 2: Pt will participate in seated LE TherEx with Min A for RLE, x10 ea  Short Term Goal 3: Pt will complete SPT with Min x2, Max cues + t/c for 50% WB RLE    Electronically signed by:    Kelsi Burris PT  3/22/2023, 12:31 PM
healthcare associated pneumonia. X-ray chest showed finding consistent with multifocal pneumonia, worse on right side. Endocrinology evaluation appreciated. S/p 1 dose of Prolia. Continue vitamin D supplement  PT/OT evaluation  Psychiatry evaluation appreciated. Sitter discontinued given improvement in his mentation. Resumed Seroquel and Lexapro   Continue Synthroid  As per ortho eval \"No concerning findings on x-ray or physical exam today. This was also confirmed by the radiologist.  Patient can weight-bear as tolerated. He can follow-up with me as needed in office 2 to 4 weeks after discharge\"    Diet Diet NPO   DVT Prophylaxis [] Lovenox, []  Heparin, [] SCDs, [] Ambulation, Lovenox on hold. GI Prophylaxis [] PPI,  [] H2 Blocker,  [] Carafate,  [] Diet/Tube Feeds   Code Status Full Code   Disposition Patient requires continued admission due to IV antibiotics/KAVON   MDM [] Low, [] Moderate,[x]  High  Patient's risk as above      Subjective     Chief Complaint: Hip pain    Patient seen and examined while working with therapy. Appears to be at his baseline mentation. No episodes of agitation or confusion noted. Actively participating with therapy. Denies any shortness of breath, chest pain, abdominal pain, nausea or vomiting now. Able to tolerate liquid diet. .    Objective: Intake/Output Summary (Last 24 hours) at 3/23/2023 1015  Last data filed at 3/23/2023 9594  Gross per 24 hour   Intake 95.74 ml   Output 650 ml   Net -554.26 ml        Vitals:   Vitals:    03/23/23 0910   BP:    Pulse:    Resp:    Temp:    SpO2: 98%     Physical Exam:   GEN Awake male, sitting upright in bed in no apparent distress. Appears given age. RESP bilateral air entry fair. Bilateral crackles  CARDIO/VASC S1/S2 auscultated. Regular rate. No peripheral edema.   GI Abdomen is soft without significant tenderness  NEURO awake, alert, unable to examine CNS fully    Medications:   Medications:    piperacillin-tazobactam
inflammatory bronchiolitis most prominent in the left   lower lobe. Consider aspiration pneumonitis. 3. Suspected circumferential wall thickening in the distal thoracic esophagus   potentially due to reflux esophagitis given adjacent fluid or inflammatory   stranding and the presence of a small sliding hiatal hernia. Malignancy   could appear similar, but no overtly suspicious features are present. 4. Trace bilateral pleural effusions, trace pericardial effusion, and minimal   anasarca potentially due to volume overload. 5. 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 Recommend follow-up with pancreas protocol abdomen MRI (preferred) or CT in   November 2024 for per the ACR recommendations for incidental pancreatic cysts. 6. Persistent focal dilation of the mid left ureter not significantly changed   since 11/13/2022 but increased since 08/31/2022 and not present prior to   that. Considerations include a focal post inflammatory or malignant   stricture versus less likely peristalsis. Consider CT urography on an   outpatient basis. 7. Additional incidental findings as above for which no dedicated follow-up   is recommended. CT PELVIS WO CONTRAST Additional Contrast? None   Final Result   1. No acute fracture or dislocation at the pelvis and hips. 2.  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 3.  Sclerosis in the superior humeral heads suggesting some avascular   necrosis but there is no collapse. 4.  Urinary bladder has wall thickening and multiple bladder diverticula. Mildly enlarged prostate bulging into the bladder floor. Had similar   features on the previous study. Unsure how much is related to the chronic   outlet obstruction versus some mild cystitis.               Scheduled Medicines   Medications:    [START ON 3/14/2023] QUEtiapine  50 mg Oral Nightly
or limited access to food as evidenced by NPO or clear liquid status due to medical condition    Nutrition Interventions:   Food and/or Nutrient Delivery: Start Oral Diet, Start Oral Nutrition Supplement (when medically appropriate)  Nutrition Education/Counseling: No recommendation at this time  Coordination of Nutrition Care: Continue to monitor while inpatient  Plan of Care discussed with: sitter in room    Goals:  Previous Goal Met: Progress towards Goal(s) Declining (d/t now NPO)  Goals: Initiate PO diet, by next RD assessment       Nutrition Monitoring and Evaluation:   Behavioral-Environmental Outcomes: None Identified  Food/Nutrient Intake Outcomes: Diet Advancement/Tolerance  Physical Signs/Symptoms Outcomes: Biochemical Data, Chewing or Swallowing, GI Status, Hemodynamic Status, Skin, Weight    Discharge Planning:     Too soon to determine     Mirna Carver RD  Contact: [phone]
recorded.     Intake/Output Summary (Last 24 hours) at 3/19/2023 0946  Last data filed at 3/18/2023 1357  Gross per 24 hour   Intake --   Output 250 ml   Net -250 ml       CBC:   Recent Labs     03/18/23  2352   WBC 13.2*   HGB 10.4*          BMP:    Recent Labs     03/17/23  0953 03/18/23  0026    142   K 4.3 4.4   * 111*   CO2 21 20*   BUN 44* 44*   CREATININE 2.5* 2.6*   GLUCOSE 108* 120*         Objective:   Vitals: BP (!) 126/58   Pulse (!) 103   Temp 98.9 °F (37.2 °C) (Axillary)   Resp 24   Ht 6' (1.829 m)   Wt 180 lb (81.6 kg)   SpO2 96%   BMI 24.41 kg/m²   General appearance:  in no acute distress  HEENT: normocephalic, atraumatic,   Neck: supple, trachea midline  Lungs:  breathing comfortably   Extremities: extremities atraumatic, no cyanosis or edema  Neurologic: drowsy    MEDICAL DECISION MAKING     -KAVON: cr 3//from volume depletion//resolved  -CKD4: baseline cr in the 2s  -Hypernatremia : 154//resolved with hypotonic fluids  -Metabolic acidosis  -Urinary retention;oley in place  -Paget dz of right hip  -Agitation     Suggest:  -Cr stable at 2.6  Na 142  -encourage po intake- he needs to be fed   Increase Po fluids  -Avoid nephrotoxins  Will follow in am                  Electronically signed by Fior Hollingsworth MD on 3/19/2023 at 9:46 AM    [facility]  MD Иван Yan DO [place] [address]  [place], [place] [address]  PHONE: [phone]  FAX: [phone]
stomach with the tip and side port at the proximal to mid gastric body. 3. Stable proportional mild gaseous bowel distention with no evidence of obstruction. CT Pelvis 3/6/23  1. No acute fracture or dislocation at the pelvis and hips. 2.  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 3.  Sclerosis in the superior humeral heads suggesting some avascular   necrosis but there is no collapse. 4.  Urinary bladder has wall thickening and multiple bladder diverticula. Mildly enlarged prostate bulging into the bladder floor. Had similar   features on the previous study. Unsure how much is related to the chronic   outlet obstruction versus some mild cystitis. CT-scan of abdomen and pelvis 3/6/23  1. Mild bronchial wall thickening potentially due to pulmonary vascular   congestion, reactive airways disease, or bronchitis. 2. Patchy infectious or inflammatory bronchiolitis most prominent in the left   lower lobe. Consider aspiration pneumonitis. 3. Suspected circumferential wall thickening in the distal thoracic esophagus potentially due to reflux esophagitis given adjacent fluid or inflammatory stranding and the presence of a small sliding hiatal hernia. Malignancy could appear similar, but no overtly suspicious features are present. 4. Trace bilateral pleural effusions, trace ericardial effusion, and minimal anasarca potentially due to volume overload. 5. 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 Recommend follow-up with pancreas protocol abdomen MRI (preferred) or CT in November 2024 for per the ACR recommendations for incidental pancreatic cysts. 6. Persistent focal dilation of the mid left ureter not significantly changed since 11/13/2022 but increased since 08/31/2022 and not present prior to that.   Considerations include a focal post inflammatory or
well)    Nutrition Diagnosis:   Inadequate oral intake related to lack or limited access to food as evidenced by NPO or clear liquid status due to medical condition    Nutrition Interventions:   Food and/or Nutrient Delivery: Start Oral Diet, Start Oral Nutrition Supplement  Nutrition Education/Counseling: Education not appropriate  Coordination of Nutrition Care: Continue to monitor while inpatient, Speech Therapy, Feeding Assistance/Environment Change  Plan of Care discussed with: sitter in room    Goals:  Previous Goal Met: Progressing toward Goal(s)  Goals: Initiate PO diet, by next RD assessment       Nutrition Monitoring and Evaluation:   Behavioral-Environmental Outcomes: None Identified  Food/Nutrient Intake Outcomes: Diet Advancement/Tolerance  Physical Signs/Symptoms Outcomes: Biochemical Data, Chewing or Swallowing, Meal Time Behavior, GI Status, Weight, Skin    Discharge Planning:     Too soon to determine     Ifrah West RD, LD  Contact: [phone]
with MIN A x 2 with [employer] management  and required seated rest break. Pt completed sit to stand from edge of bed with CGA and completed functional mobility with WW and MIN x 1 and assist for safety and chair follow. Pt returned in room and seated in chair. Sitter present call light in reach.     Assessment / Impression:    Patient's tolerance of treatment: Well  Adverse Reaction: None  Significant change in status and impact: Improved from initial evaluation  Barriers to improvement: None noted    Plan for Next Session:    Continue OT POC and progress seated ADLs    Time in:  1033  Time out:  1103  Timed treatment minutes:  25  Total treatment time:  30      Electronically signed by:    CARLOS Bermudez,   3/22/2023, 1:59 PM
03/06/2023    Orthostatic syncope 02/14/2023    Dizziness 02/12/2023    Syncope and collapse 02/12/2023    Community acquired pneumonia of left lung, unspecified part of lung 10/08/2022    Cardiac arrest ([facility] [address] 08/25/2022    Chest pain 08/21/2022    Agitation due to dementia 08/18/2022    Varicose 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 08/15/2015    Skin ulcer, limited to breakdown of skin ([address] 02/21/2022    Current moderate episode of major depressive disorder, unspecified whether recurrent ([address] 02/21/2022    Chronic kidney disease, stage IV (severe) ([facility] [address] 02/21/2022    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 ([facility] [address] 02/21/2022    Leukocytosis 09/08/2021    Thrombocytopenia, unspecified 08/31/2021    Bandemia 08/10/2021    Atelectasis 08/05/2021    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     Unclear if this am labs are spurious or not so please repeat a bmp today  Obtain a bladder scan to ensure that he is not retaining urine please  Avoid nephrotoxins  Obtain fresh BMP before starting any type of IVF please    Thank you                  Electronically signed by Michelle Felix DO on 3/20/2023 at 7:49 AM    [address] MD Lavern Borges DO [address] [address]  [place], [place] [address]  PHONE: [phone]  FAX: [phone]
[phone]  FAX: [phone]
Cardiovascular:  Negative for chest pain, palpitations and leg swelling. Gastrointestinal:  Positive for nausea and vomiting. Negative for abdominal pain, constipation and diarrhea. Endocrine: Negative for cold intolerance, heat intolerance and polyuria. Genitourinary:  Negative for dysuria. Musculoskeletal:  Negative for arthralgias, back pain and gait problem. Skin:  Negative for color change. Neurological:  Negative for dizziness, weakness and headaches. Psychiatric/Behavioral:  Negative for agitation and confusion. The patient is not nervous/anxious. Objective: Intake/Output Summary (Last 24 hours) at 3/20/2023 1329  Last data filed at 3/20/2023 0601  Gross per 24 hour   Intake --   Output 2000 ml   Net -2000 ml          Vitals:   Vitals:    03/20/23 1201   BP: 132/82   Pulse: 92   Resp: 14   Temp: 97.4 °F (36.3 °C)   SpO2:        Physical Exam:     Physical Exam  Constitutional:       General: He is not in acute distress. Appearance: Normal appearance. HENT:      Head: Normocephalic and atraumatic. Nose: Nose normal.      Mouth/Throat:      Mouth: Mucous membranes are moist.      Pharynx: Oropharynx is clear. Eyes:      Extraocular Movements: Extraocular movements intact. Conjunctiva/sclera: Conjunctivae normal.      Pupils: Pupils are equal, round, and reactive to light. Cardiovascular:      Rate and Rhythm: Normal rate and regular rhythm. Pulses: Normal pulses. Heart sounds: Normal heart sounds. Pulmonary:      Effort: Pulmonary effort is normal.   Abdominal:      General: Abdomen is flat. Bowel sounds are normal.      Palpations: Abdomen is soft. Musculoskeletal:         General: Normal range of motion. Cervical back: Normal range of motion and neck supple. Skin:     General: Skin is warm and dry. Neurological:      General: No focal deficit present. Mental Status: He is alert and oriented to person, place, and time.  Mental status is
Considerations include a focal post inflammatory or malignant stricture versus less likely peristalsis. Consider CT urography on an outpatient basis. 7. Additional incidental findings as above for which no dedicated follow-up is recommended.       TSH 3/11/23: 4.66  Hepatic Function Panel:    Lab Results   Component Value Date/Time    ALKPHOS 75 03/23/2023 02:18 AM    ALKPHOS 75 03/23/2023 02:18 AM    ALT 6 03/23/2023 02:18 AM    ALT 6 03/23/2023 02:18 AM    AST 13 03/23/2023 02:18 AM    AST 13 03/23/2023 02:18 AM    PROT 5.5 03/23/2023 02:18 AM    PROT 5.5 03/23/2023 02:18 AM    BILITOT 0.3 03/23/2023 02:18 AM    BILITOT 0.3 03/23/2023 02:18 AM    BILIDIR 0.2 03/23/2023 02:18 AM    IBILI 0.1 03/23/2023 02:18 AM    LABALBU 3.0 03/23/2023 02:18 AM    LABALBU 3.0 03/23/2023 02:18 AM    CBC:   Recent Labs     03/25/23  0028   WBC 9.0   HGB 8.5*   HCT 27.1*   MCV 95.1        BMP:   Recent Labs     03/25/23  0028 03/26/23  0557 03/27/23  0543    144 142   K 3.8 3.8 3.6    106 107   CO2 22 21 20*   BUN 70* 72* 65*   CREATININE 6.8* 7.1* 7.0*   GLUCOSE 112* 86 113*        Physical Exam:   /72   Pulse 97   Temp 98.3 °F (36.8 °C) (Oral)   Resp 15   Ht 6' (1.829 m)   Wt 190 lb 14.4 oz (86.6 kg)   SpO2 94%   BMI 25.89 kg/m²   General: drowsy but arousable, hard of hearing, in no acute distress, comfortable lying flat  Skin: scattered bruising, seborrheic keratoses   HEENT: Mucous membranes are mildly dry, sclerae are clear, conjunctivae are pale  Heart: distant tones, tachycardic RRR, S1S2, no murmurs  Lungs:  equal breath sounds bilaterally, diminished at the bases, with few basilar rales, scattered rhonchi   Abdomen: soft, bowel sounds present, no apparent tenderness, nondistended  : borden in place with concentrated urine  Extremities:  No mottling, no edema  Neurologic: drowsy but arousable, hard of hearing, generally weak without focal motor weakness         Assessment/Plan:  Acute
Gregory Frost, [address] [place] [address], [facility],  [place] [address]  PHONE: [phone]  FAX: [phone]
potentially due to reflux esophagitis given adjacent fluid or inflammatory   stranding and the presence of a small sliding hiatal hernia. Malignancy   could appear similar, but no overtly suspicious features are present. 4. Trace bilateral pleural effusions, trace pericardial effusion, and minimal   anasarca potentially due to volume overload. 5. 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 Recommend follow-up with pancreas protocol abdomen MRI (preferred) or CT in   November 2024 for per the ACR recommendations for incidental pancreatic cysts. 6. Persistent focal dilation of the mid left ureter not significantly changed   since 11/13/2022 but increased since 08/31/2022 and not present prior to   that. Considerations include a focal post inflammatory or malignant   stricture versus less likely peristalsis. Consider CT urography on an   outpatient basis. 7. Additional incidental findings as above for which no dedicated follow-up   is recommended. CT PELVIS WO CONTRAST Additional Contrast? None   Final Result   1. No acute fracture or dislocation at the pelvis and hips. 2.  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 3.  Sclerosis in the superior humeral heads suggesting some avascular   necrosis but there is no collapse. 4.  Urinary bladder has wall thickening and multiple bladder diverticula. Mildly enlarged prostate bulging into the bladder floor. Had similar   features on the previous study. Unsure how much is related to the chronic   outlet obstruction versus some mild cystitis.               Scheduled Medicines   Medications:    piperacillin-tazobactam  3,375 mg IntraVENous Q12H    pantoprazole  40 mg IntraVENous Q12H    sennosides-docusate sodium  2 tablet Oral Daily    polyethylene glycol  17 g Oral Daily    levothyroxine  100 mcg Oral Daily    tamsulosin
provider] QUEtiapine  25 mg Oral 2 times per day    [Held by provider] escitalopram  10 mg Oral Daily    Vitamin D  2,000 Units Oral Daily    [Held by provider] amLODIPine  5 mg Oral Daily    lidocaine  1 patch TransDERmal Daily    [Held by provider] aspirin  81 mg Oral Daily    atorvastatin  40 mg Oral Nightly    finasteride  5 mg Oral Daily    [Held by provider] metoprolol tartrate  25 mg Oral BID    sodium chloride flush  5-40 mL IntraVENous 2 times per day    [Held by provider] enoxaparin  30 mg SubCUTAneous Daily      Infusions:    sodium bicarbonate infusion 75 mL/hr at 03/21/23 1407    sodium chloride       PRN Meds: HYDROmorphone, 0.25 mg, Q8H PRN  HYDROcodone-acetaminophen, 1 tablet, Q6H PRN  sodium chloride flush, 5-40 mL, PRN  sodium chloride, , PRN  ondansetron, 4 mg, Q8H PRN   Or  ondansetron, 4 mg, Q6H PRN  polyethylene glycol, 17 g, Daily PRN  acetaminophen, 650 mg, Q6H PRN   Or  acetaminophen, 650 mg, Q6H PRN          Electronically signed by Tamara Vega MD on 3/21/2023 at 3:41 PM
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 Labs and X ray results   Reviewed Current Medicines   Please see my detailed note on consultation  Patient will get 1 dose of Prolia this morning the dose is due in 6 months later  .      Jose Miguel Hoffmann MD, MD
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 Labs and X ray results   Reviewed Current Medicines   Please see my detailed note on consultation  Got 1 dose of Prolia this morning the dose is due in 6 months later  Some of the endocrine test done are still pending  Will start on vitamin D 2000 units IU daily  Continue on Synthroid  .      Clarke Childress MD, MD
agree with results and recommendations: RN;Patient    Education  Patient Education: recommendations/POC  Patient Education Response: Verbalizes understanding             Therapy Time  SLP Individual Minutes  Time In: 1500  Time Out: 3657  Minutes: [address] [facility], [facility] [address], [address] [facility], 3/10/2023
glycol  17 g Oral BID    sennosides-docusate sodium  2 tablet Oral BID      Infusions:    sodium chloride       PRN Meds: HYDROmorphone, 0.25 mg, Q8H PRN  HYDROcodone-acetaminophen, 1 tablet, Q6H PRN  sodium chloride flush, 5-40 mL, PRN  sodium chloride, , PRN  ondansetron, 4 mg, Q8H PRN   Or  ondansetron, 4 mg, Q6H PRN  polyethylene glycol, 17 g, Daily PRN  acetaminophen, 650 mg, Q6H PRN   Or  acetaminophen, 650 mg, Q6H PRN      Labs      Recent Results (from the past 24 hour(s))   Basic Metabolic Panel    Collection Time: 03/18/23 12:26 AM   Result Value Ref Range    Sodium 142 135 - 145 MMOL/L    Potassium 4.4 3.5 - 5.1 MMOL/L    Chloride 111 (H) 99 - 110 mMol/L    CO2 20 (L) 21 - 32 MMOL/L    Anion Gap 11 4 - 16    BUN 44 (H) 6 - 23 MG/DL    Creatinine 2.6 (H) 0.9 - 1.3 MG/DL    Est, Glom Filt Rate 22 (L) >60 mL/min/1.73m2    Glucose 120 (H) 70 - 99 MG/DL    Calcium 8.8 8.3 - 10.6 MG/DL        Imaging/Diagnostics Last 24 Hours   No results found. Comment: Please note this report has been produced using speech recognition software and may contain errors related to that system including errors in grammar, punctuation, and spelling, as well as words and phrases that may be inappropriate. If there are any questions or concerns please feel free to contact the dictating provider for clarification.      Electronically signed by Saranya Christensen MD on 3/18/2023 at 12:14 PM
levothyroxine  100 mcg Oral Daily    tamsulosin  0.8 mg Oral Nightly    miconazole   Topical BID    [Held by provider] ferrous sulfate  300 mg Oral Dinner    QUEtiapine  50 mg Oral Nightly    QUEtiapine  25 mg Oral 2 times per day    escitalopram  10 mg Oral Daily    Vitamin D  2,000 Units Oral Daily    amLODIPine  5 mg Oral Daily    lidocaine  1 patch TransDERmal Daily    [Held by provider] aspirin  81 mg Oral Daily    atorvastatin  40 mg Oral Nightly    finasteride  5 mg Oral Daily    [Held by provider] metoprolol tartrate  25 mg Oral BID    sodium chloride flush  5-40 mL IntraVENous 2 times per day    [Held by provider] enoxaparin  30 mg SubCUTAneous Daily      Infusions:    sodium chloride 25 mL (03/25/23 5101)         Objective:   Vitals: /65   Pulse (!) 108   Temp 97.7 °F (36.5 °C) (Axillary)   Resp 12   Ht 6' (1.829 m)   Wt 190 lb 14.4 oz (86.6 kg)   SpO2 98%   BMI 25.89 kg/m²   General appearance: alert but very confused and cooperative with exam  Neck: no JVD or bruit  Thyroid : Normal lobes   Lungs: Has Vesicular Breath sounds some basilar rales in the both lungs  Heart:  regular rate and rhythm  Abdomen: soft, non-tender; bowel sounds normal; no masses,  no organomegaly has NG tube connected to suction  Musculoskeletal: Normal  Extremities: extremities normal, , no edema possibly tenderness in the right hip  Neurologic:  Awake, alert, oriented to name, not to place and time. Cranial nerves II-XII are grossly intact. Motor is  intact. Sensory is ?? intact. ,  and gait is abnormal.  Most likely he is bed ridden     Assessment:     Patient Active Problem List:     Hyperlipidemia     Depression     Primary insomnia     Dysuria     Vitamin D deficiency     Seborrheic keratosis, inflamed     Actinic keratosis     Seborrheic keratosis     SCC (squamous cell carcinoma)     Varicose 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     Anxiety     BPH with urinary obstruction     Acquired
mg, Q6H PRN   Or  acetaminophen, 650 mg, Q6H PRN      Labs      Recent Results (from the past 24 hour(s))   Basic Metabolic Panel    Collection Time: 03/15/23  6:22 AM   Result Value Ref Range    Sodium 144 135 - 145 MMOL/L    Potassium 4.2 3.5 - 5.1 MMOL/L    Chloride 115 (H) 99 - 110 mMol/L    CO2 18 (L) 21 - 32 MMOL/L    Anion Gap 11 4 - 16    BUN 46 (H) 6 - 23 MG/DL    Creatinine 2.6 (H) 0.9 - 1.3 MG/DL    Est, Glom Filt Rate 22 (L) >60 mL/min/1.73m2    Glucose 113 (H) 70 - 99 MG/DL    Calcium 8.6 8.3 - 10.6 MG/DL        Imaging/Diagnostics Last 24 Hours   No results found. Comment: Please note this report has been produced using speech recognition software and may contain errors related to that system including errors in grammar, punctuation, and spelling, as well as words and phrases that may be inappropriate. If there are any questions or concerns please feel free to contact the dictating provider for clarification.      Electronically signed by Vida Meyers MD on 3/15/2023 at 11:23 AM
pantoprazole  40 mg IntraVENous Q12H    linezolid  600 mg IntraVENous Q12H    sennosides-docusate sodium  2 tablet Oral Daily    polyethylene glycol  17 g Oral Daily    OLANZapine  2.5 mg IntraMUSCular Daily    OLANZapine  5 mg IntraMUSCular Nightly    levothyroxine  100 mcg Oral Daily    tamsulosin  0.8 mg Oral Nightly    miconazole   Topical BID    [Held by provider] ferrous sulfate  300 mg Oral Dinner    [Held by provider] QUEtiapine  50 mg Oral Nightly    [Held by provider] QUEtiapine  25 mg Oral 2 times per day    [Held by provider] escitalopram  10 mg Oral Daily    Vitamin D  2,000 Units Oral Daily    [Held by provider] amLODIPine  5 mg Oral Daily    lidocaine  1 patch TransDERmal Daily    [Held by provider] aspirin  81 mg Oral Daily    atorvastatin  40 mg Oral Nightly    finasteride  5 mg Oral Daily    [Held by provider] metoprolol tartrate  25 mg Oral BID    sodium chloride flush  5-40 mL IntraVENous 2 times per day    [Held by provider] enoxaparin  30 mg SubCUTAneous Daily      Infusions:    sodium bicarbonate infusion 100 mL/hr at 03/21/23 0211    sodium chloride           Objective:   Vitals: BP (!) 140/63   Pulse 92   Temp 98.1 °F (36.7 °C) (Oral)   Resp 16   Ht 6' (1.829 m)   Wt 190 lb 14.4 oz (86.6 kg)   SpO2 98%   BMI 25.89 kg/m²   General appearance: alert but very confused and cooperative with exam  Neck: no JVD or bruit  Thyroid : Normal lobes   Lungs: Has Vesicular Breath sounds some basilar rales in the both lungs  Heart:  regular rate and rhythm  Abdomen: soft, non-tender; bowel sounds normal; no masses,  no organomegaly has NG tube connected to suction  Musculoskeletal: Normal  Extremities: extremities normal, , no edema possibly tenderness in the right hip  Neurologic:  Awake, alert, oriented to name, not to place and time. Cranial nerves II-XII are grossly intact. Motor is  intact. Sensory is ?? intact. ,  and gait is abnormal.  Most likely he is bed ridden     Assessment:
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 Labs and X ray results   Reviewed Current Medicines   Please see my detailed note on consultation  Got 1 dose of Prolia this morning the dose is due in 6 months later  Some of the endocrine test done are still pending  On vitamin D 2000 units IU daily  Looking into possibility of skilled nursing unit. Case management is working on it  Continue on Synthroid  .      Lisset Henley MD, MD
Current Medicines   Please see my detailed note on consultation  Got 1 dose of Prolia this morning the dose is due in 6 months later  On vitamin D 2000 units IU daily  Now has NG tube connected to suction for dilated stomach  Also has bilateral lung infiltrate possibly congestive failure versus pneumonia addressed by hospitalist  Looking into possibility of skilled nursing unit. Case management is working on it  Continue on Synthroid  .      Jose Miguel Hoffmann MD, MD
K/CU MM    MPV 10.4 7.5 - 11.1 FL   TYPE AND SCREEN    Collection Time: 03/18/23 11:52 PM   Result Value Ref Range    ABO/Rh O POSITIVE     Antibody Screen NEGATIVE    POCT Glucose    Collection Time: 03/19/23  7:29 AM   Result Value Ref Range    POC Glucose 156 (H) 70 - 99 MG/DL        Imaging/Diagnostics Last 24 Hours   No results found. Comment: Please note this report has been produced using speech recognition software and may contain errors related to that system including errors in grammar, punctuation, and spelling, as well as words and phrases that may be inappropriate. If there are any questions or concerns please feel free to contact the dictating provider for clarification.      Electronically signed by Vida Meyers MD on 3/19/2023 at 11:43 AM
congruent with mood and content of speech  Thought Production: Spontaneous. Thought Form: Coherent, linear, logical & goal-directed. No tangentiality or circumstantiality. No flight of ideas or loosening of associations. Thought Content/Perceptions: No EDILSON, noted VH, denies AH   noted paranoia delusion  Insight:poor  Judgment impaired  Memory: mostly maintained throughout interview  Fund of knowledge: Average  Gait/Balance: JEOVANNY         IMPRESSION:   Delirium due to multiple etiologies  Pain, different environment, urinary retension. 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 Would utilize family as medical decision makers  Agree with starting vit D by Endocrinology as this can effect depression long term. Recommend dc risperdone 0.5 mg po at bedtime  Recommend Seroquel at 25 mg 0800, 1400 and then 50 mg po at bedtime (per son, he does much better on Seroquel and can tell when he is not on this. Dc zoloft as patient is not on this currently in place for lexapro 10 mg po daily which he has been taking.   Recommend one antipsychotic due to paranoia and ?hallucinations-(VH)- Risperidone dc  Recommend a  visit and patient is spiritual.  Psychiatry will follow  Standard Delirium precautions:  o Redirect / reorient / reassure the patient  o Early mobilization (please allow patient to walk halls with assistance if needed)   o Reduce noise and provide adequate daytime light in the room; dianna-side room preferable if available  o Prevent sleep deprivation  o Minimize use of anticholinergic drugs, benzodiazepines, and narcotics  o Use of eyeglasses and hearing aids  o Treat volume depletion  o Bowel regimens  o Avoid lines or catheters if possible  o Monitor for unintentional self-harm  o Assess fall risk   Thank you for this consult  PS to Dr Justyn Valdez regarding
vasculature. LOWER CHEST:  Mild bronchial wall thickening in the included lung bases. Perifissural lymph nodes along right minor fissure. Patchy tree-in-bud nodularity in the lung bases most prominent in the left lower lobe. Patchy linear opacities in each lung base due to scarring or subsegmental atelectasis. Minimal passive atelectasis in the bilateral lower lobes with overlying trace bilateral pleural effusions, remaining larger on the left. Mild cardiomegaly. Relative hyperdensity of the left ventricular myocardium with respect to blood, suggesting underlying anemia. Mitral and aortic valve annular calcifications. Aortic valve leaflet calcifications that can be seen with aortic valve stenosis. Trace pericardial effusion. At least mild dilation of the partially included pulmonary trunk but not out of proportion with the ascending thoracic aorta, and no significant dilation of included intrapulmonary pulmonary artery branches out of proportion with accompanying bronchi. Mild to moderate coronary atherosclerotic calcifications most prominently involving the left anterior descending coronary. ORGANS:  Hepatic granulomatous calcification. Interim cholecystectomy. No intrahepatic nor extrahepatic biliary dilation. Moderately atrophic pancreas with some fatty replacement in a few punctate calcifications, potentially sequelae of chronic pancreatitis. No significant change in a 3.2 cm simple cystic lesion in the pancreatic uncinate process. Unchanged 2.1 cm left adrenal adenoma. Unchanged 4.6 cm cystic lesion in the lateral upper pole of the left kidney with a suspected thin partially calcified septation (Bosniak category 2). No calculi nor hydronephrosis. Unchanged focal dilation of the mid left ureter. Normal appearance the spleen, right adrenal gland, and right kidney. GI/BOWEL:  Suspicion for circumferential wall thickening in the distal thoracic esophagus with adjacent stranding or fluid.   Tiny
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 Recommend follow-up with pancreas protocol abdomen MRI (preferred) or CT in November 2024 for per the ACR recommendations for incidental pancreatic cysts. 6. Persistent focal dilation of the mid left ureter not significantly changed since 11/13/2022 but increased since 08/31/2022 and not present prior to that. Considerations include a focal post inflammatory or malignant stricture versus less likely peristalsis. Consider CT urography on an outpatient basis. 7. Additional incidental findings as above for which no dedicated follow-up is recommended. CT PELVIS WO CONTRAST Additional Contrast? None    Result Date: 3/6/2023  EXAMINATION: CT OF THE PELVIS WITHOUT CONTRAST 3/6/2023 4:08 am TECHNIQUE: CT of the pelvis was performed without the administration of intravenous contrast.  Multiplanar reformatted images are provided for review. Adjustment of mA and/or kV according to patient size was utilized. Automated exposure control, iterative reconstruction, and/or weight based adjustment of the mA/kV was utilized to reduce the radiation dose to as low as reasonably achievable. COMPARISON: CT abdomen pelvis of 11/13/2022 HISTORY: ORDERING SYSTEM PROVIDED HISTORY: Right sided hip pain TECHNOLOGIST PROVIDED HISTORY: Reason for exam:->Right sided hip pain Additional Contrast?->None Decision Support Exception - unselect if not a suspected or confirmed emergency medical condition->Emergency Medical Condition (MA) Reason for Exam: Right sided hip pain FINDINGS: The bones are osteopenic with general heterogenous density. Areas of greater heterogeneity with central lucency, sclerotic foci, and cortical thickening of the right iliac wing indicate Paget's disease. There is partial bony fusion at the right sacroiliac joint. No acute fractures of the sacrum or new widening of the sacroiliac joints.  The
disease continues down the right iliac wing through the level of the acetabulum into the pubic rami and ischium. Pubic symphysis is intact. The left hemipelvis does not have Paget's disease and no acute fracture of the left hemipelvis. No acute fracture or dislocation at the right or left hip. The hip joint spaces are narrowed. There is sclerosis of the upper portions of the humeral heads but without collapse. Constipation in the partially included right hemicolon and diverticulosis of the distal colon. The urinary bladder is dilated and rising up into the high pelvis. Areas of bladder wall thickening and diverticulosis of the bladder are present. The prostate is bulging along the bladder floor. No free fluid or hematoma in the pelvis. The low attenuation/fluid collections near the origin of the right and left inguinal canal were also present on the old exam and appears stable. 1.  No acute fracture or dislocation at the pelvis and hips. 2.  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 3.  Sclerosis in the superior humeral heads suggesting some avascular necrosis but there is no collapse. 4.  Urinary bladder has wall thickening and multiple bladder diverticula. Mildly enlarged prostate bulging into the bladder floor. Had similar features on the previous study. Unsure how much is related to the chronic outlet obstruction versus some mild cystitis.        Electronically signed by Seng Cool MD on 3/6/2023 at 11:51 AM
neoplasm. Recommend follow-up with pancreas protocol abdomen MRI (preferred) or CT in November 2024 for per the ACR recommendations for incidental pancreatic cysts. 6. Persistent focal dilation of the mid left ureter not significantly changed since 11/13/2022 but increased since 08/31/2022 and not present prior to that. Considerations include a focal post inflammatory or malignant stricture versus less likely peristalsis. Consider CT urography on an outpatient basis. 7. Additional incidental findings as above for which no dedicated follow-up is recommended. CT PELVIS WO CONTRAST Additional Contrast? None    Result Date: 3/6/2023  EXAMINATION: CT OF THE PELVIS WITHOUT CONTRAST 3/6/2023 4:08 am TECHNIQUE: CT of the pelvis was performed without the administration of intravenous contrast.  Multiplanar reformatted images are provided for review. Adjustment of mA and/or kV according to patient size was utilized. Automated exposure control, iterative reconstruction, and/or weight based adjustment of the mA/kV was utilized to reduce the radiation dose to as low as reasonably achievable. COMPARISON: CT abdomen pelvis of 11/13/2022 HISTORY: ORDERING SYSTEM PROVIDED HISTORY: Right sided hip pain TECHNOLOGIST PROVIDED HISTORY: Reason for exam:->Right sided hip pain Additional Contrast?->None Decision Support Exception - unselect if not a suspected or confirmed emergency medical condition->Emergency Medical Condition (MA) Reason for Exam: Right sided hip pain FINDINGS: The bones are osteopenic with general heterogenous density. Areas of greater heterogeneity with central lucency, sclerotic foci, and cortical thickening of the right iliac wing indicate Paget's disease. There is partial bony fusion at the right sacroiliac joint. No acute fractures of the sacrum or new widening of the sacroiliac joints.  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
Electronically signed by Curly Aguila MD on 3/7/2023 at 7:34 AM
Provider Contacted? Answer:   No     Order Specific Question:   When to contact consulting provider     Answer: Today    Inpatient consult to IV Team     Standing Status:   Standing     Number of Occurrences:   1     Order Specific Question:   Reason for Consult? Answer:   Pls place PIV, preferrably in a spot that is difficult to reach by pt :)    OT eval and treat     Standing Status:   Standing     Number of Occurrences:   1    PT eval and treat     Standing Status:   Standing     Number of Occurrences:   1    Initiate Oxygen Therapy Protocol     Initiate oxygen therapy if the patient has 1) SpO2 is less than 90%, 2) Cyanosis, Chest Pain, Dyspnea, or Altered level of consciousness AND SpO2 checked and it is less than 90%, or 3) patient on Home oxygen. To initiate oxygen therapy: nurse or RT enters Nasal cannula oxygen order using Per Protocol without Cosign order mode (if indication Home Oxygen then change L/min to same amount at home and change Wean to Room Air to No). Notify provider if initiate oxygen therapy unless already on Home Oxygen. Standing Status:   Standing     Number of Occurrences:   [id number]    SLP swallowing-dysphagia evaluation and treatment     Standing Status:   Standing     Number of Occurrences:   1     Order Specific Question:   Reason for SLP? Answer:   not able to follow commands on eating or drinking    SLP eval and treat     Standing Status:   Standing     Number of Occurrences:   1    SLP swallowing-dysphagia evaluation and treatment     Standing Status:   Standing     Number of Occurrences:   1    POCT Glucose     Standing Status:   Standing     Number of Occurrences:   1    POCT Glucose     Standing Status:   Standing     Number of Occurrences:   1    EKG 12 Lead     Qtc monitoring     Standing Status:   Standing     Number of Occurrences:   1     Order Specific Question:   Reason for Exam?     Answer:    Other    TYPE AND SCREEN     Specimen is valid for 3 days -
